# Patient Record
Sex: FEMALE | Race: WHITE | NOT HISPANIC OR LATINO | Employment: OTHER | URBAN - METROPOLITAN AREA
[De-identification: names, ages, dates, MRNs, and addresses within clinical notes are randomized per-mention and may not be internally consistent; named-entity substitution may affect disease eponyms.]

---

## 2017-01-03 ENCOUNTER — APPOINTMENT (OUTPATIENT)
Dept: ULTRASOUND IMAGING | Facility: HOSPITAL | Age: 72
End: 2017-01-03
Attending: INTERNAL MEDICINE

## 2017-01-04 ENCOUNTER — TRANSCRIBE ORDERS (OUTPATIENT)
Dept: ADMINISTRATIVE | Facility: HOSPITAL | Age: 72
End: 2017-01-04

## 2017-01-04 ENCOUNTER — LAB (OUTPATIENT)
Dept: LAB | Facility: HOSPITAL | Age: 72
End: 2017-01-04

## 2017-01-04 DIAGNOSIS — M10.071 ACUTE IDIOPATHIC GOUT OF RIGHT FOOT: ICD-10-CM

## 2017-01-04 DIAGNOSIS — M10.071 ACUTE IDIOPATHIC GOUT OF RIGHT FOOT: Primary | ICD-10-CM

## 2017-01-04 LAB
ALBUMIN SERPL-MCNC: 4.1 G/DL (ref 3.5–5.2)
ALP SERPL-CCNC: 88 U/L (ref 40–129)
ALT SERPL W P-5'-P-CCNC: 21 U/L (ref 5–33)
AST SERPL-CCNC: 26 U/L (ref 5–32)
BILIRUB CONJ SERPL-MCNC: <0.2 MG/DL (ref 0.2–0.3)
BILIRUB INDIRECT SERPL-MCNC: ABNORMAL MG/DL
BILIRUB SERPL-MCNC: 0.3 MG/DL (ref 0.2–1.2)
PROT SERPL-MCNC: 6.8 G/DL (ref 6–8.5)
URATE SERPL-MCNC: 4.5 MG/DL (ref 3.4–7)

## 2017-01-04 PROCEDURE — 84550 ASSAY OF BLOOD/URIC ACID: CPT

## 2017-01-04 PROCEDURE — 36415 COLL VENOUS BLD VENIPUNCTURE: CPT

## 2017-01-04 PROCEDURE — 80076 HEPATIC FUNCTION PANEL: CPT

## 2017-01-09 ENCOUNTER — TRANSCRIBE ORDERS (OUTPATIENT)
Dept: ADMINISTRATIVE | Facility: HOSPITAL | Age: 72
End: 2017-01-09

## 2017-01-09 ENCOUNTER — HOSPITAL ENCOUNTER (OUTPATIENT)
Dept: ULTRASOUND IMAGING | Facility: HOSPITAL | Age: 72
Discharge: HOME | End: 2017-01-09
Attending: INTERNAL MEDICINE | Admitting: INTERNAL MEDICINE

## 2017-01-09 ENCOUNTER — LAB (OUTPATIENT)
Dept: LAB | Facility: HOSPITAL | Age: 72
End: 2017-01-09
Attending: INTERNAL MEDICINE

## 2017-01-09 DIAGNOSIS — N17.9 ACUTE KIDNEY FAILURE, UNSPECIFIED (HCC): ICD-10-CM

## 2017-01-09 DIAGNOSIS — N18.30 CKD (CHRONIC KIDNEY DISEASE), STAGE III (HCC): ICD-10-CM

## 2017-01-09 DIAGNOSIS — D63.1 ANEMIA IN END-STAGE RENAL DISEASE (HCC): ICD-10-CM

## 2017-01-09 DIAGNOSIS — N18.6 ANEMIA IN END-STAGE RENAL DISEASE (HCC): ICD-10-CM

## 2017-01-09 DIAGNOSIS — I12.9 HYPERTENSIVE NEPHROPATHY, STAGE 1-4 OR UNSPECIFIED CHRONIC KIDNEY DISEASE: ICD-10-CM

## 2017-01-09 DIAGNOSIS — I12.9 HYPERTENSIVE NEPHROPATHY, STAGE 1-4 OR UNSPECIFIED CHRONIC KIDNEY DISEASE: Primary | ICD-10-CM

## 2017-01-09 DIAGNOSIS — N18.30 CHRONIC KIDNEY DISEASE, STAGE III (MODERATE) (HCC): ICD-10-CM

## 2017-01-09 LAB
25(OH)D3 SERPL-MCNC: 31.5 NG/ML
ANION GAP SERPL CALCULATED.3IONS-SCNC: 13.3 MMOL/L
BASOPHILS # BLD AUTO: 0.02 10*3/MM3 (ref 0–0.2)
BASOPHILS NFR BLD AUTO: 0.4 % (ref 0–2)
BILIRUB UR QL STRIP: NEGATIVE
BUN BLD-MCNC: 16 MG/DL (ref 8–23)
BUN/CREAT SERPL: 12.2 (ref 7–25)
CALCIUM SPEC-SCNC: 10.1 MG/DL (ref 8.8–10.5)
CHLORIDE SERPL-SCNC: 98 MMOL/L (ref 98–107)
CLARITY UR: CLEAR
CO2 SERPL-SCNC: 26.7 MMOL/L (ref 22–29)
COLOR UR: NORMAL
CREAT BLD-MCNC: 1.31 MG/DL (ref 0.57–1)
CREAT UR-MCNC: 30.1 MG/DL
DEPRECATED RDW RBC AUTO: 46 FL (ref 37–54)
EOSINOPHIL # BLD AUTO: 0.21 10*3/MM3 (ref 0.1–0.3)
EOSINOPHIL NFR BLD AUTO: 4.1 % (ref 0–4)
ERYTHROCYTE [DISTWIDTH] IN BLOOD BY AUTOMATED COUNT: 13.2 % (ref 11.5–14.5)
GFR SERPL CREATININE-BSD FRML MDRD: 40 ML/MIN/1.73
GLUCOSE BLD-MCNC: 87 MG/DL (ref 65–99)
GLUCOSE UR STRIP-MCNC: NEGATIVE MG/DL
HCT VFR BLD AUTO: 35.3 % (ref 37–47)
HGB BLD-MCNC: 11.5 G/DL (ref 12–16)
HGB UR QL STRIP.AUTO: NEGATIVE
IMM GRANULOCYTES # BLD: 0.08 10*3/MM3 (ref 0–0.03)
IMM GRANULOCYTES NFR BLD: 1.6 % (ref 0–0.5)
IRON 24H UR-MRATE: 59 MCG/DL (ref 37–145)
IRON SATN MFR SERPL: 20 %
KETONES UR QL STRIP: NEGATIVE
LEUKOCYTE ESTERASE UR QL STRIP.AUTO: NEGATIVE
LYMPHOCYTES # BLD AUTO: 1.97 10*3/MM3 (ref 0.6–4.8)
LYMPHOCYTES NFR BLD AUTO: 38.3 % (ref 20–45)
MCH RBC QN AUTO: 31 PG (ref 27–31)
MCHC RBC AUTO-ENTMCNC: 32.6 G/DL (ref 31–37)
MCV RBC AUTO: 95.1 FL (ref 81–99)
MONOCYTES # BLD AUTO: 0.58 10*3/MM3 (ref 0–1)
MONOCYTES NFR BLD AUTO: 11.3 % (ref 3–8)
NEUTROPHILS # BLD AUTO: 2.28 10*3/MM3 (ref 1.5–8.3)
NEUTROPHILS NFR BLD AUTO: 44.3 % (ref 45–70)
NITRITE UR QL STRIP: NEGATIVE
NRBC BLD MANUAL-RTO: 0 /100 WBC (ref 0–0)
PH UR STRIP.AUTO: 5 [PH] (ref 4.5–8)
PLATELET # BLD AUTO: 218 10*3/MM3 (ref 140–500)
PMV BLD AUTO: 9.2 FL (ref 7.4–10.4)
POTASSIUM BLD-SCNC: 4.3 MMOL/L (ref 3.5–5.2)
PROT UR QL STRIP: NEGATIVE
PROT UR-MCNC: 8 MG/DL
RBC # BLD AUTO: 3.71 10*6/MM3 (ref 4.2–5.4)
SODIUM BLD-SCNC: 138 MMOL/L (ref 136–145)
SP GR UR STRIP: 1.01 (ref 1–1.03)
TIBC SERPL-MCNC: 289 MCG/DL (ref 261–478)
UIBC SERPL-MCNC: 230 MCG/DL (ref 112–346)
UROBILINOGEN UR QL STRIP: NORMAL
WBC NRBC COR # BLD: 5.14 10*3/MM3 (ref 4.8–10.8)

## 2017-01-09 PROCEDURE — 80048 BASIC METABOLIC PNL TOTAL CA: CPT

## 2017-01-09 PROCEDURE — 85025 COMPLETE CBC W/AUTO DIFF WBC: CPT

## 2017-01-09 PROCEDURE — 82306 VITAMIN D 25 HYDROXY: CPT

## 2017-01-09 PROCEDURE — 83540 ASSAY OF IRON: CPT

## 2017-01-09 PROCEDURE — 36415 COLL VENOUS BLD VENIPUNCTURE: CPT

## 2017-01-09 PROCEDURE — 81003 URINALYSIS AUTO W/O SCOPE: CPT

## 2017-01-09 PROCEDURE — 76775 US EXAM ABDO BACK WALL LIM: CPT

## 2017-01-09 PROCEDURE — 84156 ASSAY OF PROTEIN URINE: CPT

## 2017-01-09 PROCEDURE — 82570 ASSAY OF URINE CREATININE: CPT

## 2017-01-09 PROCEDURE — 83550 IRON BINDING TEST: CPT

## 2017-01-11 ENCOUNTER — TELEPHONE (OUTPATIENT)
Dept: INTERNAL MEDICINE | Facility: CLINIC | Age: 72
End: 2017-01-11

## 2017-01-11 NOTE — TELEPHONE ENCOUNTER
Patient has been advised of her results.    --- Message from Bubba Avalos MD sent at 1/10/2017  4:37 PM EST -----  Normal renal ultrasound

## 2017-01-17 ENCOUNTER — OFFICE VISIT (OUTPATIENT)
Dept: PAIN MEDICINE | Facility: CLINIC | Age: 72
End: 2017-01-17

## 2017-01-17 VITALS
SYSTOLIC BLOOD PRESSURE: 127 MMHG | TEMPERATURE: 98.6 F | HEART RATE: 73 BPM | OXYGEN SATURATION: 96 % | WEIGHT: 201.2 LBS | DIASTOLIC BLOOD PRESSURE: 65 MMHG | HEIGHT: 66 IN | BODY MASS INDEX: 32.33 KG/M2 | RESPIRATION RATE: 18 BRPM

## 2017-01-17 DIAGNOSIS — M51.36 DDD (DEGENERATIVE DISC DISEASE), LUMBAR: ICD-10-CM

## 2017-01-17 DIAGNOSIS — Z79.899 ENCOUNTER FOR LONG-TERM (CURRENT) USE OF HIGH-RISK MEDICATION: ICD-10-CM

## 2017-01-17 DIAGNOSIS — M47.816 LUMBAR FACET ARTHROPATHY: ICD-10-CM

## 2017-01-17 DIAGNOSIS — M17.10 ARTHRITIS OF KNEE: ICD-10-CM

## 2017-01-17 DIAGNOSIS — G89.29 OTHER CHRONIC PAIN: Primary | ICD-10-CM

## 2017-01-17 PROCEDURE — 99213 OFFICE O/P EST LOW 20 MIN: CPT | Performed by: NURSE PRACTITIONER

## 2017-01-17 RX ORDER — HYDROCODONE BITARTRATE AND ACETAMINOPHEN 10; 325 MG/1; MG/1
1 TABLET ORAL EVERY 4 HOURS PRN
Qty: 180 TABLET | Refills: 0 | Status: SHIPPED | OUTPATIENT
Start: 2017-01-17 | End: 2017-02-16 | Stop reason: SDUPTHER

## 2017-01-17 RX ORDER — AMLODIPINE BESYLATE 10 MG/1
5 TABLET ORAL
COMMUNITY
End: 2017-01-17 | Stop reason: SDUPTHER

## 2017-01-17 NOTE — MR AVS SNAPSHOT
Jung Short   1/17/2017 12:40 PM   Office Visit    Dept Phone:  [phone]   Encounter #:  [id number]    Provider:  JOSE ANTONIO Childers   Department:  [facility] PAIN MANAGEMENT                Your Full Care Plan              Today's Medication Changes          These changes are accurate as of: 1/17/17  1:05 PM.  If you have any questions, ask your nurse or doctor.               Medication(s)that have changed:     amLODIPine 5 MG tablet   Commonly known as:  NORVASC   take 1 tablet by mouth once daily   What changed:  Another medication with the same name was removed. Continue taking this medication, and follow the directions you see here.   Changed by:  JOSE ANTONIO Childers            Where to Get Your Medications      You can get these medications from any pharmacy     Bring a paper prescription for each of these medications     HYDROcodone-acetaminophen  MG per tablet                  Your Updated Medication List          This list is accurate as of: 1/17/17  1:05 PM.  Always use your most recent med list.                alendronate 35 MG tablet   Commonly known as:  FOSAMAX   take 1 tablet by mouth WEEKLY       amLODIPine 5 MG tablet   Commonly known as:  NORVASC       anastrozole 1 MG tablet   Commonly known as:  ARIMIDEX   Take 1 tablet by mouth Daily.       aspirin 81 MG chewable tablet       Blood Glucose Test strip       bumetanide 2 MG tablet   Commonly known as:  BUMEX   Take 1 tablet by mouth 2 (Two) Times a Day.       carvedilol 12.5 MG tablet   Commonly known as:  COREG   take 1 tablet by mouth twice a day       CEPHALEXIN PO       Co Q 10 100 MG capsule       colchicine 0.6 MG tablet       DULoxetine 30 MG capsule   Commonly known as:  CYMBALTA   Take 1 capsule by mouth Daily.       * ULORIC 80 MG tablet tablet   Generic drug:  febuxostat       * febuxostat 40 MG tablet   Commonly known as:  ULORIC       FLUZONE HIGH-DOSE 0.5 ML  suspension prefilled syringe injection   Generic drug:  influenza vac split high-dose       glimepiride 1 MG tablet   Commonly known as:  AMARYL   Take 1/2 tab po a.m. And p.m.       HYDROcodone-acetaminophen  MG per tablet   Commonly known as:  NORCO   Take 1 tablet by mouth Every 4 (Four) Hours As Needed for moderate pain (4-6).       IRON PO       levothyroxine 25 MCG tablet   Commonly known as:  SYNTHROID, LEVOTHROID   take 1 tablet by mouth once daily       Magnesium 400 MG capsule       MULTIVITAMIN ADULT PO       Naloxegol Oxalate 25 MG tablet   Take 25 mg by mouth Daily.       spironolactone 25 MG tablet   Commonly known as:  ALDACTONE   take 1 tablet by mouth once daily       vitamin B-12 100 MCG tablet   Commonly known as:  CYANOCOBALAMIN       * Notice:  This list has 2 medication(s) that are the same as other medications prescribed for you. Read the directions carefully, and ask your doctor or other care provider to review them with you.            You Were Diagnosed With        Codes Comments    Other chronic pain    -  Primary ICD-10-CM: G89.29  ICD-9-CM: 338.29     DDD (degenerative disc disease), lumbar     ICD-10-CM: M51.36  ICD-9-CM: 722.52     Lumbar facet arthropathy     ICD-10-CM: M12.88  ICD-9-CM: 721.3     Arthritis of knee     ICD-10-CM: M19.90  ICD-9-CM: 716.96     Encounter for long-term (current) use of high-risk medication     ICD-10-CM: Z79.899  ICD-9-CM: V58.69       Instructions     None    Patient Instructions History      Upcoming Appointments     Visit Type Date Time Department    OFFICE VISIT 1/17/2017 12:40 PM [facility] PAIN [facility]    FOLLOW UP 1 UNIT 3/14/2017  3:20 PM [facility] ONC CBC [facility]    LAB 3/14/2017  2:40 PM [facility] ONC CBC LAB [facility]      App [employer] Signup     [facility] [employer] allows you to send messages to your doctor, view your test results, renew your prescriptions, schedule appointments, and more. To sign up, go to idio and click on the Sign Up  "Now link in the New User? box. Enter your [employer] Activation Code exactly as it appears below along with the last four digits of your Social Security Number and your Date of Birth () to complete the sign-up process. If you do not sign up before the expiration date, you must request a new code.    [employer] Activation Code: [email or web address]  Expires: 2017  1:05 PM    If you have questions, you can email [email or web address] or call [phone] to talk to our [employer] staff. Remember, [employer] is NOT to be used for urgent needs. For medical emergencies, dial 911.               Other Info from Your Visit           Your Appointments     Mar 14, 2017  2:40 PM EDT   Lab with LAB CHAIR 1 [facility] ONCOLOGY CBC LAB ([facility])    [address]  [place] [address] [phone]            Mar 14, 2017  3:20 PM EDT   FOLLOW UP with Tory Pagan MD   [facility] IN BLOOD DISORDERS AND CANCER ([facility])    [address]  [address]  [place] [address] [phone]              Allergies     Other      Adhesive tape    Latex  Rash      Reason for Visit     Pain           Vital Signs     Blood Pressure Pulse Temperature Respirations Height Weight    127/65 73 98.6 °F (37 °C) 18 66\" (167.6 cm) 201 lb 3.2 oz (91.3 kg)    Oxygen Saturation Body Mass Index Smoking Status             96% 32.47 kg/m2 Former Smoker         Problems and Diagnoses Noted     Arthritis of knee    Chronic pain    Degeneration of lumbar or lumbosacral intervertebral disc    Encounter for long-term (current) use of high-risk medication    Joint disorder        "

## 2017-01-17 NOTE — PROGRESS NOTES
CHIEF COMPLAINT    Aron returns for her diffuse pain. Today her knees and hands are hurting the worst.  She sat at a conference here recently and has hurt a lot since.    Subjective   Wendyl Aron Short is a 71 y.o. female  who presents to the office for follow-up.She has a history of chronic pain of multiple joints, OA, low back pain.  Overall her pain remains unchanged, has good and bad days depending on activity.  She is in need of multiple joint replacements but she is too high risk due to cardiovascular issues. She continues to report moderate pain reduction with the current regimen of Hydrocodone 10/325 6/day. Denies adverse reactions, ADL's by self. Does report some constipation, uses Movantik PRN.      Joint Pain   This is a chronic problem. The current episode started more than 1 year ago (today pain is 9/10VAS- joints). The problem occurs constantly. The problem has been unchanged. Associated symptoms include arthralgias, fatigue, joint swelling and numbness. Pertinent negatives include no chest pain, chills, congestion, coughing, fever, headaches (when she went off lisinopril), vomiting or weakness. Nothing aggravates the symptoms. She has tried oral narcotics, position changes and rest for the symptoms. The treatment provided moderate relief.      PEG Assessment   What number best describes your pain on average in the past week?9  What number best describes how, during the past week, pain has interfered with your enjoyment of life?9  What number best describes how, during the past week, pain has interfered with your general activity?  9    The following portions of the patient's history were reviewed and updated as appropriate: allergies, current medications, past family history, past medical history, past social history, past surgical history and problem list.    Review of Systems   Constitutional: Positive for fatigue. Negative for activity change, appetite change, chills and fever.   HENT: Negative  "for congestion.    Respiratory: Negative for cough, shortness of breath and wheezing.    Cardiovascular: Negative for chest pain.   Gastrointestinal: Negative for constipation, diarrhea and vomiting.   Genitourinary: Negative for difficulty urinating, dyspareunia and dysuria.   Musculoskeletal: Positive for arthralgias and joint swelling.   Neurological: Positive for numbness. Negative for dizziness, weakness, light-headedness and headaches (when she went off lisinopril).   Psychiatric/Behavioral: Positive for sleep disturbance. Negative for agitation, confusion, hallucinations and suicidal ideas. The patient is nervous/anxious.      Vitals:    01/17/17 1236   BP: 127/65   Pulse: 73   Resp: 18   Temp: 98.6 °F (37 °C)   SpO2: 96%   Weight: 201 lb 3.2 oz (91.3 kg)   Height: 66\" (167.6 cm)   PainSc:   9   PainLoc: Knee     Objective   Physical Exam   Constitutional: She is oriented to person, place, and time. Vital signs are normal. She appears well-developed and well-nourished. She is cooperative.   HENT:   Head: Normocephalic and atraumatic.   Nose: Nose normal.   Eyes: Conjunctivae and lids are normal.   Cardiovascular: Normal rate, regular rhythm and normal heart sounds.    Pulmonary/Chest: Effort normal and breath sounds normal.   Abdominal: Normal appearance.   Musculoskeletal:        Right shoulder: She exhibits decreased range of motion and tenderness.        Left shoulder: She exhibits decreased range of motion and tenderness.        Right knee: She exhibits swelling. She exhibits no effusion. Tenderness found.        Left knee: She exhibits decreased range of motion and swelling. She exhibits no effusion. Tenderness found.        Lumbar back: She exhibits tenderness.   Walking shoe bilateral feet   Neurological: She is alert and oriented to person, place, and time. Gait (ambulates with cane ) abnormal.   Reflex Scores:       Patellar reflexes are 1+ on the right side and 1+ on the left side.  Skin: Skin is " warm, dry and intact.   Psychiatric: She has a normal mood and affect. Her speech is normal and behavior is normal. Judgment and thought content normal. Cognition and memory are normal.   Nursing note and vitals reviewed.    Assessment/Plan   Jung was seen today for pain.    Diagnoses and all orders for this visit:    Other chronic pain    DDD (degenerative disc disease), lumbar    Lumbar facet arthropathy    Arthritis of knee    Encounter for long-term (current) use of high-risk medication    Other orders  -     HYDROcodone-acetaminophen (NORCO)  MG per tablet; Take 1 tablet by mouth Every 4 (Four) Hours As Needed for moderate pain (4-6).      --- Refill Hydrocodone.  Patient appears stable with current regimen. No adverse effects. Regarding continuation of opioids, there is no evidence of aberrant behavior or any red flags.  The patient continues with appropriate response to opioid therapy. ADL's remain intact by self.   --- The urine drug screen confirmation from 5- has been reviewed and the result is appropriate based on patient history and LOUANN report  --- Follow-up 1 month          LOUANN REPORT    As part of the patient's treatment plan, I am prescribing controlled substances. The patient has been made aware of appropriate use of such medications, including potential risk of somnolence, limited ability to drive and/or work safely, and the potential for dependence or overdose. It has also bee made clear that these medications are for use by this patient only, without concomitant use of alcohol or other substances unless prescribed.     Patient has completed prescribing agreement detailing terms of continued prescribing of controlled substances, including monitoring LOUANN reports, urine drug screening, and pill counts if necessary. The patient is aware that inappropriate use will results in cessation of prescribing such medications.    LOUANN report has been reviewed and scanned into the  patient's chart.    Date of last LOUANN : 1-    History and physical exam exhibit continued safe and appropriate use of controlled substances.    EMR Dragon/Transcription disclaimer:   Much of this encounter note is an electronic transcription/translation of spoken language to printed text. The electronic translation of spoken language may permit erroneous, or at times, nonsensical words or phrases to be inadvertently transcribed; Although I have reviewed the note for such errors, some may still exist.

## 2017-01-26 ENCOUNTER — TELEPHONE (OUTPATIENT)
Dept: INTERNAL MEDICINE | Facility: CLINIC | Age: 72
End: 2017-01-26

## 2017-01-26 RX ORDER — METHYLPREDNISOLONE 4 MG/1
TABLET ORAL
Qty: 21 TABLET | Refills: 0 | Status: SHIPPED | OUTPATIENT
Start: 2017-01-26 | End: 2017-02-16

## 2017-01-26 NOTE — TELEPHONE ENCOUNTER
----- Message from Bubba Avalos MD sent at 1/26/2017 10:16 AM EST -----  Lets send out a medrol pack and if not better ntbs  ----- Message -----     From: Lizeth Hampton MA     Sent: 1/23/2017   3:16 PM       To: Bubba Avalos MD        ----- Message -----     From: Kylee Granger     Sent: 1/23/2017   2:42 PM       To: [facility] [place] [address]    PT WENT TO SEE DR CASTRO TODAY AND THEY SUGGEST SHE COMES IN HERE. STATES HER KNEES ARE SWOLLEN, AND HAS SHOULDER AND HAND PAIN. STATES SHE DOESN'T KNOW WHAT SHE NEEDS TO DO. DOESN'T KNOW IF SHE NEEDS TO BE SEEN OR IF WE CAN JUST PUT HER ON A STEROID. PT SEES CHRISTOPHER. CALL BACK [phone].    Sent to [facility],  Left message for pt

## 2017-01-31 RX ORDER — CARVEDILOL 12.5 MG/1
TABLET ORAL
Qty: 60 TABLET | Refills: 6 | Status: SHIPPED | OUTPATIENT
Start: 2017-01-31 | End: 2017-09-09 | Stop reason: SDUPTHER

## 2017-02-16 ENCOUNTER — OFFICE VISIT (OUTPATIENT)
Dept: PAIN MEDICINE | Facility: CLINIC | Age: 72
End: 2017-02-16

## 2017-02-16 VITALS
HEIGHT: 66 IN | OXYGEN SATURATION: 98 % | HEART RATE: 74 BPM | TEMPERATURE: 98.2 F | SYSTOLIC BLOOD PRESSURE: 119 MMHG | RESPIRATION RATE: 18 BRPM | DIASTOLIC BLOOD PRESSURE: 66 MMHG

## 2017-02-16 DIAGNOSIS — G89.29 OTHER CHRONIC PAIN: Primary | ICD-10-CM

## 2017-02-16 DIAGNOSIS — Z79.899 ENCOUNTER FOR LONG-TERM (CURRENT) USE OF HIGH-RISK MEDICATION: ICD-10-CM

## 2017-02-16 DIAGNOSIS — M51.36 DDD (DEGENERATIVE DISC DISEASE), LUMBAR: ICD-10-CM

## 2017-02-16 DIAGNOSIS — M15.9 GENERALIZED OSTEOARTHRITIS: ICD-10-CM

## 2017-02-16 DIAGNOSIS — M17.10 ARTHRITIS OF KNEE: ICD-10-CM

## 2017-02-16 LAB
POC AMPHETAMINES: NEGATIVE
POC BARBITURATES: NEGATIVE
POC BENZODIAZEPHINES: NEGATIVE
POC COCAINE: NEGATIVE
POC METHADONE: NEGATIVE
POC METHAMPHETAMINE SCREEN URINE: NEGATIVE
POC OPIATES: POSITIVE
POC OXYCODONE: POSITIVE
POC PHENCYCLIDINE: NEGATIVE
POC PROPOXYPHENE: NEGATIVE
POC THC: NEGATIVE
POC TRICYCLIC ANTIDEPRESSANTS: POSITIVE

## 2017-02-16 PROCEDURE — 99213 OFFICE O/P EST LOW 20 MIN: CPT | Performed by: NURSE PRACTITIONER

## 2017-02-16 PROCEDURE — 80305 DRUG TEST PRSMV DIR OPT OBS: CPT | Performed by: NURSE PRACTITIONER

## 2017-02-16 RX ORDER — LISINOPRIL 5 MG/1
5 TABLET ORAL DAILY
Refills: 0 | COMMUNITY
Start: 2017-02-13 | End: 2018-05-10 | Stop reason: SINTOL

## 2017-02-16 RX ORDER — HYDROCODONE BITARTRATE AND ACETAMINOPHEN 10; 325 MG/1; MG/1
1 TABLET ORAL EVERY 4 HOURS PRN
Qty: 180 TABLET | Refills: 0 | Status: SHIPPED | OUTPATIENT
Start: 2017-02-16 | End: 2017-03-16 | Stop reason: SDUPTHER

## 2017-02-16 NOTE — PROGRESS NOTES
"CHIEF COMPLAINT  Follow-up for back and knee pain. Ms. Short states that her left knee has increased since her last appt. He back pain is unchanged.    Subjective   Jung Short is a 71 y.o. female  who presents to the office for follow-up.She has a history of knee pain/OA and back pain.  Overall, her back pain has remained unchanged though her joint pain is slightly worse. She is still having issues with her shoulder(right, \"it is torn\"). She is in need of multiple joint replacements (two knees and 1 shoulder).    Complains of pain in her low back and knees and joints. Today her pain is 8/10VAS. Describes the pain as continuous.  Continues with Hydrocodone 10/325 6/day and Cymbalta. Denies any side effects from the regimen. Constipation is no longer an issue, which has improved significantly. Is not taking Movantik regularly but rather sparingly. Denies any other side effects from the regimen. The regimen helps decrease her pain moderately. ADL's by self.    Is under the care of rheumatology, with Dr. Bolanos. Being followed for gout. Has a follow-up in 6 months.     She has a history of CKD and cannot tolerate NSAIDs.     Joint Pain   This is a chronic problem. The current episode started more than 1 year ago (today pain is 8/10VAS- joints). The problem occurs constantly. The problem has been unchanged. Associated symptoms include arthralgias, fatigue, joint swelling and weakness. Pertinent negatives include no chest pain, chills, congestion, coughing, fever, headaches, numbness or vomiting. Nothing aggravates the symptoms. She has tried oral narcotics, position changes and rest for the symptoms. The treatment provided moderate relief.      The following portions of the patient's history were reviewed and updated as appropriate: allergies, current medications, past family history, past medical history, past social history, past surgical history and problem list.    Review of Systems   Constitutional: Positive " "for fatigue. Negative for chills and fever.   HENT: Negative for congestion.    Eyes: Negative for visual disturbance.   Respiratory: Negative for cough, shortness of breath and wheezing.    Cardiovascular: Positive for leg swelling. Negative for chest pain.   Gastrointestinal: Negative for constipation, diarrhea and vomiting.   Genitourinary: Negative for difficulty urinating.   Musculoskeletal: Positive for arthralgias and joint swelling. Negative for back pain.   Neurological: Positive for weakness. Negative for light-headedness, numbness and headaches.   Psychiatric/Behavioral: Positive for sleep disturbance. Negative for suicidal ideas. The patient is nervous/anxious.        Vitals:    02/16/17 1331   BP: 119/66   Pulse: 74   Resp: 18   Temp: 98.2 °F (36.8 °C)   SpO2: 98%   Height: 66\" (167.6 cm)   PainSc:   8   PainLoc: Knee     Objective   Physical Exam   Constitutional: She is oriented to person, place, and time. Vital signs are normal. She appears well-developed and well-nourished. She is cooperative.   HENT:   Head: Normocephalic and atraumatic.   Nose: Nose normal.   Eyes: Conjunctivae and lids are normal.   Cardiovascular: Normal rate, regular rhythm and normal heart sounds.    Pulmonary/Chest: Effort normal and breath sounds normal. No respiratory distress.   Abdominal: Normal appearance.   Musculoskeletal:        Right shoulder: She exhibits decreased range of motion and tenderness.        Left shoulder: She exhibits decreased range of motion (patient has difficulty with any over-head reach) and tenderness.        Left knee: She exhibits decreased range of motion, effusion and bony tenderness. Tenderness found.        Lumbar back: She exhibits tenderness.   OA changes of bilateral knees    Walking shoe of bilateral feet         Neurological: She is alert and oriented to person, place, and time. Gait (ambulates with aid of cane) abnormal.   Reflex Scores:       Patellar reflexes are 1+ on the right side " and 1+ on the left side.  Skin: Skin is warm, dry and intact.   Psychiatric: She has a normal mood and affect. Her speech is normal and behavior is normal. Judgment and thought content normal. Cognition and memory are normal.   Nursing note and vitals reviewed.      Assessment/Plan   Jung was seen today for back pain and knee pain.    Diagnoses and all orders for this visit:    Other chronic pain  -     POC Urine Drug Screen, Triage  -     Urine Drug Screen Confirmation    Generalized osteoarthritis  -     POC Urine Drug Screen, Triage  -     Urine Drug Screen Confirmation    DDD (degenerative disc disease), lumbar  -     POC Urine Drug Screen, Triage  -     Urine Drug Screen Confirmation    Arthritis of knee  -     POC Urine Drug Screen, Triage  -     Urine Drug Screen Confirmation    Encounter for long-term (current) use of high-risk medication  -     POC Urine Drug Screen, Triage  -     Urine Drug Screen Confirmation    Other orders  -     HYDROcodone-acetaminophen (NORCO)  MG per tablet; Take 1 tablet by mouth Every 4 (Four) Hours As Needed for moderate pain (4-6).      --- Routine UDS in office today as part of monitoring requirements for controlled substances.  The specimen was viewed and the immunoassay result reviewed and is +OPI, +TCA(appropriate) and +OXCY(anticipate false positive).  This specimen will be sent to [facility] laboratory for confirmation.     --- Refill Hydrocodone. Patient appears stable with current regimen. No adverse effects. Regarding continuation of opioids, there is no evidence of aberrant behavior or any red flags.  The patient continues with appropriate response to opioid therapy. ADL's remain intact by self.   --- Follow-up 1 month or sooner if needed.       LOUANN REPORT    As part of the patient's treatment plan, I am prescribing controlled substances. The patient has been made aware of appropriate use of such medications, including potential risk of somnolence, limited  ability to drive and/or work safely, and the potential for dependence or overdose. It has also bee made clear that these medications are for use by this patient only, without concomitant use of alcohol or other substances unless prescribed.     Patient has completed prescribing agreement detailing terms of continued prescribing of controlled substances, including monitoring LOUANN reports, urine drug screening, and pill counts if necessary. The patient is aware that inappropriate use will results in cessation of prescribing such medications.    LOUANN report has been reviewed and scanned into the patient's chart.    Date of last LOUANN : 2-13-17    History and physical exam exhibit continued safe and appropriate use of controlled substances.      EMR Dragon/Transcription disclaimer:   Much of this encounter note is an electronic transcription/translation of spoken language to printed text. The electronic translation of spoken language may permit erroneous, or at times, nonsensical words or phrases to be inadvertently transcribed; Although I have reviewed the note for such errors, some may still exist.

## 2017-02-22 ENCOUNTER — OFFICE VISIT (OUTPATIENT)
Dept: INTERNAL MEDICINE | Facility: CLINIC | Age: 72
End: 2017-02-22

## 2017-02-22 VITALS
BODY MASS INDEX: 32.02 KG/M2 | SYSTOLIC BLOOD PRESSURE: 134 MMHG | HEART RATE: 77 BPM | WEIGHT: 199.2 LBS | HEIGHT: 66 IN | DIASTOLIC BLOOD PRESSURE: 86 MMHG | OXYGEN SATURATION: 97 %

## 2017-02-22 DIAGNOSIS — M25.50 ARTHRALGIA OF MULTIPLE JOINTS: ICD-10-CM

## 2017-02-22 DIAGNOSIS — E03.9 ACQUIRED HYPOTHYROIDISM: ICD-10-CM

## 2017-02-22 DIAGNOSIS — E11.9 TYPE 2 DIABETES MELLITUS WITHOUT COMPLICATION, WITHOUT LONG-TERM CURRENT USE OF INSULIN (HCC): Primary | ICD-10-CM

## 2017-02-22 DIAGNOSIS — M10.9 GOUT, UNSPECIFIED CAUSE, UNSPECIFIED CHRONICITY, UNSPECIFIED SITE: ICD-10-CM

## 2017-02-22 PROCEDURE — 99214 OFFICE O/P EST MOD 30 MIN: CPT | Performed by: INTERNAL MEDICINE

## 2017-02-22 RX ORDER — DULOXETIN HYDROCHLORIDE 60 MG/1
60 CAPSULE, DELAYED RELEASE ORAL DAILY
Qty: 30 CAPSULE | Refills: 1 | Status: SHIPPED | OUTPATIENT
Start: 2017-02-22 | End: 2017-04-20 | Stop reason: SDUPTHER

## 2017-02-22 RX ORDER — PREDNISONE 10 MG/1
TABLET ORAL
Qty: 35 TABLET | Refills: 0 | Status: SHIPPED | OUTPATIENT
Start: 2017-02-22 | End: 2017-03-16

## 2017-02-22 NOTE — PATIENT INSTRUCTIONS
Assessment/Plan   Jung was seen today for knee pain.    Diagnoses and all orders for this visit:    Type 2 diabetes mellitus without complication, without long-term current use of insulin    Acquired hypothyroidism    Arthralgia of multiple joints  -     DULoxetine (CYMBALTA) 60 MG capsule; Take 1 capsule by mouth Daily.  -     predniSONE (DELTASONE) 10 MG tablet; 5 tab po daily for 2 days, 4 for 2 days, 3 for 2 days, 2 for 2 days, 1 for 2 days    Gout, unspecified cause, unspecified chronicity, unspecified site    Recommend sparing use of prednisone  Repeat bmp, magnesium, phosphate  Gout well controlled  DM well controlled last check, will check next visit.

## 2017-02-22 NOTE — PROGRESS NOTES
Subjective   Jnug Short is a 71 y.o. female.     History of Present Illness   70yo female with L knee pain. Ongoing CKD, back to baseline 1.3 off norvasc stable on her ACEI.  She has ongoing pain in her L knee.  Had knee replaced on R with good success.  She had initial success with cymbalta. Unable to do elective surgery due to her heart disease and CHF.     She is seeing Dr. Bolanos for her gout, uric acid 7-->4.0 last check, sees him again in 6 months.    She is unable to walk reliably on L knee, has stopped driving, very depressed about loss of independence.     The following portions of the patient's history were reviewed and updated as appropriate: allergies, current medications, past family history, past medical history, past social history, past surgical history and problem list.    Review of Systems   Constitutional: Negative for appetite change, fatigue, fever and unexpected weight change.   HENT: Negative for sinus pressure and trouble swallowing.    Respiratory: Negative for cough and chest tightness.    Cardiovascular: Negative for chest pain, palpitations and leg swelling.   Gastrointestinal: Negative for constipation and diarrhea.   Genitourinary: Negative for dysuria, frequency, hematuria, pelvic pain and vaginal discharge.   Musculoskeletal: Positive for back pain and joint swelling.        L knee pain and swelling  R shoulder pain   Skin: Negative.    Neurological: Negative for dizziness, light-headedness and headaches.   Hematological: Negative.    Psychiatric/Behavioral: Positive for dysphoric mood. Negative for behavioral problems and sleep disturbance. The patient is nervous/anxious.        Objective   Physical Exam   Constitutional: She is oriented to person, place, and time. She appears well-developed and well-nourished.   HENT:   Head: Normocephalic and atraumatic.   Right Ear: External ear normal.   Left Ear: External ear normal.   Eyes: EOM are normal. Pupils are equal, round, and  reactive to light. Right eye exhibits no discharge. Left eye exhibits no discharge.   Neck: Normal range of motion. Neck supple.   Cardiovascular: Normal rate, regular rhythm and normal heart sounds.  Exam reveals no friction rub.    No murmur heard.  Pulmonary/Chest: Effort normal and breath sounds normal.   Musculoskeletal:   L medial knee effusion   Neurological: She is alert and oriented to person, place, and time. No cranial nerve deficit.   Skin: Skin is warm and dry. No erythema.   Psychiatric: She has a normal mood and affect. Her behavior is normal.   Vitals reviewed.  Labs 1.31. Hb 11.    Assessment/Plan   Jung was seen today for knee pain.    Diagnoses and all orders for this visit:    Type 2 diabetes mellitus without complication, without long-term current use of insulin    Acquired hypothyroidism    Arthralgia of multiple joints  -     DULoxetine (CYMBALTA) 60 MG capsule; Take 1 capsule by mouth Daily.  -     predniSONE (DELTASONE) 10 MG tablet; 5 tab po daily for 2 days, 4 for 2 days, 3 for 2 days, 2 for 2 days, 1 for 2 days    Gout, unspecified cause, unspecified chronicity, unspecified site    Recommend sparing use of prednisone  Repeat bmp, magnesium, phosphate  Gout well controlled  DM well controlled last check, will check next visit.

## 2017-02-23 RX ORDER — ANASTROZOLE 1 MG/1
TABLET ORAL
Qty: 30 TABLET | Refills: 2 | Status: SHIPPED | OUTPATIENT
Start: 2017-02-23 | End: 2017-06-01 | Stop reason: SDUPTHER

## 2017-02-28 ENCOUNTER — TELEPHONE (OUTPATIENT)
Dept: INTERNAL MEDICINE | Facility: CLINIC | Age: 72
End: 2017-02-28

## 2017-02-28 RX ORDER — LEVOTHYROXINE SODIUM 0.03 MG/1
TABLET ORAL
Qty: 30 TABLET | Refills: 6 | Status: SHIPPED | OUTPATIENT
Start: 2017-02-28 | End: 2017-10-25 | Stop reason: SDUPTHER

## 2017-02-28 NOTE — TELEPHONE ENCOUNTER
Per Dr. Jensen, there are no contraindications between these 2 medications.  Patient advised.    ----- Message from Janet Lopez MA sent at 2017  3:28 PM EST -----  Regarding: FW: QUESTION ABOUT MEDS  Contact: [phone]      ----- Message -----     From: Valentina Au     Sent: 2017   3:00 PM       To: [facility]  Subject: QUESTION ABOUT MEDS                              :  41    [facility] PATIENT    PATIENT WAS PRESCRIBED PREDNISONE, 10 MG AND DULOXETINE, 60 MG LAST WEEK. SHE HAS ONLY BEEN TAKING THE DULOXETINE. CAN SHE TAKE BOTH MEDS AT THE SAME TIME?

## 2017-03-16 ENCOUNTER — OFFICE VISIT (OUTPATIENT)
Dept: PAIN MEDICINE | Facility: CLINIC | Age: 72
End: 2017-03-16

## 2017-03-16 VITALS
OXYGEN SATURATION: 98 % | SYSTOLIC BLOOD PRESSURE: 123 MMHG | WEIGHT: 204 LBS | HEART RATE: 73 BPM | BODY MASS INDEX: 32.78 KG/M2 | HEIGHT: 66 IN | TEMPERATURE: 98 F | DIASTOLIC BLOOD PRESSURE: 72 MMHG | RESPIRATION RATE: 15 BRPM

## 2017-03-16 DIAGNOSIS — Z79.899 ENCOUNTER FOR LONG-TERM (CURRENT) USE OF HIGH-RISK MEDICATION: ICD-10-CM

## 2017-03-16 DIAGNOSIS — M47.816 LUMBAR FACET ARTHROPATHY: ICD-10-CM

## 2017-03-16 DIAGNOSIS — M15.9 GENERALIZED OSTEOARTHRITIS: ICD-10-CM

## 2017-03-16 DIAGNOSIS — M51.36 DDD (DEGENERATIVE DISC DISEASE), LUMBAR: ICD-10-CM

## 2017-03-16 DIAGNOSIS — G89.29 OTHER CHRONIC PAIN: Primary | ICD-10-CM

## 2017-03-16 DIAGNOSIS — M17.10 ARTHRITIS OF KNEE: ICD-10-CM

## 2017-03-16 PROCEDURE — 99213 OFFICE O/P EST LOW 20 MIN: CPT | Performed by: NURSE PRACTITIONER

## 2017-03-16 RX ORDER — HYDROCODONE BITARTRATE AND ACETAMINOPHEN 10; 325 MG/1; MG/1
1 TABLET ORAL EVERY 4 HOURS PRN
Qty: 180 TABLET | Refills: 0 | Status: SHIPPED | OUTPATIENT
Start: 2017-03-16 | End: 2017-04-13 | Stop reason: SDUPTHER

## 2017-03-16 NOTE — PROGRESS NOTES
CHIEF COMPLAINT    Since pt's last visit on 2-16-17, pt's knee and back pain decreased since she was on prednisone. But now that she is off of it, she states she feels the pain increasing.     Subjective   Jung Short is a 71 y.o. female  who presents to the office for follow-up.She has a history of arthritis/joint pain(multiple joints) and back pain. Her pain was slightly improved when she was on steroids, which helped her pain. However, she has completed the taper and her pain is now slightly worse.    Complains of pain in her joints and back. Today her pain is 6/10VAS. Describes the pain as continuous. Continues with Hydrocodone 10/325 6/day and Cymbalta daily. She does have some constipation with the regimen. Takes Movantik PRN with positive results.Has not been as bad since her last office visit. The regimen helps decrease her pain moderately. ADL's by self.     Is under the care of rheumatology, with Dr. Bolanos. Being followed for gout. Has a follow-up in < 6 months.      She has a history of CKD and cannot tolerate NSAIDs.     Joint Pain   This is a chronic problem. The current episode started more than 1 year ago (today pain is 6/10VAS- joints). The problem occurs constantly. The problem has been unchanged. Associated symptoms include arthralgias, fatigue, joint swelling and weakness. Pertinent negatives include no chest pain, chills, congestion, coughing, fever, headaches, numbness or vomiting. Nothing aggravates the symptoms. She has tried oral narcotics, position changes and rest for the symptoms. The treatment provided moderate relief.      PEG Assessment   What number best describes your pain on average in the past week?7  What number best describes how, during the past week, pain has interfered with your enjoyment of life?7  What number best describes how, during the past week, pain has interfered with your general activity?  7    The following portions of the patient's history were reviewed and  "updated as appropriate: allergies, current medications, past family history, past medical history, past social history, past surgical history and problem list.    Review of Systems   Constitutional: Positive for fatigue. Negative for chills and fever.   HENT: Negative for congestion.    Eyes: Negative for visual disturbance.   Respiratory: Negative for cough, shortness of breath and wheezing.    Cardiovascular: Positive for leg swelling (pt states they stay swollen). Negative for chest pain.   Gastrointestinal: Negative for constipation, diarrhea and vomiting.   Genitourinary: Negative for difficulty urinating.   Musculoskeletal: Positive for arthralgias, back pain and joint swelling.   Neurological: Positive for weakness. Negative for light-headedness, numbness and headaches.   Psychiatric/Behavioral: Positive for sleep disturbance. Negative for agitation, hallucinations and suicidal ideas. The patient is nervous/anxious.        Vitals:    03/16/17 1331   BP: 123/72   Pulse: 73   Resp: 15   Temp: 98 °F (36.7 °C)   SpO2: 98%   Weight: 204 lb (92.5 kg)   Height: 66\" (167.6 cm)   PainSc: 6  Comment: knees   PainLoc: Back     Objective   Physical Exam   Constitutional: She is oriented to person, place, and time. Vital signs are normal. She appears well-developed and well-nourished. She is cooperative.   HENT:   Head: Normocephalic and atraumatic.   Nose: Nose normal.   Eyes: Conjunctivae and lids are normal.   Cardiovascular: Normal rate, regular rhythm and normal heart sounds.    Pulmonary/Chest: Effort normal and breath sounds normal. No respiratory distress.   Abdominal: Normal appearance.   Musculoskeletal:        Right shoulder: She exhibits decreased range of motion and tenderness.        Left shoulder: She exhibits decreased range of motion (patient has difficulty with any over-head reach) and tenderness.        Left knee: She exhibits decreased range of motion, effusion and bony tenderness. Tenderness found.    "     Lumbar back: She exhibits tenderness.   OA changes of bilateral knees    Walking shoes of bilateral feet         Neurological: She is alert and oriented to person, place, and time. Gait (ambulates with aid of cane) abnormal.   Reflex Scores:       Patellar reflexes are 1+ on the right side and 1+ on the left side.  Skin: Skin is warm, dry and intact.   Psychiatric: She has a normal mood and affect. Her speech is normal and behavior is normal. Judgment and thought content normal. Cognition and memory are normal.   Nursing note and vitals reviewed.      Assessment/Plan   Jung was seen today for back pain and pain.    Diagnoses and all orders for this visit:    Other chronic pain    DDD (degenerative disc disease), lumbar    Lumbar facet arthropathy    Arthritis of knee    Generalized osteoarthritis    Encounter for long-term (current) use of high-risk medication    Other orders  -     HYDROcodone-acetaminophen (NORCO)  MG per tablet; Take 1 tablet by mouth Every 4 (Four) Hours As Needed for Moderate Pain (4-6).      --- The urine drug screen confirmation from 2-14-17 has been reviewed and the result is appropriate based on patient history and LOUANN report  --- Refill Hydrocodone. Patient appears stable with current regimen. No adverse effects. Regarding continuation of opioids, there is no evidence of aberrant behavior or any red flags.  The patient continues with appropriate response to opioid therapy. ADL's remain intact by self.   --- Follow-up 1 month or sooneri f needed.         LOUANN REPORT    As part of the patient's treatment plan, I am prescribing controlled substances. The patient has been made aware of appropriate use of such medications, including potential risk of somnolence, limited ability to drive and/or work safely, and the potential for dependence or overdose. It has also bee made clear that these medications are for use by this patient only, without concomitant use of alcohol or other  substances unless prescribed.     Patient has completed prescribing agreement detailing terms of continued prescribing of controlled substances, including monitoring LOUANN reports, urine drug screening, and pill counts if necessary. The patient is aware that inappropriate use will results in cessation of prescribing such medications.    LOUANN report has been reviewed and scanned into the patient's chart.    Date of last LOUANN : 3-14-17    History and physical exam exhibit continued safe and appropriate use of controlled substances.      EMR Dragon/Transcription disclaimer:   Much of this encounter note is an electronic transcription/translation of spoken language to printed text. The electronic translation of spoken language may permit erroneous, or at times, nonsensical words or phrases to be inadvertently transcribed; Although I have reviewed the note for such errors, some may still exist.

## 2017-04-05 ENCOUNTER — LAB (OUTPATIENT)
Dept: LAB | Facility: HOSPITAL | Age: 72
End: 2017-04-05
Attending: INTERNAL MEDICINE

## 2017-04-05 ENCOUNTER — TRANSCRIBE ORDERS (OUTPATIENT)
Dept: ADMINISTRATIVE | Facility: HOSPITAL | Age: 72
End: 2017-04-05

## 2017-04-05 DIAGNOSIS — N18.30 CHRONIC KIDNEY DISEASE, STAGE III (MODERATE) (HCC): Primary | ICD-10-CM

## 2017-04-05 DIAGNOSIS — N18.6 ANEMIA IN END-STAGE RENAL DISEASE (HCC): ICD-10-CM

## 2017-04-05 DIAGNOSIS — E55.9 UNSPECIFIED VITAMIN D DEFICIENCY: ICD-10-CM

## 2017-04-05 DIAGNOSIS — N18.30 CHRONIC KIDNEY DISEASE, STAGE III (MODERATE) (HCC): ICD-10-CM

## 2017-04-05 DIAGNOSIS — D63.1 ANEMIA IN END-STAGE RENAL DISEASE (HCC): ICD-10-CM

## 2017-04-05 LAB
25(OH)D3 SERPL-MCNC: 24.6 NG/ML
ANION GAP SERPL CALCULATED.3IONS-SCNC: 10.3 MMOL/L
BASOPHILS # BLD AUTO: 0.03 10*3/MM3 (ref 0–0.2)
BASOPHILS NFR BLD AUTO: 0.6 % (ref 0–2)
BILIRUB UR QL STRIP: NEGATIVE
BUN BLD-MCNC: 35 MG/DL (ref 8–23)
BUN/CREAT SERPL: 20.6 (ref 7–25)
CALCIUM SPEC-SCNC: 9.8 MG/DL (ref 8.8–10.5)
CHLORIDE SERPL-SCNC: 94 MMOL/L (ref 98–107)
CLARITY UR: CLEAR
CO2 SERPL-SCNC: 27.7 MMOL/L (ref 22–29)
COLOR UR: YELLOW
CREAT BLD-MCNC: 1.7 MG/DL (ref 0.57–1)
DEPRECATED RDW RBC AUTO: 42.5 FL (ref 37–54)
EOSINOPHIL # BLD AUTO: 0.15 10*3/MM3 (ref 0.1–0.3)
EOSINOPHIL NFR BLD AUTO: 3.2 % (ref 0–4)
ERYTHROCYTE [DISTWIDTH] IN BLOOD BY AUTOMATED COUNT: 12.7 % (ref 11.5–14.5)
GFR SERPL CREATININE-BSD FRML MDRD: 29 ML/MIN/1.73
GLUCOSE BLD-MCNC: 125 MG/DL (ref 65–99)
GLUCOSE UR STRIP-MCNC: NEGATIVE MG/DL
HCT VFR BLD AUTO: 34.3 % (ref 37–47)
HGB BLD-MCNC: 11.1 G/DL (ref 12–16)
HGB UR QL STRIP.AUTO: NEGATIVE
IMM GRANULOCYTES # BLD: 0.04 10*3/MM3 (ref 0–0.03)
IMM GRANULOCYTES NFR BLD: 0.8 % (ref 0–0.5)
KETONES UR QL STRIP: NEGATIVE
LEUKOCYTE ESTERASE UR QL STRIP.AUTO: NEGATIVE
LYMPHOCYTES # BLD AUTO: 1.75 10*3/MM3 (ref 0.6–4.8)
LYMPHOCYTES NFR BLD AUTO: 36.8 % (ref 20–45)
MCH RBC QN AUTO: 29.8 PG (ref 27–31)
MCHC RBC AUTO-ENTMCNC: 32.4 G/DL (ref 31–37)
MCV RBC AUTO: 92.2 FL (ref 81–99)
MONOCYTES # BLD AUTO: 0.65 10*3/MM3 (ref 0–1)
MONOCYTES NFR BLD AUTO: 13.7 % (ref 3–8)
NEUTROPHILS # BLD AUTO: 2.13 10*3/MM3 (ref 1.5–8.3)
NEUTROPHILS NFR BLD AUTO: 44.9 % (ref 45–70)
NITRITE UR QL STRIP: NEGATIVE
NRBC BLD MANUAL-RTO: 0 /100 WBC (ref 0–0)
PH UR STRIP.AUTO: <=5 [PH] (ref 4.5–8)
PLATELET # BLD AUTO: 265 10*3/MM3 (ref 140–500)
PMV BLD AUTO: 9 FL (ref 7.4–10.4)
POTASSIUM BLD-SCNC: 4.5 MMOL/L (ref 3.5–5.2)
PROT UR QL STRIP: NEGATIVE
RBC # BLD AUTO: 3.72 10*6/MM3 (ref 4.2–5.4)
SODIUM BLD-SCNC: 132 MMOL/L (ref 136–145)
SP GR UR STRIP: <=1.005 (ref 1–1.03)
UROBILINOGEN UR QL STRIP: NORMAL
WBC NRBC COR # BLD: 4.75 10*3/MM3 (ref 4.8–10.8)

## 2017-04-05 PROCEDURE — 36415 COLL VENOUS BLD VENIPUNCTURE: CPT

## 2017-04-05 PROCEDURE — 80048 BASIC METABOLIC PNL TOTAL CA: CPT

## 2017-04-05 PROCEDURE — 85025 COMPLETE CBC W/AUTO DIFF WBC: CPT

## 2017-04-05 PROCEDURE — 82306 VITAMIN D 25 HYDROXY: CPT

## 2017-04-05 PROCEDURE — 81003 URINALYSIS AUTO W/O SCOPE: CPT

## 2017-04-11 ENCOUNTER — LAB (OUTPATIENT)
Dept: LAB | Facility: HOSPITAL | Age: 72
End: 2017-04-11

## 2017-04-11 ENCOUNTER — OFFICE VISIT (OUTPATIENT)
Dept: ONCOLOGY | Facility: CLINIC | Age: 72
End: 2017-04-11

## 2017-04-11 VITALS
DIASTOLIC BLOOD PRESSURE: 57 MMHG | RESPIRATION RATE: 16 BRPM | TEMPERATURE: 98.1 F | HEIGHT: 66 IN | WEIGHT: 198.2 LBS | BODY MASS INDEX: 31.85 KG/M2 | OXYGEN SATURATION: 95 % | SYSTOLIC BLOOD PRESSURE: 99 MMHG | HEART RATE: 70 BPM

## 2017-04-11 DIAGNOSIS — C50.211 MALIGNANT NEOPLASM OF UPPER-INNER QUADRANT OF RIGHT FEMALE BREAST (HCC): Primary | ICD-10-CM

## 2017-04-11 DIAGNOSIS — M85.80 OSTEOPENIA: ICD-10-CM

## 2017-04-11 DIAGNOSIS — Z12.39 BREAST CANCER SCREENING: ICD-10-CM

## 2017-04-11 DIAGNOSIS — M85.852 OSTEOPENIA OF LEFT THIGH: ICD-10-CM

## 2017-04-11 DIAGNOSIS — Z13.820 ENCOUNTER FOR SCREENING FOR OSTEOPOROSIS: ICD-10-CM

## 2017-04-11 DIAGNOSIS — D64.9 ANEMIA, UNSPECIFIED TYPE: Primary | ICD-10-CM

## 2017-04-11 DIAGNOSIS — M85.88 OSTEOPENIA OF SPINE: ICD-10-CM

## 2017-04-11 DIAGNOSIS — D64.9 ANEMIA, UNSPECIFIED TYPE: ICD-10-CM

## 2017-04-11 DIAGNOSIS — Z12.31 ENCOUNTER FOR SCREENING MAMMOGRAM FOR MALIGNANT NEOPLASM OF BREAST: ICD-10-CM

## 2017-04-11 LAB
BASOPHILS # BLD AUTO: 0.04 10*3/MM3 (ref 0–0.2)
BASOPHILS NFR BLD AUTO: 0.6 % (ref 0–2)
DEPRECATED RDW RBC AUTO: 41.9 FL (ref 37–54)
EOSINOPHIL # BLD AUTO: 0.22 10*3/MM3 (ref 0.1–0.3)
EOSINOPHIL NFR BLD AUTO: 3.5 % (ref 0–4)
ERYTHROCYTE [DISTWIDTH] IN BLOOD BY AUTOMATED COUNT: 12.8 % (ref 11.5–14.5)
HCT VFR BLD AUTO: 33.8 % (ref 37–47)
HGB BLD-MCNC: 11.2 G/DL (ref 12–16)
IMM GRANULOCYTES # BLD: 0.04 10*3/MM3 (ref 0–0.03)
IMM GRANULOCYTES NFR BLD: 0.6 % (ref 0–0.5)
LYMPHOCYTES # BLD AUTO: 2.53 10*3/MM3 (ref 0.6–4.8)
LYMPHOCYTES NFR BLD AUTO: 40 % (ref 20–45)
MCH RBC QN AUTO: 30 PG (ref 27–31)
MCHC RBC AUTO-ENTMCNC: 33.1 G/DL (ref 31–37)
MCV RBC AUTO: 90.6 FL (ref 81–99)
MONOCYTES # BLD AUTO: 0.81 10*3/MM3 (ref 0–1)
MONOCYTES NFR BLD AUTO: 12.8 % (ref 3–8)
NEUTROPHILS # BLD AUTO: 2.68 10*3/MM3 (ref 1.5–8.3)
NEUTROPHILS NFR BLD AUTO: 42.5 % (ref 45–70)
NRBC BLD MANUAL-RTO: 0 /100 WBC (ref 0–0)
PLATELET # BLD AUTO: 262 10*3/MM3 (ref 140–500)
PMV BLD AUTO: 9.7 FL (ref 7.4–10.4)
RBC # BLD AUTO: 3.73 10*6/MM3 (ref 4.2–5.4)
WBC NRBC COR # BLD: 6.32 10*3/MM3 (ref 4.8–10.8)

## 2017-04-11 PROCEDURE — 85025 COMPLETE CBC W/AUTO DIFF WBC: CPT | Performed by: INTERNAL MEDICINE

## 2017-04-11 PROCEDURE — 99214 OFFICE O/P EST MOD 30 MIN: CPT | Performed by: INTERNAL MEDICINE

## 2017-04-11 PROCEDURE — 36415 COLL VENOUS BLD VENIPUNCTURE: CPT | Performed by: INTERNAL MEDICINE

## 2017-04-11 NOTE — PROGRESS NOTES
Subjective:     Reason for follow up:   1. 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    * Arimidex initiated in 08/2015 and discontinued in 09/2015 secondary to poor tolerance. Further endocrine therapy was not undertaken secondary to patients multiple comorbidities and limited benefit.     2. Osteopenia.      History of Present Ilness: Jung Short presents for follow-up of breast cancer.  She had previously mentioned to me that she discontinue the Arimidex however today patient notes that she never stop taking it.  She tolerates it fairly well.  She has not noticed any abnormalities of the prior mastectomy site or the left breast.  She denies any fevers, chills, night sweats.  She continues to follow with multiple positions were multiple issues.      Past Medical   Past Medical History:   Diagnosis Date   • Anemia    • Benign breast disease    • Cancer     Right breast   • Cellulitis of leg     May-2003 right lower extremity   • CHF (congestive heart failure)    • Chronic kidney disease    • Chronic ulcer of right foot     Non-pressure   • Depression    • Diabetic peripheral neuropathy    • Diarrhea    • Diverticulosis    • Encounter for eye exam    • Gastroesophageal reflux    • Hiatal hernia    • History of bone density study 09/20/2012   • History of colonoscopy     done 6/03 recheck in 10 years.   Done july 2013 recheck in 5 years   • History of mammography, screening 09/20/2012   • Hospital discharge follow-up    • Hyperlipidemia    • Hypertension    • Hypothyroidism    • Impingement syndrome of right shoulder    • Joint pain    • Menopausal disorder    • Methicillin resistant Staphylococcus aureus infection    • MRSA (methicillin resistant staph aureus) culture positive    • Nausea and vomiting    • Nonischemic cardiomyopathy     Ejection fraction 10%  per 2-D echo with Doppler however she did have cardiac catheterization April 2015 which showed ejection fraction 20% with global hypokinesis and severe mitral insufficiency   • Osteoarthritis    • Paroxysmal atrial fibrillation    • Postoperative infection     July of 2012 following her hysterectomy far vaginal wall prolapse. She was on antibiotics and wound dressings for 2 months.     Patient Active Problem List   Diagnosis   • Abdominal bloating   • Abdominal mass   • Abdominal pain   • Abnormal gait   • Abnormal mammogram   • Acute bronchitis   • Acute upper respiratory infection   • Anemia   • Infection due to fungus   • Atherosclerosis of coronary artery   • Hematochezia   • Thoracic back pain   • Malignant neoplasm of upper-inner quadrant of right female breast   • Candidiasis   • Cardiomyopathy   • Disc disorder of cervical region   • Neck pain   • Contusion of chest wall   • Tenderness of chest wall   • Chronic kidney disease, stage III (moderate)   • Chronic pain   • Congestive heart failure   • Constipation   • Generalized osteoarthritis   • Degeneration of intervertebral disc   • Depression   • Type 2 diabetes mellitus   • Controlled type 2 diabetes mellitus without complication   • Diarrhea   • Dyslipidemia   • Gastroesophageal reflux disease with esophagitis   • Fall in home   • Fall on same level from slipping, tripping or stumbling   • Generalized pain   • Gout   • Injury of head   • Hyperkalemia   • Hypertension   • Hypokalemia   • Hypomagnesemia   • Hypothyroidism   • Incisional hernia   • Mass of breast   • Mitral valve insufficiency   • Nausea   • Adult body mass index greater than 30   • Osteopenia   • Arthralgia of multiple joints   • Peripheral neuropathy   • Pulmonary hypertension   • Osteomyelitis   • Tricuspid valve insufficiency   • Cobalamin deficiency   • Vitamin D deficiency   • Weight loss   • Weight gain   • Diabetes mellitus   • Congestive heart failure with left ventricular systolic  dysfunction   • Left knee pain   • Arthritis of knee   • DDD (degenerative disc disease), lumbar   • Lumbar facet arthropathy   • Pain in shoulder   • Chronic gout of multiple sites   • Shortness of breath   • Encounter for long-term (current) use of high-risk medication   • History of cancer   • Constipation due to opioid therapy     Social History   Social History     Social History   • Marital status:      Spouse name: N/A   • Number of children: N/A   • Years of education: N/A     Occupational History   • 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    Review of Systems  Review of Systems   Constitutional: Negative for activity change, appetite change, chills, diaphoresis, fatigue, fever and unexpected weight change.   Respiratory: Negative for cough, chest tightness and shortness of breath.    Cardiovascular: Negative for chest pain, palpitations and leg swelling.   Gastrointestinal:  "Negative for abdominal pain, blood in stool, constipation, diarrhea, nausea and vomiting.   Musculoskeletal: Positive for arthralgias and myalgias. Negative for joint swelling.   Neurological: Positive for numbness.   Hematological: Negative for adenopathy. Does not bruise/bleed easily.          Objective:     Vitals:    04/11/17 1027   BP: 99/57   Pulse: 70   Resp: 16   Temp: 98.1 °F (36.7 °C)   SpO2: 95%   Weight: 198 lb 3.2 oz (89.9 kg)  Comment: stated wt   Height: 66\" (167.6 cm)   PainSc: 0-No pain     Current Status 4/11/2017   ECOG score 1     GENERAL:  Well-developed, overweight female in no acute distress.   LYMPHATICS:  No cervical, supraclavicular, axillary adenopathy.  CHEST:  Lungs clear to percussion and auscultation. Good airflow.  CARDIAC:  Regular rate and rhythm without murmurs, rubs or gallops. Normal S1,S2. No edema  EXTREMITIES:  No clubbing, cyanosis or edema.  PSYCHIATRIC:  Normal affect and mood.    BREASTS: right breast status post mastectomy without palpable masses or skin changes    Labs and Imaging  Results for orders placed or performed in visit on 04/11/17   CBC Auto Differential   Result Value Ref Range    WBC 6.32 4.80 - 10.80 10*3/mm3    RBC 3.73 (L) 4.20 - 5.40 10*6/mm3    Hemoglobin 11.2 (L) 12.0 - 16.0 g/dL    Hematocrit 33.8 (L) 37.0 - 47.0 %    MCV 90.6 81.0 - 99.0 fL    MCH 30.0 27.0 - 31.0 pg    MCHC 33.1 31.0 - 37.0 g/dL    RDW 12.8 11.5 - 14.5 %    RDW-SD 41.9 37.0 - 54.0 fl    MPV 9.7 7.4 - 10.4 fL    Platelets 262 140 - 500 10*3/mm3    Neutrophil % 42.5 (L) 45.0 - 70.0 %    Lymphocyte % 40.0 20.0 - 45.0 %    Monocyte % 12.8 (H) 3.0 - 8.0 %    Eosinophil % 3.5 0.0 - 4.0 %    Basophil % 0.6 0.0 - 2.0 %    Immature Grans % 0.6 (H) 0.0 - 0.5 %    Neutrophils, Absolute 2.68 1.50 - 8.30 10*3/mm3    Lymphocytes, Absolute 2.53 0.60 - 4.80 10*3/mm3    Monocytes, Absolute 0.81 0.00 - 1.00 10*3/mm3    Eosinophils, Absolute 0.22 0.10 - 0.30 10*3/mm3    Basophils, Absolute 0.04 0.00 - " 0.20 10*3/mm3    Immature Grans, Absolute 0.04 (H) 0.00 - 0.03 10*3/mm3    nRBC 0.0 0.0 - 0.0 /100 WBC       Breast imaging: Mammo 6/2016:  IMPRESSION:  Negative left digital diagnostic mammogram. Suggest yearly  screening.      BI-RADS CATEGORY 1: Negative.    Assessment/Plan     Assessment:   1. Stage I, pT1cN0, invasive ductal carcinoma with dcis of the right breast, ER positive, HER-2/gonzalez positive.    * Status post right mastectomy with axillary dissection on 07/16/2015.    * The patient was not a candidate for chemotherapy or Herceptin treatment secondary to her significant comorbidities.  She remains on Arimidex.  After treatment his discussions it seems like she had stopped that she notes today that she never discontinued the Arimidex.  * Remains SONYA.   2. Osteopenia.  Patient has a prescription for Fosamax and takes it occasionally.  She will continue calcium and vitamin D.   3. Anemia of chronic disease. Monitoring.   4. Congestive heart failure managed by Dr. Bates.   5. Multiple comorbidities including CKD, pulmonary hypertension and diabetes as well as foot ulcer managed by Dr Avalos.    Plan:     1. Annual left mammogram due July 2017  2. DEXA scan due 8/2017.  3. Patient was instructed on the importance of physical activity, healthy diet, and self chest wall exams.  Patient will continue calcium and vitamin D supplementation.    4. Monitor anemia  5. Continue Arimidex and Fosamax.    Follow-up in 6 months. I asked the patient to call for any questions, concerns, or new symptoms.

## 2017-04-13 ENCOUNTER — OFFICE VISIT (OUTPATIENT)
Dept: PAIN MEDICINE | Facility: CLINIC | Age: 72
End: 2017-04-13

## 2017-04-13 VITALS
DIASTOLIC BLOOD PRESSURE: 73 MMHG | HEIGHT: 66 IN | OXYGEN SATURATION: 94 % | TEMPERATURE: 98 F | HEART RATE: 76 BPM | SYSTOLIC BLOOD PRESSURE: 112 MMHG | RESPIRATION RATE: 18 BRPM

## 2017-04-13 DIAGNOSIS — M15.9 GENERALIZED OSTEOARTHRITIS: ICD-10-CM

## 2017-04-13 DIAGNOSIS — Z79.899 ENCOUNTER FOR LONG-TERM (CURRENT) USE OF HIGH-RISK MEDICATION: ICD-10-CM

## 2017-04-13 DIAGNOSIS — G62.9 PERIPHERAL POLYNEUROPATHY: ICD-10-CM

## 2017-04-13 DIAGNOSIS — M51.36 DDD (DEGENERATIVE DISC DISEASE), LUMBAR: ICD-10-CM

## 2017-04-13 DIAGNOSIS — G89.29 OTHER CHRONIC PAIN: Primary | ICD-10-CM

## 2017-04-13 PROCEDURE — 99213 OFFICE O/P EST LOW 20 MIN: CPT | Performed by: NURSE PRACTITIONER

## 2017-04-13 RX ORDER — HYDROCODONE BITARTRATE AND ACETAMINOPHEN 10; 325 MG/1; MG/1
1 TABLET ORAL EVERY 4 HOURS PRN
Qty: 180 TABLET | Refills: 0 | Status: SHIPPED | OUTPATIENT
Start: 2017-04-13 | End: 2017-05-11 | Stop reason: SDUPTHER

## 2017-04-13 NOTE — PROGRESS NOTES
CHIEF COMPLAINT  Follow-up for back and knee pain. Ms. Short states that her pain is unchanged from her last appt.    Subjective   Jung Short is a 71 y.o. female  who presents to the office for follow-up.She has a history of back pain and joint pain. Overall, her pain pattern has been relatively unchanged since her last office visit.    Complains of pain in her back and joints(knee). Today her pain is 5/10VAS. Describes the pain as continuous.  Pain increases with activity, including gardening. Continues with Hydrocodone 10/325 6/day and cymbalta. Denies any side effects from the regimen.  Notes moderate relief with the regimen. ADL's by self.     Is meeting with her cardiologist tomorrow. Has discussed with orthopedist(Dr. Braden) about replacing left knee. He will do TKR if given surgical clearance.    Joint Pain   This is a chronic problem. The current episode started more than 1 year ago (today pain is 5/10VAS- joints). The problem occurs constantly. The problem has been unchanged. Associated symptoms include arthralgias, fatigue and joint swelling. Pertinent negatives include no chest pain, chills, congestion, coughing, fever, headaches, numbness, vomiting or weakness. Nothing aggravates the symptoms. She has tried oral narcotics, position changes and rest for the symptoms. The treatment provided moderate relief.      PEG Assessment   What number best describes your pain on average in the past week?5  What number best describes how, during the past week, pain has interfered with your enjoyment of life?5  What number best describes how, during the past week, pain has interfered with your general activity?  7    The following portions of the patient's history were reviewed and updated as appropriate: allergies, current medications, past family history, past medical history, past social history, past surgical history and problem list.    Review of Systems   Constitutional: Positive for fatigue. Negative  "for chills and fever.   HENT: Negative for congestion.    Eyes: Positive for visual disturbance.   Respiratory: Negative for cough, shortness of breath and wheezing.    Cardiovascular: Positive for leg swelling. Negative for chest pain and palpitations.   Gastrointestinal: Negative for constipation, diarrhea and vomiting.   Genitourinary: Negative for difficulty urinating.   Musculoskeletal: Positive for arthralgias, back pain and joint swelling.   Neurological: Negative for weakness, numbness and headaches.   Psychiatric/Behavioral: Positive for sleep disturbance. Negative for suicidal ideas. The patient is not nervous/anxious.        Vitals:    04/13/17 1312   BP: 112/73   Pulse: 76   Resp: 18   Temp: 98 °F (36.7 °C)   SpO2: 94%   Height: 66\" (167.6 cm)   PainSc:   5   PainLoc: Knee     Objective   Physical Exam   Constitutional: She is oriented to person, place, and time. Vital signs are normal. She appears well-developed and well-nourished. She is cooperative.   HENT:   Head: Normocephalic and atraumatic.   Nose: Nose normal.   Eyes: Conjunctivae and lids are normal.   Cardiovascular: Normal rate, regular rhythm and normal heart sounds.    Pulmonary/Chest: Effort normal and breath sounds normal. No respiratory distress.   Abdominal: Normal appearance.   Musculoskeletal:        Right shoulder: She exhibits tenderness.        Left shoulder: She exhibits tenderness.        Left knee: She exhibits decreased range of motion and effusion. Tenderness found.        Lumbar back: She exhibits tenderness.   OA changes of bilateral knees    Walking shoes of bilateral feet         Neurological: She is alert and oriented to person, place, and time. Gait (ambulates with aid of cane) abnormal.   Reflex Scores:       Patellar reflexes are 1+ on the right side and 1+ on the left side.  Skin: Skin is warm, dry and intact.   Psychiatric: She has a normal mood and affect. Her speech is normal and behavior is normal. Judgment and " thought content normal. Cognition and memory are normal.   Nursing note and vitals reviewed.      Assessment/Plan   Jung was seen today for back pain and knee pain.    Diagnoses and all orders for this visit:    Other chronic pain    DDD (degenerative disc disease), lumbar    Generalized osteoarthritis    Peripheral polyneuropathy    Encounter for long-term (current) use of high-risk medication    Other orders  -     HYDROcodone-acetaminophen (NORCO)  MG per tablet; Take 1 tablet by mouth Every 4 (Four) Hours As Needed for Moderate Pain (4-6).      --- The urine drug screen confirmation from 2-14-17 has been reviewed and the result is appropriate based on patient history and LOUANN report  --- Refill Hydrocodone. Patient appears stable with current regimen. No adverse effects. Regarding continuation of opioids, there is no evidence of aberrant behavior or any red flags.  The patient continues with appropriate response to opioid therapy. ADL's remain intact by self.   --- minimally discussed potential for genicular block if unable to proceed with TKR with Dr. Braden. Will continue to monitor.   --- Follow-up 1 month or sooner if needed.       LOUANN REPORT    As part of the patient's treatment plan, I am prescribing controlled substances. The patient has been made aware of appropriate use of such medications, including potential risk of somnolence, limited ability to drive and/or work safely, and the potential for dependence or overdose. It has also bee made clear that these medications are for use by this patient only, without concomitant use of alcohol or other substances unless prescribed.     Patient has completed prescribing agreement detailing terms of continued prescribing of controlled substances, including monitoring LOUANN reports, urine drug screening, and pill counts if necessary. The patient is aware that inappropriate use will results in cessation of prescribing such medications.    LOUANN  report has been reviewed and scanned into the patient's chart.    Date of last LOUANN : 4-11-17    History and physical exam exhibit continued safe and appropriate use of controlled substances.      EMR Dragon/Transcription disclaimer:   Much of this encounter note is an electronic transcription/translation of spoken language to printed text. The electronic translation of spoken language may permit erroneous, or at times, nonsensical words or phrases to be inadvertently transcribed; Although I have reviewed the note for such errors, some may still exist.

## 2017-04-14 ENCOUNTER — OFFICE VISIT (OUTPATIENT)
Dept: CARDIOLOGY | Facility: CLINIC | Age: 72
End: 2017-04-14

## 2017-04-14 VITALS
BODY MASS INDEX: 39.34 KG/M2 | WEIGHT: 200.4 LBS | DIASTOLIC BLOOD PRESSURE: 60 MMHG | HEIGHT: 60 IN | SYSTOLIC BLOOD PRESSURE: 128 MMHG | HEART RATE: 72 BPM

## 2017-04-14 DIAGNOSIS — I42.9 CARDIOMYOPATHY (HCC): Primary | ICD-10-CM

## 2017-04-14 PROCEDURE — 93000 ELECTROCARDIOGRAM COMPLETE: CPT | Performed by: INTERNAL MEDICINE

## 2017-04-14 PROCEDURE — 99214 OFFICE O/P EST MOD 30 MIN: CPT | Performed by: INTERNAL MEDICINE

## 2017-04-14 NOTE — PROGRESS NOTES
Date of Office Visit: 2017  Encounter Provider: Aria Bates MD  Place of Service: [facility] CARDIOLOGY  Patient Name: Jung Short  :1945      Patient ID:  Jung Short is a 71 y.o. female is here for  followup for cardiomyopathy.         History of Present Illness  She presented to [facility] in 2015 with acute congestive heart failure, systolic and diastolic. She had a 2-D echocardiogram which revealed an ejection fraction of 10-15%, moderate left ventricular dilation, severe mitral and tricuspid insufficiency, and her aortic valve was normal. She was transferred to [facility] for a cardiac catheterization. 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 She also had a right heart catheterization done which revealed an RV pressure of 50/20, PA pressure 50/30 with the main of 38 and a capillary wedge pressure of 31 mmHg. Her right atrial pressure was 20 mmHg. Her cardiac output was 3.9 liters per minute. She was treated medically and diuresed.     I saw her on 2015 and she was following a low salt  diet but she had some short-windedness and a cough which seemed to be coming back. Those  were the signs of her prior heart failure when she was admitted in 2015, and in  addition her blood pressure was a bit elevated so we started spironolactone 25 mg daily.   She had a 2-D echocardiogram with Doppler  performed on 2015 which showed her ejection fraction to be 50%. There was normal  segmental wall motion. There was moderate left atrial dilation. A small patent foramen  ovale/atrial septal defect by saline contrast  study and trace mitral and tricuspid  insufficiency. This is a significant improvement from her prior echocardiogram.      She is on anastrozole for right breast cancer.      She saw Ms. Guy 01/2016 as a preoperative evaluation for surgery.  She developed ischemia in her left big toe and was to undergo surgical intervention by Dr. Valentino.  At that time, she was a low surgical risk and so she was cleared for surgery.  I do have laboratory values which were performed on her 04/05/2017 showing BUN 35, creatinine 1.7, sodium 132, glucose 125, potassium 4.5, hemoglobin 11.1, hematocrit 34, white count 4.75, platelets 265,000.  She had normal renal ultrasound ordered by Dr. Hannah 01/09/2017.    She has chronic renal insufficiency and follows with Dr. Hannah.     Overall, she is doing fairly well.   She has been having some back problems lately, and she has severe knee problems.  She is seeing Dr. Chambers and she may need surgery for this.  She has no difficulty breathing with activity, no exertional chest tightness or pressure.  She has no orthopnea or PND.   She sees Dr. Hannah for her kidneys and he did stop amlodipine because her blood pressure was getting too low.   She does not feel her heart racing or skipping.  She has had hot flashes.  I suspect it is the Arimidex, but she has not recently had her thyroid checked.   Her energy level has actually been pretty  stable.  She says she is feeling well.          Past Medical History:   Diagnosis Date   • Anemia    • Benign breast disease    • Cancer     Right breast   • Cellulitis of leg     May-2003 right lower extremity   • CHF (congestive heart failure)    • Chronic kidney disease    • Chronic ulcer of right foot     Non-pressure   • Depression    • Diabetic peripheral neuropathy    • Diarrhea    • Diverticulosis    • Encounter for eye exam    • Gastroesophageal reflux    • Hiatal hernia    • History of bone density study 09/20/2012   • History of colonoscopy      done 6/03 recheck in 10 years.   Done july 2013 recheck in 5 years   • History of mammography, screening 09/20/2012   • Hospital discharge follow-up    • Hyperlipidemia    • Hypertension    • Hypothyroidism    • Impingement syndrome of right shoulder    • Joint pain    • Menopausal disorder    • Methicillin resistant Staphylococcus aureus infection    • MRSA (methicillin resistant staph aureus) culture positive    • Nausea and vomiting    • Nonischemic cardiomyopathy     Ejection fraction 10% per 2-D echo with Doppler however she did have cardiac catheterization April 2015 which showed ejection fraction 20% with global hypokinesis and severe mitral insufficiency   • Osteoarthritis    • Paroxysmal atrial fibrillation    • Postoperative infection     July of 2012 following her hysterectomy far vaginal wall prolapse. She was on antibiotics and wound dressings for 2 months.         Past Surgical History:   Procedure Laterality Date   • AMPUTATION FOOT / TOE Right 11/2013    Rt foot amputation MTP first toe   • BLADDER SURGERY     • BREAST SURGERY  06/29/2015    Percutaneous ultrasound-guided placement of metal localized clip 1st lesion   • DEBRIDEMENT  FOOT Right 01/2013    During hospitalization    • EPIDURAL BLOCK      4 chronic back pain and leg pain last epidurals done 5-2012 she had no benefit at all from this procedure.   • HERNIA REPAIR      At the abdomen February 2007 Dr. Mendez   • INCISIONAL HERNIA REPAIR  05/16/2014    Recurrent. Incarcerated. With mesh implantation   • MASTECTOMY Right 2014   • SHOULDER SURGERY     • TOTAL ABDOMINAL HYSTERECTOMY      with oophorectomy-Done June-2012 secondary to vaginal wall prolapse.   • TOTAL KNEE ARTHROPLASTY         Current Outpatient Prescriptions on File Prior to Visit   Medication Sig Dispense Refill   • alendronate (FOSAMAX) 35 MG tablet take 1 tablet by mouth WEEKLY 4 tablet 11   • anastrozole (ARIMIDEX) 1 MG tablet take 1 tablet by mouth once daily 30 tablet 2    • aspirin 81 MG chewable tablet Chew.     • bumetanide (BUMEX) 2 MG tablet Take 1 tablet by mouth 2 (Two) Times a Day. (Patient taking differently: Take 2 mg by mouth Daily.) 60 tablet 3   • carvedilol (COREG) 12.5 MG tablet take 1 tablet by mouth twice a day 60 tablet 6   • Coenzyme Q10 (CO Q 10) 100 MG capsule Take 1 tablet by mouth daily.     • colchicine 0.6 MG tablet Take 0.6 mg by mouth Daily.     • DULoxetine (CYMBALTA) 60 MG capsule Take 1 capsule by mouth Daily. (Patient taking differently: Take 30 mg by mouth Daily.) 30 capsule 1   • Esomeprazole Magnesium (NEXIUM PO) Take 1 tablet by mouth Daily.     • febuxostat (ULORIC) 40 MG tablet Take 40 mg by mouth Daily.     • glimepiride (AMARYL) 1 MG tablet Take 1/2 tab po a.m. And p.m. (Patient taking differently: 1 mg 2 (Two) Times a Day. Take 1/2 tab po a.m. And p.m. ) 90 tablet 3   • Glucose Blood (BLOOD GLUCOSE TEST) strip Blood Glucose Test In Vitro Strip; Patient Sig: Blood Glucose Test In Vitro Strip She checks blood sugars a.m. and p.m. she has the Accu-Chek christina; 60; 6; 27-Jun-2013; Active     • HYDROcodone-acetaminophen (NORCO)  MG per tablet Take 1 tablet by mouth Every 4 (Four) Hours As Needed for Moderate Pain (4-6). 180 tablet 0   • levothyroxine (SYNTHROID, LEVOTHROID) 25 MCG tablet take 1 tablet by mouth once daily 30 tablet 6   • lisinopril (PRINIVIL,ZESTRIL) 5 MG tablet   0   • spironolactone (ALDACTONE) 25 MG tablet take 1 tablet by mouth once daily 30 tablet 5   • vitamin B-12 (CYANOCOBALAMIN) 100 MCG tablet Take 1 tablet by mouth daily.     • [DISCONTINUED] AMLODIPINE BESYLATE PO Take 5 mg by mouth.       No current facility-administered medications on file prior to visit.        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     [place]  woman    Lots of friends    2 daughters - lives in [facility] and [place] (graphic design)    [employer]           Review of Systems   Constitution: Negative.   HENT: Negative for congestion and headaches.    Eyes: Negative for vision loss in left eye and vision loss in right eye.   Cardiovascular: Positive for leg swelling.   Respiratory: Negative.  Negative for cough, hemoptysis, shortness of breath, sleep disturbances due to breathing, snoring, sputum production and wheezing.    Endocrine: Negative.    Hematologic/Lymphatic: Negative.    Skin: Negative for poor wound healing and rash.   Musculoskeletal: Positive for joint pain and myalgias. Negative for falls and gout.   Gastrointestinal: Negative for abdominal pain, diarrhea, dysphagia, hematemesis, melena, nausea and vomiting.   Neurological: Negative for excessive daytime sleepiness, dizziness, light-headedness, loss of balance, seizures and vertigo.   Psychiatric/Behavioral: Negative for depression and substance abuse. The patient is not nervous/anxious.        Procedures    ECG 12 Lead  Date/Time: 4/14/2017 2:48 PM  Performed by: PITER BROOKE  Authorized by: PITER BROOKE   Comparison: compared with previous ECG   Similar to previous ECG  Rhythm: sinus rhythm  Clinical impression: normal ECG               Objective:      Vitals:    04/14/17 1430   BP: 128/60   BP Location: Right arm   Patient Position: Sitting   Pulse: 72   Weight: 200 lb 6.4 oz (90.9 kg)   Height: 60\" (152.4 cm)     Body mass index is 39.14 kg/(m^2).    Physical Exam   Constitutional: She is oriented to person, place, and time. " She appears well-developed and well-nourished. No distress.   HENT:   Head: Normocephalic and atraumatic.   Eyes: Conjunctivae are normal. No scleral icterus.   Neck: Neck supple. No JVD present. Carotid bruit is not present. No thyromegaly present.   Cardiovascular: Normal rate, regular rhythm, S1 normal, S2 normal, normal heart sounds and intact distal pulses.   No extrasystoles are present. PMI is not displaced.  Exam reveals no gallop.    No murmur heard.  Pulses:       Carotid pulses are 2+ on the right side, and 2+ on the left side.       Radial pulses are 2+ on the right side, and 2+ on the left side.        Dorsalis pedis pulses are 2+ on the right side, and 2+ on the left side.        Posterior tibial pulses are 2+ on the right side, and 2+ on the left side.   Pulmonary/Chest: Effort normal and breath sounds normal. No respiratory distress. She has no wheezes. She has no rhonchi. She has no rales. She exhibits no tenderness.   Abdominal: Soft. Bowel sounds are normal. She exhibits no distension, no abdominal bruit and no mass. There is no tenderness.   Musculoskeletal: She exhibits no edema or deformity.   Lymphadenopathy:     She has no cervical adenopathy.   Neurological: She is alert and oriented to person, place, and time. No cranial nerve deficit.   Skin: Skin is warm and dry. No rash noted. She is not diaphoretic. No cyanosis. No pallor. Nails show no clubbing.   Psychiatric: She has a normal mood and affect. Judgment normal.   Vitals reviewed.      Lab Review:       Assessment:      Diagnosis Plan   1. Cardiomyopathy       1. Nonischemic cardiomyopathy. Her ejection fraction was 10-20%. At this  time she is New York Heart Association class 2a. Her repeat echocardiogram 6/2015 showed  LVEF 50% with trace mitral and tricuspid insufficiency.  2. Hypertension, controlled.  3. DM, type 2.  4. Anomalous coronary artery.   5. H/o right breast cancer.  6. Severe OA of the knees.  7. CKD, sees Dr. Hannah.       Plan:       See back in 1 year, no changes and no testing.

## 2017-04-19 ENCOUNTER — HOSPITAL ENCOUNTER (OUTPATIENT)
Dept: ULTRASOUND IMAGING | Facility: HOSPITAL | Age: 72
Discharge: HOME | End: 2017-04-19
Admitting: INTERNAL MEDICINE

## 2017-04-19 ENCOUNTER — HOSPITAL ENCOUNTER (OUTPATIENT)
Dept: CT IMAGING | Facility: HOSPITAL | Age: 72
Discharge: HOME | End: 2017-04-19

## 2017-04-19 ENCOUNTER — OFFICE VISIT (OUTPATIENT)
Dept: INTERNAL MEDICINE | Facility: CLINIC | Age: 72
End: 2017-04-19

## 2017-04-19 VITALS
HEIGHT: 60 IN | SYSTOLIC BLOOD PRESSURE: 128 MMHG | RESPIRATION RATE: 16 BRPM | WEIGHT: 199.4 LBS | OXYGEN SATURATION: 98 % | HEART RATE: 73 BPM | DIASTOLIC BLOOD PRESSURE: 58 MMHG | BODY MASS INDEX: 39.15 KG/M2

## 2017-04-19 DIAGNOSIS — F48.8 SYNCOPE, PSYCHOGENIC: ICD-10-CM

## 2017-04-19 DIAGNOSIS — N18.30 CHRONIC KIDNEY DISEASE, STAGE III (MODERATE) (HCC): ICD-10-CM

## 2017-04-19 DIAGNOSIS — I50.20 SYSTOLIC CONGESTIVE HEART FAILURE, UNSPECIFIED CONGESTIVE HEART FAILURE CHRONICITY: ICD-10-CM

## 2017-04-19 DIAGNOSIS — R47.89 OTHER SPEECH DISTURBANCES: ICD-10-CM

## 2017-04-19 DIAGNOSIS — E78.2 MIXED HYPERLIPIDEMIA: ICD-10-CM

## 2017-04-19 DIAGNOSIS — M17.10 ARTHRITIS OF KNEE: ICD-10-CM

## 2017-04-19 DIAGNOSIS — E11.9 CONTROLLED TYPE 2 DIABETES MELLITUS WITHOUT COMPLICATION, WITHOUT LONG-TERM CURRENT USE OF INSULIN (HCC): ICD-10-CM

## 2017-04-19 DIAGNOSIS — E11.9 TYPE 2 DIABETES MELLITUS WITHOUT COMPLICATION, WITHOUT LONG-TERM CURRENT USE OF INSULIN (HCC): Primary | ICD-10-CM

## 2017-04-19 DIAGNOSIS — E03.9 HYPOTHYROIDISM (ACQUIRED): ICD-10-CM

## 2017-04-19 DIAGNOSIS — R40.4 TRANSIENT ALTERATION OF AWARENESS: ICD-10-CM

## 2017-04-19 LAB
BILIRUB BLD-MCNC: NEGATIVE MG/DL
CLARITY, POC: CLEAR
COLOR UR: YELLOW
GLUCOSE UR STRIP-MCNC: NEGATIVE MG/DL
HBA1C MFR BLD: 6.3 %
KETONES UR QL: NEGATIVE
LEUKOCYTE EST, POC: NEGATIVE
NITRITE UR-MCNC: NEGATIVE MG/ML
PH UR: 5.5 [PH] (ref 5–8)
POC CREATININE URINE: 100
POC MICROALBUMIN URINE: 30
PROT UR STRIP-MCNC: NEGATIVE MG/DL
RBC # UR STRIP: NEGATIVE /UL
SP GR UR: 1.01 (ref 1–1.03)
UROBILINOGEN UR QL: NORMAL

## 2017-04-19 PROCEDURE — 93880 EXTRACRANIAL BILAT STUDY: CPT

## 2017-04-19 PROCEDURE — 81003 URINALYSIS AUTO W/O SCOPE: CPT | Performed by: INTERNAL MEDICINE

## 2017-04-19 PROCEDURE — 83036 HEMOGLOBIN GLYCOSYLATED A1C: CPT | Performed by: INTERNAL MEDICINE

## 2017-04-19 PROCEDURE — 99214 OFFICE O/P EST MOD 30 MIN: CPT | Performed by: INTERNAL MEDICINE

## 2017-04-19 PROCEDURE — 70450 CT HEAD/BRAIN W/O DYE: CPT

## 2017-04-19 PROCEDURE — 82044 UR ALBUMIN SEMIQUANTITATIVE: CPT | Performed by: INTERNAL MEDICINE

## 2017-04-19 RX ORDER — PRAVASTATIN SODIUM 10 MG
10 TABLET ORAL DAILY
Qty: 90 TABLET | Refills: 2 | Status: SHIPPED | OUTPATIENT
Start: 2017-04-19 | End: 2018-01-04 | Stop reason: SDUPTHER

## 2017-04-19 NOTE — PROGRESS NOTES
Subjective   Jung Short is a 71 y.o. female.     History of Present Illness     72 yo female with pmhx T1 breast cancer followed by Dr. Pagan.  She has mammogram scheduled 8/8/17. She is on fosamax for osteopenia - dexa scheduled for 8/2017.  Very responsive OA to medrol. Her knee is the worst of her joint pain.    She has pmhx hypothyroidism, CHF without any cp or palpitations.  She has gout as well. Currently not on statin therapy.  Her dm is usually well controlled.       72 yo female with trouble Easter morning from 7:30-10:30 where she had a period of unawareness.  She cannot recall what she was doing. Her daughter called her and she told her daughter she could not see to take her pills.  She used a lantern and could not see them. She has cataracts and blamed it on that.   The following portions of the patient's history were reviewed and updated as appropriate: allergies, current medications, past family history, past medical history, past social history, past surgical history and problem list.    Review of Systems   Constitutional: Negative for appetite change, fatigue, fever and unexpected weight change.   HENT: Negative for sinus pressure and trouble swallowing.    Respiratory: Negative for cough and chest tightness.    Cardiovascular: Negative for chest pain, palpitations and leg swelling.   Gastrointestinal: Negative for constipation and diarrhea.   Genitourinary: Negative for dysuria, frequency, hematuria, pelvic pain and vaginal discharge.   Musculoskeletal: Positive for back pain and joint swelling.        L knee pain and swelling  R shoulder pain   Skin: Negative.    Neurological: Negative for dizziness, light-headedness and headaches.   Hematological: Negative.    Psychiatric/Behavioral: Positive for dysphoric mood. Negative for behavioral problems and sleep disturbance. The patient is nervous/anxious.        Objective   Physical Exam   Constitutional: She is oriented to person, place, and  time. She appears well-developed and well-nourished.   HENT:   Head: Normocephalic and atraumatic.   Right Ear: External ear normal.   Left Ear: External ear normal.   Eyes: EOM are normal. Pupils are equal, round, and reactive to light. Right eye exhibits no discharge. Left eye exhibits no discharge.   Neck: Normal range of motion. Neck supple.   Cardiovascular: Normal rate, regular rhythm and normal heart sounds.  Exam reveals no friction rub.    No murmur heard.  Pulmonary/Chest: Effort normal and breath sounds normal.   Musculoskeletal:   L medial knee effusion   Neurological: She is alert and oriented to person, place, and time. No cranial nerve deficit.   Skin: Skin is warm and dry. No erythema.   Psychiatric: She has a normal mood and affect. Her behavior is normal.   Vitals reviewed.      Assessment/Plan   Jung was seen today for diabetes.    Diagnoses and all orders for this visit:    Type 2 diabetes mellitus without complication, without long-term current use of insulin  -     POC Glycosylated Hemoglobin (Hb A1C)  -     POC Urinalysis Dipstick, Automated  -     POCT microalbumin    Systolic congestive heart failure, unspecified congestive heart failure chronicity  -     POCT microalbumin    Controlled type 2 diabetes mellitus without complication, without long-term current use of insulin  -     Lipid Panel w/ Chol/HDL Ratio  -     POCT microalbumin    Arthritis of knee  -     POCT microalbumin    Chronic kidney disease, stage III (moderate)  -     Comprehensive metabolic panel  -     POCT microalbumin    Syncope, psychogenic  -     US Carotid Bilateral  -     CT Head Without Contrast  -     Lipid Panel w/ Chol/HDL Ratio  -     T4, free  -     T3, free  -     TSH  -     Vitamin B12  -     POCT microalbumin    Transient alteration of awareness  -     POCT microalbumin    Mixed hyperlipidemia  -     CBC w AUTO Differential  -     pravastatin (PRAVACHOL) 10 MG tablet; Take 1 tablet by mouth Daily.  -      POCT microalbumin    Hypothyroidism (acquired)  -     T4, free  -     T3, free  -     TSH  -     POCT microalbumin    Other speech disturbances   -     US Carotid Bilateral  -     POCT microalbumin      Presyncope/abnormal mentation certainly concerning for a TIA.  Imaging as ordered  Start statin medication today  Will keep close follow up.

## 2017-04-20 DIAGNOSIS — R42 POSTURAL DIZZINESS WITH PRESYNCOPE: Primary | ICD-10-CM

## 2017-04-20 DIAGNOSIS — R55 POSTURAL DIZZINESS WITH PRESYNCOPE: Primary | ICD-10-CM

## 2017-04-20 DIAGNOSIS — M25.50 ARTHRALGIA OF MULTIPLE JOINTS: ICD-10-CM

## 2017-04-20 LAB
ALBUMIN SERPL-MCNC: 3.7 G/DL (ref 3.5–5.2)
ALBUMIN/GLOB SERPL: 2.1 G/DL
ALP SERPL-CCNC: 68 U/L (ref 40–129)
ALT SERPL-CCNC: 11 U/L (ref 5–33)
AST SERPL-CCNC: 16 U/L (ref 5–32)
BASOPHILS # BLD AUTO: 0.01 10*3/MM3 (ref 0–0.2)
BASOPHILS NFR BLD AUTO: 0.2 % (ref 0–2)
BILIRUB SERPL-MCNC: 0.3 MG/DL (ref 0.2–1.2)
BUN SERPL-MCNC: 25 MG/DL (ref 8–23)
BUN/CREAT SERPL: 18 (ref 7–25)
CALCIUM SERPL-MCNC: 9.6 MG/DL (ref 8.8–10.5)
CHLORIDE SERPL-SCNC: 95 MMOL/L (ref 98–107)
CHOLEST SERPL-MCNC: 193 MG/DL (ref 0–200)
CHOLEST/HDLC SERPL: 5.36 {RATIO}
CO2 SERPL-SCNC: 26.9 MMOL/L (ref 22–29)
CREAT SERPL-MCNC: 1.39 MG/DL (ref 0.57–1)
EOSINOPHIL # BLD AUTO: 0.25 10*3/MM3 (ref 0.1–0.3)
EOSINOPHIL NFR BLD AUTO: 5.2 % (ref 0–4)
ERYTHROCYTE [DISTWIDTH] IN BLOOD BY AUTOMATED COUNT: 13.1 % (ref 11.5–14.5)
GLOBULIN SER CALC-MCNC: 1.8 GM/DL
GLUCOSE SERPL-MCNC: 99 MG/DL (ref 65–99)
HCT VFR BLD AUTO: 32.7 % (ref 37–47)
HDLC SERPL-MCNC: 36 MG/DL (ref 40–60)
HGB BLD-MCNC: 10.3 G/DL (ref 12–16)
IMM GRANULOCYTES # BLD: 0.02 10*3/MM3 (ref 0–0.03)
IMM GRANULOCYTES NFR BLD: 0.4 % (ref 0–0.5)
LDLC SERPL CALC-MCNC: 109 MG/DL (ref 0–100)
LYMPHOCYTES # BLD AUTO: 1.3 10*3/MM3 (ref 0.6–4.8)
LYMPHOCYTES NFR BLD AUTO: 27.1 % (ref 20–45)
MCH RBC QN AUTO: 29.9 PG (ref 27–31)
MCHC RBC AUTO-ENTMCNC: 31.5 G/DL (ref 31–37)
MCV RBC AUTO: 94.8 FL (ref 81–99)
MONOCYTES # BLD AUTO: 0.47 10*3/MM3 (ref 0–1)
MONOCYTES NFR BLD AUTO: 9.8 % (ref 3–8)
NEUTROPHILS # BLD AUTO: 2.75 10*3/MM3 (ref 1.5–8.3)
NEUTROPHILS NFR BLD AUTO: 57.3 % (ref 45–70)
NRBC BLD AUTO-RTO: 0 /100 WBC (ref 0–0)
PLATELET # BLD AUTO: 167 10*3/MM3 (ref 140–500)
POTASSIUM SERPL-SCNC: 3.7 MMOL/L (ref 3.5–5.2)
PROT SERPL-MCNC: 5.5 G/DL (ref 6–8.5)
RBC # BLD AUTO: 3.45 10*6/MM3 (ref 4.2–5.4)
SODIUM SERPL-SCNC: 135 MMOL/L (ref 136–145)
T3FREE SERPL-MCNC: 2.9 PG/ML (ref 2–4.4)
T4 FREE SERPL-MCNC: 1.13 NG/DL (ref 0.93–1.7)
TRIGL SERPL-MCNC: 238 MG/DL (ref 0–150)
TSH SERPL DL<=0.005 MIU/L-ACNC: 1.06 MIU/ML (ref 0.27–4.2)
VIT B12 SERPL-MCNC: 753 PG/ML (ref 211–946)
VLDLC SERPL CALC-MCNC: 47.6 MG/DL (ref 7–27)
WBC # BLD AUTO: 4.8 10*3/MM3 (ref 4.8–10.8)

## 2017-04-20 RX ORDER — DULOXETIN HYDROCHLORIDE 60 MG/1
CAPSULE, DELAYED RELEASE ORAL
Qty: 30 CAPSULE | Refills: 1 | Status: SHIPPED | OUTPATIENT
Start: 2017-04-20 | End: 2017-06-16 | Stop reason: SDUPTHER

## 2017-04-20 NOTE — TELEPHONE ENCOUNTER
LVM with details, told her to call the office back for f/u here in office.       ---- Message from Bubba Avalos MD sent at 4/20/2017  8:09 AM EDT -----  Labs look good.  Her anemia is slightly worse.  Rest of labs stable.  I want her to follow up here in a week.  I am ordering a holter monitor.

## 2017-04-21 ENCOUNTER — HOSPITAL ENCOUNTER (OUTPATIENT)
Dept: CARDIOLOGY | Facility: HOSPITAL | Age: 72
Discharge: HOME | End: 2017-04-21
Admitting: INTERNAL MEDICINE

## 2017-04-21 DIAGNOSIS — R55 POSTURAL DIZZINESS WITH PRESYNCOPE: ICD-10-CM

## 2017-04-21 DIAGNOSIS — R42 POSTURAL DIZZINESS WITH PRESYNCOPE: ICD-10-CM

## 2017-04-21 PROCEDURE — 93226 XTRNL ECG REC<48 HR SCAN A/R: CPT

## 2017-04-21 PROCEDURE — 93225 XTRNL ECG REC<48 HRS REC: CPT

## 2017-04-24 PROCEDURE — 93227 XTRNL ECG REC<48 HR R&I: CPT | Performed by: INTERNAL MEDICINE

## 2017-04-27 ENCOUNTER — OFFICE VISIT (OUTPATIENT)
Dept: INTERNAL MEDICINE | Facility: CLINIC | Age: 72
End: 2017-04-27

## 2017-04-27 VITALS
WEIGHT: 199 LBS | BODY MASS INDEX: 39.07 KG/M2 | HEIGHT: 60 IN | RESPIRATION RATE: 16 BRPM | OXYGEN SATURATION: 98 % | DIASTOLIC BLOOD PRESSURE: 68 MMHG | SYSTOLIC BLOOD PRESSURE: 130 MMHG | HEART RATE: 72 BPM

## 2017-04-27 DIAGNOSIS — R40.4 TRANSIENT ALTERATION OF AWARENESS: Primary | ICD-10-CM

## 2017-04-27 DIAGNOSIS — E11.9 CONTROLLED TYPE 2 DIABETES MELLITUS WITHOUT COMPLICATION, WITHOUT LONG-TERM CURRENT USE OF INSULIN (HCC): ICD-10-CM

## 2017-04-27 PROCEDURE — 99214 OFFICE O/P EST MOD 30 MIN: CPT | Performed by: INTERNAL MEDICINE

## 2017-04-27 NOTE — PROGRESS NOTES
Subjective   Jung Short is a 71 y.o. female.     History of Present Illness   70 yo female with pmhx recent presyncopal and what sounds like an amnestic event.  She had 3-4 hours she was awake and interacting with family but does not remember that time. She reports pain in her feet that keeps her up, on chronic narcotic therapy.  Thinks sleep disorder started when her  .    She has DM, HTN,hypothyroidism, Chf, Pulm HTN, neuropathic pain, severe OA.      Her DM is very well controlled with Hba1c 6.3  She had   Found small vessel changes on her CT so started statin with no change to her symptoms  She is back on antibiotic for her chronic foot ulcer.    She really needs to sleep more, thinks she fell asleep.   The following portions of the patient's history were reviewed and updated as appropriate: allergies, current medications, past family history, past medical history, past social history, past surgical history and problem list.    Review of Systems   Constitutional: Negative.    HENT: Negative.    Respiratory: Negative.    Genitourinary: Negative.    Musculoskeletal: Positive for back pain, joint swelling and myalgias.   Neurological: Negative.    Psychiatric/Behavioral: Positive for sleep disturbance.       Objective   Physical Exam   Constitutional: She is oriented to person, place, and time. She appears well-developed and well-nourished. No distress.   HENT:   Head: Normocephalic.   Right Ear: External ear normal.   Left Ear: External ear normal.   Eyes: EOM are normal. Pupils are equal, round, and reactive to light. Right eye exhibits no discharge. Left eye exhibits no discharge.   Cardiovascular: Normal rate and regular rhythm.    Pulmonary/Chest: Effort normal. No respiratory distress.   Abdominal: Soft.   Neurological: She is alert and oriented to person, place, and time.   Skin: She is not diaphoretic.   Psychiatric: She has a normal mood and affect. Her behavior is normal.    Nursing note and vitals reviewed.       4/19/17 - tsh normal, , CT with small vessel changes.  CBc stable.  Creatinine 1.3  Assessment/Plan   Diagnoses and all orders for this visit:    Transient alteration of awareness   -concern that she has polypharmacy with narcotics   -will wait to see if occurs again   -monitor closely,sleep study if reoccurs    Controlled type 2 diabetes mellitus without complication, without long-term current use of insulin    CHF - well controlled, holter negative  HL - new statin therapy - check cmp   Diffuse pain,myalgia  Multifactorial  I reassured her today, would like to stay on her arimidex at least 5-10 years, despite her pain  Seeing  for ortho  [place] for gouty arthritis with good result    Spent 25 min in care of patient, fu 4 week cmp and 3 month appt.

## 2017-05-01 ENCOUNTER — TELEPHONE (OUTPATIENT)
Dept: INTERNAL MEDICINE | Facility: CLINIC | Age: 72
End: 2017-05-01

## 2017-05-08 RX ORDER — SPIRONOLACTONE 25 MG/1
TABLET ORAL
Qty: 30 TABLET | Refills: 10 | Status: SHIPPED | OUTPATIENT
Start: 2017-05-08 | End: 2018-04-03

## 2017-05-11 ENCOUNTER — TELEPHONE (OUTPATIENT)
Dept: INTERNAL MEDICINE | Facility: CLINIC | Age: 72
End: 2017-05-11

## 2017-05-11 ENCOUNTER — OFFICE VISIT (OUTPATIENT)
Dept: PAIN MEDICINE | Facility: CLINIC | Age: 72
End: 2017-05-11

## 2017-05-11 VITALS
SYSTOLIC BLOOD PRESSURE: 134 MMHG | WEIGHT: 201 LBS | OXYGEN SATURATION: 97 % | RESPIRATION RATE: 16 BRPM | HEIGHT: 60 IN | TEMPERATURE: 98.1 F | BODY MASS INDEX: 39.46 KG/M2 | HEART RATE: 78 BPM | DIASTOLIC BLOOD PRESSURE: 72 MMHG

## 2017-05-11 DIAGNOSIS — M47.816 LUMBAR FACET ARTHROPATHY: ICD-10-CM

## 2017-05-11 DIAGNOSIS — M25.50 ARTHRALGIA OF MULTIPLE JOINTS: ICD-10-CM

## 2017-05-11 DIAGNOSIS — Z79.899 ENCOUNTER FOR LONG-TERM (CURRENT) USE OF HIGH-RISK MEDICATION: ICD-10-CM

## 2017-05-11 DIAGNOSIS — G89.29 OTHER CHRONIC PAIN: Primary | ICD-10-CM

## 2017-05-11 DIAGNOSIS — M51.36 DDD (DEGENERATIVE DISC DISEASE), LUMBAR: ICD-10-CM

## 2017-05-11 DIAGNOSIS — M15.9 GENERALIZED OSTEOARTHRITIS: ICD-10-CM

## 2017-05-11 PROCEDURE — 99213 OFFICE O/P EST LOW 20 MIN: CPT | Performed by: NURSE PRACTITIONER

## 2017-05-11 RX ORDER — HYDROCODONE BITARTRATE AND ACETAMINOPHEN 10; 325 MG/1; MG/1
1 TABLET ORAL EVERY 4 HOURS PRN
Qty: 180 TABLET | Refills: 0 | Status: SHIPPED | OUTPATIENT
Start: 2017-05-11 | End: 2017-06-08 | Stop reason: SDUPTHER

## 2017-06-01 RX ORDER — ANASTROZOLE 1 MG/1
TABLET ORAL
Qty: 30 TABLET | Refills: 2 | Status: SHIPPED | OUTPATIENT
Start: 2017-06-01 | End: 2017-08-31 | Stop reason: SDUPTHER

## 2017-06-01 RX ORDER — BUMETANIDE 2 MG/1
TABLET ORAL
Qty: 60 TABLET | Refills: 3 | Status: ON HOLD | OUTPATIENT
Start: 2017-06-01 | End: 2017-10-22

## 2017-06-08 ENCOUNTER — OFFICE VISIT (OUTPATIENT)
Dept: PAIN MEDICINE | Facility: CLINIC | Age: 72
End: 2017-06-08

## 2017-06-08 VITALS
RESPIRATION RATE: 16 BRPM | SYSTOLIC BLOOD PRESSURE: 108 MMHG | TEMPERATURE: 98.1 F | HEIGHT: 60 IN | HEART RATE: 74 BPM | OXYGEN SATURATION: 99 % | BODY MASS INDEX: 38.68 KG/M2 | WEIGHT: 197 LBS | DIASTOLIC BLOOD PRESSURE: 55 MMHG

## 2017-06-08 DIAGNOSIS — M25.50 ARTHRALGIA OF MULTIPLE JOINTS: ICD-10-CM

## 2017-06-08 DIAGNOSIS — M15.9 GENERALIZED OSTEOARTHRITIS: ICD-10-CM

## 2017-06-08 DIAGNOSIS — M51.36 DDD (DEGENERATIVE DISC DISEASE), LUMBAR: ICD-10-CM

## 2017-06-08 DIAGNOSIS — Z79.899 ENCOUNTER FOR LONG-TERM (CURRENT) USE OF HIGH-RISK MEDICATION: ICD-10-CM

## 2017-06-08 DIAGNOSIS — G89.29 OTHER CHRONIC PAIN: Primary | ICD-10-CM

## 2017-06-08 PROCEDURE — 99213 OFFICE O/P EST LOW 20 MIN: CPT | Performed by: NURSE PRACTITIONER

## 2017-06-08 RX ORDER — OFLOXACIN 3 MG/ML
SOLUTION/ DROPS OPHTHALMIC
Refills: 0 | Status: ON HOLD | COMMUNITY
Start: 2017-05-30 | End: 2017-10-22

## 2017-06-08 RX ORDER — COLCHICINE 0.6 MG/1
CAPSULE ORAL
Refills: 0 | Status: ON HOLD | COMMUNITY
Start: 2017-06-01 | End: 2017-10-22

## 2017-06-08 RX ORDER — FEBUXOSTAT 80 MG/1
TABLET ORAL
Refills: 0 | COMMUNITY
Start: 2017-05-19 | End: 2017-10-23 | Stop reason: HOSPADM

## 2017-06-08 RX ORDER — HYDROCODONE BITARTRATE AND ACETAMINOPHEN 10; 325 MG/1; MG/1
1 TABLET ORAL EVERY 4 HOURS PRN
Qty: 180 TABLET | Refills: 0 | Status: SHIPPED | OUTPATIENT
Start: 2017-06-08 | End: 2017-07-06 | Stop reason: SDUPTHER

## 2017-06-08 RX ORDER — PREDNISOLONE ACETATE 10 MG/ML
SUSPENSION/ DROPS OPHTHALMIC
Refills: 0 | Status: ON HOLD | COMMUNITY
Start: 2017-05-16 | End: 2017-10-22

## 2017-06-08 NOTE — PROGRESS NOTES
"CHIEF COMPLAINT    Since pt's last visit, she states left knee, right shoulder, and back pain has worsened. States she will have a knee replacement soon.     Subjective   Jung Short is a 71 y.o. female  who presents to the office for follow-up.She has a history of chronic joint pain due to OA and low back pain. Her joint pain is slightly worse and she is awaiting impending TKR with Dr. Braden.    Complains of pain in her joints(knee, shoulder) and back. Today her pain is 5/10VAs. Describes the pain as continuous. pain in her joints and back. Today her pain is 4/10VAS. Describes the pain as continuous. Continues with Hydrocodone 10/325 6/day and cymbalta. She notes constipation with the regimen occasionally. Denies any other side effects from the regimen. Notes moderate relief with the regimen. ADL's by self.     Is under care of podiatrist/foot surgeon due to issues with her feet. No longer will have to wear walking shoe of left foot. Wears special shoe on right foot. Can now wear orthopedic(\"micro\") shoe of left foot starting later today. Still has to wear a bandage.    Continues to volunteer at hospital.    Will be having cataract surgery next few weeks.     Joint Pain   This is a chronic problem. The current episode started more than 1 year ago (today pain is 5/10VAS- joints). The problem occurs constantly. The problem has been unchanged. Associated symptoms include arthralgias, fatigue, joint swelling and weakness (legs). Pertinent negatives include no chest pain, chills, congestion, coughing, fever, headaches, numbness or vomiting. Nothing aggravates the symptoms. She has tried oral narcotics, position changes and rest for the symptoms. The treatment provided moderate relief.      PEG Assessment   What number best describes your pain on average in the past week?6  What number best describes how, during the past week, pain has interfered with your enjoyment of life?5  What number best describes how, " "during the past week, pain has interfered with your general activity?  6    The following portions of the patient's history were reviewed and updated as appropriate: allergies, current medications, past family history, past medical history, past social history, past surgical history and problem list.    Review of Systems   Constitutional: Positive for fatigue. Negative for chills and fever.   HENT: Negative for congestion.    Eyes: Positive for visual disturbance (cataracts, both eyes).   Respiratory: Negative for cough, shortness of breath and wheezing.    Cardiovascular: Positive for leg swelling. Negative for chest pain and palpitations.   Gastrointestinal: Negative for constipation, diarrhea and vomiting.   Genitourinary: Negative for difficulty urinating.   Musculoskeletal: Positive for arthralgias, back pain and joint swelling.   Neurological: Positive for weakness (legs). Negative for dizziness, numbness and headaches.   Psychiatric/Behavioral: Positive for sleep disturbance. Negative for agitation and suicidal ideas. The patient is not nervous/anxious.        Vitals:    06/08/17 1352   BP: 108/55   Pulse: 74   Resp: 16   Temp: 98.1 °F (36.7 °C)   SpO2: 99%   Weight: 197 lb (89.4 kg)   Height: 60\" (152.4 cm)   PainSc:   5   PainLoc: Back     Objective   Physical Exam   Constitutional: She is oriented to person, place, and time. Vital signs are normal. She appears well-developed and well-nourished. She is cooperative.   HENT:   Head: Normocephalic and atraumatic.   Nose: Nose normal.   Eyes: Conjunctivae and lids are normal.   Cardiovascular: Normal rate, regular rhythm and normal heart sounds.    Pulmonary/Chest: Effort normal and breath sounds normal. No respiratory distress.   Abdominal: Normal appearance.   Musculoskeletal:        Right shoulder: She exhibits tenderness.        Right knee: Tenderness found.        Left knee: Tenderness found.        Lumbar back: She exhibits tenderness.   OA changes of " bilateral knees    Walking shoe of left foot     Neurological: She is alert and oriented to person, place, and time. Gait (no cane aid today--slow ambulation) abnormal.   Skin: Skin is warm, dry and intact.   Psychiatric: She has a normal mood and affect. Her speech is normal and behavior is normal. Judgment and thought content normal. Cognition and memory are normal.   Nursing note and vitals reviewed.      Assessment/Plan   Jung was seen today for back pain and shoulder pain.    Diagnoses and all orders for this visit:    Other chronic pain    DDD (degenerative disc disease), lumbar    Generalized osteoarthritis    Arthralgia of multiple joints    Encounter for long-term (current) use of high-risk medication    Other orders  -     HYDROcodone-acetaminophen (NORCO)  MG per tablet; Take 1 tablet by mouth Every 4 (Four) Hours As Needed for Moderate Pain (4-6).      --- The urine drug screen confirmation from 2-14-17 has been reviewed and the result is appropriate based on patient history and LOUANN report  --- Refill Hydrocodone. Patient appears stable with current regimen. No adverse effects. Regarding continuation of opioids, there is no evidence of aberrant behavior or any red flags.  The patient continues with appropriate response to opioid therapy. ADL's remain intact by self.   --- Follow-up  1 month or sooner if needed.       LOUANN REPORT    As part of the patient's treatment plan, I am prescribing controlled substances. The patient has been made aware of appropriate use of such medications, including potential risk of somnolence, limited ability to drive and/or work safely, and the potential for dependence or overdose. It has also bee made clear that these medications are for use by this patient only, without concomitant use of alcohol or other substances unless prescribed.     Patient has completed prescribing agreement detailing terms of continued prescribing of controlled substances, including  monitoring LOUANN reports, urine drug screening, and pill counts if necessary. The patient is aware that inappropriate use will results in cessation of prescribing such medications.    LOUANN report has been reviewed and scanned into the patient's chart.    Date of last LOUANN : 6-5-17    History and physical exam exhibit continued safe and appropriate use of controlled substances.      EMR Dragon/Transcription disclaimer:   Much of this encounter note is an electronic transcription/translation of spoken language to printed text. The electronic translation of spoken language may permit erroneous, or at times, nonsensical words or phrases to be inadvertently transcribed; Although I have reviewed the note for such errors, some may still exist.

## 2017-06-16 DIAGNOSIS — M25.50 ARTHRALGIA OF MULTIPLE JOINTS: ICD-10-CM

## 2017-06-16 RX ORDER — DULOXETIN HYDROCHLORIDE 60 MG/1
CAPSULE, DELAYED RELEASE ORAL
Qty: 30 CAPSULE | Refills: 6 | Status: SHIPPED | OUTPATIENT
Start: 2017-06-16 | End: 2018-01-17 | Stop reason: SDUPTHER

## 2017-07-06 ENCOUNTER — OFFICE VISIT (OUTPATIENT)
Dept: PAIN MEDICINE | Facility: CLINIC | Age: 72
End: 2017-07-06

## 2017-07-06 VITALS
SYSTOLIC BLOOD PRESSURE: 156 MMHG | RESPIRATION RATE: 18 BRPM | HEART RATE: 72 BPM | TEMPERATURE: 98.5 F | OXYGEN SATURATION: 96 % | HEIGHT: 60 IN | DIASTOLIC BLOOD PRESSURE: 87 MMHG

## 2017-07-06 DIAGNOSIS — M47.816 LUMBAR FACET ARTHROPATHY: ICD-10-CM

## 2017-07-06 DIAGNOSIS — M25.50 ARTHRALGIA OF MULTIPLE JOINTS: ICD-10-CM

## 2017-07-06 DIAGNOSIS — G89.29 OTHER CHRONIC PAIN: Primary | ICD-10-CM

## 2017-07-06 DIAGNOSIS — Z79.899 ENCOUNTER FOR LONG-TERM (CURRENT) USE OF HIGH-RISK MEDICATION: ICD-10-CM

## 2017-07-06 DIAGNOSIS — R52 GENERALIZED PAIN: ICD-10-CM

## 2017-07-06 DIAGNOSIS — M51.36 DDD (DEGENERATIVE DISC DISEASE), LUMBAR: ICD-10-CM

## 2017-07-06 DIAGNOSIS — M15.9 GENERALIZED OSTEOARTHRITIS: ICD-10-CM

## 2017-07-06 PROCEDURE — 99213 OFFICE O/P EST LOW 20 MIN: CPT | Performed by: NURSE PRACTITIONER

## 2017-07-06 RX ORDER — HYDROCODONE BITARTRATE AND ACETAMINOPHEN 10; 325 MG/1; MG/1
1 TABLET ORAL EVERY 4 HOURS PRN
Qty: 180 TABLET | Refills: 0 | Status: SHIPPED | OUTPATIENT
Start: 2017-07-06 | End: 2017-08-03 | Stop reason: SDUPTHER

## 2017-07-06 RX ORDER — MAGNESIUM HYDROXIDE 1200 MG/15ML
LIQUID ORAL
Refills: 2 | Status: ON HOLD | COMMUNITY
Start: 2017-06-22 | End: 2017-10-22

## 2017-07-06 NOTE — PROGRESS NOTES
"CHIEF COMPLAINT  Follow-up for back and joint pain. Ms. Short states that her pain is unchanged from her last appt.    Subjective   Jugn Short is a 72 y.o. female  who presents to the office for follow-up.She has a history of back and joint pain. Her joint pain is slightly worse and she is awaiting impending TKR with Dr. Braden. Cannot have done until wound on left foot is completely healed. Hoping for surgery in 9-2017.    Complains of pain in her back and joints. Today her pain is 5-6/10VAS. Her pain is continuous and slightly worse since last office visit. Continues with Hydrocodone 10/325 6/day and cymbalta. She notes constipation with the regimen occasionally, however this improved with OTC stool softeners. Denies any other side effects from the regimen. The regimen helps decrease her pain by 30-40%. ADL's by self.     Is able to wear shoes. Has special orthopedic shoes. Also has been placed on \"chair-rest.\"  Was also placed on leave as hospital volunteer.     Joint Pain   This is a chronic problem. The current episode started more than 1 year ago (today pain is 5/10VAS- joints). The problem occurs constantly. The problem has been unchanged. Associated symptoms include arthralgias, fatigue, joint swelling and weakness (bilateral hands). Pertinent negatives include no chest pain, chills, congestion, coughing, fever, headaches, numbness or vomiting. Nothing aggravates the symptoms. She has tried oral narcotics, position changes and rest for the symptoms. The treatment provided moderate relief.   Back Pain   This is a chronic problem. The current episode started more than 1 year ago. The problem occurs constantly. The problem has been gradually worsening since onset. The pain is present in the lumbar spine. The pain is at a severity of 5/10. The pain is moderate. Associated symptoms include weakness (bilateral hands). Pertinent negatives include no chest pain, fever, headaches or numbness. Risk factors " "include lack of exercise and obesity. She has tried analgesics for the symptoms. The treatment provided moderate relief.      PEG Assessment   What number best describes your pain on average in the past week?5  What number best describes how, during the past week, pain has interfered with your enjoyment of life?5  What number best describes how, during the past week, pain has interfered with your general activity?  5    The following portions of the patient's history were reviewed and updated as appropriate: allergies, current medications, past family history, past medical history, past social history, past surgical history and problem list.    Review of Systems   Constitutional: Positive for fatigue. Negative for chills and fever.   HENT: Negative for congestion.    Eyes: Positive for visual disturbance (blurry vision).   Respiratory: Negative for cough, shortness of breath and wheezing.    Cardiovascular: Positive for leg swelling. Negative for chest pain and palpitations.   Gastrointestinal: Negative for constipation, diarrhea and vomiting.   Genitourinary: Negative for difficulty urinating.   Musculoskeletal: Positive for arthralgias, back pain and joint swelling.   Neurological: Positive for weakness (bilateral hands). Negative for numbness and headaches.   Psychiatric/Behavioral: Positive for sleep disturbance. Negative for suicidal ideas. The patient is nervous/anxious.        Vitals:    07/06/17 1501   BP: 156/87   Pulse: 72   Resp: 18   Temp: 98.5 °F (36.9 °C)   SpO2: 96%   Height: 60\" (152.4 cm)   PainSc: 6  Comment: back pain 5/10   PainLoc: Knee     Objective   Physical Exam   Constitutional: She is oriented to person, place, and time. Vital signs are normal. She appears well-developed and well-nourished. She is cooperative.   HENT:   Head: Normocephalic and atraumatic.   Nose: Nose normal.   Eyes: Conjunctivae and lids are normal.   Cardiovascular: Normal rate, regular rhythm and normal heart sounds.  "   Pulmonary/Chest: Effort normal and breath sounds normal. No respiratory distress.   Abdominal: Normal appearance.   Musculoskeletal:        Right shoulder: She exhibits tenderness.        Right knee: Tenderness found.        Left knee: Tenderness found.        Lumbar back: She exhibits tenderness.   OA changes of bilateral knees       Neurological: She is alert and oriented to person, place, and time. Gait (no cane aid today--slow ambulation) abnormal.   Skin: Skin is warm, dry and intact.   Psychiatric: She has a normal mood and affect. Her speech is normal and behavior is normal. Judgment and thought content normal. Cognition and memory are normal.   Nursing note and vitals reviewed.      Assessment/Plan   Jung was seen today for back pain and joint pain.    Diagnoses and all orders for this visit:    Other chronic pain    Generalized pain    Lumbar facet arthropathy    Generalized osteoarthritis    DDD (degenerative disc disease), lumbar    Arthralgia of multiple joints    Encounter for long-term (current) use of high-risk medication    Other orders  -     HYDROcodone-acetaminophen (NORCO)  MG per tablet; Take 1 tablet by mouth Every 4 (Four) Hours As Needed for Moderate Pain (4-6).      --- The urine drug screen confirmation from 2-14-17 has been reviewed and the result is appropriate based on patient history and LOUANN report  --- Refill Hydrocodone. Patient appears stable with current regimen. No adverse effects. Regarding continuation of opioids, there is no evidence of aberrant behavior or any red flags.  The patient continues with appropriate response to opioid therapy. ADL's remain intact by self.   --- Follow-up 1 month or sooner if needed.         LOUANN REPORT    As part of the patient's treatment plan, I am prescribing controlled substances. The patient has been made aware of appropriate use of such medications, including potential risk of somnolence, limited ability to drive and/or work  safely, and the potential for dependence or overdose. It has also bee made clear that these medications are for use by this patient only, without concomitant use of alcohol or other substances unless prescribed.     Patient has completed prescribing agreement detailing terms of continued prescribing of controlled substances, including monitoring LOUANN reports, urine drug screening, and pill counts if necessary. The patient is aware that inappropriate use will results in cessation of prescribing such medications.    LOUANN report has been reviewed and scanned into the patient's chart.    Date of last LOUANN : 7-5-17    History and physical exam exhibit continued safe and appropriate use of controlled substances.      EMR Dragon/Transcription disclaimer:   Much of this encounter note is an electronic transcription/translation of spoken language to printed text. The electronic translation of spoken language may permit erroneous, or at times, nonsensical words or phrases to be inadvertently transcribed; Although I have reviewed the note for such errors, some may still exist.

## 2017-07-11 DIAGNOSIS — E11.29 TYPE 2 DIABETES MELLITUS WITH OTHER KIDNEY COMPLICATION: Primary | ICD-10-CM

## 2017-07-11 DIAGNOSIS — E55.9 VITAMIN D DEFICIENCY: ICD-10-CM

## 2017-07-11 DIAGNOSIS — E03.9 HYPOTHYROIDISM, UNSPECIFIED TYPE: ICD-10-CM

## 2017-07-12 ENCOUNTER — LAB (OUTPATIENT)
Dept: INTERNAL MEDICINE | Facility: CLINIC | Age: 72
End: 2017-07-12

## 2017-07-12 ENCOUNTER — LAB (OUTPATIENT)
Dept: LAB | Facility: HOSPITAL | Age: 72
End: 2017-07-12
Attending: INTERNAL MEDICINE

## 2017-07-12 ENCOUNTER — TRANSCRIBE ORDERS (OUTPATIENT)
Dept: ADMINISTRATIVE | Facility: HOSPITAL | Age: 72
End: 2017-07-12

## 2017-07-12 DIAGNOSIS — N18.30 CHRONIC KIDNEY DISEASE, STAGE III (MODERATE) (HCC): Primary | ICD-10-CM

## 2017-07-12 DIAGNOSIS — N17.9 ACUTE KIDNEY FAILURE, UNSPECIFIED (HCC): ICD-10-CM

## 2017-07-12 DIAGNOSIS — N18.9 CHRONIC KIDNEY DISEASE, UNSPECIFIED: ICD-10-CM

## 2017-07-12 DIAGNOSIS — E03.9 HYPOTHYROIDISM, UNSPECIFIED TYPE: ICD-10-CM

## 2017-07-12 DIAGNOSIS — E55.9 VITAMIN D DEFICIENCY: ICD-10-CM

## 2017-07-12 DIAGNOSIS — E11.29 TYPE 2 DIABETES MELLITUS WITH OTHER KIDNEY COMPLICATION: ICD-10-CM

## 2017-07-12 DIAGNOSIS — N18.30 CHRONIC KIDNEY DISEASE, STAGE III (MODERATE) (HCC): ICD-10-CM

## 2017-07-12 LAB
25(OH)D3 SERPL-MCNC: 26.5 NG/ML
25(OH)D3+25(OH)D2 SERPL-MCNC: 27.7 NG/ML
ALBUMIN SERPL-MCNC: 4.1 G/DL (ref 3.5–5.2)
ALBUMIN/GLOB SERPL: 1.7 G/DL
ALP SERPL-CCNC: 79 U/L (ref 40–129)
ALT SERPL-CCNC: 12 U/L (ref 5–33)
ANION GAP SERPL CALCULATED.3IONS-SCNC: 12.7 MMOL/L
AST SERPL-CCNC: 15 U/L (ref 5–32)
BACTERIA UR QL AUTO: ABNORMAL /HPF
BASOPHILS # BLD AUTO: 0.02 10*3/MM3 (ref 0–0.2)
BASOPHILS # BLD AUTO: 0.03 10*3/MM3 (ref 0–0.2)
BASOPHILS NFR BLD AUTO: 0.3 % (ref 0–2)
BASOPHILS NFR BLD AUTO: 0.6 % (ref 0–2)
BILIRUB SERPL-MCNC: 0.4 MG/DL (ref 0.2–1.2)
BILIRUB UR QL STRIP: NEGATIVE
BUN BLD-MCNC: 28 MG/DL (ref 8–23)
BUN SERPL-MCNC: 28 MG/DL (ref 8–23)
BUN/CREAT SERPL: 23 (ref 7–25)
BUN/CREAT SERPL: 25.9 (ref 7–25)
CALCIUM SERPL-MCNC: 9.9 MG/DL (ref 8.8–10.5)
CALCIUM SPEC-SCNC: 10 MG/DL (ref 8.8–10.5)
CHLORIDE SERPL-SCNC: 98 MMOL/L (ref 98–107)
CHLORIDE SERPL-SCNC: 99 MMOL/L (ref 98–107)
CHOLEST SERPL-MCNC: 191 MG/DL (ref 0–200)
CHOLEST/HDLC SERPL: 3.98 {RATIO}
CLARITY UR: CLEAR
CO2 SERPL-SCNC: 26.4 MMOL/L (ref 22–29)
CO2 SERPL-SCNC: 27.3 MMOL/L (ref 22–29)
COLOR UR: YELLOW
CREAT BLD-MCNC: 1.22 MG/DL (ref 0.57–1)
CREAT SERPL-MCNC: 1.08 MG/DL (ref 0.57–1)
DEPRECATED RDW RBC AUTO: 41.5 FL (ref 37–54)
EOSINOPHIL # BLD AUTO: 0.18 10*3/MM3 (ref 0.1–0.3)
EOSINOPHIL # BLD AUTO: 0.2 10*3/MM3 (ref 0.1–0.3)
EOSINOPHIL NFR BLD AUTO: 3.4 % (ref 0–4)
EOSINOPHIL NFR BLD AUTO: 3.5 % (ref 0–4)
ERYTHROCYTE [DISTWIDTH] IN BLOOD BY AUTOMATED COUNT: 12.4 % (ref 11.5–14.5)
ERYTHROCYTE [DISTWIDTH] IN BLOOD BY AUTOMATED COUNT: 12.5 % (ref 11.5–14.5)
GFR SERPL CREATININE-BSD FRML MDRD: 43 ML/MIN/1.73
GLOBULIN SER CALC-MCNC: 2.4 GM/DL
GLUCOSE BLD-MCNC: 141 MG/DL (ref 65–99)
GLUCOSE SERPL-MCNC: 142 MG/DL (ref 65–99)
GLUCOSE UR STRIP-MCNC: NEGATIVE MG/DL
HBA1C MFR BLD: 6 % (ref 4.8–5.6)
HCT VFR BLD AUTO: 35 % (ref 37–47)
HCT VFR BLD AUTO: 36.7 % (ref 37–47)
HDLC SERPL-MCNC: 48 MG/DL (ref 40–60)
HGB BLD-MCNC: 11.7 G/DL (ref 12–16)
HGB BLD-MCNC: 12 G/DL (ref 12–16)
HGB UR QL STRIP.AUTO: NEGATIVE
HYALINE CASTS UR QL AUTO: ABNORMAL /LPF
IMM GRANULOCYTES # BLD: 0.02 10*3/MM3 (ref 0–0.03)
IMM GRANULOCYTES # BLD: 0.04 10*3/MM3 (ref 0–0.03)
IMM GRANULOCYTES NFR BLD: 0.4 % (ref 0–0.5)
IMM GRANULOCYTES NFR BLD: 0.7 % (ref 0–0.5)
IRON 24H UR-MRATE: 71 MCG/DL (ref 37–145)
IRON SATN MFR SERPL: 24 %
KETONES UR QL STRIP: NEGATIVE
LDLC SERPL CALC-MCNC: 98 MG/DL (ref 0–100)
LEUKOCYTE ESTERASE UR QL STRIP.AUTO: ABNORMAL
LYMPHOCYTES # BLD AUTO: 1.43 10*3/MM3 (ref 0.6–4.8)
LYMPHOCYTES # BLD AUTO: 1.54 10*3/MM3 (ref 0.6–4.8)
LYMPHOCYTES NFR BLD AUTO: 26.9 % (ref 20–45)
LYMPHOCYTES NFR BLD AUTO: 27.3 % (ref 20–45)
MCH RBC QN AUTO: 29.7 PG (ref 27–31)
MCH RBC QN AUTO: 30.5 PG (ref 27–31)
MCHC RBC AUTO-ENTMCNC: 32.7 G/DL (ref 31–37)
MCHC RBC AUTO-ENTMCNC: 33.4 G/DL (ref 31–37)
MCV RBC AUTO: 90.8 FL (ref 81–99)
MCV RBC AUTO: 91.4 FL (ref 81–99)
MONOCYTES # BLD AUTO: 0.45 10*3/MM3 (ref 0–1)
MONOCYTES # BLD AUTO: 0.48 10*3/MM3 (ref 0–1)
MONOCYTES NFR BLD AUTO: 8.4 % (ref 3–8)
MONOCYTES NFR BLD AUTO: 8.6 % (ref 3–8)
NEUTROPHILS # BLD AUTO: 3.12 10*3/MM3 (ref 1.5–8.3)
NEUTROPHILS # BLD AUTO: 3.44 10*3/MM3 (ref 1.5–8.3)
NEUTROPHILS NFR BLD AUTO: 59.7 % (ref 45–70)
NEUTROPHILS NFR BLD AUTO: 60.2 % (ref 45–70)
NITRITE UR QL STRIP: NEGATIVE
NRBC BLD AUTO-RTO: 0 /100 WBC (ref 0–0)
NRBC BLD MANUAL-RTO: 0 /100 WBC (ref 0–0)
PH UR STRIP.AUTO: 5.5 [PH] (ref 4.5–8)
PLATELET # BLD AUTO: 220 10*3/MM3 (ref 140–500)
PLATELET # BLD AUTO: 234 10*3/MM3 (ref 140–500)
PMV BLD AUTO: 9.3 FL (ref 7.4–10.4)
POTASSIUM BLD-SCNC: 4.4 MMOL/L (ref 3.5–5.2)
POTASSIUM SERPL-SCNC: 4.2 MMOL/L (ref 3.5–5.2)
PROT SERPL-MCNC: 6.5 G/DL (ref 6–8.5)
PROT UR QL STRIP: NEGATIVE
RBC # BLD AUTO: 3.83 10*6/MM3 (ref 4.2–5.4)
RBC # BLD AUTO: 4.04 10*6/MM3 (ref 4.2–5.4)
RBC # UR: ABNORMAL /HPF
REF LAB TEST METHOD: ABNORMAL
SODIUM BLD-SCNC: 139 MMOL/L (ref 136–145)
SODIUM SERPL-SCNC: 139 MMOL/L (ref 136–145)
SP GR UR STRIP: <=1.005 (ref 1–1.03)
SQUAMOUS #/AREA URNS HPF: ABNORMAL /HPF
TIBC SERPL-MCNC: 290 MCG/DL (ref 261–478)
TRIGL SERPL-MCNC: 224 MG/DL (ref 0–150)
TSH SERPL DL<=0.005 MIU/L-ACNC: 0.61 MIU/ML (ref 0.27–4.2)
UIBC SERPL-MCNC: 219 MCG/DL (ref 112–346)
UROBILINOGEN UR QL STRIP: ABNORMAL
VLDLC SERPL CALC-MCNC: 44.8 MG/DL (ref 7–27)
WBC # BLD AUTO: 5.23 10*3/MM3 (ref 4.8–10.8)
WBC NRBC COR # BLD: 5.72 10*3/MM3 (ref 4.8–10.8)
WBC UR QL AUTO: ABNORMAL /HPF

## 2017-07-12 PROCEDURE — 80048 BASIC METABOLIC PNL TOTAL CA: CPT

## 2017-07-12 PROCEDURE — 83550 IRON BINDING TEST: CPT

## 2017-07-12 PROCEDURE — 83540 ASSAY OF IRON: CPT

## 2017-07-12 PROCEDURE — 82306 VITAMIN D 25 HYDROXY: CPT

## 2017-07-12 PROCEDURE — 36415 COLL VENOUS BLD VENIPUNCTURE: CPT

## 2017-07-12 PROCEDURE — 81001 URINALYSIS AUTO W/SCOPE: CPT

## 2017-07-12 PROCEDURE — 85025 COMPLETE CBC W/AUTO DIFF WBC: CPT

## 2017-07-14 ENCOUNTER — OFFICE VISIT (OUTPATIENT)
Dept: INTERNAL MEDICINE | Facility: CLINIC | Age: 72
End: 2017-07-14

## 2017-07-14 VITALS
SYSTOLIC BLOOD PRESSURE: 110 MMHG | BODY MASS INDEX: 39.24 KG/M2 | RESPIRATION RATE: 16 BRPM | DIASTOLIC BLOOD PRESSURE: 70 MMHG | WEIGHT: 200.9 LBS | HEART RATE: 77 BPM | OXYGEN SATURATION: 97 %

## 2017-07-14 DIAGNOSIS — E11.9 TYPE 2 DIABETES MELLITUS WITHOUT COMPLICATION, WITHOUT LONG-TERM CURRENT USE OF INSULIN (HCC): ICD-10-CM

## 2017-07-14 DIAGNOSIS — I50.20 CONGESTIVE HEART FAILURE WITH LEFT VENTRICULAR SYSTOLIC DYSFUNCTION (HCC): ICD-10-CM

## 2017-07-14 DIAGNOSIS — I10 ESSENTIAL HYPERTENSION: Primary | ICD-10-CM

## 2017-07-14 DIAGNOSIS — E03.9 ACQUIRED HYPOTHYROIDISM: ICD-10-CM

## 2017-07-14 DIAGNOSIS — E11.9 CONTROLLED TYPE 2 DIABETES MELLITUS WITHOUT COMPLICATION, WITHOUT LONG-TERM CURRENT USE OF INSULIN (HCC): ICD-10-CM

## 2017-07-14 PROCEDURE — 99214 OFFICE O/P EST MOD 30 MIN: CPT | Performed by: INTERNAL MEDICINE

## 2017-07-14 NOTE — PATIENT INSTRUCTIONS
7/12/17 - labs x 2 reviewed, iron ok, creatinine ok, at baseline, hb stble.   Assessment/Plan      Jung was seen today for diabetes.    Diagnoses and all orders for this visit:    Essential hypertension    Congestive heart failure with left ventricular systolic dysfunction    Controlled type 2 diabetes mellitus without complication, without long-term current use of insulin    Acquired hypothyroidism    Type 2 diabetes mellitus without complication, without long-term current use of insulin    Gout - per Dr. Bolanos.  Will get his last labs to check uric acid.     Take vitamin D daily 1000 IU  No calcium for now

## 2017-07-14 NOTE — PROGRESS NOTES
Subjective   Jung Short is a 72 y.o. female.     History of Present Illness   72y o female with HTN, hL, breast cancer per Dr. Pagan, DM, gout, CKD seeing dr. Pina  Seeing pain management as well for her chronic knee pain and OA.  Very motivated to get off pain meds.  She has bilateral shoulder and back pain as well.   She has ongong treatment for L diabetic foot ulcer, getting therapy with wound care and nwb, following their recommendations and not volunteering right now.   Wound care with Dr. Crane. Going well.     No new cp or palpitations. Gout well controlled with Dr. Bolanos.     She is recovering from recent cataract surgery. Cant wear glasses, still not driving.      The following portions of the patient's history were reviewed and updated as appropriate: allergies, current medications, past family history, past medical history, past social history, past surgical history and problem list.    Review of Systems   Constitutional: Negative.  Negative for appetite change, fatigue, fever and unexpected weight change.   HENT: Positive for congestion. Negative for sinus pressure and trouble swallowing.    Eyes: Positive for redness. Negative for pain.   Respiratory: Negative.  Negative for cough and chest tightness.    Cardiovascular: Negative.  Negative for chest pain, palpitations and leg swelling.   Gastrointestinal: Negative.  Negative for constipation and diarrhea.   Genitourinary: Negative.  Negative for dysuria, frequency, hematuria, pelvic pain and vaginal discharge.   Musculoskeletal: Negative.    Skin: Negative.    Neurological: Negative for dizziness, light-headedness and headaches.   Hematological: Negative.    Psychiatric/Behavioral: Negative.        Objective   Physical Exam   Constitutional: She is oriented to person, place, and time. She appears well-developed and well-nourished.   HENT:   Head: Normocephalic and atraumatic.   Right Ear: External ear normal.   Left Ear: External ear normal.    Eyes: EOM are normal. Pupils are equal, round, and reactive to light.   Neck: Normal range of motion. Neck supple. No thyromegaly present.   Cardiovascular: Normal rate, regular rhythm and normal heart sounds.    No murmur heard.  Pulmonary/Chest: Effort normal and breath sounds normal. No respiratory distress.   Neurological: She is alert and oriented to person, place, and time.   Skin: Skin is warm and dry.   Psychiatric: She has a normal mood and affect. Her behavior is normal.   Nursing note and vitals reviewed.    7/12/17 - labs x 2 reviewed, iron ok, creatinine ok, at baseline, hb stble.   Assessment/Plan      Jung was seen today for diabetes.    Diagnoses and all orders for this visit:    Essential hypertension    Congestive heart failure with left ventricular systolic dysfunction    Controlled type 2 diabetes mellitus without complication, without long-term current use of insulin    Acquired hypothyroidism    Type 2 diabetes mellitus without complication, without long-term current use of insulin    Gout - per Dr. Bolanos.  Will get his last labs to check uric acid.     Take vitamin D daily 1000 IU  No calcium for now

## 2017-07-21 ENCOUNTER — TRANSCRIBE ORDERS (OUTPATIENT)
Dept: ADMINISTRATIVE | Facility: HOSPITAL | Age: 72
End: 2017-07-21

## 2017-07-21 ENCOUNTER — LAB (OUTPATIENT)
Dept: LAB | Facility: HOSPITAL | Age: 72
End: 2017-07-21

## 2017-07-21 DIAGNOSIS — M10.072 ACUTE IDIOPATHIC GOUT OF LEFT FOOT: ICD-10-CM

## 2017-07-21 DIAGNOSIS — M10.071 ACUTE IDIOPATHIC GOUT OF RIGHT FOOT: Primary | ICD-10-CM

## 2017-07-21 DIAGNOSIS — M10.071 ACUTE IDIOPATHIC GOUT OF RIGHT FOOT: ICD-10-CM

## 2017-07-21 LAB
BASOPHILS # BLD AUTO: 0.03 10*3/MM3 (ref 0–0.2)
BASOPHILS NFR BLD AUTO: 0.5 % (ref 0–2)
CRP SERPL-MCNC: 0.84 MG/DL (ref 0–0.5)
DEPRECATED RDW RBC AUTO: 41.4 FL (ref 37–54)
EOSINOPHIL # BLD AUTO: 0.25 10*3/MM3 (ref 0.1–0.3)
EOSINOPHIL NFR BLD AUTO: 4.4 % (ref 0–4)
ERYTHROCYTE [DISTWIDTH] IN BLOOD BY AUTOMATED COUNT: 12.7 % (ref 11.5–14.5)
ERYTHROCYTE [SEDIMENTATION RATE] IN BLOOD: 25 MM/HR (ref 0–20)
HCT VFR BLD AUTO: 35.1 % (ref 37–47)
HGB BLD-MCNC: 11.9 G/DL (ref 12–16)
IMM GRANULOCYTES # BLD: 0.05 10*3/MM3 (ref 0–0.03)
IMM GRANULOCYTES NFR BLD: 0.9 % (ref 0–0.5)
LYMPHOCYTES # BLD AUTO: 1.95 10*3/MM3 (ref 0.6–4.8)
LYMPHOCYTES NFR BLD AUTO: 34.4 % (ref 20–45)
MCH RBC QN AUTO: 30.7 PG (ref 27–31)
MCHC RBC AUTO-ENTMCNC: 33.9 G/DL (ref 31–37)
MCV RBC AUTO: 90.5 FL (ref 81–99)
MONOCYTES # BLD AUTO: 0.6 10*3/MM3 (ref 0–1)
MONOCYTES NFR BLD AUTO: 10.6 % (ref 3–8)
NEUTROPHILS # BLD AUTO: 2.79 10*3/MM3 (ref 1.5–8.3)
NEUTROPHILS NFR BLD AUTO: 49.2 % (ref 45–70)
NRBC BLD MANUAL-RTO: 0 /100 WBC (ref 0–0)
PLATELET # BLD AUTO: 190 10*3/MM3 (ref 140–500)
PMV BLD AUTO: 8.8 FL (ref 7.4–10.4)
RBC # BLD AUTO: 3.88 10*6/MM3 (ref 4.2–5.4)
URATE SERPL-MCNC: 3.5 MG/DL (ref 3.4–7)
WBC NRBC COR # BLD: 5.67 10*3/MM3 (ref 4.8–10.8)

## 2017-07-21 PROCEDURE — 85652 RBC SED RATE AUTOMATED: CPT

## 2017-07-21 PROCEDURE — 86140 C-REACTIVE PROTEIN: CPT

## 2017-07-21 PROCEDURE — 84550 ASSAY OF BLOOD/URIC ACID: CPT

## 2017-07-21 PROCEDURE — 36415 COLL VENOUS BLD VENIPUNCTURE: CPT

## 2017-07-21 PROCEDURE — 85025 COMPLETE CBC W/AUTO DIFF WBC: CPT

## 2017-08-03 ENCOUNTER — OFFICE VISIT (OUTPATIENT)
Dept: PAIN MEDICINE | Facility: CLINIC | Age: 72
End: 2017-08-03

## 2017-08-03 VITALS
RESPIRATION RATE: 16 BRPM | BODY MASS INDEX: 40.84 KG/M2 | SYSTOLIC BLOOD PRESSURE: 112 MMHG | OXYGEN SATURATION: 97 % | HEIGHT: 60 IN | TEMPERATURE: 97.5 F | DIASTOLIC BLOOD PRESSURE: 66 MMHG | HEART RATE: 72 BPM | WEIGHT: 208 LBS

## 2017-08-03 DIAGNOSIS — M51.36 DDD (DEGENERATIVE DISC DISEASE), LUMBAR: ICD-10-CM

## 2017-08-03 DIAGNOSIS — M15.9 GENERALIZED OSTEOARTHRITIS: ICD-10-CM

## 2017-08-03 DIAGNOSIS — Z79.899 ENCOUNTER FOR LONG-TERM (CURRENT) USE OF HIGH-RISK MEDICATION: ICD-10-CM

## 2017-08-03 DIAGNOSIS — M47.816 LUMBAR FACET ARTHROPATHY: ICD-10-CM

## 2017-08-03 DIAGNOSIS — M25.50 ARTHRALGIA OF MULTIPLE JOINTS: ICD-10-CM

## 2017-08-03 DIAGNOSIS — G89.29 OTHER CHRONIC PAIN: Primary | ICD-10-CM

## 2017-08-03 PROCEDURE — 99213 OFFICE O/P EST LOW 20 MIN: CPT | Performed by: NURSE PRACTITIONER

## 2017-08-03 RX ORDER — ERGOCALCIFEROL 1.25 MG/1
CAPSULE ORAL
Refills: 0 | COMMUNITY
Start: 2017-07-19 | End: 2018-04-03

## 2017-08-03 RX ORDER — ERYTHROMYCIN 5 MG/G
OINTMENT OPHTHALMIC
Refills: 0 | Status: ON HOLD | COMMUNITY
Start: 2017-07-11 | End: 2017-10-22

## 2017-08-03 RX ORDER — HYDROCODONE BITARTRATE AND ACETAMINOPHEN 10; 325 MG/1; MG/1
1 TABLET ORAL EVERY 4 HOURS PRN
Qty: 180 TABLET | Refills: 0 | Status: SHIPPED | OUTPATIENT
Start: 2017-08-03 | End: 2017-08-31 | Stop reason: SDUPTHER

## 2017-08-03 NOTE — PROGRESS NOTES
"CHIEF COMPLAINT    Since last visit on 7/06/17, pt states back pain is unchanged. However, she is having increased shoulder pain, fawad in her left shoulder. States she had been working on her deck so increased activity.     Subjective   Jung Short is a 72 y.o. female  who presents to the office for follow-up.She has a history of chronic low back pain and joint pain. Also has history of non-helaing ulcers of feet, left worse than right. AT her last office visit, she was on \"chair-rest.\"  Is waiting on TKR until ulcers are healed. Yesterday was her first day back at work. Also has a history of left RCT.    Complains of pain in her back and joints. Today her pain is 6/0VAS. Describes her pain is continuous and mainly unchanged. However, she is noticing a slight increase in her left shoulder pain, which she attributes to increased activity. Continues with Hydrocodone 10/325 6/day and cymbalta. She notes constipation with the regimen occasionally, however this improved with OTC stool softeners. Denies any other side effects from the regimen. The regimen helps decrease her pain by 30-40%. ADL's by self.     Her rheumatologist is Dr. Bolanos.    Joint Pain   This is a chronic problem. The current episode started more than 1 year ago (today pain is 6/10VAS- joints). The problem occurs constantly. The problem has been unchanged. Associated symptoms include arthralgias, fatigue, joint swelling and weakness (bilateral hands). Pertinent negatives include no chest pain, chills, congestion, coughing, fever, headaches, numbness or vomiting. Nothing aggravates the symptoms. She has tried oral narcotics, position changes and rest for the symptoms. The treatment provided moderate relief.   Back Pain   This is a chronic problem. The current episode started more than 1 year ago. The problem occurs constantly. The problem has been gradually worsening since onset. The pain is present in the lumbar spine. The pain is at a severity of " "6/10. The pain is moderate. Associated symptoms include weakness (bilateral hands). Pertinent negatives include no chest pain, fever, headaches or numbness. Risk factors include lack of exercise and obesity. She has tried analgesics for the symptoms. The treatment provided moderate relief.      PEG Assessment   What number best describes your pain on average in the past week?7  What number best describes how, during the past week, pain has interfered with your enjoyment of life?9  What number best describes how, during the past week, pain has interfered with your general activity?  7    The following portions of the patient's history were reviewed and updated as appropriate: allergies, current medications, past family history, past medical history, past social history, past surgical history and problem list.    Review of Systems   Constitutional: Positive for activity change and fatigue. Negative for chills and fever.   HENT: Negative for congestion.    Eyes: Positive for visual disturbance (blurry vision. pt just had cataract removal).   Respiratory: Negative for cough, shortness of breath and wheezing.    Cardiovascular: Positive for leg swelling. Negative for chest pain and palpitations.   Gastrointestinal: Negative for constipation, diarrhea and vomiting.   Genitourinary: Negative for difficulty urinating.   Musculoskeletal: Positive for arthralgias, back pain and joint swelling.   Neurological: Positive for weakness (bilateral hands). Negative for dizziness, numbness and headaches.   Psychiatric/Behavioral: Positive for sleep disturbance. Negative for agitation, confusion, hallucinations and suicidal ideas. The patient is nervous/anxious.        Vitals:    08/03/17 1255   BP: 112/66   Pulse: 72   Resp: 16   Temp: 97.5 °F (36.4 °C)   SpO2: 97%   Weight: 208 lb (94.3 kg)   Height: 60\" (152.4 cm)   PainSc:   6   PainLoc: Generalized     Objective   Physical Exam   Constitutional: She is oriented to person, place, " and time. Vital signs are normal. She appears well-developed and well-nourished. She is cooperative.   HENT:   Head: Normocephalic and atraumatic.   Nose: Nose normal.   Eyes: Conjunctivae and lids are normal.   Cardiovascular: Normal rate, regular rhythm and normal heart sounds.    Pulmonary/Chest: Effort normal and breath sounds normal. No respiratory distress.   Abdominal: Normal appearance.   Musculoskeletal:        Right shoulder: She exhibits tenderness.        Left shoulder: She exhibits decreased range of motion and tenderness.        Right knee: Tenderness found.        Left knee: Tenderness found.        Lumbar back: She exhibits tenderness.   OA changes of bilateral knees       Neurological: She is alert and oriented to person, place, and time. Gait (aid of cane) abnormal.   Reflex Scores:       Patellar reflexes are 0 on the right side and 0 on the left side.  Skin: Skin is warm, dry and intact.   Psychiatric: She has a normal mood and affect. Her speech is normal and behavior is normal. Judgment and thought content normal. Cognition and memory are normal.   Nursing note and vitals reviewed.      Assessment/Plan   Jung was seen today for pain and shoulder pain.    Diagnoses and all orders for this visit:    Other chronic pain    Lumbar facet arthropathy    Generalized osteoarthritis    DDD (degenerative disc disease), lumbar    Arthralgia of multiple joints    Encounter for long-term (current) use of high-risk medication    Other orders  -     HYDROcodone-acetaminophen (NORCO)  MG per tablet; Take 1 tablet by mouth Every 4 (Four) Hours As Needed for Moderate Pain (4-6).      --- The urine drug screen confirmation from 2-14-17 has been reviewed and the result is appropriate based on patient history and LOUANN report  --- Refill Hydrocodone. Patient appears stable with current regimen. No adverse effects. Regarding continuation of opioids, there is no evidence of aberrant behavior or any red flags.   The patient continues with appropriate response to opioid therapy. ADL's remain intact by self.   --- Follow-up 1 month or sooner if needed.       LOUANN REPORT    As part of the patient's treatment plan, I am prescribing controlled substances. The patient has been made aware of appropriate use of such medications, including potential risk of somnolence, limited ability to drive and/or work safely, and the potential for dependence or overdose. It has also bee made clear that these medications are for use by this patient only, without concomitant use of alcohol or other substances unless prescribed.     Patient has completed prescribing agreement detailing terms of continued prescribing of controlled substances, including monitoring LOUANN reports, urine drug screening, and pill counts if necessary. The patient is aware that inappropriate use will results in cessation of prescribing such medications.    LOUANN report has been reviewed and scanned into the patient's chart.    Date of last LOUANN : 7-31-17    History and physical exam exhibit continued safe and appropriate use of controlled substances.      EMR Dragon/Transcription disclaimer:   Much of this encounter note is an electronic transcription/translation of spoken language to printed text. The electronic translation of spoken language may permit erroneous, or at times, nonsensical words or phrases to be inadvertently transcribed; Although I have reviewed the note for such errors, some may still exist.

## 2017-08-04 RX ORDER — ALENDRONATE SODIUM 35 MG/1
TABLET ORAL
Qty: 4 TABLET | Refills: 11 | Status: SHIPPED | OUTPATIENT
Start: 2017-08-04 | End: 2018-04-03

## 2017-08-08 ENCOUNTER — APPOINTMENT (OUTPATIENT)
Dept: BONE DENSITY | Facility: HOSPITAL | Age: 72
End: 2017-08-08
Attending: INTERNAL MEDICINE

## 2017-08-08 ENCOUNTER — APPOINTMENT (OUTPATIENT)
Dept: MAMMOGRAPHY | Facility: HOSPITAL | Age: 72
End: 2017-08-08
Attending: INTERNAL MEDICINE

## 2017-08-11 ENCOUNTER — TELEPHONE (OUTPATIENT)
Dept: INTERNAL MEDICINE | Facility: CLINIC | Age: 72
End: 2017-08-11

## 2017-08-11 NOTE — TELEPHONE ENCOUNTER
Patient advised no record of Pneumonia vaccine dating back to 2012.  Needs shot.  Patient voiced understanding.    ----- Message from Janet Lopez MA sent at 8/11/2017 11:47 AM EDT -----  Regarding: FW: pneumonia shot      ----- Message -----     From: Monica Winston     Sent: 8/11/2017  11:35 AM       To: Jovan Landaverde [facility] [place]  Subject: pneumonia shot                                   KINDIG    Should patient be getting pneumonia shot and has she ever had one?        Gift shot/ today: ext [phone]

## 2017-08-14 ENCOUNTER — APPOINTMENT (OUTPATIENT)
Dept: BONE DENSITY | Facility: HOSPITAL | Age: 72
End: 2017-08-14
Attending: INTERNAL MEDICINE

## 2017-08-14 ENCOUNTER — HOSPITAL ENCOUNTER (OUTPATIENT)
Dept: MAMMOGRAPHY | Facility: HOSPITAL | Age: 72
Discharge: HOME | End: 2017-08-14
Attending: INTERNAL MEDICINE | Admitting: INTERNAL MEDICINE

## 2017-08-14 DIAGNOSIS — Z12.39 BREAST CANCER SCREENING: ICD-10-CM

## 2017-08-14 DIAGNOSIS — M85.88 OSTEOPENIA OF SPINE: ICD-10-CM

## 2017-08-14 DIAGNOSIS — Z12.31 ENCOUNTER FOR SCREENING MAMMOGRAM FOR MALIGNANT NEOPLASM OF BREAST: ICD-10-CM

## 2017-08-14 DIAGNOSIS — Z13.820 ENCOUNTER FOR SCREENING FOR OSTEOPOROSIS: ICD-10-CM

## 2017-08-14 DIAGNOSIS — M85.852 OSTEOPENIA OF LEFT THIGH: ICD-10-CM

## 2017-08-14 PROCEDURE — G0202 SCR MAMMO BI INCL CAD: HCPCS

## 2017-08-14 PROCEDURE — 77063 BREAST TOMOSYNTHESIS BI: CPT

## 2017-08-14 PROCEDURE — 77080 DXA BONE DENSITY AXIAL: CPT

## 2017-08-14 RX ORDER — GLIMEPIRIDE 1 MG/1
TABLET ORAL
Qty: 90 TABLET | Refills: 3 | Status: SHIPPED | OUTPATIENT
Start: 2017-08-14 | End: 2017-12-27 | Stop reason: SDUPTHER

## 2017-08-31 ENCOUNTER — OFFICE VISIT (OUTPATIENT)
Dept: PAIN MEDICINE | Facility: CLINIC | Age: 72
End: 2017-08-31

## 2017-08-31 VITALS
OXYGEN SATURATION: 95 % | WEIGHT: 208 LBS | SYSTOLIC BLOOD PRESSURE: 127 MMHG | RESPIRATION RATE: 16 BRPM | BODY MASS INDEX: 40.84 KG/M2 | HEIGHT: 60 IN | TEMPERATURE: 98.3 F | HEART RATE: 75 BPM | DIASTOLIC BLOOD PRESSURE: 75 MMHG

## 2017-08-31 DIAGNOSIS — M15.9 GENERALIZED OSTEOARTHRITIS: ICD-10-CM

## 2017-08-31 DIAGNOSIS — M25.50 ARTHRALGIA OF MULTIPLE JOINTS: ICD-10-CM

## 2017-08-31 DIAGNOSIS — M47.816 LUMBAR FACET ARTHROPATHY: ICD-10-CM

## 2017-08-31 DIAGNOSIS — Z79.899 ENCOUNTER FOR LONG-TERM (CURRENT) USE OF HIGH-RISK MEDICATION: ICD-10-CM

## 2017-08-31 DIAGNOSIS — M17.10 ARTHRITIS OF KNEE: ICD-10-CM

## 2017-08-31 DIAGNOSIS — M25.519 SHOULDER PAIN, UNSPECIFIED CHRONICITY, UNSPECIFIED LATERALITY: ICD-10-CM

## 2017-08-31 DIAGNOSIS — R52 GENERALIZED PAIN: ICD-10-CM

## 2017-08-31 DIAGNOSIS — G89.29 OTHER CHRONIC PAIN: Primary | ICD-10-CM

## 2017-08-31 PROCEDURE — 99213 OFFICE O/P EST LOW 20 MIN: CPT | Performed by: NURSE PRACTITIONER

## 2017-08-31 RX ORDER — HYDROCODONE BITARTRATE AND ACETAMINOPHEN 10; 325 MG/1; MG/1
1 TABLET ORAL EVERY 4 HOURS PRN
Qty: 180 TABLET | Refills: 0 | Status: SHIPPED | OUTPATIENT
Start: 2017-08-31 | End: 2017-09-28 | Stop reason: SDUPTHER

## 2017-08-31 RX ORDER — HYDROCODONE BITARTRATE AND ACETAMINOPHEN 10; 325 MG/1; MG/1
1 TABLET ORAL EVERY 4 HOURS PRN
Qty: 180 TABLET | Refills: 0 | Status: SHIPPED | OUTPATIENT
Start: 2017-08-31 | End: 2017-08-31 | Stop reason: SDUPTHER

## 2017-08-31 RX ORDER — ANASTROZOLE 1 MG/1
TABLET ORAL
Qty: 30 TABLET | Refills: 2 | Status: SHIPPED | OUTPATIENT
Start: 2017-08-31 | End: 2017-12-06 | Stop reason: SDUPTHER

## 2017-09-03 ENCOUNTER — HOSPITAL ENCOUNTER (EMERGENCY)
Facility: HOSPITAL | Age: 72
Discharge: HOME | End: 2017-09-03
Admitting: EMERGENCY MEDICINE

## 2017-09-03 ENCOUNTER — APPOINTMENT (OUTPATIENT)
Dept: GENERAL RADIOLOGY | Facility: HOSPITAL | Age: 72
End: 2017-09-03

## 2017-09-03 VITALS
OXYGEN SATURATION: 81 % | WEIGHT: 218 LBS | TEMPERATURE: 98.4 F | DIASTOLIC BLOOD PRESSURE: 66 MMHG | BODY MASS INDEX: 35.03 KG/M2 | RESPIRATION RATE: 18 BRPM | SYSTOLIC BLOOD PRESSURE: 132 MMHG | HEART RATE: 74 BPM | HEIGHT: 66 IN

## 2017-09-03 DIAGNOSIS — S82.002A CLOSED NONDISPLACED FRACTURE OF LEFT PATELLA, UNSPECIFIED FRACTURE MORPHOLOGY, INITIAL ENCOUNTER: Primary | ICD-10-CM

## 2017-09-03 PROCEDURE — 99284 EMERGENCY DEPT VISIT MOD MDM: CPT | Performed by: PHYSICIAN ASSISTANT

## 2017-09-03 PROCEDURE — 73560 X-RAY EXAM OF KNEE 1 OR 2: CPT

## 2017-09-03 PROCEDURE — 99283 EMERGENCY DEPT VISIT LOW MDM: CPT

## 2017-09-03 RX ORDER — ACETAMINOPHEN 325 MG/1
650 TABLET ORAL ONCE
Status: COMPLETED | OUTPATIENT
Start: 2017-09-03 | End: 2017-09-03

## 2017-09-03 RX ADMIN — ACETAMINOPHEN 650 MG: 325 TABLET, FILM COATED ORAL at 18:41

## 2017-09-03 NOTE — DISCHARGE INSTRUCTIONS
Return to the emergency department with worsening symptoms, uncontrolled pain, inability to tolerate oral liquids, fever greater than 101°F not controlled by Tylenol or as needed with emergent concerns.      Use walker at all times.

## 2017-09-03 NOTE — ED PROVIDER NOTES
Subjective   History of Present Illness  History of Present Illness    Chief complaint: knee pain    Location: Left knee    Quality/Severity:  Aching, moderate to severe    Timing/Duration: Around midnight this morning    Modifying Factors: Walking and movement makes worse.  Nothing makes better.    Associated Symptoms: Denies numbness or tingling.  Denies calf pain.  Denies chest pain or shortness of breath.  Denies headache or dizziness.  Denies abdominal pain.  Denies back pain.  Denies neck pain.    Narrative: 72-year-old female was grocery shopping around midnight this morning and was carrying groceries inside when she tripped and fell landing on her left knee.  She was scheduled to have surgery on the left knee in September but had pushed it back to January.  She sees Dr. Chambers.  She denies any other injury.  She does state that she aggravated her chronic bilateral shoulder pain, but denies injury.     Review of Systems  General: Denies fevers or chills.  Denies any weakness or fatigue.  Denies any weight loss or weight gain.  SKIN: Denies any rashes lesions or ulcers.  Denies color change.    HEENT:  Denies any change in vision.  LUNGS: Denies any shortness of breath or wheezing.    CARDIAC: Denies any chest pain.  Denies palpitations.  Denies syncope.  Denies any edema  ABD: Denies any abdominal pain.  Denies any nausea or vomiting or diarrhea.    : Denies any dysuria, urgency, frequency or hematuria.    NEURO: Denies any weakness.  Denies headache.  Denies seizures.  Denies changes in speech or difficulty walking.  M/S: knee pain, shoulder pain as above. denies back pain, myalgias or neck pain  PSYCH: Negative for suicidal ideas. Denies anxiety or depression   review was performed in addition to those in the above all other reviews are negative.    Past Medical History:   Diagnosis Date   • Anemia    • Benign breast disease    • Breast cancer    • Cancer     Right breast   • Cellulitis of leg     May-2003  right lower extremity   • CHF (congestive heart failure)    • Chronic kidney disease    • Chronic ulcer of right foot     Non-pressure   • Depression    • Diabetic peripheral neuropathy    • Diarrhea    • Diverticulosis    • Encounter for eye exam    • Gastroesophageal reflux    • Hiatal hernia    • History of bone density study 09/20/2012   • History of colonoscopy     done 6/03 recheck in 10 years.   Done july 2013 recheck in 5 years   • History of mammography, screening 09/20/2012   • Hospital discharge follow-up    • Hyperlipidemia    • Hypertension    • Hypothyroidism    • Impingement syndrome of right shoulder    • Joint pain    • Menopausal disorder    • Methicillin resistant Staphylococcus aureus infection    • MRSA (methicillin resistant staph aureus) culture positive    • Nausea and vomiting    • Nonischemic cardiomyopathy     Ejection fraction 10% per 2-D echo with Doppler however she did have cardiac catheterization April 2015 which showed ejection fraction 20% with global hypokinesis and severe mitral insufficiency   • Osteoarthritis    • Paroxysmal atrial fibrillation    • Postoperative infection     July of 2012 following her hysterectomy far vaginal wall prolapse. She was on antibiotics and wound dressings for 2 months.   • Syncope, psychogenic 4/19/2017   • Transient alteration of awareness 4/19/2017       Allergies   Allergen Reactions   • Adhesive Tape    • Latex Rash       Past Surgical History:   Procedure Laterality Date   • AMPUTATION FOOT / TOE Right 11/2013    Rt foot amputation MTP first toe   • BLADDER SURGERY     • BREAST BIOPSY     • BREAST SURGERY  06/29/2015    Percutaneous ultrasound-guided placement of metal localized clip 1st lesion   • CATARACT EXTRACTION Bilateral 2017   • DEBRIDEMENT  FOOT Right 01/2013    During hospitalization    • EPIDURAL BLOCK      4 chronic back pain and leg pain last epidurals done 5-2012 she had no benefit at all from this procedure.   • HERNIA REPAIR       At the abdomen February 2007 Dr. Mendez   • INCISIONAL HERNIA REPAIR  05/16/2014    Recurrent. Incarcerated. With mesh implantation   • MASTECTOMY Right 2014   • SHOULDER SURGERY     • TOTAL ABDOMINAL HYSTERECTOMY      with oophorectomy-Done June-2012 secondary to vaginal wall prolapse.   • TOTAL KNEE ARTHROPLASTY         Family History   Problem Relation Age of Onset   • Colonic polyp Mother    • Hypertension Mother    • Migraines Mother    • Mental illness Mother    • Coronary artery disease Father    • Other Father      Cardiac Disorder   • Colon cancer Father    • Kidney disease Father    • Cancer Father    • Breast cancer Maternal Aunt    • Colon cancer Brother        Social History     Social History   • Marital status:      Spouse name: N/A   • Number of children: N/A   • Years of education: N/A     Occupational History   • [employer]  Retired     Social History Main Topics   • Smoking status: Former Smoker     Packs/day: 3.00     Years: 30.00     Types: Cigarettes   • Smokeless tobacco: Never Used      Comment: quit 35 years ago   • Alcohol use No   • Drug use: No   • Sexual activity: Defer      Comment: celibate     Other Topics Concern   • None     Social History Narrative     2001    Volunteers here at [facility]     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      Current Outpatient Prescriptions on File Prior to Encounter   Medication Sig Dispense Refill   • alendronate (FOSAMAX) 35 MG tablet take 1 tablet by mouth every week 4 tablet 11   • anastrozole (ARIMIDEX) 1 MG tablet take 1 tablet by mouth once daily 30 tablet 2   • aspirin 81 MG chewable tablet Chew.     • bumetanide (BUMEX) 2 MG tablet take 1 tablet by mouth twice a day 60 tablet 3   • carvedilol (COREG) 12.5 MG tablet take 1 tablet by mouth twice a day 60 tablet 6   •  Coenzyme Q10 (CO Q 10) 100 MG capsule Take 1 tablet by mouth daily.     • colchicine 0.6 MG capsule capsule   0   • DULoxetine (CYMBALTA) 60 MG capsule take 1 capsule by mouth once daily 30 capsule 6   • erythromycin (ROMYCIN) 5 MG/GM ophthalmic ointment APPLY A RICE SIZED AMOUNT INTO BOTH EYES AT BEDTIME.  0   • Esomeprazole Magnesium (NEXIUM PO) Take 1 tablet by mouth Daily.     • glimepiride (AMARYL) 1 MG tablet take 1/2 tablet by mouth every morning and every evening 90 tablet 3   • Glucose Blood (BLOOD GLUCOSE TEST) strip Blood Glucose Test In Vitro Strip; Patient Sig: Blood Glucose Test In Vitro Strip She checks blood sugars a.m. and p.m. she has the Accu-Chek christina; 60; 6; 27-Jun-2013; Active     • HYDROcodone-acetaminophen (NORCO)  MG per tablet Take 1 tablet by mouth Every 4 (Four) Hours As Needed for Moderate Pain . 180 tablet 0   • levothyroxine (SYNTHROID, LEVOTHROID) 25 MCG tablet take 1 tablet by mouth once daily 30 tablet 6   • lisinopril (PRINIVIL,ZESTRIL) 5 MG tablet   0   • ofloxacin (OCUFLOX) 0.3 % ophthalmic solution instill 1 drop into right eye four times a day 3 DAYS BEFORE SURG...  (REFER TO PRESCRIPTION NOTES).  0   • pravastatin (PRAVACHOL) 10 MG tablet Take 1 tablet by mouth Daily. 90 tablet 2   • prednisoLONE acetate (PRED FORTE) 1 % ophthalmic suspension instill 1 drop into left eye three times a day STARTING DAY OF SURGERY.  0   • sodium chloride (NS) 0.9 % irrigation APPLY AA D  2   • spironolactone (ALDACTONE) 25 MG tablet take 1 tablet by mouth every morning 30 tablet 10   • ULORIC 80 MG tablet tablet   0   • vitamin B-12 (CYANOCOBALAMIN) 100 MCG tablet Take 1 tablet by mouth daily.     • vitamin D (ERGOCALCIFEROL) 17067 UNITS capsule capsule take 1 capsule by mouth every week FOR 12 WEEKS.  0             Objective   Physical Exam  Vitals:    09/03/17 1813   BP: 134/73   Pulse: 78   Resp: 18   Temp: 98.4 °F (36.9 °C)   SpO2: 98%     GENERAL: Alert and oriented ×4.  No apparent  distress. GCS 15  SKIN: Warm, pink and dry, no ecchymosis  HEENT: Atraumatic normocephalic, neck supple, full range of motion.  No spinal or paraspinal tenderness.  No step-offs  LUNGS: Clear to auscultation bilaterally without wheezes, rales or rhonchi.  No chest wall tenderness  CARDIAC: Regular rate and rhythm.  S1 and S2.  No murmurs, rubs or gallops.  ABD: Soft, nontender  M/S: MAEW, no deformity, left knee with edema and pain with range of motion.  Range of motion is restricted by pain.  Tender greatest on the medial aspect.  There is no erythema or ecchymosis.  Full range of motion to bilateral hips and ankles.  No spinal tenderness.  No step-offs.  PSYCH: Normal mood and affect    Procedures         ED Course  ED Course    tylenol given (pt has h/o kidney disease. I educated her on NSAIDs and kidney dz)  Ice applied.    Reviewed L knee xray. Independently viewed by me. Interpreted by me and Dr. Campbell. Discussed with pt that final radiology read would be done tomorrow by the radiologist. Hairline fx of the patella.    Knee immobilizer placed.  Pt has norco at home.  Will f/u with dr. Chambers.                   MDM  Number of Diagnoses or Management Options  Closed nondisplaced fracture of left patella, unspecified fracture morphology, initial encounter: new and requires workup     Amount and/or Complexity of Data Reviewed  Tests in the radiology section of CPT®: reviewed and ordered  Tests in the medicine section of CPT®: reviewed and ordered    Risk of Complications, Morbidity, and/or Mortality  Presenting problems: moderate  Diagnostic procedures: moderate  Management options: moderate    Patient Progress  Patient progress: improved      Final diagnoses:   Closed nondisplaced fracture of left patella, unspecified fracture morphology, initial encounter     EMR Dragon/Transcription disclaimer:      Much of this encounter note is an electronic transcription/translation of spoken language to printed text. The  electronic translation of spoken language may permit erroneous, or at times, nonsensical words or phrases to be inadvertently transcribed; Although I have reviewed the note for such errors, some may still exist.            Norma Pride PA-C  09/03/17 1933

## 2017-09-05 ENCOUNTER — OFFICE VISIT (OUTPATIENT)
Dept: ORTHOPEDIC SURGERY | Facility: CLINIC | Age: 72
End: 2017-09-05

## 2017-09-05 VITALS — WEIGHT: 218 LBS | BODY MASS INDEX: 35.03 KG/M2 | HEIGHT: 66 IN

## 2017-09-05 DIAGNOSIS — M17.12 PRIMARY OSTEOARTHRITIS OF LEFT KNEE: ICD-10-CM

## 2017-09-05 DIAGNOSIS — S82.092A OTHER CLOSED FRACTURE OF LEFT PATELLA, INITIAL ENCOUNTER: Primary | ICD-10-CM

## 2017-09-05 PROBLEM — S82.009A CLOSED FRACTURE OF PATELLA: Status: ACTIVE | Noted: 2017-09-05

## 2017-09-05 PROCEDURE — 27520 TREAT KNEECAP FRACTURE: CPT | Performed by: NURSE PRACTITIONER

## 2017-09-05 PROCEDURE — 99213 OFFICE O/P EST LOW 20 MIN: CPT | Performed by: NURSE PRACTITIONER

## 2017-09-05 PROCEDURE — 20610 DRAIN/INJ JOINT/BURSA W/O US: CPT | Performed by: NURSE PRACTITIONER

## 2017-09-05 RX ADMIN — BUPIVACAINE HYDROCHLORIDE 4 ML: 5 INJECTION, SOLUTION EPIDURAL; INTRACAUDAL at 15:54

## 2017-09-05 RX ADMIN — LIDOCAINE HYDROCHLORIDE 4 ML: 10 INJECTION, SOLUTION EPIDURAL; INFILTRATION; INTRACAUDAL; PERINEURAL at 15:54

## 2017-09-05 RX ADMIN — BETAMETHASONE SODIUM PHOSPHATE AND BETAMETHASONE ACETATE 12 MG: 3; 3 INJECTION, SUSPENSION INTRA-ARTICULAR; INTRALESIONAL; INTRAMUSCULAR; SOFT TISSUE at 15:54

## 2017-09-05 NOTE — PROGRESS NOTES
Subjective:     Patient ID: Jung Short is a 72 y.o. female.    Chief Complaint: nondisplaced fracture left patella, tricompartmental osteoarthritis left knee    History of Present Illness    Mr. Short is a 72 y.o female who presents with a 2 day history of pain left knee. Reports she was grocery shopping, carrying groceries in somehow tripped and landed on left knee, anterior aspect. She is planning to have a total knee replacement in January with Dr. Chambers. X-rays completed during ED visit and diagnosed with an acute patellar fracture, nondisplaced. Denies that her greatest amount of pain is over patella but rather over medial and lateral joint line. She also reports significant swelling, worse over superolateral aspect left knee. Denies presence of locking and catching. Believes that her left knee gave out on her which caused her to fall. This is the first time she has been seen by this APRN. Rates pain at 8 out of 10, aching in nature. Pain made worse with all ambulatory activities and mild relief with rest. Denies presence of numbness and tingling at left lower extremity. Denies that she has had recent corticosteroid injection at left knee. Reports it has been quite sometime since she last received a corticosteroid injection due to the previous injections not being helpful. Denies all other concerns present at this time.      Social History     Occupational History   • [employer]  Retired     Social History Main Topics   • Smoking status: Former Smoker     Packs/day: 3.00     Years: 30.00     Types: Cigarettes   • Smokeless tobacco: Never Used      Comment: quit 35 years ago   • Alcohol use No   • Drug use: No   • Sexual activity: Defer      Comment: celibate      Past Medical History:   Diagnosis Date   • Anemia    • Benign breast disease    • Breast cancer    • Cancer     Right breast   • Cellulitis of leg     May-2003 right lower extremity   • CHF (congestive heart failure)    • Chronic kidney  disease    • Chronic ulcer of right foot     Non-pressure   • Depression    • Diabetic peripheral neuropathy    • Diarrhea    • Diverticulosis    • Encounter for eye exam    • Gastroesophageal reflux    • Hiatal hernia    • History of bone density study 09/20/2012   • History of colonoscopy     done 6/03 recheck in 10 years.   Done july 2013 recheck in 5 years   • History of mammography, screening 09/20/2012   • Hospital discharge follow-up    • Hyperlipidemia    • Hypertension    • Hypothyroidism    • Impingement syndrome of right shoulder    • Joint pain    • Menopausal disorder    • Methicillin resistant Staphylococcus aureus infection    • MRSA (methicillin resistant staph aureus) culture positive    • Nausea and vomiting    • Nonischemic cardiomyopathy     Ejection fraction 10% per 2-D echo with Doppler however she did have cardiac catheterization April 2015 which showed ejection fraction 20% with global hypokinesis and severe mitral insufficiency   • Osteoarthritis    • Paroxysmal atrial fibrillation    • Postoperative infection     July of 2012 following her hysterectomy far vaginal wall prolapse. She was on antibiotics and wound dressings for 2 months.   • Syncope, psychogenic 4/19/2017   • Transient alteration of awareness 4/19/2017     Past Surgical History:   Procedure Laterality Date   • AMPUTATION FOOT / TOE Right 11/2013    Rt foot amputation MTP first toe   • BLADDER SURGERY     • BREAST BIOPSY     • BREAST SURGERY  06/29/2015    Percutaneous ultrasound-guided placement of metal localized clip 1st lesion   • CATARACT EXTRACTION Bilateral 2017   • DEBRIDEMENT  FOOT Right 01/2013    During hospitalization    • EPIDURAL BLOCK      4 chronic back pain and leg pain last epidurals done 5-2012 she had no benefit at all from this procedure.   • HERNIA REPAIR      At the abdomen February 2007 Dr. Mendez   • INCISIONAL HERNIA REPAIR  05/16/2014    Recurrent. Incarcerated. With mesh implantation   • MASTECTOMY  "Right 2014   • SHOULDER SURGERY     • TOTAL ABDOMINAL HYSTERECTOMY      with oophorectomy-Done June-2012 secondary to vaginal wall prolapse.   • TOTAL KNEE ARTHROPLASTY         Family History   Problem Relation Age of Onset   • Colonic polyp Mother    • Hypertension Mother    • Migraines Mother    • Mental illness Mother    • Coronary artery disease Father    • Other Father      Cardiac Disorder   • Colon cancer Father    • Kidney disease Father    • Cancer Father    • Breast cancer Maternal Aunt    • Colon cancer Brother          Review of Systems   Constitutional: Negative for chills, diaphoresis, fever and unexpected weight change.   HENT: Negative for hearing loss, nosebleeds, sore throat and tinnitus.    Eyes: Negative for pain and visual disturbance.   Respiratory: Negative for cough, shortness of breath and wheezing.    Cardiovascular: Negative for chest pain and palpitations.   Gastrointestinal: Negative for abdominal pain, diarrhea, nausea and vomiting.   Endocrine: Negative for cold intolerance, heat intolerance and polydipsia.   Genitourinary: Negative for difficulty urinating, dysuria and hematuria.   Musculoskeletal: Negative for arthralgias, joint swelling and myalgias.   Skin: Negative for rash and wound.   Allergic/Immunologic: Negative for environmental allergies.   Neurological: Negative for dizziness, syncope and numbness.   Hematological: Does not bruise/bleed easily.   Psychiatric/Behavioral: Negative for dysphoric mood and sleep disturbance. The patient is not nervous/anxious.            Objective:  Physical Exam    General: No acute distress.  Eyes: conjunctiva clear; pupils equally round and reactive  ENT: external ears and nose atraumatic; oropharynx clear  CV: no peripheral edema  Resp: normal respiratory effort  Skin: no rashes or wounds; normal turgor  Psych: mood and affect appropriate; recent and remote memory intact    Vitals:    09/05/17 1525   Weight: 218 lb (98.9 kg)   Height: 66\" " (167.6 cm)     Last 2 weights    09/05/17  1525   Weight: 218 lb (98.9 kg)     Body mass index is 35.19 kg/(m^2).     Left Knee Exam     Tenderness   The patient is experiencing tenderness in the medial joint line and lateral joint line.    Range of Motion   Extension: 0   Flexion: 90     Tests   Augustin:  Medial - negative Lateral - negative  Lachman:  Anterior - 1+    Posterior - negative  Drawer:       Anterior - negative     Posterior - negative  Varus: negative  Valgus: negative  Patellar Apprehension: positive    Other   Erythema: absent  Sensation: normal  Pulse: present  Effusion: effusion present    Comments:  Crepitus throughout arc of motion                Imaging:  Reviewed x-ray previously completed at [facility]:  Left knee, 3 views      INDICATION: Left knee pain after falling today. Compared with  02/24/2016.      FINDINGS:  There is a nondisplaced fracture involving the patella. There is a large  knee joint effusion. Tricompartmental degenerative changes. Soft tissue  swelling anterior to the patella.      Assessment:       1. Other closed fracture of left patella, initial encounter    2. Primary osteoarthritis of left knee          Plan:  1. Discussed plan of care with patient. Wishes to proceed with corticosteroid injection left knee. She is to remain in knee immobilizer at left leg for the next 2 weeks. She was also instructed to use ace bandage for swelling. Will plan to see her back in 2 weeks. Possibly fit for drytex hinged brace at that time. Swelling must be decreased inorder to fit for drytex brace. Patient verbalized understanding of all information and agrees with plan of care. Denies all other concerns present at this time.     Orders:  No orders of the defined types were placed in this encounter.    Large Joint Arthrocentesis  Date/Time: 9/5/2017 3:54 PM  Consent given by: patient  Site marked: site marked  Timeout: Immediately prior to procedure a time out was called to verify the correct  patient, procedure, equipment, support staff and site/side marked as required   Supporting Documentation  Indications: pain   Procedure Details  Location: knee - L knee  Preparation: Patient was prepped and draped in the usual sterile fashion  Needle size: 22 G  Medications administered: 4 mL lidocaine PF 1% 1 %; 4 mL bupivacaine (PF) 0.5 %; 12 mg betamethasone acetate-betamethasone sodium phosphate 6 (3-3) MG/ML  Patient tolerance: patient tolerated the procedure well with no immediate complications        LOUANN query complete.    Dragon transcription disclaimer     Much of this encounter note is an electronic transcription/translation of spoken language to printed text. The electronic translation of spoken language may permit erroneous, or at times, nonsensical words or phrases to be inadvertently transcribed. Although I have reviewed the note for such errors, some may still exist.

## 2017-09-06 ENCOUNTER — PATIENT OUTREACH (OUTPATIENT)
Dept: CASE MANAGEMENT | Facility: OTHER | Age: 72
End: 2017-09-06

## 2017-09-06 ENCOUNTER — TELEPHONE (OUTPATIENT)
Dept: ORTHOPEDIC SURGERY | Facility: CLINIC | Age: 72
End: 2017-09-06

## 2017-09-06 RX ORDER — BETAMETHASONE SODIUM PHOSPHATE AND BETAMETHASONE ACETATE 3; 3 MG/ML; MG/ML
12 INJECTION, SUSPENSION INTRA-ARTICULAR; INTRALESIONAL; INTRAMUSCULAR; SOFT TISSUE
Status: COMPLETED | OUTPATIENT
Start: 2017-09-05 | End: 2017-09-05

## 2017-09-06 RX ORDER — BUPIVACAINE HYDROCHLORIDE 5 MG/ML
4 INJECTION, SOLUTION EPIDURAL; INTRACAUDAL
Status: COMPLETED | OUTPATIENT
Start: 2017-09-05 | End: 2017-09-05

## 2017-09-06 RX ORDER — LIDOCAINE HYDROCHLORIDE 10 MG/ML
4 INJECTION, SOLUTION EPIDURAL; INFILTRATION; INTRACAUDAL; PERINEURAL
Status: COMPLETED | OUTPATIENT
Start: 2017-09-05 | End: 2017-09-05

## 2017-09-06 NOTE — TELEPHONE ENCOUNTER
Patient calling and left a voicemail with the following questions:      Can she just wear the ace wrap at home and how often does she need to re do the ace?    She is also inquiring on what brace will she be getting next?     She is also asking what type of injection she received and if it will mess with her blood sugar-what range should she be watching for?      Chart:4 mL lidocaine PF 1% 1 %; 4 mL bupivacaine (PF) 0.5 %; 12 mg betamethasone acetate-betamethasone sodium phosphate 6 (3-3) MG/ML    She also wanted to talk with you about the [employer] game on Saturday.    Thanks so much :)

## 2017-09-06 NOTE — OUTREACH NOTE
[facility] contacted patient. Patient seen in the ED on 9-3-17 with fracture of left patella due to fall. States she has been using immobilizer as recommended. Uses walker or cane for ambulation. Patient lives alone and is independent with ADL's. Has assistance from family when needed..She states to be compliant with medications and scheduled medial appointments. Discussed with patient benefits of monitoring blood sugars, blood pressure, daily weights and diet. She verbalized understanding. In discussion of HRCM, patient verbalized understanding and interest. Will continue follow up.

## 2017-09-06 NOTE — TELEPHONE ENCOUNTER
"She can wear ace as needed for swelling.   Next brace will be a hinged brace.   Corticosteroid injection, call her primary care if glucose increases 200 or greater but this is also going to depend on the \"normal\" range of her glucose prior to her injections."

## 2017-09-09 RX ORDER — CARVEDILOL 12.5 MG/1
TABLET ORAL
Qty: 180 TABLET | Refills: 1 | Status: SHIPPED | OUTPATIENT
Start: 2017-09-09 | End: 2018-03-05 | Stop reason: SDUPTHER

## 2017-09-14 ENCOUNTER — OFFICE VISIT (OUTPATIENT)
Dept: ORTHOPEDIC SURGERY | Facility: CLINIC | Age: 72
End: 2017-09-14

## 2017-09-14 DIAGNOSIS — R52 PAIN: Primary | ICD-10-CM

## 2017-09-14 DIAGNOSIS — M17.12 PRIMARY OSTEOARTHRITIS OF LEFT KNEE: ICD-10-CM

## 2017-09-14 DIAGNOSIS — S82.035A CLOSED NONDISPLACED TRANSVERSE FRACTURE OF LEFT PATELLA, INITIAL ENCOUNTER: ICD-10-CM

## 2017-09-14 PROCEDURE — 73560 X-RAY EXAM OF KNEE 1 OR 2: CPT | Performed by: ORTHOPAEDIC SURGERY

## 2017-09-14 PROCEDURE — 99024 POSTOP FOLLOW-UP VISIT: CPT | Performed by: ORTHOPAEDIC SURGERY

## 2017-09-14 NOTE — PROGRESS NOTES
Subjective: Left knee pain     Patient ID: Jung Short is a 72 y.o. female.    Chief Complaint:    History of Present Illness 72-year-old female is 11 days out from a nondisplaced transverse fracture of the left patella.  Seen today because of some slight increase in the pain.  She's been ambulating with a knee immobilizer using a cane.       Social History     Occupational History   • [employer]  Retired     Social History Main Topics   • Smoking status: Former Smoker     Packs/day: 3.00     Years: 30.00     Types: Cigarettes   • Smokeless tobacco: Never Used      Comment: quit 35 years ago   • Alcohol use No   • Drug use: No   • Sexual activity: Defer      Comment: celibate      Review of Systems   Constitutional: Negative for chills, diaphoresis, fever and unexpected weight change.   HENT: Negative for hearing loss, nosebleeds, sore throat and tinnitus.    Eyes: Negative for pain and visual disturbance.   Respiratory: Negative for cough, shortness of breath and wheezing.    Cardiovascular: Negative for chest pain and palpitations.   Gastrointestinal: Negative for abdominal pain, diarrhea, nausea and vomiting.   Endocrine: Negative for cold intolerance, heat intolerance and polydipsia.   Genitourinary: Negative for difficulty urinating, dysuria and hematuria.   Musculoskeletal: Positive for joint swelling and myalgias. Negative for arthralgias.   Skin: Negative for rash and wound.   Allergic/Immunologic: Negative for environmental allergies.   Neurological: Negative for dizziness, syncope and numbness.   Hematological: Does not bruise/bleed easily.   Psychiatric/Behavioral: Negative for dysphoric mood and sleep disturbance. The patient is not nervous/anxious.    All other systems reviewed and are negative.        Past Medical History:   Diagnosis Date   • Anemia    • Benign breast disease    • Breast cancer    • Cancer     Right breast   • Cellulitis of leg     May-2003 right lower extremity   • CHF  (congestive heart failure)    • Chronic kidney disease    • Chronic ulcer of right foot     Non-pressure   • Depression    • Diabetic peripheral neuropathy    • Diarrhea    • Diverticulosis    • Encounter for eye exam    • Gastroesophageal reflux    • Hiatal hernia    • History of bone density study 09/20/2012   • History of colonoscopy     done 6/03 recheck in 10 years.   Done july 2013 recheck in 5 years   • History of mammography, screening 09/20/2012   • Hospital discharge follow-up    • Hyperlipidemia    • Hypertension    • Hypothyroidism    • Impingement syndrome of right shoulder    • Joint pain    • Menopausal disorder    • Methicillin resistant Staphylococcus aureus infection    • MRSA (methicillin resistant staph aureus) culture positive    • Nausea and vomiting    • Nonischemic cardiomyopathy     Ejection fraction 10% per 2-D echo with Doppler however she did have cardiac catheterization April 2015 which showed ejection fraction 20% with global hypokinesis and severe mitral insufficiency   • Osteoarthritis    • Paroxysmal atrial fibrillation    • Postoperative infection     July of 2012 following her hysterectomy far vaginal wall prolapse. She was on antibiotics and wound dressings for 2 months.   • Syncope, psychogenic 4/19/2017   • Transient alteration of awareness 4/19/2017     Past Surgical History:   Procedure Laterality Date   • AMPUTATION FOOT / TOE Right 11/2013    Rt foot amputation MTP first toe   • BLADDER SURGERY     • BREAST BIOPSY     • BREAST SURGERY  06/29/2015    Percutaneous ultrasound-guided placement of metal localized clip 1st lesion   • CATARACT EXTRACTION Bilateral 2017   • DEBRIDEMENT  FOOT Right 01/2013    During hospitalization    • EPIDURAL BLOCK      4 chronic back pain and leg pain last epidurals done 5-2012 she had no benefit at all from this procedure.   • HERNIA REPAIR      At the abdomen February 2007 Dr. Mendez   • INCISIONAL HERNIA REPAIR  05/16/2014    Recurrent.  Incarcerated. With mesh implantation   • MASTECTOMY Right 2014   • SHOULDER SURGERY     • TOTAL ABDOMINAL HYSTERECTOMY      with oophorectomy-Done June-2012 secondary to vaginal wall prolapse.   • TOTAL KNEE ARTHROPLASTY       Family History   Problem Relation Age of Onset   • Colonic polyp Mother    • Hypertension Mother    • Migraines Mother    • Mental illness Mother    • Coronary artery disease Father    • Other Father      Cardiac Disorder   • Colon cancer Father    • Kidney disease Father    • Cancer Father    • Breast cancer Maternal Aunt    • Colon cancer Brother          Objective:  There were no vitals filed for this visit.  There were no vitals filed for this visit.  There is no height or weight on file to calculate BMI.       Ortho Exam  AP and lateral view of the knee done to evaluate her fracture showed a nondisplaced transverse fracture.  Compared to the x-rays done in September 3 is no change.  She is alert and oriented ×3.  There is some bruising superiorly over the knee but there is no appreciable effusion.  There is moderate pain with range of motion.  Calf is nontender negative Homans.  Skin is cool to touch.  No instability.  No motor deficit good distal pulses.    Assessment:       1. Pain    2. Closed nondisplaced transverse fracture of left patella, initial encounter    3. Primary osteoarthritis of left knee          Plan:      she is advised she is to wear the knee immobilizer at all time removing only for bathing.  She is to use a walker to ambulate and she can put his only as much weight as pain permits and that was emphasized.  Using a model short of the transverse fracture and full weightbearing with pain causes separation of the fracture which would result in the need for surgery.  She understands.  Return next week with an x-ray of the knee.      Work Status:    LOUANN query complete.    Orders:  Orders Placed This Encounter   Procedures   • XR Knee 1 or 2 View Left        Medications:  No orders of the defined types were placed in this encounter.      Followup:  Return in about 1 week (around 9/21/2017).          Dragon transcription disclaimer     Much of this encounter note is an electronic transcription/translation of spoken language to printed text. The electronic translation of spoken language may permit erroneous, or at times, nonsensical words or phrases to be inadvertently transcribed. Although I have reviewed the note for such errors, some may still exist.

## 2017-09-20 ENCOUNTER — OFFICE VISIT (OUTPATIENT)
Dept: ORTHOPEDIC SURGERY | Facility: CLINIC | Age: 72
End: 2017-09-20

## 2017-09-20 VITALS — BODY MASS INDEX: 32.49 KG/M2 | HEIGHT: 67 IN | WEIGHT: 207 LBS

## 2017-09-20 DIAGNOSIS — M17.12 PRIMARY OSTEOARTHRITIS OF LEFT KNEE: ICD-10-CM

## 2017-09-20 DIAGNOSIS — S82.035A CLOSED NONDISPLACED TRANSVERSE FRACTURE OF LEFT PATELLA, INITIAL ENCOUNTER: ICD-10-CM

## 2017-09-20 DIAGNOSIS — R52 PAIN: Primary | ICD-10-CM

## 2017-09-20 PROCEDURE — 73560 X-RAY EXAM OF KNEE 1 OR 2: CPT | Performed by: ORTHOPAEDIC SURGERY

## 2017-09-20 PROCEDURE — 99024 POSTOP FOLLOW-UP VISIT: CPT | Performed by: ORTHOPAEDIC SURGERY

## 2017-09-20 NOTE — PROGRESS NOTES
Subjective: Nondisplaced transverse patella fracture     Patient ID: Jung Short is a 72 y.o. female.    Chief Complaint:    History of Present Illness 72-year-old female is 2-1/2 weeks out from her injury and is doing well ambulating with a knee immobilizer.       Social History     Occupational History   • [employer]  Retired     Social History Main Topics   • Smoking status: Former Smoker     Packs/day: 3.00     Years: 30.00     Types: Cigarettes   • Smokeless tobacco: Never Used      Comment: quit 35 years ago   • Alcohol use No   • Drug use: No   • Sexual activity: Defer      Comment: celibate      Review of Systems   Constitutional: Negative for chills, diaphoresis, fever and unexpected weight change.   HENT: Negative for hearing loss, nosebleeds, sore throat and tinnitus.    Eyes: Negative for pain and visual disturbance.   Respiratory: Negative for cough, shortness of breath and wheezing.    Cardiovascular: Negative for chest pain and palpitations.   Gastrointestinal: Negative for abdominal pain, diarrhea, nausea and vomiting.   Endocrine: Negative for cold intolerance, heat intolerance and polydipsia.   Genitourinary: Negative for difficulty urinating, dysuria and hematuria.   Musculoskeletal: Positive for arthralgias and myalgias. Negative for joint swelling.   Skin: Negative for rash and wound.   Allergic/Immunologic: Negative for environmental allergies.   Neurological: Negative for dizziness, syncope and numbness.   Hematological: Does not bruise/bleed easily.   Psychiatric/Behavioral: Negative for dysphoric mood and sleep disturbance. The patient is not nervous/anxious.          Past Medical History:   Diagnosis Date   • Anemia    • Benign breast disease    • Breast cancer    • Cancer     Right breast   • Cellulitis of leg     May-2003 right lower extremity   • CHF (congestive heart failure)    • Chronic kidney disease    • Chronic ulcer of right foot     Non-pressure   • Depression    •  Diabetic peripheral neuropathy    • Diarrhea    • Diverticulosis    • Encounter for eye exam    • Gastroesophageal reflux    • Hiatal hernia    • History of bone density study 09/20/2012   • History of colonoscopy     done 6/03 recheck in 10 years.   Done july 2013 recheck in 5 years   • History of mammography, screening 09/20/2012   • Hospital discharge follow-up    • Hyperlipidemia    • Hypertension    • Hypothyroidism    • Impingement syndrome of right shoulder    • Joint pain    • Menopausal disorder    • Methicillin resistant Staphylococcus aureus infection    • MRSA (methicillin resistant staph aureus) culture positive    • Nausea and vomiting    • Nonischemic cardiomyopathy     Ejection fraction 10% per 2-D echo with Doppler however she did have cardiac catheterization April 2015 which showed ejection fraction 20% with global hypokinesis and severe mitral insufficiency   • Osteoarthritis    • Paroxysmal atrial fibrillation    • Postoperative infection     July of 2012 following her hysterectomy far vaginal wall prolapse. She was on antibiotics and wound dressings for 2 months.   • Syncope, psychogenic 4/19/2017   • Transient alteration of awareness 4/19/2017     Past Surgical History:   Procedure Laterality Date   • AMPUTATION FOOT / TOE Right 11/2013    Rt foot amputation MTP first toe   • BLADDER SURGERY     • BREAST BIOPSY     • BREAST SURGERY  06/29/2015    Percutaneous ultrasound-guided placement of metal localized clip 1st lesion   • CATARACT EXTRACTION Bilateral 2017   • DEBRIDEMENT  FOOT Right 01/2013    During hospitalization    • EPIDURAL BLOCK      4 chronic back pain and leg pain last epidurals done 5-2012 she had no benefit at all from this procedure.   • HERNIA REPAIR      At the abdomen February 2007 Dr. Mendez   • INCISIONAL HERNIA REPAIR  05/16/2014    Recurrent. Incarcerated. With mesh implantation   • MASTECTOMY Right 2014   • SHOULDER SURGERY     • TOTAL ABDOMINAL HYSTERECTOMY      with  oophorectomy-Done June-2012 secondary to vaginal wall prolapse.   • TOTAL KNEE ARTHROPLASTY       Family History   Problem Relation Age of Onset   • Colonic polyp Mother    • Hypertension Mother    • Migraines Mother    • Mental illness Mother    • Coronary artery disease Father    • Other Father      Cardiac Disorder   • Colon cancer Father    • Kidney disease Father    • Cancer Father    • Breast cancer Maternal Aunt    • Colon cancer Brother          Objective:  There were no vitals filed for this visit.  Last 3 weights    09/20/17  1304   Weight: 207 lb (93.9 kg)     Body mass index is 32.42 kg/(m^2).       Ortho Exam  AP and lateral to evaluate healing of that left knee shows the fracture remains nondisplaced.  I don't see abundant callus formation which is not unexpected.  Compared to previous x-rays no change in the position.  On exam there is no swelling to the knee no effusion.  Her calf is nontender with a negative Homans.    Assessment:       1. Pain    2. Closed nondisplaced transverse fracture of left patella, initial encounter    3. Primary osteoarthritis of left knee          Plan:  She is to continue wearing the knee immobilizer when ambulating and ambulate as pain permits.  Return in 2 weeks with repeat x-ray of the knee.          Work Status:    TuneGO query complete.    Orders:  Orders Placed This Encounter   Procedures   • XR Knee 1 or 2 View Left       Medications:  No orders of the defined types were placed in this encounter.      Followup:  Return in about 2 weeks (around 10/4/2017).          Dragon transcription disclaimer     Much of this encounter note is an electronic transcription/translation of spoken language to printed text. The electronic translation of spoken language may permit erroneous, or at times, nonsensical words or phrases to be inadvertently transcribed. Although I have reviewed the note for such errors, some may still exist.

## 2017-09-28 ENCOUNTER — OFFICE VISIT (OUTPATIENT)
Dept: PAIN MEDICINE | Facility: CLINIC | Age: 72
End: 2017-09-28

## 2017-09-28 VITALS
WEIGHT: 207 LBS | HEART RATE: 75 BPM | TEMPERATURE: 97.5 F | HEIGHT: 67 IN | OXYGEN SATURATION: 97 % | SYSTOLIC BLOOD PRESSURE: 105 MMHG | RESPIRATION RATE: 16 BRPM | BODY MASS INDEX: 32.49 KG/M2 | DIASTOLIC BLOOD PRESSURE: 56 MMHG

## 2017-09-28 DIAGNOSIS — M25.519 SHOULDER PAIN, UNSPECIFIED CHRONICITY, UNSPECIFIED LATERALITY: ICD-10-CM

## 2017-09-28 DIAGNOSIS — M25.50 ARTHRALGIA OF MULTIPLE JOINTS: ICD-10-CM

## 2017-09-28 DIAGNOSIS — Z79.899 ENCOUNTER FOR LONG-TERM (CURRENT) USE OF HIGH-RISK MEDICATION: ICD-10-CM

## 2017-09-28 DIAGNOSIS — M47.816 LUMBAR FACET ARTHROPATHY: ICD-10-CM

## 2017-09-28 DIAGNOSIS — G89.29 OTHER CHRONIC PAIN: Primary | ICD-10-CM

## 2017-09-28 DIAGNOSIS — M51.36 DDD (DEGENERATIVE DISC DISEASE), LUMBAR: ICD-10-CM

## 2017-09-28 DIAGNOSIS — M17.10 ARTHRITIS OF KNEE: ICD-10-CM

## 2017-09-28 PROCEDURE — 99213 OFFICE O/P EST LOW 20 MIN: CPT | Performed by: NURSE PRACTITIONER

## 2017-09-28 RX ORDER — HYDROCODONE BITARTRATE AND ACETAMINOPHEN 10; 325 MG/1; MG/1
1 TABLET ORAL EVERY 4 HOURS PRN
Qty: 180 TABLET | Refills: 0 | Status: SHIPPED | OUTPATIENT
Start: 2017-09-28 | End: 2017-10-23 | Stop reason: HOSPADM

## 2017-09-28 RX ORDER — CEPHALEXIN 500 MG/1
CAPSULE ORAL
Refills: 0 | COMMUNITY
Start: 2017-09-21 | End: 2017-10-17

## 2017-09-28 NOTE — PROGRESS NOTES
CHIEF COMPLAINT    F/U shoulder, back, and knee pain. Pain has been increased after a fall in her kitchen after Labor Day. She went to the ED on 9-03-17 for a fractured knee. She is seeing infectious disease doctor tomorrow for infection in the toe that has gone to the bone.     Subjective   Jung Short is a 72 y.o. female  who presents to the office for follow-up.She has a history of chronic joint pain due to OA/gout and also chronic low back pain.    Since her last office visit, she had a fall in her kitchen and fractured her left knee. Seen by Dr. Braden. Plans to have left TKR but on hold for now(january 2018). Also has been having issues with left foot and there is concern for osteomyelitis.  Sees ID tomorrow.     Complains of pain in her low back and joints. Today her pain is 5/10VAS. Describes the pain as continuous and worse.  Continues with Hydrocodone 10/325 6/day and cymbalta. She notes constipation with the regimen occasionally, however this improved significantly with OTC stool softeners. Denies any other side effects from the regimen. The regimen helps decrease her pain by 30-40%. ADL's by self.    Has stage 3 CKD.    Joint Pain   This is a chronic problem. The current episode started more than 1 year ago (today pain is 5/10VAS- joints). The problem occurs constantly. The problem has been unchanged. Associated symptoms include arthralgias, fatigue, joint swelling and weakness (bilateral hands). Pertinent negatives include no chest pain, chills, congestion, coughing, fever, headaches, numbness or vomiting. Nothing aggravates the symptoms. She has tried oral narcotics, position changes and rest for the symptoms. The treatment provided moderate relief.   Back Pain   This is a chronic problem. The current episode started more than 1 year ago. The problem occurs constantly. The problem has been gradually worsening since onset. The pain is present in the lumbar spine. The pain is at a severity of  "5/10. The pain is moderate. Associated symptoms include weakness (bilateral hands). Pertinent negatives include no chest pain, fever, headaches or numbness. Risk factors include lack of exercise and obesity. She has tried analgesics for the symptoms. The treatment provided moderate relief.      The following portions of the patient's history were reviewed and updated as appropriate: allergies, current medications, past family history, past medical history, past social history, past surgical history and problem list.    Review of Systems   Constitutional: Positive for activity change (decreased) and fatigue. Negative for chills and fever.   HENT: Negative for congestion.    Eyes: Positive for visual disturbance (blurry vision. pt just had cataract removal).   Respiratory: Negative for cough, shortness of breath and wheezing.    Cardiovascular: Positive for leg swelling. Negative for chest pain and palpitations.   Gastrointestinal: Negative for constipation, diarrhea and vomiting.   Genitourinary: Negative for difficulty urinating.   Musculoskeletal: Positive for arthralgias, back pain and joint swelling.   Neurological: Positive for weakness (bilateral hands). Negative for dizziness, numbness and headaches.   Psychiatric/Behavioral: Positive for sleep disturbance. Negative for agitation, confusion, hallucinations and suicidal ideas. The patient is nervous/anxious.        Vitals:    09/28/17 1437   BP: 105/56   Pulse: 75   Resp: 16   Temp: 97.5 °F (36.4 °C)   SpO2: 97%   Weight: 207 lb (93.9 kg)   Height: 67\" (170.2 cm)   PainSc:   5   PainLoc: Knee  Comment: shoulder     Objective   Physical Exam   Constitutional: She is oriented to person, place, and time. Vital signs are normal. She appears well-developed and well-nourished. She is cooperative.   HENT:   Head: Normocephalic and atraumatic.   Nose: Nose normal.   Eyes: Conjunctivae and lids are normal.   Cardiovascular: Normal rate, regular rhythm and normal heart " sounds.    Pulmonary/Chest: Effort normal and breath sounds normal. No respiratory distress.   Abdominal: Normal appearance.   Musculoskeletal:        Right shoulder: She exhibits tenderness.        Left shoulder: She exhibits decreased range of motion and tenderness.        Right knee: Tenderness found.        Left knee: Tenderness found.        Lumbar back: She exhibits tenderness.   Guarding of left knee with brace present   Neurological: She is alert and oriented to person, place, and time. Gait (ambulates with aid of rolling walker and left knee brace) abnormal.   Skin: Skin is warm, dry and intact.   Psychiatric: She has a normal mood and affect. Her speech is normal and behavior is normal. Judgment and thought content normal. Cognition and memory are normal.   Nursing note and vitals reviewed.      Assessment/Plan   Jung was seen today for back pain and shoulder pain.    Diagnoses and all orders for this visit:    Other chronic pain    DDD (degenerative disc disease), lumbar    Lumbar facet arthropathy    Arthralgia of multiple joints    Arthritis of knee    Shoulder pain, unspecified chronicity, unspecified laterality    Encounter for long-term (current) use of high-risk medication    Other orders  -     HYDROcodone-acetaminophen (NORCO)  MG per tablet; Take 1 tablet by mouth Every 4 (Four) Hours As Needed for Moderate Pain .      --- The urine drug screen confirmation from 2-14-17 has been reviewed and the result is appropriate based on patient history and LOUANN report  --- Refill Hydrocodone. Patient appears stable with current regimen. No adverse effects. Regarding continuation of opioids, there is no evidence of aberrant behavior or any red flags.  The patient continues with appropriate response to opioid therapy. ADL's remain intact by self.   --- Continue with other specialists as planned. Will call if has surgery prior to next office visit.   --- Follow-up 1 month or sooner if needed.        LOUANN REPORT    As part of the patient's treatment plan, I am prescribing controlled substances. The patient has been made aware of appropriate use of such medications, including potential risk of somnolence, limited ability to drive and/or work safely, and the potential for dependence or overdose. It has also bee made clear that these medications are for use by this patient only, without concomitant use of alcohol or other substances unless prescribed.     Patient has completed prescribing agreement detailing terms of continued prescribing of controlled substances, including monitoring LOUANN reports, urine drug screening, and pill counts if necessary. The patient is aware that inappropriate use will results in cessation of prescribing such medications.    LOUANN report has been reviewed and scanned into the patient's chart.    Date of last LOUANN : 9-27-17    History and physical exam exhibit continued safe and appropriate use of controlled substances.      EMR Dragon/Transcription disclaimer:   Much of this encounter note is an electronic transcription/translation of spoken language to printed text. The electronic translation of spoken language may permit erroneous, or at times, nonsensical words or phrases to be inadvertently transcribed; Although I have reviewed the note for such errors, some may still exist.

## 2017-10-05 ENCOUNTER — TELEPHONE (OUTPATIENT)
Dept: ORTHOPEDIC SURGERY | Facility: CLINIC | Age: 72
End: 2017-10-05

## 2017-10-05 ENCOUNTER — OFFICE VISIT (OUTPATIENT)
Dept: ORTHOPEDIC SURGERY | Facility: CLINIC | Age: 72
End: 2017-10-05

## 2017-10-05 VITALS — BODY MASS INDEX: 33.27 KG/M2 | WEIGHT: 207 LBS | HEIGHT: 66 IN

## 2017-10-05 DIAGNOSIS — M17.12 PRIMARY OSTEOARTHRITIS OF LEFT KNEE: ICD-10-CM

## 2017-10-05 DIAGNOSIS — R52 PAIN: Primary | ICD-10-CM

## 2017-10-05 DIAGNOSIS — S82.035A CLOSED NONDISPLACED TRANSVERSE FRACTURE OF LEFT PATELLA, INITIAL ENCOUNTER: ICD-10-CM

## 2017-10-05 PROCEDURE — 73560 X-RAY EXAM OF KNEE 1 OR 2: CPT | Performed by: ORTHOPAEDIC SURGERY

## 2017-10-05 PROCEDURE — 99024 POSTOP FOLLOW-UP VISIT: CPT | Performed by: ORTHOPAEDIC SURGERY

## 2017-10-05 NOTE — PROGRESS NOTES
Subjective: Left nondisplaced patella fracture     Patient ID: Jung Short is a 72 y.o. female.    Chief Complaint:    History of Present Illness 72-year-old female is approximately 6 weeks off her injury and doing well.  Ambulating with a knee immobilizer on the left leg doing well with minimal pain she does have arthritic pain that she has tricompartmental arthritis in that knee but not experiencing any fracture pain.       Social History     Occupational History   • [employer]  Retired     Social History Main Topics   • Smoking status: Former Smoker     Packs/day: 3.00     Years: 30.00     Types: Cigarettes   • Smokeless tobacco: Never Used      Comment: quit 35 years ago   • Alcohol use No   • Drug use: No   • Sexual activity: Defer      Comment: celibate      Review of Systems   Constitutional: Negative for chills, diaphoresis, fever and unexpected weight change.   HENT: Negative for hearing loss, nosebleeds, sore throat and tinnitus.    Eyes: Negative for pain and visual disturbance.   Respiratory: Negative for cough, shortness of breath and wheezing.    Cardiovascular: Negative for chest pain and palpitations.   Gastrointestinal: Negative for abdominal pain, diarrhea, nausea and vomiting.   Endocrine: Negative for cold intolerance, heat intolerance and polydipsia.   Genitourinary: Negative for difficulty urinating, dysuria and hematuria.   Musculoskeletal: Positive for arthralgias, joint swelling and myalgias.   Skin: Negative for rash and wound.   Allergic/Immunologic: Negative for environmental allergies.   Neurological: Negative for dizziness, syncope and numbness.   Hematological: Does not bruise/bleed easily.   Psychiatric/Behavioral: Negative for dysphoric mood and sleep disturbance. The patient is not nervous/anxious.          Past Medical History:   Diagnosis Date   • Anemia    • Benign breast disease    • Breast cancer    • Cancer     Right breast   • Cellulitis of leg     May-2003 right  lower extremity   • CHF (congestive heart failure)    • Chronic kidney disease    • Chronic ulcer of right foot     Non-pressure   • Depression    • Diabetic peripheral neuropathy    • Diarrhea    • Diverticulosis    • Encounter for eye exam    • Gastroesophageal reflux    • Hiatal hernia    • History of bone density study 09/20/2012   • History of colonoscopy     done 6/03 recheck in 10 years.   Done july 2013 recheck in 5 years   • History of mammography, screening 09/20/2012   • Hospital discharge follow-up    • Hyperlipidemia    • Hypertension    • Hypothyroidism    • Impingement syndrome of right shoulder    • Joint pain    • Menopausal disorder    • Methicillin resistant Staphylococcus aureus infection    • MRSA (methicillin resistant staph aureus) culture positive    • Nausea and vomiting    • Nonischemic cardiomyopathy     Ejection fraction 10% per 2-D echo with Doppler however she did have cardiac catheterization April 2015 which showed ejection fraction 20% with global hypokinesis and severe mitral insufficiency   • Osteoarthritis    • Paroxysmal atrial fibrillation    • Postoperative infection     July of 2012 following her hysterectomy far vaginal wall prolapse. She was on antibiotics and wound dressings for 2 months.   • Syncope, psychogenic 4/19/2017   • Transient alteration of awareness 4/19/2017     Past Surgical History:   Procedure Laterality Date   • AMPUTATION FOOT / TOE Right 11/2013    Rt foot amputation MTP first toe   • BLADDER SURGERY     • BREAST BIOPSY     • BREAST SURGERY  06/29/2015    Percutaneous ultrasound-guided placement of metal localized clip 1st lesion   • CATARACT EXTRACTION Bilateral 2017   • DEBRIDEMENT  FOOT Right 01/2013    During hospitalization    • EPIDURAL BLOCK      4 chronic back pain and leg pain last epidurals done 5-2012 she had no benefit at all from this procedure.   • HERNIA REPAIR      At the abdomen February 2007 Dr. Mendez   • INCISIONAL HERNIA REPAIR   05/16/2014    Recurrent. Incarcerated. With mesh implantation   • MASTECTOMY Right 2014   • SHOULDER SURGERY     • TOTAL ABDOMINAL HYSTERECTOMY      with oophorectomy-Done June-2012 secondary to vaginal wall prolapse.   • TOTAL KNEE ARTHROPLASTY       Family History   Problem Relation Age of Onset   • Colonic polyp Mother    • Hypertension Mother    • Migraines Mother    • Mental illness Mother    • Coronary artery disease Father    • Other Father      Cardiac Disorder   • Colon cancer Father    • Kidney disease Father    • Cancer Father    • Breast cancer Maternal Aunt    • Colon cancer Brother          Objective:  There were no vitals filed for this visit.  Last 3 weights    10/05/17  1359   Weight: 207 lb (93.9 kg)     Body mass index is 33.41 kg/(m^2).       Ortho Exam  AP lateral of the left knee was performed to evaluate fracture healing in comparison from previous x-ray shows a fracture nearly completely healed but is not visible on today's x-ray when compared to previous one.  On exam there is no swelling noted to the knee no erythema calf remains nontender.    Assessment:       1. Pain    2. Closed nondisplaced transverse fracture of left patella, initial encounter    3. Primary osteoarthritis of left knee          Plan:        She is getting ready to have foot surgery done for infection apparently the great toe of her left foot involving the metatarsophalangeal joint.  Is to be done in 2 weeks.  She will return to see me in 3 weeks with a repeat x-ray of the knee and that looks good we'll get rid of the knee immobilizer    Work Status:    LOUANN query complete.    Orders:  Orders Placed This Encounter   Procedures   • XR Knee 1 or 2 View Left       Medications:  No orders of the defined types were placed in this encounter.      Followup:  Return in about 3 weeks (around 10/26/2017).          Dragon transcription disclaimer     Much of this encounter note is an electronic transcription/translation of spoken  language to printed text. The electronic translation of spoken language may permit erroneous, or at times, nonsensical words or phrases to be inadvertently transcribed. Although I have reviewed the note for such errors, some may still exist.

## 2017-10-17 ENCOUNTER — APPOINTMENT (OUTPATIENT)
Dept: ONCOLOGY | Facility: CLINIC | Age: 72
End: 2017-10-17

## 2017-10-17 ENCOUNTER — OFFICE VISIT (OUTPATIENT)
Dept: INTERNAL MEDICINE | Facility: CLINIC | Age: 72
End: 2017-10-17

## 2017-10-17 ENCOUNTER — APPOINTMENT (OUTPATIENT)
Dept: LAB | Facility: HOSPITAL | Age: 72
End: 2017-10-17

## 2017-10-17 VITALS
OXYGEN SATURATION: 98 % | HEART RATE: 72 BPM | HEIGHT: 66 IN | DIASTOLIC BLOOD PRESSURE: 70 MMHG | TEMPERATURE: 98.5 F | SYSTOLIC BLOOD PRESSURE: 142 MMHG

## 2017-10-17 DIAGNOSIS — J20.9 ACUTE BRONCHITIS, UNSPECIFIED ORGANISM: Primary | ICD-10-CM

## 2017-10-17 PROCEDURE — 99213 OFFICE O/P EST LOW 20 MIN: CPT | Performed by: INTERNAL MEDICINE

## 2017-10-17 RX ORDER — DOXYCYCLINE HYCLATE 100 MG/1
100 TABLET, DELAYED RELEASE ORAL 2 TIMES DAILY
Qty: 14 TABLET | Refills: 0 | Status: SHIPPED | OUTPATIENT
Start: 2017-10-17 | End: 2017-10-24

## 2017-10-17 RX ORDER — METHYLPREDNISOLONE 4 MG/1
TABLET ORAL
Qty: 21 EACH | Refills: 0 | Status: SHIPPED | OUTPATIENT
Start: 2017-10-17 | End: 2017-10-23 | Stop reason: HOSPADM

## 2017-10-17 NOTE — PROGRESS NOTES
"Subjective     Joelndkermit Short is a 72 y.o. female, who presents with a chief complaint of   Chief Complaint   Patient presents with   • Cough     all symtoms started this AM, patient states low grade fever this AM 99, dentist advised patient she had blisters in her throat on 10/9 but patient denies feeling \"sick\"    • Sore Throat       HPI Comments: 71 yo F here with multiple medical problems here with cough that started just this morning. She has was noted to have \"blisters\" in her throat one week ago at the dentist. She now has sore throat. Noted low grade fever this morning. She has had fatigue as well . No vomiting or diarrhea. No SOB, but does have some wheezing. She has some hoarseness lately. Phlegm is green in color. Had to cancel knee cap surgery this morning due to feeling ill. 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        The following portions of the patient's history were reviewed and updated as appropriate: allergies, current medications, past family history, past medical history, past social history, past surgical history and problem list.    Allergies: Adhesive tape and Latex    Current Outpatient Prescriptions:   •  alendronate (FOSAMAX) 35 MG tablet, take 1 tablet by mouth every week, Disp: 4 tablet, Rfl: 11  •  anastrozole (ARIMIDEX) 1 MG tablet, take 1 tablet by mouth once daily, Disp: 30 tablet, Rfl: 2  •  aspirin 81 MG chewable tablet, Chew., Disp: , Rfl:   •  bumetanide (BUMEX) 2 MG tablet, take 1 tablet by mouth twice a day, Disp: 60 tablet, Rfl: 3  •  carvedilol (COREG) 12.5 MG tablet, take 1 tablet by mouth twice a day, Disp: 180 tablet, Rfl: 1  •  Coenzyme Q10 (CO Q 10) 100 MG capsule, Take 1 tablet by mouth daily., Disp: , Rfl:   •  colchicine 0.6 MG capsule capsule, , Disp: , Rfl: 0  •  doxycycline (DORYX) 100 MG enteric coated tablet, Take 1 tablet by mouth 2 (Two) Times a Day for 7 days., Disp: 14 tablet, Rfl: 0  •  DULoxetine (CYMBALTA) 60 MG capsule, take 1 capsule " by mouth once daily, Disp: 30 capsule, Rfl: 6  •  erythromycin (ROMYCIN) 5 MG/GM ophthalmic ointment, APPLY A RICE SIZED AMOUNT INTO BOTH EYES AT BEDTIME., Disp: , Rfl: 0  •  Esomeprazole Magnesium (NEXIUM PO), Take 1 tablet by mouth Daily., Disp: , Rfl:   •  glimepiride (AMARYL) 1 MG tablet, take 1/2 tablet by mouth every morning and every evening, Disp: 90 tablet, Rfl: 3  •  Glucose Blood (BLOOD GLUCOSE TEST) strip, Blood Glucose Test In Vitro Strip; Patient Sig: Blood Glucose Test In Vitro Strip She checks blood sugars a.m. and p.m. she has the Accu-Chek christina; 60; 6; 27-Jun-2013; Active, Disp: , Rfl:   •  GuaiFENesin 200 MG/5ML liquid, Take 10 mL by mouth 4 (Four) Times a Day., Disp: 118 mL, Rfl: 0  •  HYDROcodone-acetaminophen (NORCO)  MG per tablet, Take 1 tablet by mouth Every 4 (Four) Hours As Needed for Moderate Pain ., Disp: 180 tablet, Rfl: 0  •  levothyroxine (SYNTHROID, LEVOTHROID) 25 MCG tablet, take 1 tablet by mouth once daily, Disp: 30 tablet, Rfl: 6  •  lisinopril (PRINIVIL,ZESTRIL) 5 MG tablet, , Disp: , Rfl: 0  •  MethylPREDNISolone (MEDROL) 4 MG tablet, Take as directed on package instructions., Disp: 21 each, Rfl: 0  •  ofloxacin (OCUFLOX) 0.3 % ophthalmic solution, instill 1 drop into right eye four times a day 3 DAYS BEFORE SURG...  (REFER TO PRESCRIPTION NOTES)., Disp: , Rfl: 0  •  pravastatin (PRAVACHOL) 10 MG tablet, Take 1 tablet by mouth Daily., Disp: 90 tablet, Rfl: 2  •  prednisoLONE acetate (PRED FORTE) 1 % ophthalmic suspension, instill 1 drop into left eye three times a day STARTING DAY OF SURGERY., Disp: , Rfl: 0  •  sodium chloride (NS) 0.9 % irrigation, APPLY AA D, Disp: , Rfl: 2  •  spironolactone (ALDACTONE) 25 MG tablet, take 1 tablet by mouth every morning, Disp: 30 tablet, Rfl: 10  •  ULORIC 80 MG tablet tablet, , Disp: , Rfl: 0  •  vitamin B-12 (CYANOCOBALAMIN) 100 MCG tablet, Take 1 tablet by mouth daily., Disp: , Rfl:   •  vitamin D (ERGOCALCIFEROL) 42889 UNITS  "capsule capsule, take 1 capsule by mouth every week FOR 12 WEEKS., Disp: , Rfl: 0  Medications Discontinued During This Encounter   Medication Reason   • cephalexin (KEFLEX) 500 MG capsule Therapy completed       Review of Systems   Constitutional: Positive for fatigue and fever. Negative for chills.   HENT: Positive for congestion, postnasal drip and sore throat.    Respiratory: Positive for cough and shortness of breath.    Cardiovascular: Negative for chest pain and palpitations.   Gastrointestinal: Negative for nausea and vomiting.   Skin: Negative for rash.       Objective     /70 (BP Location: Left arm, Patient Position: Sitting, Cuff Size: Adult)  Pulse 72  Temp 98.5 °F (36.9 °C) (Oral)   Ht 66\" (167.6 cm)  SpO2 98%      Physical Exam   Constitutional: She is oriented to person, place, and time. She appears well-developed and well-nourished. No distress.   HENT:   Head: Normocephalic and atraumatic.   Right Ear: External ear normal.   Left Ear: External ear normal.   Mouth/Throat: Mucous membranes are normal. Posterior oropharyngeal edema and posterior oropharyngeal erythema present. No oropharyngeal exudate. Tonsils are 0 on the right. Tonsils are 0 on the left. No tonsillar exudate.   Eyes: Conjunctivae are normal. Right eye exhibits no discharge. Left eye exhibits no discharge. No scleral icterus.   Neck: Neck supple.   Cardiovascular: Normal rate, regular rhythm and normal heart sounds.  Exam reveals no gallop and no friction rub.    No murmur heard.  Pulmonary/Chest: Effort normal and breath sounds normal. No respiratory distress. She has no wheezes. She has no rales.   Abdominal: Soft. Bowel sounds are normal. She exhibits no distension and no mass. There is no tenderness. There is no guarding.   Lymphadenopathy:     She has no cervical adenopathy.   Neurological: She is alert and oriented to person, place, and time.   Skin: Skin is warm. No rash noted.   Psychiatric: She has a normal mood and " affect. Her behavior is normal.   Nursing note and vitals reviewed.        Results for orders placed or performed in visit on 07/21/17   C-reactive Protein   Result Value Ref Range    C-Reactive Protein 0.84 (H) 0.00 - 0.50 mg/dL   Sedimentation Rate   Result Value Ref Range    Sed Rate 25 (H) 0 - 20 mm/hr   Uric Acid   Result Value Ref Range    Uric Acid 3.5 3.4 - 7.0 mg/dL   CBC Auto Differential   Result Value Ref Range    WBC 5.67 4.80 - 10.80 10*3/mm3    RBC 3.88 (L) 4.20 - 5.40 10*6/mm3    Hemoglobin 11.9 (L) 12.0 - 16.0 g/dL    Hematocrit 35.1 (L) 37.0 - 47.0 %    MCV 90.5 81.0 - 99.0 fL    MCH 30.7 27.0 - 31.0 pg    MCHC 33.9 31.0 - 37.0 g/dL    RDW 12.7 11.5 - 14.5 %    RDW-SD 41.4 37.0 - 54.0 fl    MPV 8.8 7.4 - 10.4 fL    Platelets 190 140 - 500 10*3/mm3    Neutrophil % 49.2 45.0 - 70.0 %    Lymphocyte % 34.4 20.0 - 45.0 %    Monocyte % 10.6 (H) 3.0 - 8.0 %    Eosinophil % 4.4 (H) 0.0 - 4.0 %    Basophil % 0.5 0.0 - 2.0 %    Immature Grans % 0.9 (H) 0.0 - 0.5 %    Neutrophils, Absolute 2.79 1.50 - 8.30 10*3/mm3    Lymphocytes, Absolute 1.95 0.60 - 4.80 10*3/mm3    Monocytes, Absolute 0.60 0.00 - 1.00 10*3/mm3    Eosinophils, Absolute 0.25 0.10 - 0.30 10*3/mm3    Basophils, Absolute 0.03 0.00 - 0.20 10*3/mm3    Immature Grans, Absolute 0.05 (H) 0.00 - 0.03 10*3/mm3    nRBC 0.0 0.0 - 0.0 /100 WBC       Assessment/Plan   Jung was seen today for cough and sore throat.    Diagnoses and all orders for this visit:    Acute bronchitis, unspecified organism    Other orders  -     MethylPREDNISolone (MEDROL) 4 MG tablet; Take as directed on package instructions.  -     doxycycline (DORYX) 100 MG enteric coated tablet; Take 1 tablet by mouth 2 (Two) Times a Day for 7 days.  -     GuaiFENesin 200 MG/5ML liquid; Take 10 mL by mouth 4 (Four) Times a Day.      Doxy as well as steroids plus cough syrup   Return if not better   Educated that cough can last several weeks   No surgery if feeling poorly Friday      Return  if symptoms worsen or fail to improve.    Kylee Jensen MD  10/17/2017

## 2017-10-21 ENCOUNTER — HOSPITAL ENCOUNTER (OUTPATIENT)
Facility: HOSPITAL | Age: 72
Setting detail: OBSERVATION
LOS: 1 days | Discharge: HOME | End: 2017-10-23
Attending: EMERGENCY MEDICINE | Admitting: HOSPITALIST

## 2017-10-21 ENCOUNTER — APPOINTMENT (OUTPATIENT)
Dept: GENERAL RADIOLOGY | Facility: HOSPITAL | Age: 72
End: 2017-10-21

## 2017-10-21 DIAGNOSIS — E11.69 DIABETES MELLITUS TYPE 2 IN OBESE (HCC): ICD-10-CM

## 2017-10-21 DIAGNOSIS — S52.92XA RADIUS/ULNA FRACTURE, LEFT, CLOSED, INITIAL ENCOUNTER: Primary | ICD-10-CM

## 2017-10-21 DIAGNOSIS — S52.202A RADIUS/ULNA FRACTURE, LEFT, CLOSED, INITIAL ENCOUNTER: Primary | ICD-10-CM

## 2017-10-21 DIAGNOSIS — E66.9 DIABETES MELLITUS TYPE 2 IN OBESE (HCC): ICD-10-CM

## 2017-10-21 PROCEDURE — 25010000002 HYDROMORPHONE PER 4 MG: Performed by: EMERGENCY MEDICINE

## 2017-10-21 PROCEDURE — 99284 EMERGENCY DEPT VISIT MOD MDM: CPT | Performed by: EMERGENCY MEDICINE

## 2017-10-21 PROCEDURE — 99283 EMERGENCY DEPT VISIT LOW MDM: CPT

## 2017-10-21 PROCEDURE — 96372 THER/PROPH/DIAG INJ SC/IM: CPT

## 2017-10-21 PROCEDURE — 73090 X-RAY EXAM OF FOREARM: CPT

## 2017-10-21 PROCEDURE — 99222 1ST HOSP IP/OBS MODERATE 55: CPT | Performed by: HOSPITALIST

## 2017-10-21 RX ORDER — BUMETANIDE 2 MG/1
2 TABLET ORAL DAILY
COMMUNITY
End: 2018-08-08 | Stop reason: SDUPTHER

## 2017-10-21 RX ORDER — MAGNESIUM OXIDE 400 MG/1
400 TABLET ORAL DAILY
COMMUNITY
End: 2018-10-05

## 2017-10-21 RX ORDER — HYDROCODONE BITARTRATE AND ACETAMINOPHEN 10; 325 MG/1; MG/1
1 TABLET ORAL EVERY 4 HOURS PRN
Status: DISCONTINUED | OUTPATIENT
Start: 2017-10-21 | End: 2017-10-22

## 2017-10-21 RX ORDER — HYDROCODONE BITARTRATE AND ACETAMINOPHEN 10; 325 MG/1; MG/1
TABLET ORAL
Status: COMPLETED
Start: 2017-10-21 | End: 2017-10-21

## 2017-10-21 RX ADMIN — HYDROCODONE BITARTRATE AND ACETAMINOPHEN 1 TABLET: 10; 325 TABLET ORAL at 23:11

## 2017-10-21 RX ADMIN — HYDROMORPHONE HYDROCHLORIDE 1 MG: 1 INJECTION, SOLUTION INTRAMUSCULAR; INTRAVENOUS; SUBCUTANEOUS at 19:14

## 2017-10-21 NOTE — ED PROVIDER NOTES
Subjective   History of Present Illness  History of Present Illness    Chief complaint: Fall with wrist injury    Location: Left wrist    Quality/Severity:  Severe    Timing/Duration: Injury occurred just prior to arrival    Modifying Factors: Patient relates that she was stepping backwards when she tripped over a broom handle and fell.    Associated Symptoms: Left wrist deformity    Narrative: The patient is a 72-year-old white female who presents as noted above.  Patient does relate that she hit her head without loss of consciousness.  Some mild lower back pain.    Review of Systems   Constitutional: Negative for activity change, appetite change, fatigue and fever.   HENT: Negative for congestion.    Respiratory: Negative for cough, shortness of breath and wheezing.    Cardiovascular: Negative for chest pain, palpitations and leg swelling.   Gastrointestinal: Negative for abdominal pain, diarrhea, nausea and vomiting.   Endocrine: Negative for polydipsia.   Genitourinary: Negative for difficulty urinating, dysuria, flank pain, frequency and urgency.   Musculoskeletal: Negative for back pain.        Left wrist injury   Skin: Positive for wound (patient reports that she has a bone infection in her left foot.). Negative for rash.   Neurological: Negative for dizziness, weakness and headaches.   Psychiatric/Behavioral: Negative for confusion.   All other systems reviewed and are negative.      Past Medical History:   Diagnosis Date   • Anemia    • Benign breast disease    • Cancer 2015    Right breast   • Cellulitis of leg     May-2003 right lower extremity   • CHF (congestive heart failure)    • Chronic kidney disease    • Chronic ulcer of right foot     Non-pressure   • Depression    • Diabetic peripheral neuropathy    • Diarrhea    • Diverticulosis    • Encounter for eye exam    • Gastroesophageal reflux    • Hiatal hernia    • History of bone density study 09/20/2012   • History of colonoscopy     done 6/03  recheck in 10 years.   Done july 2013 recheck in 5 years   • History of mammography, screening 09/20/2012   • Hospital discharge follow-up    • Hyperlipidemia    • Hypertension    • Hypothyroidism    • Impingement syndrome of right shoulder    • Joint pain    • Menopausal disorder    • Methicillin resistant Staphylococcus aureus infection    • MRSA (methicillin resistant staph aureus) culture positive    • Nausea and vomiting    • Nonischemic cardiomyopathy     Ejection fraction 10% per 2-D echo with Doppler however she did have cardiac catheterization April 2015 which showed ejection fraction 20% with global hypokinesis and severe mitral insufficiency   • Osteoarthritis    • Paroxysmal atrial fibrillation    • Postoperative infection     July of 2012 following her hysterectomy far vaginal wall prolapse. She was on antibiotics and wound dressings for 2 months.   • Syncope, psychogenic 4/19/2017   • Transient alteration of awareness 4/19/2017       Allergies   Allergen Reactions   • Adhesive Tape    • Latex Rash       Past Surgical History:   Procedure Laterality Date   • AMPUTATION FOOT / TOE Right 11/2013    Rt foot amputation MTP first toe   • BLADDER SURGERY     • BREAST BIOPSY     • BREAST SURGERY  06/29/2015    Percutaneous ultrasound-guided placement of metal localized clip 1st lesion   • CATARACT EXTRACTION Bilateral 2017   • DEBRIDEMENT  FOOT Right 01/2013    During hospitalization    • EPIDURAL BLOCK      4 chronic back pain and leg pain last epidurals done 5-2012 she had no benefit at all from this procedure.   • HERNIA REPAIR      At the abdomen February 2007 Dr. Mendez   • INCISIONAL HERNIA REPAIR  05/16/2014    Recurrent. Incarcerated. With mesh implantation   • MASTECTOMY Right 2014   • SHOULDER SURGERY     • TOTAL ABDOMINAL HYSTERECTOMY      with oophorectomy-Done June-2012 secondary to vaginal wall prolapse.   • TOTAL KNEE ARTHROPLASTY         Family History   Problem Relation Age of Onset   • Colonic  polyp Mother    • Hypertension Mother    • Migraines Mother    • Mental illness Mother    • Depression Mother    • Coronary artery disease Father    • Other Father      Cardiac Disorder   • Colon cancer Father    • Kidney disease Father    • Cancer Father    • Breast cancer Maternal Aunt    • Colon cancer Brother    • Alcohol abuse Brother    • Arthritis Sister    • Hypertension Brother    • Lung disease Brother    • Alcohol abuse Brother        Social History     Social History   • Marital status:      Spouse name: N/A   • Number of children: 2   • Years of education: N/A     Occupational History   • 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   , 72-year-old, white female in significant discomfort due to her wrist injury.   HENT:   Head: Normocephalic and atraumatic.   Mouth/Throat: Oropharynx is clear and moist.   Eyes: Conjunctivae and EOM are normal. Pupils are equal, round, and reactive to light.   Neck: Normal range of motion. Neck supple. No thyromegaly present.   Cardiovascular: Normal rate, regular rhythm and normal heart sounds.    No murmur heard.  Pulmonary/Chest: Effort normal and breath sounds normal. No respiratory distress. She has no wheezes. She has no rales.   Abdominal: Soft. Bowel sounds are normal. She exhibits no distension. There is no tenderness.   Musculoskeletal: She exhibits no edema.   Left  distal radius with obvious deformity and the hand appears to be slightly offset to the radial side.  Neuromuscular vascular exams intact distally.  Mild lumbar tenderness to palpation.   Lymphadenopathy:     She has no cervical adenopathy.   Neurological: She is alert and oriented to person, place, and time.   Skin: Skin is warm and dry. No rash noted.   Psychiatric: She has a normal mood and affect. Her behavior is normal. Judgment and thought content normal.   Nursing note and vitals reviewed.      Procedures         ED Course  ED Course   Comment By Time   10/21/17, 8:05 PM  Case and findings discussed with Dr. Chambers who felt that the patient's fracture would require surgery. Rm Chowdhury MD 10/21 2006   10/21/17, 8:28 PM  Case and findings discussed with Dr. Fatima who agreed that the patient's condition warranted admission to the hospital for further evaluation/treatment.  Patient agreeable to admission.  Short arm volar splint placed by myself using 3 inch Ortho-Glass and elastic wrap.  Neuromuscular vascular exams intact postplacement. Rm Chowdhury MD 10/21 2029                  MDM  Number of Diagnoses or Management Options  Radius/ulna fracture, left, closed, initial encounter: new and does not require workup     Amount and/or Complexity of Data Reviewed  Tests in the radiology section of CPT®: ordered and reviewed  Independent visualization of images, tracings, or specimens: yes    Risk of Complications, Morbidity, and/or Mortality  Presenting problems: high  Diagnostic procedures: moderate  Management options: high        Final diagnoses:   Radius/ulna fracture, left, closed, initial encounter            Rm Chowdhury MD  10/21/17 2035

## 2017-10-22 ENCOUNTER — APPOINTMENT (OUTPATIENT)
Dept: GENERAL RADIOLOGY | Facility: HOSPITAL | Age: 72
End: 2017-10-22

## 2017-10-22 ENCOUNTER — PREP FOR SURGERY (OUTPATIENT)
Dept: OTHER | Facility: HOSPITAL | Age: 72
End: 2017-10-22

## 2017-10-22 DIAGNOSIS — S52.532A CLOSED COLLES' FRACTURE OF LEFT RADIUS, INITIAL ENCOUNTER: Primary | ICD-10-CM

## 2017-10-22 LAB
ANION GAP SERPL CALCULATED.3IONS-SCNC: 11 MMOL/L
BUN BLD-MCNC: 25 MG/DL (ref 8–23)
BUN/CREAT SERPL: 22.9 (ref 7–25)
CALCIUM SPEC-SCNC: 9.5 MG/DL (ref 8.8–10.5)
CHLORIDE SERPL-SCNC: 102 MMOL/L (ref 98–107)
CO2 SERPL-SCNC: 26 MMOL/L (ref 22–29)
CREAT BLD-MCNC: 1.09 MG/DL (ref 0.57–1)
CRP SERPL-MCNC: 0.58 MG/DL (ref 0–0.5)
DEPRECATED RDW RBC AUTO: 46.7 FL (ref 37–54)
EOSINOPHIL # BLD MANUAL: 0.39 10*3/MM3 (ref 0.1–0.3)
EOSINOPHIL NFR BLD MANUAL: 4 % (ref 0–4)
ERYTHROCYTE [DISTWIDTH] IN BLOOD BY AUTOMATED COUNT: 13.5 % (ref 11.5–14.5)
ERYTHROCYTE [SEDIMENTATION RATE] IN BLOOD: 19 MM/HR (ref 0–20)
GFR SERPL CREATININE-BSD FRML MDRD: 49 ML/MIN/1.73
GLUCOSE BLD-MCNC: 148 MG/DL (ref 65–99)
GLUCOSE BLDC GLUCOMTR-MCNC: 123 MG/DL (ref 70–130)
GLUCOSE BLDC GLUCOMTR-MCNC: 141 MG/DL (ref 70–130)
GLUCOSE BLDC GLUCOMTR-MCNC: 141 MG/DL (ref 70–130)
GLUCOSE BLDC GLUCOMTR-MCNC: 192 MG/DL (ref 70–130)
GLUCOSE BLDC GLUCOMTR-MCNC: 203 MG/DL (ref 70–130)
HBA1C MFR BLD: 6.5 % (ref 4.8–5.6)
HCT VFR BLD AUTO: 36.2 % (ref 37–47)
HGB BLD-MCNC: 11.7 G/DL (ref 12–16)
LYMPHOCYTES # BLD MANUAL: 2.15 10*3/MM3 (ref 0.6–4.8)
LYMPHOCYTES NFR BLD MANUAL: 12 % (ref 3–8)
LYMPHOCYTES NFR BLD MANUAL: 22 % (ref 20–45)
MCH RBC QN AUTO: 30.6 PG (ref 27–31)
MCHC RBC AUTO-ENTMCNC: 32.3 G/DL (ref 31–37)
MCV RBC AUTO: 94.8 FL (ref 81–99)
METAMYELOCYTES NFR BLD MANUAL: 2 % (ref 0–0)
MONOCYTES # BLD AUTO: 1.17 10*3/MM3 (ref 0–1)
MYELOCYTES NFR BLD MANUAL: 0 % (ref 0–0)
NEUTROPHILS # BLD AUTO: 5.85 10*3/MM3 (ref 1.5–8.3)
NEUTROPHILS NFR BLD MANUAL: 60 % (ref 45–70)
NEUTS HYPERSEG # BLD: ABNORMAL 10*3/UL
NRBC SPEC MANUAL: 1 /100 WBC (ref 0–0)
PLATELET # BLD AUTO: 201 10*3/MM3 (ref 140–500)
PMV BLD AUTO: 9.2 FL (ref 7.4–10.4)
POTASSIUM BLD-SCNC: 4 MMOL/L (ref 3.5–5.2)
RBC # BLD AUTO: 3.82 10*6/MM3 (ref 4.2–5.4)
RBC MORPH BLD: NORMAL
SMALL PLATELETS BLD QL SMEAR: ADEQUATE
SODIUM BLD-SCNC: 139 MMOL/L (ref 136–145)
TSH SERPL DL<=0.05 MIU/L-ACNC: 2.29 MIU/ML (ref 0.27–4.2)
WBC NRBC COR # BLD: 9.75 10*3/MM3 (ref 4.8–10.8)

## 2017-10-22 PROCEDURE — 86140 C-REACTIVE PROTEIN: CPT | Performed by: HOSPITALIST

## 2017-10-22 PROCEDURE — 93010 ELECTROCARDIOGRAM REPORT: CPT | Performed by: INTERNAL MEDICINE

## 2017-10-22 PROCEDURE — 99225 PR SBSQ OBSERVATION CARE/DAY 25 MINUTES: CPT | Performed by: HOSPITALIST

## 2017-10-22 PROCEDURE — 93005 ELECTROCARDIOGRAM TRACING: CPT | Performed by: HOSPITALIST

## 2017-10-22 PROCEDURE — 84443 ASSAY THYROID STIM HORMONE: CPT | Performed by: HOSPITALIST

## 2017-10-22 PROCEDURE — G0378 HOSPITAL OBSERVATION PER HR: HCPCS

## 2017-10-22 PROCEDURE — 99219 PR INITIAL OBSERVATION CARE/DAY 50 MINUTES: CPT | Performed by: ORTHOPAEDIC SURGERY

## 2017-10-22 PROCEDURE — 83036 HEMOGLOBIN GLYCOSYLATED A1C: CPT | Performed by: HOSPITALIST

## 2017-10-22 PROCEDURE — 85652 RBC SED RATE AUTOMATED: CPT | Performed by: HOSPITALIST

## 2017-10-22 PROCEDURE — 94799 UNLISTED PULMONARY SVC/PX: CPT

## 2017-10-22 PROCEDURE — 80048 BASIC METABOLIC PNL TOTAL CA: CPT | Performed by: HOSPITALIST

## 2017-10-22 PROCEDURE — 25010000002 HYDROMORPHONE PER 4 MG

## 2017-10-22 PROCEDURE — 96365 THER/PROPH/DIAG IV INF INIT: CPT

## 2017-10-22 PROCEDURE — 25010000002 HYDROMORPHONE PER 4 MG: Performed by: ORTHOPAEDIC SURGERY

## 2017-10-22 PROCEDURE — 96372 THER/PROPH/DIAG INJ SC/IM: CPT

## 2017-10-22 PROCEDURE — 73100 X-RAY EXAM OF WRIST: CPT

## 2017-10-22 PROCEDURE — 71010 HC CHEST PA OR AP: CPT

## 2017-10-22 PROCEDURE — 85027 COMPLETE CBC AUTOMATED: CPT | Performed by: HOSPITALIST

## 2017-10-22 PROCEDURE — 25605 CLTX DST RDL FX/EPHYS SEP W/: CPT | Performed by: ORTHOPAEDIC SURGERY

## 2017-10-22 PROCEDURE — 82962 GLUCOSE BLOOD TEST: CPT

## 2017-10-22 PROCEDURE — 85007 BL SMEAR W/DIFF WBC COUNT: CPT | Performed by: HOSPITALIST

## 2017-10-22 RX ORDER — LEVOTHYROXINE SODIUM 0.03 MG/1
25 TABLET ORAL
Status: DISCONTINUED | OUTPATIENT
Start: 2017-10-22 | End: 2017-10-23 | Stop reason: HOSPADM

## 2017-10-22 RX ORDER — ANASTROZOLE 1 MG/1
1 TABLET ORAL DAILY
Status: DISCONTINUED | OUTPATIENT
Start: 2017-10-22 | End: 2017-10-23 | Stop reason: HOSPADM

## 2017-10-22 RX ORDER — NICOTINE POLACRILEX 4 MG
15 LOZENGE BUCCAL
Status: DISCONTINUED | OUTPATIENT
Start: 2017-10-22 | End: 2017-10-23 | Stop reason: HOSPADM

## 2017-10-22 RX ORDER — ACETAMINOPHEN 500 MG
1000 TABLET ORAL ONCE
Status: CANCELLED | OUTPATIENT
Start: 2017-10-22 | End: 2017-10-22

## 2017-10-22 RX ORDER — ONDANSETRON 2 MG/ML
4 INJECTION INTRAMUSCULAR; INTRAVENOUS EVERY 6 HOURS PRN
Status: DISCONTINUED | OUTPATIENT
Start: 2017-10-22 | End: 2017-10-22

## 2017-10-22 RX ORDER — DOXYCYCLINE HYCLATE 100 MG
100 TABLET ORAL EVERY 12 HOURS SCHEDULED
Status: DISCONTINUED | OUTPATIENT
Start: 2017-10-22 | End: 2017-10-22

## 2017-10-22 RX ORDER — OXYCODONE AND ACETAMINOPHEN 7.5; 325 MG/1; MG/1
TABLET ORAL
Status: COMPLETED
Start: 2017-10-22 | End: 2017-10-22

## 2017-10-22 RX ORDER — DEXTROSE MONOHYDRATE 25 G/50ML
25 INJECTION, SOLUTION INTRAVENOUS
Status: DISCONTINUED | OUTPATIENT
Start: 2017-10-22 | End: 2017-10-23 | Stop reason: HOSPADM

## 2017-10-22 RX ORDER — UBIDECARENONE 75 MG
100 CAPSULE ORAL DAILY
Status: DISCONTINUED | OUTPATIENT
Start: 2017-10-22 | End: 2017-10-23 | Stop reason: HOSPADM

## 2017-10-22 RX ORDER — PANTOPRAZOLE SODIUM 40 MG/1
40 TABLET, DELAYED RELEASE ORAL EVERY MORNING
Status: DISCONTINUED | OUTPATIENT
Start: 2017-10-22 | End: 2017-10-23 | Stop reason: HOSPADM

## 2017-10-22 RX ORDER — OXYCODONE AND ACETAMINOPHEN 10; 325 MG/1; MG/1
1 TABLET ORAL EVERY 4 HOURS PRN
Status: DISCONTINUED | OUTPATIENT
Start: 2017-10-22 | End: 2017-10-23 | Stop reason: HOSPADM

## 2017-10-22 RX ORDER — BUMETANIDE 1 MG/1
2 TABLET ORAL DAILY
Status: DISCONTINUED | OUTPATIENT
Start: 2017-10-23 | End: 2017-10-23 | Stop reason: HOSPADM

## 2017-10-22 RX ORDER — SODIUM CHLORIDE 9 MG/ML
40 INJECTION, SOLUTION INTRAVENOUS AS NEEDED
Status: DISCONTINUED | OUTPATIENT
Start: 2017-10-22 | End: 2017-10-23 | Stop reason: HOSPADM

## 2017-10-22 RX ORDER — OXYCODONE AND ACETAMINOPHEN 7.5; 325 MG/1; MG/1
1 TABLET ORAL EVERY 4 HOURS PRN
Status: DISCONTINUED | OUTPATIENT
Start: 2017-10-22 | End: 2017-10-22

## 2017-10-22 RX ORDER — SPIRONOLACTONE 25 MG/1
25 TABLET ORAL DAILY
Status: DISCONTINUED | OUTPATIENT
Start: 2017-10-23 | End: 2017-10-23 | Stop reason: HOSPADM

## 2017-10-22 RX ORDER — DULOXETIN HYDROCHLORIDE 60 MG/1
60 CAPSULE, DELAYED RELEASE ORAL DAILY
Status: DISCONTINUED | OUTPATIENT
Start: 2017-10-22 | End: 2017-10-23 | Stop reason: HOSPADM

## 2017-10-22 RX ORDER — LISINOPRIL 5 MG/1
5 TABLET ORAL DAILY
Status: DISCONTINUED | OUTPATIENT
Start: 2017-10-22 | End: 2017-10-23 | Stop reason: HOSPADM

## 2017-10-22 RX ORDER — GLIPIZIDE 5 MG/1
2.5 TABLET ORAL
Status: DISCONTINUED | OUTPATIENT
Start: 2017-10-22 | End: 2017-10-23 | Stop reason: HOSPADM

## 2017-10-22 RX ORDER — SODIUM CHLORIDE 0.9 % (FLUSH) 0.9 %
1-10 SYRINGE (ML) INJECTION AS NEEDED
Status: DISCONTINUED | OUTPATIENT
Start: 2017-10-22 | End: 2017-10-23 | Stop reason: HOSPADM

## 2017-10-22 RX ORDER — SODIUM CHLORIDE 9 MG/ML
50 INJECTION, SOLUTION INTRAVENOUS CONTINUOUS
Status: DISCONTINUED | OUTPATIENT
Start: 2017-10-22 | End: 2017-10-22

## 2017-10-22 RX ORDER — CARVEDILOL 12.5 MG/1
12.5 TABLET ORAL 2 TIMES DAILY WITH MEALS
Status: DISCONTINUED | OUTPATIENT
Start: 2017-10-22 | End: 2017-10-23 | Stop reason: HOSPADM

## 2017-10-22 RX ORDER — MAGNESIUM OXIDE 400 MG/1
400 TABLET ORAL DAILY
Status: DISCONTINUED | OUTPATIENT
Start: 2017-10-22 | End: 2017-10-22

## 2017-10-22 RX ORDER — MAGNESIUM OXIDE 400 MG/1
400 TABLET ORAL
Status: DISCONTINUED | OUTPATIENT
Start: 2017-10-22 | End: 2017-10-23 | Stop reason: HOSPADM

## 2017-10-22 RX ORDER — OXYCODONE HCL 10 MG/1
10 TABLET, FILM COATED, EXTENDED RELEASE ORAL ONCE
Status: CANCELLED | OUTPATIENT
Start: 2017-10-22 | End: 2017-10-22

## 2017-10-22 RX ORDER — ASPIRIN 81 MG/1
81 TABLET, CHEWABLE ORAL DAILY
Status: DISCONTINUED | OUTPATIENT
Start: 2017-10-22 | End: 2017-10-23 | Stop reason: HOSPADM

## 2017-10-22 RX ORDER — ATORVASTATIN CALCIUM 10 MG/1
10 TABLET, FILM COATED ORAL NIGHTLY
Status: DISCONTINUED | OUTPATIENT
Start: 2017-10-22 | End: 2017-10-23 | Stop reason: HOSPADM

## 2017-10-22 RX ORDER — DOXYCYCLINE HYCLATE 100 MG
100 TABLET ORAL EVERY 12 HOURS SCHEDULED
Status: DISCONTINUED | OUTPATIENT
Start: 2017-10-22 | End: 2017-10-23 | Stop reason: HOSPADM

## 2017-10-22 RX ORDER — PREGABALIN 75 MG/1
150 CAPSULE ORAL ONCE
Status: CANCELLED | OUTPATIENT
Start: 2017-10-22 | End: 2017-10-22

## 2017-10-22 RX ORDER — SODIUM CHLORIDE, SODIUM LACTATE, POTASSIUM CHLORIDE, CALCIUM CHLORIDE 600; 310; 30; 20 MG/100ML; MG/100ML; MG/100ML; MG/100ML
75 INJECTION, SOLUTION INTRAVENOUS CONTINUOUS
Status: DISCONTINUED | OUTPATIENT
Start: 2017-10-22 | End: 2017-10-22

## 2017-10-22 RX ADMIN — HYDROMORPHONE HYDROCHLORIDE 1 MG: 1 INJECTION, SOLUTION INTRAMUSCULAR; INTRAVENOUS; SUBCUTANEOUS at 07:38

## 2017-10-22 RX ADMIN — OXYCODONE HYDROCHLORIDE AND ACETAMINOPHEN 1 TABLET: 10; 325 TABLET ORAL at 22:44

## 2017-10-22 RX ADMIN — HYDROMORPHONE HYDROCHLORIDE 2 MG: 1 INJECTION, SOLUTION INTRAMUSCULAR; INTRAVENOUS; SUBCUTANEOUS at 11:43

## 2017-10-22 RX ADMIN — HYDROCODONE BITARTRATE AND ACETAMINOPHEN 1 TABLET: 10; 325 TABLET ORAL at 03:26

## 2017-10-22 RX ADMIN — DULOXETINE HYDROCHLORIDE 60 MG: 60 CAPSULE, DELAYED RELEASE ORAL at 10:56

## 2017-10-22 RX ADMIN — GLIPIZIDE 2.5 MG: 5 TABLET ORAL at 10:56

## 2017-10-22 RX ADMIN — OXYCODONE HYDROCHLORIDE AND ACETAMINOPHEN 1 TABLET: 10; 325 TABLET ORAL at 18:47

## 2017-10-22 RX ADMIN — CARVEDILOL 12.5 MG: 12.5 TABLET, FILM COATED ORAL at 17:28

## 2017-10-22 RX ADMIN — VITAMIN B12 0.1 MG ORAL TABLET 100 MCG: 0.1 TABLET ORAL at 10:56

## 2017-10-22 RX ADMIN — DOXYCYCLINE HYCLATE 100 MG: 100 TABLET, COATED ORAL at 20:53

## 2017-10-22 RX ADMIN — ASPIRIN 81 MG 81 MG: 81 TABLET ORAL at 10:56

## 2017-10-22 RX ADMIN — OXYCODONE HYDROCHLORIDE AND ACETAMINOPHEN 1 TABLET: 10; 325 TABLET ORAL at 14:50

## 2017-10-22 RX ADMIN — OXYCODONE AND ACETAMINOPHEN 1 TABLET: 7.5; 325 TABLET ORAL at 09:31

## 2017-10-22 RX ADMIN — DOXYCYCLINE HYCLATE 100 MG: 100 TABLET, COATED ORAL at 10:56

## 2017-10-22 RX ADMIN — LEVOTHYROXINE SODIUM 25 MCG: 25 TABLET ORAL at 10:56

## 2017-10-22 RX ADMIN — LISINOPRIL 5 MG: 5 TABLET ORAL at 10:56

## 2017-10-22 RX ADMIN — ATORVASTATIN CALCIUM 10 MG: 10 TABLET, FILM COATED ORAL at 20:53

## 2017-10-22 RX ADMIN — OXYCODONE HYDROCHLORIDE AND ACETAMINOPHEN 1 TABLET: 7.5; 325 TABLET ORAL at 09:31

## 2017-10-22 RX ADMIN — ANASTROZOLE 1 MG: 1 TABLET, FILM COATED ORAL at 10:56

## 2017-10-22 RX ADMIN — HYDROMORPHONE HYDROCHLORIDE 1 MG: 1 INJECTION, SOLUTION INTRAMUSCULAR; INTRAVENOUS; SUBCUTANEOUS at 07:37

## 2017-10-22 RX ADMIN — MAGNESIUM OXIDE 400 MG: 400 TABLET ORAL at 14:50

## 2017-10-22 RX ADMIN — GLIPIZIDE 2.5 MG: 5 TABLET ORAL at 17:28

## 2017-10-22 RX ADMIN — PANTOPRAZOLE SODIUM 40 MG: 40 TABLET, DELAYED RELEASE ORAL at 10:56

## 2017-10-22 RX ADMIN — CARVEDILOL 12.5 MG: 12.5 TABLET, FILM COATED ORAL at 10:56

## 2017-10-22 NOTE — H&P
[facility] HOSPITALIST     Bubba Avalos MD    CHIEF COMPLAINT: fracture radius and Ulna    HISTORY OF PRESENT ILLNESS:    Also multisystem illness as you se in past medical history.  We will be consulting ortho for needed surgery?  Also she has osteomyelitis and is on doxycycline.  Surgeon was  Planning amputation of a toe.  I will order IV doxycycline.  She was on p/O doxy at home.  She has Chronic Systolic CHF as well.  I will get a pre-operative cardiology consult.    Patient is in a lot of pain and very anxious.  She cannot sit still in her seat during interview.  The daughter is in the room with her.  We cannot give iv pain medication secondary to lack of access.  I will order norco 10 mg q 4 hours prn.  Patient needs central line.  Right UE cannot be accessed secondary to breast cancer.  Left cannot be accessed secondary to fracture.  Lower extremities cannot be used secondary to diabetes and osteomyelitis.  I have started IV doxycycline.  Please notify the anesthesiologist first thing in the morning.      Past Medical History:   Diagnosis Date   • Anemia    • Benign breast disease    • Cancer 2015    Right breast   • Cellulitis of leg     May-2003 right lower extremity   • CHF (congestive heart failure)    • Chronic kidney disease    • Chronic ulcer of right foot     Non-pressure   • Depression    • Diabetic peripheral neuropathy    • Diarrhea    • Diverticulosis    • Encounter for eye exam    • Gastroesophageal reflux    • Hiatal hernia    • History of bone density study 09/20/2012   • History of colonoscopy     done 6/03 recheck in 10 years.   Done july 2013 recheck in 5 years   • History of mammography, screening 09/20/2012   • Hospital discharge follow-up    • Hyperlipidemia    • Hypertension    • Hypothyroidism    • Impingement syndrome of right shoulder    • Joint pain    • Menopausal disorder    • Methicillin resistant Staphylococcus aureus infection    • MRSA (methicillin  resistant staph aureus) culture positive    • Nausea and vomiting    • Nonischemic cardiomyopathy     Ejection fraction 10% per 2-D echo with Doppler however she did have cardiac catheterization April 2015 which showed ejection fraction 20% with global hypokinesis and severe mitral insufficiency   • Osteoarthritis    • Paroxysmal atrial fibrillation    • Postoperative infection     July of 2012 following her hysterectomy far vaginal wall prolapse. She was on antibiotics and wound dressings for 2 months.   • Syncope, psychogenic 4/19/2017   • Transient alteration of awareness 4/19/2017     Past Surgical History:   Procedure Laterality Date   • AMPUTATION FOOT / TOE Right 11/2013    Rt foot amputation MTP first toe   • BLADDER SURGERY     • BREAST BIOPSY     • BREAST SURGERY  06/29/2015    Percutaneous ultrasound-guided placement of metal localized clip 1st lesion   • CATARACT EXTRACTION Bilateral 2017   • DEBRIDEMENT  FOOT Right 01/2013    During hospitalization    • EPIDURAL BLOCK      4 chronic back pain and leg pain last epidurals done 5-2012 she had no benefit at all from this procedure.   • HERNIA REPAIR      At the abdomen February 2007 Dr. Mendez   • INCISIONAL HERNIA REPAIR  05/16/2014    Recurrent. Incarcerated. With mesh implantation   • MASTECTOMY Right 2014   • SHOULDER SURGERY     • TOTAL ABDOMINAL HYSTERECTOMY      with oophorectomy-Done June-2012 secondary to vaginal wall prolapse.   • TOTAL KNEE ARTHROPLASTY       Family History   Problem Relation Age of Onset   • Colonic polyp Mother    • Hypertension Mother    • Migraines Mother    • Mental illness Mother    • Depression Mother    • Coronary artery disease Father    • Other Father      Cardiac Disorder   • Colon cancer Father    • Kidney disease Father    • Cancer Father    • Breast cancer Maternal Aunt    • Colon cancer Brother    • Alcohol abuse Brother    • Arthritis Sister    • Hypertension Brother    • Lung disease Brother    • Alcohol abuse  Brother      Social History   Substance Use Topics   • Smoking status: Former Smoker     Packs/day: 3.00     Years: 30.00     Types: Cigarettes   • Smokeless tobacco: Never Used      Comment: quit 35 years ago   • Alcohol use No     Prescriptions Prior to Admission   Medication Sig Dispense Refill Last Dose   • alendronate (FOSAMAX) 35 MG tablet take 1 tablet by mouth every week 4 tablet 11 10/16/2017 at 0900   • anastrozole (ARIMIDEX) 1 MG tablet take 1 tablet by mouth once daily 30 tablet 2 10/21/2017 at 0900   • aspirin 81 MG chewable tablet Chew.   10/21/2017 at unkn   • bumetanide (BUMEX) 2 MG tablet take 1 tablet by mouth twice a day 60 tablet 3 10/21/2017 at unkn   • carvedilol (COREG) 12.5 MG tablet take 1 tablet by mouth twice a day 180 tablet 1 10/21/2017 at unkn   • Coenzyme Q10 (CO Q 10) 100 MG capsule Take 1 tablet by mouth daily.   10/21/2017 at unkn   • colchicine 0.6 MG capsule capsule   0 10/21/2017 at unkn   • doxycycline (DORYX) 100 MG enteric coated tablet Take 1 tablet by mouth 2 (Two) Times a Day for 7 days. 14 tablet 0 10/21/2017 at 1700   • DULoxetine (CYMBALTA) 60 MG capsule take 1 capsule by mouth once daily 30 capsule 6 10/21/2017 at unkn   • erythromycin (ROMYCIN) 5 MG/GM ophthalmic ointment APPLY A RICE SIZED AMOUNT INTO BOTH EYES AT BEDTIME.  0 Taking   • Esomeprazole Magnesium (NEXIUM PO) Take 1 tablet by mouth Daily.   10/21/2017 at unkn   • glimepiride (AMARYL) 1 MG tablet take 1/2 tablet by mouth every morning and every evening 90 tablet 3 10/21/2017 at unkn   • Glucose Blood (BLOOD GLUCOSE TEST) strip Blood Glucose Test In Vitro Strip; Patient Sig: Blood Glucose Test In Vitro Strip She checks blood sugars a.m. and p.m. she has the Accu-Chek christina; 60; 6; 27-Jun-2013; Active   Taking   • GuaiFENesin 200 MG/5ML liquid Take 10 mL by mouth 4 (Four) Times a Day. 118 mL 0 10/20/2017 at unkn   • HYDROcodone-acetaminophen (NORCO)  MG per tablet Take 1 tablet by mouth Every 4 (Four)  "Hours As Needed for Moderate Pain . 180 tablet 0 10/21/2017 at 1700   • levothyroxine (SYNTHROID, LEVOTHROID) 25 MCG tablet take 1 tablet by mouth once daily 30 tablet 6 10/21/2017 at unkn   • lisinopril (PRINIVIL,ZESTRIL) 5 MG tablet   0 10/21/2017 at unkn   • MethylPREDNISolone (MEDROL) 4 MG tablet Take as directed on package instructions. 21 each 0 10/21/2017 at unkn   • ofloxacin (OCUFLOX) 0.3 % ophthalmic solution instill 1 drop into right eye four times a day 3 DAYS BEFORE SURG...  (REFER TO PRESCRIPTION NOTES).  0 Taking   • pravastatin (PRAVACHOL) 10 MG tablet Take 1 tablet by mouth Daily. 90 tablet 2 10/21/2017 at unkn   • prednisoLONE acetate (PRED FORTE) 1 % ophthalmic suspension instill 1 drop into left eye three times a day STARTING DAY OF SURGERY.  0 Taking   • sodium chloride (NS) 0.9 % irrigation APPLY AA D  2 Taking   • spironolactone (ALDACTONE) 25 MG tablet take 1 tablet by mouth every morning 30 tablet 10 10/21/2017 at unkn   • ULORIC 80 MG tablet tablet   0 10/21/2017 at unkn   • vitamin B-12 (CYANOCOBALAMIN) 100 MCG tablet Take 1 tablet by mouth daily.   10/21/2017 at unkn   • vitamin D (ERGOCALCIFEROL) 60248 UNITS capsule capsule take 1 capsule by mouth every week FOR 12 WEEKS.  0 10/21/2017 at unkn     Allergies:  Adhesive tape and Latex    REVIEW OF SYSTEMS:  Please see the above history of present illness for pertinent positives and negatives.  The remainder of the patient's systems have been reviewed and are negative.     Vital Signs  Temp:  [99.6 °F (37.6 °C)] 99.6 °F (37.6 °C)  Heart Rate:  [73-76] 73  Resp:  [16-18] 16  BP: (149-150)/(76-85) 150/76  Oxygen Therapy  SpO2: 98 %  Pulse Oximetry Type: Intermittent  O2 Device: room air}  Body mass index is 35.23 kg/(m^2).  Flowsheet Rows         First Filed Value    Admission Height  66.5\" (168.9 cm) Documented at 10/21/2017 1835    Admission Weight  221 lb 9.6 oz (101 kg) Documented at 10/21/2017 1835        Body mass index is 34.53 " kg/(m^2).       Physical Exam:  Physical Exam   Constitutional: Patient appears obese, anxious and in acute distress secondary to pain at fracture site. She has a difficult time giving history.    HEENT:   Head: Normocephalic and atraumatic.   Eyes:  Pupils are equal, round, and reactive to light. EOM are intact. Sclera are anicteric and non-injected.  Mouth and Throat: Patient has moist mucous membranes. Oropharynx is clear of any erythema or exudate.     Neck: Neck supple. No JVD present. No thyromegaly present. No lymphadenopathy present.  Cardiovascular: Regular rate, regular rhythm, S1 normal and S2 normal.  Exam reveals no gallop and no friction rub.  No murmur heard.  Pulmonary/Chest: Lungs are clear to auscultation bilaterally. No respiratory distress. No wheezes. No rhonchi. No rales.   Abdominal: Soft. Bowel sounds are normal. No distension and no mass. There is no hepatosplenomegaly. There is no tenderness.   Musculoskeletal: Normal Muscle tone  Extremities: No edema. Splint on left arm secondary to fracture.  She is having a very hard time even moving in chair.  Her right shoulder hurts secondary to rotator cuff injury and pain.  Her left knee is in a brace and cannot bend and then she has the left arm fracture; all of which leads to the patient being very weak and unstable with movement.  Neurological: Patient is alert and oriented to person, place, and time. Cranial nerves II-XII are grossly intact with no focal deficits.  Skin: Skin is warm. No rash noted. Nails show no clubbing.  No cyanosis or erythema.     Results Review:    I reviewed the patient's new clinical results.  Lab Results (most recent)     None          Imaging Results (most recent)     Procedure Component Value Units Date/Time    XR Forearm 2 View Left [[id number]] Resulted:  10/21/17 2032     Updated:  10/21/17 2033        not reviewed    ECG/EMG Results (most recent)     None        not reviewed    Assessment/Plan     Radius ulna  fracture left closed/ consult Dr. Sampson MINER  Breast Cancer  Cardiomyopathy  CKD 3  Chronic Systolic CHF  Chronic Constipation  OA  Chronic Low Back Pain/ with pain control clinic/ DDD  Depression/ Anxiety  DM2/ Obese/ Body mass index is 34.53 kg/(m^2)./  HGB A1C was 6.0 on7/12/17  Dyslipidemia  GERD  Gout  Osteomyelitis toe/ On doxycycline/ will order IV tonight.  Essential hypertension/ 170/83  Hypothyroidism/ 7/12/17 TSH was 0.607  Mitral valve insufficiency  Osteopenia  Peripheral Neuropathy secondary to diabetes  Vit D deff  Constipation secondary to opoid therapy  Diverticulosis  Impingement syndrome right shoulder  H/O MRSA    I will consult cardiology pre-op  I will continue the doxycycline IV  We will monitor the blood pressure  EKG and CXR ordered.                    I discussed the patients findings and my recommendations with patient and her daughter.     Akua Choudhary DO  10/21/17  9:23 PM    Time: 50 min

## 2017-10-22 NOTE — PROCEDURES
After consultation with hospitalist regarding patient's general medical condition and after discussion with the patient is felt that his best to treat this nonoperatively as opposed to open reduction internal fixation status with her current infectious disease history the risk of infection at the operative sites is significantly greater been treating this nonoperatively and outcome will probably be better by treating this just with a long-arm cast.discussed history implant the patient and she was in agreement.        The patient was given 2 mg of Dilaudid IM following which the short arm splint on her left arm was removed and a well-padded well molded long-arm cast with the elbow 90° and the wrist slightly volarly flexed was applied.patient tolerated the application cast well.

## 2017-10-22 NOTE — CONSULTS
Orthopedic Consult      Patient: Jung Short    Date of Admission: 10/21/2017  6:30 PM    YOB: 1945    Medical Record Number: [id number]    Attending Physician: Akua Choudhary DO  Consulting Physician:  Reyes      Chief Complaints: Radius/ulna fracture, left, closed, initial encounter [S52.92XA, S52.202A]      History of Present Illness: 72 y.o. female admitted to [facility] to services of Akua Choudhary DO with Radius/ulna fracture, left, closed, initial encounter [S52.92XA, S52.202A].  patient fell yesterday on her back porch while cleaning tripping over a broom injuring her left nondominant wrist.  Patient was seen in the emergency room at [facility] her x-ray demonstrated displaced left distal radial fracture.  She is admitted this time to undergo definitive treatment of that fracture.  No other injuries associated with the fall Allergies:   Allergies   Allergen Reactions   • Adhesive Tape    • Latex Rash       Medications:   Home Medications:  No current facility-administered medications on file prior to encounter.      Current Outpatient Prescriptions on File Prior to Encounter   Medication Sig   • alendronate (FOSAMAX) 35 MG tablet take 1 tablet by mouth every week (Patient taking differently: take 1 tablet by mouth every week,)   • anastrozole (ARIMIDEX) 1 MG tablet take 1 tablet by mouth once daily   • aspirin 81 MG chewable tablet Chew Daily.   • carvedilol (COREG) 12.5 MG tablet take 1 tablet by mouth twice a day   • Coenzyme Q10 (CO Q 10) 100 MG capsule Take 1 tablet by mouth daily.   • doxycycline (DORYX) 100 MG enteric coated tablet Take 1 tablet by mouth 2 (Two) Times a Day for 7 days.   • DULoxetine (CYMBALTA) 60 MG capsule take 1 capsule by mouth once daily   • glimepiride (AMARYL) 1 MG tablet take 1/2 tablet by mouth every morning and every evening   • GuaiFENesin 200 MG/5ML liquid Take 10 mL by mouth 4 (Four) Times a Day.   • HYDROcodone-acetaminophen  (NORCO)  MG per tablet Take 1 tablet by mouth Every 4 (Four) Hours As Needed for Moderate Pain .   • levothyroxine (SYNTHROID, LEVOTHROID) 25 MCG tablet take 1 tablet by mouth once daily   • lisinopril (PRINIVIL,ZESTRIL) 5 MG tablet 5 mg Daily.   • MethylPREDNISolone (MEDROL) 4 MG tablet Take as directed on package instructions.   • pravastatin (PRAVACHOL) 10 MG tablet Take 1 tablet by mouth Daily. (Patient taking differently: Take 10 mg by mouth Every Night.)   • spironolactone (ALDACTONE) 25 MG tablet take 1 tablet by mouth every morning   • vitamin B-12 (CYANOCOBALAMIN) 100 MCG tablet Take 1 tablet by mouth daily.   • vitamin D (ERGOCALCIFEROL) 36626 UNITS capsule capsule takes once monthly   • Glucose Blood (BLOOD GLUCOSE TEST) strip Blood Glucose Test In Vitro Strip; Patient Sig: Blood Glucose Test In Vitro Strip She checks blood sugars a.m. and p.m. she has the Accu-Chek christina; 60; 6; 27-Jun-2013; Active   • ULORIC 80 MG tablet tablet    • [DISCONTINUED] bumetanide (BUMEX) 2 MG tablet take 1 tablet by mouth twice a day (Patient taking differently: once daily)   • [DISCONTINUED] colchicine 0.6 MG capsule capsule    • [DISCONTINUED] erythromycin (ROMYCIN) 5 MG/GM ophthalmic ointment APPLY A RICE SIZED AMOUNT INTO BOTH EYES AT BEDTIME.   • [DISCONTINUED] Esomeprazole Magnesium (NEXIUM PO) Take 1 tablet by mouth Daily.   • [DISCONTINUED] ofloxacin (OCUFLOX) 0.3 % ophthalmic solution instill 1 drop into right eye four times a day 3 DAYS BEFORE SURG...  (REFER TO PRESCRIPTION NOTES).   • [DISCONTINUED] prednisoLONE acetate (PRED FORTE) 1 % ophthalmic suspension instill 1 drop into left eye three times a day STARTING DAY OF SURGERY.   • [DISCONTINUED] sodium chloride (NS) 0.9 % irrigation APPLY AA D     Current Medications:  Scheduled Meds:  doxycycline (VIBRAMYCIN) IVPB 100 mg Intravenous Q12H   insulin aspart 0-7 Units Subcutaneous 4x Daily AC & at Bedtime     Continuous Infusions:  sodium chloride 50 mL/hr      PRN Meds:.•  dextrose  •  dextrose  •  glucagon (human recombinant)  •  HYDROcodone-acetaminophen  •  HYDROmorphone  •  HYDROmorphone  •  ondansetron  •  sodium chloride  •  sodium chloride    Past Medical History:   Diagnosis Date   • Anemia    • Benign breast disease    • Cancer 2015    Right breast   • Cellulitis of leg     May-2003 right lower extremity   • CHF (congestive heart failure)    • Chronic kidney disease    • Chronic ulcer of right foot     Non-pressure   • Depression    • Diabetic peripheral neuropathy    • Diarrhea    • Diverticulosis    • Encounter for eye exam    • Gastroesophageal reflux    • Hiatal hernia    • History of bone density study 09/20/2012   • History of colonoscopy     done 6/03 recheck in 10 years.   Done july 2013 recheck in 5 years   • History of mammography, screening 09/20/2012   • Hospital discharge follow-up    • Hyperlipidemia    • Hypertension    • Hypothyroidism    • Impingement syndrome of right shoulder    • Joint pain    • Menopausal disorder    • Methicillin resistant Staphylococcus aureus infection    • MRSA (methicillin resistant staph aureus) culture positive    • Nausea and vomiting    • Nonischemic cardiomyopathy     Ejection fraction 10% per 2-D echo with Doppler however she did have cardiac catheterization April 2015 which showed ejection fraction 20% with global hypokinesis and severe mitral insufficiency   • Osteoarthritis    • Paroxysmal atrial fibrillation    • Postoperative infection     July of 2012 following her hysterectomy far vaginal wall prolapse. She was on antibiotics and wound dressings for 2 months.   • Syncope, psychogenic 4/19/2017   • Transient alteration of awareness 4/19/2017        Past Surgical History:   Procedure Laterality Date   • AMPUTATION FOOT / TOE Right 11/2013    Rt foot amputation MTP first toe   • BLADDER SURGERY     • BREAST BIOPSY     • BREAST SURGERY  06/29/2015    Percutaneous ultrasound-guided placement of metal  localized clip 1st lesion   • CATARACT EXTRACTION Bilateral 2017   • DEBRIDEMENT  FOOT Right 01/2013    During hospitalization    • EPIDURAL BLOCK      4 chronic back pain and leg pain last epidurals done 5-2012 she had no benefit at all from this procedure.   • HERNIA REPAIR      At the abdomen February 2007 Dr. Mendez   • INCISIONAL HERNIA REPAIR  05/16/2014    Recurrent. Incarcerated. With mesh implantation   • MASTECTOMY Right 2014   • SHOULDER SURGERY     • TOTAL ABDOMINAL HYSTERECTOMY      with oophorectomy-Done June-2012 secondary to vaginal wall prolapse.   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    Lots of friends    2 daughters - lives in [facility] and [place] (graphic design)    M[employer]ism [employer]        Family History   Problem Relation Age of Onset   • Colonic polyp Mother    • Hypertension Mother    • Migraines Mother    • Mental illness Mother    • Depression Mother    • Coronary artery disease Father    • Other Father      Cardiac Disorder   • Colon cancer Father    • Kidney disease Father    • Cancer Father    • Breast cancer Maternal Aunt    • Colon cancer Brother    • Alcohol abuse Brother    • Arthritis Sister    • Hypertension Brother    • Lung disease Brother    • Alcohol abuse Brother          Review of Systems:   HEENT: Patient denies any headaches, vision changes, change in hearing, or tinnitus, Patient denies any rhinorrhea,epistaxis, sinus pain, mouth or dental problems, sore throat or hoarseness, or dysphagia  Pulmonary: Patient denies any  cough, congestion, SOA, or wheezing  Cardiovascular: Patient denies any chest pain, dyspnea, palpitations, weakness, intolerance of exercise, varicosities, swelling of extremities, known murmur  Gastrointestinal:  Patient denies nausea, vomiting, diarrhea, constipation, loss  of appetite, change in appetite, dysphagia, gas, heartburn, melena, change in bowel habits, use of laxatives or other drugs to alter the function of the gastrointestinal tract.  Genital/Urinary: Patient denies dysuria, change in color of urine, change in frequency of urination, pain with urgency, incontinence, retention, or nocturia.  Musculoskeletal: Patient denies increased warmth; redness; or swelling of joints; limitation of function; deformity; crepitation: pain in a joint or an extremity, the neck, or the back, especially with movement.  Neurological: Patient denies dizziness, tremor, ataxia, difficulty in speaking, change in speech, paresthesia, loss of sensation, seizures, syncope, changes in memory.  Endocrine system: Patient denies tremors, palpitations, intolerance of heat or cold, polyuria, polydipsia, polyphagia, diaphoresis, exophthalmos, or goiter.  Psychological: Patient denies thoughts/plans or harming self or other; depression,  insomnia, night terrors, van, memory loss, disorientation.  Skin: Patient denies any bruising, rashes, discoloration, pruritus, wounds, ulcers, decubiti, changes in the hair or nails  Hematopoietic: Patient denies history of spontaneous or excessive bleeding, epistaxis, hematuria, melena, fatigue, enlarged or tender lymph nodes, pallor, history of anemia.    Physical Exam: 72 y.o. female  General Appearance:    Alert, cooperative, in no acute distress                 Vitals:    10/21/17 2129 10/22/17 0000 10/22/17 0343 10/22/17 0650   BP: 175/95 170/83 133/67 143/80   BP Location: Left arm Left arm Left arm Left arm   Patient Position: Lying Sitting Lying Lying   Pulse: 73 79 72 73   Resp: 16 16 16  "16   Temp: 97.9 °F (36.6 °C) 97.2 °F (36.2 °C) 97.6 °F (36.4 °C) 98 °F (36.7 °C)   TempSrc: Oral Oral Oral Oral   SpO2: 100% 99% 98%    Weight: 217 lb 3.2 oz (98.5 kg)      Height: 66.5\" (168.9 cm)           Head:    Normocephalic, without obvious abnormality, atraumatic   Eyes:            Lids and lashes normal, conjunctivae and sclerae normal, no   icterus, no pallor, corneas clear, PERRLA   Ears:    Ears appear intact with no abnormalities noted   Throat:   No oral lesions, no thrush, oral mucosa moist   Neck:   No adenopathy, supple, trachea midline, no thyromegaly, no   carotid bruit, no JVD   Back:     No kyphosis present, no scoliosis present, no skin lesions,      erythema or scars, no tenderness to percussion or                   palpation,   range of motion normal   Lungs:     Clear to auscultation,respirations regular, even and                  unlabored    Heart:    Regular rhythm and normal rate, normal S1 and S2, no            murmur, no gallop, no rub, no click   Chest Wall:    No abnormalities observed   Abdomen:     Normal bowel sounds, no masses, no organomegaly, soft        non-tender, non-distended, no guarding, no rebound                tenderness   Rectal:     Deferred   Extremities:   Left upper extremity is in a short arm splint.  She has good capillary refill with minimal swelling.  Does complain of mild paresthesia in all digits but she has no motor deficit.     Pulses:   Pulses palpable and equal bilaterally   Skin:   No bleeding, bruising or rash   Lymph nodes:   No palpable adenopathy   Neurologic:   Cranial nerves 2 - 12 grossly intact, sensation intact, DTR       present and equal bilaterally           Diagnostic Tests:  [unfilled]Displaced left distal radial fracture      [unfilled]    Assessment:  Patient Active Problem List   Diagnosis   • Abdominal bloating   • Abdominal mass   • Abdominal pain   • Abnormal gait   • Abnormal mammogram   • Acute bronchitis   • Acute upper " respiratory infection   • Anemia   • Infection due to fungus   • Atherosclerosis of coronary artery   • Hematochezia   • Thoracic back pain   • Malignant neoplasm of upper-inner quadrant of right female breast   • Candidiasis   • Cardiomyopathy   • Disc disorder of cervical region   • Neck pain   • Contusion of chest wall   • Tenderness of chest wall   • Chronic kidney disease, stage III (moderate)   • Chronic pain   • Congestive heart failure   • Constipation   • Generalized osteoarthritis   • Degeneration of intervertebral disc   • Depression   • Type 2 diabetes mellitus   • Controlled type 2 diabetes mellitus without complication   • Diarrhea   • Dyslipidemia   • Gastroesophageal reflux disease with esophagitis   • Fall in home   • Fall on same level from slipping, tripping or stumbling   • Generalized pain   • Gout   • Injury of head   • Hyperkalemia   • Hypertension   • Hypokalemia   • Hypomagnesemia   • Hypothyroidism   • Incisional hernia   • Mass of breast   • Mitral valve insufficiency   • Nausea   • Adult body mass index greater than 30   • Osteopenia of spine   • Arthralgia of multiple joints   • Peripheral neuropathy   • Pulmonary hypertension   • Osteomyelitis   • Tricuspid valve insufficiency   • Cobalamin deficiency   • Vitamin D deficiency   • Weight loss   • Weight gain   • Diabetes mellitus   • Congestive heart failure with left ventricular systolic dysfunction   • Left knee pain   • Arthritis of knee   • DDD (degenerative disc disease), lumbar   • Lumbar facet arthropathy   • Pain in shoulder   • Chronic gout of multiple sites   • Shortness of breath   • Encounter for long-term (current) use of high-risk medication   • History of cancer   • Constipation due to opioid therapy   • Syncope, psychogenic   • Transient alteration of awareness   • Closed fracture of patella   • Primary osteoarthritis of left knee   • Radius/ulna fracture, left, closed, initial encounter           Plan:  The patient  voiced understanding of the risks, benefits, and alternative forms of treatment that were discussed and the patient consents to proceed with plan to proceed with open reduction internal fixation of her left distal radius today.  Discussed the risk of surgery with the patient which include infection and delayed union postop pain and discomfort and the need for further surgery in the future along with carpal tunnel symptoms.  She understands and agrees to proceed.            Date: 10/22/2017    Live Chambers MD

## 2017-10-22 NOTE — PLAN OF CARE
Problem: Patient Care Overview (Adult)  Goal: Discharge Needs Assessment  Outcome: Ongoing (interventions implemented as appropriate)    10/22/17 1220   Discharge Needs Assessment   Concerns Comments (Limited mobility. Unable to drive.)   Equipment Needed After Discharge none   Current Discharge Risk lives alone   Discharge Planning Comments Spoke with Mrs Short at bedside. She lives alone in a patio home. Prior ro admission she was independent with her ADL's and drove herself to appointments. She has 2 daughters who will be helping her upon discharge. Her pharmacy is [facility]. There are no financial issues with paying for presctiptions. Her living will is on file here. Facesheet verified and updated. Plan is home when stable. Will continue to follow.   Current Health   Anticipated Changes Related to Illness other (see comments)  (Mobility, ADL's, driving)   Living Environment   Transportation Available car;family or friend will provide   Self-Care   Equipment Currently Used at Home cane, straight;walker, rolling;glucometer

## 2017-10-22 NOTE — NURSING NOTE
Discharge Planning Assessment   [place]     Patient Name: Jung Short  MRN: [id number]  Today's Date: 10/22/2017    Admit Date: 10/21/2017          Discharge Needs Assessment       10/22/17 1220    Living Environment    Lives With alone    Living Arrangements --   patio home    Home Accessibility no concerns    Stair Railings at Home none    Type of Financial/Environmental Concern none    Transportation Available car;family or friend will provide    Living Environment    Provides Primary Care For no one, unable/limited ability to care for self    Quality Of Family Relationships helpful;involved;supportive    Able to Return to Prior Living Arrangements yes    Discharge Needs Assessment    Concerns Comments --   Limited mobility.  Unable to drive.    Anticipated Changes Related to Illness other (see comments)   Mobility, ADL's, driving    Equipment Currently Used at Home cane, straight;walker, rolling;glucometer    Equipment Needed After Discharge none    Current Discharge Risk lives alone    Discharge Planning Comments Spoke with Mrs Short at bedside. She lives alone in a patio home.  Prior ro admission she was independent with her ADL's and drove herself to appointments.  She has 2 daughters who will be helping her upon discharge.  Her pharmacy is [facility] in [place].  There are no financial issues with paying for presctiptions.  Her living will is on file here.  Facesheet verified and updated.  Plan is home when stable.  Will continue to follow.            Discharge Plan       10/22/17 1225    Case Management/Social Work Plan    Plan Home when stable    Patient/Family In Agreement With Plan yes    Additional Comments Spoke with Mrs Short at bedside. She lives alone in a patio home.  Prior ro admission she was independent with her ADL's and drove herself to appointments.  She has 2 daughters who will be helping her upon discharge.  Her pharmacy is [facility] in [place].  There are no financial issues  with paying for presctiptions.  Her living will is on file here.  Facesheet verified and updated.  Plan is home when stable.  Will continue to follow.        Discharge Placement     No information found                Demographic Summary       10/22/17 1220    Referral Information    Admission Type inpatient    Arrived From home or self-care    Referral Source admission list    Reason For Consult discharge planning    Record Reviewed medical record    Contact Information    Permission Granted to Share Information With             Functional Status     None            Psychosocial     None            Abuse/Neglect     None            Legal     None            Substance Abuse     None            Patient Forms     None          Alondra Rivera RN

## 2017-10-22 NOTE — PROGRESS NOTES
"Hospitalist Team      Patient Care Team:  Bubba Avalos MD as PCP - General (Internal Medicine & Pediatrics)  Bubba Avalos MD as PCP - Claims Attributed  Tory Pagan MD as Consulting Physician (Hematology and Oncology)  Lina Fisher MD as Referring Physician (General Surgery)  Shanda Gerardo RN as Care Coordinator (Population Health)        Chief Complaint:  F/U left distal radial and ulnar fractures    Subjective    Interval History and ROS:     I clarified some information with the patient after reviewing her records.  The patient does have OM left 2nd phalymx (? 3rd as well).  She has been off Abx's for this because she was supposed to have an amputation done by Dr. Crane last Tuesday but she developed Acute bronchitis so surgery was cancelled.  The patient was treated by her PCP with doxycycline and a medrol dose pack and she notes her cough and sore throat and fever have all now resolved (she has 2 more days left of the doxycycline for this).  They were unable to place an IV overnight so she has remained on po pain medication and her pain at the time of my exam was not very well controlled. She denies any CP, SOA, N/V or heart palpitations. She is afebrile.       Objective    Vital Signs  Temp:  [97.2 °F (36.2 °C)-99.6 °F (37.6 °C)] 98 °F (36.7 °C)  Heart Rate:  [72-79] 73  Resp:  [16-18] 16  BP: (133-175)/(67-95) 143/80  Oxygen Therapy  SpO2: 98 %  Pulse Oximetry Type: Intermittent  O2 Device: room air     Flowsheet Rows         First Filed Value    Admission Height  66.5\" (168.9 cm) Documented at 10/21/2017 1835    Admission Weight  221 lb 9.6 oz (101 kg) Documented at 10/21/2017 1835            Physical Exam:  Constitutional: Patient is well developed,obese, in mod discomfort secondary to pain at fracture site.   HEENT:   Head: Normocephalic and atraumatic.   Eyes:  Pupils are equal, round, and reactive to light. EOM are intact. Sclera are anicteric and non-injected.  Mouth and Throat: " Patient has moist mucous membranes.     Neck: Neck supple. No JVD present. No lymphadenopathy present.  Cardiovascular: Regular rate, regular rhythm, S1 normal and S2 normal.  Exam reveals no gallop and no friction rub.  No murmur heard.  Pulmonary/Chest: Lungs are clear to auscultation bilaterally. No respiratory distress. No wheezes. No rhonchi. No rales.   Abdominal: Soft. Bowel sounds are normal. There is no tenderness.   Musculoskeletal: Normal Muscle tone  Extremities: No edema. Splint on left arm secondary to fracture.    Neurological: Patient is alert and oriented to person, place, and time.   Skin: Skin is warm. No rash noted. Nails show no clubbing.  No cyanosis or erythema. She does have a hammer toe left second toe with a small scab and previous amputation of right second toes with site well healed.    Results Review:     I reviewed the patient's new clinical results.    Lab Results (last 24 hours)     Procedure Component Value Units Date/Time    POC Glucose Fingerstick [[id number]]  (Abnormal) Collected:  10/22/17 0142    Specimen:  Blood Updated:  10/22/17 0148     Glucose 192 (H) mg/dL     Narrative:       Meter: [id number] : [id number] Geno Almazan NURSING ASSISTANT    POC Glucose Fingerstick [[id number]]  (Normal) Collected:  10/22/17 0722    Specimen:  Blood Updated:  10/22/17 0746     Glucose 123 mg/dL     Narrative:       Meter: [id number] : [id number] Pa Houser NURSING ASSISTANT          Imaging Results (last 24 hours)     Procedure Component Value Units Date/Time    XR Forearm 2 View Left [[id number]] Collected:  10/22/17 0732     Updated:  10/22/17 0736    Narrative:       EXAM: Left forearm  10/21/2017     HISTORY: Pain after fall today     TECHNIQUE: 3 views      COMPARISON: None     FINDINGS: Distal radial and ulnar fractures, with probable mild ulnar  comminution and involvement of both the radiocarpal and radioulnar  joints. Mildly displaced ulnar styloid fracture. Osteopenia.        Impression:       Distal radial and ulnar fractures with radiocarpal and  radioulnar involvement and probably 20 degrees of dorsal tilting of the  distal radial articular surface.     This report was finalized on 10/22/2017 7:34 AM by Dr. Giovany Baxter MD.       XR Chest 1 View [[id number]] Collected:  10/22/17 0806     Updated:  10/22/17 0808    Narrative:       EXAM: Portable chest 10/22/2017     HISTORY: Left forearm fracture yesterday, preop chest radiograph     TECHNIQUE: Single view portable chest      COMPARISON: None     FINDINGS: Submaximal inspiration, no definite acute abnormality.  Prominent cardiac silhouette likely exaggerated by submaximal. Effort.       Impression:       No acute disease. Submaximal inspiration.     This report was finalized on 10/22/2017 8:06 AM by Dr. Giovany Baxter MD.             ECG/EMG Results (most recent)     Procedure Component Value Units Date/Time    ECG 12 Lead [[id number]] Collected:  10/22/17 0849     Updated:  10/22/17 0851    Narrative:       RR Interval= 870 ms  MI Interval= ms  QRSD Interval= 90 ms  QT Interval= 372 ms  QTc Interval= 399 ms  Heart Rate= 69 ms  P Axis= deg  QRS Axis= -11 deg  T Wave Axis= 10 deg  I: 40 Axis= 11 deg  T: 40 Axis= -39 deg  ST Axis= -47 deg  ACCELERATED JUNCTIONAL ESCAPE RHYTHM  EARLY PRECORDIAL R/S TRANSITION  Electronically Signed by:  Date and Time of Study: 2017-10-22 08:49:44          Medication Review:   I have reviewed the patient's current medication list    Current Facility-Administered Medications:   •  dextrose (D50W) solution 25 g, 25 g, Intravenous, Q15 Min PRN, Akua Choudhary, DO  •  dextrose (GLUTOSE) oral gel 15 g, 15 g, Oral, Q15 Min PRN, Akua Choudhary, DO  •  doxycycline (VIBRAMYCIN) 100 mg in sodium chloride 0.9 % 250 mL IVPB, 100 mg, Intravenous, Q12H, Akua Choudhary, DO  •  glucagon (GLUCAGEN) injection 1 mg, 1 mg, Subcutaneous, Q15 Min PRN, Akua Choudhary, DO  •  HYDROmorphone (DILAUDID) injection  1 mg, 1 mg, Intramuscular, Q2H PRN, Live Chambers MD, 1 mg at 10/22/17 0737  •  HYDROmorphone (DILAUDID) injection 1 mg, 1 mg, Intravenous, Q2H PRN, Live Chambers MD  •  insulin aspart (novoLOG) injection 0-7 Units, 0-7 Units, Subcutaneous, 4x Daily AC & at Bedtime, Akua Choudhary DO  •  lactated ringers infusion, 75 mL/hr, Intravenous, Continuous, Live Chambers MD  •  ondansetron (ZOFRAN) injection 4 mg, 4 mg, Intravenous, Q6H PRN, Akua Choudhary DO  •  sodium chloride 0.9 % flush 1-10 mL, 1-10 mL, Intravenous, PRN, Akua Choudhary DO  •  sodium chloride 0.9 % infusion 40 mL, 40 mL, Intravenous, PRN, Akua Choudhary DO  •  sodium chloride 0.9 % infusion, 50 mL/hr, Intravenous, Continuous, Akua Choudhary DO      Assessment/Plan      1. Left distal Radial and Ulnar fractures: After discussing the patient with Dr. Chmabers he has decided not to do surgery on this patient given her active OM left toe and recent acute bronchitis. He is going to place the patient in a cast.  We do not have IV access on this patient so he is using IM dilaudid for placement of the cast and she has been started on po percocet.  We will work today to achieve good pain control and the patient will be able to go home tomorrow.    2. CKD stage III: Patient's renal function here is actually a little better than her previous baseline. No acute issues.     3. Non-ischemic Cardiomyopathy/Chronic systolic CHF with normalization of EF: Patient follows with Dr. Bates outpatient. Her notes state last EF 6/2015 was 50%. Will resume patient's bumex and spironolactone tomorrow and continue her home BB and ACEI. No acute issues currently.     4. Minimal CAD and Anomolous coronary artery: Patient follows with Dr. Bates.  No acute issues here. Continue ASA and statin.    5. Right Breast Cancer: Patient with previous mastectomy 2 years ago and cannot have IV in that arm.  No acute issues here.  Continue home Arimidex.    6. DM-2 with  preripheral neuropathy: will substitute glipizide for Amaryl here. Monitor Accu checks. HbA1C is at goal at 6.5.    7. 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  She was not on Abx for this because they wanted to get a good culture when the amputation was performed. WBC is WNL.  Patient with no acute issues here. (H/o MRSA, MSSA and klebsiella per ID records from previous foot wounds).    8. Recent Bronchitis: Symptoms resolved. Finish patient's last 2 days of doxycycline for this.     9. Chronic Low Back Pain/DDD: Patient follows with Pain medicine. No acute issues here.     10. OA and recent left patella Fx.  Patient is in a brace when ambulating and was following with Dr. Chambers outpatient for this prior to admission.  She needs a left TKA but this has been put off due to her issues with DM foot ulcers and infections.     11. Hypertension: BP is intermittently elevated possibly secondary to pain.  Resume home medications, diuretics to start tomorrow. Monitor.    12. Hypothyroidism: continue home supplementation.    13. Dyslipidemia: Resume statin.    14. Depression/ Anxiety: No acute issues here. Continue home cymbalta.    15. GERD: Continue PPI, no acute issues here.     Plan for disposition: Home tomorrow if pain is controlled.    Monica Stanford MD  10/22/17  8:53 AM

## 2017-10-23 ENCOUNTER — TELEPHONE (OUTPATIENT)
Dept: ORTHOPEDIC SURGERY | Facility: CLINIC | Age: 72
End: 2017-10-23

## 2017-10-23 VITALS
TEMPERATURE: 97.8 F | WEIGHT: 217.2 LBS | SYSTOLIC BLOOD PRESSURE: 180 MMHG | RESPIRATION RATE: 16 BRPM | DIASTOLIC BLOOD PRESSURE: 83 MMHG | HEIGHT: 67 IN | OXYGEN SATURATION: 95 % | BODY MASS INDEX: 34.09 KG/M2 | HEART RATE: 78 BPM

## 2017-10-23 LAB — GLUCOSE BLDC GLUCOMTR-MCNC: 130 MG/DL (ref 70–130)

## 2017-10-23 PROCEDURE — 99217 PR OBSERVATION CARE DISCHARGE MANAGEMENT: CPT | Performed by: HOSPITALIST

## 2017-10-23 PROCEDURE — G0378 HOSPITAL OBSERVATION PER HR: HCPCS

## 2017-10-23 PROCEDURE — 99024 POSTOP FOLLOW-UP VISIT: CPT | Performed by: ORTHOPAEDIC SURGERY

## 2017-10-23 PROCEDURE — 99217 PR OBSERVATION CARE DISCHARGE MANAGEMENT: CPT | Performed by: NURSE PRACTITIONER

## 2017-10-23 PROCEDURE — 82962 GLUCOSE BLOOD TEST: CPT

## 2017-10-23 RX ORDER — OXYCODONE AND ACETAMINOPHEN 10; 325 MG/1; MG/1
1 TABLET ORAL EVERY 4 HOURS PRN
Qty: 50 TABLET | Refills: 0 | Status: SHIPPED | OUTPATIENT
Start: 2017-10-23 | End: 2017-11-01

## 2017-10-23 RX ADMIN — CARVEDILOL 12.5 MG: 12.5 TABLET, FILM COATED ORAL at 08:08

## 2017-10-23 RX ADMIN — LISINOPRIL 5 MG: 5 TABLET ORAL at 08:12

## 2017-10-23 RX ADMIN — SPIRONOLACTONE 25 MG: 25 TABLET ORAL at 08:13

## 2017-10-23 RX ADMIN — OXYCODONE HYDROCHLORIDE AND ACETAMINOPHEN 1 TABLET: 10; 325 TABLET ORAL at 07:10

## 2017-10-23 RX ADMIN — GLIPIZIDE 2.5 MG: 5 TABLET ORAL at 08:08

## 2017-10-23 RX ADMIN — OXYCODONE HYDROCHLORIDE AND ACETAMINOPHEN 1 TABLET: 10; 325 TABLET ORAL at 03:15

## 2017-10-23 RX ADMIN — DOXYCYCLINE HYCLATE 100 MG: 100 TABLET, COATED ORAL at 08:12

## 2017-10-23 RX ADMIN — PANTOPRAZOLE SODIUM 40 MG: 40 TABLET, DELAYED RELEASE ORAL at 08:08

## 2017-10-23 RX ADMIN — LEVOTHYROXINE SODIUM 25 MCG: 25 TABLET ORAL at 07:10

## 2017-10-23 RX ADMIN — BUMETANIDE 2 MG: 1 TABLET ORAL at 08:07

## 2017-10-23 RX ADMIN — ASPIRIN 81 MG 81 MG: 81 TABLET ORAL at 08:10

## 2017-10-23 RX ADMIN — VITAMIN B12 0.1 MG ORAL TABLET 100 MCG: 0.1 TABLET ORAL at 08:11

## 2017-10-23 RX ADMIN — DULOXETINE HYDROCHLORIDE 60 MG: 60 CAPSULE, DELAYED RELEASE ORAL at 08:11

## 2017-10-23 RX ADMIN — OXYCODONE HYDROCHLORIDE AND ACETAMINOPHEN 1 TABLET: 10; 325 TABLET ORAL at 11:02

## 2017-10-23 RX ADMIN — ANASTROZOLE 1 MG: 1 TABLET, FILM COATED ORAL at 08:09

## 2017-10-23 NOTE — DISCHARGE SUMMARY
Jung Short  1945  [id number]    Hospitalists Discharge Summary    Date of Admission: 10/21/2017  Date of Discharge:  10/23/2017    Primary Discharge Diagnoses:   1. Left distal radius/ulna fractures  Secondary Discharge Diagnoses:    2. CKD stage III  3. Non-ischemic Cardiomyopathy/Chronic systolic CHF with normalization of EF   4. CAD/HLD and Anomolous coronary artery   5. Right Breast Cancer, s/p right mastectomy 2 years ago   6. DM-2 with peripheral neuropathy   7. Osteomyelitis of the left 2nd (? 3rd) phalynx   8. Recent Bronchitis   9. Chronic Low Back Pain/DDD   10. OA and recent left patella fracture   11. Hypertension  12. Hypothyroidism   13. Depression/ Anxiety   14. GERD   15. Obesity  PCP  Patient Care Team:  Bubba Avalos MD as PCP - General (Internal Medicine & Pediatrics)  Bubba Avalos MD as PCP - Claims Attributed  Tory Pagan MD as Consulting Physician (Hematology and Oncology)  Lina Fisher MD as Referring Physician (General Surgery)  Shanda Gerardo RN as Care Coordinator (Population Health)    Consults:   Consults     Date and Time Order Name Status Description    10/22/2017 0115 Inpatient Consult to Orthopedic Surgery          Operations and Procedures Performed:    10/22 1206 Note By: Live Chambers MD  Xr Forearm 2 View Left    Result Date: 10/22/2017  Narrative: EXAM: Left forearm  10/21/2017  HISTORY: Pain after fall today  TECHNIQUE: 3 views  COMPARISON: None  FINDINGS: Distal radial and ulnar fractures, with probable mild ulnar comminution and involvement of both the radiocarpal and radioulnar joints. Mildly displaced ulnar styloid fracture. Osteopenia.      Impression: Distal radial and ulnar fractures with radiocarpal and radioulnar involvement and probably 20 degrees of dorsal tilting of the distal radial articular surface.  This report was finalized on 10/22/2017 7:34 AM by Dr. Giovany Baxter MD.      Xr Wrist 2 View Left    Result Date: 10/23/2017  Narrative:  POST REDUCTION LEFT WRIST, 10/22/2017:  HISTORY: Postop closed reduction left distal radius fractures.  TECHNIQUE: Three-view left wrist series was obtained postoperatively with dense cast material in place.  FINDINGS: Comminuted intra-articular fractures of the distal radius are grossly well aligned. Nondisplaced ulnar styloid fracture.   This report was finalized on 10/23/2017 7:50 AM by Dr. Levy Mishra MD.      Xr Knee 1 Or 2 View Left    Impression: Ordering physician's impression is located in the Encounter Note dated 10/05/17.     Xr Chest 1 View    Result Date: 10/22/2017  Narrative: EXAM: Portable chest 10/22/2017  HISTORY: Left forearm fracture yesterday, preop chest radiograph  TECHNIQUE: Single view portable chest  COMPARISON: None  FINDINGS: Submaximal inspiration, no definite acute abnormality. Prominent cardiac silhouette likely exaggerated by submaximal. Effort.      Impression: No acute disease. Submaximal inspiration.  This report was finalized on 10/22/2017 8:06 AM by Dr. Giovany Baxter MD.      Allergies:  is allergic to adhesive tape and latex.    Joey  Hydrocodone 9/2017 per report of 10/22/17    Discharge Medications:   Jung Short Aron   Home Medication Instructions RENATO:[id number]    Printed on:10/23/17 0921   Medication Information                      alendronate (FOSAMAX) 35 MG tablet  take 1 tablet by mouth every week             anastrozole (ARIMIDEX) 1 MG tablet  take 1 tablet by mouth once daily             aspirin 81 MG chewable tablet  Chew Daily.             bumetanide (BUMEX) 2 MG tablet  Take 2 mg by mouth Daily.             carvedilol (COREG) 12.5 MG tablet  take 1 tablet by mouth twice a day             Coenzyme Q10 (CO Q 10) 100 MG capsule  Take 1 tablet by mouth daily.             doxycycline (DORYX) 100 MG enteric coated tablet  Take 1 tablet by mouth 2 (Two) Times a Day for 7 days.             DULoxetine (CYMBALTA) 60 MG capsule  take 1 capsule by mouth once  "daily             esomeprazole (nexIUM) 20 MG capsule  Take 20 mg by mouth Every Morning Before Breakfast.             glimepiride (AMARYL) 1 MG tablet  take 1/2 tablet by mouth every morning and every evening             Glucose Blood (BLOOD GLUCOSE TEST) strip  Blood Glucose Test In Vitro Strip; Patient Sig: Blood Glucose Test In Vitro Strip She checks blood sugars a.m. and p.m. she has the Accu-Chek christina; 60; 6; 27-Jun-2013; Active             GuaiFENesin 200 MG/5ML liquid  Take 10 mL by mouth 4 (Four) Times a Day.             levothyroxine (SYNTHROID, LEVOTHROID) 25 MCG tablet  take 1 tablet by mouth once daily             lisinopril (PRINIVIL,ZESTRIL) 5 MG tablet  5 mg Daily.             magnesium oxide (MAG-OX) 400 MG tablet  Take 400 mg by mouth Daily.             Multiple Vitamin (MULTI VITAMIN DAILY PO)  Take 1 tablet by mouth Daily.             oxyCODONE-acetaminophen (PERCOCET)  MG per tablet  Take 1 tablet by mouth Every 4 (Four) Hours As Needed for Moderate Pain  or Severe Pain  for up to 9 days.             pravastatin (PRAVACHOL) 10 MG tablet  Take 1 tablet by mouth Daily.             spironolactone (ALDACTONE) 25 MG tablet  take 1 tablet by mouth every morning             vitamin B-12 (CYANOCOBALAMIN) 100 MCG tablet  Take 1 tablet by mouth daily.             vitamin D (ERGOCALCIFEROL) 37960 UNITS capsule capsule  takes once monthly               History of Present Illness: Taken from HPI per Dr. Chambers:  \"72 y.o. female admitted to [facility] to services of Akua Choudhary DO with Radius/ulna fracture, left, closed, initial encounter [S52.92XA, S52.202A].  patient fell yesterday on her back porch while cleaning tripping over a broom injuring her left nondominant wrist.  Patient was seen in the emergency room at [facility] her x-ray demonstrated displaced left distal radial fracture.  She is admitted this time to undergo definitive treatment of that fracture.  No other injuries " "associated with the fall\"     Taken from note 10/22/17 per Dr. Stanford:  \"The patient does have OM left 2nd phalynx (? 3rd as well).  She has been off Abx's for this because she was supposed to have an amputation done by Dr. Crane last Tuesday but she developed Acute bronchitis so surgery was cancelled.  The patient was treated by her PCP with doxycycline and a medrol dose pack and she notes her cough and sore throat and fever have all now resolved (she has 2 more days left of the doxycycline for this).  They were unable to place an IV overnight so she has remained on po pain medication and her pain at the time of my exam was not very well controlled. She denies any CP, SOA, N/V or heart palpitations. She is afebrile.\"     Hospital Course  1. Left distal Radial and Ulnar fractures: Dr. Chambers followed  No surgery pending secondary to number 7, 8  Placed in cast per Dr. Chambers  Pain controlled on IM dilaudid/percocet  mg every 4 hours  F/U Dr. Chambers 1 week     2. CKD stage III: creatinine stable at 1.09  F/U Bubba Avalos MD    3. Non-ischemic Cardiomyopathy/Chronic systolic CHF with normalization of EF: followed outpatient per Dr. Bates  Last EF 6/2015 50%.   No acute issues on bumex 2 mg daily/spironolactone 25 mg daily/coreg 12.5 mg twice daily/lisinopril 5 mg daily     4. CAD/HLD and Anomolous coronary artery: follows outpatient with Dr. Bates.   No acute issues on ASA and statin.     5. Right Breast Cancer, s/p right mastectomy 2 years ago:   Continued on home arimidex     6. DM-2 with preripheral neuropathy:  A1C 6.5%  will substitute glipizide for Amaryl here. Monitor Accu checks. HbA1C is at goal at 6.5.     7. Osteomyelitis of the left 2nd (? 3rd) phalynx: followed outpatient by Dr. Crane  Was supposed to undergo amputation 10/17/17, but cancelled due to bronchitis, see below  [facility] normal/CRP 0.58/sed rate 19  No antibiotic treatment due to culture pending during amputation  H/O " MRSA, MSSA and klebsiella per ID records from previous foot wounds     8. Recent Bronchitis: resolved on course of doxy (completed here), completed steroid pack prior to admit     9. Chronic Low Back Pain/DDD: No acute issues, follows outpatient by pain management     10. OA and recent left patella Fx: followed by Dr. Chambers outpatient, and here  Wears a brace when ambulating  Left TKA pending, but delayed due to diabetic foot ulcers and infections      11. Hypertension: BP elevated on and off, increased with pain  All home meds resumed today, monitor and log and bring to Bubba Avalos MD    12. Hypothyroidism: no acute issues on levothyroxine 25 mcg daily     13. Depression/ Anxiety: No acute issues here. Continue home cymbalta.     14. GERD: Continue PPI, no acute issues here.      15. Obesity: Body mass index is 34.53 kg/(m^2).    Last Lab Results:   Lab Results (most recent)     Procedure Component Value Units Date/Time    POC Glucose Fingerstick [[id number]]  (Abnormal) Collected:  10/22/17 0142    Specimen:  Blood Updated:  10/22/17 0148     Glucose 192 (H) mg/dL     Narrative:       Meter: [id number] : [id number] Geno Almazan NURSING ASSISTANT    POC Glucose Fingerstick [[id number]]  (Normal) Collected:  10/22/17 0722    Specimen:  Blood Updated:  10/22/17 0746     Glucose 123 mg/dL     Narrative:       Meter: [id number] : [id number] Pa Houser NURSING ASSISTANT    Hemoglobin A1c [[id number]]  (Abnormal) Collected:  10/22/17 1056    Specimen:  Blood Updated:  10/22/17 1129     Hemoglobin A1C 6.50 (H) %     Narrative:       Hemoglobin A1C Ranges:    Increased Risk for Diabetes  5.7% to 6.4%  Diabetes                     >= 6.5%  Diabetic Goal                < 7.0%    Basic Metabolic Panel [[id number]]  (Abnormal) Collected:  10/22/17 1053    Specimen:  Blood Updated:  10/22/17 1129     Glucose 148 (H) mg/dL      BUN 25 (H) mg/dL      Creatinine 1.09 (H) mg/dL      Sodium 139 mmol/L      Potassium 4.0  mmol/L      Chloride 102 mmol/L      CO2 26.0 mmol/L      Calcium 9.5 mg/dL      eGFR Non African Amer 49 (L) mL/min/1.73      BUN/Creatinine Ratio 22.9     Anion Gap 11.0 mmol/L     Narrative:       The MDRD GFR formula is only valid for adults with stable renal function between ages 18 and 70.    TSH [[id number]]  (Normal) Collected:  10/22/17 1053    Specimen:  Blood Updated:  10/22/17 1135     TSH 2.290 mIU/mL     Sedimentation Rate [[id number]]  (Normal) Collected:  10/22/17 1056    Specimen:  Blood Updated:  10/22/17 1136     Sed Rate 19 mm/hr     POC Glucose Fingerstick [[id number]]  (Abnormal) Collected:  10/22/17 1138    Specimen:  Blood Updated:  10/22/17 1203     Glucose 141 (H) mg/dL     Narrative:       Meter: [id number] : [id number] Pa Houser NURSING ASSISTANT    CBC Auto Differential [[id number]]  (Abnormal) Collected:  10/22/17 1056    Specimen:  Blood Updated:  10/22/17 1324     WBC 9.75 10*3/mm3      RBC 3.82 (L) 10*6/mm3      Hemoglobin 11.7 (L) g/dL      Hematocrit 36.2 (L) %      MCV 94.8 fL      MCH 30.6 pg      MCHC 32.3 g/dL      RDW 13.5 %      RDW-SD 46.7 fl      MPV 9.2 fL      Platelets 201 10*3/mm3     CBC & Differential [[id number]] Collected:  10/22/17 1056    Specimen:  Blood Updated:  10/22/17 1333    Narrative:       The following orders were created for panel order CBC & Differential.  Procedure                               Abnormality         Status                     ---------                               -----------         ------                     Manual Differential[[id number]]          Abnormal            Final result               CBC Auto Differential[[id number]]        Abnormal            Final result                 Please view results for these tests on the individual orders.    Manual Differential [[id number]]  (Abnormal) Collected:  10/22/17 1056    Specimen:  Blood Updated:  10/22/17 1333     Neutrophil % 60.0 %      Lymphocyte % 22.0 %      Monocyte % 12.0 (H) %       Eosinophil % 4.0 %      Metamyelocyte % 2.0 (H) %      Myelocyte % 0.0 %      Neutrophils Absolute 5.85 10*3/mm3      Lymphocytes Absolute 2.15 10*3/mm3      Monocytes Absolute 1.17 (H) 10*3/mm3      Eosinophils Absolute 0.39 (H) 10*3/mm3      nRBC 1.0 (H) /100 WBC      RBC Morphology Normal     Hypersegmented Neutrophils Slight/1+     Platelet Estimate Adequate    POC Glucose Fingerstick [[id number]]  (Abnormal) Collected:  10/22/17 1627    Specimen:  Blood Updated:  10/22/17 1702     Glucose 141 (H) mg/dL     Narrative:       Meter: [id number] : [id number] Pa Houser NURSING ASSISTANT    POC Glucose Fingerstick [[id number]]  (Abnormal) Collected:  10/22/17 2007    Specimen:  Blood Updated:  10/22/17 2013     Glucose 203 (H) mg/dL     Narrative:       Meter: [id number] : [id number] Jorge Salcido    C-reactive Protein [[id number]]  (Abnormal) Collected:  10/22/17 1056    Specimen:  Blood Updated:  10/22/17 2022     C-Reactive Protein 0.58 (H) mg/dL         Imaging Results (most recent)     Procedure Component Value Units Date/Time    XR Forearm 2 View Left [[id number]] Collected:  10/22/17 0732     Updated:  10/22/17 0736    Narrative:       EXAM: Left forearm  10/21/2017     HISTORY: Pain after fall today     TECHNIQUE: 3 views      COMPARISON: None     FINDINGS: Distal radial and ulnar fractures, with probable mild ulnar  comminution and involvement of both the radiocarpal and radioulnar  joints. Mildly displaced ulnar styloid fracture. Osteopenia.       Impression:       Distal radial and ulnar fractures with radiocarpal and  radioulnar involvement and probably 20 degrees of dorsal tilting of the  distal radial articular surface.     This report was finalized on 10/22/2017 7:34 AM by Dr. Giovany Baxter MD.       XR Chest 1 View [[id number]] Collected:  10/22/17 0806     Updated:  10/22/17 0808    Narrative:       EXAM: Portable chest 10/22/2017     HISTORY: Left forearm fracture yesterday, preop  chest radiograph     TECHNIQUE: Single view portable chest      COMPARISON: None     FINDINGS: Submaximal inspiration, no definite acute abnormality.  Prominent cardiac silhouette likely exaggerated by submaximal. Effort.       Impression:       No acute disease. Submaximal inspiration.     This report was finalized on 10/22/2017 8:06 AM by Dr. Giovany Baxter MD.       XR Wrist 2 View Left [[id number]] Updated:  10/22/17 2059        PROCEDURES: NONE    Condition on Discharge:  Stable    Physical Exam at Discharge  Vital Signs  Temp:  [97.7 °F (36.5 °C)-98.5 °F (36.9 °C)] 97.8 °F (36.6 °C)  Heart Rate:  [71-81] 78  Resp:  [16-18] 16  BP: (150-181)/(75-96) 180/83    Physical Exam:  Physical Exam   Constitutional: Patient appears well-developed and well-nourished and in no acute distress   HEENT:   Head: Normocephalic and atraumatic.   Eyes:  Pupils are equal, round, and reactive to light. EOM are intact. Sclera are anicteric and non-injected.  Mouth and Throat: Patient has moist mucous membranes. Oropharynx is clear of any erythema or exudate.     Neck: Neck supple. No JVD present. No thyromegaly present. No lymphadenopathy present.  Cardiovascular: Regular rate, regular rhythm, S1 normal and S2 normal.  Exam reveals no gallop and no friction rub.  No murmur heard.  Pulmonary/Chest: Lungs are clear to auscultation bilaterally. No respiratory distress. No wheezes. No rhonchi. No rales.   Abdominal: Obese. Soft. Bowel sounds are normal. No distension and no mass. There is no hepatosplenomegaly. There is no tenderness.   Musculoskeletal: decreased muscle bulk  Extremities: Left arm in cast with CMS check to left hand WNL. Pulses are palpable in all 4 extremities.  Neurological: Patient is alert and oriented to person, place, and time. Cranial nerves II-XII are grossly intact with no focal deficits.  Skin: Skin is warm. No rash noted. Nails show no clubbing.  No cyanosis or erythema.    Discharge  Disposition  Home     Visiting Nurse:    No     Home PT/OT:  No     Home Safety Evaluation:  No     DME  None new    Discharge Diet:         Dietary Orders            Start     Ordered    10/23/17 0803  Diet Regular; Consistent Carbohydrate, Cardiac  Diet Effective Now     Question Answer Comment   Diet Texture / Consistency Regular    Common Modifiers Consistent Carbohydrate    Common Modifiers Cardiac        10/23/17 0803        Activity at Discharge:  As per Dr. Chambers    Pre-discharge education  Diabetic, Cardiac, Wound Care, medications, follow up    Follow-up Appointments  Future Appointments  Date Time Provider Department Center   10/23/2017 2:00 PM Live Chambers MD [facility] None   10/26/2017 3:00 PM JOSE ANTONIO Akhtar [facility] PM EASPT None   11/6/2017 11:00 AM MD LILIAN [facility]K OS [facility] None   11/7/2017 3:30 PM LAB CHAIR [facility] LAB [facility]   11/7/2017 4:00 PM Tory Pagan MD [facility] CBC [facility]AG  CBC [place]   4/9/2018 2:45 PM Aria Bates MD [facility] CD L[facility]LA None     Additional Instructions for the Follow-ups that You Need to Schedule     Discharge Follow-Up With Specified Provider    As directed    To:  Dr. Chambers   Follow Up:  1 Week       Discharge Follow-up with PCP    As directed    Follow Up Details:  1 week       Discharge Follow-up with Specified Provider    As directed    To:  Reyes   Follow Up:  1 Week   Has the patient’s follow-up appointment been scheduled and documented in the discharge navigator?:  Yes, documented in the appointment section               Test Results Pending at Discharge: NONE     JOSE ANTONIO Fernandez  10/23/17  9:21 AM

## 2017-10-23 NOTE — TELEPHONE ENCOUNTER
Patient calling from the hospital she is about to be discharged and was inquiring about her knee immobilizer, per Dr. Chambers patient is to where the knee immobilizer on her left leg any time she is up and ambulating.     Thanks.

## 2017-10-23 NOTE — PROGRESS NOTES
Orthopedic Progress Note   Chief Complaint:  Left distal radial fracture    Subjective     Interval History: Patient is 1 day status post application long arm cast for the left distal radial fracture that to be treated nonoperatively due to patient's overall medical condition.  He is afebrile this morning still having moderate pain marginally controlled oral medication but she is stable.          Objective     Vital Signs  Temp:  [97.7 °F (36.5 °C)-98.5 °F (36.9 °C)] 97.8 °F (36.6 °C)  Heart Rate:  [71-81] 78  Resp:  [16-18] 16  BP: (150-181)/(75-96) 180/83  Body mass index is 34.53 kg/(m^2).    Intake/Output Summary (Last 24 hours) at 10/23/17 0648  Last data filed at 10/22/17 2354   Gross per 24 hour   Intake             1080 ml   Output              200 ml   Net              880 ml     I/O this shift:  In: 480 [P.O.:480]  Out: 200 [Urine:200]       Physical Exam:   General: patient awake, alert and cooperative   Cardiovascular: regular rhythm and rate   Pulm: clear to auscultation bilaterally   Abdomen: Benign.  Soft bowel sounds   Extremities: Left upper extremity as a long-arm cast.  She has good distal pulses with no motor deficit no sensory loss at this time.  Minimal swelling noted to the fingers.   Neurologic: Normal mood and behavior     Results Review:     I reviewed the patient's new clinical results.      WBC No results found for: WBCS   HGB Hemoglobin   Date Value Ref Range Status   10/22/2017 11.7 (L) 12.0 - 16.0 g/dL Final      HCT Hematocrit   Date Value Ref Range Status   10/22/2017 36.2 (L) 37.0 - 47.0 % Final      Platlets No results found for: LABPLAT     PT/INR:  No results found for: PROTIME/No results found for: INR    Sodium Sodium   Date Value Ref Range Status   10/22/2017 139 136 - 145 mmol/L Final      Potassium Potassium   Date Value Ref Range Status   10/22/2017 4.0 3.5 - 5.2 mmol/L Final      Chloride Chloride   Date Value Ref Range Status   10/22/2017 102 98 - 107 mmol/L Final       Bicarbonate No results found for: PLASMABICARB   BUN BUN   Date Value Ref Range Status   10/22/2017 25 (H) 8 - 23 mg/dL Final      Creatinine Creatinine   Date Value Ref Range Status   10/22/2017 1.09 (H) 0.57 - 1.00 mg/dL Final      Calcium Calcium   Date Value Ref Range Status   10/22/2017 9.5 8.8 - 10.5 mg/dL Final      Magnesium  AST  ALT  Bilirubin, Total  AlkPhos  Albumin    Amylase  Lipase    Radiology: No results found for: MG  No components found for: AST.*  No components found for: ALT.*  No components found for: BILIRUBIN, TOTAL.*    No components found for: ALKPHOS.*  No components found for: ALBUMIN.*      No components found for: AMYLASE.*  No components found for: LIPASE.*            Imaging Results (most recent)     Procedure Component Value Units Date/Time    XR Forearm 2 View Left [[id number]] Collected:  10/22/17 0732     Updated:  10/22/17 0736    Narrative:       EXAM: Left forearm  10/21/2017     HISTORY: Pain after fall today     TECHNIQUE: 3 views      COMPARISON: None     FINDINGS: Distal radial and ulnar fractures, with probable mild ulnar  comminution and involvement of both the radiocarpal and radioulnar  joints. Mildly displaced ulnar styloid fracture. Osteopenia.       Impression:       Distal radial and ulnar fractures with radiocarpal and  radioulnar involvement and probably 20 degrees of dorsal tilting of the  distal radial articular surface.     This report was finalized on 10/22/2017 7:34 AM by Dr. Giovany Baxter MD.       XR Chest 1 View [[id number]] Collected:  10/22/17 0806     Updated:  10/22/17 0808    Narrative:       EXAM: Portable chest 10/22/2017     HISTORY: Left forearm fracture yesterday, preop chest radiograph     TECHNIQUE: Single view portable chest      COMPARISON: None     FINDINGS: Submaximal inspiration, no definite acute abnormality.  Prominent cardiac silhouette likely exaggerated by submaximal. Effort.       Impression:       No acute disease. Submaximal  inspiration.     This report was finalized on 10/22/2017 8:06 AM by Dr. Giovany Baxter MD.       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   • Candidiasis B37.9   • Cardiomyopathy I42.9   • Disc disorder of cervical region M50.90   • Neck pain M54.2   • Contusion of chest wall S20.219A   • Tenderness of chest wall R07.89   • Chronic kidney disease, stage III (moderate) N18.3   • Chronic pain G89.29   • Congestive heart failure I50.9   • Constipation K59.00   • Generalized osteoarthritis M15.9   • Degeneration of intervertebral disc [id number]   • Depression F32.9   • Type 2 diabetes mellitus E11.9   • Controlled type 2 diabetes mellitus without complication E11.9   • Diarrhea R19.7   • Dyslipidemia E78.5   • Gastroesophageal reflux disease with esophagitis K21.0   • Fall in home W19.XXXA, Y92.099   • Fall on same level from slipping, tripping or stumbling W01.0XXA   • Generalized pain R52   • Gout M10.9   • Injury of head S09.90XA   • Hyperkalemia E87.5   • Hypertension I10   • Hypokalemia E87.6   • Hypomagnesemia E83.42   • Hypothyroidism E03.9   • Incisional hernia K43.2   • Mass of breast N63.0   • Mitral valve insufficiency I34.0   • Nausea R11.0   • Adult body mass index greater than 30 [id number]   • Osteopenia of spine M85.88   • Arthralgia of multiple joints M25.50   • Peripheral neuropathy G62.9   • Pulmonary hypertension I27.20   • Osteomyelitis M86.9   • Tricuspid valve insufficiency I07.1    • Cobalamin deficiency E53.8   • Vitamin D deficiency E55.9   • Weight loss R63.4   • Weight gain R63.5   • Diabetes mellitus E11.9   • Congestive heart failure with left ventricular systolic dysfunction I50.20   • Left knee pain M25.562   • Arthritis of knee M17.10   • DDD (degenerative disc disease), lumbar M51.36   • Lumbar facet arthropathy M12.88   • Pain in shoulder M25.519   • Chronic gout of multiple sites M1A.09X0   • Shortness of breath R06.02   • Encounter for long-term (current) use of high-risk medication Z79.899   • History of cancer Z85.9   • Constipation due to opioid therapy K59.03, T40.2X5A   • Syncope, psychogenic F48.8   • Transient alteration of awareness R40.4   • Closed fracture of patella S82.009A   • Primary osteoarthritis of left knee M17.12   • Radius/ulna fracture, left, closed, initial encounter S52.92XA, S52.202A       Post-cast application x-ray showed satisfactory alignment of the fracture.  Patient is ready for discharge from orthopedic standpoint.  We'll leave prescription for pain medication and scheduled follow-up visit in office in 1 week      Live Chambers MD  10/23/17  6:58 AM

## 2017-10-23 NOTE — PLAN OF CARE
Problem: Patient Care Overview (Adult)  Goal: Plan of Care Review  Outcome: Outcome(s) achieved Date Met:  10/23/17    10/23/17 1003   Coping/Psychosocial Response Interventions   Plan Of Care Reviewed With patient   Patient Care Overview   Progress improving   Outcome Evaluation   Outcome Summary/Follow up Plan Discharge Home       Goal: Adult Individualization and Mutuality  Outcome: Outcome(s) achieved Date Met:  10/23/17  Goal: Discharge Needs Assessment  Outcome: Outcome(s) achieved Date Met:  10/23/17    Problem: Pain, Acute (Adult)  Goal: Identify Related Risk Factors and Signs and Symptoms  Outcome: Outcome(s) achieved Date Met:  10/23/17  Goal: Acceptable Pain Control/Comfort Level  Outcome: Outcome(s) achieved Date Met:  10/23/17

## 2017-10-23 NOTE — PLAN OF CARE
Problem: Patient Care Overview (Adult)  Goal: Plan of Care Review  Outcome: Ongoing (interventions implemented as appropriate)    10/23/17 0506   Coping/Psychosocial Response Interventions   Plan Of Care Reviewed With patient   Patient Care Overview   Progress progress toward functional goals as expected       Goal: Discharge Needs Assessment  Outcome: Ongoing (interventions implemented as appropriate)    10/23/17 0506   Discharge Needs Assessment   Readmission Within The Last 30 Days no previous admission in last 30 days   Equipment Needed After Discharge none   Self-Care   Equipment Currently Used at Home cane, straight;walker, rolling;glucometer   Living Environment   Transportation Available family or friend will provide         Problem: Pain, Acute (Adult)  Goal: Identify Related Risk Factors and Signs and Symptoms  Outcome: Ongoing (interventions implemented as appropriate)    10/23/17 0506   Pain, Acute   Related Risk Factors (Acute Pain) patient perception   Signs and Symptoms (Acute Pain) verbalization of pain descriptors       Goal: Acceptable Pain Control/Comfort Level  Outcome: Ongoing (interventions implemented as appropriate)    10/23/17 0506   Pain, Acute (Adult)   Acceptable Pain Control/Comfort Level making progress toward outcome

## 2017-10-23 NOTE — NURSING NOTE
Case Management Discharge Note    Final Note: patient discharged home to self care.     Discharge Placement     No information found             Discharge Codes: 01  Discharge to home

## 2017-10-24 ENCOUNTER — PATIENT OUTREACH (OUTPATIENT)
Dept: CASE MANAGEMENT | Facility: OTHER | Age: 72
End: 2017-10-24

## 2017-10-24 NOTE — OUTREACH NOTE
Patient contacted. Discussed with patient ED visit of 10/21/17 of left distal radius ulna fracture due to fall. Has cast to left arm; able to move fingers without difficulty. Patient able to perform ADL's but  slower. Has assistance from family and neighbors as needed especially with transportation. Ambulating with cane or walker and immobilizer to left knee.. Improving from recent cold symptoms of fever, sore throat and productive cough. Compliant with medications; medical appointments; recommendations, monitoring of blood pressure and blood sugar and daily weights. Reviewed gaps in care; 24/7 Nurse Line telephone number; annual medicare wellness visit; fall precautions ; Life Alert System and medication education. No further questions or concerns voiced  at this time.

## 2017-10-26 RX ORDER — LEVOTHYROXINE SODIUM 0.03 MG/1
TABLET ORAL
Qty: 30 TABLET | Refills: 6 | Status: SHIPPED | OUTPATIENT
Start: 2017-10-26 | End: 2018-04-03

## 2017-10-27 ENCOUNTER — TELEPHONE (OUTPATIENT)
Dept: ORTHOPEDIC SURGERY | Facility: CLINIC | Age: 72
End: 2017-10-27

## 2017-10-27 NOTE — TELEPHONE ENCOUNTER
Patient calling the office back to ask if she could take more benadryl for her itching (she states she is itching all over), she took some benadryl this morning, however [facility] patient was advised to call the surgeon who completed her surgery today as our office did not have access to what medications were given before, during or after surgery.     Thanks.

## 2017-10-27 NOTE — TELEPHONE ENCOUNTER
Patient calling leaving a voicemail stating that her cast is itching her horribly (status post ORIF left radius/ulna 10.21.2017) she is also itching in other places, she has tried the blow dryer set on cool but it doesn't seen to be helping... She is at [facility] awaiting surgery on her toe today, I did try and call the patient back for her to define the itching in other places but only got a voicemail.    Thanks.

## 2017-10-30 ENCOUNTER — OFFICE VISIT (OUTPATIENT)
Dept: ORTHOPEDIC SURGERY | Facility: CLINIC | Age: 72
End: 2017-10-30

## 2017-10-30 VITALS — BODY MASS INDEX: 34.06 KG/M2 | WEIGHT: 217 LBS | HEIGHT: 67 IN

## 2017-10-30 DIAGNOSIS — R52 PAIN: Primary | ICD-10-CM

## 2017-10-30 DIAGNOSIS — S82.035A CLOSED NONDISPLACED TRANSVERSE FRACTURE OF LEFT PATELLA, INITIAL ENCOUNTER: ICD-10-CM

## 2017-10-30 DIAGNOSIS — S52.532A CLOSED COLLES' FRACTURE OF LEFT RADIUS, INITIAL ENCOUNTER: ICD-10-CM

## 2017-10-30 PROCEDURE — 73110 X-RAY EXAM OF WRIST: CPT | Performed by: ORTHOPAEDIC SURGERY

## 2017-10-30 PROCEDURE — 99024 POSTOP FOLLOW-UP VISIT: CPT | Performed by: ORTHOPAEDIC SURGERY

## 2017-10-30 PROCEDURE — 73560 X-RAY EXAM OF KNEE 1 OR 2: CPT | Performed by: ORTHOPAEDIC SURGERY

## 2017-10-30 NOTE — PROGRESS NOTES
Subjective: Left nondisplaced patella fracture, left distal radial fracture     Patient ID: Jung Short is a 72 y.o. female.    Chief Complaint:    History of Present Illness patient is in follow-up for nondisplaced left patella fracture being treated with splinting and since last seen was seen in the hospital after she fell sustaining a nondisplaced left distal radial fracture with subsequent put in a long-arm cast.  The standpoint of fracture she is doing well although she's had constant nosebleeds since she had foot surgery by the podiatrist last week.       Social History     Occupational History   • [employer]  Retired     Social History Main Topics   • Smoking status: Former Smoker     Packs/day: 3.00     Years: 30.00     Types: Cigarettes   • Smokeless tobacco: Never Used      Comment: quit 35 years ago   • Alcohol use No   • Drug use: No   • Sexual activity: Defer      Comment: celibate      Review of Systems   Constitutional: Negative for chills, diaphoresis, fever and unexpected weight change.   HENT: Negative for hearing loss, nosebleeds, sore throat and tinnitus.    Eyes: Negative for pain and visual disturbance.   Respiratory: Negative for cough, shortness of breath and wheezing.    Cardiovascular: Negative for chest pain and palpitations.   Gastrointestinal: Negative for abdominal pain, diarrhea, nausea and vomiting.   Endocrine: Negative for cold intolerance, heat intolerance and polydipsia.   Genitourinary: Negative for difficulty urinating, dysuria and hematuria.   Musculoskeletal: Positive for arthralgias, joint swelling and myalgias.   Skin: Negative for rash and wound.   Allergic/Immunologic: Negative for environmental allergies.   Neurological: Negative for dizziness, syncope and numbness.   Hematological: Does not bruise/bleed easily.   Psychiatric/Behavioral: Negative for dysphoric mood and sleep disturbance. The patient is not nervous/anxious.          Past Medical History:    Diagnosis Date   • Anemia    • Benign breast disease    • Cancer 2015    Right breast   • Cellulitis of leg     May-2003 right lower extremity   • CHF (congestive heart failure)    • Chronic kidney disease    • Chronic ulcer of right foot     Non-pressure   • Depression    • Diabetic peripheral neuropathy    • Diarrhea    • Diverticulosis    • Encounter for eye exam    • Gastroesophageal reflux    • Hiatal hernia    • History of bone density study 09/20/2012   • History of colonoscopy     done 6/03 recheck in 10 years.   Done july 2013 recheck in 5 years   • History of mammography, screening 09/20/2012   • Hospital discharge follow-up    • Hyperlipidemia    • Hypertension    • Hypothyroidism    • Impingement syndrome of right shoulder    • Joint pain    • Menopausal disorder    • Methicillin resistant Staphylococcus aureus infection    • MRSA (methicillin resistant staph aureus) culture positive    • Nausea and vomiting    • Nonischemic cardiomyopathy     Ejection fraction 10% per 2-D echo with Doppler however she did have cardiac catheterization April 2015 which showed ejection fraction 20% with global hypokinesis and severe mitral insufficiency   • Osteoarthritis    • Paroxysmal atrial fibrillation    • Postoperative infection     July of 2012 following her hysterectomy far vaginal wall prolapse. She was on antibiotics and wound dressings for 2 months.   • Syncope, psychogenic 4/19/2017   • Transient alteration of awareness 4/19/2017     Past Surgical History:   Procedure Laterality Date   • AMPUTATION FOOT / TOE Right 11/2013    Rt foot amputation MTP first toe   • BLADDER SURGERY     • BREAST BIOPSY     • BREAST SURGERY  06/29/2015    Percutaneous ultrasound-guided placement of metal localized clip 1st lesion   • CATARACT EXTRACTION Bilateral 2017   • DEBRIDEMENT  FOOT Right 01/2013    During hospitalization    • EPIDURAL BLOCK      4 chronic back pain and leg pain last epidurals done 5-2012 she had no benefit  at all from this procedure.   • HERNIA REPAIR      At the abdomen February 2007 Dr. Mendez   • INCISIONAL HERNIA REPAIR  05/16/2014    Recurrent. Incarcerated. With mesh implantation   • MASTECTOMY Right 2014   • SHOULDER SURGERY     • TOTAL ABDOMINAL HYSTERECTOMY      with oophorectomy-Done June-2012 secondary to vaginal wall prolapse.   • TOTAL KNEE ARTHROPLASTY       Family History   Problem Relation Age of Onset   • Colonic polyp Mother    • Hypertension Mother    • Migraines Mother    • Mental illness Mother    • Depression Mother    • Coronary artery disease Father    • Other Father      Cardiac Disorder   • Colon cancer Father    • Kidney disease Father    • Cancer Father    • Breast cancer Maternal Aunt    • Colon cancer Brother    • Alcohol abuse Brother    • Arthritis Sister    • Hypertension Brother    • Lung disease Brother    • Alcohol abuse Brother          Objective:  There were no vitals filed for this visit.  Last 3 weights    10/30/17  1436   Weight: 217 lb (98.4 kg)     Body mass index is 34.5 kg/(m^2).       Ortho Exam  AP and lateral of the left knee shows complete healing of the fracture compared to previous x-rays.  AP lateral of the left wrist shows a nondisplaced left distal radial fracture no prior x-rays are comparison.  Left arm is in a long-arm cast that she's doing well although still having moderate pain.  The left knee shows no effusion.  There is no motor deficit good distal pulses.    Assessment:       1. Pain    2. Closed nondisplaced transverse fracture of left patella, initial encounter    3. Closed Colles' fracture of left radius, initial encounter          Plan:      She can DC the splint weightbearing as tolerated on that left leg.  She is to continue the long-arm cast.  She'll return to see me to repeat x-ray of that left wrist and may convert to a short arm cast depending how lungs.  Return in a collar primary care doctor to get evaluated for that recurrent nosebleeds and she  is currently having.      Work Status:    [employer] query complete.    Orders:  Orders Placed This Encounter   Procedures   • XR Knee 1 or 2 View Left   • XR Wrist 3+ View Left       Medications:  No orders of the defined types were placed in this encounter.      Followup:  Return in about 2 weeks (around 11/13/2017).          Dragon transcription disclaimer     Much of this encounter note is an electronic transcription/translation of spoken language to printed text. The electronic translation of spoken language may permit erroneous, or at times, nonsensical words or phrases to be inadvertently transcribed. Although I have reviewed the note for such errors, some may still exist.

## 2017-11-06 ENCOUNTER — OFFICE VISIT (OUTPATIENT)
Dept: ORTHOPEDIC SURGERY | Facility: CLINIC | Age: 72
End: 2017-11-06

## 2017-11-06 DIAGNOSIS — M19.071 ARTHRITIS OF FOOT, RIGHT: ICD-10-CM

## 2017-11-06 DIAGNOSIS — M70.61 TROCHANTERIC BURSITIS OF RIGHT HIP: ICD-10-CM

## 2017-11-06 DIAGNOSIS — R52 PAIN: Primary | ICD-10-CM

## 2017-11-06 PROCEDURE — 20610 DRAIN/INJ JOINT/BURSA W/O US: CPT | Performed by: ORTHOPAEDIC SURGERY

## 2017-11-06 PROCEDURE — 73630 X-RAY EXAM OF FOOT: CPT | Performed by: ORTHOPAEDIC SURGERY

## 2017-11-06 PROCEDURE — 72170 X-RAY EXAM OF PELVIS: CPT | Performed by: ORTHOPAEDIC SURGERY

## 2017-11-06 PROCEDURE — 99214 OFFICE O/P EST MOD 30 MIN: CPT | Performed by: ORTHOPAEDIC SURGERY

## 2017-11-06 RX ORDER — BETAMETHASONE SODIUM PHOSPHATE AND BETAMETHASONE ACETATE 3; 3 MG/ML; MG/ML
6 INJECTION, SUSPENSION INTRA-ARTICULAR; INTRALESIONAL; INTRAMUSCULAR; SOFT TISSUE
Status: COMPLETED | OUTPATIENT
Start: 2017-11-06 | End: 2017-11-06

## 2017-11-06 RX ORDER — LIDOCAINE HYDROCHLORIDE 10 MG/ML
4 INJECTION, SOLUTION INFILTRATION; PERINEURAL
Status: COMPLETED | OUTPATIENT
Start: 2017-11-06 | End: 2017-11-06

## 2017-11-06 RX ADMIN — BETAMETHASONE SODIUM PHOSPHATE AND BETAMETHASONE ACETATE 6 MG: 3; 3 INJECTION, SUSPENSION INTRA-ARTICULAR; INTRALESIONAL; INTRAMUSCULAR; SOFT TISSUE at 11:03

## 2017-11-06 RX ADMIN — LIDOCAINE HYDROCHLORIDE 4 ML: 10 INJECTION, SOLUTION INFILTRATION; PERINEURAL at 11:03

## 2017-11-06 NOTE — PROGRESS NOTES
Subjective: Right leg pain     Patient ID: Jung Short is a 72 y.o. female.    Chief Complaint:    History of Present Illness 72-year-old female known to me being treated for minimally displaced left distal radial fracture and previously treated for a left patella fracture presents with a one-week history of severe right leg pain from the hip down to the foot.  She recently had left foot surgery by podiatrist in that is healing is doing well but she has been placing more weight on that right leg during the recovery.  She complains of severe hip pain that'll radiate down into the foot making ambulation difficult and very painful.  No history of any trauma.       Social History     Occupational History   • [employer]  Retired     Social History Main Topics   • Smoking status: Former Smoker     Packs/day: 3.00     Years: 30.00     Types: Cigarettes   • Smokeless tobacco: Never Used      Comment: quit 35 years ago   • Alcohol use No   • Drug use: No   • Sexual activity: Defer      Comment: celibate      Review of Systems   Constitutional: Negative for chills, diaphoresis, fever and unexpected weight change.   HENT: Negative for hearing loss, nosebleeds, sore throat and tinnitus.    Eyes: Negative for pain and visual disturbance.   Respiratory: Negative for cough, shortness of breath and wheezing.    Cardiovascular: Negative for chest pain and palpitations.   Gastrointestinal: Negative for abdominal pain, diarrhea, nausea and vomiting.   Endocrine: Negative for cold intolerance, heat intolerance and polydipsia.   Genitourinary: Negative for difficulty urinating, dysuria and hematuria.   Musculoskeletal: Positive for arthralgias. Negative for joint swelling and myalgias.   Skin: Negative for rash and wound.   Allergic/Immunologic: Negative for environmental allergies.   Neurological: Negative for dizziness, syncope and numbness.   Hematological: Does not bruise/bleed easily.   Psychiatric/Behavioral: Negative for  dysphoric mood and sleep disturbance. The patient is not nervous/anxious.    All other systems reviewed and are negative.        Past Medical History:   Diagnosis Date   • Anemia    • Benign breast disease    • Cancer 2015    Right breast   • Cellulitis of leg     May-2003 right lower extremity   • CHF (congestive heart failure)    • Chronic kidney disease    • Chronic ulcer of right foot     Non-pressure   • Depression    • Diabetic peripheral neuropathy    • Diarrhea    • Diverticulosis    • Encounter for eye exam    • Gastroesophageal reflux    • Hiatal hernia    • History of bone density study 09/20/2012   • History of colonoscopy     done 6/03 recheck in 10 years.   Done july 2013 recheck in 5 years   • History of mammography, screening 09/20/2012   • Hospital discharge follow-up    • Hyperlipidemia    • Hypertension    • Hypothyroidism    • Impingement syndrome of right shoulder    • Joint pain    • Menopausal disorder    • Methicillin resistant Staphylococcus aureus infection    • MRSA (methicillin resistant staph aureus) culture positive    • Nausea and vomiting    • Nonischemic cardiomyopathy     Ejection fraction 10% per 2-D echo with Doppler however she did have cardiac catheterization April 2015 which showed ejection fraction 20% with global hypokinesis and severe mitral insufficiency   • Osteoarthritis    • Paroxysmal atrial fibrillation    • Postoperative infection     July of 2012 following her hysterectomy far vaginal wall prolapse. She was on antibiotics and wound dressings for 2 months.   • Syncope, psychogenic 4/19/2017   • Transient alteration of awareness 4/19/2017     Past Surgical History:   Procedure Laterality Date   • AMPUTATION FOOT / TOE Right 11/2013    Rt foot amputation MTP first toe   • BLADDER SURGERY     • BREAST BIOPSY     • BREAST SURGERY  06/29/2015    Percutaneous ultrasound-guided placement of metal localized clip 1st lesion   • CATARACT EXTRACTION Bilateral 2017   •  DEBRIDEMENT  FOOT Right 01/2013    During hospitalization    • EPIDURAL BLOCK      4 chronic back pain and leg pain last epidurals done 5-2012 she had no benefit at all from this procedure.   • HERNIA REPAIR      At the abdomen February 2007 Dr. Mendez   • INCISIONAL HERNIA REPAIR  05/16/2014    Recurrent. Incarcerated. With mesh implantation   • MASTECTOMY Right 2014   • SHOULDER SURGERY     • TOTAL ABDOMINAL HYSTERECTOMY      with oophorectomy-Done June-2012 secondary to vaginal wall prolapse.   • TOTAL KNEE ARTHROPLASTY       Family History   Problem Relation Age of Onset   • Colonic polyp Mother    • Hypertension Mother    • Migraines Mother    • Mental illness Mother    • Depression Mother    • Coronary artery disease Father    • Other Father      Cardiac Disorder   • Colon cancer Father    • Kidney disease Father    • Cancer Father    • Breast cancer Maternal Aunt    • Colon cancer Brother    • Alcohol abuse Brother    • Arthritis Sister    • Hypertension Brother    • Lung disease Brother    • Alcohol abuse Brother          Objective:  There were no vitals filed for this visit.  There were no vitals filed for this visit.  There is no height or weight on file to calculate BMI.       Ortho Exam  AP of the pelvis to evaluate her chief complaint completely within normal limits.  AP lateral of the foot and ankle show severe arthritic changes.  Previous amputation of the second metatarsal noted.  No prior x-rays are comparison.  He is alert and oriented ×3.  Right lower extremity shows no motor deficit good distal pulses.  Is marked pain to palpation over the right greater trochanter and severe pain with abduction against resistance.  No pain with passive range of motion of the hip.  The right foot shows no swelling with resolving deformity to the foot from the previous surgery.  His no motor deficit or sensory loss in the foot.  No pain with active range of motion.  Knee exam is completely benign.  Her calf is  nontender with a negative Homans.  Skin remains cool to touch.  There is no long tract signs indicating disc disease activity to her pain and discomfort.  She is walking with an outpouching gait secondary to the pain she describes it anywhere from 8 or 9 out of 10.  She has taken occasional anti-inflammatories with minimal relief but no GI side effects.    Assessment:       1. Pain    2. Trochanteric bursitis of right hip    3. Arthritis of foot, right          Plan: Reviewed the x-ray findings a physical findings with the patient.  Believe her dealing with trochanteric bursitis and there was injected with 4 cc lidocaine 1 cc Celestone after sterile prep without complications tolerating it well.  Postinjection instructions given to the patient particularly regarding 1 glucose level.  She is a follow-up visit next Monday the x-ray and evaluate the left wrist to evaluate relief as far as the right leg is concerned.  Large Joint Arthrocentesis  Date/Time: 11/6/2017 11:03 AM  Consent given by: patient  Site marked: site marked  Timeout: Immediately prior to procedure a time out was called to verify the correct patient, procedure, equipment, support staff and site/side marked as required   Supporting Documentation  Indications: pain   Procedure Details  Location: hip - R greater trochanteric bursa  Preparation: Patient was prepped and draped in the usual sterile fashion  Needle size: 22 G  Medications administered: 4 mL lidocaine 1 %; 6 mg betamethasone acetate-betamethasone sodium phosphate 6 (3-3) MG/ML  Patient tolerance: patient tolerated the procedure well with no immediate complications                  Work Status:    LOUANN query complete.    Orders:  Orders Placed This Encounter   Procedures   • Large Joint Arthrocentesis   • XR Pelvis 1 or 2 View   • XR Foot 3+ View Right       Medications:  No orders of the defined types were placed in this encounter.      Followup:  Return in about 1 week (around  11/13/2017).          Dragon transcription disclaimer     Much of this encounter note is an electronic transcription/translation of spoken language to printed text. The electronic translation of spoken language may permit erroneous, or at times, nonsensical words or phrases to be inadvertently transcribed. Although I have reviewed the note for such errors, some may still exist.

## 2017-11-07 ENCOUNTER — LAB (OUTPATIENT)
Dept: LAB | Facility: HOSPITAL | Age: 72
End: 2017-11-07

## 2017-11-07 ENCOUNTER — OFFICE VISIT (OUTPATIENT)
Dept: ONCOLOGY | Facility: CLINIC | Age: 72
End: 2017-11-07

## 2017-11-07 ENCOUNTER — TELEPHONE (OUTPATIENT)
Dept: ORTHOPEDIC SURGERY | Facility: CLINIC | Age: 72
End: 2017-11-07

## 2017-11-07 VITALS
OXYGEN SATURATION: 97 % | RESPIRATION RATE: 16 BRPM | HEART RATE: 81 BPM | SYSTOLIC BLOOD PRESSURE: 123 MMHG | WEIGHT: 212 LBS | DIASTOLIC BLOOD PRESSURE: 69 MMHG | HEIGHT: 67 IN | BODY MASS INDEX: 33.27 KG/M2 | TEMPERATURE: 98.1 F

## 2017-11-07 DIAGNOSIS — D64.9 ANEMIA, UNSPECIFIED TYPE: ICD-10-CM

## 2017-11-07 DIAGNOSIS — Z17.0 MALIGNANT NEOPLASM OF UPPER-INNER QUADRANT OF RIGHT BREAST IN FEMALE, ESTROGEN RECEPTOR POSITIVE (HCC): Primary | ICD-10-CM

## 2017-11-07 DIAGNOSIS — C50.211 MALIGNANT NEOPLASM OF UPPER-INNER QUADRANT OF RIGHT BREAST IN FEMALE, ESTROGEN RECEPTOR POSITIVE (HCC): Primary | ICD-10-CM

## 2017-11-07 PROCEDURE — 99213 OFFICE O/P EST LOW 20 MIN: CPT | Performed by: INTERNAL MEDICINE

## 2017-11-07 NOTE — PROGRESS NOTES
Subjective:     Reason for follow up:   1. 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    * Arimidex initiated in 08/2015 and discontinued in 09/2015 secondary to poor tolerance. Further endocrine therapy was not undertaken secondary to patients multiple comorbidities and limited benefit.     2. Osteopenia.      History of Present Ilness: Wendkermit Short presents for follow-up of breast cancer. Since I last saw Aron she's had a mammogram that was benign and bone density scan that revealed osteopenia. Unfortunately she fell and suffered a left distal radius/ulna fracture and had surgery on her foot ulcer. She is doing well from her breast cancer standpoint.  She is taking the Arimidex without difficulties now.  She is taking the Fosamax on a regular basis.  She is taking vitamin D as well.  She denies any fevers, chills, night sweats.      Past Medical   Past Medical History:   Diagnosis Date   • Anemia    • Benign breast disease    • Cancer 2015    Right breast   • Cellulitis of leg     May-2003 right lower extremity   • CHF (congestive heart failure)    • Chronic kidney disease    • Chronic ulcer of right foot     Non-pressure   • Depression    • Diabetic peripheral neuropathy    • Diarrhea    • Diverticulosis    • Encounter for eye exam    • Gastroesophageal reflux    • Hiatal hernia    • History of bone density study 09/20/2012   • History of colonoscopy     done 6/03 recheck in 10 years.   Done july 2013 recheck in 5 years   • History of mammography, screening 09/20/2012   • Hospital discharge follow-up    • Hyperlipidemia    • Hypertension    • Hypothyroidism    • Impingement syndrome of right shoulder    • Joint pain    • Menopausal disorder    • Methicillin resistant Staphylococcus aureus infection    • MRSA (methicillin resistant staph aureus) culture  positive    • Nausea and vomiting    • Nonischemic cardiomyopathy     Ejection fraction 10% per 2-D echo with Doppler however she did have cardiac catheterization April 2015 which showed ejection fraction 20% with global hypokinesis and severe mitral insufficiency   • Osteoarthritis    • Paroxysmal atrial fibrillation    • Postoperative infection     July of 2012 following her hysterectomy far vaginal wall prolapse. She was on antibiotics and wound dressings for 2 months.   • Syncope, psychogenic 4/19/2017   • Transient alteration of awareness 4/19/2017     Patient Active Problem List   Diagnosis   • Abdominal bloating   • Abdominal mass   • Abdominal pain   • Abnormal gait   • Abnormal mammogram   • Acute bronchitis   • Acute upper respiratory infection   • Anemia   • Infection due to fungus   • Atherosclerosis of coronary artery   • Hematochezia   • Thoracic back pain   • Malignant neoplasm of upper-inner quadrant of right female breast   • Candidiasis   • Cardiomyopathy   • Disc disorder of cervical region   • Neck pain   • Contusion of chest wall   • Tenderness of chest wall   • Chronic kidney disease, stage III (moderate)   • Chronic pain   • Congestive heart failure   • Constipation   • Generalized osteoarthritis   • Degeneration of intervertebral disc   • Depression   • Type 2 diabetes mellitus   • Controlled type 2 diabetes mellitus without complication   • Diarrhea   • Dyslipidemia   • Gastroesophageal reflux disease with esophagitis   • Fall in home   • Fall on same level from slipping, tripping or stumbling   • Generalized pain   • Gout   • Injury of head   • Hyperkalemia   • Hypertension   • Hypokalemia   • Hypomagnesemia   • Hypothyroidism   • Incisional hernia   • Mass of breast   • Mitral valve insufficiency   • Nausea   • Adult body mass index greater than 30   • Osteopenia of spine   • Arthralgia of multiple joints   • Peripheral neuropathy   • Pulmonary hypertension   • Osteomyelitis   • Tricuspid  valve insufficiency   • Cobalamin deficiency   • Vitamin D deficiency   • Weight loss   • Weight gain   • Diabetes mellitus   • Congestive heart failure with left ventricular systolic dysfunction   • Left knee pain   • Arthritis of knee   • DDD (degenerative disc disease), lumbar   • Lumbar facet arthropathy   • Pain in shoulder   • Chronic gout of multiple sites   • Shortness of breath   • Encounter for long-term (current) use of high-risk medication   • History of cancer   • Constipation due to opioid therapy   • Syncope, psychogenic   • Transient alteration of awareness   • Closed fracture of patella   • Primary osteoarthritis of left knee   • Radius/ulna fracture, left, closed, initial encounter     Social History   Social History     Social History   • Marital status:      Spouse name: N/A   • Number of children: 2   • Years of education: N/A     Occupational History   • [employer]  Retired     Social History Main Topics   • Smoking status: Former Smoker     Packs/day: 3.00     Years: 30.00     Types: Cigarettes   • Smokeless tobacco: Never Used      Comment: quit 35 years ago   • Alcohol use No   • Drug use: No   • Sexual activity: Defer      Comment: celibate     Other Topics Concern   • Not on file     Social History Narrative     2001    Volunteers here at [facility]     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 and [place] (graphic design)    [employer]      Family History  Family History   Problem Relation Age of Onset   • Colonic polyp Mother    • Hypertension Mother    • Migraines Mother    • Mental illness Mother    • Depression Mother    • Coronary artery disease Father    • Other Father      Cardiac Disorder   • Colon cancer Father    • Kidney disease Father    • Cancer Father    • Breast cancer Maternal Aunt    • Colon cancer Brother    • Alcohol abuse Brother    • Arthritis Sister    • Hypertension Brother    • Lung disease Brother    •  "Alcohol abuse Brother      Allergies  Allergies   Allergen Reactions   • Adhesive Tape    • Latex Rash       Medications: The current medication list was reviewed in the EMR.    Review of Systems  Review of Systems   Constitutional: Negative for activity change, appetite change, chills, diaphoresis, fatigue, fever and unexpected weight change.   Respiratory: Negative for cough, chest tightness and shortness of breath.    Cardiovascular: Negative for chest pain, palpitations and leg swelling.   Gastrointestinal: Negative for abdominal pain, blood in stool, constipation, diarrhea, nausea and vomiting.   Musculoskeletal: Positive for arthralgias and myalgias. Negative for joint swelling.   Neurological: Positive for numbness.   Hematological: Negative for adenopathy. Does not bruise/bleed easily.          Objective:     Vitals:    11/07/17 1550   BP: 123/69   Pulse: 81   Resp: 16   Temp: 98.1 °F (36.7 °C)   TempSrc: Oral   SpO2: 97%   Weight: 212 lb (96.2 kg)   Height: 66.5\" (168.9 cm)   PainSc: 6  Comment: wrist     Current Status 11/7/2017   ECOG score 1     GENERAL: female comfortable, no acute distress  SKIN:  Without rashes, purpura or petechiae.   HEAD:  Normocephalic.  EYES:  EOMs intact.  Conjunctivae normal.  EARS:  Hearing intact.  LYMPHATICS:  No cervical, supraclavicular, axillary adenopathy.  CHEST:  Lungs clear to percussion and auscultation. Good airflow.  CARDIAC:  Regular rate and rhythm without murmurs, rubs or gallops. Normal S1,S2.  ABDOMEN:  Soft, nontender, normal bowel sounds  EXTREMITIES:  No clubbing, cyanosis or edema.  PSYCHIATRIC:  Normal affect and mood.        Labs and Imaging  Results for orders placed or performed during the hospital encounter of 10/21/17   Basic Metabolic Panel   Result Value Ref Range    Glucose 148 (H) 65 - 99 mg/dL    BUN 25 (H) 8 - 23 mg/dL    Creatinine 1.09 (H) 0.57 - 1.00 mg/dL    Sodium 139 136 - 145 mmol/L    Potassium 4.0 3.5 - 5.2 mmol/L    Chloride 102 98 - " 107 mmol/L    CO2 26.0 22.0 - 29.0 mmol/L    Calcium 9.5 8.8 - 10.5 mg/dL    eGFR Non African Amer 49 (L) >60 mL/min/1.73    BUN/Creatinine Ratio 22.9 7.0 - 25.0    Anion Gap 11.0 mmol/L   Hemoglobin A1c   Result Value Ref Range    Hemoglobin A1C 6.50 (H) 4.80 - 5.60 %   TSH   Result Value Ref Range    TSH 2.290 0.270 - 4.200 mIU/mL   C-reactive Protein   Result Value Ref Range    C-Reactive Protein 0.58 (H) 0.00 - 0.50 mg/dL   Sedimentation Rate   Result Value Ref Range    Sed Rate 19 0 - 20 mm/hr   CBC Auto Differential   Result Value Ref Range    WBC 9.75 4.80 - 10.80 10*3/mm3    RBC 3.82 (L) 4.20 - 5.40 10*6/mm3    Hemoglobin 11.7 (L) 12.0 - 16.0 g/dL    Hematocrit 36.2 (L) 37.0 - 47.0 %    MCV 94.8 81.0 - 99.0 fL    MCH 30.6 27.0 - 31.0 pg    MCHC 32.3 31.0 - 37.0 g/dL    RDW 13.5 11.5 - 14.5 %    RDW-SD 46.7 37.0 - 54.0 fl    MPV 9.2 7.4 - 10.4 fL    Platelets 201 140 - 500 10*3/mm3   POC Glucose Fingerstick   Result Value Ref Range    Glucose 192 (H) 70 - 130 mg/dL   POC Glucose Fingerstick   Result Value Ref Range    Glucose 123 70 - 130 mg/dL   POC Glucose Fingerstick   Result Value Ref Range    Glucose 141 (H) 70 - 130 mg/dL   POC Glucose Fingerstick   Result Value Ref Range    Glucose 141 (H) 70 - 130 mg/dL   POC Glucose Fingerstick   Result Value Ref Range    Glucose 203 (H) 70 - 130 mg/dL   POC Glucose Fingerstick   Result Value Ref Range    Glucose 130 70 - 130 mg/dL   Manual Differential   Result Value Ref Range    Neutrophil % 60.0 45.0 - 70.0 %    Lymphocyte % 22.0 20.0 - 45.0 %    Monocyte % 12.0 (H) 3.0 - 8.0 %    Eosinophil % 4.0 0.0 - 4.0 %    Metamyelocyte % 2.0 (H) 0.0 - 0.0 %    Myelocyte % 0.0 0.0 - 0.0 %    Neutrophils Absolute 5.85 1.50 - 8.30 10*3/mm3    Lymphocytes Absolute 2.15 0.60 - 4.80 10*3/mm3    Monocytes Absolute 1.17 (H) 0.00 - 1.00 10*3/mm3    Eosinophils Absolute 0.39 (H) 0.10 - 0.30 10*3/mm3    nRBC 1.0 (H) 0.0 - 0.0 /100 WBC    RBC Morphology Normal Normal    Hypersegmented  Neutrophils Slight/1+ None Seen    Platelet Estimate Adequate Normal       Breast imaging: Mammogram 8/2017:  FINDINGS:  There are scattered areas of fibroglandular density. Stable diffuse  benign nodular parenchymal pattern. No significant change when compared  with prior images. No mammographic evidence of malignancy. Recommend  repeat screening mammogram in one year.      IMPRESSION:  Benign left unilateral mammogram.      BI-RADS CATEGORY 2: Benign Findings.    DEXA:  FINDINGS:  At the left hip, lowest femoral neck T score measures -1.9, indicating  osteopenia. This represents a 5.9% decrease in left total hip BMD since  08/12/2015.      Lumbar BMD is also within osteopenia range. T score from L1 through L4  measures -1.6. Lumbar BMD has decreased 2.6% since the prior exam.      IMPRESSION:  1. Osteopenia.  2. Left femoral neck T score measures -1.9.  3. Lumbar T score measures -1.6.     Assessment/Plan     Assessment:   1. Stage I, pT1cN0, invasive ductal carcinoma with dcis of the right breast, ER positive, HER-2/gonzalez positive.    * Status post right mastectomy with axillary dissection on 07/16/2015. Not a candidate for chemo/Herceptin therapy due to comorbidities   * Arimidex since 8/2015. She's tolerating well.    * Remains no evidence of disease.   2. Osteopenia.  Continue Fosamax. She will continue calcium and vitamin D.   3. Anemia of chronic disease. Monitoring.   4. Congestive heart failure managed by Dr. Bates.   5. Multiple comorbidities including CKD, pulmonary hypertension, diabetes, foot ulcer and recent left ulnar/radius fracture    Plan:     1. Annual left mammogram due August 2018  2. DEXA scan due 8/2019.  3. Patient was instructed on the importance of physical activity, healthy diet, and self chest wall exams.  Patient will continue calcium and vitamin D supplementation.    4. Monitor anemia  5. Continue Arimidex and Fosamax.    Follow-up in 6 months. I asked the patient to call for any  questions, concerns, or new symptoms.

## 2017-11-07 NOTE — TELEPHONE ENCOUNTER
Patient calling stating that her hip pain is resolving some after the injection she just wanted what was injected she was informed 4 cc lidocaine 1 cc Celestone was what was used.    Thanks.

## 2017-11-13 RX ORDER — HYDROCODONE BITARTRATE AND ACETAMINOPHEN 10; 325 MG/1; MG/1
1 TABLET ORAL 4 TIMES DAILY PRN
Qty: 42 TABLET | Refills: 0 | Status: SHIPPED | OUTPATIENT
Start: 2017-11-13 | End: 2017-11-22 | Stop reason: SDUPTHER

## 2017-11-13 NOTE — TELEPHONE ENCOUNTER
Medication Refill Request    Date of phone call: 11/10/17    Medication being requested: Hydrocodone-apap 10 sixday  Qty: 180    Date of last visit: 17    Date of last refill: 17    LOUANN up to date?: 10/22/17    Next Follow up?: 17    Any new pertinent information? (i.e, new medication allergies, new use of medications, change in patient's health or condition, non-compliance or inconsistency with prescribing agreement?): patient states she will be a week short of her medication until she can get in next

## 2017-11-15 ENCOUNTER — TELEPHONE (OUTPATIENT)
Dept: PAIN MEDICINE | Facility: CLINIC | Age: 72
End: 2017-11-15

## 2017-11-15 ENCOUNTER — OFFICE VISIT (OUTPATIENT)
Dept: ORTHOPEDIC SURGERY | Facility: CLINIC | Age: 72
End: 2017-11-15

## 2017-11-15 DIAGNOSIS — S52.532A CLOSED COLLES' FRACTURE OF LEFT RADIUS, INITIAL ENCOUNTER: ICD-10-CM

## 2017-11-15 DIAGNOSIS — R52 PAIN: Primary | ICD-10-CM

## 2017-11-15 PROCEDURE — 73110 X-RAY EXAM OF WRIST: CPT | Performed by: ORTHOPAEDIC SURGERY

## 2017-11-15 PROCEDURE — 29075 APPL CST ELBW FNGR SHORT ARM: CPT | Performed by: ORTHOPAEDIC SURGERY

## 2017-11-15 PROCEDURE — 99024 POSTOP FOLLOW-UP VISIT: CPT | Performed by: ORTHOPAEDIC SURGERY

## 2017-11-15 RX ORDER — HYDROCODONE BITARTRATE AND ACETAMINOPHEN 10; 325 MG/1; MG/1
1 TABLET ORAL 4 TIMES DAILY PRN
Qty: 42 TABLET | Refills: 0 | Status: CANCELLED | OUTPATIENT
Start: 2017-11-15

## 2017-11-15 NOTE — PROGRESS NOTES
Subjective: Minimally displaced fracture left distal radius     Patient ID: Jung Short is a 72 y.o. female.    Chief Complaint:    History of Present Illness 72-year-old female is 3 weeks out from her fracture being treated nonoperatively due to her from medical condition.  She's been in a long-arm cast doing fairly well with mild to moderate pain controlled oral medication       Social History     Occupational History   • [employer]  Retired     Social History Main Topics   • Smoking status: Former Smoker     Packs/day: 3.00     Years: 30.00     Types: Cigarettes   • Smokeless tobacco: Never Used      Comment: quit 35 years ago   • Alcohol use No   • Drug use: No   • Sexual activity: Defer      Comment: celibate      Review of Systems   Constitutional: Negative for chills, diaphoresis, fever and unexpected weight change.   HENT: Negative for hearing loss, nosebleeds, sore throat and tinnitus.    Eyes: Negative for pain and visual disturbance.   Respiratory: Negative for cough, shortness of breath and wheezing.    Cardiovascular: Negative for chest pain and palpitations.   Gastrointestinal: Negative for abdominal pain, diarrhea, nausea and vomiting.   Endocrine: Negative for cold intolerance, heat intolerance and polydipsia.   Genitourinary: Negative for difficulty urinating, dysuria and hematuria.   Musculoskeletal: Negative for arthralgias, joint swelling and myalgias.   Skin: Negative for rash and wound.   Allergic/Immunologic: Negative for environmental allergies.   Neurological: Negative for dizziness, syncope and numbness.   Hematological: Does not bruise/bleed easily.   Psychiatric/Behavioral: Negative for dysphoric mood and sleep disturbance. The patient is not nervous/anxious.          Past Medical History:   Diagnosis Date   • Anemia    • Benign breast disease    • Cancer 2015    Right breast   • Cellulitis of leg     May-2003 right lower extremity   • CHF (congestive heart failure)    • Chronic  kidney disease    • Chronic ulcer of right foot     Non-pressure   • Depression    • Diabetic peripheral neuropathy    • Diarrhea    • Diverticulosis    • Encounter for eye exam    • Gastroesophageal reflux    • Hiatal hernia    • History of bone density study 09/20/2012   • History of colonoscopy     done 6/03 recheck in 10 years.   Done july 2013 recheck in 5 years   • History of mammography, screening 09/20/2012   • Hospital discharge follow-up    • Hyperlipidemia    • Hypertension    • Hypothyroidism    • Impingement syndrome of right shoulder    • Joint pain    • Menopausal disorder    • Methicillin resistant Staphylococcus aureus infection    • MRSA (methicillin resistant staph aureus) culture positive    • Nausea and vomiting    • Nonischemic cardiomyopathy     Ejection fraction 10% per 2-D echo with Doppler however she did have cardiac catheterization April 2015 which showed ejection fraction 20% with global hypokinesis and severe mitral insufficiency   • Osteoarthritis    • Paroxysmal atrial fibrillation    • Postoperative infection     July of 2012 following her hysterectomy far vaginal wall prolapse. She was on antibiotics and wound dressings for 2 months.   • Syncope, psychogenic 4/19/2017   • Transient alteration of awareness 4/19/2017     Past Surgical History:   Procedure Laterality Date   • AMPUTATION FOOT / TOE Right 11/2013    Rt foot amputation MTP first toe   • BLADDER SURGERY     • BREAST BIOPSY     • BREAST SURGERY  06/29/2015    Percutaneous ultrasound-guided placement of metal localized clip 1st lesion   • CATARACT EXTRACTION Bilateral 2017   • DEBRIDEMENT  FOOT Right 01/2013    During hospitalization    • EPIDURAL BLOCK      4 chronic back pain and leg pain last epidurals done 5-2012 she had no benefit at all from this procedure.   • HERNIA REPAIR      At the abdomen February 2007 Dr. Mendez   • INCISIONAL HERNIA REPAIR  05/16/2014    Recurrent. Incarcerated. With mesh implantation   •  MASTECTOMY Right 2014   • SHOULDER SURGERY     • TOTAL ABDOMINAL HYSTERECTOMY      with oophorectomy-Done June-2012 secondary to vaginal wall prolapse.   • TOTAL KNEE ARTHROPLASTY       Family History   Problem Relation Age of Onset   • Colonic polyp Mother    • Hypertension Mother    • Migraines Mother    • Mental illness Mother    • Depression Mother    • Coronary artery disease Father    • Other Father      Cardiac Disorder   • Colon cancer Father    • Kidney disease Father    • Cancer Father    • Breast cancer Maternal Aunt    • Colon cancer Brother    • Alcohol abuse Brother    • Arthritis Sister    • Hypertension Brother    • Lung disease Brother    • Alcohol abuse Brother          Objective:  There were no vitals filed for this visit.  There were no vitals filed for this visit.  There is no height or weight on file to calculate BMI.       Ortho Exam  AP lateral view show increasing callus at the fracture site compared to previous x-rays.  She is neurologically tagged good distal pulses.    Assessment:       1. Pain    2. Closed Colles' fracture of left radius, initial encounter          Plan:      I've change or short arm cast.  Return in 3 to the repeat x-ray of that wrist in the cast.      Work Status:    LOUANN query complete.    Orders:  Orders Placed This Encounter   Procedures   • XR Wrist 3+ View Left       Medications:  No orders of the defined types were placed in this encounter.      Followup:  Return in about 3 weeks (around 12/6/2017).          Dragon transcription disclaimer     Much of this encounter note is an electronic transcription/translation of spoken language to printed text. The electronic translation of spoken language may permit erroneous, or at times, nonsensical words or phrases to be inadvertently transcribed. Although I have reviewed the note for such errors, some may still exist.

## 2017-11-15 NOTE — TELEPHONE ENCOUNTER
I spoke with Ms. Short today and she wanted to let know that she broke her left wrist and was in [facility] from 10/21/17-10/23/17. She states that she had surgery on her foot on 10/27/17. She states that she was given oxy/apap  mg for post-op pain from her surgeon. She has since run out of her pain med from the surgeon and will  her regular pain med refill from our practice.

## 2017-11-21 ENCOUNTER — TELEPHONE (OUTPATIENT)
Dept: ONCOLOGY | Facility: CLINIC | Age: 72
End: 2017-11-21

## 2017-11-21 DIAGNOSIS — C50.911 MALIGNANT NEOPLASM OF RIGHT FEMALE BREAST, UNSPECIFIED ESTROGEN RECEPTOR STATUS, UNSPECIFIED SITE OF BREAST (HCC): Primary | ICD-10-CM

## 2017-11-21 NOTE — TELEPHONE ENCOUNTER
Order completed for rt. Breast mastectomy bras.  Will send dr. hendrickson a in basket message to sign so we can then fax back to [facility].

## 2017-11-22 ENCOUNTER — OFFICE VISIT (OUTPATIENT)
Dept: PAIN MEDICINE | Facility: CLINIC | Age: 72
End: 2017-11-22

## 2017-11-22 ENCOUNTER — TELEPHONE (OUTPATIENT)
Dept: PAIN MEDICINE | Facility: CLINIC | Age: 72
End: 2017-11-22

## 2017-11-22 VITALS
HEIGHT: 67 IN | HEART RATE: 73 BPM | WEIGHT: 211 LBS | BODY MASS INDEX: 33.12 KG/M2 | TEMPERATURE: 97.7 F | SYSTOLIC BLOOD PRESSURE: 101 MMHG | RESPIRATION RATE: 16 BRPM | OXYGEN SATURATION: 98 % | DIASTOLIC BLOOD PRESSURE: 63 MMHG

## 2017-11-22 DIAGNOSIS — M51.36 DDD (DEGENERATIVE DISC DISEASE), LUMBAR: ICD-10-CM

## 2017-11-22 DIAGNOSIS — Z79.899 ENCOUNTER FOR LONG-TERM (CURRENT) USE OF HIGH-RISK MEDICATION: ICD-10-CM

## 2017-11-22 DIAGNOSIS — G89.29 OTHER CHRONIC PAIN: Primary | ICD-10-CM

## 2017-11-22 DIAGNOSIS — M47.816 LUMBAR FACET ARTHROPATHY: ICD-10-CM

## 2017-11-22 DIAGNOSIS — M15.9 GENERALIZED OSTEOARTHRITIS: ICD-10-CM

## 2017-11-22 DIAGNOSIS — M25.50 ARTHRALGIA OF MULTIPLE JOINTS: ICD-10-CM

## 2017-11-22 DIAGNOSIS — M54.16 LUMBAR RADICULOPATHY: ICD-10-CM

## 2017-11-22 PROCEDURE — 99214 OFFICE O/P EST MOD 30 MIN: CPT | Performed by: NURSE PRACTITIONER

## 2017-11-22 RX ORDER — HYDROCODONE BITARTRATE AND ACETAMINOPHEN 10; 325 MG/1; MG/1
1 TABLET ORAL 4 TIMES DAILY PRN
Qty: 180 TABLET | Refills: 0 | Status: SHIPPED | OUTPATIENT
Start: 2017-11-22 | End: 2017-12-18 | Stop reason: SDUPTHER

## 2017-11-22 NOTE — PROGRESS NOTES
CHIEF COMPLAINT  F/U back, shoulder, knee pain.     Subjective   Jung Short is a 72 y.o. female  who presents to the office for follow-up.She has a history of chronic joint pain due to OA and gout, as well as chronic low back pain.    Had left foot surgery with partial amputation of part of toe box on 10-27-17. Then she broke her left wrist on 10-21-17.    Complains of pain in her low back, shoulder, knee and joints. Today her pain is 7/10VAS. Describes the pain as continuous and unchanged. She was given a temporary prescription for pain medication for broken arm and also for foot surgery which was approved by office. Continues with Hydrocodone 10/325 6/day. Denies any side effects from this. The regimen helps decrease her pain moderately. ADL's by self.    Is complaining of increasing pain in her right low back that shoots down the back of her entire right leg and into foot.  Joint Pain   This is a chronic problem. The current episode started more than 1 year ago (today pain is 7/10VAS- joints). The problem occurs constantly. The problem has been unchanged. Associated symptoms include arthralgias, fatigue, headaches (intermittent), joint swelling and weakness (bilateral hands). Pertinent negatives include no chest pain, chills, congestion, coughing, fever, numbness or vomiting. Nothing aggravates the symptoms. She has tried oral narcotics, position changes and rest for the symptoms. The treatment provided moderate relief.   Back Pain   This is a chronic problem. The current episode started more than 1 year ago. The problem occurs constantly. The problem has been gradually worsening since onset. The pain is present in the lumbar spine. The pain is at a severity of 7/10. The pain is moderate. Associated symptoms include headaches (intermittent) and weakness (bilateral hands). Pertinent negatives include no chest pain, fever or numbness. Risk factors include lack of exercise and obesity. She has tried  "analgesics for the symptoms. The treatment provided moderate relief.      PEG Assessment   What number best describes your pain on average in the past week?7  What number best describes how, during the past week, pain has interfered with your enjoyment of life?7  What number best describes how, during the past week, pain has interfered with your general activity?  7    The following portions of the patient's history were reviewed and updated as appropriate: allergies, current medications, past family history, past medical history, past social history, past surgical history and problem list.    Review of Systems   Constitutional: Positive for activity change (decreased) and fatigue. Negative for chills and fever.   HENT: Negative for congestion.    Eyes: Positive for visual disturbance (blurry vision. pt just had cataract removal).   Respiratory: Negative for cough, shortness of breath and wheezing.    Cardiovascular: Positive for leg swelling. Negative for chest pain and palpitations.   Gastrointestinal: Negative for constipation, diarrhea and vomiting.   Genitourinary: Negative for difficulty urinating.   Musculoskeletal: Positive for arthralgias, back pain and joint swelling.   Neurological: Positive for weakness (bilateral hands) and headaches (intermittent). Negative for dizziness, light-headedness and numbness.   Psychiatric/Behavioral: Positive for sleep disturbance. Negative for agitation, confusion, hallucinations and suicidal ideas. The patient is nervous/anxious.        Vitals:    11/22/17 0755   BP: 101/63   Pulse: 73   Resp: 16   Temp: 97.7 °F (36.5 °C)   SpO2: 98%   Weight: 211 lb (95.7 kg)   Height: 66.5\" (168.9 cm)   PainSc:   7   PainLoc: Generalized     Objective   Physical Exam   Constitutional: She is oriented to person, place, and time. Vital signs are normal. She appears well-developed and well-nourished. She is cooperative.   HENT:   Head: Normocephalic and atraumatic.   Nose: Nose normal. "   Eyes: Conjunctivae and lids are normal.   Cardiovascular: Normal rate, regular rhythm and normal heart sounds.    Pulmonary/Chest: Effort normal and breath sounds normal. No respiratory distress.   Abdominal: Normal appearance.   Musculoskeletal:        Right shoulder: She exhibits tenderness.        Left shoulder: She exhibits decreased range of motion and tenderness.        Right knee: Tenderness found.        Left knee: Tenderness found.        Lumbar back: She exhibits tenderness.   Cast of left wrist    Walking shoe of left foot    + SLR on right   Neurological: She is alert and oriented to person, place, and time. Gait (ambulates with aid cane) abnormal.   Reflex Scores:       Patellar reflexes are 1+ on the right side and 1+ on the left side.  Skin: Skin is warm, dry and intact.   Psychiatric: She has a normal mood and affect. Her speech is normal and behavior is normal. Judgment and thought content normal. Cognition and memory are normal.   Nursing note and vitals reviewed.      Assessment/Plan   Jung was seen today for shoulder pain and back pain.    Diagnoses and all orders for this visit:    Other chronic pain    Lumbar facet arthropathy    DDD (degenerative disc disease), lumbar  -     Case Request    Generalized osteoarthritis    Arthralgia of multiple joints    Encounter for long-term (current) use of high-risk medication    Lumbar radiculopathy  -     Case Request    Other orders  -     HYDROcodone-acetaminophen (NORCO)  MG per tablet; Take 1 tablet by mouth 4 (Four) Times a Day As Needed for Moderate Pain .      --- The urine drug screen confirmation from 2-14-17 has been reviewed and the result is appropriate based on patient history and LOUANN report  --- Refill Hydrocodone. Patient appears stable with current regimen. No adverse effects. Regarding continuation of opioids, there is no evidence of aberrant behavior or any red flags.  The patient continues with appropriate response to  opioid therapy. ADL's remain intact by self.   -- TF KAYLAH right L5. No blood thinners. Reviewed the procedure at length with the patient.  Included in the review was expectations, complications, risk and benefits.The procedure was described in detail and the risks, benefits and alternatives were discussed with the patient (including but not limited to: bleeding, infection, nerve damage, worsening of pain, inability to perform injection, paralysis, seizures, and death) who agreed to proceed.  Discussed the potential for sedation if warranted/wanted.  Questions were answered and in a way the patient could understand.  Patient verbalized understanding and wishes to proceed.  This intervention will be ordered.  --- Follow-up 1 month or sooner if needed.       LOUANN REPORT    As part of the patient's treatment plan, I am prescribing controlled substances. The patient has been made aware of appropriate use of such medications, including potential risk of somnolence, limited ability to drive and/or work safely, and the potential for dependence or overdose. It has also bee made clear that these medications are for use by this patient only, without concomitant use of alcohol or other substances unless prescribed.     Patient has completed prescribing agreement detailing terms of continued prescribing of controlled substances, including monitoring LOUANN reports, urine drug screening, and pill counts if necessary. The patient is aware that inappropriate use will results in cessation of prescribing such medications.    LOUANN report has been reviewed and scanned into the patient's chart.    Date of last LOUANN : 11-20-17    History and physical exam exhibit continued safe and appropriate use of controlled substances.      EMR Dragon/Transcription disclaimer:   Much of this encounter note is an electronic transcription/translation of spoken language to printed text. The electronic translation of spoken language may permit  erroneous, or at times, nonsensical words or phrases to be inadvertently transcribed; Although I have reviewed the note for such errors, some may still exist.

## 2017-11-22 NOTE — TELEPHONE ENCOUNTER
Annemarie at [facility] called to verify [place] and we told her it is supposed to be q4hrs # 180 for 30 days

## 2017-11-27 ENCOUNTER — HOSPITAL ENCOUNTER (OUTPATIENT)
Dept: GENERAL RADIOLOGY | Facility: HOSPITAL | Age: 72
Discharge: HOME | End: 2017-11-27
Attending: INTERNAL MEDICINE | Admitting: INTERNAL MEDICINE

## 2017-11-27 ENCOUNTER — OFFICE VISIT (OUTPATIENT)
Dept: INTERNAL MEDICINE | Facility: CLINIC | Age: 72
End: 2017-11-27

## 2017-11-27 VITALS
HEIGHT: 67 IN | DIASTOLIC BLOOD PRESSURE: 86 MMHG | HEART RATE: 79 BPM | OXYGEN SATURATION: 98 % | SYSTOLIC BLOOD PRESSURE: 148 MMHG

## 2017-11-27 DIAGNOSIS — M25.512 ACUTE PAIN OF LEFT SHOULDER: ICD-10-CM
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
## 2017-11-27 DIAGNOSIS — R07.81 RIB PAIN ON LEFT SIDE: ICD-10-CM
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
## 2017-11-27 PROCEDURE — 71020 HC CHEST PA AND LATERAL: CPT

## 2017-11-27 PROCEDURE — 99214 OFFICE O/P EST MOD 30 MIN: CPT | Performed by: INTERNAL MEDICINE

## 2017-11-27 NOTE — PROGRESS NOTES
Subjective     Jung Short is a 72 y.o. female, who presents with a chief complaint of   Chief Complaint   Patient presents with   • Fall     11/23/17, L side, L arm        HPI   The pt is here bc of difficulty breathing and left rib pain after a fall.  She tripped on her orthopedic left shoe and landed on a table and the tv.  Pain worse after taking a deep breath.  She hurt her shoulder as well and has a bruise.  At the end of October she fell and broke her left wrist.  She is in a left arm cast.  She is in a orthopedic shoe on the left bc of a recent surgery to have surgery to remove a bone that was infected in her foot.  She also fractured her patella in September.  She is worried she might get pneumonia.  She doesn't want pain medication at this time.  She went to the [employer] football game this weekend.      She has chronic shoulder tears and doesn't think her shoulders are much worse than normal.    dexa 8/14/17 osteopenia.  On vit d.  No calcium bc of hx kidney failure.      The following portions of the patient's history were reviewed and updated as appropriate: allergies, current medications, past family history, past medical history, past social history, past surgical history and problem list.    Allergies: Adhesive tape and Latex    Review of Systems   Constitutional: Negative.    HENT: Negative.    Eyes: Negative.    Respiratory: Positive for shortness of breath.    Cardiovascular: Negative.    Gastrointestinal: Negative.    Endocrine: Negative.    Genitourinary: Negative.    Musculoskeletal:        Left rib and shoulder pain   Skin: Negative.    Allergic/Immunologic: Negative.    Neurological: Negative.    Hematological: Negative.    Psychiatric/Behavioral: Negative.    All other systems reviewed and are negative.      Objective     Wt Readings from Last 3 Encounters:   11/22/17 211 lb (95.7 kg)   11/07/17 212 lb (96.2 kg)   10/30/17 217 lb (98.4 kg)     Temp Readings from Last 3 Encounters:    11/22/17 97.7 °F (36.5 °C)   11/07/17 98.1 °F (36.7 °C) (Oral)   10/23/17 97.8 °F (36.6 °C) (Oral)     BP Readings from Last 3 Encounters:   11/27/17 148/86   11/22/17 101/63   11/07/17 123/69     Pulse Readings from Last 3 Encounters:   11/27/17 79   11/22/17 73   11/07/17 81     There is no height or weight on file to calculate BMI.  SpO2 Readings from Last 3 Encounters:   11/27/17 98%   11/22/17 98%   11/07/17 97%       Physical Exam   Constitutional: She is oriented to person, place, and time. She appears well-developed and well-nourished. No distress.   HENT:   Head: Normocephalic and atraumatic.   Right Ear: External ear normal.   Left Ear: External ear normal.   Nose: Nose normal.   Mouth/Throat: Oropharynx is clear and moist.   Eyes: Conjunctivae and EOM are normal. Pupils are equal, round, and reactive to light.   Neck: Normal range of motion. Neck supple.   Cardiovascular: Normal rate, regular rhythm, normal heart sounds and intact distal pulses.    Pulmonary/Chest: Effort normal and breath sounds normal. She has no wheezes.   TTP of left lower ribs, difficulty taking deep breath because of rib pain   Abdominal:   Bruise on abdomen   Musculoskeletal:   Left foot in orthopedic protective shoe  Left wrist in cast  Bruise on left shoulder, decreased rom of both shoulders   Neurological: She is alert and oriented to person, place, and time.   Skin: Skin is warm and dry.   Psychiatric: She has a normal mood and affect. Her behavior is normal. Judgment and thought content normal.   Nursing note and vitals reviewed.      Results for orders placed or performed during the hospital encounter of 10/21/17   Basic Metabolic Panel   Result Value Ref Range    Glucose 148 (H) 65 - 99 mg/dL    BUN 25 (H) 8 - 23 mg/dL    Creatinine 1.09 (H) 0.57 - 1.00 mg/dL    Sodium 139 136 - 145 mmol/L    Potassium 4.0 3.5 - 5.2 mmol/L    Chloride 102 98 - 107 mmol/L    CO2 26.0 22.0 - 29.0 mmol/L    Calcium 9.5 8.8 - 10.5 mg/dL     eGFR Non African Amer 49 (L) >60 mL/min/1.73    BUN/Creatinine Ratio 22.9 7.0 - 25.0    Anion Gap 11.0 mmol/L   Hemoglobin A1c   Result Value Ref Range    Hemoglobin A1C 6.50 (H) 4.80 - 5.60 %   TSH   Result Value Ref Range    TSH 2.290 0.270 - 4.200 mIU/mL   C-reactive Protein   Result Value Ref Range    C-Reactive Protein 0.58 (H) 0.00 - 0.50 mg/dL   Sedimentation Rate   Result Value Ref Range    Sed Rate 19 0 - 20 mm/hr   CBC Auto Differential   Result Value Ref Range    WBC 9.75 4.80 - 10.80 10*3/mm3    RBC 3.82 (L) 4.20 - 5.40 10*6/mm3    Hemoglobin 11.7 (L) 12.0 - 16.0 g/dL    Hematocrit 36.2 (L) 37.0 - 47.0 %    MCV 94.8 81.0 - 99.0 fL    MCH 30.6 27.0 - 31.0 pg    MCHC 32.3 31.0 - 37.0 g/dL    RDW 13.5 11.5 - 14.5 %    RDW-SD 46.7 37.0 - 54.0 fl    MPV 9.2 7.4 - 10.4 fL    Platelets 201 140 - 500 10*3/mm3   POC Glucose Fingerstick   Result Value Ref Range    Glucose 192 (H) 70 - 130 mg/dL   POC Glucose Fingerstick   Result Value Ref Range    Glucose 123 70 - 130 mg/dL   POC Glucose Fingerstick   Result Value Ref Range    Glucose 141 (H) 70 - 130 mg/dL   POC Glucose Fingerstick   Result Value Ref Range    Glucose 141 (H) 70 - 130 mg/dL   POC Glucose Fingerstick   Result Value Ref Range    Glucose 203 (H) 70 - 130 mg/dL   POC Glucose Fingerstick   Result Value Ref Range    Glucose 130 70 - 130 mg/dL   Manual Differential   Result Value Ref Range    Neutrophil % 60.0 45.0 - 70.0 %    Lymphocyte % 22.0 20.0 - 45.0 %    Monocyte % 12.0 (H) 3.0 - 8.0 %    Eosinophil % 4.0 0.0 - 4.0 %    Metamyelocyte % 2.0 (H) 0.0 - 0.0 %    Myelocyte % 0.0 0.0 - 0.0 %    Neutrophils Absolute 5.85 1.50 - 8.30 10*3/mm3    Lymphocytes Absolute 2.15 0.60 - 4.80 10*3/mm3    Monocytes Absolute 1.17 (H) 0.00 - 1.00 10*3/mm3    Eosinophils Absolute 0.39 (H) 0.10 - 0.30 10*3/mm3    nRBC 1.0 (H) 0.0 - 0.0 /100 WBC    RBC Morphology Normal Normal    Hypersegmented Neutrophils Slight/1+ None Seen    Platelet Estimate Adequate Normal        Assessment/Plan   Jung was seen today for fall.    Diagnoses and all orders for this visit:    Fall, initial encounter  -     XR Chest PA & Lateral; Future    Rib pain on left side  -     XR Chest PA & Lateral; Future    Acute pain of left shoulder - pt doesn't want x-rays of shoulder at this time.    tylenol ok.  Limited nsaids.  She has norco at home. Further f/u based on x-ray results.      Outpatient Medications Prior to Visit   Medication Sig Dispense Refill   • alendronate (FOSAMAX) 35 MG tablet take 1 tablet by mouth every week (Patient taking differently: take 1 tablet by mouth every week,) 4 tablet 11   • anastrozole (ARIMIDEX) 1 MG tablet take 1 tablet by mouth once daily 30 tablet 2   • aspirin 81 MG chewable tablet Chew Daily.     • bumetanide (BUMEX) 2 MG tablet Take 2 mg by mouth Daily.     • carvedilol (COREG) 12.5 MG tablet take 1 tablet by mouth twice a day 180 tablet 1   • Coenzyme Q10 (CO Q 10) 100 MG capsule Take 1 tablet by mouth daily.     • DULoxetine (CYMBALTA) 60 MG capsule take 1 capsule by mouth once daily 30 capsule 6   • esomeprazole (nexIUM) 20 MG capsule Take 20 mg by mouth Every Morning Before Breakfast.     • glimepiride (AMARYL) 1 MG tablet take 1/2 tablet by mouth every morning and every evening 90 tablet 3   • Glucose Blood (BLOOD GLUCOSE TEST) strip Blood Glucose Test In Vitro Strip; Patient Sig: Blood Glucose Test In Vitro Strip She checks blood sugars a.m. and p.m. she has the Accu-Chek christina; 60; 6; 27-Jun-2013; Active     • HYDROcodone-acetaminophen (NORCO)  MG per tablet Take 1 tablet by mouth 4 (Four) Times a Day As Needed for Moderate Pain . 180 tablet 0   • levothyroxine (SYNTHROID, LEVOTHROID) 25 MCG tablet take 1 tablet by mouth once daily 30 tablet 6   • lisinopril (PRINIVIL,ZESTRIL) 5 MG tablet 5 mg Daily.  0   • magnesium oxide (MAG-OX) 400 MG tablet Take 400 mg by mouth Daily.     • Multiple Vitamin (MULTI VITAMIN DAILY PO) Take 1 tablet by mouth Daily.      • pravastatin (PRAVACHOL) 10 MG tablet Take 1 tablet by mouth Daily. (Patient taking differently: Take 10 mg by mouth Every Night.) 90 tablet 2   • spironolactone (ALDACTONE) 25 MG tablet take 1 tablet by mouth every morning 30 tablet 10   • vitamin B-12 (CYANOCOBALAMIN) 100 MCG tablet Take 1 tablet by mouth daily.     • vitamin D (ERGOCALCIFEROL) 59711 UNITS capsule capsule takes once monthly  0   • GuaiFENesin 200 MG/5ML liquid Take 10 mL by mouth 4 (Four) Times a Day. 118 mL 0     No facility-administered medications prior to visit.      No orders of the defined types were placed in this encounter.    Medications Discontinued During This Encounter   Medication Reason   • GuaiFENesin 200 MG/5ML liquid Therapy completed         Return in about 1 week (around 12/4/2017) for Recheck.

## 2017-11-28 ENCOUNTER — TELEPHONE (OUTPATIENT)
Dept: INTERNAL MEDICINE | Facility: CLINIC | Age: 72
End: 2017-11-28

## 2017-11-28 NOTE — TELEPHONE ENCOUNTER
Patient has been advised and voiced understanding.     ----- Message from Maryjane Milton MD sent at 11/28/2017  8:33 AM EST -----  Call pt about xr - no rib fracture seen

## 2017-12-06 ENCOUNTER — HOSPITAL ENCOUNTER (OUTPATIENT)
Dept: GENERAL RADIOLOGY | Facility: HOSPITAL | Age: 72
Discharge: HOME | End: 2017-12-06
Attending: ORTHOPAEDIC SURGERY | Admitting: ORTHOPAEDIC SURGERY

## 2017-12-06 ENCOUNTER — TRANSCRIBE ORDERS (OUTPATIENT)
Dept: ADMINISTRATIVE | Facility: HOSPITAL | Age: 72
End: 2017-12-06

## 2017-12-06 DIAGNOSIS — M54.5 LOW BACK PAIN, UNSPECIFIED BACK PAIN LATERALITY, UNSPECIFIED CHRONICITY, WITH SCIATICA PRESENCE UNSPECIFIED: Primary | ICD-10-CM

## 2017-12-06 DIAGNOSIS — M54.5 LOW BACK PAIN, UNSPECIFIED BACK PAIN LATERALITY, UNSPECIFIED CHRONICITY, WITH SCIATICA PRESENCE UNSPECIFIED: ICD-10-CM

## 2017-12-06 PROCEDURE — 72100 X-RAY EXAM L-S SPINE 2/3 VWS: CPT

## 2017-12-07 RX ORDER — ANASTROZOLE 1 MG/1
TABLET ORAL
Qty: 30 TABLET | Refills: 2 | Status: SHIPPED | OUTPATIENT
Start: 2017-12-07 | End: 2018-03-10 | Stop reason: SDUPTHER

## 2017-12-14 ENCOUNTER — OFFICE VISIT (OUTPATIENT)
Dept: ORTHOPEDIC SURGERY | Facility: CLINIC | Age: 72
End: 2017-12-14

## 2017-12-14 DIAGNOSIS — S52.532A CLOSED COLLES' FRACTURE OF LEFT RADIUS, INITIAL ENCOUNTER: ICD-10-CM

## 2017-12-14 DIAGNOSIS — R52 PAIN: Primary | ICD-10-CM

## 2017-12-14 PROCEDURE — 73110 X-RAY EXAM OF WRIST: CPT | Performed by: ORTHOPAEDIC SURGERY

## 2017-12-14 PROCEDURE — 99024 POSTOP FOLLOW-UP VISIT: CPT | Performed by: ORTHOPAEDIC SURGERY

## 2017-12-14 NOTE — PROGRESS NOTES
Subjective: Left distal radial fracture     Patient ID: Jung Short is a 72 y.o. female.    Chief Complaint:    History of Present Illness patient is now almost 8 weeks out from her injury and is been casted and is doing fairly well with just mild pain in the short arm cast       Social History     Occupational History   • [employer]  Retired     Social History Main Topics   • Smoking status: Former Smoker     Packs/day: 3.00     Years: 30.00     Types: Cigarettes   • Smokeless tobacco: Never Used      Comment: quit 35 years ago   • Alcohol use No   • Drug use: No   • Sexual activity: Defer      Comment: celibate      Review of Systems   Constitutional: Negative for chills, diaphoresis, fever and unexpected weight change.   HENT: Negative for hearing loss, nosebleeds, sore throat and tinnitus.    Eyes: Negative for pain and visual disturbance.   Respiratory: Negative for cough, shortness of breath and wheezing.    Cardiovascular: Negative for chest pain and palpitations.   Gastrointestinal: Negative for abdominal pain, diarrhea, nausea and vomiting.   Endocrine: Negative for cold intolerance, heat intolerance and polydipsia.   Genitourinary: Negative for difficulty urinating, dysuria and hematuria.   Musculoskeletal: Positive for arthralgias. Negative for joint swelling and myalgias.   Skin: Negative for rash and wound.   Allergic/Immunologic: Negative for environmental allergies.   Neurological: Negative for dizziness, syncope and numbness.   Hematological: Does not bruise/bleed easily.   Psychiatric/Behavioral: Negative for dysphoric mood and sleep disturbance. The patient is not nervous/anxious.    All other systems reviewed and are negative.        Past Medical History:   Diagnosis Date   • Anemia    • Benign breast disease    • Cancer 2015    Right breast   • Cellulitis of leg     May-2003 right lower extremity   • CHF (congestive heart failure)    • Chronic kidney disease    • Chronic ulcer of right  foot     Non-pressure   • Depression    • Diabetic peripheral neuropathy    • Diarrhea    • Diverticulosis    • Encounter for eye exam    • Gastroesophageal reflux    • Hiatal hernia    • History of bone density study 09/20/2012   • History of colonoscopy     done 6/03 recheck in 10 years.   Done july 2013 recheck in 5 years   • History of mammography, screening 09/20/2012   • Hospital discharge follow-up    • Hyperlipidemia    • Hypertension    • Hypothyroidism    • Impingement syndrome of right shoulder    • Joint pain    • Menopausal disorder    • Methicillin resistant Staphylococcus aureus infection    • MRSA (methicillin resistant staph aureus) culture positive    • Nausea and vomiting    • Nonischemic cardiomyopathy     Ejection fraction 10% per 2-D echo with Doppler however she did have cardiac catheterization April 2015 which showed ejection fraction 20% with global hypokinesis and severe mitral insufficiency   • Osteoarthritis    • Paroxysmal atrial fibrillation    • Postoperative infection     July of 2012 following her hysterectomy far vaginal wall prolapse. She was on antibiotics and wound dressings for 2 months.   • Syncope, psychogenic 4/19/2017   • Transient alteration of awareness 4/19/2017     Past Surgical History:   Procedure Laterality Date   • AMPUTATION FOOT / TOE Right 11/2013    Rt foot amputation MTP first toe   • BLADDER SURGERY     • BREAST BIOPSY     • BREAST SURGERY  06/29/2015    Percutaneous ultrasound-guided placement of metal localized clip 1st lesion   • CATARACT EXTRACTION Bilateral 2017   • DEBRIDEMENT  FOOT Right 01/2013    During hospitalization    • EPIDURAL BLOCK      4 chronic back pain and leg pain last epidurals done 5-2012 she had no benefit at all from this procedure.   • HERNIA REPAIR      At the abdomen February 2007 Dr. Mendez   • INCISIONAL HERNIA REPAIR  05/16/2014    Recurrent. Incarcerated. With mesh implantation   • MASTECTOMY Right 2014   • SHOULDER SURGERY     •  TOTAL ABDOMINAL HYSTERECTOMY      with oophorectomy-Done June-2012 secondary to vaginal wall prolapse.   • TOTAL KNEE ARTHROPLASTY       Family History   Problem Relation Age of Onset   • Colonic polyp Mother    • Hypertension Mother    • Migraines Mother    • Mental illness Mother    • Depression Mother    • Coronary artery disease Father    • Other Father      Cardiac Disorder   • Colon cancer Father    • Kidney disease Father    • Cancer Father    • Breast cancer Maternal Aunt    • Colon cancer Brother    • Alcohol abuse Brother    • Arthritis Sister    • Hypertension Brother    • Lung disease Brother    • Alcohol abuse Brother          Objective:  There were no vitals filed for this visit.  There were no vitals filed for this visit.  There is no height or weight on file to calculate BMI.       Ortho Exam  AP lateral of the wrist out of the cast shows abundant callus at the fracture site compared to previous x-rays.  Alignment is satisfactory.  She has no motor deficit no sensory loss.  There is pain with passive range of motion as expected.  Skin is cool to touch    Assessment:       1. Pain    2. Closed Colles' fracture of left radius, initial encounter          Plan:      we'll place in a wrist immobilizer begin range of motion exercises.  With regard to her left knee which is symptomatic she was to proceed with surgery patient returns in 3 weeks for follow-up on the rest of the repeat x-ray also get an x-ray of the left knee preparation for scheduling surgery      Work Status:    LOUANN query complete.    Orders:  Orders Placed This Encounter   Procedures   • XR Wrist 3+ View Left       Medications:  No orders of the defined types were placed in this encounter.      Followup:  Return in about 3 weeks (around 1/4/2018).          Dragon transcription disclaimer     Much of this encounter note is an electronic transcription/translation of spoken language to printed text. The electronic translation of spoken  language may permit erroneous, or at times, nonsensical words or phrases to be inadvertently transcribed. Although I have reviewed the note for such errors, some may still exist.

## 2017-12-18 ENCOUNTER — OFFICE VISIT (OUTPATIENT)
Dept: PAIN MEDICINE | Facility: CLINIC | Age: 72
End: 2017-12-18

## 2017-12-18 VITALS
SYSTOLIC BLOOD PRESSURE: 119 MMHG | DIASTOLIC BLOOD PRESSURE: 64 MMHG | TEMPERATURE: 97.4 F | RESPIRATION RATE: 18 BRPM | HEART RATE: 77 BPM | OXYGEN SATURATION: 97 % | HEIGHT: 67 IN

## 2017-12-18 DIAGNOSIS — R52 GENERALIZED PAIN: ICD-10-CM

## 2017-12-18 DIAGNOSIS — M15.9 GENERALIZED OSTEOARTHRITIS: ICD-10-CM

## 2017-12-18 DIAGNOSIS — G89.29 OTHER CHRONIC PAIN: Primary | ICD-10-CM

## 2017-12-18 DIAGNOSIS — Z79.899 ENCOUNTER FOR LONG-TERM (CURRENT) USE OF HIGH-RISK MEDICATION: ICD-10-CM

## 2017-12-18 DIAGNOSIS — M47.816 LUMBAR FACET ARTHROPATHY: ICD-10-CM

## 2017-12-18 DIAGNOSIS — M17.12 PRIMARY OSTEOARTHRITIS OF LEFT KNEE: ICD-10-CM

## 2017-12-18 DIAGNOSIS — M25.562 LEFT KNEE PAIN, UNSPECIFIED CHRONICITY: ICD-10-CM

## 2017-12-18 DIAGNOSIS — M51.36 DDD (DEGENERATIVE DISC DISEASE), LUMBAR: ICD-10-CM

## 2017-12-18 PROCEDURE — 99213 OFFICE O/P EST LOW 20 MIN: CPT | Performed by: NURSE PRACTITIONER

## 2017-12-18 RX ORDER — HYDROCODONE BITARTRATE AND ACETAMINOPHEN 10; 325 MG/1; MG/1
1 TABLET ORAL 4 TIMES DAILY PRN
Qty: 180 TABLET | Refills: 0 | Status: SHIPPED | OUTPATIENT
Start: 2017-12-18 | End: 2018-01-18 | Stop reason: SDUPTHER

## 2017-12-18 NOTE — PROGRESS NOTES
CHIEF COMPLAINT  Follow-up for back and joint pain. Ms. Short states that her back and joint pain has increased since her last appt.    Subjective   Jung Short is a 72 y.o. female  who presents to the office for follow-up.She has a history of chronic pain due to knee pain/OA and CLBP.  She reports her pain as being worse since last office visit. Has had another fall since last office visit, but had evaluation and no new breaks.     Complains of pain in her low back and joints(left knee). Today her pain is 5/10VAS. Describes the pain as continuous and worse. Continues with Hydrocodone 10/325 6/day. Denies any side effects from this. Constipation is significantly improved with stool softener. The regimen helps decrease her pain moderately. ADL's by self.    At her last office visit, she was ordered to have TF KAYLAH right L5. She cancelled this because she saw Dr. Draper and he sent her to 4 weeks of PT. No plans for surgery. Wants to wait on epidural until completed PT.     Still plans to have left TKR. Sees Dr. Braden. Unsure of surgery date. Aiming for march 2018.      Joint Pain   This is a chronic problem. The current episode started more than 1 year ago (today pain is 5/10VAS- joints). The problem occurs constantly. The problem has been unchanged. Associated symptoms include arthralgias, headaches (intermittent), joint swelling (left knee) and weakness (bilateral legs). Pertinent negatives include no chest pain, chills, congestion, coughing, fatigue, fever, numbness or vomiting. Nothing aggravates the symptoms. She has tried oral narcotics, position changes and rest for the symptoms. The treatment provided moderate relief.   Back Pain   This is a chronic problem. The current episode started more than 1 year ago. The problem occurs constantly. The problem has been gradually worsening since onset. The pain is present in the lumbar spine. The pain is moderate. Associated symptoms include headaches  "(intermittent) and weakness (bilateral legs). Pertinent negatives include no chest pain, fever or numbness. Risk factors include lack of exercise and obesity. She has tried analgesics for the symptoms. The treatment provided moderate relief.      PEG Assessment   What number best describes your pain on average in the past week?6  What number best describes how, during the past week, pain has interfered with your enjoyment of life?8  What number best describes how, during the past week, pain has interfered with your general activity?  8    The following portions of the patient's history were reviewed and updated as appropriate: allergies, current medications, past family history, past medical history, past social history, past surgical history and problem list.    Review of Systems   Constitutional: Negative for chills, fatigue and fever.   HENT: Negative for congestion.    Eyes: Negative for visual disturbance.   Respiratory: Negative for cough, shortness of breath and wheezing.    Cardiovascular: Positive for leg swelling (left leg). Negative for chest pain and palpitations.   Gastrointestinal: Negative for constipation, diarrhea and vomiting.   Genitourinary: Negative for difficulty urinating.   Musculoskeletal: Positive for arthralgias, back pain and joint swelling (left knee).   Neurological: Positive for weakness (bilateral legs) and headaches (intermittent). Negative for numbness.   Psychiatric/Behavioral: Positive for sleep disturbance. Negative for suicidal ideas. The patient is not nervous/anxious.        Vitals:    12/18/17 0841   BP: 119/64   Pulse: 77   Resp: 18   Temp: 97.4 °F (36.3 °C)   SpO2: 97%   Height: 168.9 cm (66.5\")   PainSc:   5   PainLoc: Knee     Objective   Physical Exam   Constitutional: She is oriented to person, place, and time. Vital signs are normal. She appears well-developed and well-nourished. She is cooperative.   HENT:   Head: Normocephalic and atraumatic.   Nose: Nose normal. "   Eyes: Conjunctivae and lids are normal.   Cardiovascular: Normal rate.    Pulmonary/Chest: Effort normal. No respiratory distress.   Abdominal: Normal appearance.   Musculoskeletal:        Right shoulder: She exhibits tenderness.        Left shoulder: She exhibits decreased range of motion and tenderness.        Right knee: Tenderness found.        Left knee: Tenderness found.        Lumbar back: She exhibits tenderness.   Brace of left wrist    Walking shoe of left foot    + SLR on right   Neurological: She is alert and oriented to person, place, and time. Gait (ambulates with aid cane) abnormal.   Reflex Scores:       Patellar reflexes are 1+ on the right side and 1+ on the left side.  Skin: Skin is warm, dry and intact.   Psychiatric: She has a normal mood and affect. Her speech is normal and behavior is normal. Judgment and thought content normal. Cognition and memory are normal.   Nursing note and vitals reviewed.      Assessment/Plan   Jung was seen today for back pain and joint pain.    Diagnoses and all orders for this visit:    Other chronic pain    Generalized pain    Lumbar facet arthropathy    Primary osteoarthritis of left knee    Left knee pain, unspecified chronicity    Generalized osteoarthritis    DDD (degenerative disc disease), lumbar    Encounter for long-term (current) use of high-risk medication      --- The urine drug screen confirmation from 2-14-17 has been reviewed and the result is appropriate based on patient history and LOUANN report  --- Refill Hydrocodone. Patient appears stable with current regimen. No adverse effects. Regarding continuation of opioids, there is no evidence of aberrant behavior or any red flags.  The patient continues with appropriate response to opioid therapy. ADL's remain intact by self.   --- Hold on epidural at this time per patient request. Wants to try PT first.  --- Follow-up 1 month or sooner if needed.       LOUANN REPORT    As part of the patient's  treatment plan, I am prescribing controlled substances. The patient has been made aware of appropriate use of such medications, including potential risk of somnolence, limited ability to drive and/or work safely, and the potential for dependence or overdose. It has also bee made clear that these medications are for use by this patient only, without concomitant use of alcohol or other substances unless prescribed.     Patient has completed prescribing agreement detailing terms of continued prescribing of controlled substances, including monitoring LOUANN reports, urine drug screening, and pill counts if necessary. The patient is aware that inappropriate use will results in cessation of prescribing such medications.    LOUANN report has been reviewed and scanned into the patient's chart.    Date of last LOUANN : 12-15-17    History and physical exam exhibit continued safe and appropriate use of controlled substances.      EMR Dragon/Transcription disclaimer:   Much of this encounter note is an electronic transcription/translation of spoken language to printed text. The electronic translation of spoken language may permit erroneous, or at times, nonsensical words or phrases to be inadvertently transcribed; Although I have reviewed the note for such errors, some may still exist.

## 2017-12-20 ENCOUNTER — TELEPHONE (OUTPATIENT)
Dept: ORTHOPEDIC SURGERY | Facility: CLINIC | Age: 72
End: 2017-12-20

## 2017-12-20 NOTE — TELEPHONE ENCOUNTER
Patient calling after clinic hours with pain and severe swelling in the left knee, no calf pain or swelling. Patient was advised to use the ace wrap, ice and elevate the knee (she states that she doesn't have time for that).     She has a Nondisplaced transverse patella fracture.    Do you have any other recommendations?

## 2017-12-27 RX ORDER — GLIMEPIRIDE 1 MG/1
1 TABLET ORAL
Qty: 180 TABLET | Refills: 1 | Status: SHIPPED | OUTPATIENT
Start: 2017-12-27 | End: 2018-07-09 | Stop reason: SDUPTHER

## 2018-01-04 ENCOUNTER — OFFICE VISIT (OUTPATIENT)
Dept: ORTHOPEDIC SURGERY | Facility: CLINIC | Age: 73
End: 2018-01-04

## 2018-01-04 VITALS — BODY MASS INDEX: 35.36 KG/M2 | HEIGHT: 66 IN | WEIGHT: 220 LBS

## 2018-01-04 DIAGNOSIS — R52 PAIN: ICD-10-CM

## 2018-01-04 DIAGNOSIS — M25.562 LEFT KNEE PAIN, UNSPECIFIED CHRONICITY: Primary | ICD-10-CM

## 2018-01-04 DIAGNOSIS — M25.532 LEFT WRIST PAIN: ICD-10-CM

## 2018-01-04 DIAGNOSIS — E78.2 MIXED HYPERLIPIDEMIA: ICD-10-CM

## 2018-01-04 DIAGNOSIS — M17.12 PRIMARY OSTEOARTHRITIS OF LEFT KNEE: ICD-10-CM

## 2018-01-04 PROCEDURE — 73110 X-RAY EXAM OF WRIST: CPT | Performed by: ORTHOPAEDIC SURGERY

## 2018-01-04 PROCEDURE — 99214 OFFICE O/P EST MOD 30 MIN: CPT | Performed by: ORTHOPAEDIC SURGERY

## 2018-01-04 PROCEDURE — 73562 X-RAY EXAM OF KNEE 3: CPT | Performed by: ORTHOPAEDIC SURGERY

## 2018-01-04 RX ORDER — PRAVASTATIN SODIUM 10 MG
TABLET ORAL
Qty: 90 TABLET | Refills: 2 | Status: SHIPPED | OUTPATIENT
Start: 2018-01-04 | End: 2018-04-03

## 2018-01-15 ENCOUNTER — APPOINTMENT (OUTPATIENT)
Dept: PHYSICAL THERAPY | Facility: HOSPITAL | Age: 73
End: 2018-01-15

## 2018-01-17 DIAGNOSIS — M25.50 ARTHRALGIA OF MULTIPLE JOINTS: ICD-10-CM

## 2018-01-17 RX ORDER — DULOXETIN HYDROCHLORIDE 60 MG/1
CAPSULE, DELAYED RELEASE ORAL
Qty: 30 CAPSULE | Refills: 6 | Status: SHIPPED | OUTPATIENT
Start: 2018-01-17 | End: 2018-04-03

## 2018-01-18 ENCOUNTER — HOSPITAL ENCOUNTER (OUTPATIENT)
Dept: PHYSICAL THERAPY | Facility: HOSPITAL | Age: 73
Setting detail: THERAPIES SERIES
Discharge: HOME | End: 2018-01-18

## 2018-01-18 ENCOUNTER — OFFICE VISIT (OUTPATIENT)
Dept: PAIN MEDICINE | Facility: CLINIC | Age: 73
End: 2018-01-18

## 2018-01-18 VITALS
TEMPERATURE: 97.8 F | SYSTOLIC BLOOD PRESSURE: 132 MMHG | HEART RATE: 82 BPM | RESPIRATION RATE: 18 BRPM | DIASTOLIC BLOOD PRESSURE: 73 MMHG | OXYGEN SATURATION: 98 % | HEIGHT: 66 IN

## 2018-01-18 DIAGNOSIS — M51.36 DDD (DEGENERATIVE DISC DISEASE), LUMBAR: ICD-10-CM

## 2018-01-18 DIAGNOSIS — M25.50 ARTHRALGIA OF MULTIPLE JOINTS: ICD-10-CM

## 2018-01-18 DIAGNOSIS — Z79.899 ENCOUNTER FOR LONG-TERM (CURRENT) USE OF MEDICATIONS: ICD-10-CM

## 2018-01-18 DIAGNOSIS — M47.816 LUMBAR FACET ARTHROPATHY: ICD-10-CM

## 2018-01-18 DIAGNOSIS — M17.10 ARTHRITIS OF KNEE: ICD-10-CM

## 2018-01-18 DIAGNOSIS — M15.9 GENERALIZED OSTEOARTHRITIS: ICD-10-CM

## 2018-01-18 DIAGNOSIS — M51.36 DEGENERATIVE DISC DISEASE, LUMBAR: Primary | ICD-10-CM

## 2018-01-18 DIAGNOSIS — R52 GENERALIZED PAIN: Primary | ICD-10-CM

## 2018-01-18 LAB
POC AMPHETAMINES: NEGATIVE
POC BARBITURATES: NEGATIVE
POC BENZODIAZEPHINES: NEGATIVE
POC COCAINE: NEGATIVE
POC METHADONE: NEGATIVE
POC METHAMPHETAMINE SCREEN URINE: NEGATIVE
POC OPIATES: POSITIVE
POC OXYCODONE: NEGATIVE
POC PHENCYCLIDINE: NEGATIVE
POC PROPOXYPHENE: NEGATIVE
POC THC: NEGATIVE
POC TRICYCLIC ANTIDEPRESSANTS: NEGATIVE

## 2018-01-18 PROCEDURE — 80305 DRUG TEST PRSMV DIR OPT OBS: CPT | Performed by: NURSE PRACTITIONER

## 2018-01-18 PROCEDURE — 97161 PT EVAL LOW COMPLEX 20 MIN: CPT | Performed by: PHYSICAL THERAPIST

## 2018-01-18 PROCEDURE — G8982 BODY POS GOAL STATUS: HCPCS | Performed by: PHYSICAL THERAPIST

## 2018-01-18 PROCEDURE — G8981 BODY POS CURRENT STATUS: HCPCS | Performed by: PHYSICAL THERAPIST

## 2018-01-18 PROCEDURE — 99213 OFFICE O/P EST LOW 20 MIN: CPT | Performed by: NURSE PRACTITIONER

## 2018-01-18 PROCEDURE — 97035 APP MDLTY 1+ULTRASOUND EA 15: CPT | Performed by: PHYSICAL THERAPIST

## 2018-01-18 RX ORDER — HYDROCODONE BITARTRATE AND ACETAMINOPHEN 10; 325 MG/1; MG/1
1 TABLET ORAL EVERY 4 HOURS PRN
Qty: 180 TABLET | Refills: 0 | Status: SHIPPED | OUTPATIENT
Start: 2018-01-18 | End: 2018-03-19 | Stop reason: SDUPTHER

## 2018-01-18 NOTE — PROGRESS NOTES
CHIEF COMPLAINT  Follow-up for back and joint pain. Ms. Short states that she is having sciatic nerve pain on the right side that radiates down her right leg and into her right foot.    Subjective   Weyesica Short is a 72 y.o. female  who presents to the office for follow-up.She has a history of chronic back and joint pain. Her back pain is worse since last office visit.    Complains of pain in her low back and right leg, as well as joints. Today her pain is 8/10VAS(right leg). Continues with Hydrocodone 10/325 6/day. Denies any side effects from this. Constipation is significantly improved with stool softener. The regimen helps decrease her pain moderately. ADL's by self.    Has previously been  ordered to have TF KAYLAH right L5. She cancelled this because she saw Dr. Draper and he sent her to 4 weeks of PT. No plans for surgery. Wants to wait on epidural until completed PT. Did not start until recently.     Still plans to have left TKR. Sees Dr. Braden. Unsure of surgery date. Aiming for march 2018.      Joint Pain   This is a chronic problem. The current episode started more than 1 year ago (today pain is 5/10VAS- joints). The problem occurs constantly. The problem has been unchanged. Associated symptoms include arthralgias, fatigue, headaches (intermittent) and joint swelling (left knee). Pertinent negatives include no chest pain, chills, congestion, coughing, fever, numbness, vomiting or weakness. Nothing aggravates the symptoms. She has tried oral narcotics, position changes and rest for the symptoms. The treatment provided moderate relief.   Back Pain   This is a chronic problem. The current episode started more than 1 year ago. The problem occurs constantly. The problem has been gradually worsening since onset. The pain is present in the lumbar spine. The pain radiates to the right knee, right foot and right thigh. The pain is at a severity of 8/10. The pain is moderate. Associated symptoms include  "headaches (intermittent). Pertinent negatives include no chest pain, fever, numbness or weakness. Risk factors include lack of exercise and obesity. She has tried analgesics for the symptoms. The treatment provided moderate relief.      PEG Assessment   What number best describes your pain on average in the past week?7  What number best describes how, during the past week, pain has interfered with your enjoyment of life?7  What number best describes how, during the past week, pain has interfered with your general activity?  7    The following portions of the patient's history were reviewed and updated as appropriate: allergies, current medications, past family history, past medical history, past social history, past surgical history and problem list.    Review of Systems   Constitutional: Positive for fatigue. Negative for chills and fever.   HENT: Negative for congestion.    Eyes: Negative for visual disturbance.   Respiratory: Negative for cough, shortness of breath and wheezing.    Cardiovascular: Positive for leg swelling. Negative for chest pain and palpitations.   Gastrointestinal: Negative for constipation, diarrhea and vomiting.   Genitourinary: Negative for difficulty urinating.   Musculoskeletal: Positive for arthralgias, back pain and joint swelling (left knee).   Neurological: Positive for headaches (intermittent). Negative for weakness and numbness.   Psychiatric/Behavioral: Positive for sleep disturbance. Negative for suicidal ideas. The patient is not nervous/anxious.        Vitals:    01/18/18 1251   BP: 132/73   Pulse: 82   Resp: 18   Temp: 97.8 °F (36.6 °C)   SpO2: 98%   Height: 167.6 cm (66\")   PainSc:   8   PainLoc: Leg     Objective   Physical Exam   Constitutional: She is oriented to person, place, and time. Vital signs are normal. She appears well-developed and well-nourished. She is cooperative.   HENT:   Head: Normocephalic and atraumatic.   Nose: Nose normal.   Eyes: Conjunctivae and lids " are normal.   Cardiovascular: Normal rate.    Pulmonary/Chest: Effort normal.   Abdominal: Normal appearance.   Musculoskeletal:        Right shoulder: She exhibits tenderness.        Left shoulder: She exhibits decreased range of motion and tenderness.        Right knee: Tenderness found.        Left knee: Tenderness found.        Lumbar back: She exhibits tenderness.   + SLR on right   Neurological: She is alert and oriented to person, place, and time. Gait (ambulates with aid cane) abnormal.   Reflex Scores:       Patellar reflexes are 1+ on the right side and 1+ on the left side.  Skin: Skin is warm, dry and intact.   Psychiatric: She has a normal mood and affect. Her speech is normal and behavior is normal. Judgment and thought content normal. Cognition and memory are normal.   Nursing note and vitals reviewed.      Assessment/Plan   Jung was seen today for back pain and joint pain.    Diagnoses and all orders for this visit:    Generalized pain  -     Urine Drug Screen Confirmation - Urine, Urine, Clean Catch  -     POC Urine Drug Screen, Triage    Lumbar facet arthropathy  -     Urine Drug Screen Confirmation - Urine, Urine, Clean Catch  -     POC Urine Drug Screen, Triage    Generalized osteoarthritis  -     Urine Drug Screen Confirmation - Urine, Urine, Clean Catch  -     POC Urine Drug Screen, Triage    DDD (degenerative disc disease), lumbar  -     Urine Drug Screen Confirmation - Urine, Urine, Clean Catch  -     POC Urine Drug Screen, Triage    Arthritis of knee  -     Urine Drug Screen Confirmation - Urine, Urine, Clean Catch  -     POC Urine Drug Screen, Triage    Arthralgia of multiple joints  -     Urine Drug Screen Confirmation - Urine, Urine, Clean Catch  -     POC Urine Drug Screen, Triage    Encounter for long-term (current) use of medications  -     Urine Drug Screen Confirmation - Urine, Urine, Clean Catch  -     POC Urine Drug Screen, Triage    Other orders  -     HYDROcodone-acetaminophen  (NORCO)  MG per tablet; Take 1 tablet by mouth Every 4 (Four) Hours As Needed for Moderate Pain .      --- Routine UDS in office today as part of monitoring requirements for controlled substances.  The specimen was viewed and the immunoassay result reviewed and is +OPI(APPROPRIATE).  This specimen will be sent to [facility] for confirmation.     --- Refill Hydrocodone. Patient appears stable with current regimen. No adverse effects. Regarding continuation of opioids, there is no evidence of aberrant behavior or any red flags.  The patient continues with appropriate response to opioid therapy. ADL's remain intact by self.   --- Consider TFESI right L5 in future PRN. Will await for PT per patient request.  --- Follow-up 1 month or sooner if needed.       LOUANN REPORT    As part of the patient's treatment plan, I am prescribing controlled substances. The patient has been made aware of appropriate use of such medications, including potential risk of somnolence, limited ability to drive and/or work safely, and the potential for dependence or overdose. It has also bee made clear that these medications are for use by this patient only, without concomitant use of alcohol or other substances unless prescribed.     Patient has completed prescribing agreement detailing terms of continued prescribing of controlled substances, including monitoring LOUANN reports, urine drug screening, and pill counts if necessary. The patient is aware that inappropriate use will results in cessation of prescribing such medications.    LOUANN report has been reviewed and scanned into the patient's chart.    Date of last LOUANN : 1-17-18    History and physical exam exhibit continued safe and appropriate use of controlled substances.      EMR Dragon/Transcription disclaimer:   Much of this encounter note is an electronic transcription/translation of spoken language to printed text. The electronic translation of spoken language  may permit erroneous, or at times, nonsensical words or phrases to be inadvertently transcribed; Although I have reviewed the note for such errors, some may still exist.

## 2018-01-18 NOTE — THERAPY EVALUATION
Outpatient Physical Therapy Ortho Initial Evaluation  NOHEMY Diaz     Patient Name: Jung Short  : 1945  MRN: [id number]  Today's Date: 2018      Visit Date: 2018    Patient Active Problem List   Diagnosis   • Abdominal bloating   • Abdominal mass   • Abdominal pain   • Abnormal gait   • Abnormal mammogram   • Acute bronchitis   • Acute upper respiratory infection   • Anemia   • Infection due to fungus   • Atherosclerosis of coronary artery   • Hematochezia   • Thoracic back pain   • Malignant neoplasm of upper-inner quadrant of right breast in female, estrogen receptor positive   • Candidiasis   • Cardiomyopathy   • Disc disorder of cervical region   • Neck pain   • Contusion of chest wall   • Tenderness of chest wall   • Chronic kidney disease, stage III (moderate)   • Chronic pain   • Congestive heart failure   • Constipation   • Generalized osteoarthritis   • Degeneration of intervertebral disc   • Depression   • Type 2 diabetes mellitus   • Controlled type 2 diabetes mellitus without complication   • Diarrhea   • Dyslipidemia   • Gastroesophageal reflux disease with esophagitis   • Fall in home   • Fall on same level from slipping, tripping or stumbling   • Generalized pain   • Gout   • Injury of head   • Hyperkalemia   • Hypertension   • Hypokalemia   • Hypomagnesemia   • Hypothyroidism   • Incisional hernia   • Mass of breast   • Mitral valve insufficiency   • Nausea   • Adult body mass index greater than 30   • Osteopenia of spine   • Arthralgia of multiple joints   • Peripheral neuropathy   • Pulmonary hypertension   • Osteomyelitis   • Tricuspid valve insufficiency   • Cobalamin deficiency   • Vitamin D deficiency   • Weight loss   • Weight gain   • Diabetes mellitus   • Congestive heart failure with left ventricular systolic dysfunction   • Left knee pain   • Arthritis of knee   • DDD (degenerative disc disease), lumbar   • Lumbar facet arthropathy   • Pain in shoulder   •  Chronic gout of multiple sites   • Shortness of breath   • Encounter for long-term (current) use of high-risk medication   • History of cancer   • Constipation due to opioid therapy   • Syncope, psychogenic   • Transient alteration of awareness   • Closed fracture of patella   • Primary osteoarthritis of left knee   • Radius/ulna fracture, left, closed, initial encounter   • Rib pain on left side        Past Medical History:   Diagnosis Date   • Anemia    • Benign breast disease    • Cancer 2015    Right breast   • Cellulitis of leg     May-2003 right lower extremity   • CHF (congestive heart failure)    • Chronic kidney disease    • Chronic ulcer of right foot     Non-pressure   • Depression    • Diabetic peripheral neuropathy    • Diarrhea    • Diverticulosis    • Encounter for eye exam    • Gastroesophageal reflux    • Hiatal hernia    • History of bone density study 09/20/2012   • History of colonoscopy     done 6/03 recheck in 10 years.   Done july 2013 recheck in 5 years   • History of mammography, screening 09/20/2012   • Hospital discharge follow-up    • Hyperlipidemia    • Hypertension    • Hypothyroidism    • Impingement syndrome of right shoulder    • Joint pain    • Menopausal disorder    • Methicillin resistant Staphylococcus aureus infection    • MRSA (methicillin resistant staph aureus) culture positive    • Nausea and vomiting    • Nonischemic cardiomyopathy     Ejection fraction 10% per 2-D echo with Doppler however she did have cardiac catheterization April 2015 which showed ejection fraction 20% with global hypokinesis and severe mitral insufficiency   • Osteoarthritis    • Paroxysmal atrial fibrillation    • Postoperative infection     July of 2012 following her hysterectomy far vaginal wall prolapse. She was on antibiotics and wound dressings for 2 months.   • Syncope, psychogenic 4/19/2017   • Transient alteration of awareness 4/19/2017        Past Surgical History:   Procedure Laterality Date    • AMPUTATION FOOT / TOE Right 11/2013    Rt foot amputation MTP first toe   • BLADDER SURGERY     • BREAST BIOPSY     • BREAST SURGERY  06/29/2015    Percutaneous ultrasound-guided placement of metal localized clip 1st lesion   • CATARACT EXTRACTION Bilateral 2017   • DEBRIDEMENT  FOOT Right 01/2013    During hospitalization    • EPIDURAL BLOCK      4 chronic back pain and leg pain last epidurals done 5-2012 she had no benefit at all from this procedure.   • FOOT SURGERY Left    • HERNIA REPAIR      At the abdomen February 2007 Dr. Mendez   • INCISIONAL HERNIA REPAIR  05/16/2014    Recurrent. Incarcerated. With mesh implantation   • MASTECTOMY Right 2014   • SHOULDER SURGERY     • TOTAL ABDOMINAL HYSTERECTOMY      with oophorectomy-Done June-2012 secondary to vaginal wall prolapse.   • TOTAL KNEE ARTHROPLASTY         Visit Dx:     ICD-10-CM ICD-9-CM   1. Degenerative disc disease, lumbar M51.36 722.52             Patient History       01/18/18 0915          History    Chief Complaint Difficulty Walking;Difficulty with daily activities;Pain  -      Type of Pain Back pain  -      Date Current Problem(s) Began 10/22/17  -      Brief Description of Current Complaint Pt fell on 10/22/2017 injuring her head, back and wrist. The wrist was fractured and she subsequently was casted and is now braced. her back did not hurt her too much at the time of the injury, but the pain has increased over the last several months and now interferes with her daily function, walking, and sleeping. She had an MRI done in December. She was diagnosed with lumbar disc degeneration and referred for therapy.  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 MRI  -      Results of Clinical Tests lumbar disc degeneration  -GC      Pain     Pain Location Back;Leg   right leg to ankle  -GC      Pain at Present 3  -GC      Pain at Best 1  -GC      Pain at Worst 8  -GC      Pain Frequency Constant/continuous  -GC      Pain Description Shooting;Radiating;Aching;Discomfort;Cramping;Tightness  -GC      What Performance Factors Make the Current Problem(s) WORSE? Pt has pain when she is on her feet for long periods, walking, and when supine  -GC      What Performance Factors Make the Current Problem(s) BETTER? Pt feels best first thing in the morning and possibly when she is in right sidelying  -GC      Is your sleep disturbed? Yes  -GC      Difficulties with ADL's? Pt has difficulty being on her feet for ADLs, performing community based walking, and with LE dressing tasks  -      Fall Risk Assessment    Any falls in the past year: Yes  -GC      Number of falls reported in the last 12 months 1  -GC      Factors that contributed to the fall: Tripped  -GC      Daily Activities    Primary Language English  -GC      Are you able to read Yes  -GC      Are you able to write Yes  -GC      How does patient learn best? Reading  -GC      Teaching needs identified Home Exercise Program;Management of Condition  -GC      Patient is concerned about/has problems with Bed Mobility;Climbing Stairs;Difficulty with self care (i.e. bathing, dressing, toileting:;Flexibility;Performing home management (household chores, shopping, care of dependents);Performing job responsibilities/community activities (work, school,;Standing;Walking;Transfers (getting out of a chair, bed)  -GC      Does patient have problems with the following? None  -GC      Barriers to learning None  -GC      Functional Status mobility issues preventing performance of daily activities;dressing  -GC      Pt Participated in POC and Goals Yes  -GC      Safety    Are you being hurt, hit, or frightened by anyone at home or  in your life? No  -GC      Are you being neglected by a caregiver No  -GC        User Key  (r) = Recorded By, (t) = Taken By, (c) = Cosigned By    Initials Name Provider Type    GC Ramirez Gutierrez PT Physical Therapist                PT Ortho       01/18/18 0915    Posture/Observations    Scoliosis Normal  -GC    Lumbar lordosis Decreased  -GC    Iliac crests Bilateral:;Normal  -GC    DTR- Lower Quarter Clearing    Patellar tendon (L2-4) Bilateral:;1- Minimal response  -GC    Achilles tendon (S1-2) Bilateral:;1- Minimal response  -GC    Sensory Screen for Light Touch- Lower Quarter Clearing    L2 (anterior mid thigh) Bilateral:;Intact  -GC    L3 (distal anterior thigh) Bilateral:;Intact  -GC    L4 (medial lower leg/foot) Bilateral:;Intact  -GC    L5 (lateral lower leg/great toe) Bilateral:;Intact  -GC    S1 (bottom of foot) Bilateral:;Intact  -GC    Myotomal Screen- Lower Quarter Clearing    Hip flexion (L2) Bilateral:;4+ (Good +)  -GC    Knee extension (L3) Bilateral:;4+ (Good +)  -GC    Ankle DF (L4) Bilateral:;5 (Normal)  -GC    Great toe extension (L5) Bilateral:;5 (Normal)  -GC    Ankle PF (S1) Bilateral:;5 (Normal)  -GC    Knee flexion (S2) Bilateral:;4+ (Good +)  -GC    Lumbar/SI Special Tests    SLR (Neural Tension) Right:;Positive  -GC    TONY (hip vs. SI Dysfunction) Right:;Positive  -GC    Trunk    Flexion AROM Deficit 50% range with pain  -GC    Extension AROM Deficit 25% range with pain  -GC    Lt Lat Flexion AROM Deficit 25% range   -GC    Right Lateral Flexion AROM Deficit 25% range with pain  -GC    Lt Rotation AROM Deficit 50% range  -GC    Right Rotation AROM Deficit 25% range with pain  -GC    Trunk    Trunk Flexion Gross Movement (3/5) fair  -GC    Left Trunk Rotation Gross Movement (3+/5) fair plus  -GC    Right Trunk Rotation Gross Movement (3+/5) fair plus  -GC    Trunk Extension Gross Movement (3+/5) fair plus  -GC    Lower Extremity Flexibility    Hamstrings Right:;Moderately  limited;Left:;Mildly limited  -GC    Hip Flexors Bilateral:;Mildly limited  -GC    Hip External Rotators Right:;Mildly limited;Left:;WNL  -GC    Hip Internal Rotators Right:;Moderately limited;Left:;WNL  -GC    Transfers    Transfer, Comment Pt has pain and difficulty going sit to/from supine and rolling supine to/from prone.  -    Gait Assessment/Treatment    Gait, Comment Pt ambualtes with straight cane and has antalgic gait right LE.  -      User Key  (r) = Recorded By, (t) = Taken By, (c) = Cosigned By    Initials Name Provider Type    ALEXANDER Gutierrez PT Physical Therapist                      Therapy Education  Given: HEP, Symptoms/condition management, Pain management, Posture/body mechanics  Program: New  How Provided: Verbal, Demonstration  Provided to: Patient  Level of Understanding: Teach back education performed, Verbalized, Demonstrated           PT OP Goals       01/18/18 0915       PT Short Term Goals    STG Date to Achieve 02/01/18  -     STG 1 Decrease LBP and right LE pain to 3-4/10 with walking and being on her feet.  -     STG 2 Increase trunk ROM to at least 50% range all planes with testing.  -     STG 3 Increase core strength to at least 4/5 all planes with testing.  -     STG 4 Pt will be indpendent with her HEP issued by this therapist.  -     Long Term Goals    LTG Date to Achieve 02/15/18  -     LTG 1 Decrease LBP and right LE pain to 0-1/10 with walking and bein latasha her feet.  -     LTG 2 Increase trunk ROM to at least 75% range all planes with testing.  -     LTG 3 Increase core strength to at least 4+/5 all planes with testing.  -     LTG 4 Pt will be indpendent with all ADLs wihtout increased pain.  -     Time Calculation    PT Goal Re-Cert Due Date 02/15/18  -       User Key  (r) = Recorded By, (t) = Taken By, (c) = Cosigned By    Initials Name Provider Type    ALEXANDER Gutierrez PT Physical Therapist                PT Assessment/Plan       01/18/18 0915        PT Assessment    Functional Limitations Impaired gait;Limitation in home management;Limitations in community activities;Limitations in functional capacity and performance;Performance in leisure activities;Performance in self-care ADL  -GC     Impairments Gait;Impaired flexibility;Range of motion;Pain;Muscle strength  -GC     Assessment Comments Pt presents approximately 5 months post fall injury with LBP and right LE pain that she rates up to 8/10 with activites sucha s walking and being on her feet. She has decreased trunk ROM, decreased core strength, decreased LE flexibility, decreased ambulatory status, and decreased function secondary to the above.  -GC     Please refer to paper survey for additional self-reported information Yes  -GC     Rehab Potential Fair  -GC     Patient/caregiver participated in establishment of treatment plan and goals Yes  -GC     Patient would benefit from skilled therapy intervention Yes  -GC     PT Plan    PT Frequency 2x/week  -GC     Predicted Duration of Therapy Intervention (days/wks) 4 weeks  -GC     Planned CPT's? PT EVAL LOW COMPLEXITY: 31148;PT THER PROC EA 15 MIN: 47612;PT HOT OR COLD PACK TREAT MCARE;PT ULTRASOUND EA 15 MIN: 47101  -GC     PT Plan Comments Pt is to continue her HEP 2x daily.  -GC       User Key  (r) = Recorded By, (t) = Taken By, (c) = Cosigned By    Initials Name Provider Type    ALEXANDER Gutierrez PT Physical Therapist                Modalities       01/18/18 0915          Moist Heat    MH Applied Yes  -GC      Location LS spine with pt prone  -GC      Rx Minutes 12 mins  -GC      MH Prior to Rx Yes  -GC      Ultrasound 45950    Location right buttock  -GC      Rx Minutes 8 min  -GC      Duty Cycle 100  -GC      Frequency 1.0 MHz  -GC      Intensity - Wts/cm 1.5  -GC        User Key  (r) = Recorded By, (t) = Taken By, (c) = Cosigned By    Initials Name Provider Type    ALEXANDER Gutierrez, PT Physical Therapist              Exercises       01/18/18 0915           Exercise 1    Exercise Name 1 Hamstring stretch  -GC      Cueing 1 Verbal;Tactile  -GC      Reps 1 10  -GC      Time (Seconds) 1 10 secs  -GC      Exercise 2    Exercise Name 2 Piriformis stretch  -GC      Cueing 2 Verbal;Tactile  -GC      Reps 2 10  -GC      Time (Seconds) 2 10 secs  -GC        User Key  (r) = Recorded By, (t) = Taken By, (c) = Cosigned By    Initials Name Provider Type    GC Ramirez Gutierrez, PT Physical Therapist                                  Time Calculation:   Start Time: 0915  Stop Time: 1023  Time Calculation (min): 68 min     Therapy Charges for Today     Code Description Service Date Service Provider Modifiers Qty    48334863366 HC PT CHNG MAIN POS CURRENT 1/18/2018 Ramirez Gutierrez, PT GP, CK 1    14409023180 HC PT CHNG MAIN POS PROJECTED 1/18/2018 Ramirez Gutierrez PT GP, CI 1    37748804096 HC PT EVAL LOW COMPLEXITY 2 1/18/2018 Ramirez Gutierrez PT GP 1    40275109584 HC PT HOT OR COLD PACK TREAT MCARE 1/18/2018 Ramirez Gutierrez, PT GP 1    11785579966 HC PT ULTRASOUND EA 15 MIN 1/18/2018 Ramirez Gutierrez, PT GP 1          PT G-Codes  PT Professional Judgement Used?: Yes (based on observed limitations in ROM, flexibility, strength, and functional mobility/gait)  Functional Limitation: Changing and maintaining body position  Changing and Maintaining Body Position Current Status (): At least 40 percent but less than 60 percent impaired, limited or restricted  Changing and Maintaining Body Position Goal Status (): At least 1 percent but less than 20 percent impaired, limited or restricted         Ramirez Gutierrez PT  1/18/2018

## 2018-01-23 ENCOUNTER — HOSPITAL ENCOUNTER (OUTPATIENT)
Dept: PHYSICAL THERAPY | Facility: HOSPITAL | Age: 73
Setting detail: THERAPIES SERIES
Discharge: HOME | End: 2018-01-23

## 2018-01-23 DIAGNOSIS — M51.36 DEGENERATIVE DISC DISEASE, LUMBAR: Primary | ICD-10-CM

## 2018-01-23 PROCEDURE — 97110 THERAPEUTIC EXERCISES: CPT | Performed by: PHYSICAL THERAPIST

## 2018-01-23 PROCEDURE — 97035 APP MDLTY 1+ULTRASOUND EA 15: CPT | Performed by: PHYSICAL THERAPIST

## 2018-01-23 NOTE — THERAPY TREATMENT NOTE
Outpatient Physical Therapy Ortho Treatment Note  NOHEMY Diaz     Patient Name: Jung Short  : 1945  MRN: [id number]  Today's Date: 2018      Visit Date: 2018    Visit Dx:    ICD-10-CM ICD-9-CM   1. Degenerative disc disease, lumbar M51.36 722.52       Patient Active Problem List   Diagnosis   • Abdominal bloating   • Abdominal mass   • Abdominal pain   • Abnormal gait   • Abnormal mammogram   • Acute bronchitis   • Acute upper respiratory infection   • Anemia   • Infection due to fungus   • Atherosclerosis of coronary artery   • Hematochezia   • Thoracic back pain   • Malignant neoplasm of upper-inner quadrant of right breast in female, estrogen receptor positive   • Candidiasis   • Cardiomyopathy   • Disc disorder of cervical region   • Neck pain   • Contusion of chest wall   • Tenderness of chest wall   • Chronic kidney disease, stage III (moderate)   • Chronic pain   • Congestive heart failure   • Constipation   • Generalized osteoarthritis   • Degeneration of intervertebral disc   • Depression   • Type 2 diabetes mellitus   • Controlled type 2 diabetes mellitus without complication   • Diarrhea   • Dyslipidemia   • Gastroesophageal reflux disease with esophagitis   • Fall in home   • Fall on same level from slipping, tripping or stumbling   • Generalized pain   • Gout   • Injury of head   • Hyperkalemia   • Hypertension   • Hypokalemia   • Hypomagnesemia   • Hypothyroidism   • Incisional hernia   • Mass of breast   • Mitral valve insufficiency   • Nausea   • Adult body mass index greater than 30   • Osteopenia of spine   • Arthralgia of multiple joints   • Peripheral neuropathy   • Pulmonary hypertension   • Osteomyelitis   • Tricuspid valve insufficiency   • Cobalamin deficiency   • Vitamin D deficiency   • Weight loss   • Weight gain   • Diabetes mellitus   • Congestive heart failure with left ventricular systolic dysfunction   • Left knee pain   • Arthritis of knee   • DDD  (degenerative disc disease), lumbar   • Lumbar facet arthropathy   • Pain in shoulder   • Chronic gout of multiple sites   • Shortness of breath   • Encounter for long-term (current) use of high-risk medication   • History of cancer   • Constipation due to opioid therapy   • Syncope, psychogenic   • Transient alteration of awareness   • Closed fracture of patella   • Primary osteoarthritis of left knee   • Radius/ulna fracture, left, closed, initial encounter   • Rib pain on left side   • Encounter for long-term (current) use of medications        Past Medical History:   Diagnosis Date   • Anemia    • Benign breast disease    • Cancer 2015    Right breast   • Cellulitis of leg     May-2003 right lower extremity   • CHF (congestive heart failure)    • Chronic kidney disease    • Chronic ulcer of right foot     Non-pressure   • Depression    • Diabetic peripheral neuropathy    • Diarrhea    • Diverticulosis    • Encounter for eye exam    • Gastroesophageal reflux    • Hiatal hernia    • History of bone density study 09/20/2012   • History of colonoscopy     done 6/03 recheck in 10 years.   Done july 2013 recheck in 5 years   • History of mammography, screening 09/20/2012   • Hospital discharge follow-up    • Hyperlipidemia    • Hypertension    • Hypothyroidism    • Impingement syndrome of right shoulder    • Joint pain    • Menopausal disorder    • Methicillin resistant Staphylococcus aureus infection    • MRSA (methicillin resistant staph aureus) culture positive    • Nausea and vomiting    • Nonischemic cardiomyopathy     Ejection fraction 10% per 2-D echo with Doppler however she did have cardiac catheterization April 2015 which showed ejection fraction 20% with global hypokinesis and severe mitral insufficiency   • Osteoarthritis    • Paroxysmal atrial fibrillation    • Postoperative infection     July of 2012 following her hysterectomy far vaginal wall prolapse. She was on antibiotics and wound dressings for 2  months.   • Syncope, psychogenic 4/19/2017   • Transient alteration of awareness 4/19/2017        Past Surgical History:   Procedure Laterality Date   • AMPUTATION FOOT / TOE Right 11/2013    Rt foot amputation MTP first toe   • BLADDER SURGERY     • BREAST BIOPSY     • BREAST SURGERY  06/29/2015    Percutaneous ultrasound-guided placement of metal localized clip 1st lesion   • CATARACT EXTRACTION Bilateral 2017   • DEBRIDEMENT  FOOT Right 01/2013    During hospitalization    • EPIDURAL BLOCK      4 chronic back pain and leg pain last epidurals done 5-2012 she had no benefit at all from this procedure.   • FOOT SURGERY Left    • HERNIA REPAIR      At the abdomen February 2007 Dr. Mendez   • INCISIONAL HERNIA REPAIR  05/16/2014    Recurrent. Incarcerated. With mesh implantation   • MASTECTOMY Right 2014   • SHOULDER SURGERY     • TOTAL ABDOMINAL HYSTERECTOMY      with oophorectomy-Done June-2012 secondary to vaginal wall prolapse.   • TOTAL KNEE ARTHROPLASTY               PT Ortho       01/23/18 1100    Subjective Comments    Subjective Comments Pt states the piriformis stretch increased her pain and hurt her quite a bit.  -GC      User Key  (r) = Recorded By, (t) = Taken By, (c) = Cosigned By    Initials Name Provider Type    ALEXANDER Gutierrez, PT Physical Therapist                            PT Assessment/Plan       01/23/18 1100       PT Assessment    Assessment Comments Pt tolerated exercises well. Held the MH secondary to pt reporting it aggravated her last time.  -GC     PT Plan    PT Plan Comments Pt is to continue her HEP daily.  -GC       User Key  (r) = Recorded By, (t) = Taken By, (c) = Cosigned By    Initials Name Provider Type    ALEXANDER Gutierrez, PT Physical Therapist                Modalities       01/23/18 1100          Ultrasound 53174    Location right buttock  -GC      Rx Minutes 8 min  -GC      Duty Cycle 100  -GC      Frequency 1.0 MHz  -GC      Intensity - Wts/cm 1.5  -GC        User Key  (r) =  Recorded By, (t) = Taken By, (c) = Cosigned By    Initials Name Provider Type    GC Ramirez Gutierrez, PT Physical Therapist                Exercises       01/23/18 1100          Subjective Comments    Subjective Comments Pt states the piriformis stretch increased her pain and hurt her quite a bit.  -GC      Exercise 1    Exercise Name 1 Hamstring stretch  -GC      Cueing 1 Verbal;Tactile  -GC      Reps 1 10  -GC      Time (Seconds) 1 10 secs  -GC      Exercise 2    Exercise Name 2 SKTC  -GC      Cueing 2 Verbal;Tactile  -GC      Reps 2 10  -GC      Time (Seconds) 2 10 secs  -GC      Exercise 3    Exercise Name 3 LTR  -GC      Cueing 3 Verbal;Tactile  -GC      Reps 3 10  -GC      Time (Seconds) 3 10 secs  -GC      Exercise 4    Exercise Name 4 PPT  -GC      Cueing 4 Verbal  -GC      Reps 4 20  -GC      Time (Seconds) 4 5 secs  -GC      Exercise 5    Exercise Name 5 Supine clam shell  -GC      Cueing 5 Verbal  -GC      Reps 5 20  -GC      Additional Comments latex-free green  -GC      Exercise 6    Exercise Name 6 Bridge vs theraband  -GC      Cueing 6 Verbal  -GC      Reps 6 20  -GC      Additional Comments latex-free green  -GC        User Key  (r) = Recorded By, (t) = Taken By, (c) = Cosigned By    Initials Name Provider Type     Ramirez Gutierrez PT Physical Therapist                                            Time Calculation:   Start Time: 1100  Stop Time: 1157  Time Calculation (min): 57 min    Therapy Charges for Today     Code Description Service Date Service Provider Modifiers Qty    42640183498 HC PT THER PROC EA 15 MIN 1/23/2018 Ramirez Gutierrez, PT GP 1    25082077346 HC PT ULTRASOUND EA 15 MIN 1/23/2018 Ramirez Gutierrez PT GP 1                    Ramirez Gutierrez PT  1/23/2018

## 2018-01-26 ENCOUNTER — HOSPITAL ENCOUNTER (OUTPATIENT)
Dept: PHYSICAL THERAPY | Facility: HOSPITAL | Age: 73
Setting detail: THERAPIES SERIES
Discharge: HOME | End: 2018-01-26

## 2018-01-26 DIAGNOSIS — M51.36 DEGENERATIVE DISC DISEASE, LUMBAR: Primary | ICD-10-CM

## 2018-01-26 PROCEDURE — 97035 APP MDLTY 1+ULTRASOUND EA 15: CPT | Performed by: PHYSICAL THERAPIST

## 2018-01-26 PROCEDURE — 97110 THERAPEUTIC EXERCISES: CPT | Performed by: PHYSICAL THERAPIST

## 2018-01-26 NOTE — THERAPY TREATMENT NOTE
Outpatient Physical Therapy Ortho Treatment Note  NOHEMY Diaz     Patient Name: Jung Short  : 1945  MRN: [id number]  Today's Date: 2018      Visit Date: 2018    Visit Dx:    ICD-10-CM ICD-9-CM   1. Degenerative disc disease, lumbar M51.36 722.52       Patient Active Problem List   Diagnosis   • Abdominal bloating   • Abdominal mass   • Abdominal pain   • Abnormal gait   • Abnormal mammogram   • Acute bronchitis   • Acute upper respiratory infection   • Anemia   • Infection due to fungus   • Atherosclerosis of coronary artery   • Hematochezia   • Thoracic back pain   • Malignant neoplasm of upper-inner quadrant of right breast in female, estrogen receptor positive   • Candidiasis   • Cardiomyopathy   • Disc disorder of cervical region   • Neck pain   • Contusion of chest wall   • Tenderness of chest wall   • Chronic kidney disease, stage III (moderate)   • Chronic pain   • Congestive heart failure   • Constipation   • Generalized osteoarthritis   • Degeneration of intervertebral disc   • Depression   • Type 2 diabetes mellitus   • Controlled type 2 diabetes mellitus without complication   • Diarrhea   • Dyslipidemia   • Gastroesophageal reflux disease with esophagitis   • Fall in home   • Fall on same level from slipping, tripping or stumbling   • Generalized pain   • Gout   • Injury of head   • Hyperkalemia   • Hypertension   • Hypokalemia   • Hypomagnesemia   • Hypothyroidism   • Incisional hernia   • Mass of breast   • Mitral valve insufficiency   • Nausea   • Adult body mass index greater than 30   • Osteopenia of spine   • Arthralgia of multiple joints   • Peripheral neuropathy   • Pulmonary hypertension   • Osteomyelitis   • Tricuspid valve insufficiency   • Cobalamin deficiency   • Vitamin D deficiency   • Weight loss   • Weight gain   • Diabetes mellitus   • Congestive heart failure with left ventricular systolic dysfunction   • Left knee pain   • Arthritis of knee   • DDD  (degenerative disc disease), lumbar   • Lumbar facet arthropathy   • Pain in shoulder   • Chronic gout of multiple sites   • Shortness of breath   • Encounter for long-term (current) use of high-risk medication   • History of cancer   • Constipation due to opioid therapy   • Syncope, psychogenic   • Transient alteration of awareness   • Closed fracture of patella   • Primary osteoarthritis of left knee   • Radius/ulna fracture, left, closed, initial encounter   • Rib pain on left side   • Encounter for long-term (current) use of medications        Past Medical History:   Diagnosis Date   • Anemia    • Benign breast disease    • Cancer 2015    Right breast   • Cellulitis of leg     May-2003 right lower extremity   • CHF (congestive heart failure)    • Chronic kidney disease    • Chronic ulcer of right foot     Non-pressure   • Depression    • Diabetic peripheral neuropathy    • Diarrhea    • Diverticulosis    • Encounter for eye exam    • Gastroesophageal reflux    • Hiatal hernia    • History of bone density study 09/20/2012   • History of colonoscopy     done 6/03 recheck in 10 years.   Done july 2013 recheck in 5 years   • History of mammography, screening 09/20/2012   • Hospital discharge follow-up    • Hyperlipidemia    • Hypertension    • Hypothyroidism    • Impingement syndrome of right shoulder    • Joint pain    • Menopausal disorder    • Methicillin resistant Staphylococcus aureus infection    • MRSA (methicillin resistant staph aureus) culture positive    • Nausea and vomiting    • Nonischemic cardiomyopathy     Ejection fraction 10% per 2-D echo with Doppler however she did have cardiac catheterization April 2015 which showed ejection fraction 20% with global hypokinesis and severe mitral insufficiency   • Osteoarthritis    • Paroxysmal atrial fibrillation    • Postoperative infection     July of 2012 following her hysterectomy far vaginal wall prolapse. She was on antibiotics and wound dressings for 2  months.   • Syncope, psychogenic 4/19/2017   • Transient alteration of awareness 4/19/2017        Past Surgical History:   Procedure Laterality Date   • AMPUTATION FOOT / TOE Right 11/2013    Rt foot amputation MTP first toe   • BLADDER SURGERY     • BREAST BIOPSY     • BREAST SURGERY  06/29/2015    Percutaneous ultrasound-guided placement of metal localized clip 1st lesion   • CATARACT EXTRACTION Bilateral 2017   • DEBRIDEMENT  FOOT Right 01/2013    During hospitalization    • EPIDURAL BLOCK      4 chronic back pain and leg pain last epidurals done 5-2012 she had no benefit at all from this procedure.   • FOOT SURGERY Left    • HERNIA REPAIR      At the abdomen February 2007 Dr. Mendez   • INCISIONAL HERNIA REPAIR  05/16/2014    Recurrent. Incarcerated. With mesh implantation   • MASTECTOMY Right 2014   • SHOULDER SURGERY     • TOTAL ABDOMINAL HYSTERECTOMY      with oophorectomy-Done June-2012 secondary to vaginal wall prolapse.   • TOTAL KNEE ARTHROPLASTY               PT Ortho       01/26/18 1100    Subjective Comments    Subjective Comments Pt states her back is feeling better and she is has noticed some improvement.  -GC      User Key  (r) = Recorded By, (t) = Taken By, (c) = Cosigned By    Initials Name Provider Type    ALEXANDER Gutierrez, PT Physical Therapist                            PT Assessment/Plan       01/26/18 1100       PT Assessment    Assessment Comments Pt is doing well with decreased c/o pain and reported improved function.  -GC     PT Plan    PT Plan Comments Pt is to continue her HEP daily.  -GC       User Key  (r) = Recorded By, (t) = Taken By, (c) = Cosigned By    Initials Name Provider Type    ALEXANDER Gutierrez, PT Physical Therapist                Modalities       01/26/18 1100          Ultrasound [id number]    Location right buttock  -GC      Rx Minutes 8 min  -GC      Duty Cycle 100  -GC      Frequency 1.0 MHz  -GC      Intensity - Wts/cm 1.5  -GC        User Key  (r) = Recorded By, (t) = Taken  By, (c) = Cosigned By    Initials Name Provider Type    ALEXANDER Gutierrez PT Physical Therapist                Exercises       01/26/18 1100          Subjective Comments    Subjective Comments Pt states her back is feeling better and she is has noticed some improvement.  -GC      Exercise 1    Exercise Name 1 Hamstring stretch  -GC      Cueing 1 Verbal;Tactile  -GC      Reps 1 10  -GC      Time (Seconds) 1 10 secs  -GC      Exercise 2    Exercise Name 2 SKTC  -GC      Cueing 2 Verbal;Tactile  -GC      Reps 2 10  -GC      Time (Seconds) 2 10 secs  -GC      Exercise 3    Exercise Name 3 LTR  -GC      Cueing 3 Verbal;Tactile  -GC      Reps 3 10  -GC      Time (Seconds) 3 10 secs  -GC      Exercise 4    Exercise Name 4 PPT  -GC      Cueing 4 Verbal  -GC      Reps 4 20  -GC      Time (Seconds) 4 5 secs  -GC      Exercise 5    Exercise Name 5 Supine clam shell  -GC      Cueing 5 Verbal  -GC      Reps 5 25  -GC      Additional Comments latex-free blue  -GC      Exercise 6    Exercise Name 6 Bridge vs theraband  -GC      Cueing 6 Verbal  -GC      Reps 6 25  -GC      Additional Comments latex-free blue  -GC      Exercise 7    Exercise Name 7 PPT with LE marching  -GC      Cueing 7 Verbal  -GC      Reps 7 20  -GC        User Key  (r) = Recorded By, (t) = Taken By, (c) = Cosigned By    Initials Name Provider Type    ALEXANDER Gutierrez PT Physical Therapist                                            Time Calculation:   Start Time: 1100  Stop Time: 1211  Time Calculation (min): 71 min    Therapy Charges for Today     Code Description Service Date Service Provider Modifiers Qty    69388557616  PT THER PROC EA 15 MIN 1/26/2018 Ramirez Gutierrez, PT GP 1    10620428752 HC PT ULTRASOUND EA 15 MIN 1/26/2018 Ramirez Gutierrez, PT GP 1                    Ramirez Gutierrez PT  1/26/2018

## 2018-01-30 ENCOUNTER — APPOINTMENT (OUTPATIENT)
Dept: PHYSICAL THERAPY | Facility: HOSPITAL | Age: 73
End: 2018-01-30

## 2018-02-02 ENCOUNTER — APPOINTMENT (OUTPATIENT)
Dept: PHYSICAL THERAPY | Facility: HOSPITAL | Age: 73
End: 2018-02-02

## 2018-02-06 ENCOUNTER — HOSPITAL ENCOUNTER (OUTPATIENT)
Dept: PHYSICAL THERAPY | Facility: HOSPITAL | Age: 73
Setting detail: THERAPIES SERIES
Discharge: HOME | End: 2018-02-06

## 2018-02-06 DIAGNOSIS — M51.36 DEGENERATIVE DISC DISEASE, LUMBAR: Primary | ICD-10-CM

## 2018-02-06 PROCEDURE — 97110 THERAPEUTIC EXERCISES: CPT | Performed by: PHYSICAL THERAPIST

## 2018-02-06 PROCEDURE — 97035 APP MDLTY 1+ULTRASOUND EA 15: CPT | Performed by: PHYSICAL THERAPIST

## 2018-02-06 NOTE — THERAPY TREATMENT NOTE
Outpatient Physical Therapy Ortho Treatment Note  NOHEMY Diaz     Patient Name: Jung Short  : 1945  MRN: [id number]  Today's Date: 2018      Visit Date: 2018    Visit Dx:    ICD-10-CM ICD-9-CM   1. Degenerative disc disease, lumbar M51.36 722.52       Patient Active Problem List   Diagnosis   • Abdominal bloating   • Abdominal mass   • Abdominal pain   • Abnormal gait   • Abnormal mammogram   • Acute bronchitis   • Acute upper respiratory infection   • Anemia   • Infection due to fungus   • Atherosclerosis of coronary artery   • Hematochezia   • Thoracic back pain   • Malignant neoplasm of upper-inner quadrant of right breast in female, estrogen receptor positive   • Candidiasis   • Cardiomyopathy   • Disc disorder of cervical region   • Neck pain   • Contusion of chest wall   • Tenderness of chest wall   • Chronic kidney disease, stage III (moderate)   • Chronic pain   • Congestive heart failure   • Constipation   • Generalized osteoarthritis   • Degeneration of intervertebral disc   • Depression   • Type 2 diabetes mellitus   • Controlled type 2 diabetes mellitus without complication   • Diarrhea   • Dyslipidemia   • Gastroesophageal reflux disease with esophagitis   • Fall in home   • Fall on same level from slipping, tripping or stumbling   • Generalized pain   • Gout   • Injury of head   • Hyperkalemia   • Hypertension   • Hypokalemia   • Hypomagnesemia   • Hypothyroidism   • Incisional hernia   • Mass of breast   • Mitral valve insufficiency   • Nausea   • Adult body mass index greater than 30   • Osteopenia of spine   • Arthralgia of multiple joints   • Peripheral neuropathy   • Pulmonary hypertension   • Osteomyelitis   • Tricuspid valve insufficiency   • Cobalamin deficiency   • Vitamin D deficiency   • Weight loss   • Weight gain   • Diabetes mellitus   • Congestive heart failure with left ventricular systolic dysfunction   • Left knee pain   • Arthritis of knee   • DDD  (degenerative disc disease), lumbar   • Lumbar facet arthropathy   • Pain in shoulder   • Chronic gout of multiple sites   • Shortness of breath   • Encounter for long-term (current) use of high-risk medication   • History of cancer   • Constipation due to opioid therapy   • Syncope, psychogenic   • Transient alteration of awareness   • Closed fracture of patella   • Primary osteoarthritis of left knee   • Radius/ulna fracture, left, closed, initial encounter   • Rib pain on left side   • Encounter for long-term (current) use of medications        Past Medical History:   Diagnosis Date   • Anemia    • Benign breast disease    • Cancer 2015    Right breast   • Cellulitis of leg     May-2003 right lower extremity   • CHF (congestive heart failure)    • Chronic kidney disease    • Chronic ulcer of right foot     Non-pressure   • Depression    • Diabetic peripheral neuropathy    • Diarrhea    • Diverticulosis    • Encounter for eye exam    • Gastroesophageal reflux    • Hiatal hernia    • History of bone density study 09/20/2012   • History of colonoscopy     done 6/03 recheck in 10 years.   Done july 2013 recheck in 5 years   • History of mammography, screening 09/20/2012   • Hospital discharge follow-up    • Hyperlipidemia    • Hypertension    • Hypothyroidism    • Impingement syndrome of right shoulder    • Joint pain    • Menopausal disorder    • Methicillin resistant Staphylococcus aureus infection    • MRSA (methicillin resistant staph aureus) culture positive    • Nausea and vomiting    • Nonischemic cardiomyopathy     Ejection fraction 10% per 2-D echo with Doppler however she did have cardiac catheterization April 2015 which showed ejection fraction 20% with global hypokinesis and severe mitral insufficiency   • Osteoarthritis    • Paroxysmal atrial fibrillation    • Postoperative infection     July of 2012 following her hysterectomy far vaginal wall prolapse. She was on antibiotics and wound dressings for 2  months.   • Syncope, psychogenic 4/19/2017   • Transient alteration of awareness 4/19/2017        Past Surgical History:   Procedure Laterality Date   • AMPUTATION FOOT / TOE Right 11/2013    Rt foot amputation MTP first toe   • BLADDER SURGERY     • BREAST BIOPSY     • BREAST SURGERY  06/29/2015    Percutaneous ultrasound-guided placement of metal localized clip 1st lesion   • CATARACT EXTRACTION Bilateral 2017   • DEBRIDEMENT  FOOT Right 01/2013    During hospitalization    • EPIDURAL BLOCK      4 chronic back pain and leg pain last epidurals done 5-2012 she had no benefit at all from this procedure.   • FOOT SURGERY Left    • HERNIA REPAIR      At the abdomen February 2007 Dr. Mendez   • INCISIONAL HERNIA REPAIR  05/16/2014    Recurrent. Incarcerated. With mesh implantation   • MASTECTOMY Right 2014   • SHOULDER SURGERY     • TOTAL ABDOMINAL HYSTERECTOMY      with oophorectomy-Done June-2012 secondary to vaginal wall prolapse.   • TOTAL KNEE ARTHROPLASTY                               PT Assessment/Plan       02/06/18 1100       PT Assessment    Assessment Comments Pt is doing well with decreased c/o pain and improving function. She tolerated increased strengthening well.  -GC     PT Plan    PT Plan Comments Pt is to continue her HEP daily.  -GC       User Key  (r) = Recorded By, (t) = Taken By, (c) = Cosigned By    Initials Name Provider Type    ALEXANDER Gutierrez, PT Physical Therapist                Modalities       02/06/18 1100          Ultrasound [id number]    Location right buttock  -GC      Rx Minutes 8 min  -GC      Duty Cycle 100  -GC      Frequency 1.0 MHz  -GC      Intensity - Wts/cm 1.5  -GC        User Key  (r) = Recorded By, (t) = Taken By, (c) = Cosigned By    Initials Name Provider Type    ALEXANDER Gutierrez, PT Physical Therapist                Exercises       02/06/18 1100          Subjective Comments    Subjective Comments Pt states her back is really feeling pretty good.  -GC      Exercise 1    Exercise  Name 1 Hamstring stretch  -GC      Cueing 1 Verbal;Tactile  -GC      Reps 1 10  -GC      Time (Seconds) 1 10 secs  -GC      Exercise 2    Exercise Name 2 SKTC  -GC      Cueing 2 Verbal;Tactile  -GC      Reps 2 10  -GC      Time (Seconds) 2 10 secs  -GC      Exercise 3    Exercise Name 3 LTR  -GC      Cueing 3 Verbal;Tactile  -GC      Reps 3 10  -GC      Time (Seconds) 3 10 secs  -GC      Exercise 4    Exercise Name 4 PPT  -GC      Cueing 4 Verbal  -GC      Reps 4 20  -GC      Time (Seconds) 4 5 secs  -GC      Exercise 5    Exercise Name 5 Supine clam shell  -GC      Cueing 5 Verbal  -GC      Reps 5 30  -GC      Additional Comments Latex-ree black  -GC      Exercise 6    Exercise Name 6 Bridge vs theraband  -GC      Cueing 6 Verbal  -GC      Reps 6 30  -GC      Additional Comments latex-free black  -GC      Exercise 7    Exercise Name 7 PPT with LE marching  -GC      Cueing 7 Verbal  -GC      Reps 7 30  -GC        User Key  (r) = Recorded By, (t) = Taken By, (c) = Cosigned By    Initials Name Provider Type     Ramirez Gutierrez, PT Physical Therapist                                            Time Calculation:   Start Time: 1100  Stop Time: 1149  Time Calculation (min): 49 min    Therapy Charges for Today     Code Description Service Date Service Provider Modifiers Qty    47971826884  PT THER PROC EA 15 MIN 2/6/2018 Ramirez Gutierrez, PT GP 1    [id number]  PT ULTRASOUND EA 15 MIN 2/6/2018 Ramirez Gutierrez PT GP 1                    Ramirez Gutierrez PT  2/6/2018

## 2018-02-09 ENCOUNTER — APPOINTMENT (OUTPATIENT)
Dept: PHYSICAL THERAPY | Facility: HOSPITAL | Age: 73
End: 2018-02-09

## 2018-02-12 ENCOUNTER — TRANSCRIBE ORDERS (OUTPATIENT)
Dept: ADMINISTRATIVE | Facility: HOSPITAL | Age: 73
End: 2018-02-12

## 2018-02-12 ENCOUNTER — LAB (OUTPATIENT)
Dept: LAB | Facility: HOSPITAL | Age: 73
End: 2018-02-12
Attending: INTERNAL MEDICINE

## 2018-02-12 DIAGNOSIS — N18.30 CHRONIC KIDNEY DISEASE, STAGE III (MODERATE) (HCC): ICD-10-CM

## 2018-02-12 DIAGNOSIS — E55.9 AVITAMINOSIS D: ICD-10-CM

## 2018-02-12 DIAGNOSIS — I12.9 HYPERTENSIVE NEPHROPATHY: ICD-10-CM

## 2018-02-12 DIAGNOSIS — N18.30 CHRONIC KIDNEY DISEASE, STAGE III (MODERATE) (HCC): Primary | ICD-10-CM

## 2018-02-12 LAB
25(OH)D3 SERPL-MCNC: 21.5 NG/ML
ANION GAP SERPL CALCULATED.3IONS-SCNC: 12.9 MMOL/L
BACTERIA UR QL AUTO: ABNORMAL /HPF
BASOPHILS # BLD AUTO: 0.03 10*3/MM3 (ref 0–0.2)
BASOPHILS NFR BLD AUTO: 0.4 % (ref 0–2)
BILIRUB UR QL STRIP: NEGATIVE
BUN BLD-MCNC: 14 MG/DL (ref 8–23)
BUN/CREAT SERPL: 14.7 (ref 7–25)
CALCIUM SPEC-SCNC: 9.2 MG/DL (ref 8.8–10.5)
CHLORIDE SERPL-SCNC: 97 MMOL/L (ref 98–107)
CLARITY UR: CLEAR
CO2 SERPL-SCNC: 27.1 MMOL/L (ref 22–29)
COLOR UR: YELLOW
CREAT BLD-MCNC: 0.95 MG/DL (ref 0.57–1)
DEPRECATED RDW RBC AUTO: 43.8 FL (ref 37–54)
EOSINOPHIL # BLD AUTO: 0.21 10*3/MM3 (ref 0.1–0.3)
EOSINOPHIL NFR BLD AUTO: 2.9 % (ref 0–4)
ERYTHROCYTE [DISTWIDTH] IN BLOOD BY AUTOMATED COUNT: 13.3 % (ref 11.5–14.5)
GFR SERPL CREATININE-BSD FRML MDRD: 58 ML/MIN/1.73
GLUCOSE BLD-MCNC: 182 MG/DL (ref 65–99)
GLUCOSE UR STRIP-MCNC: ABNORMAL MG/DL
HCT VFR BLD AUTO: 36.1 % (ref 37–47)
HGB BLD-MCNC: 11.7 G/DL (ref 12–16)
HGB UR QL STRIP.AUTO: NEGATIVE
HYALINE CASTS UR QL AUTO: ABNORMAL /LPF
IMM GRANULOCYTES # BLD: 0.11 10*3/MM3 (ref 0–0.03)
IMM GRANULOCYTES NFR BLD: 1.5 % (ref 0–0.5)
KETONES UR QL STRIP: NEGATIVE
LEUKOCYTE ESTERASE UR QL STRIP.AUTO: ABNORMAL
LYMPHOCYTES # BLD AUTO: 1.82 10*3/MM3 (ref 0.6–4.8)
LYMPHOCYTES NFR BLD AUTO: 25.4 % (ref 20–45)
MCH RBC QN AUTO: 29.6 PG (ref 27–31)
MCHC RBC AUTO-ENTMCNC: 32.4 G/DL (ref 31–37)
MCV RBC AUTO: 91.4 FL (ref 81–99)
MONOCYTES # BLD AUTO: 0.61 10*3/MM3 (ref 0–1)
MONOCYTES NFR BLD AUTO: 8.5 % (ref 3–8)
NEUTROPHILS # BLD AUTO: 4.39 10*3/MM3 (ref 1.5–8.3)
NEUTROPHILS NFR BLD AUTO: 61.3 % (ref 45–70)
NITRITE UR QL STRIP: NEGATIVE
NRBC BLD MANUAL-RTO: 0 /100 WBC (ref 0–0)
PH UR STRIP.AUTO: 5.5 [PH] (ref 4.5–8)
PLATELET # BLD AUTO: 181 10*3/MM3 (ref 140–500)
PMV BLD AUTO: 9.3 FL (ref 7.4–10.4)
POTASSIUM BLD-SCNC: 3.6 MMOL/L (ref 3.5–5.2)
PROT UR QL STRIP: NEGATIVE
RBC # BLD AUTO: 3.95 10*6/MM3 (ref 4.2–5.4)
RBC # UR: ABNORMAL /HPF
REF LAB TEST METHOD: ABNORMAL
SODIUM BLD-SCNC: 137 MMOL/L (ref 136–145)
SP GR UR STRIP: 1.01 (ref 1–1.03)
SQUAMOUS #/AREA URNS HPF: ABNORMAL /HPF
UROBILINOGEN UR QL STRIP: ABNORMAL
WBC NRBC COR # BLD: 7.17 10*3/MM3 (ref 4.8–10.8)
WBC UR QL AUTO: ABNORMAL /HPF

## 2018-02-12 PROCEDURE — 82306 VITAMIN D 25 HYDROXY: CPT

## 2018-02-12 PROCEDURE — 36415 COLL VENOUS BLD VENIPUNCTURE: CPT

## 2018-02-12 PROCEDURE — 80048 BASIC METABOLIC PNL TOTAL CA: CPT

## 2018-02-12 PROCEDURE — 81001 URINALYSIS AUTO W/SCOPE: CPT

## 2018-02-12 PROCEDURE — 85025 COMPLETE CBC W/AUTO DIFF WBC: CPT

## 2018-02-19 ENCOUNTER — OFFICE VISIT (OUTPATIENT)
Dept: PAIN MEDICINE | Facility: CLINIC | Age: 73
End: 2018-02-19

## 2018-02-19 VITALS
SYSTOLIC BLOOD PRESSURE: 133 MMHG | RESPIRATION RATE: 18 BRPM | BODY MASS INDEX: 36.32 KG/M2 | DIASTOLIC BLOOD PRESSURE: 76 MMHG | HEIGHT: 66 IN | HEART RATE: 74 BPM | TEMPERATURE: 98.3 F | WEIGHT: 226 LBS | OXYGEN SATURATION: 97 %

## 2018-02-19 DIAGNOSIS — M15.9 GENERALIZED OSTEOARTHRITIS: ICD-10-CM

## 2018-02-19 DIAGNOSIS — G89.29 OTHER CHRONIC PAIN: Primary | ICD-10-CM

## 2018-02-19 DIAGNOSIS — M25.50 ARTHRALGIA OF MULTIPLE JOINTS: ICD-10-CM

## 2018-02-19 DIAGNOSIS — Z79.899 ENCOUNTER FOR LONG-TERM (CURRENT) USE OF HIGH-RISK MEDICATION: ICD-10-CM

## 2018-02-19 DIAGNOSIS — M47.816 LUMBAR FACET ARTHROPATHY: ICD-10-CM

## 2018-02-19 DIAGNOSIS — M51.36 DDD (DEGENERATIVE DISC DISEASE), LUMBAR: ICD-10-CM

## 2018-02-19 PROCEDURE — 99213 OFFICE O/P EST LOW 20 MIN: CPT | Performed by: NURSE PRACTITIONER

## 2018-02-19 RX ORDER — BUMETANIDE 2 MG/1
TABLET ORAL
Qty: 60 TABLET | Refills: 3 | Status: SHIPPED | OUTPATIENT
Start: 2018-02-19 | End: 2018-04-03

## 2018-02-19 NOTE — PROGRESS NOTES
CHIEF COMPLAINT  Back pain and joint pain has decreased since last visit.    Subjective   Jung Short is a 72 y.o. female  who presents to the office for follow-up.She has a history of chronic back and joint pain. Reports her pain has decreased since last office visit. She associates this with PT. Notes improvement with ultrasound therapy.     Complains of pain in her back and joints. Today her pain is 6/10VAs. Describes the pain as continuous and slightly improved. Pain is usually worst in the morning. Continues with Hydrocodone 10/325 3-6/day. HAs been able to wean down somewhat from the Hydrocodone. Still has 90 pills since last office visit. Denies any side effects from this. Constipation is significantly improved with stool softener. The regimen helps decrease her pain moderately. ADL's by self.    Had a recent bout of gout of right ankle.   Planning on left TKR with Dr. Braden in April 2018. Needs cardiac clearance.    Joint Pain   This is a chronic problem. The current episode started more than 1 year ago (today pain is 6/10VAS- joints). The problem occurs constantly. The problem has been unchanged. Associated symptoms include arthralgias, fatigue and joint swelling. Pertinent negatives include no chest pain, chills, congestion, coughing, fever, headaches, nausea, numbness, vomiting or weakness. Nothing aggravates the symptoms. She has tried oral narcotics, position changes and rest for the symptoms. The treatment provided moderate relief.   Back Pain   This is a chronic problem. The current episode started more than 1 year ago. The problem occurs constantly. The problem has been gradually worsening (improved since last office visit--due to PT) since onset. The pain is present in the lumbar spine. The pain radiates to the right knee, right foot and right thigh. The pain is at a severity of 6/10. The pain is moderate. Pertinent negatives include no chest pain, dysuria, fever, headaches, numbness or  "weakness. Risk factors include lack of exercise and obesity. She has tried analgesics for the symptoms. The treatment provided moderate relief.      PEG Assessment   What number best describes your pain on average in the past week?6  What number best describes how, during the past week, pain has interfered with your enjoyment of life?5  What number best describes how, during the past week, pain has interfered with your general activity?  5    The following portions of the patient's history were reviewed and updated as appropriate: allergies, current medications, past family history, past medical history, past social history, past surgical history and problem list.    Review of Systems   Constitutional: Positive for fatigue. Negative for chills and fever.   HENT: Negative for congestion.    Respiratory: Negative for cough and shortness of breath.    Cardiovascular: Negative for chest pain.   Gastrointestinal: Negative for constipation, diarrhea, nausea and vomiting.   Genitourinary: Negative for difficulty urinating, dyspareunia and dysuria.   Musculoskeletal: Positive for arthralgias, back pain and joint swelling.   Neurological: Negative for dizziness, weakness, light-headedness, numbness and headaches.   Psychiatric/Behavioral: Negative for confusion, hallucinations, self-injury, sleep disturbance and suicidal ideas. The patient is not nervous/anxious.        Vitals:    02/19/18 1327   BP: 133/76   Pulse: 74   Resp: 18   Temp: 98.3 °F (36.8 °C)   SpO2: 97%   Weight: 103 kg (226 lb)   Height: 167.6 cm (66\")   PainSc:   6   PainLoc: Back     Objective   Physical Exam   Constitutional: She is oriented to person, place, and time. Vital signs are normal. She appears well-developed and well-nourished. She is cooperative.   HENT:   Head: Normocephalic and atraumatic.   Nose: Nose normal.   Eyes: Conjunctivae and lids are normal.   Cardiovascular: Normal rate.    Pulmonary/Chest: Effort normal.   Abdominal: Normal " appearance.   Musculoskeletal:        Right shoulder: She exhibits tenderness.        Left shoulder: She exhibits decreased range of motion and tenderness.        Right knee: Tenderness found.        Left knee: Tenderness found.        Lumbar back: She exhibits tenderness.   Neurological: She is alert and oriented to person, place, and time. Gait (ambulates with aid cane) abnormal.   Reflex Scores:       Patellar reflexes are 1+ on the right side and 1+ on the left side.  Skin: Skin is warm, dry and intact.   Psychiatric: She has a normal mood and affect. Her speech is normal and behavior is normal. Judgment and thought content normal. Cognition and memory are normal.   Nursing note and vitals reviewed.      Assessment/Plan   Jung was seen today for back pain.    Diagnoses and all orders for this visit:    Other chronic pain    DDD (degenerative disc disease), lumbar    Generalized osteoarthritis    Lumbar facet arthropathy    Arthralgia of multiple joints    Encounter for long-term (current) use of high-risk medication      --- The urine drug screen confirmation from 1-18-18 has been reviewed and the result is appropriate based on patient history and LOUANN report  --- Continue with regimen. No changes at this time. Does not need refill. Patient appears stable with current regimen. No adverse effects. Regarding continuation of opioids, there is no evidence of aberrant behavior or any red flags.  The patient continues with appropriate response to opioid therapy. ADL's remain intact by self.  --- Continue with PT.    --- Follow-up 4 weeks or sooner if needed.       LOUANN REPORT    As part of the patient's treatment plan, I am prescribing controlled substances. The patient has been made aware of appropriate use of such medications, including potential risk of somnolence, limited ability to drive and/or work safely, and the potential for dependence or overdose. It has also bee made clear that these medications are  for use by this patient only, without concomitant use of alcohol or other substances unless prescribed.     Patient has completed prescribing agreement detailing terms of continued prescribing of controlled substances, including monitoring LOUANN reports, urine drug screening, and pill counts if necessary. The patient is aware that inappropriate use will results in cessation of prescribing such medications.    LOUANN report has been reviewed and scanned into the patient's chart.    Date of last LOUANN : 2-15-18    History and physical exam exhibit continued safe and appropriate use of controlled substances.      EMR Dragon/Transcription disclaimer:   Much of this encounter note is an electronic transcription/translation of spoken language to printed text. The electronic translation of spoken language may permit erroneous, or at times, nonsensical words or phrases to be inadvertently transcribed; Although I have reviewed the note for such errors, some may still exist.

## 2018-02-23 ENCOUNTER — EPISODE CHANGES (OUTPATIENT)
Dept: CASE MANAGEMENT | Facility: OTHER | Age: 73
End: 2018-02-23

## 2018-03-05 ENCOUNTER — OFFICE VISIT (OUTPATIENT)
Dept: ORTHOPEDIC SURGERY | Facility: CLINIC | Age: 73
End: 2018-03-05

## 2018-03-05 DIAGNOSIS — M17.12 PRIMARY OSTEOARTHRITIS OF LEFT KNEE: Primary | ICD-10-CM

## 2018-03-05 PROCEDURE — 99213 OFFICE O/P EST LOW 20 MIN: CPT | Performed by: ORTHOPAEDIC SURGERY

## 2018-03-05 RX ORDER — CARVEDILOL 12.5 MG/1
TABLET ORAL
Qty: 180 TABLET | Refills: 1 | Status: SHIPPED | OUTPATIENT
Start: 2018-03-05 | End: 2018-04-03

## 2018-03-05 NOTE — PROGRESS NOTES
Subjective: Left knee pain     Patient ID: Jung Short is a 72 y.o. female.    Chief Complaint:    History of Present Illness 72-year-old female with end-stage degenerative arthritis of the left knee presents today to schedule left total knee arthroplasty.  Patient is failed to respond in the past and nonsteroidal anti-inflammatories, cortisone injections, viscus supplement injections and modification of activities.  I discussed with the patient the risk of surgery which include but not limited to infection including the patient as she has a history of MRSA, nerve injury vascular injury ,the need for revision surgery, and persistent pain and discomfort despite a well implanted total knee.  Patient understands and wishes to proceed with surgery       Social History     Occupational History   • [employer]  Retired     Social History Main Topics   • Smoking status: Former Smoker     Packs/day: 3.00     Years: 30.00     Types: Cigarettes   • Smokeless tobacco: Never Used      Comment: quit 35 years ago   • Alcohol use No   • Drug use: No   • Sexual activity: Defer      Comment: celibate      Review of Systems   Constitutional: Negative for chills, diaphoresis, fever and unexpected weight change.   HENT: Negative for hearing loss, nosebleeds, sore throat and tinnitus.    Eyes: Negative for pain and visual disturbance.   Respiratory: Negative for cough, shortness of breath and wheezing.    Cardiovascular: Negative for chest pain and palpitations.   Gastrointestinal: Negative for abdominal pain, diarrhea, nausea and vomiting.   Endocrine: Negative for cold intolerance, heat intolerance and polydipsia.   Genitourinary: Negative for difficulty urinating, dysuria and hematuria.   Musculoskeletal: Positive for arthralgias. Negative for joint swelling and myalgias.   Skin: Negative for rash and wound.   Allergic/Immunologic: Negative for environmental allergies.   Neurological: Negative for dizziness, syncope and  numbness.   Hematological: Does not bruise/bleed easily.   Psychiatric/Behavioral: Negative for dysphoric mood and sleep disturbance. The patient is not nervous/anxious.          Past Medical History:   Diagnosis Date   • Anemia    • Benign breast disease    • Cancer 2015    Right breast   • Cellulitis of leg     May-2003 right lower extremity   • CHF (congestive heart failure)    • Chronic kidney disease    • Chronic ulcer of right foot     Non-pressure   • Depression    • Diabetic peripheral neuropathy    • Diarrhea    • Diverticulosis    • Encounter for eye exam    • Gastroesophageal reflux    • Hiatal hernia    • History of bone density study 09/20/2012   • History of colonoscopy     done 6/03 recheck in 10 years.   Done july 2013 recheck in 5 years   • History of mammography, screening 09/20/2012   • Hospital discharge follow-up    • Hyperlipidemia    • Hypertension    • Hypothyroidism    • Impingement syndrome of right shoulder    • Joint pain    • Menopausal disorder    • Methicillin resistant Staphylococcus aureus infection    • MRSA (methicillin resistant staph aureus) culture positive    • Nausea and vomiting    • Nonischemic cardiomyopathy     Ejection fraction 10% per 2-D echo with Doppler however she did have cardiac catheterization April 2015 which showed ejection fraction 20% with global hypokinesis and severe mitral insufficiency   • Osteoarthritis    • Paroxysmal atrial fibrillation    • Postoperative infection     July of 2012 following her hysterectomy far vaginal wall prolapse. She was on antibiotics and wound dressings for 2 months.   • Syncope, psychogenic 4/19/2017   • Transient alteration of awareness 4/19/2017     Past Surgical History:   Procedure Laterality Date   • AMPUTATION FOOT / TOE Right 11/2013    Rt foot amputation MTP first toe   • BLADDER SURGERY     • BREAST BIOPSY     • BREAST SURGERY  06/29/2015    Percutaneous ultrasound-guided placement of metal localized clip 1st lesion   •  CATARACT EXTRACTION Bilateral 2017   • DEBRIDEMENT  FOOT Right 01/2013    During hospitalization    • EPIDURAL BLOCK      4 chronic back pain and leg pain last epidurals done 5-2012 she had no benefit at all from this procedure.   • FOOT SURGERY Left    • HERNIA REPAIR      At the abdomen February 2007 Dr. Mendez   • INCISIONAL HERNIA REPAIR  05/16/2014    Recurrent. Incarcerated. With mesh implantation   • MASTECTOMY Right 2014   • SHOULDER SURGERY     • TOTAL ABDOMINAL HYSTERECTOMY      with oophorectomy-Done June-2012 secondary to vaginal wall prolapse.   • TOTAL KNEE ARTHROPLASTY       Family History   Problem Relation Age of Onset   • Colonic polyp Mother    • Hypertension Mother    • Migraines Mother    • Mental illness Mother    • Depression Mother    • Coronary artery disease Father    • Other Father      Cardiac Disorder   • Colon cancer Father    • Kidney disease Father    • Cancer Father    • Breast cancer Maternal Aunt    • Colon cancer Brother    • Alcohol abuse Brother    • Arthritis Sister    • Hypertension Brother    • Lung disease Brother    • Alcohol abuse Brother          Objective:  There were no vitals filed for this visit.  There were no vitals filed for this visit.  There is no height or weight on file to calculate BMI.       Ortho Exam  she is alert and oriented ×3.  Reviewed the x-rays done again in January shows tricompartmental end-stage degenerative arthritis.  Left knee shows no effusion although there is a boggy synovitis.  0-125° of motion with pain and crepitus but no instability.  Calf is nontender negative Homans.  Quad function 5 over 5.  Skin is cool to touch.  No motor deficit good distal pulses no sensory loss.  Patient is unable to take anti-inflammatories due to renal disease.    Assessment:       1. Primary osteoarthritis of left knee          Plan:      discussed the timing of the surgery once again with the patient.  She wishes proceed with surgery visit April tentatively  schedule surgery for April 17 pending prep evaluation.      Work Status:    LOUANN query complete.    Orders:  No orders of the defined types were placed in this encounter.      Medications:  No orders of the defined types were placed in this encounter.      Followup:  Return in about 6 weeks (around 4/16/2018).          Dragon transcription disclaimer     Much of this encounter note is an electronic transcription/translation of spoken language to printed text. The electronic translation of spoken language may permit erroneous, or at times, nonsensical words or phrases to be inadvertently transcribed. Although I have reviewed the note for such errors, some may still exist.

## 2018-03-06 ENCOUNTER — PREP FOR SURGERY (OUTPATIENT)
Dept: OTHER | Facility: HOSPITAL | Age: 73
End: 2018-03-06

## 2018-03-06 DIAGNOSIS — M17.12 PRIMARY OSTEOARTHRITIS OF LEFT KNEE: Primary | ICD-10-CM

## 2018-03-06 RX ORDER — OXYCODONE HCL 10 MG/1
10 TABLET, FILM COATED, EXTENDED RELEASE ORAL ONCE
Status: CANCELLED | OUTPATIENT
Start: 2018-04-17 | End: 2018-04-17

## 2018-03-06 RX ORDER — PREGABALIN 75 MG/1
150 CAPSULE ORAL ONCE
Status: CANCELLED | OUTPATIENT
Start: 2018-04-17 | End: 2018-04-17

## 2018-03-06 RX ORDER — CHLORHEXIDINE GLUCONATE 0.12 MG/ML
15 RINSE ORAL EVERY 12 HOURS SCHEDULED
Status: CANCELLED | OUTPATIENT
Start: 2018-03-06

## 2018-03-06 RX ORDER — ACETAMINOPHEN 500 MG
1000 TABLET ORAL ONCE
Status: CANCELLED | OUTPATIENT
Start: 2018-04-17 | End: 2018-04-17

## 2018-03-09 ENCOUNTER — PATIENT OUTREACH (OUTPATIENT)
Dept: CASE MANAGEMENT | Facility: OTHER | Age: 73
End: 2018-03-09

## 2018-03-09 NOTE — OUTREACH NOTE
Talked with patient. Patient states she is scheduled for left TKA in April. She is presently ambulating with a cane. Reports to no longer have symptoms of gout to right ankle and no longer participating with physical therapy. Talked briefly because patient is visiting with friend. No further questions or concerns voiced at this time.

## 2018-03-12 RX ORDER — ANASTROZOLE 1 MG/1
TABLET ORAL
Qty: 90 TABLET | Refills: 0 | Status: SHIPPED | OUTPATIENT
Start: 2018-03-12 | End: 2018-04-03

## 2018-03-19 ENCOUNTER — OFFICE VISIT (OUTPATIENT)
Dept: PAIN MEDICINE | Facility: CLINIC | Age: 73
End: 2018-03-19

## 2018-03-19 VITALS
RESPIRATION RATE: 16 BRPM | HEIGHT: 66 IN | TEMPERATURE: 97.4 F | SYSTOLIC BLOOD PRESSURE: 149 MMHG | DIASTOLIC BLOOD PRESSURE: 72 MMHG | OXYGEN SATURATION: 97 % | WEIGHT: 230 LBS | BODY MASS INDEX: 36.96 KG/M2 | HEART RATE: 79 BPM

## 2018-03-19 DIAGNOSIS — M25.50 ARTHRALGIA OF MULTIPLE JOINTS: ICD-10-CM

## 2018-03-19 DIAGNOSIS — M15.9 GENERALIZED OSTEOARTHRITIS: ICD-10-CM

## 2018-03-19 DIAGNOSIS — G89.29 OTHER CHRONIC PAIN: Primary | ICD-10-CM

## 2018-03-19 DIAGNOSIS — Z79.899 ENCOUNTER FOR LONG-TERM (CURRENT) USE OF HIGH-RISK MEDICATION: ICD-10-CM

## 2018-03-19 DIAGNOSIS — M51.36 DDD (DEGENERATIVE DISC DISEASE), LUMBAR: ICD-10-CM

## 2018-03-19 DIAGNOSIS — M47.816 LUMBAR FACET ARTHROPATHY: ICD-10-CM

## 2018-03-19 PROCEDURE — 99214 OFFICE O/P EST MOD 30 MIN: CPT | Performed by: NURSE PRACTITIONER

## 2018-03-19 RX ORDER — HYDROCODONE BITARTRATE AND ACETAMINOPHEN 10; 325 MG/1; MG/1
1 TABLET ORAL EVERY 6 HOURS PRN
Qty: 120 TABLET | Refills: 0 | Status: SHIPPED | OUTPATIENT
Start: 2018-03-19 | End: 2018-04-12 | Stop reason: SDUPTHER

## 2018-03-19 NOTE — PROGRESS NOTES
"CHIEF COMPLAINT  Pt has decided to decrease hydrocodone  from 6 to 3 daily and consequently has increased joint and mid back pain.    Subjective   Jung Short is a 72 y.o. female  who presents to the office for follow-up.She has a history of chronic back and joint pain. Reports her pain is worse since last office visit, but has been decreasing Hydrocodone.    Complains of pain in her low back and joints. Today her pain is 6/10VAS. Describes the joint pain as continuous and worse since last office visit, however she associates this with decreasing Hydrocodone. \"It just wasn't enough.\"  Continues with Hydrocodone 10/325 3/day.Denies any side effects from this. Constipation is significantly improved with stool softener. The regimen helps decrease her pain minimally. ADL's by self.    Is planning LEFT TKR with Dr. Braden on 4-17-18([facility]), pending pre-op clearance.    Joint Pain   This is a chronic problem. The current episode started more than 1 year ago (today pain is 6/10VAS- joints; pain can range from 4-8/10VAs). The problem occurs constantly. The problem has been unchanged. Associated symptoms include arthralgias (Pt to have L TKR on 4/17/18 per Dr Chambers.), fatigue, joint swelling and weakness (L leg). Pertinent negatives include no chest pain, chills, congestion, coughing, fever, headaches, nausea, numbness or vomiting. Nothing aggravates the symptoms. She has tried oral narcotics, position changes and rest for the symptoms. The treatment provided moderate relief.   Back Pain   This is a chronic problem. The current episode started more than 1 year ago. The problem occurs constantly. The problem has been gradually worsening (improved since last office visit--due to PT) since onset. The pain is present in the lumbar spine. The pain radiates to the right knee, right foot and right thigh. The pain is at a severity of 6/10. The pain is moderate. Associated symptoms include weakness (L leg). " "Pertinent negatives include no chest pain, dysuria, fever, headaches or numbness. Risk factors include lack of exercise and obesity. She has tried analgesics for the symptoms. The treatment provided moderate relief.      PEG Assessment   What number best describes your pain on average in the past week?8  What number best describes how, during the past week, pain has interfered with your enjoyment of life?8  What number best describes how, during the past week, pain has interfered with your general activity?  8    The following portions of the patient's history were reviewed and updated as appropriate: allergies, current medications, past family history, past medical history, past social history, past surgical history and problem list.    Review of Systems   Constitutional: Positive for fatigue. Negative for activity change, appetite change, chills and fever.   HENT: Negative for congestion.    Respiratory: Negative for cough and shortness of breath.    Cardiovascular: Negative for chest pain.   Gastrointestinal: Positive for diarrhea. Negative for constipation, nausea and vomiting.   Genitourinary: Negative for difficulty urinating, dyspareunia and dysuria.   Musculoskeletal: Positive for arthralgias (Pt to have L TKR on 4/17/18 per Dr Chambers.), back pain and joint swelling.   Neurological: Positive for weakness (L leg). Negative for dizziness, light-headedness, numbness and headaches.   Psychiatric/Behavioral: Positive for sleep disturbance. Negative for confusion, hallucinations, self-injury and suicidal ideas. The patient is not nervous/anxious.        Vitals:    03/19/18 1321   BP: 149/72   Pulse: 79   Resp: 16   Temp: 97.4 °F (36.3 °C)   SpO2: 97%   Weight: 104 kg (230 lb)   Height: 167.6 cm (65.98\")   PainSc: 6  Comment: Diffuse joint pain ranges from 4-8/10   PainLoc: Generalized     Objective   Physical Exam   Constitutional: She is oriented to person, place, and time. Vital signs are normal. She appears " well-developed and well-nourished. She is cooperative.   HENT:   Head: Normocephalic and atraumatic.   Nose: Nose normal.   Eyes: Conjunctivae and lids are normal.   Cardiovascular: Normal rate.    Pulmonary/Chest: Effort normal.   Abdominal: Normal appearance.   Musculoskeletal:        Right shoulder: She exhibits tenderness.        Left shoulder: She exhibits decreased range of motion and tenderness.        Right knee: Tenderness found.        Left knee: Tenderness found.        Lumbar back: She exhibits tenderness.   Neurological: She is alert and oriented to person, place, and time. Gait (ambulates with aid cane) abnormal.   Reflex Scores:       Patellar reflexes are 1+ on the right side and 1+ on the left side.  Skin: Skin is warm, dry and intact.   Psychiatric: She has a normal mood and affect. Her speech is normal and behavior is normal. Judgment and thought content normal. Cognition and memory are normal.   Nursing note and vitals reviewed.      Assessment/Plan   Jung was seen today for arthritis.    Diagnoses and all orders for this visit:    Other chronic pain    DDD (degenerative disc disease), lumbar    Generalized osteoarthritis    Lumbar facet arthropathy    Arthralgia of multiple joints    Encounter for long-term (current) use of high-risk medication    Other orders  -     HYDROcodone-acetaminophen (NORCO)  MG per tablet; Take 1 tablet by mouth Every 6 (Six) Hours As Needed for Moderate Pain .      --- The urine drug screen confirmation from 1-18-18 has been reviewed and the result is appropriate based on patient history and LOUANN report  --- Refill Hydrocodone, change to q6hrs pRN.  --- can receive post-operative pain medication for TKR from Dr. Braden.  --- Follow-up 2 months or sooner if needed. To be seen when released from Dr. Braden post-operative care/medication.     LOUANN REPORT    As part of the patient's treatment plan, I am prescribing controlled substances. The patient has been  made aware of appropriate use of such medications, including potential risk of somnolence, limited ability to drive and/or work safely, and the potential for dependence or overdose. It has also bee made clear that these medications are for use by this patient only, without concomitant use of alcohol or other substances unless prescribed.     Patient has completed prescribing agreement detailing terms of continued prescribing of controlled substances, including monitoring LOUANN reports, urine drug screening, and pill counts if necessary. The patient is aware that inappropriate use will results in cessation of prescribing such medications.    LOUANN report has been reviewed and scanned into the patient's chart.    Date of last LOUANN : 3-16-18    History and physical exam exhibit continued safe and appropriate use of controlled substances.      EMR Dragon/Transcription disclaimer:   Much of this encounter note is an electronic transcription/translation of spoken language to printed text. The electronic translation of spoken language may permit erroneous, or at times, nonsensical words or phrases to be inadvertently transcribed; Although I have reviewed the note for such errors, some may still exist.

## 2018-04-03 ENCOUNTER — APPOINTMENT (OUTPATIENT)
Dept: PREADMISSION TESTING | Facility: HOSPITAL | Age: 73
End: 2018-04-03

## 2018-04-03 ENCOUNTER — PREP FOR SURGERY (OUTPATIENT)
Dept: OTHER | Facility: HOSPITAL | Age: 73
End: 2018-04-03

## 2018-04-03 VITALS
HEART RATE: 70 BPM | OXYGEN SATURATION: 96 % | DIASTOLIC BLOOD PRESSURE: 54 MMHG | WEIGHT: 236.2 LBS | HEIGHT: 66 IN | BODY MASS INDEX: 37.96 KG/M2 | RESPIRATION RATE: 16 BRPM | SYSTOLIC BLOOD PRESSURE: 104 MMHG

## 2018-04-03 DIAGNOSIS — R14.0 ABDOMINAL BLOATING: Primary | ICD-10-CM

## 2018-04-03 DIAGNOSIS — I15.9 SECONDARY HYPERTENSION: ICD-10-CM

## 2018-04-03 DIAGNOSIS — I15.9 SECONDARY HYPERTENSION: Primary | ICD-10-CM

## 2018-04-03 LAB
ABO GROUP BLD: NORMAL
ABO GROUP BLD: NORMAL
ANION GAP SERPL CALCULATED.3IONS-SCNC: 12.9 MMOL/L
BACTERIA UR QL AUTO: ABNORMAL /HPF
BASOPHILS # BLD AUTO: 0.02 10*3/MM3 (ref 0–0.2)
BASOPHILS NFR BLD AUTO: 0.3 % (ref 0–2)
BILIRUB UR QL STRIP: NEGATIVE
BLD GP AB SCN SERPL QL: NEGATIVE
BUN BLD-MCNC: 24 MG/DL (ref 8–23)
BUN/CREAT SERPL: 18.3 (ref 7–25)
CALCIUM SPEC-SCNC: 9.6 MG/DL (ref 8.8–10.5)
CHLORIDE SERPL-SCNC: 102 MMOL/L (ref 98–107)
CLARITY UR: CLEAR
CO2 SERPL-SCNC: 23.1 MMOL/L (ref 22–29)
COLOR UR: YELLOW
CREAT BLD-MCNC: 1.31 MG/DL (ref 0.57–1)
DEPRECATED RDW RBC AUTO: 47.5 FL (ref 37–54)
EOSINOPHIL # BLD AUTO: 0.28 10*3/MM3 (ref 0.1–0.3)
EOSINOPHIL NFR BLD AUTO: 4 % (ref 0–4)
ERYTHROCYTE [DISTWIDTH] IN BLOOD BY AUTOMATED COUNT: 14.1 % (ref 11.5–14.5)
GFR SERPL CREATININE-BSD FRML MDRD: 40 ML/MIN/1.73
GLUCOSE BLD-MCNC: 164 MG/DL (ref 65–99)
GLUCOSE UR STRIP-MCNC: NEGATIVE MG/DL
HCT VFR BLD AUTO: 36 % (ref 37–47)
HGB BLD-MCNC: 11.6 G/DL (ref 12–16)
HGB UR QL STRIP.AUTO: NEGATIVE
HYALINE CASTS UR QL AUTO: ABNORMAL /LPF
IMM GRANULOCYTES # BLD: 0.05 10*3/MM3 (ref 0–0.03)
IMM GRANULOCYTES NFR BLD: 0.7 % (ref 0–0.5)
INR PPP: 1 (ref 0.9–1.1)
KETONES UR QL STRIP: NEGATIVE
LEUKOCYTE ESTERASE UR QL STRIP.AUTO: ABNORMAL
LYMPHOCYTES # BLD AUTO: 2.11 10*3/MM3 (ref 0.6–4.8)
LYMPHOCYTES NFR BLD AUTO: 30.4 % (ref 20–45)
MCH RBC QN AUTO: 30.3 PG (ref 27–31)
MCHC RBC AUTO-ENTMCNC: 32.2 G/DL (ref 31–37)
MCV RBC AUTO: 94 FL (ref 81–99)
MONOCYTES # BLD AUTO: 0.79 10*3/MM3 (ref 0–1)
MONOCYTES NFR BLD AUTO: 11.4 % (ref 3–8)
MUCOUS THREADS URNS QL MICRO: ABNORMAL /HPF
NEUTROPHILS # BLD AUTO: 3.69 10*3/MM3 (ref 1.5–8.3)
NEUTROPHILS NFR BLD AUTO: 53.2 % (ref 45–70)
NITRITE UR QL STRIP: NEGATIVE
NRBC BLD MANUAL-RTO: 0 /100 WBC (ref 0–0)
PH UR STRIP.AUTO: <=5 [PH] (ref 4.5–8)
PLATELET # BLD AUTO: 197 10*3/MM3 (ref 140–500)
PMV BLD AUTO: 9.6 FL (ref 7.4–10.4)
POTASSIUM BLD-SCNC: 4.2 MMOL/L (ref 3.5–5.2)
PROT UR QL STRIP: NEGATIVE
PROTHROMBIN TIME: 13.2 SECONDS (ref 12.1–15)
RBC # BLD AUTO: 3.83 10*6/MM3 (ref 4.2–5.4)
RBC # UR: ABNORMAL /HPF
REF LAB TEST METHOD: ABNORMAL
RH BLD: POSITIVE
RH BLD: POSITIVE
SODIUM BLD-SCNC: 138 MMOL/L (ref 136–145)
SP GR UR STRIP: 1.01 (ref 1–1.03)
SQUAMOUS #/AREA URNS HPF: ABNORMAL /HPF
T&S EXPIRATION DATE: NORMAL
UROBILINOGEN UR QL STRIP: ABNORMAL
WBC NRBC COR # BLD: 6.94 10*3/MM3 (ref 4.8–10.8)
WBC UR QL AUTO: ABNORMAL /HPF

## 2018-04-03 PROCEDURE — 85025 COMPLETE CBC W/AUTO DIFF WBC: CPT | Performed by: ORTHOPAEDIC SURGERY

## 2018-04-03 PROCEDURE — 87081 CULTURE SCREEN ONLY: CPT | Performed by: ORTHOPAEDIC SURGERY

## 2018-04-03 PROCEDURE — 36415 COLL VENOUS BLD VENIPUNCTURE: CPT

## 2018-04-03 PROCEDURE — 86901 BLOOD TYPING SEROLOGIC RH(D): CPT

## 2018-04-03 PROCEDURE — 86900 BLOOD TYPING SEROLOGIC ABO: CPT

## 2018-04-03 PROCEDURE — 87086 URINE CULTURE/COLONY COUNT: CPT | Performed by: ORTHOPAEDIC SURGERY

## 2018-04-03 PROCEDURE — 81001 URINALYSIS AUTO W/SCOPE: CPT | Performed by: ORTHOPAEDIC SURGERY

## 2018-04-03 PROCEDURE — 86900 BLOOD TYPING SEROLOGIC ABO: CPT | Performed by: ORTHOPAEDIC SURGERY

## 2018-04-03 PROCEDURE — 86850 RBC ANTIBODY SCREEN: CPT | Performed by: ORTHOPAEDIC SURGERY

## 2018-04-03 PROCEDURE — 86901 BLOOD TYPING SEROLOGIC RH(D): CPT | Performed by: ORTHOPAEDIC SURGERY

## 2018-04-03 PROCEDURE — 80048 BASIC METABOLIC PNL TOTAL CA: CPT | Performed by: ORTHOPAEDIC SURGERY

## 2018-04-03 PROCEDURE — 85610 PROTHROMBIN TIME: CPT | Performed by: ORTHOPAEDIC SURGERY

## 2018-04-03 RX ORDER — SPIRONOLACTONE 25 MG/1
25 TABLET ORAL DAILY
COMMUNITY
End: 2018-05-21 | Stop reason: SDUPTHER

## 2018-04-03 RX ORDER — ALENDRONATE SODIUM 35 MG/1
35 TABLET ORAL
COMMUNITY
End: 2018-05-04

## 2018-04-03 RX ORDER — ANASTROZOLE 1 MG/1
1 TABLET ORAL DAILY
COMMUNITY
End: 2018-07-27 | Stop reason: SDUPTHER

## 2018-04-03 RX ORDER — ERGOCALCIFEROL 1.25 MG/1
50000 CAPSULE ORAL
COMMUNITY
End: 2018-05-04

## 2018-04-03 RX ORDER — PRAVASTATIN SODIUM 10 MG
10 TABLET ORAL NIGHTLY
COMMUNITY
End: 2018-10-12 | Stop reason: DRUGHIGH

## 2018-04-03 RX ORDER — LEVOTHYROXINE SODIUM 0.03 MG/1
25 TABLET ORAL DAILY
COMMUNITY
End: 2018-06-14 | Stop reason: SDUPTHER

## 2018-04-03 RX ORDER — DULOXETIN HYDROCHLORIDE 60 MG/1
60 CAPSULE, DELAYED RELEASE ORAL DAILY
COMMUNITY
End: 2018-08-01 | Stop reason: SDUPTHER

## 2018-04-03 RX ORDER — CARVEDILOL 12.5 MG/1
12.5 TABLET ORAL 2 TIMES DAILY WITH MEALS
COMMUNITY
End: 2018-10-08 | Stop reason: SDUPTHER

## 2018-04-03 NOTE — PAT
Pt here for PAT visit.  Pre-op tests completed, chg soap given, and instructions reviewed.  Instructed clears until 2 hrs prior to arrival time, voiced understanding.  Getting medical (Robbie) and cardiac (Jana) clearance.  Will need accu-check and potassium dos.

## 2018-04-03 NOTE — DISCHARGE INSTRUCTIONS
PRE-ADMISSION TESTING INSTRUCTIONS FOR TOTAL JOINT PATIENTS    Take these medications the morning of surgery with a small sip of water:  Nexium, Carvedilol, Duloxetine, and Levothyroxine      No aspirin, advil, aleve, ibuprofen, naproxen, diet pills, decongestants, or vitamin/herbal supplements for a week prior to your surgery.    Do not take any insulin or diabetes medications the morning of surgery.      Start your Bactroban ointment on ___4/12/2018________.  You will need to apply the ointment with a clean Q-tip 3 times a day in both sides of your nose for 5 days and the morning of surgery.    General Instructions:    • Do not eat solid food after midnight the night before surgery.  No gum, mints, or hard candy after midnight the night before surgery.  • You may drink clear liquids the day of surgery up until 2 hours before your arrival time.  • Clear liquids are liquids you can see through. Nothing RED in color.    Plain water    Sports drinks  Sodas     Gelatin (Jell-O)  Fruit juices without pulp such as white grape juice and apple juice  Popsicles that contain no fruit or yogurt  Tea or coffee (no cream or milk added)    • It is beneficial for you to have a clear drink that contains carbohydrates just before you leave your house and before your fasting time begins.  We suggest a 20 ounce bottle of Gatorade or Powerade for non-diabetic patients or a 20 ounce bottle of G2 or Powerade Zero for diabetic patients.     • Patients who avoid smoking, chewing tobacco and alcohol for 4 weeks prior to surgery have a reduced risk of post-operative complications.  If at all possible, quit smoking as many days before surgery as you can.    • Do not smoke, use chewing tobacco or drink alcohol after midnight the day of surgery.    • Bring your C-PAP/ BI-PAP machine if you use one.  • Wear clean comfortable clothes and socks.  • Do not wear contact lenses, lotion, deodorant, or make-up.  Bring a case for your glasses if  applicable.   • You may brush your teeth the morning of surgery.  • You may wear dentures/partials, do not put adhesive/glue on them.  • Bring crutches or walker if applicable.  • Leave all other jewelry and valuables at home.  • NOTIFY YOUR SURGEON IF YOU BECOME ILL, HAVE A FEVER, PRODUCTIVE COUGH, OR CANNOT BE HERE THE DAY OF SURGERY.      Preventing a Surgical Site Infection:    • Shower the night before and on the morning of surgery using the chlorhexidine soap you were given.  Use a clean washcloth with the soap.  Place clean sheets on your bed after showering the night before surgery. Do not use the CHG soap on your hair, face, or private areas. Wash your body gently for five (5) minutes. Do not scrub your skin.  Dry with a clean towel and dress in clean clothing.    • Do not shave the surgical area for 10 days-2 weeks prior to surgery  because the razor can irritate skin and make it easier to develop an infection.  • Make sure you, your family, and all healthcare providers clean their hands with soap and water or an alcohol based hand  before caring for you or your wound.      Day of surgery:    Your surgeon’s office will advise you of your arrival time for the day of surgery.    Upon arrival, a Pre-op nurse and Anesthesia provider will review your health history, obtain vital signs, and answer questions you may have.  The only belongings needed at this time will be your home medications and if applicable your C-PAP/BI-PAP machine.  If you are staying overnight your family can leave the rest of your belongings in the car and bring them to your room later.  A Pre-op nurse will start an IV and you may receive medication in preparation for surgery, including something to help you relax.  Your family will be able to see you in the Pre-op area.  While you are in surgery your family should notify the waiting room  if they leave the waiting room area and provide a contact phone number.    If you  have any questions, you can call the Pre-Admission Department at [phone] or your surgeon's office.    Please be aware that surgery does come with discomfort.  We want to make every effort to control your discomfort so please discuss any uncontrolled symptoms with your nurse.   Your doctor will most likely have prescribed pain medications.      Please leave all luggage in the car the morning of surgery.  You will be transported to your hospital room following the recovery period.  Your family can get your luggage at that time.

## 2018-04-04 LAB
BACTERIA SPEC AEROBE CULT: NORMAL
BACTERIA SPEC AEROBE CULT: NORMAL

## 2018-04-05 LAB — MRSA SPEC QL CULT: NORMAL

## 2018-04-06 ENCOUNTER — OFFICE VISIT (OUTPATIENT)
Dept: INTERNAL MEDICINE | Facility: CLINIC | Age: 73
End: 2018-04-06

## 2018-04-06 VITALS
RESPIRATION RATE: 16 BRPM | HEART RATE: 73 BPM | OXYGEN SATURATION: 97 % | BODY MASS INDEX: 36.96 KG/M2 | WEIGHT: 230 LBS | HEIGHT: 66 IN

## 2018-04-06 DIAGNOSIS — I10 ESSENTIAL HYPERTENSION: Primary | ICD-10-CM

## 2018-04-06 DIAGNOSIS — N18.30 CHRONIC KIDNEY DISEASE, STAGE III (MODERATE) (HCC): ICD-10-CM

## 2018-04-06 DIAGNOSIS — E55.9 VITAMIN D DEFICIENCY: ICD-10-CM

## 2018-04-06 DIAGNOSIS — R79.9 ABNORMAL FINDING OF BLOOD CHEMISTRY: ICD-10-CM

## 2018-04-06 DIAGNOSIS — Z23 NEED FOR PNEUMOCOCCAL VACCINATION: ICD-10-CM

## 2018-04-06 DIAGNOSIS — L97.509 DIABETES MELLITUS DUE TO UNDERLYING CONDITION WITH FOOT ULCER, UNSPECIFIED LONG TERM INSULIN USE STATUS: ICD-10-CM

## 2018-04-06 DIAGNOSIS — E08.621 DIABETES MELLITUS DUE TO UNDERLYING CONDITION WITH FOOT ULCER, UNSPECIFIED LONG TERM INSULIN USE STATUS: ICD-10-CM

## 2018-04-06 DIAGNOSIS — M85.88 OSTEOPENIA OF SPINE: ICD-10-CM

## 2018-04-06 DIAGNOSIS — I50.20 CONGESTIVE HEART FAILURE WITH LEFT VENTRICULAR SYSTOLIC DYSFUNCTION (HCC): ICD-10-CM

## 2018-04-06 LAB
HBA1C MFR BLD: 7.7 %
POC CREATININE URINE: 300
POC MICROALBUMIN URINE: 80

## 2018-04-06 PROCEDURE — G0009 ADMIN PNEUMOCOCCAL VACCINE: HCPCS | Performed by: INTERNAL MEDICINE

## 2018-04-06 PROCEDURE — 83036 HEMOGLOBIN GLYCOSYLATED A1C: CPT | Performed by: INTERNAL MEDICINE

## 2018-04-06 PROCEDURE — 90732 PPSV23 VACC 2 YRS+ SUBQ/IM: CPT | Performed by: INTERNAL MEDICINE

## 2018-04-06 PROCEDURE — 82044 UR ALBUMIN SEMIQUANTITATIVE: CPT | Performed by: INTERNAL MEDICINE

## 2018-04-06 PROCEDURE — 99214 OFFICE O/P EST MOD 30 MIN: CPT | Performed by: INTERNAL MEDICINE

## 2018-04-06 NOTE — PROGRESS NOTES
Jung Short is a 72 y.o. female, who presents with a chief complaint of   Chief Complaint   Patient presents with   • Pre-op Exam       HPI     71yo female with complicated medical past. She has CHF,  HTN, hypothyroidism, DM2 with neuropathy, Obesity, CKD (seeing renal)and now planning L knee replacement with Dr. Chambers.    She is seeing pain management for her pain syndrome. Has dm neuropathy, controlled with pain management. Advil not help so on chronic narcotic.  She has osteopenia - doing okay on fosamax.   Has gout, previously seeing rheum but controlled and doing well.       Recent sciatica resolved with PT and has not reoccurred.       The following portions of the patient's history were reviewed and updated as appropriate: allergies, current medications, past family history, past medical history, past social history, past surgical history and problem list.    Allergies: Latex    Current Outpatient Prescriptions:   •  alendronate (FOSAMAX) 35 MG tablet, Take 35 mg by mouth Every 7 (Seven) Days., Disp: , Rfl:   •  anastrozole (ARIMIDEX) 1 MG tablet, Take 1 mg by mouth Daily., Disp: , Rfl:   •  aspirin 81 MG chewable tablet, Chew 81 mg Daily., Disp: , Rfl:   •  bumetanide (BUMEX) 2 MG tablet, Take 2 mg by mouth Daily., Disp: , Rfl:   •  carvedilol (COREG) 12.5 MG tablet, Take 12.5 mg by mouth 2 (Two) Times a Day With Meals., Disp: , Rfl:   •  Coenzyme Q10 (CO Q 10) 100 MG capsule, Take 1 tablet by mouth daily., Disp: , Rfl:   •  Docusate Calcium (STOOL SOFTENER PO), Take 1 tablet by mouth Daily As Needed (constipation)., Disp: , Rfl:   •  DULoxetine (CYMBALTA) 60 MG capsule, Take 60 mg by mouth Daily., Disp: , Rfl:   •  esomeprazole (nexIUM) 20 MG capsule, Take 20 mg by mouth Every Morning Before Breakfast., Disp: , Rfl:   •  glimepiride (AMARYL) 1 MG tablet, Take 1 tablet by mouth 2 (Two) Times a Day. (Patient taking differently: Take 1 mg by mouth 2 (Two) Times a Day.), Disp: 180 tablet, Rfl: 1  •  " Glucose Blood (BLOOD GLUCOSE TEST) strip, Blood Glucose Test In Vitro Strip; Patient Sig: Blood Glucose Test In Vitro Strip She checks blood sugars a.m. and p.m. she has the Accu-Chek christina; 60; 6; 27-Jun-2013; Active, Disp: , Rfl:   •  HYDROcodone-acetaminophen (NORCO)  MG per tablet, Take 1 tablet by mouth Every 6 (Six) Hours As Needed for Moderate Pain ., Disp: 120 tablet, Rfl: 0  •  levothyroxine (SYNTHROID, LEVOTHROID) 25 MCG tablet, Take 25 mcg by mouth Daily., Disp: , Rfl:   •  lisinopril (PRINIVIL,ZESTRIL) 5 MG tablet, Take 5 mg by mouth Daily., Disp: , Rfl: 0  •  magnesium oxide (MAG-OX) 400 MG tablet, Take 400 mg by mouth Daily., Disp: , Rfl:   •  Multiple Vitamin (MULTI VITAMIN DAILY PO), Take 1 tablet by mouth Daily., Disp: , Rfl:   •  pravastatin (PRAVACHOL) 10 MG tablet, Take 10 mg by mouth Every Night., Disp: , Rfl:   •  spironolactone (ALDACTONE) 25 MG tablet, Take 25 mg by mouth Daily., Disp: , Rfl:   •  vitamin B-12 (CYANOCOBALAMIN) 100 MCG tablet, Take 1 tablet by mouth daily., Disp: , Rfl:   •  vitamin D (ERGOCALCIFEROL) 67456 units capsule capsule, Take 50,000 Units by mouth Every 7 (Seven) Days., Disp: , Rfl:   There are no discontinued medications.    Review of Systems   Constitutional: Negative.    Respiratory: Negative.  Negative for cough.    Cardiovascular: Negative.  Negative for chest pain and palpitations.   Gastrointestinal: Negative.    Endocrine:        Diabetes.    Genitourinary: Negative.    Musculoskeletal:        Lhand arthralgia, diffuse OA   Neurological: Negative.    All other systems reviewed and are negative.            Pulse 73   Resp 16   Ht 167.6 cm (66\")   Wt 104 kg (230 lb)   SpO2 97%   BMI 37.12 kg/m²       Physical Exam   Constitutional: She is oriented to person, place, and time. She appears well-developed and well-nourished.   HENT:   Head: Normocephalic and atraumatic.   Right Ear: External ear normal.   Left Ear: External ear normal.   Eyes: EOM are " normal. Pupils are equal, round, and reactive to light.   Neck: Normal range of motion. Neck supple. No JVD present. No tracheal deviation present. No thyromegaly present.   Cardiovascular: Normal rate, regular rhythm and normal heart sounds.    No murmur heard.  Pulmonary/Chest: Effort normal and breath sounds normal.   Lymphadenopathy:     She has no cervical adenopathy.   Neurological: She is alert and oriented to person, place, and time.   Skin: Skin is warm and dry.   Psychiatric: She has a normal mood and affect. Her behavior is normal.   Vitals reviewed.      Lab Results (most recent)     None        8/14/17 - mammogram neg  8/14/17 - dexa    Results for orders placed or performed in visit on 04/03/18   MRSA Screen Culture - Swab, Nares   Result Value Ref Range    MRSA SCREEN CX       No Methicillin Resistant Staphylococcus aureus isolated   Urine Culture - Urine, Urine, Clean Catch   Result Value Ref Range    Urine Culture      Urine Culture 50,000 CFU/mL Mixed Dee Isolated    Basic metabolic panel   Result Value Ref Range    Glucose 164 (H) 65 - 99 mg/dL    BUN 24 (H) 8 - 23 mg/dL    Creatinine 1.31 (H) 0.57 - 1.00 mg/dL    Sodium 138 136 - 145 mmol/L    Potassium 4.2 3.5 - 5.2 mmol/L    Chloride 102 98 - 107 mmol/L    CO2 23.1 22.0 - 29.0 mmol/L    Calcium 9.6 8.8 - 10.5 mg/dL    eGFR Non African Amer 40 (L) >60 mL/min/1.73    BUN/Creatinine Ratio 18.3 7.0 - 25.0    Anion Gap 12.9 mmol/L   Protime-INR   Result Value Ref Range    Protime 13.2 12.1 - 15.0 Seconds    INR 1.00 0.90 - 1.10   Urinalysis With / Culture If Indicated - Urine, Clean Catch   Result Value Ref Range    Color, UA Yellow Yellow, Straw    Appearance, UA Clear Clear    pH, UA <=5.0 4.5 - 8.0    Specific Gravity, UA 1.015 1.003 - 1.030    Glucose, UA Negative Negative    Ketones, UA Negative Negative, 80 mg/dL (3+), >=160 mg/dL (4+)    Bilirubin, UA Negative Negative    Blood, UA Negative Negative    Protein, UA Negative Negative    Leuk  Esterase, UA Small (1+) (A) Negative    Nitrite, UA Negative Negative    Urobilinogen, UA 0.2 E.U./dL 0.2 - 1.0 E.U./dL   CBC Auto Differential   Result Value Ref Range    WBC 6.94 4.80 - 10.80 10*3/mm3    RBC 3.83 (L) 4.20 - 5.40 10*6/mm3    Hemoglobin 11.6 (L) 12.0 - 16.0 g/dL    Hematocrit 36.0 (L) 37.0 - 47.0 %    MCV 94.0 81.0 - 99.0 fL    MCH 30.3 27.0 - 31.0 pg    MCHC 32.2 31.0 - 37.0 g/dL    RDW 14.1 11.5 - 14.5 %    RDW-SD 47.5 37.0 - 54.0 fl    MPV 9.6 7.4 - 10.4 fL    Platelets 197 140 - 500 10*3/mm3    Neutrophil % 53.2 45.0 - 70.0 %    Lymphocyte % 30.4 20.0 - 45.0 %    Monocyte % 11.4 (H) 3.0 - 8.0 %    Eosinophil % 4.0 0.0 - 4.0 %    Basophil % 0.3 0.0 - 2.0 %    Immature Grans % 0.7 (H) 0.0 - 0.5 %    Neutrophils, Absolute 3.69 1.50 - 8.30 10*3/mm3    Lymphocytes, Absolute 2.11 0.60 - 4.80 10*3/mm3    Monocytes, Absolute 0.79 0.00 - 1.00 10*3/mm3    Eosinophils, Absolute 0.28 0.10 - 0.30 10*3/mm3    Basophils, Absolute 0.02 0.00 - 0.20 10*3/mm3    Immature Grans, Absolute 0.05 (H) 0.00 - 0.03 10*3/mm3    nRBC 0.0 0.0 - 0.0 /100 WBC   Urinalysis, Microscopic Only - Urine, Clean Catch   Result Value Ref Range    RBC, UA 0-2 (A) None Seen /HPF    WBC, UA 6-12 (A) None Seen /HPF    Bacteria, UA 1+ (A) None Seen /HPF    Squamous Epithelial Cells, UA 7-12 (A) None Seen, 0-2 /HPF    Hyaline Casts, UA 3-6 None Seen /LPF    Mucus, UA Trace None Seen, Trace /HPF    Methodology Manual Light Microscopy    Type and screen   Result Value Ref Range    ABO Type O     RH type Positive     Antibody Screen Negative     T&S Expiration Date 4/17/2018 11:59:00 PM    ABORH 2ND SPECIMEN VERIFICATION   Result Value Ref Range    ABO Type O     RH type Positive            Jung was seen today for pre-op exam.    Diagnoses and all orders for this visit:    Essential hypertension    Congestive heart failure with left ventricular systolic dysfunction    Vitamin D deficiency    Osteopenia of spine    Chronic kidney disease,  stage III (moderate)    Creatinine is stable.  Seeing renal  HTN well controlled  CAD/CHF has appt with Dr. Bates   Asymptomatic - expect she will be cleared  Need renal note - request today  DEXA in August. Tolerating fosamax at 35 mg, does have stable reflux  Mammogram due in August as well.   Really want her to do HmR - information given.  Recheck chol and  Hm catch up after surgery.      Return if symptoms worsen or fail to improve.    Bubba Avalos MD  04/06/2018    Addend with Dr. Bates note reviewed, cleared for surgery.

## 2018-04-09 ENCOUNTER — OFFICE VISIT (OUTPATIENT)
Dept: CARDIOLOGY | Facility: CLINIC | Age: 73
End: 2018-04-09

## 2018-04-09 VITALS
HEART RATE: 75 BPM | BODY MASS INDEX: 36.8 KG/M2 | SYSTOLIC BLOOD PRESSURE: 140 MMHG | HEIGHT: 66 IN | WEIGHT: 229 LBS | DIASTOLIC BLOOD PRESSURE: 74 MMHG

## 2018-04-09 DIAGNOSIS — I42.8 OTHER CARDIOMYOPATHY (HCC): Primary | ICD-10-CM

## 2018-04-09 DIAGNOSIS — I10 ESSENTIAL HYPERTENSION: ICD-10-CM

## 2018-04-09 DIAGNOSIS — N18.30 CHRONIC KIDNEY DISEASE, STAGE III (MODERATE) (HCC): ICD-10-CM

## 2018-04-09 DIAGNOSIS — Z01.810 PREOPERATIVE CARDIOVASCULAR EXAMINATION: ICD-10-CM

## 2018-04-09 DIAGNOSIS — M1A.39X0 CHRONIC GOUT DUE TO RENAL IMPAIRMENT OF MULTIPLE SITES WITHOUT TOPHUS: ICD-10-CM

## 2018-04-09 DIAGNOSIS — E11.9 TYPE 2 DIABETES MELLITUS WITHOUT COMPLICATION, WITHOUT LONG-TERM CURRENT USE OF INSULIN (HCC): ICD-10-CM

## 2018-04-09 PROCEDURE — 99214 OFFICE O/P EST MOD 30 MIN: CPT | Performed by: INTERNAL MEDICINE

## 2018-04-09 PROCEDURE — 93000 ELECTROCARDIOGRAM COMPLETE: CPT | Performed by: INTERNAL MEDICINE

## 2018-04-09 NOTE — PROGRESS NOTES
Date of Office Visit: 2018  Encounter Provider: Aria Bates MD  Place of Service: [facility] CARDIOLOGY  Patient Name: Jung Short  :1945      Patient ID:  Jung Short is a 72 y.o. female is here for  followup for preoperative evaluation.         History of Present Illness    She presented to [facility] in 2015 with acute congestive heart failure, systolic and diastolic. She had a 2-D echocardiogram which revealed an ejection fraction of 10-15%, moderate left ventricular dilation, severe mitral and tricuspid insufficiency, and her aortic valve was normal. She was transferred to [facility] for a cardiac catheterization. 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 She also had a right heart catheterization done which revealed an RV pressure of 50/20, PA pressure 50/30 with the main of 38 and a capillary wedge pressure of 31 mmHg. Her right atrial pressure was 20 mmHg. Her cardiac output was 3.9 liters per minute. She was treated medically and diuresed.        She had a 2-D echocardiogram with Doppler  performed on 2015 which showed her ejection fraction to be 50%. There was normal  segmental wall motion. There was moderate left atrial dilation. A small patent foramen  ovale/atrial septal defect by saline contrast study and trace mitral and tricuspid  insufficiency. This is a significant improvement from her prior echocardiogram.       She is on anastrozole for right breast cancer.       She had normal renal ultrasound ordered by Dr. Hannah 2017.  She has chronic renal insufficiency and follows with   Brielle.      She had a carotid duplex study done April 2017 which showed mild carotid arteries.  She had a Holter monitor done just benign 4/2017.    Her kidney function is doing much better.  She follows with Dr. Hannah.  She is preparing to have left knee surgery done with Dr. Chambers 4/17/18.  She has no orthopnea or PND.  She has no lower extremity edema.  She's had no tachycardia, dizziness or syncope.  She has no chest pain or pressure with activity.  She has not exertional dyspnea but has not been exercising and has gained weight. Her BMP done 4/3/18 showed creatinine of 1.3.     Past Medical History:   Diagnosis Date   • Anemia    • Benign breast disease    • Cancer 2015    Right breast   • Cellulitis of leg     May-2003 right lower extremity   • CHF (congestive heart failure)     Dr Bates follows   • Chronic kidney disease     Dr Hannah follows   • Chronic ulcer of right foot     Non-pressure, history of    • Depression    • Diabetic peripheral neuropathy    • Diarrhea    • Diverticulosis    • Encounter for eye exam    • Fracture of left wrist     history of   • Gastroesophageal reflux    • Hiatal hernia    • History of bone density study 09/20/2012   • History of colonoscopy     done 6/03 recheck in 10 years.   Done july 2013 recheck in 5 years   • History of mammography, screening 09/20/2012   • Hospital discharge follow-up    • Hyperlipidemia    • Hypertension    • Hypothyroidism    • Impingement syndrome of right shoulder    • Joint pain    • Menopausal disorder    • Methicillin resistant Staphylococcus aureus infection 2012    after bladder surgery   • MRSA (methicillin resistant staph aureus) culture positive    • Nausea and vomiting    • Nonischemic cardiomyopathy     Ejection fraction 10% per 2-D echo with Doppler however she did have cardiac catheterization April 2015 which showed ejection fraction 20% with global hypokinesis and severe mitral insufficiency   • Osteoarthritis     generalized,  sched janai TKA   • Paroxysmal atrial fibrillation     Dr Bates follows   • Postoperative infection     July of 2012 following her hysterectomy far vaginal wall prolapse. She was on antibiotics and wound dressings for 2 months.   • Shoulder pain, bilateral     has tears on both sides   • Syncope, psychogenic 4/19/2017   • Toe ulcer     h/o to both feet, d/t shoes rubbing per patient   • Transient alteration of awareness 4/19/2017         Past Surgical History:   Procedure Laterality Date   • ABDOMINAL SURGERY  2012    had to be reopened after bladder surgery d/t infection   • AMPUTATION FOOT / TOE Right 11/2013    Rt foot amputation MTP first toe   • BLADDER SURGERY  2012   • BREAST BIOPSY     • BREAST SURGERY  06/29/2015    Percutaneous ultrasound-guided placement of metal localized clip 1st lesion   • CARDIAC CATHETERIZATION  2015   • CATARACT EXTRACTION Bilateral 2017   • DEBRIDEMENT  FOOT Right 01/2013    During hospitalization    • EPIDURAL BLOCK      4 chronic back pain and leg pain last epidurals done 5-2012 she had no benefit at all from this procedure.   • FOOT SURGERY Bilateral     several   • HERNIA REPAIR      At the abdomen February 2007 Dr. Mendez   • INCISIONAL HERNIA REPAIR  05/16/2014    Recurrent. Incarcerated. With mesh implantation   • MASTECTOMY Right 05/2015   • SHOULDER SURGERY Right     x2 RCR   • TOTAL ABDOMINAL HYSTERECTOMY      with oophorectomy-Done June-2012 secondary to vaginal wall prolapse.   • TOTAL KNEE ARTHROPLASTY Right        Current Outpatient Prescriptions on File Prior to Visit   Medication Sig Dispense Refill   • alendronate (FOSAMAX) 35 MG tablet Take 35 mg by mouth Every 7 (Seven) Days.     • anastrozole (ARIMIDEX) 1 MG tablet Take 1 mg by mouth Daily.     • aspirin 81 MG chewable tablet Chew 81 mg Daily.     • bumetanide (BUMEX) 2 MG tablet Take 2 mg by mouth Daily.     • carvedilol (COREG) 12.5 MG tablet Take 12.5 mg by mouth 2 (Two) Times a Day With Meals.     •  Coenzyme Q10 (CO Q 10) 100 MG capsule Take 1 tablet by mouth daily.     • Docusate Calcium (STOOL SOFTENER PO) Take 1 tablet by mouth Daily As Needed (constipation).     • DULoxetine (CYMBALTA) 60 MG capsule Take 60 mg by mouth Daily.     • esomeprazole (nexIUM) 20 MG capsule Take 20 mg by mouth Every Morning Before Breakfast.     • glimepiride (AMARYL) 1 MG tablet Take 1 tablet by mouth 2 (Two) Times a Day. (Patient taking differently: Take 1 mg by mouth 2 (Two) Times a Day.) 180 tablet 1   • Glucose Blood (BLOOD GLUCOSE TEST) strip Blood Glucose Test In Vitro Strip; Patient Sig: Blood Glucose Test In Vitro Strip She checks blood sugars a.m. and p.m. she has the Accu-Chek christina; 60; 6; 27-Jun-2013; Active     • HYDROcodone-acetaminophen (NORCO)  MG per tablet Take 1 tablet by mouth Every 6 (Six) Hours As Needed for Moderate Pain . 120 tablet 0   • levothyroxine (SYNTHROID, LEVOTHROID) 25 MCG tablet Take 25 mcg by mouth Daily.     • lisinopril (PRINIVIL,ZESTRIL) 5 MG tablet Take 5 mg by mouth Daily.  0   • magnesium oxide (MAG-OX) 400 MG tablet Take 400 mg by mouth Daily.     • Multiple Vitamin (MULTI VITAMIN DAILY PO) Take 1 tablet by mouth Daily.     • pravastatin (PRAVACHOL) 10 MG tablet Take 10 mg by mouth Every Night.     • spironolactone (ALDACTONE) 25 MG tablet Take 25 mg by mouth Daily.     • vitamin B-12 (CYANOCOBALAMIN) 100 MCG tablet Take 1 tablet by mouth daily.     • vitamin D (ERGOCALCIFEROL) 57560 units capsule capsule Take 50,000 Units by mouth Every 7 (Seven) Days.       No current facility-administered medications on file prior to visit.        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 and [place])    [employer]           Review of Systems   Constitution: Negative.   HENT: Negative for congestion.    Eyes: Negative for vision loss in left eye and vision loss in right eye.   Respiratory: Negative.  Negative for cough, hemoptysis, shortness of breath, sleep disturbances due to breathing, snoring, sputum production and wheezing.    Endocrine: Negative.    Hematologic/Lymphatic: Negative.    Skin: Negative for poor wound healing and rash.   Musculoskeletal: Negative for falls, gout, muscle cramps and myalgias.   Gastrointestinal: Negative for abdominal pain, diarrhea, dysphagia, hematemesis, melena, nausea and vomiting.   Neurological: Negative for excessive daytime sleepiness, dizziness, headaches, light-headedness, loss of balance, seizures and vertigo.   Psychiatric/Behavioral: Negative for depression and substance abuse. The patient is not nervous/anxious.        Procedures    ECG 12 Lead  Date/Time: 4/9/2018 2:38 PM  Performed by: PITER BROOKE  Authorized by: PITER BROOKE   Comparison: compared with previous ECG   Similar to previous ECG  Rhythm: sinus rhythm  QRS axis: left  Other findings: PRWP  Clinical impression: non-specific ECG                Objective:      Vitals:    04/09/18 1415   BP: 140/74   BP Location: Left arm   Patient Position: Sitting   Pulse: 75   Weight: 104 kg (229 lb)   Height: 167.6 cm (66\")     Body mass index is 36.96 kg/m².    Physical Exam   Constitutional: She is oriented to person, place, and time. She appears well-developed and well-nourished. No distress.   HENT:   Head: Normocephalic and atraumatic. "   Eyes: Conjunctivae are normal. No scleral icterus.   Neck: Neck supple. No JVD present. Carotid bruit is not present. No thyromegaly present.   Cardiovascular: Normal rate, regular rhythm, S1 normal, S2 normal, normal heart sounds and intact distal pulses.   No extrasystoles are present. PMI is not displaced.  Exam reveals no gallop.    No murmur heard.  Pulses:       Carotid pulses are 2+ on the right side, and 2+ on the left side.       Radial pulses are 2+ on the right side, and 2+ on the left side.        Dorsalis pedis pulses are 2+ on the right side, and 2+ on the left side.        Posterior tibial pulses are 2+ on the right side, and 2+ on the left side.   Pulmonary/Chest: Effort normal and breath sounds normal. No respiratory distress. She has no wheezes. She has no rhonchi. She has no rales. She exhibits no tenderness.   Abdominal: Soft. Bowel sounds are normal. She exhibits no distension, no abdominal bruit and no mass. There is no tenderness.   Musculoskeletal: She exhibits no edema or deformity.   Lymphadenopathy:     She has no cervical adenopathy.   Neurological: She is alert and oriented to person, place, and time. No cranial nerve deficit.   Skin: Skin is warm and dry. No rash noted. She is not diaphoretic. No cyanosis. No pallor. Nails show no clubbing.   Psychiatric: She has a normal mood and affect. Judgment normal.   Vitals reviewed.      Lab Review:       Assessment:      Diagnosis Plan   1. Other cardiomyopathy     2. Essential hypertension       1. H/o Nonischemic cardiomyopathy. Her ejection fraction was 10-20%. At this  time she is New York Heart Association class 2a. Her repeat echocardiogram 6/2015 showed  LVEF 50% with trace mitral and tricuspid insufficiency. No CHF now.    2. Hypertension, controlled.  3. DM, type 2.  4. Anomalous coronary artery.   5. H/o right breast cancer. Sees Dr. Draper.   6. Severe OA of the knees.  7. CKD, sees Dr. Hannah.   8.  Left knee OA.  She is low CV  risk to undergo surgery 4/17/18 with Dr. Chambers.           Plan:      No further testing, no med changes, see back in 1 year.

## 2018-04-12 RX ORDER — HYDROCODONE BITARTRATE AND ACETAMINOPHEN 10; 325 MG/1; MG/1
1 TABLET ORAL EVERY 6 HOURS PRN
Qty: 16 TABLET | Refills: 0 | Status: ON HOLD | OUTPATIENT
Start: 2018-04-12 | End: 2018-04-17

## 2018-04-12 NOTE — TELEPHONE ENCOUNTER
Will give 4 day supply. She can . Then ortho can give what they feel is appropriate. Let patient know. Thanks. piotr

## 2018-04-12 NOTE — TELEPHONE ENCOUNTER
Medication Refill Request    Date of phone call: 4-12-18    Medication being requested: Hydrocodone-apap  mg sig: Take 1 tablet by mouth every 6 hours as needed for moderate pain  Qty: 120 tablets    Date of last visit: 3-19-18    Date of last refill: 3-19-18    LOUANN up to date?: 4-16-18    Next Follow up?: 5-17-18    Any new pertinent information? (i.e, new medication allergies, new use of medications, change in patient's health or condition, non-compliance or inconsistency with prescribing agreement?): Pt states she will run out of medication on Sunday, two days before her surgery

## 2018-04-16 ENCOUNTER — OFFICE VISIT (OUTPATIENT)
Dept: ORTHOPEDIC SURGERY | Facility: CLINIC | Age: 73
End: 2018-04-16

## 2018-04-16 ENCOUNTER — ANESTHESIA EVENT (OUTPATIENT)
Dept: PERIOP | Facility: HOSPITAL | Age: 73
End: 2018-04-16

## 2018-04-16 DIAGNOSIS — M17.12 PRIMARY OSTEOARTHRITIS OF LEFT KNEE: Primary | ICD-10-CM

## 2018-04-16 PROCEDURE — S0260 H&P FOR SURGERY: HCPCS | Performed by: ORTHOPAEDIC SURGERY

## 2018-04-16 ASSESSMENT — KOOS JR
KOOS JR SCORE: 44.905
KOOS JR SCORE: 17

## 2018-04-16 NOTE — H&P
Orthopedic Surgery    Patient Care Team:  Bubba Avalos MD as PCP - General (Internal Medicine & Pediatrics)  JOSE ANTONIO Akhtar as PCP - Claims Attributed  Tory Pagan MD as Consulting Physician (Hematology and Oncology)  Lina Fisher MD as Referring Physician (General Surgery)  Shanda Gerardo RN as Care Coordinator (Population Health)  Lvie Chambers MD as Surgeon (Orthopedic Surgery)    CHIEF COMPLAINT: Osteoarthritis left knee    HISTORY OF PRESENT ILLNESS: 72-year-old female end-stage degenerative arthritis of the left knee that failed to respond to nonoperative management is admitted at this time to undergo a left total knee arthroplasty.  Have discussed with the patient in detail the risk of surgery which include but not limited to an action and the need for multiple procedures including implant removal to eradicate infection particularly in person as she is had MRSA in the past, nerve injury, vascular injury, periprosthetic fracture, DVT, the need for revision surgery and persistent pain and discomfort despite a well implanted total knee.  Discussed the rehabilitation which is 3 months to a year.  Patient understands and agrees to proceed.  Has been cleared by primary care physician for the procedure.          Past Medical History:   Diagnosis Date   • Anemia    • Benign breast disease    • Cancer 2015    Right breast   • Cellulitis of leg     May-2003 right lower extremity   • CHF (congestive heart failure)     Dr Bates follows   • Chronic kidney disease     Dr Hannah follows   • Chronic ulcer of right foot     Non-pressure, history of    • Depression    • Diabetic peripheral neuropathy    • Diarrhea    • Diverticulosis    • Encounter for eye exam    • Fracture of left wrist     history of   • Gastroesophageal reflux    • Hiatal hernia    • History of bone density study 09/20/2012   • History of colonoscopy     done 6/03 recheck in 10 years.   Done july 2013 recheck in 5 years   • History  of mammography, screening 09/20/2012   • Hospital discharge follow-up    • Hyperlipidemia    • Hypertension    • Hypothyroidism    • Impingement syndrome of right shoulder    • Joint pain    • Menopausal disorder    • Methicillin resistant Staphylococcus aureus infection 2012    after bladder surgery   • MRSA (methicillin resistant staph aureus) culture positive    • Nausea and vomiting    • Nonischemic cardiomyopathy     Ejection fraction 10% per 2-D echo with Doppler however she did have cardiac catheterization April 2015 which showed ejection fraction 20% with global hypokinesis and severe mitral insufficiency   • Osteoarthritis     generalized, sched jania TKA   • Paroxysmal atrial fibrillation     Dr Bates follows   • Postoperative infection     July of 2012 following her hysterectomy far vaginal wall prolapse. She was on antibiotics and wound dressings for 2 months.   • Shoulder pain, bilateral     has tears on both sides   • Syncope, psychogenic 4/19/2017   • Toe ulcer     h/o to both feet, d/t shoes rubbing per patient   • Transient alteration of awareness 4/19/2017     Past Surgical History:   Procedure Laterality Date   • ABDOMINAL SURGERY  2012    had to be reopened after bladder surgery d/t infection   • AMPUTATION FOOT / TOE Right 11/2013    Rt foot amputation MTP first toe   • BLADDER SURGERY  2012   • BREAST BIOPSY     • BREAST SURGERY  06/29/2015    Percutaneous ultrasound-guided placement of metal localized clip 1st lesion   • CARDIAC CATHETERIZATION  2015   • CATARACT EXTRACTION Bilateral 2017   • DEBRIDEMENT  FOOT Right 01/2013    During hospitalization    • EPIDURAL BLOCK      4 chronic back pain and leg pain last epidurals done 5-2012 she had no benefit at all from this procedure.   • FOOT SURGERY Bilateral     several   • HERNIA REPAIR      At the abdomen February 2007 Dr. Mendez   • INCISIONAL HERNIA REPAIR  05/16/2014    Recurrent. Incarcerated. With mesh implantation   • MASTECTOMY Right  05/2015   • SHOULDER SURGERY Right     x2 RCR   • TOTAL ABDOMINAL HYSTERECTOMY      with oophorectomy-Done June-2012 secondary to vaginal wall prolapse.   • TOTAL KNEE ARTHROPLASTY Right      Family History   Problem Relation Age of Onset   • Colonic polyp Mother    • Hypertension Mother    • Migraines Mother    • Mental illness Mother    • Depression Mother    • Coronary artery disease Father    • Other Father      Cardiac Disorder   • Colon cancer Father    • Kidney disease Father    • Cancer Father    • Breast cancer Maternal Aunt    • Colon cancer Brother    • Alcohol abuse Brother    • Arthritis Sister    • Hypertension Brother    • Lung disease Brother    • Alcohol abuse Brother    • Malig Hyperthermia Neg Hx      Social History   Substance Use Topics   • Smoking status: Former Smoker     Packs/day: 3.00     Years: 30.00     Types: Cigarettes     Quit date: 1983   • Smokeless tobacco: Never Used   • Alcohol use No      Comment: h/o quit 1980s     No prescriptions prior to admission.     Allergies:  Latex    REVIEW OF SYSTEMS:  Please see the above history of present illness for pertinent positives and negatives.  The remainder of the patient's systems have been reviewed and are negative.    Vital Signs            Physical Exam:  Physical Exam   Constitutional: Patient appears well-developed and well-nourished and in no acute distress   HEENT:   Head: Normocephalic and atraumatic.   Eyes:  Pupils are equal, round, and reactive to light.  Mouth and Throat: Patient has moist mucous membranes. Oropharynx is clear of any erythema or exudate.     Neck: Neck supple. No JVD present. No thyromegaly present. No lymphadenopathy present.  Cardiovascular: Regular rate, regular rhythm.  Pulmonary/Chest: Lungs are clear to auscultation bilaterally.  Abdominal:benign,soft with bowel sounds  Musculoskeletal: Normal posture.  Extremities: Left leg shows good distal pulses no motor deficit no sensory loss.  0-120° of motion with  moderate pain and crepitus throughout the arc of motion no instability no patella subluxation.  Quad function is +4 out of 5 secondary to pain in her calf is nontender negative Homans.    Neurological: Patient is alert and oriented.  Psychological:   Mood and behavior appropriate.  Skin: Skin is warm and dry.     Results Review:    I reviewed the patient's new clinical results.  Lab Results (most recent)     None          Imaging Results (most recent)     None            ECG/EMG Results (most recent)     None            Assessment/Plan osteoarthritis left knee        I discussed the patients findings and my recommendations with patient and plan to proceed with left total knee arthroplasty    Live Chambers MD  04/16/18  1:36 PM

## 2018-04-16 NOTE — PROGRESS NOTES
Subjective: Left knee pain     Patient ID: Jung Short is a 72 y.o. female.    Chief Complaint:    History of Present Illness patient seen for H&P done a left total knee arthroplasty tomorrow       Social History     Occupational History   • [employer]  Retired     Social History Main Topics   • Smoking status: Former Smoker     Packs/day: 3.00     Years: 30.00     Types: Cigarettes     Quit date: 1983   • Smokeless tobacco: Never Used   • Alcohol use No      Comment: h/o quit 1980s   • Drug use: No   • Sexual activity: Defer      Comment: celibate      Review of Systems   Constitutional: Negative for chills, diaphoresis, fever and unexpected weight change.   HENT: Negative for hearing loss, nosebleeds, sore throat and tinnitus.    Eyes: Negative for pain and visual disturbance.   Respiratory: Negative for cough, shortness of breath and wheezing.    Cardiovascular: Negative for chest pain and palpitations.   Gastrointestinal: Negative for abdominal pain, diarrhea, nausea and vomiting.   Endocrine: Negative for cold intolerance, heat intolerance and polydipsia.   Genitourinary: Negative for difficulty urinating, dysuria and hematuria.   Musculoskeletal: Positive for arthralgias and joint swelling. Negative for myalgias.   Skin: Negative for rash and wound.   Allergic/Immunologic: Negative for environmental allergies.   Neurological: Negative for dizziness, syncope and numbness.   Hematological: Does not bruise/bleed easily.   Psychiatric/Behavioral: Negative for dysphoric mood and sleep disturbance. The patient is not nervous/anxious.    All other systems reviewed and are negative.          Objective:     Ortho Exam  H&P completed    Assessment:       1. Primary osteoarthritis of left knee          Plan:        All questions answered

## 2018-04-17 ENCOUNTER — HOSPITAL ENCOUNTER (INPATIENT)
Facility: HOSPITAL | Age: 73
LOS: 3 days | Discharge: NURSING, REHAB OR LONG-TERM CARE | End: 2018-04-20
Attending: ORTHOPAEDIC SURGERY | Admitting: ORTHOPAEDIC SURGERY

## 2018-04-17 ENCOUNTER — ANESTHESIA (OUTPATIENT)
Dept: PERIOP | Facility: HOSPITAL | Age: 73
End: 2018-04-17

## 2018-04-17 ENCOUNTER — APPOINTMENT (OUTPATIENT)
Dept: GENERAL RADIOLOGY | Facility: HOSPITAL | Age: 73
End: 2018-04-17

## 2018-04-17 DIAGNOSIS — M17.12 PRIMARY OSTEOARTHRITIS OF LEFT KNEE: ICD-10-CM

## 2018-04-17 PROBLEM — Z79.899 ENCOUNTER FOR LONG-TERM (CURRENT) USE OF MEDICATIONS: Status: RESOLVED | Noted: 2018-01-18 | Resolved: 2018-04-17

## 2018-04-17 LAB
GLUCOSE BLDC GLUCOMTR-MCNC: 174 MG/DL (ref 70–130)
POTASSIUM BLD-SCNC: 3.8 MMOL/L (ref 3.5–5.2)

## 2018-04-17 PROCEDURE — 8E0YXBZ COMPUTER ASSISTED PROCEDURE OF LOWER EXTREMITY: ICD-10-PCS | Performed by: ORTHOPAEDIC SURGERY

## 2018-04-17 PROCEDURE — 25010000002 MORPHINE PER 10 MG: Performed by: ORTHOPAEDIC SURGERY

## 2018-04-17 PROCEDURE — 87075 CULTR BACTERIA EXCEPT BLOOD: CPT | Performed by: ORTHOPAEDIC SURGERY

## 2018-04-17 PROCEDURE — L1830 KO IMMOB CANVAS LONG PRE OTS: HCPCS | Performed by: ORTHOPAEDIC SURGERY

## 2018-04-17 PROCEDURE — 99222 1ST HOSP IP/OBS MODERATE 55: CPT | Performed by: INTERNAL MEDICINE

## 2018-04-17 PROCEDURE — 73560 X-RAY EXAM OF KNEE 1 OR 2: CPT

## 2018-04-17 PROCEDURE — 87205 SMEAR GRAM STAIN: CPT | Performed by: ORTHOPAEDIC SURGERY

## 2018-04-17 PROCEDURE — 84132 ASSAY OF SERUM POTASSIUM: CPT | Performed by: ORTHOPAEDIC SURGERY

## 2018-04-17 PROCEDURE — 0SRD0J9 REPLACEMENT OF LEFT KNEE JOINT WITH SYNTHETIC SUBSTITUTE, CEMENTED, OPEN APPROACH: ICD-10-PCS | Performed by: ORTHOPAEDIC SURGERY

## 2018-04-17 PROCEDURE — 25010000003 CEFAZOLIN PER 500 MG: Performed by: ORTHOPAEDIC SURGERY

## 2018-04-17 PROCEDURE — 25010000002 HYDROMORPHONE PER 4 MG: Performed by: ORTHOPAEDIC SURGERY

## 2018-04-17 PROCEDURE — 20985 CPTR-ASST DIR MS PX: CPT | Performed by: ORTHOPAEDIC SURGERY

## 2018-04-17 PROCEDURE — 25010000002 ONDANSETRON PER 1 MG: Performed by: NURSE ANESTHETIST, CERTIFIED REGISTERED

## 2018-04-17 PROCEDURE — 87070 CULTURE OTHR SPECIMN AEROBIC: CPT | Performed by: ORTHOPAEDIC SURGERY

## 2018-04-17 PROCEDURE — 27447 TOTAL KNEE ARTHROPLASTY: CPT | Performed by: ORTHOPAEDIC SURGERY

## 2018-04-17 PROCEDURE — C1713 ANCHOR/SCREW BN/BN,TIS/BN: HCPCS | Performed by: ORTHOPAEDIC SURGERY

## 2018-04-17 PROCEDURE — 25010000002 MIDAZOLAM PER 1 MG: Performed by: NURSE ANESTHETIST, CERTIFIED REGISTERED

## 2018-04-17 PROCEDURE — 25010000002 VANCOMYCIN PER 500 MG: Performed by: ORTHOPAEDIC SURGERY

## 2018-04-17 PROCEDURE — 94799 UNLISTED PULMONARY SVC/PX: CPT

## 2018-04-17 PROCEDURE — 82962 GLUCOSE BLOOD TEST: CPT

## 2018-04-17 PROCEDURE — 25010000002 ROPIVACAINE PER 1 MG: Performed by: NURSE ANESTHETIST, CERTIFIED REGISTERED

## 2018-04-17 PROCEDURE — C1776 JOINT DEVICE (IMPLANTABLE): HCPCS | Performed by: ORTHOPAEDIC SURGERY

## 2018-04-17 DEVICE — BASEPLT TIB TRIATH CMT NO4: Type: IMPLANTABLE DEVICE | Site: KNEE | Status: FUNCTIONAL

## 2018-04-17 DEVICE — INSRT TIB/KN TRIATHLON CONDY/STBL X3 SZ4 9MM: Type: IMPLANTABLE DEVICE | Site: KNEE | Status: FUNCTIONAL

## 2018-04-17 DEVICE — TOTL KN HI DEMAND STRYKER: Type: IMPLANTABLE DEVICE | Site: KNEE | Status: FUNCTIONAL

## 2018-04-17 DEVICE — COMP FEM TRIATH CR NO5 LT: Type: IMPLANTABLE DEVICE | Site: KNEE | Status: FUNCTIONAL

## 2018-04-17 DEVICE — SMARTSET GMV HIGH PERFORMANCE GENTAMICIN MEDIUM VISCOSITY BONE CEMENT 40G
Type: IMPLANTABLE DEVICE | Site: KNEE | Status: FUNCTIONAL
Brand: SMARTSET

## 2018-04-17 DEVICE — IMPLANTABLE DEVICE: Type: IMPLANTABLE DEVICE | Site: KNEE | Status: FUNCTIONAL

## 2018-04-17 RX ORDER — MULTIPLE VITAMINS W/ MINERALS TAB 9MG-400MCG
1 TAB ORAL DAILY
Status: DISCONTINUED | OUTPATIENT
Start: 2018-04-17 | End: 2018-04-20 | Stop reason: HOSPADM

## 2018-04-17 RX ORDER — ACETAMINOPHEN 500 MG
1000 TABLET ORAL EVERY 6 HOURS
Status: DISCONTINUED | OUTPATIENT
Start: 2018-04-17 | End: 2018-04-19

## 2018-04-17 RX ORDER — MIDAZOLAM HYDROCHLORIDE 1 MG/ML
2 INJECTION INTRAMUSCULAR; INTRAVENOUS
Status: DISCONTINUED | OUTPATIENT
Start: 2018-04-17 | End: 2018-04-17

## 2018-04-17 RX ORDER — ANASTROZOLE 1 MG/1
1 TABLET ORAL DAILY
Status: DISCONTINUED | OUTPATIENT
Start: 2018-04-17 | End: 2018-04-20 | Stop reason: HOSPADM

## 2018-04-17 RX ORDER — ROPIVACAINE HYDROCHLORIDE 5 MG/ML
INJECTION, SOLUTION EPIDURAL; INFILTRATION; PERINEURAL AS NEEDED
Status: DISCONTINUED | OUTPATIENT
Start: 2018-04-17 | End: 2018-04-17 | Stop reason: SURG

## 2018-04-17 RX ORDER — DULOXETIN HYDROCHLORIDE 60 MG/1
60 CAPSULE, DELAYED RELEASE ORAL DAILY
Status: DISCONTINUED | OUTPATIENT
Start: 2018-04-17 | End: 2018-04-20 | Stop reason: HOSPADM

## 2018-04-17 RX ORDER — LEVOTHYROXINE SODIUM 0.03 MG/1
25 TABLET ORAL EVERY MORNING
Status: DISCONTINUED | OUTPATIENT
Start: 2018-04-18 | End: 2018-04-20 | Stop reason: HOSPADM

## 2018-04-17 RX ORDER — OXYCODONE HCL 10 MG/1
10 TABLET, FILM COATED, EXTENDED RELEASE ORAL ONCE
Status: COMPLETED | OUTPATIENT
Start: 2018-04-17 | End: 2018-04-17

## 2018-04-17 RX ORDER — PREGABALIN 75 MG/1
150 CAPSULE ORAL 2 TIMES DAILY
Status: DISCONTINUED | OUTPATIENT
Start: 2018-04-17 | End: 2018-04-20 | Stop reason: HOSPADM

## 2018-04-17 RX ORDER — SODIUM CHLORIDE 9 MG/ML
40 INJECTION, SOLUTION INTRAVENOUS AS NEEDED
Status: DISCONTINUED | OUTPATIENT
Start: 2018-04-17 | End: 2018-04-17

## 2018-04-17 RX ORDER — OXYCODONE HYDROCHLORIDE 5 MG/1
10 TABLET ORAL EVERY 4 HOURS PRN
Status: DISCONTINUED | OUTPATIENT
Start: 2018-04-17 | End: 2018-04-17

## 2018-04-17 RX ORDER — SODIUM CHLORIDE 0.9 % (FLUSH) 0.9 %
1-10 SYRINGE (ML) INJECTION AS NEEDED
Status: DISCONTINUED | OUTPATIENT
Start: 2018-04-17 | End: 2018-04-17

## 2018-04-17 RX ORDER — LIDOCAINE HYDROCHLORIDE 10 MG/ML
0.5 INJECTION, SOLUTION EPIDURAL; INFILTRATION; INTRACAUDAL; PERINEURAL ONCE AS NEEDED
Status: DISCONTINUED | OUTPATIENT
Start: 2018-04-17 | End: 2018-04-17

## 2018-04-17 RX ORDER — ONDANSETRON 2 MG/ML
4 INJECTION INTRAMUSCULAR; INTRAVENOUS ONCE
Status: COMPLETED | OUTPATIENT
Start: 2018-04-17 | End: 2018-04-17

## 2018-04-17 RX ORDER — SPIRONOLACTONE 25 MG/1
25 TABLET ORAL DAILY
Status: DISCONTINUED | OUTPATIENT
Start: 2018-04-17 | End: 2018-04-20 | Stop reason: HOSPADM

## 2018-04-17 RX ORDER — MULTIVITAMIN,THERAPEUTIC
1 TABLET ORAL DAILY
Status: DISCONTINUED | OUTPATIENT
Start: 2018-04-17 | End: 2018-04-17 | Stop reason: ALTCHOICE

## 2018-04-17 RX ORDER — SODIUM CHLORIDE, SODIUM LACTATE, POTASSIUM CHLORIDE, CALCIUM CHLORIDE 600; 310; 30; 20 MG/100ML; MG/100ML; MG/100ML; MG/100ML
100 INJECTION, SOLUTION INTRAVENOUS CONTINUOUS
Status: DISCONTINUED | OUTPATIENT
Start: 2018-04-17 | End: 2018-04-20 | Stop reason: HOSPADM

## 2018-04-17 RX ORDER — UBIDECARENONE 75 MG
100 CAPSULE ORAL DAILY
Status: DISCONTINUED | OUTPATIENT
Start: 2018-04-17 | End: 2018-04-20 | Stop reason: HOSPADM

## 2018-04-17 RX ORDER — NALOXONE HCL 0.4 MG/ML
0.1 VIAL (ML) INJECTION
Status: DISCONTINUED | OUTPATIENT
Start: 2018-04-17 | End: 2018-04-20 | Stop reason: HOSPADM

## 2018-04-17 RX ORDER — MAGNESIUM HYDROXIDE 1200 MG/15ML
LIQUID ORAL AS NEEDED
Status: DISCONTINUED | OUTPATIENT
Start: 2018-04-17 | End: 2018-04-17 | Stop reason: HOSPADM

## 2018-04-17 RX ORDER — MAGNESIUM OXIDE 400 MG/1
400 TABLET ORAL DAILY
Status: DISCONTINUED | OUTPATIENT
Start: 2018-04-17 | End: 2018-04-20 | Stop reason: HOSPADM

## 2018-04-17 RX ORDER — SODIUM CHLORIDE, SODIUM LACTATE, POTASSIUM CHLORIDE, CALCIUM CHLORIDE 600; 310; 30; 20 MG/100ML; MG/100ML; MG/100ML; MG/100ML
30 INJECTION, SOLUTION INTRAVENOUS CONTINUOUS
Status: DISCONTINUED | OUTPATIENT
Start: 2018-04-17 | End: 2018-04-20 | Stop reason: HOSPADM

## 2018-04-17 RX ORDER — ONDANSETRON 2 MG/ML
4 INJECTION INTRAMUSCULAR; INTRAVENOUS ONCE AS NEEDED
Status: DISCONTINUED | OUTPATIENT
Start: 2018-04-17 | End: 2018-04-17 | Stop reason: HOSPADM

## 2018-04-17 RX ORDER — ACETAMINOPHEN 500 MG
1000 TABLET ORAL ONCE
Status: COMPLETED | OUTPATIENT
Start: 2018-04-17 | End: 2018-04-17

## 2018-04-17 RX ORDER — FAMOTIDINE 20 MG/50ML
20 INJECTION, SOLUTION INTRAVENOUS ONCE
Status: COMPLETED | OUTPATIENT
Start: 2018-04-17 | End: 2018-04-17

## 2018-04-17 RX ORDER — SODIUM CHLORIDE 9 MG/ML
INJECTION, SOLUTION INTRAVENOUS AS NEEDED
Status: DISCONTINUED | OUTPATIENT
Start: 2018-04-17 | End: 2018-04-17 | Stop reason: HOSPADM

## 2018-04-17 RX ORDER — PANTOPRAZOLE SODIUM 40 MG/1
40 TABLET, DELAYED RELEASE ORAL EVERY MORNING
Status: DISCONTINUED | OUTPATIENT
Start: 2018-04-18 | End: 2018-04-20 | Stop reason: HOSPADM

## 2018-04-17 RX ORDER — ONDANSETRON 2 MG/ML
4 INJECTION INTRAMUSCULAR; INTRAVENOUS EVERY 6 HOURS PRN
Status: DISCONTINUED | OUTPATIENT
Start: 2018-04-17 | End: 2018-04-20 | Stop reason: HOSPADM

## 2018-04-17 RX ORDER — MIDAZOLAM HYDROCHLORIDE 1 MG/ML
1 INJECTION INTRAMUSCULAR; INTRAVENOUS AS NEEDED
Status: DISCONTINUED | OUTPATIENT
Start: 2018-04-17 | End: 2018-04-17

## 2018-04-17 RX ORDER — BACITRACIN ZINC 500 [USP'U]/G
OINTMENT TOPICAL AS NEEDED
Status: DISCONTINUED | OUTPATIENT
Start: 2018-04-17 | End: 2018-04-17 | Stop reason: HOSPADM

## 2018-04-17 RX ORDER — ATORVASTATIN CALCIUM 10 MG/1
10 TABLET, FILM COATED ORAL DAILY
Status: DISCONTINUED | OUTPATIENT
Start: 2018-04-17 | End: 2018-04-20 | Stop reason: HOSPADM

## 2018-04-17 RX ORDER — HYDROCODONE BITARTRATE AND ACETAMINOPHEN 10; 325 MG/1; MG/1
1 TABLET ORAL EVERY 6 HOURS
Status: ON HOLD | COMMUNITY
End: 2018-04-27

## 2018-04-17 RX ORDER — LISINOPRIL 5 MG/1
5 TABLET ORAL DAILY
Status: DISCONTINUED | OUTPATIENT
Start: 2018-04-17 | End: 2018-04-20 | Stop reason: HOSPADM

## 2018-04-17 RX ORDER — MORPHINE SULFATE 10 MG/ML
4 INJECTION INTRAMUSCULAR; INTRAVENOUS; SUBCUTANEOUS
Status: DISCONTINUED | OUTPATIENT
Start: 2018-04-17 | End: 2018-04-19

## 2018-04-17 RX ORDER — OXYCODONE HYDROCHLORIDE 5 MG/1
20 TABLET ORAL EVERY 4 HOURS PRN
Status: DISCONTINUED | OUTPATIENT
Start: 2018-04-17 | End: 2018-04-19

## 2018-04-17 RX ORDER — MELATONIN
1000 DAILY
Status: DISCONTINUED | OUTPATIENT
Start: 2018-04-17 | End: 2018-04-20 | Stop reason: HOSPADM

## 2018-04-17 RX ORDER — PREGABALIN 75 MG/1
150 CAPSULE ORAL ONCE
Status: COMPLETED | OUTPATIENT
Start: 2018-04-17 | End: 2018-04-17

## 2018-04-17 RX ORDER — CARVEDILOL 12.5 MG/1
12.5 TABLET ORAL EVERY 12 HOURS SCHEDULED
Status: DISCONTINUED | OUTPATIENT
Start: 2018-04-17 | End: 2018-04-20 | Stop reason: HOSPADM

## 2018-04-17 RX ORDER — BUPIVACAINE HYDROCHLORIDE 5 MG/ML
INJECTION, SOLUTION EPIDURAL; INTRACAUDAL AS NEEDED
Status: DISCONTINUED | OUTPATIENT
Start: 2018-04-17 | End: 2018-04-17 | Stop reason: SURG

## 2018-04-17 RX ORDER — HYDROMORPHONE HCL 110MG/55ML
1 PATIENT CONTROLLED ANALGESIA SYRINGE INTRAVENOUS
Status: DISCONTINUED | OUTPATIENT
Start: 2018-04-17 | End: 2018-04-17

## 2018-04-17 RX ORDER — ONDANSETRON 4 MG/1
4 TABLET, ORALLY DISINTEGRATING ORAL EVERY 6 HOURS PRN
Status: DISCONTINUED | OUTPATIENT
Start: 2018-04-17 | End: 2018-04-20 | Stop reason: HOSPADM

## 2018-04-17 RX ORDER — GLIPIZIDE 5 MG/1
2.5 TABLET ORAL
Status: DISCONTINUED | OUTPATIENT
Start: 2018-04-18 | End: 2018-04-20 | Stop reason: HOSPADM

## 2018-04-17 RX ORDER — BISACODYL 10 MG
10 SUPPOSITORY, RECTAL RECTAL DAILY PRN
Status: DISCONTINUED | OUTPATIENT
Start: 2018-04-17 | End: 2018-04-20 | Stop reason: HOSPADM

## 2018-04-17 RX ORDER — DOCUSATE SODIUM 100 MG/1
100 CAPSULE, LIQUID FILLED ORAL DAILY PRN
Status: DISCONTINUED | OUTPATIENT
Start: 2018-04-17 | End: 2018-04-20 | Stop reason: HOSPADM

## 2018-04-17 RX ORDER — SODIUM CHLORIDE, SODIUM LACTATE, POTASSIUM CHLORIDE, CALCIUM CHLORIDE 600; 310; 30; 20 MG/100ML; MG/100ML; MG/100ML; MG/100ML
9 INJECTION, SOLUTION INTRAVENOUS CONTINUOUS
Status: DISCONTINUED | OUTPATIENT
Start: 2018-04-17 | End: 2018-04-17

## 2018-04-17 RX ORDER — ONDANSETRON 4 MG/1
4 TABLET, FILM COATED ORAL EVERY 6 HOURS PRN
Status: DISCONTINUED | OUTPATIENT
Start: 2018-04-17 | End: 2018-04-20 | Stop reason: HOSPADM

## 2018-04-17 RX ORDER — BUMETANIDE 1 MG/1
2 TABLET ORAL DAILY
Status: DISCONTINUED | OUTPATIENT
Start: 2018-04-17 | End: 2018-04-20 | Stop reason: HOSPADM

## 2018-04-17 RX ORDER — HYDROMORPHONE HCL 110MG/55ML
1 PATIENT CONTROLLED ANALGESIA SYRINGE INTRAVENOUS ONCE
Status: COMPLETED | OUTPATIENT
Start: 2018-04-17 | End: 2018-04-17

## 2018-04-17 RX ORDER — PREGABALIN 75 MG/1
75 CAPSULE ORAL EVERY 12 HOURS SCHEDULED
Status: DISCONTINUED | OUTPATIENT
Start: 2018-04-17 | End: 2018-04-17

## 2018-04-17 RX ORDER — SODIUM CHLORIDE 9 MG/ML
INJECTION, SOLUTION INTRAVENOUS
Status: DISPENSED
Start: 2018-04-17 | End: 2018-04-18

## 2018-04-17 RX ORDER — CHLORHEXIDINE GLUCONATE 0.12 MG/ML
15 RINSE ORAL EVERY 12 HOURS SCHEDULED
Status: DISCONTINUED | OUTPATIENT
Start: 2018-04-17 | End: 2018-04-17

## 2018-04-17 RX ADMIN — LISINOPRIL 5 MG: 5 TABLET ORAL at 16:21

## 2018-04-17 RX ADMIN — FAMOTIDINE 20 MG: 20 INJECTION, SOLUTION INTRAVENOUS at 10:43

## 2018-04-17 RX ADMIN — VANCOMYCIN HYDROCHLORIDE 1500 MG: 1 INJECTION, POWDER, LYOPHILIZED, FOR SOLUTION INTRAVENOUS at 10:23

## 2018-04-17 RX ADMIN — ATORVASTATIN CALCIUM 10 MG: 10 TABLET, FILM COATED ORAL at 16:21

## 2018-04-17 RX ADMIN — CEFAZOLIN SODIUM 2 G: 2 SOLUTION INTRAVENOUS at 12:33

## 2018-04-17 RX ADMIN — SODIUM CHLORIDE, POTASSIUM CHLORIDE, SODIUM LACTATE AND CALCIUM CHLORIDE 100 ML/HR: 600; 310; 30; 20 INJECTION, SOLUTION INTRAVENOUS at 19:02

## 2018-04-17 RX ADMIN — OXYCODONE HYDROCHLORIDE 20 MG: 5 TABLET ORAL at 22:26

## 2018-04-17 RX ADMIN — MORPHINE SULFATE 4 MG: 10 INJECTION INTRAVENOUS at 21:23

## 2018-04-17 RX ADMIN — VITAMIN D, TAB 1000IU (100/BT) 1000 UNITS: 25 TAB at 16:21

## 2018-04-17 RX ADMIN — VITAM B12 100 MCG: 100 TAB at 16:21

## 2018-04-17 RX ADMIN — HYDROMORPHONE HYDROCHLORIDE 1 MG: 2 INJECTION INTRAMUSCULAR; INTRAVENOUS; SUBCUTANEOUS at 18:44

## 2018-04-17 RX ADMIN — OXYCODONE HYDROCHLORIDE 10 MG: 5 TABLET ORAL at 16:20

## 2018-04-17 RX ADMIN — BUMETANIDE 2 MG: 1 TABLET ORAL at 16:21

## 2018-04-17 RX ADMIN — HYDROMORPHONE HYDROCHLORIDE 1 MG: 2 INJECTION INTRAMUSCULAR; INTRAVENOUS; SUBCUTANEOUS at 17:04

## 2018-04-17 RX ADMIN — BUPIVACAINE HYDROCHLORIDE 3 ML: 5 INJECTION, SOLUTION EPIDURAL; INTRACAUDAL; PERINEURAL at 11:55

## 2018-04-17 RX ADMIN — ACETAMINOPHEN 1000 MG: 500 TABLET, FILM COATED ORAL at 09:39

## 2018-04-17 RX ADMIN — ANASTROZOLE 1 MG: 1 TABLET, COATED ORAL at 16:21

## 2018-04-17 RX ADMIN — MAGNESIUM OXIDE TAB 400 MG (241.3 MG ELEMENTAL MG) 400 MG: 400 (241.3 MG) TAB at 16:21

## 2018-04-17 RX ADMIN — ROPIVACAINE HYDROCHLORIDE 15 ML: 5 INJECTION, SOLUTION EPIDURAL; INFILTRATION; PERINEURAL at 11:00

## 2018-04-17 RX ADMIN — ACETAMINOPHEN 1000 MG: 500 TABLET, FILM COATED ORAL at 21:31

## 2018-04-17 RX ADMIN — ONDANSETRON HYDROCHLORIDE 4 MG: 2 INJECTION, SOLUTION INTRAMUSCULAR; INTRAVENOUS at 10:43

## 2018-04-17 RX ADMIN — CEFAZOLIN SODIUM 2 G: 2 SOLUTION INTRAVENOUS at 21:23

## 2018-04-17 RX ADMIN — SPIRONOLACTONE 25 MG: 25 TABLET ORAL at 16:20

## 2018-04-17 RX ADMIN — VANCOMYCIN HYDROCHLORIDE 1500 MG: 1 INJECTION, POWDER, LYOPHILIZED, FOR SOLUTION INTRAVENOUS at 23:52

## 2018-04-17 RX ADMIN — MIDAZOLAM HYDROCHLORIDE 2 MG: 1 INJECTION, SOLUTION INTRAMUSCULAR; INTRAVENOUS at 10:42

## 2018-04-17 RX ADMIN — OXYCODONE HYDROCHLORIDE 10 MG: 10 TABLET, FILM COATED, EXTENDED RELEASE ORAL at 09:39

## 2018-04-17 RX ADMIN — Medication 1 TABLET: at 16:21

## 2018-04-17 RX ADMIN — DULOXETINE HYDROCHLORIDE 60 MG: 60 CAPSULE, DELAYED RELEASE ORAL at 16:21

## 2018-04-17 RX ADMIN — PREGABALIN 150 MG: 75 CAPSULE ORAL at 18:44

## 2018-04-17 RX ADMIN — PREGABALIN 150 MG: 75 CAPSULE ORAL at 09:39

## 2018-04-17 RX ADMIN — SODIUM CHLORIDE, POTASSIUM CHLORIDE, SODIUM LACTATE AND CALCIUM CHLORIDE 9 ML/HR: 600; 310; 30; 20 INJECTION, SOLUTION INTRAVENOUS at 09:40

## 2018-04-17 RX ADMIN — MIDAZOLAM HYDROCHLORIDE 1 MG: 1 INJECTION, SOLUTION INTRAMUSCULAR; INTRAVENOUS at 12:00

## 2018-04-17 RX ADMIN — CARVEDILOL 12.5 MG: 12.5 TABLET, FILM COATED ORAL at 21:25

## 2018-04-17 NOTE — ANESTHESIA POSTPROCEDURE EVALUATION
Patient: Jung Short    Procedure Summary     Date:  04/17/18 Room / Location:   [facility] /  [facility]    Anesthesia Start:  1222 Anesthesia Stop:  1424    Procedure:  TOTAL KNEE ARTHROPLASTY (Left Knee) Diagnosis:       Primary osteoarthritis of left knee      (Primary osteoarthritis of left knee [M17.12])    Surgeon:  Live Chambers MD Provider:  Niki Buchanan CRNA    Anesthesia Type:  spinal, regional ASA Status:  3          Anesthesia Type: spinal, regional  Last vitals  BP   145/81 (04/17/18 1505)   Temp   97.5 °F (36.4 °C) (04/17/18 1419)   Pulse   76 (04/17/18 1505)   Resp   15 (04/17/18 1505)     SpO2   98 % (04/17/18 1505)     Post Anesthesia Care and Evaluation    Patient location during evaluation: bedside  Patient participation: complete - patient participated  Level of consciousness: awake and alert  Pain score: 0  Pain management: adequate  Airway patency: patent  Anesthetic complications: No anesthetic complications  PONV Status: none  Cardiovascular status: acceptable  Respiratory status: acceptable  Hydration status: acceptable

## 2018-04-17 NOTE — ANESTHESIA PROCEDURE NOTES
Spinal Block    Patient location during procedure: pre-op  Start Time: 4/17/2018 11:45 AM  Stop Time: 4/17/2018 11:55 AM  Indication:at surgeon's request  Performed By  CRNA: HEMAL MERCER A  Preanesthetic Checklist  Completed: patient identified, surgical consent, pre-op evaluation, timeout performed, IV checked, risks and benefits discussed and monitors and equipment checked  Spinal Block Prep:  Patient Position:sitting  Sterile Tech:cap, gloves, gown, mask and sterile barriers  Prep:Betadine and Chloraprep  Patient Monitoring:blood pressure monitoring, continuous pulse oximetry and EKG  Spinal Block Procedure  Approach:midline  Guidance:landmark technique  Location:L3-L4  Needle Type:Sprotte  Needle Gauge:25 G  Placement of Spinal needle event:cerebrospinal fluid aspirated  Paresthesia: no  Fluid Appearance:clear  Post Assessment  Patient Tolerance:patient tolerated the procedure well with no apparent complications  Complications no  Additional Notes  LIDO 1% LOCAL / INTRODUCER NEEDLE USED / CLEAR CSF / 1 ATTEMPT

## 2018-04-17 NOTE — ANESTHESIA PREPROCEDURE EVALUATION
Anesthesia Evaluation     Patient summary reviewed and Nursing notes reviewed   NPO Solid Status: > 8 hours  NPO Liquid Status: > 4 hours           Airway   Mallampati: II  TM distance: >3 FB  Neck ROM: full  No difficulty expected  Dental - normal exam   (+) upper dentures and lower dentures    Pulmonary - normal exam    breath sounds clear to auscultation  Cardiovascular - normal exam    ECG reviewed  Patient on routine beta blocker and Beta blocker given within 24 hours of surgery    (+) hypertension, valvular problems/murmurs MR, CAD, CHF (H/O IN PAST), PVD, hyperlipidemia,     ROS comment: CARDIAC CLEARANCE 4/9   Diagnosis Plan  1. Other cardiomyopathy     2. Essential hypertension        1. H/o Nonischemic cardiomyopathy. Her ejection fraction was 10-20%. At this  time she is New York Heart Association class 2a. Her repeat echocardiogram 6/2015 showed  LVEF 50% with trace mitral and tricuspid insufficiency. No CHF now.    2. Hypertension, controlled.  3. DM, type 2.  4. Anomalous coronary artery.   5. H/o right breast cancer. Sees Dr. Draper.   6. Severe OA of the knees.  7. CKD, sees Dr. Hannah.   8.  Left knee OA.  She is low CV risk to undergo surgery 4/17/18 with Dr. Chambers.            Plan:     No further testing, no med changes, see back in 1 yeaR    .       Neuro/Psych  (+) psychiatric history Depression and Anxiety, poor historian.,     Numbness: PERIPH NEUROPATHY.  GI/Hepatic/Renal/Endo    (+) obesity,  GERD,  renal disease CRI, diabetes mellitus (ACCUCHK 176) type 2, hypothyroidism,     Musculoskeletal     (+) chronic pain,   Abdominal    Substance History      OB/GYN          Other   (+) arthritis     History of cancer: BREAST / NO CHEMO/ TAKING ORALLY.  ROS/Med Hx Other: PT VERY ANXIOUS /                 Anesthesia Plan    ASA 3     spinal and regional   (LFT ACB / WILL CONVERT TO GA IF NEC)  intravenous induction   Anesthetic plan and risks discussed with patient.  Use of blood products discussed  with patient  Consented to blood products.

## 2018-04-17 NOTE — ANESTHESIA PROCEDURE NOTES
Peripheral Block    Patient location during procedure: pre-op  Start time: 4/17/2018 10:55 AM  Stop time: 4/17/2018 11:00 AM  Reason for block: post-op pain management  Performed by  CRNA: HEMAL MERCER A  Preanesthetic Checklist  Completed: patient identified, site marked, surgical consent, pre-op evaluation, timeout performed, IV checked, risks and benefits discussed and monitors and equipment checked  Prep:  Sterile barriers:cap, gloves and mask  Prep: ChloraPrep  Patient monitoring: blood pressure monitoring, continuous pulse oximetry and EKG  Procedure  Sedation:yes    Guidance:ultrasound guided  ULTRASOUND INTERPRETATION. Using ultrasound guidance a 21 G gauge needle was placed in close proximity to the nerve, at which point, under ultrasound guidance anesthetic was injected in the area of the nerve and spread of the anesthesia was seen on ultrasound in close proximity thereto.  There were no abnormalities seen on ultrasound; a digital image was taken; and the patient tolerated the procedure with no complications. Images:still images obtained    Laterality:left  Block Type:adductor canal block  Injection Technique:single-shot  Needle Type:echogenic  Needle Gauge:21 G    Medications  Local Injected:ropivacaine 0.5% Local Amount Injected:15mL  Post Assessment  Injection Assessment: negative aspiration for heme, no paresthesia on injection and incremental injection  Patient Tolerance:comfortable throughout block  Complications:no

## 2018-04-17 NOTE — INTERVAL H&P NOTE
"H&P updated. The patient was examined and vital /66 (BP Location: Right leg)   Pulse 74   Temp 98 °F (36.7 °C) (Oral)   Resp 18   Ht 167.6 cm (66\")   Wt 105 kg (231 lb)   SpO2 98%   BMI 37.28 kg/m²   "

## 2018-04-17 NOTE — PLAN OF CARE
Problem: Patient Care Overview  Goal: Plan of Care Review  Outcome: Ongoing (interventions implemented as appropriate)   04/17/18 1743   Coping/Psychosocial   Plan of Care Reviewed With patient   Plan of Care Review   Progress no change   OTHER   Outcome Summary VSS. Acute pain, PRN Dilaudid IV and PO Roxicodone 10mg administered. DeRoyal ice machine and immobilizer to L. knee. Encouraged to CADB, use IS. Tolerating PO intake.        Problem: Knee Arthroplasty (Total, Partial) (Adult)  Goal: Signs and Symptoms of Listed Potential Problems Will be Absent, Minimized or Managed (Knee Arthroplasty)  Outcome: Ongoing (interventions implemented as appropriate)   04/17/18 1743   Goal/Outcome Evaluation   Problems Assessed (Knee Arthroplasty) all   Problems Present (Knee Arthroplasty) functional deficit;pain;range of motion decreased;situational response     Goal: Anesthesia/Sedation Recovery  Outcome: Ongoing (interventions implemented as appropriate)   04/17/18 1743   Goal/Outcome Evaluation   Anesthesia/Sedation Recovery progressing toward baseline       Problem: Skin Injury Risk (Adult)  Goal: Identify Related Risk Factors and Signs and Symptoms  Outcome: Ongoing (interventions implemented as appropriate)   04/17/18 1743   Skin Injury Risk (Adult)   Related Risk Factors (Skin Injury Risk) advanced age;mechanical forces;medical devices;medication;mobility impaired     Goal: Skin Health and Integrity  Outcome: Ongoing (interventions implemented as appropriate)   04/17/18 1743   Skin Injury Risk (Adult)   Skin Health and Integrity making progress toward outcome

## 2018-04-17 NOTE — OP NOTE
Preoperative Diagnosis: Osteoarthritis left knee    Postoperative Diagnosis: Same    Procedure Performed:  Left total knee arthroplasty using the OrthoAlign navigational system.  [place] #5 PC retaining femoral component, 4 tibial tray, 9 mm CS insert with 32 patella button using antibiotic cement    Surgeon: Reyes      Assistant:  Rajendra Alarcon    Anesthesia: Spinal    Anesthetist: Niki Buchanan    Tourniquet time; 49 minutes    Estimated blood loss: 125 cc    Drains: ×1     Complications: None        Indications for procedure: 72-year-old female with end-stage degenerative arthritis of the left knee that failed to respond to nonoperative management          Operative Note: Patient brought to the operating room and after satisfactory anesthesia a pneumonic tourniquet was placed around the left upper leg.  Left leg was then prepped and after the prep drive for 3 minutes was draped in a sterile from the usual manner.  Timeout completed identifying the patient procedure correctly.  The leg was then exsanguinated tourniquet was elevated to 250.  Timeout was once again completed.  A midline incisions then made a full-thickness subcutaneous flaps were developed and medial arthrotomy incision was carried out.  Aerobic anaerobic cultures were taken of the fluid which appeared clear.  A synovectomy suprapatellar pouch is then completed.  Using the femoral OrthoAlign navigational system after was applied and appropriate navigational maneuvers were made the distal femoral cut was completed.  Patient templated for 5 femoral component with a 5 jig and placed the anterior posterior camphor cuts were completed and a trial reduction 5 component showed excellent bone the prosthesis contact.  Posterior cruciate retractors and inserted remainder the menisci removed.  The tibial OrthoAlign navigational system was then applied to the proximal tibia and again after the appropriate navigational maneuvers the proximal tibia cut was  completed.  Section of the posterior recess show that the posterior cruciate was intact and allow removal remainder the menisci.  Gap balancing testing at 0 90° was symmetrical.  Trial reduction then with a 4 tray with a 9 insert 5 femur showed excellent stability throughout the arc of motion with no evidence of instability at 0 90°.  Keel cut was made in the tibia and the drill holes are made in the femur and the drill hole was made for the patella for 32 patella button.  The knee had normal tracking with patella button in place.  All trial components were then removed and the bone was lavaged.  40 cc of the exparel solution was injected the posterior capsule.  Once 2 packs of antibiotic cement was readied and nonporous keel tibial tray cemented into place after complete seating and removal of the excess cement the 9 mm insert was placed over the tibial tray.  The #5 femoral component then cemented into place again after complete seating and removing excess cement the knees but out to full extension and axially loaded the 32 patella button was cemented into place.  While the cement was hardening the remainder of the exparel solution was injected deep and superficial tissues.  The tourniquet released with hemostasis patella to cautery and transexamic acid.  Hemovac is placed in the deep recesses of the wound.  1 g of vancomycin powder massaging deep and superficial tissues.  The arthrotomy was closed a cortisone doesn't #2 Vicryl and a running stitch of #1 strata fix.  Subcuticular closure was completed using 0 strata fix and 2-0 strata fix. A Prineo Dermabond dressing was applied.  Sterile dressing then applied and the patient transferred recovery mannitol procedure well.  All counts were correct.

## 2018-04-18 ENCOUNTER — EPISODE CHANGES (OUTPATIENT)
Dept: CASE MANAGEMENT | Facility: OTHER | Age: 73
End: 2018-04-18

## 2018-04-18 LAB
ABO GROUP BLD: NORMAL
APTT PPP: 47 SECONDS (ref 24.3–38.1)
BASOPHILS # BLD AUTO: 0.03 10*3/MM3 (ref 0–0.2)
BASOPHILS NFR BLD AUTO: 0.2 % (ref 0–2)
BLD GP AB SCN SERPL QL: NEGATIVE
DEPRECATED RDW RBC AUTO: 46.9 FL (ref 37–54)
EOSINOPHIL # BLD AUTO: 0.09 10*3/MM3 (ref 0.1–0.3)
EOSINOPHIL NFR BLD AUTO: 0.7 % (ref 0–4)
ERYTHROCYTE [DISTWIDTH] IN BLOOD BY AUTOMATED COUNT: 13.9 % (ref 11.5–14.5)
HBV SURFACE AG SERPL QL IA: NORMAL
HCT VFR BLD AUTO: 30.6 % (ref 37–47)
HCV AB SER DONR QL: NORMAL
HGB BLD-MCNC: 10 G/DL (ref 12–16)
HIV1 P24 AG SER QL: NORMAL
HIV1+2 AB SER QL: NEGATIVE
IMM GRANULOCYTES # BLD: 0.1 10*3/MM3 (ref 0–0.03)
IMM GRANULOCYTES NFR BLD: 0.8 % (ref 0–0.5)
LYMPHOCYTES # BLD AUTO: 1.62 10*3/MM3 (ref 0.6–4.8)
LYMPHOCYTES NFR BLD AUTO: 13.2 % (ref 20–45)
MCH RBC QN AUTO: 30.6 PG (ref 27–31)
MCHC RBC AUTO-ENTMCNC: 32.7 G/DL (ref 31–37)
MCV RBC AUTO: 93.6 FL (ref 81–99)
MONOCYTES # BLD AUTO: 1.29 10*3/MM3 (ref 0–1)
MONOCYTES NFR BLD AUTO: 10.5 % (ref 3–8)
NEUTROPHILS # BLD AUTO: 9.15 10*3/MM3 (ref 1.5–8.3)
NEUTROPHILS NFR BLD AUTO: 74.6 % (ref 45–70)
NRBC BLD MANUAL-RTO: 0 /100 WBC (ref 0–0)
PLATELET # BLD AUTO: 225 10*3/MM3 (ref 140–500)
PMV BLD AUTO: 9.5 FL (ref 7.4–10.4)
RBC # BLD AUTO: 3.27 10*6/MM3 (ref 4.2–5.4)
RH BLD: POSITIVE
T&S EXPIRATION DATE: NORMAL
WBC NRBC COR # BLD: 12.28 10*3/MM3 (ref 4.8–10.8)

## 2018-04-18 PROCEDURE — 97161 PT EVAL LOW COMPLEX 20 MIN: CPT

## 2018-04-18 PROCEDURE — 25010000002 MORPHINE PER 10 MG: Performed by: ORTHOPAEDIC SURGERY

## 2018-04-18 PROCEDURE — 86900 BLOOD TYPING SEROLOGIC ABO: CPT | Performed by: ORTHOPAEDIC SURGERY

## 2018-04-18 PROCEDURE — 25010000003 CEFAZOLIN PER 500 MG: Performed by: ORTHOPAEDIC SURGERY

## 2018-04-18 PROCEDURE — 86803 HEPATITIS C AB TEST: CPT | Performed by: NURSE ANESTHETIST, CERTIFIED REGISTERED

## 2018-04-18 PROCEDURE — 99024 POSTOP FOLLOW-UP VISIT: CPT | Performed by: ORTHOPAEDIC SURGERY

## 2018-04-18 PROCEDURE — 97165 OT EVAL LOW COMPLEX 30 MIN: CPT

## 2018-04-18 PROCEDURE — 87521 HEPATITIS C PROBE&RVRS TRNSC: CPT | Performed by: NURSE ANESTHETIST, CERTIFIED REGISTERED

## 2018-04-18 PROCEDURE — 99232 SBSQ HOSP IP/OBS MODERATE 35: CPT | Performed by: HOSPITALIST

## 2018-04-18 PROCEDURE — 85025 COMPLETE CBC W/AUTO DIFF WBC: CPT | Performed by: ORTHOPAEDIC SURGERY

## 2018-04-18 PROCEDURE — 86850 RBC ANTIBODY SCREEN: CPT | Performed by: ORTHOPAEDIC SURGERY

## 2018-04-18 PROCEDURE — 87521 HEPATITIS C PROBE&RVRS TRNSC: CPT

## 2018-04-18 PROCEDURE — G0432 EIA HIV-1/HIV-2 SCREEN: HCPCS | Performed by: NURSE ANESTHETIST, CERTIFIED REGISTERED

## 2018-04-18 PROCEDURE — 87899 AGENT NOS ASSAY W/OPTIC: CPT | Performed by: NURSE ANESTHETIST, CERTIFIED REGISTERED

## 2018-04-18 PROCEDURE — 86901 BLOOD TYPING SEROLOGIC RH(D): CPT | Performed by: ORTHOPAEDIC SURGERY

## 2018-04-18 PROCEDURE — 97110 THERAPEUTIC EXERCISES: CPT

## 2018-04-18 PROCEDURE — 87340 HEPATITIS B SURFACE AG IA: CPT | Performed by: NURSE ANESTHETIST, CERTIFIED REGISTERED

## 2018-04-18 PROCEDURE — 85730 THROMBOPLASTIN TIME PARTIAL: CPT | Performed by: ORTHOPAEDIC SURGERY

## 2018-04-18 RX ADMIN — SPIRONOLACTONE 25 MG: 25 TABLET ORAL at 13:58

## 2018-04-18 RX ADMIN — ANASTROZOLE 1 MG: 1 TABLET, COATED ORAL at 13:56

## 2018-04-18 RX ADMIN — Medication 1 TABLET: at 09:05

## 2018-04-18 RX ADMIN — LEVOTHYROXINE SODIUM 25 MCG: 25 TABLET ORAL at 06:26

## 2018-04-18 RX ADMIN — VITAM B12 100 MCG: 100 TAB at 13:57

## 2018-04-18 RX ADMIN — ACETAMINOPHEN 1000 MG: 500 TABLET, FILM COATED ORAL at 16:51

## 2018-04-18 RX ADMIN — CARVEDILOL 12.5 MG: 12.5 TABLET, FILM COATED ORAL at 09:05

## 2018-04-18 RX ADMIN — BUMETANIDE 2 MG: 1 TABLET ORAL at 09:05

## 2018-04-18 RX ADMIN — ACETAMINOPHEN 1000 MG: 500 TABLET, FILM COATED ORAL at 04:06

## 2018-04-18 RX ADMIN — LISINOPRIL 5 MG: 5 TABLET ORAL at 09:05

## 2018-04-18 RX ADMIN — PANTOPRAZOLE SODIUM 40 MG: 40 TABLET, DELAYED RELEASE ORAL at 06:34

## 2018-04-18 RX ADMIN — OXYCODONE HYDROCHLORIDE 20 MG: 5 TABLET ORAL at 06:23

## 2018-04-18 RX ADMIN — DULOXETINE HYDROCHLORIDE 60 MG: 60 CAPSULE, DELAYED RELEASE ORAL at 13:55

## 2018-04-18 RX ADMIN — GLIPIZIDE 2.5 MG: 5 TABLET ORAL at 08:54

## 2018-04-18 RX ADMIN — MAGNESIUM OXIDE TAB 400 MG (241.3 MG ELEMENTAL MG) 400 MG: 400 (241.3 MG) TAB at 09:05

## 2018-04-18 RX ADMIN — MORPHINE SULFATE 4 MG: 10 INJECTION INTRAVENOUS at 00:29

## 2018-04-18 RX ADMIN — OXYCODONE HYDROCHLORIDE 20 MG: 5 TABLET ORAL at 13:52

## 2018-04-18 RX ADMIN — VITAMIN D, TAB 1000IU (100/BT) 1000 UNITS: 25 TAB at 13:56

## 2018-04-18 RX ADMIN — PREGABALIN 150 MG: 75 CAPSULE ORAL at 09:04

## 2018-04-18 RX ADMIN — MORPHINE SULFATE 4 MG: 10 INJECTION INTRAVENOUS at 23:50

## 2018-04-18 RX ADMIN — RIVAROXABAN 10 MG: 10 TABLET, FILM COATED ORAL at 08:54

## 2018-04-18 RX ADMIN — CEFAZOLIN SODIUM 2 G: 2 SOLUTION INTRAVENOUS at 05:40

## 2018-04-18 RX ADMIN — ATORVASTATIN CALCIUM 10 MG: 10 TABLET, FILM COATED ORAL at 09:05

## 2018-04-18 RX ADMIN — MORPHINE SULFATE 4 MG: 10 INJECTION INTRAVENOUS at 04:06

## 2018-04-18 RX ADMIN — ACETAMINOPHEN 1000 MG: 500 TABLET, FILM COATED ORAL at 09:04

## 2018-04-18 NOTE — PLAN OF CARE
Problem: Patient Care Overview  Goal: Plan of Care Review  Outcome: Ongoing (interventions implemented as appropriate)   04/18/18 4172   Coping/Psychosocial   Plan of Care Reviewed With patient   OTHER   Outcome Summary PT evaluation completeted. Patient required min assist for bed mobility and min assist for 2 for sit to stand transfers with cues for sequencing and hand placement. She ambulated 15 feet with a rolling walker and min A with cues for sequencing and assist to navigate around obstacles. Patient c/o 10/10 pain throughout session. Mobility limited today by lethargy, slurred speech, and difficulty staying awake throughout evaluation. PT to see patient twice daily x 5 days to develop and implement HEP, progress safety with functional mobility. Patient plans to transition to SNF for continued therapy prior to d/c home. PT will continue to monitor progress and make recommendations for d/c needs.

## 2018-04-18 NOTE — THERAPY EVALUATION
Acute Care - Occupational Therapy Initial Evaluation  NOHEMY Diaz     Patient Name: Jung Short  : 1945  MRN: [id number]  Today's Date: 2018  Onset of Illness/Injury or Date of Surgery: 18  Date of Referral to OT: 18  Referring Physician: Reyes    Admit Date: 2018       ICD-10-CM ICD-9-CM   1. Primary osteoarthritis of left knee M17.12 715.16     Patient Active Problem List   Diagnosis   • Abdominal bloating   • Abdominal mass   • Abdominal pain   • Abnormal gait   • Abnormal mammogram   • Anemia   • Atherosclerosis of coronary artery   • Thoracic back pain   • Malignant neoplasm of upper-inner quadrant of right breast in female, estrogen receptor positive   • Candidiasis   • Cardiomyopathy   • Disc disorder of cervical region   • Neck pain   • Tenderness of chest wall   • Chronic kidney disease, stage III (moderate)   • Chronic pain   • Constipation   • Generalized osteoarthritis   • Depression   • Type 2 diabetes mellitus   • Controlled type 2 diabetes mellitus without complication   • Dyslipidemia   • Gastroesophageal reflux disease with esophagitis   • Generalized pain   • Gout   • Hypertension   • Hypothyroidism   • Incisional hernia   • Mass of breast   • Mitral valve insufficiency   • Nausea   • Adult body mass index greater than 30   • Osteopenia of spine   • Arthralgia of multiple joints   • Peripheral neuropathy   • Pulmonary hypertension   • Osteomyelitis   • Tricuspid valve insufficiency   • Cobalamin deficiency   • Vitamin D deficiency   • Diabetes mellitus   • Left knee pain   • Arthritis of knee   • DDD (degenerative disc disease), lumbar   • Lumbar facet arthropathy   • Pain in shoulder   • Chronic gout of multiple sites   • Constipation due to opioid therapy   • Syncope, psychogenic   • Transient alteration of awareness   • Closed fracture of patella   • Primary osteoarthritis of left knee   • Radius/ulna fracture, left, closed, initial encounter   • Rib pain on  left side   • Preoperative cardiovascular examination   • Osteoarthritis of left knee     Past Medical History:   Diagnosis Date   • Anemia    • Benign breast disease    • Cancer 2015    Right breast   • Cellulitis of leg     May-2003 right lower extremity   • CHF (congestive heart failure)     Dr Bates follows   • Chronic kidney disease     Dr Hannah follows   • Chronic ulcer of right foot     Non-pressure, history of    • Depression    • Diabetic peripheral neuropathy    • Diarrhea    • Diverticulosis    • Encounter for eye exam    • Fracture of left wrist     history of   • Gastroesophageal reflux    • Hiatal hernia    • History of bone density study 09/20/2012   • History of colonoscopy     done 6/03 recheck in 10 years.   Done july 2013 recheck in 5 years   • History of mammography, screening 09/20/2012   • Hospital discharge follow-up    • Hyperlipidemia    • Hypertension    • Hypothyroidism    • Impingement syndrome of right shoulder    • Joint pain    • Menopausal disorder    • Methicillin resistant Staphylococcus aureus infection 2012    after bladder surgery   • MRSA (methicillin resistant staph aureus) culture positive    • Nausea and vomiting    • Nonischemic cardiomyopathy     Ejection fraction 10% per 2-D echo with Doppler however she did have cardiac catheterization April 2015 which showed ejection fraction 20% with global hypokinesis and severe mitral insufficiency   • Osteoarthritis     generalized, sched jania TKA   • Paroxysmal atrial fibrillation     Dr Bates follows   • Postoperative infection     July of 2012 following her hysterectomy far vaginal wall prolapse. She was on antibiotics and wound dressings for 2 months.   • Shoulder pain, bilateral     has tears on both sides   • Syncope, psychogenic 4/19/2017   • Toe ulcer     h/o to both feet, d/t shoes rubbing per patient   • Transient alteration of awareness 4/19/2017     Past Surgical History:   Procedure Laterality Date   • ABDOMINAL  SURGERY  2012    had to be reopened after bladder surgery d/t infection   • AMPUTATION FOOT / TOE Right 11/2013    Rt foot amputation MTP first toe   • BLADDER SURGERY  2012   • BREAST BIOPSY     • BREAST SURGERY  06/29/2015    Percutaneous ultrasound-guided placement of metal localized clip 1st lesion   • CARDIAC CATHETERIZATION  2015   • CATARACT EXTRACTION Bilateral 2017   • DEBRIDEMENT  FOOT Right 01/2013    During hospitalization    • EPIDURAL BLOCK      4 chronic back pain and leg pain last epidurals done 5-2012 she had no benefit at all from this procedure.   • FOOT SURGERY Bilateral     several   • HERNIA REPAIR      At the abdomen February 2007 Dr. Mendez   • INCISIONAL HERNIA REPAIR  05/16/2014    Recurrent. Incarcerated. With mesh implantation   • MASTECTOMY Right 05/2015   • SHOULDER SURGERY Right     x2 RCR   • TOTAL ABDOMINAL HYSTERECTOMY      with oophorectomy-Done June-2012 secondary to vaginal wall prolapse.   • TOTAL KNEE ARTHROPLASTY Right    • TOTAL KNEE ARTHROPLASTY Left 4/17/2018    Procedure: TOTAL KNEE ARTHROPLASTY;  Surgeon: Live Chambers MD;  Location: [facility];  Service: Orthopedics          OT ASSESSMENT FLOWSHEET (last 72 hours)      Occupational Therapy Evaluation     Row Name 04/18/18 0936                   OT Evaluation Time/Intention    Subjective Information complains of;pain;fatigue  -JJ        Document Type evaluation  -JJ        Mode of Treatment occupational therapy  -JJ        Patient Effort adequate  -JJ        Symptoms Noted During/After Treatment increased pain  -JJ           General Information    Patient Profile Reviewed? yes  -JJ        Onset of Illness/Injury or Date of Surgery 04/17/18  -JJ        Referring Physician Reyes  -JJ        Patient Observations lethargic  -JJ        Patient/Family Observations pt supine in bed, daughters in room, agreed to evaluation  -JJ        Prior Level of Function independent:;ADL's;transfer;all household mobility;bed mobility  -JJ         Equipment Currently Used at Home walker, rolling;walker, standard;commode, bedside;grab bar  -JJ        Pertinent History of Current Functional Problem pt with OA of L knee, sp L TKA  -JJ        Existing Precautions/Restrictions fall;brace worn when out of bed  -JJ        Equipment Ordered for Patient --   pt may benefit from long handled ae  -JJ        Risks Reviewed patient and family:;increased discomfort  -JJ        Benefits Reviewed patient and family:;improve function;increase independence  -JJ        Barriers to Rehab cognitive status  -JJ           Resource/Environmental Concerns    Current Living Arrangements home/apartment/condo  -JJ        Resource/Environmental Concerns none  -JJ           Cognitive Assessment/Intervention- PT/OT    Orientation Status (Cognition) oriented to;person;place  -JJ        Follows Commands (Cognition) follows one step commands;75-90% accuracy;delayed response/completion;increased processing time needed;repetition of directions required;physical/tactile prompts required  -JJ        Safety Deficit (Cognitive) problem solving;insight into deficits/self awareness  -JJ           Safety Issues, Functional Mobility    Safety Issues Affecting Function (Mobility) problem solving;insight into deficits/self awareness  -JJ           Bed Mobility Assessment/Treatment    Bed Mobility Assessment/Treatment supine-sit;sit-supine  -JJ        Supine-Sit [place] (Bed Mobility) minimum assist (75% patient effort);verbal cues  -JJ        Assistive Device (Bed Mobility) bed rails;head of bed elevated  -JJ        Comment (Bed Mobility) pt required assist with L LE and cues for sequencing  -JJ           Functional Mobility    Functional Mobility- Ind. Level contact guard assist;minimum assist (75% patient effort);verbal cues required  -JJ        Functional Mobility- Device rolling walker  -JJ        Functional Mobility-Distance (Feet) --   in room  -JJ        Functional Mobility- Comment pt  required cues and assist to advance walker  -JJ           Transfer Assessment/Treatment    Transfer Assessment/Treatment sit-stand transfer;stand-sit transfer;toilet transfer  -JJ        Comment (Transfers) pt required cues for hand placement and extended time  -JJ        Sit-Stand [place] (Transfers) minimum assist (75% patient effort);verbal cues;2 person assist  -JJ        Stand-Sit [place] (Transfers) minimum assist (75% patient effort);verbal cues;2 person assist  -JJ        [place] Level (Toilet Transfer) minimum assist (75% patient effort);verbal cues;2 person assist  -JJ        Assistive Device (Toilet Transfer) commode, 3-in-1;walker, front-wheeled  -JJ           Sit-Stand Transfer    Assistive Device (Sit-Stand Transfers) walker, front-wheeled  -JJ           Stand-Sit Transfer    Assistive Device (Stand-Sit Transfers) walker, front-wheeled  -JJ           Toilet Transfer    Type (Toilet Transfer) stand-sit;sit-stand  -JJ           Bathing Assessment/Intervention    Bathing [place] Level lower body;moderate assist (50% patient effort)  -JJ           Lower Body Dressing Assessment/Training    Lower Body Dressing [place] Level lower body dressing skills;moderate assist (50% patient effort)  -JJ           Toileting Assessment/Training    [place] Level (Toileting) toileting skills;minimum assist (75% patient effort)  -JJ           General ROM    GENERAL ROM COMMENTS B UE AROM WFL  -JJ           General Assessment (Manual Muscle Testing)    Comment, General Manual Muscle Testing (MMT) Assessment B UE MMT WFL  -J           Positioning and Restraints    Pre-Treatment Position in bed  -J        Post Treatment Position chair  -J        In Chair reclined;call light within reach;encouraged to call for assist;with PT;with family/caregiver  -J           Pain Assessment    Additional Documentation Pain Scale: Numbers Pre/Post-Treatment (Group)  -JJ           Pain Scale: Numbers  Pre/Post-Treatment    Pain Scale: Numbers, Pretreatment 10/10  -JJ        Pain Scale: Numbers, Post-Treatment 10/10  -JJ        Pain Location - Side Left  -JJ        Pain Location knee  -JJ        Pain Intervention(s) Repositioned;Cold applied  -JJ           Wound 04/17/18 1323 Left knee incision    Wound - Properties Group Date first assessed: 04/17/18  - Time first assessed: 1323  -JM Side: Left  -JM Location: knee  -JM Type: incision  -JM       Plan of Care Review    Plan of Care Reviewed With patient  -JJ           Clinical Impression (OT)    Date of Referral to OT 04/18/18  -JJ        OT Diagnosis decreased adl sp knee sx  -JJ        Prognosis (OT Eval) good  -JJ        Patient/Family Goals Statement (OT Eval) pt states wants to go to SNF after hospital admission  -JJ        Criteria for Skilled Therapeutic Interventions Met (OT Eval) yes;treatment indicated  -JJ        Rehab Potential (OT Eval) good, to achieve stated therapy goals  -JJ        Therapy Frequency (OT Eval) --   x 2 additional visits  -JJ        Predicted Duration of Therapy Intervention (OT Eval) x 2 additional visits  -JJ        Care Plan Review (OT) evaluation/treatment results reviewed;risks/benefits reviewed  -JJ        Anticipated Equipment Needs at Discharge (OT) --   pt may benefit from long handled ae   -JJ        Anticipated Discharge Disposition (OT) skilled nursing facility (SNF)  -JJ           Planned OT Interventions    Planned Therapy Interventions (OT Eval) adaptive equipment training;BADL retraining;transfer/mobility retraining  -JJ        Adaptive Equipment Training x  -JJ        BADL Retraining x  -JJ        Transfer/Mobility Retraining x  -JJ           Transfer Goal 1 (OT)    Activity/Assistive Device (Transfer Goal 1, OT) toilet;walker, rolling  -JJ        [place] Level/Cues Needed (Transfer Goal 1, OT) contact guard assist  -JJ        Time Frame (Transfer Goal 1, OT) 3 days  -JJ        Progress/Outcome (Transfer Goal  1, OT) --   new goal  -JJ           Dressing Goal 1 (OT)    Activity/Assistive Device (Dressing Goal 1, OT) lower body dressing;long handled shoe horn;reacher;sock-aid  -JJ        [place]/Cues Needed (Dressing Goal 1, OT) minimum assist (75% or more patient effort)  -JJ        Time Frame (Dressing Goal 1, OT) 3 days  -JJ        Progress/Outcome (Dressing Goal 1, OT) --   new goal  -JJ           Patient Education Goal (OT)    Activity (Patient Education Goal, OT) pt will vebalize understanding of long handled ae for use with lb adls  -JJ        [place]/Cues/Accuracy (Memory Goal 2, OT) verbalizes understanding  -JJ        Time Frame (Patient Education Goal, OT) 3 days  -JJ        Progress/Outcome (Patient Education Goal, OT) --   new goal  -JJ          User Key  (r) = Recorded By, (t) = Taken By, (c) = Cosigned By    Initials Name Effective Dates    JJ Niharika Butcher, OTR 06/22/16 -     ELSA Colindres, RN 06/16/16 -            Occupational Therapy Education     Title: PT OT SLP Therapies (Done)     Topic: Occupational Therapy (Done)     Point: ADL training (Done)     Description: Instruct learner(s) on proper safety adaptation and remediation techniques during self care or transfers.   Instruct in proper use of assistive devices.   Learning Progress Summary     Learner Status Readiness Method Response Comment Documented by    Patient Done Acceptance E VU pt and daughters educated on benefits of activity, safety with functional transfers and mobility, adls and long handled ae  04/18/18 1402    Family Done Acceptance E VU pt and daughters educated on benefits of activity, safety with functional transfers and mobility, adls and long handled ae  04/18/18 1402          Point: Precautions (Done)     Description: Instruct learner(s) on prescribed precautions during self-care and functional transfers.   Learning Progress Summary     Learner Status Readiness Method Response Comment Documented by     Patient Done Acceptance E VU pt and daughters educated on benefits of activity, safety with functional transfers and mobility, adls and long handled ae  04/18/18 1402    Family Done Acceptance E VU pt and daughters educated on benefits of activity, safety with functional transfers and mobility, adls and long handled ae  04/18/18 1402                      User Key     Initials Effective Dates Name Provider Type Discipline     06/22/16 -  Niharika Butcher, OTR Occupational Therapist OT                  OT Recommendation and Plan  Outcome Summary/Treatment Plan (OT)  Anticipated Equipment Needs at Discharge (OT):  (pt may benefit from long handled ae )  Anticipated Discharge Disposition (OT): skilled nursing facility (SNF)  Planned Therapy Interventions (OT Eval): adaptive equipment training, BADL retraining, transfer/mobility retraining  Therapy Frequency (OT Eval):  (x 2 additional visits)  Plan of Care Review  Plan of Care Reviewed With: patient  Plan of Care Reviewed With: patient  Outcome Summary: OT evaluation completed. pt required min assist for bed mobility. pt required min assist x 2 for functional transfers from EOB and  BSC with RW and cues for sequencing  and hand placement. pt requires mod-max assist for lb adls. pt complains of 10/10 pain at rest and during mobility however pt required cues to attend to task and demonstrated slurred speech and would fall asleep at times throughout evaluation. pt would benefit from skilled ot services to address adl retrining, ae education and functional transfers. pt states she plans to continue rehab in SNF with anticipated discharge home alone post SNF          Outcome Measures     Row Name 04/18/18 0936             How much help from another is currently needed...    Putting on and taking off regular lower body clothing? 2  -JJ      Bathing (including washing, rinsing, and drying) 2  -JJ      Toileting (which includes using toilet bed pan or urinal) 3  -JJ       Putting on and taking off regular upper body clothing 4  -JJ      Taking care of personal grooming (such as brushing teeth) 4  -JJ      Eating meals 4  -JJ      Score 19  -JJ         Functional Assessment    Outcome Measure Options AM-PAC 6 Clicks Daily Activity (OT)  -JJ        User Key  (r) = Recorded By, (t) = Taken By, (c) = Cosigned By    Initials Name Provider Type    JJ Niharika Butcher, GUNJAN Occupational Therapist          Time Calculation:   OT Start Time: 0935    Therapy Charges for Today     Code Description Service Date Service Provider Modifiers Qty    [id number]  OT EVAL LOW COMPLEXITY 2 4/18/2018 GUNJAN Juarez GO 1               GUNJAN Juarez  4/18/2018

## 2018-04-18 NOTE — PROGRESS NOTES
"Hospitalist Team      Patient Care Team:  Bubba Avalos MD as PCP - General (Internal Medicine & Pediatrics)  JOSE ANTONIO Akhtar as PCP - Claims Attributed  Tory Pagan MD as Consulting Physician (Hematology and Oncology)  Lina Fisher MD as Referring Physician (General Surgery)  Shanda Gerardo RN as Care Coordinator (Population Health)  Live Chambers MD as Surgeon (Orthopedic Surgery)        Chief Complaint:  Follow-up Diabetes, CKD III, and Hypothyroidism    Subjective    Events noted overnight.  She is resting, but aroused easily.  Reports her pain is not well-controlled.  Denies chest pain and dyspnea.  Minimal PO intake.    Objective    Vital Signs  Temp:  [96.7 °F (35.9 °C)-98.4 °F (36.9 °C)] 97.8 °F (36.6 °C)  Heart Rate:  [68-94] 87  Resp:  [15-20] 18  BP: (117-183)/(59-96) 117/64  Oxygen Therapy  SpO2: 95 %  Pulse Oximetry Type: Continuous  Device (Oxygen Therapy): nasal cannula  Flow (L/min): 2  ETCO2 (mmHg): 45 mmHg}    Flowsheet Rows    Flowsheet Row First Filed Value   Admission Height 167.6 cm (66\") Documented at 04/17/2018 0937   Admission Weight 105 kg (231 lb) Documented at 04/17/2018 0937          Physical Exam:    General Appearance:    Sleeping but easily aroused; Appears stated age.   Lungs:     Clear to auscultation,respirations regular, even and                  unlabored    Heart:    Regular rhythm and normal rate, normal S1 and S2, no            murmur, no gallop, no rub, no click   Abdomen:     Soft and Non-tender w/ diminished bowel sounds   Extremities:   Moves all extremities well, no edema, no cyanosis   Pulses:   Pulses palpable and equal bilaterally   Neurologic:   Cranial nerves 2 - 12 grossly intact       Results Review:     I reviewed the patient's new clinical results.    Lab Results (last 24 hours)     Procedure Component Value Units Date/Time    Wound Culture - Wound, Knee, Left [[id number]]  (Normal) Collected:  04/17/18 1358    Specimen:  Wound from Knee, Left " Updated:  04/18/18 0954     Wound Culture No growth at less than 24 hours     Gram Stain Result No organisms seen      No WBCs per low power field    Wound Culture - Wound, Knee, Left [[id number]]  (Normal) Collected:  04/17/18 1308    Specimen:  Wound from Knee, Left Updated:  04/18/18 0950     Wound Culture No growth at less than 24 hours     Gram Stain Result No organisms seen      Rare (1+) WBCs per low power field    Anaerobic Culture - Wound, Knee, Left [[id number]] Collected:  04/17/18 1358    Specimen:  Wound from Knee, Left Updated:  04/17/18 1408    Anaerobic Culture - Wound, Knee, Left [[id number]] Collected:  04/17/18 1308    Specimen:  Wound from Knee, Left Updated:  04/17/18 1318          Imaging Results (last 24 hours)     Procedure Component Value Units Date/Time    XR Knee 1 or 2 View Left [[id number]] Collected:  04/17/18 1543     Updated:  04/17/18 1546    Narrative:       POSTOP LEFT KNEE SERIES, 4/17/2018:     HISTORY:  Postop knee arthroplasty surgery.     TECHNIQUE:  AP and crosstable lateral radiographs of the left knee were obtained  portably following surgery.     FINDINGS:  Total knee arthroplasty components are well-positioned postoperatively.  No visible fracture or dislocation.       Impression:       Satisfactory postoperative appearance following left total knee  arthroplasty.     This report was finalized on 4/17/2018 3:44 PM by Dr. Levy Mishra MD.               Medication Review:   I have reviewed the patient's current medication list    Current Facility-Administered Medications:   •  acetaminophen (TYLENOL) tablet 1,000 mg, 1,000 mg, Oral, Q6H, Live Chambers MD, 1,000 mg at 04/18/18 0904  •  anastrozole (ARIMIDEX) tablet 1 mg, 1 mg, Oral, Daily, Live Chambers MD, 1 mg at 04/17/18 1621  •  atorvastatin (LIPITOR) tablet 10 mg, 10 mg, Oral, Daily, Live Chambers MD, 10 mg at 04/18/18 0905  •  bisacodyl (DULCOLAX) suppository 10 mg, 10 mg, Rectal, Daily PRN, Live Chambers MD  •   bumetanide (BUMEX) tablet 2 mg, 2 mg, Oral, Daily, Live Chambers MD, 2 mg at 04/18/18 0905  •  carvedilol (COREG) tablet 12.5 mg, 12.5 mg, Oral, Q12H, Live Chambers MD, 12.5 mg at 04/18/18 0905  •  cholecalciferol (VITAMIN D3) tablet 1,000 Units, 1,000 Units, Oral, Daily, Live Chambers MD, 1,000 Units at 04/17/18 1621  •  docusate sodium (COLACE) capsule 100 mg, 100 mg, Oral, Daily PRN, Live Chambers MD  •  DULoxetine (CYMBALTA) DR capsule 60 mg, 60 mg, Oral, Daily, Live Chambers MD, 60 mg at 04/17/18 1621  •  glipiZIDE (GLUCOTROL) tablet 2.5 mg, 2.5 mg, Oral, QAM AC, Live Chambers MD, 2.5 mg at 04/18/18 0854  •  lactated ringers infusion, 100 mL/hr, Intravenous, Continuous, Niki Buchanan, LLUVIA, Stopped at 04/18/18 0846  •  lactated ringers infusion, 30 mL/hr, Intravenous, Continuous, Live Chambers MD  •  levothyroxine (SYNTHROID, LEVOTHROID) tablet 25 mcg, 25 mcg, Oral, QAM, Live Chambers MD, 25 mcg at 04/18/18 0626  •  lisinopril (PRINIVIL,ZESTRIL) tablet 5 mg, 5 mg, Oral, Daily, Live Chambers MD, 5 mg at 04/18/18 0905  •  magnesium hydroxide (MILK OF MAGNESIA) 400 MG/5ML suspension 30 mL, 30 mL, Oral, Daily PRN, Live Chambers MD  •  magnesium oxide (MAG-OX) tablet 400 mg, 400 mg, Oral, Daily, Live Chambers MD, 400 mg at 04/18/18 0905  •  Morphine injection 4 mg, 4 mg, Intravenous, Q2H PRN, Live Chambers MD, 4 mg at 04/18/18 0406  •  multivitamin with minerals 1 tablet, 1 tablet, Oral, Daily, Live Chambers MD, 1 tablet at 04/18/18 0905  •  [DISCONTINUED] HYDROmorphone (DILAUDID) injection 1 mg, 1 mg, Intravenous, Q2H PRN, 1 mg at 04/17/18 1704 **AND** naloxone (NARCAN) injection 0.1 mg, 0.1 mg, Intravenous, Q5 Min PRN, Live Chambers MD  •  ondansetron (ZOFRAN) tablet 4 mg, 4 mg, Oral, Q6H PRN **OR** ondansetron ODT (ZOFRAN-ODT) disintegrating tablet 4 mg, 4 mg, Oral, Q6H PRN **OR** ondansetron (ZOFRAN) injection 4 mg, 4 mg, Intravenous, Q6H PRN, Live Chambers MD  •  oxyCODONE (ROXICODONE) immediate release  tablet 20 mg, 20 mg, Oral, Q4H PRN, Live Chambers MD, 20 mg at 04/18/18 0623  •  pantoprazole (PROTONIX) EC tablet 40 mg, 40 mg, Oral, QAM, Live Chambers MD, 40 mg at 04/18/18 0634  •  pregabalin (LYRICA) capsule 150 mg, 150 mg, Oral, BID, Live Chambers MD, 150 mg at 04/18/18 0904  •  rivaroxaban (XARELTO) tablet 10 mg, 10 mg, Oral, Daily, Live Chambers MD, 10 mg at 04/18/18 0854  •  spironolactone (ALDACTONE) tablet 25 mg, 25 mg, Oral, Daily, Live Chambers MD, 25 mg at 04/17/18 1620  •  vitamin B-12 (CYANOCOBALAMIN) tablet 100 mcg, 100 mcg, Oral, Daily, Live Chambers MD, 100 mcg at 04/17/18 1621      Assessment/Plan     1.  Diabetes Mellitus, Type 2:  Bedsides at goal.  Continue current dose Glipizide.    2.  HTN:  At goal.  No change to regimen.     3.  Post-op Delirium:  Improved.  Will continue to monitor.  Watch sleep/wake cycles as well.     4.  CKD III:  Check BMP in AM.     5.  Hypothyroidism:  Continue current replacement dose.    6.  Chronic Systolic CHF:  Nothing acute.  Medical management.     Plan for disposition: Home vs. Rehab    Rey Giron MD  04/18/18  11:37 AM

## 2018-04-18 NOTE — NURSING NOTE
Discharge Planning Assessment   [place]     Patient Name: Jung Short  MRN: [id number]  Today's Date: 4/18/2018    Admit Date: 4/17/2018          Discharge Needs Assessment     Row Name 04/18/18 1415       Living Environment    Lives With alone    Current Living Arrangements home/apartment/condo    Primary Care Provided by self    Provides Primary Care For no one    Family Caregiver if Needed child(shira), adult    Quality of Family Relationships helpful;involved;supportive    Able to Return to Prior Arrangements --   will continue to assess       Resource/Environmental Concerns    Resource/Environmental Concerns none    Transportation Concerns car, none       Transition Planning    Patient/Family Anticipates Transition to inpatient rehabilitation facility    Patient/Family Anticipated Services at Transition skilled nursing    Transportation Anticipated car, drives self       Discharge Needs Assessment    Readmission Within the Last 30 Days no previous admission in last 30 days    Concerns to be Addressed discharge planning    Equipment Currently Used at Home cane, straight;glucometer   has rolling walker    Anticipated Changes Related to Illness inability to care for self    Equipment Needed After Discharge --   will continue to assess    Offered/Gave Vendor List no            Discharge Plan     Row Name 04/18/18 1417       Plan    Plan to be determined    Patient/Family in Agreement with Plan yes    Plan Comments spoke with patient and daughter at bedside. patient agreeable to speak to . patient lives in a patio home alone. 2 daughters involved in her care. used [facility] in the past. she uses a cane and glucometer. has a rolling walker. no home O2/neb. independent with ADL's including driving, meal prep and shopping. uses [facility] [place] and denies having trouble paying for medications. she has filled and picked up Xarelto. she states she needs assist with living will: [facility] for Annita  Krsi. face sheet verified. patient would like to go to skilled rehab over on the [facility] rehab unit if insurance will pay. awaiting therapy recommendation. face sheet verified. will continue to follow.         Destination     No service coordination in this encounter.      Durable Medical Equipment     No service coordination in this encounter.      Dialysis/Infusion     No service coordination in this encounter.      Home Medical Care     No service coordination in this encounter.      Social Care     No service coordination in this encounter.                Demographic Summary     Row Name 04/18/18 5348       General Information    Admission Type inpatient    Arrived From home    Referral Source admission list;physician    Reason for Consult discharge planning    Preferred Language English     Used During This Interaction no       Contact Information    Permission Granted to Share Info With ;family/designee            Functional Status    No documentation.           Psychosocial    No documentation.           Abuse/Neglect    No documentation.           Legal    No documentation.           Substance Abuse    No documentation.           Patient Forms    No documentation.         Sofía Kumar RN

## 2018-04-18 NOTE — THERAPY EVALUATION
Acute Care - Physical Therapy Initial Evaluation  NOHEMY Diaz     Patient Name: Jung Short  : 1945  MRN: [id number]  Today's Date: 2018   Onset of Illness/Injury or Date of Surgery: 18  Date of Referral to PT: 18  Referring Physician: Reyes      Admit Date: 2018    Visit Dx:     ICD-10-CM ICD-9-CM   1. Primary osteoarthritis of left knee M17.12 715.16     Patient Active Problem List   Diagnosis   • Abdominal bloating   • Abdominal mass   • Abdominal pain   • Abnormal gait   • Abnormal mammogram   • Anemia   • Atherosclerosis of coronary artery   • Thoracic back pain   • Malignant neoplasm of upper-inner quadrant of right breast in female, estrogen receptor positive   • Candidiasis   • Cardiomyopathy   • Disc disorder of cervical region   • Neck pain   • Tenderness of chest wall   • Chronic kidney disease, stage III (moderate)   • Chronic pain   • Constipation   • Generalized osteoarthritis   • Depression   • Type 2 diabetes mellitus   • Controlled type 2 diabetes mellitus without complication   • Dyslipidemia   • Gastroesophageal reflux disease with esophagitis   • Generalized pain   • Gout   • Hypertension   • Hypothyroidism   • Incisional hernia   • Mass of breast   • Mitral valve insufficiency   • Nausea   • Adult body mass index greater than 30   • Osteopenia of spine   • Arthralgia of multiple joints   • Peripheral neuropathy   • Pulmonary hypertension   • Osteomyelitis   • Tricuspid valve insufficiency   • Cobalamin deficiency   • Vitamin D deficiency   • Diabetes mellitus   • Left knee pain   • Arthritis of knee   • DDD (degenerative disc disease), lumbar   • Lumbar facet arthropathy   • Pain in shoulder   • Chronic gout of multiple sites   • Constipation due to opioid therapy   • Syncope, psychogenic   • Transient alteration of awareness   • Closed fracture of patella   • Primary osteoarthritis of left knee   • Radius/ulna fracture, left, closed, initial encounter   •  Rib pain on left side   • Preoperative cardiovascular examination   • Osteoarthritis of left knee     Past Medical History:   Diagnosis Date   • Anemia    • Benign breast disease    • Cancer 2015    Right breast   • Cellulitis of leg     May-2003 right lower extremity   • CHF (congestive heart failure)     Dr Bates follows   • Chronic kidney disease     Dr Hannah follows   • Chronic ulcer of right foot     Non-pressure, history of    • Depression    • Diabetic peripheral neuropathy    • Diarrhea    • Diverticulosis    • Encounter for eye exam    • Fracture of left wrist     history of   • Gastroesophageal reflux    • Hiatal hernia    • History of bone density study 09/20/2012   • History of colonoscopy     done 6/03 recheck in 10 years.   Done july 2013 recheck in 5 years   • History of mammography, screening 09/20/2012   • Hospital discharge follow-up    • Hyperlipidemia    • Hypertension    • Hypothyroidism    • Impingement syndrome of right shoulder    • Joint pain    • Menopausal disorder    • Methicillin resistant Staphylococcus aureus infection 2012    after bladder surgery   • MRSA (methicillin resistant staph aureus) culture positive    • Nausea and vomiting    • Nonischemic cardiomyopathy     Ejection fraction 10% per 2-D echo with Doppler however she did have cardiac catheterization April 2015 which showed ejection fraction 20% with global hypokinesis and severe mitral insufficiency   • Osteoarthritis     generalized, sched jania TKA   • Paroxysmal atrial fibrillation     Dr Bates follows   • Postoperative infection     July of 2012 following her hysterectomy far vaginal wall prolapse. She was on antibiotics and wound dressings for 2 months.   • Shoulder pain, bilateral     has tears on both sides   • Syncope, psychogenic 4/19/2017   • Toe ulcer     h/o to both feet, d/t shoes rubbing per patient   • Transient alteration of awareness 4/19/2017     Past Surgical History:   Procedure Laterality Date    • ABDOMINAL SURGERY  2012    had to be reopened after bladder surgery d/t infection   • AMPUTATION FOOT / TOE Right 11/2013    Rt foot amputation MTP first toe   • BLADDER SURGERY  2012   • BREAST BIOPSY     • BREAST SURGERY  06/29/2015    Percutaneous ultrasound-guided placement of metal localized clip 1st lesion   • CARDIAC CATHETERIZATION  2015   • CATARACT EXTRACTION Bilateral 2017   • DEBRIDEMENT  FOOT Right 01/2013    During hospitalization    • EPIDURAL BLOCK      4 chronic back pain and leg pain last epidurals done 5-2012 she had no benefit at all from this procedure.   • FOOT SURGERY Bilateral     several   • HERNIA REPAIR      At the abdomen February 2007 Dr. Mendez   • INCISIONAL HERNIA REPAIR  05/16/2014    Recurrent. Incarcerated. With mesh implantation   • MASTECTOMY Right 05/2015   • SHOULDER SURGERY Right     x2 RCR   • TOTAL ABDOMINAL HYSTERECTOMY      with oophorectomy-Done June-2012 secondary to vaginal wall prolapse.   • TOTAL KNEE ARTHROPLASTY Right    • TOTAL KNEE ARTHROPLASTY Left 4/17/2018    Procedure: TOTAL KNEE ARTHROPLASTY;  Surgeon: Live Chambers MD;  Location: [facility];  Service: Orthopedics        PT ASSESSMENT (last 12 hours)      Physical Therapy Evaluation     Row Name 04/18/18 0935          PT Evaluation Time/Intention    Subjective Information complains of;pain;fatigue  -     Document Type evaluation  -     Mode of Treatment physical therapy  -     Patient Effort adequate  -     Symptoms Noted During/After Treatment increased pain  -     Comment lethargic, confused at times, possibly related to medication.    -     Row Name 04/18/18 0935          General Information    Patient Profile Reviewed? yes  -     Onset of Illness/Injury or Date of Surgery 04/17/18  -     Referring Physician Dr. Chambers  -     Patient Observations lethargic  -     Patient/Family Observations patient supine, daughters present. Immobilizer on left leg.    -     Prior Level of Function  independent:;ADL's;gait;all household mobility;community mobility  -     Equipment Currently Used at Home walker, rolling;walker, standard;grab bar;commode, bedside  -     Pertinent History of Current Functional Problem patient admitted for Left TKR due to OA, chronic pain of left knee  -     Existing Precautions/Restrictions brace worn when out of bed;fall  -     Risks Reviewed patient and family:;increased discomfort  -     Benefits Reviewed patient and family:;improve function;increase independence;decrease risk of DVT  -     Barriers to Rehab cognitive status  -     Row Name 04/18/18 0935          Resource/Environmental Concerns    Current Living Arrangements home/apartment/condo  -     Row Name 04/18/18 0935          Cognitive Assessment/Intervention- PT/OT    Orientation Status (Cognition) oriented to;place;person  -     Follows Commands (Cognition) follows one step commands;75-90% accuracy;delayed response/completion;increased processing time needed;repetition of directions required;physical/tactile prompts required  -     Safety Deficit (Cognitive) problem solving;insight into deficits/self awareness  -     Row Name 04/18/18 0935          Safety Issues, Functional Mobility    Safety Issues Affecting Function (Mobility) insight into deficits/self awareness;problem solving  -     Row Name 04/18/18 0935          Bed Mobility Assessment/Treatment    Supine-Sit [place] (Bed Mobility) minimum assist (75% patient effort);verbal cues  -     Assistive Device (Bed Mobility) bed rails;head of bed elevated  -     Comment (Bed Mobility) assist for moving left LE and verbal cues for sequencing  -     Row Name 04/18/18 0935          Transfer Assessment/Treatment    Transfer Assessment/Treatment stand pivot/stand step transfer  -     Comment (Transfers) Cues for hand placement and for positioning of left leg   -     Sit-Stand [place] (Transfers) minimum assist (75% patient  effort);2 person assist;verbal cues   from bed and from [facility]  -     Stand-Sit [place] (Transfers) minimum assist (75% patient effort);verbal cues  -     Row Name 04/18/18 0935          Sit-Stand Transfer    Assistive Device (Sit-Stand Transfers) walker, front-wheeled  -     Row Name 04/18/18 0935          Stand-Sit Transfer    Assistive Device (Stand-Sit Transfers) walker, front-wheeled  -     Row Name 04/18/18 0935          Stand Pivot/Stand Step Transfer    Stand Pivot/Stand Step [place] verbal cues;minimum assist (75% patient effort);2 person assist  -     Assistive Device (Stand Pivot Stand Step Transfer) walker, front-wheeled  -     Row Name 04/18/18 0935          Gait/Stairs Assessment/Training    [place] Level (Gait) contact guard;minimum assist (75% patient effort);verbal cues;2 person assist  -     Assistive Device (Gait) walker, front-wheeled  -     Distance in Feet (Gait) 15  -     Pattern (Gait) step-to  -     Deviations/Abnormal Patterns (Gait) gait speed decreased;stride length decreased;lisa decreased  -     Bilateral Gait Deviations forward flexed posture;weight shift ability decreased  -     Comment (Gait/Stairs) assist at times to manage walker/navigate obstacles - putting running walker into wall repeatedly, difficulty staying focused/on task.  Knee immobilizer on left LE  -     Row Name 04/18/18 0935          General ROM    GENERAL ROM COMMENTS Right LE AROM WFL.  Left hip/ankle AROM WFL.  Left knee AROM limited by pain/dressing/cognition with patient c/o 10/10 pain throughout entire session, crying out intermittently in pain, one time at rest with no movement/apparent cause of pain  -     Row Name 04/18/18 0935          General Assessment (Manual Muscle Testing)    Comment, General Manual Muscle Testing (MMT) Assessment right LE grossly 4+/5, left LE NT  -     Row Name 04/18/18 0935          Pain Scale: Numbers Pre/Post-Treatment    Pain Scale:  Numbers, Pretreatment 10/10  -     Pain Scale: Numbers, Post-Treatment 10/10  -     Pain Location - Side Left  -LH     Pain Location knee  -     Pain Intervention(s) Repositioned;Cold applied  -     Row Name             Wound 04/17/18 1323 Left knee incision    Wound - Properties Group Date first assessed: 04/17/18  - Time first assessed: 1323  - Side: Left  - Location: knee  -JM Type: incision  -JM    Row Name 04/18/18 0935          Plan of Care Review    Plan of Care Reviewed With patient  -     Row Name 04/18/18 0935          Physical Therapy Clinical Impression    Date of Referral to PT 04/17/18  -     PT Diagnosis (PT Clinical Impression) Impaired functional mobility s/p left TKR  -     Patient/Family Goals Statement (PT Clinical Impression) plan to go to rehab prior to d/c home alone  -     Criteria for Skilled Interventions Met (PT Clinical Impression) yes;treatment indicated  -     Impairments Found (describe specific impairments) gait, locomotion, and balance;ROM  -     Functional Limitations in Following Categories (Describe Specific Limitations) self-care;home management;community/leisure  -     Rehab Potential (PT Clinical Summary) good, to achieve stated therapy goals  -     Predicted Duration of Therapy (PT) 5 days  -     Care Plan Review (PT) evaluation/treatment results reviewed;care plan/treatment goals reviewed;risks/benefits reviewed;current/potential barriers reviewed;patient/other agree to care plan  -     Row Name 04/18/18 0935          Physical Therapy Goals    Bed Mobility Goal Selection (PT) bed mobility, PT goal 1  -     Transfer Goal Selection (PT) transfer, PT goal 1  [facility]     Gait Training Goal Selection (PT) gait training, PT goal 1  -     Row Name 04/18/18 0935          Bed Mobility Goal 1 (PT)    Activity/Assistive Device (Bed Mobility Goal 1, PT) sit to supine/supine to sit  -     [place] Level/Cues Needed (Bed Mobility Goal 1, PT)  conditional independence  -LH     Time Frame (Bed Mobility Goal 1, PT) 5 days  -LH     Progress/Outcomes (Bed Mobility Goal 1, PT) other (see comments)   new goal  -     Row Name 04/18/18 0935          Transfer Goal 1 (PT)    Activity/Assistive Device (Transfer Goal 1, PT) sit-to-stand/stand-to-sit;walker, rolling  -LH     [place] Level/Cues Needed (Transfer Goal 1, PT) supervision required  -LH     Time Frame (Transfer Goal 1, PT) 5 days  -LH     Progress/Outcome (Transfer Goal 1, PT) other (see comments)   new goal  -     Row Name 04/18/18 0935          Gait Training Goal 1 (PT)    Activity/Assistive Device (Gait Training Goal 1, PT) gait (walking locomotion);walker, rolling  -LH     [place] Level (Gait Training Goal 1, PT) standby assist  -LH     Distance (Gait Goal 1, PT) 75  -LH     Time Frame (Gait Training Goal 1, PT) 5 days  -LH     Progress/Outcome (Gait Training Goal 1, PT) other (see comments)   new goal  -     Row Name 04/18/18 0935          Patient Education Goal (PT)    Activity (Patient Education Goal, PT) written HEP for LE strengthening/ROM  -     [place]/Cues/Accuracy (Memory Goal 2, PT) demonstrates adequately;verbalizes understanding  -     Time Frame (Patient Education Goal, PT) long term goal (LTG);5 days  -LH     Progress/Outcome (Patient Education Goal, PT) other (see comments)   new goal  -     Row Name 04/18/18 0935          Positioning and Restraints    Pre-Treatment Position in bed  -     Post Treatment Position chair  -LH     In Chair notified nsg;reclined;call light within reach;encouraged to call for assist  -     Row Name 04/18/18 0935          Living Environment    Home Accessibility --   no steps.  [place] home.  -       User Key  (r) = Recorded By, (t) = Taken By, (c) = Cosigned By    Initials Name Provider Type     Anna Childress PT Physical Therapist    ELSA Colindres, RN Registered Nurse          Physical Therapy Education     Title: PT OT  SLP Therapies (Active)     Topic: Physical Therapy (Active)     Point: Mobility training (Done)    Learning Progress Summary     Learner Status Readiness Method Response Comment Documented by    Patient Done Acceptance E KILEY,MYLES   04/18/18 8106                      User Key     Initials Effective Dates Name Provider Type Discipline     08/11/15 -  Anna Childress, PT Physical Therapist PT                PT Recommendation and Plan  Anticipated Discharge Disposition (PT): skilled nursing facility (SNF)  Planned Therapy Interventions (PT Eval): bed mobility training, gait training, home exercise program, patient/family education, ROM (range of motion), strengthening, transfer training  Therapy Frequency (PT Clinical Impression): 2 times/day  Outcome Summary/Treatment Plan (PT)  Anticipated Discharge Disposition (PT): skilled nursing facility (SNF)  Plan of Care Reviewed With: patient  Outcome Summary: PT evaluation completeted.  Patient required min assist for bed mobility and min assist for 2 for sit to stand transfers with cues for sequencing and hand placement.  She ambulated 15 feet with a rolling walker and min A with cues for sequencing and assist to navigate around obstacles.  Patient c/o 10/10 pain throughout session.  Mobility limited today by lethargy, slurred speech, and difficulty staying awake throughout evaluation.  PT to see patient twice daily x 5 days to develop and implement HEP, progress safety with functional mobility.  Patient plans to transition to SNF for continued therapy prior to d/c home.  PT will continue to monitor progress and make recommendations for d/c needs.          Outcome Measures     Row Name 04/18/18 1400 04/18/18 0936          How much help from another person do you currently need...    Turning from your back to your side while in flat bed without using bedrails? 3  -LH  --     Moving from lying on back to sitting on the side of a flat bed without bedrails? 2  -LH  --      Moving to and from a bed to a chair (including a wheelchair)? 2  -LH  --     Standing up from a chair using your arms (e.g., wheelchair, bedside chair)? 2  -LH  --     Climbing 3-5 steps with a railing? 2  -LH  --     To walk in hospital room? 2  -LH  --     AM-PAC 6 Clicks Score 13  -  --        How much help from another is currently needed...    Putting on and taking off regular lower body clothing?  -- 2  -JJ     Bathing (including washing, rinsing, and drying)  -- 2  -JJ     Toileting (which includes using toilet bed pan or urinal)  -- 3  -JJ     Putting on and taking off regular upper body clothing  -- 4  -JJ     Taking care of personal grooming (such as brushing teeth)  -- 4  -JJ     Eating meals  -- 4  -JJ     Score  -- 19  -JJ        Functional Assessment    Outcome Measure Options AM-PAC 6 Clicks Basic Mobility (PT)  - AM-PAC 6 Clicks Daily Activity (OT)  -       User Key  (r) = Recorded By, (t) = Taken By, (c) = Cosigned By    Initials Name Provider Type     Anna Childress PT Physical Therapist    JDIRK Butcher, OTR Occupational Therapist           Time Calculation:         PT Charges     Row Name 04/18/18 1431             Time Calculation    Start Time 0935  -      Stop Time 1013  -      Time Calculation (min) 38 min  -      PT Received On 04/18/18  -      PT - Next Appointment 04/18/18  -        User Key  (r) = Recorded By, (t) = Taken By, (c) = Cosigned By    Initials Name Provider Type     Anna Childress PT Physical Therapist          Therapy Charges for Today     Code Description Service Date Service Provider Modifiers Qty    [id number] HC PT EVAL LOW COMPLEXITY 3 4/18/2018 Anna Childress, PT GP 1          PT G-Codes  Outcome Measure Options: AM-PAC 6 Clicks Basic Mobility (PT)      Anna Childress PT  4/18/2018

## 2018-04-18 NOTE — PLAN OF CARE
Problem: Patient Care Overview  Goal: Discharge Needs Assessment  Outcome: Ongoing (interventions implemented as appropriate)   04/18/18 1415   Discharge Needs Assessment   Readmission Within the Last 30 Days no previous admission in last 30 days   Concerns to be Addressed discharge planning   Patient/Family Anticipates Transition to inpatient rehabilitation facility   Patient/Family Anticipated Services at Transition skilled nursing   Transportation Concerns car, none   Transportation Anticipated car, drives self   Anticipated Changes Related to Illness inability to care for self   Equipment Needed After Discharge (will continue to assess)   Offered/Gave Vendor List no   Disability   Equipment Currently Used at Home cane, straight;glucometer  (has rolling walker)

## 2018-04-18 NOTE — THERAPY TREATMENT NOTE
Acute Care - Physical Therapy Treatment Note  NOHEMY Diaz     Patient Name: Jung Short  : 1945  MRN: [id number]  Today's Date: 2018  Onset of Illness/Injury or Date of Surgery: 18  Date of Referral to PT: 18  Referring Physician: Reyes    Admit Date: 2018    Visit Dx:    ICD-10-CM ICD-9-CM   1. Primary osteoarthritis of left knee M17.12 715.16     Patient Active Problem List   Diagnosis   • Abdominal bloating   • Abdominal mass   • Abdominal pain   • Abnormal gait   • Abnormal mammogram   • Anemia   • Atherosclerosis of coronary artery   • Thoracic back pain   • Malignant neoplasm of upper-inner quadrant of right breast in female, estrogen receptor positive   • Candidiasis   • Cardiomyopathy   • Disc disorder of cervical region   • Neck pain   • Tenderness of chest wall   • Chronic kidney disease, stage III (moderate)   • Chronic pain   • Constipation   • Generalized osteoarthritis   • Depression   • Type 2 diabetes mellitus   • Controlled type 2 diabetes mellitus without complication   • Dyslipidemia   • Gastroesophageal reflux disease with esophagitis   • Generalized pain   • Gout   • Hypertension   • Hypothyroidism   • Incisional hernia   • Mass of breast   • Mitral valve insufficiency   • Nausea   • Adult body mass index greater than 30   • Osteopenia of spine   • Arthralgia of multiple joints   • Peripheral neuropathy   • Pulmonary hypertension   • Osteomyelitis   • Tricuspid valve insufficiency   • Cobalamin deficiency   • Vitamin D deficiency   • Diabetes mellitus   • Left knee pain   • Arthritis of knee   • DDD (degenerative disc disease), lumbar   • Lumbar facet arthropathy   • Pain in shoulder   • Chronic gout of multiple sites   • Constipation due to opioid therapy   • Syncope, psychogenic   • Transient alteration of awareness   • Closed fracture of patella   • Primary osteoarthritis of left knee   • Radius/ulna fracture, left, closed, initial encounter   • Rib pain  on left side   • Preoperative cardiovascular examination   • Osteoarthritis of left knee       Therapy Treatment          Rehabilitation Treatment Summary     Row Name 04/18/18 1305             Treatment Time/Intention    Discipline physical therapy assistant  -KM      Document Type therapy note (daily note)  -KM      Subjective Information complains of;pain;fatigue   daughter states pt has to go to bathroom  -KM      Mode of Treatment physical therapy  -KM      Patient/Family Observations pt sitting up in recliner.  Knee immobilizer under knee but not strapped  -KM      Patient Effort adequate  -KM      Comment pt required cues to stay focused on task.  Pt would talk about some man coming in room to assist to toilet recently(last 20 minutes/today).  Daughter states no man has been in room to see her except MD and he did not assist her to toilet.  Pt had not eaten lunch- per family was not awake enough to eat  -KM      Existing Precautions/Restrictions fall;brace worn when out of bed   difficulty following directions  -KM      Recorded by [KM] Abimbola Singletary, MCKENZIE 04/18/18 1441 04/18/18 1441      Row Name 04/18/18 1305             Cognitive Assessment/Intervention- PT/OT    Orientation Status (Cognition) disoriented to;person;place;situation   pt recognized this therapist from previous sessions  -KM      Safety Deficit (Cognitive) problem solving;insight into deficits/self awareness  -KM      Personal Safety Interventions gait belt;nonskid shoes/slippers when out of bed  -KM      Recorded by [KM] Abimbola Singletary, MCKENZIE 04/18/18 1441 04/18/18 1441      Row Name 04/18/18 1305             Bed Mobility Assessment/Treatment    Comment (Bed Mobility) pt up in chair  -KM      Recorded by [KM] Abimbola Singletary PTA 04/18/18 1441 04/18/18 1441      Row Name 04/18/18 1305             Transfer Assessment/Treatment    Comment (Transfers) pt  required frequent cues and time to complete.  Cues to keep hands on walker with mobility and eye  open during session .  Pt required assist with walker management and safety with mobility  -KM      Sit-Stand [place] (Transfers) moderate assist (50% patient effort);2 person assist;verbal cues;nonverbal cues (demo/gesture)  -KM      Stand-Sit [place] (Transfers) moderate assist (50% patient effort);2 person assist;verbal cues;nonverbal cues (demo/gesture)  -KM      [place] Level (Toilet Transfer) minimum assist (75% patient effort);moderate assist (50% patient effort);2 person assist;verbal cues   pt able to wipe herself but cues/assist as needed  -KM      Assistive Device (Toilet Transfer) commode, 3-in-1;walker, front-wheeled  -KM      Recorded by [KM] Abimbola Singletary, [facility] 04/18/18 1441 04/18/18 1441      Row Name 04/18/18 1305             Sit-Stand Transfer    Assistive Device (Sit-Stand Transfers) walker, front-wheeled  -KM      Recorded by [KM] Abimbola Singletary, [facility] 04/18/18 1441 04/18/18 1441      Row Name 04/18/18 1305             Stand-Sit Transfer    Assistive Device (Stand-Sit Transfers) walker, front-wheeled  -KM      Recorded by [KM] Abimbola Singletary, [facility] 04/18/18 1441 04/18/18 1441      Row Name 04/18/18 1305             Toilet Transfer    Type (Toilet Transfer) sit-stand;stand-sit  -KM      Recorded by [KM] Abimbola Singletary, [facility] 04/18/18 1441 04/18/18 1441      Row Name 04/18/18 1305             Gait/Stairs Assessment/Training    [place] Level (Gait) minimum assist (75% patient effort);moderate assist (50% patient effort);2 person assist  -KM      Assistive Device (Gait) walker, front-wheeled  -KM      Distance in Feet (Gait) 3   three feet to [facility] then back to [facility]  -      Bilateral Gait Deviations forward flexed posture;weight shift ability decreased   knee immobilizer on  -KM      Comment (Gait/Stairs) pt had difficulty staying on task.  Pt concerned about man that was here earlier assisting her to bathroom.  Daughter states she has been her all day with her and no man has assisted  her to bathroom.  Pt required assist for safety.  Pt had difficulty staying focused on task and was easily distracted.  At end of session, assisted pt with setup of lunch and encouraged her to eat.  Pt states she has eaten very little today.  -KM      Recorded by [KM] Abimbola Singletary PTA 04/18/18 1441 04/18/18 1441      Row Name 04/18/18 1305             Therapeutic Exercise    Lower Extremity (Therapeutic Exercise) quad sets, bilateral;heel slides, left;SLR (straight leg raise), left   heelslides, SLR with assist  -KM      Lower Extremity Range of Motion (Therapeutic Exercise) hip abduction/adduction, left;ankle dorsiflexion/plantar flexion, bilateral   hip abduction with assist  -KM      Comment (Therapeutic Exercise) issued written HEP to pt and daughter.  Encouraged daughter to encourage pt to not keep LLE in one place but to move.  Pt per family is sleepijng alot and not moving  -KM      Recorded by [KM] Abimbola Singletary PTA 04/18/18 1441 04/18/18 1441      Row Name 04/18/18 1305             Positioning and Restraints    Pre-Treatment Position sitting in chair/recliner  -KM      Post Treatment Position chair  -KM      In Chair reclined;notified nsg;call light within reach;encouraged to call for assist;with family/caregiver;legs elevated   assisted with setup for lunch- pt attempting to eat/drink  -KM      Recorded by [KM] Abimbola Singletary PTA 04/18/18 1441 04/18/18 1441      Row Name 04/18/18 1305             Pain Scale: Numbers Pre/Post-Treatment    Pain Scale: Numbers, Pretreatment --   pt reported pain with movement but did not rate  -KM      Pain Location - Side Left  -KM      Pain Location knee  -KM      Pain Intervention(s) Medication (See MAR);Cold applied;Repositioned   discussed session with nursing  -KM      Recorded by [KM] Abimbola Singletary PTA 04/18/18 1441 04/18/18 1441      Row Name                Wound 04/17/18 1323 Left knee incision    Wound - Properties Group Date first assessed: 04/17/18 [JM] Time  first assessed: 1323 [JM] Side: Left [JM] Location: knee [JM] Type: incision [JM] Recorded by:  [JM] Niki Colindres RN 04/17/18 1323 04/17/18 1323    Row Name 04/18/18 1305             Plan of Care Review    Plan of Care Reviewed With patient  -KM      Recorded by [KM] Abimbola Singletary, PTA 04/18/18 1441 04/18/18 1441      Row Name 04/18/18 1305             Outcome Summary/Treatment Plan (PT)    Daily Summary of Progress (PT) progress towards functional goals is fair  -KM      Barriers to Overall Progress (PT) pt having difficulty staying awake and following directions.  Limited sessions per pt tolerance  -KM      Plan for Continued Treatment (PT) cont per plan  -KM      Anticipated Discharge Disposition (PT) --   skilled nursing vs home health- see how pt progresses  -KM      Recorded by [KM] Abimbola Singletary PTA 04/18/18 1441 04/18/18 1441        User Key  (r) = Recorded By, (t) = Taken By, (c) = Cosigned By    Initials Name Effective Dates Discipline    KM Abimbola Singletary, MCKENZIE 08/11/15 -  PT    JM Niki Colindres RN 06/16/16 -  Nurse          Wound 04/17/18 1323 Left knee incision (Active)   Dressing Appearance dry;intact 4/18/2018  9:09 AM   Closure ANN 4/17/2018  9:23 PM               PT Rehab Goals     Row Name 04/18/18 0935             Bed Mobility Goal 1 (PT)    Activity/Assistive Device (Bed Mobility Goal 1, PT) sit to supine/supine to sit  -      [place] Level/Cues Needed (Bed Mobility Goal 1, PT) conditional independence  -      Time Frame (Bed Mobility Goal 1, PT) 5 days  -      Progress/Outcomes (Bed Mobility Goal 1, PT) other (see comments)   new goal  -         Transfer Goal 1 (PT)    Activity/Assistive Device (Transfer Goal 1, PT) sit-to-stand/stand-to-sit;walker, rolling  -      [place] Level/Cues Needed (Transfer Goal 1, PT) supervision required  -      Time Frame (Transfer Goal 1, PT) 5 days  -      Progress/Outcome (Transfer Goal 1, PT) other (see comments)   new goal  -          Gait Training Goal 1 (PT)    Activity/Assistive Device (Gait Training Goal 1, PT) gait (walking locomotion);walker, rolling  -      [place] Level (Gait Training Goal 1, PT) standby assist  -      Distance (Gait Goal 1, PT) 75  -LH      Time Frame (Gait Training Goal 1, PT) 5 days  -LH      Progress/Outcome (Gait Training Goal 1, PT) other (see comments)   new goal  -         Patient Education Goal (PT)    Activity (Patient Education Goal, PT) written HEP for LE strengthening/ROM  -      [place]/Cues/Accuracy (Memory Goal 2, PT) demonstrates adequately;verbalizes understanding  -LH      Time Frame (Patient Education Goal, PT) long term goal (LTG);5 days  -LH      Progress/Outcome (Patient Education Goal, PT) other (see comments)   new goal  -        User Key  (r) = Recorded By, (t) = Taken By, (c) = Cosigned By    Initials Name Provider Type Discipline     Anna Childress, PT Physical Therapist PT          Physical Therapy Education     Title: PT OT SLP Therapies (Done)     Topic: Physical Therapy (Done)     Point: Mobility training (Done)    Learning Progress Summary     Learner Status Readiness Method Response Comment Documented by    Patient Done Acceptance E,D,H VU,NR Pt does remember some ex from previous surgeries- was attempting to perform on RLE. Frequent cues for mobility and safety.  04/18/18 1441     Done Acceptance E VU,NR   04/18/18 1424          Point: Home exercise program (Done)    Learning Progress Summary     Learner Status Readiness Method Response Comment Documented by    Patient Done Acceptance E,D,H VU,NR Pt does remember some ex from previous surgeries- was attempting to perform on RLE. Frequent cues for mobility and safety.  04/18/18 1441                      User Key     Initials Effective Dates Name Provider Type Discipline     08/11/15 -  Anna Childress PT Physical Therapist PT     08/11/15 -  Abimbola Singletary PTA Physical Therapy Assistant PT                 Pt having difficulty staying awake and focused on task.  Pt was not paying attention at times with mobility because she was trying to remember the man who helped her to the bathroom.  Daughter told pt several times that no man has assisted her to bathroom.  Pt states in the last few minutes man was here and we reminded her we had just assisted her to bathroom (bsc) and back to chair.  Discussed session with nursing.  Limited participation from pt.  Encouraged her to not keep LLE in place but to move LLE.  Will see how pt does tomorrow with therapy.    PT Recommendation and Plan  Anticipated Discharge Disposition (PT):  (skilled nursing vs home health- see how pt progresses)  Outcome Summary/Treatment Plan (PT)  Daily Summary of Progress (PT): progress towards functional goals is fair  Barriers to Overall Progress (PT): pt having difficulty staying awake and following directions.  Limited sessions per pt tolerance  Plan for Continued Treatment (PT): cont per plan  Anticipated Discharge Disposition (PT):  (skilled nursing vs home health- see how pt progresses)  Plan of Care Reviewed With: patient  Progress: no change  Outcome Summary: PT:  Pt had difficulty staying awake and following directions.  Pt transfers and ambulated 3 feet to BSC with mod assist of 2 and back to [facility].  Pt requires assist with rolling walker management and cues for safety.  Pt states some man helped her to bathroom earlier and daughter reports no man has been in the room to assist her to the bathroom.  Pt has difficulty staying focused on task and requires significant encouragement to perform at best.  Discussed limited session with pt's nurse.  Knee immobilizer on with mobility.          Outcome Measures     Row Name 04/18/18 1400 04/18/18 1305 04/18/18 0936       How much help from another person do you currently need...    Turning from your back to your side while in flat bed without using bedrails? 3  -LH 2  -KM  --    Moving  from lying on back to sitting on the side of a flat bed without bedrails? 2  - 2  -KM  --    Moving to and from a bed to a chair (including a wheelchair)? 2  - 2  -KM  --    Standing up from a chair using your arms (e.g., wheelchair, bedside chair)? 2  - 2  -KM  --    Climbing 3-5 steps with a railing? 2  - 2  -KM  --    To walk in hospital room? 2  - 2  -KM  --    AM-PAC 6 Clicks Score 13  - 12  -KM  --       How much help from another is currently needed...    Putting on and taking off regular lower body clothing?  --  -- 2  -JJ    Bathing (including washing, rinsing, and drying)  --  -- 2  -JJ    Toileting (which includes using toilet bed pan or urinal)  --  -- 3  -JJ    Putting on and taking off regular upper body clothing  --  -- 4  -JJ    Taking care of personal grooming (such as brushing teeth)  --  -- 4  -JJ    Eating meals  --  -- 4  -JJ    Score  --  -- 19  -JJ       Functional Assessment    Outcome Measure Options AM-PAC 6 Clicks Basic Mobility (PT)  - AM-PAC 6 Clicks Basic Mobility (PT)  - AM-PAC 6 Clicks Daily Activity (OT)  -J      User Key  (r) = Recorded By, (t) = Taken By, (c) = Cosigned By    Initials Name Provider Type     Anna Childress PT Physical Therapist    SANDRA Singletary PTA Physical Therapy Assistant    DIRK Butcher, OTR Occupational Therapist           Time Calculation:         PT Charges     Row Name 04/18/18 1446 04/18/18 1431          Time Calculation    Start Time 1305  -KM 0935  -     Stop Time 1335  -KM 1013  -     Time Calculation (min) 30 min  -KM 38 min  -     PT Received On  -- 04/18/18  -     PT - Next Appointment 04/19/18  -KM 04/18/18  -       User Key  (r) = Recorded By, (t) = Taken By, (c) = Cosigned By    Initials Name Provider Type     Anna Childress PT Physical Therapist    SANDRA Singletary PTA Physical Therapy Assistant          Therapy Charges for Today     Code Description Service Date Service Provider Modifiers  Qty    89026418817 HC PT THER PROC EA 15 MIN 4/18/2018 Abimbola Singletary, PTA GP 2    97149154616 HC PT THER SUPP EA 15 MIN 4/18/2018 Abimbola Singletary, MCKENZIE GP 1          PT G-Codes  Outcome Measure Options: AM-PAC 6 Clicks Basic Mobility (PT)    Abimbola Singletary PTA  4/18/2018

## 2018-04-18 NOTE — PLAN OF CARE
Problem: Patient Care Overview  Goal: Plan of Care Review  Outcome: Ongoing (interventions implemented as appropriate)   04/18/18 6112   Coping/Psychosocial   Plan of Care Reviewed With patient   Plan of Care Review   Progress no change   OTHER   Outcome Summary PT: Pt had difficulty staying awake and following directions. Pt transfers and ambulated 3 feet to [facility] with mod assist of 2 and back to [facility]. Pt requires assist with rolling walker management and cues for safety. Pt states some man helped her to bathroom earlier and daughter reports no man has been in the room to assist her to the bathroom. Pt has difficulty staying focused on task and requires significant encouragement to perform at best. Discussed limited session with pt's nurse. Knee immobilizer on with mobility.

## 2018-04-18 NOTE — PLAN OF CARE
Problem: Patient Care Overview  Goal: Plan of Care Review  Outcome: Ongoing (interventions implemented as appropriate)   04/18/18 0439   Coping/Psychosocial   Plan of Care Reviewed With patient   Plan of Care Review   Progress no change   OTHER   Outcome Summary Patient BP elevated, MD aware. Notify MD if SBP > 200. Patient complains of pain, controlled with PRN morphine IV and Roxicodone 20mg. Ice machine and immobilizer to L knee. POD 1. Encouraged PO intake. IVF maintained. Will continue to monitor patient.     Goal: Individualization and Mutuality  Outcome: Ongoing (interventions implemented as appropriate)    Goal: Discharge Needs Assessment  Outcome: Ongoing (interventions implemented as appropriate)    Goal: Interprofessional Rounds/Family Conf  Outcome: Ongoing (interventions implemented as appropriate)      Problem: Knee Arthroplasty (Total, Partial) (Adult)  Goal: Signs and Symptoms of Listed Potential Problems Will be Absent, Minimized or Managed (Knee Arthroplasty)  Outcome: Ongoing (interventions implemented as appropriate)    Goal: Anesthesia/Sedation Recovery  Outcome: Ongoing (interventions implemented as appropriate)      Problem: Skin Injury Risk (Adult)  Goal: Identify Related Risk Factors and Signs and Symptoms  Outcome: Ongoing (interventions implemented as appropriate)    Goal: Skin Health and Integrity  Outcome: Ongoing (interventions implemented as appropriate)      Problem: Fall Risk (Adult)  Goal: Identify Related Risk Factors and Signs and Symptoms  Outcome: Ongoing (interventions implemented as appropriate)    Goal: Absence of Fall  Outcome: Ongoing (interventions implemented as appropriate)

## 2018-04-18 NOTE — NURSING NOTE
Continued Stay Note   [place]     Patient Name: Jung Short  MRN: [id number]  Today's Date: 4/18/2018    Admit Date: 4/17/2018          Discharge Plan     Row Name 04/18/18 1522       Plan    Plan [facility] rehab referral    Patient/Family in Agreement with Plan yes    Plan Comments spoke with Monica Sainz @ [facility] rehab unit r/t referral. will continue to follow.     Row Name 04/18/18 1417       Plan    Plan to be determined    Patient/Family in Agreement with Plan yes    Plan Comments spoke with patient and daughter at bedside. patient agreeable to speak to . patient lives in a patio home alone. 2 daughters involved in her care. used [facility] in the past. she uses a cane and glucometer. has a rolling walker. no home O2/neb. independent with ADL's including driving, meal prep and shopping. uses [facility] [place] and denies having trouble paying for medications. she has filled and picked up Xarelto. she states she needs assist with living will: [facility] for Annita Ponce. face sheet verified. patient would like to go to skilled rehab over on the [facility] rehab unit if insurance will pay. awaiting therapy recommendation. face sheet verified. will continue to follow.               Discharge Codes    No documentation.           Sofía Kumar RN

## 2018-04-18 NOTE — PROGRESS NOTES
Orthopedic Progress Note   Chief Complaint:  Status post left total knee arthroplasty    Subjective     Interval History: Patient postop day 1 she is afebrile but having severe pain in that knee marginally controlled with the oxycodone morphine.  Did get some sleep last night.          Objective     Vital Signs  Temp:  [96.7 °F (35.9 °C)-98.4 °F (36.9 °C)] 97.6 °F (36.4 °C)  Heart Rate:  [68-94] 94  Resp:  [14-20] 18  BP: (125-183)/(59-96) 173/94  Body mass index is 37.28 kg/m².    Intake/Output Summary (Last 24 hours) at 04/18/18 0646  Last data filed at 04/18/18 0037   Gross per 24 hour   Intake              440 ml   Output             1525 ml   Net            -1085 ml     I/O this shift:  In: -   Out: 1325 [Urine:1325]       Physical Exam:   General: patient awake, alert and cooperative   Cardiovascular: regular rhythm and rate   Pulm: clear to auscultation bilaterally   Abdomen: Benign.  Soft bowel sounds   Extremities: Left leg is good distal pulses no motor deficit dressing is dry.  Approximately 200 cc a Hemovac drainage   Neurologic: Normal mood and behavior     Results Review:     I reviewed the patient's new clinical results.      WBC No results found for: WBCS   HGB No results found for: HGB   HCT No results found for: HCT   Platlets No results found for: LABPLAT     PT/INR:  No results found for: PROTIME/No results found for: INR    Sodium No results found for: NA   Potassium Potassium   Date Value Ref Range Status   04/17/2018 3.8 3.5 - 5.2 mmol/L Final      Chloride No results found for: CL   Bicarbonate No results found for: PLASMABICARB   BUN No results found for: BUN   Creatinine No results found for: CREATININE   Calcium No results found for: CALCIUM   Magnesium  AST  ALT  Bilirubin, Total  AlkPhos  Albumin    Amylase  Lipase    Radiology: No results found for: MG  No components found for: AST.*  No components found for: ALT.*  No components found for: BILIRUBIN, TOTAL.*    No components found for:  ALKPHOS.*  No components found for: ALBUMIN.*      No components found for: AMYLASE.*  No components found for: LIPASE.*            Imaging Results (most recent)     Procedure Component Value Units Date/Time    XR Knee 1 or 2 View Left [[id number]] Collected:  04/17/18 1543     Updated:  04/17/18 1546    Narrative:       POSTOP LEFT KNEE SERIES, 4/17/2018:     HISTORY:  Postop knee arthroplasty surgery.     TECHNIQUE:  AP and crosstable lateral radiographs of the left knee were obtained  portably following surgery.     FINDINGS:  Total knee arthroplasty components are well-positioned postoperatively.  No visible fracture or dislocation.       Impression:       Satisfactory postoperative appearance following left total knee  arthroplasty.     This report was finalized on 4/17/2018 3:44 PM by Dr. Levy Mishra MD.                  lactated ringers 100 mL/hr Last Rate: 100 mL/hr (04/17/18 1902)   lactated ringers 30 mL/hr          Assessment/Plan     Patient Active Problem List   Diagnosis Code   • Abdominal bloating R14.0   • Abdominal mass R19.00   • Abdominal pain R10.9   • Abnormal gait R26.9   • Abnormal mammogram R92.8   • Anemia D64.9   • Atherosclerosis of coronary artery I25.10   • Thoracic back pain M54.6   • Malignant neoplasm of upper-inner quadrant of right breast in female, estrogen receptor positive C50.211, Z17.0   • Candidiasis B37.9   • Cardiomyopathy I42.9   • Disc disorder of cervical region M50.90   • Neck pain M54.2   • Tenderness of chest wall R07.89   • Chronic kidney disease, stage III (moderate) N18.3   • Chronic pain G89.29   • Constipation K59.00   • Generalized osteoarthritis M15.9   • Depression F32.9   • Type 2 diabetes mellitus E11.9   • Controlled type 2 diabetes mellitus without complication E11.9   • Dyslipidemia E78.5   • Gastroesophageal reflux disease with esophagitis K21.0   • Generalized pain R52   • Gout M10.9   • Hypertension I10   • Hypothyroidism E03.9   • Incisional  hernia K43.2   • Mass of breast N63.0   • Mitral valve insufficiency I34.0   • Nausea R11.0   • Adult body mass index greater than 30 [id number]   • Osteopenia of spine M85.88   • Arthralgia of multiple joints M25.50   • Peripheral neuropathy G62.9   • Pulmonary hypertension I27.20   • Osteomyelitis M86.9   • Tricuspid valve insufficiency I07.1   • Cobalamin deficiency E53.8   • Vitamin D deficiency E55.9   • Diabetes mellitus E11.9   • Left knee pain M25.562   • Arthritis of knee M17.10   • DDD (degenerative disc disease), lumbar M51.36   • Lumbar facet arthropathy M12.88   • Pain in shoulder M25.519   • Chronic gout of multiple sites M1A.09X0   • Constipation due to opioid therapy K59.03, T40.2X5A   • Syncope, psychogenic F48.8   • Transient alteration of awareness R40.4   • Closed fracture of patella S82.009A   • Primary osteoarthritis of left knee M17.12   • Radius/ulna fracture, left, closed, initial encounter S52.92XA, S52.202A   • Rib pain on left side R07.81   • Preoperative cardiovascular examination Z01.810   • Osteoarthritis of left knee M17.12       DC Hemovac.  Begin PT OT today.  Patient most likely will need TCU stay      Live Chambers MD  04/18/18  6:46 AM

## 2018-04-18 NOTE — CONSULTS
[facility] MEDICAL [facility] HOSPITALIST     Bubba Avalos MD    CHIEF COMPLAINT: Left end stage degenerative arthritis of the knee    HISTORY OF PRESENT ILLNESS:    71 yo WF w/ PMH of CAD, DM, Depression, CKD Stage 3, Hypothyrriodism, ER pos Rt Breast CA, Pulm HTN,  Mitral Insuffiency, Prior Osteomyeltits of the Lft [place]ax w/ MSSA in 2015, Nonischemic Cardiomyopathy which improved to a Non-reduced EF with medical management.     Daughter at bedside this evening, pt had gotten dose of IV pain medication and was sleeping. History mostly from daughter, pt had been restless prior to dose of pain medication, and daughter requested pt be allowed to rest.    Pt has had a history of post op delirium after previous surgeries. MS and confusion has been waxing and waning since post op. Also complaining of all over pain, but this is a chronic issue for the pt.     Who also deals with insomnia, but does not follow with sleep hygeine recommendations, and is on chronic opioid threapy. Had tried to titrate pain medications down prior to surgery, but has recently needed to increase.       ROS: Anxiety    Past Medical History:   Diagnosis Date   • Anemia    • Benign breast disease    • Cancer 2015    Right breast   • Cellulitis of leg     May-2003 right lower extremity   • CHF (congestive heart failure)     Dr Bates follows   • Chronic kidney disease     Dr Hannah follows   • Chronic ulcer of right foot     Non-pressure, history of    • Depression    • Diabetic peripheral neuropathy    • Diarrhea    • Diverticulosis    • Encounter for eye exam    • Fracture of left wrist     history of   • Gastroesophageal reflux    • Hiatal hernia    • History of bone density study 09/20/2012   • History of colonoscopy     done 6/03 recheck in 10 years.   Done july 2013 recheck in 5 years   • History of mammography, screening 09/20/2012   • Hospital discharge follow-up    • Hyperlipidemia    • Hypertension    • Hypothyroidism    •  Impingement syndrome of right shoulder    • Joint pain    • Menopausal disorder    • Methicillin resistant Staphylococcus aureus infection 2012    after bladder surgery   • MRSA (methicillin resistant staph aureus) culture positive    • Nausea and vomiting    • Nonischemic cardiomyopathy     Ejection fraction 10% per 2-D echo with Doppler however she did have cardiac catheterization April 2015 which showed ejection fraction 20% with global hypokinesis and severe mitral insufficiency   • Osteoarthritis     generalized, sched jania TKA   • Paroxysmal atrial fibrillation     Dr Bates follows   • Postoperative infection     July of 2012 following her hysterectomy far vaginal wall prolapse. She was on antibiotics and wound dressings for 2 months.   • Shoulder pain, bilateral     has tears on both sides   • Syncope, psychogenic 4/19/2017   • Toe ulcer     h/o to both feet, d/t shoes rubbing per patient   • Transient alteration of awareness 4/19/2017     Past Surgical History:   Procedure Laterality Date   • ABDOMINAL SURGERY  2012    had to be reopened after bladder surgery d/t infection   • AMPUTATION FOOT / TOE Right 11/2013    Rt foot amputation MTP first toe   • BLADDER SURGERY  2012   • BREAST BIOPSY     • BREAST SURGERY  06/29/2015    Percutaneous ultrasound-guided placement of metal localized clip 1st lesion   • CARDIAC CATHETERIZATION  2015   • CATARACT EXTRACTION Bilateral 2017   • DEBRIDEMENT  FOOT Right 01/2013    During hospitalization    • EPIDURAL BLOCK      4 chronic back pain and leg pain last epidurals done 5-2012 she had no benefit at all from this procedure.   • FOOT SURGERY Bilateral     several   • HERNIA REPAIR      At the abdomen February 2007 Dr. Mendez   • INCISIONAL HERNIA REPAIR  05/16/2014    Recurrent. Incarcerated. With mesh implantation   • MASTECTOMY Right 05/2015   • SHOULDER SURGERY Right     x2 RCR   • TOTAL ABDOMINAL HYSTERECTOMY      with oophorectomy-Done June-2012 secondary to  vaginal wall prolapse.   • TOTAL KNEE ARTHROPLASTY Right      Family History   Problem Relation Age of Onset   • Colonic polyp Mother    • Hypertension Mother    • Migraines Mother    • Mental illness Mother    • Depression Mother    • Coronary artery disease Father    • Other Father      Cardiac Disorder   • Colon cancer Father    • Kidney disease Father    • Cancer Father    • Breast cancer Maternal Aunt    • Colon cancer Brother    • Alcohol abuse Brother    • Arthritis Sister    • Hypertension Brother    • Lung disease Brother    • Alcohol abuse Brother    • Malig Hyperthermia Neg Hx      Social History   Substance Use Topics   • Smoking status: Former Smoker     Packs/day: 3.00     Years: 30.00     Types: Cigarettes     Quit date: 1983   • Smokeless tobacco: Never Used   • Alcohol use No      Comment: h/o quit 1980s     Prescriptions Prior to Admission   Medication Sig Dispense Refill Last Dose   • esomeprazole (nexIUM) 20 MG capsule Take 20 mg by mouth Every Morning Before Breakfast.   4/17/2018 at Unknown time   • Glucose Blood (BLOOD GLUCOSE TEST) strip Blood Glucose Test In Vitro Strip; Patient Sig: Blood Glucose Test In Vitro Strip She checks blood sugars a.m. and p.m. she has the Accu-Chek christina; 60; 6; 27-Jun-2013; Active   4/17/2018 at Unknown time   • HYDROcodone-acetaminophen (NORCO)  MG per tablet Take 1 tablet by mouth Every 6 (Six) Hours.      • levothyroxine (SYNTHROID, LEVOTHROID) 25 MCG tablet Take 25 mcg by mouth Daily.   4/17/2018 at Unknown time   • mupirocin (BACTROBAN) 2 % ointment Apply  topically 3 (Three) Times a Day. PLEASE APPLY THREE TIMES PER DAY TO EACH NOSTRIL; START 5 DAYS BEFORE SURGERY 22 g 0 4/17/2018 at Unknown time   • alendronate (FOSAMAX) 35 MG tablet Take 35 mg by mouth Every 7 (Seven) Days.   Unknown at Unknown time   • anastrozole (ARIMIDEX) 1 MG tablet Take 1 mg by mouth Daily.   Unknown at Unknown time   • aspirin 81 MG chewable tablet Chew 81 mg Daily.    "4/10/2018   • bumetanide (BUMEX) 2 MG tablet Take 2 mg by mouth Daily.   Unknown at Unknown time   • carvedilol (COREG) 12.5 MG tablet Take 12.5 mg by mouth 2 (Two) Times a Day With Meals.   Unknown at Unknown time   • Coenzyme Q10 (CO Q 10) 100 MG capsule Take 1 tablet by mouth daily.   Unknown at Unknown time   • Docusate Calcium (STOOL SOFTENER PO) Take 1 tablet by mouth Daily As Needed (constipation).   Unknown at Unknown time   • DULoxetine (CYMBALTA) 60 MG capsule Take 60 mg by mouth Daily.   Unknown at Unknown time   • glimepiride (AMARYL) 1 MG tablet Take 1 tablet by mouth 2 (Two) Times a Day. (Patient taking differently: Take 1 mg by mouth 2 (Two) Times a Day.) 180 tablet 1 Unknown at Unknown time   • lisinopril (PRINIVIL,ZESTRIL) 5 MG tablet Take 5 mg by mouth Daily.  0 Unknown at Unknown time   • magnesium oxide (MAG-OX) 400 MG tablet Take 400 mg by mouth Daily.   Unknown at Unknown time   • Multiple Vitamin (MULTI VITAMIN DAILY PO) Take 1 tablet by mouth Daily.   Unknown at Unknown time   • pravastatin (PRAVACHOL) 10 MG tablet Take 10 mg by mouth Every Night.   Unknown at Unknown time   • spironolactone (ALDACTONE) 25 MG tablet Take 25 mg by mouth Daily.   Unknown at Unknown time   • vitamin B-12 (CYANOCOBALAMIN) 100 MCG tablet Take 1 tablet by mouth daily.   Unknown at Unknown time   • vitamin D (ERGOCALCIFEROL) 88686 units capsule capsule Take 50,000 Units by mouth Every 7 (Seven) Days.   Unknown at Unknown time     Allergies:  Latex    REVIEW OF SYSTEMS:  Please see the above history of present illness for pertinent positives and negatives.  The remainder of the patient's systems have been reviewed and are negative.     Vital Signs  Temp:  [96.7 °F (35.9 °C)-98 °F (36.7 °C)] 96.7 °F (35.9 °C)  Heart Rate:  [68-94] 93  Resp:  [14-20] 16  BP: (125-178)/(59-96) 174/82    Flowsheet Rows    Flowsheet Row First Filed Value   Admission Height 167.6 cm (66\") Documented at 04/17/2018 0937   Admission Weight " 105 kg (231 lb) Documented at 04/17/2018 0937           Physical Exam:  Physical Exam   Constitutional: Patient appears well-developed and well-nourished and in no acute distress, awakens easily to voice   HEENT:   Head: Normocephalic and atraumatic.   Eyes:  Pupils are equal, round, and reactive to light. EOM are intact. Sclera are anicteric and non-injected.  Mouth and Throat: Patient has moist mucous membranes. Oropharynx is clear of any erythema or exudate.     Neck: Neck supple. No JVD present.  No lymphadenopathy present.  Cardiovascular: Regular rate, regular rhythm, S1 normal and S2 normal.  Exam reveals no gallop and no friction rub.  No murmur heard.  Pulmonary/Chest: Lungs are clear to auscultation bilaterally. No respiratory distress. No wheezes. No rhonchi. No rales.   Abdominal: Soft. Bowel sounds are normal. No distension and no mass. There is no tenderness.   Musculoskeletal: Normal Muscle tone, str 5/5 in UE/LE  Extremities: No edema. Pulses are palpable in all 4 extremities. NO uclers seen on the b/l feet  Neurological: Pt GCS of 15, moves all 4 extermities, CN II_XII grossly intact  Skin: Skin is warm. No rash noted.  No cyanosis or erythema.       Results Review:    I reviewed the patient's new clinical results.  Lab Results (most recent)     Procedure Component Value Units Date/Time    Anaerobic Culture - Wound, Knee, Left [[id number]] Collected:  04/17/18 1358    Specimen:  Wound from Knee, Left Updated:  04/17/18 1408    Wound Culture - Wound, Knee, Left [[id number]] Collected:  04/17/18 1358    Specimen:  Wound from Knee, Left Updated:  04/17/18 1408    Anaerobic Culture - Wound, Knee, Left [[id number]] Collected:  04/17/18 1308    Specimen:  Wound from Knee, Left Updated:  04/17/18 1318    Wound Culture - Wound, Knee, Left [[id number]] Collected:  04/17/18 1308    Specimen:  Wound from Knee, Left Updated:  04/17/18 1318    Potassium [[id number]]  (Normal) Collected:  04/17/18 0946    Specimen:   Blood Updated:  04/17/18 1007     Potassium 3.8 mmol/L     POC Glucose Once [[id number]]  (Abnormal) Collected:  04/17/18 0948    Specimen:  Blood Updated:  04/17/18 1003     Glucose 174 (H) mg/dL     Narrative:       Meter: [id number] : [id number] Prabhu Nichols G.RN          Imaging Results (most recent)     Procedure Component Value Units Date/Time    XR Knee 1 or 2 View Left [[id number]] Collected:  04/17/18 1543     Updated:  04/17/18 1546    Narrative:       POSTOP LEFT KNEE SERIES, 4/17/2018:     HISTORY:  Postop knee arthroplasty surgery.     TECHNIQUE:  AP and crosstable lateral radiographs of the left knee were obtained  portably following surgery.     FINDINGS:  Total knee arthroplasty components are well-positioned postoperatively.  No visible fracture or dislocation.       Impression:       Satisfactory postoperative appearance following left total knee  arthroplasty.     This report was finalized on 4/17/2018 3:44 PM by Dr. Levy Mishra MD.           reviewed    ECG/EMG Results (most recent)     None        reviewed    Assessment/Plan     DM  - On orals, giving glipizide    HTN  - SBP pre op was 130's-170's, has been the same post op, has been restarted on home meds    Post Op Delirium  - Daughter was asking for something for 'jitter's  - Cautioned that the medications that can help with 'jitters' can also make post op deliruim worse  - Encouraged daughter to frequently reorinate pt  - Lights on during day, off at night, minimize interruptions and provide quiet environment  - Pt has dentures in place and glasses at bedside    CKD stage III  - Moniutor    Hypothyriodism  - On home levothyroxine 25 mcg    History of Osteomyelitis  - No non-healing ulcers seen on exam    History of Non-ischemic cardiomyopathy  - Seen by Cards on 4/9/2018, low risk for surgery    I discussed the patients findings and my recommendations with patient.     Abdullahi Mahoney MD  04/17/18  9:42 PM

## 2018-04-18 NOTE — PLAN OF CARE
Problem: Patient Care Overview  Goal: Plan of Care Review   04/18/18 9069   Coping/Psychosocial   Plan of Care Reviewed With patient   OTHER   Outcome Summary OT evaluation completed. pt required min assist for bed mobility. pt required min assist x 2 for functional transfers from EOB and BSC with RW and cues for sequencing and hand placement. pt requires mod-max assist for lb adls. pt complains of 10/10 pain at rest and during mobility however pt required cues to attend to task and demonstrated slurred speech and would fall asleep at times throughout evaluation. pt would benefit from skilled ot services to address adl retrining, ae education and functional transfers. pt states she plans to continue rehab in SNF with anticipated discharge home alone post SNF

## 2018-04-18 NOTE — PROGRESS NOTES
Patient: Jung Short  Procedure(s):  TOTAL KNEE ARTHROPLASTY  Anesthesia type: [unfilled]    Patient location: [facility] Surgical Floor  Vitals:    04/17/18 1900 04/17/18 2230 04/18/18 0037 04/18/18 0600   BP: 174/82 160/82 (!) 183/73 173/94   BP Location: Left arm Left arm Left arm Left arm   Patient Position: Lying Lying Lying Lying   Pulse:   93 94   Resp:   20 18   Temp:   98.4 °F (36.9 °C) 97.6 °F (36.4 °C)   TempSrc:   Oral Oral   SpO2:   94% 96%   Weight:       Height:         Level of consciousness: awake, alert and oriented    Post-anesthesia pain: adequate analgesia  Airway patency: patent  Respiratory: unassisted  Cardiovascular: stable and blood pressure at baseline  Hydration: euvolemic    Anesthetic complications: no

## 2018-04-18 NOTE — CONSULTS
"Diabetes Education  Assessment/Teaching    Patient Name:  Jung Short  YOB: 1945  MRN: [id number]  Admit Date:  4/17/2018      Assessment Date:  4/18/2018  Flowsheet Row Most Recent Value   General Information    Height 167.6 cm (66\")   Weight 105 kg (231 lb)   Pregnancy Assessment   Diabetes History   What type of diabetes do you have? Type 2   Length of Diabetes Diagnosis 10 + years   Do you test your blood sugar at home? yes   Frequency of checks \"not very often\" per daughter   Do you have any diabetes complications? amputations   Education Preferences   Nutrition Information   Assessment Topics   Healthy Eating - Assessment Needs education   Being Active - Assessment N/A- unable to assess   Taking Medication - Assessment Competent   Problem Solving - Assessment Needs education   Reducing Risk - Assessment Needs education   Healthy Coping - Assessment Competent   Monitoring - Assessment Needs education   DM Goals          Flowsheet Row Most Recent Value   DM Education Needs   Meter Has own   Frequency of Testing Daily   Medication Oral   Reducing Risks A1C testing, Foot care, Neuropathy   Discharge Plan Home   Teaching Method Explanation, Handouts   Patient Response Verbalized understanding            Other Comments:  All teaching was done with the daughter who was at the bedside. Patient is sleeping.  Daughter states that the patient does have a glucometer and does not check her blood sugars very often.  I encouraged her to get her mother to check at least once daily.  I discussed the importance of keeping her sugars under good control to promote healing of her incision and decrease chances for infection.  Discussed the importance of meticulous foot care in light of severe neuropathy and history of toe amputation.  Thank you for this referral.  Please reconsult if needed.        Electronically signed by:  Florence Lyle RN  04/18/18 12:47 PM  "

## 2018-04-19 LAB
ANION GAP SERPL CALCULATED.3IONS-SCNC: 9.6 MMOL/L
BASOPHILS # BLD AUTO: 0.02 10*3/MM3 (ref 0–0.2)
BASOPHILS NFR BLD AUTO: 0.2 % (ref 0–2)
BUN BLD-MCNC: 16 MG/DL (ref 8–23)
BUN/CREAT SERPL: 16.2 (ref 7–25)
CALCIUM SPEC-SCNC: 9 MG/DL (ref 8.8–10.5)
CHLORIDE SERPL-SCNC: 97 MMOL/L (ref 98–107)
CO2 SERPL-SCNC: 30.4 MMOL/L (ref 22–29)
CREAT BLD-MCNC: 0.99 MG/DL (ref 0.57–1)
DEPRECATED RDW RBC AUTO: 45.7 FL (ref 37–54)
EOSINOPHIL # BLD AUTO: 0.1 10*3/MM3 (ref 0.1–0.3)
EOSINOPHIL NFR BLD AUTO: 0.9 % (ref 0–4)
ERYTHROCYTE [DISTWIDTH] IN BLOOD BY AUTOMATED COUNT: 13.6 % (ref 11.5–14.5)
GFR SERPL CREATININE-BSD FRML MDRD: 55 ML/MIN/1.73
GLUCOSE BLD-MCNC: 211 MG/DL (ref 65–99)
HCT VFR BLD AUTO: 27.6 % (ref 37–47)
HGB BLD-MCNC: 9.1 G/DL (ref 12–16)
IMM GRANULOCYTES # BLD: 0.12 10*3/MM3 (ref 0–0.03)
IMM GRANULOCYTES NFR BLD: 1 % (ref 0–0.5)
LYMPHOCYTES # BLD AUTO: 1.49 10*3/MM3 (ref 0.6–4.8)
LYMPHOCYTES NFR BLD AUTO: 13 % (ref 20–45)
MCH RBC QN AUTO: 30.5 PG (ref 27–31)
MCHC RBC AUTO-ENTMCNC: 33 G/DL (ref 31–37)
MCV RBC AUTO: 92.6 FL (ref 81–99)
MONOCYTES # BLD AUTO: 1.18 10*3/MM3 (ref 0–1)
MONOCYTES NFR BLD AUTO: 10.3 % (ref 3–8)
NEUTROPHILS # BLD AUTO: 8.53 10*3/MM3 (ref 1.5–8.3)
NEUTROPHILS NFR BLD AUTO: 74.6 % (ref 45–70)
NRBC BLD MANUAL-RTO: 0 /100 WBC (ref 0–0)
PLATELET # BLD AUTO: 162 10*3/MM3 (ref 140–500)
PMV BLD AUTO: 9.6 FL (ref 7.4–10.4)
POTASSIUM BLD-SCNC: 3.9 MMOL/L (ref 3.5–5.2)
RBC # BLD AUTO: 2.98 10*6/MM3 (ref 4.2–5.4)
SODIUM BLD-SCNC: 137 MMOL/L (ref 136–145)
WBC NRBC COR # BLD: 11.44 10*3/MM3 (ref 4.8–10.8)

## 2018-04-19 PROCEDURE — 25010000002 NALOXONE PER 1 MG: Performed by: ORTHOPAEDIC SURGERY

## 2018-04-19 PROCEDURE — 80048 BASIC METABOLIC PNL TOTAL CA: CPT | Performed by: HOSPITALIST

## 2018-04-19 PROCEDURE — 97116 GAIT TRAINING THERAPY: CPT

## 2018-04-19 PROCEDURE — 85025 COMPLETE CBC W/AUTO DIFF WBC: CPT | Performed by: ORTHOPAEDIC SURGERY

## 2018-04-19 PROCEDURE — 99024 POSTOP FOLLOW-UP VISIT: CPT | Performed by: ORTHOPAEDIC SURGERY

## 2018-04-19 PROCEDURE — 99232 SBSQ HOSP IP/OBS MODERATE 35: CPT | Performed by: HOSPITALIST

## 2018-04-19 PROCEDURE — 25010000002 MORPHINE PER 10 MG: Performed by: ORTHOPAEDIC SURGERY

## 2018-04-19 PROCEDURE — 97110 THERAPEUTIC EXERCISES: CPT

## 2018-04-19 PROCEDURE — 94799 UNLISTED PULMONARY SVC/PX: CPT

## 2018-04-19 PROCEDURE — 97535 SELF CARE MNGMENT TRAINING: CPT

## 2018-04-19 RX ORDER — HYDROCODONE BITARTRATE AND ACETAMINOPHEN 10; 325 MG/1; MG/1
1 TABLET ORAL EVERY 4 HOURS PRN
Status: DISCONTINUED | OUTPATIENT
Start: 2018-04-19 | End: 2018-04-20 | Stop reason: HOSPADM

## 2018-04-19 RX ADMIN — BUMETANIDE 2 MG: 1 TABLET ORAL at 10:18

## 2018-04-19 RX ADMIN — PANTOPRAZOLE SODIUM 40 MG: 40 TABLET, DELAYED RELEASE ORAL at 06:22

## 2018-04-19 RX ADMIN — ACETAMINOPHEN 1000 MG: 500 TABLET, FILM COATED ORAL at 10:16

## 2018-04-19 RX ADMIN — DULOXETINE HYDROCHLORIDE 60 MG: 60 CAPSULE, DELAYED RELEASE ORAL at 10:16

## 2018-04-19 RX ADMIN — OXYCODONE HYDROCHLORIDE 10 MG: 5 TABLET ORAL at 01:00

## 2018-04-19 RX ADMIN — HYDROCODONE BITARTRATE AND ACETAMINOPHEN 1 TABLET: 10; 325 TABLET ORAL at 13:23

## 2018-04-19 RX ADMIN — PREGABALIN 150 MG: 75 CAPSULE ORAL at 20:45

## 2018-04-19 RX ADMIN — RIVAROXABAN 10 MG: 10 TABLET, FILM COATED ORAL at 10:17

## 2018-04-19 RX ADMIN — MAGNESIUM OXIDE TAB 400 MG (241.3 MG ELEMENTAL MG) 400 MG: 400 (241.3 MG) TAB at 10:19

## 2018-04-19 RX ADMIN — Medication 1 TABLET: at 10:17

## 2018-04-19 RX ADMIN — SPIRONOLACTONE 25 MG: 25 TABLET ORAL at 10:16

## 2018-04-19 RX ADMIN — LISINOPRIL 5 MG: 5 TABLET ORAL at 10:19

## 2018-04-19 RX ADMIN — MORPHINE SULFATE 4 MG: 10 INJECTION INTRAVENOUS at 02:54

## 2018-04-19 RX ADMIN — PREGABALIN 150 MG: 75 CAPSULE ORAL at 10:27

## 2018-04-19 RX ADMIN — LEVOTHYROXINE SODIUM 25 MCG: 25 TABLET ORAL at 06:22

## 2018-04-19 RX ADMIN — ACETAMINOPHEN 1000 MG: 500 TABLET, FILM COATED ORAL at 02:54

## 2018-04-19 RX ADMIN — ATORVASTATIN CALCIUM 10 MG: 10 TABLET, FILM COATED ORAL at 10:17

## 2018-04-19 RX ADMIN — CARVEDILOL 12.5 MG: 12.5 TABLET, FILM COATED ORAL at 20:45

## 2018-04-19 RX ADMIN — VITAMIN D, TAB 1000IU (100/BT) 1000 UNITS: 25 TAB at 10:18

## 2018-04-19 RX ADMIN — GLIPIZIDE 2.5 MG: 5 TABLET ORAL at 08:00

## 2018-04-19 RX ADMIN — VITAM B12 100 MCG: 100 TAB at 10:17

## 2018-04-19 RX ADMIN — OXYCODONE HYDROCHLORIDE 20 MG: 5 TABLET ORAL at 07:07

## 2018-04-19 RX ADMIN — CARVEDILOL 12.5 MG: 12.5 TABLET, FILM COATED ORAL at 10:17

## 2018-04-19 RX ADMIN — NALOXONE HYDROCHLORIDE 0.1 MG: 0.4 INJECTION, SOLUTION INTRAMUSCULAR; INTRAVENOUS; SUBCUTANEOUS at 17:05

## 2018-04-19 NOTE — THERAPY TREATMENT NOTE
Acute Care - Physical Therapy Treatment Note  NOHEMY Diaz     Patient Name: Jung Short  : 1945  MRN: [id number]  Today's Date: 2018  Onset of Illness/Injury or Date of Surgery: 18  Date of Referral to PT: 18  Referring Physician: Reyes    Admit Date: 2018    Visit Dx:    ICD-10-CM ICD-9-CM   1. Primary osteoarthritis of left knee M17.12 715.16     Patient Active Problem List   Diagnosis   • Abdominal bloating   • Abdominal mass   • Abdominal pain   • Abnormal gait   • Abnormal mammogram   • Anemia   • Atherosclerosis of coronary artery   • Thoracic back pain   • Malignant neoplasm of upper-inner quadrant of right breast in female, estrogen receptor positive   • Candidiasis   • Cardiomyopathy   • Disc disorder of cervical region   • Neck pain   • Tenderness of chest wall   • Chronic kidney disease, stage III (moderate)   • Chronic pain   • Constipation   • Generalized osteoarthritis   • Depression   • Type 2 diabetes mellitus   • Controlled type 2 diabetes mellitus without complication   • Dyslipidemia   • Gastroesophageal reflux disease with esophagitis   • Generalized pain   • Gout   • Hypertension   • Hypothyroidism   • Incisional hernia   • Mass of breast   • Mitral valve insufficiency   • Nausea   • Adult body mass index greater than 30   • Osteopenia of spine   • Arthralgia of multiple joints   • Peripheral neuropathy   • Pulmonary hypertension   • Osteomyelitis   • Tricuspid valve insufficiency   • Cobalamin deficiency   • Vitamin D deficiency   • Diabetes mellitus   • Left knee pain   • Arthritis of knee   • DDD (degenerative disc disease), lumbar   • Lumbar facet arthropathy   • Pain in shoulder   • Chronic gout of multiple sites   • Constipation due to opioid therapy   • Syncope, psychogenic   • Transient alteration of awareness   • Closed fracture of patella   • Primary osteoarthritis of left knee   • Radius/ulna fracture, left, closed, initial encounter   • Rib pain  on left side   • Preoperative cardiovascular examination   • Osteoarthritis of left knee       Therapy Treatment          Rehabilitation Treatment Summary     Row Name 04/19/18 1305 04/19/18 0900 04/19/18 0855       Treatment Time/Intention    Discipline physical therapy assistant  -KM occupational therapist  -JDIRK physical therapy assistant  -    Document Type therapy note (daily note)  -KM therapy note (daily note)  -JDIRK therapy note (daily note)  -    Subjective Information --   pt very confused/dilusional  - complains of;pain   pt with slurred speech, confusion  - --   pt confused- talking about people not in room  -    Mode of Treatment physical therapy  -KM occupational therapy  -JJ physical therapy  -KM    Patient/Family Observations Pt in bed. states she needs to go to the bathroom..  However during attempt to take pt to bathroom pt states she is not supposed to be getting up.  States Dr. Chambers said she does not have to get up until tomorrow.  informed pt that i had spoken  to Dr Chambers less than an hour ago and said pt does need to perform HEp and ambulate.  Pt states she will talk to Dr. Chambers herself.  Reminded pt she said she had to go to the bathroom and pt decided to go.  Pt verbally abusive during session- at first at me then about others in room and upset that people walked by her room and did not come in.  PT with this therapist during session and nurse and discharge planner in room to observe pt's outbursts   - pt supine in bed, sister in room, very confused, discussed with RN and MD about decreaseing pain meds in order fo pt to better participate in therapy. pt agreed to treatment  - pt in bed sister present.  Pt at first states she does not know who I am but then later states she does but does not remember my name.  Pt talking about people in room and no one is there.  Pt talking about things that do not make sense.  Pt sitting in bed but kept leaning forward and back in bed- very  restless  -KM    Patient Effort fair  -KM adequate  -JJ adequate  -KM    Comment pt at times tearful, mad, upset.  States she did a favor for the man in charge here and she is going to talk to him about the terrible care she is receiving here.  Pt states that I am taking good care of her but others keep ignoring here..  Pt easily distracted and had difficulty staying focused on task.  Nurse reports pain meds are being changed and hopefully this will help pt's confusion.  -KM  -- pt required cues to stay on task.  Pt easily distracted and at times, second therapist would have to stand by destination to show pt where she was going.  Pt would go off topic and forget what she was doing.  Sister present for session   -KM    Existing Precautions/Restrictions fall   confusion  -KM fall   confusion  -JJ fall   confusion  -KM    Recorded by [KM] Abimbola Singletary, PTA 04/19/18 1424 04/19/18 1424 [JJ] Niharika Butcher, OTR 04/19/18 1319 04/19/18 1319 [KM] Abimbola Singletary, PTA 04/19/18 1210 04/19/18 1210    Row Name 04/19/18 1305 04/19/18 0900 04/19/18 0855       Vital Signs    Pre SpO2 (%)  -- 87  -JJ 87  -KM    O2 Delivery Pre Treatment  -- room air  -JJ room air  -KM    Post SpO2 (%) 97  -KM 93  -JJ 93  -KM    O2 Delivery Post Treatment room air  -KM room air  -JJ room air  -KM    Pre Patient Position  -- Supine  -JJ Supine  -KM    Post Patient Position  -- --   Rn requests return pt to 2L O2 NC post treatment  -JJ Sitting  -KM    Recovery Time  --  -- Per RN request, O2 applied at end of session- Sats appear to possibly drop when pt sleeps  -KM    Recorded by [KM] Abimbola Singlteary, MCKENZIE 04/19/18 1424 04/19/18 1424 [JJ] Niharika Butcher, OTR 04/19/18 1319 04/19/18 1319 [KM] Abimbola Singletary, PTA 04/19/18 1210 04/19/18 1210    Row Name 04/19/18 1305 04/19/18 0900 04/19/18 0855       Cognitive Assessment/Intervention- PT/OT    Safety Deficit (Cognitive)  -- problem solving;judgment;insight into deficits/self  awareness;impulsivity  -JJ insight into deficits/self awareness;safety precautions awareness  -KM    Personal Safety Interventions gait belt;nonskid shoes/slippers when out of bed  -KM  -- gait belt;nonskid shoes/slippers when out of bed  -KM    Recorded by [KM] Abimbola Singletary, PTA 04/19/18 1424 04/19/18 1424 [JJ] Niharika Butcher, OTR 04/19/18 1319 04/19/18 1319 [KM] Abimbola Singletary, PTA 04/19/18 1210 04/19/18 1210    Row Name 04/19/18 0855             Cognitive Assessment Intervention- SLP    Cognition, Comment pt knew the year was 2018 but did not know the Presidents name.  Pt would talk on one subject and quickly change to another.  Pt had difficulty staying focused on task.  Pt wanting to rub left knee.  Cues to not rub on incision.  Nurse in to apply dressing   -KM      Recorded by [KM] Abimbola Singletary, PTA 04/19/18 1210 04/19/18 1210      Row Name 04/19/18 1305 04/19/18 0900 04/19/18 0855       Bed Mobility Assessment/Treatment    Bed Mobility Assessment/Treatment  -- supine-sit  -JJ  --    Supine-Sit [place] (Bed Mobility) contact guard   assist for LLE  -KM contact guard;verbal cues  -JJ supervision;contact guard;verbal cues  -KM    Assistive Device (Bed Mobility)  -- bed rails;head of bed elevated  -JJ  --    Comment (Bed Mobility) pt states i need to help her move her leg- reminded her she moved LLE this am and she says she did not.  Assisted as needed  -KM requires cues and extended time 2 to confusion  -JJ pt required time and assist to blanca knee immobiliizer on left LE.    -KM    Recorded by [KM] Abimbola Singletary, PTA 04/19/18 1424 04/19/18 1424 [JJ] Niharika Butcher, OTR 04/19/18 1319 04/19/18 1319 [KM] Abimbola Singletary, PTA 04/19/18 1210 04/19/18 1210    Row Name 04/19/18 0900             Functional Mobility    Functional Mobility- Ind. Level contact guard assist;verbal cues required  -JJ      Functional Mobility- Device rolling walker  -JJ      Functional Mobility-Distance (Feet) --   in room   -JJ      Functional Mobility- Comment pt required cues and increased time due to confusion. pt required cues to maintain attention to task, very distractible  -JJ      Recorded by [JJ] Niharika Butcher, OTR 04/19/18 1319 04/19/18 1319      Row Name 04/19/18 1305 04/19/18 0900 04/19/18 0855       Transfer Assessment/Treatment    Transfer Assessment/Treatment  -- sit-stand transfer;stand-sit transfer;toilet transfer  -  --    Comment (Transfers) pt sitting on the side of the bed stating we are mean making her get up.  Reminded pt she said she needed to go to the bathroom and pt decided to go into bathroom.  -KM  --  --    Sit-Stand [place] (Transfers) contact guard;verbal cues   second person for safety   -KM contact guard;minimum assist (75% patient effort);verbal cues;2 person assist  -JJ contact guard;minimum assist (75% patient effort);2 person assist;verbal cues  -KM    Stand-Sit [place] (Transfers) contact guard;verbal cues  -KM minimum assist (75% patient effort);contact guard;verbal cues;2 person assist  -JJ contact guard;minimum assist (75% patient effort);2 person assist;verbal cues  -KM    [place] Level (Toilet Transfer) contact guard;verbal cues  -KM contact guard;minimum assist (75% patient effort);2 person assist;verbal cues  -JJ contact guard;minimum assist (75% patient effort)   1-2  -KM    Assistive Device (Toilet Transfer) commode, 3-in-1;walker, front-wheeled   assist for pulling pants up  -KM commode, 3-in-1;walker, 4-wheeled   required cues to maintain attention to task  -J commode, 3-in-1;walker, front-wheeled   assist to pull up pants- would start 7 then forget task  -KM    Recorded by [KM] Abimbola Singletary, PTA 04/19/18 1424 04/19/18 1424 [JJ] Niharika Butcher, OTR 04/19/18 1319 04/19/18 1319 [KM] Abimbola Singletary, PTA 04/19/18 1210 04/19/18 1210    Row Name 04/19/18 1305 04/19/18 0900 04/19/18 0855       Sit-Stand Transfer    Assistive Device (Sit-Stand Transfers) walker,  front-wheeled  -KM walker, front-wheeled  -JJ walker, front-wheeled  -KM    Recorded by [KM] Abimbola Singletary, PTA 04/19/18 1424 04/19/18 1424 [JJ] Niharika Butcher, OTR 04/19/18 1319 04/19/18 1319 [KM] Abimbola Singletary, PTA 04/19/18 1210 04/19/18 1210    Row Name 04/19/18 1305 04/19/18 0900 04/19/18 0855       Stand-Sit Transfer    Assistive Device (Stand-Sit Transfers) walker, front-wheeled  -KM walker, front-wheeled  -JJ walker, front-wheeled  -KM    Recorded by [KM] Abimbola Singletary, PTA 04/19/18 1424 04/19/18 1424 [JJ] Niharika Butcher, OTR 04/19/18 1319 04/19/18 1319 [KM] Abimbola Singletary, PTA 04/19/18 1210 04/19/18 1210    Row Name 04/19/18 1305 04/19/18 0900 04/19/18 0855       Toilet Transfer    Type (Toilet Transfer) sit-stand;stand-sit  -KM sit-stand;stand-sit  -JJ sit-stand;stand-sit  -KM    Recorded by [KM] Abimbola Singletary, PTA 04/19/18 1424 04/19/18 1424 [JJ] Niharika Butcher, OTR 04/19/18 1319 04/19/18 1319 [KM] Abimbola Singletary, PTA 04/19/18 1210 04/19/18 1210    Row Name 04/19/18 1305 04/19/18 0855          Gait/Stairs Assessment/Training    [place] Level (Gait) minimum assist (75% patient effort);1 person assist;1 person to manage equipment  -KM minimum assist (75% patient effort);2 person assist  -KM     Assistive Device (Gait) walker, front-wheeled  -KM walker, front-wheeled  -KM     Distance in Feet (Gait) 6   seated rest then 23 feet  -KM 6   seated rest then 15 feet  -KM     Comment (Gait/Stairs) pt kept letting go of walker to blow her nose- on one occasion required assist for balance due to not paying attention.  Knee immobilizer off during ambulation this pm  -KM knee immobilizer on with ambulation.  Pt at times only required assist of one physically but due to cognition, required second person for safety and to assist with directing pt to destination.  Pt would get off topic and had difficulty walking to chair until therapist stood by chair and told pt here is where she is going.  Pt  continues to talk about boy in room and states we did not see her for therapy yesterday pm.  Pt also state Dr. Chambers told her she did not have to do ex today but then stated maybe she was not supposed to walk.  Education provided on benefits of participating with therapy.  -KM     Recorded by [KM] Abimbola Singletary, PTA 04/19/18 1424 04/19/18 1424 [KM] Abimbola Singletary, PTA 04/19/18 1210 04/19/18 1210     Row Name 04/19/18 0900             Toileting Assessment/Training    [place] Level (Toileting) --   required assist for clothing managment 2 to distractibility  -JJ      Recorded by [JJ] Niharika Butcher, OTR 04/19/18 1319 04/19/18 1319      Row Name 04/19/18 1305 04/19/18 0855          Therapeutic Exercise    Lower Extremity (Therapeutic Exercise) heel slides, left;quad sets, bilateral;SLR (straight leg raise), left  -KM heel slides, left;quad sets, bilateral;SLR (straight leg raise), left  -KM     Lower Extremity Range of Motion (Therapeutic Exercise) hip abduction/adduction, left;ankle dorsiflexion/plantar flexion, bilateral  -KM hip abduction/adduction, left;ankle dorsiflexion/plantar flexion, bilateral  -KM     Comment (Therapeutic Exercise) assist with HEP- pt will perform part of ex but assist to complete  -KM continues to require assist with HEP.  Sister asked several times if knee immobilizer can come off.  Explained at this time, pt needs to use knee immobilizer with mobility- does not need to have on when in bed/chair- just with mobility.  Explained will discharge knee immobilizer when we feel pt is ready to go without it.  -KM     Recorded by [KM] Abimbola Singletary, PTA 04/19/18 1424 04/19/18 1424 [KM] Abimbola Singletary, PTA 04/19/18 1210 04/19/18 1210     Row Name 04/19/18 1305 04/19/18 0900 04/19/18 0855       Positioning and Restraints    Pre-Treatment Position in bed  -KM in bed  -JJ in bed  -KM    Post Treatment Position chair  -KM chair  -JJ chair  -KM    In Chair notified nsg;reclined;call light within  reach;encouraged to call for assist;exit alarm on;legs elevated  -KM notified nsg;reclined;call light within reach;encouraged to call for assist;with family/caregiver;exit alarm on  -JJ notified nsg;reclined;call light within reach;encouraged to call for assist;exit alarm on;with family/caregiver;legs elevated  -KM    Recorded by [KM] Abimbola Singletary, PTA 04/19/18 1424 04/19/18 1424 [JJ] Niharika Butcher, OTR 04/19/18 1319 04/19/18 1319 [KM] Abimbola Singletary, PTA 04/19/18 1210 04/19/18 1210    Row Name 04/19/18 1305 04/19/18 0900 04/19/18 0855       Pain Scale: Numbers Pre/Post-Treatment    Pain Scale: Numbers, Pretreatment --   did not state pain number  -KM --   no co pain at rest  -JJ --   pt did not c/o pain  -KM    Pain Scale: Numbers, Post-Treatment  -- 10/10  -JJ  --    Pain Location - Side  -- Left  -JJ  --    Pain Location  -- knee  -JJ  --    Pain Intervention(s) Medication (See MAR);Repositioned;Cold applied;Ambulation/increased activity  -KM Medication (See MAR);Repositioned;Ambulation/increased activity  -JJ Medication (See MAR);Repositioned;Ambulation/increased activity  -KM    Recorded by [KM] Abimbola Singletary, PTA 04/19/18 1424 04/19/18 1424 [JDIRK] Niharika Butcher, OTR 04/19/18 1319 04/19/18 1319 [KM] Abimbola Singletary, PTA 04/19/18 1210 04/19/18 1210    Row Name                Wound 04/17/18 1323 Left knee incision    Wound - Properties Group Date first assessed: 04/17/18 [JM] Time first assessed: 1323 [JM] Side: Left [JM] Location: knee [JM] Type: incision [JM] Recorded by:  [JM] Niki Colindres RN 04/17/18 1323 04/17/18 1323    Row Name 04/19/18 0900             Plan of Care Review    Plan of Care Reviewed With patient  -JJ      Recorded by [JJ] Niharika Butcher, OTR 04/19/18 1319 04/19/18 1319      Row Name 04/19/18 0900             Outcome Summary/Treatment Plan (OT)    Daily Summary of Progress (OT) progress towards functional goals is fair  -DIRKJ      Barriers to Overall Progress (OT) pt with  confusion, distractible 2 to pain medication  -JJ      Anticipated Discharge Disposition (OT) skilled nursing facility (SNF)  -JJ      Recorded by [JJ] Niharika Butcher, OTR 04/19/18 1319 04/19/18 1319      Row Name 04/19/18 1305 04/19/18 0855          Outcome Summary/Treatment Plan (PT)    Daily Summary of Progress (PT) progress towards functional goals is fair  -KM progress towards functional goals is fair  -KM     Barriers to Overall Progress (PT) pt continues to have confusion, difficulty following directions and staying on task  -KM Pt continues to have confusion and difficulty following directions/staying on task.    -KM     Plan for Continued Treatment (PT) cont per plan  -KM cont per plan  -KM     Anticipated Discharge Disposition (PT) skilled nursing facility (SNF)  -KM skilled nursing facility (SNF)  -KM     Patient/Family Concerns, Anticipated Discharge Disposition (PT) no family present in room this pm  -KM  --     Recorded by [KM] Abimbola Singletary, PTA 04/19/18 1424 04/19/18 1424 [KM] Abimbola Singletary, PTA 04/19/18 1210 04/19/18 1210       User Key  (r) = Recorded By, (t) = Taken By, (c) = Cosigned By    Initials Name Effective Dates Discipline     Abimbola Singletary, PTA 08/11/15 -  PT    JDIRK Butcher, OTR 06/22/16 -  OT     Niki Colindres, RN 06/16/16 -  Nurse          Wound 04/17/18 1323 Left knee incision (Active)   Dressing Appearance dry;intact 4/19/2018  7:37 AM   Closure ANN 4/18/2018  8:40 PM             Physical Therapy Education     Title: PT OT SLP Therapies (Done)     Topic: Physical Therapy (Done)     Point: Mobility training (Done)    Learning Progress Summary     Learner Status Readiness Method Response Comment Documented by    Patient Done Acceptance E,D,H MYLES CAO continues to require cues and assist due to confusion  04/19/18 1424     Done Acceptance VAMSI,D,H MYLES CAO pt did participate minimally with HEP but continues to require cues and assist.  Much encouragement/cues with  "mobilty for safety  04/19/18 1210     Done Acceptance E,D,H VU,NR Pt does remember some ex from previous surgeries- was attempting to perform on RLE. Frequent cues for mobility and safety.  04/18/18 1441     Done Acceptance E KILEYNR   04/18/18 1424          Point: Home exercise program (Done)    Learning Progress Summary     Learner Status Readiness Method Response Comment Documented by    Patient Done Acceptance E,D,H VU,NR continues to require cues and assist due to confusion  04/19/18 1424     Done Acceptance E,D,H VU,NR pt did participate minimally with HEP but continues to require cues and assist.  Much encouragement/cues with mobilty for safety  04/19/18 1210     Done Acceptance E,D,H VU,NR Pt does remember some ex from previous surgeries- was attempting to perform on RLE. Frequent cues for mobility and safety.  04/18/18 1441                      User Key     Initials Effective Dates Name Provider Type Discipline     08/11/15 -  Anna Childress, PT Physical Therapist PT     08/11/15 -  Abimbola Singletary, PTA Physical Therapy Assistant PT              Spoke to Dr. Chambers prior to seeing pt this pm.  He states pt does need to participate with therapy today- including gait and ex. Discussed that pt does not remember therapy sessions from yesterday.  During session this pm, pt wanting to get up, then upset with us that we were assisting her up, then upset with me, then happy with me but upset with \"everyone else\".  Pt would change the subject frequently.  Pt states she thinks she is not getting correct meds- spoke of throwing some meds in trash or \"whatever they did with them\".  Pt required frequent cues and encouragement to perform.  Nurse in and out of the room during session and discharge planner in also.  Pt calm at end of session.  Will see if change in pain meds will help pt.  Also spoke to LUCY Shields,  Nurse practioner about pt and limited progress/outbursts/confusion.      PT Recommendation and " Plan  Anticipated Discharge Disposition (PT): skilled nursing facility (SNF)  Outcome Summary/Treatment Plan (PT)  Daily Summary of Progress (PT): progress towards functional goals is fair  Barriers to Overall Progress (PT): pt continues to have confusion, difficulty following directions and staying on task  Plan for Continued Treatment (PT): cont per plan  Anticipated Discharge Disposition (PT): skilled nursing facility (SNF)  Patient/Family Concerns, Anticipated Discharge Disposition (PT): no family present in room this pm  Plan of Care Reviewed With: patient  Progress: no change  Outcome Summary: PT:  Pt states she needs to go the bathroom & then says we are mean for making her get up.  Pt very confused this pm at times verbally abusive to this therapist, then happy with this therapist & upset people walk by her room and do not come in to assist her.  Pt had difficulty staying focused on task.  Pt states she is getting terrible care here and she did a favor for the man in charge here.  States she plans to report staff for not taking care of her.  Shortly thereafter, pt happy with care and comfortable sitting in recliner.  Discussion earlier with Dr. Chambers about pt and after session with Javier GENAO.          Outcome Measures     Row Name 04/19/18 1305 04/19/18 0900 04/19/18 0855       How much help from another person do you currently need...    Turning from your back to your side while in flat bed without using bedrails? 3  -KM  -- 3  -KM    Moving from lying on back to sitting on the side of a flat bed without bedrails? 3  -KM  -- 3  -KM    Moving to and from a bed to a chair (including a wheelchair)? 3  -KM  -- 3  -KM    Standing up from a chair using your arms (e.g., wheelchair, bedside chair)? 3  -KM  -- 2  -KM    Climbing 3-5 steps with a railing? 2  -KM  -- 2  -KM    To walk in hospital room? 3  -KM  -- 3  -KM    AM-PAC 6 Clicks Score 17  -KM  -- 16  -KM       How much help from another is currently  needed...    Putting on and taking off regular lower body clothing?  -- 2  -JJ  --    Bathing (including washing, rinsing, and drying)  -- 2  -JJ  --    Toileting (which includes using toilet bed pan or urinal)  -- 3  -JJ  --    Putting on and taking off regular upper body clothing  -- 4  -JJ  --    Taking care of personal grooming (such as brushing teeth)  -- 4  -JJ  --    Eating meals  -- 4  -JJ  --    Score  -- 19  -JJ  --       Functional Assessment    Outcome Measure Options AM-PAC 6 Clicks Basic Mobility (PT)  -KM  -- AM-PAC 6 Clicks Basic Mobility (PT)  -KM    Row Name 04/18/18 1400 04/18/18 1305 04/18/18 0936       How much help from another person do you currently need...    Turning from your back to your side while in flat bed without using bedrails? 3  -LH 2  -KM  --    Moving from lying on back to sitting on the side of a flat bed without bedrails? 2  - 2  -KM  --    Moving to and from a bed to a chair (including a wheelchair)? 2  -LH 2  -KM  --    Standing up from a chair using your arms (e.g., wheelchair, bedside chair)? 2  -LH 2  -KM  --    Climbing 3-5 steps with a railing? 2  -LH 2  -KM  --    To walk in hospital room? 2  -LH 2  -KM  --    AM-PAC 6 Clicks Score 13  -LH 12  -KM  --       How much help from another is currently needed...    Putting on and taking off regular lower body clothing?  --  -- 2  -JJ    Bathing (including washing, rinsing, and drying)  --  -- 2  -JJ    Toileting (which includes using toilet bed pan or urinal)  --  -- 3  -JJ    Putting on and taking off regular upper body clothing  --  -- 4  -JJ    Taking care of personal grooming (such as brushing teeth)  --  -- 4  -JJ    Eating meals  --  -- 4  -JJ    Score  --  -- 19  -JJ       Functional Assessment    Outcome Measure Options AM-PAC 6 Clicks Basic Mobility (PT)  -LH AM-PAC 6 Clicks Basic Mobility (PT)  -KM AM-PAC 6 Clicks Daily Activity (OT)  -JJ      User Key  (r) = Recorded By, (t) = Taken By, (c) = Cosigned By     Initials Name Provider Type     Anna Childress, PT Physical Therapist    KM Abimbola Singletary PTA Physical Therapy Assistant    LUPIS Butcher, OTR Occupational Therapist           Time Calculation:         PT Charges     Row Name 04/19/18 1429 04/19/18 1214          Time Calculation    Start Time 1305  -KM 0855  -KM     Stop Time 1345  -KM 0931  -KM     Time Calculation (min) 40 min  -KM 36 min  -KM     PT - Next Appointment 04/20/18  -KM 04/19/18  -KM       User Key  (r) = Recorded By, (t) = Taken By, (c) = Cosigned By    Initials Name Provider Type     Abimbola Singletary PTA Physical Therapy Assistant          Therapy Charges for Today     Code Description Service Date Service Provider Modifiers Qty    80716700041 HC PT THER PROC EA 15 MIN 4/18/2018 Abimbola Singletary PTA GP 2    43725462860 HC PT THER SUPP EA 15 MIN 4/18/2018 Abimbola Singletary, PTA GP 1    33184062662 HC GAIT TRAINING EA 15 MIN 4/19/2018 Abimbola Singletary PTA GP 1    82982917713 HC PT THER PROC EA 15 MIN 4/19/2018 Abimbola Singletary, PTA GP 1    81521393529 HC GAIT TRAINING EA 15 MIN 4/19/2018 Abimbola Singletary, PTA GP 1    37749291447 HC PT THER PROC EA 15 MIN 4/19/2018 Abimbola Singletary PTA GP 2    30427165678 HC PT THER SUPP EA 15 MIN 4/19/2018 Abimbola Singletary PTA GP 1          PT G-Codes  Outcome Measure Options: AM-PAC 6 Clicks Basic Mobility (PT)    Abimbola Singletary PTA  4/19/2018

## 2018-04-19 NOTE — PLAN OF CARE
Problem: Patient Care Overview  Goal: Plan of Care Review  Outcome: Ongoing (interventions implemented as appropriate)   04/19/18 0535   Coping/Psychosocial   Plan of Care Reviewed With patient   OTHER   Outcome Summary PT: Pt states she needs to go the bathroom & then says we are mean for making her get up. Pt very confused this pm at times verbally abusive to this therapist, then happy with this therapist & upset people walk by her room and do not come in to assist her. Pt had difficulty staying focued on task. Pt states she is getting terrible care here and she did a favor for the man in charge here. States she plans to report staff for not taking care of her. Shortly thereafter, pt happy with care and comfortable sitting in recliner. Discussion earlier with Dr. Chambers about pt and after session with Cornelius GENAO. JOSE ANTONIO.

## 2018-04-19 NOTE — ACP (ADVANCE CARE PLANNING)
Patient is confused and not able to complete Advance Directive.  I will continue to follow up during her stay.    Annita Ponce

## 2018-04-19 NOTE — PLAN OF CARE
Problem: Patient Care Overview  Goal: Plan of Care Review  Outcome: Ongoing (interventions implemented as appropriate)   04/19/18 0318   Coping/Psychosocial   Plan of Care Reviewed With patient   Plan of Care Review   Progress no change   OTHER   Outcome Summary Pt BP remains elevated, MD aware. MD states not concerned unless SBP >200. Pt confused and denied medication and vital signs at the beginning of shift. Throughout shift, convinced patient to take medications due at that time. Pt does well with transfer to bedside commode. Will continue to monitor patient.     Goal: Individualization and Mutuality  Outcome: Ongoing (interventions implemented as appropriate)    Goal: Discharge Needs Assessment  Outcome: Ongoing (interventions implemented as appropriate)    Goal: Interprofessional Rounds/Family Conf  Outcome: Ongoing (interventions implemented as appropriate)      Problem: Knee Arthroplasty (Total, Partial) (Adult)  Goal: Signs and Symptoms of Listed Potential Problems Will be Absent, Minimized or Managed (Knee Arthroplasty)  Outcome: Ongoing (interventions implemented as appropriate)    Goal: Anesthesia/Sedation Recovery  Outcome: Ongoing (interventions implemented as appropriate)      Problem: Skin Injury Risk (Adult)  Goal: Identify Related Risk Factors and Signs and Symptoms  Outcome: Ongoing (interventions implemented as appropriate)    Goal: Skin Health and Integrity  Outcome: Ongoing (interventions implemented as appropriate)      Problem: Fall Risk (Adult)  Goal: Identify Related Risk Factors and Signs and Symptoms  Outcome: Ongoing (interventions implemented as appropriate)    Goal: Absence of Fall  Outcome: Ongoing (interventions implemented as appropriate)

## 2018-04-19 NOTE — NURSING NOTE
Continued Stay Note   Alissa Castellon     Patient Name: Jung Short  MRN: [id number]  Today's Date: 4/19/2018    Admit Date: 4/17/2018          Discharge Plan     Row Name 04/19/18 1545       Plan    Plan accepted to [facility] rehab unit    Patient/Family in Agreement with Plan yes    Plan Comments spoke with Monica @ [facility] rehab unit and patient has been accepted starting 4/20. will continue to follow.               Discharge Codes    No documentation.           Sofía Kumar RN

## 2018-04-19 NOTE — PROGRESS NOTES
"Hospitalist Team      Patient Care Team:  Bubba Avalos MD as PCP - General (Internal Medicine & Pediatrics)  JOSE ANTONIO Akhtar as PCP - Claims Attributed  Tory Pagan MD as Consulting Physician (Hematology and Oncology)  Lina Fisher MD as Referring Physician (General Surgery)  Shanda Gerardo RN as Care Coordinator (Population Health)  Live Chambers MD as Surgeon (Orthopedic Surgery)        Chief Complaint:  Follow-up Diabetes, CKD III, and Hypothyroidism    Subjective    Events noted overnight.  Gave small dose of Narcan this afternoon.    Objective    Vital Signs  Temp:  [97.8 °F (36.6 °C)-99.1 °F (37.3 °C)] 99.1 °F (37.3 °C)  Heart Rate:  [81-98] 81  Resp:  [16-18] 18  BP: (100-183)/(60-89) 100/60  Oxygen Therapy  SpO2: 95 %  Pulse Oximetry Type: Continuous  Device (Oxygen Therapy): nasal cannula  Flow (L/min): 2  ETCO2 (mmHg): 48 mmHg}    Flowsheet Rows    Flowsheet Row First Filed Value   Admission Height 167.6 cm (66\") Documented at 04/17/2018 0937   Admission Weight 105 kg (231 lb) Documented at 04/17/2018 0937          Physical Exam:    General Appearance:    Drowsy, but arouses   Lungs:     Clear to auscultation,respirations regular, even and                  unlabored    Heart:    Regular rhythm and normal rate, normal S1 and S2, no            murmur, no gallop, no rub, no click   Abdomen:     Soft and non-tender; Bowel sounds are diminished   Extremities:   No clubbing or cyanosis   Pulses:   Radial pulses palpable and equal bilaterally   Neurologic:   Cranial nerves 2 - 12 grossly intact       Results Review:     I reviewed the patient's new clinical results.    Lab Results (last 24 hours)     Procedure Component Value Units Date/Time    Wound Culture - Wound, Knee, Left [[id number]]  (Normal) Collected:  04/17/18 1308    Specimen:  Wound from Knee, Left Updated:  04/19/18 0734     Wound Culture No growth at 2 days     Gram Stain Result No organisms seen      Rare (1+) WBCs per low power " field    Wound Culture - Wound, Knee, Left [[id number]]  (Normal) Collected:  04/17/18 1358    Specimen:  Wound from Knee, Left Updated:  04/19/18 0704     Wound Culture No growth at 2 days     Gram Stain Result No organisms seen      No WBCs per low power field    Basic Metabolic Panel [[id number]]  (Abnormal) Collected:  04/19/18 0442    Specimen:  Blood Updated:  04/19/18 0524     Glucose 211 (H) mg/dL      BUN 16 mg/dL      Creatinine 0.99 mg/dL      Sodium 137 mmol/L      Potassium 3.9 mmol/L      Chloride 97 (L) mmol/L      CO2 30.4 (H) mmol/L      Calcium 9.0 mg/dL      eGFR Non African Amer 55 (L) mL/min/1.73      BUN/Creatinine Ratio 16.2     Anion Gap 9.6 mmol/L     Narrative:       The MDRD GFR formula is only valid for adults with stable renal function between ages 18 and 70.    CBC & Differential [[id number]] Collected:  04/19/18 0442    Specimen:  Blood Updated:  04/19/18 0501    Narrative:       The following orders were created for panel order CBC & Differential.  Procedure                               Abnormality         Status                     ---------                               -----------         ------                     CBC Auto Differential[[id number]]        Abnormal            Final result                 Please view results for these tests on the individual orders.    CBC Auto Differential [[id number]]  (Abnormal) Collected:  04/19/18 0442    Specimen:  Blood Updated:  04/19/18 0501     WBC 11.44 (H) 10*3/mm3      RBC 2.98 (L) 10*6/mm3      Hemoglobin 9.1 (L) g/dL      Hematocrit 27.6 (L) %      MCV 92.6 fL      MCH 30.5 pg      MCHC 33.0 g/dL      RDW 13.6 %      RDW-SD 45.7 fl      MPV 9.6 fL      Platelets 162 10*3/mm3      Neutrophil % 74.6 (H) %      Lymphocyte % 13.0 (L) %      Monocyte % 10.3 (H) %      Eosinophil % 0.9 %      Basophil % 0.2 %      Immature Grans % 1.0 (H) %      Neutrophils, Absolute 8.53 (H) 10*3/mm3      Lymphocytes, Absolute 1.49 10*3/mm3      Monocytes,  Absolute 1.18 (H) 10*3/mm3      Eosinophils, Absolute 0.10 10*3/mm3      Basophils, Absolute 0.02 10*3/mm3      Immature Grans, Absolute 0.12 (H) 10*3/mm3      nRBC 0.0 /100 WBC     Needle Stick Pt Source [[id number]] Collected:  04/18/18 1407    Specimen:  Blood Updated:  04/18/18 1743    Narrative:       The following orders were created for panel order Needle Stick Pt Source.  Procedure                               Abnormality         Status                     ---------                               -----------         ------                     HCV RNA, PCR, Qualitative[[id number]]                        In process                 Hepatitis B Surface Antigen[[id number]]  Normal              Final result               HIV-1 / O / 2 Ag / Antib...[[id number]]  Normal              Final result               Hepatitis C Antibody[[id number]]         Normal              Final result                 Please view results for these tests on the individual orders.    Hepatitis B Surface Antigen [[id number]]  (Normal) Collected:  04/18/18 1408    Specimen:  Blood Updated:  04/18/18 1743     Hepatitis B Surface Ag Non-Reactive    Hepatitis C Antibody [[id number]]  (Normal) Collected:  04/18/18 1408    Specimen:  Blood Updated:  04/18/18 1743     Hepatitis C Ab Non-Reactive          Imaging Results (last 24 hours)     ** No results found for the last 24 hours. **            Medication Review:   I have reviewed the patient's current medication list    Current Facility-Administered Medications:   •  anastrozole (ARIMIDEX) tablet 1 mg, 1 mg, Oral, Daily, Live Chambers MD, 1 mg at 04/18/18 1356  •  atorvastatin (LIPITOR) tablet 10 mg, 10 mg, Oral, Daily, Live Chambers MD, 10 mg at 04/19/18 1017  •  bisacodyl (DULCOLAX) suppository 10 mg, 10 mg, Rectal, Daily PRN, Live Chambers MD  •  bumetanide (BUMEX) tablet 2 mg, 2 mg, Oral, Daily, Live Chambers MD, 2 mg at 04/19/18 1018  •  carvedilol (COREG) tablet 12.5 mg, 12.5 mg, Oral, Q12H,  Live Chambers MD, 12.5 mg at 04/19/18 1017  •  cholecalciferol (VITAMIN D3) tablet 1,000 Units, 1,000 Units, Oral, Daily, Live Chambers MD, 1,000 Units at 04/19/18 1018  •  docusate sodium (COLACE) capsule 100 mg, 100 mg, Oral, Daily PRN, Live Chambers MD  •  DULoxetine (CYMBALTA) DR capsule 60 mg, 60 mg, Oral, Daily, Live Chambers MD, 60 mg at 04/19/18 1016  •  glipiZIDE (GLUCOTROL) tablet 2.5 mg, 2.5 mg, Oral, QAM AC, Live Chambers MD, 2.5 mg at 04/19/18 0800  •  HYDROcodone-acetaminophen (NORCO)  MG per tablet 1 tablet, 1 tablet, Oral, Q4H PRN, Live Chambers MD, 1 tablet at 04/19/18 1323  •  lactated ringers infusion, 100 mL/hr, Intravenous, Continuous, Niki Buchanan CRNA, Stopped at 04/18/18 0846  •  lactated ringers infusion, 30 mL/hr, Intravenous, Continuous, Live Chambers MD  •  levothyroxine (SYNTHROID, LEVOTHROID) tablet 25 mcg, 25 mcg, Oral, QAM, Live Chambers MD, 25 mcg at 04/19/18 0622  •  lisinopril (PRINIVIL,ZESTRIL) tablet 5 mg, 5 mg, Oral, Daily, Live Chambers MD, 5 mg at 04/19/18 1019  •  magnesium hydroxide (MILK OF MAGNESIA) 400 MG/5ML suspension 30 mL, 30 mL, Oral, Daily PRN, Live Chambers MD  •  magnesium oxide (MAG-OX) tablet 400 mg, 400 mg, Oral, Daily, Live Chambers MD, 400 mg at 04/19/18 1019  •  multivitamin with minerals 1 tablet, 1 tablet, Oral, Daily, Live Chambers MD, 1 tablet at 04/19/18 1017  •  [DISCONTINUED] HYDROmorphone (DILAUDID) injection 1 mg, 1 mg, Intravenous, Q2H PRN, 1 mg at 04/17/18 1704 **AND** naloxone (NARCAN) injection 0.1 mg, 0.1 mg, Intravenous, Q5 Min PRN, Live Chambers MD, 0.1 mg at 04/19/18 1705  •  ondansetron (ZOFRAN) tablet 4 mg, 4 mg, Oral, Q6H PRN **OR** ondansetron ODT (ZOFRAN-ODT) disintegrating tablet 4 mg, 4 mg, Oral, Q6H PRN **OR** ondansetron (ZOFRAN) injection 4 mg, 4 mg, Intravenous, Q6H PRN, Live Chambers MD  •  pantoprazole (PROTONIX) EC tablet 40 mg, 40 mg, Oral, QAM, Live Chambers MD, 40 mg at 04/19/18 0622  •  pregabalin (LYRICA)  capsule 150 mg, 150 mg, Oral, BID, Live Chambers MD, 150 mg at 04/19/18 1027  •  rivaroxaban (XARELTO) tablet 10 mg, 10 mg, Oral, Daily, Live Chambers MD, 10 mg at 04/19/18 1017  •  spironolactone (ALDACTONE) tablet 25 mg, 25 mg, Oral, Daily, Live Chambers MD, 25 mg at 04/19/18 1016  •  vitamin B-12 (CYANOCOBALAMIN) tablet 100 mcg, 100 mcg, Oral, Daily, Live Chambers MD, 100 mcg at 04/19/18 1017      Assessment/Plan     1.  Diabetes Mellitus, Type 2:  AM not at goal.  Will continue to monitor on current dose Glipizide as she is high risk of morbidity from hypoglycemia.     2.  HTN:  BP remains at goal.  No change to regimen.     3.  Post-op Delirium:  Recommend stopping narcotics.     4.  CKD III:  Creatinine at baseline.      5.  Hypothyroidism:  Continue current replacement dose.     6.  Chronic Systolic CHF:  No acute issues.  Continue medical management.    Plan for disposition:  Rehab    Rey Giron MD  04/19/18  5:12 PM

## 2018-04-19 NOTE — PLAN OF CARE
Problem: Patient Care Overview  Goal: Plan of Care Review  Outcome: Ongoing (interventions implemented as appropriate)   04/19/18 1211   Coping/Psychosocial   Plan of Care Reviewed With patient   OTHER   Outcome Summary PT: Pt continues to be confused and having difficulty staying focused on task. Pt ambulating only short distances with rolling walker and assist of 2. Pt continues to talk about people in room and no one is there. Pt states she did not have therapy yesterday afternoon. Discussed with nurse and Dr. Giron about limited progress with pt due to limited participation from pt and confusion.

## 2018-04-19 NOTE — PLAN OF CARE
Problem: Patient Care Overview  Goal: Plan of Care Review   04/19/18 3972   Coping/Psychosocial   Plan of Care Reviewed With patient   OTHER   Outcome Summary OT: pt required cga- min assist for functional transfers from elevated commode seat. pt requires assist to manage clothing during toileting due to distractibility within functional task. pt confused and distarctible throughout treatment session due to pain medications. discussed with RN and MD regarding decreasing pain meds to incerase pts participation with therapy

## 2018-04-19 NOTE — PROGRESS NOTES
Orthopedic Progress Note   Chief Complaint:  Status post left total knee arthroplasty    Subjective     Interval History: Patient is postop day 2.  Still experiencing moderate to severe pain which is not totally unexpected with the pain management patient preoperatively.  Hemoglobin is 9.1          Objective     Vital Signs  Temp:  [97.5 °F (36.4 °C)-98 °F (36.7 °C)] 97.8 °F (36.6 °C)  Heart Rate:  [82-93] 93  Resp:  [16-18] 16  BP: (117-183)/(64-88) 152/88  Body mass index is 37.28 kg/m².    Intake/Output Summary (Last 24 hours) at 04/19/18 0718  Last data filed at 04/19/18 0159   Gross per 24 hour   Intake             1960 ml   Output             1520 ml   Net              440 ml     No intake/output data recorded.       Physical Exam:   General: patient awake, alert and cooperative   Cardiovascular: regular rhythm and rate   Pulm: clear to auscultation bilaterally   Abdomen: Benign.  Soft bowel sounds   Extremities: Dressing to be changed wound completely benign.  No motor deficit no sensory loss Is nontender   Neurologic: Normal mood and behavior     Results Review:     I reviewed the patient's new clinical results.      WBC No results found for: WBCS   HGB Hemoglobin   Date Value Ref Range Status   04/19/2018 9.1 (L) 12.0 - 16.0 g/dL Final   04/18/2018 10.0 (L) 12.0 - 16.0 g/dL Final      HCT Hematocrit   Date Value Ref Range Status   04/19/2018 27.6 (L) 37.0 - 47.0 % Final   04/18/2018 30.6 (L) 37.0 - 47.0 % Final      Platlets No results found for: LABPLAT     PT/INR:  No results found for: PROTIME/No results found for: INR    Sodium Sodium   Date Value Ref Range Status   04/19/2018 137 136 - 145 mmol/L Final      Potassium Potassium   Date Value Ref Range Status   04/19/2018 3.9 3.5 - 5.2 mmol/L Final   04/17/2018 3.8 3.5 - 5.2 mmol/L Final      Chloride Chloride   Date Value Ref Range Status   04/19/2018 97 (L) 98 - 107 mmol/L Final      Bicarbonate No results found for: PLASMABICARB   BUN BUN   Date Value  Ref Range Status   04/19/2018 16 8 - 23 mg/dL Final      Creatinine Creatinine   Date Value Ref Range Status   04/19/2018 0.99 0.57 - 1.00 mg/dL Final      Calcium Calcium   Date Value Ref Range Status   04/19/2018 9.0 8.8 - 10.5 mg/dL Final      Magnesium  AST  ALT  Bilirubin, Total  AlkPhos  Albumin    Amylase  Lipase    Radiology: No results found for: MG  No components found for: AST.*  No components found for: ALT.*  No components found for: BILIRUBIN, TOTAL.*    No components found for: ALKPHOS.*  No components found for: ALBUMIN.*      No components found for: AMYLASE.*  No components found for: LIPASE.*            Imaging Results (most recent)     Procedure Component Value Units Date/Time    XR Knee 1 or 2 View Left [[id number]] Collected:  04/17/18 1543     Updated:  04/17/18 1546    Narrative:       POSTOP LEFT KNEE SERIES, 4/17/2018:     HISTORY:  Postop knee arthroplasty surgery.     TECHNIQUE:  AP and crosstable lateral radiographs of the left knee were obtained  portably following surgery.     FINDINGS:  Total knee arthroplasty components are well-positioned postoperatively.  No visible fracture or dislocation.       Impression:       Satisfactory postoperative appearance following left total knee  arthroplasty.     This report was finalized on 4/17/2018 3:44 PM by Dr. Levy Mishra MD.                  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, Z17.0   • Candidiasis B37.9   • Cardiomyopathy I42.9   • Disc disorder of cervical region M50.90   • Neck  pain M54.2   • Tenderness of chest wall R07.89   • Chronic kidney disease, stage III (moderate) N18.3   • Chronic pain G89.29   • Constipation K59.00   • Generalized osteoarthritis M15.9   • Depression F32.9   • Type 2 diabetes mellitus E11.9   • Controlled type 2 diabetes mellitus without complication E11.9   • Dyslipidemia E78.5   • Gastroesophageal reflux disease with esophagitis K21.0   • Generalized pain R52   • Gout M10.9   • Hypertension I10   • Hypothyroidism E03.9   • Incisional hernia K43.2   • Mass of breast N63.0   • Mitral valve insufficiency I34.0   • Nausea R11.0   • Adult body mass index greater than 30 [id number]   • Osteopenia of spine M85.88   • Arthralgia of multiple joints M25.50   • Peripheral neuropathy G62.9   • Pulmonary hypertension I27.20   • Osteomyelitis M86.9   • Tricuspid valve insufficiency I07.1   • Cobalamin deficiency E53.8   • Vitamin D deficiency E55.9   • Diabetes mellitus E11.9   • Left knee pain M25.562   • Arthritis of knee M17.10   • DDD (degenerative disc disease), lumbar M51.36   • Lumbar facet arthropathy M12.88   • Pain in shoulder M25.519   • Chronic gout of multiple sites M1A.09X0   • Constipation due to opioid therapy K59.03, T40.2X5A   • Syncope, psychogenic F48.8   • Transient alteration of awareness R40.4   • Closed fracture of patella S82.009A   • Primary osteoarthritis of left knee M17.12   • Radius/ulna fracture, left, closed, initial encounter S52.92XA, S52.202A   • Rib pain on left side R07.81   • Preoperative cardiovascular examination Z01.810   • Osteoarthritis of left knee M17.12         Continue PT OT.  Transfer to TCU tomorrow    Live Chambers MD  04/19/18  7:18 AM

## 2018-04-19 NOTE — THERAPY TREATMENT NOTE
Acute Care - Physical Therapy Treatment Note  NOHEMY Diaz     Patient Name: Jung Short  : 1945  MRN: [id number]  Today's Date: 2018  Onset of Illness/Injury or Date of Surgery: 18  Date of Referral to PT: 18  Referring Physician: Reyes    Admit Date: 2018    Visit Dx:    ICD-10-CM ICD-9-CM   1. Primary osteoarthritis of left knee M17.12 715.16     Patient Active Problem List   Diagnosis   • Abdominal bloating   • Abdominal mass   • Abdominal pain   • Abnormal gait   • Abnormal mammogram   • Anemia   • Atherosclerosis of coronary artery   • Thoracic back pain   • Malignant neoplasm of upper-inner quadrant of right breast in female, estrogen receptor positive   • Candidiasis   • Cardiomyopathy   • Disc disorder of cervical region   • Neck pain   • Tenderness of chest wall   • Chronic kidney disease, stage III (moderate)   • Chronic pain   • Constipation   • Generalized osteoarthritis   • Depression   • Type 2 diabetes mellitus   • Controlled type 2 diabetes mellitus without complication   • Dyslipidemia   • Gastroesophageal reflux disease with esophagitis   • Generalized pain   • Gout   • Hypertension   • Hypothyroidism   • Incisional hernia   • Mass of breast   • Mitral valve insufficiency   • Nausea   • Adult body mass index greater than 30   • Osteopenia of spine   • Arthralgia of multiple joints   • Peripheral neuropathy   • Pulmonary hypertension   • Osteomyelitis   • Tricuspid valve insufficiency   • Cobalamin deficiency   • Vitamin D deficiency   • Diabetes mellitus   • Left knee pain   • Arthritis of knee   • DDD (degenerative disc disease), lumbar   • Lumbar facet arthropathy   • Pain in shoulder   • Chronic gout of multiple sites   • Constipation due to opioid therapy   • Syncope, psychogenic   • Transient alteration of awareness   • Closed fracture of patella   • Primary osteoarthritis of left knee   • Radius/ulna fracture, left, closed, initial encounter   • Rib pain  on left side   • Preoperative cardiovascular examination   • Osteoarthritis of left knee       Therapy Treatment          Rehabilitation Treatment Summary     Row Name 04/19/18 0855             Treatment Time/Intention    Discipline physical therapy assistant  -KM      Document Type therapy note (daily note)  -KM      Subjective Information --   pt confused- talking about people not in room  -KM      Mode of Treatment physical therapy  -KM      Patient/Family Observations pt in bed sister present.  Pt at first states she does not know who I am but then later states she does but does not remember my name.  Pt talking about people in room and no one is there.  Pt talking about things that do not make sense.  Pt sitting in bed but kept leaning forward and back in bed- very restless  -KM      Patient Effort adequate  -KM      Comment pt required cues to stay on task.  Pt easily distracted and at times, second therapist would have to stand by destination to show pt where she was going.  Pt would go off topic and forget what she was doing.  Sister present for session   -KM      Existing Precautions/Restrictions fall   confusion  -KM      Recorded by [KM] Abimbola Singletary, MCKENZIE 04/19/18 1210 04/19/18 1210      Row Name 04/19/18 0855             Vital Signs    Pre SpO2 (%) 87  -KM      O2 Delivery Pre Treatment room air  -KM      Post SpO2 (%) 93  -KM      O2 Delivery Post Treatment room air  -KM      Pre Patient Position Supine  -KM      Post Patient Position Sitting  -KM      Recovery Time Per RN request, O2 applied at end of session- Sats appear to possibly drop when pt sleeps  -KM      Recorded by [KM] Abimbola Singletary PTA 04/19/18 1210 04/19/18 1210      Row Name 04/19/18 0855             Cognitive Assessment/Intervention- PT/OT    Safety Deficit (Cognitive) insight into deficits/self awareness;safety precautions awareness  -KM      Personal Safety Interventions gait belt;nonskid shoes/slippers when out of bed  -KM       Recorded by [KM] Abimbola Singletary, PTA 04/19/18 1210 04/19/18 1210      Row Name 04/19/18 0855             Cognitive Assessment Intervention- SLP    Cognition, Comment pt knew the year was 2018 but did not know the Presidents name.  Pt would talk on one subject and quickly change to another.  Pt had difficulty staying focused on task.  Pt wanting to rub left knee.  Cues to not rub on incision.  Nurse in to apply dressing   -KM      Recorded by [KM] Abimbola Singletary, PTA 04/19/18 1210 04/19/18 1210      Row Name 04/19/18 0855             Bed Mobility Assessment/Treatment    Supine-Sit Leslie (Bed Mobility) supervision;contact guard;verbal cues  -KM      Comment (Bed Mobility) pt required time and assist to blanca knee immobiliizer on left LE.    -KM      Recorded by [KM] Abimbola Singletary, PTA 04/19/18 1210 04/19/18 1210      Row Name 04/19/18 0855             Transfer Assessment/Treatment    Sit-Stand Leslie (Transfers) contact guard;minimum assist (75% patient effort);2 person assist;verbal cues  -KM      Stand-Sit Leslie (Transfers) contact guard;minimum assist (75% patient effort);2 person assist;verbal cues  -KM      Leslie Level (Toilet Transfer) contact guard;minimum assist (75% patient effort)   1-2  -KM      Assistive Device (Toilet Transfer) commode, 3-in-1;walker, front-wheeled   assist to pull up pants- would start 7 then forget task  -KM      Recorded by [KM] Abimbola Singletary, PTA 04/19/18 1210 04/19/18 1210      Row Name 04/19/18 0855             Sit-Stand Transfer    Assistive Device (Sit-Stand Transfers) walker, front-wheeled  -KM      Recorded by [KM] Abimbola Singletary, PTA 04/19/18 1210 04/19/18 1210      Row Name 04/19/18 0855             Stand-Sit Transfer    Assistive Device (Stand-Sit Transfers) walker, front-wheeled  -KM      Recorded by [KM] Abimbola Singletary, PTA 04/19/18 1210 04/19/18 1210      Row Name 04/19/18 0855             Toilet Transfer    Type (Toilet Transfer) sit-stand;stand-sit   -KM      Recorded by [KM] Abimbola Singletary PTA 04/19/18 1210 04/19/18 1210      Row Name 04/19/18 0855             Gait/Stairs Assessment/Training    [place] Level (Gait) minimum assist (75% patient effort);2 person assist  -KM      Assistive Device (Gait) walker, front-wheeled  -KM      Distance in Feet (Gait) 6   seated rest then 15 feet  -KM      Comment (Gait/Stairs) knee immobilizer on with ambulation.  Pt at times only required assist of one physically but due to cognition, required second person for safety and to assist with directing pt to destination.  Pt would get off topic and had difficulty walking to chair until therapist stood by chair and told pt here is where she is going.  Pt continues to talk about boy in room and states we did not see her for therapy yesterday pm.  Pt also state Dr. Chambers told her she did not have to do ex today but then stated maybe she was not supposed to walk.  Education provided on benefits of participating with therapy.  -KM      Recorded by [KM] Abimbola Singletary PTA 04/19/18 1210 04/19/18 1210      Row Name 04/19/18 0855             Therapeutic Exercise    Lower Extremity (Therapeutic Exercise) heel slides, left;quad sets, bilateral;SLR (straight leg raise), left  -KM      Lower Extremity Range of Motion (Therapeutic Exercise) hip abduction/adduction, left;ankle dorsiflexion/plantar flexion, bilateral  -KM      Comment (Therapeutic Exercise) continues to require assist with HEP.  Sister asked several times if knee immobilizer can come off.  Explained at this time, pt needs to use knee immobilizer with mobility- does not need to have on when in bed/chair- just with mobility.  Explained will discharge knee immobilizer when we feel pt is ready to go without it.  -KM      Recorded by [KM] Abimbola Singletary, MCKENZIE 04/19/18 1210 04/19/18 1210      Row Name 04/19/18 0855             Positioning and Restraints    Pre-Treatment Position in bed  -KM      Post Treatment Position chair  -KM       In Chair notified nsg;reclined;call light within reach;encouraged to call for assist;exit alarm on;with family/caregiver;legs elevated  -KM      Recorded by [KM] Abimbola Singletary PTA 04/19/18 1210 04/19/18 1210      Row Name 04/19/18 0855             Pain Scale: Numbers Pre/Post-Treatment    Pain Scale: Numbers, Pretreatment --   pt did not c/o pain  -KM      Pain Intervention(s) Medication (See MAR);Repositioned;Ambulation/increased activity  -KM      Recorded by [KM] Abimbola Singletary PTA 04/19/18 1210 04/19/18 1210      Row Name                Wound 04/17/18 1323 Left knee incision    Wound - Properties Group Date first assessed: 04/17/18 [ELSA] Time first assessed: 1323 [ELSA] Side: Left [JM] Location: knee [JM] Type: incision [] Recorded by:  [] Niki Colindres RN 04/17/18 1323 04/17/18 1323    Row Name 04/19/18 0855             Outcome Summary/Treatment Plan (PT)    Daily Summary of Progress (PT) progress towards functional goals is fair  -KM      Barriers to Overall Progress (PT) Pt continues to have confusion and difficulty following directions/staying on task.    -KM      Plan for Continued Treatment (PT) cont per plan  -KM      Anticipated Discharge Disposition (PT) skilled nursing facility (SNF)  -KM      Recorded by [KM] Abimbola Singletary PTA 04/19/18 1210 04/19/18 1210        User Key  (r) = Recorded By, (t) = Taken By, (c) = Cosigned By    Initials Name Effective Dates Discipline    KM Abimbola Singletary PTA 08/11/15 -  PT    JM Niki Colindres RN 06/16/16 -  Nurse          Wound 04/17/18 1323 Left knee incision (Active)   Dressing Appearance dry;intact 4/19/2018  7:37 AM   Closure ANN 4/18/2018  8:40 PM             Physical Therapy Education     Title: PT OT SLP Therapies (Done)     Topic: Physical Therapy (Done)     Point: Mobility training (Done)    Learning Progress Summary     Learner Status Readiness Method Response Comment Documented by    Patient Done Acceptance E,D,H VU,NR pt did participate minimally  with HEP but continues to require cues and assist.  Much encouragement/cues with mobilty for safety  04/19/18 1210     Done Acceptance E,D,H VU,NR Pt does remember some ex from previous surgeries- was attempting to perform on RLE. Frequent cues for mobility and safety.  04/18/18 1441     Done Acceptance E KILEY,NR   04/18/18 1424          Point: Home exercise program (Done)    Learning Progress Summary     Learner Status Readiness Method Response Comment Documented by    Patient Done Acceptance E,D,H VU,NR pt did participate minimally with HEP but continues to require cues and assist.  Much encouragement/cues with mobilty for safety  04/19/18 1210     Done Acceptance E,D,H VU,NR Pt does remember some ex from previous surgeries- was attempting to perform on RLE. Frequent cues for mobility and safety.  04/18/18 1441                      User Key     Initials Effective Dates Name Provider Type Discipline     08/11/15 -  Anna Childress, PT Physical Therapist PT     08/11/15 -  Abimbola Singletary PTA Physical Therapy Assistant PT                Pt would just change subjects during session.  At one point, she states Dr. Chambers told her she does not have to do ex today.  Reminded her she just performed HEP with my assist and then she stated maybe it was that I was supposed to not walk.  Education provided on benefits of participating with therapy.  Pt states she did not have therapy yesterday- reminded her that I saw her in pm and OT with me this am saw her yesterday am with another PT.  Sister questions about removing knee immobilizer.  At this time, will continue with knee immobilizer and will remove when pt improves with use of LLE and mobility.  Discussed session with pt's nurse, Dr. Giron and PT.    PT Recommendation and Plan  Anticipated Discharge Disposition (PT): skilled nursing facility (SNF)  Outcome Summary/Treatment Plan (PT)  Daily Summary of Progress (PT): progress towards functional goals is  fair  Barriers to Overall Progress (PT): Pt continues to have confusion and difficulty following directions/staying on task.    Plan for Continued Treatment (PT): cont per plan  Anticipated Discharge Disposition (PT): skilled nursing facility (SNF)  Plan of Care Reviewed With: patient  Progress: no change  Outcome Summary: PT:  Pt continues to be confused and having difficulty staying focused on task.  Pt ambulating only short distances with rolling walker and assist of 2.  Pt continues to talk about people in room and no one is there.  Pt  states she did not have therapy yesterday afternoon.  Discussed with nurse and Dr. Giron about limited progress with pt due to limited participation from pt and confusion.          Outcome Measures     Row Name 04/19/18 0855 04/18/18 1400 04/18/18 1305       How much help from another person do you currently need...    Turning from your back to your side while in flat bed without using bedrails? 3  -KM 3  -LH 2  -KM    Moving from lying on back to sitting on the side of a flat bed without bedrails? 3  -KM 2  -LH 2  -KM    Moving to and from a bed to a chair (including a wheelchair)? 3  -KM 2  -LH 2  -KM    Standing up from a chair using your arms (e.g., wheelchair, bedside chair)? 2  -KM 2  -LH 2  -KM    Climbing 3-5 steps with a railing? 2  -KM 2  -LH 2  -KM    To walk in hospital room? 3  -KM 2  -LH 2  -KM    AM-PAC 6 Clicks Score 16  -KM 13  -LH 12  -KM       Functional Assessment    Outcome Measure Options AM-PAC 6 Clicks Basic Mobility (PT)  -KM AM-PAC 6 Clicks Basic Mobility (PT)  -LH AM-PAC 6 Clicks Basic Mobility (PT)  -KM    Row Name 04/18/18 0936             How much help from another is currently needed...    Putting on and taking off regular lower body clothing? 2  -JJ      Bathing (including washing, rinsing, and drying) 2  -JJ      Toileting (which includes using toilet bed pan or urinal) 3  -JJ      Putting on and taking off regular upper body clothing 4  -JJ       Taking care of personal grooming (such as brushing teeth) 4  -JJ      Eating meals 4  -JJ      Score 19  -JJ         Functional Assessment    Outcome Measure Options AM-PAC 6 Clicks Daily Activity (OT)  -JJ        User Key  (r) = Recorded By, (t) = Taken By, (c) = Cosigned By    Initials Name Provider Type     Anna Childress, PT Physical Therapist    KM Abimbola Singletary PTA Physical Therapy Assistant    LUPIS Butcher, OTR Occupational Therapist           Time Calculation:         PT Charges     Row Name 04/19/18 1214             Time Calculation    Start Time 0855  -KM      Stop Time 0931  -KM      Time Calculation (min) 36 min  -KM      PT - Next Appointment 04/19/18  -KM        User Key  (r) = Recorded By, (t) = Taken By, (c) = Cosigned By    Initials Name Provider Type    SANDRA Singletary PTA Physical Therapy Assistant          Therapy Charges for Today     Code Description Service Date Service Provider Modifiers Qty    86010426136 HC PT THER PROC EA 15 MIN 4/18/2018 Abimbola Singletary PTA GP 2    69429493550 HC PT THER SUPP EA 15 MIN 4/18/2018 Abimbola Singletary PTA GP 1    76974554283 HC GAIT TRAINING EA 15 MIN 4/19/2018 Abimbola Singletary PTA GP 1    53463107711 HC PT THER PROC EA 15 MIN 4/19/2018 Abimbola Singletary, MCKENZIE GP 1          PT G-Codes  Outcome Measure Options: AM-PAC 6 Clicks Basic Mobility (PT)    Abimbola Singletary PTA  4/19/2018

## 2018-04-19 NOTE — THERAPY TREATMENT NOTE
Acute Care - Occupational Therapy Treatment Note  [facility]     Patient Name: Jung Short  : 1945  MRN: [id number]  Today's Date: 2018  Onset of Illness/Injury or Date of Surgery: 18  Date of Referral to OT: 18  Referring Physician: Reyes    Admit Date: 2018       ICD-10-CM ICD-9-CM   1. Primary osteoarthritis of left knee M17.12 715.16     Patient Active Problem List   Diagnosis   • Abdominal bloating   • Abdominal mass   • Abdominal pain   • Abnormal gait   • Abnormal mammogram   • Anemia   • Atherosclerosis of coronary artery   • Thoracic back pain   • Malignant neoplasm of upper-inner quadrant of right breast in female, estrogen receptor positive   • Candidiasis   • Cardiomyopathy   • Disc disorder of cervical region   • Neck pain   • Tenderness of chest wall   • Chronic kidney disease, stage III (moderate)   • Chronic pain   • Constipation   • Generalized osteoarthritis   • Depression   • Type 2 diabetes mellitus   • Controlled type 2 diabetes mellitus without complication   • Dyslipidemia   • Gastroesophageal reflux disease with esophagitis   • Generalized pain   • Gout   • Hypertension   • Hypothyroidism   • Incisional hernia   • Mass of breast   • Mitral valve insufficiency   • Nausea   • Adult body mass index greater than 30   • Osteopenia of spine   • Arthralgia of multiple joints   • Peripheral neuropathy   • Pulmonary hypertension   • Osteomyelitis   • Tricuspid valve insufficiency   • Cobalamin deficiency   • Vitamin D deficiency   • Diabetes mellitus   • Left knee pain   • Arthritis of knee   • DDD (degenerative disc disease), lumbar   • Lumbar facet arthropathy   • Pain in shoulder   • Chronic gout of multiple sites   • Constipation due to opioid therapy   • Syncope, psychogenic   • Transient alteration of awareness   • Closed fracture of patella   • Primary osteoarthritis of left knee   • Radius/ulna fracture, left, closed, initial encounter   • Rib pain on left  side   • Preoperative cardiovascular examination   • Osteoarthritis of left knee     Past Medical History:   Diagnosis Date   • Anemia    • Benign breast disease    • Cancer 2015    Right breast   • Cellulitis of leg     May-2003 right lower extremity   • CHF (congestive heart failure)     Dr Bates follows   • Chronic kidney disease     Dr Hannah follows   • Chronic ulcer of right foot     Non-pressure, history of    • Depression    • Diabetic peripheral neuropathy    • Diarrhea    • Diverticulosis    • Encounter for eye exam    • Fracture of left wrist     history of   • Gastroesophageal reflux    • Hiatal hernia    • History of bone density study 09/20/2012   • History of colonoscopy     done 6/03 recheck in 10 years.   Done july 2013 recheck in 5 years   • History of mammography, screening 09/20/2012   • Hospital discharge follow-up    • Hyperlipidemia    • Hypertension    • Hypothyroidism    • Impingement syndrome of right shoulder    • Joint pain    • Menopausal disorder    • Methicillin resistant Staphylococcus aureus infection 2012    after bladder surgery   • MRSA (methicillin resistant staph aureus) culture positive    • Nausea and vomiting    • Nonischemic cardiomyopathy     Ejection fraction 10% per 2-D echo with Doppler however she did have cardiac catheterization April 2015 which showed ejection fraction 20% with global hypokinesis and severe mitral insufficiency   • Osteoarthritis     generalized, sched jania TKA   • Paroxysmal atrial fibrillation     Dr Bates follows   • Postoperative infection     July of 2012 following her hysterectomy far vaginal wall prolapse. She was on antibiotics and wound dressings for 2 months.   • Shoulder pain, bilateral     has tears on both sides   • Syncope, psychogenic 4/19/2017   • Toe ulcer     h/o to both feet, d/t shoes rubbing per patient   • Transient alteration of awareness 4/19/2017     Past Surgical History:   Procedure Laterality Date   • ABDOMINAL  SURGERY  2012    had to be reopened after bladder surgery d/t infection   • AMPUTATION FOOT / TOE Right 11/2013    Rt foot amputation MTP first toe   • BLADDER SURGERY  2012   • BREAST BIOPSY     • BREAST SURGERY  06/29/2015    Percutaneous ultrasound-guided placement of metal localized clip 1st lesion   • CARDIAC CATHETERIZATION  2015   • CATARACT EXTRACTION Bilateral 2017   • DEBRIDEMENT  FOOT Right 01/2013    During hospitalization    • EPIDURAL BLOCK      4 chronic back pain and leg pain last epidurals done 5-2012 she had no benefit at all from this procedure.   • FOOT SURGERY Bilateral     several   • HERNIA REPAIR      At the abdomen February 2007 Dr. Mendez   • INCISIONAL HERNIA REPAIR  05/16/2014    Recurrent. Incarcerated. With mesh implantation   • MASTECTOMY Right 05/2015   • SHOULDER SURGERY Right     x2 RCR   • TOTAL ABDOMINAL HYSTERECTOMY      with oophorectomy-Done June-2012 secondary to vaginal wall prolapse.   • TOTAL KNEE ARTHROPLASTY Right    • TOTAL KNEE ARTHROPLASTY Left 4/17/2018    Procedure: TOTAL KNEE ARTHROPLASTY;  Surgeon: Live Chambers MD;  Location: [facility];  Service: Orthopedics       Therapy Treatment          Rehabilitation Treatment Summary     Row Name 04/19/18 0900 04/19/18 0855          Treatment Time/Intention    Discipline occupational therapist  -JJ physical therapy assistant  -KM     Document Type therapy note (daily note)  - therapy note (daily note)  -KM     Subjective Information complains of;pain   pt with slurred speech, confusion  - --   pt confused- talking about people not in room  -KM     Mode of Treatment occupational therapy  -JJ physical therapy  -KM     Patient/Family Observations pt supine in bed, sister in room, very confused, discussed with RN and MD about decreaseing pain meds in order fo pt to better participate in therapy. pt agreed to treatment  - pt in bed sister present.  Pt at first states she does not know who I am but then later states she does  but does not remember my name.  Pt talking about people in room and no one is there.  Pt talking about things that do not make sense.  Pt sitting in bed but kept leaning forward and back in bed- very restless  -KM     Patient Effort adequate  - adequate  -KM     Comment  -- pt required cues to stay on task.  Pt easily distracted and at times, second therapist would have to stand by destination to show pt where she was going.  Pt would go off topic and forget what she was doing.  Sister present for session   -KM     Existing Precautions/Restrictions fall   confusion  - fall   confusion  -KM     Recorded by [JJ] Niharika Butcher, OTR 04/19/18 1319 04/19/18 1319 [KM] Abimbola Singletary, PTA 04/19/18 1210 04/19/18 1210     Row Name 04/19/18 0900 04/19/18 0855          Vital Signs    Pre SpO2 (%) 87  - 87  -KM     O2 Delivery Pre Treatment room air  - room air  -KM     Post SpO2 (%) 93  - 93  -KM     O2 Delivery Post Treatment room air  - room air  -KM     Pre Patient Position Supine  - Supine  -KM     Post Patient Position --   Rn requests return pt to 2L O2 NC post treatment  - Sitting  -KM     Recovery Time  -- Per RN request, O2 applied at end of session- Sats appear to possibly drop when pt sleeps  -KM     Recorded by [JJ] Niharika Butcher, OTR 04/19/18 1319 04/19/18 1319 [KM] Abimbola Singletary, PTA 04/19/18 1210 04/19/18 1210     Row Name 04/19/18 0900 04/19/18 0855          Cognitive Assessment/Intervention- PT/OT    Safety Deficit (Cognitive) problem solving;judgment;insight into deficits/self awareness;impulsivity  - insight into deficits/self awareness;safety precautions awareness  -KM     Personal Safety Interventions  -- gait belt;nonskid shoes/slippers when out of bed  -KM     Recorded by [JJ] Niharika Butcher, OTR 04/19/18 1319 04/19/18 1319 [KM] Abimbola Singletary, PTA 04/19/18 1210 04/19/18 1210     Row Name 04/19/18 0855             Cognitive Assessment Intervention- SLP    Cognition,  Comment pt knew the year was 2018 but did not know the Presidents name.  Pt would talk on one subject and quickly change to another.  Pt had difficulty staying focused on task.  Pt wanting to rub left knee.  Cues to not rub on incision.  Nurse in to apply dressing   -KM      Recorded by [KM] Abimbola Singletary, PTA 04/19/18 1210 04/19/18 1210      Row Name 04/19/18 0900 04/19/18 0855          Bed Mobility Assessment/Treatment    Bed Mobility Assessment/Treatment supine-sit  -JJ  --     Supine-Sit [place] (Bed Mobility) contact guard;verbal cues  -JJ supervision;contact guard;verbal cues  -KM     Assistive Device (Bed Mobility) bed rails;head of bed elevated  -JJ  --     Comment (Bed Mobility) requires cues and extended time 2 to confusion  -JJ pt required time and assist to blanca knee immobiliizer on left LE.    -KM     Recorded by [JJ] Niharika Butcher, OTR 04/19/18 1319 04/19/18 1319 [KM] Abimbola Singletary, PTA 04/19/18 1210 04/19/18 1210     Row Name 04/19/18 0900             Functional Mobility    Functional Mobility- Ind. Level contact guard assist;verbal cues required  -JJ      Functional Mobility- Device rolling walker  -J      Functional Mobility-Distance (Feet) --   in room  -JJ      Functional Mobility- Comment pt required cues and increased time due to confusion. pt required cues to maintain attention to task, very distractible  -JJ      Recorded by [JJ] Niharika Butcher, OTR 04/19/18 1319 04/19/18 1319      Row Name 04/19/18 0900 04/19/18 0855          Transfer Assessment/Treatment    Transfer Assessment/Treatment sit-stand transfer;stand-sit transfer;toilet transfer  -JJ  --     Sit-Stand [place] (Transfers) contact guard;minimum assist (75% patient effort);verbal cues;2 person assist  -JJ contact guard;minimum assist (75% patient effort);2 person assist;verbal cues  -KM     Stand-Sit [place] (Transfers) minimum assist (75% patient effort);contact guard;verbal cues;2 person assist  -  contact guard;minimum assist (75% patient effort);2 person assist;verbal cues  -KM     [place] Level (Toilet Transfer) contact guard;minimum assist (75% patient effort);2 person assist;verbal cues  -JJ contact guard;minimum assist (75% patient effort)   1-2  -KM     Assistive Device (Toilet Transfer) commode, 3-in-1;walker, 4-wheeled   required cues to maintain attention to task  -JJ commode, 3-in-1;walker, front-wheeled   assist to pull up pants- would start 7 then forget task  -KM     Recorded by [JJ] Niharika Butcher, OTR 04/19/18 1319 04/19/18 1319 [KM] Abimbola Singletary, PTA 04/19/18 1210 04/19/18 1210     Row Name 04/19/18 0900 04/19/18 0855          Sit-Stand Transfer    Assistive Device (Sit-Stand Transfers) walker, front-wheeled  -JJ walker, front-wheeled  -KM     Recorded by [JJ] Niharika Butcher, OTR 04/19/18 1319 04/19/18 1319 [KM] Abimbola Singletary, PTA 04/19/18 1210 04/19/18 1210     Row Name 04/19/18 0900 04/19/18 0855          Stand-Sit Transfer    Assistive Device (Stand-Sit Transfers) walker, front-wheeled  -JJ walker, front-wheeled  -KM     Recorded by [JJ] Niharika Butcher, OTR 04/19/18 1319 04/19/18 1319 [KM] Abimbola Singletary, PTA 04/19/18 1210 04/19/18 1210     Row Name 04/19/18 0900 04/19/18 0855          Toilet Transfer    Type (Toilet Transfer) sit-stand;stand-sit  -JJ sit-stand;stand-sit  -KM     Recorded by [JJ] Niharika Butcher, OTR 04/19/18 1319 04/19/18 1319 [KM] Abimbola Singletary, PTA 04/19/18 1210 04/19/18 1210     Row Name 04/19/18 0855             Gait/Stairs Assessment/Training    [place] Level (Gait) minimum assist (75% patient effort);2 person assist  -KM      Assistive Device (Gait) walker, front-wheeled  -KM      Distance in Feet (Gait) 6   seated rest then 15 feet  -KM      Comment (Gait/Stairs) knee immobilizer on with ambulation.  Pt at times only required assist of one physically but due to cognition, required second person for safety and to assist with  directing pt to destination.  Pt would get off topic and had difficulty walking to chair until therapist stood by chair and told pt here is where she is going.  Pt continues to talk about boy in room and states we did not see her for therapy yesterday pm.  Pt also state Dr. Chambers told her she did not have to do ex today but then stated maybe she was not supposed to walk.  Education provided on benefits of participating with therapy.  -KM      Recorded by [KM] Abimbola Singletary, PTA 04/19/18 1210 04/19/18 1210      Row Name 04/19/18 0900             Toileting Assessment/Training    [place] Level (Toileting) --   required assist for clothing managment 2 to distractibility  -JJ      Recorded by [JJ] Niharika Butcher, OTR 04/19/18 1319 04/19/18 1319      Row Name 04/19/18 0855             Therapeutic Exercise    Lower Extremity (Therapeutic Exercise) heel slides, left;quad sets, bilateral;SLR (straight leg raise), left  -KM      Lower Extremity Range of Motion (Therapeutic Exercise) hip abduction/adduction, left;ankle dorsiflexion/plantar flexion, bilateral  -KM      Comment (Therapeutic Exercise) continues to require assist with HEP.  Sister asked several times if knee immobilizer can come off.  Explained at this time, pt needs to use knee immobilizer with mobility- does not need to have on when in bed/chair- just with mobility.  Explained will discharge knee immobilizer when we feel pt is ready to go without it.  -KM      Recorded by [KM] Abimbola Singletary, PTA 04/19/18 1210 04/19/18 1210      Row Name 04/19/18 0900 04/19/18 0855          Positioning and Restraints    Pre-Treatment Position in bed  -JJ in bed  -KM     Post Treatment Position chair  -JJ chair  -KM     In Chair notified nsg;reclined;call light within reach;encouraged to call for assist;with family/caregiver;exit alarm on  -JJ notified nsg;reclined;call light within reach;encouraged to call for assist;exit alarm on;with family/caregiver;legs elevated   -KM     Recorded by [JJ] Niharika Butcher, OTR 04/19/18 1319 04/19/18 1319 [KM] Abimbola Singletary, PTA 04/19/18 1210 04/19/18 1210     Row Name 04/19/18 0900 04/19/18 0855          Pain Scale: Numbers Pre/Post-Treatment    Pain Scale: Numbers, Pretreatment --   no co pain at rest  -JJ --   pt did not c/o pain  -KM     Pain Scale: Numbers, Post-Treatment 10/10  -JJ  --     Pain Location - Side Left  -JJ  --     Pain Location knee  -JJ  --     Pain Intervention(s) Medication (See MAR);Repositioned;Ambulation/increased activity  -JJ Medication (See MAR);Repositioned;Ambulation/increased activity  -KM     Recorded by [JJ] Niharika Butcher, OTR 04/19/18 1319 04/19/18 1319 [KM] Abimbola Singletary, PTA 04/19/18 1210 04/19/18 1210     Row Name                Wound 04/17/18 1323 Left knee incision    Wound - Properties Group Date first assessed: 04/17/18 [JM] Time first assessed: 1323 [JM] Side: Left [JM] Location: knee [JM] Type: incision [JM] Recorded by:  [JM] Niki Colindres RN 04/17/18 1323 04/17/18 1323    Row Name 04/19/18 0900             Plan of Care Review    Plan of Care Reviewed With patient  -JJ      Recorded by [JJ] Niharika Butcher, OTR 04/19/18 1319 04/19/18 1319      Row Name 04/19/18 0900             Outcome Summary/Treatment Plan (OT)    Daily Summary of Progress (OT) progress towards functional goals is fair  -JJ      Barriers to Overall Progress (OT) pt with confusion, distractible 2 to pain medication  -JJ      Anticipated Discharge Disposition (OT) skilled nursing facility (SNF)  -JJ      Recorded by [JJ] Niharika Butcher, OTR 04/19/18 1319 04/19/18 1319      Row Name 04/19/18 0855             Outcome Summary/Treatment Plan (PT)    Daily Summary of Progress (PT) progress towards functional goals is fair  -KM      Barriers to Overall Progress (PT) Pt continues to have confusion and difficulty following directions/staying on task.    -KM      Plan for Continued Treatment (PT) cont per plan  -KM       Anticipated Discharge Disposition (PT) skilled nursing facility (SNF)  -KM      Recorded by [KM] Abimbola Singletary, PTA 04/19/18 1210 04/19/18 1210        User Key  (r) = Recorded By, (t) = Taken By, (c) = Cosigned By    Initials Name Effective Dates Discipline    KM Abimbola Singletary, PTA 08/11/15 -  PT    LUPIS Butcher, OTR 06/22/16 -  OT    ELSA Colindres, RN 06/16/16 -  Nurse        Wound 04/17/18 1323 Left knee incision (Active)   Dressing Appearance dry;intact 4/19/2018  7:37 AM   Closure ANN 4/18/2018  8:40 PM         Occupational Therapy Education     Title: PT OT SLP Therapies (Done)     Topic: Occupational Therapy (Done)     Point: ADL training (Done)     Description: Instruct learner(s) on proper safety adaptation and remediation techniques during self care or transfers.   Instruct in proper use of assistive devices.   Learning Progress Summary     Learner Status Readiness Method Response Comment Documented by    Patient Done Acceptance E VU pt and sister educated on safety with functional transfers and mobility and adls J 04/19/18 1319     Done Acceptance E VU pt and daughters educated on benefits of activity, safety with functional transfers and mobility, adls and long handled ae  04/18/18 1402    Family Done Acceptance E VU pt and daughters educated on benefits of activity, safety with functional transfers and mobility, adls and long handled ae  04/18/18 1402          Point: Precautions (Done)     Description: Instruct learner(s) on prescribed precautions during self-care and functional transfers.   Learning Progress Summary     Learner Status Readiness Method Response Comment Documented by    Patient Done Acceptance E VU pt and sister educated on safety with functional transfers and mobility and adls J 04/19/18 1319     Done Acceptance E VU pt and daughters educated on benefits of activity, safety with functional transfers and mobility, adls and long handled ae  04/18/18 1402     Family Done Acceptance E VU pt and daughters educated on benefits of activity, safety with functional transfers and mobility, adls and long handled ae  04/18/18 1402                      User Key     Initials Effective Dates Name Provider Type Discipline     06/22/16 -  Niharika Butcher, OTR Occupational Therapist OT                OT Recommendation and Plan  Outcome Summary/Treatment Plan (OT)  Daily Summary of Progress (OT): progress towards functional goals is fair  Barriers to Overall Progress (OT): pt with confusion, distractible 2 to pain medication  Anticipated Equipment Needs at Discharge (OT):  (pt may benefit from long handled ae )  Anticipated Discharge Disposition (OT): skilled nursing facility (SNF)  Planned Therapy Interventions (OT Eval): adaptive equipment training, BADL retraining, transfer/mobility retraining  Therapy Frequency (OT Eval):  (x 2 additional visits)  Daily Summary of Progress (OT): progress towards functional goals is fair  Plan of Care Review  Plan of Care Reviewed With: patient  Plan of Care Reviewed With: patient  Outcome Summary: OT: pt required cga- min assist for functional transfers from elevated commode seat. pt requires assist to manage clothing during toileting due to distractibility within functional task. pt confused and distarctible throughout treatment session due to pain medications. discussed with RN and MD regarding decreasing pain meds to incerase pts participation with therapy        Outcome Measures     Row Name 04/19/18 0900 04/19/18 0855 04/18/18 1400       How much help from another person do you currently need...    Turning from your back to your side while in flat bed without using bedrails?  -- 3  -KM 3  -LH    Moving from lying on back to sitting on the side of a flat bed without bedrails?  -- 3  -KM 2  -LH    Moving to and from a bed to a chair (including a wheelchair)?  -- 3  -KM 2  -LH    Standing up from a chair using your arms (e.g.,  wheelchair, bedside chair)?  -- 2  -KM 2  -LH    Climbing 3-5 steps with a railing?  -- 2  -KM 2  -LH    To walk in hospital room?  -- 3  -KM 2  -LH    AM-PAC 6 Clicks Score  -- 16  -KM 13  -LH       How much help from another is currently needed...    Putting on and taking off regular lower body clothing? 2  -JJ  --  --    Bathing (including washing, rinsing, and drying) 2  -JJ  --  --    Toileting (which includes using toilet bed pan or urinal) 3  -JJ  --  --    Putting on and taking off regular upper body clothing 4  -JJ  --  --    Taking care of personal grooming (such as brushing teeth) 4  -JJ  --  --    Eating meals 4  -JJ  --  --    Score 19  -JJ  --  --       Functional Assessment    Outcome Measure Options  -- AM-PAC 6 Clicks Basic Mobility (PT)  [facility]  -    Row Name 04/18/18 1305 04/18/18 0936          How much help from another person do you currently need...    Turning from your back to your side while in flat bed without using bedrails? 2  -KM  --     Moving from lying on back to sitting on the side of a flat bed without bedrails? 2  -KM  --     Moving to and from a bed to a chair (including a wheelchair)? 2  -KM  --     Standing up from a chair using your arms (e.g., wheelchair, bedside chair)? 2  -KM  --     Climbing 3-5 steps with a railing? 2  -KM  --     To walk in hospital room? 2  -KM  --     AM-PAC 6 Clicks Score 12  -KM  --        How much help from another is currently needed...    Putting on and taking off regular lower body clothing?  -- 2  -JJ     Bathing (including washing, rinsing, and drying)  -- 2  -JJ     Toileting (which includes using toilet bed pan or urinal)  -- 3  -JJ     Putting on and taking off regular upper body clothing  -- 4  -JJ     Taking care of personal grooming (such as brushing teeth)  -- 4  -JJ     Eating meals  -- 4  -JJ     Score  -- 19  -JJ        Functional Assessment    Outcome Measure Options AM-PAC 6 Clicks Basic Mobility  (PT)  -KM AM-PAC 6 Clicks Daily Activity (OT)  -       User Key  (r) = Recorded By, (t) = Taken By, (c) = Cosigned By    Initials Name Provider Type     Anna Childress, PT Physical Therapist    KM Abimbola Singletary, PTA Physical Therapy Assistant    JDIRK Butcher, OTR Occupational Therapist           Time Calculation:         Time Calculation- OT     Row Name 04/19/18 1321             Time Calculation- OT    OT Start Time 0900  -      OT Stop Time 0931  -      OT Time Calculation (min) 31 min  -        User Key  (r) = Recorded By, (t) = Taken By, (c) = Cosigned By    Initials Name Provider Type    LUPIS Butcher, OTR Occupational Therapist           Therapy Charges for Today     Code Description Service Date Service Provider Modifiers Qty    17543980993 HC OT EVAL LOW COMPLEXITY 2 4/18/2018 Niharika Butcher, OTR GO 1    10938418776 HC OT SELF CARE/MGMT/TRAIN EA 15 MIN 4/19/2018 Niharika Butcher OTR GO 2               Niharika Butcher OTR  4/19/2018

## 2018-04-20 ENCOUNTER — HOSPITAL ENCOUNTER (INPATIENT)
Facility: HOSPITAL | Age: 73
LOS: 7 days | Discharge: HOME | End: 2018-04-27
Attending: HOSPITALIST | Admitting: HOSPITALIST

## 2018-04-20 VITALS
SYSTOLIC BLOOD PRESSURE: 167 MMHG | RESPIRATION RATE: 18 BRPM | BODY MASS INDEX: 37.12 KG/M2 | TEMPERATURE: 98.7 F | DIASTOLIC BLOOD PRESSURE: 59 MMHG | HEART RATE: 94 BPM | HEIGHT: 66 IN | OXYGEN SATURATION: 96 % | WEIGHT: 231 LBS

## 2018-04-20 DIAGNOSIS — E11.9 TYPE 2 DIABETES MELLITUS WITHOUT COMPLICATION, WITHOUT LONG-TERM CURRENT USE OF INSULIN (HCC): Primary | ICD-10-CM

## 2018-04-20 PROBLEM — M17.12 OSTEOARTHRITIS OF LEFT KNEE: Status: RESOLVED | Noted: 2018-04-17 | Resolved: 2018-04-20

## 2018-04-20 PROBLEM — R53.81 PHYSICAL DECONDITIONING: Status: ACTIVE | Noted: 2018-04-20

## 2018-04-20 LAB
BACTERIA SPEC AEROBE CULT: NORMAL
BACTERIA SPEC AEROBE CULT: NORMAL
BASOPHILS # BLD AUTO: 0.02 10*3/MM3 (ref 0–0.2)
BASOPHILS NFR BLD AUTO: 0.2 % (ref 0–2)
DEPRECATED RDW RBC AUTO: 46.9 FL (ref 37–54)
EOSINOPHIL # BLD AUTO: 0.25 10*3/MM3 (ref 0.1–0.3)
EOSINOPHIL NFR BLD AUTO: 2.5 % (ref 0–4)
ERYTHROCYTE [DISTWIDTH] IN BLOOD BY AUTOMATED COUNT: 13.7 % (ref 11.5–14.5)
GLUCOSE BLDC GLUCOMTR-MCNC: 235 MG/DL (ref 70–130)
GRAM STN SPEC: NORMAL
HCT VFR BLD AUTO: 27.6 % (ref 37–47)
HGB BLD-MCNC: 8.8 G/DL (ref 12–16)
IMM GRANULOCYTES # BLD: 0.1 10*3/MM3 (ref 0–0.03)
IMM GRANULOCYTES NFR BLD: 1 % (ref 0–0.5)
LYMPHOCYTES # BLD AUTO: 1.5 10*3/MM3 (ref 0.6–4.8)
LYMPHOCYTES NFR BLD AUTO: 14.8 % (ref 20–45)
MCH RBC QN AUTO: 30.1 PG (ref 27–31)
MCHC RBC AUTO-ENTMCNC: 31.9 G/DL (ref 31–37)
MCV RBC AUTO: 94.5 FL (ref 81–99)
MONOCYTES # BLD AUTO: 0.98 10*3/MM3 (ref 0–1)
MONOCYTES NFR BLD AUTO: 9.7 % (ref 3–8)
NEUTROPHILS # BLD AUTO: 7.27 10*3/MM3 (ref 1.5–8.3)
NEUTROPHILS NFR BLD AUTO: 71.8 % (ref 45–70)
NRBC BLD MANUAL-RTO: 0.2 /100 WBC (ref 0–0)
PLATELET # BLD AUTO: 183 10*3/MM3 (ref 140–500)
PMV BLD AUTO: 10 FL (ref 7.4–10.4)
RBC # BLD AUTO: 2.92 10*6/MM3 (ref 4.2–5.4)
WBC NRBC COR # BLD: 10.12 10*3/MM3 (ref 4.8–10.8)

## 2018-04-20 PROCEDURE — 99024 POSTOP FOLLOW-UP VISIT: CPT | Performed by: ORTHOPAEDIC SURGERY

## 2018-04-20 PROCEDURE — 99231 SBSQ HOSP IP/OBS SF/LOW 25: CPT | Performed by: HOSPITALIST

## 2018-04-20 PROCEDURE — 97116 GAIT TRAINING THERAPY: CPT

## 2018-04-20 PROCEDURE — 97535 SELF CARE MNGMENT TRAINING: CPT

## 2018-04-20 PROCEDURE — 99305 1ST NF CARE MODERATE MDM 35: CPT | Performed by: HOSPITALIST

## 2018-04-20 PROCEDURE — 85025 COMPLETE CBC W/AUTO DIFF WBC: CPT | Performed by: ORTHOPAEDIC SURGERY

## 2018-04-20 PROCEDURE — 97110 THERAPEUTIC EXERCISES: CPT

## 2018-04-20 PROCEDURE — 82962 GLUCOSE BLOOD TEST: CPT

## 2018-04-20 RX ORDER — ATORVASTATIN CALCIUM 10 MG/1
10 TABLET, FILM COATED ORAL DAILY
Status: DISCONTINUED | OUTPATIENT
Start: 2018-04-21 | End: 2018-04-27 | Stop reason: HOSPADM

## 2018-04-20 RX ORDER — NICOTINE POLACRILEX 4 MG
15 LOZENGE BUCCAL
Status: DISCONTINUED | OUTPATIENT
Start: 2018-04-20 | End: 2018-04-27 | Stop reason: HOSPADM

## 2018-04-20 RX ORDER — LEVOTHYROXINE SODIUM 0.03 MG/1
25 TABLET ORAL DAILY
Status: DISCONTINUED | OUTPATIENT
Start: 2018-04-20 | End: 2018-04-27 | Stop reason: HOSPADM

## 2018-04-20 RX ORDER — DOCUSATE SODIUM 100 MG/1
100 CAPSULE, LIQUID FILLED ORAL DAILY PRN
Status: DISCONTINUED | OUTPATIENT
Start: 2018-04-20 | End: 2018-04-27 | Stop reason: HOSPADM

## 2018-04-20 RX ORDER — ANASTROZOLE 1 MG/1
1 TABLET ORAL DAILY
Status: DISCONTINUED | OUTPATIENT
Start: 2018-04-21 | End: 2018-04-27 | Stop reason: HOSPADM

## 2018-04-20 RX ORDER — BISACODYL 10 MG
10 SUPPOSITORY, RECTAL RECTAL DAILY PRN
Status: DISCONTINUED | OUTPATIENT
Start: 2018-04-20 | End: 2018-04-27 | Stop reason: HOSPADM

## 2018-04-20 RX ORDER — DULOXETIN HYDROCHLORIDE 60 MG/1
60 CAPSULE, DELAYED RELEASE ORAL DAILY
Status: DISCONTINUED | OUTPATIENT
Start: 2018-04-20 | End: 2018-04-27 | Stop reason: HOSPADM

## 2018-04-20 RX ORDER — HYDROCODONE BITARTRATE AND ACETAMINOPHEN 10; 325 MG/1; MG/1
1 TABLET ORAL EVERY 4 HOURS PRN
Status: DISCONTINUED | OUTPATIENT
Start: 2018-04-20 | End: 2018-04-20

## 2018-04-20 RX ORDER — BUMETANIDE 1 MG/1
2 TABLET ORAL DAILY
Status: DISCONTINUED | OUTPATIENT
Start: 2018-04-21 | End: 2018-04-27 | Stop reason: HOSPADM

## 2018-04-20 RX ORDER — ACETAMINOPHEN 500 MG
1000 TABLET ORAL EVERY 6 HOURS PRN
COMMUNITY
End: 2018-04-27 | Stop reason: HOSPADM

## 2018-04-20 RX ORDER — PANTOPRAZOLE SODIUM 40 MG/1
40 TABLET, DELAYED RELEASE ORAL EVERY MORNING
Status: DISCONTINUED | OUTPATIENT
Start: 2018-04-21 | End: 2018-04-27 | Stop reason: HOSPADM

## 2018-04-20 RX ORDER — LISINOPRIL 5 MG/1
5 TABLET ORAL DAILY
Status: DISCONTINUED | OUTPATIENT
Start: 2018-04-20 | End: 2018-04-27 | Stop reason: HOSPADM

## 2018-04-20 RX ORDER — SPIRONOLACTONE 25 MG/1
25 TABLET ORAL DAILY
Status: DISCONTINUED | OUTPATIENT
Start: 2018-04-21 | End: 2018-04-27 | Stop reason: HOSPADM

## 2018-04-20 RX ORDER — UBIDECARENONE 75 MG
100 CAPSULE ORAL DAILY
Status: DISCONTINUED | OUTPATIENT
Start: 2018-04-21 | End: 2018-04-27 | Stop reason: HOSPADM

## 2018-04-20 RX ORDER — ASPIRIN 81 MG/1
81 TABLET, CHEWABLE ORAL DAILY
Status: DISCONTINUED | OUTPATIENT
Start: 2018-04-21 | End: 2018-04-27 | Stop reason: HOSPADM

## 2018-04-20 RX ORDER — MAGNESIUM OXIDE 400 MG/1
400 TABLET ORAL DAILY
Status: DISCONTINUED | OUTPATIENT
Start: 2018-04-21 | End: 2018-04-27 | Stop reason: HOSPADM

## 2018-04-20 RX ORDER — HYDROCODONE BITARTRATE AND ACETAMINOPHEN 10; 325 MG/1; MG/1
1 TABLET ORAL EVERY 4 HOURS PRN
Status: DISCONTINUED | OUTPATIENT
Start: 2018-04-20 | End: 2018-04-27 | Stop reason: HOSPADM

## 2018-04-20 RX ORDER — DEXTROSE MONOHYDRATE 25 G/50ML
25 INJECTION, SOLUTION INTRAVENOUS
Status: DISCONTINUED | OUTPATIENT
Start: 2018-04-20 | End: 2018-04-27 | Stop reason: HOSPADM

## 2018-04-20 RX ORDER — MULTIPLE VITAMINS W/ MINERALS TAB 9MG-400MCG
1 TAB ORAL DAILY
Status: DISCONTINUED | OUTPATIENT
Start: 2018-04-21 | End: 2018-04-27 | Stop reason: HOSPADM

## 2018-04-20 RX ORDER — CARVEDILOL 12.5 MG/1
12.5 TABLET ORAL 2 TIMES DAILY WITH MEALS
Status: DISCONTINUED | OUTPATIENT
Start: 2018-04-20 | End: 2018-04-27 | Stop reason: HOSPADM

## 2018-04-20 RX ORDER — MULTIVITAMIN,THERAPEUTIC
1 TABLET ORAL DAILY
Status: DISCONTINUED | OUTPATIENT
Start: 2018-04-21 | End: 2018-04-20 | Stop reason: CLARIF

## 2018-04-20 RX ORDER — BISACODYL 10 MG
10 SUPPOSITORY, RECTAL RECTAL DAILY PRN
Qty: 8 SUPPOSITORY | Refills: 0
Start: 2018-04-20 | End: 2018-05-17

## 2018-04-20 RX ORDER — GLIPIZIDE 5 MG/1
2.5 TABLET ORAL
Status: DISCONTINUED | OUTPATIENT
Start: 2018-04-21 | End: 2018-04-23

## 2018-04-20 RX ADMIN — TUBERCULIN PURIFIED PROTEIN DERIVATIVE 5 UNITS: 5 INJECTION INTRADERMAL at 16:05

## 2018-04-20 RX ADMIN — HYDROCODONE BITARTRATE AND ACETAMINOPHEN 1 TABLET: 10; 325 TABLET ORAL at 09:13

## 2018-04-20 RX ADMIN — SPIRONOLACTONE 25 MG: 25 TABLET ORAL at 09:13

## 2018-04-20 RX ADMIN — VITAMIN D, TAB 1000IU (100/BT) 1000 UNITS: 25 TAB at 09:13

## 2018-04-20 RX ADMIN — GLIPIZIDE 2.5 MG: 5 TABLET ORAL at 09:12

## 2018-04-20 RX ADMIN — PREGABALIN 150 MG: 75 CAPSULE ORAL at 09:13

## 2018-04-20 RX ADMIN — HYDROCODONE BITARTRATE AND ACETAMINOPHEN 1 TABLET: 10; 325 TABLET ORAL at 16:05

## 2018-04-20 RX ADMIN — CARVEDILOL 12.5 MG: 12.5 TABLET, FILM COATED ORAL at 09:13

## 2018-04-20 RX ADMIN — BUMETANIDE 2 MG: 1 TABLET ORAL at 09:13

## 2018-04-20 RX ADMIN — ANASTROZOLE 1 MG: 1 TABLET, COATED ORAL at 11:40

## 2018-04-20 RX ADMIN — Medication 1 TABLET: at 09:13

## 2018-04-20 RX ADMIN — DOCUSATE SODIUM 100 MG: 100 CAPSULE, LIQUID FILLED ORAL at 21:14

## 2018-04-20 RX ADMIN — CARVEDILOL 12.5 MG: 12.5 TABLET, FILM COATED ORAL at 18:45

## 2018-04-20 RX ADMIN — HYDROCODONE BITARTRATE AND ACETAMINOPHEN 1 TABLET: 10; 325 TABLET ORAL at 04:29

## 2018-04-20 RX ADMIN — ATORVASTATIN CALCIUM 10 MG: 10 TABLET, FILM COATED ORAL at 09:13

## 2018-04-20 RX ADMIN — VITAM B12 100 MCG: 100 TAB at 09:13

## 2018-04-20 RX ADMIN — LISINOPRIL 5 MG: 5 TABLET ORAL at 09:13

## 2018-04-20 RX ADMIN — DULOXETINE HYDROCHLORIDE 60 MG: 60 CAPSULE, DELAYED RELEASE ORAL at 09:13

## 2018-04-20 RX ADMIN — RIVAROXABAN 10 MG: 10 TABLET, FILM COATED ORAL at 09:13

## 2018-04-20 RX ADMIN — LEVOTHYROXINE SODIUM 25 MCG: 25 TABLET ORAL at 05:34

## 2018-04-20 RX ADMIN — LEVOTHYROXINE SODIUM 25 MCG: 25 TABLET ORAL at 09:13

## 2018-04-20 RX ADMIN — PANTOPRAZOLE SODIUM 40 MG: 40 TABLET, DELAYED RELEASE ORAL at 05:35

## 2018-04-20 RX ADMIN — MAGNESIUM OXIDE TAB 400 MG (241.3 MG ELEMENTAL MG) 400 MG: 400 (241.3 MG) TAB at 09:13

## 2018-04-20 RX ADMIN — HYDROCODONE BITARTRATE AND ACETAMINOPHEN 1 TABLET: 10; 325 TABLET ORAL at 21:13

## 2018-04-20 NOTE — PROGRESS NOTES
Orthopedic Progress Note   Chief Complaint:  Status post left total knee arthroplasty    Subjective     Interval History: Patient postop day 3 afebrile hemodynamically stable.  Pain is controlled oral medication.  Still needing though maximum assistance and transfers will be transferred to TCU          Objective     Vital Signs  Temp:  [98.5 °F (36.9 °C)-99.1 °F (37.3 °C)] 98.7 °F (37.1 °C)  Heart Rate:  [81-98] 94  Resp:  [16-18] 18  BP: (100-167)/(59-89) 167/59  Body mass index is 37.28 kg/m².    Intake/Output Summary (Last 24 hours) at 04/20/18 1111  Last data filed at 04/20/18 0531   Gross per 24 hour   Intake             1680 ml   Output             1050 ml   Net              630 ml     No intake/output data recorded.       Physical Exam:   General: patient awake, alert and cooperative   Cardiovascular: regular rhythm and rate   Pulm: clear to auscultation bilaterally   Abdomen: Benign.  Soft bowel sounds   Extremities: Left knee wound completely benign.  Good distal pulses no motor deficit no sensory loss and transferred to TCU   Neurologic: Normal mood and behavior     Results Review:     I reviewed the patient's new clinical results.      WBC No results found for: WBCS   HGB Hemoglobin   Date Value Ref Range Status   04/20/2018 8.8 (L) 12.0 - 16.0 g/dL Final   04/19/2018 9.1 (L) 12.0 - 16.0 g/dL Final   04/18/2018 10.0 (L) 12.0 - 16.0 g/dL Final      HCT Hematocrit   Date Value Ref Range Status   04/20/2018 27.6 (L) 37.0 - 47.0 % Final   04/19/2018 27.6 (L) 37.0 - 47.0 % Final   04/18/2018 30.6 (L) 37.0 - 47.0 % Final      Platlets No results found for: LABPLAT     PT/INR:  No results found for: PROTIME/No results found for: INR    Sodium Sodium   Date Value Ref Range Status   04/19/2018 137 136 - 145 mmol/L Final      Potassium Potassium   Date Value Ref Range Status   04/19/2018 3.9 3.5 - 5.2 mmol/L Final      Chloride Chloride   Date Value Ref Range Status   04/19/2018 97 (L) 98 - 107 mmol/L Final       Bicarbonate No results found for: PLASMABICARB   BUN BUN   Date Value Ref Range Status   04/19/2018 16 8 - 23 mg/dL Final      Creatinine Creatinine   Date Value Ref Range Status   04/19/2018 0.99 0.57 - 1.00 mg/dL Final      Calcium Calcium   Date Value Ref Range Status   04/19/2018 9.0 8.8 - 10.5 mg/dL Final      Magnesium  AST  ALT  Bilirubin, Total  AlkPhos  Albumin    Amylase  Lipase    Radiology: No results found for: MG  No components found for: AST.*  No components found for: ALT.*  No components found for: BILIRUBIN, TOTAL.*    No components found for: ALKPHOS.*  No components found for: ALBUMIN.*      No components found for: AMYLASE.*  No components found for: LIPASE.*            Imaging Results (most recent)     Procedure Component Value Units Date/Time    XR Knee 1 or 2 View Left [[id number]] Collected:  04/17/18 1543     Updated:  04/17/18 1546    Narrative:       POSTOP LEFT KNEE SERIES, 4/17/2018:     HISTORY:  Postop knee arthroplasty surgery.     TECHNIQUE:  AP and crosstable lateral radiographs of the left knee were obtained  portably following surgery.     FINDINGS:  Total knee arthroplasty components are well-positioned postoperatively.  No visible fracture or dislocation.       Impression:       Satisfactory postoperative appearance following left total knee  arthroplasty.     This report was finalized on 4/17/2018 3:44 PM by Dr. Levy Mishra MD.                  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, Z17.0   • Candidiasis B37.9   •  Cardiomyopathy I42.9   • Disc disorder of cervical region M50.90   • Neck pain M54.2   • Tenderness of chest wall R07.89   • Chronic kidney disease, stage III (moderate) N18.3   • Chronic pain G89.29   • Constipation K59.00   • Generalized osteoarthritis M15.9   • Depression F32.9   • Type 2 diabetes mellitus E11.9   • Controlled type 2 diabetes mellitus without complication E11.9   • Dyslipidemia E78.5   • Gastroesophageal reflux disease with esophagitis K21.0   • Generalized pain R52   • Gout M10.9   • Hypertension I10   • Hypothyroidism E03.9   • Incisional hernia K43.2   • Mass of breast N63.0   • Mitral valve insufficiency I34.0   • Nausea R11.0   • Adult body mass index greater than 30 [id number]   • Osteopenia of spine M85.88   • Arthralgia of multiple joints M25.50   • Peripheral neuropathy G62.9   • Pulmonary hypertension I27.20   • Osteomyelitis M86.9   • Tricuspid valve insufficiency I07.1   • Cobalamin deficiency E53.8   • Vitamin D deficiency E55.9   • Diabetes mellitus E11.9   • Left knee pain M25.562   • Arthritis of knee M17.10   • DDD (degenerative disc disease), lumbar M51.36   • Lumbar facet arthropathy M12.88   • Pain in shoulder M25.519   • Chronic gout of multiple sites M1A.09X0   • Constipation due to opioid therapy K59.03, T40.2X5A   • Syncope, psychogenic F48.8   • Transient alteration of awareness R40.4   • Closed fracture of patella S82.009A   • Primary osteoarthritis of left knee M17.12   • Radius/ulna fracture, left, closed, initial encounter S52.92XA, S52.202A   • Rib pain on left side R07.81   • Preoperative cardiovascular examination Z01.810   • Osteoarthritis of left knee M17.12         Transfer to TCU    Live Chambers MD  04/20/18  11:11 AM

## 2018-04-20 NOTE — PLAN OF CARE
Problem: Patient Care Overview  Goal: Plan of Care Review  Outcome: Ongoing (interventions implemented as appropriate)    Goal: Individualization and Mutuality  Outcome: Ongoing (interventions implemented as appropriate)      Problem: Knee Arthroplasty (Total, Partial) (Adult)  Goal: Signs and Symptoms of Listed Potential Problems Will be Absent, Minimized or Managed (Knee Arthroplasty)  Outcome: Ongoing (interventions implemented as appropriate)      Problem: Skin Injury Risk (Adult)  Goal: Identify Related Risk Factors and Signs and Symptoms  Outcome: Ongoing (interventions implemented as appropriate)    Goal: Skin Health and Integrity  Outcome: Ongoing (interventions implemented as appropriate)      Problem: Fall Risk (Adult)  Goal: Identify Related Risk Factors and Signs and Symptoms  Outcome: Ongoing (interventions implemented as appropriate)    Goal: Absence of Fall  Outcome: Ongoing (interventions implemented as appropriate)

## 2018-04-20 NOTE — PLAN OF CARE
"Problem: Patient Care Overview  Goal: Plan of Care Review   04/20/18 1105   OTHER   Outcome Summary OT: Pt reports she is feeling better today. She stated \"apparently I was pretty confused the last couple days\". Pt was able to perform functional transfers/mobility with CGA . Eduction provided regarding compensatory strategies and use of adaptive equipment to manage daily tasks. Pt plans to go to SNf unit for continued therapy prior to returning home.          "

## 2018-04-20 NOTE — PLAN OF CARE
Problem: Patient Care Overview  Goal: Plan of Care Review   04/20/18 1312   Coping/Psychosocial   Plan of Care Reviewed With patient   OTHER   Outcome Summary PT: Patient performed supine to sit transfer with min A, sit to/from stand transfers with CGA and gait x 64 feet with 2 seated rest breaks with CGA and use of RW. Patient requires extended time and verbal cues for complete functional transfers and gait. She requires verbal cues for improved gait mechanics. Reviewed and performed LE HEP. Patient to be discharged to SNF today.

## 2018-04-20 NOTE — PROGRESS NOTES
"Hospitalist Team      Patient Care Team:  Bubba Avalos MD as PCP - General (Internal Medicine & Pediatrics)  JOSE ANTONIO Akhtar as PCP - Claims Attributed  Tory Pagan MD as Consulting Physician (Hematology and Oncology)  Lina Fisher MD as Referring Physician (General Surgery)  Shanda Gerardo RN as Care Coordinator (Population Health)  Live Chambers MD as Surgeon (Orthopedic Surgery)        Chief Complaint:  Some confusion yesterday and last night    Subjective    Interval History and ROS:     Patient States She was having nightmares during the night  Patient Complaints: She was confused when I first saw her this morning but was cleared up by 12 noon.  She and the hospitalist last night think it is ssecondary to the narcotics.  They were de-escalated.  Patient is happy now and feels much more like herself. She will go to skilled care and rehabilitation to rehabilitate from her surgery  Patient Denies:  Nausea vomiting diarrhea dizziness.  She is not confused in the afternoon.  She is doing very well  History taken from: patient      Objective    Vital Signs  Temp:  [98.5 °F (36.9 °C)-99.1 °F (37.3 °C)] 98.7 °F (37.1 °C)  Heart Rate:  [81-94] 94  Resp:  [16-18] 18  BP: (100-167)/(59-81) 167/59  Oxygen Therapy  SpO2: 96 %  Pulse Oximetry Type: Continuous  Device (Oxygen Therapy): room air  Flow (L/min): 2  ETCO2 (mmHg): 48 mmHg}  Flowsheet Rows    Flowsheet Row First Filed Value   Admission Height 167.6 cm (66\") Documented at 04/17/2018 0937   Admission Weight 105 kg (231 lb) Documented at 04/17/2018 0937        Body mass index is 37.28 kg/m².      Physical Exam:    Physical Exam   Constitutional: Patient appears well-developed and well-nourished and in no acute distress   HEENT:   Head: Normocephalic and atraumatic.   Eyes:  Pupils are equal, round, and reactive to light. EOM are intact. Sclera are anicteric and non-injected.  Mouth and Throat: Patient has moist mucous membranes. Oropharynx is clear " of any erythema or exudate.     Neck: Neck supple. No JVD present. No thyromegaly present. No lymphadenopathy present.  Cardiovascular: Regular rate, regular rhythm, S1 normal and S2 normal.  Exam reveals no gallop and no friction rub.  No murmur heard.  Pulmonary/Chest: Lungs are clear to auscultation bilaterally. No respiratory distress. No wheezes. No rhonchi. No rales.   Abdominal: Soft. Bowel sounds are normal. No distension and no mass. There is no hepatosplenomegaly. There is no tenderness.   Musculoskeletal: Normal Muscle tone  Extremities: No edema. Pulses are palpable in all 4 extremities.  Neurological: Patient is alert and oriented to person, place, and time. Cranial nerves II-XII are grossly intact with no focal deficits.  Skin: Skin is warm. No rash noted. Nails show no clubbing.  No cyanosis or erythema.         Results Review:     I reviewed the patient's new clinical results.    Lab Results (last 24 hours)     Procedure Component Value Units Date/Time    POC Glucose Once [[id number]]  (Abnormal) Collected:  04/20/18 1141    Specimen:  Blood Updated:  04/20/18 1148     Glucose 235 (H) mg/dL     Narrative:       Meter: [id number] : [id number] Heron Torres CNA    Anaerobic Culture - Wound, Knee, Left [[id number]]  (Normal) Collected:  04/17/18 1308    Specimen:  Wound from Knee, Left Updated:  04/20/18 1137     Culture No anaerobes isolated at 3 days    Anaerobic Culture - Wound, Knee, Left [[id number]]  (Normal) Collected:  04/17/18 1358    Specimen:  Wound from Knee, Left Updated:  04/20/18 1135     Culture No anaerobes isolated at 3 days    Wound Culture - Wound, Knee, Left [[id number]]  (Normal) Collected:  04/17/18 1308    Specimen:  Wound from Knee, Left Updated:  04/20/18 0748     Wound Culture No growth at 3 days     Gram Stain Result No organisms seen      Rare (1+) WBCs per low power field    Wound Culture - Wound, Knee, Left [[id number]]  (Normal) Collected:  04/17/18 1358    Specimen:  Wound  from Knee, Left Updated:  04/20/18 0748     Wound Culture No growth at 3 days     Gram Stain Result No organisms seen      No WBCs per low power field    CBC & Differential [[id number]] Collected:  04/20/18 0425    Specimen:  Blood Updated:  04/20/18 0512    Narrative:       The following orders were created for panel order CBC & Differential.  Procedure                               Abnormality         Status                     ---------                               -----------         ------                     CBC Auto Differential[[id number]]        Abnormal            Final result                 Please view results for these tests on the individual orders.    CBC Auto Differential [[id number]]  (Abnormal) Collected:  04/20/18 0425    Specimen:  Blood Updated:  04/20/18 0512     WBC 10.12 10*3/mm3      RBC 2.92 (L) 10*6/mm3      Hemoglobin 8.8 (L) g/dL      Hematocrit 27.6 (L) %      MCV 94.5 fL      MCH 30.1 pg      MCHC 31.9 g/dL      RDW 13.7 %      RDW-SD 46.9 fl      MPV 10.0 fL      Platelets 183 10*3/mm3      Neutrophil % 71.8 (H) %      Lymphocyte % 14.8 (L) %      Monocyte % 9.7 (H) %      Eosinophil % 2.5 %      Basophil % 0.2 %      Immature Grans % 1.0 (H) %      Neutrophils, Absolute 7.27 10*3/mm3      Lymphocytes, Absolute 1.50 10*3/mm3      Monocytes, Absolute 0.98 10*3/mm3      Eosinophils, Absolute 0.25 10*3/mm3      Basophils, Absolute 0.02 10*3/mm3      Immature Grans, Absolute 0.10 (H) 10*3/mm3      nRBC 0.2 (H) /100 WBC           Imaging Results (last 24 hours)     ** No results found for the last 24 hours. **          Xray not reviewed personally by physician.      ECG not reviewed personally by physician  ECG/EMG Results (most recent)     None          Medication Review:   I have reviewed the patient's current medication list    Current Facility-Administered Medications:   •  anastrozole (ARIMIDEX) tablet 1 mg, 1 mg, Oral, Daily, Live Chambers MD, 1 mg at 04/20/18 1140  •  atorvastatin  (LIPITOR) tablet 10 mg, 10 mg, Oral, Daily, Live Chambers MD, 10 mg at 04/20/18 0913  •  bisacodyl (DULCOLAX) suppository 10 mg, 10 mg, Rectal, Daily PRN, Live Chambers MD  •  bumetanide (BUMEX) tablet 2 mg, 2 mg, Oral, Daily, Live Chambers MD, 2 mg at 04/20/18 0913  •  carvedilol (COREG) tablet 12.5 mg, 12.5 mg, Oral, Q12H, Live Chambers MD, 12.5 mg at 04/20/18 0913  •  cholecalciferol (VITAMIN D3) tablet 1,000 Units, 1,000 Units, Oral, Daily, Live Chambers MD, 1,000 Units at 04/20/18 0913  •  docusate sodium (COLACE) capsule 100 mg, 100 mg, Oral, Daily PRN, Live Chambers MD  •  DULoxetine (CYMBALTA) DR capsule 60 mg, 60 mg, Oral, Daily, Live Chambers MD, 60 mg at 04/20/18 0913  •  glipiZIDE (GLUCOTROL) tablet 2.5 mg, 2.5 mg, Oral, QAM AC, Live Chambers MD, 2.5 mg at 04/20/18 0912  •  HYDROcodone-acetaminophen (NORCO)  MG per tablet 1 tablet, 1 tablet, Oral, Q4H PRN, Live Chambers MD, 1 tablet at 04/20/18 0913  •  lactated ringers infusion, 100 mL/hr, Intravenous, Continuous, Niki Buchanan CRNA, Stopped at 04/18/18 0846  •  lactated ringers infusion, 30 mL/hr, Intravenous, Continuous, Live Chambers MD  •  levothyroxine (SYNTHROID, LEVOTHROID) tablet 25 mcg, 25 mcg, Oral, QAM, Live Chambers MD, 25 mcg at 04/20/18 0913  •  lisinopril (PRINIVIL,ZESTRIL) tablet 5 mg, 5 mg, Oral, Daily, Live Chambers MD, 5 mg at 04/20/18 0913  •  magnesium hydroxide (MILK OF MAGNESIA) 400 MG/5ML suspension 30 mL, 30 mL, Oral, Daily PRN, Live Chambers MD  •  magnesium oxide (MAG-OX) tablet 400 mg, 400 mg, Oral, Daily, Live Chambers MD, 400 mg at 04/20/18 0913  •  multivitamin with minerals 1 tablet, 1 tablet, Oral, Daily, Live Chambers MD, 1 tablet at 04/20/18 0913  •  [DISCONTINUED] HYDROmorphone (DILAUDID) injection 1 mg, 1 mg, Intravenous, Q2H PRN, 1 mg at 04/17/18 1704 **AND** naloxone (NARCAN) injection 0.1 mg, 0.1 mg, Intravenous, Q5 Min PRN, Live Chambers MD, 0.1 mg at 04/19/18 1705  •  ondansetron (ZOFRAN) tablet 4 mg,  4 mg, Oral, Q6H PRN **OR** ondansetron ODT (ZOFRAN-ODT) disintegrating tablet 4 mg, 4 mg, Oral, Q6H PRN **OR** ondansetron (ZOFRAN) injection 4 mg, 4 mg, Intravenous, Q6H PRN, Live Chambers MD  •  pantoprazole (PROTONIX) EC tablet 40 mg, 40 mg, Oral, QAM, Live Chambers MD, 40 mg at 04/20/18 0535  •  pregabalin (LYRICA) capsule 150 mg, 150 mg, Oral, BID, Live Chambers MD, 150 mg at 04/20/18 0913  •  rivaroxaban (XARELTO) tablet 10 mg, 10 mg, Oral, Daily, Live Chambers MD, 10 mg at 04/20/18 0913  •  spironolactone (ALDACTONE) tablet 25 mg, 25 mg, Oral, Daily, Live Chambers MD, 25 mg at 04/20/18 0913  •  vitamin B-12 (CYANOCOBALAMIN) tablet 100 mcg, 100 mcg, Oral, Daily, Live Chambers MD, 100 mcg at 04/20/18 0913      Assessment/Plan     1.  Diabetes Mellitus, Type 2:  211 and 235/  Continue to monitor in Skilled care and rehabilitation     2.  HTN:  BP remains at goal.  No change to regimen.  167/59     3.  Post-op Delirium:  Recommend stopping narcotics.  Still somewhat confused early am but improved on my return to room at 12 noon to normal     4.  CKD III:  Creatinine at baseline.      5.  Hypothyroidism:  Continue current replacement dose.     6.  Chronic Systolic CHF:  No acute issues.  Continue medical management.       Plan for disposition:To skilled care and rehabilitation today    Akua Choudhary DO  04/20/18  12:47 PM      Time: 20 min

## 2018-04-20 NOTE — THERAPY DISCHARGE NOTE
Acute Care - Physical Therapy Treatment Note/Discharge  NOHEMY Diaz     Patient Name: Jung hSort  : 1945  MRN: [id number]  Today's Date: 2018  Onset of Illness/Injury or Date of Surgery: 18  Date of Referral to PT: 18  Referring Physician: Reyes    Admit Date: 2018    Visit Dx:    ICD-10-CM ICD-9-CM   1. Primary osteoarthritis of left knee M17.12 715.16     Patient Active Problem List   Diagnosis   • Abdominal bloating   • Abdominal mass   • Abdominal pain   • Abnormal gait   • Abnormal mammogram   • Anemia   • Atherosclerosis of coronary artery   • Thoracic back pain   • Malignant neoplasm of upper-inner quadrant of right breast in female, estrogen receptor positive   • Candidiasis   • Cardiomyopathy   • Disc disorder of cervical region   • Neck pain   • Tenderness of chest wall   • Chronic kidney disease, stage III (moderate)   • Chronic pain   • Constipation   • Generalized osteoarthritis   • Depression   • Type 2 diabetes mellitus   • Controlled type 2 diabetes mellitus without complication   • Dyslipidemia   • Gastroesophageal reflux disease with esophagitis   • Generalized pain   • Gout   • Hypertension   • Hypothyroidism   • Incisional hernia   • Mass of breast   • Mitral valve insufficiency   • Nausea   • Adult body mass index greater than 30   • Osteopenia of spine   • Arthralgia of multiple joints   • Peripheral neuropathy   • Pulmonary hypertension   • Osteomyelitis   • Tricuspid valve insufficiency   • Cobalamin deficiency   • Vitamin D deficiency   • Diabetes mellitus   • Left knee pain   • Arthritis of knee   • DDD (degenerative disc disease), lumbar   • Lumbar facet arthropathy   • Pain in shoulder   • Chronic gout of multiple sites   • Constipation due to opioid therapy   • Syncope, psychogenic   • Transient alteration of awareness   • Closed fracture of patella   • Primary osteoarthritis of left knee   • Radius/ulna fracture, left, closed, initial encounter   •  Rib pain on left side   • Preoperative cardiovascular examination       Physical Therapy Education     Title: PT OT SLP Therapies (Done)     Topic: Physical Therapy (Done)     Point: Mobility training (Done)    Learning Progress Summary     Learner Status Readiness Method Response Comment Documented by    Patient Done Acceptance E VU   04/20/18 1312     Done Acceptance E,D,H VU,NR continues to require cues and assist due to confusion  04/19/18 1424     Done Acceptance E,D,H VU,NR pt did participate minimally with HEP but continues to require cues and assist.  Much encouragement/cues with mobilty for safety  04/19/18 1210     Done Acceptance E,D,H VU,NR Pt does remember some ex from previous surgeries- was attempting to perform on RLE. Frequent cues for mobility and safety.  04/18/18 1441     Done Acceptance E VU,NR   04/18/18 1424          Point: Home exercise program (Done)    Learning Progress Summary     Learner Status Readiness Method Response Comment Documented by    Patient Done Acceptance E VU   04/20/18 1312     Done Acceptance E,D,H VU,NR continues to require cues and assist due to confusion  04/19/18 1424     Done Acceptance E,D,H VU,NR pt did participate minimally with HEP but continues to require cues and assist.  Much encouragement/cues with mobilty for safety  04/19/18 1210     Done Acceptance E,D,H VU,NR Pt does remember some ex from previous surgeries- was attempting to perform on RLE. Frequent cues for mobility and safety.  04/18/18 1441                      User Key     Initials Effective Dates Name Provider Type Discipline     08/11/15 -  Anna Childress, PT Physical Therapist PT     08/11/15 -  Abimbola Singletary, PTA Physical Therapy Assistant PT     04/03/18 -  Angeles Duarte, PT Physical Therapist PT                    PT Rehab Goals     Row Name 04/20/18 1300             Bed Mobility Goal 1 (PT)    Activity/Assistive Device (Bed Mobility Goal 1, PT) sit to supine/supine  to sit  -BP      [place] Level/Cues Needed (Bed Mobility Goal 1, PT) conditional independence  -BP      Time Frame (Bed Mobility Goal 1, PT) 5 days  -BP      Progress/Outcomes (Bed Mobility Goal 1, PT) goal not met  -BP         Transfer Goal 1 (PT)    Activity/Assistive Device (Transfer Goal 1, PT) sit-to-stand/stand-to-sit;walker, rolling  -BP      [place] Level/Cues Needed (Transfer Goal 1, PT) supervision required  -BP      Time Frame (Transfer Goal 1, PT) 5 days  -BP      Progress/Outcome (Transfer Goal 1, PT) goal not met  -BP         Gait Training Goal 1 (PT)    Activity/Assistive Device (Gait Training Goal 1, PT) gait (walking locomotion);walker, rolling  -BP      [place] Level (Gait Training Goal 1, PT) standby assist  -BP      Distance (Gait Goal 1, PT) 75  -BP      Time Frame (Gait Training Goal 1, PT) 5 days  -BP      Progress/Outcome (Gait Training Goal 1, PT) goal not met  -BP         Patient Education Goal (PT)    Activity (Patient Education Goal, PT) written HEP for LE strengthening/ROM  -BP      [place]/Cues/Accuracy (Memory Goal 2, PT) demonstrates adequately;verbalizes understanding  -BP      Time Frame (Patient Education Goal, PT) long term goal (LTG);5 days  -BP      Progress/Outcome (Patient Education Goal, PT) goal not met  -BP        User Key  (r) = Recorded By, (t) = Taken By, (c) = Cosigned By    Initials Name Provider Type Discipline    BP Angeles Duarte, PT Physical Therapist PT        Therapy Treatment        Rehabilitation Treatment Summary     Row Name 04/20/18 1015 04/20/18 0940          Treatment Time/Intention    Discipline occupational therapist  -SD physical therapy assistant  -BP     Document Type therapy note (daily note)  -SD discharge treatment;therapy note (daily note)  -BP     Subjective Information  -- complains of;pain  -BP     Mode of Treatment occupational therapy  -SD physical therapy  -BP     Patient/Family Observations  -- Patient supine in bed  "with HOB elevated. In acute distress. Agrees to treatment.   -BP     Care Plan Review  -- risks/benefits reviewed  -BP     Patient Effort good  -SD adequate  -BP     Comment Pt stated she is feeling much better today.  \"Apparently i was pretty confused the last couple days.  I don't remember much of it.\"  -SD  --     Existing Precautions/Restrictions fall  -SD fall  -BP     Recorded by [SD] Mukund Stokes, OTR 04/20/18 1114 04/20/18 1114 [BP] Angeles Duarte, PT 04/20/18 1311 04/20/18 1311     Row Name 04/20/18 0940             Cognitive Assessment/Intervention- PT/OT    Personal Safety Interventions gait belt;nonskid shoes/slippers when out of bed  -BP      Recorded by [BP] Angeles Duarte, PT 04/20/18 1311 04/20/18 1311      Row Name 04/20/18 0940             Bed Mobility Assessment/Treatment    Supine-Sit [place] (Bed Mobility) minimum assist (75% patient effort);verbal cues  -BP      Bed Mobility, Safety Issues decreased use of legs for bridging/pushing  -BP      Assistive Device (Bed Mobility) bed rails;head of bed elevated  -BP      Comment (Bed Mobility) Patient requires assist for L LE out of bed. She requires increased time to complte transfer   -BP      Recorded by [BP] Angeles Duarte, PT 04/20/18 1311 04/20/18 1311      Row Name 04/20/18 1015             Functional Mobility    Functional Mobility- Ind. Level contact guard assist  -SD      Functional Mobility- Device rolling walker  -SD      Functional Mobility-Distance (Feet) 50  -SD      Recorded by [SD] Mukund Casianor, OTR 04/20/18 1114 04/20/18 1114      Row Name 04/20/18 0940             Transfer Assessment/Treatment    Transfer Assessment/Treatment sit-stand transfer;stand-sit transfer;toilet transfer  -BP      Comment (Transfers) Patient requires verbal cues for hand placement.   -BP      Sit-Stand [place] (Transfers) contact guard;verbal cues  -BP      Stand-Sit [place] (Transfers) contact guard;verbal cues  -BP      " [place] Level (Toilet Transfer) contact guard;verbal cues  -BP      Assistive Device (Toilet Transfer) walker, front-wheeled;commode, bedside without drop arms  -BP      Recorded by [BP] Angeles Duarte, PT 04/20/18 1311 04/20/18 1311      Row Name 04/20/18 0940             Sit-Stand Transfer    Assistive Device (Sit-Stand Transfers) walker, front-wheeled  -BP      Recorded by [BP] Angeles Duarte, PT 04/20/18 1311 04/20/18 1311      Row Name 04/20/18 0940             Stand Pivot/Stand Step Transfer    Stand Pivot/Stand Step [place] contact guard;verbal cues  -BP      Recorded by [BP] Angeles Duarte, PT 04/20/18 1311 04/20/18 1311      Row Name 04/20/18 0940             Toilet Transfer    Type (Toilet Transfer) sit-stand;stand-sit  -BP      Recorded by [BP] Angeles Duarte, PT 04/20/18 1311 04/20/18 1311      Row Name 04/20/18 0940             Gait/Stairs Assessment/Training    [place] Level (Gait) contact guard;verbal cues  -BP      Assistive Device (Gait) walker, front-wheeled  -BP      Distance in Feet (Gait) 6   8, 50. seated breaks in between   -BP      Pattern (Gait) swing-to  -BP      Deviations/Abnormal Patterns (Gait) antalgic;lisa decreased;stride length decreased  -BP      Bilateral Gait Deviations forward flexed posture  -BP      Left Sided Gait Deviations heel strike decreased  -BP      Right Sided Gait Deviations --   step lenght decreased   -BP      Comment (Gait/Stairs) Patient requires verbal cues for increased step length on the right as well as verbal cues for upright posture. Patient demonstrates significantly decreased gait speed.   -BP      Recorded by [BP] Angeles Duarte, PT 04/20/18 1311 04/20/18 1311      Row Name 04/20/18 1015             Grooming Assessment/Training    [place] Level (Grooming) set up;hair care, combing/brushing   applying makeup  -SD      Grooming Position other (see comments)   sitting in recliner  -SD      Recorded by [SD] Mukund Stokes,  OTR 04/20/18 1114 04/20/18 1114      Row Name 04/20/18 0940             Motor Skills Assessment/Interventions    Additional Documentation Therapeutic Exercise (Group);Therapeutic Exercise Interventions (Group)  -BP      Recorded by [BP] Angeles Duarte, PT 04/20/18 1311 04/20/18 1311      Row Name 04/20/18 0940             Therapeutic Exercise    Therapeutic Exercise supine, lower extremities  -BP      Additional Documentation Therapeutic Exercise (Row)  -BP      Recorded by [BP] Angeles Duarte, PT 04/20/18 1311 04/20/18 1311      Row Name 04/20/18 0940             Lower Extremity Supine Therapeutic Exercise    Performed, Supine Lower Extremity (Therapeutic Exercise) hip abduction/adduction;quadriceps sets;SLR (straight leg raise);SAQ (short arc quad) over bolster;ankle pumps;heel slides;gluteal sets  -BP      Comment, Supine Lower Extremity (Therapeutic Exercise) provided and reviewed LE HEP   -BP      Recorded by [BP] Angeles Duarte, PT 04/20/18 1311 04/20/18 1311      Row Name 04/20/18 1015 04/20/18 0940          Positioning and Restraints    Pre-Treatment Position sitting in chair/recliner  -SD in bed  -BP     Post Treatment Position chair  -SD chair  -BP     In Chair sitting;with PT  -SD call light within reach;encouraged to call for assist;notified nsg;reclined  -BP     Recorded by [SD] Mukund Stokes, OTR 04/20/18 1114 04/20/18 1114 [BP] Angeles Duarte, PT 04/20/18 1311 04/20/18 1311     Row Name 04/20/18 0940             Pain Scale: Numbers Pre/Post-Treatment    Pain Scale: Numbers, Pretreatment 3/10  -BP      Pain Scale: Numbers, Post-Treatment --   Increases with gait however does not rate   -BP      Pain Location - Side Left  -BP      Pain Location knee  -BP      Pain Intervention(s) Repositioned;Ambulation/increased activity;Medication (See MAR)   pre medicated for treatment   -BP      Recorded by [BP] Angeles Duarte, PT 04/20/18 1311 04/20/18 1311      Row Name                Wound 04/17/18  1323 Left knee incision    Wound - Properties Group Date first assessed: 04/17/18 [JM] Time first assessed: 1323 [JM] Side: Left [JM] Location: knee [JM] Type: incision [JM] Recorded by:  [JM] Niki Colindres RN 04/17/18 1323 04/17/18 1323    Row Name 04/20/18 0940             Plan of Care Review    Plan of Care Reviewed With patient  -BP      Recorded by [BP] Angeles Duarte, PT 04/20/18 1311 04/20/18 1311      Row Name 04/20/18 1015             Outcome Summary/Treatment Plan (OT)    Daily Summary of Progress (OT) progress towards functional goals is fair  -SD      Plan for Continued Treatment (OT) rec continued adl retraining/AE education  -SD      Anticipated Equipment Needs at Discharge (OT) bathing equipment;dressing equipment  -SD      Anticipated Discharge Disposition (OT) skilled nursing facility (SNF)  -SD      Recorded by [SD] Mukund Stokes, OTR 04/20/18 1114 04/20/18 1114      Row Name 04/20/18 0940             Outcome Summary/Treatment Plan (PT)    Daily Summary of Progress (PT) progress toward functional goals is gradual  -BP      Barriers to Overall Progress (PT) pain   -BP      Plan for Continued Treatment (PT) Patient to be discharged to SNF today   -BP      Anticipated Discharge Disposition (PT) skilled nursing facility (SNF)  -BP      Recorded by [BP] Angeles Duarte, PT 04/20/18 1311 04/20/18 1311        User Key  (r) = Recorded By, (t) = Taken By, (c) = Cosigned By    Initials Name Effective Dates Discipline    SD Mukund Stokes, OTR 06/22/16 -  OT    JM Niki Colindres, RN 06/16/16 -  Nurse    BP Angeles Duarte, PT 04/03/18 -  PT        Wound 04/17/18 1323 Left knee incision (Active)   Dressing Appearance dry;intact 4/20/2018  9:10 AM   Closure ANN 4/20/2018  9:10 AM       PT Recommendation and Plan  Anticipated Discharge Disposition (PT): skilled nursing facility (SNF)  Outcome Summary/Treatment Plan (PT)  Daily Summary of Progress (PT): progress toward functional goals is  gradual  Barriers to Overall Progress (PT): pain   Plan for Continued Treatment (PT): Patient to be discharged to SNF today   Anticipated Discharge Disposition (PT): skilled nursing facility (SNF)  Plan of Care Reviewed With: patient  Outcome Summary: PT: Patient performed supine to sit transfer with min A, sit to/from stand transfers with CGA and gait x 64 feet with 2 seated rest  breaks with CGA and use of RW. Patient requires extended time and verbal cues for complete functional transfers and gait. She requires verbal cues for improved gait mechanics. Reviewed and performed LE HEP. Patient to be discharged to SNF today.           Outcome Measures     Row Name 04/20/18 1015 04/20/18 0840 04/19/18 1305       How much help from another person do you currently need...    Turning from your back to your side while in flat bed without using bedrails?  -- 3  -BP 3  -KM    Moving from lying on back to sitting on the side of a flat bed without bedrails?  -- 3  -BP 3  -KM    Moving to and from a bed to a chair (including a wheelchair)?  -- 3  -BP 3  -KM    Standing up from a chair using your arms (e.g., wheelchair, bedside chair)?  -- 3  -BP 3  -KM    Climbing 3-5 steps with a railing?  -- 2  -BP 2  -KM    To walk in hospital room?  -- 3  -BP 3  -KM    AM-PAC 6 Clicks Score  -- 17  -BP 17  -KM       How much help from another is currently needed...    Putting on and taking off regular lower body clothing? 2  -SD  --  --    Bathing (including washing, rinsing, and drying) 2  -SD  --  --    Toileting (which includes using toilet bed pan or urinal) 3  -SD  --  --    Putting on and taking off regular upper body clothing 4  -SD  --  --    Taking care of personal grooming (such as brushing teeth) 4  -SD  --  --    Eating meals 4  -SD  --  --    Score 19  -SD  --  --       Functional Assessment    Outcome Measure Options  -- AM-PAC 6 Clicks Basic Mobility (PT)  -BP AM-PAC 6 Clicks Basic Mobility (PT)  -KM    Row Name 04/19/18 0900  04/19/18 0855 04/18/18 1400       How much help from another person do you currently need...    Turning from your back to your side while in flat bed without using bedrails?  -- 3  -KM 3  -LH    Moving from lying on back to sitting on the side of a flat bed without bedrails?  -- 3  -KM 2  -LH    Moving to and from a bed to a chair (including a wheelchair)?  -- 3  -KM 2  -LH    Standing up from a chair using your arms (e.g., wheelchair, bedside chair)?  -- 2  -KM 2  -LH    Climbing 3-5 steps with a railing?  -- 2  -KM 2  -LH    To walk in hospital room?  -- 3  -KM 2  -LH    AM-PAC 6 Clicks Score  -- 16  -KM 13  -LH       How much help from another is currently needed...    Putting on and taking off regular lower body clothing? 2  -JJ  --  --    Bathing (including washing, rinsing, and drying) 2  -JJ  --  --    Toileting (which includes using toilet bed pan or urinal) 3  -JJ  --  --    Putting on and taking off regular upper body clothing 4  -JJ  --  --    Taking care of personal grooming (such as brushing teeth) 4  -JJ  --  --    Eating meals 4  -JJ  --  --    Score 19  -JJ  --  --       Functional Assessment    Outcome Measure Options  -- AM-PAC 6 Clicks Basic Mobility (PT)  -KM AM-PAC 6 Clicks Basic Mobility (PT)  -    Row Name 04/18/18 1305 04/18/18 0936          How much help from another person do you currently need...    Turning from your back to your side while in flat bed without using bedrails? 2  -KM  --     Moving from lying on back to sitting on the side of a flat bed without bedrails? 2  -KM  --     Moving to and from a bed to a chair (including a wheelchair)? 2  -KM  --     Standing up from a chair using your arms (e.g., wheelchair, bedside chair)? 2  -KM  --     Climbing 3-5 steps with a railing? 2  -KM  --     To walk in hospital room? 2  -KM  --     AM-PAC 6 Clicks Score 12  -KM  --        How much help from another is currently needed...    Putting on and taking off regular lower body clothing?   -- 2  -JJ     Bathing (including washing, rinsing, and drying)  -- 2  -JJ     Toileting (which includes using toilet bed pan or urinal)  -- 3  -JJ     Putting on and taking off regular upper body clothing  -- 4  -JJ     Taking care of personal grooming (such as brushing teeth)  -- 4  -JJ     Eating meals  -- 4  -JJ     Score  -- 19  -JJ        Functional Assessment    Outcome Measure Options AM-PAC 6 Clicks Basic Mobility (PT)  -KM AM-PAC 6 Clicks Daily Activity (OT)  -JJ       User Key  (r) = Recorded By, (t) = Taken By, (c) = Cosigned By    Initials Name Provider Type    LH Anna Childress, PT Physical Therapist    KM Abimbola Singletary, PTA Physical Therapy Assistant    SD Mukund Stokes, OTR Occupational Therapist    JJ Niharika Butcher, OTR Occupational Therapist    BP Angeles Duarte, PT Physical Therapist           Time Calculation:         PT Charges     Row Name 04/20/18 1316             Time Calculation    Start Time 0940  -BP      Stop Time 1022  -BP      Time Calculation (min) 42 min  -BP      PT Received On 04/20/18  -BP        User Key  (r) = Recorded By, (t) = Taken By, (c) = Cosigned By    Initials Name Provider Type    BP Angeles Duarte, PT Physical Therapist          Therapy Charges for Today     Code Description Service Date Service Provider Modifiers Qty    64717542448 HC GAIT TRAINING EA 15 MIN 4/20/2018 Angeles Duarte, PT GP 1    89562150348 HC PT THER PROC EA 15 MIN 4/20/2018 Angeles Duarte, PT GP 2          PT G-Codes  Outcome Measure Options: AM-PAC 6 Clicks Basic Mobility (PT)    PT Discharge Summary  Anticipated Discharge Disposition (PT): skilled nursing facility (SNF)  Reason for Discharge: Discharge from facility  Outcomes Achieved:  (Progressed however did not meet any goals )  Discharge Destination: SNF    Angeles Duarte, PT  4/20/2018

## 2018-04-20 NOTE — NURSING NOTE
Continued Stay Note   Alissa Castellon     Patient Name: Jung Short  MRN: [id number]  Today's Date: 4/20/2018    Admit Date: 4/17/2018          Discharge Plan     Row Name 04/20/18 1120       Plan    Plan Comments spoke with patient at bedside r/t discharge to [facility] rehab unit. updated IMM. patient in better spirits today. will continue to follow.               Discharge Codes    No documentation.           Sofía Kumar RN

## 2018-04-21 LAB
GLUCOSE BLDC GLUCOMTR-MCNC: 212 MG/DL (ref 70–130)
GLUCOSE BLDC GLUCOMTR-MCNC: 213 MG/DL (ref 70–130)
GLUCOSE BLDC GLUCOMTR-MCNC: 213 MG/DL (ref 70–130)
GLUCOSE BLDC GLUCOMTR-MCNC: 297 MG/DL (ref 70–130)
HEPATITIS C RNA-NAA: NEGATIVE

## 2018-04-21 PROCEDURE — 97110 THERAPEUTIC EXERCISES: CPT

## 2018-04-21 PROCEDURE — 82962 GLUCOSE BLOOD TEST: CPT

## 2018-04-21 PROCEDURE — 97165 OT EVAL LOW COMPLEX 30 MIN: CPT

## 2018-04-21 PROCEDURE — 63710000001 INSULIN ASPART PER 5 UNITS: Performed by: HOSPITALIST

## 2018-04-21 PROCEDURE — 97161 PT EVAL LOW COMPLEX 20 MIN: CPT

## 2018-04-21 RX ORDER — CHOLECALCIFEROL (VITAMIN D3) 125 MCG
5 CAPSULE ORAL NIGHTLY PRN
Status: DISCONTINUED | OUTPATIENT
Start: 2018-04-21 | End: 2018-04-27 | Stop reason: HOSPADM

## 2018-04-21 RX ADMIN — RIVAROXABAN 10 MG: 10 TABLET, FILM COATED ORAL at 08:16

## 2018-04-21 RX ADMIN — BUMETANIDE 2 MG: 1 TABLET ORAL at 08:16

## 2018-04-21 RX ADMIN — LEVOTHYROXINE SODIUM 25 MCG: 25 TABLET ORAL at 08:16

## 2018-04-21 RX ADMIN — Medication 5 MG: at 21:10

## 2018-04-21 RX ADMIN — ANASTROZOLE 1 MG: 1 TABLET, COATED ORAL at 08:16

## 2018-04-21 RX ADMIN — Medication 1 TABLET: at 08:16

## 2018-04-21 RX ADMIN — INSULIN ASPART 3 UNITS: 100 INJECTION, SOLUTION INTRAVENOUS; SUBCUTANEOUS at 08:17

## 2018-04-21 RX ADMIN — INSULIN ASPART 4 UNITS: 100 INJECTION, SOLUTION INTRAVENOUS; SUBCUTANEOUS at 12:02

## 2018-04-21 RX ADMIN — ASPIRIN 81 MG 81 MG: 81 TABLET ORAL at 08:16

## 2018-04-21 RX ADMIN — INSULIN ASPART 3 UNITS: 100 INJECTION, SOLUTION INTRAVENOUS; SUBCUTANEOUS at 21:11

## 2018-04-21 RX ADMIN — DULOXETINE HYDROCHLORIDE 60 MG: 60 CAPSULE, DELAYED RELEASE ORAL at 08:17

## 2018-04-21 RX ADMIN — PANTOPRAZOLE SODIUM 40 MG: 40 TABLET, DELAYED RELEASE ORAL at 06:01

## 2018-04-21 RX ADMIN — MAGNESIUM OXIDE TAB 400 MG (241.3 MG ELEMENTAL MG) 400 MG: 400 (241.3 MG) TAB at 08:16

## 2018-04-21 RX ADMIN — ATORVASTATIN CALCIUM 10 MG: 10 TABLET, FILM COATED ORAL at 08:16

## 2018-04-21 RX ADMIN — DOCUSATE SODIUM 100 MG: 100 CAPSULE, LIQUID FILLED ORAL at 21:10

## 2018-04-21 RX ADMIN — INSULIN ASPART 3 UNITS: 100 INJECTION, SOLUTION INTRAVENOUS; SUBCUTANEOUS at 17:24

## 2018-04-21 RX ADMIN — HYDROCODONE BITARTRATE AND ACETAMINOPHEN 1 TABLET: 10; 325 TABLET ORAL at 10:21

## 2018-04-21 RX ADMIN — HYDROCODONE BITARTRATE AND ACETAMINOPHEN 1 TABLET: 10; 325 TABLET ORAL at 06:01

## 2018-04-21 RX ADMIN — SPIRONOLACTONE 25 MG: 25 TABLET ORAL at 08:16

## 2018-04-21 RX ADMIN — VITAM B12 100 MCG: 100 TAB at 08:16

## 2018-04-21 RX ADMIN — HYDROCODONE BITARTRATE AND ACETAMINOPHEN 1 TABLET: 10; 325 TABLET ORAL at 16:00

## 2018-04-21 RX ADMIN — CARVEDILOL 12.5 MG: 12.5 TABLET, FILM COATED ORAL at 08:16

## 2018-04-21 RX ADMIN — DOCUSATE SODIUM 100 MG: 100 CAPSULE, LIQUID FILLED ORAL at 10:21

## 2018-04-21 RX ADMIN — GLIPIZIDE 2.5 MG: 5 TABLET ORAL at 08:17

## 2018-04-21 RX ADMIN — HYDROCODONE BITARTRATE AND ACETAMINOPHEN 1 TABLET: 10; 325 TABLET ORAL at 21:11

## 2018-04-21 RX ADMIN — CARVEDILOL 12.5 MG: 12.5 TABLET, FILM COATED ORAL at 17:24

## 2018-04-21 RX ADMIN — LISINOPRIL 5 MG: 5 TABLET ORAL at 08:16

## 2018-04-22 LAB
BACTERIA SPEC ANAEROBE CULT: NORMAL
BACTERIA SPEC ANAEROBE CULT: NORMAL
GLUCOSE BLDC GLUCOMTR-MCNC: 202 MG/DL (ref 70–130)
GLUCOSE BLDC GLUCOMTR-MCNC: 204 MG/DL (ref 70–130)
GLUCOSE BLDC GLUCOMTR-MCNC: 228 MG/DL (ref 70–130)
GLUCOSE BLDC GLUCOMTR-MCNC: 263 MG/DL (ref 70–130)

## 2018-04-22 PROCEDURE — 63710000001 INSULIN ASPART PER 5 UNITS: Performed by: HOSPITALIST

## 2018-04-22 PROCEDURE — 82962 GLUCOSE BLOOD TEST: CPT

## 2018-04-22 RX ADMIN — PANTOPRAZOLE SODIUM 40 MG: 40 TABLET, DELAYED RELEASE ORAL at 06:51

## 2018-04-22 RX ADMIN — HYDROCODONE BITARTRATE AND ACETAMINOPHEN 1 TABLET: 10; 325 TABLET ORAL at 17:52

## 2018-04-22 RX ADMIN — LISINOPRIL 5 MG: 5 TABLET ORAL at 08:55

## 2018-04-22 RX ADMIN — CARVEDILOL 12.5 MG: 12.5 TABLET, FILM COATED ORAL at 17:50

## 2018-04-22 RX ADMIN — BUMETANIDE 2 MG: 1 TABLET ORAL at 08:54

## 2018-04-22 RX ADMIN — ATORVASTATIN CALCIUM 10 MG: 10 TABLET, FILM COATED ORAL at 08:54

## 2018-04-22 RX ADMIN — HYDROCODONE BITARTRATE AND ACETAMINOPHEN 1 TABLET: 10; 325 TABLET ORAL at 04:28

## 2018-04-22 RX ADMIN — ANASTROZOLE 1 MG: 1 TABLET, COATED ORAL at 10:50

## 2018-04-22 RX ADMIN — INSULIN ASPART 6 UNITS: 100 INJECTION, SOLUTION INTRAVENOUS; SUBCUTANEOUS at 21:00

## 2018-04-22 RX ADMIN — INSULIN ASPART 3 UNITS: 100 INJECTION, SOLUTION INTRAVENOUS; SUBCUTANEOUS at 08:55

## 2018-04-22 RX ADMIN — RIVAROXABAN 10 MG: 10 TABLET, FILM COATED ORAL at 08:54

## 2018-04-22 RX ADMIN — INSULIN ASPART 4 UNITS: 100 INJECTION, SOLUTION INTRAVENOUS; SUBCUTANEOUS at 17:50

## 2018-04-22 RX ADMIN — HYDROCODONE BITARTRATE AND ACETAMINOPHEN 1 TABLET: 10; 325 TABLET ORAL at 23:20

## 2018-04-22 RX ADMIN — LEVOTHYROXINE SODIUM 25 MCG: 25 TABLET ORAL at 08:55

## 2018-04-22 RX ADMIN — ASPIRIN 81 MG 81 MG: 81 TABLET ORAL at 08:54

## 2018-04-22 RX ADMIN — MAGNESIUM OXIDE TAB 400 MG (241.3 MG ELEMENTAL MG) 400 MG: 400 (241.3 MG) TAB at 08:54

## 2018-04-22 RX ADMIN — HYDROCODONE BITARTRATE AND ACETAMINOPHEN 1 TABLET: 10; 325 TABLET ORAL at 08:55

## 2018-04-22 RX ADMIN — INSULIN ASPART 2 UNITS: 100 INJECTION, SOLUTION INTRAVENOUS; SUBCUTANEOUS at 13:06

## 2018-04-22 RX ADMIN — SPIRONOLACTONE 25 MG: 25 TABLET ORAL at 08:54

## 2018-04-22 RX ADMIN — DULOXETINE HYDROCHLORIDE 60 MG: 60 CAPSULE, DELAYED RELEASE ORAL at 08:55

## 2018-04-22 RX ADMIN — HYDROCODONE BITARTRATE AND ACETAMINOPHEN 1 TABLET: 10; 325 TABLET ORAL at 13:11

## 2018-04-22 RX ADMIN — VITAM B12 100 MCG: 100 TAB at 08:55

## 2018-04-22 RX ADMIN — Medication 1 TABLET: at 08:54

## 2018-04-22 RX ADMIN — GLIPIZIDE 2.5 MG: 5 TABLET ORAL at 08:54

## 2018-04-22 RX ADMIN — CARVEDILOL 12.5 MG: 12.5 TABLET, FILM COATED ORAL at 08:54

## 2018-04-23 ENCOUNTER — EPISODE CHANGES (OUTPATIENT)
Dept: CASE MANAGEMENT | Facility: OTHER | Age: 73
End: 2018-04-23

## 2018-04-23 ENCOUNTER — PATIENT OUTREACH (OUTPATIENT)
Dept: CASE MANAGEMENT | Facility: OTHER | Age: 73
End: 2018-04-23

## 2018-04-23 LAB
GLUCOSE BLDC GLUCOMTR-MCNC: 198 MG/DL (ref 70–130)
GLUCOSE BLDC GLUCOMTR-MCNC: 207 MG/DL (ref 70–130)
GLUCOSE BLDC GLUCOMTR-MCNC: 258 MG/DL (ref 70–130)
GLUCOSE BLDC GLUCOMTR-MCNC: 333 MG/DL (ref 70–130)
HBA1C MFR BLD: 6.9 % (ref 4.8–5.6)
HCT VFR BLD AUTO: 25.8 % (ref 37–47)
HGB BLD-MCNC: 8.4 G/DL (ref 12–16)

## 2018-04-23 PROCEDURE — 85014 HEMATOCRIT: CPT | Performed by: HOSPITALIST

## 2018-04-23 PROCEDURE — 85018 HEMOGLOBIN: CPT | Performed by: HOSPITALIST

## 2018-04-23 PROCEDURE — 83036 HEMOGLOBIN GLYCOSYLATED A1C: CPT | Performed by: HOSPITALIST

## 2018-04-23 PROCEDURE — 97535 SELF CARE MNGMENT TRAINING: CPT

## 2018-04-23 PROCEDURE — 97116 GAIT TRAINING THERAPY: CPT

## 2018-04-23 PROCEDURE — 97110 THERAPEUTIC EXERCISES: CPT

## 2018-04-23 PROCEDURE — 82962 GLUCOSE BLOOD TEST: CPT

## 2018-04-23 PROCEDURE — 63710000001 INSULIN ASPART PER 5 UNITS: Performed by: HOSPITALIST

## 2018-04-23 RX ORDER — DOCUSATE SODIUM 100 MG/1
100 CAPSULE, LIQUID FILLED ORAL 2 TIMES DAILY
Status: DISCONTINUED | OUTPATIENT
Start: 2018-04-23 | End: 2018-04-27 | Stop reason: HOSPADM

## 2018-04-23 RX ORDER — POLYETHYLENE GLYCOL 3350 17 G/17G
17 POWDER, FOR SOLUTION ORAL DAILY
Status: DISCONTINUED | OUTPATIENT
Start: 2018-04-23 | End: 2018-04-27 | Stop reason: HOSPADM

## 2018-04-23 RX ORDER — GLIPIZIDE 5 MG/1
5 TABLET ORAL
Status: DISCONTINUED | OUTPATIENT
Start: 2018-04-23 | End: 2018-04-27 | Stop reason: HOSPADM

## 2018-04-23 RX ADMIN — RIVAROXABAN 10 MG: 10 TABLET, FILM COATED ORAL at 08:01

## 2018-04-23 RX ADMIN — POLYETHYLENE GLYCOL (3350) 17 G: 17 POWDER, FOR SOLUTION ORAL at 16:31

## 2018-04-23 RX ADMIN — VITAM B12 100 MCG: 100 TAB at 08:01

## 2018-04-23 RX ADMIN — CARVEDILOL 12.5 MG: 12.5 TABLET, FILM COATED ORAL at 16:30

## 2018-04-23 RX ADMIN — ASPIRIN 81 MG 81 MG: 81 TABLET ORAL at 08:01

## 2018-04-23 RX ADMIN — MAGNESIUM OXIDE TAB 400 MG (241.3 MG ELEMENTAL MG) 400 MG: 400 (241.3 MG) TAB at 08:02

## 2018-04-23 RX ADMIN — HYDROCODONE BITARTRATE AND ACETAMINOPHEN 1 TABLET: 10; 325 TABLET ORAL at 16:30

## 2018-04-23 RX ADMIN — DOCUSATE SODIUM 100 MG: 100 CAPSULE, LIQUID FILLED ORAL at 20:37

## 2018-04-23 RX ADMIN — HYDROCODONE BITARTRATE AND ACETAMINOPHEN 1 TABLET: 10; 325 TABLET ORAL at 08:01

## 2018-04-23 RX ADMIN — HYDROCODONE BITARTRATE AND ACETAMINOPHEN 1 TABLET: 10; 325 TABLET ORAL at 12:15

## 2018-04-23 RX ADMIN — Medication 1 TABLET: at 08:01

## 2018-04-23 RX ADMIN — INSULIN ASPART 7 UNITS: 100 INJECTION, SOLUTION INTRAVENOUS; SUBCUTANEOUS at 20:38

## 2018-04-23 RX ADMIN — DULOXETINE HYDROCHLORIDE 60 MG: 60 CAPSULE, DELAYED RELEASE ORAL at 08:01

## 2018-04-23 RX ADMIN — HYDROCODONE BITARTRATE AND ACETAMINOPHEN 1 TABLET: 10; 325 TABLET ORAL at 03:41

## 2018-04-23 RX ADMIN — SPIRONOLACTONE 25 MG: 25 TABLET ORAL at 08:01

## 2018-04-23 RX ADMIN — GLIPIZIDE 2.5 MG: 5 TABLET ORAL at 08:01

## 2018-04-23 RX ADMIN — INSULIN ASPART 4 UNITS: 100 INJECTION, SOLUTION INTRAVENOUS; SUBCUTANEOUS at 08:07

## 2018-04-23 RX ADMIN — CARVEDILOL 12.5 MG: 12.5 TABLET, FILM COATED ORAL at 08:02

## 2018-04-23 RX ADMIN — BUMETANIDE 2 MG: 1 TABLET ORAL at 08:01

## 2018-04-23 RX ADMIN — INSULIN ASPART 6 UNITS: 100 INJECTION, SOLUTION INTRAVENOUS; SUBCUTANEOUS at 13:37

## 2018-04-23 RX ADMIN — LEVOTHYROXINE SODIUM 25 MCG: 25 TABLET ORAL at 08:01

## 2018-04-23 RX ADMIN — ATORVASTATIN CALCIUM 10 MG: 10 TABLET, FILM COATED ORAL at 08:01

## 2018-04-23 RX ADMIN — INSULIN ASPART 2 UNITS: 100 INJECTION, SOLUTION INTRAVENOUS; SUBCUTANEOUS at 17:29

## 2018-04-23 RX ADMIN — PANTOPRAZOLE SODIUM 40 MG: 40 TABLET, DELAYED RELEASE ORAL at 08:02

## 2018-04-23 RX ADMIN — ANASTROZOLE 1 MG: 1 TABLET, COATED ORAL at 08:01

## 2018-04-23 RX ADMIN — HYDROCODONE BITARTRATE AND ACETAMINOPHEN 1 TABLET: 10; 325 TABLET ORAL at 20:37

## 2018-04-23 RX ADMIN — LISINOPRIL 5 MG: 5 TABLET ORAL at 08:01

## 2018-04-23 NOTE — OUTREACH NOTE
Skilled Nursing Facility Discharge Flowsheet:     Skilled Nursing Facility Discharge Assessment 4/23/2018   Acute Facility Discharged From Early   Acute Discharge Date 4/20/2018   Name of the Skilled Nursing Facility? [facility]   Tier Level of the Skilled Nursing Facility 1   Purpose of SNF Admission PT;OT;SN   Estimated length of stay for the patient? Unknown   Who is the insurance provider or payor of patient stay? Medicare   Progression of Patient? Talked with Moni/ Staff at [facility] [phone]. States patient is receiving SN, PT, OT and no discharge date at this time.

## 2018-04-24 LAB
GLUCOSE BLDC GLUCOMTR-MCNC: 199 MG/DL (ref 70–130)
GLUCOSE BLDC GLUCOMTR-MCNC: 218 MG/DL (ref 70–130)
GLUCOSE BLDC GLUCOMTR-MCNC: 258 MG/DL (ref 70–130)
GLUCOSE BLDC GLUCOMTR-MCNC: 294 MG/DL (ref 70–130)

## 2018-04-24 PROCEDURE — 63710000001 INSULIN ASPART PER 5 UNITS: Performed by: HOSPITALIST

## 2018-04-24 PROCEDURE — 99024 POSTOP FOLLOW-UP VISIT: CPT | Performed by: ORTHOPAEDIC SURGERY

## 2018-04-24 PROCEDURE — 97116 GAIT TRAINING THERAPY: CPT

## 2018-04-24 PROCEDURE — 94799 UNLISTED PULMONARY SVC/PX: CPT

## 2018-04-24 PROCEDURE — 97535 SELF CARE MNGMENT TRAINING: CPT

## 2018-04-24 PROCEDURE — 97110 THERAPEUTIC EXERCISES: CPT

## 2018-04-24 PROCEDURE — 82962 GLUCOSE BLOOD TEST: CPT

## 2018-04-24 RX ADMIN — PANTOPRAZOLE SODIUM 40 MG: 40 TABLET, DELAYED RELEASE ORAL at 06:09

## 2018-04-24 RX ADMIN — ANASTROZOLE 1 MG: 1 TABLET, COATED ORAL at 09:01

## 2018-04-24 RX ADMIN — HYDROCODONE BITARTRATE AND ACETAMINOPHEN 1 TABLET: 10; 325 TABLET ORAL at 09:01

## 2018-04-24 RX ADMIN — RIVAROXABAN 10 MG: 10 TABLET, FILM COATED ORAL at 09:01

## 2018-04-24 RX ADMIN — DULOXETINE HYDROCHLORIDE 60 MG: 60 CAPSULE, DELAYED RELEASE ORAL at 09:01

## 2018-04-24 RX ADMIN — ASPIRIN 81 MG 81 MG: 81 TABLET ORAL at 09:01

## 2018-04-24 RX ADMIN — DOCUSATE SODIUM 100 MG: 100 CAPSULE, LIQUID FILLED ORAL at 09:01

## 2018-04-24 RX ADMIN — VITAM B12 100 MCG: 100 TAB at 09:01

## 2018-04-24 RX ADMIN — MAGNESIUM OXIDE TAB 400 MG (241.3 MG ELEMENTAL MG) 400 MG: 400 (241.3 MG) TAB at 09:01

## 2018-04-24 RX ADMIN — CARVEDILOL 12.5 MG: 12.5 TABLET, FILM COATED ORAL at 09:01

## 2018-04-24 RX ADMIN — ATORVASTATIN CALCIUM 10 MG: 10 TABLET, FILM COATED ORAL at 09:01

## 2018-04-24 RX ADMIN — SPIRONOLACTONE 25 MG: 25 TABLET ORAL at 09:01

## 2018-04-24 RX ADMIN — LEVOTHYROXINE SODIUM 25 MCG: 25 TABLET ORAL at 09:01

## 2018-04-24 RX ADMIN — HYDROCODONE BITARTRATE AND ACETAMINOPHEN 1 TABLET: 10; 325 TABLET ORAL at 21:18

## 2018-04-24 RX ADMIN — Medication 1 TABLET: at 09:01

## 2018-04-24 RX ADMIN — INSULIN ASPART 4 UNITS: 100 INJECTION, SOLUTION INTRAVENOUS; SUBCUTANEOUS at 17:05

## 2018-04-24 RX ADMIN — INSULIN ASPART 2 UNITS: 100 INJECTION, SOLUTION INTRAVENOUS; SUBCUTANEOUS at 09:02

## 2018-04-24 RX ADMIN — LISINOPRIL 5 MG: 5 TABLET ORAL at 09:01

## 2018-04-24 RX ADMIN — GLIPIZIDE 5 MG: 5 TABLET ORAL at 09:01

## 2018-04-24 RX ADMIN — HYDROCODONE BITARTRATE AND ACETAMINOPHEN 1 TABLET: 10; 325 TABLET ORAL at 00:35

## 2018-04-24 RX ADMIN — CARVEDILOL 12.5 MG: 12.5 TABLET, FILM COATED ORAL at 17:06

## 2018-04-24 RX ADMIN — INSULIN ASPART 6 UNITS: 100 INJECTION, SOLUTION INTRAVENOUS; SUBCUTANEOUS at 21:21

## 2018-04-24 RX ADMIN — BUMETANIDE 2 MG: 1 TABLET ORAL at 09:01

## 2018-04-24 RX ADMIN — HYDROCODONE BITARTRATE AND ACETAMINOPHEN 1 TABLET: 10; 325 TABLET ORAL at 17:05

## 2018-04-24 RX ADMIN — HYDROCODONE BITARTRATE AND ACETAMINOPHEN 1 TABLET: 10; 325 TABLET ORAL at 12:51

## 2018-04-24 RX ADMIN — INSULIN ASPART 6 UNITS: 100 INJECTION, SOLUTION INTRAVENOUS; SUBCUTANEOUS at 12:23

## 2018-04-24 RX ADMIN — POLYETHYLENE GLYCOL (3350) 17 G: 17 POWDER, FOR SOLUTION ORAL at 09:00

## 2018-04-24 RX ADMIN — DOCUSATE SODIUM 100 MG: 100 CAPSULE, LIQUID FILLED ORAL at 21:17

## 2018-04-25 ENCOUNTER — EPISODE CHANGES (OUTPATIENT)
Dept: CASE MANAGEMENT | Facility: OTHER | Age: 73
End: 2018-04-25

## 2018-04-25 LAB
GLUCOSE BLDC GLUCOMTR-MCNC: 197 MG/DL (ref 70–130)
GLUCOSE BLDC GLUCOMTR-MCNC: 226 MG/DL (ref 70–130)
GLUCOSE BLDC GLUCOMTR-MCNC: 231 MG/DL (ref 70–130)
GLUCOSE BLDC GLUCOMTR-MCNC: 242 MG/DL (ref 70–130)

## 2018-04-25 PROCEDURE — 82962 GLUCOSE BLOOD TEST: CPT

## 2018-04-25 PROCEDURE — 97535 SELF CARE MNGMENT TRAINING: CPT

## 2018-04-25 PROCEDURE — 97116 GAIT TRAINING THERAPY: CPT

## 2018-04-25 PROCEDURE — 97110 THERAPEUTIC EXERCISES: CPT

## 2018-04-25 PROCEDURE — 63710000001 INSULIN ASPART PER 5 UNITS: Performed by: HOSPITALIST

## 2018-04-25 RX ADMIN — RIVAROXABAN 10 MG: 10 TABLET, FILM COATED ORAL at 07:58

## 2018-04-25 RX ADMIN — GLIPIZIDE 5 MG: 5 TABLET ORAL at 07:57

## 2018-04-25 RX ADMIN — INSULIN ASPART 4 UNITS: 100 INJECTION, SOLUTION INTRAVENOUS; SUBCUTANEOUS at 18:16

## 2018-04-25 RX ADMIN — HYDROCODONE BITARTRATE AND ACETAMINOPHEN 1 TABLET: 10; 325 TABLET ORAL at 06:07

## 2018-04-25 RX ADMIN — HYDROCODONE BITARTRATE AND ACETAMINOPHEN 1 TABLET: 10; 325 TABLET ORAL at 18:59

## 2018-04-25 RX ADMIN — HYDROCODONE BITARTRATE AND ACETAMINOPHEN 1 TABLET: 10; 325 TABLET ORAL at 01:14

## 2018-04-25 RX ADMIN — ATORVASTATIN CALCIUM 10 MG: 10 TABLET, FILM COATED ORAL at 07:59

## 2018-04-25 RX ADMIN — INSULIN ASPART 4 UNITS: 100 INJECTION, SOLUTION INTRAVENOUS; SUBCUTANEOUS at 11:56

## 2018-04-25 RX ADMIN — INSULIN ASPART 4 UNITS: 100 INJECTION, SOLUTION INTRAVENOUS; SUBCUTANEOUS at 21:55

## 2018-04-25 RX ADMIN — DOCUSATE SODIUM 100 MG: 100 CAPSULE, LIQUID FILLED ORAL at 07:58

## 2018-04-25 RX ADMIN — PANTOPRAZOLE SODIUM 40 MG: 40 TABLET, DELAYED RELEASE ORAL at 06:07

## 2018-04-25 RX ADMIN — HYDROCODONE BITARTRATE AND ACETAMINOPHEN 1 TABLET: 10; 325 TABLET ORAL at 14:50

## 2018-04-25 RX ADMIN — BUMETANIDE 2 MG: 1 TABLET ORAL at 07:59

## 2018-04-25 RX ADMIN — INSULIN ASPART 2 UNITS: 100 INJECTION, SOLUTION INTRAVENOUS; SUBCUTANEOUS at 08:01

## 2018-04-25 RX ADMIN — MAGNESIUM OXIDE TAB 400 MG (241.3 MG ELEMENTAL MG) 400 MG: 400 (241.3 MG) TAB at 08:00

## 2018-04-25 RX ADMIN — Medication 1 TABLET: at 08:05

## 2018-04-25 RX ADMIN — HYDROCODONE BITARTRATE AND ACETAMINOPHEN 1 TABLET: 10; 325 TABLET ORAL at 23:28

## 2018-04-25 RX ADMIN — SPIRONOLACTONE 25 MG: 25 TABLET ORAL at 07:58

## 2018-04-25 RX ADMIN — LEVOTHYROXINE SODIUM 25 MCG: 25 TABLET ORAL at 07:59

## 2018-04-25 RX ADMIN — CARVEDILOL 12.5 MG: 12.5 TABLET, FILM COATED ORAL at 18:16

## 2018-04-25 RX ADMIN — CARVEDILOL 12.5 MG: 12.5 TABLET, FILM COATED ORAL at 07:57

## 2018-04-25 RX ADMIN — DOCUSATE SODIUM 100 MG: 100 CAPSULE, LIQUID FILLED ORAL at 21:55

## 2018-04-25 RX ADMIN — ASPIRIN 81 MG 81 MG: 81 TABLET ORAL at 07:59

## 2018-04-25 RX ADMIN — LISINOPRIL 5 MG: 5 TABLET ORAL at 07:58

## 2018-04-25 RX ADMIN — VITAM B12 100 MCG: 100 TAB at 08:04

## 2018-04-25 RX ADMIN — DULOXETINE HYDROCHLORIDE 60 MG: 60 CAPSULE, DELAYED RELEASE ORAL at 07:59

## 2018-04-25 RX ADMIN — ANASTROZOLE 1 MG: 1 TABLET, COATED ORAL at 08:00

## 2018-04-25 RX ADMIN — HYDROCODONE BITARTRATE AND ACETAMINOPHEN 1 TABLET: 10; 325 TABLET ORAL at 10:34

## 2018-04-26 LAB
GLUCOSE BLDC GLUCOMTR-MCNC: 189 MG/DL (ref 70–130)
GLUCOSE BLDC GLUCOMTR-MCNC: 246 MG/DL (ref 70–130)
GLUCOSE BLDC GLUCOMTR-MCNC: 261 MG/DL (ref 70–130)
GLUCOSE BLDC GLUCOMTR-MCNC: 342 MG/DL (ref 70–130)

## 2018-04-26 PROCEDURE — 97110 THERAPEUTIC EXERCISES: CPT

## 2018-04-26 PROCEDURE — 97535 SELF CARE MNGMENT TRAINING: CPT

## 2018-04-26 PROCEDURE — 63710000001 INSULIN ASPART PER 5 UNITS: Performed by: HOSPITALIST

## 2018-04-26 PROCEDURE — 99308 SBSQ NF CARE LOW MDM 20: CPT | Performed by: INTERNAL MEDICINE

## 2018-04-26 PROCEDURE — 82962 GLUCOSE BLOOD TEST: CPT

## 2018-04-26 PROCEDURE — 97116 GAIT TRAINING THERAPY: CPT

## 2018-04-26 RX ADMIN — CARVEDILOL 12.5 MG: 12.5 TABLET, FILM COATED ORAL at 17:14

## 2018-04-26 RX ADMIN — HYDROCODONE BITARTRATE AND ACETAMINOPHEN 1 TABLET: 10; 325 TABLET ORAL at 12:18

## 2018-04-26 RX ADMIN — CARVEDILOL 12.5 MG: 12.5 TABLET, FILM COATED ORAL at 08:39

## 2018-04-26 RX ADMIN — ANASTROZOLE 1 MG: 1 TABLET, COATED ORAL at 08:39

## 2018-04-26 RX ADMIN — HYDROCODONE BITARTRATE AND ACETAMINOPHEN 1 TABLET: 10; 325 TABLET ORAL at 03:39

## 2018-04-26 RX ADMIN — PANTOPRAZOLE SODIUM 40 MG: 40 TABLET, DELAYED RELEASE ORAL at 06:28

## 2018-04-26 RX ADMIN — INSULIN ASPART 4 UNITS: 100 INJECTION, SOLUTION INTRAVENOUS; SUBCUTANEOUS at 17:12

## 2018-04-26 RX ADMIN — INSULIN ASPART 6 UNITS: 100 INJECTION, SOLUTION INTRAVENOUS; SUBCUTANEOUS at 12:19

## 2018-04-26 RX ADMIN — ATORVASTATIN CALCIUM 10 MG: 10 TABLET, FILM COATED ORAL at 08:37

## 2018-04-26 RX ADMIN — VITAM B12 100 MCG: 100 TAB at 08:39

## 2018-04-26 RX ADMIN — LISINOPRIL 5 MG: 5 TABLET ORAL at 08:39

## 2018-04-26 RX ADMIN — INSULIN ASPART 7 UNITS: 100 INJECTION, SOLUTION INTRAVENOUS; SUBCUTANEOUS at 21:17

## 2018-04-26 RX ADMIN — GLIPIZIDE 5 MG: 5 TABLET ORAL at 08:37

## 2018-04-26 RX ADMIN — LEVOTHYROXINE SODIUM 25 MCG: 25 TABLET ORAL at 08:40

## 2018-04-26 RX ADMIN — HYDROCODONE BITARTRATE AND ACETAMINOPHEN 1 TABLET: 10; 325 TABLET ORAL at 08:08

## 2018-04-26 RX ADMIN — SPIRONOLACTONE 25 MG: 25 TABLET ORAL at 08:39

## 2018-04-26 RX ADMIN — DOCUSATE SODIUM 100 MG: 100 CAPSULE, LIQUID FILLED ORAL at 21:18

## 2018-04-26 RX ADMIN — Medication 1 TABLET: at 08:37

## 2018-04-26 RX ADMIN — BUMETANIDE 2 MG: 1 TABLET ORAL at 08:37

## 2018-04-26 RX ADMIN — ASPIRIN 81 MG 81 MG: 81 TABLET ORAL at 08:39

## 2018-04-26 RX ADMIN — HYDROCODONE BITARTRATE AND ACETAMINOPHEN 1 TABLET: 10; 325 TABLET ORAL at 21:17

## 2018-04-26 RX ADMIN — RIVAROXABAN 10 MG: 10 TABLET, FILM COATED ORAL at 08:40

## 2018-04-26 RX ADMIN — DOCUSATE SODIUM 100 MG: 100 CAPSULE, LIQUID FILLED ORAL at 08:39

## 2018-04-26 RX ADMIN — DULOXETINE HYDROCHLORIDE 60 MG: 60 CAPSULE, DELAYED RELEASE ORAL at 10:19

## 2018-04-26 RX ADMIN — MAGNESIUM OXIDE TAB 400 MG (241.3 MG ELEMENTAL MG) 400 MG: 400 (241.3 MG) TAB at 08:40

## 2018-04-26 RX ADMIN — INSULIN ASPART 2 UNITS: 100 INJECTION, SOLUTION INTRAVENOUS; SUBCUTANEOUS at 08:41

## 2018-04-26 RX ADMIN — HYDROCODONE BITARTRATE AND ACETAMINOPHEN 1 TABLET: 10; 325 TABLET ORAL at 17:14

## 2018-04-27 ENCOUNTER — TELEPHONE (OUTPATIENT)
Dept: ORTHOPEDIC SURGERY | Facility: CLINIC | Age: 73
End: 2018-04-27

## 2018-04-27 VITALS
SYSTOLIC BLOOD PRESSURE: 139 MMHG | TEMPERATURE: 97.6 F | WEIGHT: 231.48 LBS | HEART RATE: 75 BPM | HEIGHT: 65 IN | BODY MASS INDEX: 38.57 KG/M2 | DIASTOLIC BLOOD PRESSURE: 69 MMHG | RESPIRATION RATE: 18 BRPM | OXYGEN SATURATION: 93 %

## 2018-04-27 LAB
GLUCOSE BLDC GLUCOMTR-MCNC: 203 MG/DL (ref 70–130)
GLUCOSE BLDC GLUCOMTR-MCNC: 264 MG/DL (ref 70–130)

## 2018-04-27 PROCEDURE — 82962 GLUCOSE BLOOD TEST: CPT

## 2018-04-27 PROCEDURE — 99024 POSTOP FOLLOW-UP VISIT: CPT | Performed by: ORTHOPAEDIC SURGERY

## 2018-04-27 PROCEDURE — 99316 NF DSCHRG MGMT 30 MIN+: CPT | Performed by: NURSE PRACTITIONER

## 2018-04-27 PROCEDURE — 63710000001 INSULIN ASPART PER 5 UNITS: Performed by: HOSPITALIST

## 2018-04-27 RX ORDER — SULFAMETHOXAZOLE AND TRIMETHOPRIM 800; 160 MG/1; MG/1
1 TABLET ORAL 2 TIMES DAILY
Qty: 30 TABLET | Refills: 2 | Status: SHIPPED | OUTPATIENT
Start: 2018-04-27 | End: 2018-05-15

## 2018-04-27 RX ORDER — POLYETHYLENE GLYCOL 3350 17 G/17G
17 POWDER, FOR SOLUTION ORAL DAILY
Start: 2018-04-28 | End: 2018-05-04

## 2018-04-27 RX ORDER — CHOLECALCIFEROL (VITAMIN D3) 125 MCG
5 CAPSULE ORAL NIGHTLY PRN
Start: 2018-04-27 | End: 2018-05-04

## 2018-04-27 RX ORDER — HYDROCODONE BITARTRATE AND ACETAMINOPHEN 10; 325 MG/1; MG/1
1 TABLET ORAL EVERY 6 HOURS
Qty: 50 TABLET | Refills: 0 | Status: SHIPPED | OUTPATIENT
Start: 2018-04-27 | End: 2018-05-08 | Stop reason: SDUPTHER

## 2018-04-27 RX ORDER — PSEUDOEPHEDRINE HCL 30 MG
100 TABLET ORAL 2 TIMES DAILY
Start: 2018-04-27 | End: 2018-10-05

## 2018-04-27 RX ADMIN — HYDROCODONE BITARTRATE AND ACETAMINOPHEN 1 TABLET: 10; 325 TABLET ORAL at 01:10

## 2018-04-27 RX ADMIN — HYDROCODONE BITARTRATE AND ACETAMINOPHEN 1 TABLET: 10; 325 TABLET ORAL at 14:45

## 2018-04-27 RX ADMIN — CARVEDILOL 12.5 MG: 12.5 TABLET, FILM COATED ORAL at 08:35

## 2018-04-27 RX ADMIN — ASPIRIN 81 MG 81 MG: 81 TABLET ORAL at 08:34

## 2018-04-27 RX ADMIN — PANTOPRAZOLE SODIUM 40 MG: 40 TABLET, DELAYED RELEASE ORAL at 06:19

## 2018-04-27 RX ADMIN — BUMETANIDE 2 MG: 1 TABLET ORAL at 08:34

## 2018-04-27 RX ADMIN — LISINOPRIL 5 MG: 5 TABLET ORAL at 08:35

## 2018-04-27 RX ADMIN — INSULIN ASPART 6 UNITS: 100 INJECTION, SOLUTION INTRAVENOUS; SUBCUTANEOUS at 12:26

## 2018-04-27 RX ADMIN — MAGNESIUM OXIDE TAB 400 MG (241.3 MG ELEMENTAL MG) 400 MG: 400 (241.3 MG) TAB at 08:35

## 2018-04-27 RX ADMIN — GLIPIZIDE 5 MG: 5 TABLET ORAL at 08:34

## 2018-04-27 RX ADMIN — DULOXETINE HYDROCHLORIDE 60 MG: 60 CAPSULE, DELAYED RELEASE ORAL at 08:55

## 2018-04-27 RX ADMIN — SPIRONOLACTONE 25 MG: 25 TABLET ORAL at 08:34

## 2018-04-27 RX ADMIN — ANASTROZOLE 1 MG: 1 TABLET, COATED ORAL at 08:35

## 2018-04-27 RX ADMIN — Medication 1 TABLET: at 08:35

## 2018-04-27 RX ADMIN — VITAM B12 100 MCG: 100 TAB at 08:34

## 2018-04-27 RX ADMIN — RIVAROXABAN 10 MG: 10 TABLET, FILM COATED ORAL at 08:35

## 2018-04-27 RX ADMIN — LEVOTHYROXINE SODIUM 25 MCG: 25 TABLET ORAL at 06:19

## 2018-04-27 RX ADMIN — HYDROCODONE BITARTRATE AND ACETAMINOPHEN 1 TABLET: 10; 325 TABLET ORAL at 06:19

## 2018-04-27 RX ADMIN — HYDROCODONE BITARTRATE AND ACETAMINOPHEN 1 TABLET: 10; 325 TABLET ORAL at 10:39

## 2018-04-27 RX ADMIN — ATORVASTATIN CALCIUM 10 MG: 10 TABLET, FILM COATED ORAL at 08:34

## 2018-04-27 RX ADMIN — DOCUSATE SODIUM 100 MG: 100 CAPSULE, LIQUID FILLED ORAL at 08:35

## 2018-04-27 RX ADMIN — INSULIN ASPART 4 UNITS: 100 INJECTION, SOLUTION INTRAVENOUS; SUBCUTANEOUS at 08:35

## 2018-04-30 ENCOUNTER — EPISODE CHANGES (OUTPATIENT)
Dept: CASE MANAGEMENT | Facility: OTHER | Age: 73
End: 2018-04-30

## 2018-04-30 ENCOUNTER — OFFICE VISIT (OUTPATIENT)
Dept: ORTHOPEDIC SURGERY | Facility: CLINIC | Age: 73
End: 2018-04-30

## 2018-04-30 ENCOUNTER — PATIENT OUTREACH (OUTPATIENT)
Dept: CASE MANAGEMENT | Facility: OTHER | Age: 73
End: 2018-04-30

## 2018-04-30 DIAGNOSIS — Z96.652 STATUS POST TOTAL LEFT KNEE REPLACEMENT: ICD-10-CM

## 2018-04-30 DIAGNOSIS — M17.12 PRIMARY OSTEOARTHRITIS OF LEFT KNEE: Primary | ICD-10-CM

## 2018-04-30 PROCEDURE — 99024 POSTOP FOLLOW-UP VISIT: CPT | Performed by: ORTHOPAEDIC SURGERY

## 2018-04-30 NOTE — PROGRESS NOTES
Subjective: Status post left total knee     Patient ID: Jung Short is a 72 y.o. female.    Chief Complaint:    History of Present Illness 72-year-old female is 2 weeks status post a left total knee.  Recently discharged from the rehabilitation unit.  He is ambulating independently with a walker or is having normal postop surgical pain but her preoperative pain has resolved.       Social History     Occupational History   • [employer]  Retired     Social History Main Topics   • Smoking status: Former Smoker     Packs/day: 3.00     Years: 30.00     Types: Cigarettes     Quit date: 1983   • Smokeless tobacco: Never Used   • Alcohol use No      Comment: h/o quit 1980s   • Drug use: No   • Sexual activity: Defer      Comment: celibate      Review of Systems   Constitutional: Negative for chills, diaphoresis, fever and unexpected weight change.   HENT: Negative for hearing loss, nosebleeds, sore throat and tinnitus.    Eyes: Negative for pain and visual disturbance.   Respiratory: Negative for cough, shortness of breath and wheezing.    Cardiovascular: Negative for chest pain and palpitations.   Gastrointestinal: Negative for abdominal pain, diarrhea, nausea and vomiting.   Endocrine: Negative for cold intolerance, heat intolerance and polydipsia.   Genitourinary: Negative for difficulty urinating, dysuria and hematuria.   Musculoskeletal: Negative for arthralgias, joint swelling and myalgias.   Skin: Negative for rash and wound.   Allergic/Immunologic: Negative for environmental allergies.   Neurological: Negative for dizziness, syncope and numbness.   Hematological: Does not bruise/bleed easily.   Psychiatric/Behavioral: Negative for dysphoric mood and sleep disturbance. The patient is not nervous/anxious.          Past Medical History:   Diagnosis Date   • Anemia    • Benign breast disease    • Cancer 2015    Right breast   • Cellulitis of leg     May-2003 right lower extremity   • CHF (congestive heart  failure)     Dr Bates follows   • Chronic kidney disease     Dr Hannah follows   • Chronic ulcer of right foot     Non-pressure, history of    • Depression    • Diabetic peripheral neuropathy    • Diarrhea    • Diverticulosis    • Encounter for eye exam    • Fracture of left wrist     history of   • Gastroesophageal reflux    • Hiatal hernia    • History of bone density study 09/20/2012   • History of colonoscopy     done 6/03 recheck in 10 years.   Done july 2013 recheck in 5 years   • History of mammography, screening 09/20/2012   • Hospital discharge follow-up    • Hyperlipidemia    • Hypertension    • Hypothyroidism    • Impingement syndrome of right shoulder    • Joint pain    • Menopausal disorder    • Methicillin resistant Staphylococcus aureus infection 2012    after bladder surgery   • MRSA (methicillin resistant staph aureus) culture positive    • Nausea and vomiting    • Nonischemic cardiomyopathy     Ejection fraction 10% per 2-D echo with Doppler however she did have cardiac catheterization April 2015 which showed ejection fraction 20% with global hypokinesis and severe mitral insufficiency   • Osteoarthritis     generalized, sched jania TKA   • Paroxysmal atrial fibrillation     Dr Bates follows   • Postoperative infection     July of 2012 following her hysterectomy far vaginal wall prolapse. She was on antibiotics and wound dressings for 2 months.   • Shoulder pain, bilateral     has tears on both sides   • Syncope, psychogenic 4/19/2017   • Toe ulcer     h/o to both feet, d/t shoes rubbing per patient   • Transient alteration of awareness 4/19/2017     Past Surgical History:   Procedure Laterality Date   • ABDOMINAL SURGERY  2012    had to be reopened after bladder surgery d/t infection   • AMPUTATION FOOT / TOE Right 11/2013    Rt foot amputation MTP first toe   • BLADDER SURGERY  2012   • BREAST BIOPSY     • BREAST SURGERY  06/29/2015    Percutaneous ultrasound-guided placement of metal  localized clip 1st lesion   • CARDIAC CATHETERIZATION  2015   • CATARACT EXTRACTION Bilateral 2017   • DEBRIDEMENT  FOOT Right 01/2013    During hospitalization    • EPIDURAL BLOCK      4 chronic back pain and leg pain last epidurals done 5-2012 she had no benefit at all from this procedure.   • FOOT SURGERY Bilateral     several   • HERNIA REPAIR      At the abdomen February 2007 Dr. Mendez   • INCISIONAL HERNIA REPAIR  05/16/2014    Recurrent. Incarcerated. With mesh implantation   • MASTECTOMY Right 05/2015   • SHOULDER SURGERY Right     x2 RCR   • TOTAL ABDOMINAL HYSTERECTOMY      with oophorectomy-Done June-2012 secondary to vaginal wall prolapse.   • TOTAL KNEE ARTHROPLASTY Right    • TOTAL KNEE ARTHROPLASTY Left 4/17/2018    Procedure: TOTAL KNEE ARTHROPLASTY;  Surgeon: Live Chambers MD;  Location: [facility];  Service: Orthopedics     Family History   Problem Relation Age of Onset   • Colonic polyp Mother    • Hypertension Mother    • Migraines Mother    • Mental illness Mother    • Depression Mother    • Coronary artery disease Father    • Other Father      Cardiac Disorder   • Colon cancer Father    • Kidney disease Father    • Cancer Father    • Breast cancer Maternal Aunt    • Colon cancer Brother    • Alcohol abuse Brother    • Arthritis Sister    • Hypertension Brother    • Lung disease Brother    • Alcohol abuse Brother    • Malig Hyperthermia Neg Hx          Objective:  There were no vitals filed for this visit.  There were no vitals filed for this visit.  There is no height or weight on file to calculate BMI.       Ortho Exam  she is alert and oriented ×3.  Has had some issues with dizziness today when she bends over but otherwise is doing well.  Has an appointment with primary care physician tomorrow.  Wound is completely benign.  Some swelling but is no erythema or drainage at incision site.  Some ecchymosis about the leg or calf is nontender negative Homans.  Quad function is +4 out of  5.    Assessment:       1. Primary osteoarthritis of left knee    2. Status post total left knee replacement          Plan:      continue outpatient PT OT.  Return in 2 weeks with an x-ray of her knee on return.  Pain medication as needed.  Images are also to finish and then proceed with just 1 adult aspirin a day.      Work Status:    LOUANN query complete.    Orders:  No orders of the defined types were placed in this encounter.      Medications:  No orders of the defined types were placed in this encounter.      Followup:  Return in about 2 weeks (around 5/14/2018).          Dragon transcription disclaimer     Much of this encounter note is an electronic transcription/translation of spoken language to printed text. The electronic translation of spoken language may permit erroneous, or at times, nonsensical words or phrases to be inadvertently transcribed. Although I have reviewed the note for such errors, some may still exist.

## 2018-04-30 NOTE — OUTREACH NOTE
Skilled Nursing Facility Discharge Flowsheet:     Skilled Nursing Facility Discharge Assessment 4/30/2018   Acute Facility Discharged From [facility]   Acute Discharge Date 4/20/2018   Name of the Skilled Nursing Facility? [facility]   Tier Level of the Skilled Nursing Facility 1   Purpose of SNF Admission PT;OT;SN   Estimated length of stay for the patient? Patient discharged on 4/27/18 to home.   Who is the insurance provider or payor of patient stay? Medicare   Progression of Patient? Talked with Do/ Staff at [facility] [phone]. States patient  discharged 4/27/18 to home.    Skilled Nursing Discharge Date? 4/27/2018   Where was the patient discharged to? Home

## 2018-05-01 ENCOUNTER — APPOINTMENT (OUTPATIENT)
Dept: PHYSICAL THERAPY | Facility: HOSPITAL | Age: 73
End: 2018-05-01

## 2018-05-03 ENCOUNTER — EPISODE CHANGES (OUTPATIENT)
Dept: CASE MANAGEMENT | Facility: OTHER | Age: 73
End: 2018-05-03

## 2018-05-03 ENCOUNTER — APPOINTMENT (OUTPATIENT)
Dept: PHYSICAL THERAPY | Facility: HOSPITAL | Age: 73
End: 2018-05-03

## 2018-05-04 ENCOUNTER — OFFICE VISIT (OUTPATIENT)
Dept: INTERNAL MEDICINE | Facility: CLINIC | Age: 73
End: 2018-05-04

## 2018-05-04 VITALS
SYSTOLIC BLOOD PRESSURE: 132 MMHG | OXYGEN SATURATION: 98 % | TEMPERATURE: 97.8 F | HEART RATE: 77 BPM | DIASTOLIC BLOOD PRESSURE: 68 MMHG | WEIGHT: 216 LBS | HEIGHT: 65 IN | BODY MASS INDEX: 35.99 KG/M2

## 2018-05-04 DIAGNOSIS — T40.2X5A CONSTIPATION DUE TO OPIOID THERAPY: Primary | ICD-10-CM

## 2018-05-04 DIAGNOSIS — K21.00 GASTROESOPHAGEAL REFLUX DISEASE WITH ESOPHAGITIS: ICD-10-CM

## 2018-05-04 DIAGNOSIS — D64.9 ANEMIA, UNSPECIFIED TYPE: ICD-10-CM

## 2018-05-04 DIAGNOSIS — N18.30 CHRONIC KIDNEY DISEASE, STAGE III (MODERATE) (HCC): ICD-10-CM

## 2018-05-04 DIAGNOSIS — R11.0 NAUSEA: ICD-10-CM

## 2018-05-04 DIAGNOSIS — E03.9 ACQUIRED HYPOTHYROIDISM: ICD-10-CM

## 2018-05-04 DIAGNOSIS — E11.9 TYPE 2 DIABETES MELLITUS WITHOUT COMPLICATION, WITHOUT LONG-TERM CURRENT USE OF INSULIN (HCC): ICD-10-CM

## 2018-05-04 DIAGNOSIS — K59.03 CONSTIPATION DUE TO OPIOID THERAPY: Primary | ICD-10-CM

## 2018-05-04 LAB
ALBUMIN SERPL-MCNC: 4 G/DL (ref 3.5–5.2)
ALBUMIN/GLOB SERPL: 1.2 G/DL
ALP SERPL-CCNC: 122 U/L (ref 40–129)
ALT SERPL-CCNC: 9 U/L (ref 5–33)
AST SERPL-CCNC: 15 U/L (ref 5–32)
BASOPHILS # BLD AUTO: 0.05 10*3/MM3 (ref 0–0.2)
BASOPHILS NFR BLD AUTO: 0.4 % (ref 0–2)
BILIRUB BLD-MCNC: NEGATIVE MG/DL
BILIRUB SERPL-MCNC: 0.4 MG/DL (ref 0.2–1.2)
BUN SERPL-MCNC: 35 MG/DL (ref 8–23)
BUN/CREAT SERPL: 15 (ref 7–25)
CALCIUM SERPL-MCNC: 10 MG/DL (ref 8.8–10.5)
CHLORIDE SERPL-SCNC: 91 MMOL/L (ref 98–107)
CLARITY, POC: CLEAR
CO2 SERPL-SCNC: 23.1 MMOL/L (ref 22–29)
COLOR UR: YELLOW
CREAT SERPL-MCNC: 2.33 MG/DL (ref 0.57–1)
EOSINOPHIL # BLD AUTO: 0.33 10*3/MM3 (ref 0.1–0.3)
EOSINOPHIL NFR BLD AUTO: 2.8 % (ref 0–4)
ERYTHROCYTE [DISTWIDTH] IN BLOOD BY AUTOMATED COUNT: 14.4 % (ref 11.5–14.5)
GFR SERPLBLD CREATININE-BSD FMLA CKD-EPI: 20 ML/MIN/1.73
GFR SERPLBLD CREATININE-BSD FMLA CKD-EPI: 25 ML/MIN/1.73
GLOBULIN SER CALC-MCNC: 3.3 GM/DL
GLUCOSE BLDC GLUCOMTR-MCNC: 224 MG/DL (ref 70–130)
GLUCOSE SERPL-MCNC: 223 MG/DL (ref 65–99)
GLUCOSE UR STRIP-MCNC: NEGATIVE MG/DL
HCT VFR BLD AUTO: 32.5 % (ref 37–47)
HGB BLD-MCNC: 10.2 G/DL (ref 12–16)
IMM GRANULOCYTES # BLD: 0.1 10*3/MM3 (ref 0–0.03)
IMM GRANULOCYTES NFR BLD: 0.8 % (ref 0–0.5)
KETONES UR QL: NEGATIVE
LEUKOCYTE EST, POC: ABNORMAL
LYMPHOCYTES # BLD AUTO: 2.06 10*3/MM3 (ref 0.6–4.8)
LYMPHOCYTES NFR BLD AUTO: 17.3 % (ref 20–45)
MCH RBC QN AUTO: 29 PG (ref 27–31)
MCHC RBC AUTO-ENTMCNC: 31.4 G/DL (ref 31–37)
MCV RBC AUTO: 92.3 FL (ref 81–99)
MONOCYTES # BLD AUTO: 0.98 10*3/MM3 (ref 0–1)
MONOCYTES NFR BLD AUTO: 8.2 % (ref 3–8)
NEUTROPHILS # BLD AUTO: 8.39 10*3/MM3 (ref 1.5–8.3)
NEUTROPHILS NFR BLD AUTO: 70.5 % (ref 45–70)
NITRITE UR-MCNC: NEGATIVE MG/ML
NRBC BLD AUTO-RTO: 0 /100 WBC (ref 0–0)
PH UR: 5.5 [PH] (ref 5–8)
PLATELET # BLD AUTO: 569 10*3/MM3 (ref 140–500)
POTASSIUM SERPL-SCNC: 4.9 MMOL/L (ref 3.5–5.2)
PROT SERPL-MCNC: 7.3 G/DL (ref 6–8.5)
PROT UR STRIP-MCNC: NEGATIVE MG/DL
RBC # BLD AUTO: 3.52 10*6/MM3 (ref 4.2–5.4)
RBC # UR STRIP: NEGATIVE /UL
SODIUM SERPL-SCNC: 127 MMOL/L (ref 136–145)
SP GR UR: 1.02 (ref 1–1.03)
TSH SERPL DL<=0.005 MIU/L-ACNC: 1.3 MIU/ML (ref 0.27–4.2)
UROBILINOGEN UR QL: NORMAL
WBC # BLD AUTO: 11.91 10*3/MM3 (ref 4.8–10.8)

## 2018-05-04 PROCEDURE — 81003 URINALYSIS AUTO W/O SCOPE: CPT | Performed by: INTERNAL MEDICINE

## 2018-05-04 PROCEDURE — 99496 TRANSJ CARE MGMT HIGH F2F 7D: CPT | Performed by: INTERNAL MEDICINE

## 2018-05-04 PROCEDURE — 82962 GLUCOSE BLOOD TEST: CPT | Performed by: INTERNAL MEDICINE

## 2018-05-04 RX ORDER — ONDANSETRON HYDROCHLORIDE 8 MG/1
8 TABLET, FILM COATED ORAL EVERY 8 HOURS PRN
Qty: 30 TABLET | Refills: 2 | Status: SHIPPED | OUTPATIENT
Start: 2018-05-04 | End: 2018-10-05

## 2018-05-04 NOTE — PROGRESS NOTES
Transitional Care Follow Up Visit  Subjective     Jung Short is a 72 y.o. female who presents for a transitional care management visit.    Within 48 business hours after discharge our office contacted her via telephone to coordinate her care and needs.      I reviewed and discussed the details of that call along with the discharge summary, hospital problems, inpatient lab results, inpatient diagnostic studies, and consultation reports with Jung.   She was hospitalized 4/20-4/27 after TKA for OA   SHe has PMHx CHF, HTN, HL, DM,     She has struggled with her bowels since the surgery. She is on chronic narcotics (even prior to surgery),but that is all getting better.She has [facility] and HH, on nexium 20 daily, not increased in hosptial.  Has very persistent nausea.  She is now getting gavin eimprovement in her leg ecchymosis, on xarelto. She is not hungry due to the nausea.  Her nausea was present in the hospital.     She has struggled until today with her pain and constipation. Taking stool softener with great result today. Not holding down food to take pills.     She has no abdominal pain right now.    She has not started PT    Dr. Pagan managing R breast cancer, has resumed arimidex.            Current outpatient and discharge medications have been reconciled for the patient.    No flowsheet data found.  Risk for Readmission (LACE) Score: 14 (4/27/2018  6:00 AM)    History of Present Illness   Course During Hospital Stay:  See above.  She had relatively uncomplicated course.       The following portions of the patient's history were reviewed and updated as appropriate: allergies, current medications, past family history, past medical history, past social history, past surgical history and problem list.    Review of Systems   Constitutional: Negative.    HENT: Negative.    Eyes: Negative.    Respiratory: Negative.    Cardiovascular: Negative.  Negative for chest pain, palpitations and leg swelling.   Endocrine:         Checking sugar at home sporadically due to poor energy, last 5 days ago.    Genitourinary: Negative.  Negative for difficulty urinating, dysuria and hematuria.   Musculoskeletal:        Leg pain in L calf, bruising.   Skin: Positive for color change and rash.        Bruising and thickening L lower leg   Neurological: Negative.    Hematological: Negative.    Psychiatric/Behavioral: Negative.    All other systems reviewed and are negative.      Objective   Physical Exam   Constitutional: She appears well-developed and well-nourished.   HENT:   Head: Normocephalic and atraumatic.   Eyes: EOM are normal. Pupils are equal, round, and reactive to light. Right eye exhibits no discharge. Left eye exhibits no discharge.   Neck: Normal range of motion. No thyromegaly present.   Cardiovascular: Normal rate and regular rhythm.    Pulmonary/Chest: Effort normal. No respiratory distress.   Abdominal: Soft.   Musculoskeletal: She exhibits no edema.   Neurological: She is alert.   Skin: Skin is warm. Capillary refill takes less than 2 seconds. No erythema.   Bruising anterior   Psychiatric: She has a normal mood and affect. Her behavior is normal.   Nursing note and vitals reviewed.      Assessment/Plan   Jung was seen today for follow-up and knee pain.    Diagnoses and all orders for this visit:    Constipation due to opioid therapy  -     POCT Glucose  -     POC Urinalysis Dipstick, Automated    Type 2 diabetes mellitus without complication, without long-term current use of insulin  -     POCT Glucose  -     POC Urinalysis Dipstick, Automated    Chronic kidney disease, stage III (moderate)  -     Comprehensive metabolic panel  -     POCT Glucose  -     POC Urinalysis Dipstick, Automated    Acquired hypothyroidism  -     TSH  -     POCT Glucose  -     POC Urinalysis Dipstick, Automated    Anemia, unspecified type  -     CBC w AUTO Differential  -     POCT Glucose  -     POC Urinalysis Dipstick, Automated    Nausea  -      POCT Glucose  -     POC Urinalysis Dipstick, Automated    Gastroesophageal reflux disease with esophagitis  -     POCT Glucose  -     POC Urinalysis Dipstick, Automated    Other orders  -     ondansetron (ZOFRAN) 8 MG tablet; Take 1 tablet by mouth Every 8 (Eight) Hours As Needed for Nausea or Vomiting.        Increase nexium to 40 mg po daily  Stop vitamin D, b12 and coq10 to get rid of pill burden    If bowel stop moving, consider linzess    Start zofran 8 mg po q8 hr prn nausea    Goal stop xarelto on May 10, has some tightness in her L calf, but her indigestion is a side effect of xarelto, may need to change her to eliquis.   If her nausea is not better with zofran and increased nexium will change to eliquis.     Check sugar at least daily and write down.  She hasnt checked it in last 5 days.  She is too low if under 90.  Has lost significant weight. Continue amaryl for now, accucheck here >200.      MRSA carrier, finish bactrim.     UA with trace LE, likely contaminant and on bactrim.

## 2018-05-07 ENCOUNTER — TELEPHONE (OUTPATIENT)
Dept: ORTHOPEDIC SURGERY | Facility: CLINIC | Age: 73
End: 2018-05-07

## 2018-05-07 ENCOUNTER — EPISODE CHANGES (OUTPATIENT)
Dept: CASE MANAGEMENT | Facility: OTHER | Age: 73
End: 2018-05-07

## 2018-05-07 NOTE — TELEPHONE ENCOUNTER
Patient calling for an RX refill of pain medicine Norco 10/325- she only needs it until the 17th when she goes back to pain management.    She is status post TKA 04.17.2018.    Thanks.

## 2018-05-08 ENCOUNTER — HOSPITAL ENCOUNTER (OUTPATIENT)
Dept: PHYSICAL THERAPY | Facility: HOSPITAL | Age: 73
Setting detail: THERAPIES SERIES
Discharge: HOME | End: 2018-05-08

## 2018-05-08 DIAGNOSIS — Z96.652 STATUS POST TOTAL KNEE REPLACEMENT, LEFT: Primary | ICD-10-CM

## 2018-05-08 DIAGNOSIS — T84.82XA ARTHROFIBROSIS OF TOTAL KNEE REPLACEMENT, INITIAL ENCOUNTER (HCC): Primary | ICD-10-CM

## 2018-05-08 DIAGNOSIS — E87.1 HYPONATREMIA: Primary | ICD-10-CM

## 2018-05-08 PROCEDURE — G8979 MOBILITY GOAL STATUS: HCPCS | Performed by: PHYSICAL THERAPIST

## 2018-05-08 PROCEDURE — G8978 MOBILITY CURRENT STATUS: HCPCS | Performed by: PHYSICAL THERAPIST

## 2018-05-08 PROCEDURE — 97110 THERAPEUTIC EXERCISES: CPT | Performed by: PHYSICAL THERAPIST

## 2018-05-08 PROCEDURE — 97161 PT EVAL LOW COMPLEX 20 MIN: CPT | Performed by: PHYSICAL THERAPIST

## 2018-05-08 RX ORDER — HYDROCODONE BITARTRATE AND ACETAMINOPHEN 10; 325 MG/1; MG/1
1 TABLET ORAL EVERY 6 HOURS
Qty: 50 TABLET | Refills: 0 | Status: SHIPPED | OUTPATIENT
Start: 2018-05-08 | End: 2018-05-17 | Stop reason: SDUPTHER

## 2018-05-08 NOTE — THERAPY EVALUATION
Outpatient Physical Therapy Ortho Initial Evaluation  NOHEMY Diaz     Patient Name: Jung Short  : 1945  MRN: [id number]  Today's Date: 2018      Visit Date: 2018    Patient Active Problem List   Diagnosis   • Abdominal bloating   • Abdominal mass   • Abdominal pain   • Abnormal gait   • Abnormal mammogram   • Anemia   • Atherosclerosis of coronary artery   • Thoracic back pain   • Malignant neoplasm of upper-inner quadrant of right breast in female, estrogen receptor positive   • Candidiasis   • Cardiomyopathy   • Disc disorder of cervical region   • Neck pain   • Tenderness of chest wall   • Chronic kidney disease, stage III (moderate)   • Chronic pain   • Constipation   • Generalized osteoarthritis   • Depression   • Type 2 diabetes mellitus   • Controlled type 2 diabetes mellitus without complication   • Dyslipidemia   • Gastroesophageal reflux disease with esophagitis   • Generalized pain   • Gout   • Hypertension   • Hypothyroidism   • Incisional hernia   • Mass of breast   • Mitral valve insufficiency   • Nausea   • Adult body mass index greater than 30   • Osteopenia of spine   • Arthralgia of multiple joints   • Peripheral neuropathy   • Pulmonary hypertension   • Osteomyelitis   • Tricuspid valve insufficiency   • Cobalamin deficiency   • Vitamin D deficiency   • Diabetes mellitus   • Left knee pain   • Arthritis of knee   • DDD (degenerative disc disease), lumbar   • Lumbar facet arthropathy   • Pain in shoulder   • Chronic gout of multiple sites   • Constipation due to opioid therapy   • Syncope, psychogenic   • Transient alteration of awareness   • Closed fracture of patella   • Primary osteoarthritis of left knee   • Radius/ulna fracture, left, closed, initial encounter   • Rib pain on left side   • Preoperative cardiovascular examination   • Physical deconditioning        Past Medical History:   Diagnosis Date   • Anemia    • Benign breast disease    • Cancer 2015    Right  breast   • Cellulitis of leg     May-2003 right lower extremity   • CHF (congestive heart failure)     Dr Bates follows   • Chronic kidney disease     Dr Hannah follows   • Chronic ulcer of right foot     Non-pressure, history of    • Depression    • Diabetic peripheral neuropathy    • Diarrhea    • Diverticulosis    • Encounter for eye exam    • Fracture of left wrist     history of   • Gastroesophageal reflux    • Hiatal hernia    • History of bone density study 09/20/2012   • History of colonoscopy     done 6/03 recheck in 10 years.   Done july 2013 recheck in 5 years   • History of mammography, screening 09/20/2012   • Hospital discharge follow-up    • Hyperlipidemia    • Hypertension    • Hypothyroidism    • Impingement syndrome of right shoulder    • Joint pain    • Menopausal disorder    • Methicillin resistant Staphylococcus aureus infection 2012    after bladder surgery   • MRSA (methicillin resistant staph aureus) culture positive    • Nausea and vomiting    • Nonischemic cardiomyopathy     Ejection fraction 10% per 2-D echo with Doppler however she did have cardiac catheterization April 2015 which showed ejection fraction 20% with global hypokinesis and severe mitral insufficiency   • Osteoarthritis     generalized, sched jania TKA   • Paroxysmal atrial fibrillation     Dr Bates follows   • Postoperative infection     July of 2012 following her hysterectomy far vaginal wall prolapse. She was on antibiotics and wound dressings for 2 months.   • Shoulder pain, bilateral     has tears on both sides   • Syncope, psychogenic 4/19/2017   • Toe ulcer     h/o to both feet, d/t shoes rubbing per patient   • Transient alteration of awareness 4/19/2017        Past Surgical History:   Procedure Laterality Date   • ABDOMINAL SURGERY  2012    had to be reopened after bladder surgery d/t infection   • AMPUTATION FOOT / TOE Right 11/2013    Rt foot amputation MTP first toe   • BLADDER SURGERY  2012   • BREAST  BIOPSY     • BREAST SURGERY  06/29/2015    Percutaneous ultrasound-guided placement of metal localized clip 1st lesion   • CARDIAC CATHETERIZATION  2015   • CATARACT EXTRACTION Bilateral 2017   • DEBRIDEMENT  FOOT Right 01/2013    During hospitalization    • EPIDURAL BLOCK      4 chronic back pain and leg pain last epidurals done 5-2012 she had no benefit at all from this procedure.   • FOOT SURGERY Bilateral     several   • HERNIA REPAIR      At the abdomen February 2007 Dr. Mendez   • INCISIONAL HERNIA REPAIR  05/16/2014    Recurrent. Incarcerated. With mesh implantation   • MASTECTOMY Right 05/2015   • SHOULDER SURGERY Right     x2 RCR   • TOTAL ABDOMINAL HYSTERECTOMY      with oophorectomy-Done June-2012 secondary to vaginal wall prolapse.   • TOTAL KNEE ARTHROPLASTY Right    • TOTAL KNEE ARTHROPLASTY Left 4/17/2018    Procedure: TOTAL KNEE ARTHROPLASTY;  Surgeon: Live Chambers MD;  Location: [facility];  Service: Orthopedics       Visit Dx:     ICD-10-CM ICD-9-CM   1. Status post total knee replacement, left Z96.652 V43.65             Patient History     Row Name 05/08/18 0930             History    Chief Complaint Difficulty Walking;Difficulty with daily activities;Pain   Patient complains of nausea since surgery  -SP      Type of Pain Knee pain  -SP      Date Current Problem(s) Began 04/17/18  -SP      Brief Description of Current Complaint Patient with chronic history of left knee pain, remote total right knee replacement.  Patient is s/p left TKR 4/17/2018 without complication.  Patient stayed in skilled rehab for 11 days following surgery and has since returned home.  She has not had home health and has had to cancel initial evaluation for outpatient PT two times due to illness.  Patient reports that she has had stomach pain and nausea since surgery and has talked with her family physician Dr. Avalos and Dr. Chambers regarding these symptoms.  She reports she was prescribed medication for the nausea and has been  "taking it for about a week with no relief.  Patient states she is taking her pain meds regularly every 6 hours.  She reports that she has not been using her cane in her home and does feel like her balance is not good.  She is using the can only for community ambulation but states she has only been out the house for doctor appointments.   Patient is concerned about discoloration and anterior lower leg and her skin in the area.  She reports that her leg \"was all black\"  but this bruising has subsided, although lower leg discoloration has remained.  She has not been elevating her leg or using ice at home but reports her swelling has significantly decreased  -SP      Previous treatment for THIS PROBLEM Medication;Surgery  -SP      Patient/Caregiver Goals Relieve pain  -SP      Patient's Rating of General Health Fair  -SP      Occupation/sports/leisure activities Patient volunteers at hospital  -SP      What clinical tests have you had for this problem? X-ray  -SP         Pain     Pain Location Knee  -SP      Pain at Present 4  -SP      Pain at Best 4  -SP      Pain at Worst 4  -SP      Pain Frequency Constant/continuous  -SP      Pain Description Aching  -SP      What Performance Factors Make the Current Problem(s) WORSE? pain with weight bearing activity, and end range flexion  -SP      What Performance Factors Make the Current Problem(s) BETTER? rest, pain meds  -SP      Tolerance Time- Standing tolerates short periods of stand for light adls and self care activity  -SP      Tolerance Time- Sitting difficulty tolerating sustained positions  -SP      Tolerance Time- Walking household distances and limited community distances  -SP      Is your sleep disturbed? Yes  -SP      Difficulties with ADL's? Patient with limited tolerance for weight bearing activity.  Patient feeling ill with constant nausea and reports of weakness limiting her tolerance for activity  -SP         Fall Risk Assessment    Any falls in the past " year: Yes  -SP      Number of falls reported in the last 12 months 1  -SP      Factors that contributed to the fall: Tripped  -SP         Daily Activities    Primary Language English  -SP      Are you able to read Yes  -SP      Are you able to write Yes  -SP      How does patient learn best? Reading;Listening  -SP      Teaching needs identified Home Exercise Program;Management of Condition  -SP      Patient is concerned about/has problems with --   constant nausea  -SP      Does patient have problems with the following? None  -SP      Barriers to learning None  -SP      Pt Participated in POC and Goals Yes  -SP         Safety    Are you being hurt, hit, or frightened by anyone at home or in your life? No  -SP      Are you being neglected by a caregiver No  -SP        User Key  (r) = Recorded By, (t) = Taken By, (c) = Cosigned By    Initials Name Provider Type    SP Kylee Melton, PT Physical Therapist                PT Ortho     Row Name 05/08/18 0930       Posture/Observations    Observations Edema;Ecchymosis/bruising;Muscle atrophy;Incision healing  -SP    Posture/Observations Comments Patient with brown discoloration and thickened skin on anterior aspect of lower leg.   Clear bandage partially intact over incision, patient has cut the ends off.  Incision appears to have good closure without difficulty  -SP       Left Lower Ext    Lt Knee Extension/Flexion AROM 0 to 107  -SP    Lt Knee Extension/Flexion PROM 0 to 117  -SP       Left Hip (Manual Muscle Testing)    MMT: Flexion, Left Hip flexion  -SP    MMT, Gross Movement: Left Hip Flexion (4-/5) good minus  -SP    MMT: ABduction, Left Hip abduction  -SP    MMT, Gross Movement: Left Hip ABduction (4-/5) good minus  -SP    MMT, Gross Movement: Left Hip ADduction (3+/5) fair plus  -SP       Left Knee (Manual Muscle Testing)    MMT: Extension, Left Knee extension  -SP    MMT, Gross Movement: Left Knee Extension (3/5) fair  -SP    MMT: Flexion, Left Knee  flexion  -SP    MMT, Gross Movement: Left Knee Flexion (3/5) fair  -SP       Left Ankle/Foot (Manual Muscle Testing)    MMT: Plantarflexion, Left Ankle plantarflexion  -SP    MMT, Gross Movement: Left Ankle Plantarflexion (3+/5) fair plus  -SP    MMT: Dorsiflexion Left Ankle Muscles dorsiflexion  -SP    MMT, Gross Movement: Left Ankle Dorsiflexion (3+/5) fair plus  -SP       Sensation    Additional Comments decreased to light touch lateral aspect of left knee  -SP       Lower Extremity Flexibility    Hamstrings Left:;Moderately limited  -SP    Hip Flexors Left:;Moderately limited  -SP       LLE Quick Girth (cm)    Smallest ankle 19.5 cm  -SP    Largest calf 38.5 cm  -SP    Mid patella 52.9 cm  -SP    Other 1 56.3 cm   5 cm above mid patella  -SP    Other 2 43.8 cm   5 cm below mid patella  -SP       Transfers    Sit-Stand Dillon (Transfers) independent  -SP    Stand-Sit Dillon (Transfers) independent  -SP    Comment (Transfers) uses UEs to assist with transfers to stand: occasionally requires >1 attempt  -SP       Gait/Stairs Assessment/Training    Dillon Level (Gait) independent  -SP    Assistive Device (Gait) cane, straight  -SP    Pattern (Gait) swing-through  -SP    Deviations/Abnormal Patterns (Gait) gait speed decreased;base of support, wide;lisa decreased;antalgic  -SP    Bilateral Gait Deviations heel strike decreased;forward flexed posture  -SP    Dillon Level (Stairs) independent  -SP    Assistive Device (Stairs) cane, straight  -SP    Ascending Technique (Stairs) step-to-step  -SP    Descending Technique (Stairs) step-to-step  -SP    Dillon Level (Ramp) independent  -SP    Comment (Gait/Stairs) requires cane and hold to rail  -SP      User Key  (r) = Recorded By, (t) = Taken By, (c) = Cosigned By    Initials Name Provider Type    SP Kylee Melton PT Physical Therapist                      Therapy Education  Education Details: Patient advised to consult physician  regarding skin changes and discoloration  Given: Symptoms/condition management  Program: New  How Provided: Verbal  Provided to: Patient  Level of Understanding: Verbalized           PT OP Goals     Row Name 05/08/18 0930          PT Short Term Goals    STG 1 Patient demonstrates left knee AROM 0 to 120 degrees to aid in functional mobility  -SP     STG 2 Patient transfers sit/stand without difficulty with decreased use of UEs  -SP     STG 3 Patient tolerates participation in ther-ex for >45 min without difficulty  -SP        Long Term Goals    LTG 1 Patient ambulates stairs with normal reciprocal pattern with rail as needed  -SP     LTG 2 Patient demonstrates increased strength by one muscle grade to better tolerate ADLs  -SP     LTG 3 Patient independent with HEP  -SP        Time Calculation    PT Goal Re-Cert Due Date 06/07/18  -SP       User Key  (r) = Recorded By, (t) = Taken By, (c) = Cosigned By    Initials Name Provider Type    SP Kylee Melton, PT Physical Therapist                PT Assessment/Plan     Row Name 05/08/18 4649          PT Assessment    Functional Limitations Impaired gait;Limitation in home management;Limitations in community activities;Limitations in functional capacity and performance;Performance in self-care ADL;Performance in work activities  -SP     Impairments Balance;Edema;Gait;Pain;Muscle strength;Range of motion  -SP     Assessment Comments Patient is s/p left TKR 4-.  Patient is currently having consistent nausea and difficulty tolerating therapy.  She presents with decreased left knee ROM, decreased strength, altered gait and impaired functional ability to complete home and community ADLs  -SP     Please refer to paper survey for additional self-reported information Yes  -SP     Rehab Potential Good  -SP     Patient/caregiver participated in establishment of treatment plan and goals Yes  -SP     Patient would benefit from skilled therapy intervention Yes  -SP      "   PT Plan    PT Frequency 2x/week;3x/week  -SP     Predicted Duration of Therapy Intervention (OT Eval) 6-8 weeks  -SP     Planned CPT's? PT EVAL LOW COMPLEXITY: 13798;PT MANUAL THERAPY EA 15 MIN: 14670;PT THER PROC EA 15 MIN: 28777;PT GAIT TRAINING EA 15 MIN: 16388;PT HOT OR COLD PACK TREAT MCARE;PT ELECTRICAL STIM UNATTEND:   -SP       User Key  (r) = Recorded By, (t) = Taken By, (c) = Cosigned By    Initials Name Provider Type    SABAS Melton, CORRIE Physical Therapist                  Exercises     Row Name 05/08/18 0930             Exercise 1    Time 1 6 min  -SP      Time (Seconds) 1 heel slides with sheet  -SP         Exercise 2    Time 2 6 min  -SP      Time (Seconds) 2 cycle-recumbent (seat 7)  -SP         Exercise 3    Reps 3 10 x each  -SP      Time (Seconds) 3 step ups 4\"  -SP         Exercise 4    Reps 4 20  -SP      Time (Seconds) 4 SAQ  -SP         Exercise 5    Exercise Name 5 SLR  -SP      Reps 5 15  -SP        User Key  (r) = Recorded By, (t) = Taken By, (c) = Cosigned By    Initials Name Provider Type    SP Kylee Melton, CORRIE Physical Therapist                        Outcome Measure Options: Lower Extremity Functional Scale (LEFS)  Lower Extremity Functional Index  Any of your usual work, housework or school activities: A little bit of difficulty  Your usual hobbies, recreational or sporting activities: Moderate difficulty  Getting into or out of the bath: Quite a bit of difficulty  Walking between rooms: No difficulty  Putting on your shoes or socks: No difficulty  Squatting: Extreme difficulty or unable to perform activity  Lifting an object, like a bag of groceries from the floor: No difficulty  Performing light activities around your home: No difficulty  Performing heavy activities around your home: No difficulty  Getting into or out of a car: No difficulty  Walking 2 blocks: No difficulty  Walking a mile: A little bit of difficulty  Going up or down 10 stairs (about 1 " flight of stairs): No difficulty  Standing for 1 hour: Quite a bit of difficulty  Sitting for 1 hour: No difficulty  Running on even ground: Quite a bit of difficulty  Running on uneven ground: Quite a bit of difficulty  Making sharp turns while running fast: Quite a bit of difficulty  Hopping: Quite a bit of difficulty  Rolling over in bed: No difficulty  Total: 54      Time Calculation:   Start Time: 0930  Stop Time: 1043  Time Calculation (min): 73 min     Therapy Charges for Today     Code Description Service Date Service Provider Modifiers Qty    70552721554 HC PT MOBILITY CURRENT 5/8/2018 Kylee Melton, PT GP, CL 1    50790786368 HC PT MOBILITY PROJECTED 5/8/2018 Kylee Melton, PT GP, CI 1    22921447701 HC PT EVAL LOW COMPLEXITY 2 5/8/2018 Kylee Melton, PT GP 1    37100673364 HC PT THER PROC EA 15 MIN 5/8/2018 Kylee Melton, PT GP 1          PT G-Codes  PT Professional Judgement Used?: Yes  Outcome Measure Options: Lower Extremity Functional Scale (LEFS)  Mobility: Walking and Moving Around Current Status (): At least 60 percent but less than 80 percent impaired, limited or restricted  Mobility: Walking and Moving Around Goal Status (): At least 1 percent but less than 20 percent impaired, limited or restricted         Kylee Melton, PT  5/8/2018

## 2018-05-09 ENCOUNTER — TELEPHONE (OUTPATIENT)
Dept: INTERNAL MEDICINE | Facility: CLINIC | Age: 73
End: 2018-05-09

## 2018-05-09 ENCOUNTER — EPISODE CHANGES (OUTPATIENT)
Dept: CASE MANAGEMENT | Facility: OTHER | Age: 73
End: 2018-05-09

## 2018-05-09 NOTE — TELEPHONE ENCOUNTER
Patient advised. Made her appt for tomorrow.  Stopped xarelto yesterday and advised to stop lisinopril today.    ----- Message from Bubba Avalos MD sent at 5/8/2018  7:46 AM EDT -----  Sodium is way too low and that is new for her.   Has she stopped feeling nauseated? She needs appt tomorrow!  Stop lisinopril for now.   Stop xarelto.  Recheck labs tomorrow, nonfasting in morning please or can we have home health go out?

## 2018-05-10 ENCOUNTER — HOSPITAL ENCOUNTER (OUTPATIENT)
Dept: ULTRASOUND IMAGING | Facility: HOSPITAL | Age: 73
Discharge: HOME | End: 2018-05-10
Attending: INTERNAL MEDICINE

## 2018-05-10 ENCOUNTER — OFFICE VISIT (OUTPATIENT)
Dept: INTERNAL MEDICINE | Facility: CLINIC | Age: 73
End: 2018-05-10

## 2018-05-10 ENCOUNTER — APPOINTMENT (OUTPATIENT)
Dept: PHYSICAL THERAPY | Facility: HOSPITAL | Age: 73
End: 2018-05-10

## 2018-05-10 ENCOUNTER — HOSPITAL ENCOUNTER (OUTPATIENT)
Dept: GENERAL RADIOLOGY | Facility: HOSPITAL | Age: 73
Discharge: HOME | End: 2018-05-10
Attending: INTERNAL MEDICINE | Admitting: INTERNAL MEDICINE

## 2018-05-10 VITALS
RESPIRATION RATE: 22 BRPM | BODY MASS INDEX: 35.25 KG/M2 | HEART RATE: 80 BPM | HEIGHT: 65 IN | OXYGEN SATURATION: 98 % | WEIGHT: 211.6 LBS | SYSTOLIC BLOOD PRESSURE: 116 MMHG | DIASTOLIC BLOOD PRESSURE: 70 MMHG | TEMPERATURE: 98.3 F

## 2018-05-10 DIAGNOSIS — E11.9 TYPE 2 DIABETES MELLITUS WITHOUT COMPLICATION, WITHOUT LONG-TERM CURRENT USE OF INSULIN (HCC): ICD-10-CM

## 2018-05-10 DIAGNOSIS — R10.30 LOWER ABDOMINAL PAIN: ICD-10-CM

## 2018-05-10 DIAGNOSIS — E87.1 HYPONATREMIA: ICD-10-CM

## 2018-05-10 DIAGNOSIS — N18.30 CHRONIC KIDNEY DISEASE, STAGE III (MODERATE) (HCC): Primary | ICD-10-CM

## 2018-05-10 DIAGNOSIS — E55.9 VITAMIN D DEFICIENCY: ICD-10-CM

## 2018-05-10 DIAGNOSIS — R11.0 NAUSEA: ICD-10-CM

## 2018-05-10 LAB
ALBUMIN SERPL-MCNC: 4 G/DL (ref 3.5–5.2)
ALBUMIN/GLOB SERPL: 1.5 G/DL
ALP SERPL-CCNC: 111 U/L (ref 40–129)
ALT SERPL-CCNC: 8 U/L (ref 5–33)
AMYLASE SERPL-CCNC: 38 U/L (ref 28–100)
AST SERPL-CCNC: 15 U/L (ref 5–32)
BASOPHILS # BLD AUTO: 0.02 10*3/MM3 (ref 0–0.2)
BASOPHILS NFR BLD AUTO: 0.3 % (ref 0–2)
BILIRUB BLD-MCNC: NEGATIVE MG/DL
BILIRUB SERPL-MCNC: 0.2 MG/DL (ref 0.2–1.2)
BUN SERPL-MCNC: 28 MG/DL (ref 8–23)
BUN/CREAT SERPL: 13.6 (ref 7–25)
CALCIUM SERPL-MCNC: 10.6 MG/DL (ref 8.8–10.5)
CHLORIDE SERPL-SCNC: 99 MMOL/L (ref 98–107)
CLARITY, POC: CLEAR
CO2 SERPL-SCNC: 21.6 MMOL/L (ref 22–29)
COLOR UR: YELLOW
CREAT SERPL-MCNC: 2.06 MG/DL (ref 0.57–1)
EOSINOPHIL # BLD AUTO: 0.14 10*3/MM3 (ref 0.1–0.3)
EOSINOPHIL NFR BLD AUTO: 1.8 % (ref 0–4)
ERYTHROCYTE [DISTWIDTH] IN BLOOD BY AUTOMATED COUNT: 14.3 % (ref 11.5–14.5)
GFR SERPLBLD CREATININE-BSD FMLA CKD-EPI: 24 ML/MIN/1.73
GFR SERPLBLD CREATININE-BSD FMLA CKD-EPI: 29 ML/MIN/1.73
GLOBULIN SER CALC-MCNC: 2.7 GM/DL
GLUCOSE SERPL-MCNC: 206 MG/DL (ref 65–99)
GLUCOSE UR STRIP-MCNC: ABNORMAL MG/DL
HCT VFR BLD AUTO: 32.9 % (ref 37–47)
HGB BLD-MCNC: 10.5 G/DL (ref 12–16)
IMM GRANULOCYTES # BLD: 0.07 10*3/MM3 (ref 0–0.03)
IMM GRANULOCYTES NFR BLD: 0.9 % (ref 0–0.5)
KETONES UR QL: NEGATIVE
LEUKOCYTE EST, POC: NEGATIVE
LIPASE SERPL-CCNC: 42 U/L (ref 13–60)
LYMPHOCYTES # BLD AUTO: 1.78 10*3/MM3 (ref 0.6–4.8)
LYMPHOCYTES NFR BLD AUTO: 22.7 % (ref 20–45)
MAGNESIUM SERPL-MCNC: 1.8 MG/DL (ref 1.7–2.5)
MCH RBC QN AUTO: 29.7 PG (ref 27–31)
MCHC RBC AUTO-ENTMCNC: 31.9 G/DL (ref 31–37)
MCV RBC AUTO: 92.9 FL (ref 81–99)
MONOCYTES # BLD AUTO: 0.55 10*3/MM3 (ref 0–1)
MONOCYTES NFR BLD AUTO: 7 % (ref 3–8)
NEUTROPHILS # BLD AUTO: 5.27 10*3/MM3 (ref 1.5–8.3)
NEUTROPHILS NFR BLD AUTO: 67.3 % (ref 45–70)
NITRITE UR-MCNC: NEGATIVE MG/ML
NRBC BLD AUTO-RTO: 0 /100 WBC (ref 0–0)
PH UR: 5 [PH] (ref 5–8)
PHOSPHATE SERPL-MCNC: 3.8 MG/DL (ref 2.7–4.5)
PLATELET # BLD AUTO: 398 10*3/MM3 (ref 140–500)
POTASSIUM SERPL-SCNC: 5.2 MMOL/L (ref 3.5–5.2)
PROT SERPL-MCNC: 6.7 G/DL (ref 6–8.5)
PROT UR STRIP-MCNC: NEGATIVE MG/DL
RBC # BLD AUTO: 3.54 10*6/MM3 (ref 4.2–5.4)
RBC # UR STRIP: NEGATIVE /UL
SODIUM SERPL-SCNC: 133 MMOL/L (ref 136–145)
SP GR UR: 1.02 (ref 1–1.03)
UROBILINOGEN UR QL: NORMAL
WBC # BLD AUTO: 7.83 10*3/MM3 (ref 4.8–10.8)

## 2018-05-10 PROCEDURE — 81003 URINALYSIS AUTO W/O SCOPE: CPT | Performed by: INTERNAL MEDICINE

## 2018-05-10 PROCEDURE — 99214 OFFICE O/P EST MOD 30 MIN: CPT | Performed by: INTERNAL MEDICINE

## 2018-05-10 PROCEDURE — 71046 X-RAY EXAM CHEST 2 VIEWS: CPT

## 2018-05-10 PROCEDURE — 76705 ECHO EXAM OF ABDOMEN: CPT

## 2018-05-11 ENCOUNTER — TELEPHONE (OUTPATIENT)
Dept: INTERNAL MEDICINE | Facility: CLINIC | Age: 73
End: 2018-05-11

## 2018-05-11 DIAGNOSIS — N18.30 CHRONIC KIDNEY DISEASE, STAGE III (MODERATE) (HCC): Primary | ICD-10-CM

## 2018-05-11 DIAGNOSIS — I10 ESSENTIAL HYPERTENSION: ICD-10-CM

## 2018-05-11 NOTE — TELEPHONE ENCOUNTER
Patient has been advised and voiced understanding. Order placed, patient scheduled lab for Monday.     ----- Message from Bubba Avalos MD sent at 5/11/2018 10:57 AM EDT -----  Sodium is better. Kidney function improving.   Recommend bmp on Monday again. Pancreas numbers are good.   Keep up low carb diet at home.  Watch BP at home.

## 2018-05-13 ENCOUNTER — RESULTS ENCOUNTER (OUTPATIENT)
Dept: INTERNAL MEDICINE | Facility: CLINIC | Age: 73
End: 2018-05-13

## 2018-05-13 DIAGNOSIS — E87.1 HYPONATREMIA: ICD-10-CM

## 2018-05-14 ENCOUNTER — OFFICE VISIT (OUTPATIENT)
Dept: ORTHOPEDIC SURGERY | Facility: CLINIC | Age: 73
End: 2018-05-14

## 2018-05-14 ENCOUNTER — LAB (OUTPATIENT)
Dept: INTERNAL MEDICINE | Facility: CLINIC | Age: 73
End: 2018-05-14

## 2018-05-14 VITALS — WEIGHT: 211 LBS | HEIGHT: 67 IN | BODY MASS INDEX: 33.12 KG/M2

## 2018-05-14 DIAGNOSIS — N18.30 CHRONIC KIDNEY DISEASE, STAGE III (MODERATE) (HCC): ICD-10-CM

## 2018-05-14 DIAGNOSIS — I10 ESSENTIAL HYPERTENSION: ICD-10-CM

## 2018-05-14 DIAGNOSIS — Z96.652 STATUS POST TOTAL LEFT KNEE REPLACEMENT: ICD-10-CM

## 2018-05-14 DIAGNOSIS — R52 PAIN: Primary | ICD-10-CM

## 2018-05-14 LAB
BUN SERPL-MCNC: 20 MG/DL (ref 8–23)
BUN/CREAT SERPL: 15.5 (ref 7–25)
CALCIUM SERPL-MCNC: 10 MG/DL (ref 8.8–10.5)
CHLORIDE SERPL-SCNC: 101 MMOL/L (ref 98–107)
CO2 SERPL-SCNC: 21.9 MMOL/L (ref 22–29)
CREAT SERPL-MCNC: 1.29 MG/DL (ref 0.57–1)
GFR SERPLBLD CREATININE-BSD FMLA CKD-EPI: 40 ML/MIN/1.73
GFR SERPLBLD CREATININE-BSD FMLA CKD-EPI: 49 ML/MIN/1.73
GLUCOSE SERPL-MCNC: 271 MG/DL (ref 65–99)
POTASSIUM SERPL-SCNC: 4.9 MMOL/L (ref 3.5–5.2)
SODIUM SERPL-SCNC: 134 MMOL/L (ref 136–145)

## 2018-05-14 PROCEDURE — 73562 X-RAY EXAM OF KNEE 3: CPT | Performed by: ORTHOPAEDIC SURGERY

## 2018-05-14 PROCEDURE — 99024 POSTOP FOLLOW-UP VISIT: CPT | Performed by: ORTHOPAEDIC SURGERY

## 2018-05-14 NOTE — PROGRESS NOTES
Subjective:     Reason for follow up:   1. 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    * Arimidex initiated in 08/2015    2. Osteopenia.      History of Present Ilness: Jung Short presents for follow-up of breast cancer. She remains on Arimidex and Fosamax. Her primary care physician perform labs on May 4, 2018 and it showed a creatinine of 2.33, calcium of 10 and 23.  Her CBC showed a hemoglobin of 10.2, white blood cell count 3.5 to platelets of 569.  She repeat her labs a week later and it showed sodium of 133, creatinine 2.06, calcium 10.6 , hemoglobin 10.5 and platelets of 398. She's also had hip replacement surgery since I last saw her and has a mild postoperative anemia on top of her anemia of chronic kidney disease.  She's had nausea since being discharged from the hospital. It was persistent and now its only occasionally and she's eating better.       Past Medical   Past Medical History:   Diagnosis Date   • Anemia    • Benign breast disease    • Cancer 2015    Right breast   • Cellulitis of leg     May-2003 right lower extremity   • CHF (congestive heart failure)     Dr Bates follows   • Chronic kidney disease     Dr Hannah follows   • Chronic ulcer of right foot     Non-pressure, history of    • Depression    • Diabetic peripheral neuropathy    • Diarrhea    • Diverticulosis    • Encounter for eye exam    • Fracture of left wrist     history of   • Gastroesophageal reflux    • Hiatal hernia    • History of bone density study 09/20/2012   • History of colonoscopy     done 6/03 recheck in 10 years.   Done july 2013 recheck in 5 years   • History of mammography, screening 09/20/2012   • Hospital discharge follow-up    • Hyperlipidemia    • Hypertension    • Hypothyroidism    • Impingement syndrome of right shoulder    • Joint pain    • Menopausal  disorder    • Methicillin resistant Staphylococcus aureus infection 2012    after bladder surgery   • MRSA (methicillin resistant staph aureus) culture positive    • Nausea and vomiting    • Nonischemic cardiomyopathy     Ejection fraction 10% per 2-D echo with Doppler however she did have cardiac catheterization April 2015 which showed ejection fraction 20% with global hypokinesis and severe mitral insufficiency   • Osteoarthritis     generalized, sched jania TKA   • Paroxysmal atrial fibrillation     Dr Bates follows   • Postoperative infection     July of 2012 following her hysterectomy far vaginal wall prolapse. She was on antibiotics and wound dressings for 2 months.   • Shoulder pain, bilateral     has tears on both sides   • Syncope, psychogenic 4/19/2017   • Toe ulcer     h/o to both feet, d/t shoes rubbing per patient   • Transient alteration of awareness 4/19/2017     Patient Active Problem List   Diagnosis   • Abdominal bloating   • Abdominal mass   • Abdominal pain   • Abnormal gait   • Abnormal mammogram   • Anemia   • Atherosclerosis of coronary artery   • Thoracic back pain   • Malignant neoplasm of upper-inner quadrant of right breast in female, estrogen receptor positive   • Candidiasis   • Cardiomyopathy   • Disc disorder of cervical region   • Neck pain   • Tenderness of chest wall   • Chronic kidney disease, stage III (moderate)   • Chronic pain   • Constipation   • Generalized osteoarthritis   • Depression   • Type 2 diabetes mellitus   • Controlled type 2 diabetes mellitus without complication   • Dyslipidemia   • Gastroesophageal reflux disease with esophagitis   • Generalized pain   • Gout   • Hypertension   • Hypothyroidism   • Incisional hernia   • Mass of breast   • Mitral valve insufficiency   • Nausea   • Adult body mass index greater than 30   • Osteopenia of spine   • Arthralgia of multiple joints   • Peripheral neuropathy   • Pulmonary hypertension   • Osteomyelitis   • Tricuspid  valve insufficiency   • Cobalamin deficiency   • Vitamin D deficiency   • Diabetes mellitus   • Left knee pain   • Arthritis of knee   • DDD (degenerative disc disease), lumbar   • Lumbar facet arthropathy   • Pain in shoulder   • Chronic gout of multiple sites   • Constipation due to opioid therapy   • Syncope, psychogenic   • Transient alteration of awareness   • Closed fracture of patella   • Primary osteoarthritis of left knee   • Radius/ulna fracture, left, closed, initial encounter   • Rib pain on left side   • Preoperative cardiovascular examination   • Physical deconditioning   • Hyponatremia     Social History   Social History     Social History   • Marital status:      Spouse name: N/A   • Number of children: 2   • Years of education: N/A     Occupational History   • [employer]  Retired     Social History Main Topics   • Smoking status: Former Smoker     Packs/day: 3.00     Years: 30.00     Types: Cigarettes     Quit date: 1983   • Smokeless tobacco: Never Used   • Alcohol use No      Comment: h/o quit 1980s   • Drug use: No   • Sexual activity: Defer      Comment: celibate     Other Topics Concern   • Not on file     Social History Narrative     2001    Volunteers here at [facility]     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    Lots of friends    2 daughters - lives in [facility] [place] and [place] (graphic design)    [employer]      Family History  Family History   Problem Relation Age of Onset   • Colonic polyp Mother    • Hypertension Mother    • Migraines Mother    • Mental illness Mother    • Depression Mother    • Coronary artery disease Father    • Other Father      Cardiac Disorder   • Colon cancer Father    • Kidney disease Father    • Cancer Father    • Breast cancer Maternal Aunt    • Colon cancer Brother    • Alcohol abuse Brother    • Arthritis Sister    • Hypertension Brother    • Lung disease Brother    • Alcohol abuse Brother    • Malig Hyperthermia Neg Hx      Allergies  Allergies  "  Allergen Reactions   • Dilaudid [Hydromorphone] Delirium and Confusion   • Latex Rash       Medications: The current medication list was reviewed in the EMR.    Review of Systems  Review of Systems   Constitutional: Positive for activity change and unexpected weight change. Negative for appetite change, chills, diaphoresis, fatigue and fever.   Respiratory: Negative for cough, chest tightness and shortness of breath.    Cardiovascular: Negative for chest pain, palpitations and leg swelling.   Gastrointestinal: Positive for nausea. Negative for abdominal pain, blood in stool, constipation, diarrhea and vomiting.   Musculoskeletal: Positive for arthralgias. Negative for joint swelling and myalgias.   Neurological: Positive for numbness.   Hematological: Negative for adenopathy. Does not bruise/bleed easily.          Objective:     Vitals:    05/15/18 1609   BP: 136/77   Pulse: 85   Resp: 16   Temp: 98.3 °F (36.8 °C)   TempSrc: Oral   SpO2: 96%   Weight: 95.3 kg (210 lb)   Height: 168.9 cm (66.5\")   PainSc: 4  Comment: body pain     Current Status 5/15/2018   ECOG score 1   GENERAL: female comfortable, no acute distress  SKIN:  Warm, without rashes, purpura or petechiae. Chronic skin changes left lower leg with well healing incision on left knee  EYES:  EOMs intact.  Conjunctivae normal. Pupils equal and reactive to light.   EARS:  Hearing intact.  LYMPHATICS:  No cervical, supraclavicular, axillary adenopathy.  RESP:  Lungs clear to percussion and auscultation. Good airflow. Normal effort.   CHEST: Breast exam - right mastectomy without palpable masses or skin changes.   CARDIAC:  Regular rate and rhythm without murmurs, rubs or gallops. Normal S1,S2. Lower extremity edema:  No  GI:  Soft, nontender, normal bowel sounds  PSYCHIATRIC:  Normal affect and mood; alert and oriented x 3; Insight and judgement appropriate        Labs and Imaging  Results for orders placed or performed in visit on 05/14/18   Basic metabolic " panel   Result Value Ref Range    Glucose 271 (H) 65 - 99 mg/dL    BUN 20 8 - 23 mg/dL    Creatinine 1.29 (H) 0.57 - 1.00 mg/dL    eGFR Non African Am 40 (L) >60 mL/min/1.73    eGFR African Am 49 (L) >60 mL/min/1.73    BUN/Creatinine Ratio 15.5 7.0 - 25.0    Sodium 134 (L) 136 - 145 mmol/L    Potassium 4.9 3.5 - 5.2 mmol/L    Chloride 101 98 - 107 mmol/L    Total CO2 21.9 (L) 22.0 - 29.0 mmol/L    Calcium 10.0 8.8 - 10.5 mg/dL     Labs reviewed.     Breast imaging: None    Assessment/Plan     Assessment:   1. Stage I, pT1cN0, invasive ductal carcinoma with dcis of the right breast, ER positive, HER-2/gonzalez positive.    * Status post right mastectomy with axillary dissection on 07/16/2015. Not a candidate for chemo/Herceptin therapy due to comorbidities   * Arimidex since 8/2015. Tolerating it well.    * Remains no evidence of disease.   2. Osteopenia.  Continue Fosamax - was briefly placed on hold by PCP but patient will restart  3. Anemia of chronic disease. Down more than normal because of recent surgery.  4. Congestive heart failure managed by Dr. Bates.   5. Multiple comorbidities including CKD, pulmonary hypertension, diabetes, foot ulcer and recent left ulnar/radius fracture  6. Hypercalcemia. Her calcium was up on 5/11, but her bmp a week earlier shwoed a normal calcium thus I will not pursue further    Plan:     1. Annual left mammogram due August 2018 - ordered.  2. DEXA scan due 8/2019.  3. Patient was instructed on the importance of physical activity, healthy diet, and self chest wall exams.  Patient will continue calcium and vitamin D supplementation.    4. Monitor anemia  5. Continue Arimidex and restart Fosamax.   Plan reviewed and unchanged.   Follow-up in 6 months. I asked the patient to call for any questions, concerns, or new symptoms.

## 2018-05-14 NOTE — PROGRESS NOTES
Subjective: Status post left total knee arthroplasty     Patient ID: Jung Short is a 72 y.o. female.    Chief Complaint:    History of Present Illness 72-year-old female is 4 weeks status post a left total knee arthroplasties Point of the surgery she's doing extremely well ambulating independently relatively pain-free.  Her problem remains constant nausea that she's had since surgery.  The knee itself is doing well with minimal discomfort.       Social History     Occupational History   • [employer]  Retired     Social History Main Topics   • Smoking status: Former Smoker     Packs/day: 3.00     Years: 30.00     Types: Cigarettes     Quit date: 1983   • Smokeless tobacco: Never Used   • Alcohol use No      Comment: h/o quit 1980s   • Drug use: No   • Sexual activity: Defer      Comment: celibate      Review of Systems   Constitutional: Negative for chills, diaphoresis, fever and unexpected weight change.   HENT: Negative for hearing loss, nosebleeds, sore throat and tinnitus.    Eyes: Negative for pain and visual disturbance.   Respiratory: Negative for cough, shortness of breath and wheezing.    Cardiovascular: Negative for chest pain and palpitations.   Gastrointestinal: Negative for abdominal pain, diarrhea, nausea and vomiting.   Endocrine: Negative for cold intolerance, heat intolerance and polydipsia.   Genitourinary: Negative for difficulty urinating, dysuria and hematuria.   Musculoskeletal: Positive for arthralgias and myalgias. Negative for joint swelling.   Skin: Negative for rash and wound.   Allergic/Immunologic: Negative for environmental allergies.   Neurological: Negative for dizziness, syncope and numbness.   Hematological: Does not bruise/bleed easily.   Psychiatric/Behavioral: Negative for dysphoric mood and sleep disturbance. The patient is not nervous/anxious.          Past Medical History:   Diagnosis Date   • Anemia    • Benign breast disease    • Cancer 2015    Right breast   •  Cellulitis of leg     May-2003 right lower extremity   • CHF (congestive heart failure)     Dr Bates follows   • Chronic kidney disease     Dr Hannah follows   • Chronic ulcer of right foot     Non-pressure, history of    • Depression    • Diabetic peripheral neuropathy    • Diarrhea    • Diverticulosis    • Encounter for eye exam    • Fracture of left wrist     history of   • Gastroesophageal reflux    • Hiatal hernia    • History of bone density study 09/20/2012   • History of colonoscopy     done 6/03 recheck in 10 years.   Done july 2013 recheck in 5 years   • History of mammography, screening 09/20/2012   • Hospital discharge follow-up    • Hyperlipidemia    • Hypertension    • Hypothyroidism    • Impingement syndrome of right shoulder    • Joint pain    • Menopausal disorder    • Methicillin resistant Staphylococcus aureus infection 2012    after bladder surgery   • MRSA (methicillin resistant staph aureus) culture positive    • Nausea and vomiting    • Nonischemic cardiomyopathy     Ejection fraction 10% per 2-D echo with Doppler however she did have cardiac catheterization April 2015 which showed ejection fraction 20% with global hypokinesis and severe mitral insufficiency   • Osteoarthritis     generalized, sched jania TKA   • Paroxysmal atrial fibrillation     Dr Bates follows   • Postoperative infection     July of 2012 following her hysterectomy far vaginal wall prolapse. She was on antibiotics and wound dressings for 2 months.   • Shoulder pain, bilateral     has tears on both sides   • Syncope, psychogenic 4/19/2017   • Toe ulcer     h/o to both feet, d/t shoes rubbing per patient   • Transient alteration of awareness 4/19/2017     Past Surgical History:   Procedure Laterality Date   • ABDOMINAL SURGERY  2012    had to be reopened after bladder surgery d/t infection   • AMPUTATION FOOT / TOE Right 11/2013    Rt foot amputation MTP first toe   • BLADDER SURGERY  2012   • BREAST BIOPSY     •  BREAST SURGERY  06/29/2015    Percutaneous ultrasound-guided placement of metal localized clip 1st lesion   • CARDIAC CATHETERIZATION  2015   • CATARACT EXTRACTION Bilateral 2017   • DEBRIDEMENT  FOOT Right 01/2013    During hospitalization    • EPIDURAL BLOCK      4 chronic back pain and leg pain last epidurals done 5-2012 she had no benefit at all from this procedure.   • FOOT SURGERY Bilateral     several   • HERNIA REPAIR      At the abdomen February 2007 Dr. Mendez   • INCISIONAL HERNIA REPAIR  05/16/2014    Recurrent. Incarcerated. With mesh implantation   • MASTECTOMY Right 05/2015   • SHOULDER SURGERY Right     x2 RCR   • TOTAL ABDOMINAL HYSTERECTOMY      with oophorectomy-Done June-2012 secondary to vaginal wall prolapse.   • TOTAL KNEE ARTHROPLASTY Right    • TOTAL KNEE ARTHROPLASTY Left 4/17/2018    Procedure: TOTAL KNEE ARTHROPLASTY;  Surgeon: Live Chambers MD;  Location: [facility];  Service: Orthopedics     Family History   Problem Relation Age of Onset   • Colonic polyp Mother    • Hypertension Mother    • Migraines Mother    • Mental illness Mother    • Depression Mother    • Coronary artery disease Father    • Other Father      Cardiac Disorder   • Colon cancer Father    • Kidney disease Father    • Cancer Father    • Breast cancer Maternal Aunt    • Colon cancer Brother    • Alcohol abuse Brother    • Arthritis Sister    • Hypertension Brother    • Lung disease Brother    • Alcohol abuse Brother    • Malig Hyperthermia Neg Hx          Objective:  There were no vitals filed for this visit.  1    05/14/18  1353   Weight: 95.7 kg (211 lb)     Body mass index is 33.55 kg/m².       Ortho Exam  AP lateral sunrise view of the knee to evaluate his surgery shows perfect position of the implant.  No prior x-rays available for her some.  On exam she is alert and oriented ×3.  The wound is completely benign.  His no effusion swelling erythema to the knee.  She has 0-130° of motion with no instability out the arc  of motion no patella subluxation.  Quad function 5 over 5.  Calf is nontender.  Gives cool to touch.    Assessment:       1. Pain    2. Status post total left knee replacement          Plan:Patient is doing extremely well for standpoint of the knee does not need physical therapy.  Continue activity as tolerated return to see me in 4 weeks.            Work Status:    LOUANN query complete.    Orders:  Orders Placed This Encounter   Procedures   • XR Knee 3 View Left       Medications:  No orders of the defined types were placed in this encounter.      Followup:  Return in about 4 weeks (around 6/11/2018).          Dragon transcription disclaimer     Much of this encounter note is an electronic transcription/translation of spoken language to printed text. The electronic translation of spoken language may permit erroneous, or at times, nonsensical words or phrases to be inadvertently transcribed. Although I have reviewed the note for such errors, some may still exist.

## 2018-05-15 ENCOUNTER — TELEPHONE (OUTPATIENT)
Dept: INTERNAL MEDICINE | Facility: CLINIC | Age: 73
End: 2018-05-15

## 2018-05-15 ENCOUNTER — OFFICE VISIT (OUTPATIENT)
Dept: ONCOLOGY | Facility: CLINIC | Age: 73
End: 2018-05-15

## 2018-05-15 ENCOUNTER — APPOINTMENT (OUTPATIENT)
Dept: LAB | Facility: HOSPITAL | Age: 73
End: 2018-05-15

## 2018-05-15 VITALS
OXYGEN SATURATION: 96 % | RESPIRATION RATE: 16 BRPM | SYSTOLIC BLOOD PRESSURE: 136 MMHG | HEART RATE: 85 BPM | TEMPERATURE: 98.3 F | DIASTOLIC BLOOD PRESSURE: 77 MMHG | BODY MASS INDEX: 33.75 KG/M2 | HEIGHT: 66 IN | WEIGHT: 210 LBS

## 2018-05-15 DIAGNOSIS — D64.9 ANEMIA, UNSPECIFIED TYPE: ICD-10-CM

## 2018-05-15 DIAGNOSIS — Z12.31 ENCOUNTER FOR SCREENING MAMMOGRAM FOR BREAST CANCER: ICD-10-CM

## 2018-05-15 DIAGNOSIS — C50.211 MALIGNANT NEOPLASM OF UPPER-INNER QUADRANT OF RIGHT BREAST IN FEMALE, ESTROGEN RECEPTOR POSITIVE (HCC): Primary | ICD-10-CM

## 2018-05-15 DIAGNOSIS — Z17.0 MALIGNANT NEOPLASM OF UPPER-INNER QUADRANT OF RIGHT BREAST IN FEMALE, ESTROGEN RECEPTOR POSITIVE (HCC): Primary | ICD-10-CM

## 2018-05-15 PROCEDURE — 99214 OFFICE O/P EST MOD 30 MIN: CPT | Performed by: INTERNAL MEDICINE

## 2018-05-15 PROCEDURE — G0463 HOSPITAL OUTPT CLINIC VISIT: HCPCS | Performed by: INTERNAL MEDICINE

## 2018-05-15 NOTE — TELEPHONE ENCOUNTER
LVM with details and to call me back with any questions/concerns.     ----- Message from Bubba Avalos MD sent at 5/15/2018  8:41 AM EDT -----  Renal function is back to normal.  Stay off lisinopril. She already finished her xarelto. Good news. She should fu in 3months. I think she will start to feel better.  Dr. Pina is aware.

## 2018-05-17 ENCOUNTER — OFFICE VISIT (OUTPATIENT)
Dept: PAIN MEDICINE | Facility: CLINIC | Age: 73
End: 2018-05-17

## 2018-05-17 VITALS
BODY MASS INDEX: 33.75 KG/M2 | OXYGEN SATURATION: 98 % | RESPIRATION RATE: 16 BRPM | SYSTOLIC BLOOD PRESSURE: 119 MMHG | WEIGHT: 210 LBS | DIASTOLIC BLOOD PRESSURE: 74 MMHG | HEART RATE: 74 BPM | HEIGHT: 66 IN | TEMPERATURE: 98.2 F

## 2018-05-17 DIAGNOSIS — R52 GENERALIZED PAIN: ICD-10-CM

## 2018-05-17 DIAGNOSIS — G62.9 PERIPHERAL POLYNEUROPATHY: ICD-10-CM

## 2018-05-17 DIAGNOSIS — M25.562 LEFT KNEE PAIN, UNSPECIFIED CHRONICITY: ICD-10-CM

## 2018-05-17 DIAGNOSIS — G89.29 OTHER CHRONIC PAIN: Primary | ICD-10-CM

## 2018-05-17 DIAGNOSIS — M15.9 GENERALIZED OSTEOARTHRITIS: ICD-10-CM

## 2018-05-17 DIAGNOSIS — M47.816 LUMBAR FACET ARTHROPATHY: ICD-10-CM

## 2018-05-17 DIAGNOSIS — M51.36 DDD (DEGENERATIVE DISC DISEASE), LUMBAR: ICD-10-CM

## 2018-05-17 PROCEDURE — 99213 OFFICE O/P EST LOW 20 MIN: CPT | Performed by: NURSE PRACTITIONER

## 2018-05-17 RX ORDER — ALENDRONATE SODIUM 35 MG/1
35 TABLET ORAL
COMMUNITY
End: 2018-07-27 | Stop reason: SDUPTHER

## 2018-05-17 RX ORDER — HYDROCODONE BITARTRATE AND ACETAMINOPHEN 10; 325 MG/1; MG/1
1 TABLET ORAL EVERY 6 HOURS PRN
Qty: 120 TABLET | Refills: 0 | Status: SHIPPED | OUTPATIENT
Start: 2018-05-17 | End: 2018-06-14 | Stop reason: SDUPTHER

## 2018-05-17 RX ORDER — ERGOCALCIFEROL 1.25 MG/1
1000 CAPSULE ORAL DAILY
COMMUNITY
End: 2020-04-10 | Stop reason: HOSPADM

## 2018-05-17 NOTE — PROGRESS NOTES
"CHIEF COMPLAINT  F/U arthritis and mid back pain. Recent left knee replacement on 4-17-18. Pt states she has lost 20 lbs due to being nauseous after surgery. States her knee feels better but a different kind of \"post-operative\" pain. Her back pain is unchanged, and she gets a \"reprieve from it once in awhile.\"     Subjective   Jung Short is a 72 y.o. female  who presents to the office for follow-up.She has a history of chronic joint and back pain. Had left TKR 4-17-18. This is improved but still having some post-operative pain. Her back pain might be slightly better but believes it is due to decreased activity.    Complains of pain in her back and joints. Today her pain is 4/10VAS. Describes the pain as continuous and variable. Pain is worse at night, but also has a history of peripheral neuropathy.   Has continued with Hydrocodone. Received post-op pain medication from Dr. Braden.  Has continued with Hydrocodone 10/325 4/day and Cymbalta.  Denies any side effects from the regimen.However, she later admitted that she is having some increased constipation surgery. This was controlled prior to surgery. Took some old samples of Movantik with positive results.  This helps decrease her pain moderately. ADL's by self.     Joint Pain   This is a chronic problem. The current episode started more than 1 year ago (today pain is 4/10VAS- joints;). The problem occurs constantly. The problem has been unchanged. Associated symptoms include arthralgias (Pt to have L TKR on 4/17/18 per Dr Chambers.), fatigue, joint swelling, nausea and weakness (L leg). Pertinent negatives include no chest pain, chills, congestion, coughing, fever, headaches, numbness or vomiting. Nothing aggravates the symptoms. She has tried oral narcotics, position changes and rest for the symptoms. The treatment provided moderate relief.   Back Pain   This is a chronic problem. The current episode started more than 1 year ago. The problem occurs " constantly. The problem has been gradually worsening (improved since last office visit--due to PT) since onset. The pain is present in the lumbar spine. The pain radiates to the right knee, right foot and right thigh. The pain is at a severity of 4/10. The pain is moderate. Associated symptoms include weakness (L leg). Pertinent negatives include no chest pain, dysuria, fever, headaches or numbness. Risk factors include lack of exercise and obesity. She has tried analgesics for the symptoms. The treatment provided moderate relief.      PEG Assessment   What number best describes your pain on average in the past week?5  What number best describes how, during the past week, pain has interfered with your enjoyment of life?4  What number best describes how, during the past week, pain has interfered with your general activity?  4    The following portions of the patient's history were reviewed and updated as appropriate: allergies, current medications, past family history, past medical history, past social history, past surgical history and problem list.    Review of Systems   Constitutional: Positive for fatigue. Negative for activity change, appetite change, chills and fever.   HENT: Negative for congestion.    Respiratory: Negative for cough and shortness of breath.    Cardiovascular: Negative for chest pain.   Gastrointestinal: Positive for constipation and nausea. Negative for diarrhea and vomiting.   Genitourinary: Negative for difficulty urinating, dyspareunia and dysuria.   Musculoskeletal: Positive for arthralgias (Pt to have L TKR on 4/17/18 per Dr Chambers.), back pain and joint swelling.   Neurological: Positive for weakness (L leg). Negative for dizziness, light-headedness, numbness and headaches.   Psychiatric/Behavioral: Positive for sleep disturbance. Negative for confusion, hallucinations, self-injury and suicidal ideas. The patient is not nervous/anxious.        Vitals:    05/17/18 1235   BP: 119/74  "  Pulse: 74   Resp: 16   Temp: 98.2 °F (36.8 °C)   SpO2: 98%   Weight: 95.3 kg (210 lb)   Height: 168.9 cm (66.5\")   PainSc:   4   PainLoc: Generalized     Objective   Physical Exam   Constitutional: She is oriented to person, place, and time. Vital signs are normal. She appears well-developed and well-nourished. She is cooperative.   HENT:   Head: Normocephalic and atraumatic.   Nose: Nose normal.   Eyes: Conjunctivae and lids are normal.   Cardiovascular: Normal rate.    Pulmonary/Chest: Effort normal.   Abdominal: Normal appearance.   Musculoskeletal:        Lumbar back: She exhibits tenderness.   Surgical scar of left knee   Neurological: She is alert and oriented to person, place, and time. Gait (ambulates with aid cane) abnormal.   Skin: Skin is warm, dry and intact.   Psychiatric: She has a normal mood and affect. Her speech is normal and behavior is normal. Judgment and thought content normal. Cognition and memory are normal.   Nursing note and vitals reviewed.      Assessment/Plan   Jung was seen today for pain.    Diagnoses and all orders for this visit:    Other chronic pain    Generalized pain    DDD (degenerative disc disease), lumbar    Generalized osteoarthritis    Lumbar facet arthropathy    Left knee pain, unspecified chronicity    Peripheral polyneuropathy    Other orders  -     HYDROcodone-acetaminophen (NORCO)  MG per tablet; Take 1 tablet by mouth Every 6 (Six) Hours As Needed for Moderate Pain .      --- The urine drug screen confirmation from 1-18-18 has been reviewed and the result is APPROPRIATE based on patient history and LOUANN report  --- Refill Hydrocodone 10/325 q6hrs PRN. Patient appears stable with current regimen. No adverse effects. Regarding continuation of opioids, there is no evidence of aberrant behavior or any red flags.  The patient continues with appropriate response to opioid therapy. ADL's remain intact by self.   --- Follow-up 2 month or sooner if needed.     "   LOUANN REPORT    As part of the patient's treatment plan, I am prescribing controlled substances. The patient has been made aware of appropriate use of such medications, including potential risk of somnolence, limited ability to drive and/or work safely, and the potential for dependence or overdose. It has also bee made clear that these medications are for use by this patient only, without concomitant use of alcohol or other substances unless prescribed.     Patient has completed prescribing agreement detailing terms of continued prescribing of controlled substances, including monitoring LOUANN reports, urine drug screening, and pill counts if necessary. The patient is aware that inappropriate use will results in cessation of prescribing such medications.    LOUANN report has been reviewed and scanned into the patient's chart.    As the clinician, I personally reviewed the LOUANN from 5-16-18 while the patient was in the office today.    History and physical exam exhibit continued safe and appropriate use of controlled substances.      EMR Dragon/Transcription disclaimer:   Much of this encounter note is an electronic transcription/translation of spoken language to printed text. The electronic translation of spoken language may permit erroneous, or at times, nonsensical words or phrases to be inadvertently transcribed; Although I have reviewed the note for such errors, some may still exist.

## 2018-05-21 RX ORDER — SPIRONOLACTONE 25 MG/1
TABLET ORAL
Qty: 30 TABLET | Refills: 10 | Status: SHIPPED | OUTPATIENT
Start: 2018-05-21 | End: 2019-05-02 | Stop reason: SDUPTHER

## 2018-05-23 ENCOUNTER — EPISODE CHANGES (OUTPATIENT)
Dept: CASE MANAGEMENT | Facility: OTHER | Age: 73
End: 2018-05-23

## 2018-06-13 ENCOUNTER — OFFICE VISIT (OUTPATIENT)
Dept: ORTHOPEDIC SURGERY | Facility: CLINIC | Age: 73
End: 2018-06-13

## 2018-06-13 VITALS — WEIGHT: 210 LBS | HEIGHT: 67 IN | BODY MASS INDEX: 32.96 KG/M2

## 2018-06-13 DIAGNOSIS — Z96.652 STATUS POST TOTAL LEFT KNEE REPLACEMENT: Primary | ICD-10-CM

## 2018-06-13 PROCEDURE — 99024 POSTOP FOLLOW-UP VISIT: CPT | Performed by: ORTHOPAEDIC SURGERY

## 2018-06-13 NOTE — PROGRESS NOTES
Subjective: Status post left total knee     Patient ID: Jung Short is a 72 y.o. female.    Chief Complaint:    History of Present Illness 72-year-old female is 2 months status post a left total knee continues to do well ambulating independently with minimal pain.  Complains of only mild stiffness in the knee but all of her preoperative pain has resolved       Social History     Occupational History   • [employer]  Retired     Social History Main Topics   • Smoking status: Former Smoker     Packs/day: 3.00     Years: 30.00     Types: Cigarettes     Quit date: 1983   • Smokeless tobacco: Never Used   • Alcohol use No      Comment: h/o quit 1980s   • Drug use: No   • Sexual activity: Defer      Comment: celibate      Review of Systems   Constitutional: Negative for chills, diaphoresis, fever and unexpected weight change.   HENT: Negative for hearing loss, nosebleeds, sore throat and tinnitus.    Eyes: Negative for pain and visual disturbance.   Respiratory: Negative for cough, shortness of breath and wheezing.    Cardiovascular: Negative for chest pain and palpitations.   Gastrointestinal: Negative for abdominal pain, diarrhea, nausea and vomiting.   Endocrine: Negative for cold intolerance, heat intolerance and polydipsia.   Genitourinary: Negative for difficulty urinating, dysuria and hematuria.   Musculoskeletal: Positive for arthralgias and myalgias. Negative for joint swelling.   Skin: Negative for rash and wound.   Allergic/Immunologic: Negative for environmental allergies.   Neurological: Negative for dizziness, syncope and numbness.   Hematological: Does not bruise/bleed easily.   Psychiatric/Behavioral: Negative for dysphoric mood and sleep disturbance. The patient is not nervous/anxious.          Past Medical History:   Diagnosis Date   • Anemia    • Benign breast disease    • Cancer 2015    Right breast   • Cellulitis of leg     May-2003 right lower extremity   • CHF (congestive heart failure)      Dr Bates follows   • Chronic kidney disease     Dr Hannah follows   • Chronic ulcer of right foot     Non-pressure, history of    • Depression    • Diabetic peripheral neuropathy    • Diarrhea    • Diverticulosis    • Encounter for eye exam    • Fracture of left wrist     history of   • Gastroesophageal reflux    • Hiatal hernia    • History of bone density study 09/20/2012   • History of colonoscopy     done 6/03 recheck in 10 years.   Done july 2013 recheck in 5 years   • History of mammography, screening 09/20/2012   • Hospital discharge follow-up    • Hyperlipidemia    • Hypertension    • Hypothyroidism    • Impingement syndrome of right shoulder    • Joint pain    • Menopausal disorder    • Methicillin resistant Staphylococcus aureus infection 2012    after bladder surgery   • MRSA (methicillin resistant staph aureus) culture positive    • Nausea and vomiting    • Nonischemic cardiomyopathy     Ejection fraction 10% per 2-D echo with Doppler however she did have cardiac catheterization April 2015 which showed ejection fraction 20% with global hypokinesis and severe mitral insufficiency   • Osteoarthritis     generalized, sched jania TKA   • Paroxysmal atrial fibrillation     Dr Bates follows   • Postoperative infection     July of 2012 following her hysterectomy far vaginal wall prolapse. She was on antibiotics and wound dressings for 2 months.   • Shoulder pain, bilateral     has tears on both sides   • Syncope, psychogenic 4/19/2017   • Toe ulcer     h/o to both feet, d/t shoes rubbing per patient   • Transient alteration of awareness 4/19/2017     Past Surgical History:   Procedure Laterality Date   • ABDOMINAL SURGERY  2012    had to be reopened after bladder surgery d/t infection   • AMPUTATION FOOT / TOE Right 11/2013    Rt foot amputation MTP first toe   • BLADDER SURGERY  2012   • BREAST BIOPSY     • BREAST SURGERY  06/29/2015    Percutaneous ultrasound-guided placement of metal localized clip  1st lesion   • CARDIAC CATHETERIZATION  2015   • CATARACT EXTRACTION Bilateral 2017   • DEBRIDEMENT  FOOT Right 01/2013    During hospitalization    • EPIDURAL BLOCK      4 chronic back pain and leg pain last epidurals done 5-2012 she had no benefit at all from this procedure.   • FOOT SURGERY Bilateral     several   • HERNIA REPAIR      At the abdomen February 2007 Dr. Mendez   • INCISIONAL HERNIA REPAIR  05/16/2014    Recurrent. Incarcerated. With mesh implantation   • MASTECTOMY Right 05/2015   • SHOULDER SURGERY Right     x2 RCR   • TOTAL ABDOMINAL HYSTERECTOMY      with oophorectomy-Done June-2012 secondary to vaginal wall prolapse.   • TOTAL KNEE ARTHROPLASTY Right    • TOTAL KNEE ARTHROPLASTY Left 4/17/2018    Procedure: TOTAL KNEE ARTHROPLASTY;  Surgeon: Live Chambers MD;  Location: [facility];  Service: Orthopedics     Family History   Problem Relation Age of Onset   • Colonic polyp Mother    • Hypertension Mother    • Migraines Mother    • Mental illness Mother    • Depression Mother    • Coronary artery disease Father    • Other Father         Cardiac Disorder   • Colon cancer Father    • Kidney disease Father    • Cancer Father    • Breast cancer Maternal Aunt    • Colon cancer Brother    • Alcohol abuse Brother    • Arthritis Sister    • Hypertension Brother    • Lung disease Brother    • Alcohol abuse Brother    • Malig Hyperthermia Neg Hx          Objective:  There were no vitals filed for this visit.  1    06/13/18  1320   Weight: 95.3 kg (210 lb)     Body mass index is 33.39 kg/m².       Ortho Exam  she is alert and oriented ×3.  The left leg is no motor deficit no sensory deficit.  He has 0-120° of motion with no instability throughout the arc of motion.  No patella subluxation.  Quad function 5/5 and her calf Is nontender.  She still has what appears to be lymphedema in the lower leg.    Assessment:       1. Status post total left knee replacement          Plan: Continue exercise program.  Return to  see me in 6 weeks no x-rays necessary            Work Status:    LOUANN query complete.    Orders:  No orders of the defined types were placed in this encounter.      Medications:  No orders of the defined types were placed in this encounter.      Followup:  Return in about 6 weeks (around 7/25/2018).          Dragon transcription disclaimer     Much of this encounter note is an electronic transcription/translation of spoken language to printed text. The electronic translation of spoken language may permit erroneous, or at times, nonsensical words or phrases to be inadvertently transcribed. Although I have reviewed the note for such errors, some may still exist.

## 2018-06-14 DIAGNOSIS — R14.0 ABDOMINAL BLOATING: ICD-10-CM

## 2018-06-14 RX ORDER — HYDROCODONE BITARTRATE AND ACETAMINOPHEN 10; 325 MG/1; MG/1
1 TABLET ORAL EVERY 6 HOURS PRN
Qty: 120 TABLET | Refills: 0 | Status: SHIPPED | OUTPATIENT
Start: 2018-06-14 | End: 2018-07-17 | Stop reason: SDUPTHER

## 2018-06-14 RX ORDER — LEVOTHYROXINE SODIUM 0.03 MG/1
TABLET ORAL
Qty: 180 TABLET | Refills: 0 | Status: SHIPPED | OUTPATIENT
Start: 2018-06-14 | End: 2018-12-13 | Stop reason: SDUPTHER

## 2018-06-14 NOTE — TELEPHONE ENCOUNTER
Medication Refill Request    Date of phone call: 6-13-18    Medication being requested: Hydrocodone-apap  mg sig: Take 1 tablet by mouth every 6 hours as needed for moderate pain  Qty: 120 tablets    Date of last visit: 5-17-18    Date of last refill: 5-17-18    LOUANN up to date?: 5-16-18    Next Follow up?: 7-17-18    Any new pertinent information? (i.e, new medication allergies, new use of medications, change in patient's health or condition, non-compliance or inconsistency with prescribing agreement?): Pt states she will run out on the 17th and forgot to call 7 days prior.

## 2018-06-15 ENCOUNTER — TELEPHONE (OUTPATIENT)
Dept: INTERNAL MEDICINE | Facility: CLINIC | Age: 73
End: 2018-06-15

## 2018-06-15 DIAGNOSIS — Z79.899 HIGH RISK MEDICATION USE: Primary | ICD-10-CM

## 2018-06-15 NOTE — TELEPHONE ENCOUNTER
Left message for pt  To call to make appt for  Lab next week      ----- Message from Bubba Avalos MD sent at 6/15/2018  8:18 AM EDT -----  Regarding: RE: BP MED  Contact: [phone]  Yes but get bmp next week sometime to make sure kidneys ok going back on.  Ill put order in.  ----- Message -----  From: Lizeth Hampton MA  Sent: 6/12/2018   3:49 PM  To: Bubba Avalos MD  Subject: FW: BP MED                                           ----- Message -----  From: Angelica Zimmerman  Sent: 6/12/2018   3:26 PM  [facility] [place]  Subject: BP MED                                           CHRISTOPHER PT      Patient called to say that she wanted to let dr Avalos know that she has went back to taking her lisinopril. She said that her bp started going up. It was 170/84, so she started taking it every day, (5 MG), and it is ok now. She wants to know if it is ok for her to take it every day again. Please call her back    Thanks!  angelica

## 2018-06-20 LAB
BUN SERPL-MCNC: 21 MG/DL (ref 8–23)
BUN/CREAT SERPL: 18.8 (ref 7–25)
CALCIUM SERPL-MCNC: 9.7 MG/DL (ref 8.8–10.5)
CHLORIDE SERPL-SCNC: 101 MMOL/L (ref 98–107)
CO2 SERPL-SCNC: 26.4 MMOL/L (ref 22–29)
CREAT SERPL-MCNC: 1.12 MG/DL (ref 0.57–1)
GFR SERPLBLD CREATININE-BSD FMLA CKD-EPI: 48 ML/MIN/1.73
GFR SERPLBLD CREATININE-BSD FMLA CKD-EPI: 58 ML/MIN/1.73
GLUCOSE SERPL-MCNC: 152 MG/DL (ref 65–99)
POTASSIUM SERPL-SCNC: 3.9 MMOL/L (ref 3.5–5.2)
SODIUM SERPL-SCNC: 141 MMOL/L (ref 136–145)

## 2018-06-22 ENCOUNTER — TELEPHONE (OUTPATIENT)
Dept: INTERNAL MEDICINE | Facility: CLINIC | Age: 73
End: 2018-06-22

## 2018-06-22 RX ORDER — ANASTROZOLE 1 MG/1
TABLET ORAL
Qty: 90 TABLET | Refills: 0 | Status: SHIPPED | OUTPATIENT
Start: 2018-06-22 | End: 2018-09-17 | Stop reason: SDUPTHER

## 2018-06-22 NOTE — TELEPHONE ENCOUNTER
Patient is aware    ----- Message from Bubba Avalos MD sent at 6/21/2018  4:12 PM EDT -----  Labs great

## 2018-06-28 RX ORDER — LISINOPRIL 5 MG/1
TABLET ORAL
Qty: 120 TABLET | Refills: 0 | Status: SHIPPED | OUTPATIENT
Start: 2018-06-28 | End: 2018-09-17 | Stop reason: SDUPTHER

## 2018-07-10 RX ORDER — GLIMEPIRIDE 1 MG/1
TABLET ORAL
Qty: 180 TABLET | Refills: 1 | Status: SHIPPED | OUTPATIENT
Start: 2018-07-10 | End: 2019-01-04 | Stop reason: SDUPTHER

## 2018-07-13 RX ORDER — ALENDRONATE SODIUM 35 MG/1
TABLET ORAL
Qty: 12 TABLET | Refills: 3 | Status: SHIPPED | OUTPATIENT
Start: 2018-07-13 | End: 2020-05-29 | Stop reason: ALTCHOICE

## 2018-07-17 ENCOUNTER — OFFICE VISIT (OUTPATIENT)
Dept: PAIN MEDICINE | Facility: CLINIC | Age: 73
End: 2018-07-17

## 2018-07-17 VITALS
DIASTOLIC BLOOD PRESSURE: 81 MMHG | SYSTOLIC BLOOD PRESSURE: 166 MMHG | HEIGHT: 66 IN | OXYGEN SATURATION: 95 % | HEART RATE: 72 BPM | WEIGHT: 220 LBS | TEMPERATURE: 98.2 F | BODY MASS INDEX: 35.36 KG/M2 | RESPIRATION RATE: 16 BRPM

## 2018-07-17 DIAGNOSIS — Z79.899 ENCOUNTER FOR LONG-TERM (CURRENT) USE OF HIGH-RISK MEDICATION: ICD-10-CM

## 2018-07-17 DIAGNOSIS — M47.816 LUMBAR FACET ARTHROPATHY: ICD-10-CM

## 2018-07-17 DIAGNOSIS — R52 GENERALIZED PAIN: Primary | ICD-10-CM

## 2018-07-17 DIAGNOSIS — G62.9 PERIPHERAL POLYNEUROPATHY: ICD-10-CM

## 2018-07-17 DIAGNOSIS — M15.9 GENERALIZED OSTEOARTHRITIS: ICD-10-CM

## 2018-07-17 DIAGNOSIS — M51.36 DDD (DEGENERATIVE DISC DISEASE), LUMBAR: ICD-10-CM

## 2018-07-17 PROCEDURE — 99214 OFFICE O/P EST MOD 30 MIN: CPT | Performed by: NURSE PRACTITIONER

## 2018-07-17 RX ORDER — HYDROCODONE BITARTRATE AND ACETAMINOPHEN 10; 325 MG/1; MG/1
1 TABLET ORAL EVERY 6 HOURS PRN
Qty: 120 TABLET | Refills: 0 | Status: SHIPPED | OUTPATIENT
Start: 2018-07-17 | End: 2018-08-13 | Stop reason: SDUPTHER

## 2018-07-17 NOTE — PROGRESS NOTES
CHIEF COMPLAINT  Patient f/u for chronic pain.    Subjective   Weyesica Short is a 73 y.o. female  who presents to the office for follow-up of chronic joint and back pain. Reports her pain is variable since last office visit. Reports her knee pain is still improving. Had left TKR 4-17-18.    Complains of pain in her back and joints. Today her pain is 5/10VAS.  Her back is her primary complaint. Continues with Hydrocodone 10/325 4/day and Cymbalta.  Denies any side effects from the regimen. Has previously had constipation but this has resolved.  This helps decrease her pain moderately. ADL's by self.      Joint Pain   This is a chronic problem. The current episode started more than 1 year ago (today pain is 5/10VAS- joints). The problem occurs constantly. The problem has been unchanged. Associated symptoms include arthralgias, fatigue, joint swelling, nausea and weakness (L leg). Pertinent negatives include no chest pain, chills, congestion, coughing, fever, headaches, numbness or vomiting. Nothing aggravates the symptoms. She has tried oral narcotics, position changes and rest for the symptoms. The treatment provided moderate relief.   Back Pain   This is a chronic problem. The current episode started more than 1 year ago. The problem occurs constantly. The problem has been gradually worsening since onset. The pain is present in the lumbar spine. The pain radiates to the right knee, right foot and right thigh. The pain is at a severity of 5/10. The pain is moderate. Associated symptoms include weakness (L leg). Pertinent negatives include no chest pain, dysuria, fever, headaches or numbness. Risk factors include lack of exercise and obesity. She has tried analgesics for the symptoms. The treatment provided moderate relief.      PEG Assessment   What number best describes your pain on average in the past week? 5  What number best describes how, during the past week, pain has interfered with your enjoyment of  "life? 5  What number best describes how, during the past week, pain has interfered with your general activity? 5    The following portions of the patient's history were reviewed and updated as appropriate: allergies, current medications, past family history, past medical history, past social history, past surgical history and problem list.    Review of Systems   Constitutional: Positive for fatigue. Negative for activity change, appetite change, chills and fever.   HENT: Negative for congestion.    Respiratory: Negative for cough and shortness of breath.    Cardiovascular: Negative for chest pain.   Gastrointestinal: Positive for constipation and nausea. Negative for diarrhea and vomiting.   Genitourinary: Negative for difficulty urinating, dyspareunia and dysuria.   Musculoskeletal: Positive for arthralgias, back pain and joint swelling.   Neurological: Positive for weakness (L leg). Negative for dizziness, light-headedness, numbness and headaches.   Psychiatric/Behavioral: Positive for sleep disturbance. Negative for confusion, hallucinations, self-injury and suicidal ideas. The patient is not nervous/anxious.        Vitals:    07/17/18 1244   BP: 166/81   Pulse: 72   Resp: 16   Temp: 98.2 °F (36.8 °C)   TempSrc: Oral   SpO2: 95%   Weight: 99.8 kg (220 lb)   Height: 168.9 cm (66.5\")   PainSc:   5     Objective   Physical Exam   Constitutional: She is oriented to person, place, and time. Vital signs are normal. She appears well-developed and well-nourished. She is cooperative.   HENT:   Head: Normocephalic and atraumatic.   Nose: Nose normal.   Eyes: Conjunctivae and lids are normal.   Cardiovascular: Normal rate.    Pulmonary/Chest: Effort normal.   Abdominal: Normal appearance.   Musculoskeletal:        Lumbar back: She exhibits tenderness.   Surgical scar of left knee   Neurological: She is alert and oriented to person, place, and time. Gait abnormal.   Reflex Scores:       Patellar reflexes are 1+ on the right " side and 1+ on the left side.  Skin: Skin is warm, dry and intact.   Psychiatric: She has a normal mood and affect. Her speech is normal and behavior is normal. Judgment and thought content normal. Cognition and memory are normal.   Nursing note and vitals reviewed.      Assessment/Plan   Jung was seen today for pain.    Diagnoses and all orders for this visit:    Generalized pain    DDD (degenerative disc disease), lumbar    Generalized osteoarthritis    Lumbar facet arthropathy    Peripheral polyneuropathy    Encounter for long-term (current) use of high-risk medication    Other orders  -     HYDROcodone-acetaminophen (NORCO)  MG per tablet; Take 1 tablet by mouth Every 6 (Six) Hours As Needed for Moderate Pain .      --- The urine drug screen confirmation from 1-18-18 has been reviewed and the result is APPROPRIATE based on patient history and LOUANN report  --- Refill Hydrocodone. Patient appears stable with current regimen. No adverse effects. Regarding continuation of opioids, there is no evidence of aberrant behavior or any red flags.  The patient continues with appropriate response to opioid therapy. ADL's remain intact by self.   --- Declines lumbar interventions at this time.  --- Follow-up 2 months or sooner if needed.       LOUANN REPORT    As part of the patient's treatment plan, I am prescribing controlled substances. The patient has been made aware of appropriate use of such medications, including potential risk of somnolence, limited ability to drive and/or work safely, and the potential for dependence or overdose. It has also bee made clear that these medications are for use by this patient only, without concomitant use of alcohol or other substances unless prescribed.     Patient has completed prescribing agreement detailing terms of continued prescribing of controlled substances, including monitoring LOUANN reports, urine drug screening, and pill counts if necessary. The patient is aware  that inappropriate use will results in cessation of prescribing such medications.    LOUANN report has been reviewed and scanned into the patient's chart.    As the clinician, I personally reviewed the LOUANN from 7-16-18 while the patient was in the office today.    History and physical exam exhibit continued safe and appropriate use of controlled substances.       EMR Dragon/Transcription disclaimer:   Much of this encounter note is an electronic transcription/translation of spoken language to printed text. The electronic translation of spoken language may permit erroneous, or at times, nonsensical words or phrases to be inadvertently transcribed; Although I have reviewed the note for such errors, some may still exist.

## 2018-07-24 ENCOUNTER — PATIENT OUTREACH (OUTPATIENT)
Dept: CASE MANAGEMENT | Facility: OTHER | Age: 73
End: 2018-07-24

## 2018-07-24 ENCOUNTER — EPISODE CHANGES (OUTPATIENT)
Dept: CASE MANAGEMENT | Facility: OTHER | Age: 73
End: 2018-07-24

## 2018-07-25 ENCOUNTER — OFFICE VISIT (OUTPATIENT)
Dept: ORTHOPEDIC SURGERY | Facility: CLINIC | Age: 73
End: 2018-07-25

## 2018-07-25 DIAGNOSIS — Z96.652 STATUS POST TOTAL LEFT KNEE REPLACEMENT: Primary | ICD-10-CM

## 2018-07-25 PROCEDURE — 99213 OFFICE O/P EST LOW 20 MIN: CPT | Performed by: ORTHOPAEDIC SURGERY

## 2018-07-25 NOTE — PROGRESS NOTES
Subjective: Status post left total knee     Patient ID: Jung Short is a 73 y.o. female.    Chief Complaint:    History of Present Illness 73-year-old female is 3 months status post left total knee continues to do well with complete resolution of all of her preoperative pain and discomfort.  Ambulating independently without cane or crutch.  The only soreness she has is getting in and out of a chair and navigating steps and I think is due to more quad weakness in any instability in the knee itself.  Also her diabetic neuropathy gives her problems as far as ambulating long distances.       Social History     Occupational History   • [employer]  Retired     Social History Main Topics   • Smoking status: Former Smoker     Packs/day: 3.00     Years: 30.00     Types: Cigarettes     Quit date: 1983   • Smokeless tobacco: Never Used   • Alcohol use No      Comment: h/o quit 1980s   • Drug use: No   • Sexual activity: Defer      Comment: celibate      Review of Systems   Constitutional: Negative for chills, diaphoresis, fever and unexpected weight change.   HENT: Negative for hearing loss, nosebleeds, sore throat and tinnitus.    Eyes: Negative for pain and visual disturbance.   Respiratory: Negative for cough, shortness of breath and wheezing.    Cardiovascular: Negative for chest pain and palpitations.   Gastrointestinal: Negative for abdominal pain, diarrhea, nausea and vomiting.   Endocrine: Negative for cold intolerance, heat intolerance and polydipsia.   Genitourinary: Negative for difficulty urinating, dysuria and hematuria.   Musculoskeletal: Positive for arthralgias and myalgias. Negative for joint swelling.   Skin: Negative for rash and wound.   Allergic/Immunologic: Negative for environmental allergies.   Neurological: Negative for dizziness, syncope and numbness.   Hematological: Does not bruise/bleed easily.   Psychiatric/Behavioral: Negative for dysphoric mood and sleep disturbance. The patient is not  nervous/anxious.          Past Medical History:   Diagnosis Date   • Anemia    • Benign breast disease    • Cancer (CMS/HCC) 2015    Right breast   • Cellulitis of leg     May-2003 right lower extremity   • CHF (congestive heart failure) (CMS/HCC)     Dr Bates follows   • Chronic kidney disease     Dr Hannah follows   • Chronic ulcer of right foot (CMS/HCC)     Non-pressure, history of    • Depression    • Diabetic peripheral neuropathy (CMS/HCC)    • Diarrhea    • Diverticulosis    • Encounter for eye exam    • Fracture of left wrist     history of   • Gastroesophageal reflux    • Hiatal hernia    • History of bone density study 09/20/2012   • History of colonoscopy     done 6/03 recheck in 10 years.   Done july 2013 recheck in 5 years   • History of mammography, screening 09/20/2012   • Hospital discharge follow-up    • Hyperlipidemia    • Hypertension    • Hypothyroidism    • Impingement syndrome of right shoulder    • Joint pain    • Menopausal disorder    • Methicillin resistant Staphylococcus aureus infection 2012    after bladder surgery   • MRSA (methicillin resistant staph aureus) culture positive    • Nausea and vomiting    • Nonischemic cardiomyopathy (CMS/[facility])     Ejection fraction 10% per 2-D echo with Doppler however she did have cardiac catheterization April 2015 which showed ejection fraction 20% with global hypokinesis and severe mitral insufficiency   • Osteoarthritis     generalized, sched jania TKA   • Paroxysmal atrial fibrillation (CMS/HCC)     Dr Bates follows   • Postoperative infection     July of 2012 following her hysterectomy far vaginal wall prolapse. She was on antibiotics and wound dressings for 2 months.   • Shoulder pain, bilateral     has tears on both sides   • Syncope, psychogenic 4/19/2017   • Toe ulcer (CMS/HCC)     h/o to both feet, d/t shoes rubbing per patient   • Transient alteration of awareness 4/19/2017     Past Surgical History:   Procedure Laterality Date   •  ABDOMINAL SURGERY  2012    had to be reopened after bladder surgery d/t infection   • AMPUTATION FOOT / TOE Right 11/2013    Rt foot amputation MTP first toe   • BLADDER SURGERY  2012   • BREAST BIOPSY     • BREAST SURGERY  06/29/2015    Percutaneous ultrasound-guided placement of metal localized clip 1st lesion   • CARDIAC CATHETERIZATION  2015   • CATARACT EXTRACTION Bilateral 2017   • DEBRIDEMENT  FOOT Right 01/2013    During hospitalization    • EPIDURAL BLOCK      4 chronic back pain and leg pain last epidurals done 5-2012 she had no benefit at all from this procedure.   • FOOT SURGERY Bilateral     several   • HERNIA REPAIR      At the abdomen February 2007 Dr. Mendez   • INCISIONAL HERNIA REPAIR  05/16/2014    Recurrent. Incarcerated. With mesh implantation   • MASTECTOMY Right 05/2015   • SHOULDER SURGERY Right     x2 RCR   • TOTAL ABDOMINAL HYSTERECTOMY      with oophorectomy-Done June-2012 secondary to vaginal wall prolapse.   • TOTAL KNEE ARTHROPLASTY Right    • TOTAL KNEE ARTHROPLASTY Left 4/17/2018    Procedure: TOTAL KNEE ARTHROPLASTY;  Surgeon: Live Chambers MD;  Location: [facility];  Service: Orthopedics     Family History   Problem Relation Age of Onset   • Colonic polyp Mother    • Hypertension Mother    • Migraines Mother    • Mental illness Mother    • Depression Mother    • Coronary artery disease Father    • Other Father         Cardiac Disorder   • Colon cancer Father    • Kidney disease Father    • Cancer Father    • Breast cancer Maternal Aunt    • Colon cancer Brother    • Alcohol abuse Brother    • Arthritis Sister    • Hypertension Brother    • Lung disease Brother    • Alcohol abuse Brother    • Malig Hyperthermia Neg Hx          Objective:  There were no vitals filed for this visit.  There were no vitals filed for this visit.  There is no height or weight on file to calculate BMI.       Ortho Exam  She is alert and oriented ×3.  The left knee shows no swelling effusion erythema.  She has  0-125° of motion with no instability at 0 90° and no varus or valgus instability no patella subluxation.  Quad function is +3-1/2-4 out of 5.  Her calf is nontender with a negative Homans.  The skin is cool to touch.  No motor deficit good capillary refill but again she does complain of diabetic neuropathy both feet.  All in all the patient isn't very pleased with results of her surgery and now able to perform just about all activities of daily living without discomfort    Assessment:       1. Status post total left knee replacement          Plan:Over 10 minutes was spent with the patient reviewing a physical finding outlining treatment plan going forward.  Some of the soreness getting into a chair and navigating steps to do think his quad weakness I set it up therapy for home strengthening program.  As far as the neuropathy in the feet she states she's not on any medications I told her to contact her primary care physician to see she is a candidate for Neurontin or Lyrica.  Poorly prescribing the medication to her primary care physician as she knows her medical history better than I.  Return in 3 months with an x-ray of the knee            Work Status:    LOUANN query complete.    Orders:  Orders Placed This Encounter   Procedures   • Ambulatory Referral to Physical Therapy Evaluate and treat       Medications:  No orders of the defined types were placed in this encounter.      Followup:  Return in about 3 months (around 10/25/2018).          Dragon transcription disclaimer     Much of this encounter note is an electronic transcription/translation of spoken language to printed text. The electronic translation of spoken language may permit erroneous, or at times, nonsensical words or phrases to be inadvertently transcribed. Although I have reviewed the note for such errors, some may still exist.

## 2018-07-27 ENCOUNTER — OFFICE VISIT (OUTPATIENT)
Dept: INTERNAL MEDICINE | Facility: CLINIC | Age: 73
End: 2018-07-27

## 2018-07-27 ENCOUNTER — TELEPHONE (OUTPATIENT)
Dept: INTERNAL MEDICINE | Facility: CLINIC | Age: 73
End: 2018-07-27

## 2018-07-27 VITALS
SYSTOLIC BLOOD PRESSURE: 140 MMHG | TEMPERATURE: 98.2 F | OXYGEN SATURATION: 97 % | BODY MASS INDEX: 35.2 KG/M2 | HEART RATE: 72 BPM | DIASTOLIC BLOOD PRESSURE: 80 MMHG | HEIGHT: 66 IN | RESPIRATION RATE: 16 BRPM | WEIGHT: 219 LBS

## 2018-07-27 DIAGNOSIS — G62.9 PERIPHERAL POLYNEUROPATHY: Primary | ICD-10-CM

## 2018-07-27 DIAGNOSIS — N18.30 CHRONIC KIDNEY DISEASE, STAGE III (MODERATE) (HCC): ICD-10-CM

## 2018-07-27 PROCEDURE — 99214 OFFICE O/P EST MOD 30 MIN: CPT | Performed by: INTERNAL MEDICINE

## 2018-07-27 RX ORDER — GABAPENTIN 100 MG/1
200 CAPSULE ORAL 2 TIMES DAILY
Qty: 60 CAPSULE | Refills: 0 | Status: SHIPPED | OUTPATIENT
Start: 2018-07-27 | End: 2018-07-30 | Stop reason: SDUPTHER

## 2018-07-27 NOTE — PROGRESS NOTES
Jung Short is a 73 y.o. female, who presents with a chief complaint of   Chief Complaint   Patient presents with   • Hypertension     per patient's home Bp cuff   • Peripheral Neuropathy     pedal- suggested Gabapentin       72 yo F with CKD stage 3, diabetes and peripheral neuropathy that has worsened lately. The pain has been worsening. She has sharp and shooting pain in her bilateral feet that radiates up her leg to mid calf area. She has never been treated for this other than her Norco which she also takes for her arthritis. She takes 4 per day. All of her problems are new to me.          The following portions of the patient's history were reviewed and updated as appropriate: allergies, current medications, past family history, past medical history, past social history, past surgical history and problem list.    Allergies: Dilaudid [hydromorphone] and Latex    Current Outpatient Prescriptions:   •  alendronate (FOSAMAX) 35 MG tablet, take 1 tablet by mouth every week, Disp: 12 tablet, Rfl: 3  •  anastrozole (ARIMIDEX) 1 MG tablet, take 1 tablet by mouth once daily, Disp: 90 tablet, Rfl: 0  •  bumetanide (BUMEX) 2 MG tablet, Take 2 mg by mouth Daily., Disp: , Rfl:   •  carvedilol (COREG) 12.5 MG tablet, Take 12.5 mg by mouth 2 (Two) Times a Day With Meals., Disp: , Rfl:   •  docusate sodium 100 MG capsule, Take 100 mg by mouth 2 (Two) Times a Day., Disp: , Rfl:   •  DULoxetine (CYMBALTA) 60 MG capsule, Take 60 mg by mouth Daily., Disp: , Rfl:   •  esomeprazole (nexIUM) 20 MG capsule, Take 20 mg by mouth Every Morning Before Breakfast., Disp: , Rfl:   •  glimepiride (AMARYL) 1 MG tablet, take 1 tablet by mouth twice a day, Disp: 180 tablet, Rfl: 1  •  levothyroxine (SYNTHROID, LEVOTHROID) 25 MCG tablet, take 1 tablet by mouth once daily, Disp: 180 tablet, Rfl: 0  •  lisinopril (PRINIVIL,ZESTRIL) 5 MG tablet, take 1 tablet by mouth once daily, Disp: 120 tablet, Rfl: 0  •  magnesium oxide  "(MAG-OX) 400 MG tablet, Take 400 mg by mouth Daily., Disp: , Rfl:   •  Multiple Vitamins-Minerals (MULTIVITAMIN ADULT PO), Take  by mouth., Disp: , Rfl:   •  pravastatin (PRAVACHOL) 10 MG tablet, Take 10 mg by mouth Every Night., Disp: , Rfl:   •  spironolactone (ALDACTONE) 25 MG tablet, take 1 tablet by mouth every morning, Disp: 30 tablet, Rfl: 10  •  vitamin D (ERGOCALCIFEROL) 39734 units capsule capsule, Take 50,000 Units by mouth 1 (One) Time Per Week., Disp: , Rfl:   •  gabapentin (NEURONTIN) 100 MG capsule, Take 2 capsules by mouth 2 (Two) Times a Day., Disp: 60 capsule, Rfl: 0  •  HYDROcodone-acetaminophen (NORCO)  MG per tablet, Take 1 tablet by mouth Every 6 (Six) Hours As Needed for Moderate Pain ., Disp: 120 tablet, Rfl: 0  •  ondansetron (ZOFRAN) 8 MG tablet, Take 1 tablet by mouth Every 8 (Eight) Hours As Needed for Nausea or Vomiting., Disp: 30 tablet, Rfl: 2  Medications Discontinued During This Encounter   Medication Reason   • alendronate (FOSAMAX) 35 MG tablet Duplicate order   • anastrozole (ARIMIDEX) 1 MG tablet Duplicate order       Review of Systems   Constitutional: Negative for chills, fatigue and fever.   Musculoskeletal: Positive for arthralgias.   Neurological: Positive for numbness.        Tingling of bilateral feet              /80 (BP Location: Left arm, Patient Position: Sitting, Cuff Size: Large Adult)   Pulse 72   Temp 98.2 °F (36.8 °C) (Oral)   Resp 16   Ht 167.6 cm (66\")   Wt 99.3 kg (219 lb)   SpO2 97%   BMI 35.35 kg/m²       Physical Exam   Constitutional: She is oriented to person, place, and time. She appears well-developed and well-nourished. No distress.   HENT:   Head: Normocephalic and atraumatic.   Right Ear: External ear normal.   Left Ear: External ear normal.   Mouth/Throat: Oropharynx is clear and moist. No oropharyngeal exudate.   Eyes: Conjunctivae are normal. Right eye exhibits no discharge. Left eye exhibits no discharge. No scleral icterus. "   Neck: Neck supple.   Pulmonary/Chest: Effort normal.    Jung had a diabetic foot exam performed today.   During the foot exam she had a monofilament test performed.    Neurological Sensory Findings - Unaltered hot/cold right ankle/foot discrimination and unaltered hot/cold left ankle/foot discrimination. Altered sharp/dull right ankle/foot discrimination and altered sharp/dull left ankle/foot discrimination. No right ankle/foot altered proprioception and no left ankle/foot altered proprioception  Vascular Status -  Her right foot exhibits normal foot vasculature  and no edema. Her left foot exhibits normal foot vasculature  and no edema.  Skin Integrity  -  Her right foot skin is intact.Her left foot skin is intact..  Lymphadenopathy:     She has no cervical adenopathy.   Neurological: She is alert and oriented to person, place, and time.   Skin: Skin is warm. No rash noted.   Psychiatric: She has a normal mood and affect. Her behavior is normal.   Nursing note and vitals reviewed.        Results for orders placed or performed in visit on 06/15/18   Basic Metabolic Panel   Result Value Ref Range    Glucose 152 (H) 65 - 99 mg/dL    BUN 21 8 - 23 mg/dL    Creatinine 1.12 (H) 0.57 - 1.00 mg/dL    eGFR Non African Am 48 (L) >60 mL/min/1.73    eGFR African Am 58 (L) >60 mL/min/1.73    BUN/Creatinine Ratio 18.8 7.0 - 25.0    Sodium 141 136 - 145 mmol/L    Potassium 3.9 3.5 - 5.2 mmol/L    Chloride 101 98 - 107 mmol/L    Total CO2 26.4 22.0 - 29.0 mmol/L    Calcium 9.7 8.8 - 10.5 mg/dL           Jung was seen today for hypertension and peripheral neuropathy.    Diagnoses and all orders for this visit:    Peripheral polyneuropathy  -     Basic metabolic panel    Chronic kidney disease, stage III (moderate)  -     Basic metabolic panel    Other orders  -     gabapentin (NEURONTIN) 100 MG capsule; Take 2 capsules by mouth 2 (Two) Times a Day.      Start gabapentin at 200mg PO BID with her CKD Stage 3   Repeat BMP in 1  month to make sure that kidney function has maintained   Discussed risks and side effects of medication   Spent 50% of 25 minutes in counseling patient regarding treatment and side effects of medication       Return if symptoms worsen or fail to improve.    Kylee Jensen MD  07/27/2018

## 2018-07-27 NOTE — TELEPHONE ENCOUNTER
LM on patient's VM as per below.    ----- Message from Bubba Avalos MD sent at 7/26/2018  3:41 PM EDT -----  Needs appt.  ----- Message -----  From: Janet Lopez MA  Sent: 7/25/2018   2:56 PM  To: Bubba Avalos MD        ----- Message -----  From: Kylee Giulia  Sent: 7/25/2018   2:09 PM  To: Jovan Landaverde [facility]    PT STATES SHE HAS DIABETES IN HER FEET THAT REALLY BOTHERS HER A NIGHT. SHE SAW DR FALLON TODAY AND HE RECOMMENDS GABAPENTIN. CAN WE PRESCRIBE THIS FOR HER OR DOES SHE NEED TO BE SEEN?   ALSO SHE IS ON LISINOPRIL 5MG. SHE HAD TO TAKE THREE YESTERDAY TO KEEP IT DOWN. IT /86. THAT IS AS HIGH AS IT HAS GOT. SHE HAD GOT THE TOP NUMBER TO AROUND 150 WITH TAKING IT THREE TIMES DAILY.   SEES CHRISTOPHER.   CALL BACK [phone].

## 2018-07-30 ENCOUNTER — HOSPITAL ENCOUNTER (OUTPATIENT)
Dept: PHYSICAL THERAPY | Facility: HOSPITAL | Age: 73
Setting detail: THERAPIES SERIES
Discharge: HOME | End: 2018-07-30

## 2018-07-30 DIAGNOSIS — Z96.652 STATUS POST TOTAL KNEE REPLACEMENT, LEFT: Primary | ICD-10-CM

## 2018-07-30 PROCEDURE — G8979 MOBILITY GOAL STATUS: HCPCS | Performed by: PHYSICAL THERAPIST

## 2018-07-30 PROCEDURE — G8980 MOBILITY D/C STATUS: HCPCS | Performed by: PHYSICAL THERAPIST

## 2018-07-30 PROCEDURE — G8978 MOBILITY CURRENT STATUS: HCPCS | Performed by: PHYSICAL THERAPIST

## 2018-07-30 PROCEDURE — 97161 PT EVAL LOW COMPLEX 20 MIN: CPT | Performed by: PHYSICAL THERAPIST

## 2018-07-30 RX ORDER — GABAPENTIN 100 MG/1
200 CAPSULE ORAL 2 TIMES DAILY
Qty: 120 CAPSULE | Refills: 0 | Status: SHIPPED | OUTPATIENT
Start: 2018-07-30 | End: 2018-09-07 | Stop reason: SINTOL

## 2018-07-30 NOTE — THERAPY EVALUATION
Outpatient Physical Therapy Ortho Initial Evaluation  NOHEMY Diaz     Patient Name: Jung Short  : 1945  MRN: [id number]  Today's Date: 2018      Visit Date: 2018    Patient Active Problem List   Diagnosis   • Abdominal bloating   • Abdominal mass   • Abdominal pain   • Abnormal gait   • Abnormal mammogram   • Anemia   • Atherosclerosis of coronary artery   • Thoracic back pain   • Malignant neoplasm of upper-inner quadrant of right breast in female, estrogen receptor positive (CMS/HCC)   • Candidiasis   • Cardiomyopathy (CMS/HCC)   • Disc disorder of cervical region   • Neck pain   • Tenderness of chest wall   • Chronic kidney disease, stage III (moderate)   • Chronic pain   • Constipation   • Generalized osteoarthritis   • Depression   • Type 2 diabetes mellitus (CMS/HCC)   • Controlled type 2 diabetes mellitus without complication (CMS/HCC)   • Dyslipidemia   • Gastroesophageal reflux disease with esophagitis   • Generalized pain   • Gout   • Hypertension   • Hypothyroidism   • Incisional hernia   • Mass of breast   • Mitral valve insufficiency   • Nausea   • Adult body mass index greater than 30   • Osteopenia of spine   • Arthralgia of multiple joints   • Peripheral neuropathy   • Pulmonary hypertension   • Osteomyelitis (CMS/HCC)   • Tricuspid valve insufficiency   • Cobalamin deficiency   • Vitamin D deficiency   • Diabetes mellitus (CMS/HCC)   • Left knee pain   • Arthritis of knee   • DDD (degenerative disc disease), lumbar   • Lumbar facet arthropathy   • Pain in shoulder   • Chronic gout of multiple sites   • Encounter for long-term (current) use of high-risk medication   • Constipation due to opioid therapy   • Syncope, psychogenic   • Transient alteration of awareness   • Closed fracture of patella   • Primary osteoarthritis of left knee   • Radius/ulna fracture, left, closed, initial encounter   • Rib pain on left side   • Preoperative cardiovascular examination   •  Physical deconditioning   • Hyponatremia        Past Medical History:   Diagnosis Date   • Anemia    • Benign breast disease    • Cancer (CMS/HCC) 2015    Right breast   • Cellulitis of leg     May-2003 right lower extremity   • CHF (congestive heart failure) (CMS/HCC)     Dr Bates follows   • Chronic kidney disease     Dr Hannah follows   • Chronic ulcer of right foot (CMS/HCC)     Non-pressure, history of    • Depression    • Diabetic peripheral neuropathy (CMS/HCC)    • Diarrhea    • Diverticulosis    • Encounter for eye exam    • Fracture of left wrist     history of   • Gastroesophageal reflux    • Hiatal hernia    • History of bone density study 09/20/2012   • History of colonoscopy     done 6/03 recheck in 10 years.   Done july 2013 recheck in 5 years   • History of mammography, screening 09/20/2012   • Hospital discharge follow-up    • Hyperlipidemia    • Hypertension    • Hypothyroidism    • Impingement syndrome of right shoulder    • Joint pain    • Menopausal disorder    • Methicillin resistant Staphylococcus aureus infection 2012    after bladder surgery   • MRSA (methicillin resistant staph aureus) culture positive    • Nausea and vomiting    • Nonischemic cardiomyopathy (CMS/HCC)     Ejection fraction 10% per 2-D echo with Doppler however she did have cardiac catheterization April 2015 which showed ejection fraction 20% with global hypokinesis and severe mitral insufficiency   • Osteoarthritis     generalized, sched jania TKA   • Paroxysmal atrial fibrillation (CMS/HCC)     Dr Bates follows   • Postoperative infection     July of 2012 following her hysterectomy far vaginal wall prolapse. She was on antibiotics and wound dressings for 2 months.   • Shoulder pain, bilateral     has tears on both sides   • Syncope, psychogenic 4/19/2017   • Toe ulcer (CMS/HCC)     h/o to both feet, d/t shoes rubbing per patient   • Transient alteration of awareness 4/19/2017        Past Surgical History:    Procedure Laterality Date   • ABDOMINAL SURGERY  2012    had to be reopened after bladder surgery d/t infection   • AMPUTATION FOOT / TOE Right 11/2013    Rt foot amputation MTP first toe   • BLADDER SURGERY  2012   • BREAST BIOPSY     • BREAST SURGERY  06/29/2015    Percutaneous ultrasound-guided placement of metal localized clip 1st lesion   • CARDIAC CATHETERIZATION  2015   • CATARACT EXTRACTION Bilateral 2017   • DEBRIDEMENT  FOOT Right 01/2013    During hospitalization    • EPIDURAL BLOCK      4 chronic back pain and leg pain last epidurals done 5-2012 she had no benefit at all from this procedure.   • FOOT SURGERY Bilateral     several   • HERNIA REPAIR      At the abdomen February 2007 Dr. Mendez   • INCISIONAL HERNIA REPAIR  05/16/2014    Recurrent. Incarcerated. With mesh implantation   • MASTECTOMY Right 05/2015   • SHOULDER SURGERY Right     x2 RCR   • TOTAL ABDOMINAL HYSTERECTOMY      with oophorectomy-Done June-2012 secondary to vaginal wall prolapse.   • TOTAL KNEE ARTHROPLASTY Right    • TOTAL KNEE ARTHROPLASTY Left 4/17/2018    Procedure: TOTAL KNEE ARTHROPLASTY;  Surgeon: Live Chambers MD;  Location: [facility];  Service: Orthopedics       Visit Dx:     ICD-10-CM ICD-9-CM   1. Status post total knee replacement, left Z96.652 V43.65             Patient History     Row Name 07/30/18 1140             History    Chief Complaint Difficulty Walking;Difficulty with daily activities;Pain   Patient complains of nausea since surgery  -      Type of Pain Knee pain   left  -      Date Current Problem(s) Began 04/17/18  -      Brief Description of Current Complaint Pt had left TKA on 4/17/2018. She had one outpatient post-op visit before getting very sick for several weeks. She decided at that point to do her own therapy based on what she did after her right TKA. She has done well with her rehab, but a follow up with the MD resulted in a referral for therapy due to quad weakness.   -GC      Previous  treatment for THIS PROBLEM Rehabilitation  -      Patient/Caregiver Goals Improve strength  -      Patient's Rating of General Health Fair  -      Occupation/sports/leisure activities Patient volunteers at hospital  -      What clinical tests have you had for this problem? X-ray  -         Pain     Pain Location Knee   left  -GC      Pain at Present 0  -GC      Pain at Best 0  -GC      Pain at Worst 2  -GC      Pain Frequency Intermittent  -GC      Pain Description Aching;Discomfort  -      What Performance Factors Make the Current Problem(s) WORSE? Pt has pain withgoing up stairs and rising from low seat heights  -      What Performance Factors Make the Current Problem(s) BETTER? Pt feels better if she gets off her feet and rests  -      Difficulties with ADL's? Pt has some difficulty with ADLs due to trouble with going up stairs  -         Fall Risk Assessment    Any falls in the past year: Yes  -GC      Number of falls reported in the last 12 months 1  -GC      Factors that contributed to the fall: Tripped  -         Daily Activities    Primary Language English  -      Are you able to read Yes  -GC      Are you able to write Yes  -GC      How does patient learn best? Reading;Listening  -      Teaching needs identified Home Exercise Program;Management of Condition  -      Patient is concerned about/has problems with --  -GC      Does patient have problems with the following? None  -      Barriers to learning None  -GC      Pt Participated in POC and Goals Yes  -GC         Safety    Are you being hurt, hit, or frightened by anyone at home or in your life? No  -GC      Are you being neglected by a caregiver No  -GC        User Key  (r) = Recorded By, (t) = Taken By, (c) = Cosigned By    Initials Name Provider Type     Ramirez Gutierrez PT Physical Therapist                PT Ortho     Row Name 07/30/18 1140       Posture/Observations    Posture/Observations Comments Pt has mild left knee  edema and mild quadricep atrophy noted.  -GC       General ROM    GENERAL ROM COMMENTS TKE < 6 degrees left knee  -GC       Left Lower Ext    Lt Knee Extension/Flexion AROM 0-125 degrees  -GC       Left Hip (Manual Muscle Testing)    MMT: Flexion, Left Hip flexion  -    MMT, Gross Movement: Left Hip Flexion (4+/5) good plus  -GC    MMT: ABduction, Left Hip abduction  -    MMT, Gross Movement: Left Hip ABduction (5/5) normal  -GC    MMT, Gross Movement: Left Hip ADduction (4/5) good   adduction  -GC       Left Knee (Manual Muscle Testing)    MMT: Extension, Left Knee extension  -    MMT, Gross Movement: Left Knee Extension (4+/5) good plus   4/5 at TKE  -GC    MMT: Flexion, Left Knee flexion  -GC    MMT, Gross Movement: Left Knee Flexion (4+/5) good plus  -GC       Left Ankle/Foot (Manual Muscle Testing)    MMT: Plantarflexion, Left Ankle plantarflexion  -    MMT, Gross Movement: Left Ankle Plantarflexion (5/5) normal  -    MMT: Dorsiflexion Left Ankle Muscles dorsiflexion  -    MMT, Gross Movement: Left Ankle Dorsiflexion (5/5) normal  -       Lower Extremity Flexibility    Hamstrings Left:;Mildly limited  -GC    Hip Flexors Left:;WNL  -GC    Quadriceps Left:;Mildly limited  -GC       Transfers    Comment (Transfers) Pt is independent with all bed mobility and transfers, but was noted to have difficulty rising from chair in waiting room  -       Gait/Stairs Assessment/Training    Comment (Gait/Stairs) Pt ambulates without assistive device  -      User Key  (r) = Recorded By, (t) = Taken By, (c) = Cosigned By    Initials Name Provider Type    GC Ramirez Gutierrez, PT Physical Therapist                      Therapy Education  Given: HEP  Program: New  How Provided: Verbal, Demonstration, Written  Provided to: Patient  Level of Understanding: Teach back education performed, Verbalized, Demonstrated           PT OP Goals     Row Name 07/30/18 2518          PT Short Term Goals    STG Date to Achieve  07/30/18  -     STG 1 Pt will be independent with her HEP issued by this therapist.  -     STG 1 Progress Met  -       User Key  (r) = Recorded By, (t) = Taken By, (c) = Cosigned By    Initials Name Provider Type    ALEXANDER Gutierrez, PT Physical Therapist                PT Assessment/Plan     Row Name 07/30/18 1140          PT Assessment    Functional Limitations Impaired gait;Limitations in community activities;Limitations in functional capacity and performance  -     Impairments Gait;Pain;Muscle strength  -     Assessment Comments Pt presents several months s/p left TKA. She has done most of her rehab at home on her own. She still has some decrease in her quad strength leading to difficulty with stairs and rising from a low seat height. She is seen today for instruction in an HEP only.  -     Rehab Potential Good  -     Patient/caregiver participated in establishment of treatment plan and goals Yes  -     Patient would benefit from skilled therapy intervention Yes  -        PT Plan    PT Frequency One time visit  -     Planned CPT's? PT EVAL LOW COMPLEXITY: 28107  -     PT Plan Comments Pt is to continue her HEP daily  -       User Key  (r) = Recorded By, (t) = Taken By, (c) = Cosigned By    Initials Name Provider Type    ALEXANDER Gutierrez, PT Physical Therapist                  Exercises     Row Name 07/30/18 1140             Exercise 1    Exercise Name 1 SLR  -GC      Cueing 1 Verbal;Tactile  -GC      Reps 1 25  -GC         Exercise 2    Exercise Name 2 SAQ  -GC      Cueing 2 Verbal;Tactile  -GC      Reps 2 25  -GC         Exercise 3    Exercise Name 3 LAQ  -GC      Cueing 3 Verbal;Tactile  -GC      Reps 3 25  -GC         Exercise 4    Exercise Name 4 TKE vs latex free theraband  -GC      Cueing 4 Verbal;Demo  -GC      Reps 4 25  -GC      Additional Comments black  -GC         Exercise 5    Exercise Name 5 Mini squats/ball squeeze  -GC      Cueing 5 Verbal;Demo  -GC      Reps 5 25  -GC          Exercise 6    Exercise Name 6 Forward/backward step ups/downs  -      Cueing 6 Verbal;Demo  -GC      Reps 6 20  -GC        User Key  (r) = Recorded By, (t) = Taken By, (c) = Cosigned By    Initials Name Provider Type     Ramirez Gutierrez PT Physical Therapist                        Outcome Measure Options: Lower Extremity Functional Scale (LEFS)  Lower Extremity Functional Index  Any of your usual work, housework or school activities: A little bit of difficulty  Your usual hobbies, recreational or sporting activities: A little bit of difficulty  Getting into or out of the bath: A little bit of difficulty  Walking between rooms: No difficulty  Putting on your shoes or socks: No difficulty  Squatting: Moderate difficulty  Lifting an object, like a bag of groceries from the floor: No difficulty  Performing light activities around your home: No difficulty  Performing heavy activities around your home: A little bit of difficulty  Getting into or out of a car: A little bit of difficulty  Walking 2 blocks: No difficulty  Walking a mile: No difficulty  Going up or down 10 stairs (about 1 flight of stairs): A little bit of difficulty  Standing for 1 hour: Moderate difficulty  Sitting for 1 hour: No difficulty  Running on even ground: Quite a bit of difficulty  Running on uneven ground: Quite a bit of difficulty  Making sharp turns while running fast: Quite a bit of difficulty  Hopping: Quite a bit of difficulty  Rolling over in bed: No difficulty  Total: 58      Time Calculation:     Therapy Suggested Charges     Code   Minutes Charges    None             Start Time: 1140  Stop Time: 1218  Time Calculation (min): 38 min     Therapy Charges for Today     Code Description Service Date Service Provider Modifiers Qty    84160724397 HC PT EVAL LOW COMPLEXITY 1 7/30/2018 Ramirez Gutierrez, PT GP 1    50455265342 HC PT MOBILITY CURRENT 7/30/2018 Ramirez Gutierrez, PT GP, CI 1    46363740004 HC PT MOBILITY PROJECTED 7/30/2018 Ramirez  Brenda, PT GP, CI 1    [id number]  PT MOBILITY DISCHARGE 7/30/2018 Ramirez Gutierrez, PT GP, CI 1          PT G-Codes  Outcome Measure Options: Lower Extremity Functional Scale (LEFS)  Score: 58  Functional Limitation: Mobility: Walking and moving around  Mobility: Walking and Moving Around Current Status (): At least 1 percent but less than 20 percent impaired, limited or restricted  Mobility: Walking and Moving Around Goal Status (): At least 1 percent but less than 20 percent impaired, limited or restricted  Mobility: Walking and Moving Around Discharge Status (): At least 1 percent but less than 20 percent impaired, limited or restricted         Ramirez Gutierrez, PT  7/30/2018       Patient/Caregiver provided printed discharge information.

## 2018-07-30 NOTE — TELEPHONE ENCOUNTER
PT  AWARE TO SENT  TO [facility]      ----- Message from Kylee Jensen MD sent at 7/30/2018  2:10 PM EDT -----  It was an accident. Can send in 30 day supply for me. I didn't realize it was 2 tabs  BID   ----- Message -----  From: Lizeth Hampton MA  Sent: 7/30/2018   2:06 PM  To: Kylee Jensen MD    PT  CALLED TO LET US KNOW THAT ONLY  15 DAYS WORTH OF GABAPENTIN 100MG   2 PO  BID  WAS SENT TO PHARMACY.  SHE WANTS TO KNOW WHY ONLY   15 DAYS WORTH OF MEDICATION WAS SENT, HAS NOT PICKED UP YET.   SHOULD FOR   120 FOR   30 DAYS.     THANKS    [phone]    ADVISE

## 2018-08-01 DIAGNOSIS — R14.0 ABDOMINAL BLOATING: ICD-10-CM

## 2018-08-01 RX ORDER — DULOXETIN HYDROCHLORIDE 60 MG/1
CAPSULE, DELAYED RELEASE ORAL
Qty: 90 CAPSULE | Refills: 0 | Status: SHIPPED | OUTPATIENT
Start: 2018-08-01 | End: 2018-10-26 | Stop reason: SDUPTHER

## 2018-08-07 ENCOUNTER — TELEPHONE (OUTPATIENT)
Dept: INTERNAL MEDICINE | Facility: CLINIC | Age: 73
End: 2018-08-07

## 2018-08-07 NOTE — TELEPHONE ENCOUNTER
----- Message from Lizeth Hampton MA sent at 8/7/2018 12:18 PM EDT -----  I think this was  Suppose to be on  Usman  feeback chart not  Ms tierney.      ----- Message -----  From: Janet Lopez MA  Sent: 8/7/2018  10:33 AM  To: Lizeth Hampton MA    I don't see Valsartan on her med list, just Lisinopril...  ----- Message -----  From: Maryjane Milton MD  Sent: 8/6/2018  10:29 AM  To: Janet Lopez MA    Switch per chart I gave you.    ----- Message -----  From: Lizeth Hampton MA  Sent: 7/30/2018   2:08 PM  To: Maryjane Milton MD    PT  CALLED GOT RECALL NOTICE ON HIS   VALSARTAN   80 MG, NEEDS SOMETHING ELSE  SENT TO PHARMACY      [phone]

## 2018-08-09 RX ORDER — BUMETANIDE 2 MG/1
TABLET ORAL
Qty: 60 TABLET | Refills: 5 | Status: SHIPPED | OUTPATIENT
Start: 2018-08-09 | End: 2018-12-06 | Stop reason: SDUPTHER

## 2018-08-09 RX ORDER — BUMETANIDE 2 MG/1
TABLET ORAL
Qty: 60 TABLET | Refills: 5 | Status: SHIPPED | OUTPATIENT
Start: 2018-08-09 | End: 2018-10-05

## 2018-08-10 ENCOUNTER — TELEPHONE (OUTPATIENT)
Dept: ORTHOPEDIC SURGERY | Facility: CLINIC | Age: 73
End: 2018-08-10

## 2018-08-10 NOTE — TELEPHONE ENCOUNTER
"Patient calling stating that she fell at home last night and bumped her left knee she is Status post left total knee on 04.17.2018, she said her knee is just a little \"very little but sore\".    Dr. Chambers notified and he would like the patient to just rest it over the weekend and call Monday if it is still giving her any issues.    Thanks.      "

## 2018-08-13 NOTE — TELEPHONE ENCOUNTER
Medication Refill Request    Date of phone call: 18    Medication being requested: hydro/apap  mg si tab q 6 hrs  Qty: 120    Date of last visit: 18    Date of last refill: 18    LOUANN up to date?: yes    Next Follow up?: 18    Any new pertinent information? (i.e, new medication allergies, new use of medications, change in patient's health or condition, non-compliance or inconsistency with prescribing agreement?):

## 2018-08-14 RX ORDER — HYDROCODONE BITARTRATE AND ACETAMINOPHEN 10; 325 MG/1; MG/1
1 TABLET ORAL EVERY 6 HOURS PRN
Qty: 120 TABLET | Refills: 0 | Status: SHIPPED | OUTPATIENT
Start: 2018-08-14 | End: 2018-09-17 | Stop reason: SDUPTHER

## 2018-08-16 ENCOUNTER — HOSPITAL ENCOUNTER (OUTPATIENT)
Dept: MAMMOGRAPHY | Facility: HOSPITAL | Age: 73
Discharge: HOME | End: 2018-08-16
Attending: INTERNAL MEDICINE | Admitting: INTERNAL MEDICINE

## 2018-08-16 DIAGNOSIS — Z17.0 MALIGNANT NEOPLASM OF UPPER-INNER QUADRANT OF RIGHT BREAST IN FEMALE, ESTROGEN RECEPTOR POSITIVE (HCC): ICD-10-CM

## 2018-08-16 DIAGNOSIS — C50.211 MALIGNANT NEOPLASM OF UPPER-INNER QUADRANT OF RIGHT BREAST IN FEMALE, ESTROGEN RECEPTOR POSITIVE (HCC): ICD-10-CM

## 2018-08-16 DIAGNOSIS — Z12.31 ENCOUNTER FOR SCREENING MAMMOGRAM FOR BREAST CANCER: ICD-10-CM

## 2018-08-16 PROCEDURE — 77063 BREAST TOMOSYNTHESIS BI: CPT

## 2018-08-16 PROCEDURE — 77067 SCR MAMMO BI INCL CAD: CPT

## 2018-08-17 ENCOUNTER — TELEPHONE (OUTPATIENT)
Dept: INTERNAL MEDICINE | Facility: CLINIC | Age: 73
End: 2018-08-17

## 2018-08-17 DIAGNOSIS — E11.42 DM TYPE 2 WITH DIABETIC PERIPHERAL NEUROPATHY (HCC): ICD-10-CM

## 2018-08-17 DIAGNOSIS — E11.8 TYPE 2 DIABETES MELLITUS WITH COMPLICATION, UNSPECIFIED LONG TERM INSULIN USE STATUS: Primary | ICD-10-CM

## 2018-08-17 DIAGNOSIS — L97.509 ULCER OF FOOT, UNSPECIFIED LATERALITY, UNSPECIFIED ULCER STAGE (HCC): ICD-10-CM

## 2018-08-17 NOTE — TELEPHONE ENCOUNTER
----- Message from Janet Lopez MA sent at 8/17/2018  9:59 AM EDT -----  At patient request, I faxed order for diabetic shoes (along with demos, insurance, and last OV notes) to [facility].

## 2018-08-21 ENCOUNTER — TELEPHONE (OUTPATIENT)
Dept: INTERNAL MEDICINE | Facility: CLINIC | Age: 73
End: 2018-08-21

## 2018-08-21 NOTE — TELEPHONE ENCOUNTER
Left detailed message for pt.   Also told her to call back and let us know if she wants to change to lyrica        ----- Message from Kylee Borjas MD sent at 8/21/2018  7:35 AM EDT -----  Gabapentin should not make her swell. If she is falling from side effects of gabapentin (feeling dizzy or drunk), then we can switch her to Lyrica. Otherwise, it may take up to a month for her to not have side effects and she may be falling due to the neuropathy. Should would have to let us know whether this was a tripping fall (mechanical in nature) or related to dizziness.  If she is wanting to switch, I would start at 75mg PO BID of Lyrica.    ----- Message -----  From: Lizeth Hampton MA  Sent: 8/20/2018   3:44 PM  To: Kylee Borjas MD        ----- Message -----  From: Kylee Platt  Sent: 8/20/2018   3:22 PM  [facility] [place]    PT STATES SHE WAS PUT ON GABAPENTIN. STATES IT IS WORKING FOR THE PAIN, BUT SHE HAS FALLEN. HER LEGS FEELS HEAVY-HAVE BEEN SWOLLEN ONCE OR TWICE. WANTS TO KNOW HOW LONG THIS WILL LAST WITH THIS MEDICINE. SHE SAW DR BORJAS-AND WAS SWITCHED TO THIS (THIS IS WHO IS ON HER BOTTLE) NOT SURE IF SHE IS WHO PRESCRIBED THIS.   CALL BACK [phone].  NORMALLY SEES LU

## 2018-08-21 NOTE — TELEPHONE ENCOUNTER
"  Pt  Aware           ----- Message from Kylee Borjas MD sent at 8/21/2018  3:31 PM EDT -----  The walking and tripping may never get fully better since the nerves are damaged in her feet.Her feeling in her feet will not get better with the gabapentin, but will help with pain. She needs to remove all rugs or cables that she could trip over. Wear good shoes that she can't easily fall in either.     ----- Message -----  From: Lizeth Hampton MA  Sent: 8/21/2018   3:08 PM  To: Kylee Borjas MD    Pt is going  Try to stay on gabapentin.  She just wants to know how  Long   Before she can walk normal again.   Falls related fell overy rug once  And  2 times got feet tangled up.   Morning she is fine.   Feels like she has   \"heavy legs in the afternoon.  No longer has   Neuropathy since being on meds.      [phone]  ----- Message -----  From: Lizeth Hampton MA  Sent: 8/21/2018   2:53 PM  To: Lizeth Hampton MA        ----- Message -----  From: Kylee Borjas MD  Sent: 8/21/2018   7:35 AM  To: Lizeth Hampton MA    Gabapentin should not make her swell. If she is falling from side effects of gabapentin (feeling dizzy or drunk), then we can switch her to Lyrica. Otherwise, it may take up to a month for her to not have side effects and she may be falling due to the neuropathy. Should would have to let us know whether this was a tripping fall (mechanical in nature) or related to dizziness.  If she is wanting to switch, I would start at 75mg PO BID of Lyrica.    ----- Message -----  From: Lizeth Hampton MA  Sent: 8/20/2018   3:44 PM  To: Kylee Borjas MD        ----- Message -----  From: Kylee Platt  Sent: 8/20/2018   3:22 PM  To: [facility] Clinical [place]    PT STATES SHE WAS PUT ON GABAPENTIN. STATES IT IS WORKING FOR THE PAIN, BUT SHE HAS FALLEN. HER LEGS FEELS HEAVY-HAVE BEEN SWOLLEN ONCE OR TWICE. WANTS TO KNOW HOW LONG THIS WILL LAST WITH THIS MEDICINE. SHE SAW DR BORJAS-AND WAS SWITCHED " TO THIS (THIS IS WHO IS ON HER BOTTLE) NOT SURE IF SHE IS WHO PRESCRIBED THIS.   CALL BACK [phone].  NORMALLY SEES LU

## 2018-08-27 LAB
BUN SERPL-MCNC: 15 MG/DL (ref 8–23)
BUN/CREAT SERPL: 14.3 (ref 7–25)
CALCIUM SERPL-MCNC: 8.7 MG/DL (ref 8.8–10.5)
CHLORIDE SERPL-SCNC: 101 MMOL/L (ref 98–107)
CO2 SERPL-SCNC: 25.2 MMOL/L (ref 22–29)
CREAT SERPL-MCNC: 1.05 MG/DL (ref 0.57–1)
GLUCOSE SERPL-MCNC: 349 MG/DL (ref 65–99)
POTASSIUM SERPL-SCNC: 3.4 MMOL/L (ref 3.5–5.2)
SODIUM SERPL-SCNC: 142 MMOL/L (ref 136–145)

## 2018-08-28 ENCOUNTER — TELEPHONE (OUTPATIENT)
Dept: INTERNAL MEDICINE | Facility: CLINIC | Age: 73
End: 2018-08-28

## 2018-08-28 DIAGNOSIS — E87.6 LOW BLOOD POTASSIUM: Primary | ICD-10-CM

## 2018-08-28 RX ORDER — POTASSIUM CHLORIDE 750 MG/1
10 TABLET, FILM COATED, EXTENDED RELEASE ORAL DAILY
Qty: 30 TABLET | Refills: 1 | Status: SHIPPED | OUTPATIENT
Start: 2018-08-28 | End: 2018-08-28 | Stop reason: SDUPTHER

## 2018-08-28 RX ORDER — POTASSIUM CHLORIDE 750 MG/1
10 TABLET, FILM COATED, EXTENDED RELEASE ORAL DAILY
Qty: 90 TABLET | Refills: 1 | Status: SHIPPED | OUTPATIENT
Start: 2018-08-28 | End: 2019-02-26 | Stop reason: SDUPTHER

## 2018-08-28 NOTE — TELEPHONE ENCOUNTER
Patient has been advised and voiced understanding. RX sent to local pharmacy. Lab apt scheduled. Patient voiced ok.     ----- Message from Kylee Jensen MD sent at 8/28/2018  8:06 AM EDT -----  Kidney function is better. Potassium a little low. I want her to start taking 10meq of potassium chloride per day and recheck BMP in 7-10 days. Keep pushing fluids.

## 2018-09-05 LAB
BUN SERPL-MCNC: 14 MG/DL (ref 8–23)
BUN/CREAT SERPL: 13.2 (ref 7–25)
CALCIUM SERPL-MCNC: 9.4 MG/DL (ref 8.8–10.5)
CHLORIDE SERPL-SCNC: 99 MMOL/L (ref 98–107)
CO2 SERPL-SCNC: 27.1 MMOL/L (ref 22–29)
CREAT SERPL-MCNC: 1.06 MG/DL (ref 0.57–1)
GLUCOSE SERPL-MCNC: 235 MG/DL (ref 65–99)
POTASSIUM SERPL-SCNC: 4 MMOL/L (ref 3.5–5.2)
SODIUM SERPL-SCNC: 140 MMOL/L (ref 136–145)

## 2018-09-06 NOTE — PROGRESS NOTES
Potassium is much better and I want her to stay on the 10mEq per day. She needs to schedule labs and AWV with Dr. Avalos in the next few months to have her cholesterol and fasting labs checked.

## 2018-09-07 ENCOUNTER — RESULTS ENCOUNTER (OUTPATIENT)
Dept: INTERNAL MEDICINE | Facility: CLINIC | Age: 73
End: 2018-09-07

## 2018-09-07 DIAGNOSIS — E87.6 LOW BLOOD POTASSIUM: ICD-10-CM

## 2018-09-07 RX ORDER — PREGABALIN 75 MG/1
75 CAPSULE ORAL 2 TIMES DAILY
Qty: 60 CAPSULE | Refills: 0 | Status: SHIPPED | OUTPATIENT
Start: 2018-09-07 | End: 2018-10-05 | Stop reason: SDUPTHER

## 2018-09-07 NOTE — TELEPHONE ENCOUNTER
"Patient has been advised and voiced understanding. Patient states her legs have been \"wobbly\" and does not like RX gabapentin. Patient states she has fallen a \"few\" times in the past few weeks, advised to go to ER, did not go to ER. MDs aware. Per Dr Jensen patient can dc RX gabapentin and start RX Lyrica 75 mg po BID. Rx sent to local pharmacy.     ----- Message from Kylee Jenesn MD sent at 9/6/2018  8:01 AM EDT -----  Potassium is much better and I want her to stay on the 10mEq per day. She needs to schedule labs and AWV with Dr. Avalos in the next few months to have her cholesterol and fasting labs checked.    "

## 2018-09-17 ENCOUNTER — OFFICE VISIT (OUTPATIENT)
Dept: PAIN MEDICINE | Facility: CLINIC | Age: 73
End: 2018-09-17

## 2018-09-17 VITALS
RESPIRATION RATE: 14 BRPM | HEART RATE: 74 BPM | HEIGHT: 66 IN | OXYGEN SATURATION: 96 % | BODY MASS INDEX: 35.81 KG/M2 | DIASTOLIC BLOOD PRESSURE: 88 MMHG | TEMPERATURE: 97.1 F | WEIGHT: 222.8 LBS | SYSTOLIC BLOOD PRESSURE: 159 MMHG

## 2018-09-17 DIAGNOSIS — M47.816 LUMBAR FACET ARTHROPATHY: ICD-10-CM

## 2018-09-17 DIAGNOSIS — G89.29 OTHER CHRONIC PAIN: Primary | ICD-10-CM

## 2018-09-17 DIAGNOSIS — Z79.899 ENCOUNTER FOR LONG-TERM (CURRENT) USE OF HIGH-RISK MEDICATION: ICD-10-CM

## 2018-09-17 DIAGNOSIS — M51.36 DDD (DEGENERATIVE DISC DISEASE), LUMBAR: ICD-10-CM

## 2018-09-17 DIAGNOSIS — M25.50 ARTHRALGIA OF MULTIPLE JOINTS: ICD-10-CM

## 2018-09-17 DIAGNOSIS — M15.9 GENERALIZED OSTEOARTHRITIS: ICD-10-CM

## 2018-09-17 PROCEDURE — 99214 OFFICE O/P EST MOD 30 MIN: CPT | Performed by: NURSE PRACTITIONER

## 2018-09-17 RX ORDER — HYDROCODONE BITARTRATE AND ACETAMINOPHEN 10; 325 MG/1; MG/1
1 TABLET ORAL EVERY 6 HOURS PRN
Qty: 120 TABLET | Refills: 0 | Status: SHIPPED | OUTPATIENT
Start: 2018-09-17 | End: 2018-10-11 | Stop reason: SDUPTHER

## 2018-09-17 NOTE — PROGRESS NOTES
CHIEF COMPLAINT  F/U back pain and arthritis. States BP been worse in the last three weeks, and had fallen four times. She is now on Lyrica rather than Gabapentin.     Subjective   Jung Short is a 73 y.o. female  who presents to the office for follow-up.She has a history of chronic back and joint pain. Reports her pain is worse since last office visit, specifically her back pain.  Stopped gabapentin due to falls. Was started by Dr. Jnesen. Could not tolerate. Changed to Lyrica 75 mg BID.  Started a few days ago. Was having worsening neuropathy symptoms, which is why she went to Dr. Jensen to begin with, as well we elevated BP.     Complains of pain in her low back, joints and feet. Today her pain is 6/10VAS. Describes her pain as continuous and slightly worse. Has started Lyrica. Continues with Hydrocodone 10/325 4/day and Cymbalta, and Lyrica 75 mg BID.  Denies any side effects from the regimen. Has previously had constipation but this has resolved.  This helps decrease her pain by 50%. ADL's by self.     Had left TKR 4-17-18.  Last refill of pain medication was 8-14-18.   Joint Pain   This is a chronic problem. The current episode started more than 1 year ago (today pain is 6/10VAS- joints). The problem occurs constantly. The problem has been unchanged. Associated symptoms include arthralgias, fatigue, joint swelling and weakness (L leg). Pertinent negatives include no chest pain, chills, congestion, coughing, fever, headaches, nausea, numbness or vomiting. Nothing aggravates the symptoms. She has tried oral narcotics, position changes and rest for the symptoms. The treatment provided moderate relief.   Back Pain   This is a chronic problem. The current episode started more than 1 year ago. The problem occurs constantly. The problem has been gradually worsening (wosre since last office visti) since onset. The pain is present in the lumbar spine. The pain radiates to the right knee, right foot and right  "thigh. The pain is at a severity of 6/10. The pain is moderate. Associated symptoms include weakness (L leg). Pertinent negatives include no chest pain, dysuria, fever, headaches or numbness. Risk factors include lack of exercise and obesity. She has tried analgesics for the symptoms. The treatment provided moderate relief.      PEG Assessment   What number best describes your pain on average in the past week?5  What number best describes how, during the past week, pain has interfered with your enjoyment of life?5  What number best describes how, during the past week, pain has interfered with your general activity?  5    The following portions of the patient's history were reviewed and updated as appropriate: allergies, current medications, past family history, past medical history, past social history, past surgical history and problem list.    Review of Systems   Constitutional: Positive for fatigue. Negative for activity change, appetite change, chills and fever.   HENT: Negative for congestion.    Respiratory: Negative for cough and shortness of breath.    Cardiovascular: Negative for chest pain.   Gastrointestinal: Negative for constipation, diarrhea, nausea and vomiting.   Genitourinary: Negative for difficulty urinating, dyspareunia and dysuria.   Musculoskeletal: Positive for arthralgias, back pain and joint swelling.   Neurological: Positive for weakness (L leg). Negative for dizziness, light-headedness, numbness and headaches.   Psychiatric/Behavioral: Positive for sleep disturbance. Negative for agitation, confusion, hallucinations, self-injury and suicidal ideas. The patient is not nervous/anxious.        Vitals:    09/17/18 1419   BP: 159/88   Pulse: 74   Resp: 14   Temp: 97.1 °F (36.2 °C)   SpO2: 96%   Weight: 101 kg (222 lb 12.8 oz)   Height: 167.6 cm (65.98\")   PainSc:   6   PainLoc: Back     Objective   Physical Exam   Constitutional: She is oriented to person, place, and time. Vital signs are " normal. She appears well-developed and well-nourished. She is cooperative.   HENT:   Head: Normocephalic and atraumatic.   Nose: Nose normal.   Eyes: Conjunctivae and lids are normal.   Cardiovascular: Normal rate.    Pulmonary/Chest: Effort normal.   Abdominal: Normal appearance.   Musculoskeletal:        Lumbar back: She exhibits decreased range of motion and tenderness.   Surgical scar of left knee   Neurological: She is alert and oriented to person, place, and time. Gait (cane) abnormal.   Reflex Scores:       Patellar reflexes are 1+ on the right side and 1+ on the left side.  Skin: Skin is warm, dry and intact.   Psychiatric: She has a normal mood and affect. Her speech is normal and behavior is normal. Judgment and thought content normal. Cognition and memory are normal.   Nursing note and vitals reviewed.      Assessment/Plan   Jung was seen today for pain.    Diagnoses and all orders for this visit:    Other chronic pain    Lumbar facet arthropathy    DDD (degenerative disc disease), lumbar    Generalized osteoarthritis    Arthralgia of multiple joints    Encounter for long-term (current) use of high-risk medication    Other orders  -     HYDROcodone-acetaminophen (NORCO)  MG per tablet; Take 1 tablet by mouth Every 6 (Six) Hours As Needed for Moderate Pain .      --- The urine drug screen confirmation from 1-18-18 has been reviewed and the result is APPROPRIATE based on patient history and LOUANN report  --- Refill Hydrocodone. Patient appears stable with current regimen. No adverse effects. Regarding continuation of opioids, there is no evidence of aberrant behavior or any red flags.  The patient continues with appropriate response to opioid therapy. ADL's remain intact by self.   --- Patient declined TFLESI.  --- Follow-up 2 months or sooner if needed.     LOUANN REPORT    As part of the patient's treatment plan, I am prescribing controlled substances. The patient has been made aware of  appropriate use of such medications, including potential risk of somnolence, limited ability to drive and/or work safely, and the potential for dependence or overdose. It has also bee made clear that these medications are for use by this patient only, without concomitant use of alcohol or other substances unless prescribed.     Patient has completed prescribing agreement detailing terms of continued prescribing of controlled substances, including monitoring LOUANN reports, urine drug screening, and pill counts if necessary. The patient is aware that inappropriate use will results in cessation of prescribing such medications.    LOUANN report has been reviewed and scanned into the patient's chart.    As the clinician, I personally reviewed the LOUANN from 9-14-18 while the patient was in the office today.    History and physical exam exhibit continued safe and appropriate use of controlled substances.      EMR Dragon/Transcription disclaimer:   Much of this encounter note is an electronic transcription/translation of spoken language to printed text. The electronic translation of spoken language may permit erroneous, or at times, nonsensical words or phrases to be inadvertently transcribed; Although I have reviewed the note for such errors, some may still exist.

## 2018-09-18 RX ORDER — ANASTROZOLE 1 MG/1
TABLET ORAL
Qty: 90 TABLET | Refills: 0 | Status: SHIPPED | OUTPATIENT
Start: 2018-09-18 | End: 2018-12-13 | Stop reason: SDUPTHER

## 2018-09-18 RX ORDER — LISINOPRIL 5 MG/1
TABLET ORAL
Qty: 120 TABLET | Refills: 0 | Status: SHIPPED | OUTPATIENT
Start: 2018-09-18 | End: 2019-01-07 | Stop reason: SDUPTHER

## 2018-10-05 ENCOUNTER — OFFICE VISIT (OUTPATIENT)
Dept: INTERNAL MEDICINE | Facility: CLINIC | Age: 73
End: 2018-10-05

## 2018-10-05 VITALS
BODY MASS INDEX: 37.09 KG/M2 | HEART RATE: 78 BPM | WEIGHT: 222.6 LBS | DIASTOLIC BLOOD PRESSURE: 68 MMHG | SYSTOLIC BLOOD PRESSURE: 140 MMHG | RESPIRATION RATE: 20 BRPM | TEMPERATURE: 98.8 F | OXYGEN SATURATION: 96 % | HEIGHT: 65 IN

## 2018-10-05 DIAGNOSIS — N18.30 CHRONIC KIDNEY DISEASE, STAGE III (MODERATE) (HCC): ICD-10-CM

## 2018-10-05 DIAGNOSIS — E11.9 TYPE 2 DIABETES MELLITUS WITHOUT COMPLICATION, WITHOUT LONG-TERM CURRENT USE OF INSULIN (HCC): Primary | ICD-10-CM

## 2018-10-05 DIAGNOSIS — D64.9 ANEMIA, UNSPECIFIED TYPE: ICD-10-CM

## 2018-10-05 DIAGNOSIS — Z23 NEEDS FLU SHOT: ICD-10-CM

## 2018-10-05 DIAGNOSIS — G62.9 PERIPHERAL POLYNEUROPATHY: ICD-10-CM

## 2018-10-05 DIAGNOSIS — E03.9 ACQUIRED HYPOTHYROIDISM: ICD-10-CM

## 2018-10-05 LAB
ALBUMIN SERPL-MCNC: 4.3 G/DL (ref 3.5–5.2)
ALBUMIN/GLOB SERPL: 1.6 G/DL
ALP SERPL-CCNC: 107 U/L (ref 40–129)
ALT SERPL-CCNC: 11 U/L (ref 5–33)
AST SERPL-CCNC: 18 U/L (ref 5–32)
BASOPHILS # BLD AUTO: 0.02 10*3/MM3 (ref 0–0.2)
BASOPHILS NFR BLD AUTO: 0.3 % (ref 0–2)
BILIRUB SERPL-MCNC: 0.4 MG/DL (ref 0.2–1.2)
BUN SERPL-MCNC: 17 MG/DL (ref 8–23)
BUN/CREAT SERPL: 15.3 (ref 7–25)
CALCIUM SERPL-MCNC: 9.5 MG/DL (ref 8.8–10.5)
CHLORIDE SERPL-SCNC: 98 MMOL/L (ref 98–107)
CO2 SERPL-SCNC: 26.8 MMOL/L (ref 22–29)
CREAT SERPL-MCNC: 1.11 MG/DL (ref 0.57–1)
EOSINOPHIL # BLD AUTO: 0.1 10*3/MM3 (ref 0.1–0.3)
EOSINOPHIL NFR BLD AUTO: 1.6 % (ref 0–4)
ERYTHROCYTE [DISTWIDTH] IN BLOOD BY AUTOMATED COUNT: 14.5 % (ref 11.5–14.5)
GLOBULIN SER CALC-MCNC: 2.7 GM/DL
GLUCOSE SERPL-MCNC: 295 MG/DL (ref 65–99)
HCT VFR BLD AUTO: 37.4 % (ref 37–47)
HGB BLD-MCNC: 11.8 G/DL (ref 12–16)
IMM GRANULOCYTES # BLD: 0.08 10*3/MM3 (ref 0–0.03)
IMM GRANULOCYTES NFR BLD: 1.3 % (ref 0–0.5)
LYMPHOCYTES # BLD AUTO: 1.14 10*3/MM3 (ref 0.6–4.8)
LYMPHOCYTES NFR BLD AUTO: 17.9 % (ref 20–45)
MAGNESIUM SERPL-MCNC: 1.5 MG/DL (ref 1.7–2.5)
MCH RBC QN AUTO: 28 PG (ref 27–31)
MCHC RBC AUTO-ENTMCNC: 31.6 G/DL (ref 31–37)
MCV RBC AUTO: 88.8 FL (ref 81–99)
MONOCYTES # BLD AUTO: 0.34 10*3/MM3 (ref 0–1)
MONOCYTES NFR BLD AUTO: 5.3 % (ref 3–8)
NEUTROPHILS # BLD AUTO: 4.69 10*3/MM3 (ref 1.5–8.3)
NEUTROPHILS NFR BLD AUTO: 73.6 % (ref 45–70)
NRBC BLD AUTO-RTO: 0 /100 WBC (ref 0–0)
PHOSPHATE SERPL-MCNC: 3.1 MG/DL (ref 2.7–4.5)
PLATELET # BLD AUTO: 186 10*3/MM3 (ref 140–500)
POTASSIUM SERPL-SCNC: 4 MMOL/L (ref 3.5–5.2)
PROT SERPL-MCNC: 7 G/DL (ref 6–8.5)
RBC # BLD AUTO: 4.21 10*6/MM3 (ref 4.2–5.4)
SODIUM SERPL-SCNC: 138 MMOL/L (ref 136–145)
TSH SERPL DL<=0.005 MIU/L-ACNC: 0.63 MIU/ML (ref 0.27–4.2)
WBC # BLD AUTO: 6.37 10*3/MM3 (ref 4.8–10.8)

## 2018-10-05 PROCEDURE — 99214 OFFICE O/P EST MOD 30 MIN: CPT | Performed by: INTERNAL MEDICINE

## 2018-10-05 PROCEDURE — G0008 ADMIN INFLUENZA VIRUS VAC: HCPCS | Performed by: INTERNAL MEDICINE

## 2018-10-05 PROCEDURE — 90662 IIV NO PRSV INCREASED AG IM: CPT | Performed by: INTERNAL MEDICINE

## 2018-10-05 RX ORDER — PREGABALIN 50 MG/1
50 CAPSULE ORAL 3 TIMES DAILY
Qty: 60 CAPSULE | Refills: 3 | Status: SHIPPED | OUTPATIENT
Start: 2018-10-05 | End: 2018-10-12 | Stop reason: DRUGHIGH

## 2018-10-05 RX ORDER — PREGABALIN 50 MG/1
50 CAPSULE ORAL 2 TIMES DAILY
Qty: 60 CAPSULE | Refills: 2 | Status: SHIPPED | OUTPATIENT
Start: 2018-10-05 | End: 2018-10-12 | Stop reason: DRUGHIGH

## 2018-10-05 NOTE — PROGRESS NOTES
Jung Short is a 73 y.o. female, who presents with a chief complaint of   Chief Complaint   Patient presents with   • Leg Pain       HPI     72 yo female with HTN, CKD 3, osteoporosis, DM, hypothyroidism and worsening leg pain.    She tried gabapentin but had significant falls on that medication. We changed to lyrica 75 mg po twice daily and it is really helping her.  She does sleep well at night.  She is sleeping more than she should.     She does feel wellenough to start her walking.  No significant falls since off her gapapentin. Did forget lyrica one night and had significant unilateral leg pain.      The following portions of the patient's history were reviewed and updated as appropriate: allergies, current medications, past family history, past medical history, past social history, past surgical history and problem list.    Allergies: Dilaudid [hydromorphone] and Latex    Current Outpatient Prescriptions:   •  alendronate (FOSAMAX) 35 MG tablet, take 1 tablet by mouth every week, Disp: 12 tablet, Rfl: 3  •  anastrozole (ARIMIDEX) 1 MG tablet, TAKE 1 TABLET BY MOUTH ONCE DAILY, Disp: 90 tablet, Rfl: 0  •  bumetanide (BUMEX) 2 MG tablet, TAKE 1 TABLET BY MOUTH TWICE A DAY, Disp: 60 tablet, Rfl: 5  •  carvedilol (COREG) 12.5 MG tablet, Take 12.5 mg by mouth 2 (Two) Times a Day With Meals., Disp: , Rfl:   •  DULoxetine (CYMBALTA) 60 MG capsule, TAKE 1 CAPSULE BY MOUTH ONCE DAILY, Disp: 90 capsule, Rfl: 0  •  esomeprazole (nexIUM) 20 MG capsule, Take 20 mg by mouth Every Morning Before Breakfast., Disp: , Rfl:   •  glimepiride (AMARYL) 1 MG tablet, take 1 tablet by mouth twice a day, Disp: 180 tablet, Rfl: 1  •  HYDROcodone-acetaminophen (NORCO)  MG per tablet, Take 1 tablet by mouth Every 6 (Six) Hours As Needed for Moderate Pain ., Disp: 120 tablet, Rfl: 0  •  levothyroxine (SYNTHROID, LEVOTHROID) 25 MCG tablet, take 1 tablet by mouth once daily, Disp: 180 tablet, Rfl: 0  •  lisinopril  "(PRINIVIL,ZESTRIL) 5 MG tablet, TAKE 1 TABLET BY MOUTH ONCE DAILY, Disp: 120 tablet, Rfl: 0  •  Multiple Vitamins-Minerals (MULTIVITAMIN ADULT PO), Take  by mouth., Disp: , Rfl:   •  potassium chloride (K-DUR) 10 MEQ CR tablet, TAKE 1 TABLET BY MOUTH DAILY, Disp: 90 tablet, Rfl: 1  •  pravastatin (PRAVACHOL) 10 MG tablet, Take 10 mg by mouth Every Night., Disp: , Rfl:   •  pregabalin (LYRICA) 50 MG capsule, Take 1 capsule by mouth 2 (Two) Times a Day., Disp: 60 capsule, Rfl: 2  •  spironolactone (ALDACTONE) 25 MG tablet, take 1 tablet by mouth every morning, Disp: 30 tablet, Rfl: 10  •  vitamin D (ERGOCALCIFEROL) 11528 units capsule capsule, Take 50,000 Units by mouth 1 (One) Time Per Week., Disp: , Rfl:   •  pregabalin (LYRICA) 50 MG capsule, Take 1 capsule by mouth 3 (Three) Times a Day., Disp: 60 capsule, Rfl: 3  Medications Discontinued During This Encounter   Medication Reason   • bumetanide (BUMEX) 2 MG tablet    • magnesium oxide (MAG-OX) 400 MG tablet *Therapy completed   • ondansetron (ZOFRAN) 8 MG tablet *Therapy completed   • docusate sodium 100 MG capsule *Therapy completed   • pregabalin (LYRICA) 75 MG capsule Reorder       Review of Systems   Constitutional: Negative.    HENT: Negative.    Respiratory: Negative.    Gastrointestinal: Positive for constipation.        Uses stool softener   Genitourinary: Negative.         Chronic renal disease   Musculoskeletal:        Bilateral shoulder pain   Neurological:        Bipedal neuropathy   Hematological: Negative.    Psychiatric/Behavioral: Negative.    All other systems reviewed and are negative.            /68   Pulse 78   Temp 98.8 °F (37.1 °C)   Resp 20   Ht 165.1 cm (65\")   Wt 101 kg (222 lb 9.6 oz)   SpO2 96%   BMI 37.04 kg/m²       Physical Exam   Constitutional: She is oriented to person, place, and time. She appears well-developed and well-nourished. No distress.   HENT:   Head: Normocephalic and atraumatic.   Right Ear: External ear " normal.   Left Ear: External ear normal.   Mouth/Throat: Oropharynx is clear and moist.   Eyes: Pupils are equal, round, and reactive to light. EOM are normal.   Neck: Normal range of motion. Neck supple. No tracheal deviation present. No thyromegaly present.   Cardiovascular: Normal rate, regular rhythm and normal heart sounds.    No murmur heard.  Pulmonary/Chest: Effort normal and breath sounds normal.   Abdominal: Soft.   Musculoskeletal: She exhibits no edema.   Lipoma left shoulder   Neurological: She is alert and oriented to person, place, and time.   Skin: Skin is warm and dry. Capillary refill takes less than 2 seconds. She is not diaphoretic.   Psychiatric: She has a normal mood and affect. Her behavior is normal.   Vitals reviewed.      Lab Results (most recent)     None          Results for orders placed or performed in visit on 09/07/18   Basic metabolic panel   Result Value Ref Range    Glucose 235 (H) 65 - 99 mg/dL    BUN 14 8 - 23 mg/dL    Creatinine 1.06 (H) 0.57 - 1.00 mg/dL    eGFR Non African Am 51 (L) >60 mL/min/1.73    eGFR African Am 61 >60 mL/min/1.73    BUN/Creatinine Ratio 13.2 7.0 - 25.0    Sodium 140 136 - 145 mmol/L    Potassium 4.0 3.5 - 5.2 mmol/L    Chloride 99 98 - 107 mmol/L    Total CO2 27.1 22.0 - 29.0 mmol/L    Calcium 9.4 8.8 - 10.5 mg/dL           Jung was seen today for leg pain.    Diagnoses and all orders for this visit:    Type 2 diabetes mellitus without complication, without long-term current use of insulin (CMS/[facility])    Acquired hypothyroidism  -     TSH    Chronic kidney disease, stage III (moderate) (CMS/[facility])  -     Comprehensive Metabolic Panel  -     Magnesium  -     Phosphorus    Anemia, unspecified type  -     CBC & Differential    Peripheral polyneuropathy  -     pregabalin (LYRICA) 50 MG capsule; Take 1 capsule by mouth 3 (Three) Times a Day.  -     pregabalin (LYRICA) 50 MG capsule; Take 1 capsule by mouth 2 (Two) Times a Day.    decrease lyrica 75-->50 oo cut  down on sedation  Natalietes today  FOllowed by renal    Pain management doing her narcotic refills.  She will let me know how this goes.     Return in about 3 months (around 1/5/2019).    Bubba Avalos MD  10/05/2018

## 2018-10-08 DIAGNOSIS — R14.0 ABDOMINAL BLOATING: ICD-10-CM

## 2018-10-08 RX ORDER — CARVEDILOL 12.5 MG/1
12.5 TABLET ORAL 2 TIMES DAILY WITH MEALS
Qty: 180 TABLET | Refills: 3 | Status: SHIPPED | OUTPATIENT
Start: 2018-10-08 | End: 2019-09-26 | Stop reason: SDUPTHER

## 2018-10-11 NOTE — TELEPHONE ENCOUNTER
Medication Refill Request    Date of phone call: 10/10/18    Medication being requested: Hydrocodone-apap 10 sig: q6hrs  Qty: 120    Date of last visit: 9/17/18    Date of last refill: 9/17/18    LOUANN up to date?: 9/14/18    Next Follow up?: 11/12/18    Any new pertinent information? (i.e, new medication allergies, new use of medications, change in patient's health or condition, non-compliance or inconsistency with prescribing agreement?): n/a

## 2018-10-12 DIAGNOSIS — G62.9 PERIPHERAL POLYNEUROPATHY: ICD-10-CM

## 2018-10-12 RX ORDER — PREGABALIN 75 MG/1
75 CAPSULE ORAL 3 TIMES DAILY
Qty: 90 CAPSULE | Refills: 0 | Status: SHIPPED | OUTPATIENT
Start: 2018-10-12 | End: 2019-01-14 | Stop reason: SDUPTHER

## 2018-10-12 RX ORDER — HYDROCODONE BITARTRATE AND ACETAMINOPHEN 10; 325 MG/1; MG/1
1 TABLET ORAL EVERY 6 HOURS PRN
Qty: 120 TABLET | Refills: 0 | Status: SHIPPED | OUTPATIENT
Start: 2018-10-12 | End: 2018-11-12 | Stop reason: SDUPTHER

## 2018-10-12 RX ORDER — PRAVASTATIN SODIUM 10 MG
10 TABLET ORAL DAILY
Qty: 30 TABLET | Refills: 0
Start: 2018-10-12 | End: 2018-10-12 | Stop reason: SDUPTHER

## 2018-10-12 RX ORDER — PRAVASTATIN SODIUM 10 MG
TABLET ORAL
Qty: 90 TABLET | Refills: 0 | Status: SHIPPED | OUTPATIENT
Start: 2018-10-12 | End: 2019-01-07 | Stop reason: SDUPTHER

## 2018-10-12 NOTE — TELEPHONE ENCOUNTER
Sent Rx to Dr. LUCAS to sign off as per below.  Patient advised.    ----- Message from Bubba Avalos MD sent at 10/11/2018  1:12 PM EDT -----  Regarding: RE: Dose adjustment   Contact: [phone]  Ok send in increased dose  ----- Message -----  From: Janet Lopez MA  Sent: 10/9/2018  10:05 AM  To: Bubba Avalos MD  Subject: FW: Dose adjustment                                  ----- Message -----  From: Rosita Wen MA  Sent: 10/9/2018   9:49 AM  [facility] [place]  Subject: Dose adjustment                                  Pt is taking Lyrica was changed to 50 mg due to side effects from taking 75 mg. Pt had increased neuropathy last night due to lowering the dose. Pt wanted to increase back up to 75 mg. She claims her foot pain from the neuropathy is unbearable.

## 2018-10-14 ENCOUNTER — EPISODE CHANGES (OUTPATIENT)
Dept: CASE MANAGEMENT | Facility: OTHER | Age: 73
End: 2018-10-14

## 2018-10-25 ENCOUNTER — TELEPHONE (OUTPATIENT)
Dept: INTERNAL MEDICINE | Facility: CLINIC | Age: 73
End: 2018-10-25

## 2018-10-25 NOTE — TELEPHONE ENCOUNTER
----- Message from Bubba Avalos MD sent at 10/24/2018  2:48 PM EDT -----  Please make sure she is taking her magnesium

## 2018-10-26 DIAGNOSIS — R14.0 ABDOMINAL BLOATING: ICD-10-CM

## 2018-10-29 ENCOUNTER — OFFICE VISIT (OUTPATIENT)
Dept: ORTHOPEDIC SURGERY | Facility: CLINIC | Age: 73
End: 2018-10-29

## 2018-10-29 VITALS — WEIGHT: 226 LBS | HEIGHT: 65 IN | BODY MASS INDEX: 37.65 KG/M2

## 2018-10-29 DIAGNOSIS — R52 PAIN: Primary | ICD-10-CM

## 2018-10-29 DIAGNOSIS — Z96.652 STATUS POST TOTAL LEFT KNEE REPLACEMENT: ICD-10-CM

## 2018-10-29 PROCEDURE — 73562 X-RAY EXAM OF KNEE 3: CPT | Performed by: ORTHOPAEDIC SURGERY

## 2018-10-29 PROCEDURE — 99213 OFFICE O/P EST LOW 20 MIN: CPT | Performed by: ORTHOPAEDIC SURGERY

## 2018-10-29 RX ORDER — DULOXETIN HYDROCHLORIDE 60 MG/1
CAPSULE, DELAYED RELEASE ORAL
Qty: 90 CAPSULE | Refills: 1 | Status: SHIPPED | OUTPATIENT
Start: 2018-10-29 | End: 2019-02-13 | Stop reason: SDUPTHER

## 2018-10-29 NOTE — PROGRESS NOTES
Subjective:Status post left total knee.         Patient ID: Jung Short is a 73 y.o. female.    Chief Complaint:    History of Present Illness Patient approximately 6 months out from her surgery. From the standpoint of the total knee arthroplasty, she is doing well. The bigger problem she has had is vertigo and falling. According to the patient, she is now on Lyrica to help with her legs giving out. She was on gabapentin but that made her too sleepy. The Lyrica has too although not as bad. She is now again taking 75 mg twice a day. She states it has helped with discomfort in the legs and her legs giving out and falling. She has had several episodes since last seen. The knee itself is doing well with minimal complaints.           Social History     Occupational History   • [employer]  Retired     Social History Main Topics   • Smoking status: Former Smoker     Packs/day: 3.00     Years: 30.00     Types: Cigarettes     Quit date: 1983   • Smokeless tobacco: Never Used   • Alcohol use No      Comment: h/o quit 1980s   • Drug use: No   • Sexual activity: Defer      Comment: celibate      Review of Systems   Constitutional: Negative for chills, diaphoresis, fever and unexpected weight change.   HENT: Negative for hearing loss, nosebleeds, sore throat and tinnitus.    Eyes: Negative for pain and visual disturbance.   Respiratory: Negative for cough, shortness of breath and wheezing.    Cardiovascular: Negative for chest pain and palpitations.   Gastrointestinal: Negative for abdominal pain, diarrhea, nausea and vomiting.   Endocrine: Negative for cold intolerance, heat intolerance and polydipsia.   Genitourinary: Negative for difficulty urinating, dysuria and hematuria.   Musculoskeletal: Positive for arthralgias and myalgias. Negative for joint swelling.   Skin: Negative for rash and wound.   Allergic/Immunologic: Negative for environmental allergies.   Neurological: Negative for dizziness, syncope and numbness.    Hematological: Does not bruise/bleed easily.   Psychiatric/Behavioral: Negative for dysphoric mood and sleep disturbance. The patient is not nervous/anxious.          Past Medical History:   Diagnosis Date   • Anemia    • Benign breast disease    • Cancer (CMS/HCC) 2015    Right breast   • Cellulitis of leg     May-2003 right lower extremity   • CHF (congestive heart failure) (CMS/HCC)     Dr Bates follows   • Chronic kidney disease     Dr Hannah follows   • Chronic ulcer of right foot (CMS/HCC)     Non-pressure, history of    • Depression    • Diabetic peripheral neuropathy (CMS/HCC)    • Diarrhea    • Diverticulosis    • Encounter for eye exam    • Fracture of left wrist     history of   • Gastroesophageal reflux    • Hiatal hernia    • History of bone density study 09/20/2012   • History of colonoscopy     done 6/03 recheck in 10 years.   Done july 2013 recheck in 5 years   • History of mammography, screening 09/20/2012   • Hospital discharge follow-up    • Hyperlipidemia    • Hypertension    • Hypothyroidism    • Impingement syndrome of right shoulder    • Joint pain    • Menopausal disorder    • Methicillin resistant Staphylococcus aureus infection 2012    after bladder surgery   • MRSA (methicillin resistant staph aureus) culture positive    • Nausea and vomiting    • Nonischemic cardiomyopathy (CMS/HCC)     Ejection fraction 10% per 2-D echo with Doppler however she did have cardiac catheterization April 2015 which showed ejection fraction 20% with global hypokinesis and severe mitral insufficiency   • Osteoarthritis     generalized, sched jania TKA   • Paroxysmal atrial fibrillation (CMS/HCC)     Dr Bates follows   • Postoperative infection     July of 2012 following her hysterectomy far vaginal wall prolapse. She was on antibiotics and wound dressings for 2 months.   • Shoulder pain, bilateral     has tears on both sides   • Syncope, psychogenic 4/19/2017   • Toe ulcer (CMS/HCC)     h/o to both  feet, d/t shoes rubbing per patient   • Transient alteration of awareness 4/19/2017     Past Surgical History:   Procedure Laterality Date   • ABDOMINAL SURGERY  2012    had to be reopened after bladder surgery d/t infection   • AMPUTATION FOOT / TOE Right 11/2013    Rt foot amputation MTP first toe   • BLADDER SURGERY  2012   • BREAST BIOPSY     • BREAST SURGERY  06/29/2015    Percutaneous ultrasound-guided placement of metal localized clip 1st lesion   • CARDIAC CATHETERIZATION  2015   • CATARACT EXTRACTION Bilateral 2017   • DEBRIDEMENT  FOOT Right 01/2013    During hospitalization    • EPIDURAL BLOCK      4 chronic back pain and leg pain last epidurals done 5-2012 she had no benefit at all from this procedure.   • FOOT SURGERY Bilateral     several   • HERNIA REPAIR      At the abdomen February 2007 Dr. Mendez   • INCISIONAL HERNIA REPAIR  05/16/2014    Recurrent. Incarcerated. With mesh implantation   • MASTECTOMY Right 05/2015   • SHOULDER SURGERY Right     x2 RCR   • TOTAL ABDOMINAL HYSTERECTOMY      with oophorectomy-Done June-2012 secondary to vaginal wall prolapse.   • TOTAL KNEE ARTHROPLASTY Right    • TOTAL KNEE ARTHROPLASTY Left 4/17/2018    Procedure: TOTAL KNEE ARTHROPLASTY;  Surgeon: Live Chambers MD;  Location: [facility];  Service: Orthopedics     Family History   Problem Relation Age of Onset   • Colonic polyp Mother    • Hypertension Mother    • Migraines Mother    • Mental illness Mother    • Depression Mother    • Coronary artery disease Father    • Other Father         Cardiac Disorder   • Colon cancer Father    • Kidney disease Father    • Cancer Father    • Breast cancer Maternal Aunt    • Colon cancer Brother    • Alcohol abuse Brother    • Arthritis Sister    • Hypertension Brother    • Lung disease Brother    • Alcohol abuse Brother    • Malig Hyperthermia Neg Hx          Objective:  There were no vitals filed for this visit.  1    10/29/18  1327   Weight: 103 kg (226 lb)     Body mass index  is 37.61 kg/m².        Ortho Exam   AP, lateral, sunrise view of the knee shows perfect condition of the implant in all 3 views compared to previous x-rays with no change. On exam, she is alert and oriented x3. The knee has 0° to 125° of motion with no instability at 0° to 90° and no varus or valgus instability. Good distal pulses. No motor or sensory deficit. Quad function 5/5 and her calf is nontender.        Assessment:        1. Pain    2. Status post total left knee replacement           Plan:She had been afraid to walk outside for fear of falling and hurting herself, so she is going to try to do some conditioning in the workout facility where she lives; i.e. a stationary bike.  Continue that program as tolerated. Return to see me in 6 months with a repeat x-ray of the knee.                Work Status:    LOUANN query complete.    Orders:  Orders Placed This Encounter   Procedures   • XR Knee 3 View Left       Medications:  No orders of the defined types were placed in this encounter.      Followup:  Return in about 6 months (around 4/29/2019).          Dictated utilizing Dragon dictation

## 2018-11-12 ENCOUNTER — OFFICE VISIT (OUTPATIENT)
Dept: PAIN MEDICINE | Facility: CLINIC | Age: 73
End: 2018-11-12

## 2018-11-12 VITALS
DIASTOLIC BLOOD PRESSURE: 79 MMHG | SYSTOLIC BLOOD PRESSURE: 156 MMHG | HEIGHT: 65 IN | OXYGEN SATURATION: 94 % | RESPIRATION RATE: 15 BRPM | WEIGHT: 231 LBS | HEART RATE: 75 BPM | BODY MASS INDEX: 38.49 KG/M2 | TEMPERATURE: 97.9 F

## 2018-11-12 DIAGNOSIS — M51.36 DDD (DEGENERATIVE DISC DISEASE), LUMBAR: ICD-10-CM

## 2018-11-12 DIAGNOSIS — Z79.899 ENCOUNTER FOR LONG-TERM (CURRENT) USE OF HIGH-RISK MEDICATION: ICD-10-CM

## 2018-11-12 DIAGNOSIS — M15.9 GENERALIZED OSTEOARTHRITIS: ICD-10-CM

## 2018-11-12 DIAGNOSIS — G89.29 OTHER CHRONIC PAIN: Primary | ICD-10-CM

## 2018-11-12 DIAGNOSIS — M47.816 LUMBAR FACET ARTHROPATHY: ICD-10-CM

## 2018-11-12 PROCEDURE — 99214 OFFICE O/P EST MOD 30 MIN: CPT | Performed by: NURSE PRACTITIONER

## 2018-11-12 RX ORDER — HYDROCODONE BITARTRATE AND ACETAMINOPHEN 10; 325 MG/1; MG/1
1 TABLET ORAL EVERY 6 HOURS PRN
Qty: 120 TABLET | Refills: 0 | Status: SHIPPED | OUTPATIENT
Start: 2018-11-12 | End: 2018-12-06 | Stop reason: SDUPTHER

## 2018-11-12 NOTE — PROGRESS NOTES
CHIEF COMPLAINT  F/U back pain and arthritis. States her neuropathy has improved.    Subjective   Jung Short is a 73 y.o. female  who presents to the office for follow-up.She has a history of chronic back and joint pain. Reports her pain is unchanged since last office visit. However, later reports her neuropathy is improved.  Admits she has had a few falls since last office visit.  HAd previously failed Gabapentin.     Complains of pain in her low back and joints. Today her pain is 4/10VAS. Describes the pain as continuous and unchanged. Pain increases with walking, standing and activity; pain decreases with medication and rest. Continues with Hydrocodone 10/325 4/day and Cymbalta, and Lyrica 50 mg BID(prescribed by Dr. Avalos).  Denies any side effects from the regimen.  This helps decrease her pain by 40-50%. ADL's by self.      Had left TKR 4-17-18.    Joint Pain   This is a chronic problem. The current episode started more than 1 year ago (today pain is 4/10VAS- joints). The problem occurs constantly. The problem has been unchanged (unchanged since last [id number] visit). Associated symptoms include arthralgias, fatigue, joint swelling and weakness (L leg). Pertinent negatives include no chest pain, chills, congestion, coughing, fever, headaches, nausea, numbness or vomiting. Nothing aggravates the symptoms. She has tried oral narcotics, position changes and rest for the symptoms. The treatment provided moderate relief.   Back Pain   This is a chronic problem. The current episode started more than 1 year ago. The problem occurs constantly. The problem has been gradually worsening (unchanged since last office visti) since onset. The pain is present in the lumbar spine. The pain radiates to the right knee, right foot and right thigh. The pain is at a severity of 4/10. The pain is moderate. Associated symptoms include weakness (L leg). Pertinent negatives include no chest pain, dysuria, fever, headaches or  "numbness. Risk factors include lack of exercise and obesity. She has tried analgesics for the symptoms. The treatment provided moderate relief.      PEG Assessment   What number best describes your pain on average in the past week?5  What number best describes how, during the past week, pain has interfered with your enjoyment of life?6  What number best describes how, during the past week, pain has interfered with your general activity?  6    The following portions of the patient's history were reviewed and updated as appropriate: allergies, current medications, past family history, past medical history, past social history, past surgical history and problem list.    Review of Systems   Constitutional: Positive for fatigue. Negative for activity change, appetite change, chills and fever.   HENT: Negative for congestion.    Respiratory: Negative for cough and shortness of breath.    Cardiovascular: Negative for chest pain.   Gastrointestinal: Negative for constipation, diarrhea, nausea and vomiting.   Genitourinary: Negative for difficulty urinating, dyspareunia and dysuria.   Musculoskeletal: Positive for arthralgias, back pain and joint swelling.   Neurological: Positive for weakness (L leg). Negative for dizziness, light-headedness, numbness and headaches.   Psychiatric/Behavioral: Positive for sleep disturbance. Negative for agitation, confusion, hallucinations, self-injury and suicidal ideas. The patient is not nervous/anxious.        Vitals:    11/12/18 1403   BP: 156/79   Pulse: 75   Resp: 15   Temp: 97.9 °F (36.6 °C)   SpO2: 94%   Weight: 105 kg (231 lb)   Height: 165.1 cm (65\")   PainSc:   4   PainLoc: Back       Objective   Physical Exam   Constitutional: She is oriented to person, place, and time. Vital signs are normal. She appears well-developed and well-nourished. She is cooperative.   HENT:   Head: Normocephalic and atraumatic.   Nose: Nose normal.   Eyes: Conjunctivae and lids are normal. "   Cardiovascular: Normal rate.   Pulmonary/Chest: Effort normal.   Abdominal: Normal appearance.   Musculoskeletal:        Lumbar back: She exhibits decreased range of motion and tenderness.   Surgical scar of left knee  Widespread OA changes with no acute synovitis   Neurological: She is alert and oriented to person, place, and time. Gait (cane) abnormal.   Reflex Scores:       Patellar reflexes are 1+ on the right side and 1+ on the left side.  Skin: Skin is warm, dry and intact.   Psychiatric: She has a normal mood and affect. Her speech is normal and behavior is normal. Judgment and thought content normal. Cognition and memory are normal.   Nursing note and vitals reviewed.      Assessment/Plan   Jung was seen today for back pain.    Diagnoses and all orders for this visit:    Other chronic pain    Lumbar facet arthropathy    DDD (degenerative disc disease), lumbar    Generalized osteoarthritis    Encounter for long-term (current) use of high-risk medication    Other orders  -     HYDROcodone-acetaminophen (NORCO)  MG per tablet; Take 1 tablet by mouth Every 6 (Six) Hours As Needed for Moderate Pain .      --- Routine UDS in office today as part of monitoring requirements for controlled substances.  Unable to perform POC. This specimen will be sent to [facility] for confirmation.     --- Refill Hydrocodone. Patient appears stable with current regimen. No adverse effects. Regarding continuation of opioids, there is no evidence of aberrant behavior or any red flags.  The patient continues with appropriate response to opioid therapy. ADL's remain intact by self.   --_ Declines TFLESI.  --- Follow-up 2 months or sooner if needed.     LOUANN REPORT    As part of the patient's treatment plan, I am prescribing controlled substances. The patient has been made aware of appropriate use of such medications, including potential risk of somnolence, limited ability to drive and/or work safely, and the  potential for dependence or overdose. It has also bee made clear that these medications are for use by this patient only, without concomitant use of alcohol or other substances unless prescribed.     Patient has completed prescribing agreement detailing terms of continued prescribing of controlled substances, including monitoring LOUANN reports, urine drug screening, and pill counts if necessary. The patient is aware that inappropriate use will results in cessation of prescribing such medications.    LOUANN report has been reviewed and scanned into the patient's chart.    As the clinician, I personally reviewed the LOUANN from 11-9-18 while the patient was in the office today.    +++ Reviewed LOUANN with patient. She states her last refill was 10-12-18. Her LOUANN shows 7-17-18. Had to switch pharmacy from [facility] to [facility].+++    History and physical exam exhibit continued safe and appropriate use of controlled substances.      EMR Dragon/Transcription disclaimer:   Much of this encounter note is an electronic transcription/translation of spoken language to printed text. The electronic translation of spoken language may permit erroneous, or at times, nonsensical words or phrases to be inadvertently transcribed; Although I have reviewed the note for such errors, some may still exist.

## 2018-11-20 ENCOUNTER — APPOINTMENT (OUTPATIENT)
Dept: LAB | Facility: HOSPITAL | Age: 73
End: 2018-11-20

## 2018-11-20 ENCOUNTER — OFFICE VISIT (OUTPATIENT)
Dept: ONCOLOGY | Facility: CLINIC | Age: 73
End: 2018-11-20

## 2018-11-20 VITALS
TEMPERATURE: 98.1 F | WEIGHT: 232 LBS | OXYGEN SATURATION: 96 % | BODY MASS INDEX: 38.65 KG/M2 | HEIGHT: 65 IN | SYSTOLIC BLOOD PRESSURE: 127 MMHG | HEART RATE: 82 BPM | DIASTOLIC BLOOD PRESSURE: 77 MMHG | RESPIRATION RATE: 16 BRPM

## 2018-11-20 DIAGNOSIS — Z17.0 MALIGNANT NEOPLASM OF UPPER-INNER QUADRANT OF RIGHT BREAST IN FEMALE, ESTROGEN RECEPTOR POSITIVE (HCC): Primary | ICD-10-CM

## 2018-11-20 DIAGNOSIS — C50.211 MALIGNANT NEOPLASM OF UPPER-INNER QUADRANT OF RIGHT BREAST IN FEMALE, ESTROGEN RECEPTOR POSITIVE (HCC): Primary | ICD-10-CM

## 2018-11-20 PROCEDURE — 99213 OFFICE O/P EST LOW 20 MIN: CPT | Performed by: INTERNAL MEDICINE

## 2018-11-20 PROCEDURE — G0463 HOSPITAL OUTPT CLINIC VISIT: HCPCS | Performed by: INTERNAL MEDICINE

## 2018-11-20 RX ORDER — ASPIRIN 81 MG/1
81 TABLET ORAL DAILY
COMMUNITY

## 2018-11-20 NOTE — PROGRESS NOTES
Subjective:     Reason for follow up:   1. 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    * Arimidex initiated in 08/2015    2. Osteopenia.      History of Present Ilness: Jung Short presents for follow-up of breast cancer. She remains on Arimidex and Fosamax. Mammogram in August was benign. She's on Lyrica for neuropathy which is helping.  No fevers, chills, night sweats, nausea, vomiting, diarrhea, and generally is doing quite well.    Reviewed, confirmed and updated history (past medical, social and family)   Past Medical   Past Medical History:   Diagnosis Date   • Anemia    • Benign breast disease    • Cancer (CMS/HCC) 2015    Right breast   • Cellulitis of leg     May-2003 right lower extremity   • CHF (congestive heart failure) (CMS/HCC)     Dr Bates follows   • Chronic kidney disease     Dr Hannah follows   • Chronic ulcer of right foot (CMS/HCC)     Non-pressure, history of    • Depression    • Diabetic peripheral neuropathy (CMS/HCC)    • Diarrhea    • Diverticulosis    • Encounter for eye exam    • Fracture of left wrist     history of   • Gastroesophageal reflux    • Hiatal hernia    • History of bone density study 09/20/2012   • History of colonoscopy     done 6/03 recheck in 10 years.   Done july 2013 recheck in 5 years   • History of mammography, screening 09/20/2012   • Hospital discharge follow-up    • Hyperlipidemia    • Hypertension    • Hypothyroidism    • Impingement syndrome of right shoulder    • Joint pain    • Menopausal disorder    • Methicillin resistant Staphylococcus aureus infection 2012    after bladder surgery   • MRSA (methicillin resistant staph aureus) culture positive    • Nausea and vomiting    • Nonischemic cardiomyopathy (CMS/HCC)     Ejection fraction 10% per 2-D echo with Doppler however she did have cardiac  catheterization April 2015 which showed ejection fraction 20% with global hypokinesis and severe mitral insufficiency   • Osteoarthritis     generalized, sched jania TKA   • Paroxysmal atrial fibrillation (CMS/HCC)     Dr Bates follows   • Postoperative infection     July of 2012 following her hysterectomy far vaginal wall prolapse. She was on antibiotics and wound dressings for 2 months.   • Shoulder pain, bilateral     has tears on both sides   • Syncope, psychogenic 4/19/2017   • Toe ulcer (CMS/HCC)     h/o to both feet, d/t shoes rubbing per patient   • Transient alteration of awareness 4/19/2017     Patient Active Problem List   Diagnosis   • Abdominal bloating   • Abdominal mass   • Abdominal pain   • Abnormal gait   • Abnormal mammogram   • Anemia   • Atherosclerosis of coronary artery   • Thoracic back pain   • Malignant neoplasm of upper-inner quadrant of right breast in female, estrogen receptor positive (CMS/HCC)   • Candidiasis   • Cardiomyopathy (CMS/HCC)   • Disc disorder of cervical region   • Neck pain   • Tenderness of chest wall   • Chronic kidney disease, stage III (moderate) (CMS/HCC)   • Chronic pain   • Constipation   • Generalized osteoarthritis   • Depression   • Type 2 diabetes mellitus (CMS/HCC)   • Controlled type 2 diabetes mellitus without complication (CMS/HCC)   • Dyslipidemia   • Gastroesophageal reflux disease with esophagitis   • Generalized pain   • Gout   • Hypertension   • Hypothyroidism   • Incisional hernia   • Mass of breast   • Mitral valve insufficiency   • Nausea   • Adult body mass index greater than 30   • Osteopenia of spine   • Arthralgia of multiple joints   • Peripheral neuropathy   • Pulmonary hypertension (CMS/HCC)   • Osteomyelitis (CMS/HCC)   • Tricuspid valve insufficiency   • Cobalamin deficiency   • Vitamin D deficiency   • Diabetes mellitus (CMS/HCC)   • Left knee pain   • Arthritis of knee   • DDD (degenerative disc disease), lumbar   • Lumbar facet  arthropathy   • Pain in shoulder   • Chronic gout of multiple sites   • Encounter for long-term (current) use of high-risk medication   • Constipation due to opioid therapy   • Syncope, psychogenic   • Transient alteration of awareness   • Closed fracture of patella   • Primary osteoarthritis of left knee   • Radius/ulna fracture, left, closed, initial encounter   • Rib pain on left side   • Preoperative cardiovascular examination   • Physical deconditioning   • Hyponatremia   • Needs flu shot     Social History   Social History     Socioeconomic History   • Marital status:      Spouse name: Not on file   • Number of children: 2   • Years of education: Not on file   • Highest education level: Not on file   Social Needs   • Financial resource strain: Not on file   • Food insecurity - worry: Not on file   • Food insecurity - inability: Not on file   • Transportation needs - medical: Not on file   • Transportation needs - non-medical: Not on file   Occupational History   • 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   Tobacco Use   • Smoking status: Former Smoker     Packs/day: 3.00     Years: 30.00     Pack years: 90.00     Types: Cigarettes     Last attempt to quit:      Years since quittin.9   • Smokeless tobacco: Never Used   Substance and Sexual Activity   • Alcohol use: No     Comment: h/o quit    • Drug use: No   • Sexual activity: Defer     Partners: Male     Comment: celibate   Other Topics Concern   • Not on file   Social History Narrative         Volunteers here at hospital     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    Lots of friends    2 daughters - lives in [facility] and [place] (graphic design)    J[employer] Worship      Family History  Family History   Problem Relation Age of Onset   • Colonic polyp Mother    • Hypertension Mother    • Migraines Mother    • Mental illness Mother    • Depression Mother    • Coronary artery disease Father    • Other Father         Cardiac Disorder   •  "Colon cancer Father    • Kidney disease Father    • Cancer Father    • Breast cancer Maternal Aunt    • Colon cancer Brother    • Alcohol abuse Brother    • Arthritis Sister    • Hypertension Brother    • Lung disease Brother    • Alcohol abuse Brother    • Malig Hyperthermia Neg Hx      Allergies  Allergies   Allergen Reactions   • Dilaudid [Hydromorphone] Delirium and Confusion   • Latex Rash       Medications: The current medication list was reviewed in the EMR.    Review of Systems  Review of Systems   Constitutional: Negative for activity change, appetite change, chills, diaphoresis, fatigue, fever and unexpected weight change.   Respiratory: Negative for cough, chest tightness and shortness of breath.    Cardiovascular: Negative for chest pain, palpitations and leg swelling.   Gastrointestinal: Negative for abdominal pain, blood in stool, constipation, diarrhea, nausea and vomiting.   Musculoskeletal: Positive for arthralgias and back pain. Negative for joint swelling and myalgias.   Neurological: Positive for numbness.   Hematological: Negative for adenopathy. Does not bruise/bleed easily.          Objective:     Vitals:    11/20/18 1616   BP: 127/77   Pulse: 82   Resp: 16   Temp: 98.1 °F (36.7 °C)   SpO2: 96%   Weight: 105 kg (232 lb)   Height: 165.1 cm (65\")   PainSc:   4   PainLoc: Back     Current Status 11/20/2018   ECOG score 0   GENERAL: female comfortable, no acute distress  SKIN:  Warm, without rashes, purpura or petechiae.   EYES:  EOMs intact.  Conjunctivae normal. Pupils equal and reactive to light.   EARS:  Hearing intact.  LYMPHATICS:  No cervical, supraclavicular, axillary adenopathy.  RESP:  Lungs clear to percussion and auscultation. Good airflow. Normal effort.   CHEST: Mediport -No; Breast exam - Yes, describe: status post left mastectomy without palpable masses or skin changes.   CARDIAC:  Regular rate and rhythm without murmurs, rubs or gallops. Normal S1,S2. Lower extremity edema:  " No  GI:  Soft, nontender, normal bowel sounds  PSYCHIATRIC:  Normal affect and mood; alert and oriented x 3; Insight and judgement appropriate        Labs and Imaging  Results for orders placed or performed in visit on 10/05/18   Comprehensive Metabolic Panel   Result Value Ref Range    Glucose 295 (H) 65 - 99 mg/dL    BUN 17 8 - 23 mg/dL    Creatinine 1.11 (H) 0.57 - 1.00 mg/dL    eGFR Non African Am 48 (L) >60 mL/min/1.73    eGFR African Am 58 (L) >60 mL/min/1.73    BUN/Creatinine Ratio 15.3 7.0 - 25.0    Sodium 138 136 - 145 mmol/L    Potassium 4.0 3.5 - 5.2 mmol/L    Chloride 98 98 - 107 mmol/L    Total CO2 26.8 22.0 - 29.0 mmol/L    Calcium 9.5 8.8 - 10.5 mg/dL    Total Protein 7.0 6.0 - 8.5 g/dL    Albumin 4.30 3.50 - 5.20 g/dL    Globulin 2.7 gm/dL    A/G Ratio 1.6 g/dL    Total Bilirubin 0.4 0.2 - 1.2 mg/dL    Alkaline Phosphatase 107 40 - 129 U/L    AST (SGOT) 18 5 - 32 U/L    ALT (SGPT) 11 5 - 33 U/L   Magnesium   Result Value Ref Range    Magnesium 1.5 (L) 1.7 - 2.5 mg/dL   Phosphorus   Result Value Ref Range    Phosphorus 3.1 2.7 - 4.5 mg/dL   TSH   Result Value Ref Range    TSH 0.626 0.270 - 4.200 mIU/mL   CBC & Differential   Result Value Ref Range    WBC 6.37 4.80 - 10.80 10*3/mm3    RBC 4.21 4.20 - 5.40 10*6/mm3    Hemoglobin 11.8 (L) 12.0 - 16.0 g/dL    Hematocrit 37.4 37.0 - 47.0 %    MCV 88.8 81.0 - 99.0 fL    MCH 28.0 27.0 - 31.0 pg    MCHC 31.6 31.0 - 37.0 g/dL    RDW 14.5 11.5 - 14.5 %    Platelets 186 140 - 500 10*3/mm3    Neutrophil Rel % 73.6 (H) 45.0 - 70.0 %    Lymphocyte Rel % 17.9 (L) 20.0 - 45.0 %    Monocyte Rel % 5.3 3.0 - 8.0 %    Eosinophil Rel % 1.6 0.0 - 4.0 %    Basophil Rel % 0.3 0.0 - 2.0 %    Neutrophils Absolute 4.69 1.50 - 8.30 10*3/mm3    Lymphocytes Absolute 1.14 0.60 - 4.80 10*3/mm3    Monocytes Absolute 0.34 0.00 - 1.00 10*3/mm3    Eosinophils Absolute 0.10 0.10 - 0.30 10*3/mm3    Basophils Absolute 0.02 0.00 - 0.20 10*3/mm3    Immature Granulocyte Rel % 1.3 (H) 0.0 -  0.5 %    Immature Grans Absolute 0.08 (H) 0.00 - 0.03 10*3/mm3    nRBC 0.0 0.0 - 0.0 /100 WBC     Breast imaging: Mammogram 8/2018:  FINDINGS:  There are scattered areas of fibroglandular density. Stable benign  diffuse mildly nodular parenchymal pattern. No significant change when  compared with prior images. No mammographic evidence of malignancy.  Recommend repeat screening mammogram in one year.     IMPRESSION:  Benign left unilateral mammogram.     BI-RADS CATEGORY 2: Benign Findings.    Assessment/Plan     Assessment:   1. Stage I, pT1cN0, invasive ductal carcinoma with dcis of the right breast, ER positive, HER-2/gonzalez positive.    * Status post right mastectomy with axillary dissection on 07/16/2015. Not a candidate for chemo/Herceptin therapy due to comorbidities   * Arimidex since 8/2015. Tolerating well.    * Remains no evidence of disease   2. Osteopenia.  Continue Fosamax  3. Anemia of chronic disease. Down more than normal because of recent surgery.  4. Congestive heart failure managed by Dr. Bates.   5. Multiple comorbidities including CKD, pulmonary hypertension, diabetes, foot ulcer and recent left ulnar/radius fracture    Plan:     1. Annual left mammogram due August 2019  2. DEXA scan due 8/2019.  3. Patient was instructed on the importance of physical activity, healthy diet, and self chest wall exams.  Patient will continue calcium and vitamin D supplementation.    4. Monitor anemia  5. Continue Arimidex and continueFosamax.    Plan of care reviewed and unchanged.   Follow-up in 6 months. I asked the patient to call for any questions, concerns, or new symptoms.

## 2018-12-06 RX ORDER — HYDROCODONE BITARTRATE AND ACETAMINOPHEN 10; 325 MG/1; MG/1
1 TABLET ORAL EVERY 6 HOURS PRN
Qty: 120 TABLET | Refills: 0 | Status: SHIPPED | OUTPATIENT
Start: 2018-12-06 | End: 2019-01-09 | Stop reason: SDUPTHER

## 2018-12-06 NOTE — TELEPHONE ENCOUNTER
Medication Refill Request    Date of phone call: 12/4/18    Medication being requested: Hydrocodone-apap 10 sig: q6hrs  Qty: 120    Date of last visit: 11/12/18    Date of last refill: 11/12/18    LOUANN up to date?: 11/9/18    Next Follow up?: 1/9/19    Any new pertinent information? (i.e, new medication allergies, new use of medications, change in patient's health or condition, non-compliance or inconsistency with prescribing agreement?): n/a

## 2018-12-07 RX ORDER — BUMETANIDE 2 MG/1
TABLET ORAL
Qty: 180 TABLET | Refills: 4 | Status: SHIPPED | OUTPATIENT
Start: 2018-12-07 | End: 2019-02-12 | Stop reason: SDUPTHER

## 2018-12-13 DIAGNOSIS — R14.0 ABDOMINAL BLOATING: ICD-10-CM

## 2018-12-14 RX ORDER — ANASTROZOLE 1 MG/1
TABLET ORAL
Qty: 90 TABLET | Refills: 1 | Status: SHIPPED | OUTPATIENT
Start: 2018-12-14 | End: 2019-06-28 | Stop reason: SDUPTHER

## 2018-12-14 RX ORDER — LEVOTHYROXINE SODIUM 0.03 MG/1
TABLET ORAL
Qty: 180 TABLET | Refills: 0 | Status: SHIPPED | OUTPATIENT
Start: 2018-12-14 | End: 2019-09-27 | Stop reason: SDUPTHER

## 2018-12-26 ENCOUNTER — OFFICE VISIT (OUTPATIENT)
Dept: ORTHOPEDIC SURGERY | Facility: CLINIC | Age: 73
End: 2018-12-26

## 2018-12-26 VITALS — BODY MASS INDEX: 38.32 KG/M2 | HEIGHT: 65 IN | WEIGHT: 230 LBS

## 2018-12-26 DIAGNOSIS — R52 PAIN: Primary | ICD-10-CM

## 2018-12-26 DIAGNOSIS — S60.111A CONTUSION OF RIGHT THUMB WITH DAMAGE TO NAIL, INITIAL ENCOUNTER: ICD-10-CM

## 2018-12-26 PROCEDURE — 73130 X-RAY EXAM OF HAND: CPT | Performed by: ORTHOPAEDIC SURGERY

## 2018-12-26 PROCEDURE — 99214 OFFICE O/P EST MOD 30 MIN: CPT | Performed by: ORTHOPAEDIC SURGERY

## 2018-12-26 NOTE — PROGRESS NOTES
Subjective: Right thumb pain     Patient ID: Jung Short is a 73 y.o. female.    Chief Complaint:    History of Present Illness 73-year-old female known to me seen today for new problem involving her right hand are completely the thumb.  Fell about a month ago struck the hand and thumb against the sink and bathtub but since that time she developed increasing pain and discomfort and now has a small not on the dorsal aspect of the base of the thumb that is painful.  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   HENT: Negative for hearing loss, nosebleeds, sore throat and tinnitus.    Eyes: Negative for pain and visual disturbance.   Respiratory: Negative for cough, shortness of breath and wheezing.    Cardiovascular: Negative for chest pain and palpitations.   Gastrointestinal: Negative for abdominal pain, diarrhea, nausea and vomiting.   Endocrine: Negative for cold intolerance, heat intolerance and polydipsia.   Genitourinary: Negative for difficulty urinating, dysuria and hematuria.   Musculoskeletal: Positive for arthralgias and myalgias. Negative for joint swelling.   Skin: Negative for rash and wound.   Allergic/Immunologic: Negative for environmental allergies.   Neurological: Negative for dizziness, syncope and numbness.   Hematological: Does not bruise/bleed easily.   Psychiatric/Behavioral:  Negative for dysphoric mood and sleep disturbance. The patient is not nervous/anxious.          Past Medical History:   Diagnosis Date   • Anemia    • Benign breast disease    • Cancer (CMS/HCC) 2015    Right breast   • Cellulitis of leg     May-2003 right lower extremity   • CHF (congestive heart failure) (CMS/HCC)     Dr Bates follows   • Chronic kidney disease     Dr Hannah follows   • Chronic ulcer of right foot (CMS/HCC)     Non-pressure, history of    • Depression    • Diabetic peripheral neuropathy (CMS/HCC)    • Diarrhea    • Diverticulosis    • Encounter for eye exam    • Fracture of left wrist     history of   • Gastroesophageal reflux    • Hiatal hernia    • History of bone density study 09/20/2012   • History of colonoscopy     done 6/03 recheck in 10 years.   Done july 2013 recheck in 5 years   • History of mammography, screening 09/20/2012   • Hospital discharge follow-up    • Hyperlipidemia    • Hypertension    • Hypothyroidism    • Impingement syndrome of right shoulder    • Joint pain    • Menopausal disorder    • Methicillin resistant Staphylococcus aureus infection 2012    after bladder surgery   • MRSA (methicillin resistant staph aureus) culture positive    • Nausea and vomiting    • Nonischemic cardiomyopathy (CMS/HCC)     Ejection fraction 10% per 2-D echo with Doppler however she did have cardiac catheterization April 2015 which showed ejection fraction 20% with global hypokinesis and severe mitral insufficiency   • Osteoarthritis     generalized, sched jania TKA   • Paroxysmal atrial fibrillation (CMS/HCC)     Dr Bates follows   • Postoperative infection     July of 2012 following her hysterectomy far vaginal wall prolapse. She was on antibiotics and wound dressings for 2 months.   • Shoulder pain, bilateral     has tears on both sides   • Syncope, psychogenic 4/19/2017   • Toe ulcer (CMS/HCC)     h/o to both feet, d/t shoes rubbing per patient   • Transient alteration of awareness  4/19/2017     Past Surgical History:   Procedure Laterality Date   • ABDOMINAL SURGERY  2012    had to be reopened after bladder surgery d/t infection   • AMPUTATION FOOT / TOE Right 11/2013    Rt foot amputation MTP first toe   • BLADDER SURGERY  2012   • BREAST BIOPSY     • BREAST SURGERY  06/29/2015    Percutaneous ultrasound-guided placement of metal localized clip 1st lesion   • CARDIAC CATHETERIZATION  2015   • CATARACT EXTRACTION Bilateral 2017   • DEBRIDEMENT  FOOT Right 01/2013    During hospitalization    • EPIDURAL BLOCK      4 chronic back pain and leg pain last epidurals done 5-2012 she had no benefit at all from this procedure.   • FOOT SURGERY Bilateral     several   • HERNIA REPAIR      At the abdomen February 2007 Dr. Mendez   • INCISIONAL HERNIA REPAIR  05/16/2014    Recurrent. Incarcerated. With mesh implantation   • MASTECTOMY Right 05/2015   • SHOULDER SURGERY Right     x2 RCR   • TOTAL ABDOMINAL HYSTERECTOMY      with oophorectomy-Done June-2012 secondary to vaginal wall prolapse.   • TOTAL KNEE ARTHROPLASTY Right    • TOTAL KNEE ARTHROPLASTY Left 4/17/2018    Procedure: TOTAL KNEE ARTHROPLASTY;  Surgeon: Live Chambers MD;  Location: [facility];  Service: Orthopedics     Family History   Problem Relation Age of Onset   • Colonic polyp Mother    • Hypertension Mother    • Migraines Mother    • Mental illness Mother    • Depression Mother    • Coronary artery disease Father    • Other Father         Cardiac Disorder   • Colon cancer Father    • Kidney disease Father    • Cancer Father    • Breast cancer Maternal Aunt    • Colon cancer Brother    • Alcohol abuse Brother    • Arthritis Sister    • Hypertension Brother    • Lung disease Brother    • Alcohol abuse Brother    • Malig Hyperthermia Neg Hx          Objective:  There were no vitals filed for this visit.      12/26/18  1318   Weight: 104 kg (230 lb)     Body mass index is 38.27 kg/m².        Ortho Exam   AP lateral oblique view of the hand  shows arthritis at the metacarpocarpal junction of the right thumb.  No acute changes are noted no prior x-rays available for comparison.  She is alert and oriented ×3.  She does have a small ganglionic cyst in the dorsal aspect of the base of the thumb.  This pain to palpation with a positive grind test of the thumb.  No motor sensory deficit.  No pain with extension against resistance and negative Finkelstein test.  She has full dorsi and volar flexion of the wrist with minimal pain.  No deficits far as radial ulnar median nerve function and no sensory loss either.  The skin to touch.  Patient unable to take anti-inflammatory medication.    Assessment:        1. Pain    2. Contusion of right thumb with damage to nail, initial encounter           Plan: Over 15 minutes with the patient face-to-face reviewing her x-ray findings and physical findings outlined a treatment plan going forward.  She is concerned of the fracture and I did assure there was no fracture noted just arthritic changes that was aggravated by the fall causing development of a ganglionic cyst.  Offered a cortisone injection but she like to defer for the time being.  I did advise a thumb spica splint for comfort.  She is getting ready to go to [place] for week I told her pain is still symptomatic when she returns I can give her cortisone injection at that time she was in agreement with the treatment plan            Work Status:    LOUANN query complete.    Orders:  Orders Placed This Encounter   Procedures   • XR Hand 3+ View Right       Medications:  No orders of the defined types were placed in this encounter.      Followup:  Return if symptoms worsen or fail to improve.          Dictated utilizing Dragon dictation

## 2019-01-04 RX ORDER — GLIMEPIRIDE 1 MG/1
TABLET ORAL
Qty: 180 TABLET | Refills: 0 | Status: SHIPPED | OUTPATIENT
Start: 2019-01-04 | End: 2019-02-08

## 2019-01-07 RX ORDER — LISINOPRIL 5 MG/1
TABLET ORAL
Qty: 120 TABLET | Refills: 0 | Status: SHIPPED | OUTPATIENT
Start: 2019-01-07 | End: 2019-05-20 | Stop reason: SDUPTHER

## 2019-01-07 RX ORDER — PRAVASTATIN SODIUM 10 MG
TABLET ORAL
Qty: 90 TABLET | Refills: 0 | Status: SHIPPED | OUTPATIENT
Start: 2019-01-07 | End: 2019-04-09 | Stop reason: SDUPTHER

## 2019-01-09 ENCOUNTER — OFFICE VISIT (OUTPATIENT)
Dept: PAIN MEDICINE | Facility: CLINIC | Age: 74
End: 2019-01-09

## 2019-01-09 VITALS
BODY MASS INDEX: 38.53 KG/M2 | HEART RATE: 70 BPM | SYSTOLIC BLOOD PRESSURE: 151 MMHG | TEMPERATURE: 98.3 F | OXYGEN SATURATION: 93 % | DIASTOLIC BLOOD PRESSURE: 85 MMHG | RESPIRATION RATE: 16 BRPM | WEIGHT: 231.25 LBS | HEIGHT: 65 IN

## 2019-01-09 DIAGNOSIS — M47.816 LUMBAR FACET ARTHROPATHY: ICD-10-CM

## 2019-01-09 DIAGNOSIS — Z79.899 ENCOUNTER FOR LONG-TERM (CURRENT) USE OF HIGH-RISK MEDICATION: ICD-10-CM

## 2019-01-09 DIAGNOSIS — G89.29 OTHER CHRONIC PAIN: Primary | ICD-10-CM

## 2019-01-09 DIAGNOSIS — M15.9 GENERALIZED OSTEOARTHRITIS: ICD-10-CM

## 2019-01-09 DIAGNOSIS — M51.36 DDD (DEGENERATIVE DISC DISEASE), LUMBAR: ICD-10-CM

## 2019-01-09 PROCEDURE — 99214 OFFICE O/P EST MOD 30 MIN: CPT | Performed by: NURSE PRACTITIONER

## 2019-01-09 RX ORDER — HYDROCODONE BITARTRATE AND ACETAMINOPHEN 10; 325 MG/1; MG/1
1 TABLET ORAL EVERY 6 HOURS PRN
Qty: 120 TABLET | Refills: 0 | Status: SHIPPED | OUTPATIENT
Start: 2019-01-09 | End: 2019-02-07 | Stop reason: SDUPTHER

## 2019-01-09 NOTE — PROGRESS NOTES
CHIEF COMPLAINT  Follow up back and joint pain    Subjective   Jung Short is a 73 y.o. female  who presents to the office for follow-up.She has a history of chronic back and joint pain. Reports her pain is UNCHANGED since last office visit.    Complains of pain in her low back and joints. Today her pain is 4/10VAS. Describes the pain as continuous aching and throbbing.  Continues with Hydrocodone 10/325 4/day and Cymbalta, and Lyrica 50 mg BID(prescribed by Dr. Avalos).  Denies any side effects from the regimen.  This helps decrease her pain by 40-50%. ADL's by self.      Has been ill for past week with cough.   Just returned from [employer] in [place]. Rented a scooter.   Had left TKR 4-17-18.  Has a cyst on right hand near thumb.  Is supposed to be wearing a brace.   Joint Pain   This is a chronic problem. The current episode started more than 1 year ago (today pain is 4/10VAS- joints). The problem occurs constantly. The problem has been unchanged (unchanged since last [id number] visit). Associated symptoms include arthralgias, congestion, coughing, fatigue, joint swelling and weakness (L leg). Pertinent negatives include no chest pain, chills, fever, headaches, nausea, neck pain, numbness, sore throat or vomiting. Nothing aggravates the symptoms. She has tried oral narcotics, position changes and rest for the symptoms. The treatment provided moderate relief.   Back Pain   This is a chronic problem. The current episode started more than 1 year ago. The problem occurs constantly. The problem has been gradually worsening (unchanged since last office visti) since onset. The pain is present in the lumbar spine. The pain radiates to the right knee, right foot and right thigh. The pain is at a severity of 4/10. The pain is moderate. Associated symptoms include weakness (L leg). Pertinent negatives include no chest pain, dysuria, fever, headaches or numbness. Risk factors include lack of exercise and obesity. She  "has tried analgesics for the symptoms. The treatment provided moderate relief.      PEG Assessment   What number best describes your pain on average in the past week?5  What number best describes how, during the past week, pain has interfered with your enjoyment of life?5  What number best describes how, during the past week, pain has interfered with your general activity?  5    The following portions of the patient's history were reviewed and updated as appropriate: allergies, current medications, past family history, past medical history, past social history, past surgical history and problem list.    Review of Systems   Constitutional: Positive for fatigue. Negative for appetite change, chills and fever.   HENT: Positive for congestion, postnasal drip and sinus pain. Negative for sore throat.    Eyes: Negative for pain.   Respiratory: Positive for cough. Negative for chest tightness.    Cardiovascular: Negative for chest pain and palpitations.   Gastrointestinal: Positive for diarrhea. Negative for constipation, nausea and vomiting.   Genitourinary: Negative for difficulty urinating, dysuria, flank pain and hematuria.   Musculoskeletal: Positive for arthralgias, back pain and joint swelling. Negative for neck pain.   Neurological: Positive for weakness (L leg). Negative for dizziness, syncope, numbness and headaches.   Psychiatric/Behavioral: Negative for confusion, self-injury and suicidal ideas.       Vitals:    01/09/19 1402   BP: 151/85   Pulse: 70   Resp: 16   Temp: 98.3 °F (36.8 °C)   SpO2: 93%   Weight: 105 kg (231 lb 4 oz)   Height: 165.1 cm (65\")   PainSc:   4     Objective   Physical Exam   Constitutional: She is oriented to person, place, and time. Vital signs are normal. She appears well-developed and well-nourished. She is cooperative.   HENT:   Head: Normocephalic and atraumatic.   Nose: Nose normal.   Eyes: Conjunctivae and lids are normal.   Cardiovascular: Normal rate.   Pulmonary/Chest: Effort " normal.   Abdominal: Normal appearance.   Musculoskeletal:        Lumbar back: She exhibits decreased range of motion and tenderness.   Surgical scar of left knee  Widespread OA changes with no acute synovitis   Neurological: She is alert and oriented to person, place, and time. Gait (cane) abnormal.   Reflex Scores:       Patellar reflexes are 1+ on the right side and 1+ on the left side.  Skin: Skin is warm, dry and intact.   Psychiatric: She has a normal mood and affect. Her speech is normal and behavior is normal. Judgment and thought content normal. Cognition and memory are normal.   Nursing note and vitals reviewed.      Assessment/Plan   Diagnoses and all orders for this visit:    Other chronic pain    Generalized osteoarthritis    DDD (degenerative disc disease), lumbar    Lumbar facet arthropathy    Encounter for long-term (current) use of high-risk medication    Other orders  -     HYDROcodone-acetaminophen (NORCO)  MG per tablet; Take 1 tablet by mouth Every 6 (Six) Hours As Needed for Moderate Pain . DNF until 12-11-18      --- The urine drug screen confirmation from 11-14-18 has been reviewed and the result is APPROPRIATE based on patient history and LOUANN report  --- If pharmacy remains non-compliant with reporting to LOUANN, she will have to change pharmacies. Patient verbalized understanding.  --- Refill Hydrocodone. Patient appears stable with current regimen. No adverse effects. Regarding continuation of opioids, there is no evidence of aberrant behavior or any red flags.  The patient continues with appropriate response to opioid therapy. ADL's remain intact by self.   --- Follow-up 2 months or sooner if needed.     LOUANN REPORT    As part of the patient's treatment plan, I am prescribing controlled substances. The patient has been made aware of appropriate use of such medications, including potential risk of somnolence, limited ability to drive and/or work safely, and the potential for  dependence or overdose. It has also bee made clear that these medications are for use by this patient only, without concomitant use of alcohol or other substances unless prescribed.     Patient has completed prescribing agreement detailing terms of continued prescribing of controlled substances, including monitoring LOUANN reports, urine drug screening, and pill counts if necessary. The patient is aware that inappropriate use will results in cessation of prescribing such medications.    LOUANN report has been reviewed and scanned into the patient's chart.    As the clinician, I personally reviewed the LOUANN from 1-8-19 while the patient was in the office today.    History and physical exam exhibit continued safe and appropriate use of controlled substances.      EMR Dragon/Transcription disclaimer:   Much of this encounter note is an electronic transcription/translation of spoken language to printed text. The electronic translation of spoken language may permit erroneous, or at times, nonsensical words or phrases to be inadvertently transcribed; Although I have reviewed the note for such errors, some may still exist.

## 2019-01-18 ENCOUNTER — TELEPHONE (OUTPATIENT)
Dept: INTERNAL MEDICINE | Facility: CLINIC | Age: 74
End: 2019-01-18

## 2019-01-18 NOTE — TELEPHONE ENCOUNTER
----- Message from Angelica Zimmerman sent at 1/18/2019 10:46 AM EST -----  Regarding: FW: ADVISE  Contact: [phone]  I spoke to the patient and scheduled an appointment for 01-29 for her to come in as a follow up visit from her last appointment, and to discuss this cough. She is hoping that it will be gone by then.  I suggested that she call on Tuesday morning to check for any cancellations, to see if we can work her in.    angelica  ----- Message -----  From: Lizeth Hampton MA  Sent: 1/18/2019   8:20 AM  To: Angelica Erasmo  Subject: FW: ADVISE                                           ----- Message -----  From: Bubba Avalos MD  Sent: 1/17/2019   5:41 PM  To: Lizeth Hampton MA  Subject: RE: ADVISE                                       She will need to go to urgent care or er if we have nothing available. Sadly.  ----- Message -----  From: Lizeth Hampton MA  Sent: 1/17/2019   2:04 PM  To: Bubba Avalos MD  Subject: FW: ADVISE                                           ----- Message -----  From: Erasmo, Karen  Sent: 1/17/2019   1:52 PM  To: Jovan Landaverde [facility]  Subject: ADVISE                                           CHRISTOPHER PT    PATIENT CALLED TO SAY THAT SHE HAS BEEN SICK FOR 19 DAYS WITH COUGH, GREEN MUCUS, NO FEVER. SHE HAD A SORE THROAT WHEN IT STARTED, BUT NOT NOW. SHE SAID THAT SHE HAS TRIED SEVERAL OVER THE COUNTER MEDS, AND NOTHING IS WORKING FOR HER.   SHE IS RASPY.    PLEASE CALL HER TO ADVISE    SULEMA BUCKLEY    THANKS!  ANGELICA

## 2019-01-29 ENCOUNTER — OFFICE VISIT (OUTPATIENT)
Dept: INTERNAL MEDICINE | Facility: CLINIC | Age: 74
End: 2019-01-29

## 2019-01-29 VITALS
BODY MASS INDEX: 37.09 KG/M2 | OXYGEN SATURATION: 98 % | HEIGHT: 66 IN | HEART RATE: 73 BPM | RESPIRATION RATE: 16 BRPM | DIASTOLIC BLOOD PRESSURE: 76 MMHG | TEMPERATURE: 98 F | WEIGHT: 230.8 LBS | SYSTOLIC BLOOD PRESSURE: 148 MMHG

## 2019-01-29 DIAGNOSIS — E78.5 HYPERLIPIDEMIA, UNSPECIFIED HYPERLIPIDEMIA TYPE: ICD-10-CM

## 2019-01-29 DIAGNOSIS — I10 ESSENTIAL HYPERTENSION: Primary | ICD-10-CM

## 2019-01-29 DIAGNOSIS — E11.9 TYPE 2 DIABETES MELLITUS WITHOUT COMPLICATION, WITHOUT LONG-TERM CURRENT USE OF INSULIN (HCC): ICD-10-CM

## 2019-01-29 DIAGNOSIS — J06.9 ACUTE URI: ICD-10-CM

## 2019-01-29 DIAGNOSIS — E03.9 ACQUIRED HYPOTHYROIDISM: ICD-10-CM

## 2019-01-29 DIAGNOSIS — D63.1 ANEMIA DUE TO STAGE 3 CHRONIC KIDNEY DISEASE (HCC): ICD-10-CM

## 2019-01-29 DIAGNOSIS — N18.30 ANEMIA DUE TO STAGE 3 CHRONIC KIDNEY DISEASE (HCC): ICD-10-CM

## 2019-01-29 LAB
ALBUMIN SERPL-MCNC: 4.3 G/DL (ref 3.5–5.2)
ALBUMIN/GLOB SERPL: 1.5 G/DL
ALP SERPL-CCNC: 123 U/L (ref 40–129)
ALT SERPL-CCNC: 9 U/L (ref 5–33)
AST SERPL-CCNC: 14 U/L (ref 5–32)
BASOPHILS # BLD AUTO: 0.03 10*3/MM3 (ref 0–0.2)
BASOPHILS NFR BLD AUTO: 0.5 % (ref 0–2)
BILIRUB SERPL-MCNC: 0.4 MG/DL (ref 0.2–1.2)
BUN SERPL-MCNC: 16 MG/DL (ref 8–23)
BUN/CREAT SERPL: 13 (ref 7–25)
CALCIUM SERPL-MCNC: 9.6 MG/DL (ref 8.8–10.5)
CHLORIDE SERPL-SCNC: 96 MMOL/L (ref 98–107)
CHOLEST SERPL-MCNC: 216 MG/DL (ref 0–200)
CO2 SERPL-SCNC: 28.1 MMOL/L (ref 22–29)
CREAT SERPL-MCNC: 1.23 MG/DL (ref 0.57–1)
EOSINOPHIL # BLD AUTO: 0.23 10*3/MM3 (ref 0.1–0.3)
EOSINOPHIL NFR BLD AUTO: 3.5 % (ref 0–4)
ERYTHROCYTE [DISTWIDTH] IN BLOOD BY AUTOMATED COUNT: 13.6 % (ref 11.5–14.5)
GLOBULIN SER CALC-MCNC: 2.8 GM/DL
GLUCOSE SERPL-MCNC: 261 MG/DL (ref 65–99)
HBA1C MFR BLD: 9 % (ref 4.8–5.6)
HCT VFR BLD AUTO: 38.3 % (ref 37–47)
HDLC SERPL-MCNC: 47 MG/DL (ref 40–60)
HGB BLD-MCNC: 12.1 G/DL (ref 12–16)
IMM GRANULOCYTES # BLD AUTO: 0.07 10*3/MM3 (ref 0–0.03)
IMM GRANULOCYTES NFR BLD AUTO: 1.1 % (ref 0–0.5)
LDLC SERPL CALC-MCNC: 125 MG/DL (ref 0–100)
LDLC/HDLC SERPL: 2.66 {RATIO}
LYMPHOCYTES # BLD AUTO: 1.7 10*3/MM3 (ref 0.6–4.8)
LYMPHOCYTES NFR BLD AUTO: 25.8 % (ref 20–45)
MAGNESIUM SERPL-MCNC: 1.3 MG/DL (ref 1.7–2.5)
MCH RBC QN AUTO: 28.5 PG (ref 27–31)
MCHC RBC AUTO-ENTMCNC: 31.6 G/DL (ref 31–37)
MCV RBC AUTO: 90.3 FL (ref 81–99)
MONOCYTES # BLD AUTO: 0.51 10*3/MM3 (ref 0–1)
MONOCYTES NFR BLD AUTO: 7.7 % (ref 3–8)
NEUTROPHILS # BLD AUTO: 4.05 10*3/MM3 (ref 1.5–8.3)
NEUTROPHILS NFR BLD AUTO: 61.4 % (ref 45–70)
NRBC BLD AUTO-RTO: 0 /100 WBC (ref 0–0)
PHOSPHATE SERPL-MCNC: 3.8 MG/DL (ref 2.7–4.5)
PLATELET # BLD AUTO: 205 10*3/MM3 (ref 140–500)
POTASSIUM SERPL-SCNC: 4.2 MMOL/L (ref 3.5–5.2)
PROT SERPL-MCNC: 7.1 G/DL (ref 6–8.5)
RBC # BLD AUTO: 4.24 10*6/MM3 (ref 4.2–5.4)
SODIUM SERPL-SCNC: 140 MMOL/L (ref 136–145)
TRIGL SERPL-MCNC: 220 MG/DL (ref 0–150)
TSH SERPL DL<=0.005 MIU/L-ACNC: 1.78 MIU/ML (ref 0.27–4.2)
VLDLC SERPL CALC-MCNC: 44 MG/DL (ref 7–27)
WBC # BLD AUTO: 6.59 10*3/MM3 (ref 4.8–10.8)

## 2019-01-29 PROCEDURE — 99214 OFFICE O/P EST MOD 30 MIN: CPT | Performed by: INTERNAL MEDICINE

## 2019-01-29 NOTE — PATIENT INSTRUCTIONS
Jung was seen today for diabetes and hypothyroidism.    Diagnoses and all orders for this visit:    Essential hypertension  -     Comprehensive Metabolic Panel  -     Magnesium  -     Phosphorus    Type 2 diabetes mellitus without complication, without long-term current use of insulin (CMS/[facility])  -     Hemoglobin A1c    Acquired hypothyroidism  -     TSH    Acute URI    Anemia due to stage 3 chronic kidney disease (CMS/[facility])  -     CBC & Differential  -     Magnesium  -     Phosphorus

## 2019-01-29 NOTE — PROGRESS NOTES
Jung Short is a 73 y.o. female, who presents with a chief complaint of   Chief Complaint   Patient presents with   • Diabetes   • Hypothyroidism       HPI     74 YO FEMALE  With pmhx hypothyroidism, Fm, osteoporosis, HTN, dependent edema, DM, HL, RLS.    She is here for follow up. Doing fairly well.     HTN is well controlled.  Has CAD and CDM - on lisinopril and spironolactone. Using bumex sparingly with leg swelling.     She is also very concerned about lyrica which cost her around 300 - taking 75 mg at night and getting a little more pain.  She was previously taking tid.     Going to [facility] pain management UDS appropriate 11/18    Diabetes well controlled, eye exam 8/7/18    She is otherwise feeling herself.      Has CKD seeing nephrology annually.  Some underlying anemia - like from renal disease.     Getting over URI, some production that has resolved. She was told we were too busy to be seen, frustrated about waiting for an appointment. Sick contact with son in law.  She was able to go to the [facility] in [place], drove to [place].       The following portions of the patient's history were reviewed and updated as appropriate: allergies, current medications, past family history, past medical history, past social history, past surgical history and problem list.    Allergies: Dilaudid [hydromorphone] and Latex    Current Outpatient Medications:   •  alendronate (FOSAMAX) 35 MG tablet, take 1 tablet by mouth every week, Disp: 12 tablet, Rfl: 3  •  anastrozole (ARIMIDEX) 1 MG tablet, TAKE 1 TABLET BY MOUTH EVERY DAY, Disp: 90 tablet, Rfl: 1  •  aspirin 81 MG EC tablet, Take 81 mg by mouth Daily., Disp: , Rfl:   •  bumetanide (BUMEX) 2 MG tablet, TAKE 1 TABLET BY MOUTH TWICE A DAY, Disp: 180 tablet, Rfl: 4  •  carvedilol (COREG) 12.5 MG tablet, Take 1 tablet by mouth 2 (Two) Times a Day With Meals., Disp: 180 tablet, Rfl: 3  •  DULoxetine (CYMBALTA) 60 MG capsule, TAKE 1 CAPSULE BY MOUTH EVERY DAY, Disp: 90  capsule, Rfl: 1  •  esomeprazole (nexIUM) 20 MG capsule, Take 20 mg by mouth Every Morning Before Breakfast., Disp: , Rfl:   •  glimepiride (AMARYL) 1 MG tablet, TAKE 1 TABLET BY MOUTH TWICE A DAY, Disp: 180 tablet, Rfl: 0  •  HYDROcodone-acetaminophen (NORCO)  MG per tablet, Take 1 tablet by mouth Every 6 (Six) Hours As Needed for Moderate Pain . DNF until 12-11-18, Disp: 120 tablet, Rfl: 0  •  levothyroxine (SYNTHROID, LEVOTHROID) 25 MCG tablet, TAKE 1 TABLET BY MOUTH ONCE DAILY, Disp: 180 tablet, Rfl: 0  •  lisinopril (PRINIVIL,ZESTRIL) 5 MG tablet, TAKE 1 TABLET BY MOUTH ONCE DAILY, Disp: 120 tablet, Rfl: 0  •  LYRICA 75 MG capsule, TAKE 1 CAPSULE BY MOUTH THREE TIMES DAILY (Patient taking differently: TAKE 1 CAPSULE IN AM, 1 CAPSULE IN PM), Disp: 90 capsule, Rfl: 5  •  Multiple Vitamins-Minerals (MULTIVITAMIN ADULT PO), Take  by mouth., Disp: , Rfl:   •  potassium chloride (K-DUR) 10 MEQ CR tablet, TAKE 1 TABLET BY MOUTH DAILY, Disp: 90 tablet, Rfl: 1  •  pravastatin (PRAVACHOL) 10 MG tablet, TAKE 1 TABLET BY MOUTH ONCE DAILY, Disp: 90 tablet, Rfl: 0  •  spironolactone (ALDACTONE) 25 MG tablet, take 1 tablet by mouth every morning, Disp: 30 tablet, Rfl: 10  •  vitamin D (ERGOCALCIFEROL) 42586 units capsule capsule, Take 50,000 Units by mouth 1 (One) Time Per Week., Disp: , Rfl:   Medications Discontinued During This Encounter   Medication Reason   • LYRICA 50 MG capsule *Therapy completed       Review of Systems   Constitutional: Positive for fatigue.   HENT: Positive for congestion and rhinorrhea.    Respiratory: Positive for cough. Negative for shortness of breath.    Cardiovascular: Negative for chest pain and palpitations.   Gastrointestinal: Negative.    Genitourinary: Negative.         Seeing renal for CKD   Musculoskeletal:        Leg pain   Psychiatric/Behavioral: Negative.    All other systems reviewed and are negative.            /76   Pulse 73   Temp 98 °F (36.7 °C)   Resp 16   Ht  "166.4 cm (65.5\")   Wt 105 kg (230 lb 12.8 oz)   SpO2 98%   BMI 37.82 kg/m²       Physical Exam   Constitutional: She is oriented to person, place, and time. She appears well-developed and well-nourished.   HENT:   Head: Normocephalic and atraumatic.   Right Ear: External ear normal.   Left Ear: External ear normal.   Eyes: EOM are normal. Pupils are equal, round, and reactive to light.   Neck: Normal range of motion. Neck supple. No thyromegaly present.   Cardiovascular: Normal rate, regular rhythm and normal heart sounds.   No murmur heard.  Pulmonary/Chest: Effort normal and breath sounds normal. No respiratory distress. She has no wheezes.   Neurological: She is alert and oriented to person, place, and time.   Skin: Skin is warm and dry. Capillary refill takes less than 2 seconds.   Psychiatric: She has a normal mood and affect. Her behavior is normal.   Vitals reviewed.      Lab Results (most recent)     None          Results for orders placed or performed in visit on 10/05/18   Comprehensive Metabolic Panel   Result Value Ref Range    Glucose 295 (H) 65 - 99 mg/dL    BUN 17 8 - 23 mg/dL    Creatinine 1.11 (H) 0.57 - 1.00 mg/dL    eGFR Non African Am 48 (L) >60 mL/min/1.73    eGFR African Am 58 (L) >60 mL/min/1.73    BUN/Creatinine Ratio 15.3 7.0 - 25.0    Sodium 138 136 - 145 mmol/L    Potassium 4.0 3.5 - 5.2 mmol/L    Chloride 98 98 - 107 mmol/L    Total CO2 26.8 22.0 - 29.0 mmol/L    Calcium 9.5 8.8 - 10.5 mg/dL    Total Protein 7.0 6.0 - 8.5 g/dL    Albumin 4.30 3.50 - 5.20 g/dL    Globulin 2.7 gm/dL    A/G Ratio 1.6 g/dL    Total Bilirubin 0.4 0.2 - 1.2 mg/dL    Alkaline Phosphatase 107 40 - 129 U/L    AST (SGOT) 18 5 - 32 U/L    ALT (SGPT) 11 5 - 33 U/L   Magnesium   Result Value Ref Range    Magnesium 1.5 (L) 1.7 - 2.5 mg/dL   Phosphorus   Result Value Ref Range    Phosphorus 3.1 2.7 - 4.5 mg/dL   TSH   Result Value Ref Range    TSH 0.626 0.270 - 4.200 mIU/mL   CBC & Differential   Result Value Ref " Range    WBC 6.37 4.80 - 10.80 10*3/mm3    RBC 4.21 4.20 - 5.40 10*6/mm3    Hemoglobin 11.8 (L) 12.0 - 16.0 g/dL    Hematocrit 37.4 37.0 - 47.0 %    MCV 88.8 81.0 - 99.0 fL    MCH 28.0 27.0 - 31.0 pg    MCHC 31.6 31.0 - 37.0 g/dL    RDW 14.5 11.5 - 14.5 %    Platelets 186 140 - 500 10*3/mm3    Neutrophil Rel % 73.6 (H) 45.0 - 70.0 %    Lymphocyte Rel % 17.9 (L) 20.0 - 45.0 %    Monocyte Rel % 5.3 3.0 - 8.0 %    Eosinophil Rel % 1.6 0.0 - 4.0 %    Basophil Rel % 0.3 0.0 - 2.0 %    Neutrophils Absolute 4.69 1.50 - 8.30 10*3/mm3    Lymphocytes Absolute 1.14 0.60 - 4.80 10*3/mm3    Monocytes Absolute 0.34 0.00 - 1.00 10*3/mm3    Eosinophils Absolute 0.10 0.10 - 0.30 10*3/mm3    Basophils Absolute 0.02 0.00 - 0.20 10*3/mm3    Immature Granulocyte Rel % 1.3 (H) 0.0 - 0.5 %    Immature Grans Absolute 0.08 (H) 0.00 - 0.03 10*3/mm3    nRBC 0.0 0.0 - 0.0 /100 WBC           Jung was seen today for diabetes and hypothyroidism.    Diagnoses and all orders for this visit:    Essential hypertension  -     Comprehensive Metabolic Panel  -     Magnesium  -     Phosphorus    Type 2 diabetes mellitus without complication, without long-term current use of insulin (CMS/[facility])  -     Hemoglobin A1c    Acquired hypothyroidism  -     TSH    Acute URI    Anemia due to stage 3 chronic kidney disease (CMS/[facility])  -     CBC & Differential  -     Magnesium  -     Phosphorus    Keep renal appointment.  Reassurance that she is doing well.  Recommend low carb diet.  She is almost out of lyrica, and cant afford more. She will do a low dose nightly neurontin when the time comes due to the cost.  She will call that week for the change.    The patient has read and signed the [facility] Controlled Substance Contract.  I will continue to see patient for regular follow up appointments.  They are well controlled on their medication.  LOUANN is updated every 3 months. The patient is aware of the potential for addiction and dependence.      Return in  about 3 months (around 4/29/2019).    Bubba Avalos MD  01/29/2019

## 2019-02-07 RX ORDER — HYDROCODONE BITARTRATE AND ACETAMINOPHEN 10; 325 MG/1; MG/1
1 TABLET ORAL EVERY 6 HOURS PRN
Qty: 120 TABLET | Refills: 0 | Status: SHIPPED | OUTPATIENT
Start: 2019-02-07 | End: 2019-03-06 | Stop reason: SDUPTHER

## 2019-02-07 NOTE — TELEPHONE ENCOUNTER
Medication Refill Request    Date of phone call: 2-5-19    Medication being requested: Hydrocodone-apap  mg sig: Take 1 tablet by mouth every 6 hours PRN pain  Qty: 120     Date of last visit: 1-09-19    Date of last refill: 1-09-19    LOUANN up to date?: 1-08-19    Next Follow up?: 3-06-19    Any new pertinent information? (i.e, new medication allergies, new use of medications, change in patient's health or condition, non-compliance or inconsistency with prescribing agreement?): n/a

## 2019-02-08 RX ORDER — GLIMEPIRIDE 4 MG/1
4 TABLET ORAL
Qty: 90 TABLET | Refills: 2 | Status: SHIPPED | OUTPATIENT
Start: 2019-02-08 | End: 2019-02-28 | Stop reason: SDUPTHER

## 2019-02-12 RX ORDER — BUMETANIDE 2 MG/1
TABLET ORAL
Qty: 180 TABLET | Refills: 3 | Status: SHIPPED | OUTPATIENT
Start: 2019-02-12 | End: 2019-08-16

## 2019-02-13 DIAGNOSIS — G62.9 PERIPHERAL POLYNEUROPATHY: ICD-10-CM

## 2019-02-13 DIAGNOSIS — R14.0 ABDOMINAL BLOATING: ICD-10-CM

## 2019-02-14 ENCOUNTER — TRANSCRIBE ORDERS (OUTPATIENT)
Dept: ADMINISTRATIVE | Facility: HOSPITAL | Age: 74
End: 2019-02-14

## 2019-02-14 ENCOUNTER — LAB (OUTPATIENT)
Dept: LAB | Facility: HOSPITAL | Age: 74
End: 2019-02-14

## 2019-02-14 DIAGNOSIS — I12.9 HYPERTENSIVE NEPHROPATHY: ICD-10-CM

## 2019-02-14 DIAGNOSIS — N18.30 CHRONIC KIDNEY DISEASE, STAGE III (MODERATE) (HCC): ICD-10-CM

## 2019-02-14 DIAGNOSIS — E55.9 VITAMIN D DEFICIENCY: ICD-10-CM

## 2019-02-14 DIAGNOSIS — N18.30 CHRONIC KIDNEY DISEASE, STAGE III (MODERATE) (HCC): Primary | ICD-10-CM

## 2019-02-14 LAB
25(OH)D3 SERPL-MCNC: 15.7 NG/ML
ANION GAP SERPL CALCULATED.3IONS-SCNC: 12.5 MMOL/L
BACTERIA UR QL AUTO: ABNORMAL /HPF
BASOPHILS # BLD AUTO: 0.01 10*3/MM3 (ref 0–0.2)
BASOPHILS NFR BLD AUTO: 0.1 % (ref 0–1.5)
BILIRUB UR QL STRIP: NEGATIVE
BUN BLD-MCNC: 12 MG/DL (ref 8–23)
BUN/CREAT SERPL: 10.5 (ref 7–25)
CALCIUM SPEC-SCNC: 9.3 MG/DL (ref 8.8–10.5)
CHLORIDE SERPL-SCNC: 100 MMOL/L (ref 98–107)
CLARITY UR: CLEAR
CO2 SERPL-SCNC: 27.5 MMOL/L (ref 22–29)
COLOR UR: YELLOW
CREAT BLD-MCNC: 1.14 MG/DL (ref 0.57–1)
DEPRECATED RDW RBC AUTO: 44.3 FL (ref 37–54)
EOSINOPHIL # BLD AUTO: 0.2 10*3/MM3 (ref 0–0.4)
EOSINOPHIL NFR BLD AUTO: 2.7 % (ref 0.3–6.2)
ERYTHROCYTE [DISTWIDTH] IN BLOOD BY AUTOMATED COUNT: 13.7 % (ref 12.3–15.4)
GFR SERPL CREATININE-BSD FRML MDRD: 46 ML/MIN/1.73
GLUCOSE BLD-MCNC: 172 MG/DL (ref 65–99)
GLUCOSE UR STRIP-MCNC: ABNORMAL MG/DL
HCT VFR BLD AUTO: 37.7 % (ref 34–46.6)
HGB BLD-MCNC: 12 G/DL (ref 12–15.9)
HGB UR QL STRIP.AUTO: NEGATIVE
HYALINE CASTS UR QL AUTO: ABNORMAL /LPF
IMM GRANULOCYTES # BLD AUTO: 0.06 10*3/MM3 (ref 0–0.05)
IMM GRANULOCYTES NFR BLD AUTO: 0.8 % (ref 0–0.5)
KETONES UR QL STRIP: NEGATIVE
LEUKOCYTE ESTERASE UR QL STRIP.AUTO: ABNORMAL
LYMPHOCYTES # BLD AUTO: 2.32 10*3/MM3 (ref 0.7–3.1)
LYMPHOCYTES NFR BLD AUTO: 30.8 % (ref 19.6–45.3)
MCH RBC QN AUTO: 28.5 PG (ref 26.6–33)
MCHC RBC AUTO-ENTMCNC: 31.8 G/DL (ref 31.5–35.7)
MCV RBC AUTO: 89.5 FL (ref 79–97)
MONOCYTES # BLD AUTO: 0.59 10*3/MM3 (ref 0.1–0.9)
MONOCYTES NFR BLD AUTO: 7.8 % (ref 5–12)
NEUTROPHILS # BLD AUTO: 4.36 10*3/MM3 (ref 1.4–7)
NEUTROPHILS NFR BLD AUTO: 57.8 % (ref 42.7–76)
NITRITE UR QL STRIP: NEGATIVE
NRBC BLD AUTO-RTO: 0 /100 WBC (ref 0–0)
PH UR STRIP.AUTO: 5.5 [PH] (ref 4.5–8)
PLATELET # BLD AUTO: 180 10*3/MM3 (ref 140–450)
PMV BLD AUTO: 9.4 FL (ref 6–12)
POTASSIUM BLD-SCNC: 3.8 MMOL/L (ref 3.5–5.2)
PROT UR QL STRIP: NEGATIVE
RBC # BLD AUTO: 4.21 10*6/MM3 (ref 3.77–5.28)
RBC # UR: ABNORMAL /HPF
REF LAB TEST METHOD: ABNORMAL
SODIUM BLD-SCNC: 140 MMOL/L (ref 136–145)
SP GR UR STRIP: 1.01 (ref 1–1.03)
SQUAMOUS #/AREA URNS HPF: ABNORMAL /HPF
UROBILINOGEN UR QL STRIP: ABNORMAL
WBC CLUMPS # UR AUTO: ABNORMAL /HPF
WBC NRBC COR # BLD: 7.54 10*3/MM3 (ref 3.4–10.8)
WBC UR QL AUTO: ABNORMAL /HPF

## 2019-02-14 PROCEDURE — 80048 BASIC METABOLIC PNL TOTAL CA: CPT

## 2019-02-14 PROCEDURE — 82306 VITAMIN D 25 HYDROXY: CPT

## 2019-02-14 PROCEDURE — 85025 COMPLETE CBC W/AUTO DIFF WBC: CPT

## 2019-02-14 PROCEDURE — 81001 URINALYSIS AUTO W/SCOPE: CPT

## 2019-02-14 PROCEDURE — 36415 COLL VENOUS BLD VENIPUNCTURE: CPT

## 2019-02-15 RX ORDER — DULOXETIN HYDROCHLORIDE 60 MG/1
CAPSULE, DELAYED RELEASE ORAL
Qty: 90 CAPSULE | Refills: 0 | Status: SHIPPED | OUTPATIENT
Start: 2019-02-15 | End: 2019-05-01 | Stop reason: SDUPTHER

## 2019-02-27 RX ORDER — POTASSIUM CHLORIDE 750 MG/1
10 TABLET, FILM COATED, EXTENDED RELEASE ORAL DAILY
Qty: 90 TABLET | Refills: 0 | Status: SHIPPED | OUTPATIENT
Start: 2019-02-27 | End: 2019-05-24

## 2019-02-28 ENCOUNTER — TELEPHONE (OUTPATIENT)
Dept: INTERNAL MEDICINE | Facility: CLINIC | Age: 74
End: 2019-02-28

## 2019-02-28 RX ORDER — GLIMEPIRIDE 4 MG/1
TABLET ORAL
Qty: 180 TABLET | Refills: 2 | Status: SHIPPED | OUTPATIENT
Start: 2019-02-28 | End: 2019-09-04 | Stop reason: HOSPADM

## 2019-02-28 NOTE — TELEPHONE ENCOUNTER
Was  Calling  About   Glipizide   Instead of potassium.    Per  Lab results this was sent in incorrectly.   I corrected and sent to pharmacy  ----- Message from Monica Winston sent at 2/28/2019  1:15 PM EST -----  Regarding: script question  Contact: [phone]  dean / maría    Patient has questions about potassium chloride (K-DUR) 10 MEQ CR tablet and how she is to take it.

## 2019-03-06 ENCOUNTER — OFFICE VISIT (OUTPATIENT)
Dept: PAIN MEDICINE | Facility: CLINIC | Age: 74
End: 2019-03-06

## 2019-03-06 VITALS
HEIGHT: 65 IN | RESPIRATION RATE: 16 BRPM | TEMPERATURE: 98.3 F | HEART RATE: 70 BPM | OXYGEN SATURATION: 99 % | SYSTOLIC BLOOD PRESSURE: 156 MMHG | BODY MASS INDEX: 38.41 KG/M2 | DIASTOLIC BLOOD PRESSURE: 85 MMHG

## 2019-03-06 DIAGNOSIS — Z79.899 ENCOUNTER FOR LONG-TERM (CURRENT) USE OF HIGH-RISK MEDICATION: ICD-10-CM

## 2019-03-06 DIAGNOSIS — G89.29 OTHER CHRONIC PAIN: Primary | ICD-10-CM

## 2019-03-06 DIAGNOSIS — M15.9 GENERALIZED OSTEOARTHRITIS: ICD-10-CM

## 2019-03-06 DIAGNOSIS — M51.36 DDD (DEGENERATIVE DISC DISEASE), LUMBAR: ICD-10-CM

## 2019-03-06 PROCEDURE — 99213 OFFICE O/P EST LOW 20 MIN: CPT | Performed by: NURSE PRACTITIONER

## 2019-03-06 RX ORDER — HYDROCODONE BITARTRATE AND ACETAMINOPHEN 10; 325 MG/1; MG/1
1 TABLET ORAL EVERY 6 HOURS PRN
Qty: 120 TABLET | Refills: 0 | Status: SHIPPED | OUTPATIENT
Start: 2019-03-06 | End: 2019-04-01 | Stop reason: SDUPTHER

## 2019-03-06 NOTE — PROGRESS NOTES
CHIEF COMPLAINT  Pt is here for wrist, back, pt sts chronic pain all over.    Subjective   Jung Short is a 73 y.o. female  who presents to the office for follow-up.She has a history of chronic back and joint pain. Reports her pain is unchanged since last office visit.    Complains of pain in her joints and back. Today her pain is 4/10VAS. Describes the pain as continuous aching and throbbing. Is having issues with her feet, wearing her shoes. Continues with Hydrocodone 10/325 4/day and Cymbalta, and Lyrica 75 mg nightly(prescribed by Dr. Avalos)(has a new prescription for Lyrica 50 mg BID--cost).  Denies any side effects from the regimen.  This helps decrease her pain by 40-50%. ADL's by self.     Had left [facility] 4-17-18.  Dr Avalos has left her practice. Patient was referred to Dr. Kylee Jensen.   Joint Pain   This is a chronic problem. The current episode started more than 1 year ago (today pain is 4/10VAS- joints). The problem occurs constantly. The problem has been unchanged (unchanged since last [id number] visit). Associated symptoms include arthralgias, congestion, coughing and joint swelling. Pertinent negatives include no abdominal pain, chest pain, chills, fatigue, fever, headaches, nausea, neck pain, numbness, sore throat, vomiting or weakness. Nothing aggravates the symptoms. She has tried oral narcotics, position changes and rest for the symptoms. The treatment provided moderate relief.   Back Pain   This is a chronic problem. The current episode started more than 1 year ago. The problem occurs constantly. The problem has been gradually worsening (unchanged since last office visit) since onset. The pain is present in the lumbar spine. The pain radiates to the right knee, right foot and right thigh. The pain is at a severity of 4/10. The pain is moderate. Pertinent negatives include no abdominal pain, chest pain, dysuria, fever, headaches, numbness or weakness. Risk factors include lack of exercise and  "obesity. She has tried analgesics for the symptoms. The treatment provided moderate relief.      PEG Assessment   What number best describes your pain on average in the past week?4  What number best describes how, during the past week, pain has interfered with your enjoyment of life?5  What number best describes how, during the past week, pain has interfered with your general activity?  5    The following portions of the patient's history were reviewed and updated as appropriate: allergies, current medications, past family history, past medical history, past social history, past surgical history and problem list.    Review of Systems   Constitutional: Negative for chills, fatigue and fever.   HENT: Positive for congestion. Negative for sore throat.    Respiratory: Positive for cough. Negative for chest tightness and shortness of breath.    Cardiovascular: Negative for chest pain and leg swelling.   Gastrointestinal: Positive for constipation. Negative for abdominal pain, diarrhea, nausea and vomiting.   Genitourinary: Negative for difficulty urinating and dysuria.   Musculoskeletal: Positive for arthralgias, back pain, gait problem and joint swelling. Negative for neck pain.   Neurological: Negative for dizziness, weakness, light-headedness, numbness and headaches.   Psychiatric/Behavioral: Negative for sleep disturbance. The patient is not nervous/anxious.        Vitals:    03/06/19 1340   BP: 156/85   Pulse: 70   Resp: 16   Temp: 98.3 °F (36.8 °C)   SpO2: 99%   Height: 165.1 cm (65\")   PainSc:   4   PainLoc: Back     Objective   Physical Exam   Constitutional: She is oriented to person, place, and time. Vital signs are normal. She appears well-developed and well-nourished. She is cooperative.   HENT:   Head: Normocephalic and atraumatic.   Nose: Nose normal.   Eyes: Conjunctivae and lids are normal.   Cardiovascular: Normal rate.   Pulmonary/Chest: Effort normal.   Abdominal: Normal appearance.   Musculoskeletal: "        Lumbar back: She exhibits decreased range of motion and tenderness.   Surgical scar of left knee  Widespread OA changes with no acute synovitis   Neurological: She is alert and oriented to person, place, and time. Gait (cane) abnormal.   Reflex Scores:       Patellar reflexes are 1+ on the right side and 1+ on the left side.  Skin: Skin is warm, dry and intact.   Psychiatric: She has a normal mood and affect. Her speech is normal and behavior is normal. Judgment and thought content normal. Cognition and memory are normal.   Nursing note and vitals reviewed.      Assessment/Plan   Jung was seen today for pain.    Diagnoses and all orders for this visit:    Other chronic pain    Generalized osteoarthritis    DDD (degenerative disc disease), lumbar    Encounter for long-term (current) use of high-risk medication    Other orders  -     HYDROcodone-acetaminophen (NORCO)  MG per tablet; Take 1 tablet by mouth Every 6 (Six) Hours As Needed for Moderate Pain . DNF until 3-11-19      --- The urine drug screen confirmation from 11-14-18 has been reviewed and the result is APPROPRIATE based on patient history and LOUANN report  --- Refill Hydrocodone. Patient appears stable with current regimen. No adverse effects. Regarding continuation of opioids, there is no evidence of aberrant behavior or any red flags.  The patient continues with appropriate response to opioid therapy. ADL's remain intact by self.   --- Follow-up 2 months or sooner if needed.     LOUANN REPORT    As part of the patient's treatment plan, I am prescribing controlled substances. The patient has been made aware of appropriate use of such medications, including potential risk of somnolence, limited ability to drive and/or work safely, and the potential for dependence or overdose. It has also bee made clear that these medications are for use by this patient only, without concomitant use of alcohol or other substances unless prescribed.      Patient has completed prescribing agreement detailing terms of continued prescribing of controlled substances, including monitoring LOUANN reports, urine drug screening, and pill counts if necessary. The patient is aware that inappropriate use will results in cessation of prescribing such medications.    LOUANN report has been reviewed and scanned into the patient's chart.    As the clinician, I personally reviewed the LOUANN from 3-5-19 while the patient was in the office today.    History and physical exam exhibit continued safe and appropriate use of controlled substances.      EMR Dragon/Transcription disclaimer:   Much of this encounter note is an electronic transcription/translation of spoken language to printed text. The electronic translation of spoken language may permit erroneous, or at times, nonsensical words or phrases to be inadvertently transcribed; Although I have reviewed the note for such errors, some may still exist.

## 2019-03-12 ENCOUNTER — OFFICE VISIT (OUTPATIENT)
Dept: INTERNAL MEDICINE | Facility: CLINIC | Age: 74
End: 2019-03-12

## 2019-03-12 VITALS
HEIGHT: 65 IN | WEIGHT: 230 LBS | HEART RATE: 74 BPM | DIASTOLIC BLOOD PRESSURE: 84 MMHG | OXYGEN SATURATION: 98 % | RESPIRATION RATE: 18 BRPM | TEMPERATURE: 98 F | SYSTOLIC BLOOD PRESSURE: 138 MMHG | BODY MASS INDEX: 38.32 KG/M2

## 2019-03-12 DIAGNOSIS — G62.9 PERIPHERAL POLYNEUROPATHY: ICD-10-CM

## 2019-03-12 DIAGNOSIS — I10 ESSENTIAL HYPERTENSION: Primary | ICD-10-CM

## 2019-03-12 DIAGNOSIS — E11.65 UNCONTROLLED TYPE 2 DIABETES MELLITUS WITH HYPERGLYCEMIA (HCC): ICD-10-CM

## 2019-03-12 DIAGNOSIS — M79.604 RIGHT LEG PAIN: ICD-10-CM

## 2019-03-12 LAB — HBA1C MFR BLD: 9.6 %

## 2019-03-12 PROCEDURE — 99214 OFFICE O/P EST MOD 30 MIN: CPT | Performed by: INTERNAL MEDICINE

## 2019-03-12 PROCEDURE — 83036 HEMOGLOBIN GLYCOSYLATED A1C: CPT | Performed by: INTERNAL MEDICINE

## 2019-03-12 NOTE — PROGRESS NOTES
Subjective   Jung Short is a 73 y.o. female.     History of Present Illness     72 y/o WF with hx of DM with complications including bilateral LE neuropathy, osteomyelitis of L foot s/p amputation, and CKD stage 3, HTN, Hx of breast cancer s/p R masectomy with LN dissection, chronic Lower back pain, osteoarthritis, osteopenia, CHF, GERD, hypothryoidism and vit D deficiency who presents for follow up.      Today has some aching pain in her L Hip.  Just begin today.  Seems to be exacerbating with walking. Tender to palpation.  Has chronic low back pain and is on Norco, Lyrica, Cymbalta and follows with a pain clinic.  She has known osteoarthritis.  L hip is worsened with some lateral motion.        Trying to lose weight by walking on the treadmill.  Planning to try the eliptical.  Working on improved diet.      On Glimepiride for DM. At last appointment, was noted to have A1c of 9.0 and therefore glimepiride was increased from  1 mg to 4mg BID.  Has been running from 120s to 160s at home without any hypoglycemic episodes.  Has known LE neuropathy and CKD    Vit D Deficiency - On replacement.       The following portions of the patient's history were reviewed and updated as appropriate: allergies, current medications, past family history, past medical history, past social history, past surgical history and problem list.    Review of Systems   Constitutional: Negative for chills, fatigue and fever.   HENT: Negative.    Respiratory: Negative.    Genitourinary: Negative for dysuria.   Musculoskeletal: Positive for arthralgias and myalgias.   Neurological: Positive for numbness.        Tingling of bilateral feet        Objective   Physical Exam   Constitutional: She is oriented to person, place, and time. She appears well-developed and well-nourished. No distress.   HENT:   Head: Normocephalic and atraumatic.   Right Ear: External ear normal.   Left Ear: External ear normal.   Mouth/Throat: Oropharynx is clear and  moist. No oropharyngeal exudate.   Eyes: Conjunctivae are normal. Right eye exhibits no discharge. Left eye exhibits no discharge. No scleral icterus.   Neck: Neck supple.   Cardiovascular: Normal rate and regular rhythm.   Murmur heard.  Pulmonary/Chest: Effort normal and breath sounds normal. No respiratory distress. She has no wheezes.   Musculoskeletal:   Bilateral wrists with deformity/swelling.  + Osteophytes and MCP swelling.  + R wrist ganglion cyst.        During the foot exam she had a monofilament test not performed.  Lymphadenopathy:     She has no cervical adenopathy.   Neurological: She is alert and oriented to person, place, and time.   Skin: Skin is warm. No rash noted.   Psychiatric: She has a normal mood and affect. Her behavior is normal.   Nursing note and vitals reviewed.      Assessment/Plan   Jung was seen today for follow-up and diabetes.    Diagnoses and all orders for this visit:    Essential hypertension    Type 2 diabetes mellitus without complication, without long-term current use of insulin (CMS/[facility])      HTN  - BP well controlled in office today.  Continue Lisinopril at current dose.  No symptoms of light headedness or dizziness today    DM II  - Recheck A1c today.  Increased to 9.6%.  Counseled on low carb low sweet diet.  Encouraged exercise.  Will add Januvia to regimen and continue glimepiride.  Not having hypoglycemic episodes.  Continue to monitor BG at home.  Diabetic Foot exam and eye exam UTD    Osteoarthritis - Pain management per pain clinic.  Lyrica 75mg daily for now until out and then 50mg BID.      Vitamin D Deficiency - Continue replacement    Paul Stone MD  Med/Peds PGY-4    I saw patient with resident and agree with assessment and plan. F/u in 3 months to monitor BG after starting Januvia (samples given to patient today) and follow up of chronic medical conditions. Patient needs to schedule f/u with cardiology to manage CHF.

## 2019-04-01 RX ORDER — HYDROCODONE BITARTRATE AND ACETAMINOPHEN 10; 325 MG/1; MG/1
1 TABLET ORAL EVERY 6 HOURS PRN
Qty: 120 TABLET | Refills: 0 | Status: SHIPPED | OUTPATIENT
Start: 2019-04-01 | End: 2019-05-06 | Stop reason: SDUPTHER

## 2019-04-01 NOTE — TELEPHONE ENCOUNTER
Medication Refill Request    Date of phone call: 19    Medication being requested: norco 10/325 si tab po q 6 hours prn  Qty: 120    Date of last visit: 3/6/19    Date of last refill: 3/11/19    LOUANN up to date?: yes    Next Follow up?: 19    Any new pertinent information? (i.e, new medication allergies, new use of medications, change in patient's health or condition, non-compliance or inconsistency with prescribing agreement?):

## 2019-04-05 NOTE — TELEPHONE ENCOUNTER
Medication Refill Request    Date of phone call: ***    Medication being requested: *** sig: ***  Qty: ***    Date of last visit: ***    Date of last refill: ***    LOUANN up to date?: ***    Next Follow up?: ***    Any new pertinent information? (i.e, new medication allergies, new use of medications, change in patient's health or condition, non-compliance or inconsistency with prescribing agreement?): ***

## 2019-04-10 RX ORDER — PRAVASTATIN SODIUM 10 MG
TABLET ORAL
Qty: 90 TABLET | Refills: 1 | Status: SHIPPED | OUTPATIENT
Start: 2019-04-10 | End: 2019-05-24 | Stop reason: SDUPTHER

## 2019-04-21 DIAGNOSIS — G62.9 PERIPHERAL POLYNEUROPATHY: ICD-10-CM

## 2019-05-01 DIAGNOSIS — R14.0 ABDOMINAL BLOATING: ICD-10-CM

## 2019-05-01 RX ORDER — LINAGLIPTIN 5 MG/1
5 TABLET, FILM COATED ORAL DAILY
Qty: 30 TABLET | Refills: 5 | Status: SHIPPED | OUTPATIENT
Start: 2019-05-01 | End: 2019-08-05

## 2019-05-01 RX ORDER — DULOXETIN HYDROCHLORIDE 60 MG/1
60 CAPSULE, DELAYED RELEASE ORAL DAILY
Qty: 90 CAPSULE | Refills: 1 | Status: SHIPPED | OUTPATIENT
Start: 2019-05-01 | End: 2019-08-01

## 2019-05-02 RX ORDER — SPIRONOLACTONE 25 MG/1
25 TABLET ORAL EVERY MORNING
Qty: 90 TABLET | Refills: 0 | Status: SHIPPED | OUTPATIENT
Start: 2019-05-02 | End: 2019-08-05 | Stop reason: SDUPTHER

## 2019-05-06 ENCOUNTER — OFFICE VISIT (OUTPATIENT)
Dept: PAIN MEDICINE | Facility: CLINIC | Age: 74
End: 2019-05-06

## 2019-05-06 VITALS
WEIGHT: 228 LBS | HEIGHT: 65 IN | RESPIRATION RATE: 18 BRPM | OXYGEN SATURATION: 94 % | TEMPERATURE: 98.4 F | HEART RATE: 74 BPM | DIASTOLIC BLOOD PRESSURE: 83 MMHG | SYSTOLIC BLOOD PRESSURE: 152 MMHG | BODY MASS INDEX: 37.99 KG/M2

## 2019-05-06 DIAGNOSIS — Z79.899 ENCOUNTER FOR LONG-TERM (CURRENT) USE OF HIGH-RISK MEDICATION: ICD-10-CM

## 2019-05-06 DIAGNOSIS — M51.36 DDD (DEGENERATIVE DISC DISEASE), LUMBAR: ICD-10-CM

## 2019-05-06 DIAGNOSIS — M47.816 LUMBAR FACET ARTHROPATHY: ICD-10-CM

## 2019-05-06 DIAGNOSIS — G89.29 OTHER CHRONIC PAIN: Primary | ICD-10-CM

## 2019-05-06 DIAGNOSIS — M1A.39X0 CHRONIC GOUT DUE TO RENAL IMPAIRMENT OF MULTIPLE SITES WITHOUT TOPHUS: ICD-10-CM

## 2019-05-06 DIAGNOSIS — M25.50 ARTHRALGIA OF MULTIPLE JOINTS: ICD-10-CM

## 2019-05-06 PROCEDURE — 99214 OFFICE O/P EST MOD 30 MIN: CPT | Performed by: NURSE PRACTITIONER

## 2019-05-06 RX ORDER — HYDROCODONE BITARTRATE AND ACETAMINOPHEN 10; 325 MG/1; MG/1
1 TABLET ORAL EVERY 6 HOURS PRN
Qty: 120 TABLET | Refills: 0 | Status: SHIPPED | OUTPATIENT
Start: 2019-05-06 | End: 2019-06-06 | Stop reason: SDUPTHER

## 2019-05-06 NOTE — PROGRESS NOTES
"CHIEF COMPLAINT  Follow-up for joint pain.    Subjective   Jung Short is a 73 y.o. female  who presents to the office for follow-up.She has a history of chronic joint and back pain. Reports her pain is UNCHANGED since last office visit.    Complains of pain in her low back and joints(right shoulder, right wrist). Today her pain is 6/10VAS. Describes the pain as continuous aching and throbbing. Pain increases with walking, standing, bending/lifting/twisting, household chores; pain decreases with medication, rest. Continues with Hydrocodone 10/325 4/day and Cymbalta, and Lyrica 50 mg BID(had difficulty tolerating Lyrica 75 mg).  Denies any side effects from the regimen. Used to have constipation, takes Colace OTC as needed with positive results.  This helps decrease her pain by 40-50%. ADL's by self. Is no longer volunteering at [facility]. \"I feel really old and worn out.\" Is no a [employer] [place] [employer]. Is unable to go to [place] with family and friends.     Had left TKR 4-17-18.  Joint Pain   This is a chronic problem. The current episode started more than 1 year ago (today pain is 6/10VAS- joints). The problem occurs constantly. The problem has been unchanged (unchanged since last office visit). Associated symptoms include arthralgias and joint swelling (bilateral ankles). Pertinent negatives include no abdominal pain, chest pain, chills, congestion, coughing, fatigue, fever, headaches, nausea, neck pain, numbness, sore throat, vomiting or weakness. Nothing aggravates the symptoms. She has tried oral narcotics, position changes and rest for the symptoms. The treatment provided moderate relief.   Back Pain   This is a chronic problem. The current episode started more than 1 year ago. The problem occurs constantly. The problem has been gradually worsening (unchanged since last office visit) since onset. The pain is present in the lumbar spine. The pain radiates to the right knee, right foot and right " "thigh. The pain is at a severity of 6/10. The pain is moderate. Pertinent negatives include no abdominal pain, chest pain, dysuria, fever, headaches, numbness or weakness. Risk factors include lack of exercise and obesity. She has tried analgesics for the symptoms. The treatment provided moderate relief.      PEG Assessment   What number best describes your pain on average in the past week?6  What number best describes how, during the past week, pain has interfered with your enjoyment of life?5  What number best describes how, during the past week, pain has interfered with your general activity?  5    The following portions of the patient's history were reviewed and updated as appropriate: allergies, current medications, past family history, past medical history, past social history, past surgical history and problem list.    Review of Systems   Constitutional: Negative for chills, fatigue and fever.   HENT: Negative for congestion and sore throat.    Eyes: Negative for visual disturbance.   Respiratory: Negative for cough, shortness of breath and wheezing.    Cardiovascular: Negative.  Negative for chest pain.   Gastrointestinal: Negative for abdominal pain, constipation, diarrhea, nausea and vomiting.   Genitourinary: Negative for difficulty urinating and dysuria.   Musculoskeletal: Positive for arthralgias, back pain and joint swelling (bilateral ankles). Negative for neck pain.   Neurological: Negative for weakness, numbness and headaches.   Psychiatric/Behavioral: Positive for sleep disturbance. Negative for suicidal ideas. The patient is not nervous/anxious.        Vitals:    05/06/19 1323   BP: 152/83   Pulse: 74   Resp: 18   Temp: 98.4 °F (36.9 °C)   SpO2: 94%   Weight: 103 kg (228 lb)  Comment: Per patient   Height: 165.1 cm (65\")   PainSc:   6   PainLoc: Back     Objective   Physical Exam   Constitutional: She is oriented to person, place, and time. Vital signs are normal. She appears well-developed and " well-nourished. She is cooperative.   HENT:   Head: Normocephalic and atraumatic.   Nose: Nose normal.   Eyes: Conjunctivae and lids are normal.   Cardiovascular: Normal rate.   Pulmonary/Chest: Effort normal.   Abdominal: Normal appearance.   Musculoskeletal:        Lumbar back: She exhibits decreased range of motion and tenderness.   Surgical scar of left knee  Widespread OA changes with no acute synovitis   Neurological: She is alert and oriented to person, place, and time. Gait (cane) abnormal.   Reflex Scores:       Patellar reflexes are 1+ on the right side and 1+ on the left side.  Skin: Skin is warm, dry and intact.   Psychiatric: She has a normal mood and affect. Her speech is normal and behavior is normal. Judgment and thought content normal. Cognition and memory are normal.   Nursing note and vitals reviewed.      Assessment/Plan   Jung was seen today for joint pain.    Diagnoses and all orders for this visit:    Other chronic pain    Arthralgia of multiple joints    DDD (degenerative disc disease), lumbar    Lumbar facet arthropathy    Chronic gout due to renal impairment of multiple sites without tophus    Encounter for long-term (current) use of high-risk medication    Other orders  -     HYDROcodone-acetaminophen (NORCO)  MG per tablet; Take 1 tablet by mouth Every 6 (Six) Hours As Needed for Moderate Pain . DNF until 5-9-19      --- The urine drug screen confirmation from 11-14-18 has been reviewed and the result is APPROPRIATE based on patient history and LOUANN report  --- Refill Hydrocodone. DNF until 5-9-19. Patient appears stable with current regimen. No adverse effects. Regarding continuation of opioids, there is no evidence of aberrant behavior or any red flags.  The patient continues with appropriate response to opioid therapy. ADL's remain intact by self.   --- Follow-up 2 months or sooner if needed.     LOUANN REPORT    As part of the patient's treatment plan, I am prescribing  controlled substances. The patient has been made aware of appropriate use of such medications, including potential risk of somnolence, limited ability to drive and/or work safely, and the potential for dependence or overdose. It has also bee made clear that these medications are for use by this patient only, without concomitant use of alcohol or other substances unless prescribed.     Patient has completed prescribing agreement detailing terms of continued prescribing of controlled substances, including monitoring LOUANN reports, urine drug screening, and pill counts if necessary. The patient is aware that inappropriate use will results in cessation of prescribing such medications.    LOUANN report has been reviewed and scanned into the patient's chart.    As the clinician, I personally reviewed the LOUANN from 5-1-19 while the patient was in the office today.    History and physical exam exhibit continued safe and appropriate use of controlled substances.      EMR Dragon/Transcription disclaimer:   Much of this encounter note is an electronic transcription/translation of spoken language to printed text. The electronic translation of spoken language may permit erroneous, or at times, nonsensical words or phrases to be inadvertently transcribed; Although I have reviewed the note for such errors, some may still exist.

## 2019-05-08 ENCOUNTER — APPOINTMENT (OUTPATIENT)
Dept: LAB | Facility: HOSPITAL | Age: 74
End: 2019-05-08

## 2019-05-08 ENCOUNTER — APPOINTMENT (OUTPATIENT)
Dept: ONCOLOGY | Facility: CLINIC | Age: 74
End: 2019-05-08

## 2019-05-09 ENCOUNTER — TELEPHONE (OUTPATIENT)
Dept: INTERNAL MEDICINE | Facility: CLINIC | Age: 74
End: 2019-05-09

## 2019-05-09 DIAGNOSIS — E11.65 TYPE 2 DIABETES MELLITUS WITH HYPERGLYCEMIA, WITHOUT LONG-TERM CURRENT USE OF INSULIN (HCC): ICD-10-CM

## 2019-05-09 DIAGNOSIS — L98.9 SORE ON TOE: Primary | ICD-10-CM

## 2019-05-09 NOTE — TELEPHONE ENCOUNTER
Appointment made for 08/16 - confirmed appointment on 05/09        ----- Message from Sofía Mcclure CMA sent at 5/9/2019  8:57 AM EDT -----  Regarding: FW: REFERRAL REQUEST  Contact: [phone]  Lily please schedule patient in 3 months for 30 minutes. Let her know Kiarra or Dr. Mcclain ([place]) will call her to set up Podiatry apt. Thanks you.       ----- Message -----  From: Kylee Daniel MD  Sent: 5/8/2019   1:58 PM  To: Janet Lopez MA, Sofía Mcclure CMA  Subject: RE: REFERRAL REQUEST                             Referral to Dr. Mcclain or Matt is fine. I wanted to see her back in 3 months I think. 30 minutes would be great.  ----- Message -----  From: Sofía Mcclure CMA  Sent: 5/8/2019   9:20 AM  To: Kylee Daniel MD  Subject: FW: REFERRAL REQUEST                             Ok for Dr. Mcclain ([place] referral?   Also, how long would you like her apt? 15 or 30    ----- Message -----  From: Erasmo Lily  Sent: 5/7/2019   2:12 PM  To: Jovan [address]  Subject: REFERRAL REQUEST                                 PREV [facility] - [facility]    Patient called to say that she is very concerned about the sore on her toe because of her sugar. She has seen dr daniel a couple of times. She is asking for a referral to podiatrist. She prefers to stay in [place] or [place].  She also wanted to make another appointment with dr daniel. Should this be a 15 minute or 30, and when?    thanks

## 2019-05-09 NOTE — TELEPHONE ENCOUNTER
Lily will schedule apt with patient. Referral placed.     ----- Message from Kylee Daniel MD sent at 5/8/2019  1:58 PM EDT -----  Regarding: RE: REFERRAL REQUEST  Contact: [phone]  Referral to Dr. Mcclain or Matt is fine. I wanted to see her back in 3 months I think. 30 minutes would be great.    ----- Message -----  From: Sofía Mcclure CMA  Sent: 5/8/2019   9:20 AM  To: Kylee Daniel MD  Subject: FW: REFERRAL REQUEST                             Ok for Dr. Mcclain ([place] referral?   Also, how long would you like her apt? 15 or 30    ----- Message -----  From: Lily Zimmerman  Sent: 5/7/2019   2:12 PM  To: Jovan [address]  Subject: REFERRAL REQUEST                                 [facility] [place]    Patient called to say that she is very concerned about the sore on her toe because of her sugar. She has seen dr daniel a couple of times. She is asking for a referral to podiatrist. She prefers to stay in [place] or [place].  She also wanted to make another appointment with dr daniel. Should this be a 15 minute or 30, and when?    thanks

## 2019-05-20 RX ORDER — LISINOPRIL 5 MG/1
5 TABLET ORAL DAILY
Qty: 120 TABLET | Refills: 5 | Status: SHIPPED | OUTPATIENT
Start: 2019-05-20 | End: 2019-05-24

## 2019-05-24 ENCOUNTER — OFFICE VISIT (OUTPATIENT)
Dept: CARDIOLOGY | Facility: CLINIC | Age: 74
End: 2019-05-24

## 2019-05-24 VITALS
SYSTOLIC BLOOD PRESSURE: 152 MMHG | WEIGHT: 231.4 LBS | HEIGHT: 67 IN | BODY MASS INDEX: 36.32 KG/M2 | HEART RATE: 66 BPM | DIASTOLIC BLOOD PRESSURE: 80 MMHG

## 2019-05-24 DIAGNOSIS — I10 ESSENTIAL HYPERTENSION: ICD-10-CM

## 2019-05-24 DIAGNOSIS — L97.509 DIABETES MELLITUS DUE TO UNDERLYING CONDITION WITH FOOT ULCER, UNSPECIFIED WHETHER LONG TERM INSULIN USE (HCC): ICD-10-CM

## 2019-05-24 DIAGNOSIS — N18.9 CHRONIC KIDNEY DISEASE, UNSPECIFIED CKD STAGE: ICD-10-CM

## 2019-05-24 DIAGNOSIS — I42.8 OTHER CARDIOMYOPATHY (HCC): Primary | ICD-10-CM

## 2019-05-24 DIAGNOSIS — I34.0 NON-RHEUMATIC MITRAL REGURGITATION: ICD-10-CM

## 2019-05-24 DIAGNOSIS — E08.621 DIABETES MELLITUS DUE TO UNDERLYING CONDITION WITH FOOT ULCER, UNSPECIFIED WHETHER LONG TERM INSULIN USE (HCC): ICD-10-CM

## 2019-05-24 PROCEDURE — 99214 OFFICE O/P EST MOD 30 MIN: CPT | Performed by: INTERNAL MEDICINE

## 2019-05-24 PROCEDURE — 93000 ELECTROCARDIOGRAM COMPLETE: CPT | Performed by: INTERNAL MEDICINE

## 2019-05-24 RX ORDER — LISINOPRIL 20 MG/1
20 TABLET ORAL DAILY
Qty: 90 TABLET | Refills: 3 | Status: SHIPPED | OUTPATIENT
Start: 2019-05-24 | End: 2019-08-17 | Stop reason: HOSPADM

## 2019-05-24 RX ORDER — CEPHALEXIN 500 MG/1
500 CAPSULE ORAL 2 TIMES DAILY
COMMUNITY
End: 2019-06-14

## 2019-05-24 RX ORDER — PRAVASTATIN SODIUM 20 MG
20 TABLET ORAL DAILY
Qty: 90 TABLET | Refills: 3 | Status: SHIPPED | OUTPATIENT
Start: 2019-05-24 | End: 2019-08-16

## 2019-05-24 RX ORDER — LISINOPRIL 20 MG/1
20 TABLET ORAL DAILY
Qty: 90 TABLET | Refills: 3 | Status: SHIPPED | OUTPATIENT
Start: 2019-05-24 | End: 2019-05-24 | Stop reason: SDUPTHER

## 2019-05-24 RX ORDER — CIPROFLOXACIN 500 MG/1
500 TABLET, FILM COATED ORAL 2 TIMES DAILY
COMMUNITY
End: 2019-06-14

## 2019-05-24 NOTE — PROGRESS NOTES
Date of Office Visit: 2019  Encounter Provider: Aria Bates MD  Place of Service: [facility] CARDIOLOGY  Patient Name: Jung Short  :1945      Patient ID:  Jung Short is a 73 y.o. female is here for  followup for cardiomyopathy.         History of Present Illness    She presented to [facility] in 2015 with acute congestive heart failure, systolic and diastolic. She had a 2-D echocardiogram which revealed an ejection fraction of 10-15%, moderate left ventricular dilation, severe mitral and tricuspid insufficiency, and her aortic valve was normal. She was transferred to [facility] for a cardiac catheterization. 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 She also had a right heart catheterization done which revealed an RV pressure of 50/20, PA pressure 50/30 with the main of 38 and a capillary wedge pressure of 31 mmHg. Her right atrial pressure was 20 mmHg. Her cardiac output was 3.9 liters per minute. She was treated medically and diuresed.         She had a 2-D echocardiogram with Doppler  performed on 2015 which showed her ejection fraction to be 50%. There was normal  segmental wall motion. There was moderate left atrial dilation. A small patent foramen  ovale/atrial septal defect by saline contrast study and trace mitral and tricuspid  insufficiency. This is a significant improvement from her prior echocardiogram.       She is on anastrozole for right breast cancer.       She had normal renal ultrasound ordered by Dr. Hannah 2017.  She has chronic renal insufficiency and follows with Dr. Hannah.  She had a  carotid duplex study done April 2017 which showed mild carotid arteries.  She had a Holter monitor done just benign 4/2017.    Laboratory values done 2/14/2019 show BMP with creatinine of 1.14.  Her last lipid panel was done 1/29/2019 showing , HDL 47.  She also had a normal TSH.  Her CBC has been normal.  She had laboratory values done at [facility] on 5/2019 showing creatinine 0.9.  Her potassium was 3.5.    She has no tachycardia, dizziness or syncope.  She has bilateral foot wounds from diabetes and follows with Kaitlynn Frias at [facility] for wound care.  She does not feel more short winded though she has some mild exertional dyspnea.  She has not had orthopnea or PND and she has no chest pain.  She has been taking her medications as directed but her blood pressures been really high and she is concerned about that, she has gotten as high as 240/110.  She is not using salt.    Past Medical History:   Diagnosis Date   • Anemia    • Benign breast disease    • Cancer (CMS/HCC) 2015    Right breast   • Cellulitis of leg     May-2003 right lower extremity   • CHF (congestive heart failure) (CMS/HCC)     Dr Bates follows   • Chronic kidney disease     Dr Hannah follows   • Chronic ulcer of right foot (CMS/HCC)     Non-pressure, history of    • Depression    • Diabetic peripheral neuropathy (CMS/HCC)    • Diarrhea    • Diverticulosis    • Encounter for eye exam    • Fracture of left wrist     history of   • Gastroesophageal reflux    • Hiatal hernia    • History of bone density study 09/20/2012   • History of colonoscopy     done 6/03 recheck in 10 years.   Done july 2013 recheck in 5 years   • History of mammography, screening 09/20/2012   • Hospital discharge follow-up    • Hyperlipidemia    • Hypertension    • Hypothyroidism    • Impingement syndrome of right shoulder    • Joint pain    • Menopausal disorder    • Methicillin resistant Staphylococcus aureus infection 2012    after bladder surgery   • MRSA  (methicillin resistant staph aureus) culture positive    • Nausea and vomiting    • Nonischemic cardiomyopathy (CMS/HCC)     Ejection fraction 10% per 2-D echo with Doppler however she did have cardiac catheterization April 2015 which showed ejection fraction 20% with global hypokinesis and severe mitral insufficiency   • Osteoarthritis     generalized, sched jania TKA   • Paroxysmal atrial fibrillation (CMS/HCC)     Dr Bates follows   • Postoperative infection     July of 2012 following her hysterectomy far vaginal wall prolapse. She was on antibiotics and wound dressings for 2 months.   • Shoulder pain, bilateral     has tears on both sides   • Syncope, psychogenic 4/19/2017   • Toe ulcer (CMS/HCC)     h/o to both feet, d/t shoes rubbing per patient   • Transient alteration of awareness 4/19/2017         Past Surgical History:   Procedure Laterality Date   • ABDOMINAL SURGERY  2012    had to be reopened after bladder surgery d/t infection   • AMPUTATION FOOT / TOE Right 11/2013    Rt foot amputation MTP first toe   • BLADDER SURGERY  2012   • BREAST BIOPSY     • BREAST SURGERY  06/29/2015    Percutaneous ultrasound-guided placement of metal localized clip 1st lesion   • CARDIAC CATHETERIZATION  2015   • CATARACT EXTRACTION Bilateral 2017   • DEBRIDEMENT  FOOT Right 01/2013    During hospitalization    • EPIDURAL BLOCK      4 chronic back pain and leg pain last epidurals done 5-2012 she had no benefit at all from this procedure.   • FOOT SURGERY Bilateral     several   • HERNIA REPAIR      At the abdomen February 2007 Dr. Mendez   • INCISIONAL HERNIA REPAIR  05/16/2014    Recurrent. Incarcerated. With mesh implantation   • MASTECTOMY Right 05/2015   • SHOULDER SURGERY Right     x2 RCR   • TOTAL ABDOMINAL HYSTERECTOMY      with oophorectomy-Done June-2012 secondary to vaginal wall prolapse.   • TOTAL KNEE ARTHROPLASTY Right    • TOTAL KNEE ARTHROPLASTY Left 4/17/2018    Procedure: TOTAL KNEE ARTHROPLASTY;  Surgeon:  Live Chambers MD;  Location: [facility];  Service: Orthopedics       Current Outpatient Medications on File Prior to Visit   Medication Sig Dispense Refill   • anastrozole (ARIMIDEX) 1 MG tablet TAKE 1 TABLET BY MOUTH EVERY DAY 90 tablet 1   • aspirin 81 MG EC tablet Take 81 mg by mouth Daily.     • bumetanide (BUMEX) 2 MG tablet TAKE 1 TABLET BY MOUTH TWICE A  tablet 3   • carvedilol (COREG) 12.5 MG tablet Take 1 tablet by mouth 2 (Two) Times a Day With Meals. 180 tablet 3   • cephalexin (KEFLEX) 500 MG capsule Take 500 mg by mouth 2 (Two) Times a Day.     • ciprofloxacin (CIPRO) 500 MG tablet Take 500 mg by mouth 2 (Two) Times a Day.     • DULoxetine (CYMBALTA) 60 MG capsule Take 1 capsule by mouth Daily. 90 capsule 1   • esomeprazole (nexIUM) 20 MG capsule Take 20 mg by mouth Every Morning Before Breakfast.     • glimepiride (AMARYL) 4 MG tablet Take  One po bid 180 tablet 2   • HYDROcodone-acetaminophen (NORCO)  MG per tablet Take 1 tablet by mouth Every 6 (Six) Hours As Needed for Moderate Pain . DNF until 5-9-19 120 tablet 0   • levothyroxine (SYNTHROID, LEVOTHROID) 25 MCG tablet TAKE 1 TABLET BY MOUTH ONCE DAILY 180 tablet 0   • lisinopril (PRINIVIL,ZESTRIL) 5 MG tablet Take 1 tablet by mouth Daily. 120 tablet 5   • LYRICA 50 MG capsule TAKE 1 CAPSULE BY MOUTH TWICE DAILY 180 capsule 1   • Multiple Vitamins-Minerals (MULTIVITAMIN ADULT PO) Take  by mouth.     • potassium chloride (K-DUR) 10 MEQ CR tablet TAKE 1 TABLET BY MOUTH DAILY 90 tablet 0   • pravastatin (PRAVACHOL) 10 MG tablet TAKE 1 TABLET BY MOUTH ONCE DAILY 90 tablet 1   • spironolactone (ALDACTONE) 25 MG tablet Take 1 tablet by mouth Every Morning. 90 tablet 0   • TRADJENTA 5 MG tablet tablet Take 1 tablet by mouth Daily. 30 tablet 5   • vitamin D (ERGOCALCIFEROL) 00206 units capsule capsule Take 50,000 Units by mouth 1 (One) Time Per Week.     • alendronate (FOSAMAX) 35 MG tablet take 1 tablet by mouth every week 12 tablet 3     No  current facility-administered medications on file prior to visit.        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   Tobacco Use   • Smoking status: Former Smoker     Packs/day: 3.00     Years: 30.00     Pack years: 90.00     Types: Cigarettes     Last attempt to quit:      Years since quittin.4   • Smokeless tobacco: Never Used   Substance and Sexual Activity   • Alcohol use: No     Comment: h/o quit    • Drug use: No   • Sexual activity: Defer     Partners: Male     Comment: celibate   Social History Narrative         Volunteers here at [facility] [employer] [facility]     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   HENT: Negative for congestion.    Eyes: Negative for vision loss in left eye and vision loss in right eye.   Respiratory: Negative.  Negative for cough, hemoptysis, shortness of breath, sleep disturbances due to breathing, snoring, sputum production and wheezing.    Endocrine: Negative.    Hematologic/Lymphatic: Negative.    Skin: Negative for poor wound healing and rash.   Musculoskeletal: Negative for falls, gout, muscle cramps and myalgias.   Gastrointestinal: Negative for abdominal pain, diarrhea, dysphagia, hematemesis, melena, nausea and vomiting.   Neurological: Negative for excessive daytime sleepiness, dizziness, headaches, light-headedness, loss of balance, seizures and vertigo.   Psychiatric/Behavioral: Negative for depression and substance abuse. The patient is not nervous/anxious.        Procedures    ECG 12 Lead  Date/Time: 2019 1:05 PM  Performed by: Aria Bates MD  Authorized by: Aria Bates,  "MD   Comparison: compared with previous ECG   Similar to previous ECG  Rhythm: sinus rhythm    Clinical impression: normal ECG                Objective:      Vitals:    05/24/19 1237   BP: 152/80   BP Location: Left arm   Pulse: 66   Weight: 105 kg (231 lb 6.4 oz)   Height: 168.9 cm (66.5\")     Body mass index is 36.79 kg/m².    Physical Exam   Constitutional: She is oriented to person, place, and time. She appears well-developed and well-nourished. No distress.   HENT:   Head: Normocephalic and atraumatic.   Eyes: Conjunctivae are normal. No scleral icterus.   Neck: Neck supple. No JVD present. Carotid bruit is not present. No thyromegaly present.   Cardiovascular: Normal rate, regular rhythm, S1 normal, S2 normal, normal heart sounds and intact distal pulses.  No extrasystoles are present. PMI is not displaced. Exam reveals no gallop.   No murmur heard.  Pulses:       Carotid pulses are 2+ on the right side, and 2+ on the left side.       Radial pulses are 2+ on the right side, and 2+ on the left side.        Dorsalis pedis pulses are 2+ on the right side, and 2+ on the left side.        Posterior tibial pulses are 2+ on the right side, and 2+ on the left side.   Pulmonary/Chest: Effort normal and breath sounds normal. No respiratory distress. She has no wheezes. She has no rhonchi. She has no rales. She exhibits no tenderness.   Abdominal: Soft. Bowel sounds are normal. She exhibits no distension, no abdominal bruit and no mass. There is no tenderness.   Musculoskeletal: She exhibits no edema or deformity.   Lymphadenopathy:     She has no cervical adenopathy.   Neurological: She is alert and oriented to person, place, and time. No cranial nerve deficit.   Skin: Skin is warm and dry. No rash noted. She is not diaphoretic. No cyanosis. No pallor. Nails show no clubbing.   Psychiatric: She has a normal mood and affect. Judgment normal.   Vitals reviewed.      Lab Review:       Assessment:      Diagnosis Plan   1. " Other cardiomyopathy (CMS/[facility])     2. Essential hypertension     3. Non-rheumatic mitral regurgitation     4. Diabetes mellitus due to underlying condition with foot ulcer, unspecified whether long term insulin use (CMS/[facility])     5. Chronic kidney disease, unspecified CKD stage       1. H/o Nonischemic cardiomyopathy. Her ejection fraction was 10-20%. At this  time she is New York Heart Association class 2a. Her repeat echocardiogram 6/2015 showed  LVEF 50% with trace mitral and tricuspid insufficiency. No CHF now.    2. Hypertension, BP is too high.   3. DM, type 2.  4. Anomalous coronary artery.   5. H/o right breast cancer. Sees Dr. Draper.   6. Severe OA of the knees.  7. CKD, sees Dr. Hannah.   8.  Left knee OA.  She is low CV risk to undergo surgery 4/17/18 with Dr. Chambers.      Plan:       See ha in 6 months.  Increase lisinopril to 20mg daily for hypertension, BP target <120/80.  Increase pravastatin to 20mg daily, get LDL <100. Repeat echo in 1 year.

## 2019-06-06 RX ORDER — HYDROCODONE BITARTRATE AND ACETAMINOPHEN 10; 325 MG/1; MG/1
1 TABLET ORAL EVERY 6 HOURS PRN
Qty: 120 TABLET | Refills: 0 | Status: SHIPPED | OUTPATIENT
Start: 2019-06-06 | End: 2019-07-11 | Stop reason: SDUPTHER

## 2019-06-14 ENCOUNTER — OFFICE VISIT (OUTPATIENT)
Dept: ONCOLOGY | Facility: CLINIC | Age: 74
End: 2019-06-14

## 2019-06-14 ENCOUNTER — LAB (OUTPATIENT)
Dept: LAB | Facility: HOSPITAL | Age: 74
End: 2019-06-14

## 2019-06-14 VITALS
DIASTOLIC BLOOD PRESSURE: 68 MMHG | WEIGHT: 237 LBS | HEART RATE: 96 BPM | BODY MASS INDEX: 37.68 KG/M2 | SYSTOLIC BLOOD PRESSURE: 147 MMHG | OXYGEN SATURATION: 96 % | TEMPERATURE: 98.9 F

## 2019-06-14 DIAGNOSIS — M85.80 OSTEOPENIA, UNSPECIFIED LOCATION: Primary | ICD-10-CM

## 2019-06-14 DIAGNOSIS — C50.211 MALIGNANT NEOPLASM OF UPPER-INNER QUADRANT OF RIGHT BREAST IN FEMALE, ESTROGEN RECEPTOR POSITIVE (HCC): ICD-10-CM

## 2019-06-14 DIAGNOSIS — Z79.811 AROMATASE INHIBITOR USE: ICD-10-CM

## 2019-06-14 DIAGNOSIS — Z17.0 MALIGNANT NEOPLASM OF UPPER-INNER QUADRANT OF RIGHT BREAST IN FEMALE, ESTROGEN RECEPTOR POSITIVE (HCC): ICD-10-CM

## 2019-06-14 DIAGNOSIS — Z12.31 ENCOUNTER FOR SCREENING MAMMOGRAM FOR BREAST CANCER: ICD-10-CM

## 2019-06-14 DIAGNOSIS — D64.9 ANEMIA, UNSPECIFIED TYPE: Primary | ICD-10-CM

## 2019-06-14 LAB
ALBUMIN SERPL-MCNC: 3.5 G/DL (ref 3.5–5.2)
ALBUMIN/GLOB SERPL: 1.2 G/DL
ALP SERPL-CCNC: 98 U/L (ref 39–117)
ALT SERPL W P-5'-P-CCNC: 8 U/L (ref 1–33)
ANION GAP SERPL CALCULATED.3IONS-SCNC: 13.6 MMOL/L
AST SERPL-CCNC: 12 U/L (ref 1–32)
BASOPHILS # BLD AUTO: 0.02 10*3/MM3 (ref 0–0.2)
BASOPHILS NFR BLD AUTO: 0.4 % (ref 0–1.5)
BILIRUB SERPL-MCNC: 0.4 MG/DL (ref 0.2–1.2)
BUN BLD-MCNC: 10 MG/DL (ref 8–23)
BUN/CREAT SERPL: 9.9 (ref 7–25)
CALCIUM SPEC-SCNC: 9.3 MG/DL (ref 8.6–10.5)
CHLORIDE SERPL-SCNC: 97 MMOL/L (ref 98–107)
CO2 SERPL-SCNC: 28.4 MMOL/L (ref 22–29)
CREAT BLD-MCNC: 1.01 MG/DL (ref 0.57–1)
DEPRECATED RDW RBC AUTO: 44 FL (ref 37–54)
EOSINOPHIL # BLD AUTO: 0.24 10*3/MM3 (ref 0–0.4)
EOSINOPHIL NFR BLD AUTO: 4.3 % (ref 0.3–6.2)
ERYTHROCYTE [DISTWIDTH] IN BLOOD BY AUTOMATED COUNT: 13.7 % (ref 12.3–15.4)
GFR SERPL CREATININE-BSD FRML MDRD: 53 ML/MIN/1.73
GLOBULIN UR ELPH-MCNC: 3 GM/DL
GLUCOSE BLD-MCNC: 301 MG/DL (ref 65–99)
HCT VFR BLD AUTO: 35 % (ref 34–46.6)
HGB BLD-MCNC: 11.2 G/DL (ref 12–15.9)
IMM GRANULOCYTES # BLD AUTO: 0.04 10*3/MM3 (ref 0–0.05)
IMM GRANULOCYTES NFR BLD AUTO: 0.7 % (ref 0–0.5)
LYMPHOCYTES # BLD AUTO: 1.49 10*3/MM3 (ref 0.7–3.1)
LYMPHOCYTES NFR BLD AUTO: 26.7 % (ref 19.6–45.3)
MCH RBC QN AUTO: 28 PG (ref 26.6–33)
MCHC RBC AUTO-ENTMCNC: 32 G/DL (ref 31.5–35.7)
MCV RBC AUTO: 87.5 FL (ref 79–97)
MONOCYTES # BLD AUTO: 0.49 10*3/MM3 (ref 0.1–0.9)
MONOCYTES NFR BLD AUTO: 8.8 % (ref 5–12)
NEUTROPHILS # BLD AUTO: 3.3 10*3/MM3 (ref 1.7–7)
NEUTROPHILS NFR BLD AUTO: 59.1 % (ref 42.7–76)
NRBC BLD AUTO-RTO: 0 /100 WBC (ref 0–0.2)
PLATELET # BLD AUTO: 166 10*3/MM3 (ref 140–450)
PMV BLD AUTO: 9.6 FL (ref 6–12)
POTASSIUM BLD-SCNC: 3.2 MMOL/L (ref 3.5–5.2)
PROT SERPL-MCNC: 6.5 G/DL (ref 6–8.5)
RBC # BLD AUTO: 4 10*6/MM3 (ref 3.77–5.28)
SODIUM BLD-SCNC: 139 MMOL/L (ref 136–145)
WBC NRBC COR # BLD: 5.58 10*3/MM3 (ref 3.4–10.8)

## 2019-06-14 PROCEDURE — 85025 COMPLETE CBC W/AUTO DIFF WBC: CPT

## 2019-06-14 PROCEDURE — 36415 COLL VENOUS BLD VENIPUNCTURE: CPT

## 2019-06-14 PROCEDURE — 80053 COMPREHEN METABOLIC PANEL: CPT

## 2019-06-14 PROCEDURE — 99213 OFFICE O/P EST LOW 20 MIN: CPT | Performed by: NURSE PRACTITIONER

## 2019-06-14 RX ORDER — UBIDECARENONE 100 MG
100 CAPSULE ORAL
Status: ON HOLD | COMMUNITY
End: 2020-01-31

## 2019-06-14 NOTE — PROGRESS NOTES
Subjective:     Reason for follow up:   1. 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    * Arimidex initiated in 08/2015    2. Osteopenia.      History of Present Ilness: Jung Short presents for follow-up of breast cancer. She remains on Arimidex and Fosamax.  She does have intermittent hot flashes.  She also has chronic arthralgias that were pre-existing.  She denies recent fevers or infections.  She denies any changes to the breast.  She is frustrated by excess skin under her right axilla however this is unchanged.  She does state her blood sugar has been uncontrolled as of recent.  She did have some medication changes in the past few months.  I have encouraged her to follow-up on this sooner.      Reviewed, confirmed and updated history (past medical, social and family)   Past Medical   Past Medical History:   Diagnosis Date   • Anemia    • Benign breast disease    • Cancer (CMS/HCC) 2015    Right breast   • Cellulitis of leg     May-2003 right lower extremity   • CHF (congestive heart failure) (CMS/HCC)     Dr Bates follows   • Chronic kidney disease     Dr Hannah follows   • Chronic ulcer of right foot (CMS/HCC)     Non-pressure, history of    • Depression    • Diabetic peripheral neuropathy (CMS/HCC)    • Diarrhea    • Diverticulosis    • Encounter for eye exam    • Fracture of left wrist     history of   • Gastroesophageal reflux    • Hiatal hernia    • History of bone density study 09/20/2012   • History of colonoscopy     done 6/03 recheck in 10 years.   Done july 2013 recheck in 5 years   • History of mammography, screening 09/20/2012   • Hospital discharge follow-up    • Hyperlipidemia    • Hypertension    • Hypothyroidism    • Impingement syndrome of right shoulder    • Joint pain    • Menopausal disorder    • Methicillin resistant  Staphylococcus aureus infection 2012    after bladder surgery   • MRSA (methicillin resistant staph aureus) culture positive    • Nausea and vomiting    • Nonischemic cardiomyopathy (CMS/HCC)     Ejection fraction 10% per 2-D echo with Doppler however she did have cardiac catheterization April 2015 which showed ejection fraction 20% with global hypokinesis and severe mitral insufficiency   • Osteoarthritis     generalized, sched jania TKA   • Paroxysmal atrial fibrillation (CMS/HCC)     Dr Bates follows   • Postoperative infection     July of 2012 following her hysterectomy far vaginal wall prolapse. She was on antibiotics and wound dressings for 2 months.   • Shoulder pain, bilateral     has tears on both sides   • Syncope, psychogenic 4/19/2017   • Toe ulcer (CMS/HCC)     h/o to both feet, d/t shoes rubbing per patient   • Transient alteration of awareness 4/19/2017     Patient Active Problem List   Diagnosis   • Abdominal bloating   • Abdominal mass   • Abdominal pain   • Abnormal gait   • Abnormal mammogram   • Anemia   • Thoracic back pain   • Malignant neoplasm of upper-inner quadrant of right breast in female, estrogen receptor positive (CMS/HCC)   • Candidiasis   • Cardiomyopathy (CMS/HCC)   • Disc disorder of cervical region   • Neck pain   • Tenderness of chest wall   • Anemia due to stage 3 chronic kidney disease (CMS/HCC)   • Chronic pain   • Constipation   • Generalized osteoarthritis   • Depression   • Type 2 diabetes mellitus (CMS/HCC)   • Controlled type 2 diabetes mellitus without complication (CMS/HCC)   • Dyslipidemia   • Gastroesophageal reflux disease with esophagitis   • Generalized pain   • Gout   • Hypertension   • Hypothyroidism   • Incisional hernia   • Mass of breast   • Mitral valve insufficiency   • Nausea   • Adult body mass index greater than 30   • Osteopenia of spine   • Arthralgia of multiple joints   • Peripheral neuropathy   • Pulmonary hypertension (CMS/HCC)   • Osteomyelitis  (CMS/[facility])   • Tricuspid valve insufficiency   • Cobalamin deficiency   • Vitamin D deficiency   • Diabetes mellitus (CMS/[facility])   • Left knee pain   • Arthritis of knee   • DDD (degenerative disc disease), lumbar   • Lumbar facet arthropathy   • Pain in shoulder   • Chronic gout of multiple sites   • Encounter for long-term (current) use of high-risk medication   • Constipation due to opioid therapy   • Syncope, psychogenic   • Transient alteration of awareness   • Closed fracture of patella   • Primary osteoarthritis of left knee   • Radius/ulna fracture, left, closed, initial encounter   • Rib pain on left side   • Preoperative cardiovascular examination   • Physical deconditioning   • Hyponatremia   • Needs flu shot   • Acute URI   • Chronic kidney disease     Social History   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   Tobacco Use   • Smoking status: Former Smoker     Packs/day: 3.00     Years: 30.00     Pack years: 90.00     Types: Cigarettes     Last attempt to quit:      Years since quittin.4   • Smokeless tobacco: Never Used   Substance and Sexual Activity   • Alcohol use: No     Comment: h/o quit    • Drug use: No   • Sexual activity: Defer     Partners: Male     Comment: celibate   Social History Narrative         Volunteers here at [facility]     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    Lots of friends    2 daughters - lives in [facility] [place] [facility] and [place] (graphic design)    [employer]      Family History  Family History   Problem Relation Age of Onset   • Colonic polyp Mother    • Hypertension Mother    • Migraines Mother    • Mental illness Mother    • Depression Mother    • Coronary artery disease Father    • Other Father         Cardiac Disorder   • Colon cancer  Father    • Kidney disease Father    • Cancer Father    • Breast cancer Maternal Aunt    • Colon cancer Brother    • Alcohol abuse Brother    • Arthritis Sister    • Hypertension Brother    • Lung disease Brother    • Alcohol abuse Brother    • Malig Hyperthermia Neg Hx      Allergies  Allergies   Allergen Reactions   • Dilaudid [Hydromorphone] Delirium and Confusion   • Latex Rash       Medications: The current medication list was reviewed in the EMR.    Review of Systems  Review of Systems   Constitutional: Negative for activity change, appetite change, chills, diaphoresis, fatigue, fever and unexpected weight change.   Respiratory: Negative for cough, chest tightness and shortness of breath.    Cardiovascular: Negative for chest pain, palpitations and leg swelling.   Gastrointestinal: Negative for abdominal pain, blood in stool, constipation, diarrhea, nausea and vomiting.   Musculoskeletal: Positive for arthralgias and back pain. Negative for joint swelling and myalgias.   Neurological: Positive for numbness.   Hematological: Negative for adenopathy. Does not bruise/bleed easily.   Review of systems unchanged from previous as of 6/14/2019       Objective:     Vitals:    06/14/19 1201   BP: 147/68   Pulse: 96   Temp: 98.9 °F (37.2 °C)   SpO2: 96%   Weight: 108 kg (237 lb)   PainSc: 0-No pain     Current Status 6/14/2019   ECOG score 0   GENERAL: female comfortable, no acute distress  SKIN:  Warm, without rashes, purpura or petechiae.   EYES:  EOMs intact.  Conjunctivae normal. Pupils equal and reactive to light.   EARS:  Hearing intact.  LYMPHATICS:  No cervical, supraclavicular, axillary adenopathy.  RESP:  Lungs clear to auscultation. Good airflow. Normal effort.   CHEST: Mediport -No; Breast exam - Yes, describe: status post left mastectomy without palpable masses or skin changes.  Right breast without nodules, color change, or pain.  CARDIAC:  Regular rate and rhythm without murmurs, rubs or gallops. Normal  S1,S2. Lower extremity edema:  No  GI:  Soft, nontender, normal bowel sounds  PSYCHIATRIC:  Normal affect and mood; alert and oriented x 3; Insight and judgement appropriate        Labs and Imaging  Results for orders placed or performed in visit on 06/14/19   Comprehensive Metabolic Panel   Result Value Ref Range    Glucose 301 (H) 65 - 99 mg/dL    BUN 10 8 - 23 mg/dL    Creatinine 1.01 (H) 0.57 - 1.00 mg/dL    Sodium 139 136 - 145 mmol/L    Potassium 3.2 (L) 3.5 - 5.2 mmol/L    Chloride 97 (L) 98 - 107 mmol/L    CO2 28.4 22.0 - 29.0 mmol/L    Calcium 9.3 8.6 - 10.5 mg/dL    Total Protein 6.5 6.0 - 8.5 g/dL    Albumin 3.50 3.50 - 5.20 g/dL    ALT (SGPT) 8 1 - 33 U/L    AST (SGOT) 12 1 - 32 U/L    Alkaline Phosphatase 98 39 - 117 U/L    Total Bilirubin 0.4 0.2 - 1.2 mg/dL    eGFR Non African Amer 53 (L) >60 mL/min/1.73    Globulin 3.0 gm/dL    A/G Ratio 1.2 g/dL    BUN/Creatinine Ratio 9.9 7.0 - 25.0    Anion Gap 13.6 mmol/L   CBC Auto Differential   Result Value Ref Range    WBC 5.58 3.40 - 10.80 10*3/mm3    RBC 4.00 3.77 - 5.28 10*6/mm3    Hemoglobin 11.2 (L) 12.0 - 15.9 g/dL    Hematocrit 35.0 34.0 - 46.6 %    MCV 87.5 79.0 - 97.0 fL    MCH 28.0 26.6 - 33.0 pg    MCHC 32.0 31.5 - 35.7 g/dL    RDW 13.7 12.3 - 15.4 %    RDW-SD 44.0 37.0 - 54.0 fl    MPV 9.6 6.0 - 12.0 fL    Platelets 166 140 - 450 10*3/mm3    Neutrophil % 59.1 42.7 - 76.0 %    Lymphocyte % 26.7 19.6 - 45.3 %    Monocyte % 8.8 5.0 - 12.0 %    Eosinophil % 4.3 0.3 - 6.2 %    Basophil % 0.4 0.0 - 1.5 %    Immature Grans % 0.7 (H) 0.0 - 0.5 %    Neutrophils, Absolute 3.30 1.70 - 7.00 10*3/mm3    Lymphocytes, Absolute 1.49 0.70 - 3.10 10*3/mm3    Monocytes, Absolute 0.49 0.10 - 0.90 10*3/mm3    Eosinophils, Absolute 0.24 0.00 - 0.40 10*3/mm3    Basophils, Absolute 0.02 0.00 - 0.20 10*3/mm3    Immature Grans, Absolute 0.04 0.00 - 0.05 10*3/mm3    nRBC 0.0 0.0 - 0.2 /100 WBC     Breast imaging: Mammogram 8/2018:  FINDINGS:  There are scattered areas of  fibroglandular density. Stable benign  diffuse mildly nodular parenchymal pattern. No significant change when  compared with prior images. No mammographic evidence of malignancy.  Recommend repeat screening mammogram in one year.     IMPRESSION:  Benign left unilateral mammogram.     BI-RADS CATEGORY 2: Benign Findings.    Assessment/Plan     Assessment:   1. Stage I, pT1cN0, invasive ductal carcinoma with dcis of the right breast, ER positive, HER-2/gonzalez positive.    * Status post right mastectomy with axillary dissection on 07/16/2015. Not a candidate for chemo/Herceptin therapy due to comorbidities   * Arimidex since 8/2015. Tolerating well.    * Remains no evidence of disease   2. Osteopenia.  Continue Fosamax, calcium, and vitamin D.  3. Anemia of chronic disease.  Hemoglobin stable.  4. Congestive heart failure managed by Dr. Bates.   5. Multiple comorbidities including CKD, pulmonary hypertension, diabetes, foot ulcer and recent left ulnar/radius fracture  6.  Elevated blood glucose and hypokalemia.  Lab results were forwarded to Dr. Jensen, the patient's primary care provider.  She states that her blood sugar has been higher than typical as of the past few months and I have encouraged patient to get back in with Dr. Jensen sooner.    Plan:     1. Annual left mammogram due August 2019  2. DEXA scan due 8/2019.  3. Patient was instructed on the importance of physical activity, healthy diet, and self chest wall exams.  Patient will continue calcium and vitamin D supplementation.    4. Monitor anemia  5. Continue Arimidex and continueFosamax.  6. Patient asked to call Dr. Jensen's office to schedule follow-up regarding elevated glucose.    Patient will follow-up in 6 months with Dr. Lo.  Mammogram and DEXA scan scheduled for August 2019.

## 2019-06-17 DIAGNOSIS — Z86.39 HISTORY OF LOW POTASSIUM: Primary | ICD-10-CM

## 2019-06-17 RX ORDER — POTASSIUM CHLORIDE 20 MEQ/1
20 TABLET, EXTENDED RELEASE ORAL DAILY
Qty: 90 TABLET | Refills: 0 | Status: SHIPPED | OUTPATIENT
Start: 2019-06-17 | End: 2019-11-22 | Stop reason: SDUPTHER

## 2019-06-20 DIAGNOSIS — E87.6 HYPOKALEMIA: Primary | ICD-10-CM

## 2019-06-22 LAB
BUN SERPL-MCNC: 17 MG/DL (ref 8–23)
BUN/CREAT SERPL: 14.3 (ref 7–25)
CALCIUM SERPL-MCNC: 9 MG/DL (ref 8.6–10.5)
CHLORIDE SERPL-SCNC: 102 MMOL/L (ref 98–107)
CO2 SERPL-SCNC: 24.9 MMOL/L (ref 22–29)
CREAT SERPL-MCNC: 1.19 MG/DL (ref 0.57–1)
GLUCOSE SERPL-MCNC: 203 MG/DL (ref 65–99)
MAGNESIUM SERPL-MCNC: 1.6 MG/DL (ref 1.6–2.4)
POTASSIUM SERPL-SCNC: 4.5 MMOL/L (ref 3.5–5.2)
SODIUM SERPL-SCNC: 142 MMOL/L (ref 136–145)

## 2019-06-28 RX ORDER — ANASTROZOLE 1 MG/1
TABLET ORAL
Qty: 90 TABLET | Refills: 0 | Status: ON HOLD | OUTPATIENT
Start: 2019-06-28 | End: 2019-08-17 | Stop reason: SDUPTHER

## 2019-07-02 ENCOUNTER — TELEPHONE (OUTPATIENT)
Dept: PAIN MEDICINE | Facility: CLINIC | Age: 74
End: 2019-07-02

## 2019-07-02 NOTE — TELEPHONE ENCOUNTER
Pt called today and could not make it due to not being able to find keys after being home from vacation .   Cancelled and r/s appt to 7/11   requesting medication refill     HYDROcodone-acetaminophen (NORCO)  MG per tablet    Original Order:  HYDROcodone-acetaminophen (NORCO)  MG per tablet [[id number]]      Pharmacy:  [facility] [address] [place] [address] [place] [id number] AT [facility] & [address] - [phone]  - [phone] FX Phone:  [phone] Fax:  [phone]    Address:  [address] [place] [id number]

## 2019-07-11 ENCOUNTER — OFFICE VISIT (OUTPATIENT)
Dept: PAIN MEDICINE | Facility: CLINIC | Age: 74
End: 2019-07-11

## 2019-07-11 VITALS
HEART RATE: 71 BPM | BODY MASS INDEX: 34.84 KG/M2 | SYSTOLIC BLOOD PRESSURE: 105 MMHG | WEIGHT: 222 LBS | DIASTOLIC BLOOD PRESSURE: 59 MMHG | TEMPERATURE: 97.9 F | OXYGEN SATURATION: 97 % | RESPIRATION RATE: 20 BRPM | HEIGHT: 67 IN

## 2019-07-11 DIAGNOSIS — Z79.899 ENCOUNTER FOR LONG-TERM (CURRENT) USE OF HIGH-RISK MEDICATION: ICD-10-CM

## 2019-07-11 DIAGNOSIS — M25.50 ARTHRALGIA OF MULTIPLE JOINTS: ICD-10-CM

## 2019-07-11 DIAGNOSIS — G89.29 OTHER CHRONIC PAIN: Primary | ICD-10-CM

## 2019-07-11 DIAGNOSIS — M51.36 DDD (DEGENERATIVE DISC DISEASE), LUMBAR: ICD-10-CM

## 2019-07-11 DIAGNOSIS — M47.816 LUMBAR FACET ARTHROPATHY: ICD-10-CM

## 2019-07-11 PROCEDURE — 99214 OFFICE O/P EST MOD 30 MIN: CPT | Performed by: NURSE PRACTITIONER

## 2019-07-11 RX ORDER — HYDROCODONE BITARTRATE AND ACETAMINOPHEN 10; 325 MG/1; MG/1
1 TABLET ORAL EVERY 6 HOURS PRN
Qty: 120 TABLET | Refills: 0 | Status: SHIPPED | OUTPATIENT
Start: 2019-07-11 | End: 2019-08-07 | Stop reason: SDUPTHER

## 2019-07-11 RX ORDER — PRAVASTATIN SODIUM 10 MG
10 TABLET ORAL DAILY
Refills: 1 | COMMUNITY
Start: 2019-06-28 | End: 2019-09-06 | Stop reason: SDUPTHER

## 2019-07-11 NOTE — PROGRESS NOTES
"CHIEF COMPLAINT  F/u joint and lower back pain. Pt sts pain has remained the same since last ov.     Subjective   Jung Short is a 74 y.o. female  who presents to the office for follow-up.She has a history of chronic joint and low back pain. Reports her pain is unchanged since last office visit. Has also been doing more activity. \"I had to sit in a chair for a month so my feet would heal so I could go to [place].\"   Recently returned from vacation to [place]. Was sick on almost the entire trip with a cough.      Complains of pain in her low back and joints. Today her pain is 4/10VAS. Describes the pain as nearly continuous aching and throbbing. Pain increases with walking, standing, activity; pain decreases with medication, rest. Continues with Hydrocodone 10/325 4/day and Cymbalta, and Lyrica 50 mg BID(had difficulty tolerating Lyrica 75 mg).  Denies any side effects from the regimen. Used to have constipation, takes Colace OTC as needed with positive results.  This helps decrease her pain by 40-50%. ADL's by self.     Had left TKR 4-17-18.  Joint Pain   This is a chronic problem. The current episode started more than 1 year ago (today pain is 4/10VAS- joints). The problem occurs constantly. The problem has been unchanged (unchanged since last office visit). Associated symptoms include arthralgias (joint pain in shoulders, arms) and fatigue (occ). Pertinent negatives include no abdominal pain, chest pain, chills, congestion, coughing, fever, headaches, joint swelling, nausea, neck pain, numbness, sore throat, vomiting or weakness. Nothing aggravates the symptoms. She has tried oral narcotics, position changes and rest for the symptoms. The treatment provided moderate relief.   Back Pain   This is a chronic problem. The current episode started more than 1 year ago. The problem occurs constantly. The problem has been gradually worsening (unchanged since last office visit) since onset. The pain is present in " "the lumbar spine. The pain radiates to the right knee, right foot and right thigh. The pain is at a severity of 4/10. The pain is moderate. Pertinent negatives include no abdominal pain, chest pain, dysuria, fever, headaches, numbness or weakness. Risk factors include lack of exercise and obesity. She has tried analgesics for the symptoms. The treatment provided moderate relief.      PEG Assessment   What number best describes your pain on average in the past week?4  What number best describes how, during the past week, pain has interfered with your enjoyment of life?5  What number best describes how, during the past week, pain has interfered with your general activity?  5    The following portions of the patient's history were reviewed and updated as appropriate: allergies, current medications, past family history, past medical history, past social history, past surgical history and problem list.    Review of Systems   Constitutional: Positive for fatigue (occ). Negative for activity change, chills and fever.   HENT: Negative for congestion and sore throat.    Eyes: Negative for visual disturbance.   Respiratory: Negative for cough, shortness of breath and wheezing.    Cardiovascular: Negative.  Negative for chest pain.   Gastrointestinal: Negative for abdominal pain, constipation, diarrhea, nausea and vomiting.   Genitourinary: Negative for difficulty urinating and dysuria.   Musculoskeletal: Positive for arthralgias (joint pain in shoulders, arms) and back pain. Negative for joint swelling and neck pain.   Neurological: Negative for weakness, numbness and headaches.   Psychiatric/Behavioral: Positive for sleep disturbance. Negative for suicidal ideas. The patient is not nervous/anxious.        Vitals:    07/11/19 1250   BP: 105/59   Pulse: 71   Resp: 20   Temp: 97.9 °F (36.6 °C)   SpO2: 97%   Weight: 101 kg (222 lb)   Height: 168.9 cm (66.5\")   PainSc:   4   PainLoc: Hip  Comment: right     Objective   Physical " Exam   Constitutional: She is oriented to person, place, and time. Vital signs are normal. She appears well-developed and well-nourished. She is cooperative.   HENT:   Head: Normocephalic and atraumatic.   Nose: Nose normal.   Eyes: Conjunctivae and lids are normal.   Cardiovascular: Normal rate.   Pulmonary/Chest: Effort normal.   Abdominal: Normal appearance.   Musculoskeletal:        Lumbar back: She exhibits decreased range of motion and tenderness.   Surgical scar of left knee  Widespread OA changes with no acute synovitis   Neurological: She is alert and oriented to person, place, and time. Gait (cane) abnormal.   Reflex Scores:       Patellar reflexes are 1+ on the right side and 1+ on the left side.  Skin: Skin is warm, dry and intact.   Psychiatric: She has a normal mood and affect. Her speech is normal and behavior is normal. Judgment and thought content normal. Cognition and memory are normal.   Nursing note and vitals reviewed.      Assessment/Plan   Jugn was seen today for generalized body aches.    Diagnoses and all orders for this visit:    Other chronic pain    Arthralgia of multiple joints    DDD (degenerative disc disease), lumbar    Lumbar facet arthropathy    Encounter for long-term (current) use of high-risk medication    Other orders  -     HYDROcodone-acetaminophen (NORCO)  MG per tablet; Take 1 tablet by mouth Every 6 (Six) Hours As Needed for Moderate Pain .      --- The urine drug screen confirmation from 11-14-18 has been reviewed and the result is APPROPRIATE based on patient history and LOUANN report  --- Refill Hydrocodone.  Patient appears stable with current regimen. No adverse effects. Regarding continuation of opioids, there is no evidence of aberrant behavior or any red flags.  The patient continues with appropriate response to opioid therapy. ADL's remain intact by self.   --- Follow-up 2 months or sooner if needed.     LOUANN REPORT    As part of the patient's treatment  plan, I am prescribing controlled substances. The patient has been made aware of appropriate use of such medications, including potential risk of somnolence, limited ability to drive and/or work safely, and the potential for dependence or overdose. It has also bee made clear that these medications are for use by this patient only, without concomitant use of alcohol or other substances unless prescribed.     Patient has completed prescribing agreement detailing terms of continued prescribing of controlled substances, including monitoring LOUANN reports, urine drug screening, and pill counts if necessary. The patient is aware that inappropriate use will results in cessation of prescribing such medications.    LOUANN report has been reviewed and scanned into the patient's chart.    As the clinician, I personally reviewed the LOUANN from 7-5-19 while the patient was in the office today.    History and physical exam exhibit continued safe and appropriate use of controlled substances.      EMR Dragon/Transcription disclaimer:   Much of this encounter note is an electronic transcription/translation of spoken language to printed text. The electronic translation of spoken language may permit erroneous, or at times, nonsensical words or phrases to be inadvertently transcribed; Although I have reviewed the note for such errors, some may still exist.

## 2019-07-30 ENCOUNTER — TELEPHONE (OUTPATIENT)
Dept: INTERNAL MEDICINE | Facility: CLINIC | Age: 74
End: 2019-07-30

## 2019-07-30 NOTE — TELEPHONE ENCOUNTER
Pt  Called and wanted to let you know that she can not get her  duloxetin 60 mg and  tradjenta   5mg  This will cost her over  $500 per month. She can't afford.   She did   One medication that cost   $122, but she did not say what medication.  This is a  Fyi,i think,  As the phone was cutting in and out.    [phone]

## 2019-08-01 ENCOUNTER — LAB (OUTPATIENT)
Dept: LAB | Facility: HOSPITAL | Age: 74
End: 2019-08-01

## 2019-08-01 ENCOUNTER — TRANSCRIBE ORDERS (OUTPATIENT)
Dept: ADMINISTRATIVE | Facility: HOSPITAL | Age: 74
End: 2019-08-01

## 2019-08-01 DIAGNOSIS — E55.9 VITAMIN D DEFICIENCY: ICD-10-CM

## 2019-08-01 DIAGNOSIS — N18.30 CHRONIC KIDNEY DISEASE, STAGE III (MODERATE) (HCC): ICD-10-CM

## 2019-08-01 DIAGNOSIS — N18.30 CHRONIC KIDNEY DISEASE, STAGE III (MODERATE) (HCC): Primary | ICD-10-CM

## 2019-08-01 LAB
25(OH)D3 SERPL-MCNC: 27.4 NG/ML (ref 30–100)
ANION GAP SERPL CALCULATED.3IONS-SCNC: 11.1 MMOL/L (ref 5–15)
BACTERIA UR QL AUTO: ABNORMAL /HPF
BASOPHILS # BLD AUTO: 0.03 10*3/MM3 (ref 0–0.2)
BASOPHILS NFR BLD AUTO: 0.4 % (ref 0–1.5)
BILIRUB UR QL STRIP: NEGATIVE
BUN BLD-MCNC: 7 MG/DL (ref 8–23)
BUN/CREAT SERPL: 7.5 (ref 7–25)
CALCIUM SPEC-SCNC: 9.5 MG/DL (ref 8.6–10.5)
CHLORIDE SERPL-SCNC: 107 MMOL/L (ref 98–107)
CLARITY UR: CLEAR
CO2 SERPL-SCNC: 21.9 MMOL/L (ref 22–29)
COLOR UR: YELLOW
CREAT BLD-MCNC: 0.93 MG/DL (ref 0.57–1)
DEPRECATED RDW RBC AUTO: 50.7 FL (ref 37–54)
EOSINOPHIL # BLD AUTO: 0.13 10*3/MM3 (ref 0–0.4)
EOSINOPHIL NFR BLD AUTO: 1.9 % (ref 0.3–6.2)
ERYTHROCYTE [DISTWIDTH] IN BLOOD BY AUTOMATED COUNT: 15.2 % (ref 12.3–15.4)
GFR SERPL CREATININE-BSD FRML MDRD: 59 ML/MIN/1.73
GLUCOSE BLD-MCNC: 122 MG/DL (ref 65–99)
GLUCOSE UR STRIP-MCNC: NEGATIVE MG/DL
HCT VFR BLD AUTO: 35.9 % (ref 34–46.6)
HGB BLD-MCNC: 11 G/DL (ref 12–15.9)
HGB UR QL STRIP.AUTO: NEGATIVE
HYALINE CASTS UR QL AUTO: ABNORMAL /LPF
IMM GRANULOCYTES # BLD AUTO: 0.08 10*3/MM3 (ref 0–0.05)
IMM GRANULOCYTES NFR BLD AUTO: 1.2 % (ref 0–0.5)
KETONES UR QL STRIP: NEGATIVE
LEUKOCYTE ESTERASE UR QL STRIP.AUTO: ABNORMAL
LYMPHOCYTES # BLD AUTO: 1.53 10*3/MM3 (ref 0.7–3.1)
LYMPHOCYTES NFR BLD AUTO: 22.7 % (ref 19.6–45.3)
MCH RBC QN AUTO: 28.2 PG (ref 26.6–33)
MCHC RBC AUTO-ENTMCNC: 30.6 G/DL (ref 31.5–35.7)
MCV RBC AUTO: 92.1 FL (ref 79–97)
MONOCYTES # BLD AUTO: 0.52 10*3/MM3 (ref 0.1–0.9)
MONOCYTES NFR BLD AUTO: 7.7 % (ref 5–12)
NEUTROPHILS # BLD AUTO: 4.46 10*3/MM3 (ref 1.7–7)
NEUTROPHILS NFR BLD AUTO: 66.1 % (ref 42.7–76)
NITRITE UR QL STRIP: NEGATIVE
NRBC BLD AUTO-RTO: 0 /100 WBC (ref 0–0.2)
PH UR STRIP.AUTO: 6 [PH] (ref 5–8)
PLATELET # BLD AUTO: 172 10*3/MM3 (ref 140–450)
PMV BLD AUTO: 9.7 FL (ref 6–12)
POTASSIUM BLD-SCNC: 4.5 MMOL/L (ref 3.5–5.2)
PROT UR QL STRIP: ABNORMAL
RBC # BLD AUTO: 3.9 10*6/MM3 (ref 3.77–5.28)
RBC # UR: ABNORMAL /HPF
REF LAB TEST METHOD: ABNORMAL
SODIUM BLD-SCNC: 140 MMOL/L (ref 136–145)
SP GR UR STRIP: 1.02 (ref 1–1.03)
SQUAMOUS #/AREA URNS HPF: ABNORMAL /HPF
UROBILINOGEN UR QL STRIP: ABNORMAL
WBC NRBC COR # BLD: 6.75 10*3/MM3 (ref 3.4–10.8)
WBC UR QL AUTO: ABNORMAL /HPF

## 2019-08-01 PROCEDURE — 36415 COLL VENOUS BLD VENIPUNCTURE: CPT

## 2019-08-01 PROCEDURE — 80048 BASIC METABOLIC PNL TOTAL CA: CPT

## 2019-08-01 PROCEDURE — 82306 VITAMIN D 25 HYDROXY: CPT

## 2019-08-01 PROCEDURE — 81001 URINALYSIS AUTO W/SCOPE: CPT | Performed by: INTERNAL MEDICINE

## 2019-08-01 PROCEDURE — 87086 URINE CULTURE/COLONY COUNT: CPT | Performed by: INTERNAL MEDICINE

## 2019-08-01 PROCEDURE — 85025 COMPLETE CBC W/AUTO DIFF WBC: CPT

## 2019-08-01 RX ORDER — DESVENLAFAXINE 25 MG/1
25 TABLET, EXTENDED RELEASE ORAL DAILY
Qty: 30 TABLET | Refills: 3 | Status: SHIPPED | OUTPATIENT
Start: 2019-08-01 | End: 2019-08-30

## 2019-08-02 LAB — BACTERIA SPEC AEROBE CULT: NORMAL

## 2019-08-05 ENCOUNTER — TELEPHONE (OUTPATIENT)
Dept: INTERNAL MEDICINE | Facility: CLINIC | Age: 74
End: 2019-08-05

## 2019-08-05 RX ORDER — SPIRONOLACTONE 25 MG/1
25 TABLET ORAL EVERY MORNING
Qty: 90 TABLET | Refills: 0 | Status: SHIPPED | OUTPATIENT
Start: 2019-08-05 | End: 2019-08-16 | Stop reason: SDUPTHER

## 2019-08-05 NOTE — TELEPHONE ENCOUNTER
Spoke with patient on 8/1/19.   Dr. Jensen advised patient could start Desvenlafaxine 25mg, daily in place if Duloxetine 60mg, due to cost. Patient could also switch to Januvia 100 mg once daily, in place of Tradjenta due to cost. Januvia samples were placed up front for patient to . Patient aware. Patient states no change for Lyrica (even though costly) patient would like to stay on Lyrica. Dr. Jensen and patient aware of all above.

## 2019-08-05 NOTE — TELEPHONE ENCOUNTER
Lizeth Hampton, MA        7/30/19 3:32 PM   Note      Pt  Called and wanted to let you know that she can not get her  duloxetin 60 mg and  tradjenta   5mg  This will cost her over  $500 per month. She can't afford.   She did   One medication that cost   $122, but she did not say what medication.  This is a  Fyi,i think,  As the phone was cutting in and out.    [phone]

## 2019-08-07 RX ORDER — HYDROCODONE BITARTRATE AND ACETAMINOPHEN 10; 325 MG/1; MG/1
1 TABLET ORAL EVERY 6 HOURS PRN
Qty: 120 TABLET | Refills: 0 | Status: SHIPPED | OUTPATIENT
Start: 2019-08-07 | End: 2019-09-11 | Stop reason: SDUPTHER

## 2019-08-07 NOTE — TELEPHONE ENCOUNTER
Medication Refill Request    Date of phone call: 2019    Medication being requested:Norco 10-325mg  si tablet every 6 hours prn  Qty:120    Date of last visit:2019     Date of last refill: 2019    LOUANN up to date?: yes    Next Follow up?: 2019    Any new pertinent information? (i.e, new medication allergies, new use of medications, change in patient's health or condition, non-compliance or inconsistency with prescribing agreement?):

## 2019-08-16 ENCOUNTER — APPOINTMENT (OUTPATIENT)
Dept: GENERAL RADIOLOGY | Facility: HOSPITAL | Age: 74
End: 2019-08-16

## 2019-08-16 ENCOUNTER — HOSPITAL ENCOUNTER (OUTPATIENT)
Facility: HOSPITAL | Age: 74
Setting detail: OBSERVATION
Discharge: HOME | End: 2019-08-17
Attending: EMERGENCY MEDICINE | Admitting: INTERNAL MEDICINE

## 2019-08-16 ENCOUNTER — OFFICE VISIT (OUTPATIENT)
Dept: INTERNAL MEDICINE | Facility: CLINIC | Age: 74
End: 2019-08-16

## 2019-08-16 VITALS
DIASTOLIC BLOOD PRESSURE: 68 MMHG | BODY MASS INDEX: 35.94 KG/M2 | SYSTOLIC BLOOD PRESSURE: 138 MMHG | HEIGHT: 67 IN | TEMPERATURE: 98.1 F | WEIGHT: 229 LBS | RESPIRATION RATE: 18 BRPM | HEART RATE: 69 BPM | OXYGEN SATURATION: 98 %

## 2019-08-16 DIAGNOSIS — I10 ESSENTIAL HYPERTENSION: Primary | ICD-10-CM

## 2019-08-16 DIAGNOSIS — R06.02 SOB (SHORTNESS OF BREATH) ON EXERTION: ICD-10-CM

## 2019-08-16 DIAGNOSIS — E03.9 ACQUIRED HYPOTHYROIDISM: ICD-10-CM

## 2019-08-16 DIAGNOSIS — E11.9 TYPE 2 DIABETES MELLITUS WITHOUT COMPLICATION, WITHOUT LONG-TERM CURRENT USE OF INSULIN (HCC): ICD-10-CM

## 2019-08-16 DIAGNOSIS — R06.09 DOE (DYSPNEA ON EXERTION): ICD-10-CM

## 2019-08-16 DIAGNOSIS — R07.89 ATYPICAL CHEST PAIN: ICD-10-CM

## 2019-08-16 DIAGNOSIS — I50.23 ACUTE ON CHRONIC HFREF (HEART FAILURE WITH REDUCED EJECTION FRACTION) (HCC): Primary | ICD-10-CM

## 2019-08-16 DIAGNOSIS — D64.9 ANEMIA, UNSPECIFIED TYPE: ICD-10-CM

## 2019-08-16 DIAGNOSIS — R07.89 OTHER CHEST PAIN: ICD-10-CM

## 2019-08-16 PROBLEM — Z23 NEEDS FLU SHOT: Status: RESOLVED | Noted: 2018-10-05 | Resolved: 2019-08-16

## 2019-08-16 PROBLEM — R07.9 CHEST PAIN: Status: ACTIVE | Noted: 2019-08-16

## 2019-08-16 LAB
ALBUMIN SERPL-MCNC: 4 G/DL (ref 3.5–5.2)
ALBUMIN/GLOB SERPL: 1.3 G/DL
ALP SERPL-CCNC: 79 U/L (ref 39–117)
ALT SERPL W P-5'-P-CCNC: 11 U/L (ref 1–33)
ANION GAP SERPL CALCULATED.3IONS-SCNC: 10.8 MMOL/L (ref 5–15)
APTT PPP: 33.7 SECONDS (ref 24.3–38.1)
AST SERPL-CCNC: 21 U/L (ref 1–32)
BASOPHILS # BLD AUTO: 0.02 10*3/MM3 (ref 0–0.2)
BASOPHILS NFR BLD AUTO: 0.3 % (ref 0–1.5)
BILIRUB SERPL-MCNC: 0.5 MG/DL (ref 0.2–1.2)
BUN BLD-MCNC: 11 MG/DL (ref 8–23)
BUN/CREAT SERPL: 10.5 (ref 7–25)
CALCIUM SPEC-SCNC: 10.2 MG/DL (ref 8.6–10.5)
CHLORIDE SERPL-SCNC: 107 MMOL/L (ref 98–107)
CO2 SERPL-SCNC: 23.2 MMOL/L (ref 22–29)
CREAT BLD-MCNC: 1.05 MG/DL (ref 0.57–1)
DEPRECATED RDW RBC AUTO: 49.6 FL (ref 37–54)
EOSINOPHIL # BLD AUTO: 0.14 10*3/MM3 (ref 0–0.4)
EOSINOPHIL NFR BLD AUTO: 2.1 % (ref 0.3–6.2)
ERYTHROCYTE [DISTWIDTH] IN BLOOD BY AUTOMATED COUNT: 14.9 % (ref 12.3–15.4)
GFR SERPL CREATININE-BSD FRML MDRD: 51 ML/MIN/1.73
GLOBULIN UR ELPH-MCNC: 3.1 GM/DL
GLUCOSE BLD-MCNC: 159 MG/DL (ref 65–99)
GLUCOSE BLDC GLUCOMTR-MCNC: 82 MG/DL (ref 70–130)
HBA1C MFR BLD: 7.3 % (ref 4.8–5.6)
HBA1C MFR BLD: 7.8 %
HCT VFR BLD AUTO: 35.9 % (ref 34–46.6)
HGB BLD-MCNC: 11.3 G/DL (ref 12–15.9)
IMM GRANULOCYTES # BLD AUTO: 0.06 10*3/MM3 (ref 0–0.05)
IMM GRANULOCYTES NFR BLD AUTO: 0.9 % (ref 0–0.5)
INR PPP: 1.08 (ref 0.9–1.1)
LYMPHOCYTES # BLD AUTO: 1.53 10*3/MM3 (ref 0.7–3.1)
LYMPHOCYTES NFR BLD AUTO: 23.3 % (ref 19.6–45.3)
MAGNESIUM SERPL-MCNC: 1.9 MG/DL (ref 1.6–2.4)
MCH RBC QN AUTO: 28.7 PG (ref 26.6–33)
MCHC RBC AUTO-ENTMCNC: 31.5 G/DL (ref 31.5–35.7)
MCV RBC AUTO: 91.1 FL (ref 79–97)
MONOCYTES # BLD AUTO: 0.56 10*3/MM3 (ref 0.1–0.9)
MONOCYTES NFR BLD AUTO: 8.5 % (ref 5–12)
NEUTROPHILS # BLD AUTO: 4.26 10*3/MM3 (ref 1.7–7)
NEUTROPHILS NFR BLD AUTO: 64.9 % (ref 42.7–76)
NRBC BLD AUTO-RTO: 0 /100 WBC (ref 0–0.2)
NT-PROBNP SERPL-MCNC: 392.9 PG/ML (ref 5–1800)
PLATELET # BLD AUTO: 169 10*3/MM3 (ref 140–450)
PMV BLD AUTO: 9.7 FL (ref 6–12)
POTASSIUM BLD-SCNC: 4.9 MMOL/L (ref 3.5–5.2)
PROT SERPL-MCNC: 7.1 G/DL (ref 6–8.5)
PROTHROMBIN TIME: 13.7 SECONDS (ref 12.1–15)
RBC # BLD AUTO: 3.94 10*6/MM3 (ref 3.77–5.28)
SODIUM BLD-SCNC: 141 MMOL/L (ref 136–145)
TROPONIN T SERPL-MCNC: <0.01 NG/ML (ref 0–0.03)
TROPONIN T SERPL-MCNC: <0.01 NG/ML (ref 0–0.03)
WBC NRBC COR # BLD: 6.57 10*3/MM3 (ref 3.4–10.8)

## 2019-08-16 PROCEDURE — 85025 COMPLETE CBC W/AUTO DIFF WBC: CPT | Performed by: PHYSICIAN ASSISTANT

## 2019-08-16 PROCEDURE — 93000 ELECTROCARDIOGRAM COMPLETE: CPT | Performed by: INTERNAL MEDICINE

## 2019-08-16 PROCEDURE — 99284 EMERGENCY DEPT VISIT MOD MDM: CPT

## 2019-08-16 PROCEDURE — 99214 OFFICE O/P EST MOD 30 MIN: CPT | Performed by: INTERNAL MEDICINE

## 2019-08-16 PROCEDURE — 93010 ELECTROCARDIOGRAM REPORT: CPT | Performed by: INTERNAL MEDICINE

## 2019-08-16 PROCEDURE — 82962 GLUCOSE BLOOD TEST: CPT

## 2019-08-16 PROCEDURE — G0378 HOSPITAL OBSERVATION PER HR: HCPCS

## 2019-08-16 PROCEDURE — 99219 PR INITIAL OBSERVATION CARE/DAY 50 MINUTES: CPT | Performed by: FAMILY MEDICINE

## 2019-08-16 PROCEDURE — 85610 PROTHROMBIN TIME: CPT | Performed by: PHYSICIAN ASSISTANT

## 2019-08-16 PROCEDURE — 96374 THER/PROPH/DIAG INJ IV PUSH: CPT

## 2019-08-16 PROCEDURE — 84484 ASSAY OF TROPONIN QUANT: CPT | Performed by: PHYSICIAN ASSISTANT

## 2019-08-16 PROCEDURE — 93005 ELECTROCARDIOGRAM TRACING: CPT | Performed by: EMERGENCY MEDICINE

## 2019-08-16 PROCEDURE — 80053 COMPREHEN METABOLIC PANEL: CPT | Performed by: PHYSICIAN ASSISTANT

## 2019-08-16 PROCEDURE — 83036 HEMOGLOBIN GLYCOSYLATED A1C: CPT | Performed by: INTERNAL MEDICINE

## 2019-08-16 PROCEDURE — 85730 THROMBOPLASTIN TIME PARTIAL: CPT | Performed by: PHYSICIAN ASSISTANT

## 2019-08-16 PROCEDURE — 83880 ASSAY OF NATRIURETIC PEPTIDE: CPT | Performed by: PHYSICIAN ASSISTANT

## 2019-08-16 PROCEDURE — 99284 EMERGENCY DEPT VISIT MOD MDM: CPT | Performed by: PHYSICIAN ASSISTANT

## 2019-08-16 PROCEDURE — 71045 X-RAY EXAM CHEST 1 VIEW: CPT

## 2019-08-16 PROCEDURE — 84484 ASSAY OF TROPONIN QUANT: CPT | Performed by: INTERNAL MEDICINE

## 2019-08-16 PROCEDURE — 83735 ASSAY OF MAGNESIUM: CPT | Performed by: PHYSICIAN ASSISTANT

## 2019-08-16 RX ORDER — DESVENLAFAXINE 25 MG/1
25 TABLET, EXTENDED RELEASE ORAL DAILY
Status: DISCONTINUED | OUTPATIENT
Start: 2019-08-16 | End: 2019-08-17 | Stop reason: HOSPADM

## 2019-08-16 RX ORDER — DEXTROSE MONOHYDRATE 25 G/50ML
25 INJECTION, SOLUTION INTRAVENOUS
Status: DISCONTINUED | OUTPATIENT
Start: 2019-08-16 | End: 2019-08-17 | Stop reason: HOSPADM

## 2019-08-16 RX ORDER — BUMETANIDE 0.25 MG/ML
2 INJECTION INTRAMUSCULAR; INTRAVENOUS ONCE
Status: COMPLETED | OUTPATIENT
Start: 2019-08-16 | End: 2019-08-16

## 2019-08-16 RX ORDER — ASPIRIN 81 MG/1
81 TABLET ORAL DAILY
Status: DISCONTINUED | OUTPATIENT
Start: 2019-08-16 | End: 2019-08-17 | Stop reason: HOSPADM

## 2019-08-16 RX ORDER — PREGABALIN 50 MG/1
50 CAPSULE ORAL 2 TIMES DAILY
Status: DISCONTINUED | OUTPATIENT
Start: 2019-08-16 | End: 2019-08-17 | Stop reason: HOSPADM

## 2019-08-16 RX ORDER — POTASSIUM CHLORIDE 20 MEQ/1
20 TABLET, EXTENDED RELEASE ORAL DAILY
Status: DISCONTINUED | OUTPATIENT
Start: 2019-08-17 | End: 2019-08-17

## 2019-08-16 RX ORDER — PRAVASTATIN SODIUM 20 MG
10 TABLET ORAL DAILY
Status: DISCONTINUED | OUTPATIENT
Start: 2019-08-16 | End: 2019-08-17 | Stop reason: HOSPADM

## 2019-08-16 RX ORDER — PRAVASTATIN SODIUM 10 MG
10 TABLET ORAL DAILY
Status: DISCONTINUED | OUTPATIENT
Start: 2019-08-16 | End: 2019-08-16 | Stop reason: RX

## 2019-08-16 RX ORDER — CARVEDILOL 12.5 MG/1
12.5 TABLET ORAL 2 TIMES DAILY WITH MEALS
Status: DISCONTINUED | OUTPATIENT
Start: 2019-08-16 | End: 2019-08-17 | Stop reason: HOSPADM

## 2019-08-16 RX ORDER — LISINOPRIL 5 MG/1
5 TABLET ORAL DAILY
Status: DISCONTINUED | OUTPATIENT
Start: 2019-08-16 | End: 2019-08-17

## 2019-08-16 RX ORDER — SODIUM CHLORIDE 0.9 % (FLUSH) 0.9 %
3 SYRINGE (ML) INJECTION EVERY 12 HOURS SCHEDULED
Status: DISCONTINUED | OUTPATIENT
Start: 2019-08-16 | End: 2019-08-17 | Stop reason: HOSPADM

## 2019-08-16 RX ORDER — LEVOTHYROXINE SODIUM 0.03 MG/1
25 TABLET ORAL DAILY
Status: DISCONTINUED | OUTPATIENT
Start: 2019-08-16 | End: 2019-08-17 | Stop reason: HOSPADM

## 2019-08-16 RX ORDER — SODIUM CHLORIDE 0.9 % (FLUSH) 0.9 %
3-10 SYRINGE (ML) INJECTION AS NEEDED
Status: DISCONTINUED | OUTPATIENT
Start: 2019-08-16 | End: 2019-08-17 | Stop reason: HOSPADM

## 2019-08-16 RX ORDER — SODIUM CHLORIDE 0.9 % (FLUSH) 0.9 %
10 SYRINGE (ML) INJECTION AS NEEDED
Status: DISCONTINUED | OUTPATIENT
Start: 2019-08-16 | End: 2019-08-17 | Stop reason: HOSPADM

## 2019-08-16 RX ORDER — SPIRONOLACTONE 25 MG/1
25 TABLET ORAL EVERY MORNING
Status: DISCONTINUED | OUTPATIENT
Start: 2019-08-17 | End: 2019-08-17 | Stop reason: HOSPADM

## 2019-08-16 RX ORDER — MAGNESIUM OXIDE 400 MG/1
500 TABLET ORAL DAILY
Status: ON HOLD | COMMUNITY
End: 2020-01-31

## 2019-08-16 RX ORDER — DULOXETIN HYDROCHLORIDE 60 MG/1
60 CAPSULE, DELAYED RELEASE ORAL DAILY
Status: ON HOLD | COMMUNITY
End: 2019-08-16

## 2019-08-16 RX ORDER — SODIUM CHLORIDE 9 MG/ML
40 INJECTION, SOLUTION INTRAVENOUS AS NEEDED
Status: DISCONTINUED | OUTPATIENT
Start: 2019-08-16 | End: 2019-08-17 | Stop reason: HOSPADM

## 2019-08-16 RX ORDER — PANTOPRAZOLE SODIUM 40 MG/1
40 TABLET, DELAYED RELEASE ORAL EVERY MORNING
Status: DISCONTINUED | OUTPATIENT
Start: 2019-08-17 | End: 2019-08-17 | Stop reason: HOSPADM

## 2019-08-16 RX ORDER — CHOLECALCIFEROL (VITAMIN D3) 125 MCG
5 CAPSULE ORAL NIGHTLY PRN
Status: DISCONTINUED | OUTPATIENT
Start: 2019-08-16 | End: 2019-08-17 | Stop reason: HOSPADM

## 2019-08-16 RX ORDER — HYDROCODONE BITARTRATE AND ACETAMINOPHEN 10; 325 MG/1; MG/1
1 TABLET ORAL EVERY 6 HOURS PRN
Status: DISCONTINUED | OUTPATIENT
Start: 2019-08-16 | End: 2019-08-17 | Stop reason: HOSPADM

## 2019-08-16 RX ORDER — NICOTINE POLACRILEX 4 MG
15 LOZENGE BUCCAL
Status: DISCONTINUED | OUTPATIENT
Start: 2019-08-16 | End: 2019-08-17 | Stop reason: HOSPADM

## 2019-08-16 RX ORDER — SPIRONOLACTONE 50 MG/1
50 TABLET, FILM COATED ORAL EVERY MORNING
Qty: 30 TABLET | Refills: 2 | Status: ON HOLD | OUTPATIENT
Start: 2019-08-16 | End: 2019-08-17 | Stop reason: SDUPTHER

## 2019-08-16 RX ORDER — SENNA AND DOCUSATE SODIUM 50; 8.6 MG/1; MG/1
2 TABLET, FILM COATED ORAL NIGHTLY PRN
Status: DISCONTINUED | OUTPATIENT
Start: 2019-08-16 | End: 2019-08-17 | Stop reason: HOSPADM

## 2019-08-16 RX ADMIN — BUMETANIDE 2 MG: 0.25 INJECTION INTRAMUSCULAR; INTRAVENOUS at 22:48

## 2019-08-16 RX ADMIN — SODIUM CHLORIDE, PRESERVATIVE FREE 3 ML: 5 INJECTION INTRAVENOUS at 20:53

## 2019-08-16 RX ADMIN — PREGABALIN 50 MG: 50 CAPSULE ORAL at 20:45

## 2019-08-16 RX ADMIN — HYDROCODONE BITARTRATE AND ACETAMINOPHEN 1 TABLET: 10; 325 TABLET ORAL at 20:44

## 2019-08-16 RX ADMIN — CARVEDILOL 12.5 MG: 12.5 TABLET, FILM COATED ORAL at 20:47

## 2019-08-16 NOTE — ED PROVIDER NOTES
"Subjective   History of Present Illness  History of Present Illness    Chief complaint: soa    Location: generalized    Quality/Severity:  \"can't breathe\", varies. None currently    Timing/Duration: 2 weeks, intermittent    Modifying Factors: worse with exertion.  Relieved with rest.    Associated Symptoms: Positive chronic two-pillow orthopnea, unchanged.  Positive dyspnea on exertion.  Occasional left sharp chest pain with radiation to the left jaw.  This is not occurred in a couple of days.  Denies cough.  Denies hemoptysis.  Denies fevers or chills.  Denies nausea or vomiting.  Denies dizziness.  Denies syncope.  Denies weight gain.  Chronic peripheral edema is improved.    Narrative: 74-year-old female presents from her primary care's office for further evaluation of dyspnea on exertion and intermittent chest pain as above. (And potential stress test)  Dr. Jensen felt patient seems somewhat confused about her medications. she has not been taking her Bumex for an unknown amount of time. (was on Bumex 2mg BID per May 2019 Cards note).  Patient states that that medication was too expensive, along with other medications.  She is not exactly sure which medications she stopped.  She has been continuing to take her spironolactone. Pt is a poor historian.    RHC/C April 2015:  1.  Hemodynamics: RA 21/24/19. RV 50/20. PA 50/30/38. PCWP 31/37/30. /30.  AO1 119/68/89. Cardiac output was 3.9 L/min.  2.  Left ventriculography: EF 20%, global hypokinesis, severe mitral regurgitation.   3.  Coronary angiography: Right dominant system. There is a single coronary  artery arising from the right coronary cusp. This feeds the right coronary  artery as well as a branch that feeds both the conus as well as the ventricular  septum. Another branch feeds the LAD and circumflex branches. There is minimal atherosclerosis (10% throughout).     2-D echocardiogram with Doppler 06/29/2015   -ejection fraction to be 50%. There was " normal segmental wall motion. There was moderate left atrial dilation. A small patent foramen ovale/atrial septal defect by saline contrast study and trace mitral and tricuspid insufficiency. This is a significant improvement from her prior echocardiogram.    Review of Systems  General: Denies fevers or chills.  Denies any weakness or fatigue.  Denies any weight loss or weight gain.  SKIN: Denies any rashes lesions or ulcers.  Denies color change.  ENT: Denies sore throat or rhinorrhea.  Denies ear pain.    EYES: Denies any blurred vision.  Denies any change in vision.  Denies any photophobia.  Denies any vision loss.  LUNGS: + shortness of breath  Denies wheezing.  Denies any cough.  Denies any hemoptysis.  CARDIAC: + chest pain.  Denies palpitations.  Denies syncope.  Chronic edema improved.  ABD: Denies any abdominal pain.  Denies any nausea or vomiting or diarrhea.  Denies any rectal bleeding.  Denies constipation  : Denies any dysuria, urgency, frequency or hematuria.  Denies discharge.  Denies flank pain.  NEURO: Denies any focal weakness.  Denies headache.  Denies seizures.  Denies changes in speech or difficulty walking.  ENDOCRINE: Denies polydipsia and polyuria  M/S: Denies arthralgias, back pain, myalgias or neck pain  HEME/LYMPH: Negative for adenopathy. Does not bruise/bleed easily.   PSYCH: Negative for suicidal ideas. Denies anxiety or depression  review was performed in addition to those in the above all other reviews are negative.      Past Medical History:   Diagnosis Date   • Anemia    • Benign breast disease    • Cancer (CMS/HCC) 2015    Right breast   • Cellulitis of leg     May-2003 right lower extremity   • CHF (congestive heart failure) (CMS/HCC)     Dr Bates follows   • Chronic kidney disease     Dr Hannah follows   • Chronic ulcer of right foot (CMS/HCC)     Non-pressure, history of    • Depression    • Diabetic peripheral neuropathy (CMS/HCC)    • Diarrhea    • Diverticulosis    •  Encounter for eye exam    • Fracture of left wrist     history of   • Gastroesophageal reflux    • Hiatal hernia    • History of bone density study 09/20/2012   • History of colonoscopy     done 6/03 recheck in 10 years.   Done july 2013 recheck in 5 years   • History of mammography, screening 09/20/2012   • Hospital discharge follow-up    • Hyperlipidemia    • Hypertension    • Hypothyroidism    • Impingement syndrome of right shoulder    • Joint pain    • Menopausal disorder    • Methicillin resistant Staphylococcus aureus infection 2012    after bladder surgery   • MRSA (methicillin resistant staph aureus) culture positive    • Nausea and vomiting    • Nonischemic cardiomyopathy (CMS/HCC)     Ejection fraction 10% per 2-D echo with Doppler however she did have cardiac catheterization April 2015 which showed ejection fraction 20% with global hypokinesis and severe mitral insufficiency   • Osteoarthritis     generalized, sched jania TKA   • Paroxysmal atrial fibrillation (CMS/HCC)     Dr Bates follows   • Postoperative infection     July of 2012 following her hysterectomy far vaginal wall prolapse. She was on antibiotics and wound dressings for 2 months.   • Shoulder pain, bilateral     has tears on both sides   • Syncope, psychogenic 4/19/2017   • Toe ulcer (CMS/HCC)     h/o to both feet, d/t shoes rubbing per patient   • Transient alteration of awareness 4/19/2017       Allergies   Allergen Reactions   • Dilaudid [Hydromorphone] Delirium and Confusion   • Latex Rash       Past Surgical History:   Procedure Laterality Date   • ABDOMINAL SURGERY  2012    had to be reopened after bladder surgery d/t infection   • AMPUTATION FOOT / TOE Right 11/2013    Rt foot amputation MTP first toe   • BLADDER SURGERY  2012   • BREAST BIOPSY     • BREAST SURGERY  06/29/2015    Percutaneous ultrasound-guided placement of metal localized clip 1st lesion   • CARDIAC CATHETERIZATION  2015   • CATARACT EXTRACTION Bilateral 2017   •  DEBRIDEMENT  FOOT Right 2013    During hospitalization    • EPIDURAL BLOCK      4 chronic back pain and leg pain last epidurals done  she had no benefit at all from this procedure.   • FOOT SURGERY Bilateral     several   • HERNIA REPAIR      At the abdomen 2007 Dr. Mendez   • INCISIONAL HERNIA REPAIR  2014    Recurrent. Incarcerated. With mesh implantation   • MASTECTOMY Right 2015   • SHOULDER SURGERY Right     x2 RCR   • TOTAL ABDOMINAL HYSTERECTOMY      with oophorectomy-Done 2012 secondary to vaginal wall prolapse.   • TOTAL KNEE ARTHROPLASTY Right    • TOTAL KNEE ARTHROPLASTY Left 2018    Procedure: TOTAL KNEE ARTHROPLASTY;  Surgeon: Live Chambers MD;  Location: [facility];  Service: Orthopedics       Family History   Problem Relation Age of Onset   • Colonic polyp Mother    • Hypertension Mother    • Migraines Mother    • Mental illness Mother    • Depression Mother    • Coronary artery disease Father    • Other Father         Cardiac Disorder   • Colon cancer Father    • Kidney disease Father    • Cancer Father    • Breast cancer Maternal Aunt    • Colon cancer Brother    • Alcohol abuse Brother    • Arthritis Sister    • Hypertension Brother    • Lung disease Brother    • Alcohol abuse Brother    • Malig Hyperthermia Neg Hx        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   Tobacco Use   • Smoking status: Former Smoker     Packs/day: 3.00     Years: 30.00     Pack years: 90.00     Types: Cigarettes     Last attempt to quit:      Years since quittin.6   • Smokeless tobacco: Never Used   Substance and Sexual Activity   • Alcohol use: No     Comment: h/o quit    • Drug use: No   • Sexual activity: Defer     Partners: Male     Comment:  celibate   Social History Narrative     2001    Volunteers here at [facility]  woman    Lots of friends    2 daughters - lives in [facility] and [place] (graphic design)    [employer]     No current facility-administered medications for this encounter.     Current Outpatient Medications:   •  alendronate (FOSAMAX) 35 MG tablet, take 1 tablet by mouth every week, Disp: 12 tablet, Rfl: 3  •  anastrozole (ARIMIDEX) 1 MG tablet, TAKE 1 TABLET BY MOUTH EVERY DAY, Disp: 90 tablet, Rfl: 0  •  aspirin 81 MG EC tablet, Take 81 mg by mouth Daily., Disp: , Rfl:   •  carvedilol (COREG) 12.5 MG tablet, Take 1 tablet by mouth 2 (Two) Times a Day With Meals., Disp: 180 tablet, Rfl: 3  •  coenzyme Q10 100 MG capsule, Take 100 mg by mouth., Disp: , Rfl:   •  Desvenlafaxine Succinate ER 25 MG tablet sustained-release 24 hour, Take 1 tablet by mouth Daily., Disp: 30 tablet, Rfl: 3  •  esomeprazole (nexIUM) 20 MG capsule, Take 20 mg by mouth Every Morning Before Breakfast., Disp: , Rfl:   •  glimepiride (AMARYL) 4 MG tablet, Take  One po bid, Disp: 180 tablet, Rfl: 2  •  HYDROcodone-acetaminophen (NORCO)  MG per tablet, Take 1 tablet by mouth Every 6 (Six) Hours As Needed for Moderate Pain . DNF until 8-10-19, Disp: 120 tablet, Rfl: 0  •  levothyroxine (SYNTHROID, LEVOTHROID) 25 MCG tablet, TAKE 1 TABLET BY MOUTH ONCE DAILY, Disp: 180 tablet, Rfl: 0  •  lisinopril (PRINIVIL,ZESTRIL) 20 MG tablet, Take 1 tablet by mouth Daily., Disp: 90 tablet, Rfl: 3  •  LYRICA 50 MG capsule, TAKE 1 CAPSULE BY MOUTH TWICE DAILY, Disp: 180 capsule, Rfl: 1  •  Multiple Vitamins-Minerals (MULTIVITAMIN ADULT PO), Take  by mouth., Disp: , Rfl:   •  potassium chloride (K-DUR,KLOR-CON) 20 MEQ CR tablet, Take 1 tablet by mouth Daily., Disp: 90 tablet, Rfl: 0  •  pravastatin (PRAVACHOL) 10 MG tablet, Take 10 mg by mouth Daily., Disp: , Rfl: 1  •  SITagliptin (JANUVIA) 100 MG tablet, Take 1 tablet by mouth Daily., Disp: 90  tablet, Rfl: 0  •  spironolactone (ALDACTONE) 50 MG tablet, Take 1 tablet by mouth Every Morning., Disp: 30 tablet, Rfl: 2  •  vitamin D (ERGOCALCIFEROL) 65614 units capsule capsule, Take 50,000 Units by mouth 1 (One) Time Per Week., Disp: , Rfl:         Objective   Physical Exam  Vitals:    08/16/19 1624   BP: (!) 188/97   Pulse: 78   Resp: 22   Temp: 98.9 °F (37.2 °C)   SpO2: 95%   GENERAL: a/o x 4, NAD  SKIN: Warm pink and dry   HEENT:  PERRLA, EOM intact, conjunctiva normal, sclera clear  NECK: supple, no JVD  LUNGS: L base rales. No wheezes or rhonchi..  No accessory muscle use and no nasal flaring.  CARDIAC:  Regular rate and rhythm, S1-S2.  No murmurs, rubs or gallops.  Trace bilat peripheral edema.  Equal pulses bilaterally.  ABDOMEN: Soft, nontender, nondistended.  No guarding or rebound tenderness.  Normal bowel sounds.  MUSCULOSKELETAL: Moves all extremities well.  No deformity.  NEURO: Cranial nerves II through XII grossly intact.  No gross focal deficits.  Alert.  Normal speech and motor.  PSYCH: Normal mood and affect        Procedures           ED Course  ED Course as of Aug 16 1725   Fri Aug 16, 2019   1714 stable Creatinine: (!) 1.05 [KY]      ED Course User Index  [KY] Norma Pride, ATTILA    EKG         EKG time / Interpretation time: 1626/1628  Rhythm/Rate: Normal sinus rhythm 70, LVH   CO: 173  QRS, axis: -26  QTc 419  ST and T waves: Inversion lead III, aVR.  EKG Tracing Interpreted Contemporaneously by me, independently viewed by me and MD.  Not significantly changed compared to prior 5/24/2019    Reviewed CXR. Independently viewed by me. Interpreted by radiologist. Discussed with pt.  Xr Chest 1 View    Result Date: 8/16/2019  Narrative: CR Chest 1 Vw INDICATION: Short of air and chest pain. Symptoms started 2 weeks ago. Sent from 's office with an abnormal EKG. COMPARISON:  Chest x-ray 5/10/2018 FINDINGS: Single portable AP view(s) of the chest.  The cardiac silhouette size is mildly  enlarged. This is increased from previous. Lung volumes are lower and there is subtle interstitial edema. No focal alveolar infiltrate. There is evidence for old granulomatous disease. No pleural effusion or pneumothorax.     Impression: Mild cardiac silhouette enlargement, size increased from prior with development of subtle interstitial edema. Findings suggest mild congestive failure. Correlation and follow-up suggested. Signer Name: Zarina Mari MD  Signed: 8/16/2019 5:04 PM  Workstation Name: JOHN  [facility]    Results for orders placed or performed during the hospital encounter of 08/16/19   Comprehensive Metabolic Panel   Result Value Ref Range    Glucose 159 (H) 65 - 99 mg/dL    BUN 11 8 - 23 mg/dL    Creatinine 1.05 (H) 0.57 - 1.00 mg/dL    Sodium 141 136 - 145 mmol/L    Potassium 4.9 3.5 - 5.2 mmol/L    Chloride 107 98 - 107 mmol/L    CO2 23.2 22.0 - 29.0 mmol/L    Calcium 10.2 8.6 - 10.5 mg/dL    Total Protein 7.1 6.0 - 8.5 g/dL    Albumin 4.00 3.50 - 5.20 g/dL    ALT (SGPT) 11 1 - 33 U/L    AST (SGOT) 21 1 - 32 U/L    Alkaline Phosphatase 79 39 - 117 U/L    Total Bilirubin 0.5 0.2 - 1.2 mg/dL    eGFR Non African Amer 51 (L) >60 mL/min/1.73    Globulin 3.1 gm/dL    A/G Ratio 1.3 g/dL    BUN/Creatinine Ratio 10.5 7.0 - 25.0    Anion Gap 10.8 5.0 - 15.0 mmol/L   Protime-INR   Result Value Ref Range    Protime 13.7 12.1 - 15.0 Seconds    INR 1.08 0.90 - 1.10   aPTT   Result Value Ref Range    PTT 33.7 24.3 - 38.1 seconds   Troponin   Result Value Ref Range    Troponin T <0.010 0.000-<0.030 ng/mL   BNP   Result Value Ref Range    proBNP 392.9 5.0-1,800.0 pg/mL   Magnesium   Result Value Ref Range    Magnesium 1.9 1.6 - 2.4 mg/dL   CBC Auto Differential   Result Value Ref Range    WBC 6.57 3.40 - 10.80 10*3/mm3    RBC 3.94 3.77 - 5.28 10*6/mm3    Hemoglobin 11.3 (L) 12.0 - 15.9 g/dL    Hematocrit 35.9 34.0 - 46.6 %    MCV 91.1 79.0 - 97.0 fL    MCH 28.7 26.6 - 33.0 pg    MCHC 31.5  31.5 - 35.7 g/dL    RDW 14.9 12.3 - 15.4 %    RDW-SD 49.6 37.0 - 54.0 fl    MPV 9.7 6.0 - 12.0 fL    Platelets 169 140 - 450 10*3/mm3    Neutrophil % 64.9 42.7 - 76.0 %    Lymphocyte % 23.3 19.6 - 45.3 %    Monocyte % 8.5 5.0 - 12.0 %    Eosinophil % 2.1 0.3 - 6.2 %    Basophil % 0.3 0.0 - 1.5 %    Immature Grans % 0.9 (H) 0.0 - 0.5 %    Neutrophils, Absolute 4.26 1.70 - 7.00 10*3/mm3    Lymphocytes, Absolute 1.53 0.70 - 3.10 10*3/mm3    Monocytes, Absolute 0.56 0.10 - 0.90 10*3/mm3    Eosinophils, Absolute 0.14 0.00 - 0.40 10*3/mm3    Basophils, Absolute 0.02 0.00 - 0.20 10*3/mm3    Immature Grans, Absolute 0.06 (H) 0.00 - 0.05 10*3/mm3    nRBC 0.0 0.0 - 0.2 /100 WBC     CONSULT  Time 1727  Discussed case with Dr Mahoney  Reviewed history, exam, results and treatments.  Discussed concerns and plan of care. Dr Mahoney accepts pt to be admitted to tele after we talk to cards.  1738- d/w Dr. Gayle.  Ok to stay here.   Pt agrees.            MDM  Number of Diagnoses or Management Options  Acute on chronic HFrEF (heart failure with reduced ejection fraction) (CMS/[facility]): new and requires workup  PITTS (dyspnea on exertion): new and requires workup     Amount and/or Complexity of Data Reviewed  Clinical lab tests: reviewed and ordered  Tests in the radiology section of CPT®: reviewed and ordered  Tests in the medicine section of CPT®: reviewed and ordered  Decide to obtain previous medical records or to obtain history from someone other than the patient: yes  Obtain history from someone other than the patient: yes  Review and summarize past medical records: yes  Discuss the patient with other providers: yes  Independent visualization of images, tracings, or specimens: yes    Risk of Complications, Morbidity, and/or Mortality  Presenting problems: high  Diagnostic procedures: high  Management options: high    Patient Progress  Patient progress: improved    My differential diagnosis for chest pain includes but is not limited  to:  Muscle strain, costochondritis, myositis, pleurisy, rib fracture, intercostal neuritis, herpes zoster, tumor, myocardial infarction, coronary syndrome, unstable angina, angina, aortic dissection, mitral valve prolapse, pericarditis, palpitations, pulmonary embolus, pneumonia, pneumothorax, lung cancer, GERD, esophagitis, esophageal spasm  My differential diagnosis for dyspnea includes but is not limited to:  Asthma, COPD, pneumonia, pulmonary embolus, acute respiratory distress syndrome, pneumothorax, pleural effusion, pulmonary fibrosis, congestive heart failure, myocardial infarction, DKA, uremia, acidosis, sepsis, anemia, drug related, hyperventilation, CNS disease      Final diagnoses:   Acute on chronic HFrEF (heart failure with reduced ejection fraction) (CMS/[facility])   PITTS (dyspnea on exertion)     Dictated utilizing Dragon dictation         Norma Pride PA-C  08/16/19 [id number]

## 2019-08-16 NOTE — PROGRESS NOTES
"      Jung Short is a 74 y.o. female, who presents with a chief complaint of   Chief Complaint   Patient presents with   • Follow-up     3 x month f/u, lab results   • Diabetes       75 yo F with h/o non-ischemic cardiomyopathy, diabetes, HLD and HTN here for follow up of her diabetes and chronic issues. This is my 2nd time seeing her after transfer from Dr. Avalos.     She has been feeling SOB recently with walking a few feet. Not SOB at rest. Happening for a few weeks. Does have CP. States that it's sharp in the central to left side of her chest.Pain is \"fleeting\". Not tender to touch. Happens intermittently.  Pains in her neck as well that are sharp. Feels really tired too.    She has Type 2 diabetes and is now taking Januvia. Tradjenta was too expensive. She is feeling well on this.     She is taking Cymbalta and Pristiq today. Apparently could not afford Cymbalta and picked up Pristiq and is now taking both. Did not understand to stop Cymbalta.     Her legs have been swelling for about 3 weeks ago. Unsure of when she stopped Bumes. She is taking Spironalactone though. No leg pain. Worse at the end of the day.              The following portions of the patient's history were reviewed and updated as appropriate: allergies, current medications, past family history, past medical history, past social history, past surgical history and problem list.    Allergies: Dilaudid [hydromorphone] and Latex    Current Outpatient Medications:   •  alendronate (FOSAMAX) 35 MG tablet, take 1 tablet by mouth every week, Disp: 12 tablet, Rfl: 3  •  anastrozole (ARIMIDEX) 1 MG tablet, TAKE 1 TABLET BY MOUTH EVERY DAY, Disp: 90 tablet, Rfl: 0  •  aspirin 81 MG EC tablet, Take 81 mg by mouth Daily., Disp: , Rfl:   •  carvedilol (COREG) 12.5 MG tablet, Take 1 tablet by mouth 2 (Two) Times a Day With Meals., Disp: 180 tablet, Rfl: 3  •  coenzyme Q10 100 MG capsule, Take 100 mg by mouth., Disp: , Rfl:   •  Desvenlafaxine " Succinate ER 25 MG tablet sustained-release 24 hour, Take 1 tablet by mouth Daily., Disp: 30 tablet, Rfl: 3  •  esomeprazole (nexIUM) 20 MG capsule, Take 20 mg by mouth Every Morning Before Breakfast., Disp: , Rfl:   •  glimepiride (AMARYL) 4 MG tablet, Take  One po bid, Disp: 180 tablet, Rfl: 2  •  HYDROcodone-acetaminophen (NORCO)  MG per tablet, Take 1 tablet by mouth Every 6 (Six) Hours As Needed for Moderate Pain . DNF until 8-10-19, Disp: 120 tablet, Rfl: 0  •  levothyroxine (SYNTHROID, LEVOTHROID) 25 MCG tablet, TAKE 1 TABLET BY MOUTH ONCE DAILY, Disp: 180 tablet, Rfl: 0  •  lisinopril (PRINIVIL,ZESTRIL) 20 MG tablet, Take 1 tablet by mouth Daily., Disp: 90 tablet, Rfl: 3  •  LYRICA 50 MG capsule, TAKE 1 CAPSULE BY MOUTH TWICE DAILY, Disp: 180 capsule, Rfl: 1  •  Multiple Vitamins-Minerals (MULTIVITAMIN ADULT PO), Take  by mouth., Disp: , Rfl:   •  potassium chloride (K-DUR,KLOR-CON) 20 MEQ CR tablet, Take 1 tablet by mouth Daily., Disp: 90 tablet, Rfl: 0  •  pravastatin (PRAVACHOL) 10 MG tablet, Take 10 mg by mouth Daily., Disp: , Rfl: 1  •  SITagliptin (JANUVIA) 100 MG tablet, Take 1 tablet by mouth Daily., Disp: 90 tablet, Rfl: 0  •  spironolactone (ALDACTONE) 50 MG tablet, Take 1 tablet by mouth Every Morning., Disp: 30 tablet, Rfl: 2  •  vitamin D (ERGOCALCIFEROL) 74479 units capsule capsule, Take 50,000 Units by mouth 1 (One) Time Per Week., Disp: , Rfl:   Medications Discontinued During This Encounter   Medication Reason   • bumetanide (BUMEX) 2 MG tablet *Therapy completed   • spironolactone (ALDACTONE) 25 MG tablet Reorder   • pravastatin (PRAVACHOL) 20 MG tablet *Therapy completed       Review of Systems   Constitutional: Negative for chills and fatigue.   HENT: Negative for congestion.    Respiratory: Positive for cough (when she is SOB ) and shortness of breath. Negative for wheezing.    Cardiovascular: Positive for chest pain (neck and chest pain (central)).   Genitourinary: Negative for  "difficulty urinating and dysuria.             /68 (BP Location: Left arm, Patient Position: Sitting, Cuff Size: Large Adult)   Pulse 69   Temp 98.1 °F (36.7 °C) (Oral)   Resp 18   Ht 168.9 cm (66.5\")   Wt 104 kg (229 lb)   SpO2 98%   BMI 36.41 kg/m²         Physical Exam   Constitutional: She is oriented to person, place, and time. She appears well-developed and well-nourished. No distress.   HENT:   Head: Normocephalic and atraumatic.   Right Ear: External ear normal.   Left Ear: External ear normal.   Mouth/Throat: Oropharynx is clear and moist. No oropharyngeal exudate.   Eyes: Conjunctivae are normal. Right eye exhibits no discharge. Left eye exhibits no discharge. No scleral icterus.   Neck: Neck supple.   Cardiovascular: Normal rate, regular rhythm and normal heart sounds. Exam reveals no gallop and no friction rub.   No murmur heard.  Pulmonary/Chest: Effort normal and breath sounds normal. No respiratory distress. She has no wheezes. She has no rales.   Slightly breathless when talking    Lymphadenopathy:     She has no cervical adenopathy.   Neurological: She is alert and oriented to person, place, and time.   Skin: Skin is warm. No rash noted.   Psychiatric: She has a normal mood and affect. Her behavior is normal.   Nursing note and vitals reviewed.      ECG 12 Lead  Date/Time: 8/16/2019 3:48 PM  Performed by: Kylee Jensen MD  Authorized by: Kylee Jensen MD   Comparison: compared with previous ECG from 5/24/2019  Rhythm: sinus rhythm  Rate: normal  Conduction: conduction normal  ST Segments: ST segments normal  ST Depression: II, III and aVF  T Waves: T waves normal  QRS axis: normal  Other: no other findings    Clinical impression: normal ECG            Results for orders placed or performed in visit on 08/16/19   POC Glycosylated Hemoglobin (Hb A1C)   Result Value Ref Range    Hemoglobin A1C 7.8 %           Jung was seen today for follow-up and diabetes.    Diagnoses and all " orders for this visit:    Essential hypertension    SOB (shortness of breath) on exertion  -     Cancel: Stress test with myocardial perfusion; Future  -     ECG 12 Lead    Type 2 diabetes mellitus without complication, without long-term current use of insulin (CMS/[facility])  -     POC Glycosylated Hemoglobin (Hb A1C)    Atypical chest pain  -     ECG 12 Lead    Anemia, unspecified type  -     Ferritin  -     Iron Profile  -     Folate  -     Vitamin B12    Acquired hypothyroidism  -     T4, Free  -     TSH    Other orders  -     spironolactone (ALDACTONE) 50 MG tablet; Take 1 tablet by mouth Every Morning.      She has new inferior lead changes on her EKG and with her SOB on exertion and chest pain for the last 3 weeks, I think that she needs labs and possibly stress test. Will send to ER for further evaluation. I am unsure when she stopped her Bumex, but this may reason for her recent change. Seems confused about her medications at times during exam.     Will plan on seeing her for follow up after ER to discuss. Needs labs for anemia as well as check of her thyroid.     I am happy that her diabetes is doing better.       Return for Follow up after ER .    Kylee Jensen MD  08/16/2019

## 2019-08-16 NOTE — PLAN OF CARE
Problem: Pain, Acute (Adult)  Goal: Acceptable Pain Control/Comfort Level  Outcome: Ongoing (interventions implemented as appropriate)   08/16/19 3979   Pain, Acute (Adult)   Acceptable Pain Control/Comfort Level making progress toward outcome  (Patient states she has no pain at this time.)   Patient in good spirits, no complaints.  She would like to eat and drink.

## 2019-08-17 ENCOUNTER — APPOINTMENT (OUTPATIENT)
Dept: GENERAL RADIOLOGY | Facility: HOSPITAL | Age: 74
End: 2019-08-17

## 2019-08-17 PROBLEM — R07.9 CHEST PAIN: Status: RESOLVED | Noted: 2019-08-16 | Resolved: 2019-08-17

## 2019-08-17 PROBLEM — I50.23 ACUTE ON CHRONIC HFREF (HEART FAILURE WITH REDUCED EJECTION FRACTION): Status: ACTIVE | Noted: 2019-08-17

## 2019-08-17 LAB
ANION GAP SERPL CALCULATED.3IONS-SCNC: 13.7 MMOL/L (ref 5–15)
BUN BLD-MCNC: 12 MG/DL (ref 8–23)
BUN/CREAT SERPL: 11.9 (ref 7–25)
CALCIUM SPEC-SCNC: 10.5 MG/DL (ref 8.6–10.5)
CHLORIDE SERPL-SCNC: 104 MMOL/L (ref 98–107)
CO2 SERPL-SCNC: 22.3 MMOL/L (ref 22–29)
CREAT BLD-MCNC: 1.01 MG/DL (ref 0.57–1)
DEPRECATED RDW RBC AUTO: 47.7 FL (ref 37–54)
ERYTHROCYTE [DISTWIDTH] IN BLOOD BY AUTOMATED COUNT: 14.8 % (ref 12.3–15.4)
GFR SERPL CREATININE-BSD FRML MDRD: 53 ML/MIN/1.73
GLUCOSE BLD-MCNC: 77 MG/DL (ref 65–99)
GLUCOSE BLDC GLUCOMTR-MCNC: 232 MG/DL (ref 70–130)
GLUCOSE BLDC GLUCOMTR-MCNC: 86 MG/DL (ref 70–130)
HCT VFR BLD AUTO: 36.7 % (ref 34–46.6)
HGB BLD-MCNC: 11.6 G/DL (ref 12–15.9)
INR PPP: 1.09 (ref 0.9–1.1)
MCH RBC QN AUTO: 28.2 PG (ref 26.6–33)
MCHC RBC AUTO-ENTMCNC: 31.6 G/DL (ref 31.5–35.7)
MCV RBC AUTO: 89.3 FL (ref 79–97)
NT-PROBNP SERPL-MCNC: 755.4 PG/ML (ref 5–1800)
PLATELET # BLD AUTO: 185 10*3/MM3 (ref 140–450)
PMV BLD AUTO: 9.9 FL (ref 6–12)
POTASSIUM BLD-SCNC: 4.3 MMOL/L (ref 3.5–5.2)
PROTHROMBIN TIME: 13.8 SECONDS (ref 12.1–15)
RBC # BLD AUTO: 4.11 10*6/MM3 (ref 3.77–5.28)
SODIUM BLD-SCNC: 140 MMOL/L (ref 136–145)
TROPONIN T SERPL-MCNC: <0.01 NG/ML (ref 0–0.03)
WBC NRBC COR # BLD: 7.12 10*3/MM3 (ref 3.4–10.8)

## 2019-08-17 PROCEDURE — 80048 BASIC METABOLIC PNL TOTAL CA: CPT | Performed by: INTERNAL MEDICINE

## 2019-08-17 PROCEDURE — 63710000001 INSULIN ASPART PER 5 UNITS: Performed by: INTERNAL MEDICINE

## 2019-08-17 PROCEDURE — 99214 OFFICE O/P EST MOD 30 MIN: CPT | Performed by: INTERNAL MEDICINE

## 2019-08-17 PROCEDURE — 93010 ELECTROCARDIOGRAM REPORT: CPT | Performed by: INTERNAL MEDICINE

## 2019-08-17 PROCEDURE — 99217 PR OBSERVATION CARE DISCHARGE MANAGEMENT: CPT | Performed by: INTERNAL MEDICINE

## 2019-08-17 PROCEDURE — G0378 HOSPITAL OBSERVATION PER HR: HCPCS

## 2019-08-17 PROCEDURE — 85027 COMPLETE CBC AUTOMATED: CPT | Performed by: INTERNAL MEDICINE

## 2019-08-17 PROCEDURE — 93005 ELECTROCARDIOGRAM TRACING: CPT | Performed by: INTERNAL MEDICINE

## 2019-08-17 PROCEDURE — 84484 ASSAY OF TROPONIN QUANT: CPT | Performed by: INTERNAL MEDICINE

## 2019-08-17 PROCEDURE — 74018 RADEX ABDOMEN 1 VIEW: CPT

## 2019-08-17 PROCEDURE — 85610 PROTHROMBIN TIME: CPT | Performed by: INTERNAL MEDICINE

## 2019-08-17 PROCEDURE — 83880 ASSAY OF NATRIURETIC PEPTIDE: CPT | Performed by: INTERNAL MEDICINE

## 2019-08-17 PROCEDURE — 82962 GLUCOSE BLOOD TEST: CPT

## 2019-08-17 RX ORDER — BUMETANIDE 1 MG/1
2 TABLET ORAL DAILY
Status: DISCONTINUED | OUTPATIENT
Start: 2019-08-17 | End: 2019-08-17 | Stop reason: HOSPADM

## 2019-08-17 RX ORDER — BUMETANIDE 2 MG/1
2 TABLET ORAL DAILY
Qty: 30 TABLET | Refills: 0 | Status: SHIPPED | OUTPATIENT
Start: 2019-08-18 | End: 2019-09-04 | Stop reason: HOSPADM

## 2019-08-17 RX ORDER — LOSARTAN POTASSIUM 50 MG/1
100 TABLET ORAL
Status: DISCONTINUED | OUTPATIENT
Start: 2019-08-17 | End: 2019-08-17 | Stop reason: HOSPADM

## 2019-08-17 RX ORDER — SPIRONOLACTONE 50 MG/1
25 TABLET, FILM COATED ORAL EVERY MORNING
Start: 2019-08-17 | End: 2019-09-04 | Stop reason: HOSPADM

## 2019-08-17 RX ORDER — ANASTROZOLE 1 MG/1
1 TABLET ORAL DAILY
Qty: 90 TABLET | Refills: 0
Start: 2019-08-17 | End: 2019-09-27 | Stop reason: SDUPTHER

## 2019-08-17 RX ORDER — LOSARTAN POTASSIUM 100 MG/1
100 TABLET ORAL
Qty: 30 TABLET | Refills: 0 | Status: SHIPPED | OUTPATIENT
Start: 2019-08-18 | End: 2019-09-04 | Stop reason: HOSPADM

## 2019-08-17 RX ADMIN — PRAVASTATIN SODIUM 10 MG: 20 TABLET ORAL at 08:28

## 2019-08-17 RX ADMIN — CARVEDILOL 12.5 MG: 12.5 TABLET, FILM COATED ORAL at 07:46

## 2019-08-17 RX ADMIN — PANTOPRAZOLE SODIUM 40 MG: 40 TABLET, DELAYED RELEASE ORAL at 07:46

## 2019-08-17 RX ADMIN — HYDROCODONE BITARTRATE AND ACETAMINOPHEN 1 TABLET: 10; 325 TABLET ORAL at 08:31

## 2019-08-17 RX ADMIN — LEVOTHYROXINE SODIUM 25 MCG: 25 TABLET ORAL at 07:47

## 2019-08-17 RX ADMIN — SPIRONOLACTONE 25 MG: 25 TABLET ORAL at 08:28

## 2019-08-17 RX ADMIN — BUMETANIDE 2 MG: 1 TABLET ORAL at 09:13

## 2019-08-17 RX ADMIN — ASPIRIN 81 MG: 81 TABLET ORAL at 07:46

## 2019-08-17 RX ADMIN — INSULIN ASPART 3 UNITS: 100 INJECTION, SOLUTION INTRAVENOUS; SUBCUTANEOUS at 12:28

## 2019-08-17 RX ADMIN — LISINOPRIL 5 MG: 5 TABLET ORAL at 07:47

## 2019-08-17 RX ADMIN — PREGABALIN 50 MG: 50 CAPSULE ORAL at 07:47

## 2019-08-17 RX ADMIN — HYDROCODONE BITARTRATE AND ACETAMINOPHEN 1 TABLET: 10; 325 TABLET ORAL at 02:57

## 2019-08-17 RX ADMIN — SODIUM CHLORIDE, PRESERVATIVE FREE 3 ML: 5 INJECTION INTRAVENOUS at 08:03

## 2019-08-17 RX ADMIN — LOSARTAN POTASSIUM 100 MG: 50 TABLET, FILM COATED ORAL at 09:13

## 2019-08-17 NOTE — PLAN OF CARE
Problem: Patient Care Overview  Goal: Discharge Needs Assessment  Outcome: Ongoing (interventions implemented as appropriate)   08/16/19 1835 08/17/19 0992   Discharge Needs Assessment   Readmission Within the Last 30 Days --  no previous admission in last 30 days   Concerns to be Addressed --  medication   Patient/Family Anticipates Transition to --  home   Patient/Family Anticipated Services at Transition none --    Transportation Anticipated --  family or friend will provide   Anticipated Changes Related to Illness --  none   Equipment Needed After Discharge --  none   Disability   Equipment Currently Used at Home --  cane, straight

## 2019-08-17 NOTE — PLAN OF CARE
Problem: Patient Care Overview  Goal: Plan of Care Review  Outcome: Ongoing (interventions implemented as appropriate)      Problem: Pain, Acute (Adult)  Goal: Acceptable Pain Control/Comfort Level  Outcome: Ongoing (interventions implemented as appropriate)      Problem: Fall Risk (Adult)  Goal: Absence of Fall  Outcome: Ongoing (interventions implemented as appropriate)      Problem: Arrhythmia/Dysrhythmia (Symptomatic) (Adult)  Goal: Signs and Symptoms of Listed Potential Problems Will be Absent, Minimized or Managed (Arrhythmia/Dysrhythmia)  Outcome: Ongoing (interventions implemented as appropriate)      Problem: Cardiac: ACS (Acute Coronary Syndrome) (Adult)  Goal: Signs and Symptoms of Listed Potential Problems Will be Absent, Minimized or Managed (Cardiac: ACS)  Outcome: Ongoing (interventions implemented as appropriate)   08/17/19 0625   Goal/Outcome Evaluation   Problems Assessed (Acute Coronary Syndrome) all   Problems Present (Acute Coronary Syn) none

## 2019-08-17 NOTE — CONSULTS
Date of Hospital Visit: [unfilled]ENC@  Encounter Provider: Aria Bates MD  Place of Service: [facility] CARDIOLOGY  Patient Name: Jung Short  :1945  Referral Provider: No ref. provider found    Chief complaint    Dyspnea, edema and chest pain.     History of Present Illness      This is a 74-year-old female with a history of severe nonischemic cardiomyopathy.  This was diagnosed in 2015, ejection fraction at that time 10-15%.  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  She was treated medically.  A repeat echocardiogram done 2015 showed ejection fraction 50%.    She has a history of right breast cancer, diabetes mellitus, chronic kidney disease, right foot ulcers, diabetic peripheral neuropathy, hypertension, hyperlipidemia, hypothyroidism she has been on chronic anastrozole therapy for breast cancer.      She was last evaluated in my office on 2019.  At that time, her blood pressure was elevated and medication adjustments were made.  I was seeing her as a preoperative evaluation prior to left knee surgery.    She had a benign Holter recording in 2017.  This showed one 3 beat run of SVT.  She had a carotid ultrasound done 2017 which showed mild atherosclerosis but no stenosis.    On arrival to the emergency department, her blood pressure was 188/97, heart rate 78.  Her laboratory values have been demonstrated 3 consecutive negative troponins, proBNP on arrival was 392, is now 755.  Her CMP was fairly unremarkable except blood glucose at 139, hemoglobin A1c 7.3, magnesium 1.9, mild anemia on CBC hemoglobin 11.3.  Chest x-ray shows mild interstitial edema.  EKG shows normal sinus rhythm, left axis deviation, no acute ST changes.  This is unchanged from  prior ECGs.    She says that she has had diarrhea for 3 weeks as well as abdominal fullness.  She had some intermittent nonexertional sharp substernal fleeting chest pain yesterday.  She has had no prolonged exertional chest tightness.  She is noticed 3 weeks of progressive lower extremity edema.  She has not checked her blood pressure in 3 weeks.  She does not know if her blood pressure cuff is accurate.  She does not really follow a low-sodium diet.  She denies fevers, chills, vomiting.    She is a poor historian    Past Medical History:   Diagnosis Date   • Anemia    • Benign breast disease    • Cancer (CMS/HCC) 2015    Right breast   • Cellulitis of leg     May-2003 right lower extremity   • CHF (congestive heart failure) (CMS/HCC)     Dr Bates follows   • Chronic kidney disease     Dr Hannah follows   • Chronic ulcer of right foot (CMS/HCC)     Non-pressure, history of    • Depression    • Diabetic peripheral neuropathy (CMS/HCC)    • Diarrhea    • Diverticulosis    • Encounter for eye exam    • Fracture of left wrist     history of   • Gastroesophageal reflux    • Hiatal hernia    • History of bone density study 09/20/2012   • History of colonoscopy     done 6/03 recheck in 10 years.   Done july 2013 recheck in 5 years   • History of mammography, screening 09/20/2012   • Hospital discharge follow-up    • Hyperlipidemia    • Hypertension    • Hypothyroidism    • Impingement syndrome of right shoulder    • Joint pain    • Menopausal disorder    • Methicillin resistant Staphylococcus aureus infection 2012    after bladder surgery   • MRSA (methicillin resistant staph aureus) culture positive    • Nausea and vomiting    • Nonischemic cardiomyopathy (CMS/HCC)     Ejection fraction 10% per 2-D echo with Doppler however she did have cardiac catheterization April 2015 which showed ejection fraction 20% with global hypokinesis and severe mitral insufficiency   • Osteoarthritis     generalized, sched jania TKA   •  Paroxysmal atrial fibrillation (CMS/HCC)     Dr Bates follows   • Postoperative infection     July of 2012 following her hysterectomy far vaginal wall prolapse. She was on antibiotics and wound dressings for 2 months.   • Shoulder pain, bilateral     has tears on both sides   • Syncope, psychogenic 4/19/2017   • Toe ulcer (CMS/HCC)     h/o to both feet, d/t shoes rubbing per patient   • Transient alteration of awareness 4/19/2017       Past Surgical History:   Procedure Laterality Date   • ABDOMINAL SURGERY  2012    had to be reopened after bladder surgery d/t infection   • AMPUTATION FOOT / TOE Right 11/2013    Rt foot amputation MTP first toe   • BLADDER SURGERY  2012   • BREAST BIOPSY     • BREAST SURGERY  06/29/2015    Percutaneous ultrasound-guided placement of metal localized clip 1st lesion   • CARDIAC CATHETERIZATION  2015   • CATARACT EXTRACTION Bilateral 2017   • DEBRIDEMENT  FOOT Right 01/2013    During hospitalization    • EPIDURAL BLOCK      4 chronic back pain and leg pain last epidurals done 5-2012 she had no benefit at all from this procedure.   • FOOT SURGERY Bilateral     several   • HERNIA REPAIR      At the abdomen February 2007 Dr. Mednez   • INCISIONAL HERNIA REPAIR  05/16/2014    Recurrent. Incarcerated. With mesh implantation   • MASTECTOMY Right 05/2015   • SHOULDER SURGERY Right     x2 RCR   • TOTAL ABDOMINAL HYSTERECTOMY      with oophorectomy-Done June-2012 secondary to vaginal wall prolapse.   • TOTAL KNEE ARTHROPLASTY Right    • TOTAL KNEE ARTHROPLASTY Left 4/17/2018    Procedure: TOTAL KNEE ARTHROPLASTY;  Surgeon: Live Chambers MD;  Location: [facility];  Service: Orthopedics       Medications Prior to Admission   Medication Sig Dispense Refill Last Dose   • alendronate (FOSAMAX) 35 MG tablet take 1 tablet by mouth every week 12 tablet 3 Taking   • anastrozole (ARIMIDEX) 1 MG tablet TAKE 1 TABLET BY MOUTH EVERY DAY 90 tablet 0 Taking   • aspirin 81 MG EC tablet Take 81 mg by mouth  Daily.   Taking   • carvedilol (COREG) 12.5 MG tablet Take 1 tablet by mouth 2 (Two) Times a Day With Meals. 180 tablet 3 Taking   • coenzyme Q10 100 MG capsule Take 100 mg by mouth.   Taking   • Desvenlafaxine Succinate ER 25 MG tablet sustained-release 24 hour Take 1 tablet by mouth Daily. 30 tablet 3 Taking   • esomeprazole (nexIUM) 20 MG capsule Take 20 mg by mouth Every Morning Before Breakfast.   Taking   • glimepiride (AMARYL) 4 MG tablet Take  One po bid 180 tablet 2 Taking   • HYDROcodone-acetaminophen (NORCO)  MG per tablet Take 1 tablet by mouth Every 6 (Six) Hours As Needed for Moderate Pain . DNF until 8-10-19 120 tablet 0 Taking   • levothyroxine (SYNTHROID, LEVOTHROID) 25 MCG tablet TAKE 1 TABLET BY MOUTH ONCE DAILY 180 tablet 0 Taking   • lisinopril (PRINIVIL,ZESTRIL) 20 MG tablet Take 1 tablet by mouth Daily. (Patient taking differently: Take 5 mg by mouth Daily. Lisinopril 5mg) 90 tablet 3 Taking   • LYRICA 50 MG capsule TAKE 1 CAPSULE BY MOUTH TWICE DAILY 180 capsule 1 Taking   • magnesium oxide (MAG-OX) 400 MG tablet Take 500 mg by mouth Daily.      • Multiple Vitamins-Minerals (MULTIVITAMIN ADULT PO) Take  by mouth.   Taking   • potassium chloride (K-DUR,KLOR-CON) 20 MEQ CR tablet Take 1 tablet by mouth Daily. 90 tablet 0 Taking   • pravastatin (PRAVACHOL) 10 MG tablet Take 10 mg by mouth Daily.  1 Taking   • SITagliptin (JANUVIA) 100 MG tablet Take 1 tablet by mouth Daily. 90 tablet 0 Taking   • vitamin D (ERGOCALCIFEROL) 49435 units capsule capsule Take 50,000 Units by mouth 1 (One) Time Per Week.   Taking   • spironolactone (ALDACTONE) 50 MG tablet Take 1 tablet by mouth Every Morning. (Patient taking differently: Take 25 mg by mouth Every Morning.) 30 tablet 2        Current Meds  Scheduled Meds:  aspirin 81 mg Oral Daily   carvedilol 12.5 mg Oral BID With Meals   Desvenlafaxine Succinate ER 25 mg Oral Daily   enoxaparin 40 mg Subcutaneous Q24H   insulin aspart 0-7 Units Subcutaneous  4x Daily AC & at Bedtime   levothyroxine 25 mcg Oral Daily   lisinopril 5 mg Oral Daily   pantoprazole 40 mg Oral QAM   potassium chloride 20 mEq Oral Daily   pravastatin 10 mg Oral Daily   pregabalin 50 mg Oral BID   sodium chloride 3 mL Intravenous Q12H   spironolactone 25 mg Oral QAM     Continuous Infusions:   PRN Meds:.dextrose  •  dextrose  •  glucagon (human recombinant)  •  HYDROcodone-acetaminophen  •  melatonin  •  sennosides-docusate sodium  •  [COMPLETED] Insert peripheral IV **AND** sodium chloride  •  sodium chloride  •  sodium chloride    Allergies as of 2019 - Reviewed 2019   Allergen Reaction Noted   • Dilaudid [hydromorphone] Delirium and Confusion 2018   • Latex Rash 10/20/2016       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   Tobacco Use   • Smoking status: Former Smoker     Packs/day: 3.00     Years: 30.00     Pack years: 90.00     Types: Cigarettes     Last attempt to quit:      Years since quittin.6   • Smokeless tobacco: Never Used   Substance and Sexual Activity   • Alcohol use: No     Comment: h/o quit    • Drug use: No   • Sexual activity: Defer     Partners: Male     Comment: celibate   Social History Narrative         Volunteers here at [facility]     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    Lots of friends    2 daughters - lives in [facility] and [place] (graphic design)    Sabianist Bahai       Family History   Problem Relation Age of Onset   • Colonic polyp Mother    • Hypertension Mother    • Migraines Mother    • Mental illness Mother    • Depression Mother    • Coronary artery disease Father    • Other Father         Cardiac Disorder   • Colon cancer Father    • Kidney disease Father    • Cancer Father    • Breast cancer  "Maternal Aunt    • Colon cancer Brother    • Alcohol abuse Brother    • Arthritis Sister    • Hypertension Brother    • Lung disease Brother    • Alcohol abuse Brother    • Malig Hyperthermia Neg Hx        REVIEW OF SYSTEMS:   12 point ROS was performed and is negative except as outlined in HPI       Objective:   Temp:  [97.4 °F (36.3 °C)-98.9 °F (37.2 °C)] 97.4 °F (36.3 °C)  Heart Rate:  [65-78] 69  Resp:  [16-22] 18  BP: (136-188)/(68-97) 136/83  Body mass index is 36.2 kg/m².  Flowsheet Rows      First Filed Value   Admission Height  167.6 cm (66\") Documented at 08/16/2019 1624   Admission Weight  104 kg (229 lb) Documented at 08/16/2019 1624        Vitals:    08/17/19 0637   BP: 136/83   Pulse: 69   Resp: 18   Temp: 97.4 °F (36.3 °C)   SpO2: 96%       General Appearance:    Alert, cooperative, in no acute distress   Head:    Normocephalic, without obvious abnormality, atraumatic   Eyes:            Lids and lashes normal, conjunctivae and sclerae normal, no   icterus, no pallor, corneas clear   Ears:    Ears appear intact with no abnormalities noted   Throat:   No oral lesions, no thrush, oral mucosa moist   Neck:   No adenopathy, supple, trachea midline, no thyromegaly, no   carotid bruit, no JVD   Back:     No kyphosis present, no scoliosis present, no skin lesions, erythema or scars, no tenderness range of motion normal   Lungs:     Clear to auscultation,respirations regular, even and unlabored    Heart:    Regular rhythm and normal rate, normal S1 and S2, no murmur, no gallop, no rub, no click   Chest Wall:    No abnormalities observed   Abdomen:     Normal bowel sounds, no masses, no organomegaly, soft        non-tender, non-distended, no guarding, no rebound  tenderness   Extremities:   Moves all extremities well, no edema, no cyanosis, no redness   Pulses:   Pulses palpable and equal bilaterally. Normal radial, carotid, dorsalis pedis and posterior tibial pulses bilaterally.    Skin:  Psychiatric:   No " bleeding, bruising or rash    Alert and oriented x 3, normal mood and affect                 Lab Review:    Results from last 7 days   Lab Units 08/17/19  0353 08/16/19  1643   SODIUM mmol/L 140 141   POTASSIUM mmol/L 4.3 4.9   CHLORIDE mmol/L 104 107   CO2 mmol/L 22.3 23.2   BUN mg/dL 12 11   CREATININE mg/dL 1.01* 1.05*   CALCIUM mg/dL 10.5 10.2   BILIRUBIN mg/dL  --  0.5   ALK PHOS U/L  --  79   ALT (SGPT) U/L  --  11   AST (SGOT) U/L  --  21   GLUCOSE mg/dL 77 159*     Results from last 7 days   Lab Units 08/17/19  0353 08/16/19  2303 08/16/19  1643   TROPONIN T ng/mL <0.010 <0.010 <0.010       Results from last 7 days   Lab Units 08/17/19  0353 08/16/19  1643   WBC 10*3/mm3 7.12 6.57   HEMOGLOBIN g/dL 11.6* 11.3*   HEMATOCRIT % 36.7 35.9   PLATELETS 10*3/mm3 185 169     Results from last 7 days   Lab Units 08/17/19  0353 08/16/19  1643   INR  1.09 1.08   APTT seconds  --  33.7     Results from last 7 days   Lab Units 08/16/19  1643   MAGNESIUM mg/dL 1.9       I personally viewed and interpreted the patient's EKG/Telemetry data  )  Patient Active Problem List   Diagnosis   • Abdominal bloating   • Abdominal mass   • Abdominal pain   • Abnormal gait   • Abnormal mammogram   • Anemia   • Thoracic back pain   • Malignant neoplasm of upper-inner quadrant of right breast in female, estrogen receptor positive (CMS/HCC)   • Candidiasis   • Cardiomyopathy (CMS/HCC)   • Disc disorder of cervical region   • Neck pain   • Tenderness of chest wall   • Anemia due to stage 3 chronic kidney disease (CMS/HCC)   • Chronic pain   • Constipation   • Depression   • Type 2 diabetes mellitus (CMS/HCC)   • Uncontrolled type 2 diabetes mellitus (CMS/HCC)   • Dyslipidemia   • Gastroesophageal reflux disease with esophagitis   • Generalized pain   • Gout   • Hypertension   • Hypothyroidism   • Incisional hernia   • Mass of breast   • Mitral valve insufficiency   • Nausea   • Adult body mass index greater than 30   • Osteopenia of spine    • Arthralgia of multiple joints   • Peripheral neuropathy   • Pulmonary hypertension (CMS/HCC)   • Osteomyelitis (CMS/HCC)   • Tricuspid valve insufficiency   • Cobalamin deficiency   • Vitamin D deficiency   • Diabetes mellitus (CMS/HCC)   • Left knee pain   • Arthritis of knee   • DDD (degenerative disc disease), lumbar   • Lumbar facet arthropathy   • Pain in shoulder   • Chronic gout of multiple sites   • Encounter for long-term (current) use of high-risk medication   • Constipation due to opioid therapy   • Syncope, psychogenic   • Transient alteration of awareness   • Closed fracture of patella   • Primary osteoarthritis of left knee   • Radius/ulna fracture, left, closed, initial encounter   • Rib pain on left side   • Preoperative cardiovascular examination   • Physical deconditioning   • Hyponatremia   • Acute URI   • Chronic kidney disease   • Chest pain     Assessment and Plan:    1. Abdominal distention and diarrhea for 3 weeks.  Check an abdominal flatplate.  This could be heart failure but could be a primary abdominal pathology as well.  2. Edema of the lower extremities for 3 weeks.  Her blood pressure was very high on admission.  This may be due to heart failure but could also be due to overuse of sodium.  Interestingly, her proBNP was normal.  Her chest x-ray though does show vascular congestion.  3. History of nonischemic cardiomyopathy with resolution in 2015.  Await another echocardiogram to be done Monday  4. Hypertension, not well controlled.  At her last visit in the office in May, her blood pressure was elevated.  I increased her blood pressure medications.  When she came back in, she was on even less than when I saw her in the office.  I do wonder if her current symptoms are driven by uncontrolled hypertension.  She checks her blood pressure at home but she is unsure if her cuff at home is accurate.  5. 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  6. Breast cancer on anastrozole, not sure that she is still taking - this can increase risk of MI/ischemia but has no ACS now and ECG is stable.   7. Obesity  8. DM, type II  9. Possible sleep apnea.  Should be tested and treated for this.    Treat blood pressure, start oral Bumex, check echo Monday, needs dietary education.    Aria Bates MD  08/17/19  7:22 AM.  Time spent in reviewing chart, discussion and examination:

## 2019-08-17 NOTE — DISCHARGE SUMMARY
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  H/o Breast Ca on Aromatase inhibitor which may worsen HF  CKD Stage III  Possible Sleep Apnea  Essential HTN  Hypothyroidism  Non-Ischemic Cardiomyopathy w/ resolution in 2015      PCP  Patient Care Team:  Kylee Jensen MD as PCP - General (Internal Medicine)  Trini Zaidi APRN as PCP - Claims Attributed  Lina Fisher MD as Referring Physician (General Surgery)  Live Chambers MD as Surgeon (Orthopedic Surgery)  Rafa Hannah MD as Consulting Physician (Nephrology)    Consults:   Consults     Date and Time Order Name Status Description    8/17/2019 0030 Inpatient Cardiology Consult Completed           CC:  Shortness of breath, lower extremity edema, intermittent chest pain      History of Present Illness:  As Per H&P: \"74-year-old white female with multiple medical problems history of diabetes hypertension chronic kidney disease stage III prior history of nonischemic cardiomyopathy who was at her primary care physician's office Dr. Jensen for routine evaluation.  She complained of intermittent episodes of sharp fleeting left-sided chest pain and bilateral neck pain over the last several weeks had several episodes lasted a few seconds along with recent onset of exertional dyspnea and lower extremity edema.  Denies any orthopnea or PND.  She is noticed she is getting short of breath even with least with exertion.  She denies any dyspnea at rest.  As noted patient was on bumex 2 mg twice a day for some reason stopped it     Her cardiac history significant for " "acute onset of congestive heart failure systolic and diastolic 4/2015 showed ejection fraction of 10 to 15% with moderate left ventricular dilatation severe mitral tricuspid insufficiency.  Cardiac catheterization did not show any significant coronary disease and repeat echocardiogram 6/2015 showed ejection fraction of 50% with normal segmental wall motion with small patent PFO with mild mitral and tricuspid insufficiency.\"    Hospital Course    Pt very insistent on going home. Pt was ambulating, and not on oxygen, and had good family support.    Clinically chief complaint appeared to be related to pt deciding not to take her bumex because \"I tried to  3 medications and it was $550, so I said forget it.\" Unclear how much bumex was. Family at bedside said they would be sure to acquire bumex for pt while she awaits scheduling of an otpt echo and followup.    Holding aromatase inhib until followup, as it may worsen heart failure .    Started on Arb, and added Bumex in addition to sprinolactone, follow up w/ PCP next week for hospital follow up, HF management and electrolyte check    Follow up with Cards per their scheduling    Physical Exam at Discharge  Vital Signs  Temp:  [97.4 °F (36.3 °C)-98.9 °F (37.2 °C)] 97.6 °F (36.4 °C)  Heart Rate:  [60-78] 60  Resp:  [16-22] 18  BP: (130-188)/(68-97) 130/68    Physical Exam:  Physical Exam   Constitutional: She is oriented to person, place, and time. She appears well-developed and well-nourished. No distress.   HENT:   Head: Normocephalic and atraumatic.   Right Ear: External ear normal.   Left Ear: External ear normal.   Nose: Nose normal.   Eyes: Conjunctivae and EOM are normal. Pupils are equal, round, and reactive to light. No scleral icterus.   Cardiovascular: Normal rate, regular rhythm and normal heart sounds.   Pulmonary/Chest: Effort normal and breath sounds normal. No stridor. No respiratory distress. She has no wheezes. She has no rales.   Abdominal: Soft. " Bowel sounds are normal. She exhibits no distension. There is no tenderness. There is no guarding.   Musculoskeletal: She exhibits edema.   1+ b/l   Neurological: She is alert and oriented to person, place, and time. No cranial nerve deficit.   Skin: Skin is warm and dry. She is not diaphoretic. No erythema.   Psychiatric: She has a normal mood and affect. Her behavior is normal.   Nursing note and vitals reviewed.      Operations and Procedures Performed:        Allergies:  is allergic to dilaudid [hydromorphone] and latex.    Joey  reviewed    Discharge Medications:     Discharge Medications      New Medications      Instructions Start Date   bumetanide 2 MG tablet  Commonly known as:  BUMEX   2 mg, Oral, Daily   Start Date:  8/18/2019     losartan 100 MG tablet  Commonly known as:  COZAAR   100 mg, Oral, Every 24 Hours Scheduled   Start Date:  8/18/2019        Changes to Medications      Instructions Start Date   anastrozole 1 MG tablet  Commonly known as:  ARIMIDEX  What changed:  additional instructions   1 mg, Oral, Daily, Hold until follow up with Dr. Jensen         Continue These Medications      Instructions Start Date   alendronate 35 MG tablet  Commonly known as:  FOSAMAX   take 1 tablet by mouth every week      aspirin 81 MG EC tablet   81 mg, Oral, Daily      carvedilol 12.5 MG tablet  Commonly known as:  COREG   12.5 mg, Oral, 2 Times Daily With Meals      coenzyme Q10 100 MG capsule   100 mg, Oral      Desvenlafaxine Succinate ER 25 MG tablet sustained-release 24 hour   25 mg, Oral, Daily      esomeprazole 20 MG capsule  Commonly known as:  nexIUM   20 mg, Oral, Every Morning Before Breakfast      glimepiride 4 MG tablet  Commonly known as:  AMARYL   Take  One po bid      HYDROcodone-acetaminophen  MG per tablet  Commonly known as:  NORCO   1 tablet, Oral, Every 6 Hours PRN, DNF until 8-10-19      levothyroxine 25 MCG tablet  Commonly known as:  SYNTHROID, LEVOTHROID   TAKE 1 TABLET BY MOUTH  ONCE DAILY      LYRICA 50 MG capsule  Generic drug:  pregabalin   TAKE 1 CAPSULE BY MOUTH TWICE DAILY      magnesium oxide 400 MG tablet  Commonly known as:  MAG-OX   500 mg, Oral, Daily      MULTIVITAMIN ADULT PO   Oral      potassium chloride 20 MEQ CR tablet  Commonly known as:  K-DUR,KLOR-CON   20 mEq, Oral, Daily      pravastatin 10 MG tablet  Commonly known as:  PRAVACHOL   10 mg, Oral, Daily      SITagliptin 100 MG tablet  Commonly known as:  JANUVIA   100 mg, Oral, Daily      spironolactone 50 MG tablet  Commonly known as:  ALDACTONE   25 mg, Oral, Every Morning      vitamin D 60306 units capsule capsule  Commonly known as:  ERGOCALCIFEROL   50,000 Units, Oral, Weekly         Stop These Medications    lisinopril 20 MG tablet  Commonly known as:  PRINIVIL,ZESTRIL            Last Lab Results:   Lab Results (last 72 hours)     Procedure Component Value Units Date/Time    POC Glucose Once [[id number]]  (Abnormal) Collected:  08/17/19 1122    Specimen:  Blood Updated:  08/17/19 1149     Glucose 232 mg/dL     POC Glucose Once [[id number]]  (Normal) Collected:  08/17/19 0715    Specimen:  Blood Updated:  08/17/19 0738     Glucose 86 mg/dL     BNP [[id number]]  (Normal) Collected:  08/17/19 0353    Specimen:  Blood Updated:  08/17/19 0507     proBNP 755.4 pg/mL     Narrative:       Among patients with dyspnea, NT-proBNP is highly sensitive for the detection of acute congestive heart failure. In addition NT-proBNP of <300 pg/ml effectively rules out acute congestive heart failure with 99% negative predictive value.    Troponin [[id number]]  (Normal) Collected:  08/17/19 0353    Specimen:  Blood Updated:  08/17/19 0505     Troponin T <0.010 ng/mL     Narrative:       Troponin T Reference Range:  <= 0.03 ng/mL-   Negative for AMI  >0.03 ng/mL-     Abnormal for myocardial necrosis.  Clinicians would have to utilize clinical acumen, EKG, Troponin and serial changes to determine if it is an Acute Myocardial Infarction or  myocardial injury due to an underlying chronic condition.     Basic Metabolic Panel [[id number]]  (Abnormal) Collected:  08/17/19 0353    Specimen:  Blood Updated:  08/17/19 0504     Glucose 77 mg/dL      BUN 12 mg/dL      Creatinine 1.01 mg/dL      Sodium 140 mmol/L      Potassium 4.3 mmol/L      Chloride 104 mmol/L      CO2 22.3 mmol/L      Calcium 10.5 mg/dL      eGFR Non African Amer 53 mL/min/1.73      BUN/Creatinine Ratio 11.9     Anion Gap 13.7 mmol/L     Narrative:       GFR Normal >60  Chronic Kidney Disease <60  Kidney Failure <15    Protime-INR [[id number]]  (Normal) Collected:  08/17/19 0353    Specimen:  Blood Updated:  08/17/19 0501     Protime 13.8 Seconds      INR 1.09    Narrative:       Therapeutic Ranges for INR: 2.0-3.0 (PT 20-30)                              2.5-3.5 (PT 25-34)    CBC (No Diff) [[id number]]  (Abnormal) Collected:  08/17/19 0353    Specimen:  Blood Updated:  08/17/19 0443     WBC 7.12 10*3/mm3      RBC 4.11 10*6/mm3      Hemoglobin 11.6 g/dL      Hematocrit 36.7 %      MCV 89.3 fL      MCH 28.2 pg      MCHC 31.6 g/dL      RDW 14.8 %      RDW-SD 47.7 fl      MPV 9.9 fL      Platelets 185 10*3/mm3     Troponin [[id number]]  (Normal) Collected:  08/16/19 2303    Specimen:  Blood Updated:  08/16/19 2328     Troponin T <0.010 ng/mL     Narrative:       Troponin T Reference Range:  <= 0.03 ng/mL-   Negative for AMI  >0.03 ng/mL-     Abnormal for myocardial necrosis.  Clinicians would have to utilize clinical acumen, EKG, Troponin and serial changes to determine if it is an Acute Myocardial Infarction or myocardial injury due to an underlying chronic condition.     POC Glucose Once [[id number]]  (Normal) Collected:  08/16/19 2026    Specimen:  Blood Updated:  08/16/19 2033     Glucose 82 mg/dL     Hemoglobin A1c [[id number]]  (Abnormal) Collected:  08/16/19 1643    Specimen:  Blood Updated:  08/16/19 2007     Hemoglobin A1C 7.30 %     Narrative:       Hemoglobin A1C Ranges:    Increased Risk  for Diabetes  5.7% to 6.4%  Diabetes                     >= 6.5%  Diabetic Goal                < 7.0%    Protime-INR [[id number]]  (Normal) Collected:  08/16/19 1643    Specimen:  Blood Updated:  08/16/19 1725     Protime 13.7 Seconds      INR 1.08    Narrative:       Therapeutic Ranges for INR: 2.0-3.0 (PT 20-30)                              2.5-3.5 (PT 25-34)    aPTT [[id number]]  (Normal) Collected:  08/16/19 1643    Specimen:  Blood Updated:  08/16/19 1725     PTT 33.7 seconds     Narrative:       PTT = The equivalent PTT values for the therapeutic range of heparin levels at 0.1 to 0.7 U/ml are 53 to 110 seconds.    BNP [[id number]]  (Normal) Collected:  08/16/19 1643    Specimen:  Blood Updated:  08/16/19 1720     proBNP 392.9 pg/mL     Narrative:       Among patients with dyspnea, NT-proBNP is highly sensitive for the detection of acute congestive heart failure. In addition NT-proBNP of <300 pg/ml effectively rules out acute congestive heart failure with 99% negative predictive value.    Troponin [[id number]]  (Normal) Collected:  08/16/19 1643    Specimen:  Blood Updated:  08/16/19 1713     Troponin T <0.010 ng/mL     Narrative:       Troponin T Reference Range:  <= 0.03 ng/mL-   Negative for AMI  >0.03 ng/mL-     Abnormal for myocardial necrosis.  Clinicians would have to utilize clinical acumen, EKG, Troponin and serial changes to determine if it is an Acute Myocardial Infarction or myocardial injury due to an underlying chronic condition.     Comprehensive Metabolic Panel [[id number]]  (Abnormal) Collected:  08/16/19 1643    Specimen:  Blood Updated:  08/16/19 1712     Glucose 159 mg/dL      BUN 11 mg/dL      Creatinine 1.05 mg/dL      Sodium 141 mmol/L      Potassium 4.9 mmol/L      Chloride 107 mmol/L      CO2 23.2 mmol/L      Calcium 10.2 mg/dL      Total Protein 7.1 g/dL      Albumin 4.00 g/dL      ALT (SGPT) 11 U/L      AST (SGOT) 21 U/L      Comment: Specimen hemolyzed.  Results may be affected.         Alkaline Phosphatase 79 U/L      Total Bilirubin 0.5 mg/dL      eGFR Non African Amer 51 mL/min/1.73      Globulin 3.1 gm/dL      A/G Ratio 1.3 g/dL      BUN/Creatinine Ratio 10.5     Anion Gap 10.8 mmol/L     Narrative:       GFR Normal >60  Chronic Kidney Disease <60  Kidney Failure <15    Magnesium [[id number]]  (Normal) Collected:  08/16/19 1643    Specimen:  Blood Updated:  08/16/19 1711     Magnesium 1.9 mg/dL     CBC & Differential [[id number]] Collected:  08/16/19 1643    Specimen:  Blood Updated:  08/16/19 1650    Narrative:       The following orders were created for panel order CBC & Differential.  Procedure                               Abnormality         Status                     ---------                               -----------         ------                     CBC Auto Differential[[id number]]        Abnormal            Final result                 Please view results for these tests on the individual orders.    CBC Auto Differential [[id number]]  (Abnormal) Collected:  08/16/19 1643    Specimen:  Blood Updated:  08/16/19 1650     WBC 6.57 10*3/mm3      RBC 3.94 10*6/mm3      Hemoglobin 11.3 g/dL      Hematocrit 35.9 %      MCV 91.1 fL      MCH 28.7 pg      MCHC 31.5 g/dL      RDW 14.9 %      RDW-SD 49.6 fl      MPV 9.7 fL      Platelets 169 10*3/mm3      Neutrophil % 64.9 %      Lymphocyte % 23.3 %      Monocyte % 8.5 %      Eosinophil % 2.1 %      Basophil % 0.3 %      Immature Grans % 0.9 %      Neutrophils, Absolute 4.26 10*3/mm3      Lymphocytes, Absolute 1.53 10*3/mm3      Monocytes, Absolute 0.56 10*3/mm3      Eosinophils, Absolute 0.14 10*3/mm3      Basophils, Absolute 0.02 10*3/mm3      Immature Grans, Absolute 0.06 10*3/mm3      nRBC 0.0 /100 WBC         Xr Chest 1 View    Result Date: 8/16/2019  Narrative: CR Chest 1 Vw INDICATION: Short of air and chest pain. Symptoms started 2 weeks ago. Sent from 's office with an abnormal EKG. COMPARISON:  Chest x-ray 5/10/2018 FINDINGS: Single  portable AP view(s) of the chest.  The cardiac silhouette size is mildly enlarged. This is increased from previous. Lung volumes are lower and there is subtle interstitial edema. No focal alveolar infiltrate. There is evidence for old granulomatous disease. No pleural effusion or pneumothorax.     Impression: Mild cardiac silhouette enlargement, size increased from prior with development of subtle interstitial edema. Findings suggest mild congestive failure. Correlation and follow-up suggested. Signer Name: Zarina Mari MD  Signed: 8/16/2019 5:04 PM  Workstation Name: [id number]  [facility]    Xr Abdomen Kub    Result Date: 8/17/2019  Narrative: CR Abdomen 1 Vw 8/17/2019 INDICATION: Abdominal fullness and bilateral lower extremity edema for 3 weeks with diarrhea for 10 days. COMPARISON: None available FINDINGS: AP radiographs of the abdomen. The renal shadows are symmetric. No renal calculi. No bladder calculi. The bowel gas pattern is nonobstructive. No acute osseous abnormalities. No radiopaque foreign body.     Impression: Negative KUB. Signer Name: Dillon Adair MD  Signed: 8/17/2019 8:24 AM  Workstation Name: [id number]  [facility]      Condition on Discharge:  Asymptomatic, good, ambulatory    Discharge Disposition  To Home    Visiting Nurse:    No     Home PT/OT:  No     Home Safety Evaluation:  No     DME  None    Discharge Diet:      Dietary Orders (From admission, onward)    Start     Ordered    08/17/19 1053  Diet Regular; Cardiac, Consistent Carbohydrate  Diet Effective Now     Question Answer Comment   Diet Texture / Consistency Regular    Common Modifiers Cardiac    Common Modifiers Consistent Carbohydrate        08/17/19 1053          Activity at Discharge:  no strenuous exercise for until follow up      Pre-discharge education      Follow-up Appointments  Future Appointments   Date Time Provider Department Center   8/21/2019 10:40 AM LAG MAMM 1 BH LAG  MAMMO LAG   8/21/2019 11:00 AM LAG DEXA 1  LAG DEXA None   9/11/2019  1:00 PM Trini Zaidi APRN [facility] PM EASPT None   11/25/2019  1:30 PM Lina Valencia APRN [facility] CD LCGLA None   11/26/2019  1:30 PM [facility]   11/26/2019  2:00 PM Robbin Lo MD [facility]     Additional Instructions for the Follow-ups that You Need to Schedule     Discharge Follow-up with PCP   As directed       Currently Documented PCP:    Kylee Jensen MD    PCP Phone Number:    [phone]     Follow Up Details:  1 week for electrolyte check         Discharge Follow-up with Specified Provider: Cardiology; 6 Weeks   As directed      To:  Cardiology    Follow Up:  6 Weeks               Test Results Pending at Discharge       Abdullahi Mahoney MD  08/17/19  2:16 PM    Time: Discharge < 30 min (if over 30 minutes give explanation as to why it took greater than 30 minutes)

## 2019-08-17 NOTE — H&P
HISTORY AND PHYSICAL      Patient Care Team:  Kylee Jensen MD as PCP - General (Internal Medicine)  Trini Zaidi APRN as PCP - Claims Attributed  Lina Fisher MD as Referring Physician (General Surgery)  Live Chambers MD as Surgeon (Orthopedic Surgery)  Rafa Hannah MD as Consulting Physician (Nephrology)    CHIEF COMPLAINT: Shortness of breath, lower extremity edema, intermittent chest pain    HISTORY OF PRESENT ILLNESS:    74-year-old white female with multiple medical problems history of diabetes hypertension chronic kidney disease stage III prior history of nonischemic cardiomyopathy who was at her primary care physician's office Dr. Jensen for routine evaluation.  She complained of intermittent episodes of sharp fleeting left-sided chest pain and bilateral neck pain over the last several weeks had several episodes lasted a few seconds along with recent onset of exertional dyspnea and lower extremity edema.  Denies any orthopnea or PND.  She is noticed she is getting short of breath even with least with exertion.  She denies any dyspnea at rest.  As noted patient was on bumex 2 mg twice a day for some reason stopped it    Her cardiac history significant for acute onset of congestive heart failure systolic and diastolic 4/2015 showed ejection fraction of 10 to 15% with moderate left ventricular dilatation severe mitral tricuspid insufficiency.  Cardiac catheterization did not show any significant coronary disease and repeat echocardiogram 6/2015 showed ejection fraction of 50% with normal segmental wall motion with small patent PFO with mild mitral and tricuspid insufficiency.    Past Medical History:   Diagnosis Date   • Anemia    • Benign breast disease    • Cancer (CMS/[facility]) 2015    Right breast   • Cellulitis of leg     May-2003 right lower extremity   • CHF (congestive heart failure) (CMS/[facility])     Dr Bates follows   • Chronic kidney disease     Dr Hannah follows   • Chronic  ulcer of right foot (CMS/HCC)     Non-pressure, history of    • Depression    • Diabetic peripheral neuropathy (CMS/HCC)    • Diarrhea    • Diverticulosis    • Encounter for eye exam    • Fracture of left wrist     history of   • Gastroesophageal reflux    • Hiatal hernia    • History of bone density study 09/20/2012   • History of colonoscopy     done 6/03 recheck in 10 years.   Done july 2013 recheck in 5 years   • History of mammography, screening 09/20/2012   • Hospital discharge follow-up    • Hyperlipidemia    • Hypertension    • Hypothyroidism    • Impingement syndrome of right shoulder    • Joint pain    • Menopausal disorder    • Methicillin resistant Staphylococcus aureus infection 2012    after bladder surgery   • MRSA (methicillin resistant staph aureus) culture positive    • Nausea and vomiting    • Nonischemic cardiomyopathy (CMS/HCC)     Ejection fraction 10% per 2-D echo with Doppler however she did have cardiac catheterization April 2015 which showed ejection fraction 20% with global hypokinesis and severe mitral insufficiency   • Osteoarthritis     generalized, sched jania TKA   • Paroxysmal atrial fibrillation (CMS/HCC)     Dr Bates follows   • Postoperative infection     July of 2012 following her hysterectomy far vaginal wall prolapse. She was on antibiotics and wound dressings for 2 months.   • Shoulder pain, bilateral     has tears on both sides   • Syncope, psychogenic 4/19/2017   • Toe ulcer (CMS/HCC)     h/o to both feet, d/t shoes rubbing per patient   • Transient alteration of awareness 4/19/2017     Past Surgical History:   Procedure Laterality Date   • ABDOMINAL SURGERY  2012    had to be reopened after bladder surgery d/t infection   • AMPUTATION FOOT / TOE Right 11/2013    Rt foot amputation MTP first toe   • BLADDER SURGERY  2012   • BREAST BIOPSY     • BREAST SURGERY  06/29/2015    Percutaneous ultrasound-guided placement of metal localized clip 1st lesion   • CARDIAC  CATHETERIZATION     • CATARACT EXTRACTION Bilateral 2017   • DEBRIDEMENT  FOOT Right 2013    During hospitalization    • EPIDURAL BLOCK      4 chronic back pain and leg pain last epidurals done  she had no benefit at all from this procedure.   • FOOT SURGERY Bilateral     several   • HERNIA REPAIR      At the abdomen 2007 Dr. Mendez   • INCISIONAL HERNIA REPAIR  2014    Recurrent. Incarcerated. With mesh implantation   • MASTECTOMY Right 2015   • SHOULDER SURGERY Right     x2 RCR   • TOTAL ABDOMINAL HYSTERECTOMY      with oophorectomy-Done 2012 secondary to vaginal wall prolapse.   • TOTAL KNEE ARTHROPLASTY Right    • TOTAL KNEE ARTHROPLASTY Left 2018    Procedure: TOTAL KNEE ARTHROPLASTY;  Surgeon: Live Chambers MD;  Location: [facility];  Service: Orthopedics     Family History   Problem Relation Age of Onset   • Colonic polyp Mother    • Hypertension Mother    • Migraines Mother    • Mental illness Mother    • Depression Mother    • Coronary artery disease Father    • Other Father         Cardiac Disorder   • Colon cancer Father    • Kidney disease Father    • Cancer Father    • Breast cancer Maternal Aunt    • Colon cancer Brother    • Alcohol abuse Brother    • Arthritis Sister    • Hypertension Brother    • Lung disease Brother    • Alcohol abuse Brother    • Malig Hyperthermia Neg Hx      Social History     Tobacco Use   • Smoking status: Former Smoker     Packs/day: 3.00     Years: 30.00     Pack years: 90.00     Types: Cigarettes     Last attempt to quit:      Years since quittin.6   • Smokeless tobacco: Never Used   Substance Use Topics   • Alcohol use: No     Comment: h/o quit    • Drug use: No     Medications Prior to Admission   Medication Sig Dispense Refill Last Dose   • alendronate (FOSAMAX) 35 MG tablet take 1 tablet by mouth every week 12 tablet 3 Taking   • anastrozole (ARIMIDEX) 1 MG tablet TAKE 1 TABLET BY MOUTH EVERY DAY 90 tablet 0 Taking   •  aspirin 81 MG EC tablet Take 81 mg by mouth Daily.   Taking   • carvedilol (COREG) 12.5 MG tablet Take 1 tablet by mouth 2 (Two) Times a Day With Meals. 180 tablet 3 Taking   • coenzyme Q10 100 MG capsule Take 100 mg by mouth.   Taking   • Desvenlafaxine Succinate ER 25 MG tablet sustained-release 24 hour Take 1 tablet by mouth Daily. 30 tablet 3 Taking   • DULoxetine (CYMBALTA) 60 MG capsule Take 60 mg by mouth Daily.      • esomeprazole (nexIUM) 20 MG capsule Take 20 mg by mouth Every Morning Before Breakfast.   Taking   • glimepiride (AMARYL) 4 MG tablet Take  One po bid 180 tablet 2 Taking   • HYDROcodone-acetaminophen (NORCO)  MG per tablet Take 1 tablet by mouth Every 6 (Six) Hours As Needed for Moderate Pain . DNF until 8-10-19 120 tablet 0 Taking   • levothyroxine (SYNTHROID, LEVOTHROID) 25 MCG tablet TAKE 1 TABLET BY MOUTH ONCE DAILY 180 tablet 0 Taking   • lisinopril (PRINIVIL,ZESTRIL) 20 MG tablet Take 1 tablet by mouth Daily. (Patient taking differently: Take 5 mg by mouth Daily. Lisinopril 5mg) 90 tablet 3 Taking   • LYRICA 50 MG capsule TAKE 1 CAPSULE BY MOUTH TWICE DAILY 180 capsule 1 Taking   • magnesium oxide (MAG-OX) 400 MG tablet Take 500 mg by mouth Daily.      • Multiple Vitamins-Minerals (MULTIVITAMIN ADULT PO) Take  by mouth.   Taking   • potassium chloride (K-DUR,KLOR-CON) 20 MEQ CR tablet Take 1 tablet by mouth Daily. 90 tablet 0 Taking   • pravastatin (PRAVACHOL) 10 MG tablet Take 10 mg by mouth Daily.  1 Taking   • SITagliptin (JANUVIA) 100 MG tablet Take 1 tablet by mouth Daily. 90 tablet 0 Taking   • vitamin D (ERGOCALCIFEROL) 15782 units capsule capsule Take 50,000 Units by mouth 1 (One) Time Per Week.   Taking   • spironolactone (ALDACTONE) 50 MG tablet Take 1 tablet by mouth Every Morning. (Patient taking differently: Take 25 mg by mouth Every Morning.) 30 tablet 2      Allergies:  Dilaudid [hydromorphone] and Latex     Review of Systems   Constitutional: Positive for activity  "change. Negative for appetite change and fatigue.   HENT: Negative.  Negative for congestion.    Respiratory: Positive for shortness of breath. Negative for cough, chest tightness and wheezing.    Cardiovascular: Positive for chest pain and leg swelling.   Gastrointestinal: Negative for abdominal distention, abdominal pain, diarrhea, nausea and vomiting.   Endocrine: Negative for polyphagia and polyuria.   Genitourinary: Negative for frequency.   Skin: Negative for rash.   Neurological: Negative for light-headedness.   Hematological: Does not bruise/bleed easily.   Psychiatric/Behavioral: Negative for agitation and behavioral problems.   All other systems reviewed and are negative.      Vital Signs  Temp:  [97.9 °F (36.6 °C)-98.9 °F (37.2 °C)] 98.3 °F (36.8 °C)  Heart Rate:  [65-78] 70  Resp:  [16-22] 18  BP: (138-188)/(68-97) 174/73  Oxygen Therapy  SpO2: 98 %  Pulse Oximetry Type: Intermittent  Device (Oxygen Therapy): room air}  Body mass index is 37.33 kg/m².  Flowsheet Rows      First Filed Value   Admission Height  167.6 cm (66\") Documented at 08/16/2019 1624   Admission Weight  104 kg (229 lb) Documented at 08/16/2019 1624                 Physical Exam   Constitutional: She appears well-developed and well-nourished.   Moderately obese   HENT:   Head: Normocephalic.   Mouth/Throat: Oropharynx is clear and moist.   Eyes: Conjunctivae are normal.   Neck: Normal range of motion. No JVD present. No thyromegaly present.   Cardiovascular: Normal rate and regular rhythm. Exam reveals no gallop, no S3 and no S4.   No murmur heard.  Pulmonary/Chest: Effort normal. No respiratory distress. She has no wheezes. She has rales.   Fine rales at both bases posteriorly   Abdominal: Soft. Bowel sounds are normal. She exhibits no distension. There is no tenderness. There is no guarding.   Musculoskeletal: She exhibits no edema (1+ pitting edema below the knees).   Neurological: She is alert.   Skin: Skin is warm and dry. No rash " noted.   Nursing note and vitals reviewed.       Debilities/Disabilities Identified: None    Emotional Behavior: Appropriate    Results Review:    I reviewed the patient's new clinical results.  Lab Results (most recent)     Procedure Component Value Units Date/Time    POC Glucose Once [[id number]]  (Normal) Collected:  08/16/19 2026    Specimen:  Blood Updated:  08/16/19 2033     Glucose 82 mg/dL     Hemoglobin A1c [[id number]]  (Abnormal) Collected:  08/16/19 1643    Specimen:  Blood Updated:  08/16/19 2007     Hemoglobin A1C 7.30 %     Narrative:       Hemoglobin A1C Ranges:    Increased Risk for Diabetes  5.7% to 6.4%  Diabetes                     >= 6.5%  Diabetic Goal                < 7.0%    Protime-INR [[id number]]  (Normal) Collected:  08/16/19 1643    Specimen:  Blood Updated:  08/16/19 1725     Protime 13.7 Seconds      INR 1.08    Narrative:       Therapeutic Ranges for INR: 2.0-3.0 (PT 20-30)                              2.5-3.5 (PT 25-34)    aPTT [[id number]]  (Normal) Collected:  08/16/19 1643    Specimen:  Blood Updated:  08/16/19 1725     PTT 33.7 seconds     Narrative:       PTT = The equivalent PTT values for the therapeutic range of heparin levels at 0.1 to 0.7 U/ml are 53 to 110 seconds.    BNP [[id number]]  (Normal) Collected:  08/16/19 1643    Specimen:  Blood Updated:  08/16/19 1720     proBNP 392.9 pg/mL     Narrative:       Among patients with dyspnea, NT-proBNP is highly sensitive for the detection of acute congestive heart failure. In addition NT-proBNP of <300 pg/ml effectively rules out acute congestive heart failure with 99% negative predictive value.    Troponin [[id number]]  (Normal) Collected:  08/16/19 1643    Specimen:  Blood Updated:  08/16/19 1713     Troponin T <0.010 ng/mL     Narrative:       Troponin T Reference Range:  <= 0.03 ng/mL-   Negative for AMI  >0.03 ng/mL-     Abnormal for myocardial necrosis.  Clinicians would have to utilize clinical acumen, EKG, Troponin and serial  changes to determine if it is an Acute Myocardial Infarction or myocardial injury due to an underlying chronic condition.     Comprehensive Metabolic Panel [[id number]]  (Abnormal) Collected:  08/16/19 1643    Specimen:  Blood Updated:  08/16/19 1712     Glucose 159 mg/dL      BUN 11 mg/dL      Creatinine 1.05 mg/dL      Sodium 141 mmol/L      Potassium 4.9 mmol/L      Chloride 107 mmol/L      CO2 23.2 mmol/L      Calcium 10.2 mg/dL      Total Protein 7.1 g/dL      Albumin 4.00 g/dL      ALT (SGPT) 11 U/L      AST (SGOT) 21 U/L      Comment: Specimen hemolyzed.  Results may be affected.        Alkaline Phosphatase 79 U/L      Total Bilirubin 0.5 mg/dL      eGFR Non African Amer 51 mL/min/1.73      Globulin 3.1 gm/dL      A/G Ratio 1.3 g/dL      BUN/Creatinine Ratio 10.5     Anion Gap 10.8 mmol/L     Narrative:       GFR Normal >60  Chronic Kidney Disease <60  Kidney Failure <15    Magnesium [[id number]]  (Normal) Collected:  08/16/19 1643    Specimen:  Blood Updated:  08/16/19 1711     Magnesium 1.9 mg/dL     CBC & Differential [[id number]] Collected:  08/16/19 1643    Specimen:  Blood Updated:  08/16/19 1650    Narrative:       The following orders were created for panel order CBC & Differential.  Procedure                               Abnormality         Status                     ---------                               -----------         ------                     CBC Auto Differential[[id number]]        Abnormal            Final result                 Please view results for these tests on the individual orders.    CBC Auto Differential [[id number]]  (Abnormal) Collected:  08/16/19 1643    Specimen:  Blood Updated:  08/16/19 1650     WBC 6.57 10*3/mm3      RBC 3.94 10*6/mm3      Hemoglobin 11.3 g/dL      Hematocrit 35.9 %      MCV 91.1 fL      MCH 28.7 pg      MCHC 31.5 g/dL      RDW 14.9 %      RDW-SD 49.6 fl      MPV 9.7 fL      Platelets 169 10*3/mm3      Neutrophil % 64.9 %      Lymphocyte % 23.3 %       Monocyte % 8.5 %      Eosinophil % 2.1 %      Basophil % 0.3 %      Immature Grans % 0.9 %      Neutrophils, Absolute 4.26 10*3/mm3      Lymphocytes, Absolute 1.53 10*3/mm3      Monocytes, Absolute 0.56 10*3/mm3      Eosinophils, Absolute 0.14 10*3/mm3      Basophils, Absolute 0.02 10*3/mm3      Immature Grans, Absolute 0.06 10*3/mm3      nRBC 0.0 /100 WBC           Imaging Results (most recent)     Procedure Component Value Units Date/Time    XR Chest 1 View [[id number]] Collected:  08/16/19 1704     Updated:  08/16/19 1706    Narrative:       CR Chest 1 Vw    INDICATION:   Short of air and chest pain. Symptoms started 2 weeks ago. Sent from 's office with an abnormal EKG.    COMPARISON:    Chest x-ray 5/10/2018    FINDINGS:  Single portable AP view(s) of the chest.  The cardiac silhouette size is mildly enlarged. This is increased from previous. Lung volumes are lower and there is subtle interstitial edema. No focal alveolar infiltrate. There is evidence for old  granulomatous disease. No pleural effusion or pneumothorax.      Impression:       Mild cardiac silhouette enlargement, size increased from prior with development of subtle interstitial edema. Findings suggest mild congestive failure. Correlation and follow-up suggested.    Signer Name: Zarina Mari MD   Signed: 8/16/2019 5:04 PM   Workstation Name: JOHN    [facility]        reviewed    ECG/EMG Results (most recent)     Procedure Component Value Units Date/Time    ECG 12 Lead [[id number]] Collected:  08/16/19 1626     Updated:  08/16/19 1630    Narrative:       HEART RATE= 70  bpm  RR Interval= 852  ms  WY Interval= 173  ms  P Horizontal Axis= 67  deg  P Front Axis= -6  deg  QRSD Interval= 107  ms  QT Interval= 387  ms  QRS Axis= -26  deg  T Wave Axis= -1  deg  - BORDERLINE ECG -  Sinus rhythm  Probable left ventricular hypertrophy  Electronically Signed By:   Date and Time of Study: 2019-08-16 16:26:09         reviewed    Assessment/Plan       1.  Acute congestive heart failure likely systolic uncertain etiology although could be due to the fact that she has stopped oral diuretics.  Patient will be given given some IV diuretics echocardiogram was obtained cardiology be consulted    2.  Atypical chest pain questionable angina will likely need stress test await cardiology evaluation    3.  Adult-onset diabetes mellitus non-insulin-dependent controlled continue home medications Accu-Chek sliding scale insulin    4.  Hypothyroidism nothing acute continue with home thyroid replacement      5.  Chronic kidney disease stage III at baseline nothing acute    6.  History of nonischemic cardiomyopathy last ejection fraction 6 2015  EF 50% we will reevaluate with repeat echocardiogram    7.  Depression anxiety stable    8.  DVT prophylaxis Lovenox will be ordered        I discussed the patients findings and my recommendations with patient and and nursing staff    Caridad Vega MD  08/16/19  10:53 PM      Much of this encounter note is an electronic transcription/translation of spoken language to printed text using Dragon Software

## 2019-08-17 NOTE — NURSING NOTE
"Discharge Planning Assessment  [facility]     Patient Name: Jung Short  MRN: [id number]  Today's Date: 8/17/2019    Admit Date: 8/16/2019    Discharge Needs Assessment     Row Name 08/17/19 0923       Living Environment    Lives With  alone    Current Living Arrangements  home/apartment/condo    Primary Care Provided by  self    Provides Primary Care For  no one, unable/limited ability to care for self    Family Caregiver if Needed  child(shira), adult    Quality of Family Relationships  helpful    Able to Return to Prior Arrangements  yes       Transition Planning    Patient/Family Anticipates Transition to  home    Transportation Anticipated  family or friend will provide       Discharge Needs Assessment    Readmission Within the Last 30 Days  no previous admission in last 30 days    Concerns to be Addressed  medication    Equipment Currently Used at Home  cane, straight    Anticipated Changes Related to Illness  none    Equipment Needed After Discharge  none        Discharge Plan     Row Name 08/17/19 0925       Plan    Plan  Home when stable    Patient/Family in Agreement with Plan  yes    Plan Comments  Spoke with Mrs Short at bedside.  She lives alone in a house in [place].  She does not have home health, DME or oxygen.  She has a glucometer that she does not use becasue the strips are \"Too expensive.\"  She is independent with her ADL's and srives herself.  Her pharmacy is [facility] in [place].  She is on many medications and has stopped several of them lately due to cost:  she has stopped Lyrica, Trajenta, and Bumex, due to being \"in the donut hole\".  She has an advanced directive and it is on file here at the hospital.  Plan is home when stable.  Will continue to follow        Destination      No service coordination in this encounter.      Durable Medical Equipment      No service coordination in this encounter.      Dialysis/Infusion      No service coordination in this encounter.      Home " Medical Care      No service coordination in this encounter.      Therapy      No service coordination in this encounter.      Community Resources      No service coordination in this encounter.          Demographic Summary     Row Name 08/17/19 0923       General Information    Admission Type  observation    Arrived From  home    Referral Source  admission list    Reason for Consult  discharge planning    Preferred Language  English     Used During This Interaction  no       Contact Information    Permission Granted to Share Info With          Functional Status    No documentation.       Psychosocial    No documentation.       Abuse/Neglect    No documentation.       Legal    No documentation.       Substance Abuse    No documentation.       Patient Forms    No documentation.           Alondra Rivera RN

## 2019-08-18 ENCOUNTER — READMISSION MANAGEMENT (OUTPATIENT)
Dept: CALL CENTER | Facility: HOSPITAL | Age: 74
End: 2019-08-18

## 2019-08-18 VITALS
SYSTOLIC BLOOD PRESSURE: 130 MMHG | OXYGEN SATURATION: 94 % | WEIGHT: 224.31 LBS | RESPIRATION RATE: 18 BRPM | DIASTOLIC BLOOD PRESSURE: 68 MMHG | HEIGHT: 66 IN | HEART RATE: 60 BPM | TEMPERATURE: 97.6 F | BODY MASS INDEX: 36.05 KG/M2

## 2019-08-18 NOTE — CONSULTS
"Adult Nutrition  Assessment/PES    Patient Name:  Jung Short  YOB: 1945  MRN: [id number]  Admit Date:  8/16/2019    Assessment Date:  8/18/2019    Comments:  Educated pt. Via phone on Low   Sodium, Heart Healthy and Consistent Carbohydrate diet (see below):    Reason for Assessment     Row Name 08/18/19 1349          Reason for Assessment    Reason For Assessment  physician consult     Diagnosis  -- acute/chronic CHF, hypertension, hyperlipidemia, hypothyroidism, diabetes, obesity, CKD         Nutrition/Diet History     Row Name 08/18/19 1352          Nutrition/Diet History    Typical Food/Fluid Intake  -- doesn't cook a lot, tries to do some fresh vegetables, some canned, won't eat something if it doesn't have flavor, want to enjoy my food, sometimes only eats twice a day         Anthropometrics     Row Name 08/18/19 1354          Anthropometrics    Height  167.6 cm (65.98\")     Weight  -- 224.3# 8-17-19        Ideal Body Weight (IBW)    Ideal Body Weight (IBW) (kg)  59.54        Usual Body Weight (UBW)    Usual Body Weight  -- 7-11-19 222#, 10-29-18 226#        Body Mass Index (BMI)    BMI Assessment  BMI 35-39.9: obesity grade II 36.3 36.2                                       Labs/Tests/Procedures/Meds     Row Name 08/18/19 1355          Labs/Procedures/Meds    Lab Results Reviewed  reviewed     Lab Results Comments  [id number]=6.3        Medications    Pertinent Medications Reviewed  reviewed     Pertinent Medications Comments  Bumex, Aldactone, Pravachol, Lozaar           Estimated/Assessed Needs     Row Name 08/18/19 1356 08/18/19 1354       Calculation Measurements    Height  --  167.6 cm (65.98\")       Estimated/Assessed Needs    Additional Documentation  Calorie Requirements (Group);[place] Equation (Group);Protein Requirements (Group);Fluid Requirements (Group)  --       Calorie Requirements    Estimated Calorie Need Method  [place] no activity factor  --    Estimated " "Calorie Requirement Comment  1,526 estimated carbohydrate yswds=242 grams carbohydrate   --       Protein Requirements    Est Protein Requirement Amount (gms/kg)  0.8 gm protein 82 grams  --       Fluid Requirements    Estimated Fluid Requirement Method  RDA Method 1,526  --        Nutrition Prescription Ordered     Row Name 08/18/19 1359          Nutrition Prescription PO    Common Modifiers  Consistent Carbohydrate;Cardiac was on this diet when admitted         Evaluation of Received Nutrient/Fluid Intake     Row Name 08/18/19 1359 08/18/19 1354       Calculation Measurements    Height  --  167.6 cm (65.98\")       Fluid Intake Evaluation    Oral Fluid (mL)  -- insufficient data  --       PO Evaluation    Number of Meals  2  --    % PO Intake  75%  --        Evaluation of Prescribed Nutrient/Fluid Intake     Row Name 08/18/19 1354          Calculation Measurements    Height  167.6 cm (65.98\")             Problem/Interventions:  Problem 1     Row Name 08/18/19 1401          Nutrition Diagnoses Problem 1    Problem 1  Knowledge Deficit     Etiology (related to)  MNT for Treatment/Condition     Signs/Symptoms (evidenced by)  Potential Information Deficit                 Intervention Goal     Row Name 08/18/19 1401          Intervention Goal    General  Provide information regarding MNT for treatment/condition         Nutrition Intervention     Row Name 08/18/19 1401          Nutrition Intervention    RD/Tech Action  Other (comment) called pt. on home phone           Education/Evaluation     Row Name 08/18/19 1402          Education    Education  -- called pt. on home number and reviewed Low Sodium diet for diabetes, reviewed low sodium seasonings, discussed healthier choices and recommended to spread intake throughout the day, portion control, also recommended to weigh self daily/alert MD office        Monitor/Evaluation    Monitor  -- mailed the following:  Seasoning Your Food, Quick Starts to Low Sodium, Quick " Starts to Diabetes, DASH diet & RD contact info.           Electronically signed by:  Ivory Luther RD  08/18/19 2:08 PM

## 2019-08-18 NOTE — OUTREACH NOTE
Prep Survey      Responses   Facility patient discharged from?  [facility]   Is LACE score < 7 ?  No   Is patient eligible?  Yes   Discharge diagnosis  CHF   Does the patient have one of the following disease processes/diagnoses(primary or secondary)?  CHF   Does the patient have Home health ordered?  No   Is there a DME ordered?  No   Medication alerts for this patient  bumex, cozaar, arimidex,  STOP lisinopril   Prep survey completed?  Yes          Eusebia Granados RN

## 2019-08-19 ENCOUNTER — READMISSION MANAGEMENT (OUTPATIENT)
Dept: CALL CENTER | Facility: HOSPITAL | Age: 74
End: 2019-08-19

## 2019-08-19 ENCOUNTER — EPISODE CHANGES (OUTPATIENT)
Dept: CASE MANAGEMENT | Facility: OTHER | Age: 74
End: 2019-08-19

## 2019-08-19 NOTE — OUTREACH NOTE
CHF Week 1 Survey      Responses   Facility patient discharged from?  [facility]   Does the patient have one of the following disease processes/diagnoses(primary or secondary)?  CHF   Is there a successful TCM telephone encounter documented?  No   CHF Week 1 attempt successful?  Yes   Call start time  1443   Call end time  1502   Discharge diagnosis  CHF   Meds reviewed with patient/caregiver?  Yes   Is the patient having any side effects they believe may be caused by any medication additions or changes?  No   Does the patient have all medications ordered at discharge?  Yes   Prescription comments  pt states is on Cymbalta, but is not listed on AVS, advised pt to discuss med with MD at next appt   Is the patient taking all medications as directed (includes completed medication regime)?  Yes   Does the patient have a primary care provider?   Yes   Does the patient have an appointment with their PCP within 7 days of discharge?  No   What is preventing the patient from scheduling follow up appointments within 7 days of discharge?  Unsure of when or with whom, Transportation, Haven't had time, Waiting on return call   Nursing Interventions  Educated patient on importance of making appointment   Has the patient kept scheduled appointments due by today?  N/A   Has home health visited the patient within 72 hours of discharge?  N/A   Psychosocial issues?  No   Comments  pt states difficulty sleeping, restlessness   Did the patient receive a copy of their discharge instructions?  Yes   Nursing interventions  Reviewed instructions with patient   What is the patient's perception of their health status since discharge?  Improving   Nursing interventions  Nurse provided patient education   Is the patient weighing daily?  Yes   Does the patient have scales?  Yes   Is the patient able to teach back Heart Failure diet management?  Yes   Is the patient able to teach back Heart Failure Zones?  Yes   Is the patient able to teach back  signs and symptoms of worsening condition? (i.e. weight gain, shortness of air, etc.)  Yes   Is the patient/caregiver able to teach back the hierarchy of who to call/visit for symptoms/problems? PCP, Specialist, Home health nurse, Urgent Care, ED, 911  Yes    CHF Week 1 call completed?  Yes          Jania Uriostegui RN

## 2019-08-20 ENCOUNTER — TELEPHONE (OUTPATIENT)
Dept: INTERNAL MEDICINE | Facility: CLINIC | Age: 74
End: 2019-08-20

## 2019-08-20 NOTE — TELEPHONE ENCOUNTER
----- Message from Monica Winston sent at 8/20/2019  2:38 PM EDT -----  Contact: patient  maría    Pt has scheduled the Echo on September 10th  Follow up with electrolyte check in 1 week.  (pt is confused as to whether she just needs lab appointment or pcp appt as well) please advise

## 2019-08-21 ENCOUNTER — TELEPHONE (OUTPATIENT)
Dept: INTERNAL MEDICINE | Facility: CLINIC | Age: 74
End: 2019-08-21

## 2019-08-21 ENCOUNTER — HOSPITAL ENCOUNTER (OUTPATIENT)
Dept: MAMMOGRAPHY | Facility: HOSPITAL | Age: 74
Discharge: HOME | End: 2019-08-21
Admitting: NURSE PRACTITIONER

## 2019-08-21 ENCOUNTER — APPOINTMENT (OUTPATIENT)
Dept: BONE DENSITY | Facility: HOSPITAL | Age: 74
End: 2019-08-21

## 2019-08-21 DIAGNOSIS — C50.211 MALIGNANT NEOPLASM OF UPPER-INNER QUADRANT OF RIGHT BREAST IN FEMALE, ESTROGEN RECEPTOR POSITIVE (HCC): ICD-10-CM

## 2019-08-21 DIAGNOSIS — Z17.0 MALIGNANT NEOPLASM OF UPPER-INNER QUADRANT OF RIGHT BREAST IN FEMALE, ESTROGEN RECEPTOR POSITIVE (HCC): ICD-10-CM

## 2019-08-21 DIAGNOSIS — Z12.31 ENCOUNTER FOR SCREENING MAMMOGRAM FOR BREAST CANCER: ICD-10-CM

## 2019-08-21 DIAGNOSIS — I10 ESSENTIAL HYPERTENSION: ICD-10-CM

## 2019-08-21 DIAGNOSIS — M85.80 OSTEOPENIA, UNSPECIFIED LOCATION: ICD-10-CM

## 2019-08-21 DIAGNOSIS — I42.8 OTHER CARDIOMYOPATHY (HCC): ICD-10-CM

## 2019-08-21 DIAGNOSIS — Z79.811 AROMATASE INHIBITOR USE: ICD-10-CM

## 2019-08-21 DIAGNOSIS — I50.23 ACUTE ON CHRONIC HFREF (HEART FAILURE WITH REDUCED EJECTION FRACTION) (HCC): Primary | ICD-10-CM

## 2019-08-21 PROCEDURE — 77067 SCR MAMMO BI INCL CAD: CPT

## 2019-08-21 PROCEDURE — 77063 BREAST TOMOSYNTHESIS BI: CPT

## 2019-08-21 PROCEDURE — 77080 DXA BONE DENSITY AXIAL: CPT | Performed by: NURSE PRACTITIONER

## 2019-08-21 NOTE — TELEPHONE ENCOUNTER
LM for pt to call and confirm appt made on 8/27/19.    ----- Message from Sofía Mcclure CMA sent at 8/20/2019  2:51 PM EDT -----  Please schedule pt for HOS f/u in 7-14 days, 30 minutes. Thank you

## 2019-08-23 ENCOUNTER — OFFICE VISIT (OUTPATIENT)
Dept: INTERNAL MEDICINE | Facility: CLINIC | Age: 74
End: 2019-08-23

## 2019-08-23 ENCOUNTER — TELEPHONE (OUTPATIENT)
Dept: ULTRASOUND IMAGING | Facility: HOSPITAL | Age: 74
End: 2019-08-23

## 2019-08-23 ENCOUNTER — HOSPITAL ENCOUNTER (OUTPATIENT)
Dept: MRI IMAGING | Facility: HOSPITAL | Age: 74
Discharge: HOME | End: 2019-08-23
Admitting: NURSE PRACTITIONER

## 2019-08-23 VITALS
OXYGEN SATURATION: 94 % | BODY MASS INDEX: 35.16 KG/M2 | RESPIRATION RATE: 18 BRPM | HEIGHT: 67 IN | DIASTOLIC BLOOD PRESSURE: 50 MMHG | TEMPERATURE: 98.5 F | SYSTOLIC BLOOD PRESSURE: 96 MMHG | WEIGHT: 224 LBS | HEART RATE: 85 BPM

## 2019-08-23 DIAGNOSIS — F03.91 DEMENTIA WITH BEHAVIORAL DISTURBANCE, UNSPECIFIED DEMENTIA TYPE: ICD-10-CM

## 2019-08-23 DIAGNOSIS — R41.0 DISORIENTATION: Primary | ICD-10-CM

## 2019-08-23 DIAGNOSIS — F03.91 DEMENTIA WITH BEHAVIORAL DISTURBANCE, UNSPECIFIED DEMENTIA TYPE: Primary | ICD-10-CM

## 2019-08-23 PROCEDURE — 70553 MRI BRAIN STEM W/O & W/DYE: CPT

## 2019-08-23 PROCEDURE — A9577 INJ MULTIHANCE: HCPCS | Performed by: NURSE PRACTITIONER

## 2019-08-23 PROCEDURE — 99213 OFFICE O/P EST LOW 20 MIN: CPT | Performed by: NURSE PRACTITIONER

## 2019-08-23 PROCEDURE — 0 GADOBENATE DIMEGLUMINE 529 MG/ML SOLUTION: Performed by: NURSE PRACTITIONER

## 2019-08-23 RX ADMIN — GADOBENATE DIMEGLUMINE 20 ML: 529 INJECTION, SOLUTION INTRAVENOUS at 16:55

## 2019-08-23 NOTE — TELEPHONE ENCOUNTER
Called STAT MRI results to JOSE ANTONIO Bethea who spoke to patient at that time and gave her instructions to follow-up. 8/23/19 6911

## 2019-08-23 NOTE — PROGRESS NOTES
"Subjective   Wendyl Aron Short is a 74 y.o. female presenting today for   Chief Complaint   Patient presents with   • Altered Mental Status       Altered Mental Status   This is a new problem. The current episode started today. Pertinent negatives include no chest pain, coughing, fatigue, fever, nausea, numbness, vomiting or weakness.      This is my first visit w/ Ms. Short and ll issues are new to me. She is seen today at the request of the office staff d/t concern over her altered mental state. Ms. Short was scheduled for a lab appt today and reports sitting in her car for about an hour confused as to where she was and what she was doing here. She notes maybe two other occassions in the last few weeks where she has had a similar experience.     The following portions of the patient's history were reviewed and updated as appropriate: allergies, current medications, past family history, past medical history, past social history, past surgical history and problem list.    Review of Systems   Constitutional: Negative for fatigue and fever.   Respiratory: Negative for cough, shortness of breath and wheezing.    Cardiovascular: Negative for chest pain and palpitations.   Gastrointestinal: Negative for diarrhea, nausea and vomiting.   Neurological: Positive for confusion. Negative for dizziness, weakness, numbness and headache.       Objective   Vitals:    08/23/19 1433   BP: 96/50   BP Location: Left arm   Patient Position: Sitting   Cuff Size: Adult   Pulse: 85   Resp: 18   Temp: 98.5 °F (36.9 °C)   TempSrc: Oral   SpO2: 94%   Weight: 102 kg (224 lb)   Height: 168.9 cm (66.5\")     Nursing notes and vitals reviewed.    Physical Exam   Constitutional: She appears well-developed and well-nourished.   Cardiovascular: Regular rhythm and normal heart sounds. Exam reveals no gallop and no friction rub.   No murmur heard.  Pulmonary/Chest: Effort normal and breath sounds normal. No respiratory distress. She has no wheezes. "   Neurological: She is alert. She is not disoriented. No sensory deficit. She exhibits abnormal muscle tone.         Assessment/Plan   Jung was seen today for altered mental status.    Diagnoses and all orders for this visit:    Disorientation    Pt escorted from my office to radiology for stat MRI of the brain w and w/o contrast.    Return pending imaging.

## 2019-08-26 ENCOUNTER — READMISSION MANAGEMENT (OUTPATIENT)
Dept: CALL CENTER | Facility: HOSPITAL | Age: 74
End: 2019-08-26

## 2019-08-26 ENCOUNTER — RESULTS ENCOUNTER (OUTPATIENT)
Dept: INTERNAL MEDICINE | Facility: CLINIC | Age: 74
End: 2019-08-26

## 2019-08-26 DIAGNOSIS — I10 ESSENTIAL HYPERTENSION: ICD-10-CM

## 2019-08-26 DIAGNOSIS — I50.23 ACUTE ON CHRONIC HFREF (HEART FAILURE WITH REDUCED EJECTION FRACTION) (HCC): ICD-10-CM

## 2019-08-26 DIAGNOSIS — I42.8 OTHER CARDIOMYOPATHY (HCC): ICD-10-CM

## 2019-08-26 LAB
BUN SERPL-MCNC: 43 MG/DL (ref 8–23)
BUN/CREAT SERPL: 19.4 (ref 7–25)
CALCIUM SERPL-MCNC: 9.8 MG/DL (ref 8.6–10.5)
CHLORIDE SERPL-SCNC: 99 MMOL/L (ref 98–107)
CO2 SERPL-SCNC: 23.5 MMOL/L (ref 22–29)
CREAT SERPL-MCNC: 2.22 MG/DL (ref 0.57–1)
GLUCOSE SERPL-MCNC: 214 MG/DL (ref 65–99)
POTASSIUM SERPL-SCNC: 5.1 MMOL/L (ref 3.5–5.2)
SODIUM SERPL-SCNC: 137 MMOL/L (ref 136–145)

## 2019-08-26 NOTE — OUTREACH NOTE
CHF Week 2 Survey      Responses   Facility patient discharged from?  [facility]   Does the patient have one of the following disease processes/diagnoses(primary or secondary)?  CHF   Week 2 attempt successful?  Yes   Call start time  1645   Call end time  1726   Discharge diagnosis  CHF   Medication alerts for this patient  bumex, cozaar, arimidex,  STOP lisinopril   Meds reviewed with patient/caregiver?  Yes   Is the patient having any side effects they believe may be caused by any medication additions or changes?  No   Does the patient have all medications ordered at discharge?  Yes   Prescription comments  No longer on cymbalta, was swapped to pristiq d/t cost, was changed at the beginning of August   Is the patient taking all medications as directed (includes completed medication regime)?  Yes   Medication comments  Discussed her medications for 30 minutes.    Does the patient have a primary care provider?   Yes   Does the patient have an appointment with their PCP within 7 days of discharge?  Greater than 7 days   What is preventing the patient from scheduling follow up appointments within 7 days of discharge?  Earlier appointment not available   Nursing Interventions  Verified appointment date/time/provider   Has the patient kept scheduled appointments due by today?  N/A   Has home health visited the patient within 72 hours of discharge?  N/A   Psychosocial issues?  No   Did the patient receive a copy of their discharge instructions?  Yes   Nursing interventions  Reviewed instructions with patient   What is the patient's perception of their health status since discharge?  Improving   Nursing interventions  Nurse provided patient education   Is the patient weighing daily?  Yes   Does the patient have scales?  Yes   Daily weight interventions  Education provided on importance of daily weight   Is the patient able to teach back Heart Failure diet management?  Yes   Is the patient able to teach back Heart Failure  Zones?  Yes   Is the patient able to teach back signs and symptoms of worsening condition? (i.e. weight gain, shortness of air, etc.)  Yes   Is the patient/caregiver able to teach back the hierarchy of who to call/visit for symptoms/problems? PCP, Specialist, Home health nurse, Urgent Care, ED, 911  Yes   CHF Week 2 call completed?  Yes   Wrap up additional comments  Long call.           Jordyn Maier RN

## 2019-08-28 NOTE — PROGRESS NOTES
Patient did not come to her appointment on Tuesday. She needs to reschedule with me ASAP. Her kidney function is down after restarting Bumex. We need her to hold this for a few days and repeat labs. See if she is willing to come in and see me later this week.

## 2019-08-30 ENCOUNTER — OFFICE VISIT (OUTPATIENT)
Dept: INTERNAL MEDICINE | Facility: CLINIC | Age: 74
End: 2019-08-30

## 2019-08-30 VITALS
SYSTOLIC BLOOD PRESSURE: 116 MMHG | WEIGHT: 217 LBS | TEMPERATURE: 98.2 F | DIASTOLIC BLOOD PRESSURE: 60 MMHG | RESPIRATION RATE: 18 BRPM | BODY MASS INDEX: 34.06 KG/M2 | HEART RATE: 78 BPM | HEIGHT: 67 IN | OXYGEN SATURATION: 98 %

## 2019-08-30 DIAGNOSIS — N17.9 AKI (ACUTE KIDNEY INJURY) (HCC): ICD-10-CM

## 2019-08-30 DIAGNOSIS — R40.4 TRANSIENT ALTERATION OF AWARENESS: ICD-10-CM

## 2019-08-30 DIAGNOSIS — R82.998 LEUKOCYTES IN URINE: ICD-10-CM

## 2019-08-30 DIAGNOSIS — I50.23 ACUTE ON CHRONIC HFREF (HEART FAILURE WITH REDUCED EJECTION FRACTION) (HCC): Primary | ICD-10-CM

## 2019-08-30 DIAGNOSIS — N18.30 CKD (CHRONIC KIDNEY DISEASE) STAGE 3, GFR 30-59 ML/MIN (HCC): ICD-10-CM

## 2019-08-30 PROBLEM — N18.9 CHRONIC KIDNEY DISEASE: Status: RESOLVED | Noted: 2019-05-24 | Resolved: 2019-08-30

## 2019-08-30 LAB
BILIRUB BLD-MCNC: NEGATIVE MG/DL
CLARITY, POC: CLEAR
COLOR UR: YELLOW
GLUCOSE UR STRIP-MCNC: NEGATIVE MG/DL
KETONES UR QL: NEGATIVE
LEUKOCYTE EST, POC: ABNORMAL
NITRITE UR-MCNC: NEGATIVE MG/ML
PH UR: 5 [PH] (ref 5–8)
PROT UR STRIP-MCNC: NEGATIVE MG/DL
RBC # UR STRIP: NEGATIVE /UL
SP GR UR: 1.01 (ref 1–1.03)
UROBILINOGEN UR QL: NORMAL

## 2019-08-30 PROCEDURE — 81003 URINALYSIS AUTO W/O SCOPE: CPT | Performed by: INTERNAL MEDICINE

## 2019-08-30 PROCEDURE — 99214 OFFICE O/P EST MOD 30 MIN: CPT | Performed by: INTERNAL MEDICINE

## 2019-08-30 RX ORDER — DULOXETIN HYDROCHLORIDE 60 MG/1
60 CAPSULE, DELAYED RELEASE ORAL DAILY
COMMUNITY
End: 2019-11-19 | Stop reason: SDUPTHER

## 2019-08-31 ENCOUNTER — HOSPITAL ENCOUNTER (EMERGENCY)
Facility: HOSPITAL | Age: 74
Discharge: ANOTHER HOSPITAL | End: 2019-09-01
Attending: EMERGENCY MEDICINE | Admitting: EMERGENCY MEDICINE

## 2019-08-31 ENCOUNTER — APPOINTMENT (OUTPATIENT)
Dept: GENERAL RADIOLOGY | Facility: HOSPITAL | Age: 74
End: 2019-08-31

## 2019-08-31 DIAGNOSIS — N17.9 ACUTE KIDNEY INJURY (HCC): Primary | ICD-10-CM

## 2019-08-31 PROCEDURE — 80048 BASIC METABOLIC PNL TOTAL CA: CPT | Performed by: EMERGENCY MEDICINE

## 2019-08-31 PROCEDURE — 71045 X-RAY EXAM CHEST 1 VIEW: CPT

## 2019-08-31 PROCEDURE — 93005 ELECTROCARDIOGRAM TRACING: CPT | Performed by: EMERGENCY MEDICINE

## 2019-08-31 PROCEDURE — 84484 ASSAY OF TROPONIN QUANT: CPT | Performed by: EMERGENCY MEDICINE

## 2019-08-31 PROCEDURE — 99284 EMERGENCY DEPT VISIT MOD MDM: CPT | Performed by: EMERGENCY MEDICINE

## 2019-08-31 PROCEDURE — 73030 X-RAY EXAM OF SHOULDER: CPT

## 2019-08-31 PROCEDURE — 99284 EMERGENCY DEPT VISIT MOD MDM: CPT

## 2019-09-01 ENCOUNTER — APPOINTMENT (OUTPATIENT)
Dept: ULTRASOUND IMAGING | Facility: HOSPITAL | Age: 74
End: 2019-09-01

## 2019-09-01 ENCOUNTER — READMISSION MANAGEMENT (OUTPATIENT)
Dept: CALL CENTER | Facility: HOSPITAL | Age: 74
End: 2019-09-01

## 2019-09-01 ENCOUNTER — HOSPITAL ENCOUNTER (INPATIENT)
Facility: HOSPITAL | Age: 74
LOS: 3 days | Discharge: HOME | End: 2019-09-04
Attending: HOSPITALIST | Admitting: INTERNAL MEDICINE

## 2019-09-01 VITALS
HEART RATE: 67 BPM | OXYGEN SATURATION: 95 % | RESPIRATION RATE: 14 BRPM | TEMPERATURE: 97.6 F | BODY MASS INDEX: 35.03 KG/M2 | HEIGHT: 66 IN | SYSTOLIC BLOOD PRESSURE: 124 MMHG | WEIGHT: 218 LBS | DIASTOLIC BLOOD PRESSURE: 83 MMHG

## 2019-09-01 PROBLEM — I50.30 DIASTOLIC CONGESTIVE HEART FAILURE: Status: ACTIVE | Noted: 2019-09-01

## 2019-09-01 PROBLEM — N17.9 AKI (ACUTE KIDNEY INJURY): Status: ACTIVE | Noted: 2019-09-01

## 2019-09-01 LAB
ANION GAP SERPL CALCULATED.3IONS-SCNC: 17.7 MMOL/L (ref 5–15)
ANION GAP SERPL CALCULATED.3IONS-SCNC: 9.2 MMOL/L (ref 5–15)
BACTERIA UR CULT: NO GROWTH
BACTERIA UR CULT: NORMAL
BUN BLD-MCNC: 29 MG/DL (ref 8–23)
BUN BLD-MCNC: 34 MG/DL (ref 8–23)
BUN/CREAT SERPL: 11.1 (ref 7–25)
BUN/CREAT SERPL: 13.1 (ref 7–25)
CALCIUM SPEC-SCNC: 10.1 MG/DL (ref 8.6–10.5)
CALCIUM SPEC-SCNC: 8.2 MG/DL (ref 8.6–10.5)
CHLORIDE SERPL-SCNC: 107 MMOL/L (ref 98–107)
CHLORIDE SERPL-SCNC: 98 MMOL/L (ref 98–107)
CHLORIDE UR-SCNC: <20 MMOL/L
CO2 SERPL-SCNC: 21.8 MMOL/L (ref 22–29)
CO2 SERPL-SCNC: 23.3 MMOL/L (ref 22–29)
CREAT BLD-MCNC: 2.22 MG/DL (ref 0.57–1)
CREAT BLD-MCNC: 3.06 MG/DL (ref 0.57–1)
DEPRECATED RDW RBC AUTO: 50.6 FL (ref 37–54)
ERYTHROCYTE [DISTWIDTH] IN BLOOD BY AUTOMATED COUNT: 14.9 % (ref 12.3–15.4)
GFR SERPL CREATININE-BSD FRML MDRD: 15 ML/MIN/1.73
GFR SERPL CREATININE-BSD FRML MDRD: 21 ML/MIN/1.73
GLUCOSE BLD-MCNC: 165 MG/DL (ref 65–99)
GLUCOSE BLD-MCNC: 94 MG/DL (ref 65–99)
GLUCOSE BLDC GLUCOMTR-MCNC: 126 MG/DL (ref 70–130)
GLUCOSE BLDC GLUCOMTR-MCNC: 146 MG/DL (ref 70–130)
GLUCOSE BLDC GLUCOMTR-MCNC: 221 MG/DL (ref 70–130)
GLUCOSE BLDC GLUCOMTR-MCNC: 95 MG/DL (ref 70–130)
HBA1C MFR BLD: 7.2 % (ref 4.8–5.6)
HCT VFR BLD AUTO: 33.5 % (ref 34–46.6)
HGB BLD-MCNC: 10 G/DL (ref 12–15.9)
MCH RBC QN AUTO: 27.5 PG (ref 26.6–33)
MCHC RBC AUTO-ENTMCNC: 29.9 G/DL (ref 31.5–35.7)
MCV RBC AUTO: 92.3 FL (ref 79–97)
PLATELET # BLD AUTO: 168 10*3/MM3 (ref 140–450)
PMV BLD AUTO: 10.1 FL (ref 6–12)
POTASSIUM BLD-SCNC: 3.5 MMOL/L (ref 3.5–5.2)
POTASSIUM BLD-SCNC: 4.5 MMOL/L (ref 3.5–5.2)
RBC # BLD AUTO: 3.63 10*6/MM3 (ref 3.77–5.28)
SODIUM BLD-SCNC: 138 MMOL/L (ref 136–145)
SODIUM BLD-SCNC: 139 MMOL/L (ref 136–145)
SODIUM UR-SCNC: 39 MMOL/L
TROPONIN T SERPL-MCNC: <0.01 NG/ML (ref 0–0.03)
WBC NRBC COR # BLD: 6.91 10*3/MM3 (ref 3.4–10.8)

## 2019-09-01 PROCEDURE — 80048 BASIC METABOLIC PNL TOTAL CA: CPT | Performed by: NURSE PRACTITIONER

## 2019-09-01 PROCEDURE — 85027 COMPLETE CBC AUTOMATED: CPT | Performed by: NURSE PRACTITIONER

## 2019-09-01 PROCEDURE — 63710000001 INSULIN LISPRO (HUMAN) PER 5 UNITS: Performed by: NURSE PRACTITIONER

## 2019-09-01 PROCEDURE — 82436 ASSAY OF URINE CHLORIDE: CPT | Performed by: INTERNAL MEDICINE

## 2019-09-01 PROCEDURE — 76775 US EXAM ABDO BACK WALL LIM: CPT

## 2019-09-01 PROCEDURE — 82962 GLUCOSE BLOOD TEST: CPT

## 2019-09-01 PROCEDURE — 84300 ASSAY OF URINE SODIUM: CPT | Performed by: INTERNAL MEDICINE

## 2019-09-01 PROCEDURE — 83036 HEMOGLOBIN GLYCOSYLATED A1C: CPT | Performed by: NURSE PRACTITIONER

## 2019-09-01 PROCEDURE — 93010 ELECTROCARDIOGRAM REPORT: CPT | Performed by: INTERNAL MEDICINE

## 2019-09-01 RX ORDER — CARVEDILOL 12.5 MG/1
12.5 TABLET ORAL 2 TIMES DAILY WITH MEALS
Status: DISCONTINUED | OUTPATIENT
Start: 2019-09-01 | End: 2019-09-04 | Stop reason: HOSPADM

## 2019-09-01 RX ORDER — NICOTINE POLACRILEX 4 MG
15 LOZENGE BUCCAL
Status: DISCONTINUED | OUTPATIENT
Start: 2019-09-01 | End: 2019-09-04 | Stop reason: HOSPADM

## 2019-09-01 RX ORDER — ACETAMINOPHEN 325 MG/1
650 TABLET ORAL EVERY 4 HOURS PRN
Status: DISCONTINUED | OUTPATIENT
Start: 2019-09-01 | End: 2019-09-04 | Stop reason: HOSPADM

## 2019-09-01 RX ORDER — PRAVASTATIN SODIUM 10 MG
10 TABLET ORAL DAILY
Status: DISCONTINUED | OUTPATIENT
Start: 2019-09-01 | End: 2019-09-04 | Stop reason: HOSPADM

## 2019-09-01 RX ORDER — LEVOTHYROXINE SODIUM 0.03 MG/1
25 TABLET ORAL DAILY
Status: DISCONTINUED | OUTPATIENT
Start: 2019-09-01 | End: 2019-09-04 | Stop reason: HOSPADM

## 2019-09-01 RX ORDER — ONDANSETRON 2 MG/ML
4 INJECTION INTRAMUSCULAR; INTRAVENOUS EVERY 6 HOURS PRN
Status: DISCONTINUED | OUTPATIENT
Start: 2019-09-01 | End: 2019-09-04 | Stop reason: HOSPADM

## 2019-09-01 RX ORDER — SODIUM CHLORIDE 0.9 % (FLUSH) 0.9 %
10 SYRINGE (ML) INJECTION EVERY 12 HOURS SCHEDULED
Status: DISCONTINUED | OUTPATIENT
Start: 2019-09-01 | End: 2019-09-04 | Stop reason: HOSPADM

## 2019-09-01 RX ORDER — PREGABALIN 25 MG/1
50 CAPSULE ORAL 2 TIMES DAILY
Status: DISCONTINUED | OUTPATIENT
Start: 2019-09-01 | End: 2019-09-04 | Stop reason: HOSPADM

## 2019-09-01 RX ORDER — PANTOPRAZOLE SODIUM 40 MG/1
40 TABLET, DELAYED RELEASE ORAL EVERY MORNING
Status: DISCONTINUED | OUTPATIENT
Start: 2019-09-01 | End: 2019-09-04 | Stop reason: HOSPADM

## 2019-09-01 RX ORDER — ASPIRIN 81 MG/1
81 TABLET ORAL DAILY
Status: DISCONTINUED | OUTPATIENT
Start: 2019-09-01 | End: 2019-09-04 | Stop reason: HOSPADM

## 2019-09-01 RX ORDER — HYDROCODONE BITARTRATE AND ACETAMINOPHEN 10; 325 MG/1; MG/1
1 TABLET ORAL EVERY 6 HOURS PRN
Status: DISCONTINUED | OUTPATIENT
Start: 2019-09-01 | End: 2019-09-04 | Stop reason: HOSPADM

## 2019-09-01 RX ORDER — ACETAMINOPHEN 160 MG/5ML
650 SOLUTION ORAL EVERY 4 HOURS PRN
Status: DISCONTINUED | OUTPATIENT
Start: 2019-09-01 | End: 2019-09-04 | Stop reason: HOSPADM

## 2019-09-01 RX ORDER — SODIUM CHLORIDE 9 MG/ML
100 INJECTION, SOLUTION INTRAVENOUS CONTINUOUS
Status: DISCONTINUED | OUTPATIENT
Start: 2019-09-01 | End: 2019-09-03

## 2019-09-01 RX ORDER — LANOLIN ALCOHOL/MO/W.PET/CERES
1000 CREAM (GRAM) TOPICAL DAILY
Status: ON HOLD | COMMUNITY
End: 2020-01-31

## 2019-09-01 RX ORDER — CHOLECALCIFEROL (VITAMIN D3) 125 MCG
1000 CAPSULE ORAL DAILY
Status: DISCONTINUED | OUTPATIENT
Start: 2019-09-01 | End: 2019-09-04 | Stop reason: HOSPADM

## 2019-09-01 RX ORDER — ACETAMINOPHEN 650 MG/1
650 SUPPOSITORY RECTAL EVERY 4 HOURS PRN
Status: DISCONTINUED | OUTPATIENT
Start: 2019-09-01 | End: 2019-09-04 | Stop reason: HOSPADM

## 2019-09-01 RX ORDER — DULOXETIN HYDROCHLORIDE 60 MG/1
60 CAPSULE, DELAYED RELEASE ORAL DAILY
Status: DISCONTINUED | OUTPATIENT
Start: 2019-09-01 | End: 2019-09-04 | Stop reason: HOSPADM

## 2019-09-01 RX ORDER — DEXTROSE MONOHYDRATE 25 G/50ML
25 INJECTION, SOLUTION INTRAVENOUS
Status: DISCONTINUED | OUTPATIENT
Start: 2019-09-01 | End: 2019-09-04 | Stop reason: HOSPADM

## 2019-09-01 RX ORDER — SODIUM CHLORIDE 0.9 % (FLUSH) 0.9 %
10 SYRINGE (ML) INJECTION AS NEEDED
Status: DISCONTINUED | OUTPATIENT
Start: 2019-09-01 | End: 2019-09-04 | Stop reason: HOSPADM

## 2019-09-01 RX ORDER — SODIUM CHLORIDE 9 MG/ML
300 INJECTION, SOLUTION INTRAVENOUS CONTINUOUS
Status: DISCONTINUED | OUTPATIENT
Start: 2019-09-01 | End: 2019-09-01 | Stop reason: HOSPADM

## 2019-09-01 RX ADMIN — SODIUM CHLORIDE 100 ML/HR: 9 INJECTION, SOLUTION INTRAVENOUS at 15:32

## 2019-09-01 RX ADMIN — LEVOTHYROXINE SODIUM 25 MCG: 25 TABLET ORAL at 09:30

## 2019-09-01 RX ADMIN — PANTOPRAZOLE SODIUM 40 MG: 40 TABLET, DELAYED RELEASE ORAL at 06:17

## 2019-09-01 RX ADMIN — PREGABALIN 50 MG: 25 CAPSULE ORAL at 20:15

## 2019-09-01 RX ADMIN — CARVEDILOL 12.5 MG: 12.5 TABLET, FILM COATED ORAL at 18:22

## 2019-09-01 RX ADMIN — HYDROCODONE BITARTRATE AND ACETAMINOPHEN 1 TABLET: 10; 325 TABLET ORAL at 14:04

## 2019-09-01 RX ADMIN — HYDROCODONE BITARTRATE AND ACETAMINOPHEN 1 TABLET: 10; 325 TABLET ORAL at 20:15

## 2019-09-01 RX ADMIN — PRAVASTATIN SODIUM 10 MG: 10 TABLET ORAL at 10:50

## 2019-09-01 RX ADMIN — INSULIN LISPRO 3 UNITS: 100 INJECTION, SOLUTION INTRAVENOUS; SUBCUTANEOUS at 12:39

## 2019-09-01 RX ADMIN — SODIUM CHLORIDE 100 ML/HR: 9 INJECTION, SOLUTION INTRAVENOUS at 05:20

## 2019-09-01 RX ADMIN — ASPIRIN 81 MG: 81 TABLET, COATED ORAL at 09:29

## 2019-09-01 RX ADMIN — CARVEDILOL 12.5 MG: 12.5 TABLET, FILM COATED ORAL at 09:29

## 2019-09-01 RX ADMIN — PREGABALIN 50 MG: 25 CAPSULE ORAL at 12:45

## 2019-09-01 RX ADMIN — DULOXETINE HYDROCHLORIDE 60 MG: 60 CAPSULE, DELAYED RELEASE ORAL at 09:30

## 2019-09-01 RX ADMIN — Medication 1000 MCG: at 09:29

## 2019-09-01 NOTE — PLAN OF CARE
Problem: Patient Care Overview  Goal: Plan of Care Review  Outcome: Ongoing (interventions implemented as appropriate)   09/01/19 0526   Coping/Psychosocial   Plan of Care Reviewed With patient   Plan of Care Review   Progress no change   OTHER   Outcome Summary vss, ivfluid started, consult  Daily to see pt in am, bladder scan done, on fall precaution, encourage to increase fluid intake. will continue to monitor the pt.     Goal: Individualization and Mutuality  Outcome: Ongoing (interventions implemented as appropriate)    Goal: Discharge Needs Assessment  Outcome: Ongoing (interventions implemented as appropriate)    Goal: Interprofessional Rounds/Family Conf  Outcome: Ongoing (interventions implemented as appropriate)      Problem: Pain, Acute (Adult)  Goal: Identify Related Risk Factors and Signs and Symptoms  Outcome: Ongoing (interventions implemented as appropriate)    Goal: Acceptable Pain Control/Comfort Level  Outcome: Ongoing (interventions implemented as appropriate)      Problem: Renal Failure/Kidney Injury, Acute (Adult)  Goal: Signs and Symptoms of Listed Potential Problems Will be Absent, Minimized or Managed (Renal Failure/Kidney Injury, Acute)  Outcome: Ongoing (interventions implemented as appropriate)      Problem: Fall Risk (Adult)  Goal: Identify Related Risk Factors and Signs and Symptoms  Outcome: Ongoing (interventions implemented as appropriate)    Goal: Absence of Fall  Outcome: Ongoing (interventions implemented as appropriate)

## 2019-09-01 NOTE — H&P
Patient Name:  Jung Short  YOB: 1945  MRN:  [id number]  Admit Date:  9/1/2019  Patient Care Team:  Kylee Jensen MD as PCP - General (Internal Medicine)  Trini Zaidi APRN as PCP - Claims Attributed  Lina Fisher MD as Referring Physician (General Surgery)  Live Chambers MD as Surgeon (Orthopedic Surgery)  Rafa Hannah MD as Consulting Physician (Nephrology)  Shanda Gerardo RN as Care Coordinator (Population Health)      CC: can't urinate    Subjective     Ms. Short is a 74 y.o. female with a history of breast CA, CKD3, depression, DM2, GERD, HTN, hypothyroidism, HLD, PAF that presents to [facility] as a transfer from [facility] for acute kidney injury. Patient went to [facility] ED last night after she was unable to urinate all day. She reports that she last was able to urinate on Saturday morning at about 4 am and began drinking water and cokes all day to help make urine. She then reports that she drank a beer because someone told her it would make her pee, and reports that she was able to make a very small amount of urine at that time. On arrival to ED, she was found to have creatinine of 3.06 and BUN of 34. Patient reports several new medications and medications changes recently, though she is unable to tell which. Patient denies fever, chills, chest pain, shortness of breath, abdominal pain, changes in bowel habits, edema. She does report having dysuria on Friday, though no other urinary complaints other than previously stated. In ED prior to transfer, they were unable to obtain IV access and she was not able to receive any IV fluids at that time. Nephrology was consulted and they asked that she be transferred here to be seen by their group.    History of Present Illness    Past Medical History:   Diagnosis Date   • Anemia    • Benign breast disease    • Cancer (CMS/HCC) 2015    Right breast   • Cellulitis of leg     May-2003 right lower  extremity   • CHF (congestive heart failure) (CMS/HCC)     Dr Bates follows   • Chronic kidney disease     Dr Hannah follows   • Chronic kidney disease 5/24/2019   • Chronic ulcer of right foot (CMS/HCC)     Non-pressure, history of    • Depression    • Diabetic peripheral neuropathy (CMS/HCC)    • Diarrhea    • Diverticulosis    • Encounter for eye exam    • Fracture of left wrist     history of   • Gastroesophageal reflux    • Hiatal hernia    • History of bone density study 09/20/2012   • History of colonoscopy     done 6/03 recheck in 10 years.   Done july 2013 recheck in 5 years   • History of mammography, screening 09/20/2012   • Hospital discharge follow-up    • Hyperlipidemia    • Hypertension    • Hypothyroidism    • Impingement syndrome of right shoulder    • Joint pain    • Menopausal disorder    • Methicillin resistant Staphylococcus aureus infection 2012    after bladder surgery   • MRSA (methicillin resistant staph aureus) culture positive    • Nausea and vomiting    • Nonischemic cardiomyopathy (CMS/HCC)     Ejection fraction 10% per 2-D echo with Doppler however she did have cardiac catheterization April 2015 which showed ejection fraction 20% with global hypokinesis and severe mitral insufficiency   • Osteoarthritis     generalized, sched jania TKA   • Paroxysmal atrial fibrillation (CMS/HCC)     Dr Bates follows   • Postoperative infection     July of 2012 following her hysterectomy far vaginal wall prolapse. She was on antibiotics and wound dressings for 2 months.   • Shoulder pain, bilateral     has tears on both sides   • Syncope, psychogenic 4/19/2017   • Toe ulcer (CMS/HCC)     h/o to both feet, d/t shoes rubbing per patient   • Transient alteration of awareness 4/19/2017     Past Surgical History:   Procedure Laterality Date   • ABDOMINAL SURGERY  2012    had to be reopened after bladder surgery d/t infection   • AMPUTATION FOOT / TOE Right 11/2013    Rt foot amputation MTP first toe    • BLADDER SURGERY     • BREAST BIOPSY     • BREAST SURGERY  2015    Percutaneous ultrasound-guided placement of metal localized clip 1st lesion   • CARDIAC CATHETERIZATION     • CATARACT EXTRACTION Bilateral    • DEBRIDEMENT  FOOT Right 2013    During hospitalization    • EPIDURAL BLOCK      4 chronic back pain and leg pain last epidurals done  she had no benefit at all from this procedure.   • FOOT SURGERY Bilateral     several   • HERNIA REPAIR      At the abdomen 2007 Dr. Mendez   • INCISIONAL HERNIA REPAIR  2014    Recurrent. Incarcerated. With mesh implantation   • MASTECTOMY Right 2015   • SHOULDER SURGERY Right     x2 RCR   • TOTAL ABDOMINAL HYSTERECTOMY      with oophorectomy-Done 2012 secondary to vaginal wall prolapse.   • TOTAL KNEE ARTHROPLASTY Right    • TOTAL KNEE ARTHROPLASTY Left 2018    Procedure: TOTAL KNEE ARTHROPLASTY;  Surgeon: Live Chambers MD;  Location: [facility];  Service: Orthopedics     Family History   Problem Relation Age of Onset   • Colonic polyp Mother    • Hypertension Mother    • Migraines Mother    • Mental illness Mother    • Depression Mother    • Coronary artery disease Father    • Other Father         Cardiac Disorder   • Colon cancer Father    • Kidney disease Father    • Cancer Father    • Breast cancer Maternal Aunt    • Colon cancer Brother    • Alcohol abuse Brother    • Arthritis Sister    • Hypertension Brother    • Lung disease Brother    • Alcohol abuse Brother    • Malig Hyperthermia Neg Hx      Social History     Tobacco Use   • Smoking status: Former Smoker     Packs/day: 3.00     Years: 30.00     Pack years: 90.00     Types: Cigarettes     Last attempt to quit:      Years since quittin.6   • Smokeless tobacco: Never Used   Substance Use Topics   • Alcohol use: No     Comment: h/o quit    • Drug use: No     Medications Prior to Admission   Medication Sig Dispense Refill Last Dose   • alendronate  (FOSAMAX) 35 MG tablet take 1 tablet by mouth every week 12 tablet 3 8/31/2019 at Unknown time   • aspirin 81 MG EC tablet Take 81 mg by mouth Daily.   8/31/2019 at Unknown time   • carvedilol (COREG) 12.5 MG tablet Take 1 tablet by mouth 2 (Two) Times a Day With Meals. 180 tablet 3 8/31/2019 at Unknown time   • coenzyme Q10 100 MG capsule Take 100 mg by mouth.   8/31/2019 at Unknown time   • DULoxetine (CYMBALTA) 60 MG capsule Take 60 mg by mouth Daily.   8/31/2019 at Unknown time   • esomeprazole (nexIUM) 20 MG capsule Take 20 mg by mouth Every Morning Before Breakfast.   8/31/2019 at Unknown time   • glimepiride (AMARYL) 4 MG tablet Take  One po bid 180 tablet 2 8/31/2019 at Unknown time   • levothyroxine (SYNTHROID, LEVOTHROID) 25 MCG tablet TAKE 1 TABLET BY MOUTH ONCE DAILY 180 tablet 0 8/31/2019 at Unknown time   • losartan (COZAAR) 100 MG tablet Take 1 tablet by mouth Daily for 30 days. 30 tablet 0 8/31/2019 at Unknown time   • LYRICA 50 MG capsule TAKE 1 CAPSULE BY MOUTH TWICE DAILY 180 capsule 1 8/31/2019 at Unknown time   • magnesium oxide (MAG-OX) 400 MG tablet Take 500 mg by mouth Daily.   8/31/2019 at Unknown time   • Multiple Vitamins-Minerals (MULTIVITAMIN ADULT PO) Take  by mouth.   8/31/2019 at Unknown time   • potassium chloride (K-DUR,KLOR-CON) 20 MEQ CR tablet Take 1 tablet by mouth Daily. 90 tablet 0 8/31/2019 at Unknown time   • pravastatin (PRAVACHOL) 10 MG tablet Take 10 mg by mouth Daily.  1 8/31/2019 at Unknown time   • SITagliptin (JANUVIA) 100 MG tablet Take 1 tablet by mouth Daily. 90 tablet 0 8/31/2019 at Unknown time   • spironolactone (ALDACTONE) 50 MG tablet Take 0.5 tablets by mouth Every Morning.   8/31/2019 at Unknown time   • vitamin B-12 (CYANOCOBALAMIN) 1000 MCG tablet Take 1,000 mcg by mouth Daily.   8/31/2019 at Unknown time   • vitamin D (ERGOCALCIFEROL) 25342 units capsule capsule Take 50,000 Units by mouth 1 (One) Time Per Week.   8/31/2019 at Unknown time   • anastrozole  (ARIMIDEX) 1 MG tablet Take 1 tablet by mouth Daily. Hold until follow up with Dr. Jensen 90 tablet 0 Unknown   • bumetanide (BUMEX) 2 MG tablet Take 1 tablet by mouth Daily for 30 days. 30 tablet 0 Unknown   • HYDROcodone-acetaminophen (NORCO)  MG per tablet Take 1 tablet by mouth Every 6 (Six) Hours As Needed for Moderate Pain . DNF until 8-10-19 120 tablet 0 Unknown at Unknown time     Allergies:    Allergies   Allergen Reactions   • Dilaudid [Hydromorphone] Delirium and Confusion   • Latex Rash       Review of Systems   Constitutional: Negative.  Negative for chills and fever.   HENT: Negative.  Negative for congestion and sore throat.    Eyes: Negative.  Negative for visual disturbance.   Respiratory: Negative.  Negative for cough and shortness of breath.    Cardiovascular: Negative.  Negative for chest pain, palpitations and leg swelling.   Gastrointestinal: Negative for abdominal pain, constipation, diarrhea, nausea and vomiting.   Endocrine: Negative.    Genitourinary: Positive for decreased urine volume, difficulty urinating and dysuria. Negative for frequency and urgency.   Musculoskeletal: Negative.  Negative for arthralgias and myalgias.   Skin: Negative.  Negative for color change, pallor, rash and wound.   Allergic/Immunologic: Negative.    Neurological: Negative.  Negative for dizziness and weakness.   Hematological: Negative.    Psychiatric/Behavioral: Negative.  Negative for agitation, behavioral problems, confusion and decreased concentration.        Objective    Vital Signs  Temp:  [97.6 °F (36.4 °C)-98.3 °F (36.8 °C)] 97.6 °F (36.4 °C)  Heart Rate:  [67-83] 67  Resp:  [14-16] 14  BP: (123-124)/(68-83) 124/83  SpO2:  [95 %-97 %] 95 %  on   ;      There is no height or weight on file to calculate BMI.    Physical Exam   Constitutional: She is oriented to person, place, and time. She appears well-developed and well-nourished. No distress.   HENT:   Head: Normocephalic and atraumatic.   Eyes:  EOM are normal. Pupils are equal, round, and reactive to light.   Neck: Normal range of motion. Neck supple.   Cardiovascular: Normal rate, regular rhythm and intact distal pulses.   Pulmonary/Chest: Effort normal and breath sounds normal. No respiratory distress.   Abdominal: Soft. Bowel sounds are normal. She exhibits no distension. There is no tenderness.   Musculoskeletal: Normal range of motion. She exhibits no edema or tenderness.   Neurological: She is alert and oriented to person, place, and time.   Skin: Skin is warm and dry. She is not diaphoretic.   Psychiatric: She has a normal mood and affect. Her behavior is normal. Judgment and thought content normal.   Nursing note and vitals reviewed.      Results Review:   I reviewed the patient's new clinical results including all labs and xrays.    Lab Results (last 24 hours)     Procedure Component Value Units Date/Time    Basic Metabolic Panel [[id number]]  (Abnormal) Collected:  08/31/19 2355    Specimen:  Blood Updated:  09/01/19 0049     Glucose 165 mg/dL      BUN 34 mg/dL      Creatinine 3.06 mg/dL      Sodium 139 mmol/L      Potassium 4.5 mmol/L      Chloride 98 mmol/L      CO2 23.3 mmol/L      Calcium 10.1 mg/dL      eGFR Non African Amer 15 mL/min/1.73      BUN/Creatinine Ratio 11.1     Anion Gap 17.7 mmol/L     Narrative:       GFR Normal >60  Chronic Kidney Disease <60  Kidney Failure <15    Troponin [[id number]]  (Normal) Collected:  08/31/19 2355    Specimen:  Blood Updated:  09/01/19 0023     Troponin T <0.010 ng/mL     Narrative:       Troponin T Reference Range:  <= 0.03 ng/mL-   Negative for AMI  >0.03 ng/mL-     Abnormal for myocardial necrosis.  Clinicians would have to utilize clinical acumen, EKG, Troponin and serial changes to determine if it is an Acute Myocardial Infarction or myocardial injury due to an underlying chronic condition.           No orders to display     Assessment/Plan      Active Hospital Problems    Diagnosis  POA   • **ADRIENNE  (acute kidney injury) (CMS/[facility]) [N17.9]  Yes   • Diastolic congestive heart failure (CMS/[facility]) [I50.30]  Yes   • CKD (chronic kidney disease) stage 3, GFR 30-59 ml/min (CMS/[facility]) [N18.3]  Yes   • Hypertension [I10]  Yes   • DM2 (diabetes mellitus, type 2) (CMS/[facility]) [E11.9]  Yes   • Hypothyroidism [E03.9]  Yes      Resolved Hospital Problems   No resolved problems to display.     ADRIENNE on CKD3  -no urination for past 24 hours  -creatinine 3.06 tonight up from baseline of 2.22 one week ago  -renal consult  -IVF fluids  -avoid further nephrotoxins  -recheck labs in AM    CHF  -on spironolactone at home, recently taken off bumex  -will hold for now given ADRIENNE  -renal consult as per above for further fluid management    DM2  -hold home dose amaryl and januvia for now  -low dose correctional factor insulin for meal coverage  -a1c in AM    HTN/hypothyroidism  -stable, will hold home dose losartan and spironolactone given ADRIENNE  -may continue other home medications    VTE Ppx  -SCDs    CODE status  -full    I discussed the patients findings and my recommendations with patient, family and nursing staff.    JOSE ANTONIO Larson  [facility]  09/01/19  4:39 AM    As above.  Patient examined and test results noted.  Patient states she is beginning to feel better already.  She has now started to make urine again.  Initial bladder scan showed 0 residual.    Well-developed well-nourished female no apparent distress.  Lungs clear to auscultation good air movement.  Heart regular rate and rhythm.  Extremities with no clubbing cyanosis or edema.  Alert oriented conversant cooperative and pleasant.  Creatinine BUN improving this morning.  Agree with above assessment.  Nephrology input greatly appreciated.  Continue IV fluids and monitor renal function electrolytes as well as fluid status closely.    Eamon Liu MD  9/1/2019  10:32

## 2019-09-01 NOTE — ED NOTES
Call made to Nephrology Dr. Rafa Hannah @[phone], Dr. Ramos is on call     Anna Woods  09/01/19 0126

## 2019-09-01 NOTE — CONSULTS
Referring Provider: Dr. Liu  Reason for Consultation: ADRIENNE/CKD    Subjective     Chief complaint: No urine output    History of present illness:  Patient is a 75 yo WF with h/o ADRIENNE/CKD who has been following with Dr. Hannah.  Her recent Cr was 0.9 when she saw him last.  She was admitted to [facility] for fluid overload a couple weeks ago.  She was discharged on Bumex, Aldactone and Losartan.  Yesterday she states she didn't make any urine so she went to the ER.  Her Cr was 3.  She is a little confused but family is at bedside.  They state she has had no N/V/D.  She has been acting strange recently.  No CP or SOA.  No NSAID's.  No other complaints.    History  Past Medical History:   Diagnosis Date   • Anemia    • Benign breast disease    • Cancer (CMS/HCC) 2015    Right breast   • Cellulitis of leg     May-2003 right lower extremity   • CHF (congestive heart failure) (CMS/HCC)     Dr Bates follows   • Chronic kidney disease     Dr Hannah follows   • Chronic kidney disease 5/24/2019   • Chronic ulcer of right foot (CMS/HCC)     Non-pressure, history of    • Depression    • Diabetic peripheral neuropathy (CMS/HCC)    • Diarrhea    • Diverticulosis    • Encounter for eye exam    • Fracture of left wrist     history of   • Gastroesophageal reflux    • Hiatal hernia    • History of bone density study 09/20/2012   • History of colonoscopy     done 6/03 recheck in 10 years.   Done july 2013 recheck in 5 years   • History of mammography, screening 09/20/2012   • Hospital discharge follow-up    • Hyperlipidemia    • Hypertension    • Hypothyroidism    • Impingement syndrome of right shoulder    • Joint pain    • Menopausal disorder    • Methicillin resistant Staphylococcus aureus infection 2012    after bladder surgery   • MRSA (methicillin resistant staph aureus) culture positive    • Nausea and vomiting    • Nonischemic cardiomyopathy (CMS/HCC)     Ejection fraction 10% per 2-D echo with Doppler however  she did have cardiac catheterization April 2015 which showed ejection fraction 20% with global hypokinesis and severe mitral insufficiency   • Osteoarthritis     generalized, sched jania TKA   • Paroxysmal atrial fibrillation (CMS/HCC)     Dr Bates follows   • Postoperative infection     July of 2012 following her hysterectomy far vaginal wall prolapse. She was on antibiotics and wound dressings for 2 months.   • Shoulder pain, bilateral     has tears on both sides   • Syncope, psychogenic 4/19/2017   • Toe ulcer (CMS/HCC)     h/o to both feet, d/t shoes rubbing per patient   • Transient alteration of awareness 4/19/2017   ,   Past Surgical History:   Procedure Laterality Date   • ABDOMINAL SURGERY  2012    had to be reopened after bladder surgery d/t infection   • AMPUTATION FOOT / TOE Right 11/2013    Rt foot amputation MTP first toe   • BLADDER SURGERY  2012   • BREAST BIOPSY     • BREAST SURGERY  06/29/2015    Percutaneous ultrasound-guided placement of metal localized clip 1st lesion   • CARDIAC CATHETERIZATION  2015   • CATARACT EXTRACTION Bilateral 2017   • DEBRIDEMENT  FOOT Right 01/2013    During hospitalization    • EPIDURAL BLOCK      4 chronic back pain and leg pain last epidurals done 5-2012 she had no benefit at all from this procedure.   • FOOT SURGERY Bilateral     several   • HERNIA REPAIR      At the abdomen February 2007 Dr. Mendez   • INCISIONAL HERNIA REPAIR  05/16/2014    Recurrent. Incarcerated. With mesh implantation   • MASTECTOMY Right 05/2015   • SHOULDER SURGERY Right     x2 RCR   • TOTAL ABDOMINAL HYSTERECTOMY      with oophorectomy-Done June-2012 secondary to vaginal wall prolapse.   • TOTAL KNEE ARTHROPLASTY Right    • TOTAL KNEE ARTHROPLASTY Left 4/17/2018    Procedure: TOTAL KNEE ARTHROPLASTY;  Surgeon: Live Chambers MD;  Location: [facility];  Service: Orthopedics   ,   Family History   Problem Relation Age of Onset   • Colonic polyp Mother    • Hypertension Mother    • Migraines  Mother    • Mental illness Mother    • Depression Mother    • Coronary artery disease Father    • Other Father         Cardiac Disorder   • Colon cancer Father    • Kidney disease Father    • Cancer Father    • Breast cancer Maternal Aunt    • Colon cancer Brother    • Alcohol abuse Brother    • Arthritis Sister    • Hypertension Brother    • Lung disease Brother    • Alcohol abuse Brother    • Malig Hyperthermia Neg Hx    ,   Social History     Tobacco Use   • Smoking status: Former Smoker     Packs/day: 3.00     Years: 30.00     Pack years: 90.00     Types: Cigarettes     Last attempt to quit:      Years since quittin.6   • Smokeless tobacco: Never Used   Substance Use Topics   • Alcohol use: No     Comment: h/o quit    • Drug use: No   ,   Medications Prior to Admission   Medication Sig Dispense Refill Last Dose   • alendronate (FOSAMAX) 35 MG tablet take 1 tablet by mouth every week 12 tablet 3 2019 at Unknown time   • aspirin 81 MG EC tablet Take 81 mg by mouth Daily.   2019 at Unknown time   • carvedilol (COREG) 12.5 MG tablet Take 1 tablet by mouth 2 (Two) Times a Day With Meals. 180 tablet 3 2019 at Unknown time   • coenzyme Q10 100 MG capsule Take 100 mg by mouth.   2019 at Unknown time   • DULoxetine (CYMBALTA) 60 MG capsule Take 60 mg by mouth Daily.   2019 at Unknown time   • esomeprazole (nexIUM) 20 MG capsule Take 20 mg by mouth Every Morning Before Breakfast.   2019 at Unknown time   • glimepiride (AMARYL) 4 MG tablet Take  One po bid 180 tablet 2 2019 at Unknown time   • levothyroxine (SYNTHROID, LEVOTHROID) 25 MCG tablet TAKE 1 TABLET BY MOUTH ONCE DAILY 180 tablet 0 2019 at Unknown time   • losartan (COZAAR) 100 MG tablet Take 1 tablet by mouth Daily for 30 days. 30 tablet 0 2019 at Unknown time   • LYRICA 50 MG capsule TAKE 1 CAPSULE BY MOUTH TWICE DAILY 180 capsule 1 2019 at Unknown time   • magnesium oxide (MAG-OX) 400 MG tablet  Take 500 mg by mouth Daily.   8/31/2019 at Unknown time   • Multiple Vitamins-Minerals (MULTIVITAMIN ADULT PO) Take  by mouth.   8/31/2019 at Unknown time   • potassium chloride (K-DUR,KLOR-CON) 20 MEQ CR tablet Take 1 tablet by mouth Daily. 90 tablet 0 8/31/2019 at Unknown time   • pravastatin (PRAVACHOL) 10 MG tablet Take 10 mg by mouth Daily.  1 8/31/2019 at Unknown time   • SITagliptin (JANUVIA) 100 MG tablet Take 1 tablet by mouth Daily. 90 tablet 0 8/31/2019 at Unknown time   • spironolactone (ALDACTONE) 50 MG tablet Take 0.5 tablets by mouth Every Morning.   8/31/2019 at Unknown time   • vitamin B-12 (CYANOCOBALAMIN) 1000 MCG tablet Take 1,000 mcg by mouth Daily.   8/31/2019 at Unknown time   • vitamin D (ERGOCALCIFEROL) 47134 units capsule capsule Take 50,000 Units by mouth 1 (One) Time Per Week.   8/31/2019 at Unknown time   • anastrozole (ARIMIDEX) 1 MG tablet Take 1 tablet by mouth Daily. Hold until follow up with Dr. Jensen 90 tablet 0 Unknown   • bumetanide (BUMEX) 2 MG tablet Take 1 tablet by mouth Daily for 30 days. 30 tablet 0 Unknown   • HYDROcodone-acetaminophen (NORCO)  MG per tablet Take 1 tablet by mouth Every 6 (Six) Hours As Needed for Moderate Pain . DNF until 8-10-19 120 tablet 0 Unknown at Unknown time   , Scheduled Meds:    aspirin 81 mg Oral Daily   carvedilol 12.5 mg Oral BID With Meals   DULoxetine 60 mg Oral Daily   insulin lispro 0-7 Units Subcutaneous 4x Daily With Meals & Nightly   levothyroxine 25 mcg Oral Daily   pantoprazole 40 mg Oral QAM   pravastatin 10 mg Oral Daily   sodium chloride 10 mL Intravenous Q12H   vitamin B-12 1,000 mcg Oral Daily   , Continuous Infusions:    sodium chloride 100 mL/hr Last Rate: 100 mL/hr (09/01/19 0520)   , PRN Meds:  •  acetaminophen **OR** acetaminophen **OR** acetaminophen  •  dextrose  •  dextrose  •  glucagon (human recombinant)  •  ondansetron  •  sodium chloride and Allergies:  Dilaudid [hydromorphone] and Latex    Review of  Systems  Pertinent items are noted in HPI    Objective     Vital Signs  Temp:  [97.6 °F (36.4 °C)-98.3 °F (36.8 °C)] 97.6 °F (36.4 °C)  Heart Rate:  [67-83] 67  Resp:  [14-16] 14  BP: (123-124)/(68-83) 124/83    No intake/output data recorded.  I/O last 3 completed shifts:  In: 300 [P.O.:300]  Out: -     Physical Exam:  GEN: Awake, NAD  ENT: PERRL, EOMI, MMM  NECK: Supple, no JVD  CHEST: CTAB, no W/R/C  CV: RRR, no M/G/R  ABD: Soft, NT, +BS  SKIN: Warm an Dry  NEURO: CN's intact    Results Review:   I reviewed the patient's new clinical results.    WBC WBC   Date Value Ref Range Status   09/01/2019 6.91 3.40 - 10.80 10*3/mm3 Final      HGB Hemoglobin   Date Value Ref Range Status   09/01/2019 10.0 (L) 12.0 - 15.9 g/dL Final      HCT Hematocrit   Date Value Ref Range Status   09/01/2019 33.5 (L) 34.0 - 46.6 % Final      Platlets No results found for: LABPLAT   MCV MCV   Date Value Ref Range Status   09/01/2019 92.3 79.0 - 97.0 fL Final          Sodium Sodium   Date Value Ref Range Status   09/01/2019 138 136 - 145 mmol/L Final   08/31/2019 139 136 - 145 mmol/L Final      Potassium Potassium   Date Value Ref Range Status   09/01/2019 3.5 3.5 - 5.2 mmol/L Final   08/31/2019 4.5 3.5 - 5.2 mmol/L Final      Chloride Chloride   Date Value Ref Range Status   09/01/2019 107 98 - 107 mmol/L Final   08/31/2019 98 98 - 107 mmol/L Final      CO2 CO2   Date Value Ref Range Status   09/01/2019 21.8 (L) 22.0 - 29.0 mmol/L Final   08/31/2019 23.3 22.0 - 29.0 mmol/L Final      BUN BUN   Date Value Ref Range Status   09/01/2019 29 (H) 8 - 23 mg/dL Final   08/31/2019 34 (H) 8 - 23 mg/dL Final      Creatinine Creatinine   Date Value Ref Range Status   09/01/2019 2.22 (H) 0.57 - 1.00 mg/dL Final   08/31/2019 3.06 (H) 0.57 - 1.00 mg/dL Final      Calcium Calcium   Date Value Ref Range Status   09/01/2019 8.2 (L) 8.6 - 10.5 mg/dL Final   08/31/2019 10.1 8.6 - 10.5 mg/dL Final      PO4 No results found for: CAPO4   Albumin No results found  for: ALBUMIN   Magnesium No results found for: MG   Uric Acid No results found for: URICACID           aspirin 81 mg Oral Daily   carvedilol 12.5 mg Oral BID With Meals   DULoxetine 60 mg Oral Daily   insulin lispro 0-7 Units Subcutaneous 4x Daily With Meals & Nightly   levothyroxine 25 mcg Oral Daily   pantoprazole 40 mg Oral QAM   pravastatin 10 mg Oral Daily   sodium chloride 10 mL Intravenous Q12H   vitamin B-12 1,000 mcg Oral Daily       sodium chloride 100 mL/hr Last Rate: 100 mL/hr (09/01/19 0520)       Assessment/Plan       ADRIENNE (acute kidney injury) (CMS/[facility])    DM2 (diabetes mellitus, type 2) (CMS/[facility])    Hypertension    Hypothyroidism    CKD (chronic kidney disease) stage 3, GFR 30-59 ml/min (CMS/[facility])    Diastolic congestive heart failure (CMS/[facility])    PLAN: Cr 2.2 from 3 with IVF which makes pre-renal etiology most likely (possibly from diuretics and ARB).  U/S and UA OK.  Continue IVF today but watch closely given her h/o CHF and recent admission for fluid overload.        Simone Hopkins MD   [facility]  09/01/19  @NOW

## 2019-09-01 NOTE — ED PROVIDER NOTES
Subjective   History of Present Illness  History of Present Illness    Chief complaint: Cannot urinate    Location: Home    Quality/Severity: No burning on urination, had urinary output just prior to arrival    Timing/Onset/Duration: Started today    Modifying Factors: Nothing makes it better work    Associated Symptoms: No fever chills.  No nausea or vomiting.  No chest pain or shortness of breath.  No abdominal pain.  No diarrhea or burning when she urinates.  She has some intermittent left shoulder pain today.  No headache.    Narrative: This 74-year-old white female with stage III kidney disease presents with inability to urinate.  She states she could not urinate all day until just prior to arrival.  Patient seen by physician on Friday with bacteria in the urine but not started on antibiotic.      PCP: Mikey  Nephrologist: Daily      Review of Systems   Constitutional: Negative for chills and fever.   HENT: Negative for ear pain and sore throat.    Eyes: Negative for discharge and redness.   Respiratory: Negative for cough, chest tightness, shortness of breath and wheezing.    Cardiovascular: Negative for chest pain, palpitations and leg swelling.   Gastrointestinal: Negative for abdominal pain, diarrhea, nausea and vomiting.   Endocrine: Negative for polyuria.   Genitourinary: Positive for difficulty urinating. Negative for decreased urine volume, dysuria, flank pain, frequency, hematuria and urgency.   Musculoskeletal: Negative for back pain and neck stiffness.   Skin: Negative for rash.   Neurological: Negative for headaches.   Psychiatric/Behavioral: Negative.  Negative for confusion.        Medication List      ASK your doctor about these medications    alendronate 35 MG tablet  Commonly known as:  FOSAMAX  take 1 tablet by mouth every week     anastrozole 1 MG tablet  Commonly known as:  ARIMIDEX  Take 1 tablet by mouth Daily. Hold until follow up with Dr. Jensen     aspirin 81 MG EC tablet      bumetanide 2 MG tablet  Commonly known as:  BUMEX  Take 1 tablet by mouth Daily for 30 days.     carvedilol 12.5 MG tablet  Commonly known as:  COREG  Take 1 tablet by mouth 2 (Two) Times a Day With Meals.     coenzyme Q10 100 MG capsule     DULoxetine 60 MG capsule  Commonly known as:  CYMBALTA     esomeprazole 20 MG capsule  Commonly known as:  nexIUM     glimepiride 4 MG tablet  Commonly known as:  AMARYL  Take  One po bid     HYDROcodone-acetaminophen  MG per tablet  Commonly known as:  NORCO  Take 1 tablet by mouth Every 6 (Six) Hours As Needed for Moderate Pain .   DNF until 8-10-19     levothyroxine 25 MCG tablet  Commonly known as:  SYNTHROID, LEVOTHROID  TAKE 1 TABLET BY MOUTH ONCE DAILY     losartan 100 MG tablet  Commonly known as:  COZAAR  Take 1 tablet by mouth Daily for 30 days.     LYRICA 50 MG capsule  Generic drug:  pregabalin  TAKE 1 CAPSULE BY MOUTH TWICE DAILY     magnesium oxide 400 MG tablet  Commonly known as:  MAG-OX     MULTIVITAMIN ADULT PO     potassium chloride 20 MEQ CR tablet  Commonly known as:  K-DUR,KLOR-CON  Take 1 tablet by mouth Daily.     pravastatin 10 MG tablet  Commonly known as:  PRAVACHOL     SITagliptin 100 MG tablet  Commonly known as:  JANUVIA  Take 1 tablet by mouth Daily.     spironolactone 50 MG tablet  Commonly known as:  ALDACTONE  Take 0.5 tablets by mouth Every Morning.     vitamin D 12331 units capsule capsule  Commonly known as:  ERGOCALCIFEROL          Past Medical History:   Diagnosis Date   • Anemia    • Benign breast disease    • Cancer (CMS/HCC) 2015    Right breast   • Cellulitis of leg     May-2003 right lower extremity   • CHF (congestive heart failure) (CMS/HCC)     Dr Bates follows   • Chronic kidney disease     Dr Hannah follows   • Chronic kidney disease 5/24/2019   • Chronic ulcer of right foot (CMS/HCC)     Non-pressure, history of    • Depression    • Diabetic peripheral neuropathy (CMS/HCC)    • Diarrhea    • Diverticulosis    •  Encounter for eye exam    • Fracture of left wrist     history of   • Gastroesophageal reflux    • Hiatal hernia    • History of bone density study 09/20/2012   • History of colonoscopy     done 6/03 recheck in 10 years.   Done july 2013 recheck in 5 years   • History of mammography, screening 09/20/2012   • Hospital discharge follow-up    • Hyperlipidemia    • Hypertension    • Hypothyroidism    • Impingement syndrome of right shoulder    • Joint pain    • Menopausal disorder    • Methicillin resistant Staphylococcus aureus infection 2012    after bladder surgery   • MRSA (methicillin resistant staph aureus) culture positive    • Nausea and vomiting    • Nonischemic cardiomyopathy (CMS/HCC)     Ejection fraction 10% per 2-D echo with Doppler however she did have cardiac catheterization April 2015 which showed ejection fraction 20% with global hypokinesis and severe mitral insufficiency   • Osteoarthritis     generalized, sched jania TKA   • Paroxysmal atrial fibrillation (CMS/HCC)     Dr Bates follows   • Postoperative infection     July of 2012 following her hysterectomy far vaginal wall prolapse. She was on antibiotics and wound dressings for 2 months.   • Shoulder pain, bilateral     has tears on both sides   • Syncope, psychogenic 4/19/2017   • Toe ulcer (CMS/HCC)     h/o to both feet, d/t shoes rubbing per patient   • Transient alteration of awareness 4/19/2017       Allergies   Allergen Reactions   • Dilaudid [Hydromorphone] Delirium and Confusion   • Latex Rash       Past Surgical History:   Procedure Laterality Date   • ABDOMINAL SURGERY  2012    had to be reopened after bladder surgery d/t infection   • AMPUTATION FOOT / TOE Right 11/2013    Rt foot amputation MTP first toe   • BLADDER SURGERY  2012   • BREAST BIOPSY     • BREAST SURGERY  06/29/2015    Percutaneous ultrasound-guided placement of metal localized clip 1st lesion   • CARDIAC CATHETERIZATION  2015   • CATARACT EXTRACTION Bilateral 2017   •  DEBRIDEMENT  FOOT Right 2013    During hospitalization    • EPIDURAL BLOCK      4 chronic back pain and leg pain last epidurals done  she had no benefit at all from this procedure.   • FOOT SURGERY Bilateral     several   • HERNIA REPAIR      At the abdomen 2007 Dr. Mendez   • INCISIONAL HERNIA REPAIR  2014    Recurrent. Incarcerated. With mesh implantation   • MASTECTOMY Right 2015   • SHOULDER SURGERY Right     x2 RCR   • TOTAL ABDOMINAL HYSTERECTOMY      with oophorectomy-Done 2012 secondary to vaginal wall prolapse.   • TOTAL KNEE ARTHROPLASTY Right    • TOTAL KNEE ARTHROPLASTY Left 2018    Procedure: TOTAL KNEE ARTHROPLASTY;  Surgeon: Live Chambers MD;  Location: [facility];  Service: Orthopedics       Family History   Problem Relation Age of Onset   • Colonic polyp Mother    • Hypertension Mother    • Migraines Mother    • Mental illness Mother    • Depression Mother    • Coronary artery disease Father    • Other Father         Cardiac Disorder   • Colon cancer Father    • Kidney disease Father    • Cancer Father    • Breast cancer Maternal Aunt    • Colon cancer Brother    • Alcohol abuse Brother    • Arthritis Sister    • Hypertension Brother    • Lung disease Brother    • Alcohol abuse Brother    • Malig Hyperthermia Neg Hx        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   Tobacco Use   • Smoking status: Former Smoker     Packs/day: 3.00     Years: 30.00     Pack years: 90.00     Types: Cigarettes     Last attempt to quit:      Years since quittin.6   • Smokeless tobacco: Never Used   Substance and Sexual Activity   • Alcohol use: No     Comment: h/o quit    • Drug use: No   • Sexual activity: Defer     Partners: Male     Comment:  celibate   Social History Narrative     2001    Volunteers here at [facility]     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 She appears well-developed and well-nourished. No distress.   ED Triage Vitals (08/31/19 2328)  Temp: 98.3 °F (36.8 °C)  Heart Rate: 83  Resp: 16  BP: 123/80  SpO2: 95 %  Temp src: Oral  Heart Rate Source: Monitor  Patient Position: Sitting  BP Location: Left arm  FiO2 (%): n/a    The patient's vitals were reviewed by me.  Unless otherwise noted they are within normal limits.     Cardiovascular: Normal rate, regular rhythm, normal heart sounds and intact distal pulses. Exam reveals no gallop and no friction rub.   No murmur heard.  Pulmonary/Chest: Effort normal and breath sounds normal. No stridor. No respiratory distress. She has no wheezes. She has no rales. She exhibits no tenderness.   Abdominal: Soft. Bowel sounds are normal. She exhibits no distension and no mass. There is no tenderness. There is no rebound and no guarding. No hernia.   Musculoskeletal: Normal range of motion. She exhibits no edema, tenderness or deformity.   Neurological: She is alert and oriented to person, place, and time.   Skin: Skin is warm and dry. No erythema. No pallor.   Psychiatric: She has a normal mood and affect.   Nursing note and vitals reviewed.      Procedures           ED Course  ED Course as of Sep 01 0130   Sun Sep 01, 2019   0120 The laboratory values were reviewed by me.  The glucose is 165.  The BUN is 34.  The creatinine is 3.06.  GFR is 15.  The anion gap is 17.7.  The patient has not been able to urinate.  She refuses to provide a cath specimen.  [RC]   0121 Results for JOSSELIN GUAJARDO (MRN [id number]) as of 9/1/2019 01:21    8/23/2019 14:57  Glucose: 214 (H)  Sodium: 137  Potassium: 5.1  CO2:  23.5  Chloride: 99  Creatinine: 2.22 (H)  BUN: 43 (H)  BUN/Creatinine Ratio: 19.4  Calcium: 9.8  eGFR Non  Am: 21 (L)  eGFR  Am: 26 (L)    [RC]      ED Course User Index  [RC] Eduardo Harrington MD      12:05 AM, 09/01/19:  The EKG was obtained at 0001.  The EKG was interpreted by me at 0005.  EKG shows a normal sinus rhythm with rate of 74.  There is a normal axis with left ventricular hypertrophy.  The [place], and QT intervals are unremarkable.  The QRS is prolonged at 113 ms.  There is no ectopy.  There is no acute ST elevation or depression.  The EKG tonight was compared to an EKG dated September 1, 2019 and is is essentially unchanged.   1:30 AM, 09/01/19:  The patient was reassessed.  She has not been able to have any urinary output.  He has no complaints.  The patchy airspace disease is inconsistent with pneumonia as the patient is afebrile.  She is not tachypneic.  She has normal saturations.    1:30 AM, 09/01/19:  I spoke with Dr. Ramos, on-call for Dr. Dyer, the patient's nephrologist.  She would like the patient admitted.  The patient will be transferred to [facility] as we have no nephrology coverage here.  Daily service will consult on the patient.    1:31 AM, 09/01/19:  The patient's diagnosis of acute kidney injury was discussed with the patient.  The patient will be transferred to [facility] for further work-up and evaluation.    2:10 AM, 09/01/19:  I spoke with Dr. Markham, on-call for the hospitalist, he will admit the patient at [facility].          MDM    No orders to display     Labs Reviewed - No data to display  Mri Brain With & Without Contrast    Addendum Date: 8/23/2019 Addendum:   //////////// ADDENDUM #1 //////////// Findings called and discussed with Brittany Epstein following this dictation. Signer Name: DIRK Granados MD  Signed: 8/23/2019 5:38 PM  Workstation Name: [facility]  [facility]////////////   ORIGINAL REPORT //////////// MRI Brain WO W INDICATION: Unsteady balance. Difficulty getting words out today. Breast cancer diagnosed 3 years ago. TECHNIQUE: MRI of the brain with and without IV gadolinium 20 cc MultiHance IV contrast. COMPARISON:  Head CT 4/19/2017 FINDINGS: Mild prominence of ventricles, sulci and basilar cisterns compatible with generalized atrophy and advanced age. No mass, mass effect or midline shift. No hemorrhage or abnormal extra axial fluid collections. Scattered T2 and FLAIR periventricular white matter changes most likely represent the sequela of chronic microvascular disease. No restricted diffusion. 9 x 12 mm focus of abnormal enhancement right superior cerebellum. No corresponding mass or restricted diffusion identified on the other imaging sequences. 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 The bony calvarium, skull base and mastoids appear normal.     Result Date: 8/23/2019  Narrative: MRI Brain WO W INDICATION: Unsteady balance. Difficulty getting words out today. Breast cancer diagnosed 3 years ago. TECHNIQUE: MRI of the brain with and without IV gadolinium 20 cc MultiHance IV contrast. COMPARISON:  Head CT 4/19/2017 FINDINGS: Mild prominence of ventricles, sulci and basilar cisterns compatible with generalized atrophy and advanced age. No mass, mass effect or midline shift. No hemorrhage or abnormal extra axial fluid collections. Scattered T2 and FLAIR periventricular white matter changes most likely represent the sequela of chronic microvascular disease. No restricted diffusion. 9 x 12 mm focus of abnormal enhancement right superior cerebellum. No corresponding mass or restricted diffusion identified on the other imaging sequences. 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 The bony calvarium, skull base and mastoids appear normal.     Impression: 9 x 12 mm focus of abnormal enhancement right superior cerebellum without corresponding mass or  signal abnormality on the precontrast study. This may represent a developmental venous anomaly (venous angioma). Scattered periventricular white matter signal abnormality most likely the sequela of chronic microvascular disease. Signer Name: DIRK Granados MD  Signed: 8/23/2019 5:29 PM  Workstation Name: [email or web address]  [facility]    Xr Chest 1 View    Result Date: 8/16/2019  Narrative: CR Chest 1 Vw INDICATION: Short of air and chest pain. Symptoms started 2 weeks ago. Sent from 's office with an abnormal EKG. COMPARISON:  Chest x-ray 5/10/2018 FINDINGS: Single portable AP view(s) of the chest.  The cardiac silhouette size is mildly enlarged. This is increased from previous. Lung volumes are lower and there is subtle interstitial edema. No focal alveolar infiltrate. There is evidence for old granulomatous disease. No pleural effusion or pneumothorax.     Impression: Mild cardiac silhouette enlargement, size increased from prior with development of subtle interstitial edema. Findings suggest mild congestive failure. Correlation and follow-up suggested. Signer Name: Zarina Mari MD  Signed: 8/16/2019 5:04 PM  Workstation Name: [email or web address]  [facility]    Dexa Bone Density Axial    Result Date: 8/21/2019  Narrative: DXA BONE MINERAL DENSITY MEASUREMENT, 08/21/2019  INDICATION: 74-year-old postmenopausal female with history of osteopenia/low bone mineral density.  TECHNIQUE: DXA bone mineral density measurements were obtained at the lumbar spine and left hip.  FINDINGS: At the left hip, lowest femoral neck T score measures -2.3, indicating osteopenia. Similarly, L1-for lumbar T score measures -1.4, also indicating osteopenia. Left total hip BMD has improved 6.9% since 08/14/2017, and lumbar BMD has not changed significantly.      Impression: 1. Osteopenia. 2. Left femoral neck T score measures -2.3. 3. Lumbar T score measures -1.4.  FRAX: *  10 year fracture risk for  major osteoporotic fracture: 14%. *  Calculated for an untreated postmenopausal patient with osteopenia/low bone mineral density.  This report was finalized on 8/21/2019 11:45 AM by Dr. Levy Mishra MD.      Xr Abdomen Kub    Result Date: 8/17/2019  Narrative: CR Abdomen 1 Vw 8/17/2019 INDICATION: Abdominal fullness and bilateral lower extremity edema for 3 weeks with diarrhea for 10 days. COMPARISON: None available FINDINGS: AP radiographs of the abdomen. The renal shadows are symmetric. No renal calculi. No bladder calculi. The bowel gas pattern is nonobstructive. No acute osseous abnormalities. No radiopaque foreign body.     Impression: Negative KUB. Signer Name: Dillon Adair MD  Signed: 8/17/2019 8:24 AM  Workstation Name: [email or web address]  [facility]    Mammo Screening Modified With Tomosynthesis Left With Cad    Result Date: 8/21/2019  Narrative: EXAM, a 2119: 1. Left unilateral digital screening mammogram with CAD. 2. Left unilateral digital breast tomosynthesis.  INDICATION: 74-year-old female status post right mastectomy for breast cancer in 2015. Annual examination.  TECHNIQUE: Unilateral digital screening mammogram images were obtained including digital breast tomosynthesis and CAD review.  COMPARISON: *  Screening mammogram, 08/16/2018, 8/14/2017 and 6/24/2016.  FINDINGS: There are scattered areas of fibroglandular density. Stable benign diffuse mildly nodular parenchymal pattern. No significant change when compared with prior images. No mammographic evidence of malignancy. Recommend repeat screening mammogram in one year.      Impression: Benign left unilateral annual mammogram.  BI-RADS CATEGORY 2: Benign Findings.   Women over the age of 40 undergoing screening mammography are entered into a reminder system with target due date for the next mammogram.  This report was finalized on 8/21/2019 1:08 PM by Dr. Levy Mishra MD.        Final diagnoses:   Acute kidney injury  (CMS/[facility])         ED Medications:  Medications - No data to display    New Medications:     Medication List      ASK your doctor about these medications    alendronate 35 MG tablet  Commonly known as:  FOSAMAX  take 1 tablet by mouth every week     anastrozole 1 MG tablet  Commonly known as:  ARIMIDEX  Take 1 tablet by mouth Daily. Hold until follow up with Dr. Jensen     aspirin 81 MG EC tablet     bumetanide 2 MG tablet  Commonly known as:  BUMEX  Take 1 tablet by mouth Daily for 30 days.     carvedilol 12.5 MG tablet  Commonly known as:  COREG  Take 1 tablet by mouth 2 (Two) Times a Day With Meals.     coenzyme Q10 100 MG capsule     DULoxetine 60 MG capsule  Commonly known as:  CYMBALTA     esomeprazole 20 MG capsule  Commonly known as:  nexIUM     glimepiride 4 MG tablet  Commonly known as:  AMARYL  Take  One po bid     HYDROcodone-acetaminophen  MG per tablet  Commonly known as:  NORCO  Take 1 tablet by mouth Every 6 (Six) Hours As Needed for Moderate Pain .   DNF until 8-10-19     levothyroxine 25 MCG tablet  Commonly known as:  SYNTHROID, LEVOTHROID  TAKE 1 TABLET BY MOUTH ONCE DAILY     losartan 100 MG tablet  Commonly known as:  COZAAR  Take 1 tablet by mouth Daily for 30 days.     LYRICA 50 MG capsule  Generic drug:  pregabalin  TAKE 1 CAPSULE BY MOUTH TWICE DAILY     magnesium oxide 400 MG tablet  Commonly known as:  MAG-OX     MULTIVITAMIN ADULT PO     potassium chloride 20 MEQ CR tablet  Commonly known as:  K-DUR,KLOR-CON  Take 1 tablet by mouth Daily.     pravastatin 10 MG tablet  Commonly known as:  PRAVACHOL     SITagliptin 100 MG tablet  Commonly known as:  JANUVIA  Take 1 tablet by mouth Daily.     spironolactone 50 MG tablet  Commonly known as:  ALDACTONE  Take 0.5 tablets by mouth Every Morning.     vitamin D 02210 units capsule capsule  Commonly known as:  ERGOCALCIFEROL          Stopped Medications:     Medication List      ASK your doctor about these medications    alendronate 35 MG  tablet  Commonly known as:  FOSAMAX  take 1 tablet by mouth every week     anastrozole 1 MG tablet  Commonly known as:  ARIMIDEX  Take 1 tablet by mouth Daily. Hold until follow up with Dr. Jensen     aspirin 81 MG EC tablet     bumetanide 2 MG tablet  Commonly known as:  BUMEX  Take 1 tablet by mouth Daily for 30 days.     carvedilol 12.5 MG tablet  Commonly known as:  COREG  Take 1 tablet by mouth 2 (Two) Times a Day With Meals.     coenzyme Q10 100 MG capsule     DULoxetine 60 MG capsule  Commonly known as:  CYMBALTA     esomeprazole 20 MG capsule  Commonly known as:  nexIUM     glimepiride 4 MG tablet  Commonly known as:  AMARYL  Take  One po bid     HYDROcodone-acetaminophen  MG per tablet  Commonly known as:  NORCO  Take 1 tablet by mouth Every 6 (Six) Hours As Needed for Moderate Pain .   DNF until 8-10-19     levothyroxine 25 MCG tablet  Commonly known as:  SYNTHROID, LEVOTHROID  TAKE 1 TABLET BY MOUTH ONCE DAILY     losartan 100 MG tablet  Commonly known as:  COZAAR  Take 1 tablet by mouth Daily for 30 days.     LYRICA 50 MG capsule  Generic drug:  pregabalin  TAKE 1 CAPSULE BY MOUTH TWICE DAILY     magnesium oxide 400 MG tablet  Commonly known as:  MAG-OX     MULTIVITAMIN ADULT PO     potassium chloride 20 MEQ CR tablet  Commonly known as:  K-DUR,KLOR-CON  Take 1 tablet by mouth Daily.     pravastatin 10 MG tablet  Commonly known as:  PRAVACHOL     SITagliptin 100 MG tablet  Commonly known as:  JANUVIA  Take 1 tablet by mouth Daily.     spironolactone 50 MG tablet  Commonly known as:  ALDACTONE  Take 0.5 tablets by mouth Every Morning.     vitamin D 04552 units capsule capsule  Commonly known as:  ERGOCALCIFEROL            Final diagnoses:   Acute kidney injury (CMS/HCC)            Eduardo Harrington MD  09/01/19 0214       Eduardo Harrington MD  09/01/19 [id number]

## 2019-09-01 NOTE — ED NOTES
Myself along with 2 other nurses attempted to obtain IV access without success. Patient received 4 attempts, all in her left arm.     Gisell Jeronimo, RN  09/01/19 [id number]

## 2019-09-01 NOTE — PROGRESS NOTES
Discharge Planning Assessment  [facility]     Patient Name: Jung Short  MRN: [id number]  Today's Date: 9/1/2019    Admit Date: 9/1/2019    Discharge Needs Assessment     Row Name 09/01/19 1429       Living Environment    Lives With  alone    Current Living Arrangements  home/apartment/condo    Primary Care Provided by  self    Provides Primary Care For  no one    Family Caregiver if Needed  child(shira), adult    Quality of Family Relationships  supportive    Able to Return to Prior Arrangements  yes       Resource/Environmental Concerns    Resource/Environmental Concerns  none    Transportation Concerns  car, none       Transition Planning    Patient/Family Anticipates Transition to  home    Patient/Family Anticipated Services at Transition  none    Transportation Anticipated  family or friend will provide       Discharge Needs Assessment    Readmission Within the Last 30 Days  no previous admission in last 30 days    Concerns to be Addressed  no discharge needs identified    Equipment Currently Used at Home  cane, straight    Anticipated Changes Related to Illness  none    Equipment Needed After Discharge  none        Discharge Plan     Row Name 09/01/19 1429       Plan    Plan  Home    Patient/Family in Agreement with Plan  yes    Plan Comments  Met with patient and dgt Flory Hitesh [phone].  Patient gave permission to discuss with dgt present.  Plan is home at discharge.  No needs identified.        Destination      No service coordination in this encounter.      Durable Medical Equipment      No service coordination in this encounter.      Dialysis/Infusion      No service coordination in this encounter.      Home Medical Care      No service coordination in this encounter.      Therapy      No service coordination in this encounter.      Community Resources      No service coordination in this encounter.          Demographic Summary     Row Name 09/01/19 1428       General Information    Admission Type   inpatient    Arrived From  emergency department    Required Notices Provided  Important Message from Medicare    Referral Source  admission list    Reason for Consult  discharge planning    Preferred Language  English     Used During This Interaction  no        Functional Status     Row Name 09/01/19 1428       Functional Status    Usual Activity Tolerance  excellent    Current Activity Tolerance  good       Functional Status, IADL    Medications  independent    Meal Preparation  independent    Housekeeping  independent    Laundry  independent    Shopping  independent       Mental Status    General Appearance WDL  WDL       Mental Status Summary    Recent Changes in Mental Status/Cognitive Functioning  no changes       Employment/    Employment Status  retired        Psychosocial    No documentation.       Abuse/Neglect    No documentation.       Legal    No documentation.       Substance Abuse    No documentation.       Patient Forms    No documentation.           Valentina Abrams RN

## 2019-09-02 LAB
ANION GAP SERPL CALCULATED.3IONS-SCNC: 11.4 MMOL/L (ref 5–15)
BASOPHILS # BLD AUTO: 0.02 10*3/MM3 (ref 0–0.2)
BASOPHILS NFR BLD AUTO: 0.3 % (ref 0–1.5)
BUN BLD-MCNC: 24 MG/DL (ref 8–23)
BUN/CREAT SERPL: 18.3 (ref 7–25)
CALCIUM SPEC-SCNC: 9.5 MG/DL (ref 8.6–10.5)
CHLORIDE SERPL-SCNC: 107 MMOL/L (ref 98–107)
CO2 SERPL-SCNC: 22.6 MMOL/L (ref 22–29)
CREAT BLD-MCNC: 1.31 MG/DL (ref 0.57–1)
DEPRECATED RDW RBC AUTO: 50.7 FL (ref 37–54)
EOSINOPHIL # BLD AUTO: 0.19 10*3/MM3 (ref 0–0.4)
EOSINOPHIL NFR BLD AUTO: 3 % (ref 0.3–6.2)
ERYTHROCYTE [DISTWIDTH] IN BLOOD BY AUTOMATED COUNT: 14.7 % (ref 12.3–15.4)
GFR SERPL CREATININE-BSD FRML MDRD: 39 ML/MIN/1.73
GLUCOSE BLD-MCNC: 111 MG/DL (ref 65–99)
GLUCOSE BLDC GLUCOMTR-MCNC: 117 MG/DL (ref 70–130)
GLUCOSE BLDC GLUCOMTR-MCNC: 122 MG/DL (ref 70–130)
GLUCOSE BLDC GLUCOMTR-MCNC: 131 MG/DL (ref 70–130)
GLUCOSE BLDC GLUCOMTR-MCNC: 194 MG/DL (ref 70–130)
HCT VFR BLD AUTO: 35.4 % (ref 34–46.6)
HGB BLD-MCNC: 10.5 G/DL (ref 12–15.9)
IMM GRANULOCYTES # BLD AUTO: 0.03 10*3/MM3 (ref 0–0.05)
IMM GRANULOCYTES NFR BLD AUTO: 0.5 % (ref 0–0.5)
LYMPHOCYTES # BLD AUTO: 1.73 10*3/MM3 (ref 0.7–3.1)
LYMPHOCYTES NFR BLD AUTO: 27.2 % (ref 19.6–45.3)
MAGNESIUM SERPL-MCNC: 1.8 MG/DL (ref 1.6–2.4)
MCH RBC QN AUTO: 28.1 PG (ref 26.6–33)
MCHC RBC AUTO-ENTMCNC: 29.7 G/DL (ref 31.5–35.7)
MCV RBC AUTO: 94.7 FL (ref 79–97)
MONOCYTES # BLD AUTO: 0.49 10*3/MM3 (ref 0.1–0.9)
MONOCYTES NFR BLD AUTO: 7.7 % (ref 5–12)
NEUTROPHILS # BLD AUTO: 3.91 10*3/MM3 (ref 1.7–7)
NEUTROPHILS NFR BLD AUTO: 61.3 % (ref 42.7–76)
NRBC BLD AUTO-RTO: 0 /100 WBC (ref 0–0.2)
PHOSPHATE SERPL-MCNC: 3.2 MG/DL (ref 2.5–4.5)
PLATELET # BLD AUTO: 144 10*3/MM3 (ref 140–450)
PMV BLD AUTO: 10.6 FL (ref 6–12)
POTASSIUM BLD-SCNC: 4.8 MMOL/L (ref 3.5–5.2)
RBC # BLD AUTO: 3.74 10*6/MM3 (ref 3.77–5.28)
SODIUM BLD-SCNC: 141 MMOL/L (ref 136–145)
WBC NRBC COR # BLD: 6.37 10*3/MM3 (ref 3.4–10.8)

## 2019-09-02 PROCEDURE — 80048 BASIC METABOLIC PNL TOTAL CA: CPT | Performed by: INTERNAL MEDICINE

## 2019-09-02 PROCEDURE — 63710000001 INSULIN LISPRO (HUMAN) PER 5 UNITS: Performed by: NURSE PRACTITIONER

## 2019-09-02 PROCEDURE — 84100 ASSAY OF PHOSPHORUS: CPT | Performed by: INTERNAL MEDICINE

## 2019-09-02 PROCEDURE — 82962 GLUCOSE BLOOD TEST: CPT

## 2019-09-02 PROCEDURE — 83735 ASSAY OF MAGNESIUM: CPT | Performed by: INTERNAL MEDICINE

## 2019-09-02 PROCEDURE — 85025 COMPLETE CBC W/AUTO DIFF WBC: CPT | Performed by: INTERNAL MEDICINE

## 2019-09-02 RX ADMIN — HYDROCODONE BITARTRATE AND ACETAMINOPHEN 1 TABLET: 10; 325 TABLET ORAL at 04:14

## 2019-09-02 RX ADMIN — CARVEDILOL 12.5 MG: 12.5 TABLET, FILM COATED ORAL at 09:38

## 2019-09-02 RX ADMIN — LEVOTHYROXINE SODIUM 25 MCG: 25 TABLET ORAL at 06:43

## 2019-09-02 RX ADMIN — PRAVASTATIN SODIUM 10 MG: 10 TABLET ORAL at 09:38

## 2019-09-02 RX ADMIN — DULOXETINE HYDROCHLORIDE 60 MG: 60 CAPSULE, DELAYED RELEASE ORAL at 09:38

## 2019-09-02 RX ADMIN — HYDROCODONE BITARTRATE AND ACETAMINOPHEN 1 TABLET: 10; 325 TABLET ORAL at 17:25

## 2019-09-02 RX ADMIN — SODIUM CHLORIDE 100 ML/HR: 9 INJECTION, SOLUTION INTRAVENOUS at 21:39

## 2019-09-02 RX ADMIN — HYDROCODONE BITARTRATE AND ACETAMINOPHEN 1 TABLET: 10; 325 TABLET ORAL at 11:01

## 2019-09-02 RX ADMIN — CARVEDILOL 12.5 MG: 12.5 TABLET, FILM COATED ORAL at 17:25

## 2019-09-02 RX ADMIN — ASPIRIN 81 MG: 81 TABLET, COATED ORAL at 09:38

## 2019-09-02 RX ADMIN — Medication 1000 MCG: at 09:38

## 2019-09-02 RX ADMIN — INSULIN LISPRO 2 UNITS: 100 INJECTION, SOLUTION INTRAVENOUS; SUBCUTANEOUS at 18:05

## 2019-09-02 RX ADMIN — PANTOPRAZOLE SODIUM 40 MG: 40 TABLET, DELAYED RELEASE ORAL at 06:43

## 2019-09-02 RX ADMIN — SODIUM CHLORIDE 100 ML/HR: 9 INJECTION, SOLUTION INTRAVENOUS at 12:11

## 2019-09-02 RX ADMIN — PREGABALIN 50 MG: 25 CAPSULE ORAL at 09:38

## 2019-09-02 RX ADMIN — SODIUM CHLORIDE 100 ML/HR: 9 INJECTION, SOLUTION INTRAVENOUS at 00:43

## 2019-09-02 RX ADMIN — PREGABALIN 50 MG: 25 CAPSULE ORAL at 20:44

## 2019-09-02 NOTE — PLAN OF CARE
Problem: Patient Care Overview  Goal: Plan of Care Review  Outcome: Ongoing (interventions implemented as appropriate)   09/02/19 0526   Coping/Psychosocial   Plan of Care Reviewed With patient   Plan of Care Review   Progress improving   OTHER   Outcome Summary Pt c/o pain and was medicated with norco. IVFs infusing. Voiding spontaneously - 1200 mL through the night. Up with assist. SCDs on while in bed. Tolerating regular diet. Ambulated in [place]. VSS. Rested comfortably during the night.      Goal: Individualization and Mutuality  Outcome: Ongoing (interventions implemented as appropriate)    Goal: Discharge Needs Assessment  Outcome: Ongoing (interventions implemented as appropriate)    Goal: Interprofessional Rounds/Family Conf  Outcome: Ongoing (interventions implemented as appropriate)      Problem: Pain, Acute (Adult)  Goal: Identify Related Risk Factors and Signs and Symptoms  Outcome: Outcome(s) achieved Date Met: 09/02/19    Goal: Acceptable Pain Control/Comfort Level  Outcome: Ongoing (interventions implemented as appropriate)      Problem: Renal Failure/Kidney Injury, Acute (Adult)  Goal: Signs and Symptoms of Listed Potential Problems Will be Absent, Minimized or Managed (Renal Failure/Kidney Injury, Acute)  Outcome: Ongoing (interventions implemented as appropriate)      Problem: Fall Risk (Adult)  Goal: Identify Related Risk Factors and Signs and Symptoms  Outcome: Outcome(s) achieved Date Met: 09/02/19    Goal: Absence of Fall  Outcome: Ongoing (interventions implemented as appropriate)      Problem: Pain, Chronic (Adult)  Goal: Identify Related Risk Factors and Signs and Symptoms  Outcome: Outcome(s) achieved Date Met: 09/02/19    Goal: Acceptable Pain/Comfort Level and Functional Ability  Outcome: Ongoing (interventions implemented as appropriate)

## 2019-09-02 NOTE — PLAN OF CARE
Problem: Patient Care Overview  Goal: Plan of Care Review   09/02/19 1818   Coping/Psychosocial   Plan of Care Reviewed With patient   Plan of Care Review   Progress improving   OTHER   Outcome Summary Patient voiding freely. Iv fluids as ordered. Pain controlled with PO pain meds. Up with assist. Tolerating regular diet.

## 2019-09-02 NOTE — PROGRESS NOTES
Name: Jung Short ADMIT: 2019   : 1945  PCP: Kylee Jensen MD    MRN: [id number] LOS: 1 days   AGE/SEX: 74 y.o. female  ROOM: [facility]     Subjective   Subjective   CC: decresaed urine output, generalized weakness  No acute events.  Patient overall is feeling much better.  UOP is improving.  Taking PO.  No CP/dyspnea/f/c/n/v/d.    Objective   Objective   Vital Signs  Temp:  [96.9 °F (36.1 °C)-98 °F (36.7 °C)] 97 °F (36.1 °C)  Heart Rate:  [68-80] 70  Resp:  [16] 16  BP: (113-149)/(64-84) 149/84  SpO2:  [95 %-98 %] 97 %  on   ;   Device (Oxygen Therapy): room air  There is no height or weight on file to calculate BMI.  Physical Exam   Constitutional: She is oriented to person, place, and time. No distress.   HENT:   Head: Normocephalic and atraumatic.   Mouth/Throat: Oropharynx is clear and moist.   Eyes: Conjunctivae and EOM are normal. Pupils are equal, round, and reactive to light.   Neck: Normal range of motion. Neck supple.   Cardiovascular: Normal rate, regular rhythm and intact distal pulses.   Pulmonary/Chest: Effort normal and breath sounds normal. She has no rales.   Abdominal: Soft. Bowel sounds are normal.   Musculoskeletal: She exhibits no edema or tenderness.   Neurological: She is alert and oriented to person, place, and time.   Skin: Skin is warm and dry. She is not diaphoretic.   Psychiatric: She has a normal mood and affect. Her behavior is normal.   Nursing note and vitals reviewed.      Results Review:       I reviewed the patient's new clinical results.  Results from last 7 days   Lab Units 19  0609 19  0601   WBC 10*3/mm3 6.37 6.91   HEMOGLOBIN g/dL 10.5* 10.0*   PLATELETS 10*3/mm3 144 168     Results from last 7 days   Lab Units 19  0609 19  0601 19  2355   SODIUM mmol/L 141 138 139   POTASSIUM mmol/L 4.8 3.5 4.5   CHLORIDE mmol/L 107 107 98   CO2 mmol/L 22.6 21.8* 23.3   BUN mg/dL 24* 29* 34*   CREATININE mg/dL 1.31* 2.22* 3.06*   GLUCOSE  mg/dL 111* 94 165*   Estimated Creatinine Clearance: 43.3 mL/min (A) (by C-G formula based on SCr of 1.31 mg/dL (H)).    Results from last 7 days   Lab Units 09/02/19  0609 09/01/19  0601 08/31/19  2355   CALCIUM mg/dL 9.5 8.2* 10.1   MAGNESIUM mg/dL 1.8  --   --    PHOSPHORUS mg/dL 3.2  --   --        Hemoglobin A1C   Date/Time Value Ref Range Status   09/01/2019 0601 7.20 (H) 4.80 - 5.60 % Final     Glucose   Date/Time Value Ref Range Status   09/02/2019 1628 194 (H) 70 - 130 mg/dL Final   09/02/2019 1116 131 (H) 70 - 130 mg/dL Final   09/02/2019 0552 122 70 - 130 mg/dL Final   09/01/2019 2039 146 (H) 70 - 130 mg/dL Final   09/01/2019 1642 95 70 - 130 mg/dL Final   09/01/2019 1135 221 (H) 70 - 130 mg/dL Final   09/01/2019 0614 126 70 - 130 mg/dL Final         aspirin 81 mg Oral Daily   carvedilol 12.5 mg Oral BID With Meals   DULoxetine 60 mg Oral Daily   insulin lispro 0-7 Units Subcutaneous 4x Daily With Meals & Nightly   levothyroxine 25 mcg Oral Daily   pantoprazole 40 mg Oral QAM   pravastatin 10 mg Oral Daily   pregabalin 50 mg Oral BID   sodium chloride 10 mL Intravenous Q12H   vitamin B-12 1,000 mcg Oral Daily       sodium chloride 100 mL/hr Last Rate: 100 mL/hr (09/02/19 1211)   Diet Regular; Cardiac, Consistent Carbohydrate       Assessment/Plan     Active Hospital Problems    Diagnosis  POA   • **ADRIENNE (acute kidney injury) (CMS/[facility]) [N17.9]  Yes   • Diastolic congestive heart failure (CMS/[facility]) [I50.30]  Yes   • CKD (chronic kidney disease) stage 3, GFR 30-59 ml/min (CMS/[facility]) [N18.3]  Yes   • Hypertension [I10]  Yes   • DM2 (diabetes mellitus, type 2) (CMS/[facility]) [E11.9]  Yes   • Hypothyroidism [E03.9]  Yes      Resolved Hospital Problems   No resolved problems to display.   ADRIENNE on CKD  - likely pre-renal from volume depletion  - improving with IVF, continue  - no evidence of postrenal obstruction  - hold bumex, spironolactone, and losartan  - appreciate nephrology recs    Chronic Diastolic CHF  - she does  not appear volume overloaded but will watch closely with IVF    Type 2 DM  - holding amaryl and januvia  - cover with ssi    VTE Prophylaxis - SCDs  Code Status - Full code  Disposition - Anticipate discharge home in 1-2 days.      Lupillo Garcia MD  [facility]  09/02/19  7:54 PM

## 2019-09-02 NOTE — PROGRESS NOTES
LOS: 1 day    Patient Care Team:  Kylee Jensen MD as PCP - General (Internal Medicine)  Trini Zaidi APRN as PCP - Claims Attributed  Lina Fisher MD as Referring Physician (General Surgery)  Live Chambers MD as Surgeon (Orthopedic Surgery)  Rafa Hannah MD as Consulting Physician (Nephrology)  Shanda Gerardo, RN as Care Coordinator ([facility])    Chief Complaint:  ADRIENNE    Subjective     Interval History:   Feels much better.  No CP or SOA.  No other complaints.    Objective     Vital Sign Min/Max for last 24 hours  Temp  Min: 97.2 °F (36.2 °C)  Max: 98 °F (36.7 °C)   BP  Min: 113/65  Max: 147/74   Pulse  Min: 68  Max: 80   Resp  Min: 16  Max: 16   SpO2  Min: 95 %  Max: 98 %   No Data Recorded   No Data Recorded         No intake/output data recorded.  I/O last 3 completed shifts:  In: 3829 [P.O.:730; I.V.:3099]  Out: 2500 [Urine:2500]    Physical Exam:  GEN: Awake, NAD  ENT: PERRL, EOMI, MMM  NECK: Supple, no JVD  CHEST: CTAB, no W/R/C  CV: RRR, no M/G/R  ABD: Soft, NT, +BS  SKIN: Warm an Dry  NEURO: CN's intact    WBC WBC   Date Value Ref Range Status   09/02/2019 6.37 3.40 - 10.80 10*3/mm3 Final   09/01/2019 6.91 3.40 - 10.80 10*3/mm3 Final      HGB Hemoglobin   Date Value Ref Range Status   09/02/2019 10.5 (L) 12.0 - 15.9 g/dL Final   09/01/2019 10.0 (L) 12.0 - 15.9 g/dL Final      HCT Hematocrit   Date Value Ref Range Status   09/02/2019 35.4 34.0 - 46.6 % Final   09/01/2019 33.5 (L) 34.0 - 46.6 % Final      Platlets No results found for: LABPLAT   MCV MCV   Date Value Ref Range Status   09/02/2019 94.7 79.0 - 97.0 fL Final   09/01/2019 92.3 79.0 - 97.0 fL Final          Sodium Sodium   Date Value Ref Range Status   09/02/2019 141 136 - 145 mmol/L Final   09/01/2019 138 136 - 145 mmol/L Final   08/31/2019 139 136 - 145 mmol/L Final      Potassium Potassium   Date Value Ref Range Status   09/02/2019 4.8 3.5 - 5.2 mmol/L Final   09/01/2019 3.5 3.5 - 5.2 mmol/L Final    08/31/2019 4.5 3.5 - 5.2 mmol/L Final      Chloride Chloride   Date Value Ref Range Status   09/02/2019 107 98 - 107 mmol/L Final   09/01/2019 107 98 - 107 mmol/L Final   08/31/2019 98 98 - 107 mmol/L Final      CO2 CO2   Date Value Ref Range Status   09/02/2019 22.6 22.0 - 29.0 mmol/L Final   09/01/2019 21.8 (L) 22.0 - 29.0 mmol/L Final   08/31/2019 23.3 22.0 - 29.0 mmol/L Final      BUN BUN   Date Value Ref Range Status   09/02/2019 24 (H) 8 - 23 mg/dL Final   09/01/2019 29 (H) 8 - 23 mg/dL Final   08/31/2019 34 (H) 8 - 23 mg/dL Final      Creatinine Creatinine   Date Value Ref Range Status   09/02/2019 1.31 (H) 0.57 - 1.00 mg/dL Final   09/01/2019 2.22 (H) 0.57 - 1.00 mg/dL Final   08/31/2019 3.06 (H) 0.57 - 1.00 mg/dL Final      Calcium Calcium   Date Value Ref Range Status   09/02/2019 9.5 8.6 - 10.5 mg/dL Final   09/01/2019 8.2 (L) 8.6 - 10.5 mg/dL Final   08/31/2019 10.1 8.6 - 10.5 mg/dL Final      PO4 No results found for: CAPO4   Albumin No results found for: ALBUMIN   Magnesium Magnesium   Date Value Ref Range Status   09/02/2019 1.8 1.6 - 2.4 mg/dL Final      Uric Acid No results found for: URICACID        Results Review:     I reviewed the patient's new clinical results.      aspirin 81 mg Oral Daily   carvedilol 12.5 mg Oral BID With Meals   DULoxetine 60 mg Oral Daily   insulin lispro 0-7 Units Subcutaneous 4x Daily With Meals & Nightly   levothyroxine 25 mcg Oral Daily   pantoprazole 40 mg Oral QAM   pravastatin 10 mg Oral Daily   pregabalin 50 mg Oral BID   sodium chloride 10 mL Intravenous Q12H   vitamin B-12 1,000 mcg Oral Daily       sodium chloride 100 mL/hr Last Rate: 100 mL/hr (09/02/19 0043)       Medication Review: Reviewed    Assessment/Plan       ADRIENNE (acute kidney injury) (CMS/[facility])    DM2 (diabetes mellitus, type 2) (CMS/[facility])    Hypertension    Hypothyroidism    CKD (chronic kidney disease) stage 3, GFR 30-59 ml/min (CMS/[facility])    Diastolic congestive heart failure (CMS/[facility])    PLAN: Cr  improving with IVF as of yesterday makes pre-renal azotemia from diuretics and ARB most likely.  Awaiting labs today.  Diuretics on hold.  If Cr significantly better then can monitor off IVF.  Would then cautiously ad back diuretics at lower dose once renal function remaining stable.  Will follow       Simone Hopkins MD   [facility]  09/02/19  8:34 AM

## 2019-09-03 ENCOUNTER — EPISODE CHANGES (OUTPATIENT)
Dept: CASE MANAGEMENT | Facility: OTHER | Age: 74
End: 2019-09-03

## 2019-09-03 ENCOUNTER — TELEPHONE (OUTPATIENT)
Dept: INTERNAL MEDICINE | Facility: CLINIC | Age: 74
End: 2019-09-03

## 2019-09-03 LAB
ANION GAP SERPL CALCULATED.3IONS-SCNC: 4.4 MMOL/L (ref 5–15)
BASOPHILS # BLD AUTO: 0.02 10*3/MM3 (ref 0–0.2)
BASOPHILS NFR BLD AUTO: 0.4 % (ref 0–1.5)
BUN BLD-MCNC: 14 MG/DL (ref 8–23)
BUN/CREAT SERPL: 15.1 (ref 7–25)
CALCIUM SPEC-SCNC: 9 MG/DL (ref 8.6–10.5)
CHLORIDE SERPL-SCNC: 109 MMOL/L (ref 98–107)
CO2 SERPL-SCNC: 23.6 MMOL/L (ref 22–29)
CREAT BLD-MCNC: 0.93 MG/DL (ref 0.57–1)
DEPRECATED RDW RBC AUTO: 49 FL (ref 37–54)
EOSINOPHIL # BLD AUTO: 0.19 10*3/MM3 (ref 0–0.4)
EOSINOPHIL NFR BLD AUTO: 3.4 % (ref 0.3–6.2)
ERYTHROCYTE [DISTWIDTH] IN BLOOD BY AUTOMATED COUNT: 14.3 % (ref 12.3–15.4)
GFR SERPL CREATININE-BSD FRML MDRD: 59 ML/MIN/1.73
GLUCOSE BLD-MCNC: 119 MG/DL (ref 65–99)
GLUCOSE BLDC GLUCOMTR-MCNC: 110 MG/DL (ref 70–130)
GLUCOSE BLDC GLUCOMTR-MCNC: 139 MG/DL (ref 70–130)
GLUCOSE BLDC GLUCOMTR-MCNC: 147 MG/DL (ref 70–130)
GLUCOSE BLDC GLUCOMTR-MCNC: 168 MG/DL (ref 70–130)
HCT VFR BLD AUTO: 31.3 % (ref 34–46.6)
HGB BLD-MCNC: 9.6 G/DL (ref 12–15.9)
IMM GRANULOCYTES # BLD AUTO: 0.03 10*3/MM3 (ref 0–0.05)
IMM GRANULOCYTES NFR BLD AUTO: 0.5 % (ref 0–0.5)
LYMPHOCYTES # BLD AUTO: 1.58 10*3/MM3 (ref 0.7–3.1)
LYMPHOCYTES NFR BLD AUTO: 28.1 % (ref 19.6–45.3)
MAGNESIUM SERPL-MCNC: 1.4 MG/DL (ref 1.6–2.4)
MCH RBC QN AUTO: 28.8 PG (ref 26.6–33)
MCHC RBC AUTO-ENTMCNC: 30.7 G/DL (ref 31.5–35.7)
MCV RBC AUTO: 94 FL (ref 79–97)
MONOCYTES # BLD AUTO: 0.43 10*3/MM3 (ref 0.1–0.9)
MONOCYTES NFR BLD AUTO: 7.6 % (ref 5–12)
NEUTROPHILS # BLD AUTO: 3.38 10*3/MM3 (ref 1.7–7)
NEUTROPHILS NFR BLD AUTO: 60 % (ref 42.7–76)
NRBC BLD AUTO-RTO: 0 /100 WBC (ref 0–0.2)
PHOSPHATE SERPL-MCNC: 2.8 MG/DL (ref 2.5–4.5)
PLATELET # BLD AUTO: 132 10*3/MM3 (ref 140–450)
PMV BLD AUTO: 10.1 FL (ref 6–12)
POTASSIUM BLD-SCNC: 4 MMOL/L (ref 3.5–5.2)
RBC # BLD AUTO: 3.33 10*6/MM3 (ref 3.77–5.28)
SODIUM BLD-SCNC: 137 MMOL/L (ref 136–145)
WBC NRBC COR # BLD: 5.63 10*3/MM3 (ref 3.4–10.8)

## 2019-09-03 PROCEDURE — 82962 GLUCOSE BLOOD TEST: CPT

## 2019-09-03 PROCEDURE — 85025 COMPLETE CBC W/AUTO DIFF WBC: CPT | Performed by: INTERNAL MEDICINE

## 2019-09-03 PROCEDURE — 84100 ASSAY OF PHOSPHORUS: CPT | Performed by: INTERNAL MEDICINE

## 2019-09-03 PROCEDURE — 25010000002 MAGNESIUM SULFATE IN D5W 1G/100ML (PREMIX) 1-5 GM/100ML-% SOLUTION: Performed by: INTERNAL MEDICINE

## 2019-09-03 PROCEDURE — 63710000001 INSULIN LISPRO (HUMAN) PER 5 UNITS: Performed by: NURSE PRACTITIONER

## 2019-09-03 PROCEDURE — 83735 ASSAY OF MAGNESIUM: CPT | Performed by: INTERNAL MEDICINE

## 2019-09-03 PROCEDURE — 80048 BASIC METABOLIC PNL TOTAL CA: CPT | Performed by: INTERNAL MEDICINE

## 2019-09-03 RX ORDER — BUMETANIDE 0.5 MG/1
0.5 TABLET ORAL DAILY
Status: DISCONTINUED | OUTPATIENT
Start: 2019-09-04 | End: 2019-09-04 | Stop reason: HOSPADM

## 2019-09-03 RX ORDER — MAGNESIUM SULFATE 1 G/100ML
1 INJECTION INTRAVENOUS ONCE
Status: COMPLETED | OUTPATIENT
Start: 2019-09-03 | End: 2019-09-03

## 2019-09-03 RX ADMIN — HYDROCODONE BITARTRATE AND ACETAMINOPHEN 1 TABLET: 10; 325 TABLET ORAL at 13:52

## 2019-09-03 RX ADMIN — INSULIN LISPRO 2 UNITS: 100 INJECTION, SOLUTION INTRAVENOUS; SUBCUTANEOUS at 12:18

## 2019-09-03 RX ADMIN — ASPIRIN 81 MG: 81 TABLET, COATED ORAL at 10:31

## 2019-09-03 RX ADMIN — LEVOTHYROXINE SODIUM 25 MCG: 25 TABLET ORAL at 06:02

## 2019-09-03 RX ADMIN — SODIUM CHLORIDE 100 ML/HR: 9 INJECTION, SOLUTION INTRAVENOUS at 07:09

## 2019-09-03 RX ADMIN — PREGABALIN 50 MG: 25 CAPSULE ORAL at 10:31

## 2019-09-03 RX ADMIN — HYDROCODONE BITARTRATE AND ACETAMINOPHEN 1 TABLET: 10; 325 TABLET ORAL at 07:09

## 2019-09-03 RX ADMIN — PANTOPRAZOLE SODIUM 40 MG: 40 TABLET, DELAYED RELEASE ORAL at 06:02

## 2019-09-03 RX ADMIN — HYDROCODONE BITARTRATE AND ACETAMINOPHEN 1 TABLET: 10; 325 TABLET ORAL at 00:53

## 2019-09-03 RX ADMIN — PRAVASTATIN SODIUM 10 MG: 10 TABLET ORAL at 10:30

## 2019-09-03 RX ADMIN — SODIUM CHLORIDE, PRESERVATIVE FREE 10 ML: 5 INJECTION INTRAVENOUS at 22:00

## 2019-09-03 RX ADMIN — HYDROCODONE BITARTRATE AND ACETAMINOPHEN 1 TABLET: 10; 325 TABLET ORAL at 20:01

## 2019-09-03 RX ADMIN — PREGABALIN 50 MG: 25 CAPSULE ORAL at 22:00

## 2019-09-03 RX ADMIN — CARVEDILOL 12.5 MG: 12.5 TABLET, FILM COATED ORAL at 20:01

## 2019-09-03 RX ADMIN — Medication 1000 MCG: at 10:31

## 2019-09-03 RX ADMIN — CARVEDILOL 12.5 MG: 12.5 TABLET, FILM COATED ORAL at 10:30

## 2019-09-03 RX ADMIN — DULOXETINE HYDROCHLORIDE 60 MG: 60 CAPSULE, DELAYED RELEASE ORAL at 10:30

## 2019-09-03 RX ADMIN — MAGNESIUM SULFATE 1 G: 1 INJECTION INTRAVENOUS at 10:30

## 2019-09-03 NOTE — TELEPHONE ENCOUNTER
----- Message from Valentina Au sent at 9/3/2019 11:27 AM EDT -----  Regarding: PATIENT  PATIENT CALLED TO LET DR. BORJAS KNOW SHE IS CURRENTLY IN [facility] SINCE SAT. HER KIDNEYS SHUT DOWN.

## 2019-09-03 NOTE — PLAN OF CARE
Problem: Patient Care Overview  Goal: Plan of Care Review  Outcome: Ongoing (interventions implemented as appropriate)   09/03/19 0322   Coping/Psychosocial   Plan of Care Reviewed With patient   Plan of Care Review   Progress improving   OTHER   Outcome Summary VSS and afebrile. Medicating with Lortab for chronic pain. Up with asst to void. IVF continued. Tolerating regular diet well. Will cont to monitor.      Goal: Individualization and Mutuality  Outcome: Ongoing (interventions implemented as appropriate)    Goal: Discharge Needs Assessment  Outcome: Ongoing (interventions implemented as appropriate)    Goal: Interprofessional Rounds/Family Conf  Outcome: Ongoing (interventions implemented as appropriate)      Problem: Pain, Acute (Adult)  Goal: Acceptable Pain Control/Comfort Level  Outcome: Ongoing (interventions implemented as appropriate)      Problem: Renal Failure/Kidney Injury, Acute (Adult)  Goal: Signs and Symptoms of Listed Potential Problems Will be Absent, Minimized or Managed (Renal Failure/Kidney Injury, Acute)  Outcome: Ongoing (interventions implemented as appropriate)      Problem: Fall Risk (Adult)  Goal: Absence of Fall  Outcome: Ongoing (interventions implemented as appropriate)      Problem: Pain, Chronic (Adult)  Goal: Acceptable Pain/Comfort Level and Functional Ability  Outcome: Ongoing (interventions implemented as appropriate)

## 2019-09-03 NOTE — PROGRESS NOTES
LOS: 2 days    Patient Care Team:  Kylee Jensen MD as PCP - General (Internal Medicine)  Trini Zaidi APRN as PCP - Claims Attributed  Lina Fisher MD as Referring Physician (General Surgery)  Live Chambers MD as Surgeon (Orthopedic Surgery)  Rafa Hannah MD as Consulting Physician (Nephrology)  Shanda Gerardo, RN as Community Care Coordinator ([facility])    Chief Complaint:  ADRIENNE    Subjective     Interval History:   Feels much better.  No CP or SOA.  No other complaints.  Good appetite, no n/v    Objective     Vital Sign Min/Max for last 24 hours  Temp  Min: 96.9 °F (36.1 °C)  Max: 98.2 °F (36.8 °C)   BP  Min: 134/73  Max: 151/79   Pulse  Min: 60  Max: 70   Resp  Min: 16  Max: 16   SpO2  Min: 94 %  Max: 98 %   No Data Recorded   No Data Recorded         I/O this shift:  In: 1170 [P.O.:220; I.V.:950]  Out: 600 [Urine:600]  I/O last 3 completed shifts:  In: 5320.7 [P.O.:1280; I.V.:4040.7]  Out: 4300 [Urine:4300]    Physical Exam:  GEN: Awake, NAD  ENT: PERRL, EOMI, MMM  NECK: Supple, no JVD  CHEST: CTAB, no W/R/C  CV: RRR, no M/G/R  ABD: Soft, NT, +BS  SKIN: Warm an Dry  NEURO: CN's intact    WBC WBC   Date Value Ref Range Status   09/03/2019 5.63 3.40 - 10.80 10*3/mm3 Final   09/02/2019 6.37 3.40 - 10.80 10*3/mm3 Final   09/01/2019 6.91 3.40 - 10.80 10*3/mm3 Final      HGB Hemoglobin   Date Value Ref Range Status   09/03/2019 9.6 (L) 12.0 - 15.9 g/dL Final   09/02/2019 10.5 (L) 12.0 - 15.9 g/dL Final   09/01/2019 10.0 (L) 12.0 - 15.9 g/dL Final      HCT Hematocrit   Date Value Ref Range Status   09/03/2019 31.3 (L) 34.0 - 46.6 % Final   09/02/2019 35.4 34.0 - 46.6 % Final   09/01/2019 33.5 (L) 34.0 - 46.6 % Final      Platlets No results found for: LABPLAT   MCV MCV   Date Value Ref Range Status   09/03/2019 94.0 79.0 - 97.0 fL Final   09/02/2019 94.7 79.0 - 97.0 fL Final   09/01/2019 92.3 79.0 - 97.0 fL Final          Sodium Sodium   Date Value Ref Range Status   09/03/2019 137  136 - 145 mmol/L Final   09/02/2019 141 136 - 145 mmol/L Final   09/01/2019 138 136 - 145 mmol/L Final   08/31/2019 139 136 - 145 mmol/L Final      Potassium Potassium   Date Value Ref Range Status   09/03/2019 4.0 3.5 - 5.2 mmol/L Final   09/02/2019 4.8 3.5 - 5.2 mmol/L Final   09/01/2019 3.5 3.5 - 5.2 mmol/L Final   08/31/2019 4.5 3.5 - 5.2 mmol/L Final      Chloride Chloride   Date Value Ref Range Status   09/03/2019 109 (H) 98 - 107 mmol/L Final   09/02/2019 107 98 - 107 mmol/L Final   09/01/2019 107 98 - 107 mmol/L Final   08/31/2019 98 98 - 107 mmol/L Final      CO2 CO2   Date Value Ref Range Status   09/03/2019 23.6 22.0 - 29.0 mmol/L Final   09/02/2019 22.6 22.0 - 29.0 mmol/L Final   09/01/2019 21.8 (L) 22.0 - 29.0 mmol/L Final   08/31/2019 23.3 22.0 - 29.0 mmol/L Final      BUN BUN   Date Value Ref Range Status   09/03/2019 14 8 - 23 mg/dL Final   09/02/2019 24 (H) 8 - 23 mg/dL Final   09/01/2019 29 (H) 8 - 23 mg/dL Final   08/31/2019 34 (H) 8 - 23 mg/dL Final      Creatinine Creatinine   Date Value Ref Range Status   09/03/2019 0.93 0.57 - 1.00 mg/dL Final   09/02/2019 1.31 (H) 0.57 - 1.00 mg/dL Final   09/01/2019 2.22 (H) 0.57 - 1.00 mg/dL Final   08/31/2019 3.06 (H) 0.57 - 1.00 mg/dL Final      Calcium Calcium   Date Value Ref Range Status   09/03/2019 9.0 8.6 - 10.5 mg/dL Final   09/02/2019 9.5 8.6 - 10.5 mg/dL Final   09/01/2019 8.2 (L) 8.6 - 10.5 mg/dL Final   08/31/2019 10.1 8.6 - 10.5 mg/dL Final      PO4 No results found for: CAPO4   Albumin No results found for: ALBUMIN   Magnesium Magnesium   Date Value Ref Range Status   09/03/2019 1.4 (L) 1.6 - 2.4 mg/dL Final   09/02/2019 1.8 1.6 - 2.4 mg/dL Final      Uric Acid No results found for: URICACID        Results Review:     I reviewed the patient's new clinical results.      aspirin 81 mg Oral Daily   [START ON 9/4/2019] bumetanide 0.5 mg Oral Daily   carvedilol 12.5 mg Oral BID With Meals   DULoxetine 60 mg Oral Daily   insulin lispro 0-7 Units  Subcutaneous 4x Daily With Meals & Nightly   levothyroxine 25 mcg Oral Daily   pantoprazole 40 mg Oral QAM   pravastatin 10 mg Oral Daily   pregabalin 50 mg Oral BID   sodium chloride 10 mL Intravenous Q12H   vitamin B-12 1,000 mcg Oral Daily          Medication Review: Reviewed    Assessment/Plan       ADRIENNE (acute kidney injury, much improved    DM2 (diabetes mellitus, type 2) (CMS/[facility])    Hypertension    Hypothyroidism    CKD (chronic kidney disease) stage 3, GFR 30-59 ml/min (CMS/[facility])    Diastolic congestive heart failure (CMS/[facility])    PLAN:   Cr improved with IVF and creatinine near baseline  Diuretics on hold, can resume low dose po bumex tomorrow  Hold aldactone and ARB for now.   D/C planning, OK from renal standpoint at any time      Rafa Hannah MD   [facility]  09/03/19  12:13 PM

## 2019-09-03 NOTE — PLAN OF CARE
Problem: Patient Care Overview  Goal: Plan of Care Review  Outcome: Ongoing (interventions implemented as appropriate)   09/03/19 1403   Coping/Psychosocial   Plan of Care Reviewed With patient   Plan of Care Review   Progress no change   OTHER   Outcome Summary IV saline locked. Voiding freely. Pain controlled with PO pain meds. Probable d/c tomorrow

## 2019-09-03 NOTE — PROGRESS NOTES
Name: Jung Short ADMIT: 2019   : 1945  PCP: Kylee Jensen MD    MRN: [id number] LOS: 2 days   AGE/SEX: 74 y.o. female  ROOM: [facility]     Subjective   Subjective   CC: decresaed urine output, generalized weakness  No acute events.  Patient overall is feeling much better.  UOP is improved and more consistent now.  Taking PO.  No CP/dyspnea/f/c/n/v/d.    Objective   Objective   Vital Signs  Temp:  [96.9 °F (36.1 °C)-98.2 °F (36.8 °C)] 98.2 °F (36.8 °C)  Heart Rate:  [60-70] 70  Resp:  [16] 16  BP: (134-151)/(66-84) 151/79  SpO2:  [94 %-98 %] 97 %  on   ;   Device (Oxygen Therapy): room air  There is no height or weight on file to calculate BMI.  Physical Exam   Constitutional: She is oriented to person, place, and time. No distress.   HENT:   Head: Normocephalic and atraumatic.   Mouth/Throat: Oropharynx is clear and moist.   Eyes: Conjunctivae and EOM are normal. Pupils are equal, round, and reactive to light.   Neck: Normal range of motion. Neck supple.   Cardiovascular: Normal rate, regular rhythm and intact distal pulses.   Pulmonary/Chest: Effort normal and breath sounds normal. She has no rales.   Abdominal: Soft. Bowel sounds are normal.   Musculoskeletal: She exhibits no edema or tenderness.   Neurological: She is alert and oriented to person, place, and time.   Skin: Skin is warm and dry. She is not diaphoretic.   Psychiatric: She has a normal mood and affect. Her behavior is normal.   Nursing note and vitals reviewed.      Results Review:       I reviewed the patient's new clinical results.  Results from last 7 days   Lab Units 19  0620 19  0609 19  0601   WBC 10*3/mm3 5.63 6.37 6.91   HEMOGLOBIN g/dL 9.6* 10.5* 10.0*   PLATELETS 10*3/mm3 132* 144 168     Results from last 7 days   Lab Units 19  0619 19  0609 19  0601 19  9226   SODIUM mmol/L 137 141 138 139   POTASSIUM mmol/L 4.0 4.8 3.5 4.5   CHLORIDE mmol/L 109* 107 107 98   CO2 mmol/L 23.6  22.6 21.8* 23.3   BUN mg/dL 14 24* 29* 34*   CREATININE mg/dL 0.93 1.31* 2.22* 3.06*   GLUCOSE mg/dL 119* 111* 94 165*   Estimated Creatinine Clearance: 61 mL/min (by C-G formula based on SCr of 0.93 mg/dL).    Results from last 7 days   Lab Units 09/03/19  0619 09/02/19  0609 09/01/19  0601 08/31/19  2355   CALCIUM mg/dL 9.0 9.5 8.2* 10.1   MAGNESIUM mg/dL 1.4* 1.8  --   --    PHOSPHORUS mg/dL 2.8 3.2  --   --        Hemoglobin A1C   Date/Time Value Ref Range Status   09/01/2019 0601 7.20 (H) 4.80 - 5.60 % Final     Glucose   Date/Time Value Ref Range Status   09/03/2019 1105 168 (H) 70 - 130 mg/dL Final   09/03/2019 0630 110 70 - 130 mg/dL Final   09/02/2019 2050 117 70 - 130 mg/dL Final   09/02/2019 1628 194 (H) 70 - 130 mg/dL Final   09/02/2019 1116 131 (H) 70 - 130 mg/dL Final   09/02/2019 0552 122 70 - 130 mg/dL Final   09/01/2019 2039 146 (H) 70 - 130 mg/dL Final         aspirin 81 mg Oral Daily   [START ON 9/4/2019] bumetanide 0.5 mg Oral Daily   carvedilol 12.5 mg Oral BID With Meals   DULoxetine 60 mg Oral Daily   insulin lispro 0-7 Units Subcutaneous 4x Daily With Meals & Nightly   levothyroxine 25 mcg Oral Daily   pantoprazole 40 mg Oral QAM   pravastatin 10 mg Oral Daily   pregabalin 50 mg Oral BID   sodium chloride 10 mL Intravenous Q12H   vitamin B-12 1,000 mcg Oral Daily      Diet Regular; Cardiac, Consistent Carbohydrate       Assessment/Plan     Active Hospital Problems    Diagnosis  POA   • **ADRIENNE (acute kidney injury) (CMS/HCC) [N17.9]  Yes   • Diastolic congestive heart failure (CMS/[facility]) [I50.30]  Yes   • CKD (chronic kidney disease) stage 3, GFR 30-59 ml/min (CMS/[facility]) [N18.3]  Yes   • Hypertension [I10]  Yes   • DM2 (diabetes mellitus, type 2) (CMS/HCC) [E11.9]  Yes   • Hypothyroidism [E03.9]  Yes      Resolved Hospital Problems   No resolved problems to display.   ADRIENNE on CKD  - likely pre-renal from volume depletion  - improving with IVF, continue  - no evidence of postrenal obstruction  - hold  bumex, spironolactone, and losartan-resume low dose bumex tomorrow per nephrology  - appreciate nephrology recs    Chronic Diastolic CHF  - she does not appear volume overloaded but will watch volume status closely     Type 2 DM  - holding amaryl and januvia  - cover with ssi    VTE Prophylaxis - SCDs  Code Status - Full code  Disposition - Anticipate discharge home tomorrow.      Lupillo Garcia MD  [facility]  09/03/19  12:28 PM

## 2019-09-04 VITALS
OXYGEN SATURATION: 92 % | RESPIRATION RATE: 16 BRPM | SYSTOLIC BLOOD PRESSURE: 143 MMHG | HEART RATE: 64 BPM | DIASTOLIC BLOOD PRESSURE: 71 MMHG | TEMPERATURE: 98.2 F

## 2019-09-04 PROBLEM — N17.9 AKI (ACUTE KIDNEY INJURY) (HCC): Status: RESOLVED | Noted: 2019-09-01 | Resolved: 2019-09-04

## 2019-09-04 LAB
GLUCOSE BLDC GLUCOMTR-MCNC: 104 MG/DL (ref 70–130)
GLUCOSE BLDC GLUCOMTR-MCNC: 153 MG/DL (ref 70–130)

## 2019-09-04 PROCEDURE — 82962 GLUCOSE BLOOD TEST: CPT

## 2019-09-04 RX ORDER — BUMETANIDE 0.5 MG/1
0.5 TABLET ORAL DAILY
Qty: 30 TABLET | Refills: 0 | Status: SHIPPED | OUTPATIENT
Start: 2019-09-05 | End: 2019-09-06 | Stop reason: SDUPTHER

## 2019-09-04 RX ADMIN — PRAVASTATIN SODIUM 10 MG: 10 TABLET ORAL at 08:25

## 2019-09-04 RX ADMIN — CARVEDILOL 12.5 MG: 12.5 TABLET, FILM COATED ORAL at 08:25

## 2019-09-04 RX ADMIN — LEVOTHYROXINE SODIUM 25 MCG: 25 TABLET ORAL at 05:43

## 2019-09-04 RX ADMIN — PREGABALIN 50 MG: 25 CAPSULE ORAL at 08:25

## 2019-09-04 RX ADMIN — HYDROCODONE BITARTRATE AND ACETAMINOPHEN 1 TABLET: 10; 325 TABLET ORAL at 02:07

## 2019-09-04 RX ADMIN — HYDROCODONE BITARTRATE AND ACETAMINOPHEN 1 TABLET: 10; 325 TABLET ORAL at 08:30

## 2019-09-04 RX ADMIN — DULOXETINE HYDROCHLORIDE 60 MG: 60 CAPSULE, DELAYED RELEASE ORAL at 08:25

## 2019-09-04 RX ADMIN — ASPIRIN 81 MG: 81 TABLET, COATED ORAL at 08:25

## 2019-09-04 RX ADMIN — BUMETANIDE 0.5 MG: 0.5 TABLET ORAL at 08:25

## 2019-09-04 RX ADMIN — SODIUM CHLORIDE, PRESERVATIVE FREE 10 ML: 5 INJECTION INTRAVENOUS at 08:25

## 2019-09-04 RX ADMIN — Medication 1000 MCG: at 08:25

## 2019-09-04 RX ADMIN — PANTOPRAZOLE SODIUM 40 MG: 40 TABLET, DELAYED RELEASE ORAL at 05:43

## 2019-09-04 NOTE — DISCHARGE SUMMARY
Date of Admission: 9/1/2019  Date of Discharge:  9/4/2019  Primary Care Physician: Kylee Jensen MD     Discharge Diagnosis:  Active Hospital Problems    Diagnosis  POA   • Diastolic congestive heart failure (CMS/[facility]) [I50.30]  Yes   • CKD (chronic kidney disease) stage 3, GFR 30-59 ml/min (CMS/[facility]) [N18.3]  Yes   • Hypertension [I10]  Yes   • DM2 (diabetes mellitus, type 2) (CMS/[facility]) [E11.9]  Yes   • Hypothyroidism [E03.9]  Yes      Resolved Hospital Problems    Diagnosis Date Resolved POA   • **ADRIENNE (acute kidney injury) (CMS/[facility]) [N17.9] 09/04/2019 Yes       Presenting Problem/History of Present Illness:  ADRIENNE (acute kidney injury) (CMS/[facility]) [N17.9]     Hospital Course:  The patient is a 74 y.o. female with a history of breast CA, CKD3, depression, DM2, GERD, HTN, hypothyroidism, HLD, PAF who presented with decreased urine output and generalized weakness.  She was found to have an ADRIENNE with a creatinine of 3.06.  Please see admission H&P from 9/1/19 for further details.  She was started on IV fluids with steady improvement in her symptoms as well as her renal function.  Her creatinine was 0.93 at discharge.  She will be resumed on a low dose of bumex.  Her losartan and aldactone will be held for the time being.  Nephrology followed the patient and she should keep follow up with Dr. Hannah.  Of note, her amaryl and januvia were held on admission.  Her BG levels were very stable in the low 100s.  She will be resumed on januvia at discharge without the amaryl. She should follow up with her PCP in about a week.    Exam Today:  Blood pressure 143/71, pulse 64, temperature 98.2 °F (36.8 °C), temperature source Oral, resp. rate 16, SpO2 92 %.  Constitutional: She is oriented to person, place, and time. No distress.   Head: Normocephalic and atraumatic.   Mouth/Throat: Oropharynx is clear and moist.   Eyes: Conjunctivae and EOM are normal. Pupils are equal, round, and reactive to light.   Neck: Normal range of motion.  Neck supple.   Cardiovascular: Normal rate, regular rhythm and intact distal pulses.   Pulmonary/Chest: Effort normal and breath sounds normal. She has no rales.   Abdominal: Soft. Bowel sounds are normal.   Musculoskeletal: She exhibits no edema or tenderness.   Neurological: She is alert and oriented to person, place, and time.   Skin: Skin is warm and dry. She is not diaphoretic.   Psychiatric: She has a normal mood and affect. Her behavior is normal.   Nursing note and vitals reviewed.    Consults:   Consults     Date and Time Order Name Status Description    9/1/2019 0438 Inpatient Nephrology Consult Completed     8/17/2019 0030 Inpatient Cardiology Consult Completed            Discharge Disposition:  Home or Self Care    Discharge Medications:     Discharge Medications      Changes to Medications      Instructions Start Date   bumetanide 0.5 MG tablet  Commonly known as:  BUMEX  What changed:    · medication strength  · how much to take   0.5 mg, Oral, Daily   Start Date:  9/5/2019        Continue These Medications      Instructions Start Date   alendronate 35 MG tablet  Commonly known as:  FOSAMAX   take 1 tablet by mouth every week      anastrozole 1 MG tablet  Commonly known as:  ARIMIDEX   1 mg, Oral, Daily, Hold until follow up with Dr. Jensen      aspirin 81 MG EC tablet   81 mg, Oral, Daily      carvedilol 12.5 MG tablet  Commonly known as:  COREG   12.5 mg, Oral, 2 Times Daily With Meals      coenzyme Q10 100 MG capsule   100 mg, Oral      DULoxetine 60 MG capsule  Commonly known as:  CYMBALTA   60 mg, Oral, Daily      esomeprazole 20 MG capsule  Commonly known as:  nexIUM   20 mg, Oral, Every Morning Before Breakfast      HYDROcodone-acetaminophen  MG per tablet  Commonly known as:  NORCO   1 tablet, Oral, Every 6 Hours PRN, DNF until 8-10-19      levothyroxine 25 MCG tablet  Commonly known as:  SYNTHROID, LEVOTHROID   TAKE 1 TABLET BY MOUTH ONCE DAILY      LYRICA 50 MG capsule  Generic drug:   pregabalin   TAKE 1 CAPSULE BY MOUTH TWICE DAILY      magnesium oxide 400 MG tablet  Commonly known as:  MAG-OX   500 mg, Oral, Daily      MULTIVITAMIN ADULT PO   Oral      potassium chloride 20 MEQ CR tablet  Commonly known as:  K-DUR,KLOR-CON   20 mEq, Oral, Daily      pravastatin 10 MG tablet  Commonly known as:  PRAVACHOL   10 mg, Oral, Daily      SITagliptin 100 MG tablet  Commonly known as:  JANUVIA   100 mg, Oral, Daily      vitamin B-12 1000 MCG tablet  Commonly known as:  CYANOCOBALAMIN   1,000 mcg, Oral, Daily      vitamin D 34151 units capsule capsule  Commonly known as:  ERGOCALCIFEROL   50,000 Units, Oral, Weekly         Stop These Medications    glimepiride 4 MG tablet  Commonly known as:  AMARYL     losartan 100 MG tablet  Commonly known as:  COZAAR     spironolactone 50 MG tablet  Commonly known as:  ALDACTONE            Discharge Diet:   Diet Instructions     Diet: Consistent Carbohydrate, Cardiac; Thin      Discharge Diet:   Consistent Carbohydrate  Cardiac       Fluid Consistency:  Thin          Activity at Discharge:   Activity Instructions     Activity as Tolerated            Follow-up Appointments:  Future Appointments   Date Time Provider Department Center   9/6/2019 11:15 AM Kylee Jensen MD [facility] PC L[facility]2 None   9/10/2019  1:00 PM [facility] ECHO CART 1  [facility] CARDI [facility]   9/11/2019  1:00 PM Trini Zaidi APRN [facility] PM EASPT None   11/25/2019  1:30 PM Lina Valencia APRN [facility] CD LCGLA None   11/26/2019  1:30 PM LAB CHAIR 1 [facility] LAB LAG LAG   11/26/2019  2:00 PM Robbin Lo MD [facility]     Additional Instructions for the Follow-ups that You Need to Schedule     Discharge Follow-up with PCP   As directed       Currently Documented PCP:    Kylee Jensen MD    PCP Phone Number:    [phone]     Follow Up Details:  1 week         Discharge Follow-up with Specified Provider: Dr. Hannah (Nephrology)   As directed      To:  Dr. Hannah (Nephrology)    Follow Up  Details:  as scheduled                Lupillo Garcia MD  09/04/19  10:30 AM    Time Spent on Discharge Activities: Greater than 30 minutes.

## 2019-09-04 NOTE — PLAN OF CARE
Problem: Patient Care Overview  Goal: Plan of Care Review  Outcome: Ongoing (interventions implemented as appropriate)   09/04/19 0406   Coping/Psychosocial   Plan of Care Reviewed With patient   Plan of Care Review   Progress no change   OTHER   Outcome Summary PO pain meds given for chronic pain. VSS. Ambulating with assist. Voiding freely. IV saline lock. Will cont to monitor.      Goal: Individualization and Mutuality  Outcome: Ongoing (interventions implemented as appropriate)    Goal: Discharge Needs Assessment  Outcome: Ongoing (interventions implemented as appropriate)    Goal: Interprofessional Rounds/Family Conf  Outcome: Ongoing (interventions implemented as appropriate)      Problem: Pain, Acute (Adult)  Goal: Acceptable Pain Control/Comfort Level  Outcome: Ongoing (interventions implemented as appropriate)      Problem: Renal Failure/Kidney Injury, Acute (Adult)  Goal: Signs and Symptoms of Listed Potential Problems Will be Absent, Minimized or Managed (Renal Failure/Kidney Injury, Acute)  Outcome: Ongoing (interventions implemented as appropriate)      Problem: Fall Risk (Adult)  Goal: Absence of Fall  Outcome: Ongoing (interventions implemented as appropriate)      Problem: Pain, Chronic (Adult)  Goal: Acceptable Pain/Comfort Level and Functional Ability  Outcome: Ongoing (interventions implemented as appropriate)

## 2019-09-04 NOTE — PROGRESS NOTES
LOS: 3 days    Patient Care Team:  Kylee Jensen MD as PCP - General (Internal Medicine)  Trini Zaidi APRN as PCP - Claims Attributed  Lina Fisher MD as Referring Physician (General Surgery)  Live Chambers MD as Surgeon (Orthopedic Surgery)  Rafa Hannah MD as Consulting Physician (Nephrology)  Shanda Gerardo, RN as Community Care Coordinator ([facility])    Chief Complaint:  ADRIENNE    Subjective     Interval History:   Feels much better.  No CP or SOA.  No other complaints.  Good appetite, no n/v  Getting ready for DC    Objective     Vital Sign Min/Max for last 24 hours  Temp  Min: 97 °F (36.1 °C)  Max: 98.7 °F (37.1 °C)   BP  Min: 149/75  Max: 179/82   Pulse  Min: 63  Max: 86   Resp  Min: 16  Max: 16   SpO2  Min: 91 %  Max: 97 %   No Data Recorded   No Data Recorded         No intake/output data recorded.  I/O last 3 completed shifts:  In: 3646.7 [P.O.:1190; I.V.:2456.7]  Out: 5750 [Urine:5750]    Physical Exam:  GEN: Awake, NAD  ENT: PERRL, EOMI, MMM  NECK: Supple, no JVD  CHEST: CTAB, no W/R/C  CV: RRR, no M/G/R  ABD: Soft, NT, +BS  SKIN: Warm an Dry  NEURO: CN's intact. walking    WBC WBC   Date Value Ref Range Status   09/03/2019 5.63 3.40 - 10.80 10*3/mm3 Final   09/02/2019 6.37 3.40 - 10.80 10*3/mm3 Final      HGB Hemoglobin   Date Value Ref Range Status   09/03/2019 9.6 (L) 12.0 - 15.9 g/dL Final   09/02/2019 10.5 (L) 12.0 - 15.9 g/dL Final      HCT Hematocrit   Date Value Ref Range Status   09/03/2019 31.3 (L) 34.0 - 46.6 % Final   09/02/2019 35.4 34.0 - 46.6 % Final      Platlets No results found for: LABPLAT   MCV MCV   Date Value Ref Range Status   09/03/2019 94.0 79.0 - 97.0 fL Final   09/02/2019 94.7 79.0 - 97.0 fL Final          Sodium Sodium   Date Value Ref Range Status   09/03/2019 137 136 - 145 mmol/L Final   09/02/2019 141 136 - 145 mmol/L Final      Potassium Potassium   Date Value Ref Range Status   09/03/2019 4.0 3.5 - 5.2 mmol/L Final   09/02/2019 4.8 3.5 -  5.2 mmol/L Final      Chloride Chloride   Date Value Ref Range Status   09/03/2019 109 (H) 98 - 107 mmol/L Final   09/02/2019 107 98 - 107 mmol/L Final      CO2 CO2   Date Value Ref Range Status   09/03/2019 23.6 22.0 - 29.0 mmol/L Final   09/02/2019 22.6 22.0 - 29.0 mmol/L Final      BUN BUN   Date Value Ref Range Status   09/03/2019 14 8 - 23 mg/dL Final   09/02/2019 24 (H) 8 - 23 mg/dL Final      Creatinine Creatinine   Date Value Ref Range Status   09/03/2019 0.93 0.57 - 1.00 mg/dL Final   09/02/2019 1.31 (H) 0.57 - 1.00 mg/dL Final      Calcium Calcium   Date Value Ref Range Status   09/03/2019 9.0 8.6 - 10.5 mg/dL Final   09/02/2019 9.5 8.6 - 10.5 mg/dL Final      PO4 No results found for: CAPO4   Albumin No results found for: ALBUMIN   Magnesium Magnesium   Date Value Ref Range Status   09/03/2019 1.4 (L) 1.6 - 2.4 mg/dL Final   09/02/2019 1.8 1.6 - 2.4 mg/dL Final      Uric Acid No results found for: URICACID        Results Review:     I reviewed the patient's new clinical results.      aspirin 81 mg Oral Daily   bumetanide 0.5 mg Oral Daily   carvedilol 12.5 mg Oral BID With Meals   DULoxetine 60 mg Oral Daily   insulin lispro 0-7 Units Subcutaneous 4x Daily With Meals & Nightly   levothyroxine 25 mcg Oral Daily   pantoprazole 40 mg Oral QAM   pravastatin 10 mg Oral Daily   pregabalin 50 mg Oral BID   sodium chloride 10 mL Intravenous Q12H   vitamin B-12 1,000 mcg Oral Daily          Medication Review: Reviewed    Assessment/Plan       ADRIENNE (acute kidney injury, much improved    DM2 (diabetes mellitus, type 2) (CMS/[facility])    Hypertension    Hypothyroidism    CKD (chronic kidney disease) stage 3, GFR 30-59 ml/min (CMS/[facility])    Diastolic congestive heart failure (CMS/[facility])  Hypomagnesemia     PLAN:   Cr improved as of yesterday. No labs today  can resume low dose po bumex today   Hold aldactone and ARB for now.   D/C planning, OK from renal standpoint at any time  If no DC today, check BMP and Mg in am   Follow up  with Dr. Hannah in 6 weeks.     Breanna Potts MD   [facility]  09/04/19  9:17 AM

## 2019-09-05 ENCOUNTER — EPISODE CHANGES (OUTPATIENT)
Dept: CASE MANAGEMENT | Facility: OTHER | Age: 74
End: 2019-09-05

## 2019-09-05 ENCOUNTER — READMISSION MANAGEMENT (OUTPATIENT)
Dept: CALL CENTER | Facility: HOSPITAL | Age: 74
End: 2019-09-05

## 2019-09-05 NOTE — OUTREACH NOTE
Prep Survey      Responses   Facility patient discharged from?  [place]   Is patient eligible?  Yes   Discharge diagnosis  Diastolic CHF, CKD III, HTN, DM II, Hypothyroidism   Does the patient have one of the following disease processes/diagnoses(primary or secondary)?  CHF [Admitted for ADRIENNE, chronic CHF]   Does the patient have Home health ordered?  No   Is there a DME ordered?  No   Comments regarding appointments  See AVS   Prep survey completed?  Yes          Alondra Garcia RN

## 2019-09-06 ENCOUNTER — OFFICE VISIT (OUTPATIENT)
Dept: INTERNAL MEDICINE | Facility: CLINIC | Age: 74
End: 2019-09-06

## 2019-09-06 ENCOUNTER — READMISSION MANAGEMENT (OUTPATIENT)
Dept: CALL CENTER | Facility: HOSPITAL | Age: 74
End: 2019-09-06

## 2019-09-06 VITALS
HEART RATE: 70 BPM | RESPIRATION RATE: 20 BRPM | DIASTOLIC BLOOD PRESSURE: 66 MMHG | OXYGEN SATURATION: 100 % | WEIGHT: 227 LBS | BODY MASS INDEX: 35.63 KG/M2 | SYSTOLIC BLOOD PRESSURE: 140 MMHG | HEIGHT: 67 IN | TEMPERATURE: 98.3 F

## 2019-09-06 DIAGNOSIS — N18.30 TYPE 2 DIABETES MELLITUS WITH STAGE 3 CHRONIC KIDNEY DISEASE, WITHOUT LONG-TERM CURRENT USE OF INSULIN (HCC): ICD-10-CM

## 2019-09-06 DIAGNOSIS — G62.9 PERIPHERAL POLYNEUROPATHY: ICD-10-CM

## 2019-09-06 DIAGNOSIS — Z09 HOSPITAL DISCHARGE FOLLOW-UP: Primary | ICD-10-CM

## 2019-09-06 DIAGNOSIS — E11.22 TYPE 2 DIABETES MELLITUS WITH STAGE 3 CHRONIC KIDNEY DISEASE, WITHOUT LONG-TERM CURRENT USE OF INSULIN (HCC): ICD-10-CM

## 2019-09-06 DIAGNOSIS — N17.9 AKI (ACUTE KIDNEY INJURY) (HCC): ICD-10-CM

## 2019-09-06 DIAGNOSIS — N18.30 CKD (CHRONIC KIDNEY DISEASE) STAGE 3, GFR 30-59 ML/MIN (HCC): ICD-10-CM

## 2019-09-06 DIAGNOSIS — I50.23 ACUTE ON CHRONIC HFREF (HEART FAILURE WITH REDUCED EJECTION FRACTION) (HCC): ICD-10-CM

## 2019-09-06 LAB
BILIRUB BLD-MCNC: NEGATIVE MG/DL
CLARITY, POC: CLEAR
COLOR UR: YELLOW
GLUCOSE UR STRIP-MCNC: NEGATIVE MG/DL
KETONES UR QL: NEGATIVE
LEUKOCYTE EST, POC: NEGATIVE
NITRITE UR-MCNC: NEGATIVE MG/ML
PH UR: 5 [PH] (ref 5–8)
PROT UR STRIP-MCNC: NEGATIVE MG/DL
RBC # UR STRIP: NEGATIVE /UL
SP GR UR: 1.02 (ref 1–1.03)
UROBILINOGEN UR QL: NORMAL

## 2019-09-06 PROCEDURE — 81003 URINALYSIS AUTO W/O SCOPE: CPT | Performed by: INTERNAL MEDICINE

## 2019-09-06 PROCEDURE — 99214 OFFICE O/P EST MOD 30 MIN: CPT | Performed by: INTERNAL MEDICINE

## 2019-09-06 RX ORDER — PRAVASTATIN SODIUM 10 MG
10 TABLET ORAL DAILY
Qty: 30 TABLET | Refills: 1 | Status: SHIPPED | OUTPATIENT
Start: 2019-09-06 | End: 2019-09-06 | Stop reason: SDUPTHER

## 2019-09-06 RX ORDER — BUMETANIDE 0.5 MG/1
0.5 TABLET ORAL DAILY
Qty: 30 TABLET | Refills: 1 | Status: SHIPPED | OUTPATIENT
Start: 2019-09-06 | End: 2019-09-06 | Stop reason: SDUPTHER

## 2019-09-06 RX ORDER — PRAVASTATIN SODIUM 10 MG
10 TABLET ORAL DAILY
Qty: 90 TABLET | Refills: 1 | Status: SHIPPED | OUTPATIENT
Start: 2019-09-06 | End: 2019-10-10 | Stop reason: SDUPTHER

## 2019-09-06 RX ORDER — PREGABALIN 75 MG/1
75 CAPSULE ORAL 2 TIMES DAILY
Qty: 60 CAPSULE | Refills: 1 | Status: SHIPPED | OUTPATIENT
Start: 2019-09-06 | End: 2019-09-12

## 2019-09-06 RX ORDER — BUMETANIDE 0.5 MG/1
0.5 TABLET ORAL DAILY
Qty: 90 TABLET | Refills: 1 | Status: ON HOLD | OUTPATIENT
Start: 2019-09-06 | End: 2020-04-10 | Stop reason: SDUPTHER

## 2019-09-06 NOTE — OUTREACH NOTE
CHF Week 1 Survey      Responses   Facility patient discharged from?  [place]   Does the patient have one of the following disease processes/diagnoses(primary or secondary)?  CHF   Is there a successful TCM telephone encounter documented?  No   CHF Week 1 attempt successful?  No   Unsuccessful attempts  Attempt 1          Lisa Grissom, RN

## 2019-09-06 NOTE — PROGRESS NOTES
Jung Short is a 74 y.o. female, who presents with a chief complaint of   Chief Complaint   Patient presents with   • Follow-up     HOS f/u 9/1-9/4   • Acute Renal Failure     ADRIENNE       75 yo F with a history of breast CA, CKD3, depression, DM2, GERD, HTN, hypothyroidism, HLD, PAF who presented with decreased urine output and generalized weakness to the hospital. Seen by me Friday before admission and I had held her Bumex due to ADRIENNE. She was started on IV fluids with steady improvement in her symptoms as well as her renal function.  Her creatinine was 0.93 at discharge.  She was resumed on a low dose of bumex.  Her losartan and aldactone were held as well.  Nephrology followed the patient and she should keep follow up with Dr. Hannah.  Her were held on admission as well. Her BG levels were very stable in the low 100s.  Januvia was restarted at discharge.     She has been taking 2mg of Bumex since she left hospital. States that she did not have 0.5mg pills. Her BP has been running good about 130/80 during the day. No CP or SOB.     She states that Lyrica is no longer being covered by her insurance. Did not take on Thursday. It has helped her a lot and she did not do well with Gabapentin in the past.              The following portions of the patient's history were reviewed and updated as appropriate: allergies, current medications, past family history, past medical history, past social history, past surgical history and problem list.    Allergies: Dilaudid [hydromorphone] and Latex    Current Outpatient Medications:   •  alendronate (FOSAMAX) 35 MG tablet, take 1 tablet by mouth every week, Disp: 12 tablet, Rfl: 3  •  anastrozole (ARIMIDEX) 1 MG tablet, Take 1 tablet by mouth Daily. Hold until follow up with Dr. Jensen, Disp: 90 tablet, Rfl: 0  •  aspirin 81 MG EC tablet, Take 81 mg by mouth Daily., Disp: , Rfl:   •  bumetanide (BUMEX) 0.5 MG tablet, Take 1 tablet by mouth Daily., Disp: 30 tablet, Rfl:  1  •  carvedilol (COREG) 12.5 MG tablet, Take 1 tablet by mouth 2 (Two) Times a Day With Meals., Disp: 180 tablet, Rfl: 3  •  coenzyme Q10 100 MG capsule, Take 100 mg by mouth., Disp: , Rfl:   •  DULoxetine (CYMBALTA) 60 MG capsule, Take 60 mg by mouth Daily., Disp: , Rfl:   •  esomeprazole (nexIUM) 20 MG capsule, Take 20 mg by mouth Every Morning Before Breakfast., Disp: , Rfl:   •  HYDROcodone-acetaminophen (NORCO)  MG per tablet, Take 1 tablet by mouth Every 6 (Six) Hours As Needed for Moderate Pain . DNF until 8-10-19, Disp: 120 tablet, Rfl: 0  •  levothyroxine (SYNTHROID, LEVOTHROID) 25 MCG tablet, TAKE 1 TABLET BY MOUTH ONCE DAILY, Disp: 180 tablet, Rfl: 0  •  magnesium oxide (MAG-OX) 400 MG tablet, Take 500 mg by mouth Daily., Disp: , Rfl:   •  potassium chloride (K-DUR,KLOR-CON) 20 MEQ CR tablet, Take 1 tablet by mouth Daily., Disp: 90 tablet, Rfl: 0  •  pravastatin (PRAVACHOL) 10 MG tablet, Take 1 tablet by mouth Daily., Disp: 30 tablet, Rfl: 1  •  SITagliptin (JANUVIA) 100 MG tablet, Take 1 tablet by mouth Daily., Disp: 30 tablet, Rfl: 1  •  vitamin B-12 (CYANOCOBALAMIN) 1000 MCG tablet, Take 1,000 mcg by mouth Daily., Disp: , Rfl:   •  vitamin D (ERGOCALCIFEROL) 70221 units capsule capsule, Take 50,000 Units by mouth 1 (One) Time Per Week., Disp: , Rfl:   •  Multiple Vitamins-Minerals (MULTIVITAMIN ADULT PO), Take  by mouth., Disp: , Rfl:   •  pregabalin (LYRICA) 75 MG capsule, Take 1 capsule by mouth 2 (Two) Times a Day., Disp: 60 capsule, Rfl: 1  Medications Discontinued During This Encounter   Medication Reason   • LYRICA 50 MG capsule *Therapy completed   • bumetanide (BUMEX) 0.5 MG tablet Reorder   • pravastatin (PRAVACHOL) 10 MG tablet Reorder   • SITagliptin (JANUVIA) 100 MG tablet Reorder       Review of Systems   Constitutional: Negative for chills and fatigue.   Respiratory: Negative for cough and shortness of breath.    Cardiovascular: Negative for chest pain, palpitations and leg  "swelling.             /66 (BP Location: Left arm, Patient Position: Sitting, Cuff Size: Large Adult)   Pulse 70   Temp 98.3 °F (36.8 °C) (Oral)   Resp 20   Ht 168.9 cm (66.5\")   Wt 103 kg (227 lb) Comment: per pt- she never weighs in office  SpO2 100%   BMI 36.09 kg/m²       Physical Exam   Constitutional: She is oriented to person, place, and time. She appears well-developed and well-nourished. No distress.   HENT:   Head: Normocephalic and atraumatic.   Right Ear: External ear normal.   Left Ear: External ear normal.   Mouth/Throat: Oropharynx is clear and moist. No oropharyngeal exudate.   Eyes: Conjunctivae are normal. Right eye exhibits no discharge. Left eye exhibits no discharge. No scleral icterus.   Neck: Neck supple.   Cardiovascular: Normal rate, regular rhythm, normal heart sounds and intact distal pulses. Exam reveals no gallop and no friction rub.   No murmur heard.  Pulmonary/Chest: Effort normal and breath sounds normal. No respiratory distress. She has no wheezes. She has no rales.   Musculoskeletal: She exhibits no edema.   Lymphadenopathy:     She has no cervical adenopathy.   Neurological: She is alert and oriented to person, place, and time.   Skin: Skin is warm. No rash noted.   Psychiatric: She has a normal mood and affect. Her behavior is normal.   Nursing note and vitals reviewed.        Results for orders placed or performed during the hospital encounter of 09/01/19   Basic Metabolic Panel   Result Value Ref Range    Glucose 94 65 - 99 mg/dL    BUN 29 (H) 8 - 23 mg/dL    Creatinine 2.22 (H) 0.57 - 1.00 mg/dL    Sodium 138 136 - 145 mmol/L    Potassium 3.5 3.5 - 5.2 mmol/L    Chloride 107 98 - 107 mmol/L    CO2 21.8 (L) 22.0 - 29.0 mmol/L    Calcium 8.2 (L) 8.6 - 10.5 mg/dL    eGFR Non African Amer 21 (L) >60 mL/min/1.73    BUN/Creatinine Ratio 13.1 7.0 - 25.0    Anion Gap 9.2 5.0 - 15.0 mmol/L   CBC (No Diff)   Result Value Ref Range    WBC 6.91 3.40 - 10.80 10*3/mm3    RBC " 3.63 (L) 3.77 - 5.28 10*6/mm3    Hemoglobin 10.0 (L) 12.0 - 15.9 g/dL    Hematocrit 33.5 (L) 34.0 - 46.6 %    MCV 92.3 79.0 - 97.0 fL    MCH 27.5 26.6 - 33.0 pg    MCHC 29.9 (L) 31.5 - 35.7 g/dL    RDW 14.9 12.3 - 15.4 %    RDW-SD 50.6 37.0 - 54.0 fl    MPV 10.1 6.0 - 12.0 fL    Platelets 168 140 - 450 10*3/mm3   Hemoglobin A1c   Result Value Ref Range    Hemoglobin A1C 7.20 (H) 4.80 - 5.60 %   Chloride, Urine, Random - Urine, Clean Catch   Result Value Ref Range    Chloride, Urine <20 mmol/L   Sodium, Urine, Random - Urine, Clean Catch   Result Value Ref Range    Sodium, Urine 39 mmol/L   Basic Metabolic Panel   Result Value Ref Range    Glucose 111 (H) 65 - 99 mg/dL    BUN 24 (H) 8 - 23 mg/dL    Creatinine 1.31 (H) 0.57 - 1.00 mg/dL    Sodium 141 136 - 145 mmol/L    Potassium 4.8 3.5 - 5.2 mmol/L    Chloride 107 98 - 107 mmol/L    CO2 22.6 22.0 - 29.0 mmol/L    Calcium 9.5 8.6 - 10.5 mg/dL    eGFR Non African Amer 39 (L) >60 mL/min/1.73    BUN/Creatinine Ratio 18.3 7.0 - 25.0    Anion Gap 11.4 5.0 - 15.0 mmol/L   Magnesium   Result Value Ref Range    Magnesium 1.8 1.6 - 2.4 mg/dL   Phosphorus   Result Value Ref Range    Phosphorus 3.2 2.5 - 4.5 mg/dL   CBC Auto Differential   Result Value Ref Range    WBC 6.37 3.40 - 10.80 10*3/mm3    RBC 3.74 (L) 3.77 - 5.28 10*6/mm3    Hemoglobin 10.5 (L) 12.0 - 15.9 g/dL    Hematocrit 35.4 34.0 - 46.6 %    MCV 94.7 79.0 - 97.0 fL    MCH 28.1 26.6 - 33.0 pg    MCHC 29.7 (L) 31.5 - 35.7 g/dL    RDW 14.7 12.3 - 15.4 %    RDW-SD 50.7 37.0 - 54.0 fl    MPV 10.6 6.0 - 12.0 fL    Platelets 144 140 - 450 10*3/mm3    Neutrophil % 61.3 42.7 - 76.0 %    Lymphocyte % 27.2 19.6 - 45.3 %    Monocyte % 7.7 5.0 - 12.0 %    Eosinophil % 3.0 0.3 - 6.2 %    Basophil % 0.3 0.0 - 1.5 %    Immature Grans % 0.5 0.0 - 0.5 %    Neutrophils, Absolute 3.91 1.70 - 7.00 10*3/mm3    Lymphocytes, Absolute 1.73 0.70 - 3.10 10*3/mm3    Monocytes, Absolute 0.49 0.10 - 0.90 10*3/mm3    Eosinophils, Absolute 0.19  0.00 - 0.40 10*3/mm3    Basophils, Absolute 0.02 0.00 - 0.20 10*3/mm3    Immature Grans, Absolute 0.03 0.00 - 0.05 10*3/mm3    nRBC 0.0 0.0 - 0.2 /100 WBC   Basic Metabolic Panel   Result Value Ref Range    Glucose 119 (H) 65 - 99 mg/dL    BUN 14 8 - 23 mg/dL    Creatinine 0.93 0.57 - 1.00 mg/dL    Sodium 137 136 - 145 mmol/L    Potassium 4.0 3.5 - 5.2 mmol/L    Chloride 109 (H) 98 - 107 mmol/L    CO2 23.6 22.0 - 29.0 mmol/L    Calcium 9.0 8.6 - 10.5 mg/dL    eGFR Non African Amer 59 (L) >60 mL/min/1.73    BUN/Creatinine Ratio 15.1 7.0 - 25.0    Anion Gap 4.4 (L) 5.0 - 15.0 mmol/L   Magnesium   Result Value Ref Range    Magnesium 1.4 (L) 1.6 - 2.4 mg/dL   Phosphorus   Result Value Ref Range    Phosphorus 2.8 2.5 - 4.5 mg/dL   CBC Auto Differential   Result Value Ref Range    WBC 5.63 3.40 - 10.80 10*3/mm3    RBC 3.33 (L) 3.77 - 5.28 10*6/mm3    Hemoglobin 9.6 (L) 12.0 - 15.9 g/dL    Hematocrit 31.3 (L) 34.0 - 46.6 %    MCV 94.0 79.0 - 97.0 fL    MCH 28.8 26.6 - 33.0 pg    MCHC 30.7 (L) 31.5 - 35.7 g/dL    RDW 14.3 12.3 - 15.4 %    RDW-SD 49.0 37.0 - 54.0 fl    MPV 10.1 6.0 - 12.0 fL    Platelets 132 (L) 140 - 450 10*3/mm3    Neutrophil % 60.0 42.7 - 76.0 %    Lymphocyte % 28.1 19.6 - 45.3 %    Monocyte % 7.6 5.0 - 12.0 %    Eosinophil % 3.4 0.3 - 6.2 %    Basophil % 0.4 0.0 - 1.5 %    Immature Grans % 0.5 0.0 - 0.5 %    Neutrophils, Absolute 3.38 1.70 - 7.00 10*3/mm3    Lymphocytes, Absolute 1.58 0.70 - 3.10 10*3/mm3    Monocytes, Absolute 0.43 0.10 - 0.90 10*3/mm3    Eosinophils, Absolute 0.19 0.00 - 0.40 10*3/mm3    Basophils, Absolute 0.02 0.00 - 0.20 10*3/mm3    Immature Grans, Absolute 0.03 0.00 - 0.05 10*3/mm3    nRBC 0.0 0.0 - 0.2 /100 WBC   POC Glucose Once   Result Value Ref Range    Glucose 126 70 - 130 mg/dL   POC Glucose Once   Result Value Ref Range    Glucose 221 (H) 70 - 130 mg/dL   POC Glucose Once   Result Value Ref Range    Glucose 95 70 - 130 mg/dL   POC Glucose Once   Result Value Ref Range     Glucose 146 (H) 70 - 130 mg/dL   POC Glucose Once   Result Value Ref Range    Glucose 122 70 - 130 mg/dL   POC Glucose Once   Result Value Ref Range    Glucose 131 (H) 70 - 130 mg/dL   POC Glucose Once   Result Value Ref Range    Glucose 194 (H) 70 - 130 mg/dL   POC Glucose Once   Result Value Ref Range    Glucose 117 70 - 130 mg/dL   POC Glucose Once   Result Value Ref Range    Glucose 110 70 - 130 mg/dL   POC Glucose Once   Result Value Ref Range    Glucose 168 (H) 70 - 130 mg/dL   POC Glucose Once   Result Value Ref Range    Glucose 147 (H) 70 - 130 mg/dL   POC Glucose Once   Result Value Ref Range    Glucose 139 (H) 70 - 130 mg/dL   POC Glucose Once   Result Value Ref Range    Glucose 104 70 - 130 mg/dL   POC Glucose Once   Result Value Ref Range    Glucose 153 (H) 70 - 130 mg/dL           Jung was seen today for follow-up and acute renal failure.    Diagnoses and all orders for this visit:    Hospital discharge follow-up    ADRIENNE (acute kidney injury) (CMS/[facility])  -     Basic Metabolic Panel    CKD (chronic kidney disease) stage 3, GFR 30-59 ml/min (CMS/[facility])  -     Basic Metabolic Panel    Type 2 diabetes mellitus with stage 3 chronic kidney disease, without long-term current use of insulin (CMS/[facility])    Peripheral polyneuropathy    Acute on chronic HFrEF (heart failure with reduced ejection fraction) (CMS/[facility])  -     Basic Metabolic Panel    Other orders  -     bumetanide (BUMEX) 0.5 MG tablet; Take 1 tablet by mouth Daily.  -     pravastatin (PRAVACHOL) 10 MG tablet; Take 1 tablet by mouth Daily.  -     SITagliptin (JANUVIA) 100 MG tablet; Take 1 tablet by mouth Daily.  -     pregabalin (LYRICA) 75 MG capsule; Take 1 capsule by mouth 2 (Two) Times a Day.      She is doing fairly well from her hospitalization. Has been taking 2mg Bumex instead of 0.5mg. I have send 0.5mg for her to pharmacy. Will recheck BMP today with close follow up with me next week on her pressure.     For now we are going to keep holding her  Losartan and Aldactone and she is going to keep monitoring her pressures. Will likely restart when she returns. Call me if she has issues before our follow up including high blood pressure, leg swelling or problems with urination.     Return in about 6 days (around 9/12/2019) for Recheck.    Kylee Jensen MD  09/06/2019

## 2019-09-07 ENCOUNTER — READMISSION MANAGEMENT (OUTPATIENT)
Dept: CALL CENTER | Facility: HOSPITAL | Age: 74
End: 2019-09-07

## 2019-09-07 LAB
BUN SERPL-MCNC: 21 MG/DL (ref 8–23)
BUN/CREAT SERPL: 20.6 (ref 7–25)
CALCIUM SERPL-MCNC: 10.3 MG/DL (ref 8.6–10.5)
CHLORIDE SERPL-SCNC: 105 MMOL/L (ref 98–107)
CO2 SERPL-SCNC: 23.4 MMOL/L (ref 22–29)
CREAT SERPL-MCNC: 1.02 MG/DL (ref 0.57–1)
GLUCOSE SERPL-MCNC: 166 MG/DL (ref 65–99)
POTASSIUM SERPL-SCNC: 5 MMOL/L (ref 3.5–5.2)
SODIUM SERPL-SCNC: 142 MMOL/L (ref 136–145)

## 2019-09-07 NOTE — OUTREACH NOTE
CHF Week 1 Survey      Responses   Facility patient discharged from?  [facility]   Does the patient have one of the following disease processes/diagnoses(primary or secondary)?  CHF   Is there a successful TCM telephone encounter documented?  No   CHF Week 1 attempt successful?  Yes   Call start time  1136   Call end time  1141   Discharge diagnosis  Diastolic CHF, CKD III, HTN, DM II, Hypothyroidism   Is patient permission given to speak with other caregiver?  Yes   Person spoke with today (if not patient) and relationship  Flory   Meds reviewed with patient/caregiver?  Yes   Is the patient having any side effects they believe may be caused by any medication additions or changes?  No   Does the patient have all medications ordered at discharge?  Yes   Is the patient taking all medications as directed (includes completed medication regime)?  Yes   Does the patient have a primary care provider?   Yes   Does the patient have an appointment with their PCP within 7 days of discharge?  Yes   Has the patient kept scheduled appointments due by today?  Yes   Has home health visited the patient within 72 hours of discharge?  N/A   Psychosocial issues?  No   Did the patient receive a copy of their discharge instructions?  Yes   Nursing interventions  Reviewed instructions with patient   What is the patient's perception of their health status since discharge?  Improving   Nursing interventions  Nurse provided patient education   Is the patient weighing daily?  Yes   Does the patient have scales?  Yes   Daily weight interventions  Education provided on importance of daily weight   Is the patient able to teach back Heart Failure diet management?  Yes   Is the patient able to teach back Heart Failure Zones?  Yes   Is the patient able to teach back signs and symptoms of worsening condition? (i.e. weight gain, shortness of air, etc.)  Yes   Is the patient/caregiver able to teach back the hierarchy of who to call/visit for  symptoms/problems? PCP, Specialist, Home health nurse, Urgent Care, ED, 911  Yes   Additional teach back comments  She says her mom weighs daily but doesn't avoid salt as she should.  Saw PCP yesterday.    CHF Week 1 call completed?  Yes          Lakshmi Negrete RN

## 2019-09-09 ENCOUNTER — TELEPHONE (OUTPATIENT)
Dept: INTERNAL MEDICINE | Facility: CLINIC | Age: 74
End: 2019-09-09

## 2019-09-09 NOTE — TELEPHONE ENCOUNTER
Pt contact her insurance company. Lyrica is not covered and is $153.00 every month. Pt cannot do this. LOV you all had discussed starting Gabapentin in replace of Lyrica. Please send in what dose, pt will  tomorrow (09/10/2019) at her pharmacy.

## 2019-09-09 NOTE — TELEPHONE ENCOUNTER
Pt is calling insurance to see what is on her formulary. Pt advised she will have insurance fax us regarding this.

## 2019-09-09 NOTE — TELEPHONE ENCOUNTER
----- Message from Kylee Jensen MD sent at 9/9/2019  9:25 AM EDT -----  Kidney function stable. Please make sure that she is taking 0.5mg of her Bumex until I see her for follow up and then we will decide on increasing based on her symptoms.

## 2019-09-09 NOTE — PROGRESS NOTES
Kidney function stable. Please make sure that she is taking 0.5mg of her Bumex until I see her for follow up and then we will decide on increasing based on her symptoms.

## 2019-09-09 NOTE — TELEPHONE ENCOUNTER
----- Message from Kylee Jensen MD sent at 9/6/2019 12:00 PM EDT -----  Pt states that her Lyrica is being denied by insurance. Can we check on this? She has failed Gabapentin before. I sent in 75mg PO BID today.

## 2019-09-10 ENCOUNTER — HOSPITAL ENCOUNTER (OUTPATIENT)
Dept: CARDIOLOGY | Facility: HOSPITAL | Age: 74
Discharge: HOME | End: 2019-09-10
Admitting: INTERNAL MEDICINE

## 2019-09-10 VITALS
SYSTOLIC BLOOD PRESSURE: 194 MMHG | BODY MASS INDEX: 36.48 KG/M2 | WEIGHT: 227 LBS | DIASTOLIC BLOOD PRESSURE: 99 MMHG | HEIGHT: 66 IN

## 2019-09-10 DIAGNOSIS — R07.89 OTHER CHEST PAIN: ICD-10-CM

## 2019-09-10 DIAGNOSIS — R06.09 DOE (DYSPNEA ON EXERTION): ICD-10-CM

## 2019-09-10 LAB
BH CV ECHO MEAS - ACS: 2 CM
BH CV ECHO MEAS - AO MAX PG (FULL): 6.5 MMHG
BH CV ECHO MEAS - AO MAX PG: 10.9 MMHG
BH CV ECHO MEAS - AO MEAN PG (FULL): 4 MMHG
BH CV ECHO MEAS - AO MEAN PG: 6 MMHG
BH CV ECHO MEAS - AO ROOT AREA (BSA CORRECTED): 1.4
BH CV ECHO MEAS - AO ROOT AREA: 7.1 CM^2
BH CV ECHO MEAS - AO ROOT DIAM: 3 CM
BH CV ECHO MEAS - AO V2 MAX: 165 CM/SEC
BH CV ECHO MEAS - AO V2 MEAN: 117 CM/SEC
BH CV ECHO MEAS - AO V2 VTI: 39.2 CM
BH CV ECHO MEAS - AVA(I,A): 2 CM^2
BH CV ECHO MEAS - AVA(I,D): 2 CM^2
BH CV ECHO MEAS - AVA(V,A): 2 CM^2
BH CV ECHO MEAS - AVA(V,D): 2 CM^2
BH CV ECHO MEAS - BSA(HAYCOCK): 2.2 M^2
BH CV ECHO MEAS - BSA: 2.1 M^2
BH CV ECHO MEAS - BZI_BMI: 36.6 KILOGRAMS/M^2
BH CV ECHO MEAS - BZI_METRIC_HEIGHT: 167.6 CM
BH CV ECHO MEAS - BZI_METRIC_WEIGHT: 103 KG
BH CV ECHO MEAS - EDV(CUBED): 129.6 ML
BH CV ECHO MEAS - EDV(MOD-SP2): 107 ML
BH CV ECHO MEAS - EDV(MOD-SP4): 72.8 ML
BH CV ECHO MEAS - EDV(TEICH): 121.6 ML
BH CV ECHO MEAS - EF(CUBED): 63.7 %
BH CV ECHO MEAS - EF(MOD-BP): 52 %
BH CV ECHO MEAS - EF(MOD-SP2): 55.6 %
BH CV ECHO MEAS - EF(MOD-SP4): 54.4 %
BH CV ECHO MEAS - EF(TEICH): 54.9 %
BH CV ECHO MEAS - ESV(CUBED): 47 ML
BH CV ECHO MEAS - ESV(MOD-SP2): 47.5 ML
BH CV ECHO MEAS - ESV(MOD-SP4): 33.2 ML
BH CV ECHO MEAS - ESV(TEICH): 54.8 ML
BH CV ECHO MEAS - FS: 28.7 %
BH CV ECHO MEAS - IVS/LVPW: 1.1
BH CV ECHO MEAS - IVSD: 1.2 CM
BH CV ECHO MEAS - LAT PEAK E' VEL: 6 CM/SEC
BH CV ECHO MEAS - LV DIASTOLIC VOL/BSA (35-75): 34.5 ML/M^2
BH CV ECHO MEAS - LV MASS(C)D: 221.8 GRAMS
BH CV ECHO MEAS - LV MASS(C)DI: 105.1 GRAMS/M^2
BH CV ECHO MEAS - LV MAX PG: 4.4 MMHG
BH CV ECHO MEAS - LV MEAN PG: 2 MMHG
BH CV ECHO MEAS - LV SYSTOLIC VOL/BSA (12-30): 15.7 ML/M^2
BH CV ECHO MEAS - LV V1 MAX: 105 CM/SEC
BH CV ECHO MEAS - LV V1 MEAN: 73.1 CM/SEC
BH CV ECHO MEAS - LV V1 VTI: 25.4 CM
BH CV ECHO MEAS - LVIDD: 5.1 CM
BH CV ECHO MEAS - LVIDS: 3.6 CM
BH CV ECHO MEAS - LVLD AP2: 8 CM
BH CV ECHO MEAS - LVLD AP4: 7.7 CM
BH CV ECHO MEAS - LVLS AP2: 7.3 CM
BH CV ECHO MEAS - LVLS AP4: 6 CM
BH CV ECHO MEAS - LVOT AREA (M): 3.1 CM^2
BH CV ECHO MEAS - LVOT AREA: 3.1 CM^2
BH CV ECHO MEAS - LVOT DIAM: 2 CM
BH CV ECHO MEAS - LVPWD: 1.1 CM
BH CV ECHO MEAS - MED PEAK E' VEL: 6 CM/SEC
BH CV ECHO MEAS - MV A DUR: 0.14 SEC
BH CV ECHO MEAS - MV A MAX VEL: 106 CM/SEC
BH CV ECHO MEAS - MV DEC SLOPE: 322 CM/SEC^2
BH CV ECHO MEAS - MV DEC TIME: 280 SEC
BH CV ECHO MEAS - MV E MAX VEL: 117 CM/SEC
BH CV ECHO MEAS - MV E/A: 1.1
BH CV ECHO MEAS - MV MAX PG: 5.3 MMHG
BH CV ECHO MEAS - MV MEAN PG: 3 MMHG
BH CV ECHO MEAS - MV P1/2T MAX VEL: 117 CM/SEC
BH CV ECHO MEAS - MV P1/2T: 106.4 MSEC
BH CV ECHO MEAS - MV V2 MAX: 115 CM/SEC
BH CV ECHO MEAS - MV V2 MEAN: 75 CM/SEC
BH CV ECHO MEAS - MV V2 VTI: 34.4 CM
BH CV ECHO MEAS - MVA P1/2T LCG: 1.9 CM^2
BH CV ECHO MEAS - MVA(P1/2T): 2.1 CM^2
BH CV ECHO MEAS - MVA(VTI): 2.3 CM^2
BH CV ECHO MEAS - PA ACC TIME: 0.12 SEC
BH CV ECHO MEAS - PA MAX PG (FULL): 3.3 MMHG
BH CV ECHO MEAS - PA MAX PG: 6.5 MMHG
BH CV ECHO MEAS - PA PR(ACCEL): 26.8 MMHG
BH CV ECHO MEAS - PA V2 MAX: 127 CM/SEC
BH CV ECHO MEAS - PULM A REVS DUR: 0.14 SEC
BH CV ECHO MEAS - PULM A REVS VEL: 22 CM/SEC
BH CV ECHO MEAS - PULM DIAS VEL: 41.1 CM/SEC
BH CV ECHO MEAS - PULM S/D: 1.6
BH CV ECHO MEAS - PULM SYS VEL: 66.8 CM/SEC
BH CV ECHO MEAS - PVA(V,A): 3.5 CM^2
BH CV ECHO MEAS - PVA(V,D): 3.5 CM^2
BH CV ECHO MEAS - QP/QS: 1.3
BH CV ECHO MEAS - RAP SYSTOLE: 8 MMHG
BH CV ECHO MEAS - RV MAX PG: 3.2 MMHG
BH CV ECHO MEAS - RV MEAN PG: 2 MMHG
BH CV ECHO MEAS - RV V1 MAX: 89.4 CM/SEC
BH CV ECHO MEAS - RV V1 MEAN: 61.4 CM/SEC
BH CV ECHO MEAS - RV V1 VTI: 21.3 CM
BH CV ECHO MEAS - RVOT AREA: 4.9 CM^2
BH CV ECHO MEAS - RVOT DIAM: 2.5 CM
BH CV ECHO MEAS - RVSP: 40.3 MMHG
BH CV ECHO MEAS - SI(AO): 131.3 ML/M^2
BH CV ECHO MEAS - SI(CUBED): 39.1 ML/M^2
BH CV ECHO MEAS - SI(LVOT): 37.8 ML/M^2
BH CV ECHO MEAS - SI(MOD-SP2): 28.2 ML/M^2
BH CV ECHO MEAS - SI(MOD-SP4): 18.8 ML/M^2
BH CV ECHO MEAS - SI(TEICH): 31.6 ML/M^2
BH CV ECHO MEAS - SV(AO): 277.1 ML
BH CV ECHO MEAS - SV(CUBED): 82.5 ML
BH CV ECHO MEAS - SV(LVOT): 79.8 ML
BH CV ECHO MEAS - SV(MOD-SP2): 59.5 ML
BH CV ECHO MEAS - SV(MOD-SP4): 39.6 ML
BH CV ECHO MEAS - SV(RVOT): 104.6 ML
BH CV ECHO MEAS - SV(TEICH): 66.8 ML
BH CV ECHO MEAS - TAPSE (>1.6): 1.7 CM
BH CV ECHO MEAS - TR MAX VEL: 283.5 CM/SEC
BH CV ECHO MEASUREMENTS AVERAGE E/E' RATIO: 19.5
BH CV VAS BP LEFT ARM: NORMAL MMHG
BH CV XLRA - RV BASE: 2.8 CM
BH CV XLRA - TDI S': 13 CM/SEC
LEFT ATRIUM VOLUME INDEX: 34 ML/M2
MAXIMAL PREDICTED HEART RATE: 144 BPM
STRESS TARGET HR: 122 BPM

## 2019-09-10 PROCEDURE — 93306 TTE W/DOPPLER COMPLETE: CPT | Performed by: INTERNAL MEDICINE

## 2019-09-10 PROCEDURE — 93306 TTE W/DOPPLER COMPLETE: CPT

## 2019-09-10 RX ORDER — GABAPENTIN 100 MG/1
100 CAPSULE ORAL 2 TIMES DAILY
Qty: 60 CAPSULE | Refills: 0 | Status: SHIPPED | OUTPATIENT
Start: 2019-09-10 | End: 2019-11-27

## 2019-09-10 NOTE — TELEPHONE ENCOUNTER
She was on Gabapentin the past and had some confusion. I am happy to try again, but will nerd to start at low does. Would start out with 100mg BID.

## 2019-09-11 ENCOUNTER — OFFICE VISIT (OUTPATIENT)
Dept: PAIN MEDICINE | Facility: CLINIC | Age: 74
End: 2019-09-11

## 2019-09-11 VITALS
WEIGHT: 224 LBS | SYSTOLIC BLOOD PRESSURE: 177 MMHG | HEIGHT: 66 IN | OXYGEN SATURATION: 96 % | BODY MASS INDEX: 36 KG/M2 | DIASTOLIC BLOOD PRESSURE: 78 MMHG | HEART RATE: 65 BPM | RESPIRATION RATE: 20 BRPM | TEMPERATURE: 98.9 F

## 2019-09-11 DIAGNOSIS — M47.816 LUMBAR FACET ARTHROPATHY: ICD-10-CM

## 2019-09-11 DIAGNOSIS — M25.50 ARTHRALGIA OF MULTIPLE JOINTS: ICD-10-CM

## 2019-09-11 DIAGNOSIS — Z79.899 ENCOUNTER FOR LONG-TERM (CURRENT) USE OF HIGH-RISK MEDICATION: ICD-10-CM

## 2019-09-11 DIAGNOSIS — M51.36 DDD (DEGENERATIVE DISC DISEASE), LUMBAR: ICD-10-CM

## 2019-09-11 DIAGNOSIS — G89.29 OTHER CHRONIC PAIN: Primary | ICD-10-CM

## 2019-09-11 PROCEDURE — 99214 OFFICE O/P EST MOD 30 MIN: CPT | Performed by: NURSE PRACTITIONER

## 2019-09-11 RX ORDER — HYDROCODONE BITARTRATE AND ACETAMINOPHEN 10; 325 MG/1; MG/1
1 TABLET ORAL EVERY 6 HOURS PRN
Qty: 120 TABLET | Refills: 0 | Status: SHIPPED | OUTPATIENT
Start: 2019-09-11 | End: 2019-10-08 | Stop reason: SDUPTHER

## 2019-09-11 NOTE — PROGRESS NOTES
CHIEF COMPLAINT  F/u joint and lower back pain. Pt sts pain has remained the same since last ov.     Subjective   Jung Short is a 74 y.o. female  who presents to the office for follow-up.She has a history of chronic back and joint pain. Reports her pain is unchanged since last office visit.    Has been in hospital twice since last office visit. First was 23 obs for chest pain 8-16-19. Second admission for ADRIENNE 9-1-19.  Has new PCP-Dr Kylee Jensen    Complains of pain in her joints and low back. Today her pain is 5/10VAs. Describes the pain as continuous aching and throbbing. Pain increases with walking, standing, activity and prolonged position; pain decreases with medication, rest.  Continues with Hydrocodone 10/325 4/day and Cymbalta and Lyrica 75 mg BID.   Denies any side effects from the regimen. Used to have constipation, takes Colace OTC as needed with positive results.  This helps decrease her pain by 40-50%. ADL's by self.      Had left TKR 4-17-18.  Could not tolerate gabapentin--- altered mental status.     Joint Pain   This is a chronic problem. The current episode started more than 1 year ago (today pain is 5/10VAS- joints). The problem occurs constantly. The problem has been unchanged (unchanged since last office visit). Associated symptoms include arthralgias (joint pain in shoulders, arms), fatigue (occ) and numbness (bilat legs and feet). Pertinent negatives include no abdominal pain, chest pain, chills, congestion, coughing, fever, headaches, joint swelling, nausea, neck pain, sore throat, vomiting or weakness. Nothing aggravates the symptoms. She has tried oral narcotics, position changes and rest for the symptoms. The treatment provided moderate relief.   Back Pain   This is a chronic problem. The current episode started more than 1 year ago. The problem occurs constantly. The problem has been gradually worsening (unchanged since last office visit) since onset. The pain is present in the  lumbar spine. The pain radiates to the right knee, right foot and right thigh. The pain is at a severity of 5/10. The pain is moderate. Associated symptoms include numbness (bilat legs and feet). Pertinent negatives include no abdominal pain, chest pain, dysuria, fever, headaches or weakness. Risk factors include lack of exercise and obesity. She has tried analgesics for the symptoms. The treatment provided moderate relief.      PEG Assessment   What number best describes your pain on average in the past week?5  What number best describes how, during the past week, pain has interfered with your enjoyment of life?5  What number best describes how, during the past week, pain has interfered with your general activity?  5    The following portions of the patient's history were reviewed and updated as appropriate: allergies, current medications, past family history, past medical history, past social history, past surgical history and problem list.    Review of Systems   Constitutional: Positive for fatigue (occ). Negative for activity change, chills and fever.   HENT: Negative for congestion and sore throat.    Eyes: Negative for visual disturbance.   Respiratory: Negative for cough, shortness of breath and wheezing.    Cardiovascular: Negative.  Negative for chest pain.   Gastrointestinal: Negative for abdominal pain, constipation, diarrhea, nausea and vomiting.   Genitourinary: Negative for difficulty urinating and dysuria.   Musculoskeletal: Positive for arthralgias (joint pain in shoulders, arms) and back pain. Negative for joint swelling and neck pain.   Neurological: Positive for numbness (bilat legs and feet). Negative for dizziness, weakness and headaches.   Psychiatric/Behavioral: Positive for sleep disturbance (occ). Negative for agitation and suicidal ideas. The patient is not nervous/anxious.        Vitals:    09/11/19 1302   BP: 177/78   Pulse: 65   Resp: 20   Temp: 98.9 °F (37.2 °C)   SpO2: 96%   Weight:  "102 kg (224 lb)  Comment: pt stated wt, pt never weighs in office   Height: 167.6 cm (66\")   PainSc:   5   PainLoc: Back     Objective   Physical Exam   Constitutional: She is oriented to person, place, and time. Vital signs are normal. She appears well-developed and well-nourished. She is cooperative.   HENT:   Head: Normocephalic and atraumatic.   Nose: Nose normal.   Eyes: Conjunctivae and lids are normal.   Cardiovascular: Normal rate.   Pulmonary/Chest: Effort normal.   Abdominal: Normal appearance.   Musculoskeletal:        Lumbar back: She exhibits decreased range of motion and tenderness.   Surgical scar of left knee  Widespread OA changes with no acute synovitis   Neurological: She is alert and oriented to person, place, and time. Gait (cane) abnormal.   Reflex Scores:       Patellar reflexes are 1+ on the right side and 1+ on the left side.  Skin: Skin is warm, dry and intact.   Psychiatric: She has a normal mood and affect. Her speech is normal and behavior is normal. Judgment and thought content normal. Cognition and memory are normal.   Nursing note and vitals reviewed.    Assessment/Plan   Jung was seen today for back pain.    Diagnoses and all orders for this visit:    Other chronic pain    Arthralgia of multiple joints    DDD (degenerative disc disease), lumbar    Lumbar facet arthropathy    Encounter for long-term (current) use of high-risk medication    Other orders  -     HYDROcodone-acetaminophen (NORCO)  MG per tablet; Take 1 tablet by mouth Every 6 (Six) Hours As Needed for Moderate Pain .      --- The urine drug screen confirmation from 11-14-18 has been reviewed and the result is APPROPRIATE based on patient history and LOUANN report  --- Refill Hydrocodone. Patient appears stable with current regimen. No adverse effects. Regarding continuation of opioids, there is no evidence of aberrant behavior or any red flags.  The patient continues with appropriate response to opioid therapy. " ADL's remain intact by self.   --- Follow-up 2 months or sooner if needed.     LOUANN REPORT    As part of the patient's treatment plan, I am prescribing controlled substances. The patient has been made aware of appropriate use of such medications, including potential risk of somnolence, limited ability to drive and/or work safely, and the potential for dependence or overdose. It has also bee made clear that these medications are for use by this patient only, without concomitant use of alcohol or other substances unless prescribed.     Patient has completed prescribing agreement detailing terms of continued prescribing of controlled substances, including monitoring LOUANN reports, urine drug screening, and pill counts if necessary. The patient is aware that inappropriate use will results in cessation of prescribing such medications.    LOUANN report has been reviewed and scanned into the patient's chart.    As the clinician, I personally reviewed the LOUANN from 9-10-19 while the patient was in the office today.    History and physical exam exhibit continued safe and appropriate use of controlled substances.      EMR Dragon/Transcription disclaimer:   Much of this encounter note is an electronic transcription/translation of spoken language to printed text. The electronic translation of spoken language may permit erroneous, or at times, nonsensical words or phrases to be inadvertently transcribed; Although I have reviewed the note for such errors, some may still exist.

## 2019-09-12 ENCOUNTER — OFFICE VISIT (OUTPATIENT)
Dept: INTERNAL MEDICINE | Facility: CLINIC | Age: 74
End: 2019-09-12

## 2019-09-12 VITALS
DIASTOLIC BLOOD PRESSURE: 72 MMHG | HEART RATE: 64 BPM | OXYGEN SATURATION: 99 % | HEIGHT: 66 IN | SYSTOLIC BLOOD PRESSURE: 154 MMHG | TEMPERATURE: 98.3 F | RESPIRATION RATE: 18 BRPM | BODY MASS INDEX: 36 KG/M2 | WEIGHT: 224 LBS

## 2019-09-12 DIAGNOSIS — I10 ESSENTIAL HYPERTENSION: Primary | ICD-10-CM

## 2019-09-12 DIAGNOSIS — N17.9 AKI (ACUTE KIDNEY INJURY) (HCC): ICD-10-CM

## 2019-09-12 DIAGNOSIS — G62.9 PERIPHERAL POLYNEUROPATHY: ICD-10-CM

## 2019-09-12 PROBLEM — R40.4 TRANSIENT ALTERATION OF AWARENESS: Status: RESOLVED | Noted: 2017-04-19 | Resolved: 2019-09-12

## 2019-09-12 PROCEDURE — 99214 OFFICE O/P EST MOD 30 MIN: CPT | Performed by: INTERNAL MEDICINE

## 2019-09-12 RX ORDER — LOSARTAN POTASSIUM 25 MG/1
25 TABLET ORAL DAILY
Qty: 30 TABLET | Refills: 0 | Status: SHIPPED | OUTPATIENT
Start: 2019-09-12 | End: 2019-09-13 | Stop reason: SDUPTHER

## 2019-09-12 NOTE — PROGRESS NOTES
uJng Short is a 74 y.o. female, who presents with a chief complaint of   Chief Complaint   Patient presents with   • Follow-up     6 x day f/u    • Chronic Kidney Disease   • Diabetes       73 yo F with a history of breast CA, CKD3, depression, DM2, GERD, HTN, hypothyroidism, HLD, and PAF here for follow up of her HTN and kidney function. She has been doing well on Bumex 0.5mg. She has noticed increased swelling of her legs, but resolved on own. No checking her BP's at home, but has had two readings on 9/10 and 9/11 that are high.    She started Gabapentin 100mg PO BID and feels well on this. Does not feel odd or strange on this. Helping her pain.          The following portions of the patient's history were reviewed and updated as appropriate: allergies, current medications, past family history, past medical history, past social history, past surgical history and problem list.    Allergies: Dilaudid [hydromorphone] and Latex    Current Outpatient Medications:   •  alendronate (FOSAMAX) 35 MG tablet, take 1 tablet by mouth every week, Disp: 12 tablet, Rfl: 3  •  anastrozole (ARIMIDEX) 1 MG tablet, Take 1 tablet by mouth Daily. Hold until follow up with Dr. Jensen, Disp: 90 tablet, Rfl: 0  •  aspirin 81 MG EC tablet, Take 81 mg by mouth Daily., Disp: , Rfl:   •  bumetanide (BUMEX) 0.5 MG tablet, TAKE 1 TABLET BY MOUTH DAILY, Disp: 90 tablet, Rfl: 1  •  carvedilol (COREG) 12.5 MG tablet, Take 1 tablet by mouth 2 (Two) Times a Day With Meals., Disp: 180 tablet, Rfl: 3  •  coenzyme Q10 100 MG capsule, Take 100 mg by mouth., Disp: , Rfl:   •  DULoxetine (CYMBALTA) 60 MG capsule, Take 60 mg by mouth Daily., Disp: , Rfl:   •  esomeprazole (nexIUM) 20 MG capsule, Take 20 mg by mouth Every Morning Before Breakfast., Disp: , Rfl:   •  gabapentin (NEURONTIN) 100 MG capsule, Take 1 capsule by mouth 2 (Two) Times a Day., Disp: 60 capsule, Rfl: 0  •  HYDROcodone-acetaminophen (NORCO)  MG per tablet, Take 1  "tablet by mouth Every 6 (Six) Hours As Needed for Moderate Pain ., Disp: 120 tablet, Rfl: 0  •  levothyroxine (SYNTHROID, LEVOTHROID) 25 MCG tablet, TAKE 1 TABLET BY MOUTH ONCE DAILY, Disp: 180 tablet, Rfl: 0  •  magnesium oxide (MAG-OX) 400 MG tablet, Take 500 mg by mouth Daily., Disp: , Rfl:   •  Multiple Vitamins-Minerals (MULTIVITAMIN ADULT PO), Take  by mouth., Disp: , Rfl:   •  potassium chloride (K-DUR,KLOR-CON) 20 MEQ CR tablet, Take 1 tablet by mouth Daily., Disp: 90 tablet, Rfl: 0  •  pravastatin (PRAVACHOL) 10 MG tablet, TAKE 1 TABLET BY MOUTH DAILY, Disp: 90 tablet, Rfl: 1  •  SITagliptin (JANUVIA) 100 MG tablet, Take 1 tablet by mouth Daily., Disp: 30 tablet, Rfl: 1  •  vitamin B-12 (CYANOCOBALAMIN) 1000 MCG tablet, Take 1,000 mcg by mouth Daily., Disp: , Rfl:   •  vitamin D (ERGOCALCIFEROL) 45198 units capsule capsule, Take 50,000 Units by mouth 1 (One) Time Per Week., Disp: , Rfl:   •  losartan (COZAAR) 25 MG tablet, Take 1 tablet by mouth Daily., Disp: 30 tablet, Rfl: 0  Medications Discontinued During This Encounter   Medication Reason   • pregabalin (LYRICA) 75 MG capsule *Therapy completed       Review of Systems   Constitutional: Positive for fatigue. Negative for chills and fever.   Cardiovascular: Negative for leg swelling.             /72 (BP Location: Left arm, Patient Position: Sitting, Cuff Size: Large Adult)   Pulse 64   Temp 98.3 °F (36.8 °C) (Oral)   Resp 18   Ht 167.6 cm (66\")   Wt 102 kg (224 lb)   SpO2 99%   BMI 36.15 kg/m²       Physical Exam   Constitutional: She is oriented to person, place, and time. She appears well-developed and well-nourished. No distress.   HENT:   Head: Normocephalic and atraumatic.   Right Ear: External ear normal.   Left Ear: External ear normal.   Mouth/Throat: Oropharynx is clear and moist. No oropharyngeal exudate.   Eyes: Conjunctivae are normal. Right eye exhibits no discharge. Left eye exhibits no discharge. No scleral icterus.   Neck: " Neck supple.   Cardiovascular: Normal rate, regular rhythm and normal heart sounds. Exam reveals no gallop and no friction rub.   No murmur heard.  Pulmonary/Chest: Effort normal and breath sounds normal. No respiratory distress. She has no wheezes. She has no rales.   Musculoskeletal: She exhibits edema (trace edema of her ankles ).   Lymphadenopathy:     She has no cervical adenopathy.   Neurological: She is alert and oriented to person, place, and time.   Skin: Skin is warm. No rash noted.   Psychiatric: She has a normal mood and affect. Her behavior is normal.   Nursing note and vitals reviewed.        Results for orders placed or performed during the hospital encounter of 09/10/19   Adult Transthoracic Echo Complete W/ Cont if Necessary Per Protocol   Result Value Ref Range    BSA 2.1 m^2    IVSd 1.2 cm    LVIDd 5.1 cm    LVIDs 3.6 cm    LVPWd 1.1 cm    IVS/LVPW 1.1     FS 28.7 %    EDV(Teich) 121.6 ml    ESV(Teich) 54.8 ml    EF(Teich) 54.9 %    EDV(cubed) 129.6 ml    ESV(cubed) 47.0 ml    EF(cubed) 63.7 %    LV mass(C)d 221.8 grams    LV mass(C)dI 105.1 grams/m^2    SV(Teich) 66.8 ml    SI(Teich) 31.6 ml/m^2    SV(cubed) 82.5 ml    SI(cubed) 39.1 ml/m^2    Ao root diam 3.0 cm    Ao root area 7.1 cm^2    ACS 2.0 cm    LVOT diam 2.0 cm    LVOT area 3.1 cm^2    LVOT area(traced) 3.1 cm^2    RVOT diam 2.5 cm    RVOT area 4.9 cm^2    LVLd ap4 7.7 cm    EDV(MOD-sp4) 72.8 ml    LVLs ap4 6.0 cm    ESV(MOD-sp4) 33.2 ml    EF(MOD-sp4) 54.4 %    LVLd ap2 8.0 cm    EDV(MOD-sp2) 107.0 ml    LVLs ap2 7.3 cm    ESV(MOD-sp2) 47.5 ml    EF(MOD-sp2) 55.6 %    SV(MOD-sp4) 39.6 ml    SI(MOD-sp4) 18.8 ml/m^2    SV(MOD-sp2) 59.5 ml    SI(MOD-sp2) 28.2 ml/m^2    Ao root area (BSA corrected) 1.4     LV Hfuf Vol (BSA corrected) 34.5 ml/m^2    LV Sys Vol (BSA corrected) 15.7 ml/m^2    TAPSE (>1.6) 1.7 cm    MV A dur 0.14 sec    MV E max macie 117.0 cm/sec    MV A max macie 106.0 cm/sec    MV E/A 1.1     MV V2 max 115.0 cm/sec    MV max PG  5.3 mmHg    MV V2 mean 75.0 cm/sec    MV mean PG 3.0 mmHg    MV V2 VTI 34.4 cm    MVA(VTI) 2.3 cm^2    MV P1/2t max tobi 117.0 cm/sec    MV P1/2t 106.4 msec    MVA(P1/2t) 2.1 cm^2    MV dec slope 322.0 cm/sec^2    MV dec time 280 sec    Ao pk tobi 165.0 cm/sec    Ao max PG 10.9 mmHg    Ao max PG (full) 6.5 mmHg    Ao V2 mean 117.0 cm/sec    Ao mean PG 6.0 mmHg    Ao mean PG (full) 4.0 mmHg    Ao V2 VTI 39.2 cm    ERNESTINA(I,A) 2.0 cm^2    ERNESTINA(I,D) 2.0 cm^2    ERNESTINA(V,A) 2.0 cm^2    ERNESTINA(V,D) 2.0 cm^2    LV V1 max PG 4.4 mmHg    LV V1 mean PG 2.0 mmHg    LV V1 max 105.0 cm/sec    LV V1 mean 73.1 cm/sec    LV V1 VTI 25.4 cm    SV(Ao) 277.1 ml    SI(Ao) 131.3 ml/m^2    SV(LVOT) 79.8 ml    SV(RVOT) 104.6 ml    SI(LVOT) 37.8 ml/m^2    PA V2 max 127.0 cm/sec    PA max PG 6.5 mmHg    PA max PG (full) 3.3 mmHg    BH CV ECHO SHAKIRA - PVA(V,A) 3.5 cm^2    BH CV ECHO SHAKIRA - PVA(V,D) 3.5 cm^2    PA acc time 0.12 sec    RV V1 max PG 3.2 mmHg    RV V1 mean PG 2.0 mmHg    RV V1 max 89.4 cm/sec    RV V1 mean 61.4 cm/sec    RV V1 VTI 21.3 cm    TR max tobi 283.5 cm/sec    RVSP(TR) 40.3 mmHg    RAP systole 8.0 mmHg    PA pr(Accel) 26.8 mmHg    Pulm Sys Tobi 66.8 cm/sec    Pulm Huff Tobi 41.1 cm/sec    Pulm S/D 1.6     Qp/Qs 1.3     Pulm A Revs Dur 0.14 sec    Pulm A Revs Tobi 22.0 cm/sec    MVA P1/2T LCG 1.9 cm^2    BH CV ECHO SHAKIRA - BZI_BMI 36.6 kilograms/m^2    BH CV ECHO SHAKIRA - BSA(HAYCOCK) 2.2 m^2    BH CV ECHO SHAKIRA - BZI_METRIC_WEIGHT 103.0 kg    BH CV ECHO SHAKIRA - BZI_METRIC_HEIGHT 167.6 cm    Target HR (85%) 122 bpm    Max. Pred. HR (100%) 144 bpm    BH CV VAS BP LEFT /99 mmHg    TDI S' 13.00 cm/sec    RV Base 2.80 cm    LA Volume Index 34.0 mL/m2    Avg E/e' ratio 19.50     EF(MOD-bp) 52.0 %    Lat Peak E' Tobi 6.0 cm/sec    Med Peak E' Tobi 6.00 cm/sec           Jung was seen today for follow-up, chronic kidney disease and diabetes.    Diagnoses and all orders for this visit:    Essential hypertension    ADRIENNE (acute kidney injury)  (CMS/HCC)    Peripheral polyneuropathy    Other orders  -     losartan (COZAAR) 25 MG tablet; Take 1 tablet by mouth Daily.      She is doing well now. BP is higher today and we need to get a little bit better control. Will continue on her Bumex at 0.5mg and Coreg and restart her Losartan today. Will see back in one week and if doing well, will just hold. Need to repeat kidney function next week to make sure she is tolerating her ARB well.     Doing well on Gabapentin. Will continue low dose and titrate up as we need to.     Return in about 1 week (around 9/19/2019) for Recheck.    Kylee Jensen MD  09/12/2019

## 2019-09-13 RX ORDER — LOSARTAN POTASSIUM 25 MG/1
25 TABLET ORAL DAILY
Qty: 90 TABLET | Refills: 0 | Status: SHIPPED | OUTPATIENT
Start: 2019-09-13 | End: 2019-11-15 | Stop reason: SDUPTHER

## 2019-09-15 ENCOUNTER — READMISSION MANAGEMENT (OUTPATIENT)
Dept: CALL CENTER | Facility: HOSPITAL | Age: 74
End: 2019-09-15

## 2019-09-15 NOTE — OUTREACH NOTE
CHF Week 2 Survey      Responses   Facility patient discharged from?  [facility]   Does the patient have one of the following disease processes/diagnoses(primary or secondary)?  CHF   Week 2 attempt successful?  No   Unsuccessful attempts  Attempt 1          Lakshmi Negrete RN

## 2019-09-16 ENCOUNTER — READMISSION MANAGEMENT (OUTPATIENT)
Dept: CALL CENTER | Facility: HOSPITAL | Age: 74
End: 2019-09-16

## 2019-09-16 NOTE — OUTREACH NOTE
CHF Week 2 Survey      Responses   Facility patient discharged from?  [facility]   Does the patient have one of the following disease processes/diagnoses(primary or secondary)?  CHF   Week 2 attempt successful?  No   Unsuccessful attempts  Attempt 2          Lakshmi Negrete RN

## 2019-09-18 ENCOUNTER — READMISSION MANAGEMENT (OUTPATIENT)
Dept: CALL CENTER | Facility: HOSPITAL | Age: 74
End: 2019-09-18

## 2019-09-18 NOTE — OUTREACH NOTE
CHF Week 3 Survey      Responses   Facility patient discharged from?  [facility]   Does the patient have one of the following disease processes/diagnoses(primary or secondary)?  CHF   Week 3 attempt successful?  No   Unsuccessful attempts  Attempt 1          Connie Reveles RN

## 2019-09-20 ENCOUNTER — OFFICE VISIT (OUTPATIENT)
Dept: INTERNAL MEDICINE | Facility: CLINIC | Age: 74
End: 2019-09-20

## 2019-09-20 ENCOUNTER — READMISSION MANAGEMENT (OUTPATIENT)
Dept: CALL CENTER | Facility: HOSPITAL | Age: 74
End: 2019-09-20

## 2019-09-20 ENCOUNTER — EPISODE CHANGES (OUTPATIENT)
Dept: CASE MANAGEMENT | Facility: OTHER | Age: 74
End: 2019-09-20

## 2019-09-20 VITALS
HEART RATE: 70 BPM | RESPIRATION RATE: 18 BRPM | TEMPERATURE: 98.3 F | BODY MASS INDEX: 36.42 KG/M2 | DIASTOLIC BLOOD PRESSURE: 82 MMHG | HEIGHT: 66 IN | WEIGHT: 226.6 LBS | SYSTOLIC BLOOD PRESSURE: 142 MMHG | OXYGEN SATURATION: 97 %

## 2019-09-20 DIAGNOSIS — N17.9 AKI (ACUTE KIDNEY INJURY) (HCC): ICD-10-CM

## 2019-09-20 DIAGNOSIS — M79.89 LEG SWELLING: ICD-10-CM

## 2019-09-20 DIAGNOSIS — I10 ESSENTIAL HYPERTENSION: Primary | ICD-10-CM

## 2019-09-20 LAB
BUN SERPL-MCNC: 12 MG/DL (ref 8–23)
BUN/CREAT SERPL: 12.6 (ref 7–25)
CALCIUM SERPL-MCNC: 10 MG/DL (ref 8.6–10.5)
CHLORIDE SERPL-SCNC: 100 MMOL/L (ref 98–107)
CO2 SERPL-SCNC: 26.3 MMOL/L (ref 22–29)
CREAT SERPL-MCNC: 0.95 MG/DL (ref 0.57–1)
GLUCOSE SERPL-MCNC: 184 MG/DL (ref 65–99)
POTASSIUM SERPL-SCNC: 4.4 MMOL/L (ref 3.5–5.2)
SODIUM SERPL-SCNC: 137 MMOL/L (ref 136–145)

## 2019-09-20 PROCEDURE — 99214 OFFICE O/P EST MOD 30 MIN: CPT | Performed by: INTERNAL MEDICINE

## 2019-09-20 NOTE — PROGRESS NOTES
Jung Short is a 74 y.o. female, who presents with a chief complaint of   Chief Complaint   Patient presents with   • Follow-up     1 x week f/u   • Hypertension       75 yo F with a history of breast CA, CKD3, depression, DM2, GERD, HTN, hypothyroidism, HLD, and PAF here for follow up of her HTN and kidney function. She has been doing well on Bumex 0.5mg. Started on low dose Losartan last week due to higher BP's and is tolerating well. Not checking at home. Still having some leg swelling.      She is still taking Gabapentin 100mg PO BID and feels well on this and it's helping her pain.          The following portions of the patient's history were reviewed and updated as appropriate: allergies, current medications, past family history, past medical history, past social history, past surgical history and problem list.    Allergies: Dilaudid [hydromorphone] and Latex    Current Outpatient Medications:   •  alendronate (FOSAMAX) 35 MG tablet, take 1 tablet by mouth every week, Disp: 12 tablet, Rfl: 3  •  anastrozole (ARIMIDEX) 1 MG tablet, Take 1 tablet by mouth Daily. Hold until follow up with Dr. Jensen, Disp: 90 tablet, Rfl: 0  •  aspirin 81 MG EC tablet, Take 81 mg by mouth Daily., Disp: , Rfl:   •  bumetanide (BUMEX) 0.5 MG tablet, TAKE 1 TABLET BY MOUTH DAILY, Disp: 90 tablet, Rfl: 1  •  carvedilol (COREG) 12.5 MG tablet, Take 1 tablet by mouth 2 (Two) Times a Day With Meals., Disp: 180 tablet, Rfl: 3  •  coenzyme Q10 100 MG capsule, Take 100 mg by mouth., Disp: , Rfl:   •  DULoxetine (CYMBALTA) 60 MG capsule, Take 60 mg by mouth Daily., Disp: , Rfl:   •  esomeprazole (nexIUM) 20 MG capsule, Take 20 mg by mouth Every Morning Before Breakfast., Disp: , Rfl:   •  gabapentin (NEURONTIN) 100 MG capsule, Take 1 capsule by mouth 2 (Two) Times a Day., Disp: 60 capsule, Rfl: 0  •  glucose blood test strip, Take BG daily, Disp: 50 each, Rfl: 12  •  HYDROcodone-acetaminophen (NORCO)  MG per tablet, Take  "1 tablet by mouth Every 6 (Six) Hours As Needed for Moderate Pain ., Disp: 120 tablet, Rfl: 0  •  levothyroxine (SYNTHROID, LEVOTHROID) 25 MCG tablet, TAKE 1 TABLET BY MOUTH ONCE DAILY, Disp: 180 tablet, Rfl: 0  •  losartan (COZAAR) 25 MG tablet, TAKE 1 TABLET BY MOUTH DAILY (Patient taking differently: Take 50 mg by mouth Daily.), Disp: 90 tablet, Rfl: 0  •  magnesium oxide (MAG-OX) 400 MG tablet, Take 500 mg by mouth Daily., Disp: , Rfl:   •  Multiple Vitamins-Minerals (MULTIVITAMIN ADULT PO), Take  by mouth., Disp: , Rfl:   •  potassium chloride (K-DUR,KLOR-CON) 20 MEQ CR tablet, Take 1 tablet by mouth Daily., Disp: 90 tablet, Rfl: 0  •  pravastatin (PRAVACHOL) 10 MG tablet, TAKE 1 TABLET BY MOUTH DAILY, Disp: 90 tablet, Rfl: 1  •  SITagliptin (JANUVIA) 100 MG tablet, Take 1 tablet by mouth Daily., Disp: 30 tablet, Rfl: 1  •  vitamin B-12 (CYANOCOBALAMIN) 1000 MCG tablet, Take 1,000 mcg by mouth Daily., Disp: , Rfl:   •  vitamin D (ERGOCALCIFEROL) 01903 units capsule capsule, Take 50,000 Units by mouth 1 (One) Time Per Week., Disp: , Rfl:   There are no discontinued medications.    Review of Systems   Constitutional: Negative for chills, fatigue and fever.   HENT: Negative for congestion.    Respiratory: Negative for cough and shortness of breath.    Cardiovascular: Positive for leg swelling (minimal ).             /82 (BP Location: Left arm, Patient Position: Sitting, Cuff Size: Large Adult)   Pulse 70   Temp 98.3 °F (36.8 °C) (Oral)   Resp 18   Ht 167.6 cm (66\")   Wt 103 kg (226 lb 9.6 oz) Comment: per pt  SpO2 97%   BMI 36.57 kg/m²       Physical Exam   Constitutional: She is oriented to person, place, and time. She appears well-developed and well-nourished. No distress.   HENT:   Head: Normocephalic and atraumatic.   Right Ear: External ear normal.   Left Ear: External ear normal.   Mouth/Throat: Oropharynx is clear and moist. No oropharyngeal exudate.   Eyes: Conjunctivae are normal. Right eye " exhibits no discharge. Left eye exhibits no discharge. No scleral icterus.   Cardiovascular: Normal rate, regular rhythm and normal heart sounds. Exam reveals no gallop and no friction rub.   No murmur heard.  Pulmonary/Chest: Effort normal and breath sounds normal. No respiratory distress. She has no wheezes. She has no rales.   Musculoskeletal: She exhibits edema (minimal of bilateral legs to mid calf).   Neurological: She is alert and oriented to person, place, and time.   Skin: Skin is warm. No rash noted.   Psychiatric: She has a normal mood and affect. Her behavior is normal.   Nursing note and vitals reviewed.        Results for orders placed or performed during the hospital encounter of 09/10/19   Adult Transthoracic Echo Complete W/ Cont if Necessary Per Protocol   Result Value Ref Range    BSA 2.1 m^2    IVSd 1.2 cm    LVIDd 5.1 cm    LVIDs 3.6 cm    LVPWd 1.1 cm    IVS/LVPW 1.1     FS 28.7 %    EDV(Teich) 121.6 ml    ESV(Teich) 54.8 ml    EF(Teich) 54.9 %    EDV(cubed) 129.6 ml    ESV(cubed) 47.0 ml    EF(cubed) 63.7 %    LV mass(C)d 221.8 grams    LV mass(C)dI 105.1 grams/m^2    SV(Teich) 66.8 ml    SI(Teich) 31.6 ml/m^2    SV(cubed) 82.5 ml    SI(cubed) 39.1 ml/m^2    Ao root diam 3.0 cm    Ao root area 7.1 cm^2    ACS 2.0 cm    LVOT diam 2.0 cm    LVOT area 3.1 cm^2    LVOT area(traced) 3.1 cm^2    RVOT diam 2.5 cm    RVOT area 4.9 cm^2    LVLd ap4 7.7 cm    EDV(MOD-sp4) 72.8 ml    LVLs ap4 6.0 cm    ESV(MOD-sp4) 33.2 ml    EF(MOD-sp4) 54.4 %    LVLd ap2 8.0 cm    EDV(MOD-sp2) 107.0 ml    LVLs ap2 7.3 cm    ESV(MOD-sp2) 47.5 ml    EF(MOD-sp2) 55.6 %    SV(MOD-sp4) 39.6 ml    SI(MOD-sp4) 18.8 ml/m^2    SV(MOD-sp2) 59.5 ml    SI(MOD-sp2) 28.2 ml/m^2    Ao root area (BSA corrected) 1.4     LV Huff Vol (BSA corrected) 34.5 ml/m^2    LV Sys Vol (BSA corrected) 15.7 ml/m^2    TAPSE (>1.6) 1.7 cm    MV A dur 0.14 sec    MV E max macie 117.0 cm/sec    MV A max macie 106.0 cm/sec    MV E/A 1.1     MV V2 max 115.0  cm/sec    MV max PG 5.3 mmHg    MV V2 mean 75.0 cm/sec    MV mean PG 3.0 mmHg    MV V2 VTI 34.4 cm    MVA(VTI) 2.3 cm^2    MV P1/2t max tobi 117.0 cm/sec    MV P1/2t 106.4 msec    MVA(P1/2t) 2.1 cm^2    MV dec slope 322.0 cm/sec^2    MV dec time 280 sec    Ao pk tobi 165.0 cm/sec    Ao max PG 10.9 mmHg    Ao max PG (full) 6.5 mmHg    Ao V2 mean 117.0 cm/sec    Ao mean PG 6.0 mmHg    Ao mean PG (full) 4.0 mmHg    Ao V2 VTI 39.2 cm    ERNESTINA(I,A) 2.0 cm^2    ERNESTINA(I,D) 2.0 cm^2    ERNESTINA(V,A) 2.0 cm^2    ERNESTINA(V,D) 2.0 cm^2    LV V1 max PG 4.4 mmHg    LV V1 mean PG 2.0 mmHg    LV V1 max 105.0 cm/sec    LV V1 mean 73.1 cm/sec    LV V1 VTI 25.4 cm    SV(Ao) 277.1 ml    SI(Ao) 131.3 ml/m^2    SV(LVOT) 79.8 ml    SV(RVOT) 104.6 ml    SI(LVOT) 37.8 ml/m^2    PA V2 max 127.0 cm/sec    PA max PG 6.5 mmHg    PA max PG (full) 3.3 mmHg    BH CV ECHO SHAKIRA - PVA(V,A) 3.5 cm^2    BH CV ECHO SHAKIRA - PVA(V,D) 3.5 cm^2    PA acc time 0.12 sec    RV V1 max PG 3.2 mmHg    RV V1 mean PG 2.0 mmHg    RV V1 max 89.4 cm/sec    RV V1 mean 61.4 cm/sec    RV V1 VTI 21.3 cm    TR max tobi 283.5 cm/sec    RVSP(TR) 40.3 mmHg    RAP systole 8.0 mmHg    PA pr(Accel) 26.8 mmHg    Pulm Sys Tobi 66.8 cm/sec    Pulm Huff Tobi 41.1 cm/sec    Pulm S/D 1.6     Qp/Qs 1.3     Pulm A Revs Dur 0.14 sec    Pulm A Revs Tobi 22.0 cm/sec    MVA P1/2T LCG 1.9 cm^2    BH CV ECHO SHAKIRA - BZI_BMI 36.6 kilograms/m^2    BH CV ECHO SHAKIRA - BSA(HAYCOCK) 2.2 m^2    BH CV ECHO SHAKIRA - BZI_METRIC_WEIGHT 103.0 kg    BH CV ECHO SHAKIRA - BZI_METRIC_HEIGHT 167.6 cm    Target HR (85%) 122 bpm    Max. Pred. HR (100%) 144 bpm    BH CV VAS BP LEFT /99 mmHg    TDI S' 13.00 cm/sec    RV Base 2.80 cm    LA Volume Index 34.0 mL/m2    Avg E/e' ratio 19.50     EF(MOD-bp) 52.0 %    Lat Peak E' Tobi 6.0 cm/sec    Med Peak E' Tobi 6.00 cm/sec           Jung was seen today for follow-up and hypertension.    Diagnoses and all orders for this visit:    Essential hypertension  -     Basic Metabolic  Panel    ADRIENNE (acute kidney injury) (CMS/[facility])  -     Basic Metabolic Panel    Leg swelling      Go up on Losartan to 50mg for goal BP of 130/80. Recheck BMP today and see back next week. If swelling does not get better with compression stockings, will go up on Bumex to 1mg.   Return in about 1 week (around 9/27/2019) for Recheck of swelling and BP .    Kylee Jensen MD  09/20/2019

## 2019-09-20 NOTE — OUTREACH NOTE
CHF Week 3 Survey      Responses   Facility patient discharged from?  [facility]   Does the patient have one of the following disease processes/diagnoses(primary or secondary)?  CHF   Week 3 attempt successful?  Yes   Call start time  0844   Revoke  Decline to participate [Hung up]   Call end time  0844          Alondra Turner RN

## 2019-09-24 ENCOUNTER — TELEPHONE (OUTPATIENT)
Dept: INTERNAL MEDICINE | Facility: CLINIC | Age: 74
End: 2019-09-24

## 2019-09-24 ENCOUNTER — EPISODE CHANGES (OUTPATIENT)
Dept: CASE MANAGEMENT | Facility: OTHER | Age: 74
End: 2019-09-24

## 2019-09-24 NOTE — TELEPHONE ENCOUNTER
----- Message from Kylee Jensen MD sent at 9/20/2019  8:40 PM EDT -----  Repeat kidney function is all normal.

## 2019-09-26 DIAGNOSIS — R14.0 ABDOMINAL BLOATING: ICD-10-CM

## 2019-09-26 RX ORDER — ANASTROZOLE 1 MG/1
TABLET ORAL
Qty: 90 TABLET | Refills: 0 | Status: SHIPPED | OUTPATIENT
Start: 2019-09-26 | End: 2019-12-26

## 2019-09-26 RX ORDER — DULOXETIN HYDROCHLORIDE 60 MG/1
60 CAPSULE, DELAYED RELEASE ORAL DAILY
Qty: 90 CAPSULE | Refills: 0 | OUTPATIENT
Start: 2019-09-26

## 2019-09-26 RX ORDER — CARVEDILOL 12.5 MG/1
TABLET ORAL
Qty: 180 TABLET | Refills: 0 | Status: SHIPPED | OUTPATIENT
Start: 2019-09-26 | End: 2020-06-03 | Stop reason: SDUPTHER

## 2019-09-27 ENCOUNTER — EPISODE CHANGES (OUTPATIENT)
Dept: CASE MANAGEMENT | Facility: OTHER | Age: 74
End: 2019-09-27

## 2019-09-27 ENCOUNTER — OFFICE VISIT (OUTPATIENT)
Dept: INTERNAL MEDICINE | Facility: CLINIC | Age: 74
End: 2019-09-27

## 2019-09-27 VITALS
DIASTOLIC BLOOD PRESSURE: 72 MMHG | HEART RATE: 74 BPM | HEIGHT: 66 IN | TEMPERATURE: 98 F | SYSTOLIC BLOOD PRESSURE: 160 MMHG | WEIGHT: 228.5 LBS | OXYGEN SATURATION: 97 % | RESPIRATION RATE: 18 BRPM | BODY MASS INDEX: 36.72 KG/M2

## 2019-09-27 DIAGNOSIS — M79.89 LEG SWELLING: ICD-10-CM

## 2019-09-27 DIAGNOSIS — R09.89 LABILE BLOOD PRESSURE: ICD-10-CM

## 2019-09-27 DIAGNOSIS — I10 ESSENTIAL HYPERTENSION: Primary | ICD-10-CM

## 2019-09-27 DIAGNOSIS — R14.0 ABDOMINAL BLOATING: ICD-10-CM

## 2019-09-27 PROCEDURE — G0008 ADMIN INFLUENZA VIRUS VAC: HCPCS | Performed by: INTERNAL MEDICINE

## 2019-09-27 PROCEDURE — 99214 OFFICE O/P EST MOD 30 MIN: CPT | Performed by: INTERNAL MEDICINE

## 2019-09-27 PROCEDURE — 90653 IIV ADJUVANT VACCINE IM: CPT | Performed by: INTERNAL MEDICINE

## 2019-09-27 RX ORDER — LISINOPRIL 5 MG/1
TABLET ORAL
COMMUNITY
Start: 2019-09-26 | End: 2019-09-27

## 2019-09-27 RX ORDER — LEVOTHYROXINE SODIUM 0.03 MG/1
25 TABLET ORAL DAILY
Qty: 90 TABLET | Refills: 1 | Status: SHIPPED | OUTPATIENT
Start: 2019-09-27 | End: 2020-05-11

## 2019-09-27 NOTE — TELEPHONE ENCOUNTER
----- Message from Monica Winston sent at 9/27/2019  3:23 PM EDT -----  Contact: patient  Patient    During visit today patient remembers pcp asking if she was having trouble getting any of her medication.  At the time she said no, but after leaving she remembers that she has had trouble getting the Levothyroxine and she is still taking it.

## 2019-09-27 NOTE — PROGRESS NOTES
Jung Short is a 74 y.o. female, who presents with a chief complaint of   Chief Complaint   Patient presents with   • Follow-up     1 x week f/u    • Hypertension     pt thinks she is taking lisinopril and losartan    • Edema       73 yo F here for follow up of her HTN and swelling. She went up on her Losartan to 50mg and is still taking Bumex 0.5mg. Over the last week it has been running 124-150/70-80. She is also taking Coreg BID. Feeling well on this. Still having minimal swelling of her legs.          The following portions of the patient's history were reviewed and updated as appropriate: allergies, current medications, past family history, past medical history, past social history, past surgical history and problem list.    Allergies: Dilaudid [hydromorphone] and Latex    Current Outpatient Medications:   •  alendronate (FOSAMAX) 35 MG tablet, take 1 tablet by mouth every week, Disp: 12 tablet, Rfl: 3  •  anastrozole (ARIMIDEX) 1 MG tablet, TAKE 1 TABLET BY MOUTH EVERY DAY, Disp: 90 tablet, Rfl: 0  •  aspirin 81 MG EC tablet, Take 81 mg by mouth Daily., Disp: , Rfl:   •  bumetanide (BUMEX) 0.5 MG tablet, TAKE 1 TABLET BY MOUTH DAILY, Disp: 90 tablet, Rfl: 1  •  carvedilol (COREG) 12.5 MG tablet, TAKE 1 TABLET BY MOUTH TWICE DAILY WITH MEALS, Disp: 180 tablet, Rfl: 0  •  coenzyme Q10 100 MG capsule, Take 100 mg by mouth., Disp: , Rfl:   •  DULoxetine (CYMBALTA) 60 MG capsule, Take 60 mg by mouth Daily., Disp: , Rfl:   •  esomeprazole (nexIUM) 20 MG capsule, Take 20 mg by mouth Every Morning Before Breakfast., Disp: , Rfl:   •  gabapentin (NEURONTIN) 100 MG capsule, Take 1 capsule by mouth 2 (Two) Times a Day., Disp: 60 capsule, Rfl: 0  •  glucose blood test strip, Take BG daily, Disp: 50 each, Rfl: 12  •  HYDROcodone-acetaminophen (NORCO)  MG per tablet, Take 1 tablet by mouth Every 6 (Six) Hours As Needed for Moderate Pain ., Disp: 120 tablet, Rfl: 0  •  levothyroxine (SYNTHROID, LEVOTHROID)  "25 MCG tablet, TAKE 1 TABLET BY MOUTH ONCE DAILY, Disp: 180 tablet, Rfl: 0  •  losartan (COZAAR) 25 MG tablet, TAKE 1 TABLET BY MOUTH DAILY (Patient taking differently: Take 50 mg by mouth Daily.), Disp: 90 tablet, Rfl: 0  •  magnesium oxide (MAG-OX) 400 MG tablet, Take 500 mg by mouth Daily., Disp: , Rfl:   •  Multiple Vitamins-Minerals (MULTIVITAMIN ADULT PO), Take  by mouth., Disp: , Rfl:   •  potassium chloride (K-DUR,KLOR-CON) 20 MEQ CR tablet, Take 1 tablet by mouth Daily., Disp: 90 tablet, Rfl: 0  •  pravastatin (PRAVACHOL) 10 MG tablet, TAKE 1 TABLET BY MOUTH DAILY, Disp: 90 tablet, Rfl: 1  •  SITagliptin (JANUVIA) 100 MG tablet, Take 1 tablet by mouth Daily., Disp: 30 tablet, Rfl: 1  •  vitamin B-12 (CYANOCOBALAMIN) 1000 MCG tablet, Take 1,000 mcg by mouth Daily., Disp: , Rfl:   •  vitamin D (ERGOCALCIFEROL) 75829 units capsule capsule, Take 50,000 Units by mouth 1 (One) Time Per Week., Disp: , Rfl:   Medications Discontinued During This Encounter   Medication Reason   • anastrozole (ARIMIDEX) 1 MG tablet Duplicate order   • lisinopril (PRINIVIL,ZESTRIL) 5 MG tablet *Therapy completed       Review of Systems   Constitutional: Negative for chills, fatigue and fever.   Respiratory: Negative for cough and shortness of breath.    Cardiovascular: Positive for leg swelling (minimal swelling bilateral legs ). Negative for chest pain and palpitations.             /72 (BP Location: Left arm, Patient Position: Sitting, Cuff Size: Large Adult)   Pulse 74   Temp 98 °F (36.7 °C) (Oral)   Resp 18   Ht 167.6 cm (66\")   Wt 104 kg (228 lb 8 oz) Comment: pt does not weigh in office-  per pt  SpO2 97%   BMI 36.88 kg/m²       Physical Exam   Constitutional: She is oriented to person, place, and time. She appears well-developed and well-nourished. No distress.   HENT:   Head: Normocephalic and atraumatic.   Right Ear: External ear normal.   Left Ear: External ear normal.   Mouth/Throat: Oropharynx is clear and " moist. No oropharyngeal exudate.   Eyes: Conjunctivae are normal. Right eye exhibits no discharge. Left eye exhibits no discharge. No scleral icterus.   Neck: Neck supple.   Cardiovascular: Normal rate, regular rhythm and normal heart sounds. Exam reveals no gallop and no friction rub.   No murmur heard.  Pulmonary/Chest: Effort normal and breath sounds normal. No respiratory distress. She has no wheezes. She has no rales.   Abdominal: She exhibits no mass.   Lymphadenopathy:     She has no cervical adenopathy.   Neurological: She is alert and oriented to person, place, and time.   Skin: Skin is warm. No rash noted.   Psychiatric: She has a normal mood and affect. Her behavior is normal.   Nursing note and vitals reviewed.        Results for orders placed or performed in visit on 09/20/19   Basic Metabolic Panel   Result Value Ref Range    Glucose 184 (H) 65 - 99 mg/dL    BUN 12 8 - 23 mg/dL    Creatinine 0.95 0.57 - 1.00 mg/dL    eGFR Non African Am 57 (L) >60 mL/min/1.73    eGFR African Am 69 >60 mL/min/1.73    BUN/Creatinine Ratio 12.6 7.0 - 25.0    Sodium 137 136 - 145 mmol/L    Potassium 4.4 3.5 - 5.2 mmol/L    Chloride 100 98 - 107 mmol/L    Total CO2 26.3 22.0 - 29.0 mmol/L    Calcium 10.0 8.6 - 10.5 mg/dL           Jung was seen today for follow-up, hypertension and edema.    Diagnoses and all orders for this visit:    Essential hypertension    Leg swelling    Labile blood pressure    Other orders  -     Fluad Quad >65 years    I want her to stay on her Coreg at current dose of 12.5mg BID   Stay on Losartan at 50mg   Bring back log with her when she returns   Will go up to 1mg on her Bumex and see back in 7-10 days and recheck kidney function next time  Stop taking Green Coffee Bean extract      Return in about 10 days (around 10/7/2019) for Recheck HTN and edema .    Kylee Jensen MD  09/27/2019

## 2019-10-02 RX ORDER — SPIRONOLACTONE 25 MG/1
25 TABLET ORAL EVERY MORNING
Qty: 90 TABLET | Refills: 0 | OUTPATIENT
Start: 2019-10-02

## 2019-10-02 NOTE — TELEPHONE ENCOUNTER
Called and s/w pt. Pt states that she is no longer taking Spirolactone since she was dc from the Hospital 9/1/19.

## 2019-10-07 ENCOUNTER — EPISODE CHANGES (OUTPATIENT)
Dept: CASE MANAGEMENT | Facility: OTHER | Age: 74
End: 2019-10-07

## 2019-10-08 RX ORDER — HYDROCODONE BITARTRATE AND ACETAMINOPHEN 10; 325 MG/1; MG/1
1 TABLET ORAL EVERY 6 HOURS PRN
Qty: 120 TABLET | Refills: 0 | Status: SHIPPED | OUTPATIENT
Start: 2019-10-08 | End: 2019-11-11 | Stop reason: SDUPTHER

## 2019-10-08 NOTE — TELEPHONE ENCOUNTER
Medication Refill Request    Date of phone call: 10/8/19    Medication being requested: norco 10/325mg si tab po q 6 hours prn   Qty: 120    Date of last visit: 19    Date of last refill: 19    LOUANN up to date?: yes    Next Follow up?: 19    Any new pertinent information? (i.e, new medication allergies, new use of medications, change in patient's health or condition, non-compliance or inconsistency with prescribing agreement?): can you please fill for Trini?

## 2019-10-10 ENCOUNTER — OFFICE VISIT (OUTPATIENT)
Dept: INTERNAL MEDICINE | Facility: CLINIC | Age: 74
End: 2019-10-10

## 2019-10-10 ENCOUNTER — TELEPHONE (OUTPATIENT)
Dept: INTERNAL MEDICINE | Facility: CLINIC | Age: 74
End: 2019-10-10

## 2019-10-10 VITALS
OXYGEN SATURATION: 98 % | DIASTOLIC BLOOD PRESSURE: 78 MMHG | SYSTOLIC BLOOD PRESSURE: 132 MMHG | TEMPERATURE: 98.6 F | BODY MASS INDEX: 36.32 KG/M2 | HEIGHT: 66 IN | HEART RATE: 76 BPM | WEIGHT: 226 LBS | RESPIRATION RATE: 18 BRPM

## 2019-10-10 DIAGNOSIS — M79.89 LEG SWELLING: ICD-10-CM

## 2019-10-10 DIAGNOSIS — I10 ESSENTIAL HYPERTENSION: Primary | ICD-10-CM

## 2019-10-10 PROCEDURE — 99213 OFFICE O/P EST LOW 20 MIN: CPT | Performed by: INTERNAL MEDICINE

## 2019-10-10 RX ORDER — PRAVASTATIN SODIUM 10 MG
10 TABLET ORAL DAILY
Qty: 90 TABLET | Refills: 1 | Status: SHIPPED | OUTPATIENT
Start: 2019-10-10 | End: 2020-06-23

## 2019-10-10 NOTE — TELEPHONE ENCOUNTER
----- Message from Kylee Jensen MD sent at 10/10/2019  3:56 PM EDT -----  Regarding: RE: MEDS  Contact: [phone]  Ok, just stay on 75mg of Losartan then. She was suppose to be on 50mg just FYI. May need new prescription.     Can send in Pravachol for her.     ----- Message -----  From: Valentina Au  Sent: 10/10/2019   2:26 PM  To: Kylee Jensen MD  Subject: MEDS                                             PATIENT CALLED BACK TO GIVE YOU THE LIST OF MEDS SHE IS CURRENTLY TAKING.    POTASSIUM  20  LOSARTAN 25 MG + 50 MG  BUMETANIDE  .5 MG  PRAVASTATIN  10 MG-- HAS 5 LEFT  LEVOTHYROXINE  .025  TREGABALIN  75 MG  CARVEDILOL  12.5 MG

## 2019-10-10 NOTE — PROGRESS NOTES
Jung Short is a 74 y.o. female, who presents with a chief complaint of   Chief Complaint   Patient presents with   • Follow-up     1 x week f/u   • Hypertension       75 yo F with HTN and leg swelling here for follow up.     She is unsure of what she is taking at home for her BP. States that I wrote something down for her (which I did last time she was here), but couldn't remember what. Leg swelling is better. BP better today. Running around 130-140/80 at home. She did not increase her Lasix.          The following portions of the patient's history were reviewed and updated as appropriate: allergies, current medications, past family history, past medical history, past social history, past surgical history and problem list.    Allergies: Dilaudid [hydromorphone] and Latex    Current Outpatient Medications:   •  alendronate (FOSAMAX) 35 MG tablet, take 1 tablet by mouth every week, Disp: 12 tablet, Rfl: 3  •  anastrozole (ARIMIDEX) 1 MG tablet, TAKE 1 TABLET BY MOUTH EVERY DAY, Disp: 90 tablet, Rfl: 0  •  aspirin 81 MG EC tablet, Take 81 mg by mouth Daily., Disp: , Rfl:   •  bumetanide (BUMEX) 0.5 MG tablet, TAKE 1 TABLET BY MOUTH DAILY, Disp: 90 tablet, Rfl: 1  •  carvedilol (COREG) 12.5 MG tablet, TAKE 1 TABLET BY MOUTH TWICE DAILY WITH MEALS, Disp: 180 tablet, Rfl: 0  •  coenzyme Q10 100 MG capsule, Take 100 mg by mouth., Disp: , Rfl:   •  DULoxetine (CYMBALTA) 60 MG capsule, Take 60 mg by mouth Daily., Disp: , Rfl:   •  esomeprazole (nexIUM) 20 MG capsule, Take 20 mg by mouth Every Morning Before Breakfast., Disp: , Rfl:   •  gabapentin (NEURONTIN) 100 MG capsule, Take 1 capsule by mouth 2 (Two) Times a Day., Disp: 60 capsule, Rfl: 0  •  glucose blood test strip, Take BG daily, Disp: 50 each, Rfl: 12  •  HYDROcodone-acetaminophen (NORCO)  MG per tablet, Take 1 tablet by mouth Every 6 (Six) Hours As Needed for Moderate Pain . dnf 10/11/19, Disp: 120 tablet, Rfl: 0  •  levothyroxine (SYNTHROID,  "LEVOTHROID) 25 MCG tablet, Take 1 tablet by mouth Daily., Disp: 90 tablet, Rfl: 1  •  losartan (COZAAR) 25 MG tablet, TAKE 1 TABLET BY MOUTH DAILY (Patient taking differently: Take 50 mg by mouth Daily.), Disp: 90 tablet, Rfl: 0  •  magnesium oxide (MAG-OX) 400 MG tablet, Take 500 mg by mouth Daily., Disp: , Rfl:   •  Multiple Vitamins-Minerals (MULTIVITAMIN ADULT PO), Take  by mouth., Disp: , Rfl:   •  potassium chloride (K-DUR,KLOR-CON) 20 MEQ CR tablet, Take 1 tablet by mouth Daily., Disp: 90 tablet, Rfl: 0  •  pravastatin (PRAVACHOL) 10 MG tablet, TAKE 1 TABLET BY MOUTH DAILY, Disp: 90 tablet, Rfl: 1  •  SITagliptin (JANUVIA) 100 MG tablet, Take 1 tablet by mouth Daily., Disp: 30 tablet, Rfl: 1  •  vitamin B-12 (CYANOCOBALAMIN) 1000 MCG tablet, Take 1,000 mcg by mouth Daily., Disp: , Rfl:   •  vitamin D (ERGOCALCIFEROL) 80116 units capsule capsule, Take 50,000 Units by mouth 1 (One) Time Per Week., Disp: , Rfl:   There are no discontinued medications.    Review of Systems   Constitutional: Negative for chills, fatigue and fever.   Respiratory: Negative for cough and shortness of breath.    Cardiovascular: Negative for leg swelling (getting better).             /78 (BP Location: Left arm, Patient Position: Sitting, Cuff Size: Large Adult)   Pulse 76   Temp 98.6 °F (37 °C) (Oral)   Resp 18   Ht 167.6 cm (66\")   Wt 103 kg (226 lb)   SpO2 98%   BMI 36.48 kg/m²       Physical Exam   Constitutional: She is oriented to person, place, and time. She appears well-developed and well-nourished. No distress.   HENT:   Head: Normocephalic and atraumatic.   Right Ear: External ear normal.   Left Ear: External ear normal.   Mouth/Throat: Oropharynx is clear and moist. No oropharyngeal exudate.   Eyes: Conjunctivae are normal. Right eye exhibits no discharge. Left eye exhibits no discharge. No scleral icterus.   Neck: Neck supple.   Pulmonary/Chest: Effort normal.   Lymphadenopathy:     She has no cervical " adenopathy.   Neurological: She is alert and oriented to person, place, and time.   Skin: Skin is warm. No rash noted.   Psychiatric: She has a normal mood and affect. Her behavior is normal.   Nursing note and vitals reviewed.          Results for orders placed or performed in visit on 09/20/19   Basic Metabolic Panel   Result Value Ref Range    Glucose 184 (H) 65 - 99 mg/dL    BUN 12 8 - 23 mg/dL    Creatinine 0.95 0.57 - 1.00 mg/dL    eGFR Non African Am 57 (L) >60 mL/min/1.73    eGFR African Am 69 >60 mL/min/1.73    BUN/Creatinine Ratio 12.6 7.0 - 25.0    Sodium 137 136 - 145 mmol/L    Potassium 4.4 3.5 - 5.2 mmol/L    Chloride 100 98 - 107 mmol/L    Total CO2 26.3 22.0 - 29.0 mmol/L    Calcium 10.0 8.6 - 10.5 mg/dL           Jung was seen today for follow-up and hypertension.    Diagnoses and all orders for this visit:    Essential hypertension    Leg swelling      She is unsure what she is taking for her medications yet again. I advised her to stay on what she is taking now and will call office. Continue Coreg at current dose as well as Losartan at 50mg. She is just not sure if she is taking 0.5 or 1mg of her Bumex.     See back in 3 months with labs one week before     Return in about 3 months (around 1/10/2020) for Recheck.    Kylee Jensen MD  10/10/2019

## 2019-11-05 ENCOUNTER — EPISODE CHANGES (OUTPATIENT)
Dept: CASE MANAGEMENT | Facility: OTHER | Age: 74
End: 2019-11-05

## 2019-11-07 ENCOUNTER — EPISODE CHANGES (OUTPATIENT)
Dept: CASE MANAGEMENT | Facility: OTHER | Age: 74
End: 2019-11-07

## 2019-11-11 ENCOUNTER — OFFICE VISIT (OUTPATIENT)
Dept: PAIN MEDICINE | Facility: CLINIC | Age: 74
End: 2019-11-11

## 2019-11-11 VITALS
WEIGHT: 226 LBS | RESPIRATION RATE: 16 BRPM | OXYGEN SATURATION: 96 % | DIASTOLIC BLOOD PRESSURE: 90 MMHG | BODY MASS INDEX: 36.32 KG/M2 | HEART RATE: 79 BPM | TEMPERATURE: 97.5 F | SYSTOLIC BLOOD PRESSURE: 164 MMHG | HEIGHT: 66 IN

## 2019-11-11 DIAGNOSIS — M51.36 DDD (DEGENERATIVE DISC DISEASE), LUMBAR: ICD-10-CM

## 2019-11-11 DIAGNOSIS — M47.816 LUMBAR FACET ARTHROPATHY: ICD-10-CM

## 2019-11-11 DIAGNOSIS — G89.29 OTHER CHRONIC PAIN: Primary | ICD-10-CM

## 2019-11-11 DIAGNOSIS — M25.50 ARTHRALGIA OF MULTIPLE JOINTS: ICD-10-CM

## 2019-11-11 DIAGNOSIS — Z79.899 ENCOUNTER FOR LONG-TERM (CURRENT) USE OF HIGH-RISK MEDICATION: ICD-10-CM

## 2019-11-11 PROCEDURE — 99214 OFFICE O/P EST MOD 30 MIN: CPT | Performed by: NURSE PRACTITIONER

## 2019-11-11 PROCEDURE — 80305 DRUG TEST PRSMV DIR OPT OBS: CPT | Performed by: NURSE PRACTITIONER

## 2019-11-11 RX ORDER — AMLODIPINE BESYLATE 5 MG/1
TABLET ORAL
Status: ON HOLD | COMMUNITY
Start: 2017-01-18 | End: 2020-01-31

## 2019-11-11 RX ORDER — DULOXETIN HYDROCHLORIDE 30 MG/1
CAPSULE, DELAYED RELEASE ORAL
COMMUNITY
Start: 2017-01-18 | End: 2019-11-19 | Stop reason: SDUPTHER

## 2019-11-11 RX ORDER — HYDROCODONE BITARTRATE AND ACETAMINOPHEN 10; 325 MG/1; MG/1
1 TABLET ORAL EVERY 6 HOURS PRN
Qty: 120 TABLET | Refills: 0 | Status: ON HOLD | OUTPATIENT
Start: 2019-11-11 | End: 2020-01-31

## 2019-11-11 RX ORDER — SPIRONOLACTONE 25 MG/1
TABLET ORAL
Status: ON HOLD | COMMUNITY
Start: 2016-12-01 | End: 2020-01-31

## 2019-11-11 RX ORDER — GLIMEPIRIDE 1 MG/1
TABLET ORAL
Status: ON HOLD | COMMUNITY
Start: 2016-12-01 | End: 2020-01-31

## 2019-11-11 RX ORDER — PREGABALIN 75 MG/1
CAPSULE ORAL
Refills: 1 | COMMUNITY
Start: 2019-10-08 | End: 2019-11-27 | Stop reason: SDUPTHER

## 2019-11-11 RX ORDER — LISINOPRIL 5 MG/1
1 TABLET ORAL
Status: ON HOLD | COMMUNITY
Start: 2017-01-18 | End: 2020-01-31

## 2019-11-11 NOTE — PROGRESS NOTES
CHIEF COMPLAINT  Pt is here to f/u on joint pain. Pt sts the pain has increased.    Subjective   Jung Short is a 74 y.o. female  who presents to the office for follow-up.She has a history of chronic back and joint pain. Reports her pain is worse since last office visit.    Complains of pain in her low back and joints. Today her pain is 4/10VAS. Describes the pain as nearly continuous aching and throbbing. Pain increases with activity, weather changes; pain decreases with medication and rest. Continues with Hydrocodone 10/325 4/day and cymbalta 30 mg daily and pregabalin 75 mg BID. No longer taking Lyrica(NBO) due to cost.  Denies any side effects from the regimen. Used to have constipation, takes Colace OTC as needed with positive results.  This helps decrease her pain moderately. ADL's by self.     Had left TKR 4-17-18.  Could not tolerate gabapentin--- altered mental status.   Joint Pain   This is a chronic problem. The current episode started more than 1 year ago (today pain is 5/10VAS- joints). The problem occurs constantly. The problem has been unchanged (worse since last office visit). Associated symptoms include arthralgias (joint pain in shoulders, arms) and numbness (bilat legs and feet). Pertinent negatives include no abdominal pain, chest pain, chills, congestion, coughing, fatigue, fever, headaches, joint swelling, nausea, neck pain, sore throat, vomiting or weakness. Nothing aggravates the symptoms. She has tried oral narcotics, position changes and rest for the symptoms. The treatment provided moderate relief.   Back Pain   This is a chronic problem. The current episode started more than 1 year ago. The problem occurs constantly. The problem has been gradually worsening (worse since last office visit) since onset. The pain is present in the lumbar spine. The pain radiates to the right knee, right foot and right thigh. The pain is at a severity of 4/10. The pain is moderate. Associated symptoms  "include numbness (bilat legs and feet). Pertinent negatives include no abdominal pain, chest pain, dysuria, fever, headaches or weakness. Risk factors include lack of exercise and obesity. She has tried analgesics for the symptoms. The treatment provided moderate relief.      PEG Assessment   What number best describes your pain on average in the past week?4  What number best describes how, during the past week, pain has interfered with your enjoyment of life?4  What number best describes how, during the past week, pain has interfered with your general activity?  5    The following portions of the patient's history were reviewed and updated as appropriate: allergies, current medications, past family history, past medical history, past social history, past surgical history and problem list.    Review of Systems   Constitutional: Negative for chills, fatigue and fever.   HENT: Negative for congestion and sore throat.    Respiratory: Negative for cough and shortness of breath.    Cardiovascular: Negative for chest pain.   Gastrointestinal: Negative for abdominal pain, nausea and vomiting.   Genitourinary: Negative for difficulty urinating and dysuria.   Musculoskeletal: Positive for arthralgias (joint pain in shoulders, arms). Negative for back pain, joint swelling and neck pain.   Neurological: Positive for numbness (bilat legs and feet). Negative for dizziness, weakness and headaches.   Psychiatric/Behavioral: Negative for sleep disturbance.       Vitals:    11/11/19 1236   BP: 164/90   Pulse: 79   Resp: 16   Temp: 97.5 °F (36.4 °C)   SpO2: 96%   Weight: 103 kg (226 lb)   Height: 167.6 cm (65.98\")   PainSc:   4   PainLoc: Comment: Joint pain     Objective   Physical Exam   Constitutional: She is oriented to person, place, and time. Vital signs are normal. She appears well-developed and well-nourished. She is cooperative.   HENT:   Head: Normocephalic and atraumatic.   Nose: Nose normal.   Eyes: Conjunctivae and lids " are normal.   Cardiovascular: Normal rate.   Pulmonary/Chest: Effort normal.   Abdominal: Normal appearance.   Musculoskeletal:        Lumbar back: She exhibits decreased range of motion and tenderness.   Surgical scar of left knee  Widespread OA changes with no acute synovitis   Neurological: She is alert and oriented to person, place, and time. Gait (cane) abnormal.   Skin: Skin is warm, dry and intact.   Psychiatric: She has a normal mood and affect. Her speech is normal and behavior is normal. Judgment and thought content normal. Cognition and memory are normal.   Nursing note and vitals reviewed.      Assessment/Plan   Jung was seen today for joint pain.    Diagnoses and all orders for this visit:    Other chronic pain    DDD (degenerative disc disease), lumbar    Lumbar facet arthropathy    Arthralgia of multiple joints    Encounter for long-term (current) use of high-risk medication    Other orders  -     HYDROcodone-acetaminophen (NORCO)  MG per tablet; Take 1 tablet by mouth Every 6 (Six) Hours As Needed for Moderate Pain .      --- Routine UDS in office today as part of monitoring requirements for controlled substances.  The specimen was viewed and the immunoassay result reviewed and is +OPI(APPROPRIATE).  This specimen will be sent to [facility] laboratory for confirmation.     --- Refill Hydrocodone. Patient appears stable with current regimen. No adverse effects. Regarding continuation of opioids, there is no evidence of aberrant behavior or any red flags.  The patient continues with appropriate response to opioid therapy. ADL's remain intact by self.   --- Follow-up 2 months or sooner if needed.     LOUANN REPORT    As part of the patient's treatment plan, I am prescribing controlled substances. The patient has been made aware of appropriate use of such medications, including potential risk of somnolence, limited ability to drive and/or work safely, and the potential for dependence or overdose.  It has also bee made clear that these medications are for use by this patient only, without concomitant use of alcohol or other substances unless prescribed.     Patient has completed prescribing agreement detailing terms of continued prescribing of controlled substances, including monitoring LOUANN reports, urine drug screening, and pill counts if necessary. The patient is aware that inappropriate use will results in cessation of prescribing such medications.    LOUANN report has been reviewed and scanned into the patient's chart.    As the clinician, I personally reviewed the LOUANN from 11-8-19 while the patient was in the office today.    History and physical exam exhibit continued safe and appropriate use of controlled substances.      EMR Dragon/Transcription disclaimer:   Much of this encounter note is an electronic transcription/translation of spoken language to printed text. The electronic translation of spoken language may permit erroneous, or at times, nonsensical words or phrases to be inadvertently transcribed; Although I have reviewed the note for such errors, some may still exist.

## 2019-11-13 ENCOUNTER — RESULTS ENCOUNTER (OUTPATIENT)
Dept: PAIN MEDICINE | Facility: CLINIC | Age: 74
End: 2019-11-13

## 2019-11-13 DIAGNOSIS — Z79.899 ENCOUNTER FOR LONG-TERM (CURRENT) USE OF HIGH-RISK MEDICATION: ICD-10-CM

## 2019-11-13 DIAGNOSIS — M47.816 LUMBAR FACET ARTHROPATHY: ICD-10-CM

## 2019-11-13 DIAGNOSIS — M25.50 ARTHRALGIA OF MULTIPLE JOINTS: ICD-10-CM

## 2019-11-13 DIAGNOSIS — G89.29 OTHER CHRONIC PAIN: ICD-10-CM

## 2019-11-13 DIAGNOSIS — M51.36 DDD (DEGENERATIVE DISC DISEASE), LUMBAR: ICD-10-CM

## 2019-11-18 RX ORDER — LOSARTAN POTASSIUM 25 MG/1
25 TABLET ORAL DAILY
Qty: 90 TABLET | Refills: 0 | Status: SHIPPED | OUTPATIENT
Start: 2019-11-18 | End: 2020-06-23

## 2019-11-18 NOTE — TELEPHONE ENCOUNTER
Pt with difficult med list, LOV she had a hard time remembering what she was taking. (see phone encounter day after LOV) she called with some RX's. Ok for pt to continue this RX?

## 2019-11-19 DIAGNOSIS — R14.0 ABDOMINAL BLOATING: ICD-10-CM

## 2019-11-19 RX ORDER — PREGABALIN 75 MG/1
CAPSULE ORAL
Qty: 60 CAPSULE | Refills: 0 | OUTPATIENT
Start: 2019-11-19

## 2019-11-19 RX ORDER — DULOXETIN HYDROCHLORIDE 60 MG/1
60 CAPSULE, DELAYED RELEASE ORAL DAILY
Qty: 90 CAPSULE | Refills: 0 | Status: SHIPPED | OUTPATIENT
Start: 2019-11-19 | End: 2019-12-26

## 2019-11-19 RX ORDER — SPIRONOLACTONE 50 MG/1
50 TABLET, FILM COATED ORAL EVERY MORNING
Qty: 90 TABLET | Refills: 0 | OUTPATIENT
Start: 2019-11-19

## 2019-11-22 DIAGNOSIS — Z86.39 HISTORY OF LOW POTASSIUM: ICD-10-CM

## 2019-11-26 RX ORDER — PREGABALIN 75 MG/1
CAPSULE ORAL
Qty: 60 CAPSULE | Refills: 0 | OUTPATIENT
Start: 2019-11-26

## 2019-11-26 RX ORDER — POTASSIUM CHLORIDE 20 MEQ/1
20 TABLET, EXTENDED RELEASE ORAL DAILY
Qty: 90 TABLET | Refills: 0 | Status: ON HOLD | OUTPATIENT
Start: 2019-11-26 | End: 2020-01-31

## 2019-11-27 ENCOUNTER — TELEPHONE (OUTPATIENT)
Dept: INTERNAL MEDICINE | Facility: CLINIC | Age: 74
End: 2019-11-27

## 2019-11-27 RX ORDER — GABAPENTIN 100 MG/1
100 CAPSULE ORAL 2 TIMES DAILY
Qty: 60 CAPSULE | Refills: 0 | Status: CANCELLED | OUTPATIENT
Start: 2019-11-27

## 2019-11-27 RX ORDER — PREGABALIN 75 MG/1
75 CAPSULE ORAL 2 TIMES DAILY
Qty: 60 CAPSULE | Refills: 1 | Status: SHIPPED | OUTPATIENT
Start: 2019-11-27 | End: 2020-06-02

## 2019-11-27 NOTE — TELEPHONE ENCOUNTER
Pt with complicated med list. Pt was supposed to be taking Gabapentin but confirmed with pharmacist 11/27/19, pt has never picked up. Pt has still been taking lyrica. Would you mind filling since Dr. Jensen is out of the office. Thank you. I will CC Dr. Jensen in this so she is in the loop for when she returns.

## 2019-12-25 ENCOUNTER — LAB REQUISITION (OUTPATIENT)
Dept: LAB | Facility: HOSPITAL | Age: 74
End: 2019-12-25

## 2019-12-25 DIAGNOSIS — R14.0 ABDOMINAL BLOATING: ICD-10-CM

## 2019-12-25 DIAGNOSIS — Z00.00 ROUTINE GENERAL MEDICAL EXAMINATION AT A HEALTH CARE FACILITY: ICD-10-CM

## 2019-12-25 LAB
BACTERIA UR QL AUTO: ABNORMAL /HPF
BILIRUB UR QL STRIP: NEGATIVE
CLARITY UR: ABNORMAL
COLOR UR: YELLOW
GLUCOSE UR STRIP-MCNC: ABNORMAL MG/DL
HGB UR QL STRIP.AUTO: NEGATIVE
HYALINE CASTS UR QL AUTO: ABNORMAL /LPF
KETONES UR QL STRIP: NEGATIVE
LEUKOCYTE ESTERASE UR QL STRIP.AUTO: ABNORMAL
NITRITE UR QL STRIP: NEGATIVE
PH UR STRIP.AUTO: <=5 [PH] (ref 5–8)
PROT UR QL STRIP: NEGATIVE
RBC # UR: ABNORMAL /HPF
REF LAB TEST METHOD: ABNORMAL
SP GR UR STRIP: 1.02 (ref 1–1.03)
SQUAMOUS #/AREA URNS HPF: ABNORMAL /HPF
UROBILINOGEN UR QL STRIP: ABNORMAL
WBC UR QL AUTO: ABNORMAL /HPF

## 2019-12-25 PROCEDURE — 81001 URINALYSIS AUTO W/SCOPE: CPT

## 2019-12-26 RX ORDER — ANASTROZOLE 1 MG/1
TABLET ORAL
Qty: 90 TABLET | Refills: 2 | Status: SHIPPED | OUTPATIENT
Start: 2019-12-26 | End: 2020-06-17

## 2019-12-26 RX ORDER — DULOXETIN HYDROCHLORIDE 60 MG/1
60 CAPSULE, DELAYED RELEASE ORAL DAILY
Qty: 90 CAPSULE | Refills: 0 | Status: SHIPPED | OUTPATIENT
Start: 2019-12-26 | End: 2020-06-18

## 2020-01-31 ENCOUNTER — HOSPITAL ENCOUNTER (INPATIENT)
Facility: HOSPITAL | Age: 75
LOS: 6 days | Discharge: NURSING, REHAB OR LONG-TERM CARE | End: 2020-02-06
Attending: EMERGENCY MEDICINE | Admitting: HOSPITALIST

## 2020-01-31 ENCOUNTER — APPOINTMENT (OUTPATIENT)
Dept: GENERAL RADIOLOGY | Facility: HOSPITAL | Age: 75
End: 2020-01-31

## 2020-01-31 ENCOUNTER — APPOINTMENT (OUTPATIENT)
Dept: CT IMAGING | Facility: HOSPITAL | Age: 75
End: 2020-01-31

## 2020-01-31 DIAGNOSIS — G89.29 OTHER CHRONIC PAIN: ICD-10-CM

## 2020-01-31 DIAGNOSIS — I50.9 CONGESTIVE HEART FAILURE, UNSPECIFIED HF CHRONICITY, UNSPECIFIED HEART FAILURE TYPE (HCC): ICD-10-CM

## 2020-01-31 DIAGNOSIS — I16.1 HYPERTENSIVE EMERGENCY: ICD-10-CM

## 2020-01-31 DIAGNOSIS — J90 PLEURAL EFFUSION: Primary | ICD-10-CM

## 2020-01-31 DIAGNOSIS — S52.591A OTHER CLOSED FRACTURE OF DISTAL END OF RIGHT RADIUS, INITIAL ENCOUNTER: ICD-10-CM

## 2020-01-31 DIAGNOSIS — R09.02 HYPOXIA: ICD-10-CM

## 2020-01-31 PROBLEM — J96.01 ACUTE RESPIRATORY FAILURE WITH HYPOXIA (HCC): Status: ACTIVE | Noted: 2020-01-31

## 2020-01-31 LAB
ALBUMIN SERPL-MCNC: 4 G/DL (ref 3.5–5.2)
ALBUMIN/GLOB SERPL: 1.2 G/DL
ALP SERPL-CCNC: 104 U/L (ref 39–117)
ALT SERPL W P-5'-P-CCNC: 7 U/L (ref 1–33)
ANION GAP SERPL CALCULATED.3IONS-SCNC: 13.1 MMOL/L (ref 5–15)
AST SERPL-CCNC: 13 U/L (ref 1–32)
BASOPHILS # BLD AUTO: 0.03 10*3/MM3 (ref 0–0.2)
BASOPHILS NFR BLD AUTO: 0.3 % (ref 0–1.5)
BILIRUB SERPL-MCNC: 0.9 MG/DL (ref 0.2–1.2)
BUN BLD-MCNC: 11 MG/DL (ref 8–23)
BUN/CREAT SERPL: 17.2 (ref 7–25)
CALCIUM SPEC-SCNC: 9.7 MG/DL (ref 8.6–10.5)
CHLORIDE SERPL-SCNC: 100 MMOL/L (ref 98–107)
CO2 SERPL-SCNC: 26.9 MMOL/L (ref 22–29)
CREAT BLD-MCNC: 0.64 MG/DL (ref 0.57–1)
D DIMER PPP FEU-MCNC: 2.15 MCGFEU/ML (ref 0–0.46)
D-LACTATE SERPL-SCNC: 1.3 MMOL/L (ref 0.5–2)
DEPRECATED RDW RBC AUTO: 47.1 FL (ref 37–54)
EOSINOPHIL # BLD AUTO: 0.16 10*3/MM3 (ref 0–0.4)
EOSINOPHIL NFR BLD AUTO: 1.6 % (ref 0.3–6.2)
ERYTHROCYTE [DISTWIDTH] IN BLOOD BY AUTOMATED COUNT: 14.8 % (ref 12.3–15.4)
GFR SERPL CREATININE-BSD FRML MDRD: 90 ML/MIN/1.73
GLOBULIN UR ELPH-MCNC: 3.4 GM/DL
GLUCOSE BLD-MCNC: 213 MG/DL (ref 65–99)
GLUCOSE BLDC GLUCOMTR-MCNC: 172 MG/DL (ref 70–130)
HBA1C MFR BLD: 6.3 % (ref 4.8–5.6)
HCT VFR BLD AUTO: 37.8 % (ref 34–46.6)
HGB BLD-MCNC: 11.7 G/DL (ref 12–15.9)
IMM GRANULOCYTES # BLD AUTO: 0.11 10*3/MM3 (ref 0–0.05)
IMM GRANULOCYTES NFR BLD AUTO: 1.1 % (ref 0–0.5)
LYMPHOCYTES # BLD AUTO: 0.96 10*3/MM3 (ref 0.7–3.1)
LYMPHOCYTES NFR BLD AUTO: 9.8 % (ref 19.6–45.3)
MCH RBC QN AUTO: 27.1 PG (ref 26.6–33)
MCHC RBC AUTO-ENTMCNC: 31 G/DL (ref 31.5–35.7)
MCV RBC AUTO: 87.7 FL (ref 79–97)
MONOCYTES # BLD AUTO: 0.66 10*3/MM3 (ref 0.1–0.9)
MONOCYTES NFR BLD AUTO: 6.7 % (ref 5–12)
NEUTROPHILS # BLD AUTO: 7.9 10*3/MM3 (ref 1.7–7)
NEUTROPHILS NFR BLD AUTO: 80.5 % (ref 42.7–76)
NRBC BLD AUTO-RTO: 0.3 /100 WBC (ref 0–0.2)
NT-PROBNP SERPL-MCNC: ABNORMAL PG/ML (ref 5–1800)
PLATELET # BLD AUTO: 299 10*3/MM3 (ref 140–450)
PMV BLD AUTO: 9.7 FL (ref 6–12)
POTASSIUM BLD-SCNC: 3.3 MMOL/L (ref 3.5–5.2)
PROCALCITONIN SERPL-MCNC: 0.03 NG/ML (ref 0.1–0.25)
PROT SERPL-MCNC: 7.4 G/DL (ref 6–8.5)
RBC # BLD AUTO: 4.31 10*6/MM3 (ref 3.77–5.28)
SODIUM BLD-SCNC: 140 MMOL/L (ref 136–145)
TROPONIN T SERPL-MCNC: 0.01 NG/ML (ref 0–0.03)
WBC NRBC COR # BLD: 9.82 10*3/MM3 (ref 3.4–10.8)

## 2020-01-31 PROCEDURE — 87040 BLOOD CULTURE FOR BACTERIA: CPT | Performed by: EMERGENCY MEDICINE

## 2020-01-31 PROCEDURE — 83880 ASSAY OF NATRIURETIC PEPTIDE: CPT | Performed by: EMERGENCY MEDICINE

## 2020-01-31 PROCEDURE — 83036 HEMOGLOBIN GLYCOSYLATED A1C: CPT | Performed by: HOSPITALIST

## 2020-01-31 PROCEDURE — 73110 X-RAY EXAM OF WRIST: CPT

## 2020-01-31 PROCEDURE — 25010000002 HYDRALAZINE PER 20 MG: Performed by: EMERGENCY MEDICINE

## 2020-01-31 PROCEDURE — 84484 ASSAY OF TROPONIN QUANT: CPT | Performed by: EMERGENCY MEDICINE

## 2020-01-31 PROCEDURE — 63710000001 INSULIN ASPART PER 5 UNITS: Performed by: HOSPITALIST

## 2020-01-31 PROCEDURE — 94799 UNLISTED PULMONARY SVC/PX: CPT

## 2020-01-31 PROCEDURE — 85379 FIBRIN DEGRADATION QUANT: CPT | Performed by: EMERGENCY MEDICINE

## 2020-01-31 PROCEDURE — 85025 COMPLETE CBC W/AUTO DIFF WBC: CPT | Performed by: EMERGENCY MEDICINE

## 2020-01-31 PROCEDURE — 99284 EMERGENCY DEPT VISIT MOD MDM: CPT | Performed by: EMERGENCY MEDICINE

## 2020-01-31 PROCEDURE — 82962 GLUCOSE BLOOD TEST: CPT

## 2020-01-31 PROCEDURE — 25010000002 ENOXAPARIN PER 10 MG: Performed by: HOSPITALIST

## 2020-01-31 PROCEDURE — 25010000002 FUROSEMIDE PER 20 MG: Performed by: HOSPITALIST

## 2020-01-31 PROCEDURE — 99222 1ST HOSP IP/OBS MODERATE 55: CPT | Performed by: HOSPITALIST

## 2020-01-31 PROCEDURE — 84145 PROCALCITONIN (PCT): CPT | Performed by: EMERGENCY MEDICINE

## 2020-01-31 PROCEDURE — 93010 ELECTROCARDIOGRAM REPORT: CPT | Performed by: INTERNAL MEDICINE

## 2020-01-31 PROCEDURE — 93005 ELECTROCARDIOGRAM TRACING: CPT | Performed by: EMERGENCY MEDICINE

## 2020-01-31 PROCEDURE — 71045 X-RAY EXAM CHEST 1 VIEW: CPT

## 2020-01-31 PROCEDURE — 99285 EMERGENCY DEPT VISIT HI MDM: CPT

## 2020-01-31 PROCEDURE — 80053 COMPREHEN METABOLIC PANEL: CPT | Performed by: EMERGENCY MEDICINE

## 2020-01-31 PROCEDURE — 25010000002 FUROSEMIDE PER 20 MG: Performed by: EMERGENCY MEDICINE

## 2020-01-31 PROCEDURE — 83605 ASSAY OF LACTIC ACID: CPT | Performed by: EMERGENCY MEDICINE

## 2020-01-31 RX ORDER — OXYCODONE HYDROCHLORIDE 5 MG/1
5 TABLET ORAL EVERY 4 HOURS PRN
Status: DISCONTINUED | OUTPATIENT
Start: 2020-01-31 | End: 2020-02-03

## 2020-01-31 RX ORDER — NICOTINE POLACRILEX 4 MG
15 LOZENGE BUCCAL
Status: DISCONTINUED | OUTPATIENT
Start: 2020-01-31 | End: 2020-02-06 | Stop reason: HOSPADM

## 2020-01-31 RX ORDER — IPRATROPIUM BROMIDE AND ALBUTEROL SULFATE 2.5; .5 MG/3ML; MG/3ML
3 SOLUTION RESPIRATORY (INHALATION) EVERY 4 HOURS PRN
Status: DISCONTINUED | OUTPATIENT
Start: 2020-01-31 | End: 2020-02-06 | Stop reason: HOSPADM

## 2020-01-31 RX ORDER — PHENAZOPYRIDINE HYDROCHLORIDE 100 MG/1
200 TABLET, FILM COATED ORAL ONCE
Status: COMPLETED | OUTPATIENT
Start: 2020-01-31 | End: 2020-01-31

## 2020-01-31 RX ORDER — OXYCODONE HYDROCHLORIDE 10 MG/1
10 TABLET ORAL EVERY 4 HOURS PRN
COMMUNITY
End: 2020-02-06 | Stop reason: HOSPADM

## 2020-01-31 RX ORDER — FERROUS SULFATE TAB EC 324 MG (65 MG FE EQUIVALENT) 324 (65 FE) MG
324 TABLET DELAYED RESPONSE ORAL
Status: DISCONTINUED | OUTPATIENT
Start: 2020-02-01 | End: 2020-02-06 | Stop reason: HOSPADM

## 2020-01-31 RX ORDER — PANTOPRAZOLE SODIUM 40 MG/1
40 TABLET, DELAYED RELEASE ORAL EVERY MORNING
Status: DISCONTINUED | OUTPATIENT
Start: 2020-02-01 | End: 2020-02-06 | Stop reason: HOSPADM

## 2020-01-31 RX ORDER — METHOCARBAMOL 500 MG/1
500 TABLET, FILM COATED ORAL 3 TIMES DAILY
Status: DISCONTINUED | OUTPATIENT
Start: 2020-01-31 | End: 2020-02-06 | Stop reason: HOSPADM

## 2020-01-31 RX ORDER — ACETAMINOPHEN 325 MG/1
650 TABLET ORAL EVERY 4 HOURS PRN
Status: DISCONTINUED | OUTPATIENT
Start: 2020-01-31 | End: 2020-02-06 | Stop reason: HOSPADM

## 2020-01-31 RX ORDER — IPRATROPIUM BROMIDE AND ALBUTEROL SULFATE 2.5; .5 MG/3ML; MG/3ML
3 SOLUTION RESPIRATORY (INHALATION) EVERY 4 HOURS PRN
COMMUNITY
End: 2020-05-29 | Stop reason: ALTCHOICE

## 2020-01-31 RX ORDER — OXYCODONE HYDROCHLORIDE 5 MG/1
5 TABLET ORAL EVERY 4 HOURS PRN
COMMUNITY
End: 2020-02-06 | Stop reason: HOSPADM

## 2020-01-31 RX ORDER — ACETAMINOPHEN 325 MG/1
650 TABLET ORAL EVERY 4 HOURS PRN
COMMUNITY
End: 2020-05-29

## 2020-01-31 RX ORDER — OMEPRAZOLE 20 MG/1
20 CAPSULE, DELAYED RELEASE ORAL DAILY
Status: ON HOLD | COMMUNITY
End: 2020-04-10

## 2020-01-31 RX ORDER — CEFDINIR 300 MG/1
300 CAPSULE ORAL 2 TIMES DAILY
COMMUNITY
End: 2020-02-06 | Stop reason: HOSPADM

## 2020-01-31 RX ORDER — LOSARTAN POTASSIUM 50 MG/1
25 TABLET ORAL DAILY
Status: DISCONTINUED | OUTPATIENT
Start: 2020-01-31 | End: 2020-02-06 | Stop reason: HOSPADM

## 2020-01-31 RX ORDER — PRAVASTATIN SODIUM 20 MG
10 TABLET ORAL DAILY
Status: DISCONTINUED | OUTPATIENT
Start: 2020-01-31 | End: 2020-02-06 | Stop reason: HOSPADM

## 2020-01-31 RX ORDER — MELATONIN
1000 DAILY
Status: DISCONTINUED | OUTPATIENT
Start: 2020-01-31 | End: 2020-02-06 | Stop reason: HOSPADM

## 2020-01-31 RX ORDER — DICYCLOMINE HYDROCHLORIDE 10 MG/1
20 CAPSULE ORAL ONCE
Status: COMPLETED | OUTPATIENT
Start: 2020-01-31 | End: 2020-01-31

## 2020-01-31 RX ORDER — ASPIRIN 81 MG/1
81 TABLET ORAL DAILY
Status: DISCONTINUED | OUTPATIENT
Start: 2020-01-31 | End: 2020-02-06 | Stop reason: HOSPADM

## 2020-01-31 RX ORDER — DULOXETIN HYDROCHLORIDE 60 MG/1
60 CAPSULE, DELAYED RELEASE ORAL DAILY
Status: DISCONTINUED | OUTPATIENT
Start: 2020-01-31 | End: 2020-02-06 | Stop reason: HOSPADM

## 2020-01-31 RX ORDER — HYDRALAZINE HYDROCHLORIDE 20 MG/ML
20 INJECTION INTRAMUSCULAR; INTRAVENOUS ONCE
Status: COMPLETED | OUTPATIENT
Start: 2020-01-31 | End: 2020-01-31

## 2020-01-31 RX ORDER — ONDANSETRON 4 MG/1
4 TABLET, FILM COATED ORAL EVERY 6 HOURS PRN
Status: DISCONTINUED | OUTPATIENT
Start: 2020-01-31 | End: 2020-02-06 | Stop reason: HOSPADM

## 2020-01-31 RX ORDER — DEXTROSE MONOHYDRATE 25 G/50ML
25 INJECTION, SOLUTION INTRAVENOUS
Status: DISCONTINUED | OUTPATIENT
Start: 2020-01-31 | End: 2020-02-06 | Stop reason: HOSPADM

## 2020-01-31 RX ORDER — NITROGLYCERIN 0.4 MG/1
0.4 TABLET SUBLINGUAL ONCE
Status: COMPLETED | OUTPATIENT
Start: 2020-01-31 | End: 2020-01-31

## 2020-01-31 RX ORDER — OXYCODONE HYDROCHLORIDE AND ACETAMINOPHEN 5; 325 MG/1; MG/1
1 TABLET ORAL ONCE
Status: COMPLETED | OUTPATIENT
Start: 2020-01-31 | End: 2020-01-31

## 2020-01-31 RX ORDER — PREGABALIN 75 MG/1
75 CAPSULE ORAL 2 TIMES DAILY
Status: DISCONTINUED | OUTPATIENT
Start: 2020-01-31 | End: 2020-02-06 | Stop reason: HOSPADM

## 2020-01-31 RX ORDER — LANOLIN ALCOHOL/MO/W.PET/CERES
3 CREAM (GRAM) TOPICAL NIGHTLY
Status: DISCONTINUED | OUTPATIENT
Start: 2020-01-31 | End: 2020-02-06 | Stop reason: HOSPADM

## 2020-01-31 RX ORDER — METHOCARBAMOL 500 MG/1
500 TABLET, FILM COATED ORAL 3 TIMES DAILY
Status: ON HOLD | COMMUNITY
End: 2020-04-10 | Stop reason: SDUPTHER

## 2020-01-31 RX ORDER — HYDROMORPHONE HCL 110MG/55ML
PATIENT CONTROLLED ANALGESIA SYRINGE INTRAVENOUS
Status: DISPENSED
Start: 2020-01-31 | End: 2020-01-31

## 2020-01-31 RX ORDER — CARVEDILOL 12.5 MG/1
12.5 TABLET ORAL 2 TIMES DAILY WITH MEALS
Status: DISCONTINUED | OUTPATIENT
Start: 2020-01-31 | End: 2020-02-06 | Stop reason: HOSPADM

## 2020-01-31 RX ORDER — ANASTROZOLE 1 MG/1
1 TABLET ORAL DAILY
Status: DISCONTINUED | OUTPATIENT
Start: 2020-01-31 | End: 2020-02-06 | Stop reason: HOSPADM

## 2020-01-31 RX ORDER — OXYCODONE HYDROCHLORIDE 5 MG/1
10 TABLET ORAL EVERY 4 HOURS PRN
Status: DISCONTINUED | OUTPATIENT
Start: 2020-01-31 | End: 2020-02-02

## 2020-01-31 RX ORDER — FERROUS SULFATE 325(65) MG
325 TABLET ORAL
COMMUNITY
End: 2020-05-29 | Stop reason: ALTCHOICE

## 2020-01-31 RX ORDER — LEVOTHYROXINE SODIUM 0.03 MG/1
25 TABLET ORAL
Status: DISCONTINUED | OUTPATIENT
Start: 2020-02-01 | End: 2020-02-06 | Stop reason: HOSPADM

## 2020-01-31 RX ORDER — ONDANSETRON 4 MG/1
4 TABLET, FILM COATED ORAL EVERY 6 HOURS PRN
COMMUNITY
End: 2020-04-10 | Stop reason: HOSPADM

## 2020-01-31 RX ORDER — FUROSEMIDE 10 MG/ML
80 INJECTION INTRAMUSCULAR; INTRAVENOUS ONCE
Status: COMPLETED | OUTPATIENT
Start: 2020-01-31 | End: 2020-01-31

## 2020-01-31 RX ORDER — UREA 10 %
3 LOTION (ML) TOPICAL NIGHTLY
COMMUNITY
End: 2020-05-29 | Stop reason: ALTCHOICE

## 2020-01-31 RX ORDER — FUROSEMIDE 10 MG/ML
40 INJECTION INTRAMUSCULAR; INTRAVENOUS ONCE
Status: DISCONTINUED | OUTPATIENT
Start: 2020-01-31 | End: 2020-01-31

## 2020-01-31 RX ORDER — FUROSEMIDE 10 MG/ML
40 INJECTION INTRAMUSCULAR; INTRAVENOUS ONCE
Status: COMPLETED | OUTPATIENT
Start: 2020-01-31 | End: 2020-01-31

## 2020-01-31 RX ADMIN — ENOXAPARIN SODIUM 40 MG: 40 INJECTION SUBCUTANEOUS at 18:49

## 2020-01-31 RX ADMIN — DULOXETINE HYDROCHLORIDE 60 MG: 60 CAPSULE, DELAYED RELEASE ORAL at 18:47

## 2020-01-31 RX ADMIN — Medication 3 MG: at 22:59

## 2020-01-31 RX ADMIN — ASPIRIN 81 MG: 81 TABLET, COATED ORAL at 18:45

## 2020-01-31 RX ADMIN — HYDRALAZINE HYDROCHLORIDE 20 MG: 20 INJECTION INTRAMUSCULAR; INTRAVENOUS at 11:50

## 2020-01-31 RX ADMIN — ANASTROZOLE 1 MG: 1 TABLET ORAL at 18:49

## 2020-01-31 RX ADMIN — PRAVASTATIN SODIUM 10 MG: 20 TABLET ORAL at 18:47

## 2020-01-31 RX ADMIN — OXYCODONE HYDROCHLORIDE AND ACETAMINOPHEN 1 TABLET: 5; 325 TABLET ORAL at 14:11

## 2020-01-31 RX ADMIN — PHENAZOPYRIDINE HYDROCHLORIDE 200 MG: 100 TABLET ORAL at 12:04

## 2020-01-31 RX ADMIN — FUROSEMIDE 40 MG: 10 INJECTION, SOLUTION INTRAMUSCULAR; INTRAVENOUS at 22:59

## 2020-01-31 RX ADMIN — INSULIN ASPART 2 UNITS: 100 INJECTION, SOLUTION INTRAVENOUS; SUBCUTANEOUS at 22:57

## 2020-01-31 RX ADMIN — LOSARTAN POTASSIUM 25 MG: 50 TABLET, FILM COATED ORAL at 18:46

## 2020-01-31 RX ADMIN — OXYCODONE HYDROCHLORIDE AND ACETAMINOPHEN 1 TABLET: 5; 325 TABLET ORAL at 11:49

## 2020-01-31 RX ADMIN — METHOCARBAMOL 500 MG: 500 TABLET, FILM COATED ORAL at 22:59

## 2020-01-31 RX ADMIN — MELATONIN 1000 UNITS: at 18:47

## 2020-01-31 RX ADMIN — OXYCODONE HYDROCHLORIDE 5 MG: 5 TABLET ORAL at 18:48

## 2020-01-31 RX ADMIN — PREGABALIN 75 MG: 75 CAPSULE ORAL at 22:59

## 2020-01-31 RX ADMIN — NITROGLYCERIN 1 INCH: 20 OINTMENT TOPICAL at 08:58

## 2020-01-31 RX ADMIN — FUROSEMIDE 80 MG: 100 INJECTION, SOLUTION INTRAMUSCULAR; INTRAVENOUS at 09:53

## 2020-01-31 RX ADMIN — NITROGLYCERIN 0.4 MG: 0.4 TABLET SUBLINGUAL at 09:50

## 2020-01-31 RX ADMIN — CARVEDILOL 12.5 MG: 12.5 TABLET, FILM COATED ORAL at 18:47

## 2020-01-31 RX ADMIN — DICYCLOMINE HYDROCHLORIDE 20 MG: 10 CAPSULE ORAL at 12:05

## 2020-01-31 RX ADMIN — METFORMIN HYDROCHLORIDE 500 MG: 500 TABLET ORAL at 18:49

## 2020-02-01 ENCOUNTER — APPOINTMENT (OUTPATIENT)
Dept: GENERAL RADIOLOGY | Facility: HOSPITAL | Age: 75
End: 2020-02-01

## 2020-02-01 ENCOUNTER — APPOINTMENT (OUTPATIENT)
Dept: ULTRASOUND IMAGING | Facility: HOSPITAL | Age: 75
End: 2020-02-01

## 2020-02-01 LAB
ALBUMIN SERPL-MCNC: 3.3 G/DL (ref 3.5–5.2)
ALBUMIN/GLOB SERPL: 1.1 G/DL
ALP SERPL-CCNC: 87 U/L (ref 39–117)
ALT SERPL W P-5'-P-CCNC: 5 U/L (ref 1–33)
ANION GAP SERPL CALCULATED.3IONS-SCNC: 12 MMOL/L (ref 5–15)
AST SERPL-CCNC: 11 U/L (ref 1–32)
BILIRUB SERPL-MCNC: 0.6 MG/DL (ref 0.2–1.2)
BUN BLD-MCNC: 11 MG/DL (ref 8–23)
BUN/CREAT SERPL: 14.9 (ref 7–25)
CALCIUM SPEC-SCNC: 9.7 MG/DL (ref 8.6–10.5)
CHLORIDE SERPL-SCNC: 103 MMOL/L (ref 98–107)
CO2 SERPL-SCNC: 30 MMOL/L (ref 22–29)
CREAT BLD-MCNC: 0.74 MG/DL (ref 0.57–1)
GFR SERPL CREATININE-BSD FRML MDRD: 76 ML/MIN/1.73
GLOBULIN UR ELPH-MCNC: 3 GM/DL
GLUCOSE BLD-MCNC: 175 MG/DL (ref 65–99)
GLUCOSE BLDC GLUCOMTR-MCNC: 139 MG/DL (ref 70–130)
GLUCOSE BLDC GLUCOMTR-MCNC: 156 MG/DL (ref 70–130)
GLUCOSE BLDC GLUCOMTR-MCNC: 174 MG/DL (ref 70–130)
GLUCOSE BLDC GLUCOMTR-MCNC: 222 MG/DL (ref 70–130)
MAGNESIUM SERPL-MCNC: 1.3 MG/DL (ref 1.6–2.4)
POTASSIUM BLD-SCNC: 3.1 MMOL/L (ref 3.5–5.2)
PROT SERPL-MCNC: 6.3 G/DL (ref 6–8.5)
SODIUM BLD-SCNC: 145 MMOL/L (ref 136–145)

## 2020-02-01 PROCEDURE — 72170 X-RAY EXAM OF PELVIS: CPT

## 2020-02-01 PROCEDURE — 97110 THERAPEUTIC EXERCISES: CPT

## 2020-02-01 PROCEDURE — 71045 X-RAY EXAM CHEST 1 VIEW: CPT

## 2020-02-01 PROCEDURE — 25600 CLTX DST RDL FX/EPHYS SEP WO: CPT | Performed by: ORTHOPAEDIC SURGERY

## 2020-02-01 PROCEDURE — 80053 COMPREHEN METABOLIC PANEL: CPT | Performed by: HOSPITALIST

## 2020-02-01 PROCEDURE — 97162 PT EVAL MOD COMPLEX 30 MIN: CPT

## 2020-02-01 PROCEDURE — 93970 EXTREMITY STUDY: CPT

## 2020-02-01 PROCEDURE — 73552 X-RAY EXAM OF FEMUR 2/>: CPT

## 2020-02-01 PROCEDURE — 25010000002 FUROSEMIDE PER 20 MG: Performed by: HOSPITALIST

## 2020-02-01 PROCEDURE — 25010000002 MAGNESIUM SULFATE 2 GM/50ML SOLUTION: Performed by: HOSPITALIST

## 2020-02-01 PROCEDURE — 73620 X-RAY EXAM OF FOOT: CPT

## 2020-02-01 PROCEDURE — 83735 ASSAY OF MAGNESIUM: CPT | Performed by: HOSPITALIST

## 2020-02-01 PROCEDURE — 82962 GLUCOSE BLOOD TEST: CPT

## 2020-02-01 PROCEDURE — 99232 SBSQ HOSP IP/OBS MODERATE 35: CPT | Performed by: HOSPITALIST

## 2020-02-01 PROCEDURE — 94799 UNLISTED PULMONARY SVC/PX: CPT

## 2020-02-01 PROCEDURE — 99221 1ST HOSP IP/OBS SF/LOW 40: CPT | Performed by: ORTHOPAEDIC SURGERY

## 2020-02-01 PROCEDURE — 25010000002 ENOXAPARIN PER 10 MG: Performed by: HOSPITALIST

## 2020-02-01 RX ORDER — FUROSEMIDE 10 MG/ML
40 INJECTION INTRAMUSCULAR; INTRAVENOUS ONCE
Status: COMPLETED | OUTPATIENT
Start: 2020-02-01 | End: 2020-02-01

## 2020-02-01 RX ORDER — POTASSIUM CHLORIDE 20 MEQ/1
40 TABLET, EXTENDED RELEASE ORAL
Status: COMPLETED | OUTPATIENT
Start: 2020-02-01 | End: 2020-02-01

## 2020-02-01 RX ORDER — MAGNESIUM SULFATE HEPTAHYDRATE 40 MG/ML
2 INJECTION, SOLUTION INTRAVENOUS ONCE
Status: COMPLETED | OUTPATIENT
Start: 2020-02-01 | End: 2020-02-01

## 2020-02-01 RX ADMIN — OXYCODONE HYDROCHLORIDE 5 MG: 5 TABLET ORAL at 14:25

## 2020-02-01 RX ADMIN — METFORMIN HYDROCHLORIDE 500 MG: 500 TABLET ORAL at 17:42

## 2020-02-01 RX ADMIN — MELATONIN 1000 UNITS: at 08:43

## 2020-02-01 RX ADMIN — POTASSIUM CHLORIDE 40 MEQ: 1500 TABLET, EXTENDED RELEASE ORAL at 08:43

## 2020-02-01 RX ADMIN — OXYCODONE HYDROCHLORIDE 10 MG: 5 TABLET ORAL at 19:05

## 2020-02-01 RX ADMIN — PREGABALIN 75 MG: 75 CAPSULE ORAL at 21:20

## 2020-02-01 RX ADMIN — PRAVASTATIN SODIUM 10 MG: 20 TABLET ORAL at 08:52

## 2020-02-01 RX ADMIN — OXYCODONE HYDROCHLORIDE 5 MG: 5 TABLET ORAL at 09:04

## 2020-02-01 RX ADMIN — FERROUS SULFATE TAB EC 324 MG (65 MG FE EQUIVALENT) 324 MG: 324 (65 FE) TABLET DELAYED RESPONSE at 08:43

## 2020-02-01 RX ADMIN — LOSARTAN POTASSIUM 25 MG: 50 TABLET, FILM COATED ORAL at 08:43

## 2020-02-01 RX ADMIN — OXYCODONE HYDROCHLORIDE 10 MG: 5 TABLET ORAL at 23:33

## 2020-02-01 RX ADMIN — ENOXAPARIN SODIUM 40 MG: 40 INJECTION SUBCUTANEOUS at 17:43

## 2020-02-01 RX ADMIN — ASPIRIN 81 MG: 81 TABLET, COATED ORAL at 08:42

## 2020-02-01 RX ADMIN — METHOCARBAMOL 500 MG: 500 TABLET, FILM COATED ORAL at 08:43

## 2020-02-01 RX ADMIN — CARVEDILOL 12.5 MG: 12.5 TABLET, FILM COATED ORAL at 08:43

## 2020-02-01 RX ADMIN — Medication 3 MG: at 21:20

## 2020-02-01 RX ADMIN — PANTOPRAZOLE SODIUM 40 MG: 40 TABLET, DELAYED RELEASE ORAL at 08:43

## 2020-02-01 RX ADMIN — ANASTROZOLE 1 MG: 1 TABLET ORAL at 08:54

## 2020-02-01 RX ADMIN — PREGABALIN 75 MG: 75 CAPSULE ORAL at 08:43

## 2020-02-01 RX ADMIN — LEVOTHYROXINE SODIUM 25 MCG: 25 TABLET ORAL at 08:52

## 2020-02-01 RX ADMIN — METFORMIN HYDROCHLORIDE 1000 MG: 500 TABLET, FILM COATED ORAL at 08:52

## 2020-02-01 RX ADMIN — MAGNESIUM SULFATE HEPTAHYDRATE 2 G: 40 INJECTION, SOLUTION INTRAVENOUS at 09:04

## 2020-02-01 RX ADMIN — METHOCARBAMOL 500 MG: 500 TABLET, FILM COATED ORAL at 16:38

## 2020-02-01 RX ADMIN — POTASSIUM CHLORIDE 40 MEQ: 1500 TABLET, EXTENDED RELEASE ORAL at 11:40

## 2020-02-01 RX ADMIN — CARVEDILOL 12.5 MG: 12.5 TABLET, FILM COATED ORAL at 17:42

## 2020-02-01 RX ADMIN — FUROSEMIDE 40 MG: 10 INJECTION, SOLUTION INTRAMUSCULAR; INTRAVENOUS at 17:43

## 2020-02-01 RX ADMIN — DULOXETINE HYDROCHLORIDE 60 MG: 60 CAPSULE, DELAYED RELEASE ORAL at 08:43

## 2020-02-01 RX ADMIN — FUROSEMIDE 40 MG: 10 INJECTION, SOLUTION INTRAMUSCULAR; INTRAVENOUS at 08:44

## 2020-02-01 RX ADMIN — METHOCARBAMOL 500 MG: 500 TABLET, FILM COATED ORAL at 21:20

## 2020-02-01 NOTE — NURSING NOTE
"Discharge Planning Assessment  [facility]     Patient Name: Jung Short  MRN: [id number]  Today's Date: 2/1/2020    Admit Date: 1/31/2020    Discharge Needs Assessment     Row Name 02/01/20 1025       Living Environment    Lives With  alone    Current Living Arrangements  home/apartment/condo    Primary Care Provided by  self    Provides Primary Care For  no one, unable/limited ability to care for self    Family Caregiver if Needed  child(shira), adult    Family Caregiver Names  Daughters Flory & Ilana    Quality of Family Relationships  helpful;supportive    Able to Return to Prior Arrangements  yes       Resource/Environmental Concerns    Resource/Environmental Concerns  none       Transition Planning    Patient/Family Anticipates Transition to  home    Transportation Anticipated  family or friend will provide       Discharge Needs Assessment    Readmission Within the Last 30 Days  no previous admission in last 30 days    Concerns to be Addressed  discharge planning    Equipment Currently Used at Home  wheelchair    Anticipated Changes Related to Illness  inability to care for self    Equipment Needed After Discharge  -- Continue to assess        Discharge Plan     Row Name 02/01/20 1032       Plan    Plan  Uncertain at this time    Provided post acute provider list?  Refused    Refused Provider List Comment  Patient states she is current with [facility] and she just was discharged from [place] Thursday 1/30    Patient/Family in Agreement with Plan  other (see comments) Continue to assess    Plan Comments  Spoke with Mrs Short at bedside.  She lives alone in a home in [place].  She was discharged from [facility] on Thursday 1/30.  She states \"I only got to be home for one day.  then I fell.\"  She uses a wheelchair to ambulate.  She is not on oxygen at home.  She state [facility] was supposed to start seeing her soon.  She states she cannot drive at the moment but normally she does " drive.  Her daughters live nearby and help her.  Her pharmacy is [facility] in [place].  There are no issues with paying for her prescriptions.  She has an advanced directive on file here at the hospital.  Her plan is uncertain at this time.  Will continue to follow        Destination      Coordination has not been started for this encounter.      Durable Medical Equipment      Coordination has not been started for this encounter.      Dialysis/Infusion      Coordination has not been started for this encounter.      Home Medical Care      Coordination has not been started for this encounter.      Therapy      Coordination has not been started for this encounter.      Community Resources      Coordination has not been started for this encounter.          Demographic Summary     Row Name 02/01/20 1025       General Information    Admission Type  inpatient    Arrived From  home    Referral Source  admission list    Preferred Language  English     Used During This Interaction  no       Contact Information    Permission Granted to Share Info With          Functional Status    No documentation.       Psychosocial    No documentation.       Abuse/Neglect    No documentation.       Legal    No documentation.       Substance Abuse    No documentation.       Patient Forms    No documentation.           Alondra Rivera RN

## 2020-02-01 NOTE — THERAPY EVALUATION
Acute Care - Physical Therapy Initial Evaluation   [place]     Patient Name: Jung Short  : 1945  MRN: [id number]  Today's Date: 2020   Onset of Illness/Injury or Date of Surgery: 20(right femur and foot fracture 2 months ago)  Date of Referral to PT: 20  Referring Physician: Dr. Giron      Admit Date: 2020    Visit Dx:     ICD-10-CM ICD-9-CM   1. Pleural effusion J90 511.9   2. Congestive heart failure, unspecified HF chronicity, unspecified heart failure type (CMS/HCC) I50.9 428.0   3. Hypoxia R09.02 799.02   4. Other closed fracture of distal end of right radius, initial encounter S52.591A 813.42   5. Hypertensive emergency I16.1 401.9     Patient Active Problem List   Diagnosis   • Abdominal bloating   • Abdominal mass   • Abdominal pain   • Abnormal gait   • Abnormal mammogram   • Chronic anemia   • Thoracic back pain   • Malignant neoplasm of upper-inner quadrant of right breast in female, estrogen receptor positive (CMS/HCC)   • Candidiasis   • Cardiomyopathy (CMS/HCC)   • Disc disorder of cervical region   • Neck pain   • Tenderness of chest wall   • Anemia due to stage 3 chronic kidney disease (CMS/HCC)   • Chronic pain   • Constipation   • Depression   • Type 2 diabetes mellitus (CMS/HCC)   • DM2 (diabetes mellitus, type 2) (CMS/HCC)   • Dyslipidemia   • Gastroesophageal reflux disease with esophagitis   • Generalized pain   • Gout   • Hypertension   • Hypothyroidism   • Incisional hernia   • Mass of breast   • Mitral valve insufficiency   • Nausea   • Adult body mass index greater than 30   • Osteopenia of spine   • Arthralgia of multiple joints   • Peripheral neuropathy   • Pulmonary hypertension (CMS/HCC)   • PITTS (dyspnea on exertion)   • Osteomyelitis (CMS/HCC)   • Tricuspid valve insufficiency   • Cobalamin deficiency   • Vitamin D deficiency   • Diabetes mellitus (CMS/HCC)   • Left knee pain   • Arthritis of knee   • DDD (degenerative disc disease), lumbar   •  Lumbar facet arthropathy   • Pain in shoulder   • Chronic gout of multiple sites   • Encounter for long-term (current) use of high-risk medication   • Constipation due to opioid therapy   • Syncope, psychogenic   • Closed fracture of patella   • Primary osteoarthritis of left knee   • Radius/ulna fracture, left, closed, initial encounter   • Rib pain on left side   • Preoperative cardiovascular examination   • Physical deconditioning   • Hyponatremia   • Acute URI   • Acute on chronic HFrEF (heart failure with reduced ejection fraction) (CMS/[facility])   • CKD (chronic kidney disease) stage 3, GFR 30-59 ml/min (CMS/[facility])   • Diastolic congestive heart failure (CMS/[facility])   • MRSA infection   • Pleural effusion   • Acute respiratory failure with hypoxia (CMS/[facility])     Past Medical History:   Diagnosis Date   • Anemia    • Benign breast disease    • Cancer (CMS/[facility]) 2015    Right breast   • Cellulitis of leg     May-2003 right lower extremity   • CHF (congestive heart failure) (CMS/[facility])     Dr Bates follows   • Chronic kidney disease     Dr Hannah follows   • Chronic kidney disease 5/24/2019   • Chronic ulcer of right foot (CMS/[facility])     Non-pressure, history of    • Depression    • Diabetic peripheral neuropathy (CMS/[facility])    • Diarrhea    • Diverticulosis    • Encounter for eye exam    • Femoral fracture (CMS/[facility])     right   • Fracture of left wrist     history of   • Gastroesophageal reflux    • Hiatal hernia    • History of bone density study 09/20/2012   • History of colonoscopy     done 6/03 recheck in 10 years.   Done july 2013 recheck in 5 years   • History of mammography, screening 09/20/2012   • Hospital discharge follow-up    • Hyperlipidemia    • Hypertension    • Hypothyroidism    • Impingement syndrome of right shoulder    • Joint pain    • Menopausal disorder    • Methicillin resistant Staphylococcus aureus infection 2012    after bladder surgery   • MRSA (methicillin resistant staph aureus) culture positive     • Nausea and vomiting    • Nonischemic cardiomyopathy (CMS/HCC)     Ejection fraction 10% per 2-D echo with Doppler however she did have cardiac catheterization April 2015 which showed ejection fraction 20% with global hypokinesis and severe mitral insufficiency   • Osteoarthritis     generalized, sched jania TKA   • Paroxysmal atrial fibrillation (CMS/HCC)     Dr Bates follows   • Postoperative infection     July of 2012 following her hysterectomy far vaginal wall prolapse. She was on antibiotics and wound dressings for 2 months.   • Shoulder pain, bilateral     has tears on both sides   • Syncope, psychogenic 4/19/2017   • Toe ulcer (CMS/HCC)     h/o to both feet, d/t shoes rubbing per patient   • Transient alteration of awareness 4/19/2017     Past Surgical History:   Procedure Laterality Date   • ABDOMINAL SURGERY  2012    had to be reopened after bladder surgery d/t infection   • AMPUTATION FOOT / TOE Right 11/2013    Rt foot amputation MTP first toe   • BLADDER SURGERY  2012   • BREAST BIOPSY     • BREAST SURGERY  06/29/2015    Percutaneous ultrasound-guided placement of metal localized clip 1st lesion   • CARDIAC CATHETERIZATION  2015   • CATARACT EXTRACTION Bilateral 2017   • DEBRIDEMENT  FOOT Right 01/2013    During hospitalization    • EPIDURAL BLOCK      4 chronic back pain and leg pain last epidurals done 5-2012 she had no benefit at all from this procedure.   • FEMUR FRACTURE SURGERY     • FOOT SURGERY Bilateral     several   • HERNIA REPAIR      At the abdomen February 2007 Dr. Mendez   • INCISIONAL HERNIA REPAIR  05/16/2014    Recurrent. Incarcerated. With mesh implantation   • MASTECTOMY Right 05/2015   • SHOULDER SURGERY Right     x2 RCR   • TOTAL ABDOMINAL HYSTERECTOMY      with oophorectomy-Done June-2012 secondary to vaginal wall prolapse.   • TOTAL KNEE ARTHROPLASTY Right    • TOTAL KNEE ARTHROPLASTY Left 4/17/2018    Procedure: TOTAL KNEE ARTHROPLASTY;  Surgeon: Live Chambres MD;  Location:  "[facility] OR;  Service: Orthopedics        PT ASSESSMENT (last 12 hours)      Physical Therapy Evaluation     Row Name 02/01/20 1240          PT Evaluation Time/Intention    Subjective Information  complains of;pain;weakness;fatigue  -LN     Document Type  evaluation  -LN     Mode of Treatment  physical therapy  -LN     Patient Effort  fair  -LN     Symptoms Noted During/After Treatment  increased pain;fatigue  -LN     Comment  Patient reports \"I can't walk.\" She reports she had just gotten home from rehab at [facility] and was only home 1 night when she fell out of wheelchair. She reports that she hadn't used a walker yet but was independent with sit pivot transfers from bed to wheelchair and was able to keep weight off right leg. \"I don't know how I'm going to do it if I can't use my right hand.\"   -LN     Row Name 02/01/20 4865          General Information    Patient Profile Reviewed?  yes  -LN     Onset of Illness/Injury or Date of Surgery  01/31/20 right femur and foot fracture 2 months ago  -LN     Referring Physician  Dr. Giron  -LN     Patient Observations  alert;cooperative;agree to therapy with encouragement  -LN     General Observations of Patient  Patient resting in bed with immobilizer/splint right wrist, O2, telemetry, IV site. Patient did have a pure wick catheter in- nursing removed prior to PT.   -LN     Prior Level of Function  independent:;transfer;bed mobility;w/c or scooter after ORIF right femur; see pertinent history of problem.   -LN     Equipment Currently Used at Home  wheelchair;commode, bedside  -LN     Pertinent History of Current Functional Problem  Patient had a fall 2 months ago in [place] after going to a football game and suffered a right femur and right foot fracture. Patient s/p ORIF right femur fracture; just got out of rehab at [facility] in [place] 1/30/2020 and reports she was only home 1 night when she was sitting in wheelchair and forgot to lock the wheels and reached forward to " "get something and she fell out of wheechair and injured right wrist. X-rays showed an impacted right distal radial fracture; Ortho evaluated wrist and put an immobilizer/splint on wrist; no surgery needed at this time.  Patient reports that she had been independent with all bed mobility and could transfer into her wheelchair independently- using a sit pivot technique and reaching for wheelchair with right arm. \"I was able to do it and not put any weight on my right leg.\"  She reports that she had not used a walker yet.  \"They tried a sliding board but I didn't like it and couldn't do it.\"  Prior to fall 2 months ago, patient states she was independent with all mobility and gait.  Had recent US doppler that was negative for DVT. Had x-rays today of right foot and right femur= ? Old healed fracture deformity of distal fibula; osteopenia and an ORIF right femur; healing mildly displaced femur fracture. Patient with history of right TKA, transmetatarsal amputation of second ray and a partial amputation of 1st MT right foot.  -LN     Existing Precautions/Restrictions  fall;non-weight bearing;oxygen therapy device and L/min  -LN     Risks Reviewed  patient:;LOB;increased discomfort  -LN     Benefits Reviewed  patient:;improve function;increase independence;increase strength;decrease risk of DVT;increase knowledge  -LN     Barriers to Rehab  previous functional deficit Is NWB right leg and right wrist  -LN     Row Name 02/01/20 1240          Relationship/Environment    Primary Source of Support/Comfort  child(shira)  -LN     Lives With  alone  -LN     Family Caregiver if Needed  child(shira), adult  -LN     Primary Roles/Responsibilities  homemaker  -LN     Concerns About Impact on Relationships  \"My daughters work so they can't help me much at home and there isn't anyone that can stay with me.\"   -LN     Row Name 02/01/20 1240          Resource/Environmental Concerns    Current Living Arrangements  home/apartment/condo  -LN  "    Row Name 02/01/20 1240          Living Environment    Living Arrangements  condominium;other (see comments) patio home  -LN     Home Accessibility  wheelchair accessible  -LN     Living Environment Comment  no stairs per patient  -LN     Row Name 02/01/20 1240          Cognitive Assessment/Interventions    Additional Documentation  Cognitive Assessment/Intervention (Group)  -LN     Row Name 02/01/20 1240          Cognitive Assessment/Intervention- PT/OT    Affect/Mental Status (Cognitive)  anxious  -LN     Orientation Status (Cognition)  oriented x 4  -LN     Follows Commands (Cognition)  follows one step commands;verbal cues/prompting required;physical/tactile prompts required  -LN     Personal Safety Interventions  gait belt;nonskid shoes/slippers when out of bed;supervised activity  -LN     Row Name 02/01/20 1240          Safety Issues, Functional Mobility    Impairments Affecting Function (Mobility)  balance;endurance/activity tolerance;pain;strength  -LN     Row Name 02/01/20 1240          Mobility Assessment/Treatment    Extremity Weight-bearing Status  right upper extremity;right lower extremity  -LN     Right Upper Extremity (Weight-bearing Status)  non weight-bearing (NWB) right wrist  -LN     Right Lower Extremity (Weight-bearing Status)  non weight-bearing (NWB)  -LN     Additional Documentation  -- has a boot to wear on right ankle/foot; needed assist to don and doff. -LN     Row Name 02/01/20 1240          Bed Mobility Assessment/Treatment    Bed Mobility Assessment/Treatment  bed mobility (all) activities;supine-sit;sit-supine  -LN     Supine-Sit [place] (Bed Mobility)  moderate assist (50% patient effort);1 person assist;1 person to manage equipment;verbal cues;nonverbal cues (demo/gesture)  -LN     Sit-Supine [place] (Bed Mobility)  moderate assist (50% patient effort);1 person assist;1 person to manage equipment;verbal cues;nonverbal cues (demo/gesture)  -LN     Bed Mobility, Safety  "Issues  decreased use of arms for pushing/pulling;decreased use of legs for bridging/pushing  -LN     Assistive Device (Bed Mobility)  bed rails;draw sheet;head of bed elevated  -LN     Comment (Bed Mobility)  Patient able to roll with only minimal assist but max assist of 2 to move up in bed; able to help a little with left leg.   -LN     Row Name 02/01/20 1240          Transfer Assessment/Treatment    Transfer Assessment/Treatment  sit-stand transfer;stand-sit transfer  -LN     Comment (Transfers)  attempted sit to stand 2 x using stephanie- walker on left but patient unable to come to stand without putting weight on right leg.  Patient then requested to lie back down stating \"If you could just give my wrist a little time to heal than I could stand.\"   -LN     Sit-Stand [place] (Transfers)  unable to assess patient unable to do this am; 2 attempts  -LN     Row Name 02/01/20 1240          Sit-Stand Transfer    Assistive Device (Sit-Stand Transfers)  walker, stephanie  -LN     Row Name 02/01/20 1240          Gait/Stairs Assessment/Training    Comment (Gait/Stairs)  Patient unable to attempt gait at this time.   -LN     Row Name 02/01/20 1240          General ROM    GENERAL ROM COMMENTS  Left LE WFL; right LE NT.  Bilateral shoulders limited; right wrist not assessed.   -     Row Name 02/01/20 1240          MMT (Manual Muscle Testing)    General MMT Comments  Left LE 4/5 grossly; right LE NT  -LN     Row Name 02/01/20 1240          Sensory Assessment/Intervention    Sensory General Assessment  no sensation deficits identified  -     Row Name 02/01/20 1240          Pain Assessment    Additional Documentation  Pain Scale: Numbers Pre/Post-Treatment (Group)  -     Row Name 02/01/20 1240          Pain Scale: Numbers Pre/Post-Treatment    Pain Scale: Numbers, Pretreatment  6/10  -LN     Pain Scale: Numbers, Post-Treatment  6/10  -LN     Pain Location - Side  Right  -LN     Pain Location  wrist and right leg  -LN     " Pre/Post Treatment Pain Comment  Patient reports pain primarily in right wrist but does report stil having some pain in right leg and foot.   -LN     Pain Intervention(s)  Medication (See MAR);Repositioned  -LN     Row Name 02/01/20 1240          Plan of Care Review    Plan of Care Reviewed With  patient  -LN     Outcome Summary  PT evaluation completed. Patient was able to transfer supine to sit with moderate assist of 1 and assist of 1 for lines and verbal and tactile cueing. She can sit at EOB independently. Patient attempted sit to stand 2 x using a stephanie walker on left, NWB right leg and right wrist but patient unable to stand without putting weight on right leg.  Patient will benefit from skilled PT services for functional mobility and transfer training, NWB right leg and right wrist and LE exercises with HEP and patient and family education.  Will consider trying a platform walker if patient can do without putting any weight on right wrist and may need to try a sliding board if patient will agree to assist with with sit pivot transfers. At this point, recommend that nursing using lilliam lift for any transfers to a chair for the safety of patient and staff.  Feel patient will benefit from a STR stay prior to going home secondary to living alone, but may need  an extended rehab stay until she can bear weight on right leg and wrist.  Will follow patient daily while in hospital and continue to assess for best way for her to transfer to wheelchair.   -LN     Row Name 02/01/20 1240          Physical Therapy Clinical Impression    Date of Referral to PT  02/01/20  -LN     PT Diagnosis (PT Clinical Impression)  Patient s/p fall with right distal radius fracture with recent history of fall with right femur and foot fracture and s/p ORIF right femur fracture 2 months ago.   -LN     Criteria for Skilled Interventions Met (PT Clinical Impression)  yes;treatment indicated  -LN     Pathology/Pathophysiology Noted (Describe  Specifically for Each System)  musculoskeletal;pulmonary  -LN     Impairments Found (describe specific impairments)  aerobic capacity/endurance;gait, locomotion, and balance;muscle performance;ROM  -LN     Functional Limitations in Following Categories (Describe Specific Limitations)  self-care;home management  -LN     Rehab Potential (PT Clinical Summary)  good, to achieve stated therapy goals  -LN     Predicted Duration of Therapy (PT)  5-7 days  -LN     Care Plan Review (PT)  evaluation/treatment results reviewed;care plan/treatment goals reviewed;risks/benefits reviewed;current/potential barriers reviewed;patient/other agree to care plan  -LN     Patient/Family Concerns, Equipment Needs at Discharge (PT)  -- TBA  -LN     Row Name 02/01/20 1240          Physical Therapy Goals    Bed Mobility Goal Selection (PT)  bed mobility, PT goal 1  -LN     Transfer Goal Selection (PT)  transfer, PT goal 1;transfer, PT goal 2  -LN     Row Name 02/01/20 1240          Bed Mobility Goal 1 (PT)    Activity/Assistive Device (Bed Mobility Goal 1, PT)  bed mobility activities, all;sit to supine;supine to sit  -LN     [place] Level/Cues Needed (Bed Mobility Goal 1, PT)  contact guard assist  -LN     Time Frame (Bed Mobility Goal 1, PT)  1 week  -LN     Progress/Outcomes (Bed Mobility Goal 1, PT)  goal ongoing  -LN     Row Name 02/01/20 1240          Transfer Goal 1 (PT)    Activity/Assistive Device (Transfer Goal 1, PT)  sit-to-stand/stand-to-sit;other (see comments) with appropriate AD  -LN     [place] Level/Cues Needed (Transfer Goal 1, PT)  minimum assist (75% or more patient effort);1 person assist NWB right leg and right wrist  -LN     Time Frame (Transfer Goal 1, PT)  1 week  -LN     Progress/Outcome (Transfer Goal 1, PT)  goal ongoing  -LN     Row Name 02/01/20 1240          Transfer Goal 2 (PT)    Activity/Assistive Device (Transfer Goal 2, PT)  bed-to-chair/chair-to-bed;wheelchair transfer with appropriate AD  -LN      [place] Level/Cues Needed (Transfer Goal 2, PT)  minimum assist (75% or more patient effort);1 person assist NWB right leg and right wrist  -LN     Time Frame (Transfer Goal 2, PT)  1 week  -LN     Progress/Outcome (Transfer Goal 2, PT)  goal ongoing  -LN     Row Name 02/01/20 1240          Patient Education Goal (PT)    Activity (Patient Education Goal, PT)  10-20 reps LE exercises  -LN     [place]/Cues/Accuracy (Memory Goal 2, PT)  demonstrates adequately;independent;verbalizes understanding  -LN     Time Frame (Patient Education Goal, PT)  1 week  -LN     Progress/Outcome (Patient Education Goal, PT)  goal ongoing  -LN     Row Name 02/01/20 1240          Positioning and Restraints    Pre-Treatment Position  in bed  -LN     Post Treatment Position  bed  -LN     In Bed  notified nsg;call light within reach;encouraged to call for assist;side rails up x2 helped set up her lunch  -LN       User Key  (r) = Recorded By, (t) = Taken By, (c) = Cosigned By    Initials Name Provider Type    Christine Dai, PT Physical Therapist          PT Recommendation and Plan  Anticipated Discharge Disposition (PT): skilled nursing facility, extended care facility, transitional care, other (see comments)(will continue to assess.)  Therapy Frequency (PT Clinical Impression): daily  Outcome Summary/Treatment Plan (PT)  Anticipated Equipment Needs at Discharge (PT): (TBA)  Patient/Family Concerns, Equipment Needs at Discharge (PT): (TBA)  Anticipated Discharge Disposition (PT): skilled nursing facility, extended care facility, transitional care, other (see comments)(will continue to assess.)  Plan of Care Reviewed With: patient  Outcome Summary: PT evaluation completed. Patient was able to transfer supine to sit with moderate assist of 1 and assist of 1 for lines and verbal and tactile cueing. She can sit at EOB independently. Patient attempted sit to stand 2 x using a stephanie walker on left, NWB right leg and right  wrist but patient unable to stand without putting weight on right leg.  Patient will benefit from skilled PT services for functional mobility and transfer training, NWB right leg and right wrist and LE exercises with HEP and patient and family education.  Will consider trying a platform walker if patient can do without putting any weight on right wrist and may need to try a sliding board if patient will agree to assist with with sit pivot transfers. At this point, recommend that nursing using lilliam lift for any transfers to a chair for the safety of patient and staff.  Feel patient will benefit from a STR stay prior to going home secondary to living alone, but may need  an extended rehab stay until she can bear weight on right leg and wrist.  Will follow patient daily while in hospital and continue to assess for best way for her to transfer to wheelchair.        Outcome Measures     Row Name 02/01/20 1240             How much help from another person do you currently need...    Turning from your back to your side while in flat bed without using bedrails?  3  -LN      Moving from lying on back to sitting on the side of a flat bed without bedrails?  2  -LN      Moving to and from a bed to a chair (including a wheelchair)?  1  -LN      Standing up from a chair using your arms (e.g., wheelchair, bedside chair)?  1  -LN      Climbing 3-5 steps with a railing?  1  -LN      To walk in hospital room?  1  -LN      AM-PAC 6 Clicks Score (PT)  9  -LN         Functional Assessment    Outcome Measure Options  AM-PAC 6 Clicks Basic Mobility (PT)  -LN        User Key  (r) = Recorded By, (t) = Taken By, (c) = Cosigned By    Initials Name Provider Type    Christine Dai, PT Physical Therapist         Time Calculation:   PT Charges     Row Name 02/01/20 1431             Time Calculation    Start Time  1240  -LN      Stop Time  1305  -LN      Time Calculation (min)  25 min  -LN        User Key  (r) = Recorded By, (t) =  Taken By, (c) = Cosigned By    Initials Name Provider Type    Christine Dai, PT Physical Therapist        Therapy Charges for Today     Code Description Service Date Service Provider Modifiers Qty    51538822862 HC PT THER PROC EA 15 MIN 2/1/2020 Christine Norman, PT GP 1    41620464541 HC PT EVAL MOD COMPLEXITY 1 2/1/2020 Christine Norman, PT GP 1    09326436397 HC PT THER SUPP EA 15 MIN 2/1/2020 Christine Norman, PT GP 1          PT G-Codes  Outcome Measure Options: AM-PAC 6 Clicks Basic Mobility (PT)  AM-PAC 6 Clicks Score (PT): 9      Christine Norman, PT  2/1/2020

## 2020-02-01 NOTE — PROGRESS NOTES
"Hospitalist Team      Patient Care Team:  Provider, No Known as PCP - General  Kylee Jensen MD as PCP - Claims Attributed  Lina Fisher MD as Referring Physician (General Surgery)  Live Chambers MD as Surgeon (Orthopedic Surgery)  Rafa Hannah MD as Consulting Physician (Nephrology)  Robbin Lo MD as Consulting Physician (Hematology and Oncology)        Chief Complaint: Follow-up Acute Hypoxic Respiratory Failure    Subjective    No acute events overnight.  She has tolerated the decrease of O2 to 2L w/ SaO2 noted of 91-95%.  She denies feeling dyspneic.  Denies chest pain.  Minimal wrist pain.  Tolerating PO.    Objective    Vital Signs  Temp:  [97.5 °F (36.4 °C)-98.9 °F (37.2 °C)] 97.6 °F (36.4 °C)  Heart Rate:  [] 83  Resp:  [16-18] 18  BP: (138-195)/() 166/80  Oxygen Therapy  SpO2: 91 %  Pulse Oximetry Type: Continuous  Device (Oxygen Therapy): nasal cannula  Flow (L/min): 1}    Flowsheet Rows      First Filed Value   Admission Height  165.1 cm (65\") Documented at 01/31/2020 0815   Admission Weight  99.3 kg (219 lb) Documented at 01/31/2020 0815          Physical Exam:    General: Appears stated age in NAD.  Lungs: Diminished throughout all fields.  No wheeze or rales.  No accessory use.  CV: Regular w/o murmur.  Radial and Pedal pulses are 2+ and symmetric.  Abdomen: Obese, soft, and non-tender w/ active bowel sounds.  MSK: No C/C/E  Neuro: CN II-XII grossly intact  Psych: Pleasant affect    Results Review:     I reviewed the patient's new clinical results.    Lab Results (last 24 hours)     Procedure Component Value Units Date/Time    Magnesium [[id number]]  (Abnormal) Collected:  02/01/20 0416    Specimen:  Blood Updated:  02/01/20 0835     Magnesium 1.3 mg/dL     POC Glucose Once [[id number]]  (Abnormal) Collected:  02/01/20 0723    Specimen:  Blood Updated:  02/01/20 0734     Glucose 139 mg/dL     Comprehensive Metabolic Panel [[id number]]  (Abnormal) Collected:  02/01/20 " 0416    Specimen:  Blood Updated:  02/01/20 0513     Glucose 175 mg/dL      BUN 11 mg/dL      Creatinine 0.74 mg/dL      Sodium 145 mmol/L      Potassium 3.1 mmol/L      Chloride 103 mmol/L      CO2 30.0 mmol/L      Calcium 9.7 mg/dL      Total Protein 6.3 g/dL      Albumin 3.30 g/dL      ALT (SGPT) 5 U/L      AST (SGOT) 11 U/L      Alkaline Phosphatase 87 U/L      Total Bilirubin 0.6 mg/dL      eGFR Non African Amer 76 mL/min/1.73      Globulin 3.0 gm/dL      A/G Ratio 1.1 g/dL      BUN/Creatinine Ratio 14.9     Anion Gap 12.0 mmol/L     Narrative:       GFR Normal >60  Chronic Kidney Disease <60  Kidney Failure <15      Blood Culture - Blood, Arm, Left [[id number]] Collected:  01/31/20 1250    Specimen:  Blood from Arm, Left Updated:  02/01/20 0100     Blood Culture No growth at less than 24 hours    POC Glucose Once [[id number]]  (Abnormal) Collected:  01/31/20 2058    Specimen:  Blood Updated:  01/31/20 2104     Glucose 172 mg/dL     Hemoglobin A1c [[id number]]  (Abnormal) Collected:  01/31/20 1250    Specimen:  Blood Updated:  01/31/20 1845     Hemoglobin A1C 6.30 %     Narrative:       Hemoglobin A1C Ranges:    Increased Risk for Diabetes  5.7% to 6.4%  Diabetes                     >= 6.5%  Diabetic Goal                < 7.0%    BNP [[id number]]  (Abnormal) Collected:  01/31/20 1250    Specimen:  Blood Updated:  01/31/20 1328     proBNP 15,217.0 pg/mL     Narrative:       Among patients with dyspnea, NT-proBNP is highly sensitive for the detection of acute congestive heart failure. In addition NT-proBNP of <300 pg/ml effectively rules out acute congestive heart failure with 99% negative predictive value.    Results may be falsely decreased if patient taking Biotin.      Comprehensive Metabolic Panel [[id number]]  (Abnormal) Collected:  01/31/20 1250    Specimen:  Blood Updated:  01/31/20 1321     Glucose 213 mg/dL      BUN 11 mg/dL      Creatinine 0.64 mg/dL      Sodium 140 mmol/L      Potassium 3.3 mmol/L       Chloride 100 mmol/L      CO2 26.9 mmol/L      Calcium 9.7 mg/dL      Total Protein 7.4 g/dL      Albumin 4.00 g/dL      ALT (SGPT) 7 U/L      AST (SGOT) 13 U/L      Alkaline Phosphatase 104 U/L      Total Bilirubin 0.9 mg/dL      eGFR Non African Amer 90 mL/min/1.73      Globulin 3.4 gm/dL      A/G Ratio 1.2 g/dL      BUN/Creatinine Ratio 17.2     Anion Gap 13.1 mmol/L     Narrative:       GFR Normal >60  Chronic Kidney Disease <60  Kidney Failure <15      D-dimer, Quantitative [[id number]]  (Abnormal) Collected:  01/31/20 1250    Specimen:  Blood Updated:  01/31/20 1315     D-Dimer, Quantitative 2.15 MCGFEU/mL     Narrative:       Can be elevated in, but is not diagnostic for deep vein thrombosis (DVT) or pulmonary embolis (PE).  It is also elevated in other medical conditions.  Clinical correlation is required.  The negative cut-off value for the D-Dimer is 0.50 mcg FEU/mL for DVT and PE.      Lactic Acid, Plasma [[id number]]  (Normal) Collected:  01/31/20 1250    Specimen:  Blood Updated:  01/31/20 1315     Lactate 1.3 mmol/L     CBC & Differential [[id number]] Collected:  01/31/20 1250    Specimen:  Blood Updated:  01/31/20 1258    Narrative:       The following orders were created for panel order CBC & Differential.  Procedure                               Abnormality         Status                     ---------                               -----------         ------                     CBC Auto Differential[[id number]]        Abnormal            Final result                 Please view results for these tests on the individual orders.    CBC Auto Differential [[id number]]  (Abnormal) Collected:  01/31/20 1250    Specimen:  Blood Updated:  01/31/20 1258     WBC 9.82 10*3/mm3      RBC 4.31 10*6/mm3      Hemoglobin 11.7 g/dL      Hematocrit 37.8 %      MCV 87.7 fL      MCH 27.1 pg      MCHC 31.0 g/dL      RDW 14.8 %      RDW-SD 47.1 fl      MPV 9.7 fL      Platelets 299 10*3/mm3      Neutrophil % 80.5 %       Lymphocyte % 9.8 %      Monocyte % 6.7 %      Eosinophil % 1.6 %      Basophil % 0.3 %      Immature Grans % 1.1 %      Neutrophils, Absolute 7.90 10*3/mm3      Lymphocytes, Absolute 0.96 10*3/mm3      Monocytes, Absolute 0.66 10*3/mm3      Eosinophils, Absolute 0.16 10*3/mm3      Basophils, Absolute 0.03 10*3/mm3      Immature Grans, Absolute 0.11 10*3/mm3      nRBC 0.3 /100 WBC     Procalcitonin [[id number]]  (Abnormal) Collected:  01/31/20 1130    Specimen:  Blood Updated:  01/31/20 1214     Procalcitonin 0.03 ng/mL     Narrative:       Results may be falsely decreased if patient taking Biotin.     Troponin [[id number]]  (Normal) Collected:  01/31/20 1130    Specimen:  Blood Updated:  01/31/20 1208     Troponin T 0.011 ng/mL     Narrative:       Troponin T Reference Range:  <= 0.03 ng/mL-   Negative for AMI  >0.03 ng/mL-     Abnormal for myocardial necrosis.  Clinicians would have to utilize clinical acumen, EKG, Troponin and serial changes to determine if it is an Acute Myocardial Infarction or myocardial injury due to an underlying chronic condition.       Results may be falsely decreased if patient taking Biotin.            Imaging Results (Last 24 Hours)     Procedure Component Value Units Date/Time    XR Chest 1 View [[id number]] Collected:  02/01/20 0631     Updated:  02/01/20 0634    Narrative:       CHEST X-RAY, 1/31/2020      HISTORY:    Respiratory failure. Pleural effusion. Follow-up cardiopulmonary status.      TECHNIQUE:  AP portable upright chest x-ray.      FINDINGS:  Small-to-moderate bilateral pleural effusions and bilateral lower lung atelectasis, unchanged since yesterday. Moderate cardiomegaly. Mildly prominent indistinct interstitial markings may reflect mild vascular congestion, correlate clinically. Right  axillary surgical clips.      Impression:       1. Cardiomegaly and possible mild vascular congestion.  2. Moderate bilateral pleural effusions and bibasilar pulmonary atelectasis.  3. No  significant change since yesterday.    Signer Name: Levy Mishra MD   Signed: 2/1/2020 6:31 AM   Workstation Name: [id number]    [facility]    XR Wrist 3+ View Right [[id number]] Collected:  01/31/20 0927     Updated:  01/31/20 0930    Narrative:       XR WRIST 3+ VW RIGHT-: 1/31/2020 9:15 AM     INDICATION:   Patient fell from wheelchair this morning with pain in right wrist.     COMPARISON:   None available.     FINDINGS:   3 views of the right wrist.  There is an acute fracture involving the  distal radius about 1 cm proximal to the end of the bone. The fracture  appears be lying transversely with mild impaction. The carpal bones are  normal. The ulna is normal. No bone erosion or destruction.  No foreign  body.       Impression:       Mild impaction-type fracture involving the distal radius about 1 cm from  the end of the bone..     This report was finalized on 1/31/2020 9:28 AM by Dr. Chi Mattson MD.       XR Chest 1 View [[id number]] Collected:  01/31/20 0927     Updated:  01/31/20 0929    Narrative:       AP PORTABLE CHEST, 01/31/2020     HISTORY:  Shortness of air for weeks     COMPARISON:  09/01/2019     TECHNIQUE:  AP portable chest x-ray.     FINDINGS:  There are low lung volumes. There are small bilateral effusions with  bibasilar atelectasis. There is mild interstitial prominence.       Impression:       Small left pleural effusions with bibasal atelectasis and mild  interstitial prominence.     This report was finalized on 1/31/2020 9:27 AM by Dr. Chi Mattson MD.             Xray reviewed personally by physician.      ECG/EMG Results (most recent)     Procedure Component Value Units Date/Time    ECG 12 Lead [[id number]] Collected:  01/31/20 0935     Updated:  01/31/20 1602    Narrative:       HEART RATE= 107  bpm  RR Interval= 564  ms  [place] Interval= 159  ms  P Horizontal Axis= 19  deg  P Front Axis= 40  deg  QRSD Interval= 108  ms  QT Interval= 338  ms  QRS Axis= -18  deg  T  Wave Axis= 82  deg  - ABNORMAL ECG -  Sinus tachycardia  Probable LVH with secondary repol abnrm  Anterior Q waves, possibly due to LVH  No change from prior tracing  Electronically Signed By: Aria Bates ([facility]) 31-Jan-2020 16:02:26  Date and Time of Study: 2020-01-31 09:35:46          Medication Review:   I have reviewed the patient's current medication list    Current Facility-Administered Medications:   •  acetaminophen (TYLENOL) tablet 650 mg, 650 mg, Oral, Q4H PRN, Rey Giron MD  •  anastrozole (ARIMIDEX) tablet 1 mg, 1 mg, Oral, Daily, Rey Giron MD, 1 mg at 01/31/20 1849  •  aspirin EC tablet 81 mg, 81 mg, Oral, Daily, Rey Giron MD, 81 mg at 02/01/20 0842  •  carvedilol (COREG) tablet 12.5 mg, 12.5 mg, Oral, BID With Meals, Rey Giron MD, 12.5 mg at 02/01/20 0843  •  cholecalciferol (VITAMIN D3) tablet 1,000 Units, 1,000 Units, Oral, Daily, Rey Giron MD, 1,000 Units at 02/01/20 0843  •  dextrose (D50W) 25 g/ 50mL Intravenous Solution 25 g, 25 g, Intravenous, Q15 Min PRN, Rey Giron MD  •  dextrose (GLUTOSE) oral gel 15 g, 15 g, Oral, Q15 Min PRN, Rey Giron MD  •  DULoxetine (CYMBALTA) DR capsule 60 mg, 60 mg, Oral, Daily, Rey Giron MD, 60 mg at 02/01/20 0843  •  enoxaparin (LOVENOX) syringe 40 mg, 40 mg, Subcutaneous, Q24H, Rey Giron MD, 40 mg at 01/31/20 1849  •  ferrous sulfate EC tablet 324 mg, 324 mg, Oral, Daily With Breakfast, Rey Giron MD, 324 mg at 02/01/20 0843  •  glucagon (GLUCAGEN) injection 1 mg, 1 mg, Subcutaneous, Q15 Min PRN, Rey Giron MD  •  insulin aspart (novoLOG) injection 0-7 Units, 0-7 Units, Subcutaneous, 4x Daily AC & at Bedtime, Rey Giron MD, 2 Units at 01/31/20 5814  •  ipratropium-albuterol (DUO-NEB) nebulizer solution 3 mL, 3 mL, Nebulization, Q4H PRN, Rey Giron MD  •  levothyroxine (SYNTHROID, LEVOTHROID) tablet 25 mcg, 25 mcg, Oral, Q AM, Rey Giron MD  •  losartan (COZAAR) tablet 25 mg, 25 mg,  Oral, Daily, Rey Giron MD, 25 mg at 02/01/20 0843  •  melatonin tablet 3 mg, 3 mg, Oral, Nightly, Rey Giron MD, 3 mg at 01/31/20 2259  •  metFORMIN (GLUCOPHAGE) tablet 1,000 mg, 1,000 mg, Oral, Daily With Breakfast, Rey Giron MD  •  metFORMIN (GLUCOPHAGE) tablet 500 mg, 500 mg, Oral, Daily With Dinner, Rey Giron MD, 500 mg at 01/31/20 1849  •  methocarbamol (ROBAXIN) tablet 500 mg, 500 mg, Oral, TID, Rey Giron MD, 500 mg at 02/01/20 0843  •  ondansetron (ZOFRAN) tablet 4 mg, 4 mg, Oral, Q6H PRN, Rey Giron MD  •  oxyCODONE (ROXICODONE) immediate release tablet 10 mg, 10 mg, Oral, Q4H PRN, Rey Giron MD  •  oxyCODONE (ROXICODONE) immediate release tablet 5 mg, 5 mg, Oral, Q4H PRN, Rey Giron MD, 5 mg at 01/31/20 1848  •  pantoprazole (PROTONIX) EC tablet 40 mg, 40 mg, Oral, QAM, Rey Giron MD, 40 mg at 02/01/20 0843  •  potassium chloride (K-DUR,KLOR-CON) CR tablet 40 mEq, 40 mEq, Oral, Q2H, Rey Giron MD, 40 mEq at 02/01/20 0843  •  pravastatin (PRAVACHOL) tablet 10 mg, 10 mg, Oral, Daily, Rey Giron MD, 10 mg at 01/31/20 1847  •  pregabalin (LYRICA) capsule 75 mg, 75 mg, Oral, BID, Rey Giron MD, 75 mg at 02/01/20 0843      Assessment/Plan     1.  Acute Hypoxic Respiratory Failure: I'm suspecting this is due to an acute exacerbation of CHF given repeat chest x-ray findings.  Doppler of the lower extremities was negative.  Although on the differential, PE is seems unlikely given de-escalation of O2 based on diuresis.  She has had a V/Q in the past, and given her reservation about repeat IV placement, we may pursue this.    2.  A/C dCHF Exacerbation: Good response to Lasix.  Repleting K+ and Mg++.  Watch bicarb.  Kidneys tolerating.    3.  Right Wrist Fracture: Await Ortho eval.    4.   Diabetes Mellitus, Type in Obese: A1c at goal.  Continue home regimen and SSI.    5.  HTN: Not at goal, but trend is better.  Continue to monitor on current  regimen.    6.  CKD III: Creatinine remains around baseline.    7.  Recent Rehab for Femur Fracture: Will have PT evaluate.       Plan for disposition: Pending clinical course.    Rey Giron MD  02/01/20  8:45 AM

## 2020-02-01 NOTE — PLAN OF CARE
"Continued Stay Note  [facility]     Patient Name: Jung Short  MRN: [id number]  Today's Date: 2/1/2020    Admit Date: 1/31/2020    Discharge Plan       Row Name 02/01/20 1032       Plan    Plan  Uncertain at this time    Provided post acute provider list?  Refused    Refused Provider List Comment  Patient states she is current with [facility] and she just was discharged from [facility] Thursday 1/30    Patient/Family in Agreement with Plan  other (see comments) Continue to assess    Plan Comments  Spoke with Mrs Short at bedside.  She lives alone in a home in [place].  She was discharged from [facility] on Thursday 1/30.  She states \"I only got to be home for one day.  then I fell.\"  She uses a wheelchair to ambulate.  She is not on oxygen at home.  She state [facility] was supposed to start seeing her soon.  She states she cannot drive at the moment but normally she does drive.  Her daughters live nearby and help her.  Her pharmacy is [facility] in [place].  There are no issues with paying for her prescriptions.  She has an advanced directive on file here at the hospital.  Her plan is uncertain at this time.  Will continue to follow            Discharge Codes    No documentation.               Alondra Rivera RN    "

## 2020-02-01 NOTE — CONSULTS
Orthopedic Consult      Patient: Jung Short    Date of Admission: 1/31/2020  8:13 AM    YOB: 1945    Medical Record Number: [id number]    Attending Physician: Rey Giron MD  Consulting Physician: Reyes      Chief Complaints: Pleural effusion [J90]  Hypoxia [R09.02]  Hypertensive emergency [I16.1]  Other closed fracture of distal end of right radius, initial encounter [S52.506N]  Congestive heart failure, unspecified HF chronicity, unspecified heart failure type (CMS/HCC) [I50.9]  Acute respiratory failure with hypoxia (CMS/HCC) [J96.01]      History of Present Illness: 74-year-old female known to me having undergone a total knee arthroplasty several years ago was admitted to the emergency room after having fallen from her wheelchair injuring her right wrist.  Patient apparently had sustained fractures to her right femur and right foot 2 months ago she is visiting her grandchild in [place] from football game and apparently fell resulting in a fracture to the femur and the foot.  According to the patient underwent surgery to the femur in [place] has been in rehab until yesterday she was discharged.  She has been nonweightbearing on that leg.  She states she is in a wheelchair and failed to lock the wheels when she reached forward to get something and she fell out of a chair with a wheelchair moved backwards injuring her right wrist.  X-rays of the wrist show an impacted right distal radial fracture.  Is not complaining of any other injuries associated with this fall.  Allergies:   Allergies   Allergen Reactions   • Dilaudid [Hydromorphone] Delirium and Confusion   • Latex Rash       Medications:   Home Medications:  No current facility-administered medications on file prior to encounter.      Current Outpatient Medications on File Prior to Encounter   Medication Sig   • acetaminophen (TYLENOL) 325 MG tablet Take 650 mg by mouth Every 4 (Four) Hours As Needed for Moderate Pain .   •  alendronate (FOSAMAX) 35 MG tablet take 1 tablet by mouth every week (Patient taking differently: Take 35 mg by mouth Every 7 (Seven) Days. On Monday)   • anastrozole (ARIMIDEX) 1 MG tablet TAKE 1 TABLET BY MOUTH EVERY DAY (Patient taking differently: Take  by mouth Daily.)   • aspirin 81 MG EC tablet Take 81 mg by mouth Daily.   • carvedilol (COREG) 12.5 MG tablet TAKE 1 TABLET BY MOUTH TWICE DAILY WITH MEALS (Patient taking differently: Take 12.5 mg by mouth 2 (Two) Times a Day With Meals.)   • cefdinir (OMNICEF) 300 MG capsule Take 300 mg by mouth 2 (Two) Times a Day. For 7 days to end on 2/01/2020   • DULoxetine (CYMBALTA) 60 MG capsule TAKE 1 CAPSULE BY MOUTH DAILY   • ferrous sulfate 325 (65 FE) MG tablet Take 325 mg by mouth Daily With Breakfast.   • ipratropium-albuterol (DUO-NEB) 0.5-2.5 mg/3 ml nebulizer Take 3 mL by nebulization Every 4 (Four) Hours As Needed for Wheezing or Shortness of Air.   • levothyroxine (SYNTHROID, LEVOTHROID) 25 MCG tablet Take 1 tablet by mouth Daily.   • losartan (COZAAR) 25 MG tablet TAKE 1 TABLET BY MOUTH DAILY   • melatonin 1 MG tablet Take 3 mg by mouth Every Night.   • metFORMIN (GLUCOPHAGE) 1000 MG tablet Take 1,000 mg by mouth Daily With Breakfast.   • metFORMIN (GLUCOPHAGE) 500 MG tablet Take 500 mg by mouth Daily With Dinner.   • methocarbamol (ROBAXIN) 500 MG tablet Take 500 mg by mouth 3 (Three) Times a Day.   • Multiple Vitamins-Minerals (MULTIVITAMIN ADULT PO) Take 1 tablet by mouth Daily.   • omeprazole (priLOSEC) 20 MG capsule Take 20 mg by mouth Daily.   • ondansetron (ZOFRAN) 4 MG tablet Take 4 mg by mouth Every 6 (Six) Hours As Needed for Nausea or Vomiting.   • oxyCODONE (ROXICODONE) 10 MG tablet Take 10 mg by mouth Every 4 (Four) Hours As Needed for Severe Pain .   • oxyCODONE (ROXICODONE) 5 MG immediate release tablet Take 5 mg by mouth Every 4 (Four) Hours As Needed for Moderate Pain .   • pravastatin (PRAVACHOL) 10 MG tablet Take 1 tablet by mouth Daily.    • pregabalin (LYRICA) 75 MG capsule Take 1 capsule by mouth 2 (Two) Times a Day.   • vitamin D (ERGOCALCIFEROL) 80255 units capsule capsule Take 1,000 Units by mouth Daily.   • bumetanide (BUMEX) 0.5 MG tablet TAKE 1 TABLET BY MOUTH DAILY   • glucose blood test strip Take BG daily     Current Medications:  Scheduled Meds:  anastrozole 1 mg Oral Daily   aspirin 81 mg Oral Daily   carvedilol 12.5 mg Oral BID With Meals   cholecalciferol 1,000 Units Oral Daily   DULoxetine 60 mg Oral Daily   enoxaparin 40 mg Subcutaneous Q24H   ferrous sulfate 324 mg Oral Daily With Breakfast   insulin aspart 0-7 Units Subcutaneous 4x Daily AC & at Bedtime   levothyroxine 25 mcg Oral Q AM   losartan 25 mg Oral Daily   melatonin 3 mg Oral Nightly   metFORMIN 1,000 mg Oral Daily With Breakfast   metFORMIN 500 mg Oral Daily With Dinner   methocarbamol 500 mg Oral TID   pantoprazole 40 mg Oral QAM   potassium chloride 40 mEq Oral Q2H   pravastatin 10 mg Oral Daily   pregabalin 75 mg Oral BID     Continuous Infusions:   PRN Meds:.•  acetaminophen  •  dextrose  •  dextrose  •  glucagon (human recombinant)  •  ipratropium-albuterol  •  ondansetron  •  oxyCODONE  •  oxyCODONE    Past Medical History:   Diagnosis Date   • Anemia    • Benign breast disease    • Cancer (CMS/HCC) 2015    Right breast   • Cellulitis of leg     May-2003 right lower extremity   • CHF (congestive heart failure) (CMS/HCC)     Dr Bates follows   • Chronic kidney disease     Dr Hannah follows   • Chronic kidney disease 5/24/2019   • Chronic ulcer of right foot (CMS/HCC)     Non-pressure, history of    • Depression    • Diabetic peripheral neuropathy (CMS/HCC)    • Diarrhea    • Diverticulosis    • Encounter for eye exam    • Femoral fracture (CMS/HCC)     right   • Fracture of left wrist     history of   • Gastroesophageal reflux    • Hiatal hernia    • History of bone density study 09/20/2012   • History of colonoscopy     done 6/03 recheck in 10 years.   Done  july 2013 recheck in 5 years   • History of mammography, screening 09/20/2012   • Hospital discharge follow-up    • Hyperlipidemia    • Hypertension    • Hypothyroidism    • Impingement syndrome of right shoulder    • Joint pain    • Menopausal disorder    • Methicillin resistant Staphylococcus aureus infection 2012    after bladder surgery   • MRSA (methicillin resistant staph aureus) culture positive    • Nausea and vomiting    • Nonischemic cardiomyopathy (CMS/HCC)     Ejection fraction 10% per 2-D echo with Doppler however she did have cardiac catheterization April 2015 which showed ejection fraction 20% with global hypokinesis and severe mitral insufficiency   • Osteoarthritis     generalized, sched jania TKA   • Paroxysmal atrial fibrillation (CMS/HCC)     Dr Bates follows   • Postoperative infection     July of 2012 following her hysterectomy far vaginal wall prolapse. She was on antibiotics and wound dressings for 2 months.   • Shoulder pain, bilateral     has tears on both sides   • Syncope, psychogenic 4/19/2017   • Toe ulcer (CMS/HCC)     h/o to both feet, d/t shoes rubbing per patient   • Transient alteration of awareness 4/19/2017        Past Surgical History:   Procedure Laterality Date   • ABDOMINAL SURGERY  2012    had to be reopened after bladder surgery d/t infection   • AMPUTATION FOOT / TOE Right 11/2013    Rt foot amputation MTP first toe   • BLADDER SURGERY  2012   • BREAST BIOPSY     • BREAST SURGERY  06/29/2015    Percutaneous ultrasound-guided placement of metal localized clip 1st lesion   • CARDIAC CATHETERIZATION  2015   • CATARACT EXTRACTION Bilateral 2017   • DEBRIDEMENT  FOOT Right 01/2013    During hospitalization    • EPIDURAL BLOCK      4 chronic back pain and leg pain last epidurals done 5-2012 she had no benefit at all from this procedure.   • FEMUR FRACTURE SURGERY     • FOOT SURGERY Bilateral     several   • HERNIA REPAIR      At the abdomen February 2007 Dr. Mendez   •  INCISIONAL HERNIA REPAIR  2014    Recurrent. Incarcerated. With mesh implantation   • MASTECTOMY Right 2015   • SHOULDER SURGERY Right     x2 RCR   • TOTAL ABDOMINAL HYSTERECTOMY      with oophorectomy-Done 2012 secondary to vaginal wall prolapse.   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   Tobacco Use   • Smoking status: Former Smoker     Packs/day: 3.00     Years: 30.00     Pack years: 90.00     Types: Cigarettes     Last attempt to quit:      Years since quittin.1   • Smokeless tobacco: Never Used   Substance and Sexual Activity   • Alcohol use: No     Comment: h/o quit    • Drug use: No   • Sexual activity: Defer     Partners: Male     Comment: celibate    Social History     Social History Narrative         Volunteers here at [facility] [place]     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    Lots of friends    2 daughters - lives in [place] [facility] and [place] (graphic design)    [employer]        Family History   Problem Relation Age of Onset   • Colonic polyp Mother    • Hypertension Mother    • Migraines Mother    • Mental illness Mother    • Depression Mother    • Coronary artery disease Father    • Other Father         Cardiac Disorder   • Colon cancer Father    • Kidney disease Father    • Cancer Father    • Breast cancer Maternal Aunt    • Colon cancer Brother    • Alcohol abuse Brother    • Arthritis Sister    • Hypertension Brother    • Lung disease Brother    • Alcohol abuse Brother    • Malig Hyperthermia Neg Hx          Review of Systems:   HEENT: Patient denies any headaches, vision changes, change in hearing, or tinnitus, Patient denies any rhinorrhea,epistaxis, sinus pain, mouth or dental problems, sore throat or hoarseness, or  dysphagia  Pulmonary: Patient denies any cough, congestion, SOA, or wheezing  Cardiovascular: Patient denies any chest pain, dyspnea, palpitations, weakness, intolerance of exercise, varicosities, swelling of extremities, known murmur  Gastrointestinal:  Patient denies nausea, vomiting, diarrhea, constipation, loss  of appetite, change in appetite, dysphagia, gas, heartburn, melena, change in bowel habits, use of laxatives or other drugs to alter the function of the gastrointestinal tract.  Genital/Urinary: Patient denies dysuria, change in color of urine, change in frequency of urination, pain with urgency, incontinence, retention, or nocturia.  Musculoskeletal: Patient denies increased warmth; redness; or swelling of joints; limitation of function; deformity; crepitation: pain in a joint or an extremity, the neck, or the back, especially with movement.  Neurological: Patient denies dizziness, tremor, ataxia, difficulty in speaking, change in speech, paresthesia, loss of sensation, seizures, syncope, changes in memory.  Endocrine system: Patient denies tremors, palpitations, intolerance of heat or cold, polyuria, polydipsia, polyphagia, diaphoresis, exophthalmos, or goiter.  Psychological: Patient denies thoughts/plans or harming self or other; depression,  insomnia, night terrors, van, memory loss, disorientation.  Skin: Patient denies any bruising, rashes, discoloration, pruritus, wounds, ulcers, decubiti, changes in the hair or nails  Hematopoietic: Patient denies history of spontaneous or excessive bleeding, epistaxis, hematuria, melena, fatigue, enlarged or tender lymph nodes, pallor, history of anemia.    Physical Exam: 74 y.o. female  General Appearance:    Alert, cooperative, in no acute distress                 Vitals:    01/31/20 2304 02/01/20 0536 02/01/20 0808 02/01/20 0838   BP: 167/92 175/90  166/80   BP Location: Left arm Left arm  Left arm   Patient Position: Lying Lying  Lying   Pulse: 90 78 87  83   Resp:  18     Temp: 97.5 °F (36.4 °C) 97.6 °F (36.4 °C)     TempSrc: Oral Oral     SpO2: 93% 95% 95% 91%   Weight:       Height:            Head:    Normocephalic, without obvious abnormality, atraumatic   Eyes:            Lids and lashes normal, conjunctivae and sclerae normal, no   icterus, no pallor, corneas clear, PERRLA   Ears:    Ears appear intact with no abnormalities noted   Throat:   No oral lesions, no thrush, oral mucosa moist   Neck:   No adenopathy, supple, trachea midline, no thyromegaly, no   carotid bruit, no JVD   Back:     No kyphosis present, no scoliosis present, no skin lesions,      erythema or scars, no tenderness to percussion or                   palpation,   range of motion normal   Lungs:     Clear to auscultation,respirations regular, even and                  unlabored    Heart:    Regular rhythm and normal rate, normal S1 and S2, no            murmur, no gallop, no rub, no click   Chest Wall:    No abnormalities observed   Abdomen:     Normal bowel sounds, no masses, no organomegaly, soft        non-tender, non-distended, no guarding, no rebound                tenderness   Rectal:     Deferred   Extremities:  Patient is in a molded right wrist immobilizer.  She has good capillary refill with no motor or sensory deficit but there is pain with active or passive range of motion of the digits.  There is some swelling noted no ecchymosis noted to the digits.  Patient has no pain in the foot no pain with passive internal or external rotation of her femur.  Her calf is nontender.   Pulses:   Pulses palpable and equal bilaterally   Skin:   No bleeding, bruising or rash   Lymph nodes:   No palpable adenopathy   Neurologic:   Cranial nerves 2 - 12 grossly intact, sensation intact, DTR       present and equal bilaterally           Diagnostic Tests: X-rays of the right wrist show an impacted right distal radial fracture  [unfilled]      [unfilled]    Assessment:  Patient Active Problem  List   Diagnosis   • Abdominal bloating   • Abdominal mass   • Abdominal pain   • Abnormal gait   • Abnormal mammogram   • Chronic anemia   • Thoracic back pain   • Malignant neoplasm of upper-inner quadrant of right breast in female, estrogen receptor positive (CMS/HCC)   • Candidiasis   • Cardiomyopathy (CMS/HCC)   • Disc disorder of cervical region   • Neck pain   • Tenderness of chest wall   • Anemia due to stage 3 chronic kidney disease (CMS/HCC)   • Chronic pain   • Constipation   • Depression   • Type 2 diabetes mellitus (CMS/HCC)   • DM2 (diabetes mellitus, type 2) (CMS/[facility])   • Dyslipidemia   • Gastroesophageal reflux disease with esophagitis   • Generalized pain   • Gout   • Hypertension   • Hypothyroidism   • Incisional hernia   • Mass of breast   • Mitral valve insufficiency   • Nausea   • Adult body mass index greater than 30   • Osteopenia of spine   • Arthralgia of multiple joints   • Peripheral neuropathy   • Pulmonary hypertension (CMS/[facility])   • PITTS (dyspnea on exertion)   • Osteomyelitis (CMS/[facility])   • Tricuspid valve insufficiency   • Cobalamin deficiency   • Vitamin D deficiency   • Diabetes mellitus (CMS/[facility])   • Left knee pain   • Arthritis of knee   • DDD (degenerative disc disease), lumbar   • Lumbar facet arthropathy   • Pain in shoulder   • Chronic gout of multiple sites   • Encounter for long-term (current) use of high-risk medication   • Constipation due to opioid therapy   • Syncope, psychogenic   • Closed fracture of patella   • Primary osteoarthritis of left knee   • Radius/ulna fracture, left, closed, initial encounter   • Rib pain on left side   • Preoperative cardiovascular examination   • Physical deconditioning   • Hyponatremia   • Acute URI   • Acute on chronic HFrEF (heart failure with reduced ejection fraction) (CMS/[facility])   • CKD (chronic kidney disease) stage 3, GFR 30-59 ml/min (CMS/[facility])   • Diastolic congestive heart failure (CMS/[facility])   • MRSA infection   • Pleural effusion   •  Acute respiratory failure with hypoxia (CMS/HCC)           Plan: Reviewed the results of the x-ray and exam with the patient.  She has an impacted right distal radial fracture that I think can be treated with a wrist immobilizer.  I do want to x-ray the right foot and femur to evaluate those injuries.  She was told that the femur fracture had not healed yet when last seen by her doctors in [place] so I want to get a baseline x-ray to determine the extent of healing and to ensure that alignment and fixation is unchanged following this fall.  Answered all questions.          Date: 2/1/2020    Live Chambers MD

## 2020-02-01 NOTE — PLAN OF CARE
Problem: Patient Care Overview  Goal: Plan of Care Review  Flowsheets (Taken 2/1/2020 1240)  Plan of Care Reviewed With: patient     Problem: Patient Care Overview  Goal: Plan of Care Review  2/1/2020 1429 by Christine Norman, PT  Flowsheets (Taken 2/1/2020 1240)  Plan of Care Reviewed With: patient  Outcome Summary: PT evaluation completed. Patient was able to transfer supine to sit with moderate assist of 1 and assist of 1 for lines and verbal and tactile cueing. She can sit at EOB independently. Patient attempted sit to stand 2 x using a stephanie walker on left, NWB right leg and right wrist but patient unable to stand without putting weight on right leg.  Patient will benefit from skilled PT services for functional mobility and transfer training, NWB right leg and right wrist and LE exercises with HEP and patient and family education.  Will consider trying a platform walker if patient can do without putting any weight on right wrist and may need to try a sliding board if patient will agree to assist with with sit pivot transfers. At this point, recommend that nursing using lilliam lift for any transfers to a chair for the safety of patient and staff.  Feel patient will benefit from a STR stay prior to going home secondary to living alone, but may need  an extended rehab stay until she can bear weight on right leg and wrist.  Will follow patient daily while in hospital and continue to assess for best way for her to transfer to wheelchair.

## 2020-02-02 ENCOUNTER — APPOINTMENT (OUTPATIENT)
Dept: GENERAL RADIOLOGY | Facility: HOSPITAL | Age: 75
End: 2020-02-02

## 2020-02-02 LAB
ANION GAP SERPL CALCULATED.3IONS-SCNC: 10.6 MMOL/L (ref 5–15)
BUN BLD-MCNC: 13 MG/DL (ref 8–23)
BUN/CREAT SERPL: 16.7 (ref 7–25)
CALCIUM SPEC-SCNC: 9.7 MG/DL (ref 8.6–10.5)
CHLORIDE SERPL-SCNC: 100 MMOL/L (ref 98–107)
CO2 SERPL-SCNC: 28.4 MMOL/L (ref 22–29)
CREAT BLD-MCNC: 0.78 MG/DL (ref 0.57–1)
GFR SERPL CREATININE-BSD FRML MDRD: 72 ML/MIN/1.73
GLUCOSE BLD-MCNC: 191 MG/DL (ref 65–99)
GLUCOSE BLDC GLUCOMTR-MCNC: 161 MG/DL (ref 70–130)
GLUCOSE BLDC GLUCOMTR-MCNC: 193 MG/DL (ref 70–130)
GLUCOSE BLDC GLUCOMTR-MCNC: 207 MG/DL (ref 70–130)
GLUCOSE BLDC GLUCOMTR-MCNC: 265 MG/DL (ref 70–130)
POTASSIUM BLD-SCNC: 4 MMOL/L (ref 3.5–5.2)
SODIUM BLD-SCNC: 139 MMOL/L (ref 136–145)

## 2020-02-02 PROCEDURE — 71045 X-RAY EXAM CHEST 1 VIEW: CPT

## 2020-02-02 PROCEDURE — 82962 GLUCOSE BLOOD TEST: CPT

## 2020-02-02 PROCEDURE — 29125 APPL SHORT ARM SPLINT STATIC: CPT | Performed by: ORTHOPAEDIC SURGERY

## 2020-02-02 PROCEDURE — 99232 SBSQ HOSP IP/OBS MODERATE 35: CPT | Performed by: ORTHOPAEDIC SURGERY

## 2020-02-02 PROCEDURE — 99232 SBSQ HOSP IP/OBS MODERATE 35: CPT | Performed by: FAMILY MEDICINE

## 2020-02-02 PROCEDURE — 94799 UNLISTED PULMONARY SVC/PX: CPT

## 2020-02-02 PROCEDURE — 97110 THERAPEUTIC EXERCISES: CPT

## 2020-02-02 PROCEDURE — 25010000002 FUROSEMIDE PER 20 MG: Performed by: FAMILY MEDICINE

## 2020-02-02 PROCEDURE — 25010000002 ENOXAPARIN PER 10 MG: Performed by: HOSPITALIST

## 2020-02-02 PROCEDURE — 80048 BASIC METABOLIC PNL TOTAL CA: CPT | Performed by: HOSPITALIST

## 2020-02-02 RX ORDER — NITROGLYCERIN 0.4 MG/1
0.4 TABLET SUBLINGUAL
Status: DISCONTINUED | OUTPATIENT
Start: 2020-02-02 | End: 2020-02-03

## 2020-02-02 RX ORDER — FUROSEMIDE 10 MG/ML
40 INJECTION INTRAMUSCULAR; INTRAVENOUS ONCE
Status: DISCONTINUED | OUTPATIENT
Start: 2020-02-02 | End: 2020-02-03

## 2020-02-02 RX ORDER — FUROSEMIDE 10 MG/ML
40 INJECTION INTRAMUSCULAR; INTRAVENOUS ONCE
Status: COMPLETED | OUTPATIENT
Start: 2020-02-02 | End: 2020-02-02

## 2020-02-02 RX ADMIN — MELATONIN 1000 UNITS: at 10:39

## 2020-02-02 RX ADMIN — OXYCODONE HYDROCHLORIDE 10 MG: 5 TABLET ORAL at 10:50

## 2020-02-02 RX ADMIN — METHOCARBAMOL 500 MG: 500 TABLET, FILM COATED ORAL at 16:15

## 2020-02-02 RX ADMIN — FUROSEMIDE 40 MG: 10 INJECTION, SOLUTION INTRAMUSCULAR; INTRAVENOUS at 16:15

## 2020-02-02 RX ADMIN — ENOXAPARIN SODIUM 40 MG: 40 INJECTION SUBCUTANEOUS at 21:47

## 2020-02-02 RX ADMIN — PRAVASTATIN SODIUM 10 MG: 20 TABLET ORAL at 10:39

## 2020-02-02 RX ADMIN — LEVOTHYROXINE SODIUM 25 MCG: 25 TABLET ORAL at 06:54

## 2020-02-02 RX ADMIN — DULOXETINE HYDROCHLORIDE 60 MG: 60 CAPSULE, DELAYED RELEASE ORAL at 10:38

## 2020-02-02 RX ADMIN — METFORMIN HYDROCHLORIDE 500 MG: 500 TABLET ORAL at 17:35

## 2020-02-02 RX ADMIN — OXYCODONE HYDROCHLORIDE 10 MG: 5 TABLET ORAL at 06:55

## 2020-02-02 RX ADMIN — FERROUS SULFATE TAB EC 324 MG (65 MG FE EQUIVALENT) 324 MG: 324 (65 FE) TABLET DELAYED RESPONSE at 10:38

## 2020-02-02 RX ADMIN — LOSARTAN POTASSIUM 25 MG: 50 TABLET, FILM COATED ORAL at 10:39

## 2020-02-02 RX ADMIN — ASPIRIN 81 MG: 81 TABLET, COATED ORAL at 10:38

## 2020-02-02 RX ADMIN — METFORMIN HYDROCHLORIDE 1000 MG: 500 TABLET, FILM COATED ORAL at 11:15

## 2020-02-02 RX ADMIN — PREGABALIN 75 MG: 75 CAPSULE ORAL at 10:38

## 2020-02-02 RX ADMIN — CARVEDILOL 12.5 MG: 12.5 TABLET, FILM COATED ORAL at 10:39

## 2020-02-02 RX ADMIN — METHOCARBAMOL 500 MG: 500 TABLET, FILM COATED ORAL at 21:47

## 2020-02-02 RX ADMIN — PANTOPRAZOLE SODIUM 40 MG: 40 TABLET, DELAYED RELEASE ORAL at 06:54

## 2020-02-02 RX ADMIN — CARVEDILOL 12.5 MG: 12.5 TABLET, FILM COATED ORAL at 17:31

## 2020-02-02 RX ADMIN — PREGABALIN 75 MG: 75 CAPSULE ORAL at 21:47

## 2020-02-02 RX ADMIN — METHOCARBAMOL 500 MG: 500 TABLET, FILM COATED ORAL at 10:38

## 2020-02-02 RX ADMIN — ANASTROZOLE 1 MG: 1 TABLET ORAL at 10:40

## 2020-02-02 NOTE — PLAN OF CARE
Pt reports her cast to (R ) distal arm was placed this morning and she continues to experience throbbing pain. Pt required to maintain NWB to ( R) distal UE and ( R) LE. Increased encouragement required to complete tasks through treatment session. Pt completes supine>sit Sup/CI with HOB elevated; sit>supine Min A to maintain NWB statues of ( R) LE. Attempted use of platform walker for sit<>stand transfers. Pt was able to maintain NWB status with increased cues, pt complains of pain pushing through ( R) elbow on platform, transfers completed Min  A + 1 from elevated bed height. Pt able to static balance x 2 up to 3 minutes each. Due to pts NWB status, limited mobility and assistance required at home pt would benefit from extended rehab stay until WB status changes.

## 2020-02-02 NOTE — PROGRESS NOTES
Orthopedic Progress Note   Chief Complaint: Right distal radial fracture    Subjective     Interval History: Patient is afebrile hemodynamically stable.  Still experiencing moderate pain in the right wrist.  X-rays of her right femur show ORIF of that periprosthetic femur fracture without significant callus formation.  Alignment is acceptable.  No fracture noted in the foot.          Objective     Vital Signs  Temp:  [97.5 °F (36.4 °C)-98.6 °F (37 °C)] 98.6 °F (37 °C)  Heart Rate:  [85-99] 85  Resp:  [18-20] 20  BP: (146-163)/(71-88) 163/83  Body mass index is 35.94 kg/m².    Intake/Output Summary (Last 24 hours) at 2/2/2020 0846  Last data filed at 2/2/2020 0558  Gross per 24 hour   Intake 840 ml   Output 3250 ml   Net -2410 ml     No intake/output data recorded.       Physical Exam:   General: patient awake, alert and cooperative   Cardiovascular: regular rhythm and rate   Pulm: clear to auscultation bilaterally   Abdomen: Benign.  Soft bowel sounds   Extremities: Right upper extremity still exhibits swelling about the wrist.  There is no motor or sensory deficit.  No ecchymosis.  Lower extremity shows good distal pulses no motor or sensory deficit the calf nontender.   Neurologic: Normal mood and behavior     Results Review:     I reviewed the patient's new clinical results.      WBC No results found for: WBCS   HGB Hemoglobin   Date Value Ref Range Status   01/31/2020 11.7 (L) 12.0 - 15.9 g/dL Final      HCT Hematocrit   Date Value Ref Range Status   01/31/2020 37.8 34.0 - 46.6 % Final      Platlets No results found for: LABPLAT     PT/INR:  No results found for: PROTIME/No results found for: INR    Sodium Sodium   Date Value Ref Range Status   02/02/2020 139 136 - 145 mmol/L Final   02/01/2020 145 136 - 145 mmol/L Final   01/31/2020 140 136 - 145 mmol/L Final      Potassium Potassium   Date Value Ref Range Status   02/02/2020 4.0 3.5 - 5.2 mmol/L Final   02/01/2020 3.1 (L) 3.5 - 5.2 mmol/L Final   01/31/2020  3.3 (L) 3.5 - 5.2 mmol/L Final      Chloride Chloride   Date Value Ref Range Status   02/02/2020 100 98 - 107 mmol/L Final   02/01/2020 103 98 - 107 mmol/L Final   01/31/2020 100 98 - 107 mmol/L Final      Bicarbonate No results found for: PLASMABICARB   BUN BUN   Date Value Ref Range Status   02/02/2020 13 8 - 23 mg/dL Final   02/01/2020 11 8 - 23 mg/dL Final   01/31/2020 11 8 - 23 mg/dL Final      Creatinine Creatinine   Date Value Ref Range Status   02/02/2020 0.78 0.57 - 1.00 mg/dL Final   02/01/2020 0.74 0.57 - 1.00 mg/dL Final   01/31/2020 0.64 0.57 - 1.00 mg/dL Final      Calcium Calcium   Date Value Ref Range Status   02/02/2020 9.7 8.6 - 10.5 mg/dL Final   02/01/2020 9.7 8.6 - 10.5 mg/dL Final   01/31/2020 9.7 8.6 - 10.5 mg/dL Final      Magnesium  AST  ALT  Bilirubin, Total  AlkPhos  Albumin    Amylase  Lipase    Radiology: Magnesium   Date Value Ref Range Status   02/01/2020 1.3 (L) 1.6 - 2.4 mg/dL Final     No components found for: AST.*  No components found for: ALT.*  No results found for: BILIRUBIN    No components found for: ALKPHOS.*  No components found for: ALBUMIN.*      No components found for: AMYLASE.*  No components found for: LIPASE.*            Imaging Results (Most Recent)     Procedure Component Value Units Date/Time    XR Femur 2 View Right [[id number]] Collected:  02/01/20 1227     Updated:  02/01/20 1229    Narrative:       CR Femur 2 Vws RT    INDICATION:   74-year-old female with right femur pain status post fall 2 months ago.    COMPARISON:   None available.    FINDINGS:   By views of the right femur.  Diffuse bony demineralization. No gross evidence of acute displaced fracture or dislocation. Total right knee arthroplasty. ORIF of the right femur with a long lateral sideplate and multiple associated screws. The hardware  spans a healing, mildly displaced fracture of the distal femoral metaphysis. The fracture line remains visualized with evidence of some callus formation around the  fracture site. No significant solid osseous bridging.  No foreign body.      Impression:         1. Osteopenia. No gross acute injury.  2. ORIF of the right femur spanning a healing, mildly displaced fracture of the distal femoral metaphysis. The fracture line remains well-visualized without evidence of callus formation. No significant solid osseous bridging.  3. Right knee arthroplasty.    Signer Name: Benito Zimmer MD   Signed: 2/1/2020 12:27 PM   Workstation Name: [facility]    Radiology [facility]    XR Foot 2 View Right [[id number]] Collected:  02/01/20 1224     Updated:  02/01/20 1226    Narrative:       CR Foot 2 Vws RT    INDICATION:   74-year-old female with right foot pains that is post fall 2 months ago.    COMPARISON:   None available    FINDINGS:   2 views of the right foot.  Diffuse bony demineralization. No gross evidence of a displaced fracture or dislocation. Transmetatarsal amputation of the second ray. Partial amputation of the first metatarsal. Suspected sequelae of old, healed distal  fibular fracture. Moderate tibiotalar arthrosis. Mild arthrosis throughout the midfoot. Plantar calcaneal spur.  No bone erosion or destruction.  No foreign body.      Impression:         1. Osteopenia. No gross evidence of a displaced fracture or dislocation.  2. Postsurgical changes of the first metatarsal and second ray.  3. Hindfoot and midfoot arthrosis, detailed above. Questionable old, healed fracture deformity of the distal fibula.    Signer Name: Benito Zimmer MD   Signed: 2/1/2020 12:24 PM   Workstation Name: [facility]    Radiology [facility]    XR Pelvis 1 or 2 View [[id number]] Collected:  02/01/20 1221     Updated:  02/01/20 1224    Narrative:       CR Pelvis 1 or 2 Vws    INDICATION:   74-year-old female with pelvic pain status post fall 2 months ago.    COMPARISON:   None.    FINDINGS:  AP view(s) of the pelvis.  Diffuse bony demineralization. No evidence of a  displaced fracture or dislocation. Bony pelvis and sacrum appear grossly intact. Partially imaged right femur ORIF. Mild degenerative changes in both hips. Soft tissues are  unremarkable. No bone erosion or destruction.        Impression:       Osteopenia. No evidence of a displaced fracture or dislocation. Mild bilateral hip arthrosis.    Signer Name: Benito Zimmer MD   Signed: 2/1/2020 12:21 PM   Workstation Name: [id number]    [facility]    US Venous Doppler Lower Extremity Bilateral (duplex) [[id number]] Collected:  02/01/20 1119     Updated:  02/01/20 1121    Narrative:       US Veins LE Duplex BILAT    HISTORY:   Fall yesterday with fracture of right wrist. Recent right femur fracture. Positive d-dimer.     TECHNIQUE:   Real-time ultrasound was performed of both lower extremities utilizing spectral and color Doppler with compression and augmentation techniques.    COMPARISON:  July 29, 2016    FINDINGS:  Right Lower Extremity:  There is no deep venous thrombus seen in the right lower extremity, including the right common femoral veins, right femoral veins and right popliteal veins.  Normal compressibility and respiratory phasicity was visualized.  No calf vein thrombus.    Left Lower Extremity:  There is no deep venous thrombus seen in the left lower extremity, including the left common femoral veins, left femoral veins and left popliteal veins.   Normal compressibility and respiratory phasicity was visualized.  No calf vein thrombus.        Impression:       No deep venous thrombus seen in either lower extremity.    Signer Name: Robbin Zimmer MD   Signed: 2/1/2020 11:19 AM   Workstation [facility]: [id number]    [facility]    XR Chest 1 View [[id number]] Collected:  02/01/20 0631     Updated:  02/01/20 0634    Narrative:       CHEST X-RAY, 1/31/2020      HISTORY:    Respiratory failure. Pleural effusion. Follow-up cardiopulmonary status.      TECHNIQUE:  AP  portable upright chest x-ray.      FINDINGS:  Small-to-moderate bilateral pleural effusions and bilateral lower lung atelectasis, unchanged since yesterday. Moderate cardiomegaly. Mildly prominent indistinct interstitial markings may reflect mild vascular congestion, correlate clinically. Right  axillary surgical clips.      Impression:       1. Cardiomegaly and possible mild vascular congestion.  2. Moderate bilateral pleural effusions and bibasilar pulmonary atelectasis.  3. No significant change since yesterday.    Signer Name: Levy Mishra MD   Signed: 2/1/2020 6:31 AM   Workstation Name: [id number]    [facility]    XR Wrist 3+ View Right [[id number]] Collected:  01/31/20 0927     Updated:  01/31/20 0930    Narrative:       XR WRIST 3+ VW RIGHT-: 1/31/2020 9:15 AM     INDICATION:   Patient fell from wheelchair this morning with pain in right wrist.     COMPARISON:   None available.     FINDINGS:   3 views of the right wrist.  There is an acute fracture involving the  distal radius about 1 cm proximal to the end of the bone. The fracture  appears be lying transversely with mild impaction. The carpal bones are  normal. The ulna is normal. No bone erosion or destruction.  No foreign  body.       Impression:       Mild impaction-type fracture involving the distal radius about 1 cm from  the end of the bone..     This report was finalized on 1/31/2020 9:28 AM by Dr. Chi Mattson MD.       XR Chest 1 View [[id number]] Collected:  01/31/20 0927     Updated:  01/31/20 0929    Narrative:       AP PORTABLE CHEST, 01/31/2020     HISTORY:  Shortness of air for weeks     COMPARISON:  09/01/2019     TECHNIQUE:  AP portable chest x-ray.     FINDINGS:  There are low lung volumes. There are small bilateral effusions with  bibasilar atelectasis. There is mild interstitial prominence.       Impression:       Small left pleural effusions with bibasal atelectasis and mild  interstitial prominence.      This report was finalized on 1/31/2020 9:27 AM by Dr. Chi Mattson MD.                       Assessment/Plan     Patient Active Problem List   Diagnosis Code   • Abdominal bloating R14.0   • Abdominal mass R19.00   • Abdominal pain R10.9   • Abnormal gait R26.9   • Abnormal mammogram R92.8   • Chronic anemia D64.9   • Thoracic back pain M54.6   • Malignant neoplasm of upper-inner quadrant of right breast in female, estrogen receptor positive (CMS/HCC) C50.211, Z17.0   • Candidiasis B37.9   • Cardiomyopathy (CMS/HCC) I42.9   • Disc disorder of cervical region M50.90   • Neck pain M54.2   • Tenderness of chest wall R07.89   • Anemia due to stage 3 chronic kidney disease (CMS/[facility]) N18.3, D63.1   • Chronic pain G89.29   • Constipation K59.00   • Depression F32.9   • Type 2 diabetes mellitus (CMS/[facility]) E11.9   • DM2 (diabetes mellitus, type 2) (CMS/[facility]) E11.9   • Dyslipidemia E78.5   • Gastroesophageal reflux disease with esophagitis K21.0   • Generalized pain R52   • Gout M10.9   • Hypertension I10   • Hypothyroidism E03.9   • Incisional hernia K43.2   • Mass of breast N63.0   • Mitral valve insufficiency I34.0   • Nausea R11.0   • Adult body mass index greater than 30 [id number]   • Osteopenia of spine M85.88   • Arthralgia of multiple joints M25.50   • Peripheral neuropathy G62.9   • Pulmonary hypertension (CMS/[facility]) I27.20   • PITTS (dyspnea on exertion) R06.09   • Osteomyelitis (CMS/[facility]) M86.9   • Tricuspid valve insufficiency I07.1   • Cobalamin deficiency E53.8   • Vitamin D deficiency E55.9   • Diabetes mellitus (CMS/[facility]) E11.9   • Left knee pain M25.562   • Arthritis of knee M17.10   • DDD (degenerative disc disease), lumbar M51.36   • Lumbar facet arthropathy M47.816   • Pain in shoulder M25.519   • Chronic gout of multiple sites M1A.09X0   • Encounter for long-term (current) use of high-risk medication Z79.899   • Constipation due to opioid therapy K59.03, T40.2X5A   • Syncope, psychogenic F48.8   • Closed fracture  of patella S82.009A   • Primary osteoarthritis of left knee M17.12   • Radius/ulna fracture, left, closed, initial encounter S52.92XA, S52.202A   • Rib pain on left side R07.81   • Preoperative cardiovascular examination Z01.810   • Physical deconditioning R53.81   • Hyponatremia E87.1   • Acute URI J06.9   • Acute on chronic HFrEF (heart failure with reduced ejection fraction) (CMS/HCC) I50.23   • CKD (chronic kidney disease) stage 3, GFR 30-59 ml/min (CMS/HCC) N18.3   • Diastolic congestive heart failure (CMS/HCC) I50.30   • MRSA infection A49.02   • Pleural effusion J90   • Acute respiratory failure with hypoxia (CMS/HCC) J96.01         We will remove the wrist immobilizer placed the patient in a short arm cast in the right wrist.  She should experience better comfort with the cast in the immobilizer which I think was putting too much pressure at the fracture site.  With regard to the right femur again there is not abundant callus summation states that she needs to remain nonweightbearing.  Is been 2 months now that she cannot use her right hand to assist with gait training she will need placement in rehab once again.    Live Chambers MD  02/02/20  8:46 AM          Dictated utilizing Dragon dictation

## 2020-02-02 NOTE — PLAN OF CARE
Problem: Patient Care Overview  Goal: Plan of Care Review  Outcome: Ongoing (interventions implemented as appropriate)  Flowsheets (Taken 2/2/2020 1038)  Plan of Care Reviewed With: patient  Outcome Summary: pts oxygen sats began to desat a few times during the night as pt slept, put her on 1L then she needed to be increased to 2L - pain medicine given once for wrist pain - pt utilizing purewick - pt refused insulin sliding scale

## 2020-02-02 NOTE — PROGRESS NOTES
"Daily Progress Note:      Chief complaint: Follow-up acute postoperative, CHF exacerbation, right wrist fracture, hypertension, diabetes, chronic kidney stage III    Subjective: Overnight events noted shortness of breath is improved no cough congestion     LOS: 2 days     Vital Signs  Temp:  [97.5 °F (36.4 °C)-98.6 °F (37 °C)] 98.6 °F (37 °C)  Heart Rate:  [82-99] 82  Resp:  [18-20] 20  BP: (139-163)/(71-88) 139/79  Oxygen Therapy  SpO2: 94 %  Pulse Oximetry Type: Continuous  Device (Oxygen Therapy): nasal cannula  Flow (L/min): 2}  Body mass index is 35.94 kg/m².  Flowsheet Rows      First Filed Value   Admission Height  165.1 cm (65\") Documented at 01/31/2020 0815   Admission Weight  99.3 kg (219 lb) Documented at 01/31/2020 0815                   Documented weights    01/31/20 0815 01/31/20 1502   Weight: 99.3 kg (219 lb) 98 kg (216 lb)           Patient Vitals for the past 24 hrs:   BP Temp Temp src Pulse Resp SpO2   02/02/20 1038 139/79 -- -- 82 -- --   02/02/20 0956 -- -- -- 82 -- 94 %   02/02/20 0558 163/83 98.6 °F (37 °C) Oral 85 20 93 %   02/02/20 0508 -- -- -- -- -- 93 %   02/02/20 0500 -- -- -- -- -- (!) 86 %   02/01/20 2353 162/87 98.3 °F (36.8 °C) Oral 89 20 92 %   02/01/20 2219 -- -- -- -- -- 96 %   02/01/20 2157 -- -- -- -- -- (!) 88 %   02/01/20 2024 -- -- -- -- -- 91 %   02/01/20 1925 163/88 98.4 °F (36.9 °C) Oral 99 20 90 %   02/01/20 1742 146/71 -- -- 89 -- --   02/01/20 1602 146/71 97.5 °F (36.4 °C) Oral 89 18 94 %       98 kg (216 lb)      Intake/Output Summary (Last 24 hours) at 2/2/2020 1444  Last data filed at 2/2/2020 1348  Gross per 24 hour   Intake 0 ml   Output 2450 ml   Net -2450 ml       Review of Systems   Constitutional: Positive for activity change. Negative for appetite change and fatigue.   HENT: Negative for congestion.    Respiratory: Negative for cough, chest tightness, shortness of breath and wheezing.    Cardiovascular: Negative for chest pain.   Gastrointestinal: Negative for " abdominal distention, abdominal pain, diarrhea, nausea and vomiting.   Endocrine: Negative for polyphagia and polyuria.   Genitourinary: Negative for frequency.   Musculoskeletal: Positive for arthralgias, back pain and gait problem.   Skin: Negative for rash.   Neurological: Negative for light-headedness.   Hematological: Does not bruise/bleed easily.   Psychiatric/Behavioral: Negative for agitation and behavioral problems.       Physical Exam   Constitutional: She appears well-developed and well-nourished.   Obese   HENT:   Head: Normocephalic.   Mouth/Throat: Oropharynx is clear and moist.   Eyes: Conjunctivae are normal.   Neck: Normal range of motion. No JVD present. No thyromegaly present.   Cardiovascular: Normal rate, regular rhythm and normal heart sounds.   No murmur heard.  Pulmonary/Chest: Effort normal and breath sounds normal. No respiratory distress. She has no wheezes. She has no rales.   Crackles at both bases posteriorly   Abdominal: Soft. Bowel sounds are normal. She exhibits no distension. There is no tenderness. There is no guarding.   Musculoskeletal: She exhibits edema (His bilateral lower extremity edema).   Left forearm in cast   Neurological: She is alert.   Skin: Skin is warm and dry. No rash noted.   Nursing note and vitals reviewed.      Medication Review:   I have reviewed the patient's current medication list  Scheduled Meds:  anastrozole 1 mg Oral Daily   aspirin 81 mg Oral Daily   carvedilol 12.5 mg Oral BID With Meals   cholecalciferol 1,000 Units Oral Daily   DULoxetine 60 mg Oral Daily   enoxaparin 40 mg Subcutaneous Q24H   ferrous sulfate 324 mg Oral Daily With Breakfast   insulin aspart 0-7 Units Subcutaneous 4x Daily AC & at Bedtime   levothyroxine 25 mcg Oral Q AM   losartan 25 mg Oral Daily   melatonin 3 mg Oral Nightly   metFORMIN 1,000 mg Oral Daily With Breakfast   metFORMIN 500 mg Oral Daily With Dinner   methocarbamol 500 mg Oral TID   pantoprazole 40 mg Oral QAM    pravastatin 10 mg Oral Daily   pregabalin 75 mg Oral BID     Continuous Infusions:   PRN Meds:.•  acetaminophen  •  dextrose  •  dextrose  •  glucagon (human recombinant)  •  ipratropium-albuterol  •  nitroglycerin  •  ondansetron  •  oxyCODONE  •  oxyCODONE      Labs:  Results from last 7 days   Lab Units 01/31/20  1250   WBC 10*3/mm3 9.82   HEMOGLOBIN g/dL 11.7*   HEMATOCRIT % 37.8   PLATELETS 10*3/mm3 299     Results from last 7 days   Lab Units 02/02/20  0731 02/01/20  0416 01/31/20  1250   SODIUM mmol/L 139 145 140   POTASSIUM mmol/L 4.0 3.1* 3.3*   CHLORIDE mmol/L 100 103 100   CO2 mmol/L 28.4 30.0* 26.9   BUN mg/dL 13 11 11   CREATININE mg/dL 0.78 0.74 0.64   CALCIUM mg/dL 9.7 9.7 9.7   BILIRUBIN mg/dL  --  0.6 0.9   ALK PHOS U/L  --  87 104   ALT (SGPT) U/L  --  5 7   AST (SGOT) U/L  --  11 13   GLUCOSE mg/dL 191* 175* 213*     Results from last 7 days   Lab Units 02/01/20  0416   MAGNESIUM mg/dL 1.3*       Lab Results (last 24 hours)     Procedure Component Value Units Date/Time    Blood Culture - Blood, Arm, Left [[id number]] Collected:  01/31/20 1250    Specimen:  Blood from Arm, Left Updated:  02/02/20 1300     Blood Culture No growth at 2 days    POC Glucose Once [[id number]]  (Abnormal) Collected:  02/02/20 1154    Specimen:  Blood Updated:  02/02/20 1210     Glucose 265 mg/dL     POC Glucose Once [[id number]]  (Abnormal) Collected:  02/02/20 0757    Specimen:  Blood Updated:  02/02/20 0812     Glucose 193 mg/dL     Basic Metabolic Panel [[id number]]  (Abnormal) Collected:  02/02/20 0731    Specimen:  Blood Updated:  02/02/20 0811     Glucose 191 mg/dL      BUN 13 mg/dL      Creatinine 0.78 mg/dL      Sodium 139 mmol/L      Potassium 4.0 mmol/L      Chloride 100 mmol/L      CO2 28.4 mmol/L      Calcium 9.7 mg/dL      eGFR Non African Amer 72 mL/min/1.73      BUN/Creatinine Ratio 16.7     Anion Gap 10.6 mmol/L     Narrative:       GFR Normal >60  Chronic Kidney Disease <60  Kidney Failure <15      POC  Glucose Once [[id number]]  (Abnormal) Collected:  02/01/20 2042    Specimen:  Blood Updated:  02/01/20 2049     Glucose 222 mg/dL     POC Glucose Once [[id number]]  (Abnormal) Collected:  02/01/20 1644    Specimen:  Blood Updated:  02/01/20 1656     Glucose 156 mg/dL         Results from last 7 days   Lab Units 01/31/20  1250 01/31/20  1130   TROPONIN T ng/mL  --  0.011   D DIMER QUANT MCGFEU/mL 2.15*  --          Results from last 7 days   Lab Units 01/31/20  1250   PROBNP pg/mL 15,217.0*         Results from last 7 days   Lab Units 02/01/20  0416   MAGNESIUM mg/dL 1.3*           Invalid input(s): LDLCALC      Results from last 7 days   Lab Units 01/31/20  1250   HEMOGLOBIN A1C % 6.30*     Glucose   Date/Time Value Ref Range Status   02/02/2020 1154 265 (H) 70 - 130 mg/dL Final   02/02/2020 0757 193 (H) 70 - 130 mg/dL Final   02/01/2020 2042 222 (H) 70 - 130 mg/dL Final   02/01/2020 1644 156 (H) 70 - 130 mg/dL Final   02/01/2020 1219 174 (H) 70 - 130 mg/dL Final   02/01/2020 0723 139 (H) 70 - 130 mg/dL Final   01/31/2020 2058 172 (H) 70 - 130 mg/dL Final     Results from last 7 days   Lab Units 01/31/20  1250 01/31/20  1130   PROCALCITONIN ng/mL  --  0.03*   LACTATE mmol/L 1.3  --      Results from last 7 days   Lab Units 01/31/20  1250   BLOODCX  No growth at 2 days                 Radiology:  Imaging Results (Last 24 Hours)     Procedure Component Value Units Date/Time    XR Chest 1 View [[id number]] Collected:  02/02/20 1213     Updated:  02/02/20 1215    Narrative:       CR Chest 1 Vw    INDICATION:   Acute onset of shortness of breath. History of CHF.     COMPARISON:    None available.    FINDINGS:  Single portable AP view(s) of the chest.    The heart size remains enlarged. Bibasilar atelectatic/infiltrative changes with bilateral pleural effusions. Modest improvement from prior study. No pneumothorax.       Impression:       Cardiomegaly with bilateral pleural effusions and bibasilar atelectatic/infiltrative  change. Modest improvement from prior study.    Signer Name: Robbin Zimmer MD   Signed: 2/2/2020 12:13 PM   Workstation Name: [id number]    [facility]          Cardiology:  ECG/EMG Results (last 24 hours)     ** No results found for the last 24 hours. **          I have reviewed recent labs results and consult notes.  Parts of this note may have been copied and pasted but patient was examined and interviewed by me today    Assessment and Plan:    1.  Hypoxic respiratory failure likely due to decompensated congestive heart failure she is feeling better still has some crackles at bases we will give her another dose of IV diuretics today    2.  Acute on chronic systolic congestive heart failure mildly decompensated    3.  Adult-onset diabetes mellitus continues to be hyperglycemic will adjust insulins accordingly continue Accu-Cheks    4.  Hypertension not at goal likely due to pain and some anxiety issues hold off on any medication changes for now    5.  Acute disease stage III at baseline nothing acute    6.  Left wrist fracture in cast    7.  Hypothyroidism nothing acute continue home medications    Much of this encounter note is an electronic transcription/translation of spoken language to printed text using Dragon Software

## 2020-02-02 NOTE — THERAPY TREATMENT NOTE
Acute Care - Physical Therapy Treatment Note  NOHEMY Diaz     Patient Name: Jung Short  : 1945  MRN: [id number]  Today's Date: 2020  Onset of Illness/Injury or Date of Surgery: 20(right femur and foot fracture 2 months ago)  Date of Referral to PT: 20  Referring Physician: Dr. Giron    Admit Date: 2020    Visit Dx:    ICD-10-CM ICD-9-CM   1. Pleural effusion J90 511.9   2. Congestive heart failure, unspecified HF chronicity, unspecified heart failure type (CMS/HCC) I50.9 428.0   3. Hypoxia R09.02 799.02   4. Other closed fracture of distal end of right radius, initial encounter S52.591A 813.42   5. Hypertensive emergency I16.1 401.9     Patient Active Problem List   Diagnosis   • Abdominal bloating   • Abdominal mass   • Abdominal pain   • Abnormal gait   • Abnormal mammogram   • Chronic anemia   • Thoracic back pain   • Malignant neoplasm of upper-inner quadrant of right breast in female, estrogen receptor positive (CMS/HCC)   • Candidiasis   • Cardiomyopathy (CMS/HCC)   • Disc disorder of cervical region   • Neck pain   • Tenderness of chest wall   • Anemia due to stage 3 chronic kidney disease (CMS/HCC)   • Chronic pain   • Constipation   • Depression   • Type 2 diabetes mellitus (CMS/HCC)   • DM2 (diabetes mellitus, type 2) (CMS/HCC)   • Dyslipidemia   • Gastroesophageal reflux disease with esophagitis   • Generalized pain   • Gout   • Hypertension   • Hypothyroidism   • Incisional hernia   • Mass of breast   • Mitral valve insufficiency   • Nausea   • Adult body mass index greater than 30   • Osteopenia of spine   • Arthralgia of multiple joints   • Peripheral neuropathy   • Pulmonary hypertension (CMS/HCC)   • PITTS (dyspnea on exertion)   • Osteomyelitis (CMS/HCC)   • Tricuspid valve insufficiency   • Cobalamin deficiency   • Vitamin D deficiency   • Diabetes mellitus (CMS/HCC)   • Left knee pain   • Arthritis of knee   • DDD (degenerative disc disease), lumbar   • Lumbar  facet arthropathy   • Pain in shoulder   • Chronic gout of multiple sites   • Encounter for long-term (current) use of high-risk medication   • Constipation due to opioid therapy   • Syncope, psychogenic   • Closed fracture of patella   • Primary osteoarthritis of left knee   • Radius/ulna fracture, left, closed, initial encounter   • Rib pain on left side   • Preoperative cardiovascular examination   • Physical deconditioning   • Hyponatremia   • Acute URI   • Acute on chronic HFrEF (heart failure with reduced ejection fraction) (CMS/[facility])   • CKD (chronic kidney disease) stage 3, GFR 30-59 ml/min (CMS/[facility])   • Diastolic congestive heart failure (CMS/[facility])   • MRSA infection   • Pleural effusion   • Acute respiratory failure with hypoxia (CMS/[facility])       Therapy Treatment    Rehabilitation Treatment Summary     Row Name 02/02/20 0935             Treatment Time/Intention    Discipline  physical therapy assistant  -KM      Document Type  therapy note (daily note)  -KM      Subjective Information  complains of;pain;fatigue  -KM      Mode of Treatment  physical therapy  -KM      Patient Effort  adequate  -KM      Existing Precautions/Restrictions  non-weight bearing NWB (R) distal UE, NWB (R) LE.   -KM      Patient Response to Treatment  Pt requiring encouragement to complete tasks.   -KM      Recorded by [KM] Ai Coley, MCKENZIE 02/02/20 1248      Row Name 02/02/20 0935             Cognitive Assessment/Intervention- PT/OT    Personal Safety Interventions  gait belt;nonskid shoes/slippers when out of bed  -KM      Recorded by [KM] Ai Coley PTA 02/02/20 1248      Row Name 02/02/20 0935             Bed Mobility Assessment/Treatment    Bed Mobility Assessment/Treatment  supine-sit;sit-supine  -KM      Supine-Sit [place] (Bed Mobility)  supervision;conditional independence  -KM      Sit-Supine [place] (Bed Mobility)  minimum assist (75% patient effort)  -KM      Assistive Device (Bed Mobility)  head of bed  elevated;bed rails  -KM      Comment (Bed Mobility)  Pt required Min A sit>supine to abide NWB status of (R)LE  -KM      Recorded by [KM] Ai Coley PTA 02/02/20 1248      Row Name 02/02/20 0935             Transfer Assessment/Treatment    Transfer Assessment/Treatment  sit-stand transfer;stand-sit transfer  -KM      Maintains Weight-bearing Status (Transfers)  able to maintain;verbal cues to maintain  -KM      Comment (Transfers)  transfers completed with first attempt of platform walker, VC tomaintain NWB status and push through elbow. Transfers completed from elevated bed height.   -KM      Recorded by [KM] Ai Coley PTA 02/02/20 1248      Row Name 02/02/20 0935             Sit-Stand Transfer    Sit-Stand [place] (Transfers)  minimum assist (75% patient effort);1 person assist;verbal cues  -KM      Assistive Device (Sit-Stand Transfers)  walker, rolling platform  -KM      Recorded by [KM] Ai Coley PTA 02/02/20 1248      Row Name 02/02/20 0935             Stand-Sit Transfer    Stand-Sit [place] (Transfers)  minimum assist (75% patient effort);1 person assist;verbal cues  -KM      Assistive Device (Stand-Sit Transfers)  walker, front-wheeled  -KM      Recorded by [KM] Ai Coley PTA 02/02/20 1248      Row Name 02/02/20 0935             Gait/Stairs Assessment/Training    Comment (Gait/Stairs)  Unable to complete at this time  -KM      Recorded by [KM] Ai Coley PTA 02/02/20 1248      Row Name 02/02/20 0935             Positioning and Restraints    Pre-Treatment Position  in bed  -KM      Post Treatment Position  bed  -KM      In Bed  supine;call light within reach;encouraged to call for assist  -KM      Recorded by [KM] Ai Coley PTA 02/02/20 1248      Row Name 02/02/20 0935             Plan of Care Review    Plan of Care Reviewed With  patient  -KM      Outcome Summary  PT:Pt reports her cast to (R ) distal arm was placed this morning and she continues to experience  throbbing pain. Pt required to maintain NWB to ( R) distal UE and ( R) LE. Increased encouragement required to complete tasks through treatment session. Pt completes supine>sit Sup/CI with HOB elevated; sit>supine Min A to maintain NWB statues of ( R) LE. Attempted use of platform walker for sit<>stand transfers. Pt was able to maintain NWB status with increased cues, pt complains of pain pushing through ( R) elbow on platform, transfers completed Min  A + 1 from elevated bed height. Pt able to static balance x 2 up to 3 minutes each. Due to pts NWB status, limited mobility and assistance required at home pt would benefit from extended rehab stay until WB status changes.  -KM      Recorded by [SANDRA] Ai Coley, MCKENZIE 02/02/20 1248        User Key  (r) = Recorded By, (t) = Taken By, (c) = Cosigned By    Initials Name Effective Dates Discipline    Ai Johnson, MCKENZIE 02/05/19 -  PT                       PT Recommendation and Plan     Plan of Care Reviewed With: patient  Outcome Summary: PT:Pt reports her cast to (R ) distal arm was placed this morning and she continues to experience throbbing pain. Pt required to maintain NWB to ( R) distal UE and ( R) LE. Increased encouragement required to complete tasks through treatment session. Pt completes supine>sit Sup/CI with HOB elevated; sit>supine Min A to maintain NWB statues of ( R) LE. Attempted use of platform walker for sit<>stand transfers. Pt was able to maintain NWB status with increased cues, pt complains of pain pushing through ( R) elbow on platform, transfers completed Min  A + 1 from elevated bed height. Pt able to static balance x 2 up to 3 minutes each. Due to pts NWB status, limited mobility and assistance required at home pt would benefit from extended rehab stay until WB status changes.  Outcome Measures     Row Name 02/02/20 0935 02/01/20 1240          How much help from another person do you currently need...    Turning from your back to your side  while in flat bed without using bedrails?  3  -KM  3  -LN     Moving from lying on back to sitting on the side of a flat bed without bedrails?  3  -KM  2  -LN     Moving to and from a bed to a chair (including a wheelchair)?  1  -KM  1  -LN     Standing up from a chair using your arms (e.g., wheelchair, bedside chair)?  2  -KM  1  -LN     Climbing 3-5 steps with a railing?  1  -KM  1  -LN     To walk in hospital room?  1  -KM  1  -LN     AM-PAC 6 Clicks Score (PT)  11  -KM  9  -LN        Functional Assessment    Outcome Measure Options  AM-PAC 6 Clicks Basic Mobility (PT)  -KM  AM-PAC 6 Clicks Basic Mobility (PT)  -LN       User Key  (r) = Recorded By, (t) = Taken By, (c) = Cosigned By    Initials Name Provider Type    Christine Dai, PT Physical Therapist    Ai Johnson PTA Physical Therapy Assistant         Time Calculation:   PT Charges     Row Name 02/02/20 1251             Time Calculation    Start Time  0935  -KM      Stop Time  0952  -KM      Time Calculation (min)  17 min  -KM        User Key  (r) = Recorded By, (t) = Taken By, (c) = Cosigned By    Initials Name Provider Type    Ai Johnson PTA Physical Therapy Assistant        Therapy Charges for Today     Code Description Service Date Service Provider Modifiers Qty    83411985733 HC PT THER PROC EA 15 MIN 2/2/2020 Ai Coley PTA GP 1    02650392796 HC PT THER SUPP EA 15 MIN 2/2/2020 Ai Coley PTA GP 1          PT G-Codes  Outcome Measure Options: AM-PAC 6 Clicks Basic Mobility (PT)  AM-PAC 6 Clicks Score (PT): 11    iA Coley PTA  2/2/2020

## 2020-02-03 ENCOUNTER — TELEPHONE (OUTPATIENT)
Dept: ORTHOPEDIC SURGERY | Facility: CLINIC | Age: 75
End: 2020-02-03

## 2020-02-03 ENCOUNTER — APPOINTMENT (OUTPATIENT)
Dept: NUCLEAR MEDICINE | Facility: HOSPITAL | Age: 75
End: 2020-02-03

## 2020-02-03 ENCOUNTER — APPOINTMENT (OUTPATIENT)
Dept: GENERAL RADIOLOGY | Facility: HOSPITAL | Age: 75
End: 2020-02-03

## 2020-02-03 LAB
ARTERIAL PATENCY WRIST A: POSITIVE
ATMOSPHERIC PRESS: 735 MMHG
BASE EXCESS BLDA CALC-SCNC: 8.5 MMOL/L (ref 0–2)
BDY SITE: ABNORMAL
BODY TEMPERATURE: 37 C
GAS FLOW AIRWAY: 3 LPM
GLUCOSE BLDC GLUCOMTR-MCNC: 168 MG/DL (ref 70–130)
GLUCOSE BLDC GLUCOMTR-MCNC: 193 MG/DL (ref 70–130)
GLUCOSE BLDC GLUCOMTR-MCNC: 197 MG/DL (ref 70–130)
GLUCOSE BLDC GLUCOMTR-MCNC: 214 MG/DL (ref 70–130)
HCO3 BLDA-SCNC: 34.2 MMOL/L (ref 20–26)
HGB BLDA-MCNC: 10 G/DL (ref 13.5–17.5)
MODALITY: ABNORMAL
PCO2 BLDA: 52.8 MM HG (ref 35–45)
PCO2 TEMP ADJ BLD: 52.8 MM HG (ref 35–45)
PH BLDA: 7.42 PH UNITS (ref 7.35–7.45)
PH, TEMP CORRECTED: 7.42 PH UNITS (ref 7.35–7.45)
PO2 BLDA: 79.9 MM HG (ref 83–108)
PO2 TEMP ADJ BLD: 79.9 MM HG (ref 83–108)
SAO2 % BLDCOA: 96.8 % (ref 94–99)
VENTILATOR MODE: ABNORMAL

## 2020-02-03 PROCEDURE — 0 TECHNETIUM ALBUMIN AGGREGATED: Performed by: HOSPITALIST

## 2020-02-03 PROCEDURE — 25010000002 ENOXAPARIN PER 10 MG: Performed by: HOSPITALIST

## 2020-02-03 PROCEDURE — 82962 GLUCOSE BLOOD TEST: CPT

## 2020-02-03 PROCEDURE — 82803 BLOOD GASES ANY COMBINATION: CPT

## 2020-02-03 PROCEDURE — 73100 X-RAY EXAM OF WRIST: CPT

## 2020-02-03 PROCEDURE — 97530 THERAPEUTIC ACTIVITIES: CPT

## 2020-02-03 PROCEDURE — A9539 TC99M PENTETATE: HCPCS | Performed by: HOSPITALIST

## 2020-02-03 PROCEDURE — A9540 TC99M MAA: HCPCS | Performed by: HOSPITALIST

## 2020-02-03 PROCEDURE — 94799 UNLISTED PULMONARY SVC/PX: CPT

## 2020-02-03 PROCEDURE — 36600 WITHDRAWAL OF ARTERIAL BLOOD: CPT

## 2020-02-03 PROCEDURE — 78582 LUNG VENTILAT&PERFUS IMAGING: CPT

## 2020-02-03 PROCEDURE — 99232 SBSQ HOSP IP/OBS MODERATE 35: CPT | Performed by: HOSPITALIST

## 2020-02-03 PROCEDURE — 99232 SBSQ HOSP IP/OBS MODERATE 35: CPT | Performed by: ORTHOPAEDIC SURGERY

## 2020-02-03 PROCEDURE — 25010000002 FUROSEMIDE PER 20 MG: Performed by: HOSPITALIST

## 2020-02-03 PROCEDURE — 0 TECHNETIUM TC 99M PENTETATE KIT: Performed by: HOSPITALIST

## 2020-02-03 RX ORDER — OXYCODONE AND ACETAMINOPHEN 7.5; 325 MG/1; MG/1
1 TABLET ORAL EVERY 4 HOURS PRN
Status: DISCONTINUED | OUTPATIENT
Start: 2020-02-03 | End: 2020-02-06 | Stop reason: HOSPADM

## 2020-02-03 RX ORDER — FUROSEMIDE 10 MG/ML
40 INJECTION INTRAMUSCULAR; INTRAVENOUS EVERY 12 HOURS
Status: DISCONTINUED | OUTPATIENT
Start: 2020-02-03 | End: 2020-02-05

## 2020-02-03 RX ADMIN — FUROSEMIDE 40 MG: 10 INJECTION, SOLUTION INTRAMUSCULAR; INTRAVENOUS at 23:48

## 2020-02-03 RX ADMIN — PREGABALIN 75 MG: 75 CAPSULE ORAL at 20:47

## 2020-02-03 RX ADMIN — OXYCODONE HYDROCHLORIDE AND ACETAMINOPHEN 1 TABLET: 7.5; 325 TABLET ORAL at 13:34

## 2020-02-03 RX ADMIN — PRAVASTATIN SODIUM 10 MG: 20 TABLET ORAL at 08:28

## 2020-02-03 RX ADMIN — PANTOPRAZOLE SODIUM 40 MG: 40 TABLET, DELAYED RELEASE ORAL at 06:28

## 2020-02-03 RX ADMIN — CARVEDILOL 12.5 MG: 12.5 TABLET, FILM COATED ORAL at 19:48

## 2020-02-03 RX ADMIN — ANASTROZOLE 1 MG: 1 TABLET ORAL at 09:52

## 2020-02-03 RX ADMIN — OXYCODONE HYDROCHLORIDE 5 MG: 5 TABLET ORAL at 05:04

## 2020-02-03 RX ADMIN — CARVEDILOL 12.5 MG: 12.5 TABLET, FILM COATED ORAL at 08:28

## 2020-02-03 RX ADMIN — METHOCARBAMOL 500 MG: 500 TABLET, FILM COATED ORAL at 17:13

## 2020-02-03 RX ADMIN — FUROSEMIDE 40 MG: 10 INJECTION, SOLUTION INTRAMUSCULAR; INTRAVENOUS at 11:23

## 2020-02-03 RX ADMIN — METFORMIN HYDROCHLORIDE 500 MG: 500 TABLET ORAL at 19:47

## 2020-02-03 RX ADMIN — Medication 3 MG: at 20:47

## 2020-02-03 RX ADMIN — LOSARTAN POTASSIUM 25 MG: 50 TABLET, FILM COATED ORAL at 08:29

## 2020-02-03 RX ADMIN — FERROUS SULFATE TAB EC 324 MG (65 MG FE EQUIVALENT) 324 MG: 324 (65 FE) TABLET DELAYED RESPONSE at 08:28

## 2020-02-03 RX ADMIN — METHOCARBAMOL 500 MG: 500 TABLET, FILM COATED ORAL at 20:47

## 2020-02-03 RX ADMIN — OXYCODONE HYDROCHLORIDE AND ACETAMINOPHEN 1 TABLET: 7.5; 325 TABLET ORAL at 08:42

## 2020-02-03 RX ADMIN — MELATONIN 1000 UNITS: at 08:28

## 2020-02-03 RX ADMIN — Medication 1 DOSE: at 19:34

## 2020-02-03 RX ADMIN — OXYCODONE HYDROCHLORIDE AND ACETAMINOPHEN 1 TABLET: 7.5; 325 TABLET ORAL at 20:47

## 2020-02-03 RX ADMIN — LEVOTHYROXINE SODIUM 25 MCG: 25 TABLET ORAL at 05:04

## 2020-02-03 RX ADMIN — DULOXETINE HYDROCHLORIDE 60 MG: 60 CAPSULE, DELAYED RELEASE ORAL at 08:27

## 2020-02-03 RX ADMIN — PREGABALIN 75 MG: 75 CAPSULE ORAL at 08:29

## 2020-02-03 RX ADMIN — METHOCARBAMOL 500 MG: 500 TABLET, FILM COATED ORAL at 08:28

## 2020-02-03 RX ADMIN — ENOXAPARIN SODIUM 40 MG: 40 INJECTION SUBCUTANEOUS at 20:47

## 2020-02-03 RX ADMIN — KIT FOR THE PREPARATION OF TECHNETIUM TC 99M PENTETATE 1 DOSE: 20 INJECTION, POWDER, LYOPHILIZED, FOR SOLUTION INTRAVENOUS; RESPIRATORY (INHALATION) at 19:00

## 2020-02-03 RX ADMIN — ASPIRIN 81 MG: 81 TABLET, COATED ORAL at 08:28

## 2020-02-03 RX ADMIN — METFORMIN HYDROCHLORIDE 1000 MG: 500 TABLET, FILM COATED ORAL at 08:28

## 2020-02-03 NOTE — PROGRESS NOTES
"Hospitalist Team      Patient Care Team:  Provider, No Known as PCP - General  Kylee Jensen MD as PCP - Claims Attributed  Lina Fisher MD as Referring Physician (General Surgery)  Live Chambers MD as Surgeon (Orthopedic Surgery)  Rafa Hannah MD as Consulting Physician (Nephrology)  Robbin Lo MD as Consulting Physician (Hematology and Oncology)        Chief Complaint:  Follow-up Acute Hypoxic Respiratory Failure; Right wrist fracture    Subjective    Feels well this morning.  She denies chest pain, and feels as though her breathing is better.  She is tolerating PO.    Objective    Vital Signs  Temp:  [97.4 °F (36.3 °C)-98 °F (36.7 °C)] 97.4 °F (36.3 °C)  Heart Rate:  [73-89] 74  Resp:  [16-18] 18  BP: (128-158)/(72-90) 146/74  Oxygen Therapy  SpO2: 97 %  Pulse Oximetry Type: Continuous  Device (Oxygen Therapy): nasal cannula  Flow (L/min): 2(lowered to 2L)}    Flowsheet Rows      First Filed Value   Admission Height  165.1 cm (65\") Documented at 01/31/2020 0815   Admission Weight  99.3 kg (219 lb) Documented at 01/31/2020 0815          Physical Exam:    General: Appears stated age in NAD  Lungs: Diminished at the left base.  I appreciate no wheeze or rales.  Normal excursion.  CV: Regular rate and rhythm.  Radial pulses are 2+ and symmetric.  Abdomen: Obese, soft and non-tender w/ active bowel sounds.  MSK: No C/C.  No LE asymmetry.  Neuro: CN II-XII grossly intact.  Psych: Pleasant affect.  AAOx3.    Results Review:     I reviewed the patient's new clinical results.    Lab Results (last 24 hours)     Procedure Component Value Units Date/Time    POC Glucose Once [[id number]]  (Abnormal) Collected:  02/03/20 0732    Specimen:  Blood Updated:  02/03/20 0742     Glucose 193 mg/dL     Blood Gas, Arterial [[id number]]  (Abnormal) Collected:  02/03/20 0337    Specimen:  Arterial Blood Updated:  02/03/20 0340     Site Left Radial     Kuldip's Test Positive     pH, Arterial 7.421 pH units      pCO2, " Arterial 52.8 mm Hg      Comment: 83 Value above reference range        pO2, Arterial 79.9 mm Hg      Comment: 84 Value below reference range        HCO3, Arterial 34.2 mmol/L      Comment: 83 Value above reference range        Base Excess, Arterial 8.5 mmol/L      Comment: 83 Value above reference range        O2 Saturation, Arterial 96.8 %      Hemoglobin, Blood Gas 10.0 g/dL      Temperature 37.0 C      Barometric Pressure for Blood Gas 735 mmHg      Modality Nasal Cannula     Flow Rate 3.0 lpm      Ventilator Mode NA     Comment: Meter: [id number]     :  [id number]        pCO2, Temperature Corrected 52.8 mm Hg      pH, Temp Corrected 7.421 pH Units      pO2, Temperature Corrected 79.9 mm Hg     POC Glucose Once [[id number]]  (Abnormal) Collected:  02/02/20 2048    Specimen:  Blood Updated:  02/02/20 2055     Glucose 207 mg/dL     POC Glucose Once [[id number]]  (Abnormal) Collected:  02/02/20 1644    Specimen:  Blood Updated:  02/02/20 1658     Glucose 161 mg/dL     Blood Culture - Blood, Arm, Left [[id number]] Collected:  01/31/20 1250    Specimen:  Blood from Arm, Left Updated:  02/02/20 1300     Blood Culture No growth at 2 days    POC Glucose Once [[id number]]  (Abnormal) Collected:  02/02/20 1154    Specimen:  Blood Updated:  02/02/20 1210     Glucose 265 mg/dL           Imaging Results (Last 24 Hours)     Procedure Component Value Units Date/Time    XR Wrist 2 View Right [[id number]] Collected:  02/03/20 0841     Updated:  02/03/20 0844    Narrative:       XR WRIST 2 VW RIGHT-: 2/3/2020 7:42 AM     INDICATION:   HISTORY right wrist fracture. Follow-up..     COMPARISON:   01/31/2020.     FINDINGS:   2 views of the right wrist.  Artifact related to overlying cast  material. Impacted distal radius fracture, similar to the prior study.  Perhaps some minimal callus formation, difficult to confirm due to  overlying cast material. Otherwise no significant interval change. The  bones are osteopenic.  Alignment preserved..  No foreign body.       Impression:          1. Distal right radius fracture with perhaps some interval callus  formation. No other significant interval change.     This report was finalized on 2/3/2020 8:42 AM by Dr. Kristopher Carr MD.       XR Chest 1 View [[id number]] Collected:  02/02/20 1213     Updated:  02/02/20 1215    Narrative:       CR Chest 1 Vw    INDICATION:   Acute onset of shortness of breath. History of CHF.     COMPARISON:    None available.    FINDINGS:  Single portable AP view(s) of the chest.    The heart size remains enlarged. Bibasilar atelectatic/infiltrative changes with bilateral pleural effusions. Modest improvement from prior study. No pneumothorax.       Impression:       Cardiomegaly with bilateral pleural effusions and bibasilar atelectatic/infiltrative change. Modest improvement from prior study.    Signer Name: Robbin Zimmer MD   Signed: 2/2/2020 12:13 PM   Workstation Name: [facility]    [facility] reviewed personally by physician.        Medication Review:   I have reviewed the patient's current medication list    Current Facility-Administered Medications:   •  acetaminophen (TYLENOL) tablet 650 mg, 650 mg, Oral, Q4H PRN, Rey Giron MD  •  anastrozole (ARIMIDEX) tablet 1 mg, 1 mg, Oral, Daily, Rey Giron MD, 1 mg at 02/02/20 1040  •  aspirin EC tablet 81 mg, 81 mg, Oral, Daily, Rey Giron MD, 81 mg at 02/03/20 0828  •  carvedilol (COREG) tablet 12.5 mg, 12.5 mg, Oral, BID With Meals, Rey Giron MD, 12.5 mg at 02/03/20 0828  •  cholecalciferol (VITAMIN D3) tablet 1,000 Units, 1,000 Units, Oral, Daily, Rey Giron MD, 1,000 Units at 02/03/20 0828  •  dextrose (D50W) 25 g/ 50mL Intravenous Solution 25 g, 25 g, Intravenous, Q15 Min PRN, Rey Giron MD  •  dextrose (GLUTOSE) oral gel 15 g, 15 g, Oral, Q15 Min PRN, Rey Giron MD  •  DULoxetine (CYMBALTA) DR capsule 60 mg, 60 mg, Oral, Daily,  Rey Giron MD, 60 mg at 02/03/20 0827  •  enoxaparin (LOVENOX) syringe 40 mg, 40 mg, Subcutaneous, Q24H, Rey Giron MD, 40 mg at 02/02/20 2147  •  ferrous sulfate EC tablet 324 mg, 324 mg, Oral, Daily With Breakfast, Rey Giron MD, 324 mg at 02/03/20 0828  •  furosemide (LASIX) injection 40 mg, 40 mg, Intravenous, Once, Caridad Vega MD  •  glucagon (GLUCAGEN) injection 1 mg, 1 mg, Subcutaneous, Q15 Min PRN, Rey Giron MD  •  insulin aspart (novoLOG) injection 0-7 Units, 0-7 Units, Subcutaneous, 4x Daily AC & at Bedtime, Rey Giron MD, 2 Units at 01/31/20 2257  •  ipratropium-albuterol (DUO-NEB) nebulizer solution 3 mL, 3 mL, Nebulization, Q4H PRN, Rey Giron MD  •  levothyroxine (SYNTHROID, LEVOTHROID) tablet 25 mcg, 25 mcg, Oral, Q AM, Rey Giron MD, 25 mcg at 02/03/20 0504  •  losartan (COZAAR) tablet 25 mg, 25 mg, Oral, Daily, Rey Giron MD, 25 mg at 02/03/20 0829  •  melatonin tablet 3 mg, 3 mg, Oral, Nightly, Rey Giron MD, 3 mg at 02/01/20 2120  •  metFORMIN (GLUCOPHAGE) tablet 1,000 mg, 1,000 mg, Oral, Daily With Breakfast, Rey Giron MD, 1,000 mg at 02/03/20 0828  •  metFORMIN (GLUCOPHAGE) tablet 500 mg, 500 mg, Oral, Daily With Dinner, Rey Giron MD, 500 mg at 02/02/20 1735  •  methocarbamol (ROBAXIN) tablet 500 mg, 500 mg, Oral, TID, Rey Giron MD, 500 mg at 02/03/20 0828  •  nitroglycerin (NITROSTAT) SL tablet 0.4 mg, 0.4 mg, Sublingual, Q5 Min PRN, Geoffrey Oconnor, DO  •  ondansetron (ZOFRAN) tablet 4 mg, 4 mg, Oral, Q6H PRN, Rey Giron MD  •  oxyCODONE-acetaminophen (PERCOCET) 7.5-325 MG per tablet 1 tablet, 1 tablet, Oral, Q4H PRN, Live Chambers MD, 1 tablet at 02/03/20 0842  •  pantoprazole (PROTONIX) EC tablet 40 mg, 40 mg, Oral, QAM, Rey Giron MD, 40 mg at 02/03/20 0628  •  pravastatin (PRAVACHOL) tablet 10 mg, 10 mg, Oral, Daily, Rey Giron MD, 10 mg at 02/03/20 0828  •  pregabalin (LYRICA) capsule 75 mg, 75  mg, Oral, BID, Rey Giron MD, 75 mg at 02/03/20 0829      Assessment/Plan     1.  Acute Hypoxic Respiratory Failure: Likely due to exacerbation of CHF.  Will check a V/Q also to exclude acute PE (unable to perform CT due to IV access limitations).  I've turned her down to 1L and will monitor.    2.  A/C dCHF Exacerbation: CXR improved.  Continue IV Lasix.  Creatinine and eLytes have been stable.  Continue to monitor.     3.  Right Wrist Fracture: Appreciate Dr. Braden help!  Now casted.      4.  Diabetes Mellitus, Type in Obese: Bedsides and AM not at goal.  Continue Metformin and sliding scale and monitor.     5.  HTN: Near goal on exam.  Continue to monitor trend on home regimen.     6.  CKD III: Check BMP in AM.     7.  Recent Rehab for Femur Fracture: PT following.       Plan for disposition: Likely back to rehab.  Case management aware.    Rey Giron MD  02/03/20  9:03 AM

## 2020-02-03 NOTE — PLAN OF CARE
Problem: Patient Care Overview  Goal: Plan of Care Review  Flowsheets (Taken 2/3/2020 4715)  Outcome Summary: PT tx: PT very confused about mobilty today and repots she has not stood in several months. Upon direct questions though she reports she used grab bars at home to stand pivot on her LLE w/c to toilet. Pt repeatedly siad I can not move however she was able to transfer supine to sit with use of bedrail and HOB raised and supervison. Pt able to sit EOB and complete BUE and BLE ex. Pt worked on standing with platform walker however pt reports she can not and gets confuse dwith motor planning and verbal cues. Pt will continue to benefit from skilled PT services daily to improveher mobility.

## 2020-02-03 NOTE — PLAN OF CARE
Problem: Patient Care Overview  Goal: Plan of Care Review  Outcome: Ongoing (interventions implemented as appropriate)  Flowsheets  Taken 2/3/2020 1650 by Rukhsana Wolfe RN  Progress: improving  Outcome Summary: patient complaining of pain to arm and leg, controlled ok on current regimen.  refusing to take sliding scale insulin.  heel boots placed to offload heels, and providing assistance turning patient.  Taken 2/3/2020 0945 by Fara Ramos PT  Plan of Care Reviewed With: patient     Problem: Patient Care Overview  Goal: Individualization and Mutuality  Outcome: Ongoing (interventions implemented as appropriate)  Flowsheets  Taken 2/3/2020 1650 by Rukhsana Wolfe RN  Patient Specific Interventions: turning patient, pain control  Taken 2/3/2020 0515 by Valentina Hopkins RN  Patient Specific Preferences: goes by [facility]     Problem: Fall Risk (Adult)  Goal: Absence of Fall  Outcome: Ongoing (interventions implemented as appropriate)  Flowsheets (Taken 2/3/2020 1650)  Absence of Fall: making progress toward outcome     Problem: Cardiac: Heart Failure (Adult)  Goal: Signs and Symptoms of Listed Potential Problems Will be Absent, Minimized or Managed (Cardiac: Heart Failure)  Outcome: Ongoing (interventions implemented as appropriate)  Flowsheets  Taken 1/31/2020 1721 by Fabi Alvarado RN  Problems Assessed (Heart Failure): all  Taken 2/3/2020 1650 by Rukhsana Wolfe RN  Problems Present (Heart Failure): functional decline/self-care deficit     Problem: Skin Injury Risk (Adult)  Goal: Skin Health and Integrity  Outcome: Ongoing (interventions implemented as appropriate)  Flowsheets (Taken 2/3/2020 1650)  Skin Health and Integrity: making progress toward outcome     Problem: Pain, Chronic (Adult)  Goal: Acceptable Pain/Comfort Level and Functional Ability  Outcome: Ongoing (interventions implemented as appropriate)  Flowsheets (Taken 2/3/2020 1650)  Acceptable Pain/Comfort Level and Functional Ability: making progress toward  outcome     Problem: Breathing Pattern Ineffective (Adult)  Goal: Effective Oxygenation/Ventilation  Outcome: Ongoing (interventions implemented as appropriate)  Flowsheets (Taken 2/3/2020 1650)  Effective Oxygenation/Ventilation: making progress toward outcome     Problem: Breathing Pattern Ineffective (Adult)  Goal: Anxiety/Fear Reduction  Outcome: Ongoing (interventions implemented as appropriate)  Flowsheets (Taken 2/3/2020 1650)  Anxiety/Fear Reduction: making progress toward outcome     Problem: Fracture Orthopaedic (Adult)  Goal: Signs and Symptoms of Listed Potential Problems Will be Absent, Minimized or Managed (Fracture Orthopaedic)  Outcome: Ongoing (interventions implemented as appropriate)  Flowsheets (Taken 2/3/2020 1650)  Problems Assessed (Orthopaedic Fracture): all  Problems Present (Orthopaedic Fracture): pain; situational response

## 2020-02-03 NOTE — PLAN OF CARE
Problem: Patient Care Overview  Goal: Plan of Care Review  Outcome: Ongoing (interventions implemented as appropriate)  Flowsheets  Taken 1/31/2020 1721 by Fabi Alvarado, RN  Progress: no change  Taken 2/3/2020 0515 by Valentina Hopkins, RN  Plan of Care Reviewed With: patient  Outcome Summary: pt lethargic and confused at start of shift - bed alarm placed - slept through night - woke this morning AOx4 - pain med given for right wrist - see note re: confusion and call to Dr. Oconnor

## 2020-02-03 NOTE — CONSULTS
"Diabetes Education  Assessment/Teaching    Patient Name:  Jung Short  YOB: 1945  MRN: [id number]  Admit Date:  1/31/2020      Assessment Date:  2/3/2020    Most Recent Value   General Information    Height  165.1 cm (65\")   Height Method  Stated   Weight  98 kg (216 lb)   Weight Method  Bed scale   Pregnancy Assessment   Diabetes History   What type of diabetes do you have?  Type 2   Length of Diabetes Diagnosis  10 + years [States that she was diagnosed in her 40's]   Current DM knowledge  good   Have you had diabetes education/teaching in the past?  no   Do you test your blood sugar at home?  yes   Frequency of checks  1-2 times a week   Typical readings  120's   Have you had low blood sugar? (<70mg/dl)  yes   How often do you have low blood sugar?  rare   Have you had high blood sugar? (>140mg/dl)  yes   How often do you have high blood sugar?  rare   How would you rate your diabetes control?  excellent   Education Preferences   Nutrition Information   Assessment Topics   Healthy Eating - Assessment  Needs education   Being Active - Assessment  Needs education   Taking Medication - Assessment  Competent   Problem Solving - Assessment  Needs education   Reducing Risk - Assessment  Needs education   Healthy Coping - Assessment  Competent   Monitoring - Assessment  Competent   DM Goals            Most Recent Value   DM Education Needs   Meter  Has own   Frequency of Testing  2 times a week   Blood Glucose Target Range  Fasting  and less than 180 2 hours after meals   Medication  Oral [metformin]   Problem Solving  Hypoglycemia, Hyperglycemia, Sick days, Signs, Symptoms, Treatment   Reducing Risks  A1C testing   Healthy Eating  RD consult   Physical Activity Frequency  Discussed exercise importance   Healthy Coping  Appropriate   Discharge Plan  Home   Motivation  Engaged   Teaching Method  Explanation, Discussion, Handouts   Patient Response  Verbalized understanding            Other " Comments:          Electronically signed by:  Florence Lyle RN  02/03/20 1:23 PM

## 2020-02-03 NOTE — NURSING NOTE
Notified by sister in law pt was hard to wake up.  Walking in room pt was sleeping with mouth gaped open with O2 saturation 76% with 2 L n/c on.  Pt awaken by gentle touch, oxygen saturation increased, pulse ox replaced.  Pt was very lethargic, falls back to sleep soon after any type of stimulus and increased confusion. Talked with daughter, who arrived soon after this event, mentioning 10 mg oxycodone builds up in pt's system, making her severely drowsy with increased confusion causing pt to get narcan her last stay here.  Dr. Chambers notified, 10 mg oxycodone dc'd.

## 2020-02-03 NOTE — CONSULTS
"Diabetes Education  Assessment/Teaching    Patient Name:  Jung Short  YOB: 1945  MRN: [id number]  Admit Date:  1/31/2020      Assessment Date:  2/3/2020    Most Recent Value   General Information    Height  165.1 cm (65\")   Height Method  Stated   Weight  98 kg (216 lb)   Weight Method  Bed scale   Pregnancy Assessment   Diabetes History   What type of diabetes do you have?  Type 2   Length of Diabetes Diagnosis  10 + years [States that she was diagnosed in her 40's]   Current DM knowledge  good   Have you had diabetes education/teaching in the past?  no   Do you test your blood sugar at home?  yes   Frequency of checks  1-2 times a week   Typical readings  120's   Have you had low blood sugar? (<70mg/dl)  yes   How often do you have low blood sugar?  rare   Have you had high blood sugar? (>140mg/dl)  yes   How often do you have high blood sugar?  rare   How would you rate your diabetes control?  excellent   Education Preferences   Nutrition Information   Assessment Topics   Healthy Eating - Assessment  Needs education   Being Active - Assessment  Needs education   Taking Medication - Assessment  Competent   Problem Solving - Assessment  Needs education   Reducing Risk - Assessment  Needs education   Healthy Coping - Assessment  Competent   Monitoring - Assessment  Competent   DM Goals            Most Recent Value   DM Education Needs   Meter  Has own   Frequency of Testing  2 times a week   Blood Glucose Target Range  Fasting  and less than 180 2 hours after meals   Medication  Oral [metformin]   Problem Solving  Hypoglycemia, Hyperglycemia, Sick days, Signs, Symptoms, Treatment   Reducing Risks  A1C testing   Healthy Eating  RD consult   Physical Activity Frequency  Discussed exercise importance   Healthy Coping  Appropriate   Discharge Plan  Home   Motivation  Engaged   Teaching Method  Explanation, Discussion, Handouts   Patient Response  Verbalized understanding            Other " Comments:          Electronically signed by:  Florence Lyle RN  02/03/20 1:22 PM

## 2020-02-03 NOTE — NURSING NOTE
Continued Stay Note  NOHEMY Diaz     Patient Name: Jung Short  MRN: [id number]  Today's Date: 2/3/2020    Admit Date: 1/31/2020    Discharge Plan     Row Name 02/03/20 1502       Plan    Plan Comments  Voicemail left with Sabi at [facility] concerning patient stay there last  month.  Plan is uncertain at this time.  Will continue to follow.        Discharge Codes    No documentation.             Alondra Rivera RN

## 2020-02-03 NOTE — NURSING NOTE
"Spoke to Dr. Oconnor about reading note regarding pt being lethargic and confused with oxygen sats of 76% during day shift.  Family thought it could be her makenna 10mg pain med stating \"this happens to pt when a build-up of pain med gets in her system and and she had to have narcan.\"  Dr. Chambers was called and pain med was discontinued.  At beginning of shift, pt was confused and sleepy still.  Her oxygen level dropped to 78 and she was placed on 3L.  Dr. Oconnor ordered ABG's to be collected.  Nothing outstanding found.      Pt woke up around 0500 and was AOx4.  No signs of confusion at this time.  She is back to baseline.    Valentina Hopkins RN  "

## 2020-02-03 NOTE — PROGRESS NOTES
Orthopedic Progress Note   Chief Complaint: Right distal radial fracture    Subjective     Interval History: Patient is afebrile hemodynamically stable.  The oxycodone 5 mg she states is not controlling her pain very well on the 10 mg was too high causing become very lethargic and somnolent.          Objective     Vital Signs  Temp:  [97.4 °F (36.3 °C)-98 °F (36.7 °C)] 97.4 °F (36.3 °C)  Heart Rate:  [73-89] 74  Resp:  [16-18] 18  BP: (128-158)/(72-90) 146/74  Body mass index is 35.94 kg/m².    Intake/Output Summary (Last 24 hours) at 2/3/2020 0707  Last data filed at 2/3/2020 0500  Gross per 24 hour   Intake 0 ml   Output 1800 ml   Net -1800 ml     No intake/output data recorded.       Physical Exam:   General: patient awake, alert and cooperative   Cardiovascular: regular rhythm and rate   Pulm: clear to auscultation bilaterally   Abdomen: Benign.  Soft bowel sounds   Extremities: Right upper extremity still has good distal pulses no motor or sensory deficit good capillary refill   Neurologic: Normal mood and behavior     Results Review:     I reviewed the patient's new clinical results.      WBC No results found for: WBCS   HGB Hemoglobin   Date Value Ref Range Status   01/31/2020 11.7 (L) 12.0 - 15.9 g/dL Final      HCT Hematocrit   Date Value Ref Range Status   01/31/2020 37.8 34.0 - 46.6 % Final      Platlets No results found for: LABPLAT     PT/INR:  No results found for: PROTIME/No results found for: INR    Sodium Sodium   Date Value Ref Range Status   02/02/2020 139 136 - 145 mmol/L Final   02/01/2020 145 136 - 145 mmol/L Final   01/31/2020 140 136 - 145 mmol/L Final      Potassium Potassium   Date Value Ref Range Status   02/02/2020 4.0 3.5 - 5.2 mmol/L Final   02/01/2020 3.1 (L) 3.5 - 5.2 mmol/L Final   01/31/2020 3.3 (L) 3.5 - 5.2 mmol/L Final      Chloride Chloride   Date Value Ref Range Status   02/02/2020 100 98 - 107 mmol/L Final   02/01/2020 103 98 - 107 mmol/L Final   01/31/2020 100 98 - 107 mmol/L  Final      Bicarbonate No results found for: PLASMABICARB   BUN BUN   Date Value Ref Range Status   02/02/2020 13 8 - 23 mg/dL Final   02/01/2020 11 8 - 23 mg/dL Final   01/31/2020 11 8 - 23 mg/dL Final      Creatinine Creatinine   Date Value Ref Range Status   02/02/2020 0.78 0.57 - 1.00 mg/dL Final   02/01/2020 0.74 0.57 - 1.00 mg/dL Final   01/31/2020 0.64 0.57 - 1.00 mg/dL Final      Calcium Calcium   Date Value Ref Range Status   02/02/2020 9.7 8.6 - 10.5 mg/dL Final   02/01/2020 9.7 8.6 - 10.5 mg/dL Final   01/31/2020 9.7 8.6 - 10.5 mg/dL Final      Magnesium  AST  ALT  Bilirubin, Total  AlkPhos  Albumin    Amylase  Lipase    Radiology: Magnesium   Date Value Ref Range Status   02/01/2020 1.3 (L) 1.6 - 2.4 mg/dL Final     No components found for: AST.*  No components found for: ALT.*  No results found for: BILIRUBIN    No components found for: ALKPHOS.*  No components found for: ALBUMIN.*      No components found for: AMYLASE.*  No components found for: LIPASE.*            Imaging Results (Most Recent)     Procedure Component Value Units Date/Time    XR Chest 1 View [[id number]] Collected:  02/02/20 1213     Updated:  02/02/20 1215    Narrative:       CR Chest 1 Vw    INDICATION:   Acute onset of shortness of breath. History of CHF.     COMPARISON:    None available.    FINDINGS:  Single portable AP view(s) of the chest.    The heart size remains enlarged. Bibasilar atelectatic/infiltrative changes with bilateral pleural effusions. Modest improvement from prior study. No pneumothorax.       Impression:       Cardiomegaly with bilateral pleural effusions and bibasilar atelectatic/infiltrative change. Modest improvement from prior study.    Signer Name: Robbin Zimmer MD   Signed: 2/2/2020 12:13 PM   Workstation Name: [id number]    [facility]    XR Femur 2 View Right [[id number]] Collected:  02/01/20 1227     Updated:  02/01/20 1229    Narrative:       CR Femur 2 Vws RT    INDICATION:    74-year-old female with right femur pain status post fall 2 months ago.    COMPARISON:   None available.    FINDINGS:   By views of the right femur.  Diffuse bony demineralization. No gross evidence of acute displaced fracture or dislocation. Total right knee arthroplasty. ORIF of the right femur with a long lateral sideplate and multiple associated screws. The hardware  spans a healing, mildly displaced fracture of the distal femoral metaphysis. The fracture line remains visualized with evidence of some callus formation around the fracture site. No significant solid osseous bridging.  No foreign body.      Impression:         1. Osteopenia. No gross acute injury.  2. ORIF of the right femur spanning a healing, mildly displaced fracture of the distal femoral metaphysis. The fracture line remains well-visualized without evidence of callus formation. No significant solid osseous bridging.  3. Right knee arthroplasty.    Signer Name: Benito Zimmer MD   Signed: 2/1/2020 12:27 PM   Workstation Name: [id number]    [facility]    XR Foot 2 View Right [[id number]] Collected:  02/01/20 1224     Updated:  02/01/20 1226    Narrative:       CR Foot 2 Vws RT    INDICATION:   74-year-old female with right foot pains that is post fall 2 months ago.    COMPARISON:   None available    FINDINGS:   2 views of the right foot.  Diffuse bony demineralization. No gross evidence of a displaced fracture or dislocation. Transmetatarsal amputation of the second ray. Partial amputation of the first metatarsal. Suspected sequelae of old, healed distal  fibular fracture. Moderate tibiotalar arthrosis. Mild arthrosis throughout the midfoot. Plantar calcaneal spur.  No bone erosion or destruction.  No foreign body.      Impression:         1. Osteopenia. No gross evidence of a displaced fracture or dislocation.  2. Postsurgical changes of the first metatarsal and second ray.  3. Hindfoot and midfoot arthrosis, detailed  above. Questionable old, healed fracture deformity of the distal fibula.    Signer Name: Benito Zimmer MD   Signed: 2/1/2020 12:24 PM   Workstation Name: [facility]    [facility]    XR Pelvis 1 or 2 View [[id number]] Collected:  02/01/20 1221     Updated:  02/01/20 1224    Narrative:       CR Pelvis 1 or 2 Vws    INDICATION:   74-year-old female with pelvic pain status post fall 2 months ago.    COMPARISON:   None.    FINDINGS:  AP view(s) of the pelvis.  Diffuse bony demineralization. No evidence of a displaced fracture or dislocation. Bony pelvis and sacrum appear grossly intact. Partially imaged right femur ORIF. Mild degenerative changes in both hips. Soft tissues are  unremarkable. No bone erosion or destruction.        Impression:       Osteopenia. No evidence of a displaced fracture or dislocation. Mild bilateral hip arthrosis.    Signer Name: Benito Zimmer MD   Signed: 2/1/2020 12:21 PM   Workstation Name: [facility]    [facility]    US Venous Doppler Lower Extremity Bilateral (duplex) [[id number]] Collected:  02/01/20 1119     Updated:  02/01/20 1121    Narrative:       US Veins LE Duplex BILAT    HISTORY:   Fall yesterday with fracture of right wrist. Recent right femur fracture. Positive d-dimer.     TECHNIQUE:   Real-time ultrasound was performed of both lower extremities utilizing spectral and color Doppler with compression and augmentation techniques.    COMPARISON:  July 29, 2016    FINDINGS:  Right Lower Extremity:  There is no deep venous thrombus seen in the right lower extremity, including the right common femoral veins, right femoral veins and right popliteal veins.  Normal compressibility and respiratory phasicity was visualized.  No calf vein thrombus.    Left Lower Extremity:  There is no deep venous thrombus seen in the left lower extremity, including the left common femoral veins, left femoral veins and left popliteal veins.   Normal  compressibility and respiratory phasicity was visualized.  No calf vein thrombus.        Impression:       No deep venous thrombus seen in either lower extremity.    Signer Name: Robbin Zimmer MD   Signed: 2/1/2020 11:19 AM   Workstation Name: [id number]    [facility]    XR Chest 1 View [[id number]] Collected:  02/01/20 0631     Updated:  02/01/20 0634    Narrative:       CHEST X-RAY, 1/31/2020      HISTORY:    Respiratory failure. Pleural effusion. Follow-up cardiopulmonary status.      TECHNIQUE:  AP portable upright chest x-ray.      FINDINGS:  Small-to-moderate bilateral pleural effusions and bilateral lower lung atelectasis, unchanged since yesterday. Moderate cardiomegaly. Mildly prominent indistinct interstitial markings may reflect mild vascular congestion, correlate clinically. Right  axillary surgical clips.      Impression:       1. Cardiomegaly and possible mild vascular congestion.  2. Moderate bilateral pleural effusions and bibasilar pulmonary atelectasis.  3. No significant change since yesterday.    Signer Name: Levy Mishra MD   Signed: 2/1/2020 6:31 AM   Workstation Name: [id number]    [facility]    XR Wrist 3+ View Right [[id number]] Collected:  01/31/20 0927     Updated:  01/31/20 0930    Narrative:       XR WRIST 3+ VW RIGHT-: 1/31/2020 9:15 AM     INDICATION:   Patient fell from wheelchair this morning with pain in right wrist.     COMPARISON:   None available.     FINDINGS:   3 views of the right wrist.  There is an acute fracture involving the  distal radius about 1 cm proximal to the end of the bone. The fracture  appears be lying transversely with mild impaction. The carpal bones are  normal. The ulna is normal. No bone erosion or destruction.  No foreign  body.       Impression:       Mild impaction-type fracture involving the distal radius about 1 cm from  the end of the bone..     This report was finalized on 1/31/2020 9:28 AM  by Dr. Chi Mattson MD.       XR Chest 1 View [[id number]] Collected:  01/31/20 0927     Updated:  01/31/20 0929    Narrative:       AP PORTABLE CHEST, 01/31/2020     HISTORY:  Shortness of air for weeks     COMPARISON:  09/01/2019     TECHNIQUE:  AP portable chest x-ray.     FINDINGS:  There are low lung volumes. There are small bilateral effusions with  bibasilar atelectasis. There is mild interstitial prominence.       Impression:       Small left pleural effusions with bibasal atelectasis and mild  interstitial prominence.     This report was finalized on 1/31/2020 9:27 AM by Dr. Chi Mattson MD.                       Assessment/Plan     Patient Active Problem List   Diagnosis Code   • Abdominal bloating R14.0   • Abdominal mass R19.00   • Abdominal pain R10.9   • Abnormal gait R26.9   • Abnormal mammogram R92.8   • Chronic anemia D64.9   • Thoracic back pain M54.6   • Malignant neoplasm of upper-inner quadrant of right breast in female, estrogen receptor positive (CMS/HCC) C50.211, Z17.0   • Candidiasis B37.9   • Cardiomyopathy (CMS/HCC) I42.9   • Disc disorder of cervical region M50.90   • Neck pain M54.2   • Tenderness of chest wall R07.89   • Anemia due to stage 3 chronic kidney disease (CMS/HCC) N18.3, D63.1   • Chronic pain G89.29   • Constipation K59.00   • Depression F32.9   • Type 2 diabetes mellitus (CMS/HCC) E11.9   • DM2 (diabetes mellitus, type 2) (CMS/HCC) E11.9   • Dyslipidemia E78.5   • Gastroesophageal reflux disease with esophagitis K21.0   • Generalized pain R52   • Gout M10.9   • Hypertension I10   • Hypothyroidism E03.9   • Incisional hernia K43.2   • Mass of breast N63.0   • Mitral valve insufficiency I34.0   • Nausea R11.0   • Adult body mass index greater than 30 [id number]   • Osteopenia of spine M85.88   • Arthralgia of multiple joints M25.50   • Peripheral neuropathy G62.9   • Pulmonary hypertension (CMS/HCC) I27.20   • PITTS (dyspnea on exertion) R06.09   • Osteomyelitis (CMS/HCC) M86.9   •  Tricuspid valve insufficiency I07.1   • Cobalamin deficiency E53.8   • Vitamin D deficiency E55.9   • Diabetes mellitus (CMS/HCC) E11.9   • Left knee pain M25.562   • Arthritis of knee M17.10   • DDD (degenerative disc disease), lumbar M51.36   • Lumbar facet arthropathy M47.816   • Pain in shoulder M25.519   • Chronic gout of multiple sites M1A.09X0   • Encounter for long-term (current) use of high-risk medication Z79.899   • Constipation due to opioid therapy K59.03, T40.2X5A   • Syncope, psychogenic F48.8   • Closed fracture of patella S82.009A   • Primary osteoarthritis of left knee M17.12   • Radius/ulna fracture, left, closed, initial encounter S52.92XA, S52.202A   • Rib pain on left side R07.81   • Preoperative cardiovascular examination Z01.810   • Physical deconditioning R53.81   • Hyponatremia E87.1   • Acute URI J06.9   • Acute on chronic HFrEF (heart failure with reduced ejection fraction) (CMS/HCC) I50.23   • CKD (chronic kidney disease) stage 3, GFR 30-59 ml/min (CMS/HCC) N18.3   • Diastolic congestive heart failure (CMS/HCC) I50.30   • MRSA infection A49.02   • Pleural effusion J90   • Acute respiratory failure with hypoxia (CMS/HCC) J96.01         We will try Percocet 7.5 to see if he can better control the pain without causing confusion and lethargy.  Awaiting decision regarding placement.    Live Chambers MD  02/03/20  7:07 AM          Dictated utilizing Dragon dictation

## 2020-02-03 NOTE — TELEPHONE ENCOUNTER
Patients family dropped off records from  when patient was seen for ankle.   Dr. Chambers said that when she does need to come back and see him he would see for ankle and wrist

## 2020-02-03 NOTE — THERAPY TREATMENT NOTE
Acute Care - Physical Therapy Treatment Note  NOHEMY Diaz     Patient Name: Jung Short  : 1945  MRN: [id number]  Today's Date: 2/3/2020  Onset of Illness/Injury or Date of Surgery: 20(right femur and foot fracture 2 months ago)  Date of Referral to PT: 20  Referring Physician: Dr. Giron    Admit Date: 2020    Visit Dx:    ICD-10-CM ICD-9-CM   1. Pleural effusion J90 511.9   2. Congestive heart failure, unspecified HF chronicity, unspecified heart failure type (CMS/HCC) I50.9 428.0   3. Hypoxia R09.02 799.02   4. Other closed fracture of distal end of right radius, initial encounter S52.591A 813.42   5. Hypertensive emergency I16.1 401.9     Patient Active Problem List   Diagnosis   • Abdominal bloating   • Abdominal mass   • Abdominal pain   • Abnormal gait   • Abnormal mammogram   • Chronic anemia   • Thoracic back pain   • Malignant neoplasm of upper-inner quadrant of right breast in female, estrogen receptor positive (CMS/HCC)   • Candidiasis   • Cardiomyopathy (CMS/HCC)   • Disc disorder of cervical region   • Neck pain   • Tenderness of chest wall   • Anemia due to stage 3 chronic kidney disease (CMS/HCC)   • Chronic pain   • Constipation   • Depression   • Type 2 diabetes mellitus (CMS/HCC)   • DM2 (diabetes mellitus, type 2) (CMS/HCC)   • Dyslipidemia   • Gastroesophageal reflux disease with esophagitis   • Generalized pain   • Gout   • Hypertension   • Hypothyroidism   • Incisional hernia   • Mass of breast   • Mitral valve insufficiency   • Nausea   • Adult body mass index greater than 30   • Osteopenia of spine   • Arthralgia of multiple joints   • Peripheral neuropathy   • Pulmonary hypertension (CMS/HCC)   • PITTS (dyspnea on exertion)   • Osteomyelitis (CMS/HCC)   • Tricuspid valve insufficiency   • Cobalamin deficiency   • Vitamin D deficiency   • Diabetes mellitus (CMS/HCC)   • Left knee pain   • Arthritis of knee   • DDD (degenerative disc disease), lumbar   • Lumbar  facet arthropathy   • Pain in shoulder   • Chronic gout of multiple sites   • Encounter for long-term (current) use of high-risk medication   • Constipation due to opioid therapy   • Syncope, psychogenic   • Closed fracture of patella   • Primary osteoarthritis of left knee   • Radius/ulna fracture, left, closed, initial encounter   • Rib pain on left side   • Preoperative cardiovascular examination   • Physical deconditioning   • Hyponatremia   • Acute URI   • Acute on chronic HFrEF (heart failure with reduced ejection fraction) (CMS/[facility])   • CKD (chronic kidney disease) stage 3, GFR 30-59 ml/min (CMS/[facility])   • Diastolic congestive heart failure (CMS/[facility])   • MRSA infection   • Pleural effusion   • Acute respiratory failure with hypoxia (CMS/[facility])       Therapy Treatment    Rehabilitation Treatment Summary     Row Name 02/03/20 8083             Treatment Time/Intention    Discipline  physical therapist  -      Document Type  therapy note (daily note)  -JW      Subjective Information  complains of;weakness  -JW      Mode of Treatment  physical therapy  -JW      Patient/Family Observations  pt supine in bed  -JW      Care Plan Review  care plan/treatment goals reviewed  -JW      Therapy Frequency (PT Clinical Impression)  daily  -JW      Patient Effort  adequate  -JW      Comment  Pt reports she can not move and that she can not do anything. Pt veyr focused on not using her Right leg and arm but unable to concentrate on being able to use her left arm and leg.  -JW      Existing Precautions/Restrictions  non-weight bearing NWB RUE (wrist fx) NWB RLE  -JW      Equipment Issued to Patient  walker, front wheeled with platform   -JW      Patient Response to Treatment  pt required max motivation to participate in therapy   -JW      Recorded by [JW] Fara Ramos, PT 02/03/20 1053      Row Name 02/03/20 4737             Cognitive Assessment/Intervention- PT/OT    Affect/Mental Status (Cognitive)  WNL  -JW      Orientation  Status (Cognition)  oriented x 3  -JW      Follows Commands (Cognition)  follows one step commands;25-49% accuracy  -JW      Personal Safety Interventions  gait belt;nonskid shoes/slippers when out of bed;other (see comments) education NWB RUE/LE  -JW      Recorded by [JW] Fara Ramos, PT 02/03/20 1053      Row Name 02/03/20 0945             Bed Mobility Assessment/Treatment    Supine-Sit [place] (Bed Mobility)  supervision;conditional independence  -JW      Sit-Supine [place] (Bed Mobility)  minimum assist (75% patient effort) for RLE  -JW      Bed Mobility, Safety Issues  decreased use of arms for pushing/pulling;decreased use of legs for bridging/pushing  -JW      Assistive Device (Bed Mobility)  head of bed elevated;bed rails  -JW      Recorded by [JW] Fara Ramos, PT 02/03/20 1053      Row Name 02/03/20 0945             Transfer Assessment/Treatment    Comment (Transfers)  Pt was not able to stand today and reports that she has not stood in months. Pt asked if she used her LLE for stand pivots at home and she reports she did. Pt very confused about how to stand only using LLE and not RLE/ Pt educated on platform walker use. Pt reports she can not do this.   -JW      Recorded by [JW] Fara Ramos, PT 02/03/20 1053      Row Name 02/03/20 0945             Gait/Stairs Assessment/Training    Comment (Gait/Stairs)  Unable   -JW      Recorded by [JW] Fara Ramos, PT 02/03/20 1053      Row Name 02/03/20 0945             Motor Skills Assessment/Interventions    Additional Documentation  Therapeutic Exercise (Group)  -JW      Recorded by [JW] Fara Ramos, PT 02/03/20 1053      Row Name 02/03/20 0945             Therapeutic Exercise    Therapeutic Exercise  seated, lower extremities;seated, upper extremities  -JW      Additional Documentation  Therapeutic Exercise (Row)  -JW      Recorded by [JW] Fara Ramos, PT 02/03/20 1053      Row Name 02/03/20 0945             Upper Extremity Seated Therapeutic Exercise     Performed, Seated Upper Extremity (Therapeutic Exercise)  shoulder flexion/extension  -JW      Exercise Type, Seated Upper Extremity (Therapeutic Exercise)  AROM (active range of motion)  -JW      Expected Outcomes, Seated Upper Extremity (Therapeutic Exercise)  improve motor control  -JW      Restrictions, Seated Upper Extremity (Therapeutic Exercise)  NWB right wrist  -JW      Sets/Reps Detail, Seated Upper Extremity (Therapeutic Exercise)  1/10  -JW      Recorded by [JW] Fara Ramos, PT 02/03/20 1053      Row Name 02/03/20 0945             Lower Extremity Seated Therapeutic Exercise    Performed, Seated Lower Extremity (Therapeutic Exercise)  LAQ (long arc quad), knee extension;ankle dorsiflexion/plantarflexion;hip abduction/adduction  -JW      Exercise Type, Seated Lower Extremity (Therapeutic Exercise)  AROM (active range of motion)  -JW      Expected Outcomes, Seated Lower Extremity (Therapeutic Exercise)  improve motor control  -JW      Restrictions, Seated Lower Extremity (Therapeutic Exercise)  NWB RLE  -JW      Sets/Reps Detail, Seated Lower Extremity (Therapeutic Exercise)  1/10  -JW      Recorded by [JW] Fara Ramos, PT 02/03/20 1053      Row Name 02/03/20 0945             Positioning and Restraints    Pre-Treatment Position  in bed  -JW      Post Treatment Position  bed  -JW      In Bed  supine;encouraged to call for assist;with nsg RN reports she does not need her bed alarm on at this time  -JW      Recorded by [JW] Fara Ramos, PT 02/03/20 1053      Row Name 02/03/20 0945             Pain Scale: Numbers Pre/Post-Treatment    Pain Scale: Numbers, Pretreatment  2/10  -JW      Pain Scale: Numbers, Post-Treatment  2/10  -JW      Pain Location - Side  Right  -JW      Pain Location  wrist  -JW      Pain Intervention(s)  Repositioned  -JW      Recorded by [JW] Fara Ramos, PT 02/03/20 1053      Row Name 02/03/20 0945             Coping    Observed Emotional State  anxious  -JW      Verbalized Emotional State   anxiety  -JENA      Recorded by [JW] Fara Ramos, PT 02/03/20 1053      Row Name 02/03/20 0945             Plan of Care Review    Plan of Care Reviewed With  patient  -JENA      Progress  no change  -JW      Outcome Summary  PT tx: PT very confused about mobilty today and repots she has not stood in several months. Upon direct questions though she reports she used grab bars at home to stand pivot on her LLE w/c to toilet. Pt repeatedly siad I can not move however she was able to transfer supine to sit with use of bedrail and HOB raised and supervison. Pt able to sit EOB and complete BUE and BLE ex. Pt worked on standing with platform walker however pt reports she can not and gets confuse dwith motor planning and verbal cues. Pt will continue to benefit from skilled PT services daily to improveher mobility.  -JENA      Recorded by [JW] Fara Ramos, PT 02/03/20 1053      Row Name 02/03/20 0945             Outcome Summary/Treatment Plan (PT)    Daily Summary of Progress (PT)  progress towards functional goals is fair  -JENA      Barriers to Overall Progress (PT)  ability to follow directions for mobility  -JENA      Plan for Continued Treatment (PT)  improve mobility  -JENA      Patient/Family Concerns, Equipment Needs at Discharge (PT)  Pt reports she can not move  -JENA      Anticipated Discharge Disposition (PT)  skilled nursing facility;extended care facility;transitional care;other (see comments)  -JENA      Recorded by [JW] Richard Fara, PT 02/03/20 1059        User Key  (r) = Recorded By, (t) = Taken By, (c) = Cosigned By    Initials Name Effective Dates Discipline    JW Fara Ramos, PT 06/20/18 -  PT                       PT Recommendation and Plan  Anticipated Discharge Disposition (PT): skilled nursing facility, extended care facility, transitional care, other (see comments)  Therapy Frequency (PT Clinical Impression): daily  Outcome Summary/Treatment Plan (PT)  Daily Summary of Progress (PT): progress towards functional  goals is fair  Barriers to Overall Progress (PT): ability to follow directions for mobility  Plan for Continued Treatment (PT): improve mobility  Patient/Family Concerns, Equipment Needs at Discharge (PT): Pt reports she can not move  Anticipated Discharge Disposition (PT): skilled nursing facility, extended care facility, transitional care, other (see comments)  Plan of Care Reviewed With: patient  Progress: no change  Outcome Summary: PT tx: PT very confused about mobilty today and repots she has not stood in several months. Upon direct questions though she reports she used grab bars at home to stand pivot on her LLE w/c to toilet. Pt repeatedly siad I can not move however she was able to transfer supine to sit with use of bedrail and HOB raised and supervison. Pt able to sit EOB and complete BUE and BLE ex. Pt worked on standing with platform walker however pt reports she can not and gets confuse dwith motor planning and verbal cues. Pt will continue to benefit from skilled PT services daily to improveher mobility.  Outcome Measures     Row Name 02/02/20 0935 02/01/20 1240          How much help from another person do you currently need...    Turning from your back to your side while in flat bed without using bedrails?  3  -KM  3  -LN     Moving from lying on back to sitting on the side of a flat bed without bedrails?  3  -KM  2  -LN     Moving to and from a bed to a chair (including a wheelchair)?  1  -KM  1  -LN     Standing up from a chair using your arms (e.g., wheelchair, bedside chair)?  2  -KM  1  -LN     Climbing 3-5 steps with a railing?  1  -KM  1  -LN     To walk in hospital room?  1  -KM  1  -LN     AM-PAC 6 Clicks Score (PT)  11  -KM  9  -LN        Functional Assessment    Outcome Measure Options  AM-PAC 6 Clicks Basic Mobility (PT)  -KM  AM-PAC 6 Clicks Basic Mobility (PT)  -LN       User Key  (r) = Recorded By, (t) = Taken By, (c) = Cosigned By    Initials Name Provider Type    Christine Dai  Monica, PT Physical Therapist    KM Ai Coley, PTA Physical Therapy Assistant         Time Calculation:   PT Charges     Row Name 02/03/20 1054             Time Calculation    Start Time  0940  -      Stop Time  1000  -      Time Calculation (min)  20 min  -         Timed Charges    16264 - PT Therapeutic Activity Minutes  20  -        User Key  (r) = Recorded By, (t) = Taken By, (c) = Cosigned By    Initials Name Provider Type     Fara Ramos, PT Physical Therapist        Therapy Charges for Today     Code Description Service Date Service Provider Modifiers Qty    [id number]  PT THERAPEUTIC ACT EA 15 MIN 2/3/2020 Fara Ramos, PT GP 1          PT G-Codes  Outcome Measure Options: AM-PAC 6 Clicks Basic Mobility (PT)  AM-PAC 6 Clicks Score (PT): 11    Fara Silva PT  2/3/2020

## 2020-02-03 NOTE — NURSING NOTE
Patient with yasemin scale of 16, a fracture to RLE, confusion, and other medical issues putting pt at high risk for PI development, especially to heels. Will recommend floating heels with heel protective boots for PI prevention. Discussed with MARILYN Milian.

## 2020-02-04 LAB
ALBUMIN SERPL-MCNC: 3 G/DL (ref 3.5–5.2)
ALBUMIN/GLOB SERPL: 0.9 G/DL
ALP SERPL-CCNC: 80 U/L (ref 39–117)
ALT SERPL W P-5'-P-CCNC: <5 U/L (ref 1–33)
ANION GAP SERPL CALCULATED.3IONS-SCNC: 8 MMOL/L (ref 5–15)
AST SERPL-CCNC: 10 U/L (ref 1–32)
BILIRUB SERPL-MCNC: 0.4 MG/DL (ref 0.2–1.2)
BUN BLD-MCNC: 15 MG/DL (ref 8–23)
BUN/CREAT SERPL: 18.5 (ref 7–25)
CALCIUM SPEC-SCNC: 9.7 MG/DL (ref 8.6–10.5)
CHLORIDE SERPL-SCNC: 98 MMOL/L (ref 98–107)
CO2 SERPL-SCNC: 31 MMOL/L (ref 22–29)
CREAT BLD-MCNC: 0.81 MG/DL (ref 0.57–1)
GFR SERPL CREATININE-BSD FRML MDRD: 69 ML/MIN/1.73
GLOBULIN UR ELPH-MCNC: 3.3 GM/DL
GLUCOSE BLD-MCNC: 161 MG/DL (ref 65–99)
GLUCOSE BLDC GLUCOMTR-MCNC: 136 MG/DL (ref 70–130)
GLUCOSE BLDC GLUCOMTR-MCNC: 151 MG/DL (ref 70–130)
GLUCOSE BLDC GLUCOMTR-MCNC: 182 MG/DL (ref 70–130)
GLUCOSE BLDC GLUCOMTR-MCNC: 184 MG/DL (ref 70–130)
MAGNESIUM SERPL-MCNC: 1.3 MG/DL (ref 1.6–2.4)
PLATELET # BLD AUTO: 215 10*3/MM3 (ref 140–450)
POTASSIUM BLD-SCNC: 3.6 MMOL/L (ref 3.5–5.2)
PROT SERPL-MCNC: 6.3 G/DL (ref 6–8.5)
SODIUM BLD-SCNC: 137 MMOL/L (ref 136–145)

## 2020-02-04 PROCEDURE — 97110 THERAPEUTIC EXERCISES: CPT

## 2020-02-04 PROCEDURE — 85049 AUTOMATED PLATELET COUNT: CPT | Performed by: HOSPITALIST

## 2020-02-04 PROCEDURE — 99231 SBSQ HOSP IP/OBS SF/LOW 25: CPT | Performed by: ORTHOPAEDIC SURGERY

## 2020-02-04 PROCEDURE — 25010000002 FUROSEMIDE PER 20 MG: Performed by: HOSPITALIST

## 2020-02-04 PROCEDURE — 80053 COMPREHEN METABOLIC PANEL: CPT | Performed by: HOSPITALIST

## 2020-02-04 PROCEDURE — 82962 GLUCOSE BLOOD TEST: CPT

## 2020-02-04 PROCEDURE — 25010000002 MAGNESIUM SULFATE 2 GM/50ML SOLUTION: Performed by: HOSPITALIST

## 2020-02-04 PROCEDURE — 25010000002 ENOXAPARIN PER 10 MG: Performed by: HOSPITALIST

## 2020-02-04 PROCEDURE — 99232 SBSQ HOSP IP/OBS MODERATE 35: CPT | Performed by: HOSPITALIST

## 2020-02-04 PROCEDURE — 83735 ASSAY OF MAGNESIUM: CPT | Performed by: HOSPITALIST

## 2020-02-04 RX ORDER — MAGNESIUM SULFATE HEPTAHYDRATE 40 MG/ML
2 INJECTION, SOLUTION INTRAVENOUS ONCE
Status: COMPLETED | OUTPATIENT
Start: 2020-02-04 | End: 2020-02-04

## 2020-02-04 RX ADMIN — DULOXETINE HYDROCHLORIDE 60 MG: 60 CAPSULE, DELAYED RELEASE ORAL at 08:12

## 2020-02-04 RX ADMIN — METHOCARBAMOL 500 MG: 500 TABLET, FILM COATED ORAL at 16:46

## 2020-02-04 RX ADMIN — LEVOTHYROXINE SODIUM 25 MCG: 25 TABLET ORAL at 06:30

## 2020-02-04 RX ADMIN — CARVEDILOL 12.5 MG: 12.5 TABLET, FILM COATED ORAL at 08:11

## 2020-02-04 RX ADMIN — METFORMIN HYDROCHLORIDE 1000 MG: 500 TABLET, FILM COATED ORAL at 08:12

## 2020-02-04 RX ADMIN — MAGNESIUM SULFATE HEPTAHYDRATE 2 G: 40 INJECTION, SOLUTION INTRAVENOUS at 09:41

## 2020-02-04 RX ADMIN — OXYCODONE HYDROCHLORIDE AND ACETAMINOPHEN 1 TABLET: 7.5; 325 TABLET ORAL at 14:03

## 2020-02-04 RX ADMIN — PREGABALIN 75 MG: 75 CAPSULE ORAL at 08:11

## 2020-02-04 RX ADMIN — PREGABALIN 75 MG: 75 CAPSULE ORAL at 20:07

## 2020-02-04 RX ADMIN — Medication 3 MG: at 20:07

## 2020-02-04 RX ADMIN — PRAVASTATIN SODIUM 10 MG: 20 TABLET ORAL at 08:11

## 2020-02-04 RX ADMIN — PANTOPRAZOLE SODIUM 40 MG: 40 TABLET, DELAYED RELEASE ORAL at 06:30

## 2020-02-04 RX ADMIN — FUROSEMIDE 40 MG: 10 INJECTION, SOLUTION INTRAMUSCULAR; INTRAVENOUS at 23:18

## 2020-02-04 RX ADMIN — ANASTROZOLE 1 MG: 1 TABLET ORAL at 09:41

## 2020-02-04 RX ADMIN — MELATONIN 1000 UNITS: at 08:12

## 2020-02-04 RX ADMIN — CARVEDILOL 12.5 MG: 12.5 TABLET, FILM COATED ORAL at 18:20

## 2020-02-04 RX ADMIN — FERROUS SULFATE TAB EC 324 MG (65 MG FE EQUIVALENT) 324 MG: 324 (65 FE) TABLET DELAYED RESPONSE at 08:11

## 2020-02-04 RX ADMIN — ENOXAPARIN SODIUM 40 MG: 40 INJECTION SUBCUTANEOUS at 20:07

## 2020-02-04 RX ADMIN — OXYCODONE HYDROCHLORIDE AND ACETAMINOPHEN 1 TABLET: 7.5; 325 TABLET ORAL at 08:19

## 2020-02-04 RX ADMIN — METFORMIN HYDROCHLORIDE 500 MG: 500 TABLET ORAL at 18:20

## 2020-02-04 RX ADMIN — METHOCARBAMOL 500 MG: 500 TABLET, FILM COATED ORAL at 20:07

## 2020-02-04 RX ADMIN — OXYCODONE HYDROCHLORIDE AND ACETAMINOPHEN 1 TABLET: 7.5; 325 TABLET ORAL at 18:20

## 2020-02-04 RX ADMIN — METHOCARBAMOL 500 MG: 500 TABLET, FILM COATED ORAL at 08:11

## 2020-02-04 RX ADMIN — LOSARTAN POTASSIUM 25 MG: 50 TABLET, FILM COATED ORAL at 08:11

## 2020-02-04 RX ADMIN — ASPIRIN 81 MG: 81 TABLET, COATED ORAL at 08:11

## 2020-02-04 RX ADMIN — FUROSEMIDE 40 MG: 10 INJECTION, SOLUTION INTRAMUSCULAR; INTRAVENOUS at 10:40

## 2020-02-04 NOTE — NURSING NOTE
Continued Stay Note  NOHEMY Diaz     Patient Name: Jung Short  MRN: [id number]  Today's Date: 2/4/2020    Admit Date: 1/31/2020    Discharge Plan     Row Name 02/04/20 1118       Plan    Plan  STR    Provided post acute provider list?  Yes    Post Acute Provider List  Inpatient Rehab    Post Acute Provider Quality & Resource List  Yes    Delivered To  Patient    Method of Delivery  In person    Plan Comments  Into room to discuss d/c plans.  Pt states she [facility].  Pt was given list of local facilities along with the quality indicators.  Pt states she wants to stay here in [place].  Pt  [facility] choice [facility], Second choice is [facility].  Referral called to [facility], waiting on return phone call.        Discharge Codes    No documentation.             Coleman Altamirano RN

## 2020-02-04 NOTE — PROGRESS NOTES
"Hospitalist Team      Patient Care Team:  Provider, No Known as PCP - General  Kylee Jensen MD as PCP - Claims Attributed  Lina Fisher MD as Referring Physician (General Surgery)  Live Chambers MD as Surgeon (Orthopedic Surgery)  Rafa Hannah MD as Consulting Physician (Nephrology)  Robbin Lo MD as Consulting Physician (Hematology and Oncology)        Chief Complaint:  Follow-up Acute Hypoxic Resp Failure; Right Wrist Fracture    Subjective    Continues to do well.  She denies chest pain and dyspnea.  She is tolerating PO.    Objective    Vital Signs  Temp:  [97.2 °F (36.2 °C)-98.4 °F (36.9 °C)] 98.2 °F (36.8 °C)  Heart Rate:  [71-82] 82  Resp:  [18] 18  BP: (123-134)/(64-76) 129/64  Oxygen Therapy  SpO2: 92 %  Pulse Oximetry Type: Continuous  Device (Oxygen Therapy): nasal cannula  Flow (L/min): 1}    Flowsheet Rows      First Filed Value   Admission Height  165.1 cm (65\") Documented at 01/31/2020 0815   Admission Weight  99.3 kg (219 lb) Documented at 01/31/2020 0815          Physical Exam:    General: Appears stated age in NAD.  Lungs: Diminished w/ fine bibasilar crackles.  No accessory use.  Normal excursion.  CV: Regular w/o murmur.  Radial pulses are 2+ and symmetric.  Abdomen: Obese, soft, and non-tender w/ active bowel sounds.  MSK: No C/C/E  Neuro: CN II-XII grossly intact  Psych: Pleasant affect.  AAOx3.    Results Review:     I reviewed the patient's new clinical results.    Lab Results (last 24 hours)     Procedure Component Value Units Date/Time    POC Glucose Once [[id number]]  (Abnormal) Collected:  02/04/20 0719    Specimen:  Blood Updated:  02/04/20 0728     Glucose 151 mg/dL     Comprehensive Metabolic Panel [[id number]]  (Abnormal) Collected:  02/04/20 0625    Specimen:  Blood Updated:  02/04/20 0712     Glucose 161 mg/dL      BUN 15 mg/dL      Creatinine 0.81 mg/dL      Sodium 137 mmol/L      Potassium 3.6 mmol/L      Chloride 98 mmol/L      CO2 31.0 mmol/L      Calcium " 9.7 mg/dL      Total Protein 6.3 g/dL      Albumin 3.00 g/dL      ALT (SGPT) <5 U/L      AST (SGOT) 10 U/L      Alkaline Phosphatase 80 U/L      Total Bilirubin 0.4 mg/dL      eGFR Non African Amer 69 mL/min/1.73      Globulin 3.3 gm/dL      A/G Ratio 0.9 g/dL      BUN/Creatinine Ratio 18.5     Anion Gap 8.0 mmol/L     Narrative:       GFR Normal >60  Chronic Kidney Disease <60  Kidney Failure <15      Magnesium [[id number]]  (Abnormal) Collected:  02/04/20 0625    Specimen:  Blood Updated:  02/04/20 0712     Magnesium 1.3 mg/dL     Platelet Count [[id number]]  (Normal) Collected:  02/04/20 0625    Specimen:  Blood Updated:  02/04/20 0657     Platelets 215 10*3/mm3     POC Glucose Once [[id number]]  (Abnormal) Collected:  02/03/20 2054    Specimen:  Blood Updated:  02/03/20 2100     Glucose 197 mg/dL     POC Glucose Once [[id number]]  (Abnormal) Collected:  02/03/20 1633    Specimen:  Blood Updated:  02/03/20 1643     Glucose 168 mg/dL     Blood Culture - Blood, Arm, Left [[id number]] Collected:  01/31/20 1250    Specimen:  Blood from Arm, Left Updated:  02/03/20 1300     Blood Culture No growth at 3 days    POC Glucose Once [[id number]]  (Abnormal) Collected:  02/03/20 1208    Specimen:  Blood Updated:  02/03/20 1227     Glucose 214 mg/dL             Medication Review:   I have reviewed the patient's current medication list    Current Facility-Administered Medications:   •  acetaminophen (TYLENOL) tablet 650 mg, 650 mg, Oral, Q4H PRN, Rey Giron MD  •  anastrozole (ARIMIDEX) tablet 1 mg, 1 mg, Oral, Daily, Rey Giron MD, 1 mg at 02/03/20 0952  •  aspirin EC tablet 81 mg, 81 mg, Oral, Daily, Rey Giron MD, 81 mg at 02/04/20 0811  •  carvedilol (COREG) tablet 12.5 mg, 12.5 mg, Oral, BID With Meals, Rey Giron MD, 12.5 mg at 02/04/20 0811  •  cholecalciferol (VITAMIN D3) tablet 1,000 Units, 1,000 Units, Oral, Daily, Rey Giron MD, 1,000 Units at 02/04/20 0812  •  dextrose (D50W) 25 g/ 50mL  Intravenous Solution 25 g, 25 g, Intravenous, Q15 Min PRN, Rey Giron MD  •  dextrose (GLUTOSE) oral gel 15 g, 15 g, Oral, Q15 Min PRN, Rey Giron MD  •  DULoxetine (CYMBALTA) DR capsule 60 mg, 60 mg, Oral, Daily, Rey Giron MD, 60 mg at 02/04/20 0812  •  enoxaparin (LOVENOX) syringe 40 mg, 40 mg, Subcutaneous, Q24H, Rey Giron MD, 40 mg at 02/03/20 2047  •  ferrous sulfate EC tablet 324 mg, 324 mg, Oral, Daily With Breakfast, Rey Giron MD, 324 mg at 02/04/20 0811  •  furosemide (LASIX) injection 40 mg, 40 mg, Intravenous, Q12H, Rey Giron MD, 40 mg at 02/03/20 2348  •  glucagon (GLUCAGEN) injection 1 mg, 1 mg, Subcutaneous, Q15 Min PRN, Rey Giron MD  •  insulin aspart (novoLOG) injection 0-7 Units, 0-7 Units, Subcutaneous, 4x Daily AC & at Bedtime, Rey Giron MD, 2 Units at 01/31/20 2257  •  ipratropium-albuterol (DUO-NEB) nebulizer solution 3 mL, 3 mL, Nebulization, Q4H PRN, Rey Giron MD  •  levothyroxine (SYNTHROID, LEVOTHROID) tablet 25 mcg, 25 mcg, Oral, Q AM, Rey Giron MD, 25 mcg at 02/04/20 0630  •  losartan (COZAAR) tablet 25 mg, 25 mg, Oral, Daily, Rey Giron MD, 25 mg at 02/04/20 0811  •  magnesium sulfate 2g/50 mL (PREMIX) infusion, 2 g, Intravenous, Once, Rey Giron MD  •  melatonin tablet 3 mg, 3 mg, Oral, Nightly, Rey Giron MD, 3 mg at 02/03/20 2047  •  metFORMIN (GLUCOPHAGE) tablet 1,000 mg, 1,000 mg, Oral, Daily With Breakfast, Rey Giron MD, 1,000 mg at 02/04/20 0812  •  metFORMIN (GLUCOPHAGE) tablet 500 mg, 500 mg, Oral, Daily With Dinner, Rey Giron MD, 500 mg at 02/03/20 1947  •  methocarbamol (ROBAXIN) tablet 500 mg, 500 mg, Oral, TID, Rey Giron MD, 500 mg at 02/04/20 0811  •  ondansetron (ZOFRAN) tablet 4 mg, 4 mg, Oral, Q6H PRN, Rey Giron MD  •  oxyCODONE-acetaminophen (PERCOCET) 7.5-325 MG per tablet 1 tablet, 1 tablet, Oral, Q4H PRN, Live Chambers MD, 1 tablet at 02/04/20 0819  •  pantoprazole  (PROTONIX) EC tablet 40 mg, 40 mg, Oral, QAM, Rey Giron MD, 40 mg at 02/04/20 0630  •  pravastatin (PRAVACHOL) tablet 10 mg, 10 mg, Oral, Daily, Rey Giron MD, 10 mg at 02/04/20 0811  •  pregabalin (LYRICA) capsule 75 mg, 75 mg, Oral, BID, Rey Giron MD, 75 mg at 02/04/20 0811      Assessment/Plan     1.  Acute Hypoxic Respiratory Failure: Could not due V/Q because of IV issues.  Will wean her down to 1L.  Given improvement w/ diuresis, PE seems less likely.  Will continue to monitor, and see if we can wean O2 to off today.    2.  A/C dCHF Exacerbation: Fine crackles noted on exam, but O2 requirements less.  Continue IV diuresis today, and then re-assess tomorrow.    3.  Right Wrist Fracture: Ortho following.    4.  Diabetes Mellitus, Type 2 in Obese: AM at goal.  Bedsides mostly at goal.  Continue to monitor on current regimen.    5.  HTN: At goal on exam.  Continue to monitor.    6.  CKD III: Creatinine tolerating diuresis.  CKDII?  Repeat BMP in AM.    7.  Obesity: Nutrition consult.    8.  Recent Rehab for Femur Fracture: PT following.  Given immobility issues, may be prudent to continue Lovenox at discharge.    Plan for disposition: Case Management following.    Rey Giron MD  02/04/20  9:27 AM

## 2020-02-04 NOTE — NURSING NOTE
Continued Stay Note  NOHEMY Diaz     Patient Name: Jung Short  MRN: [id number]  Today's Date: 2/4/2020    Admit Date: 1/31/2020    Discharge Plan     Row Name 02/04/20 1419       Plan    Plan Comments  Referral to [facility] called as well, notes state pt may require an extended stay due to mobility issues.          Discharge Codes    No documentation.             Coleman Altamirano RN

## 2020-02-04 NOTE — PLAN OF CARE
Problem: Patient Care Overview  Goal: Plan of Care Review  Outcome: Ongoing (interventions implemented as appropriate)  Flowsheets (Taken 2/4/2020 3969)  Progress: improving  Plan of Care Reviewed With: patient  Outcome Summary: wrist pain controlled with current pain med - rested well through the night - still refusing insulin sliding scale - heel boots in place - readjusting pt - VSS

## 2020-02-04 NOTE — PLAN OF CARE
Problem: Patient Care Overview  Goal: Plan of Care Review  Flowsheets (Taken 2/4/2020 6562)  Outcome Summary: PT: Patient requires maximal encouragement for participation.  Patient performs supine to sit with SBA with increased time and cues for sequencing.  Patient unable to safely attempt standing at this time, and continues to display difficulty with scooting along EOB in preparation for attempted sit pivot/sliding board transfer.  Patient unable to scoot and displays difficulty with motor planning during session.  At this time, recommend extended rehab stay at discharge due to NWB restrictions and current mobility.

## 2020-02-04 NOTE — THERAPY TREATMENT NOTE
Acute Care - Physical Therapy Treatment Note  NOHEMY Diaz     Patient Name: Jung Short  : 1945  MRN: [id number]  Today's Date: 2020  Onset of Illness/Injury or Date of Surgery: 20(right femur and foot fracture 2 months ago)  Date of Referral to PT: 20  Referring Physician: Dr. Giron    Admit Date: 2020    Visit Dx:    ICD-10-CM ICD-9-CM   1. Pleural effusion J90 511.9   2. Congestive heart failure, unspecified HF chronicity, unspecified heart failure type (CMS/HCC) I50.9 428.0   3. Hypoxia R09.02 799.02   4. Other closed fracture of distal end of right radius, initial encounter S52.591A 813.42   5. Hypertensive emergency I16.1 401.9     Patient Active Problem List   Diagnosis   • Abdominal bloating   • Abdominal mass   • Abdominal pain   • Abnormal gait   • Abnormal mammogram   • Chronic anemia   • Thoracic back pain   • Malignant neoplasm of upper-inner quadrant of right breast in female, estrogen receptor positive (CMS/HCC)   • Candidiasis   • Cardiomyopathy (CMS/HCC)   • Disc disorder of cervical region   • Neck pain   • Tenderness of chest wall   • Anemia due to stage 3 chronic kidney disease (CMS/HCC)   • Chronic pain   • Constipation   • Depression   • Type 2 diabetes mellitus (CMS/HCC)   • DM2 (diabetes mellitus, type 2) (CMS/HCC)   • Dyslipidemia   • Gastroesophageal reflux disease with esophagitis   • Generalized pain   • Gout   • Hypertension   • Hypothyroidism   • Incisional hernia   • Mass of breast   • Mitral valve insufficiency   • Nausea   • Adult body mass index greater than 30   • Osteopenia of spine   • Arthralgia of multiple joints   • Peripheral neuropathy   • Pulmonary hypertension (CMS/HCC)   • PITTS (dyspnea on exertion)   • Osteomyelitis (CMS/HCC)   • Tricuspid valve insufficiency   • Cobalamin deficiency   • Vitamin D deficiency   • Diabetes mellitus (CMS/HCC)   • Left knee pain   • Arthritis of knee   • DDD (degenerative disc disease), lumbar   • Lumbar  "facet arthropathy   • Pain in shoulder   • Chronic gout of multiple sites   • Encounter for long-term (current) use of high-risk medication   • Constipation due to opioid therapy   • Syncope, psychogenic   • Closed fracture of patella   • Primary osteoarthritis of left knee   • Radius/ulna fracture, left, closed, initial encounter   • Rib pain on left side   • Preoperative cardiovascular examination   • Physical deconditioning   • Hyponatremia   • Acute URI   • Acute on chronic HFrEF (heart failure with reduced ejection fraction) (CMS/[facility])   • CKD (chronic kidney disease) stage 3, GFR 30-59 ml/min (CMS/[facility])   • Diastolic congestive heart failure (CMS/[facility])   • MRSA infection   • Pleural effusion   • Acute respiratory failure with hypoxia (CMS/[facility])       Therapy Treatment    Rehabilitation Treatment Summary     Row Name 02/04/20 5097             Treatment Time/Intention    Discipline  physical therapist  -      Document Type  therapy note (daily note)  -JW      Subjective Information  complains of;weakness;fatigue  -      Mode of Treatment  physical therapy  -JW      Patient/Family Observations  pt supine, agreeable to therapy with encouragement  -      Care Plan Review  care plan/treatment goals reviewed;risks/benefits reviewed;patient/other agree to care plan  -      Patient Effort  adequate  -JW      Comment  pt reports \"I can not move at all\" however able to move within the bed following education and encouragement  -      Existing Precautions/Restrictions  non-weight bearing NWB Right UE/LE  -JW      Patient Response to Treatment  requires encouragement to participate  -JW      Recorded by [JW] Sarah Begum, PT 02/04/20 1248      Row Name 02/04/20 0810             Cognitive Assessment/Intervention- PT/OT    Follows Commands (Cognition)  follows one step commands;50-74% accuracy confusion with motor planning/sequencing  -      Personal Safety Interventions  gait belt;nonskid shoes/slippers when out " of bed  -JW      Recorded by [JW] Sarah Begum, PT 02/04/20 1248      Row Name 02/04/20 0850             Mobility Assessment/Intervention    Right Upper Extremity (Weight-bearing Status)  non weight-bearing (NWB)  -JW      Right Lower Extremity (Weight-bearing Status)  non weight-bearing (NWB)  -JW      Recorded by [JW] Sarah Begum, PT 02/04/20 1248      Row Name 02/04/20 0850             Bed Mobility Assessment/Treatment    Supine-Sit [place] (Bed Mobility)  supervision;verbal cues  -JW      Sit-Supine [place] (Bed Mobility)  contact guard;verbal cues  -JW      Bed Mobility, Safety Issues  decreased use of arms for pushing/pulling;decreased use of legs for bridging/pushing  -JW      Assistive Device (Bed Mobility)  head of bed elevated  -JW      Comment (Bed Mobility)  requires encouragement to attempt mobility with maximal independence   -JW      Recorded by [JW] Sarah Begum, PT 02/04/20 1248      Row Name 02/04/20 0850             Transfer Assessment/Treatment    Comment (Transfers)  pt unable to attempt standing safely.  Attempts scooting along EOB but unable to sequencing and maintain NWB.  Patient with difficulty with motor planning/sequencing  -JW      Recorded by [JW] Sarah Begum, PT 02/04/20 1248      Row Name 02/04/20 0850             Positioning and Restraints    Pre-Treatment Position  in bed  -JW      Post Treatment Position  bed  -JW      In Bed  supine;encouraged to call for assist;call light within reach  -JW      Recorded by [JW] Sarah Beugm, PT 02/04/20 1248      Row Name 02/04/20 0850             Pain Scale: Numbers Pre/Post-Treatment    Pre/Post Treatment Pain Comment  pt reports pain, does not rate  -JW      Recorded by [JW] Sarah Begum, PT 02/04/20 1248      Row Name 02/04/20 0850             Plan of Care Review    Plan of Care Reviewed With  patient  -JW      Outcome Summary  PT: Patient requires maximal encouragement for participation.  Patient performs supine  to sit with SBA with increased time and cues for sequencing.  Patient unable to safely attempt standing at this time, and continues to display difficulty with scooting along EOB in preparation for attempted sit pivot/sliding board transfer.  Patient unable to scoot and displays difficulty with motor planning during session.  At this time, recommend extended rehab stay at discharge due to NWB restrictions and current mobility.  -JW      Recorded by [JW] Sarah Begum, PT 02/04/20 1248      Row Name 02/04/20 0850             Outcome Summary/Treatment Plan (PT)    Daily Summary of Progress (PT)  progress towards functional goals is fair  -      Barriers to Overall Progress (PT)  command following/poor sequencing  -JW      Anticipated Discharge Disposition (PT)  -- extended rehab stay  -      Recorded by [JW] Sarah Begum, PT 02/04/20 1248        User Key  (r) = Recorded By, (t) = Taken By, (c) = Cosigned By    Initials Name Effective Dates Discipline     Sarah Begum, PT 04/03/18 -  PT                       PT Recommendation and Plan  Anticipated Discharge Disposition (PT): (extended rehab stay)  Outcome Summary/Treatment Plan (PT)  Daily Summary of Progress (PT): progress towards functional goals is fair  Barriers to Overall Progress (PT): command following/poor sequencing  Anticipated Discharge Disposition (PT): (extended rehab stay)  Plan of Care Reviewed With: patient  Outcome Summary: PT: Patient requires maximal encouragement for participation.  Patient performs supine to sit with SBA with increased time and cues for sequencing.  Patient unable to safely attempt standing at this time, and continues to display difficulty with scooting along EOB in preparation for attempted sit pivot/sliding board transfer.  Patient unable to scoot and displays difficulty with motor planning during session.  At this time, recommend extended rehab stay at discharge due to NWB restrictions and current mobility.  Outcome  Measures     Row Name 02/04/20 0850 02/02/20 0935          How much help from another person do you currently need...    Turning from your back to your side while in flat bed without using bedrails?  3  -JW  3  -KM     Moving from lying on back to sitting on the side of a flat bed without bedrails?  2  -JW  3  -KM     Moving to and from a bed to a chair (including a wheelchair)?  1  -JW  1  -KM     Standing up from a chair using your arms (e.g., wheelchair, bedside chair)?  1  -  2  -KM     Climbing 3-5 steps with a railing?  1  -JW  1  -KM     To walk in hospital room?  1  -  1  -KM     AM-PAC 6 Clicks Score (PT)  9  -  11  -KM        Functional Assessment    Outcome Measure Options  AM-PAC 6 Clicks Basic Mobility (PT)  -JW  AM-PAC 6 Clicks Basic Mobility (PT)  -KM       User Key  (r) = Recorded By, (t) = Taken By, (c) = Cosigned By    Initials Name Provider Type    Sarah Hull, PT Physical Therapist     Ai Coley, PTA Physical Therapy Assistant         Time Calculation:   PT Charges     Row Name 02/04/20 1250             Time Calculation    Start Time  0850  -      Stop Time  0907  -      Time Calculation (min)  17 min  -      PT Received On  02/04/20  -      PT - Next Appointment  02/05/20  -         Timed Charges    33631 - PT Therapeutic Exercise Minutes  17  -        User Key  (r) = Recorded By, (t) = Taken By, (c) = Cosigned By    Initials Name Provider Type     Sarah Begum, PT Physical Therapist        Therapy Charges for Today     Code Description Service Date Service Provider Modifiers Qty    [id number]  PT THER PROC EA 15 MIN 2/4/2020 Sarah Begum, PT GP 1          PT G-Codes  Outcome Measure Options: AM-PAC 6 Clicks Basic Mobility (PT)  AM-PAC 6 Clicks Score (PT): 9    Sarah Begum, PT  2/4/2020

## 2020-02-04 NOTE — PROGRESS NOTES
Orthopedic Progress Note   Chief Complaint: Right distal radial fracture, status post IM rodding right femur fracture    Subjective     Interval History: Patient states the wrist is feeling slightly better.  Switch pain medication to Percocet 7.5 and that is controlling the pain better          Objective     Vital Signs  Temp:  [97.2 °F (36.2 °C)-98.4 °F (36.9 °C)] 98.2 °F (36.8 °C)  Heart Rate:  [71-82] 82  Resp:  [18] 18  BP: (123-134)/(64-76) 129/64  Body mass index is 35.94 kg/m².    Intake/Output Summary (Last 24 hours) at 2/4/2020 1129  Last data filed at 2/4/2020 0900  Gross per 24 hour   Intake 960 ml   Output 1500 ml   Net -540 ml     I/O this shift:  In: 480 [P.O.:480]  Out: -        Physical Exam:   General: patient awake, alert and cooperative   Cardiovascular: regular rhythm and rate   Pulm: clear to auscultation bilaterally   Abdomen: Benign.  Soft bowel sounds   Extremities: Right upper extremity is in a short arm cast.  She has good distal pulses.   Neurologic: Normal mood and behavior     Results Review:     I reviewed the patient's new clinical results.      WBC No results found for: WBCS   HGB No results found for: HGB   HCT No results found for: HCT   Platlets No results found for: LABPLAT     PT/INR:  No results found for: PROTIME/No results found for: INR    Sodium Sodium   Date Value Ref Range Status   02/04/2020 137 136 - 145 mmol/L Final   02/02/2020 139 136 - 145 mmol/L Final      Potassium Potassium   Date Value Ref Range Status   02/04/2020 3.6 3.5 - 5.2 mmol/L Final   02/02/2020 4.0 3.5 - 5.2 mmol/L Final      Chloride Chloride   Date Value Ref Range Status   02/04/2020 98 98 - 107 mmol/L Final   02/02/2020 100 98 - 107 mmol/L Final      Bicarbonate No results found for: PLASMABICARB   BUN BUN   Date Value Ref Range Status   02/04/2020 15 8 - 23 mg/dL Final   02/02/2020 13 8 - 23 mg/dL Final      Creatinine Creatinine   Date Value Ref Range Status   02/04/2020 0.81 0.57 - 1.00 mg/dL  Final   02/02/2020 0.78 0.57 - 1.00 mg/dL Final      Calcium Calcium   Date Value Ref Range Status   02/04/2020 9.7 8.6 - 10.5 mg/dL Final   02/02/2020 9.7 8.6 - 10.5 mg/dL Final      Magnesium  AST  ALT  Bilirubin, Total  AlkPhos  Albumin    Amylase  Lipase    Radiology: Magnesium   Date Value Ref Range Status   02/04/2020 1.3 (L) 1.6 - 2.4 mg/dL Final     No components found for: AST.*  No components found for: ALT.*  No results found for: BILIRUBIN    No components found for: ALKPHOS.*  No components found for: ALBUMIN.*      No components found for: AMYLASE.*  No components found for: LIPASE.*            Imaging Results (Most Recent)     Procedure Component Value Units Date/Time    NM Lung Ventilation Perfusion [[id number]] Collected:  02/03/20 1914     Updated:  02/03/20 1916    Narrative:       INDICATION:   Acute onset short of breath. History of CHF. Unable to raise left arm due to fracture. Also IV blew per technologist during the perfusion injection.     DOSE:  *  27.2 mCi Tc99m DTPA. Aerosolized  *  5.5 mCi Tc99m MAA. Attempted IV administration    COMPARISON:   Chest x-ray 2/2/2020..    FINDINGS:   Ventilation:  There is heterogeneity of ventilation bilaterally most consistent with underlying chronic obstructive lung disease.    Perfusion:  The perfusion images are nondiagnostic since IV access was lost during attempted administration of the tracer. The study can be repeated in the morning after the dose has decayed or alternatively, the patient could undergo CT chest pulmonary  embolism protocol if they are candidate.      Impression:         1. The perfusion imaging is nondiagnostic since IV access was lost during the attempted injection of the tracer. The study could be repeated in the morning after the tracer has decayed, or alternatively, if renal function is adequate, CT chest pulmonary  embolism protocol could be obtained.    Signer Name: Zarina Mari MD   Signed: 2/3/2020 7:14 PM   [facility]  [email or web address]    [facility]    XR Wrist 2 View Right [[id number]] Collected:  02/03/20 0841     Updated:  02/03/20 0844    Narrative:       XR WRIST 2 VW RIGHT-: 2/3/2020 7:42 AM     INDICATION:   HISTORY right wrist fracture. Follow-up..     COMPARISON:   01/31/2020.     FINDINGS:   2 views of the right wrist.  Artifact related to overlying cast  material. Impacted distal radius fracture, similar to the prior study.  Perhaps some minimal callus formation, difficult to confirm due to  overlying cast material. Otherwise no significant interval change. The  bones are osteopenic. Alignment preserved..  No foreign body.       Impression:          1. Distal right radius fracture with perhaps some interval callus  formation. No other significant interval change.     This report was finalized on 2/3/2020 8:42 AM by Dr. Kristopher Carr MD.       XR Chest 1 View [[id number]] Collected:  02/02/20 1213     Updated:  02/02/20 1215    Narrative:       CR Chest 1 Vw    INDICATION:   Acute onset of shortness of breath. History of CHF.     COMPARISON:    None available.    FINDINGS:  Single portable AP view(s) of the chest.    The heart size remains enlarged. Bibasilar atelectatic/infiltrative changes with bilateral pleural effusions. Modest improvement from prior study. No pneumothorax.       Impression:       Cardiomegaly with bilateral pleural effusions and bibasilar atelectatic/infiltrative change. Modest improvement from prior study.    Signer Name: Robbin Zimmer MD   Signed: 2/2/2020 12:13 PM   [id number]    [facility]    XR Femur 2 View Right [[id number]] Collected:  02/01/20 1227     Updated:  02/01/20 1229    Narrative:       CR Femur 2 Vws RT    INDICATION:   74-year-old female with right femur pain status post fall 2 months ago.    COMPARISON:   None available.    FINDINGS:   By views of the right femur.  Diffuse bony demineralization. No gross evidence of  acute displaced fracture or dislocation. Total right knee arthroplasty. ORIF of the right femur with a long lateral sideplate and multiple associated screws. The hardware  spans a healing, mildly displaced fracture of the distal femoral metaphysis. The fracture line remains visualized with evidence of some callus formation around the fracture site. No significant solid osseous bridging.  No foreign body.      Impression:         1. Osteopenia. No gross acute injury.  2. ORIF of the right femur spanning a healing, mildly displaced fracture of the distal femoral metaphysis. The fracture line remains well-visualized without evidence of callus formation. No significant solid osseous bridging.  3. Right knee arthroplasty.    Signer Name: Benito Zimmer MD   Signed: 2/1/2020 12:27 PM   Workstation Name: [email or web address]    [facility]    XR Foot 2 View Right [[id number]] Collected:  02/01/20 1224     Updated:  02/01/20 1226    Narrative:       CR Foot 2 Vws RT    INDICATION:   74-year-old female with right foot pains that is post fall 2 months ago.    COMPARISON:   None available    FINDINGS:   2 views of the right foot.  Diffuse bony demineralization. No gross evidence of a displaced fracture or dislocation. Transmetatarsal amputation of the second ray. Partial amputation of the first metatarsal. Suspected sequelae of old, healed distal  fibular fracture. Moderate tibiotalar arthrosis. Mild arthrosis throughout the midfoot. Plantar calcaneal spur.  No bone erosion or destruction.  No foreign body.      Impression:         1. Osteopenia. No gross evidence of a displaced fracture or dislocation.  2. Postsurgical changes of the first metatarsal and second ray.  3. Hindfoot and midfoot arthrosis, detailed above. Questionable old, healed fracture deformity of the distal fibula.    Signer Name: Benito Zimmer MD   Signed: 2/1/2020 12:24 PM   Workstation Name: [id number]    [facility]   [place]    XR Pelvis 1 or 2 View [[id number]] Collected:  02/01/20 1221     Updated:  02/01/20 1224    Narrative:       CR Pelvis 1 or 2 Vws    INDICATION:   74-year-old female with pelvic pain status post fall 2 months ago.    COMPARISON:   None.    FINDINGS:  AP view(s) of the pelvis.  Diffuse bony demineralization. No evidence of a displaced fracture or dislocation. Bony pelvis and sacrum appear grossly intact. Partially imaged right femur ORIF. Mild degenerative changes in both hips. Soft tissues are  unremarkable. No bone erosion or destruction.        Impression:       Osteopenia. No evidence of a displaced fracture or dislocation. Mild bilateral hip arthrosis.    Signer Name: Benito Zimmer MD   Signed: 2/1/2020 12:21 PM   Workstation Name: [email or web address]    [facility]    US Venous Doppler Lower Extremity Bilateral (duplex) [[id number]] Collected:  02/01/20 1119     Updated:  02/01/20 1121    Narrative:       US Veins LE Duplex BILAT    HISTORY:   Fall yesterday with fracture of right wrist. Recent right femur fracture. Positive d-dimer.     TECHNIQUE:   Real-time ultrasound was performed of both lower extremities utilizing spectral and color Doppler with compression and augmentation techniques.    COMPARISON:  July 29, 2016    FINDINGS:  Right Lower Extremity:  There is no deep venous thrombus seen in the right lower extremity, including the right common femoral veins, right femoral veins and right popliteal veins.  Normal compressibility and respiratory phasicity was visualized.  No calf vein thrombus.    Left Lower Extremity:  There is no deep venous thrombus seen in the left lower extremity, including the left common femoral veins, left femoral veins and left popliteal veins.   Normal compressibility and respiratory phasicity was visualized.  No calf vein thrombus.        Impression:       No deep venous thrombus seen in either lower extremity.    Signer Name: Robbin Zimmer MD    Signed: 2/1/2020 11:19 AM   Workstation Name: [id number]    [facility]    XR Chest 1 View [[id number]] Collected:  02/01/20 0631     Updated:  02/01/20 0634    Narrative:       CHEST X-RAY, 1/31/2020      HISTORY:    Respiratory failure. Pleural effusion. Follow-up cardiopulmonary status.      TECHNIQUE:  AP portable upright chest x-ray.      FINDINGS:  Small-to-moderate bilateral pleural effusions and bilateral lower lung atelectasis, unchanged since yesterday. Moderate cardiomegaly. Mildly prominent indistinct interstitial markings may reflect mild vascular congestion, correlate clinically. Right  axillary surgical clips.      Impression:       1. Cardiomegaly and possible mild vascular congestion.  2. Moderate bilateral pleural effusions and bibasilar pulmonary atelectasis.  3. No significant change since yesterday.    Signer Name: Levy Mishra MD   Signed: 2/1/2020 6:31 AM   Workstation Name: [id number]    [facility]    XR Wrist 3+ View Right [[id number]] Collected:  01/31/20 0927     Updated:  01/31/20 0930    Narrative:       XR WRIST 3+ VW RIGHT-: 1/31/2020 9:15 AM     INDICATION:   Patient fell from wheelchair this morning with pain in right wrist.     COMPARISON:   None available.     FINDINGS:   3 views of the right wrist.  There is an acute fracture involving the  distal radius about 1 cm proximal to the end of the bone. The fracture  appears be lying transversely with mild impaction. The carpal bones are  normal. The ulna is normal. No bone erosion or destruction.  No foreign  body.       Impression:       Mild impaction-type fracture involving the distal radius about 1 cm from  the end of the bone..     This report was finalized on 1/31/2020 9:28 AM by Dr. Chi Mattson MD.       XR Chest 1 View [[id number]] Collected:  01/31/20 0927     Updated:  01/31/20 0929    Narrative:       AP PORTABLE CHEST, 01/31/2020     HISTORY:  Shortness of air for  weeks     COMPARISON:  09/01/2019     TECHNIQUE:  AP portable chest x-ray.     FINDINGS:  There are low lung volumes. There are small bilateral effusions with  bibasilar atelectasis. There is mild interstitial prominence.       Impression:       Small left pleural effusions with bibasal atelectasis and mild  interstitial prominence.     This report was finalized on 1/31/2020 9:27 AM by Dr. Chi Mattson MD.                       Assessment/Plan     Patient Active Problem List   Diagnosis Code   • Abdominal bloating R14.0   • Abdominal mass R19.00   • Abdominal pain R10.9   • Abnormal gait R26.9   • Abnormal mammogram R92.8   • Chronic anemia D64.9   • Thoracic back pain M54.6   • Malignant neoplasm of upper-inner quadrant of right breast in female, estrogen receptor positive (CMS/HCC) C50.211, Z17.0   • Candidiasis B37.9   • Cardiomyopathy (CMS/HCC) I42.9   • Disc disorder of cervical region M50.90   • Neck pain M54.2   • Tenderness of chest wall R07.89   • Anemia due to stage 3 chronic kidney disease (CMS/HCC) N18.3, D63.1   • Chronic pain G89.29   • Constipation K59.00   • Depression F32.9   • Type 2 diabetes mellitus (CMS/HCC) E11.9   • DM2 (diabetes mellitus, type 2) (CMS/HCC) E11.9   • Dyslipidemia E78.5   • Gastroesophageal reflux disease with esophagitis K21.0   • Generalized pain R52   • Gout M10.9   • Hypertension I10   • Hypothyroidism E03.9   • Incisional hernia K43.2   • Mass of breast N63.0   • Mitral valve insufficiency I34.0   • Nausea R11.0   • Adult body mass index greater than 30 [id number]   • Osteopenia of spine M85.88   • Arthralgia of multiple joints M25.50   • Peripheral neuropathy G62.9   • Pulmonary hypertension (CMS/HCC) I27.20   • PITTS (dyspnea on exertion) R06.09   • Osteomyelitis (CMS/HCC) M86.9   • Tricuspid valve insufficiency I07.1   • Cobalamin deficiency E53.8   • Vitamin D deficiency E55.9   • Diabetes mellitus (CMS/HCC) E11.9   • Left knee pain M25.562   • Arthritis of knee M17.10   •  DDD (degenerative disc disease), lumbar M51.36   • Lumbar facet arthropathy M47.816   • Pain in shoulder M25.519   • Chronic gout of multiple sites M1A.09X0   • Encounter for long-term (current) use of high-risk medication Z79.899   • Constipation due to opioid therapy K59.03, T40.2X5A   • Syncope, psychogenic F48.8   • Closed fracture of patella S82.009A   • Primary osteoarthritis of left knee M17.12   • Radius/ulna fracture, left, closed, initial encounter S52.92XA, S52.202A   • Rib pain on left side R07.81   • Preoperative cardiovascular examination Z01.810   • Physical deconditioning R53.81   • Hyponatremia E87.1   • Acute URI J06.9   • Acute on chronic HFrEF (heart failure with reduced ejection fraction) (CMS/[facility]) I50.23   • CKD (chronic kidney disease) stage 3, GFR 30-59 ml/min (CMS/[facility]) N18.3   • Diastolic congestive heart failure (CMS/[facility]) I50.30   • MRSA infection A49.02   • Pleural effusion J90   • Acute respiratory failure with hypoxia (CMS/[facility]) J96.01       Patient awaiting placement.  If discharged to outside facility will need to see in the office in 2 weeks with an x-ray of her right wrist and also an x-ray of her right femur.  Surgery was done in [place] over 2 months ago and recent x-rays show the fracture is not yet this healed so she will need to remain nonweightbearing on the right lower extremity till seen in the office and she returns I will need an x-ray of her right femur again done 1 day prior to the office visit to evaluate healing and determination about weightbearing status will be made.      Live Chambers MD  02/04/20  11:29 AM          Dictated utilizing Dragon dictation

## 2020-02-04 NOTE — PLAN OF CARE
Problem: Patient Care Overview  Goal: Plan of Care Review  Outcome: Ongoing (interventions implemented as appropriate)  Flowsheets  Taken 2/4/2020 1727  Progress: improving  Outcome Summary: patient awaiting insurance approval for rehab placement for strengthening.  continuing pain control measures and using a purewick.  IV site changed  Taken 2/4/2020 1403  Plan of Care Reviewed With: patient     Problem: Patient Care Overview  Goal: Individualization and Mutuality  Outcome: Ongoing (interventions implemented as appropriate)  Flowsheets  Taken 2/4/2020 1727 by Rukhsana Wolfe RN  Patient Specific Interventions: skin interventions and pain management  Taken 2/4/2020 0539 by Valentina Hopkins RN  Patient Specific Preferences: goes by Aron     Problem: Fall Risk (Adult)  Goal: Absence of Fall  Outcome: Ongoing (interventions implemented as appropriate)  Flowsheets (Taken 2/4/2020 1727)  Absence of Fall: making progress toward outcome     Problem: Cardiac: Heart Failure (Adult)  Goal: Signs and Symptoms of Listed Potential Problems Will be Absent, Minimized or Managed (Cardiac: Heart Failure)  Outcome: Ongoing (interventions implemented as appropriate)  Flowsheets  Taken 1/31/2020 1721 by Fabi Alvarado RN  Problems Assessed (Heart Failure): all  Taken 2/3/2020 1650 by Rukhsana Wolfe RN  Problems Present (Heart Failure): functional decline/self-care deficit     Problem: Skin Injury Risk (Adult)  Goal: Skin Health and Integrity  Outcome: Ongoing (interventions implemented as appropriate)  Flowsheets (Taken 2/4/2020 1727)  Skin Health and Integrity: making progress toward outcome     Problem: Pain, Chronic (Adult)  Goal: Acceptable Pain/Comfort Level and Functional Ability  Outcome: Ongoing (interventions implemented as appropriate)  Flowsheets (Taken 2/4/2020 1727)  Acceptable Pain/Comfort Level and Functional Ability: making progress toward outcome     Problem: Breathing Pattern Ineffective (Adult)  Goal: Effective  Oxygenation/Ventilation  Outcome: Ongoing (interventions implemented as appropriate)  Flowsheets (Taken 2/4/2020 1727)  Effective Oxygenation/Ventilation: making progress toward outcome     Problem: Breathing Pattern Ineffective (Adult)  Goal: Anxiety/Fear Reduction  Outcome: Ongoing (interventions implemented as appropriate)  Flowsheets (Taken 2/4/2020 1727)  Anxiety/Fear Reduction: making progress toward outcome     Problem: Fracture Orthopaedic (Adult)  Goal: Signs and Symptoms of Listed Potential Problems Will be Absent, Minimized or Managed (Fracture Orthopaedic)  Outcome: Ongoing (interventions implemented as appropriate)  Flowsheets (Taken 2/3/2020 1650)  Problems Assessed (Orthopaedic Fracture): all  Problems Present (Orthopaedic Fracture): pain;situational response

## 2020-02-05 LAB
ALBUMIN SERPL-MCNC: 3 G/DL (ref 3.5–5.2)
ALBUMIN/GLOB SERPL: 0.9 G/DL
ALP SERPL-CCNC: 81 U/L (ref 39–117)
ALT SERPL W P-5'-P-CCNC: <5 U/L (ref 1–33)
ANION GAP SERPL CALCULATED.3IONS-SCNC: 10.8 MMOL/L (ref 5–15)
AST SERPL-CCNC: 12 U/L (ref 1–32)
BACTERIA SPEC AEROBE CULT: NORMAL
BASOPHILS # BLD AUTO: 0.03 10*3/MM3 (ref 0–0.2)
BASOPHILS NFR BLD AUTO: 0.5 % (ref 0–1.5)
BILIRUB SERPL-MCNC: 0.3 MG/DL (ref 0.2–1.2)
BUN BLD-MCNC: 17 MG/DL (ref 8–23)
BUN/CREAT SERPL: 21.3 (ref 7–25)
CALCIUM SPEC-SCNC: 10.1 MG/DL (ref 8.6–10.5)
CHLORIDE SERPL-SCNC: 98 MMOL/L (ref 98–107)
CO2 SERPL-SCNC: 27.2 MMOL/L (ref 22–29)
CREAT BLD-MCNC: 0.8 MG/DL (ref 0.57–1)
DEPRECATED RDW RBC AUTO: 48.5 FL (ref 37–54)
EOSINOPHIL # BLD AUTO: 0.31 10*3/MM3 (ref 0–0.4)
EOSINOPHIL NFR BLD AUTO: 4.9 % (ref 0.3–6.2)
ERYTHROCYTE [DISTWIDTH] IN BLOOD BY AUTOMATED COUNT: 14.6 % (ref 12.3–15.4)
GFR SERPL CREATININE-BSD FRML MDRD: 70 ML/MIN/1.73
GLOBULIN UR ELPH-MCNC: 3.5 GM/DL
GLUCOSE BLD-MCNC: 154 MG/DL (ref 65–99)
GLUCOSE BLDC GLUCOMTR-MCNC: 131 MG/DL (ref 70–130)
GLUCOSE BLDC GLUCOMTR-MCNC: 163 MG/DL (ref 70–130)
GLUCOSE BLDC GLUCOMTR-MCNC: 172 MG/DL (ref 70–130)
GLUCOSE BLDC GLUCOMTR-MCNC: 233 MG/DL (ref 70–130)
HCT VFR BLD AUTO: 35.5 % (ref 34–46.6)
HGB BLD-MCNC: 10.6 G/DL (ref 12–15.9)
IMM GRANULOCYTES # BLD AUTO: 0.02 10*3/MM3 (ref 0–0.05)
IMM GRANULOCYTES NFR BLD AUTO: 0.3 % (ref 0–0.5)
LYMPHOCYTES # BLD AUTO: 1.48 10*3/MM3 (ref 0.7–3.1)
LYMPHOCYTES NFR BLD AUTO: 23.5 % (ref 19.6–45.3)
MAGNESIUM SERPL-MCNC: 1.5 MG/DL (ref 1.6–2.4)
MCH RBC QN AUTO: 26.9 PG (ref 26.6–33)
MCHC RBC AUTO-ENTMCNC: 29.9 G/DL (ref 31.5–35.7)
MCV RBC AUTO: 90.1 FL (ref 79–97)
MONOCYTES # BLD AUTO: 0.56 10*3/MM3 (ref 0.1–0.9)
MONOCYTES NFR BLD AUTO: 8.9 % (ref 5–12)
NEUTROPHILS # BLD AUTO: 3.89 10*3/MM3 (ref 1.7–7)
NEUTROPHILS NFR BLD AUTO: 61.9 % (ref 42.7–76)
NRBC BLD AUTO-RTO: 0 /100 WBC (ref 0–0.2)
PLATELET # BLD AUTO: 236 10*3/MM3 (ref 140–450)
PMV BLD AUTO: 10.2 FL (ref 6–12)
POTASSIUM BLD-SCNC: 3.8 MMOL/L (ref 3.5–5.2)
PROT SERPL-MCNC: 6.5 G/DL (ref 6–8.5)
RBC # BLD AUTO: 3.94 10*6/MM3 (ref 3.77–5.28)
SODIUM BLD-SCNC: 136 MMOL/L (ref 136–145)
WBC NRBC COR # BLD: 6.29 10*3/MM3 (ref 3.4–10.8)

## 2020-02-05 PROCEDURE — 63710000001 INSULIN ASPART PER 5 UNITS: Performed by: HOSPITALIST

## 2020-02-05 PROCEDURE — 82962 GLUCOSE BLOOD TEST: CPT

## 2020-02-05 PROCEDURE — 80053 COMPREHEN METABOLIC PANEL: CPT | Performed by: HOSPITALIST

## 2020-02-05 PROCEDURE — 83735 ASSAY OF MAGNESIUM: CPT | Performed by: INTERNAL MEDICINE

## 2020-02-05 PROCEDURE — 97530 THERAPEUTIC ACTIVITIES: CPT

## 2020-02-05 PROCEDURE — 25010000002 ENOXAPARIN PER 10 MG: Performed by: HOSPITALIST

## 2020-02-05 PROCEDURE — 85025 COMPLETE CBC W/AUTO DIFF WBC: CPT | Performed by: HOSPITALIST

## 2020-02-05 PROCEDURE — 99232 SBSQ HOSP IP/OBS MODERATE 35: CPT | Performed by: INTERNAL MEDICINE

## 2020-02-05 PROCEDURE — 94799 UNLISTED PULMONARY SVC/PX: CPT

## 2020-02-05 RX ORDER — FUROSEMIDE 40 MG/1
40 TABLET ORAL DAILY
Status: DISCONTINUED | OUTPATIENT
Start: 2020-02-06 | End: 2020-02-06 | Stop reason: HOSPADM

## 2020-02-05 RX ADMIN — OXYCODONE HYDROCHLORIDE AND ACETAMINOPHEN 1 TABLET: 7.5; 325 TABLET ORAL at 06:07

## 2020-02-05 RX ADMIN — CARVEDILOL 12.5 MG: 12.5 TABLET, FILM COATED ORAL at 17:02

## 2020-02-05 RX ADMIN — PANTOPRAZOLE SODIUM 40 MG: 40 TABLET, DELAYED RELEASE ORAL at 06:04

## 2020-02-05 RX ADMIN — CARVEDILOL 12.5 MG: 12.5 TABLET, FILM COATED ORAL at 08:52

## 2020-02-05 RX ADMIN — PRAVASTATIN SODIUM 10 MG: 20 TABLET ORAL at 09:01

## 2020-02-05 RX ADMIN — LOSARTAN POTASSIUM 25 MG: 50 TABLET, FILM COATED ORAL at 09:00

## 2020-02-05 RX ADMIN — ANASTROZOLE 1 MG: 1 TABLET ORAL at 09:00

## 2020-02-05 RX ADMIN — PREGABALIN 75 MG: 75 CAPSULE ORAL at 21:43

## 2020-02-05 RX ADMIN — METFORMIN HYDROCHLORIDE 500 MG: 500 TABLET ORAL at 17:02

## 2020-02-05 RX ADMIN — INSULIN ASPART 3 UNITS: 100 INJECTION, SOLUTION INTRAVENOUS; SUBCUTANEOUS at 21:43

## 2020-02-05 RX ADMIN — LEVOTHYROXINE SODIUM 25 MCG: 25 TABLET ORAL at 06:04

## 2020-02-05 RX ADMIN — OXYCODONE HYDROCHLORIDE AND ACETAMINOPHEN 1 TABLET: 7.5; 325 TABLET ORAL at 17:02

## 2020-02-05 RX ADMIN — ENOXAPARIN SODIUM 40 MG: 40 INJECTION SUBCUTANEOUS at 17:03

## 2020-02-05 RX ADMIN — DULOXETINE HYDROCHLORIDE 60 MG: 60 CAPSULE, DELAYED RELEASE ORAL at 08:52

## 2020-02-05 RX ADMIN — METHOCARBAMOL 500 MG: 500 TABLET, FILM COATED ORAL at 09:00

## 2020-02-05 RX ADMIN — METHOCARBAMOL 500 MG: 500 TABLET, FILM COATED ORAL at 17:02

## 2020-02-05 RX ADMIN — PREGABALIN 75 MG: 75 CAPSULE ORAL at 08:52

## 2020-02-05 RX ADMIN — INSULIN ASPART 2 UNITS: 100 INJECTION, SOLUTION INTRAVENOUS; SUBCUTANEOUS at 08:53

## 2020-02-05 RX ADMIN — ASPIRIN 81 MG: 81 TABLET, COATED ORAL at 08:52

## 2020-02-05 RX ADMIN — MELATONIN 1000 UNITS: at 08:52

## 2020-02-05 RX ADMIN — OXYCODONE HYDROCHLORIDE AND ACETAMINOPHEN 1 TABLET: 7.5; 325 TABLET ORAL at 11:57

## 2020-02-05 RX ADMIN — INSULIN ASPART 2 UNITS: 100 INJECTION, SOLUTION INTRAVENOUS; SUBCUTANEOUS at 11:57

## 2020-02-05 RX ADMIN — METHOCARBAMOL 500 MG: 500 TABLET, FILM COATED ORAL at 21:43

## 2020-02-05 RX ADMIN — FERROUS SULFATE TAB EC 324 MG (65 MG FE EQUIVALENT) 324 MG: 324 (65 FE) TABLET DELAYED RESPONSE at 08:52

## 2020-02-05 RX ADMIN — METFORMIN HYDROCHLORIDE 1000 MG: 500 TABLET, FILM COATED ORAL at 09:00

## 2020-02-05 RX ADMIN — Medication 3 MG: at 21:43

## 2020-02-05 NOTE — PROGRESS NOTES
"Adult Nutrition  Assessment/PES    Patient Name:  Jung Short  YOB: 1945  MRN: [id number]  Admit Date:  1/31/2020    Assessment Date:  2/5/2020    Comments: Pt. tolerating diet.  Diet education provided (see below):      Reason for Assessment     Row Name 02/05/20 1253          Reason for Assessment    Reason For Assessment  other (see comments)     Diagnosis  -- recent femur fracture, right wrist fracture, diabetes, obesity, hypertension, chronic CHF, CKD         Nutrition/Diet History     Row Name 02/05/20 1340          Nutrition/Diet History    Typical Food/Fluid Intake  follows a regular diet, not really motivated to change since \"I've lived this long\".           Anthropometrics     Row Name 02/05/20 1255          Anthropometrics    Height  165.1 cm (65\")     Weight  -- 216# 1-31-20        Ideal Body Weight (IBW)    Ideal Body Weight (IBW) (kg)  57.29        Body Mass Index (BMI)    BMI Assessment  BMI 35-39.9: obesity grade II 35.9         Labs/Tests/Procedures/Meds     Row Name 02/05/20 1256          Labs/Procedures/Meds    Lab Results Reviewed  reviewed     Lab Results Comments  qrln3b=6.3, magnesium 1.5        Medications    Pertinent Medications Reviewed  reviewed     Pertinent Medications Comments  lasix           Estimated/Assessed Needs     Row Name 02/05/20 1341 02/05/20 1255       Calculation Measurements    Height  --  165.1 cm (65\")       Estimated/Assessed Needs    Additional Documentation  Fluid Requirements (Group);[place]-St. Jeor Equation (Group);Protein Requirements (Group);Calorie Requirements (Group)  --       Calorie Requirements    Estimated Calorie Need Method  [place]-St Jeor no activity factor  --    Estimated Calorie Requirement Comment  1,472 estimated carbohydrate needs=166 grams  --       Protein Requirements    Est Protein Requirement Amount (gms/kg)  0.8 gm protein 79 grams per day  --       Fluid Requirements    Estimated Fluid Requirement Method  other (see " comments) 1,000-2,000 ml/day due to CHF  --        Nutrition Prescription Ordered     Row Name 02/05/20 1259          Nutrition Prescription PO    Common Modifiers  Cardiac;Consistent Carbohydrate;Renal         Evaluation of Received Nutrient/Fluid Intake     Row Name 02/05/20 1349          Fluid Intake Evaluation    Oral Fluid (mL)  840 which meets 84% of lower end of estimated fluid needs        PO Evaluation    Number of Meals  3     % PO Intake  92%               Problem/Interventions:  Problem 1     Row Name 02/05/20 1352          Nutrition Diagnoses Problem 1    Problem 1  Knowledge Deficit     Etiology (related to)  MNT for Treatment/Condition     Signs/Symptoms (evidenced by)  Potential Information Deficit               Intervention Goal     Row Name 02/05/20 1353          Intervention Goal    PO  PO intake (%)     PO Intake %  75 % or greater of meals         Nutrition Intervention     Row Name 02/05/20 1354          Nutrition Intervention    RD/Tech Action  Interview for preference;Follow Tx progress           Education/Evaluation     Row Name 02/05/20 1354          Education    Education  Provided education regarding     Provided education regarding  -- low sodium, weighing self daily, emphasis on non-starchy vegetables, limiting added sugars/high fat/fried foods, importance of portion control.  Expect poor compliance.         Monitor/Evaluation    Monitor  I&O;PO intake;Supplement intake;Weight;Skin status     Education Follow-up  -- provided with handouts:  Simple Steps to Reduce Sodium, Simples Rules for Diabetes, Seasoning Your Food, Food Labeling and RD contact information           Electronically signed by:  Ivory Luther RD  02/05/20 1:57 PM

## 2020-02-05 NOTE — PROGRESS NOTES
"Hospitalist Team      Patient Care Team:  Provider, No Known as PCP - General  Kylee Jensen MD as PCP - Claims Attributed  Lina Fisher MD as Referring Physician (General Surgery)  Live Chambers MD as Surgeon (Orthopedic Surgery)  Rafa Hannah MD as Consulting Physician (Nephrology)  Robbin Lo MD as Consulting Physician (Hematology and Oncology)        Chief Complaint:  Follow-up Acute Hypoxic Resp Failure; Right Wrist Fracture    Subjective    Breathing easier.  Still functionally limited given her broken wrist and fractured femur.    Objective    Vital Signs  Temp:  [97.1 °F (36.2 °C)-97.9 °F (36.6 °C)] 97.1 °F (36.2 °C)  Heart Rate:  [69-72] 71  Resp:  [18-20] 20  BP: (123-180)/(64-89) 153/89  Oxygen Therapy  SpO2: 96 %  Pulse Oximetry Type: Continuous  Device (Oxygen Therapy): nasal cannula  Flow (L/min): 1}    Flowsheet Rows      First Filed Value   Admission Height  165.1 cm (65\") Documented at 01/31/2020 0815   Admission Weight  99.3 kg (219 lb) Documented at 01/31/2020 0815          Physical Exam:    General: Appears stated age in NAD.  Lungs: Diminished w/ fine bibasilar crackles.  No accessory use.  Normal excursion.  CV: Regular w/o murmur.  Radial pulses are 2+ and symmetric.  Abdomen: Obese, soft, and non-tender w/ active bowel sounds.  MSK: Right hand with a cast, tender to palpate right leg  Neuro: CN II-XII grossly intact  Psych: Pleasant affect.  AAOx3.    Results Review:     I reviewed the patient's new clinical results.    Lab Results (last 24 hours)     Procedure Component Value Units Date/Time    Blood Culture - Blood, Arm, Left [[id number]] Collected:  01/31/20 1250    Specimen:  Blood from Arm, Left Updated:  02/05/20 1301     Blood Culture No growth at 5 days    POC Glucose Once [[id number]]  (Abnormal) Collected:  02/05/20 1139    Specimen:  Blood Updated:  02/05/20 1151     Glucose 172 mg/dL     Magnesium [[id number]]  (Abnormal) Collected:  02/05/20 0613    Specimen:  " Blood Updated:  02/05/20 0848     Magnesium 1.5 mg/dL     POC Glucose Once [[id number]]  (Abnormal) Collected:  02/05/20 0747    Specimen:  Blood Updated:  02/05/20 0753     Glucose 163 mg/dL     Comprehensive Metabolic Panel [[id number]]  (Abnormal) Collected:  02/05/20 0613    Specimen:  Blood Updated:  02/05/20 0706     Glucose 154 mg/dL      BUN 17 mg/dL      Creatinine 0.80 mg/dL      Sodium 136 mmol/L      Potassium 3.8 mmol/L      Chloride 98 mmol/L      CO2 27.2 mmol/L      Calcium 10.1 mg/dL      Total Protein 6.5 g/dL      Albumin 3.00 g/dL      ALT (SGPT) <5 U/L      AST (SGOT) 12 U/L      Alkaline Phosphatase 81 U/L      Total Bilirubin 0.3 mg/dL      eGFR Non African Amer 70 mL/min/1.73      Globulin 3.5 gm/dL      A/G Ratio 0.9 g/dL      BUN/Creatinine Ratio 21.3     Anion Gap 10.8 mmol/L     Narrative:       GFR Normal >60  Chronic Kidney Disease <60  Kidney Failure <15      CBC & Differential [[id number]] Collected:  02/05/20 0613    Specimen:  Blood Updated:  02/05/20 0643    Narrative:       The following orders were created for panel order CBC & Differential.  Procedure                               Abnormality         Status                     ---------                               -----------         ------                     CBC Auto Differential[[id number]]        Abnormal            Final result                 Please view results for these tests on the individual orders.    CBC Auto Differential [[id number]]  (Abnormal) Collected:  02/05/20 0613    Specimen:  Blood Updated:  02/05/20 0643     WBC 6.29 10*3/mm3      RBC 3.94 10*6/mm3      Hemoglobin 10.6 g/dL      Hematocrit 35.5 %      MCV 90.1 fL      MCH 26.9 pg      MCHC 29.9 g/dL      RDW 14.6 %      RDW-SD 48.5 fl      MPV 10.2 fL      Platelets 236 10*3/mm3      Neutrophil % 61.9 %      Lymphocyte % 23.5 %      Monocyte % 8.9 %      Eosinophil % 4.9 %      Basophil % 0.5 %      Immature Grans % 0.3 %      Neutrophils, Absolute 3.89  10*3/mm3      Lymphocytes, Absolute 1.48 10*3/mm3      Monocytes, Absolute 0.56 10*3/mm3      Eosinophils, Absolute 0.31 10*3/mm3      Basophils, Absolute 0.03 10*3/mm3      Immature Grans, Absolute 0.02 10*3/mm3      nRBC 0.0 /100 WBC     POC Glucose Once [[id number]]  (Abnormal) Collected:  02/04/20 2100    Specimen:  Blood Updated:  02/04/20 2106     Glucose 182 mg/dL     POC Glucose Once [[id number]]  (Abnormal) Collected:  02/04/20 1646    Specimen:  Blood Updated:  02/04/20 1655     Glucose 136 mg/dL             Medication Review:   I have reviewed the patient's current medication list    Current Facility-Administered Medications:   •  acetaminophen (TYLENOL) tablet 650 mg, 650 mg, Oral, Q4H PRN, Rey Giron MD  •  anastrozole (ARIMIDEX) tablet 1 mg, 1 mg, Oral, Daily, Rey Giron MD, 1 mg at 02/05/20 0900  •  aspirin EC tablet 81 mg, 81 mg, Oral, Daily, Rey Giron MD, 81 mg at 02/05/20 0852  •  carvedilol (COREG) tablet 12.5 mg, 12.5 mg, Oral, BID With Meals, Rey Giron MD, 12.5 mg at 02/05/20 0852  •  cholecalciferol (VITAMIN D3) tablet 1,000 Units, 1,000 Units, Oral, Daily, Rey Giron MD, 1,000 Units at 02/05/20 0852  •  dextrose (D50W) 25 g/ 50mL Intravenous Solution 25 g, 25 g, Intravenous, Q15 Min PRN, Rey Giron MD  •  dextrose (GLUTOSE) oral gel 15 g, 15 g, Oral, Q15 Min PRN, Rey Giron MD  •  DULoxetine (CYMBALTA) DR capsule 60 mg, 60 mg, Oral, Daily, Rey Giron MD, 60 mg at 02/05/20 0852  •  enoxaparin (LOVENOX) syringe 40 mg, 40 mg, Subcutaneous, Q24H, Rey Giorn MD, 40 mg at 02/04/20 2007  •  ferrous sulfate EC tablet 324 mg, 324 mg, Oral, Daily With Breakfast, Rey Giron MD, 324 mg at 02/05/20 0852  •  [START ON 2/6/2020] furosemide (LASIX) tablet 40 mg, 40 mg, Oral, Daily, [place], Srinivasan MAZA, DO  •  glucagon (GLUCAGEN) injection 1 mg, 1 mg, Subcutaneous, Q15 Min PRN, Rey Giron MD  •  insulin aspart (novoLOG) injection 0-7 Units, 0-7 Units,  Subcutaneous, 4x Daily AC & at Bedtime, Rey Giron MD, 2 Units at 02/05/20 1157  •  ipratropium-albuterol (DUO-NEB) nebulizer solution 3 mL, 3 mL, Nebulization, Q4H PRN, Rey Giron MD  •  levothyroxine (SYNTHROID, LEVOTHROID) tablet 25 mcg, 25 mcg, Oral, Q AM, Rey Giron MD, 25 mcg at 02/05/20 0604  •  losartan (COZAAR) tablet 25 mg, 25 mg, Oral, Daily, Rey Giron MD, 25 mg at 02/05/20 0900  •  melatonin tablet 3 mg, 3 mg, Oral, Nightly, Rey Giron MD, 3 mg at 02/04/20 2007  •  metFORMIN (GLUCOPHAGE) tablet 1,000 mg, 1,000 mg, Oral, Daily With Breakfast, Rey Giron MD, 1,000 mg at 02/05/20 0900  •  metFORMIN (GLUCOPHAGE) tablet 500 mg, 500 mg, Oral, Daily With Dinner, Rey Giron MD, 500 mg at 02/04/20 1820  •  methocarbamol (ROBAXIN) tablet 500 mg, 500 mg, Oral, TID, Rey Giron MD, 500 mg at 02/05/20 0900  •  ondansetron (ZOFRAN) tablet 4 mg, 4 mg, Oral, Q6H PRN, Rey Giron MD  •  oxyCODONE-acetaminophen (PERCOCET) 7.5-325 MG per tablet 1 tablet, 1 tablet, Oral, Q4H PRN, Live Chambers MD, 1 tablet at 02/05/20 1157  •  pantoprazole (PROTONIX) EC tablet 40 mg, 40 mg, Oral, QAM, Rey Giron MD, 40 mg at 02/05/20 0604  •  pravastatin (PRAVACHOL) tablet 10 mg, 10 mg, Oral, Daily, Rey Giron MD, 10 mg at 02/05/20 0901  •  pregabalin (LYRICA) capsule 75 mg, 75 mg, Oral, BID, Rey Giron MD, 75 mg at 02/05/20 0852      Assessment/Plan     1.  Acute Hypoxic Respiratory Failure: Elevated d-dimer likely due to acute fracture thus will not repeat VQ scan.  Given improvement w/ diuresis, PE seems less likely.  On 1 L of oxygen wean off as able.    2.  A/C dCHF Exacerbation: improving.given IV lasix this am, will stop and start PO lasix tomorrow      3.  Right Wrist Fracture: Ortho followed. Will see in office 2 weeks post d/c and will need x-ray of wrist prior to visit     4.  Diabetes Mellitus, Type 2 in Obese: BG stable follow.  Continue to monitor on current  regimen.    5.  HTN: At goal on exam.  Continue to monitor.    6.  CKD III: Creatinine tolerating diuresis.  CKDII?  Repeat BMP in AM.    7.  Obesity: Nutrition consult.    8.  Recent Rehab for Femur Fracture: PT following. Per ortho will need to be non-weightbearing until office follow up. Needs repeat femur x-ray prior to visit     Plan for disposition: Case Management following for placement    Srinivasan Merchant DO  02/05/20  2:27 PM

## 2020-02-05 NOTE — PLAN OF CARE
"  Problem: Patient Care Overview  Goal: Plan of Care Review  Flowsheets (Taken 2/5/2020 5316)  Progress: improving  Plan of Care Reviewed With: patient  Outcome Summary: PT: Pt was awake/alert supine in bed finishing breakfast. Pt agreeable to tx session. Pt initially appears appropriate, but upon further questioning is confused and unable to remember how she brioke her arm. Pt also reports that her leg is almost healed, but she doesn't think she can stand up. Pt was reminded of NWB status to R U/LE. Pt able to roll and transfer sup-sit using L UE only. Pt sat on EOB and performed ankle pumps and LAQ's x 10 reps each. Moderate crepitus noted proximal to R knee during AROM, but pt did not c/o pain. Pt agreeable to getting OOB, but unable to maintain NWB on R LE. Pt transferred sit-sup with SBA, then used Ben LE's to bridge and scoot up in the bed. Pt was reminded again that she is not supposed to bear weight or use her R leg. Pt responded \"I'm not\". Pt left sup in bed with R UE elevated on pillow, call light in reach, fall alarm on. Pt ever tx well this date with improved effort and participating with requested tasks. She needs less assist with bed mob and functional transfers, but is not able to remember or physically maintain NWB status to R U/LE.      "

## 2020-02-05 NOTE — NURSING NOTE
Continued Stay Note  NOHEMY Diaz     Patient Name: Jung Sohrt  MRN: [id number]  Today's Date: 2/5/2020    Admit Date: 1/31/2020    Discharge Plan     Row Name 02/05/20 1129       Plan    Plan  Extended stay     Plan Comments  [facility] has declined pt.  F/U phone call to Ciera at [facility] to check placement progress.  Ciera is working to get pt approved and will notify us when she is approved.          Discharge Codes    No documentation.             Coleman Altamirano RN

## 2020-02-05 NOTE — NURSING NOTE
Continued Stay Note  NOHEMY Diaz     Patient Name: Jung Short  MRN: [id number]  Today's Date: 2/5/2020    Admit Date: 1/31/2020    Discharge Plan     Row Name 02/05/20 1557       Plan    Plan  d/c to [facility]     Plan Comments  Phone call from Ciera at [facility] to confirm that pt has been accepted at [facility] and will be able to accept 2/6/2020.          Discharge Codes    No documentation.             Coleman Altamirano RN

## 2020-02-05 NOTE — THERAPY TREATMENT NOTE
Acute Care - Physical Therapy Treatment Note  NOHEMY Diaz     Patient Name: Jung Short  : 1945  MRN: [id number]  Today's Date: 2020  Onset of Illness/Injury or Date of Surgery: 20(right femur and foot fracture 2 months ago)  Date of Referral to PT: 20  Referring Physician: Dr. Giron    Admit Date: 2020    Visit Dx:    ICD-10-CM ICD-9-CM   1. Pleural effusion J90 511.9   2. Congestive heart failure, unspecified HF chronicity, unspecified heart failure type (CMS/HCC) I50.9 428.0   3. Hypoxia R09.02 799.02   4. Other closed fracture of distal end of right radius, initial encounter S52.591A 813.42   5. Hypertensive emergency I16.1 401.9     Patient Active Problem List   Diagnosis   • Abdominal bloating   • Abdominal mass   • Abdominal pain   • Abnormal gait   • Abnormal mammogram   • Chronic anemia   • Thoracic back pain   • Malignant neoplasm of upper-inner quadrant of right breast in female, estrogen receptor positive (CMS/HCC)   • Candidiasis   • Cardiomyopathy (CMS/HCC)   • Disc disorder of cervical region   • Neck pain   • Tenderness of chest wall   • Anemia due to stage 3 chronic kidney disease (CMS/HCC)   • Chronic pain   • Constipation   • Depression   • Type 2 diabetes mellitus (CMS/HCC)   • DM2 (diabetes mellitus, type 2) (CMS/HCC)   • Dyslipidemia   • Gastroesophageal reflux disease with esophagitis   • Generalized pain   • Gout   • Hypertension   • Hypothyroidism   • Incisional hernia   • Mass of breast   • Mitral valve insufficiency   • Nausea   • Adult body mass index greater than 30   • Osteopenia of spine   • Arthralgia of multiple joints   • Peripheral neuropathy   • Pulmonary hypertension (CMS/HCC)   • PITTS (dyspnea on exertion)   • Osteomyelitis (CMS/HCC)   • Tricuspid valve insufficiency   • Cobalamin deficiency   • Vitamin D deficiency   • Diabetes mellitus (CMS/HCC)   • Left knee pain   • Arthritis of knee   • DDD (degenerative disc disease), lumbar   • Lumbar  facet arthropathy   • Pain in shoulder   • Chronic gout of multiple sites   • Encounter for long-term (current) use of high-risk medication   • Constipation due to opioid therapy   • Syncope, psychogenic   • Closed fracture of patella   • Primary osteoarthritis of left knee   • Radius/ulna fracture, left, closed, initial encounter   • Rib pain on left side   • Preoperative cardiovascular examination   • Physical deconditioning   • Hyponatremia   • Acute URI   • Acute on chronic HFrEF (heart failure with reduced ejection fraction) (CMS/[facility])   • CKD (chronic kidney disease) stage 3, GFR 30-59 ml/min (CMS/[facility])   • Diastolic congestive heart failure (CMS/[facility])   • MRSA infection   • Pleural effusion   • Acute respiratory failure with hypoxia (CMS/[facility])       Therapy Treatment    Rehabilitation Treatment Summary     Row Name 02/05/20 0955             Treatment Time/Intention    Discipline  physical therapist  -JV      Document Type  therapy note (daily note)  -JV      Subjective Information  no complaints  -JV      Mode of Treatment  physical therapy;individual therapy  -JV      Patient/Family Observations  Pt awake and finishing breakfast tray, agreeable to tx.  -JV      Care Plan Review  care plan/treatment goals reviewed  -JV      Patient Effort  good  -JV      Recorded by [JV] Jordyn Villagomez, PT 02/05/20 5366      Row Name 02/05/20 0955             Cognitive Assessment/Intervention- PT/OT    Affect/Mental Status (Cognitive)  confused  -JV      Orientation Status (Cognition)  oriented to;person;place  -JV      Follows Commands (Cognition)  follows one step commands;increased processing time needed;physical/tactile prompts required  -JV      Recorded by [JV] Jordyn Villagomez, PT 02/05/20 1117      Row Name 02/05/20 0955             Safety Issues, Functional Mobility    Safety Issues Affecting Function (Mobility)  awareness of need for assistance;insight into deficits/self awareness;unable to maintain weight-bearing  restrictions  -JV      Impairments Affecting Function (Mobility)  balance;cognition;motor planning  -JV      Recorded by [DIRKV] Jordyn Villagomez, PT 02/05/20 1155      Row Name 02/05/20 0955             Mobility Assessment/Intervention    Right Upper Extremity (Weight-bearing Status)  non weight-bearing (NWB)  -JV      Right Lower Extremity (Weight-bearing Status)  non weight-bearing (NWB)  -JV      Recorded by [DIRKV] Jordyn Villagomez, PT 02/05/20 1155      Row Name 02/05/20 0955             Bed Mobility Assessment/Treatment    Supine-Sit [place] (Bed Mobility)  supervision;verbal cues  -JV      Sit-Supine [place] (Bed Mobility)  supervision;verbal cues  -JV      Bed Mobility, Safety Issues  unable to safely maintain weight bearing restrictions  -JV      Assistive Device (Bed Mobility)  bed rails;head of bed elevated  -JV      Recorded by [HARPER] Jordyn Villagomez, PT 02/05/20 1155      Row Name 02/05/20 0955             Transfer Assessment/Treatment    Maintains Weight-bearing Status (Transfers)  unable to maintain weight-bearing status  -JV      Recorded by [HARPER] Jordyn Villagomez, PT 02/05/20 1155      Row Name 02/05/20 0955             Lower Extremity Seated Therapeutic Exercise    Performed, Seated Lower Extremity (Therapeutic Exercise)  ankle dorsiflexion/plantarflexion;LAQ (long arc quad), knee extension  -JV      Exercise Type, Seated Lower Extremity (Therapeutic Exercise)  AROM (active range of motion)  -JV      Sets/Reps Detail, Seated Lower Extremity (Therapeutic Exercise)  10 reps each  -JV      Recorded by [HARPER] Jordyn Villagomez, PT 02/05/20 1155      Row Name 02/05/20 0955             Positioning and Restraints    Pre-Treatment Position  in bed  -JV      Post Treatment Position  bed  -JV      In Bed  supine;call light within reach;encouraged to call for assist;exit alarm on;RUE elevated  -JV      Recorded by [HARPER] Jordyn Villagomez, PT 02/05/20 1155      Row Name 02/05/20 0955             Pain Scale: Numbers  "Pre/Post-Treatment    Pain Scale: Numbers, Pretreatment  2/10  -JV      Pain Scale: Numbers, Post-Treatment  2/10  -JV      Pain Location  knee  -JV      Recorded by [HARPER] Jordyn Villagomez, PT 02/05/20 8884      Row Name 02/05/20 0955             Plan of Care Review    Plan of Care Reviewed With  patient  -JV      Progress  improving  -JV      Outcome Summary  PT: Pt was awake/alert supine in bed finishing breakfast. Pt agreeable to tx session. Pt initially appears appropriate, but upon further questioning is confused and unable to remember how she brioke her arm. Pt also reports that her leg is almost healed, but she doesn't think she can stand up. Pt was reminded of NWB status to R U/LE. Pt able to roll and transfer sup-sit using L UE only. Pt sat on EOB and performed ankle pumps and LAQ's x 10 reps each. Moderate crepitus noted proximal to R knee during AROM, but pt did not c/o pain. Pt agreeable to getting OOB, but unable to maintain NWB on R LE. Pt transferred sit-sup with SBA, then used Ben LE's to bridge and scoot up in the bed. Pt was reminded again that she is not supposed to bear weight or use her R leg. Pt responded \"I'm not\". Pt left sup in bed with R UE elevated on pillow, call light in reach, fall alarm on. Pt ever tx well this date with improved effort and participating with requested tasks. She needs less assist with bed mob and functional transfers, but is not able to remember or physically maintain NWB status to R U/LE.   -JV      Recorded by [DIRKV] Jordyn Villagomez, PT 02/05/20 1155      Row Name 02/05/20 0955             Outcome Summary/Treatment Plan (PT)    Daily Summary of Progress (PT)  progress toward functional goals is gradual  -JV      Plan for Continued Treatment (PT)  Cont plan as ever.  -JV      Recorded by [HARPER] Jordyn Villagomez, PT 02/05/20 8161        User Key  (r) = Recorded By, (t) = Taken By, (c) = Cosigned By    Initials Name Effective Dates Discipline    Jordyn Fuentes, PT 09/30/19 -  " "PT                       PT Recommendation and Plan     Outcome Summary/Treatment Plan (PT)  Daily Summary of Progress (PT): progress toward functional goals is gradual  Plan for Continued Treatment (PT): Cont plan as ever.  Plan of Care Reviewed With: patient  Progress: improving  Outcome Summary: PT: Pt was awake/alert supine in bed finishing breakfast. Pt agreeable to tx session. Pt initially appears appropriate, but upon further questioning is confused and unable to remember how she brioke her arm. Pt also reports that her leg is almost healed, but she doesn't think she can stand up. Pt was reminded of NWB status to R U/LE. Pt able to roll and transfer sup-sit using L UE only. Pt sat on EOB and performed ankle pumps and LAQ's x 10 reps each. Moderate crepitus noted proximal to R knee during AROM, but pt did not c/o pain. Pt agreeable to getting OOB, but unable to maintain NWB on R LE. Pt transferred sit-sup with SBA, then used Ben LE's to bridge and scoot up in the bed. Pt was reminded again that she is not supposed to bear weight or use her R leg. Pt responded \"I'm not\". Pt left sup in bed with R UE elevated on pillow, call light in reach, fall alarm on. Pt ever tx well this date with improved effort and participating with requested tasks. She needs less assist with bed mob and functional transfers, but is not able to remember or physically maintain NWB status to R U/LE.   Outcome Measures     Row Name 02/05/20 0955 02/04/20 0879          How much help from another person do you currently need...    Turning from your back to your side while in flat bed without using bedrails?  4  -JV  3  -JW     Moving from lying on back to sitting on the side of a flat bed without bedrails?  4  -JV  2  -JW     Moving to and from a bed to a chair (including a wheelchair)?  1  -JV  1  -JW     Standing up from a chair using your arms (e.g., wheelchair, bedside chair)?  1  -JV  1  -JW     Climbing 3-5 steps with a railing?  1  -JV  " 1  -JW     To walk in hospital room?  1  -JV  1  -JW     AM-PAC 6 Clicks Score (PT)  12  -JV  9  -JW        Functional Assessment    Outcome Measure Options  AM-PAC 6 Clicks Basic Mobility (PT)  -JV  AM-PAC 6 Clicks Basic Mobility (PT)  -JW       User Key  (r) = Recorded By, (t) = Taken By, (c) = Cosigned By    Initials Name Provider Type     Sarah Begum, PT Physical Therapist    Jordyn Fuentes PT Physical Therapist         Time Calculation:   PT Charges     Row Name 02/05/20 1159             Time Calculation    Start Time  0955  -JV      Stop Time  1010  -JV      Time Calculation (min)  15 min  -JV         Timed Charges    17796 - PT Therapeutic Activity Minutes  15  -JV        User Key  (r) = Recorded By, (t) = Taken By, (c) = Cosigned By    Initials Name Provider Type    Jordyn Fuentes, CORRIE Physical Therapist        Therapy Charges for Today     Code Description Service Date Service Provider Modifiers Qty    93120828522  PT THERAPEUTIC ACT EA 15 MIN 2/5/2020 Jordyn Villagomez, PT GP 1          PT G-Codes  Outcome Measure Options: AM-PAC 6 Clicks Basic Mobility (PT)  AM-PAC 6 Clicks Score (PT): 12    Jordyn Villagomez PT  2/5/2020

## 2020-02-05 NOTE — NURSING NOTE
Rn Spoke w/ Daughter, via phone, r/t D/C. During conversation, Daughter stated that patient does not do well r/t pain administration in leiu of CKD III. RN acknowledged understanding, will monitor patient closely, and will attempt to stagger Narcotic and tylenol to meet patient pain control. Daughter wa sin agreement.

## 2020-02-06 VITALS
SYSTOLIC BLOOD PRESSURE: 148 MMHG | OXYGEN SATURATION: 94 % | RESPIRATION RATE: 20 BRPM | HEART RATE: 72 BPM | BODY MASS INDEX: 35.99 KG/M2 | HEIGHT: 65 IN | TEMPERATURE: 98.1 F | DIASTOLIC BLOOD PRESSURE: 64 MMHG | WEIGHT: 216 LBS

## 2020-02-06 LAB
ANION GAP SERPL CALCULATED.3IONS-SCNC: 9.5 MMOL/L (ref 5–15)
BUN BLD-MCNC: 16 MG/DL (ref 8–23)
BUN/CREAT SERPL: 20.3 (ref 7–25)
CALCIUM SPEC-SCNC: 10.4 MG/DL (ref 8.6–10.5)
CHLORIDE SERPL-SCNC: 102 MMOL/L (ref 98–107)
CO2 SERPL-SCNC: 29.5 MMOL/L (ref 22–29)
CREAT BLD-MCNC: 0.79 MG/DL (ref 0.57–1)
GFR SERPL CREATININE-BSD FRML MDRD: 71 ML/MIN/1.73
GLUCOSE BLD-MCNC: 156 MG/DL (ref 65–99)
GLUCOSE BLDC GLUCOMTR-MCNC: 150 MG/DL (ref 70–130)
GLUCOSE BLDC GLUCOMTR-MCNC: 166 MG/DL (ref 70–130)
POTASSIUM BLD-SCNC: 3.5 MMOL/L (ref 3.5–5.2)
SODIUM BLD-SCNC: 141 MMOL/L (ref 136–145)

## 2020-02-06 PROCEDURE — 99239 HOSP IP/OBS DSCHRG MGMT >30: CPT | Performed by: INTERNAL MEDICINE

## 2020-02-06 PROCEDURE — 63710000001 INSULIN ASPART PER 5 UNITS: Performed by: HOSPITALIST

## 2020-02-06 PROCEDURE — 80048 BASIC METABOLIC PNL TOTAL CA: CPT | Performed by: INTERNAL MEDICINE

## 2020-02-06 PROCEDURE — 82962 GLUCOSE BLOOD TEST: CPT

## 2020-02-06 PROCEDURE — 25010000002 MAGNESIUM SULFATE IN D5W 1G/100ML (PREMIX) 1-5 GM/100ML-% SOLUTION: Performed by: INTERNAL MEDICINE

## 2020-02-06 PROCEDURE — 99024 POSTOP FOLLOW-UP VISIT: CPT | Performed by: ORTHOPAEDIC SURGERY

## 2020-02-06 RX ORDER — LANOLIN ALCOHOL/MO/W.PET/CERES
800 CREAM (GRAM) TOPICAL ONCE
Status: COMPLETED | OUTPATIENT
Start: 2020-02-06 | End: 2020-02-06

## 2020-02-06 RX ORDER — OXYCODONE AND ACETAMINOPHEN 7.5; 325 MG/1; MG/1
1 TABLET ORAL EVERY 4 HOURS PRN
Qty: 8 TABLET | Refills: 0 | Status: SHIPPED | OUTPATIENT
Start: 2020-02-06 | End: 2020-02-13

## 2020-02-06 RX ORDER — MAGNESIUM SULFATE 1 G/100ML
1 INJECTION INTRAVENOUS ONCE
Status: DISCONTINUED | OUTPATIENT
Start: 2020-02-06 | End: 2020-02-06

## 2020-02-06 RX ADMIN — LOSARTAN POTASSIUM 25 MG: 50 TABLET, FILM COATED ORAL at 09:13

## 2020-02-06 RX ADMIN — LEVOTHYROXINE SODIUM 25 MCG: 25 TABLET ORAL at 06:06

## 2020-02-06 RX ADMIN — Medication 800 MG: at 12:10

## 2020-02-06 RX ADMIN — ANASTROZOLE 1 MG: 1 TABLET ORAL at 09:21

## 2020-02-06 RX ADMIN — MELATONIN 1000 UNITS: at 09:12

## 2020-02-06 RX ADMIN — CARVEDILOL 12.5 MG: 12.5 TABLET, FILM COATED ORAL at 09:11

## 2020-02-06 RX ADMIN — ASPIRIN 81 MG: 81 TABLET, COATED ORAL at 09:11

## 2020-02-06 RX ADMIN — INSULIN ASPART 2 UNITS: 100 INJECTION, SOLUTION INTRAVENOUS; SUBCUTANEOUS at 09:09

## 2020-02-06 RX ADMIN — FERROUS SULFATE TAB EC 324 MG (65 MG FE EQUIVALENT) 324 MG: 324 (65 FE) TABLET DELAYED RESPONSE at 09:11

## 2020-02-06 RX ADMIN — OXYCODONE HYDROCHLORIDE AND ACETAMINOPHEN 1 TABLET: 7.5; 325 TABLET ORAL at 00:32

## 2020-02-06 RX ADMIN — METHOCARBAMOL 500 MG: 500 TABLET, FILM COATED ORAL at 09:11

## 2020-02-06 RX ADMIN — OXYCODONE HYDROCHLORIDE AND ACETAMINOPHEN 1 TABLET: 7.5; 325 TABLET ORAL at 09:21

## 2020-02-06 RX ADMIN — DULOXETINE HYDROCHLORIDE 60 MG: 60 CAPSULE, DELAYED RELEASE ORAL at 09:12

## 2020-02-06 RX ADMIN — METFORMIN HYDROCHLORIDE 1000 MG: 500 TABLET, FILM COATED ORAL at 09:12

## 2020-02-06 RX ADMIN — PANTOPRAZOLE SODIUM 40 MG: 40 TABLET, DELAYED RELEASE ORAL at 06:06

## 2020-02-06 RX ADMIN — PRAVASTATIN SODIUM 10 MG: 20 TABLET ORAL at 09:12

## 2020-02-06 RX ADMIN — PREGABALIN 75 MG: 75 CAPSULE ORAL at 09:13

## 2020-02-06 RX ADMIN — FUROSEMIDE 40 MG: 40 TABLET ORAL at 09:11

## 2020-02-06 RX ADMIN — INSULIN ASPART 2 UNITS: 100 INJECTION, SOLUTION INTRAVENOUS; SUBCUTANEOUS at 12:10

## 2020-02-06 NOTE — NURSING NOTE
Case Management Discharge Note      Final Note: d/c to [facility]     Provided post acute provider list?: Yes  Post Acute Provider List: Inpatient Rehab  Post Acute Provider Quality & Resource List: Yes  Delivered To: Patient  Method of Delivery: In person         Final Discharge Disposition Code: 03 - skilled nursing facility (SNF)

## 2020-02-06 NOTE — PLAN OF CARE
Problem: Patient Care Overview  Goal: Plan of Care Review  Outcome: Ongoing (interventions implemented as appropriate)  Flowsheets (Taken 2/6/2020 0625)  Progress: improving  Plan of Care Reviewed With: patient  Outcome Summary: Patient tolerating room air, patient pain stable with PO medication, Patient anticipates being transferred to new level of care today     Problem: Fall Risk (Adult)  Goal: Absence of Fall  Outcome: Ongoing (interventions implemented as appropriate)  Flowsheets (Taken 2/6/2020 0625)  Absence of Fall: making progress toward outcome     Problem: Cardiac: Heart Failure (Adult)  Goal: Signs and Symptoms of Listed Potential Problems Will be Absent, Minimized or Managed (Cardiac: Heart Failure)  Outcome: Ongoing (interventions implemented as appropriate)  Flowsheets (Taken 2/6/2020 0625)  Problems Assessed (Heart Failure): all  Problems Present (Heart Failure): functional decline/self-care deficit     Problem: Breathing Pattern Ineffective (Adult)  Goal: Effective Oxygenation/Ventilation  Outcome: Ongoing (interventions implemented as appropriate)  Flowsheets (Taken 2/6/2020 0625)  Effective Oxygenation/Ventilation: achieves outcome  Note:   Proper oxygenation on room air

## 2020-02-06 NOTE — PROGRESS NOTES
Orthopedic Progress Note   Chief Complaint: Right distal radial fracture, left femur fracture    Subjective     Interval History: Patient is stable this morning.  Right wrist is feeling better in the cast.  Again the femur fracture she had surgery over 2 months ago in [place] at this time is stable but again x-rays did not show sufficient callus will weightbearing can begin.  She has been accepted at [facility]          Objective     Vital Signs  Temp:  [97.3 °F (36.3 °C)-98 °F (36.7 °C)] 97.3 °F (36.3 °C)  Heart Rate:  [68-80] 80  Resp:  [20] 20  BP: (125-140)/(66-75) 140/75  Body mass index is 35.94 kg/m².    Intake/Output Summary (Last 24 hours) at 2/6/2020 0718  Last data filed at 2/6/2020 0702  Gross per 24 hour   Intake 240 ml   Output 1500 ml   Net -1260 ml     I/O this shift:  In: -   Out: 400 [Urine:400]       Physical Exam:   General: patient awake, alert and cooperative   Cardiovascular: regular rhythm and rate   Pulm: clear to auscultation bilaterally   Abdomen: Benign.  Soft bowel sounds   Extremities: Right upper extremity is in a short arm thumb spica cast.  She is good distal pulses no motor or sensory deficit.  Right lower extremity shows no motor or sensory deficit.   Neurologic: Normal mood and behavior     Results Review:     I reviewed the patient's new clinical results.      WBC No results found for: WBCS   HGB Hemoglobin   Date Value Ref Range Status   02/05/2020 10.6 (L) 12.0 - 15.9 g/dL Final      HCT Hematocrit   Date Value Ref Range Status   02/05/2020 35.5 34.0 - 46.6 % Final      Platlets No results found for: LABPLAT     PT/INR:  No results found for: PROTIME/No results found for: INR    Sodium Sodium   Date Value Ref Range Status   02/06/2020 141 136 - 145 mmol/L Final   02/05/2020 136 136 - 145 mmol/L Final   02/04/2020 137 136 - 145 mmol/L Final      Potassium Potassium   Date Value Ref Range Status   02/06/2020 3.5 3.5 - 5.2 mmol/L Final   02/05/2020 3.8 3.5 - 5.2 mmol/L Final    02/04/2020 3.6 3.5 - 5.2 mmol/L Final      Chloride Chloride   Date Value Ref Range Status   02/06/2020 102 98 - 107 mmol/L Final   02/05/2020 98 98 - 107 mmol/L Final   02/04/2020 98 98 - 107 mmol/L Final      Bicarbonate No results found for: PLASMABICARB   BUN BUN   Date Value Ref Range Status   02/06/2020 16 8 - 23 mg/dL Final   02/05/2020 17 8 - 23 mg/dL Final   02/04/2020 15 8 - 23 mg/dL Final      Creatinine Creatinine   Date Value Ref Range Status   02/06/2020 0.79 0.57 - 1.00 mg/dL Final   02/05/2020 0.80 0.57 - 1.00 mg/dL Final   02/04/2020 0.81 0.57 - 1.00 mg/dL Final      Calcium Calcium   Date Value Ref Range Status   02/06/2020 10.4 8.6 - 10.5 mg/dL Final   02/05/2020 10.1 8.6 - 10.5 mg/dL Final   02/04/2020 9.7 8.6 - 10.5 mg/dL Final      Magnesium  AST  ALT  Bilirubin, Total  AlkPhos  Albumin    Amylase  Lipase    Radiology: Magnesium   Date Value Ref Range Status   02/05/2020 1.5 (L) 1.6 - 2.4 mg/dL Final   02/04/2020 1.3 (L) 1.6 - 2.4 mg/dL Final     No components found for: AST.*  No components found for: ALT.*  No results found for: BILIRUBIN    No components found for: ALKPHOS.*  No components found for: ALBUMIN.*      No components found for: AMYLASE.*  No components found for: LIPASE.*            Imaging Results (Most Recent)     Procedure Component Value Units Date/Time    NM Lung Ventilation Perfusion [[id number]] Collected:  02/03/20 1914     Updated:  02/03/20 1916    Narrative:       INDICATION:   Acute onset short of breath. History of CHF. Unable to raise left arm due to fracture. Also IV blew per technologist during the perfusion injection.     DOSE:  *  27.2 mCi Tc99m DTPA. Aerosolized  *  5.5 mCi Tc99m MAA. Attempted IV administration    COMPARISON:   Chest x-ray 2/2/2020..    FINDINGS:   Ventilation:  There is heterogeneity of ventilation bilaterally most consistent with underlying chronic obstructive lung disease.    Perfusion:  The perfusion images are nondiagnostic since IV  access was lost during attempted administration of the tracer. The study can be repeated in the morning after the dose has decayed or alternatively, the patient could undergo CT chest pulmonary  embolism protocol if they are candidate.      Impression:         1. The perfusion imaging is nondiagnostic since IV access was lost during the attempted injection of the tracer. The study could be repeated in the morning after the tracer has decayed, or alternatively, if renal function is adequate, CT chest pulmonary  embolism protocol could be obtained.    Signer Name: Zarina Mari MD   Signed: 2/3/2020 7:14 PM   Workstation Name: [email or web address]    [facility]    XR Wrist 2 View Right [[id number]] Collected:  02/03/20 0841     Updated:  02/03/20 0844    Narrative:       XR WRIST 2 VW RIGHT-: 2/3/2020 7:42 AM     INDICATION:   HISTORY right wrist fracture. Follow-up..     COMPARISON:   01/31/2020.     FINDINGS:   2 views of the right wrist.  Artifact related to overlying cast  material. Impacted distal radius fracture, similar to the prior study.  Perhaps some minimal callus formation, difficult to confirm due to  overlying cast material. Otherwise no significant interval change. The  bones are osteopenic. Alignment preserved..  No foreign body.       Impression:          1. Distal right radius fracture with perhaps some interval callus  formation. No other significant interval change.     This report was finalized on 2/3/2020 8:42 AM by Dr. Kristopher Carr MD.       XR Chest 1 View [[id number]] Collected:  02/02/20 1213     Updated:  02/02/20 1215    Narrative:       CR Chest 1 Vw    INDICATION:   Acute onset of shortness of breath. History of CHF.     COMPARISON:    None available.    FINDINGS:  Single portable AP view(s) of the chest.    The heart size remains enlarged. Bibasilar atelectatic/infiltrative changes with bilateral pleural effusions. Modest improvement from prior study. No pneumothorax.        Impression:       Cardiomegaly with bilateral pleural effusions and bibasilar atelectatic/infiltrative change. Modest improvement from prior study.    Signer Name: Robbin Zimmer MD   Signed: 2/2/2020 12:13 PM   Workstation Name: [id number]    [facility]    XR Femur 2 View Right [[id number]] Collected:  02/01/20 1227     Updated:  02/01/20 1229    Narrative:       CR Femur 2 Vws RT    INDICATION:   74-year-old female with right femur pain status post fall 2 months ago.    COMPARISON:   None available.    FINDINGS:   By views of the right femur.  Diffuse bony demineralization. No gross evidence of acute displaced fracture or dislocation. Total right knee arthroplasty. ORIF of the right femur with a long lateral sideplate and multiple associated screws. The hardware  spans a healing, mildly displaced fracture of the distal femoral metaphysis. The fracture line remains visualized with evidence of some callus formation around the fracture site. No significant solid osseous bridging.  No foreign body.      Impression:         1. Osteopenia. No gross acute injury.  2. ORIF of the right femur spanning a healing, mildly displaced fracture of the distal femoral metaphysis. The fracture line remains well-visualized without evidence of callus formation. No significant solid osseous bridging.  3. Right knee arthroplasty.    Signer Name: Benito Zimmer MD   Signed: 2/1/2020 12:27 PM   Workstation Name: [id number]    [facility]    XR Foot 2 View Right [[id number]] Collected:  02/01/20 1224     Updated:  02/01/20 1226    Narrative:       CR Foot 2 Vws RT    INDICATION:   74-year-old female with right foot pains that is post fall 2 months ago.    COMPARISON:   None available    FINDINGS:   2 views of the right foot.  Diffuse bony demineralization. No gross evidence of a displaced fracture or dislocation. Transmetatarsal amputation of the second ray. Partial amputation of the first  metatarsal. Suspected sequelae of old, healed distal  fibular fracture. Moderate tibiotalar arthrosis. Mild arthrosis throughout the midfoot. Plantar calcaneal spur.  No bone erosion or destruction.  No foreign body.      Impression:         1. Osteopenia. No gross evidence of a displaced fracture or dislocation.  2. Postsurgical changes of the first metatarsal and second ray.  3. Hindfoot and midfoot arthrosis, detailed above. Questionable old, healed fracture deformity of the distal fibula.    Signer Name: Benito Zimmer MD   Signed: 2/1/2020 12:24 PM   Workstation Name: BOY"[employer]"[facility]    [facility]    XR Pelvis 1 or 2 View [[id number]] Collected:  02/01/20 1221     Updated:  02/01/20 1224    Narrative:       CR Pelvis 1 or 2 Vws    INDICATION:   74-year-old female with pelvic pain status post fall 2 months ago.    COMPARISON:   None.    FINDINGS:  AP view(s) of the pelvis.  Diffuse bony demineralization. No evidence of a displaced fracture or dislocation. Bony pelvis and sacrum appear grossly intact. Partially imaged right femur ORIF. Mild degenerative changes in both hips. Soft tissues are  unremarkable. No bone erosion or destruction.        Impression:       Osteopenia. No evidence of a displaced fracture or dislocation. Mild bilateral hip arthrosis.    Signer Name: Benito Zimmer MD   Signed: 2/1/2020 12:21 PM   Workstation Name: [facility]    [facility]    US Venous Doppler Lower Extremity Bilateral (duplex) [[id number]] Collected:  02/01/20 1119     Updated:  02/01/20 1121    Narrative:       US Veins LE Duplex BILAT    HISTORY:   Fall yesterday with fracture of right wrist. Recent right femur fracture. Positive d-dimer.     TECHNIQUE:   Real-time ultrasound was performed of both lower extremities utilizing spectral and color Doppler with compression and augmentation techniques.    COMPARISON:  July 29, 2016    FINDINGS:  Right Lower Extremity:  There is no  deep venous thrombus seen in the right lower extremity, including the right common femoral veins, right femoral veins and right popliteal veins.  Normal compressibility and respiratory phasicity was visualized.  No calf vein thrombus.    Left Lower Extremity:  There is no deep venous thrombus seen in the left lower extremity, including the left common femoral veins, left femoral veins and left popliteal veins.   Normal compressibility and respiratory phasicity was visualized.  No calf vein thrombus.        Impression:       No deep venous thrombus seen in either lower extremity.    Signer Name: Robbin Zimmer MD   Signed: 2/1/2020 11:19 AM   Workstation Name: [id number]    [facility]    XR Chest 1 View [[id number]] Collected:  02/01/20 0631     Updated:  02/01/20 0634    Narrative:       CHEST X-RAY, 1/31/2020      HISTORY:    Respiratory failure. Pleural effusion. Follow-up cardiopulmonary status.      TECHNIQUE:  AP portable upright chest x-ray.      FINDINGS:  Small-to-moderate bilateral pleural effusions and bilateral lower lung atelectasis, unchanged since yesterday. Moderate cardiomegaly. Mildly prominent indistinct interstitial markings may reflect mild vascular congestion, correlate clinically. Right  axillary surgical clips.      Impression:       1. Cardiomegaly and possible mild vascular congestion.  2. Moderate bilateral pleural effusions and bibasilar pulmonary atelectasis.  3. No significant change since yesterday.    Signer Name: Levy Mishra MD   Signed: 2/1/2020 6:31 AM   Workstation Name: [id number]    [facility]    XR Wrist 3+ View Right [[id number]] Collected:  01/31/20 0927     Updated:  01/31/20 0930    Narrative:       XR WRIST 3+ VW RIGHT-: 1/31/2020 9:15 AM     INDICATION:   Patient fell from wheelchair this morning with pain in right wrist.     COMPARISON:   None available.     FINDINGS:   3 views of the right wrist.  There is an  acute fracture involving the  distal radius about 1 cm proximal to the end of the bone. The fracture  appears be lying transversely with mild impaction. The carpal bones are  normal. The ulna is normal. No bone erosion or destruction.  No foreign  body.       Impression:       Mild impaction-type fracture involving the distal radius about 1 cm from  the end of the bone..     This report was finalized on 1/31/2020 9:28 AM by Dr. Chi Mattson MD.       XR Chest 1 View [[id number]] Collected:  01/31/20 0927     Updated:  01/31/20 0929    Narrative:       AP PORTABLE CHEST, 01/31/2020     HISTORY:  Shortness of air for weeks     COMPARISON:  09/01/2019     TECHNIQUE:  AP portable chest x-ray.     FINDINGS:  There are low lung volumes. There are small bilateral effusions with  bibasilar atelectasis. There is mild interstitial prominence.       Impression:       Small left pleural effusions with bibasal atelectasis and mild  interstitial prominence.     This report was finalized on 1/31/2020 9:27 AM by Dr. Chi Mattson MD.                       Assessment/Plan     Patient Active Problem List   Diagnosis Code   • Abdominal bloating R14.0   • Abdominal mass R19.00   • Abdominal pain R10.9   • Abnormal gait R26.9   • Abnormal mammogram R92.8   • Chronic anemia D64.9   • Thoracic back pain M54.6   • Malignant neoplasm of upper-inner quadrant of right breast in female, estrogen receptor positive (CMS/HCC) C50.211, Z17.0   • Candidiasis B37.9   • Cardiomyopathy (CMS/HCC) I42.9   • Disc disorder of cervical region M50.90   • Neck pain M54.2   • Tenderness of chest wall R07.89   • Anemia due to stage 3 chronic kidney disease (CMS/HCC) N18.3, D63.1   • Chronic pain G89.29   • Constipation K59.00   • Depression F32.9   • Type 2 diabetes mellitus (CMS/HCC) E11.9   • DM2 (diabetes mellitus, type 2) (CMS/HCC) E11.9   • Dyslipidemia E78.5   • Gastroesophageal reflux disease with esophagitis K21.0   • Generalized pain R52   • Gout M10.9   •  Hypertension I10   • Hypothyroidism E03.9   • Incisional hernia K43.2   • Mass of breast N63.0   • Mitral valve insufficiency I34.0   • Nausea R11.0   • Adult body mass index greater than 30 [id number]   • Osteopenia of spine M85.88   • Arthralgia of multiple joints M25.50   • Peripheral neuropathy G62.9   • Pulmonary hypertension (CMS/[facility]) I27.20   • PITTS (dyspnea on exertion) R06.09   • Osteomyelitis (CMS/[facility]) M86.9   • Tricuspid valve insufficiency I07.1   • Cobalamin deficiency E53.8   • Vitamin D deficiency E55.9   • Diabetes mellitus (CMS/[facility]) E11.9   • Left knee pain M25.562   • Arthritis of knee M17.10   • DDD (degenerative disc disease), lumbar M51.36   • Lumbar facet arthropathy M47.816   • Pain in shoulder M25.519   • Chronic gout of multiple sites M1A.09X0   • Encounter for long-term (current) use of high-risk medication Z79.899   • Constipation due to opioid therapy K59.03, T40.2X5A   • Syncope, psychogenic F48.8   • Closed fracture of patella S82.009A   • Primary osteoarthritis of left knee M17.12   • Radius/ulna fracture, left, closed, initial encounter S52.92XA, S52.202A   • Rib pain on left side R07.81   • Preoperative cardiovascular examination Z01.810   • Physical deconditioning R53.81   • Hyponatremia E87.1   • Acute URI J06.9   • Acute on chronic HFrEF (heart failure with reduced ejection fraction) (CMS/[facility]) I50.23   • CKD (chronic kidney disease) stage 3, GFR 30-59 ml/min (CMS/[facility]) N18.3   • Diastolic congestive heart failure (CMS/[facility]) I50.30   • MRSA infection A49.02   • Pleural effusion J90   • Acute respiratory failure with hypoxia (CMS/[facility]) J96.01       Patient is being transferred from [place] today and she will remain nonweightbearing on that right leg.  We will see in the office in 4 weeks with an x-ray to be done of the right wrist and the right femur 1 day before office visit and sent with patient.      Live Chambers MD  02/06/20  7:18 AM          Dictated utilizing Dragon dictation

## 2020-02-06 NOTE — DISCHARGE SUMMARY
Date of Admission: 1/31/2020    Date of Discharge:  2/6/2020    Length of stay:  LOS: 6 days     Presenting Problem:   Pleural effusion [J90]  Hypoxia [R09.02]  Hypertensive emergency [I16.1]  Other closed fracture of distal end of right radius, initial encounter [S52.531A]  Congestive heart failure, unspecified HF chronicity, unspecified heart failure type (CMS/HCC) [I50.9]  Acute respiratory failure with hypoxia (CMS/HCC) [J96.01]      Principal and Active Diagnosis During Hospital Stay:     Active Hospital Problems    Diagnosis  POA   • Pleural effusion [J90]  Yes   • Acute respiratory failure with hypoxia (CMS/HCC) [J96.01]  Yes      Resolved Hospital Problems   No resolved problems to display.     1.  Acute Hypoxic Respiratory Failure: resolved  Elevated d-dimer likely due to acute fracture thus will not repeat VQ scan.  Given improvement w/ diuresis, PE seems less likely.  on RA at d/c      2.  A/C dCHF Exacerbation: improving. Back on home po bumex at d/c      3.  Right Wrist Fracture: Ortho followed. Will see in office 4 weeks post d/c and will need x-ray of wrist prior to visit      4.  Diabetes Mellitus, Type 2 in Obese: BG stable follow.  Continue to monitor on current regimen.     5.  HTN: At goal on exam.  Continue to monitor.     6.  CKD III: Creatinine tolerating diuresis.  CKDII?  Repeat BMP in AM.     7.  Obesity: Nutrition consult.     8.  Recent Rehab for Femur Fracture: PT following. Per ortho will need to be non-weightbearing until office follow up. Needs repeat femur x-ray prior to visit       Hospital Course  Patient is a 74 y.o. female presented with shortness of breath and was treated with diuresis and was able to be titrated to room air.  She had additional issues of right wrist fracture and recent rehab for femur fracture.  Ortho will follow up with the patient in 4 weeks to monitor improvement.  She was very functionally limited and it was felt she would be better suited for rehab and  she will be transferred to rehab in stable condition for further treatment.        Procedures Performed:none         Consults:   Consults     Date and Time Order Name Status Description    1/31/2020 1830 Inpatient Orthopedic Surgery Consult            Pertinent Test Results:     Lab Results (last 72 hours)     Procedure Component Value Units Date/Time    POC Glucose Once [[id number]]  (Abnormal) Collected:  02/06/20 0739    Specimen:  Blood Updated:  02/06/20 0745     Glucose 150 mg/dL     Basic Metabolic Panel [[id number]]  (Abnormal) Collected:  02/06/20 0349    Specimen:  Blood Updated:  02/06/20 0428     Glucose 156 mg/dL      BUN 16 mg/dL      Creatinine 0.79 mg/dL      Sodium 141 mmol/L      Potassium 3.5 mmol/L      Chloride 102 mmol/L      CO2 29.5 mmol/L      Calcium 10.4 mg/dL      eGFR Non African Amer 71 mL/min/1.73      BUN/Creatinine Ratio 20.3     Anion Gap 9.5 mmol/L     Narrative:       GFR Normal >60  Chronic Kidney Disease <60  Kidney Failure <15      POC Glucose Once [[id number]]  (Abnormal) Collected:  02/05/20 2133    Specimen:  Blood Updated:  02/05/20 2138     Glucose 233 mg/dL     POC Glucose Once [[id number]]  (Abnormal) Collected:  02/05/20 1704    Specimen:  Blood Updated:  02/05/20 1715     Glucose 131 mg/dL     Blood Culture - Blood, Arm, Left [[id number]] Collected:  01/31/20 1250    Specimen:  Blood from Arm, Left Updated:  02/05/20 1301     Blood Culture No growth at 5 days    POC Glucose Once [[id number]]  (Abnormal) Collected:  02/05/20 1139    Specimen:  Blood Updated:  02/05/20 1151     Glucose 172 mg/dL     Magnesium [[id number]]  (Abnormal) Collected:  02/05/20 0613    Specimen:  Blood Updated:  02/05/20 0848     Magnesium 1.5 mg/dL     POC Glucose Once [[id number]]  (Abnormal) Collected:  02/05/20 0747    Specimen:  Blood Updated:  02/05/20 0753     Glucose 163 mg/dL     Comprehensive Metabolic Panel [[id number]]  (Abnormal) Collected:  02/05/20 0613    Specimen:  Blood Updated:   02/05/20 0706     Glucose 154 mg/dL      BUN 17 mg/dL      Creatinine 0.80 mg/dL      Sodium 136 mmol/L      Potassium 3.8 mmol/L      Chloride 98 mmol/L      CO2 27.2 mmol/L      Calcium 10.1 mg/dL      Total Protein 6.5 g/dL      Albumin 3.00 g/dL      ALT (SGPT) <5 U/L      AST (SGOT) 12 U/L      Alkaline Phosphatase 81 U/L      Total Bilirubin 0.3 mg/dL      eGFR Non African Amer 70 mL/min/1.73      Globulin 3.5 gm/dL      A/G Ratio 0.9 g/dL      BUN/Creatinine Ratio 21.3     Anion Gap 10.8 mmol/L     Narrative:       GFR Normal >60  Chronic Kidney Disease <60  Kidney Failure <15      CBC & Differential [[id number]] Collected:  02/05/20 0613    Specimen:  Blood Updated:  02/05/20 0643    Narrative:       The following orders were created for panel order CBC & Differential.  Procedure                               Abnormality         Status                     ---------                               -----------         ------                     CBC Auto Differential[[id number]]        Abnormal            Final result                 Please view results for these tests on the individual orders.    CBC Auto Differential [[id number]]  (Abnormal) Collected:  02/05/20 0613    Specimen:  Blood Updated:  02/05/20 0643     WBC 6.29 10*3/mm3      RBC 3.94 10*6/mm3      Hemoglobin 10.6 g/dL      Hematocrit 35.5 %      MCV 90.1 fL      MCH 26.9 pg      MCHC 29.9 g/dL      RDW 14.6 %      RDW-SD 48.5 fl      MPV 10.2 fL      Platelets 236 10*3/mm3      Neutrophil % 61.9 %      Lymphocyte % 23.5 %      Monocyte % 8.9 %      Eosinophil % 4.9 %      Basophil % 0.5 %      Immature Grans % 0.3 %      Neutrophils, Absolute 3.89 10*3/mm3      Lymphocytes, Absolute 1.48 10*3/mm3      Monocytes, Absolute 0.56 10*3/mm3      Eosinophils, Absolute 0.31 10*3/mm3      Basophils, Absolute 0.03 10*3/mm3      Immature Grans, Absolute 0.02 10*3/mm3      nRBC 0.0 /100 WBC     POC Glucose Once [[id number]]  (Abnormal) Collected:  02/04/20 2100     Specimen:  Blood Updated:  02/04/20 2106     Glucose 182 mg/dL     POC Glucose Once [[id number]]  (Abnormal) Collected:  02/04/20 1646    Specimen:  Blood Updated:  02/04/20 1655     Glucose 136 mg/dL     POC Glucose Once [[id number]]  (Abnormal) Collected:  02/04/20 1156    Specimen:  Blood Updated:  02/04/20 1221     Glucose 184 mg/dL     POC Glucose Once [[id number]]  (Abnormal) Collected:  02/04/20 0719    Specimen:  Blood Updated:  02/04/20 0728     Glucose 151 mg/dL     Comprehensive Metabolic Panel [[id number]]  (Abnormal) Collected:  02/04/20 0625    Specimen:  Blood Updated:  02/04/20 0712     Glucose 161 mg/dL      BUN 15 mg/dL      Creatinine 0.81 mg/dL      Sodium 137 mmol/L      Potassium 3.6 mmol/L      Chloride 98 mmol/L      CO2 31.0 mmol/L      Calcium 9.7 mg/dL      Total Protein 6.3 g/dL      Albumin 3.00 g/dL      ALT (SGPT) <5 U/L      AST (SGOT) 10 U/L      Alkaline Phosphatase 80 U/L      Total Bilirubin 0.4 mg/dL      eGFR Non African Amer 69 mL/min/1.73      Globulin 3.3 gm/dL      A/G Ratio 0.9 g/dL      BUN/Creatinine Ratio 18.5     Anion Gap 8.0 mmol/L     Narrative:       GFR Normal >60  Chronic Kidney Disease <60  Kidney Failure <15      Magnesium [[id number]]  (Abnormal) Collected:  02/04/20 0625    Specimen:  Blood Updated:  02/04/20 0712     Magnesium 1.3 mg/dL     Platelet Count [[id number]]  (Normal) Collected:  02/04/20 0625    Specimen:  Blood Updated:  02/04/20 0657     Platelets 215 10*3/mm3     POC Glucose Once [[id number]]  (Abnormal) Collected:  02/03/20 2054    Specimen:  Blood Updated:  02/03/20 2100     Glucose 197 mg/dL     POC Glucose Once [[id number]]  (Abnormal) Collected:  02/03/20 1633    Specimen:  Blood Updated:  02/03/20 1643     Glucose 168 mg/dL     POC Glucose Once [[id number]]  (Abnormal) Collected:  02/03/20 1208    Specimen:  Blood Updated:  02/03/20 1227     Glucose 214 mg/dL                Microbiology Results (last 10 days)     Procedure Component Value -  Date/Time    Blood Culture - Blood, Arm, Left [[id number]] Collected:  01/31/20 1250    Lab Status:  Final result Specimen:  Blood from Arm, Left Updated:  02/05/20 1301     Blood Culture No growth at 5 days            Results for orders placed during the hospital encounter of 09/10/19   Adult Transthoracic Echo Complete W/ Cont if Necessary Per Protocol    Narrative · Calculated EF = 52.0%.  · Left ventricular systolic function is normal.  · Left atrial cavity size is mildly dilated.  · Left ventricular diastolic dysfunction (grade II) consistent with   pseudonormalization.  · Mild-to-moderate mitral valve regurgitation is present  · Mild tricuspid valve regurgitation is present.  · Estimated right ventricular systolic pressure from tricuspid   regurgitation is mildly elevated (35-45 mmHg).  · Calculated right ventricular systolic pressure from tricuspid   regurgitation is 40.3 mmHg.          Imaging Results (All)     Procedure Component Value Units Date/Time    NM Lung Ventilation Perfusion [[id number]] Collected:  02/03/20 1914     Updated:  02/03/20 1916    Narrative:       INDICATION:   Acute onset short of breath. History of CHF. Unable to raise left arm due to fracture. Also IV blew per technologist during the perfusion injection.     DOSE:  *  27.2 mCi Tc99m DTPA. Aerosolized  *  5.5 mCi Tc99m MAA. Attempted IV administration    COMPARISON:   Chest x-ray 2/2/2020..    FINDINGS:   Ventilation:  There is heterogeneity of ventilation bilaterally most consistent with underlying chronic obstructive lung disease.    Perfusion:  The perfusion images are nondiagnostic since IV access was lost during attempted administration of the tracer. The study can be repeated in the morning after the dose has decayed or alternatively, the patient could undergo CT chest pulmonary  embolism protocol if they are candidate.      Impression:         1. The perfusion imaging is nondiagnostic since IV access was lost during the  attempted injection of the tracer. The study could be repeated in the morning after the tracer has decayed, or alternatively, if renal function is adequate, CT chest pulmonary  embolism protocol could be obtained.    Signer Name: Zarina Mari MD   Signed: 2/3/2020 7:14 PM   Workstation Name: [email or web address]    [facility]    XR Wrist 2 View Right [[id number]] Collected:  02/03/20 0841     Updated:  02/03/20 0844    Narrative:       XR WRIST 2 VW RIGHT-: 2/3/2020 7:42 AM     INDICATION:   HISTORY right wrist fracture. Follow-up..     COMPARISON:   01/31/2020.     FINDINGS:   2 views of the right wrist.  Artifact related to overlying cast  material. Impacted distal radius fracture, similar to the prior study.  Perhaps some minimal callus formation, difficult to confirm due to  overlying cast material. Otherwise no significant interval change. The  bones are osteopenic. Alignment preserved..  No foreign body.       Impression:          1. Distal right radius fracture with perhaps some interval callus  formation. No other significant interval change.     This report was finalized on 2/3/2020 8:42 AM by Dr. Kristopher Carr MD.       XR Chest 1 View [[id number]] Collected:  02/02/20 1213     Updated:  02/02/20 1215    Narrative:       CR Chest 1 Vw    INDICATION:   Acute onset of shortness of breath. History of CHF.     COMPARISON:    None available.    FINDINGS:  Single portable AP view(s) of the chest.    The heart size remains enlarged. Bibasilar atelectatic/infiltrative changes with bilateral pleural effusions. Modest improvement from prior study. No pneumothorax.       Impression:       Cardiomegaly with bilateral pleural effusions and bibasilar atelectatic/infiltrative change. Modest improvement from prior study.    Signer Name: Robbin Zimmer MD   Signed: 2/2/2020 12:13 PM   [email or web address]    [facility]    XR Femur 2 View Right [[id number]] Collected:  02/01/20  1227     Updated:  02/01/20 1229    Narrative:       CR Femur 2 Vws RT    INDICATION:   74-year-old female with right femur pain status post fall 2 months ago.    COMPARISON:   None available.    FINDINGS:   By views of the right femur.  Diffuse bony demineralization. No gross evidence of acute displaced fracture or dislocation. Total right knee arthroplasty. ORIF of the right femur with a long lateral sideplate and multiple associated screws. The hardware  spans a healing, mildly displaced fracture of the distal femoral metaphysis. The fracture line remains visualized with evidence of some callus formation around the fracture site. No significant solid osseous bridging.  No foreign body.      Impression:         1. Osteopenia. No gross acute injury.  2. ORIF of the right femur spanning a healing, mildly displaced fracture of the distal femoral metaphysis. The fracture line remains well-visualized without evidence of callus formation. No significant solid osseous bridging.  3. Right knee arthroplasty.    Signer Name: Benito Zimmer MD   Signed: 2/1/2020 12:27 PM   Workstation Name: [email or web address]    [facility]    XR Foot 2 View Right [[id number]] Collected:  02/01/20 1224     Updated:  02/01/20 1226    Narrative:       CR Foot 2 Vws RT    INDICATION:   74-year-old female with right foot pains that is post fall 2 months ago.    COMPARISON:   None available    FINDINGS:   2 views of the right foot.  Diffuse bony demineralization. No gross evidence of a displaced fracture or dislocation. Transmetatarsal amputation of the second ray. Partial amputation of the first metatarsal. Suspected sequelae of old, healed distal  fibular fracture. Moderate tibiotalar arthrosis. Mild arthrosis throughout the midfoot. Plantar calcaneal spur.  No bone erosion or destruction.  No foreign body.      Impression:         1. Osteopenia. No gross evidence of a displaced fracture or dislocation.  2. Postsurgical changes  of the first metatarsal and second ray.  3. Hindfoot and midfoot arthrosis, detailed above. Questionable old, healed fracture deformity of the distal fibula.    Signer Name: Benito Zimmer MD   Signed: 2/1/2020 12:24 PM   Workstation Name: [email or web address]    [facility]    XR Pelvis 1 or 2 View [[id number]] Collected:  02/01/20 1221     Updated:  02/01/20 1224    Narrative:       CR Pelvis 1 or 2 Vws    INDICATION:   74-year-old female with pelvic pain status post fall 2 months ago.    COMPARISON:   None.    FINDINGS:  AP view(s) of the pelvis.  Diffuse bony demineralization. No evidence of a displaced fracture or dislocation. Bony pelvis and sacrum appear grossly intact. Partially imaged right femur ORIF. Mild degenerative changes in both hips. Soft tissues are  unremarkable. No bone erosion or destruction.        Impression:       Osteopenia. No evidence of a displaced fracture or dislocation. Mild bilateral hip arthrosis.    Signer Name: Benito Zimmer MD   Signed: 2/1/2020 12:21 PM   Workstation Name: [facility]    [facility]    US Venous Doppler Lower Extremity Bilateral (duplex) [[id number]] Collected:  02/01/20 1119     Updated:  02/01/20 1121    Narrative:       US Veins LE Duplex BILAT    HISTORY:   Fall yesterday with fracture of right wrist. Recent right femur fracture. Positive d-dimer.     TECHNIQUE:   Real-time ultrasound was performed of both lower extremities utilizing spectral and color Doppler with compression and augmentation techniques.    COMPARISON:  July 29, 2016    FINDINGS:  Right Lower Extremity:  There is no deep venous thrombus seen in the right lower extremity, including the right common femoral veins, right femoral veins and right popliteal veins.  Normal compressibility and respiratory phasicity was visualized.  No calf vein thrombus.    Left Lower Extremity:  There is no deep venous thrombus seen in the left lower extremity, including the  left common femoral veins, left femoral veins and left popliteal veins.   Normal compressibility and respiratory phasicity was visualized.  No calf vein thrombus.        Impression:       No deep venous thrombus seen in either lower extremity.    Signer Name: Robbin Zimmer MD   Signed: 2/1/2020 11:19 AM   Workstation Name: [email or web address]    [facility]    XR Chest 1 View [[id number]] Collected:  02/01/20 0631     Updated:  02/01/20 0634    Narrative:       CHEST X-RAY, 1/31/2020      HISTORY:    Respiratory failure. Pleural effusion. Follow-up cardiopulmonary status.      TECHNIQUE:  AP portable upright chest x-ray.      FINDINGS:  Small-to-moderate bilateral pleural effusions and bilateral lower lung atelectasis, unchanged since yesterday. Moderate cardiomegaly. Mildly prominent indistinct interstitial markings may reflect mild vascular congestion, correlate clinically. Right  axillary surgical clips.      Impression:       1. Cardiomegaly and possible mild vascular congestion.  2. Moderate bilateral pleural effusions and bibasilar pulmonary atelectasis.  3. No significant change since yesterday.    Signer Name: Levy Mishra MD   Signed: 2/1/2020 6:31 AM   Workstation Name: [email or web address]    [facility]    XR Wrist 3+ View Right [[id number]] Collected:  01/31/20 0927     Updated:  01/31/20 0930    Narrative:       XR WRIST 3+ VW RIGHT-: 1/31/2020 9:15 AM     INDICATION:   Patient fell from wheelchair this morning with pain in right wrist.     COMPARISON:   None available.     FINDINGS:   3 views of the right wrist.  There is an acute fracture involving the  distal radius about 1 cm proximal to the end of the bone. The fracture  appears be lying transversely with mild impaction. The carpal bones are  normal. The ulna is normal. No bone erosion or destruction.  No foreign  body.       Impression:       Mild impaction-type fracture involving the distal radius about 1 cm  from  the end of the bone..     This report was finalized on 1/31/2020 9:28 AM by Dr. Chi Mattson MD.       XR Chest 1 View [[id number]] Collected:  01/31/20 0927     Updated:  01/31/20 0929    Narrative:       AP PORTABLE CHEST, 01/31/2020     HISTORY:  Shortness of air for weeks     COMPARISON:  09/01/2019     TECHNIQUE:  AP portable chest x-ray.     FINDINGS:  There are low lung volumes. There are small bilateral effusions with  bibasilar atelectasis. There is mild interstitial prominence.       Impression:       Small left pleural effusions with bibasal atelectasis and mild  interstitial prominence.     This report was finalized on 1/31/2020 9:27 AM by Dr. Chi Mattson MD.               Condition on Discharge:  Stable     Vital Signs  Temp:  [97.3 °F (36.3 °C)-98 °F (36.7 °C)] 97.3 °F (36.3 °C)  Heart Rate:  [68-80] 75  Resp:  [20] 20  BP: (125-140)/(66-75) 140/75    Physical Exam:  Physical Exam  General: Appears stated age in NAD.  Lungs: Diminished w/ fine bibasilar crackles.  No accessory use.  Normal excursion.  CV: Regular w/o murmur.  Radial pulses are 2+ and symmetric.  Abdomen: Obese, soft, and non-tender w/ active bowel sounds.  MSK: Right hand with a cast, tender to palpate right leg  Neuro: CN II-XII grossly intact  Psych: Pleasant affect.  AAOx3.    Discharge Disposition  Rehab Facility or Unit (DC - External)    Discharge Medications     Discharge Medications      New Medications      Instructions Start Date   oxyCODONE-acetaminophen 7.5-325 MG per tablet  Commonly known as:  PERCOCET   1 tablet, Oral, Every 4 Hours PRN         Changes to Medications      Instructions Start Date   alendronate 35 MG tablet  Commonly known as:  FOSAMAX  What changed:    · when to take this  · additional instructions   take 1 tablet by mouth every week      anastrozole 1 MG tablet  Commonly known as:  ARIMIDEX  What changed:  how much to take   TAKE 1 TABLET BY MOUTH EVERY DAY         Continue These Medications       Instructions Start Date   acetaminophen 325 MG tablet  Commonly known as:  TYLENOL   650 mg, Oral, Every 4 Hours PRN      aspirin 81 MG EC tablet   81 mg, Oral, Daily      bumetanide 0.5 MG tablet  Commonly known as:  BUMEX   0.5 mg, Oral, Daily      carvedilol 12.5 MG tablet  Commonly known as:  COREG   TAKE 1 TABLET BY MOUTH TWICE DAILY WITH MEALS      DULoxetine 60 MG capsule  Commonly known as:  CYMBALTA   60 mg, Oral, Daily      ferrous sulfate 325 (65 FE) MG tablet   325 mg, Oral, Daily With Breakfast      glucose blood test strip   Take BG daily      ipratropium-albuterol 0.5-2.5 mg/3 ml nebulizer  Commonly known as:  DUO-NEB   3 mL, Nebulization, Every 4 Hours PRN      levothyroxine 25 MCG tablet  Commonly known as:  SYNTHROID, LEVOTHROID   25 mcg, Oral, Daily      losartan 25 MG tablet  Commonly known as:  COZAAR   25 mg, Oral, Daily      melatonin 1 MG tablet   3 mg, Oral, Nightly      metFORMIN 1000 MG tablet  Commonly known as:  GLUCOPHAGE   1,000 mg, Oral, Daily With Breakfast      metFORMIN 500 MG tablet  Commonly known as:  GLUCOPHAGE   500 mg, Oral, Daily With Dinner      methocarbamol 500 MG tablet  Commonly known as:  ROBAXIN   500 mg, Oral, 3 Times Daily      MULTIVITAMIN ADULT PO   1 tablet, Oral, Daily      omeprazole 20 MG capsule  Commonly known as:  priLOSEC   20 mg, Oral, Daily      ondansetron 4 MG tablet  Commonly known as:  ZOFRAN   4 mg, Oral, Every 6 Hours PRN      pravastatin 10 MG tablet  Commonly known as:  PRAVACHOL   10 mg, Oral, Daily      pregabalin 75 MG capsule  Commonly known as:  LYRICA   75 mg, Oral, 2 Times Daily      vitamin D 1.25 MG (16169 UT) capsule capsule  Commonly known as:  ERGOCALCIFEROL   1,000 Units, Oral, Daily         Stop These Medications    cefdinir 300 MG capsule  Commonly known as:  OMNICEF     oxyCODONE 10 MG tablet  Commonly known as:  ROXICODONE     oxyCODONE 5 MG immediate release tablet  Commonly known as:  ROXICODONE            Discharge Diet:          Activity at Discharge:     Follow-up Appointments  No future appointments.  Additional Instructions for the Follow-ups that You Need to Schedule     Discharge Follow-up with Specified Provider: ortho; 1 Month   As directed      To:  ortho    Follow Up:  1 Month               Test Results Pending at Discharge       Risk for Readmission (LACE) Score: 15 (2/6/2020  6:01 AM)          Time: Discharge 36 min with face-to-face history exam, writing of prescriptions, and documenting discharge data including care coordination.      Srinivasan Merchant DO  02/06/20  9:44 AM

## 2020-02-07 ENCOUNTER — TELEPHONE (OUTPATIENT)
Dept: ORTHOPEDIC SURGERY | Facility: CLINIC | Age: 75
End: 2020-02-07

## 2020-02-07 ENCOUNTER — EPISODE CHANGES (OUTPATIENT)
Dept: CASE MANAGEMENT | Facility: OTHER | Age: 75
End: 2020-02-07

## 2020-02-07 NOTE — TELEPHONE ENCOUNTER
Should the patient be seen in 2 or 4 weeks for follow up on her right wrist and also right femur.      Please advise.

## 2020-02-10 ENCOUNTER — EPISODE CHANGES (OUTPATIENT)
Dept: CASE MANAGEMENT | Facility: OTHER | Age: 75
End: 2020-02-10

## 2020-02-19 ENCOUNTER — OFFICE VISIT (OUTPATIENT)
Dept: ORTHOPEDIC SURGERY | Facility: CLINIC | Age: 75
End: 2020-02-19

## 2020-02-19 ENCOUNTER — EPISODE CHANGES (OUTPATIENT)
Dept: CASE MANAGEMENT | Facility: OTHER | Age: 75
End: 2020-02-19

## 2020-02-19 VITALS — HEIGHT: 65 IN | BODY MASS INDEX: 35.99 KG/M2 | WEIGHT: 216 LBS

## 2020-02-19 DIAGNOSIS — R52 PAIN: Primary | ICD-10-CM

## 2020-02-19 DIAGNOSIS — S52.521A TRAUMATIC CLOSED NONDISP TORUS FRACTURE OF DISTAL RADIAL METAPHYSIS, RIGHT, INITIAL ENCOUNTER: ICD-10-CM

## 2020-02-19 DIAGNOSIS — M97.8XXA PERIPROSTHETIC SUBTROCHANTERIC FRACTURE OF FEMUR: ICD-10-CM

## 2020-02-19 DIAGNOSIS — Z96.649 PERIPROSTHETIC SUBTROCHANTERIC FRACTURE OF FEMUR: ICD-10-CM

## 2020-02-19 PROCEDURE — 99024 POSTOP FOLLOW-UP VISIT: CPT | Performed by: ORTHOPAEDIC SURGERY

## 2020-02-19 NOTE — PROGRESS NOTES
Subjective: Right distal radial fracture nondisplaced, right periprosthetic knee fracture     Patient ID: Jung Short is a 74 y.o. female.    Chief Complaint:    History of Present Illness patient seen for follow-up to injury to the right wrist.  Periprosthetic femur fracture occurred approximately 2 and 3:30 months ago treated at  and was home convalescing she fell from a wheelchair injuring her right wrist with a nondisplaced distal radial fracture that was treated with a cast.  She has been at the nursing facility nonweightbearing on the right leg.       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   HENT: Negative for hearing loss, nosebleeds, sore throat and tinnitus.    Eyes: Negative for pain and visual disturbance.   Respiratory: Negative for cough, shortness of breath and wheezing.    Cardiovascular: Negative for chest pain and palpitations.   Gastrointestinal: Negative for abdominal pain, diarrhea, nausea and vomiting.   Endocrine: Negative for cold intolerance, heat intolerance and polydipsia.   Genitourinary: Negative for difficulty urinating, dysuria and hematuria.   Musculoskeletal: Positive for arthralgias and myalgias. Negative for joint swelling.   Skin: Negative for rash and wound.   Allergic/Immunologic: Negative for environmental allergies.   Neurological: Negative for dizziness, syncope and numbness.   Hematological: Does not bruise/bleed easily.    Psychiatric/Behavioral: Negative for dysphoric mood and sleep disturbance. The patient is not nervous/anxious.          Past Medical History:   Diagnosis Date   • Anemia    • Benign breast disease    • Cancer (CMS/HCC) 2015    Right breast   • Cellulitis of leg     May-2003 right lower extremity   • CHF (congestive heart failure) (CMS/HCC)     Dr Bates follows   • Chronic kidney disease     Dr Hannah follows   • Chronic kidney disease 5/24/2019   • Chronic ulcer of right foot (CMS/HCC)     Non-pressure, history of    • Depression    • Diabetic peripheral neuropathy (CMS/HCC)    • Diarrhea    • Diverticulosis    • Encounter for eye exam    • Femoral fracture (CMS/HCC)     right   • Fracture of left wrist     history of   • Gastroesophageal reflux    • Hiatal hernia    • History of bone density study 09/20/2012   • History of colonoscopy     done 6/03 recheck in 10 years.   Done july 2013 recheck in 5 years   • History of mammography, screening 09/20/2012   • Hospital discharge follow-up    • Hyperlipidemia    • Hypertension    • Hypothyroidism    • Impingement syndrome of right shoulder    • Joint pain    • Menopausal disorder    • Methicillin resistant Staphylococcus aureus infection 2012    after bladder surgery   • MRSA (methicillin resistant staph aureus) culture positive    • Nausea and vomiting    • Nonischemic cardiomyopathy (CMS/HCC)     Ejection fraction 10% per 2-D echo with Doppler however she did have cardiac catheterization April 2015 which showed ejection fraction 20% with global hypokinesis and severe mitral insufficiency   • Osteoarthritis     generalized, sched jania TKA   • Paroxysmal atrial fibrillation (CMS/HCC)     Dr Bates follows   • Postoperative infection     July of 2012 following her hysterectomy far vaginal wall prolapse. She was on antibiotics and wound dressings for 2 months.   • Shoulder pain, bilateral     has tears on both sides   • Syncope, psychogenic 4/19/2017   • Toe  ulcer (CMS/HCC)     h/o to both feet, d/t shoes rubbing per patient   • Transient alteration of awareness 4/19/2017     Past Surgical History:   Procedure Laterality Date   • ABDOMINAL SURGERY  2012    had to be reopened after bladder surgery d/t infection   • AMPUTATION FOOT / TOE Right 11/2013    Rt foot amputation MTP first toe   • BLADDER SURGERY  2012   • BREAST BIOPSY     • BREAST SURGERY  06/29/2015    Percutaneous ultrasound-guided placement of metal localized clip 1st lesion   • CARDIAC CATHETERIZATION  2015   • CATARACT EXTRACTION Bilateral 2017   • DEBRIDEMENT  FOOT Right 01/2013    During hospitalization    • EPIDURAL BLOCK      4 chronic back pain and leg pain last epidurals done 5-2012 she had no benefit at all from this procedure.   • FEMUR FRACTURE SURGERY     • FOOT SURGERY Bilateral     several   • HERNIA REPAIR      At the abdomen February 2007 Dr. Mendez   • INCISIONAL HERNIA REPAIR  05/16/2014    Recurrent. Incarcerated. With mesh implantation   • MASTECTOMY Right 05/2015   • SHOULDER SURGERY Right     x2 RCR   • TOTAL ABDOMINAL HYSTERECTOMY      with oophorectomy-Done June-2012 secondary to vaginal wall prolapse.   • TOTAL KNEE ARTHROPLASTY Right    • TOTAL KNEE ARTHROPLASTY Left 4/17/2018    Procedure: TOTAL KNEE ARTHROPLASTY;  Surgeon: Live Chambers MD;  Location: [facility];  Service: Orthopedics     Family History   Problem Relation Age of Onset   • Colonic polyp Mother    • Hypertension Mother    • Migraines Mother    • Mental illness Mother    • Depression Mother    • Coronary artery disease Father    • Other Father         Cardiac Disorder   • Colon cancer Father    • Kidney disease Father    • Cancer Father    • Breast cancer Maternal Aunt    • Colon cancer Brother    • Alcohol abuse Brother    • Arthritis Sister    • Hypertension Brother    • Lung disease Brother    • Alcohol abuse Brother    • Malig Hyperthermia Neg Hx          Objective:  There were no vitals filed for this visit.       02/19/20  1320   Weight: 98 kg (216 lb)     Body mass index is 35.94 kg/m².        Ortho Exam   X-rays from the nursing facility brought today for me to review show abundant callus at the fracture site in the right distal femur.  Alignment is satisfactory although not anatomical but abundant callus is seen today.  Right wrist x-ray shows no displacement of the fracture.  No severe significant callus formation is seen.  The right leg has no motor or sensory deficit.  The cast is being changed to the right wrist and is in his edges are rubbing the skin.    Assessment:        1. Pain    2. Traumatic closed nondisp torus fracture of distal radial metaphysis, right, initial encounter    3. Periprosthetic subtrochanteric fracture of femur           Plan: She is to remain strictly nonweightbearing on the right leg but she is forming abundant callus at the fracture site so she should be ready for weightbearing when she returns in 4 weeks.  I want repeat x-rays of the right wrist and of the right femur hip the knee done on return.  Again nonweightbearing until seen.  New short arm cast applied to the right wrist.            Work Status:    LOUANN query complete.    Orders:  No orders of the defined types were placed in this encounter.      Medications:  No orders of the defined types were placed in this encounter.      Followup:  Return in about 4 weeks (around 3/18/2020).          Dictated utilizing Dragon dictation

## 2020-02-20 ENCOUNTER — TELEPHONE (OUTPATIENT)
Dept: ORTHOPEDIC SURGERY | Facility: CLINIC | Age: 75
End: 2020-02-20

## 2020-02-20 NOTE — TELEPHONE ENCOUNTER
Pt states when she was here, you said you would send in a rx for a cream to the pharmacy at the nursing home.  Chart doesn't show anything.  Do you want to send anything in for her?

## 2020-02-26 ENCOUNTER — TELEPHONE (OUTPATIENT)
Dept: ORTHOPEDIC SURGERY | Facility: CLINIC | Age: 75
End: 2020-02-26

## 2020-02-26 NOTE — TELEPHONE ENCOUNTER
Kandi Rainey, PT at [facility], seeing pt for PT at [facility], she is putting full wt bearing on the leg and the arm.  They have seen her do this on multiple times.  Patient is very arguementive and refuses to obey [facility] orders.  Do you have any suggestions.

## 2020-02-26 NOTE — TELEPHONE ENCOUNTER
Pt is having pain in the wrist that has the cast on, she is only Lyrica and Percocet 7.5/325.  She is in the [facility], I asked if they had been elevating it, and they said no, its just dangling down.,

## 2020-02-27 ENCOUNTER — TELEPHONE (OUTPATIENT)
Dept: ORTHOPEDIC SURGERY | Facility: CLINIC | Age: 75
End: 2020-02-27

## 2020-02-27 ENCOUNTER — OFFICE VISIT (OUTPATIENT)
Dept: ORTHOPEDIC SURGERY | Facility: CLINIC | Age: 75
End: 2020-02-27

## 2020-02-27 DIAGNOSIS — S52.521A TRAUMATIC CLOSED NONDISP TORUS FRACTURE OF DISTAL RADIAL METAPHYSIS, RIGHT, INITIAL ENCOUNTER: Primary | ICD-10-CM

## 2020-02-27 DIAGNOSIS — M97.8XXA PERIPROSTHETIC SUBTROCHANTERIC FRACTURE OF FEMUR: ICD-10-CM

## 2020-02-27 DIAGNOSIS — Z96.649 PERIPROSTHETIC SUBTROCHANTERIC FRACTURE OF FEMUR: ICD-10-CM

## 2020-02-27 PROCEDURE — 99024 POSTOP FOLLOW-UP VISIT: CPT | Performed by: NURSE PRACTITIONER

## 2020-02-27 NOTE — TELEPHONE ENCOUNTER
I recommend that they keep reminding and encouraging non weight-bearing to extremities as instructed. Thanks

## 2020-02-27 NOTE — TELEPHONE ENCOUNTER
Can you schedule this patient when the [facility] calls back?  I called but had to leave a message on their answering machine.

## 2020-02-27 NOTE — TELEPHONE ENCOUNTER
Pt herself is calling from the nursing home, states she wants the cast off, it is bothering her arthritis, there is no swelling, she just wants it off.  Do you want to see her?  The nursing home called yesterday several times.  See yesterdays phone notes.

## 2020-02-27 NOTE — TELEPHONE ENCOUNTER
Deepali from [facility] called back on 2-26-20 at 3:51 , states pt says cast is too tight.  I recommend they take her to the ER for evaluation.

## 2020-02-28 PROBLEM — M97.8XXA PERIPROSTHETIC SUBTROCHANTERIC FRACTURE OF FEMUR: Status: ACTIVE | Noted: 2020-02-28

## 2020-02-28 PROBLEM — Z96.649 PERIPROSTHETIC SUBTROCHANTERIC FRACTURE OF FEMUR: Status: ACTIVE | Noted: 2020-02-28

## 2020-02-28 PROBLEM — S52.521A: Status: ACTIVE | Noted: 2020-02-28

## 2020-02-28 NOTE — PROGRESS NOTES
CC: Follow-up Right distal radial fracture nondisplaced, right periprosthetic knee fracture    Interval history: Jung Short Returns to clinic with caretaker for follow-up right wrist.  Presents to clinic today with concerns of pain related to cast greatest at the dorsum of the thumb and pressure the fifth digit.  Denies that she is experiencing numbness or tingling right upper extremity.  Denies feeling with the cast is too tight.  Cast was placed on 2/19/2020.  Approximately 2 days after the cast was placed began noticing discomfort of the thumb.  States that when she was here last Dr. Chambers discussed possibly a half cast that she could remove with showering versus the cast she preferred the cast at that time but again was not experiencing the pain of the thumb she is currently experiencing.  Denies other concerns with this time.    Exam:  Right wrist examined out of cast  Pain relief with cast removal the dorsum of the thumb, metacarpal phalangeal joint and dorsum of the hand over fifth digit  Positive sensation light touch the dorsum and palmar surface surface of the hand  2+ distal radial pulse  Flex extend all digits right hand    Assessment: Nondisplaced fracture distal radius, right    Plan:  1.  We will transition patient to XO splint right upper extremity.  She does have follow-up with Dr. Chambers in the next 3 weeks encouraged to continue with appointment.  Long discussion with patient regarding avoidance of pushing herself up with her right upper and lower extremity.  May continue finger range of motion only but again strictly instructed avoidance of using right upper extremity to lift herself.  She verbalized understanding of all information agrees with plan of care.  Denies other concerns present this time.

## 2020-03-02 ENCOUNTER — EPISODE CHANGES (OUTPATIENT)
Dept: CASE MANAGEMENT | Facility: OTHER | Age: 75
End: 2020-03-02

## 2020-03-12 ENCOUNTER — EPISODE CHANGES (OUTPATIENT)
Dept: CASE MANAGEMENT | Facility: OTHER | Age: 75
End: 2020-03-12

## 2020-03-19 ENCOUNTER — OFFICE VISIT (OUTPATIENT)
Dept: ORTHOPEDIC SURGERY | Facility: CLINIC | Age: 75
End: 2020-03-19

## 2020-03-19 VITALS — TEMPERATURE: 98.2 F | BODY MASS INDEX: 35.99 KG/M2 | HEIGHT: 65 IN | WEIGHT: 216 LBS

## 2020-03-19 DIAGNOSIS — M97.8XXA PERIPROSTHETIC SUBTROCHANTERIC FRACTURE OF FEMUR: ICD-10-CM

## 2020-03-19 DIAGNOSIS — S82.891D CLOSED FRACTURE OF MALLEOLUS OF RIGHT ANKLE WITH ROUTINE HEALING, SUBSEQUENT ENCOUNTER: ICD-10-CM

## 2020-03-19 DIAGNOSIS — S52.521A TRAUMATIC CLOSED NONDISP TORUS FRACTURE OF DISTAL RADIAL METAPHYSIS, RIGHT, INITIAL ENCOUNTER: Primary | ICD-10-CM

## 2020-03-19 DIAGNOSIS — Z96.649 PERIPROSTHETIC SUBTROCHANTERIC FRACTURE OF FEMUR: ICD-10-CM

## 2020-03-19 PROCEDURE — 99024 POSTOP FOLLOW-UP VISIT: CPT | Performed by: ORTHOPAEDIC SURGERY

## 2020-03-19 NOTE — PROGRESS NOTES
Subjective: Fracture right distal radius, fracture right periprosthetic distal femur fracture, fracture right medial malleolus     Patient ID: Jung Short is a 74 y.o. female.    Chief Complaint:    History of Present Illness patient is approximately 6 months out from the distal radial fracture with 3-1/2 months out from the right distal periprosthetic femur fracture and the medial malleolar fracture.  She is been at the [facility] doing well.  The wrist is giving her some soreness due to stiffness but she is having minimal pain in the femur and the ankle and she states she is even taken a few steps with no pain.  Has been wearing a fracture boot to the right ankle for the minimally the fracture.       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   HENT: Negative for hearing loss, nosebleeds, sore throat and tinnitus.    Eyes: Negative for pain and visual disturbance.   Respiratory: Negative for cough, shortness of breath and wheezing.    Cardiovascular: Negative for chest pain and palpitations.   Gastrointestinal: Negative for abdominal pain, diarrhea, nausea and vomiting.   Endocrine: Negative for cold intolerance, heat intolerance and polydipsia.   Genitourinary: Negative for difficulty urinating, dysuria and hematuria.   Musculoskeletal: Positive for arthralgias and myalgias. Negative for joint swelling.   Skin: Negative for rash and wound.    Allergic/Immunologic: Negative for environmental allergies.   Neurological: Negative for dizziness, syncope and numbness.   Hematological: Does not bruise/bleed easily.   Psychiatric/Behavioral: Negative for dysphoric mood and sleep disturbance. The patient is not nervous/anxious.          Past Medical History:   Diagnosis Date   • Anemia    • Benign breast disease    • Cancer (CMS/HCC) 2015    Right breast   • Cellulitis of leg     May-2003 right lower extremity   • CHF (congestive heart failure) (CMS/HCC)     Dr Bates follows   • Chronic kidney disease     Dr Hannah follows   • Chronic kidney disease 5/24/2019   • Chronic ulcer of right foot (CMS/HCC)     Non-pressure, history of    • Depression    • Diabetic peripheral neuropathy (CMS/HCC)    • Diarrhea    • Diverticulosis    • Encounter for eye exam    • Femoral fracture (CMS/HCC)     right   • Fracture of left wrist     history of   • Gastroesophageal reflux    • Hiatal hernia    • History of bone density study 09/20/2012   • History of colonoscopy     done 6/03 recheck in 10 years.   Done july 2013 recheck in 5 years   • History of mammography, screening 09/20/2012   • Hospital discharge follow-up    • Hyperlipidemia    • Hypertension    • Hypothyroidism    • Impingement syndrome of right shoulder    • Joint pain    • Menopausal disorder    • Methicillin resistant Staphylococcus aureus infection 2012    after bladder surgery   • MRSA (methicillin resistant staph aureus) culture positive    • Nausea and vomiting    • Nonischemic cardiomyopathy (CMS/HCC)     Ejection fraction 10% per 2-D echo with Doppler however she did have cardiac catheterization April 2015 which showed ejection fraction 20% with global hypokinesis and severe mitral insufficiency   • Osteoarthritis     generalized, sched jania TKA   • Paroxysmal atrial fibrillation (CMS/HCC)     Dr Bates follows   • Postoperative infection     July of 2012 following her hysterectomy far vaginal wall  prolapse. She was on antibiotics and wound dressings for 2 months.   • Shoulder pain, bilateral     has tears on both sides   • Syncope, psychogenic 4/19/2017   • Toe ulcer (CMS/HCC)     h/o to both feet, d/t shoes rubbing per patient   • Transient alteration of awareness 4/19/2017     Past Surgical History:   Procedure Laterality Date   • ABDOMINAL SURGERY  2012    had to be reopened after bladder surgery d/t infection   • AMPUTATION FOOT / TOE Right 11/2013    Rt foot amputation MTP first toe   • BLADDER SURGERY  2012   • BREAST BIOPSY     • BREAST SURGERY  06/29/2015    Percutaneous ultrasound-guided placement of metal localized clip 1st lesion   • CARDIAC CATHETERIZATION  2015   • CATARACT EXTRACTION Bilateral 2017   • DEBRIDEMENT  FOOT Right 01/2013    During hospitalization    • EPIDURAL BLOCK      4 chronic back pain and leg pain last epidurals done 5-2012 she had no benefit at all from this procedure.   • FEMUR FRACTURE SURGERY     • FOOT SURGERY Bilateral     several   • HERNIA REPAIR      At the abdomen February 2007 Dr. Mendez   • INCISIONAL HERNIA REPAIR  05/16/2014    Recurrent. Incarcerated. With mesh implantation   • MASTECTOMY Right 05/2015   • SHOULDER SURGERY Right     x2 RCR   • TOTAL ABDOMINAL HYSTERECTOMY      with oophorectomy-Done June-2012 secondary to vaginal wall prolapse.   • TOTAL KNEE ARTHROPLASTY Right    • TOTAL KNEE ARTHROPLASTY Left 4/17/2018    Procedure: TOTAL KNEE ARTHROPLASTY;  Surgeon: Live Chambers MD;  Location: [facility];  Service: Orthopedics     Family History   Problem Relation Age of Onset   • Colonic polyp Mother    • Hypertension Mother    • Migraines Mother    • Mental illness Mother    • Depression Mother    • Coronary artery disease Father    • Other Father         Cardiac Disorder   • Colon cancer Father    • Kidney disease Father    • Cancer Father    • Breast cancer Maternal Aunt    • Colon cancer Brother    • Alcohol abuse Brother    • Arthritis Sister    •  Hypertension Brother    • Lung disease Brother    • Alcohol abuse Brother    • Malig Hyperthermia Neg Hx          Objective:  Vitals:    03/19/20 1330   Temp: 98.2 °F (36.8 °C)         03/19/20  1330   Weight: 98 kg (216 lb)     Body mass index is 35.94 kg/m².        Ortho Exam   X-rays from the nursing facility reviewed by me right distal femur show increasing callus formation at the fracture site with satisfactory alignment, x-rays of the right distal radius show some early callus formation acceptable alignment and x-rays of the ankle show some early callus formation.  No prior x-ray available for comparison today.  She is alert and oriented x3.  There is no motor deficit in the right wrist but there is stiffness and soreness with decreased range of motion when he has about 20 degrees of active motion.  She is good capillary refill.  The knee is asymptomatic and she has minimal tenderness over the medial ankle fracture.    Assessment:        1. Traumatic closed nondisp torus fracture of distal radial metaphysis, right, initial encounter    2. Periprosthetic subtrochanteric fracture of femur    3. Closed fracture of malleolus of right ankle with routine healing, subsequent encounter           Plan: At this point I want the nursing home to get some moist heat and range of motion exercises to the right wrist out of the splint.  This to be done twice a day again when she is discharged home, according to the patient which is good to be next Tuesday, home health is to continue the same.  As far as the right lower leg should begin to weight-bear as tolerated.  I have given her an active ankle brace that she can apply to the ankle as opposed to the fracture boot and she has no pain in the active ankle she can use that.  Also advised that this far as the right knee is concerned she can put as much weight as pain permits and that was emphasized as pain permits.  She is to return to see me in 4 weeks and if she is at home we  will get x-rays of the wrist the knee and the ankle on return.  Answered all questions.            Work Status:    LOUANN query complete.    Orders:  No orders of the defined types were placed in this encounter.      Medications:  No orders of the defined types were placed in this encounter.      Followup:  Return in about 4 weeks (around 4/16/2020).          Dictated utilizing [employer]

## 2020-03-26 ENCOUNTER — EPISODE CHANGES (OUTPATIENT)
Dept: CASE MANAGEMENT | Facility: OTHER | Age: 75
End: 2020-03-26

## 2020-04-01 ENCOUNTER — EPISODE CHANGES (OUTPATIENT)
Dept: CASE MANAGEMENT | Facility: OTHER | Age: 75
End: 2020-04-01

## 2020-04-10 ENCOUNTER — APPOINTMENT (OUTPATIENT)
Dept: GENERAL RADIOLOGY | Facility: HOSPITAL | Age: 75
End: 2020-04-10

## 2020-04-10 ENCOUNTER — EPISODE CHANGES (OUTPATIENT)
Dept: CASE MANAGEMENT | Facility: OTHER | Age: 75
End: 2020-04-10

## 2020-04-10 ENCOUNTER — HOSPITAL ENCOUNTER (OUTPATIENT)
Facility: HOSPITAL | Age: 75
Setting detail: OBSERVATION
Discharge: HOME | End: 2020-04-10
Attending: EMERGENCY MEDICINE | Admitting: HOSPITALIST

## 2020-04-10 VITALS
HEIGHT: 65 IN | TEMPERATURE: 97.4 F | WEIGHT: 207.01 LBS | BODY MASS INDEX: 34.49 KG/M2 | OXYGEN SATURATION: 100 % | SYSTOLIC BLOOD PRESSURE: 146 MMHG | HEART RATE: 80 BPM | RESPIRATION RATE: 16 BRPM | DIASTOLIC BLOOD PRESSURE: 80 MMHG

## 2020-04-10 DIAGNOSIS — S52.92XA RADIUS/ULNA FRACTURE, LEFT, CLOSED, INITIAL ENCOUNTER: ICD-10-CM

## 2020-04-10 DIAGNOSIS — S52.202A RADIUS/ULNA FRACTURE, LEFT, CLOSED, INITIAL ENCOUNTER: ICD-10-CM

## 2020-04-10 DIAGNOSIS — I50.9 ACUTE ON CHRONIC CONGESTIVE HEART FAILURE, UNSPECIFIED HEART FAILURE TYPE (HCC): Primary | ICD-10-CM

## 2020-04-10 LAB
ALBUMIN SERPL-MCNC: 3.9 G/DL (ref 3.5–5.2)
ALBUMIN/GLOB SERPL: 1.1 G/DL
ALP SERPL-CCNC: 114 U/L (ref 39–117)
ALT SERPL W P-5'-P-CCNC: 9 U/L (ref 1–33)
ANION GAP SERPL CALCULATED.3IONS-SCNC: 10.1 MMOL/L (ref 5–15)
ANION GAP SERPL CALCULATED.3IONS-SCNC: 16.7 MMOL/L (ref 5–15)
AST SERPL-CCNC: 20 U/L (ref 1–32)
BASOPHILS # BLD AUTO: 0.03 10*3/MM3 (ref 0–0.2)
BASOPHILS NFR BLD AUTO: 0.3 % (ref 0–1.5)
BILIRUB SERPL-MCNC: 0.5 MG/DL (ref 0.2–1.2)
BUN BLD-MCNC: 10 MG/DL (ref 8–23)
BUN BLD-MCNC: 9 MG/DL (ref 8–23)
BUN/CREAT SERPL: 11.4 (ref 7–25)
BUN/CREAT SERPL: 13 (ref 7–25)
CALCIUM SPEC-SCNC: 10 MG/DL (ref 8.6–10.5)
CALCIUM SPEC-SCNC: 9.8 MG/DL (ref 8.6–10.5)
CHLORIDE SERPL-SCNC: 102 MMOL/L (ref 98–107)
CHLORIDE SERPL-SCNC: 104 MMOL/L (ref 98–107)
CLUMPED PLATELETS: NORMAL
CO2 SERPL-SCNC: 18.3 MMOL/L (ref 22–29)
CO2 SERPL-SCNC: 27.9 MMOL/L (ref 22–29)
CREAT BLD-MCNC: 0.77 MG/DL (ref 0.57–1)
CREAT BLD-MCNC: 0.79 MG/DL (ref 0.57–1)
DEPRECATED RDW RBC AUTO: 51.4 FL (ref 37–54)
EOSINOPHIL # BLD AUTO: 0.2 10*3/MM3 (ref 0–0.4)
EOSINOPHIL NFR BLD AUTO: 1.9 % (ref 0.3–6.2)
ERYTHROCYTE [DISTWIDTH] IN BLOOD BY AUTOMATED COUNT: 15.8 % (ref 12.3–15.4)
GFR SERPL CREATININE-BSD FRML MDRD: 71 ML/MIN/1.73
GFR SERPL CREATININE-BSD FRML MDRD: 73 ML/MIN/1.73
GLOBULIN UR ELPH-MCNC: 3.6 GM/DL
GLUCOSE BLD-MCNC: 132 MG/DL (ref 65–99)
GLUCOSE BLD-MCNC: 228 MG/DL (ref 65–99)
GLUCOSE BLDC GLUCOMTR-MCNC: 138 MG/DL (ref 70–130)
GLUCOSE BLDC GLUCOMTR-MCNC: 138 MG/DL (ref 70–130)
HBA1C MFR BLD: 6.64 % (ref 4.8–5.6)
HCT VFR BLD AUTO: 41.8 % (ref 34–46.6)
HGB BLD-MCNC: 12.7 G/DL (ref 12–15.9)
IMM GRANULOCYTES # BLD AUTO: 0.12 10*3/MM3 (ref 0–0.05)
IMM GRANULOCYTES NFR BLD AUTO: 1.2 % (ref 0–0.5)
LARGE PLATELETS: NORMAL
LYMPHOCYTES # BLD AUTO: 1.05 10*3/MM3 (ref 0.7–3.1)
LYMPHOCYTES NFR BLD AUTO: 10.1 % (ref 19.6–45.3)
MCH RBC QN AUTO: 26.9 PG (ref 26.6–33)
MCHC RBC AUTO-ENTMCNC: 30.4 G/DL (ref 31.5–35.7)
MCV RBC AUTO: 88.6 FL (ref 79–97)
MONOCYTES # BLD AUTO: 0.55 10*3/MM3 (ref 0.1–0.9)
MONOCYTES NFR BLD AUTO: 5.3 % (ref 5–12)
NEUTROPHILS # BLD AUTO: 8.46 10*3/MM3 (ref 1.7–7)
NEUTROPHILS NFR BLD AUTO: 81.2 % (ref 42.7–76)
NRBC BLD AUTO-RTO: 0 /100 WBC (ref 0–0.2)
NT-PROBNP SERPL-MCNC: 4374 PG/ML (ref 5–1800)
PLATELET # BLD AUTO: 132 10*3/MM3 (ref 140–450)
PMV BLD AUTO: 10.4 FL (ref 6–12)
POTASSIUM BLD-SCNC: 3.7 MMOL/L (ref 3.5–5.2)
POTASSIUM BLD-SCNC: 4.3 MMOL/L (ref 3.5–5.2)
PROT SERPL-MCNC: 7.5 G/DL (ref 6–8.5)
RBC # BLD AUTO: 4.72 10*6/MM3 (ref 3.77–5.28)
RBC MORPH BLD: NORMAL
SMALL PLATELETS BLD QL SMEAR: ADEQUATE
SODIUM BLD-SCNC: 139 MMOL/L (ref 136–145)
SODIUM BLD-SCNC: 140 MMOL/L (ref 136–145)
TROPONIN T SERPL-MCNC: 0.02 NG/ML (ref 0–0.03)
TROPONIN T SERPL-MCNC: 0.03 NG/ML (ref 0–0.03)
TROPONIN T SERPL-MCNC: <0.01 NG/ML (ref 0–0.03)
TSH SERPL DL<=0.05 MIU/L-ACNC: 2.13 UIU/ML (ref 0.27–4.2)
WBC MORPH BLD: NORMAL
WBC NRBC COR # BLD: 10.41 10*3/MM3 (ref 3.4–10.8)

## 2020-04-10 PROCEDURE — G0378 HOSPITAL OBSERVATION PER HR: HCPCS

## 2020-04-10 PROCEDURE — 99285 EMERGENCY DEPT VISIT HI MDM: CPT

## 2020-04-10 PROCEDURE — 96374 THER/PROPH/DIAG INJ IV PUSH: CPT

## 2020-04-10 PROCEDURE — 83036 HEMOGLOBIN GLYCOSYLATED A1C: CPT | Performed by: NURSE PRACTITIONER

## 2020-04-10 PROCEDURE — 71045 X-RAY EXAM CHEST 1 VIEW: CPT

## 2020-04-10 PROCEDURE — 96372 THER/PROPH/DIAG INJ SC/IM: CPT

## 2020-04-10 PROCEDURE — 84484 ASSAY OF TROPONIN QUANT: CPT | Performed by: EMERGENCY MEDICINE

## 2020-04-10 PROCEDURE — 99234 HOSP IP/OBS SM DT SF/LOW 45: CPT | Performed by: HOSPITALIST

## 2020-04-10 PROCEDURE — 85007 BL SMEAR W/DIFF WBC COUNT: CPT | Performed by: EMERGENCY MEDICINE

## 2020-04-10 PROCEDURE — 25010000002 ENOXAPARIN PER 10 MG: Performed by: NURSE PRACTITIONER

## 2020-04-10 PROCEDURE — 94799 UNLISTED PULMONARY SVC/PX: CPT

## 2020-04-10 PROCEDURE — 85025 COMPLETE CBC W/AUTO DIFF WBC: CPT | Performed by: EMERGENCY MEDICINE

## 2020-04-10 PROCEDURE — 82962 GLUCOSE BLOOD TEST: CPT

## 2020-04-10 PROCEDURE — 84443 ASSAY THYROID STIM HORMONE: CPT | Performed by: NURSE PRACTITIONER

## 2020-04-10 PROCEDURE — 99284 EMERGENCY DEPT VISIT MOD MDM: CPT | Performed by: EMERGENCY MEDICINE

## 2020-04-10 PROCEDURE — 80053 COMPREHEN METABOLIC PANEL: CPT | Performed by: EMERGENCY MEDICINE

## 2020-04-10 PROCEDURE — 93010 ELECTROCARDIOGRAM REPORT: CPT | Performed by: INTERNAL MEDICINE

## 2020-04-10 PROCEDURE — 93005 ELECTROCARDIOGRAM TRACING: CPT | Performed by: EMERGENCY MEDICINE

## 2020-04-10 PROCEDURE — 83880 ASSAY OF NATRIURETIC PEPTIDE: CPT | Performed by: EMERGENCY MEDICINE

## 2020-04-10 PROCEDURE — 84484 ASSAY OF TROPONIN QUANT: CPT | Performed by: HOSPITALIST

## 2020-04-10 PROCEDURE — 25010000002 FUROSEMIDE PER 20 MG: Performed by: EMERGENCY MEDICINE

## 2020-04-10 RX ORDER — SODIUM CHLORIDE 0.9 % (FLUSH) 0.9 %
10 SYRINGE (ML) INJECTION EVERY 12 HOURS SCHEDULED
Status: DISCONTINUED | OUTPATIENT
Start: 2020-04-10 | End: 2020-04-10 | Stop reason: HOSPADM

## 2020-04-10 RX ORDER — OXYCODONE HYDROCHLORIDE AND ACETAMINOPHEN 5; 325 MG/1; MG/1
1 TABLET ORAL EVERY 6 HOURS PRN
Qty: 15 TABLET | Refills: 0 | Status: SHIPPED | OUTPATIENT
Start: 2020-04-10 | End: 2020-04-16 | Stop reason: SDUPTHER

## 2020-04-10 RX ORDER — LOSARTAN POTASSIUM 50 MG/1
25 TABLET ORAL DAILY
Status: DISCONTINUED | OUTPATIENT
Start: 2020-04-10 | End: 2020-04-10 | Stop reason: HOSPADM

## 2020-04-10 RX ORDER — SODIUM CHLORIDE 0.9 % (FLUSH) 0.9 %
10 SYRINGE (ML) INJECTION AS NEEDED
Status: DISCONTINUED | OUTPATIENT
Start: 2020-04-10 | End: 2020-04-10 | Stop reason: HOSPADM

## 2020-04-10 RX ORDER — FERROUS SULFATE TAB EC 324 MG (65 MG FE EQUIVALENT) 324 (65 FE) MG
324 TABLET DELAYED RESPONSE ORAL
Status: DISCONTINUED | OUTPATIENT
Start: 2020-04-10 | End: 2020-04-10 | Stop reason: HOSPADM

## 2020-04-10 RX ORDER — METHOCARBAMOL 500 MG/1
500 TABLET, FILM COATED ORAL 3 TIMES DAILY
Qty: 90 TABLET | Refills: 0 | Status: SHIPPED | OUTPATIENT
Start: 2020-04-10 | End: 2020-06-22 | Stop reason: SDUPTHER

## 2020-04-10 RX ORDER — ANASTROZOLE 1 MG/1
1 TABLET ORAL DAILY
Status: DISCONTINUED | OUTPATIENT
Start: 2020-04-10 | End: 2020-04-10 | Stop reason: HOSPADM

## 2020-04-10 RX ORDER — OXYCODONE AND ACETAMINOPHEN 7.5; 325 MG/1; MG/1
1 TABLET ORAL EVERY 4 HOURS PRN
Status: DISCONTINUED | OUTPATIENT
Start: 2020-04-10 | End: 2020-04-10 | Stop reason: HOSPADM

## 2020-04-10 RX ORDER — BISACODYL 5 MG/1
5 TABLET, DELAYED RELEASE ORAL DAILY PRN
Status: DISCONTINUED | OUTPATIENT
Start: 2020-04-10 | End: 2020-04-10 | Stop reason: HOSPADM

## 2020-04-10 RX ORDER — IPRATROPIUM BROMIDE AND ALBUTEROL SULFATE 2.5; .5 MG/3ML; MG/3ML
3 SOLUTION RESPIRATORY (INHALATION) EVERY 4 HOURS PRN
Status: DISCONTINUED | OUTPATIENT
Start: 2020-04-10 | End: 2020-04-10 | Stop reason: HOSPADM

## 2020-04-10 RX ORDER — ACETAMINOPHEN 325 MG/1
650 TABLET ORAL EVERY 4 HOURS PRN
Status: DISCONTINUED | OUTPATIENT
Start: 2020-04-10 | End: 2020-04-10 | Stop reason: HOSPADM

## 2020-04-10 RX ORDER — ACETAMINOPHEN 650 MG/1
650 SUPPOSITORY RECTAL EVERY 4 HOURS PRN
Status: DISCONTINUED | OUTPATIENT
Start: 2020-04-10 | End: 2020-04-10 | Stop reason: HOSPADM

## 2020-04-10 RX ORDER — CARVEDILOL 12.5 MG/1
12.5 TABLET ORAL 2 TIMES DAILY WITH MEALS
Status: DISCONTINUED | OUTPATIENT
Start: 2020-04-10 | End: 2020-04-10 | Stop reason: HOSPADM

## 2020-04-10 RX ORDER — NICOTINE POLACRILEX 4 MG
15 LOZENGE BUCCAL
Status: DISCONTINUED | OUTPATIENT
Start: 2020-04-10 | End: 2020-04-10 | Stop reason: HOSPADM

## 2020-04-10 RX ORDER — PRAVASTATIN SODIUM 20 MG
10 TABLET ORAL DAILY
Status: DISCONTINUED | OUTPATIENT
Start: 2020-04-10 | End: 2020-04-10 | Stop reason: HOSPADM

## 2020-04-10 RX ORDER — CALCIUM CARBONATE 200(500)MG
1 TABLET,CHEWABLE ORAL 2 TIMES DAILY PRN
Status: DISCONTINUED | OUTPATIENT
Start: 2020-04-10 | End: 2020-04-10 | Stop reason: HOSPADM

## 2020-04-10 RX ORDER — ONDANSETRON 4 MG/1
4 TABLET, FILM COATED ORAL EVERY 6 HOURS PRN
Status: DISCONTINUED | OUTPATIENT
Start: 2020-04-10 | End: 2020-04-10 | Stop reason: HOSPADM

## 2020-04-10 RX ORDER — NITROGLYCERIN 0.4 MG/1
0.4 TABLET SUBLINGUAL
Status: DISCONTINUED | OUTPATIENT
Start: 2020-04-10 | End: 2020-04-10 | Stop reason: HOSPADM

## 2020-04-10 RX ORDER — LEVOTHYROXINE SODIUM 0.03 MG/1
25 TABLET ORAL EVERY MORNING
Status: DISCONTINUED | OUTPATIENT
Start: 2020-04-10 | End: 2020-04-10 | Stop reason: HOSPADM

## 2020-04-10 RX ORDER — MELATONIN
1000 DAILY
Status: DISCONTINUED | OUTPATIENT
Start: 2020-04-10 | End: 2020-04-10 | Stop reason: HOSPADM

## 2020-04-10 RX ORDER — FUROSEMIDE 10 MG/ML
40 INJECTION INTRAMUSCULAR; INTRAVENOUS ONCE
Status: DISCONTINUED | OUTPATIENT
Start: 2020-04-10 | End: 2020-04-10 | Stop reason: HOSPADM

## 2020-04-10 RX ORDER — FUROSEMIDE 10 MG/ML
40 INJECTION INTRAMUSCULAR; INTRAVENOUS ONCE
Status: COMPLETED | OUTPATIENT
Start: 2020-04-10 | End: 2020-04-10

## 2020-04-10 RX ORDER — DULOXETIN HYDROCHLORIDE 60 MG/1
60 CAPSULE, DELAYED RELEASE ORAL DAILY
Status: DISCONTINUED | OUTPATIENT
Start: 2020-04-10 | End: 2020-04-10 | Stop reason: HOSPADM

## 2020-04-10 RX ORDER — DEXTROSE MONOHYDRATE 25 G/50ML
25 INJECTION, SOLUTION INTRAVENOUS
Status: DISCONTINUED | OUTPATIENT
Start: 2020-04-10 | End: 2020-04-10 | Stop reason: HOSPADM

## 2020-04-10 RX ORDER — DOCUSATE SODIUM 100 MG/1
100 CAPSULE, LIQUID FILLED ORAL 2 TIMES DAILY PRN
Status: DISCONTINUED | OUTPATIENT
Start: 2020-04-10 | End: 2020-04-10 | Stop reason: HOSPADM

## 2020-04-10 RX ORDER — ONDANSETRON 2 MG/ML
4 INJECTION INTRAMUSCULAR; INTRAVENOUS EVERY 6 HOURS PRN
Status: DISCONTINUED | OUTPATIENT
Start: 2020-04-10 | End: 2020-04-10 | Stop reason: HOSPADM

## 2020-04-10 RX ORDER — OXYCODONE AND ACETAMINOPHEN 7.5; 325 MG/1; MG/1
1 TABLET ORAL EVERY 4 HOURS PRN
COMMUNITY
End: 2020-04-10 | Stop reason: HOSPADM

## 2020-04-10 RX ORDER — POTASSIUM CHLORIDE 750 MG/1
10 TABLET, EXTENDED RELEASE ORAL DAILY
Status: DISCONTINUED | OUTPATIENT
Start: 2020-04-10 | End: 2020-04-10 | Stop reason: HOSPADM

## 2020-04-10 RX ORDER — SODIUM CHLORIDE 9 MG/ML
40 INJECTION, SOLUTION INTRAVENOUS AS NEEDED
Status: DISCONTINUED | OUTPATIENT
Start: 2020-04-10 | End: 2020-04-10 | Stop reason: HOSPADM

## 2020-04-10 RX ORDER — PREGABALIN 75 MG/1
75 CAPSULE ORAL 2 TIMES DAILY
Status: DISCONTINUED | OUTPATIENT
Start: 2020-04-10 | End: 2020-04-10 | Stop reason: HOSPADM

## 2020-04-10 RX ORDER — ASPIRIN 81 MG/1
81 TABLET ORAL DAILY
Status: DISCONTINUED | OUTPATIENT
Start: 2020-04-10 | End: 2020-04-10 | Stop reason: HOSPADM

## 2020-04-10 RX ORDER — POTASSIUM CHLORIDE 750 MG/1
10 TABLET, FILM COATED, EXTENDED RELEASE ORAL DAILY
COMMUNITY
End: 2020-05-29

## 2020-04-10 RX ORDER — BUMETANIDE 0.5 MG/1
1 TABLET ORAL DAILY
Qty: 30 TABLET | Refills: 0 | Status: SHIPPED | OUTPATIENT
Start: 2020-04-10 | End: 2020-04-28 | Stop reason: SDUPTHER

## 2020-04-10 RX ORDER — FLUOXETINE HYDROCHLORIDE 20 MG/1
20 CAPSULE ORAL NIGHTLY
COMMUNITY
End: 2020-04-10 | Stop reason: HOSPADM

## 2020-04-10 RX ORDER — AMOXICILLIN 250 MG
2 CAPSULE ORAL NIGHTLY PRN
Status: DISCONTINUED | OUTPATIENT
Start: 2020-04-10 | End: 2020-04-10 | Stop reason: HOSPADM

## 2020-04-10 RX ORDER — ACETAMINOPHEN 160 MG/5ML
650 SOLUTION ORAL EVERY 4 HOURS PRN
Status: DISCONTINUED | OUTPATIENT
Start: 2020-04-10 | End: 2020-04-10 | Stop reason: HOSPADM

## 2020-04-10 RX ORDER — MELATONIN
1000 DAILY
Qty: 30 TABLET | Refills: 0 | Status: SHIPPED | OUTPATIENT
Start: 2020-04-11 | End: 2020-05-14 | Stop reason: SDUPTHER

## 2020-04-10 RX ORDER — BISACODYL 10 MG
10 SUPPOSITORY, RECTAL RECTAL DAILY PRN
Status: DISCONTINUED | OUTPATIENT
Start: 2020-04-10 | End: 2020-04-10 | Stop reason: HOSPADM

## 2020-04-10 RX ORDER — LANOLIN ALCOHOL/MO/W.PET/CERES
3 CREAM (GRAM) TOPICAL NIGHTLY
Status: DISCONTINUED | OUTPATIENT
Start: 2020-04-10 | End: 2020-04-10 | Stop reason: HOSPADM

## 2020-04-10 RX ORDER — CHOLECALCIFEROL (VITAMIN D3) 125 MCG
5 CAPSULE ORAL NIGHTLY PRN
Status: DISCONTINUED | OUTPATIENT
Start: 2020-04-10 | End: 2020-04-10 | Stop reason: HOSPADM

## 2020-04-10 RX ORDER — OXYCODONE HYDROCHLORIDE AND ACETAMINOPHEN 5; 325 MG/1; MG/1
2 TABLET ORAL ONCE
Status: COMPLETED | OUTPATIENT
Start: 2020-04-10 | End: 2020-04-10

## 2020-04-10 RX ADMIN — OXYCODONE HYDROCHLORIDE AND ACETAMINOPHEN 2 TABLET: 5; 325 TABLET ORAL at 03:16

## 2020-04-10 RX ADMIN — PREGABALIN 75 MG: 75 CAPSULE ORAL at 08:35

## 2020-04-10 RX ADMIN — LOSARTAN POTASSIUM 25 MG: 50 TABLET, FILM COATED ORAL at 08:34

## 2020-04-10 RX ADMIN — ENOXAPARIN SODIUM 40 MG: 40 INJECTION SUBCUTANEOUS at 08:35

## 2020-04-10 RX ADMIN — ANASTROZOLE 1 MG: 1 TABLET ORAL at 11:21

## 2020-04-10 RX ADMIN — FUROSEMIDE 40 MG: 10 INJECTION, SOLUTION INTRAMUSCULAR; INTRAVENOUS at 02:36

## 2020-04-10 RX ADMIN — POTASSIUM CHLORIDE 10 MEQ: 10 TABLET, EXTENDED RELEASE ORAL at 08:33

## 2020-04-10 RX ADMIN — ASPIRIN 81 MG: 81 TABLET, COATED ORAL at 08:33

## 2020-04-10 RX ADMIN — OXYCODONE HYDROCHLORIDE AND ACETAMINOPHEN 1 TABLET: 7.5; 325 TABLET ORAL at 09:01

## 2020-04-10 RX ADMIN — FERROUS SULFATE TAB EC 324 MG (65 MG FE EQUIVALENT) 324 MG: 324 (65 FE) TABLET DELAYED RESPONSE at 08:35

## 2020-04-10 RX ADMIN — SODIUM CHLORIDE, PRESERVATIVE FREE 10 ML: 5 INJECTION INTRAVENOUS at 08:42

## 2020-04-10 RX ADMIN — MELATONIN 1000 UNITS: at 08:34

## 2020-04-10 RX ADMIN — LEVOTHYROXINE SODIUM 25 MCG: 25 TABLET ORAL at 08:34

## 2020-04-10 RX ADMIN — DULOXETINE HYDROCHLORIDE 60 MG: 60 CAPSULE, DELAYED RELEASE ORAL at 08:35

## 2020-04-10 RX ADMIN — METFORMIN HYDROCHLORIDE 1000 MG: 500 TABLET ORAL at 08:33

## 2020-04-10 RX ADMIN — PRAVASTATIN SODIUM 10 MG: 20 TABLET ORAL at 08:34

## 2020-04-10 RX ADMIN — CARVEDILOL 12.5 MG: 12.5 TABLET, FILM COATED ORAL at 08:34

## 2020-04-10 NOTE — PROGRESS NOTES
Pharmacy Dosing Lovenox    Pharmacy dosing Lovenox for VTE prophylaxis.   CrCl = 67.8 ml/min  Lovenox 40 mg daily is correct dose for patient. Will continue to follow and monitor.    Thank you-    Dionne Small PharmD, [facility]

## 2020-04-10 NOTE — NURSING NOTE
Continued Stay Note  NOHEMY Diaz     Patient Name: Jung Short  MRN: [id number]  Today's Date: 4/10/2020    Admit Date: 4/10/2020    Discharge Plan     Row Name 04/10/20 1229       Plan    Plan  plan home, assess needs    Patient/Family in Agreement with Plan  yes    Plan Comments  Spoke with patient at bedside, face sheet verified. Patient lives alone in a patio home and is fairly independent of ADLs. She was recently dc'd from [facility] r/t fractured ankle and wrist. Since sustaining her injuries she is using a rw and a cane. She states she has elevated toilet seat and a glucometer. She denies use of home 02, cpap/bipap. She states [facility] set up Home Health and she cannot recall the agency.Call to [facility] requesting HH agency arranged for patient at [place]. Patient has a living will on file. She uses [facility] and denies issues obtaining medications. She states she has been assisgend a new PCP but cannot recall her name- she states she is with Dr Toth office. Call placed to Dr Toth office and they confirm patient will be seeing Kathryn BRADY and has her initial appointment scheduled 5/29/20@1330. Face sheet updated.  CM will list PCP in comments of AVS for patient reference. CM # placed on white board, will continue to follow.        Discharge Codes    No documentation.             Blade Craig RN

## 2020-04-10 NOTE — ED PROVIDER NOTES
Subjective   Jung Short is a 74-year-old white female who presents secondary to shortness of breath.  Onset of symptoms was approximately 1 hour prior to ER arrival.  Patient has a history of CHF.  No recent illness.  Patient has been home approximately 2 weeks after a stint in rehab secondary to right upper and lower extremity fractures sustained in a fall.  Patient denies fever, chills, cough, congestion, chest pain, back pain or abdominal pain.  Patient called EMS to her home.  She was placed on 100% oxygen via nonrebreather.  She was stable in route.      History provided by:  Patient  Shortness of Breath   Severity:  Moderate  Duration:  1 hour  Timing:  Constant  Chronicity:  New  Context comment:  As described above  Relieved by:  Nothing  Associated symptoms: no abdominal pain, no chest pain, no claudication, no cough, no diaphoresis, no fever, no hemoptysis, no sputum production, no syncope, no vomiting and no wheezing    Risk factors: hx of cancer (Breast CA)    Risk factors: no recent alcohol use, no hx of PE/DVT, no prolonged immobilization and no tobacco use        Review of Systems   Constitutional: Negative.  Negative for diaphoresis and fever.   HENT: Negative.  Negative for rhinorrhea.    Eyes: Negative.  Negative for redness.   Respiratory: Positive for shortness of breath. Negative for cough, hemoptysis, sputum production and wheezing.    Cardiovascular: Negative for chest pain, claudication and syncope.   Gastrointestinal: Negative for abdominal pain and vomiting.   Endocrine: Negative.    Genitourinary: Negative.  Negative for difficulty urinating.   Musculoskeletal: Negative.  Negative for back pain.   Skin: Negative.  Negative for color change.   Neurological: Negative.  Negative for syncope.   Hematological: Negative.    Psychiatric/Behavioral: Negative.    All other systems reviewed and are negative.      Past Medical History:   Diagnosis Date   • Anemia    • Benign breast disease     • Cancer (CMS/Formerly [facility]) 2015    Right breast   • Cellulitis of leg     May-2003 right lower extremity   • CHF (congestive heart failure) (CMS/Formerly [facility])     Dr Bates follows   • Chronic kidney disease     Dr Hannah follows   • Chronic kidney disease 5/24/2019   • Chronic ulcer of right foot (CMS/Formerly [facility])     Non-pressure, history of    • Depression    • Diabetic peripheral neuropathy (CMS/Formerly [facility])    • Diarrhea    • Diverticulosis    • Encounter for eye exam    • Femoral fracture (CMS/Formerly [facility])     right   • Fracture of left wrist     history of   • Gastroesophageal reflux    • Hiatal hernia    • History of bone density study 09/20/2012   • History of colonoscopy     done 6/03 recheck in 10 years.   Done july 2013 recheck in 5 years   • History of mammography, screening 09/20/2012   • Hospital discharge follow-up    • Hyperlipidemia    • Hypertension    • Hypothyroidism    • Impingement syndrome of right shoulder    • Joint pain    • Menopausal disorder    • Methicillin resistant Staphylococcus aureus infection 2012    after bladder surgery   • MRSA (methicillin resistant staph aureus) culture positive    • Nausea and vomiting    • Nonischemic cardiomyopathy (CMS/Formerly [facility])     Ejection fraction 10% per 2-D echo with Doppler however she did have cardiac catheterization April 2015 which showed ejection fraction 20% with global hypokinesis and severe mitral insufficiency   • Osteoarthritis     generalized, sched jania TKA   • Paroxysmal atrial fibrillation (CMS/Formerly [facility])     Dr Bates follows   • Postoperative infection     July of 2012 following her hysterectomy far vaginal wall prolapse. She was on antibiotics and wound dressings for 2 months.   • Shoulder pain, bilateral     has tears on both sides   • Syncope, psychogenic 4/19/2017   • Toe ulcer (CMS/Formerly [facility])     h/o to both feet, d/t shoes rubbing per patient   • Transient alteration of awareness 4/19/2017       Allergies   Allergen Reactions   • Dilaudid [Hydromorphone] Delirium and Confusion    • Latex Rash       Past Surgical History:   Procedure Laterality Date   • ABDOMINAL SURGERY  2012    had to be reopened after bladder surgery d/t infection   • AMPUTATION FOOT / TOE Right 11/2013    Rt foot amputation MTP first toe   • BLADDER SURGERY  2012   • BREAST BIOPSY     • BREAST SURGERY  06/29/2015    Percutaneous ultrasound-guided placement of metal localized clip 1st lesion   • CARDIAC CATHETERIZATION  2015   • CATARACT EXTRACTION Bilateral 2017   • DEBRIDEMENT  FOOT Right 01/2013    During hospitalization    • EPIDURAL BLOCK      4 chronic back pain and leg pain last epidurals done 5-2012 she had no benefit at all from this procedure.   • FEMUR FRACTURE SURGERY     • FOOT SURGERY Bilateral     several   • HERNIA REPAIR      At the abdomen February 2007 Dr. Mendez   • INCISIONAL HERNIA REPAIR  05/16/2014    Recurrent. Incarcerated. With mesh implantation   • MASTECTOMY Right 05/2015   • SHOULDER SURGERY Right     x2 RCR   • TOTAL ABDOMINAL HYSTERECTOMY      with oophorectomy-Done June-2012 secondary to vaginal wall prolapse.   • TOTAL KNEE ARTHROPLASTY Right    • TOTAL KNEE ARTHROPLASTY Left 4/17/2018    Procedure: TOTAL KNEE ARTHROPLASTY;  Surgeon: Live Chambers MD;  Location: [facility];  Service: Orthopedics       Family History   Problem Relation Age of Onset   • Colonic polyp Mother    • Hypertension Mother    • Migraines Mother    • Mental illness Mother    • Depression Mother    • Coronary artery disease Father    • Other Father         Cardiac Disorder   • Colon cancer Father    • Kidney disease Father    • Cancer Father    • Breast cancer Maternal Aunt    • Colon cancer Brother    • Alcohol abuse Brother    • Arthritis Sister    • Hypertension Brother    • Lung disease Brother    • Alcohol abuse Brother    • Malig Hyperthermia Neg Hx        Social History     Socioeconomic History   • Marital status:      Spouse name: Not on file   • Number of children: 2   • Years of education: Not on file    • Highest education level: Not on file   Occupational History   • 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   Tobacco Use   • Smoking status: Former Smoker     Packs/day: 3.00     Years: 30.00     Pack years: 90.00     Types: Cigarettes     Last attempt to quit:      Years since quittin.2   • Smokeless tobacco: Never Used   Substance and Sexual Activity   • Alcohol use: No     Comment: h/o quit    • Drug use: No   • Sexual activity: Defer     Partners: Male     Comment: celibate   Social History Narrative         Volunteers here at [facility]     [place]  woman    Lots of friends    2 daughters - lives in [facility] and [place] (graphic design)    [employer]           Objective   Physical Exam   Constitutional: She is oriented to person, place, and time. She appears well-developed and well-nourished. She appears distressed (Patient appears in mild respiratory distress.).   74-year-old white female lying in bed.  Patient is a bit overweight.  She appears in fair health for age.  Vital signs notable for heart rate of 103, respiratory rate of 26, /89 and oxygen saturation 90%.   HENT:   Head: Normocephalic and atraumatic.   Right Ear: External ear normal.   Left Ear: External ear normal.   Nose: Nose normal.   Mouth/Throat: Oropharynx is clear and moist.   Eyes: Pupils are equal, round, and reactive to light. Conjunctivae and EOM are normal.   Neck: Normal range of motion. Neck supple.   Cardiovascular: Regular rhythm, normal heart sounds and intact distal pulses. Tachycardia present. Exam reveals no gallop and no friction rub.   No murmur heard.  Pulmonary/Chest: She is in respiratory distress. She has rales (Bilateral lung bases heard on posterior auscultation).   Abdominal: Soft. Bowel sounds are normal. She exhibits no distension. There is no tenderness.   Musculoskeletal: Normal range of motion.        Right lower leg: She exhibits edema (2-3+ pitting edema).    Neurological: She is alert and oriented to person, place, and time. She has normal reflexes.   Skin: Skin is warm and dry. She is not diaphoretic. There is pallor.   Psychiatric: She has a normal mood and affect. Her behavior is normal.   Nursing note and vitals reviewed.      Procedures     EKG 12-lead  Date 04/10/2020  Time 02: 48  Normal sinus rhythm  Normal rate  Leftward axis  Normal intervals  LVH by voltage with associated repull abnormalities  T wave inversion in leads aVL, V2 through V6  Abnormal EKG    Unchanged from EKG dated 1/31/2020      ED Course  ED Course as of Apr 10 0437   Fri Apr 10, 2020   0206 Patient has bibasilar crackles.  Obtaining full set of lab work, EKG and chest x-ray.  Giving Lasix 40 mg IV.    [SS]   0337 Patient was a very hard stick.  Not enough blood was obtained for a d-dimer or a type and screen.      CBC is unremarkable.  CMP notable for blood sugar of 228 and bicarb of 18.3.  Troponin is negative.  proBNP 4374.  Chest x-ray shows cardiomegaly with vascular congestion.  EKG is unchanged from old.Patient has diuresed 600 cc of urine.  Patient is not on oxygen at home.  She was taken off O2.  Her sats dropped into the upper 80s.  Thus she was placed back on 2 L of oxygen per minute via nasal cannula.  I feel patient requires hospitalization.  Calling hospitalist.    [SS]   0343 Ms. Short is feeling much better.  She has now diuresed 1300 cc in total.      [SS]   0418 Discussed with Jessenia Mcleod.  She will admit for further care.    [SS]      ED Course User Index  [SS] Peter Campbell MD      Labs Reviewed   COMPREHENSIVE METABOLIC PANEL - Abnormal; Notable for the following components:       Result Value    Glucose 228 (*)     CO2 18.3 (*)     Anion Gap 16.7 (*)     All other components within normal limits    Narrative:     GFR Normal >60  Chronic Kidney Disease <60  Kidney Failure <15     BNP (IN-HOUSE) - Abnormal; Notable for the following components:    proBNP 4,374.0  (*)     All other components within normal limits    Narrative:     Among patients with dyspnea, NT-proBNP is highly sensitive for the detection of acute congestive heart failure. In addition NT-proBNP of <300 pg/ml effectively rules out acute congestive heart failure with 99% negative predictive value.    Results may be falsely decreased if patient taking Biotin.     CBC WITH AUTO DIFFERENTIAL - Abnormal; Notable for the following components:    MCHC 30.4 (*)     RDW 15.8 (*)     Platelets 132 (*)     Neutrophil % 81.2 (*)     Lymphocyte % 10.1 (*)     Immature Grans % 1.2 (*)     Neutrophils, Absolute 8.46 (*)     Immature Grans, Absolute 0.12 (*)     All other components within normal limits   TROPONIN (IN-HOUSE) - Normal    Narrative:     Troponin T Reference Range:  <= 0.03 ng/mL-   Negative for AMI  >0.03 ng/mL-     Abnormal for myocardial necrosis.  Clinicians would have to utilize clinical acumen, EKG, Troponin and serial changes to determine if it is an Acute Myocardial Infarction or myocardial injury due to an underlying chronic condition.       Results may be falsely decreased if patient taking Biotin.     SCAN SLIDE   CBC AND DIFFERENTIAL    Narrative:     The following orders were created for panel order CBC & Differential.  Procedure                               Abnormality         Status                     ---------                               -----------         ------                     CBC Auto Differential[[id number]]        Abnormal            Final result                 Please view results for these tests on the individual orders.     Xr Chest 1 View    Result Date: 4/10/2020  Narrative: CHEST X-RAY, 4/10/2020  HISTORY:  74-year-old female in the ED with new onset shortness of air this morning. History of CHF.  TECHNIQUE: AP portable upright chest x-ray. COMPARISON: *  Chest x-ray, 2/2/2020. FINDINGS: Stable moderate cardiomegaly. Pulmonary venous redistribution and probable mild diffuse  interstitial edema, new or increased since the prior exam and suggesting mild vascular congestion. Chronically low lung volumes. No visible dense airspace consolidation or definite pleural effusion.     Impression: Cardiomegaly with likely mild vascular congestion including mild diffuse interstitial edema, new or increased since the prior exam. Signer Name: Levy Mishra MD  Signed: 4/10/2020 3:20 AM  Workstation Name: [id number]  [facility]    My differential diagnosis for dyspnea includes but is not limited to:  Asthma, COPD, pneumonia, pulmonary embolus, acute respiratory distress syndrome, pneumothorax, pleural effusion, pulmonary fibrosis, congestive heart failure, myocardial infarction, DKA, uremia, acidosis, sepsis, anemia, drug related, hyperventilation, CNS disease                                       MDM    Final diagnoses:   Acute on chronic congestive heart failure, unspecified heart failure type (CMS/HCC)            Peter Campbell MD  04/10/20 0428       Peter Campbell MD  04/10/20 0437

## 2020-04-10 NOTE — DISCHARGE SUMMARY
Jung Short  1945  [id number]    Hospitalists Discharge Summary    Date of Admission: 4/10/2020  Date of Discharge:  4/10/2020    Primary Discharge Diagnoses:  1.  Acute Hypoxia  2.  Acute-on-Chronic dCHF    Secondary Discharge Diagnoses:  1.  Diabetes Mellitus, Type 2  2.  GERD  3.  Hypothyroidism  4.  Hx/O Right Breast Cancer  5.  Hypothyroidism  6.  Vitamin D Deficiency    History of Present Illness (taken from H&P):  The patient is a 74-year-old female that presented via EMS to the emergency department secondary to acute onset shortness of air while at home using the restroom this evening.  She notes episodes of heart failure in past have not been as bad as this 1.  She notes 6 pound weight gain in the past 1 week.  She denies cough, chest pain, syncopal sensation, nausea, diaphoresis with this episode.  In ER she received a dose of IV Lasix with immediate diuresis according to ER provider.  Currently she is able to speak in full sentences.     She notes discharge approximately 1 week ago from [facility] secondary to rehab from right ankle fracture, right wrist fracture.  She reports multiple medication changes and feels that her medications were not correct.  She reports she was getting 1 mg of Bumex while at [facility] but upon return home was only taking 0.5 mg.  She further reports being unable to get some of her medications and having been given only 1 week prescriptions at discharge from [facility].     She has a known history of diastolic CHF, CKD, and ICM, hyperlipidemia, hypertension, GERD/HH, depression, episode of PAF, hypothyroidism, DM 2 with neuropathy.     She otherwise denies f/c/headache/rhinorrhea/nasal congestion/lightheadedness/syncopal sensation/cough/soa/n/v/d/chest pain/abdominal pain/recent illness/sick exposures/change in bowel or bladder habits/bloody emesis or bloody stools/change in medications or any other new concerns.    Hospital Course:  Ms. Short  was admitted to the Med/Surg unit.  She had good clinical response to IV Lasix given in the ER, and her Bumex dose at home was incorrect as she was to be taking 1mg instead of 0.5mg.  She was quickly weaned from O2.  AMI was excluded by serial biomarker study.    I reluctantly refilled her Percocet, but I told her she is far enough out from her break to begin weaning off of Percocet so I did write a script for a reduced dose.    PCP  Patient Care Team:  Kathryn Epstein APRN as PCP - General (Family Medicine)  Trini Zaidi APRN as PCP - Claims Attributed  Shanda Gerardo, MARILYN as Ambulatory  (Population Health)    Consults:   Consults     No orders found from 3/12/2020 to 4/11/2020.          Operations and Procedures Performed:       Xr Chest 1 View    Result Date: 4/10/2020  Narrative: CHEST X-RAY, 4/10/2020  HISTORY:  74-year-old female in the ED with new onset shortness of air this morning. History of CHF.  TECHNIQUE: AP portable upright chest x-ray. COMPARISON: *  Chest x-ray, 2/2/2020. FINDINGS: Stable moderate cardiomegaly. Pulmonary venous redistribution and probable mild diffuse interstitial edema, new or increased since the prior exam and suggesting mild vascular congestion. Chronically low lung volumes. No visible dense airspace consolidation or definite pleural effusion.     Impression: Cardiomegaly with likely mild vascular congestion including mild diffuse interstitial edema, new or increased since the prior exam. Signer Name: Levy Mishra MD  Signed: 4/10/2020 3:20 AM  Workstation Name: [email or web address]  [facility]      Allergies:  is allergic to dilaudid [hydromorphone] and latex.    Joey  reviewed    Discharge Medications:     Discharge Medications      New Medications      Instructions Start Date   cholecalciferol 25 MCG (1000 UT) tablet  Commonly known as:  VITAMIN D3  Replaces:  vitamin D 1.25 MG (60432 UT) capsule capsule   1,000 Units, Oral,  Daily   Start Date:  April 11, 2020     oxyCODONE-acetaminophen 5-325 MG per tablet  Commonly known as:  Percocet  Replaces:  oxyCODONE-acetaminophen 7.5-325 MG per tablet   1 tablet, Oral, Every 6 Hours PRN         Changes to Medications      Instructions Start Date   alendronate 35 MG tablet  Commonly known as:  FOSAMAX  What changed:    · when to take this  · additional instructions   take 1 tablet by mouth every week         Continue These Medications      Instructions Start Date   acetaminophen 325 MG tablet  Commonly known as:  TYLENOL   650 mg, Oral, Every 4 Hours PRN      anastrozole 1 MG tablet  Commonly known as:  ARIMIDEX   TAKE 1 TABLET BY MOUTH EVERY DAY      aspirin 81 MG EC tablet   81 mg, Oral, Daily      bumetanide 0.5 MG tablet  Commonly known as:  BUMEX   1 mg, Oral, Daily      carvedilol 12.5 MG tablet  Commonly known as:  COREG   TAKE 1 TABLET BY MOUTH TWICE DAILY WITH MEALS      DULoxetine 60 MG capsule  Commonly known as:  CYMBALTA   60 mg, Oral, Daily      ferrous sulfate 325 (65 FE) MG tablet   325 mg, Oral, Daily With Breakfast      glucose blood test strip   Take BG daily      ipratropium-albuterol 0.5-2.5 mg/3 ml nebulizer  Commonly known as:  DUO-NEB   3 mL, Nebulization, Every 4 Hours PRN      levothyroxine 25 MCG tablet  Commonly known as:  SYNTHROID, LEVOTHROID   25 mcg, Oral, Daily      losartan 25 MG tablet  Commonly known as:  COZAAR   25 mg, Oral, Daily      melatonin 1 MG tablet   3 mg, Oral, Nightly      metFORMIN 1000 MG tablet  Commonly known as:  GLUCOPHAGE   1,000 mg, Oral, Daily With Breakfast      metFORMIN 500 MG tablet  Commonly known as:  GLUCOPHAGE   500 mg, Oral, Daily With Dinner      methocarbamol 500 MG tablet  Commonly known as:  ROBAXIN   500 mg, Oral, 3 Times Daily      MULTIVITAMIN ADULT PO   1 tablet, Oral, Daily      potassium chloride 10 MEQ CR tablet  Commonly known as:  K-DUR   10 mEq, Oral, Daily      pravastatin 10 MG tablet  Commonly known as:   PRAVACHOL   10 mg, Oral, Daily      pregabalin 75 MG capsule  Commonly known as:  LYRICA   75 mg, Oral, 2 Times Daily         Stop These Medications    FLUoxetine 20 MG capsule  Commonly known as:  PROzac     ondansetron 4 MG tablet  Commonly known as:  ZOFRAN     oxyCODONE-acetaminophen 7.5-325 MG per tablet  Commonly known as:  PERCOCET  Replaced by:  oxyCODONE-acetaminophen 5-325 MG per tablet     vitamin D 1.25 MG (07964 UT) capsule capsule  Commonly known as:  ERGOCALCIFEROL  Replaced by:  cholecalciferol 25 MCG (1000 UT) tablet            Last Lab Results:   Lab Results (most recent)     Procedure Component Value Units Date/Time    Troponin [[id number]]  (Normal) Collected:  04/10/20 1543    Specimen:  Blood Updated:  04/10/20 1607     Troponin T 0.022 ng/mL     Narrative:       Troponin T Reference Range:  <= 0.03 ng/mL-   Negative for AMI  >0.03 ng/mL-     Abnormal for myocardial necrosis.  Clinicians would have to utilize clinical acumen, EKG, Troponin and serial changes to determine if it is an Acute Myocardial Infarction or myocardial injury due to an underlying chronic condition.       Results may be falsely decreased if patient taking Biotin.      Basic Metabolic Panel [[id number]]  (Abnormal) Collected:  04/10/20 1159    Specimen:  Blood Updated:  04/10/20 1233     Glucose 132 mg/dL      BUN 10 mg/dL      Creatinine 0.77 mg/dL      Sodium 140 mmol/L      Potassium 3.7 mmol/L      Chloride 102 mmol/L      CO2 27.9 mmol/L      Calcium 9.8 mg/dL      eGFR Non African Amer 73 mL/min/1.73      BUN/Creatinine Ratio 13.0     Anion Gap 10.1 mmol/L     Narrative:       GFR Normal >60  Chronic Kidney Disease <60  Kidney Failure <15      Troponin [[id number]]  (Normal) Collected:  04/10/20 1159    Specimen:  Blood Updated:  04/10/20 1230     Troponin T 0.029 ng/mL     Narrative:       Troponin T Reference Range:  <= 0.03 ng/mL-   Negative for AMI  >0.03 ng/mL-     Abnormal for myocardial necrosis.  Clinicians would  have to utilize clinical acumen, EKG, Troponin and serial changes to determine if it is an Acute Myocardial Infarction or myocardial injury due to an underlying chronic condition.       Results may be falsely decreased if patient taking Biotin.      POC Glucose Once [[id number]]  (Abnormal) Collected:  04/10/20 1131    Specimen:  Blood Updated:  04/10/20 1139     Glucose 138 mg/dL     POC Glucose Once [[id number]]  (Abnormal) Collected:  04/10/20 0840    Specimen:  Blood Updated:  04/10/20 0857     Glucose 138 mg/dL     Hemoglobin A1c [[id number]]  (Abnormal) Collected:  04/10/20 0243    Specimen:  Blood Updated:  04/10/20 0541     Hemoglobin A1C 6.64 %     Narrative:       Hemoglobin A1C Ranges:    Increased Risk for Diabetes  5.7% to 6.4%  Diabetes                     >= 6.5%  Diabetic Goal                < 7.0%    TSH [[id number]]  (Normal) Collected:  04/10/20 0243    Specimen:  Blood Updated:  04/10/20 0539     TSH 2.130 uIU/mL     BNP [[id number]]  (Abnormal) Collected:  04/10/20 0243    Specimen:  Blood Updated:  04/10/20 0329     proBNP 4,374.0 pg/mL     Narrative:       Among patients with dyspnea, NT-proBNP is highly sensitive for the detection of acute congestive heart failure. In addition NT-proBNP of <300 pg/ml effectively rules out acute congestive heart failure with 99% negative predictive value.    Results may be falsely decreased if patient taking Biotin.      Comprehensive Metabolic Panel [[id number]]  (Abnormal) Collected:  04/10/20 0243    Specimen:  Blood Updated:  04/10/20 0317     Glucose 228 mg/dL      BUN 9 mg/dL      Creatinine 0.79 mg/dL      Sodium 139 mmol/L      Potassium 4.3 mmol/L      Chloride 104 mmol/L      CO2 18.3 mmol/L      Calcium 10.0 mg/dL      Total Protein 7.5 g/dL      Albumin 3.90 g/dL      ALT (SGPT) 9 U/L      AST (SGOT) 20 U/L      Alkaline Phosphatase 114 U/L      Total Bilirubin 0.5 mg/dL      eGFR Non African Amer 71 mL/min/1.73      Globulin 3.6 gm/dL      A/G Ratio 1.1  g/dL      BUN/Creatinine Ratio 11.4     Anion Gap 16.7 mmol/L     Narrative:       GFR Normal >60  Chronic Kidney Disease <60  Kidney Failure <15      Scan Slide [[id number]] Collected:  04/10/20 0243    Specimen:  Blood Updated:  04/10/20 0313     RBC Morphology Normal     WBC Morphology Normal     Platelet Estimate Adequate     Clumped Platelets --     Comment: RARE        Large Platelets Slight/1+    CBC & Differential [[id number]] Collected:  04/10/20 0243    Specimen:  Blood Updated:  04/10/20 0251    Narrative:       The following orders were created for panel order CBC & Differential.  Procedure                               Abnormality         Status                     ---------                               -----------         ------                     CBC Auto Differential[[id number]]        Abnormal            Final result                 Please view results for these tests on the individual orders.    CBC Auto Differential [[id number]]  (Abnormal) Collected:  04/10/20 0243    Specimen:  Blood Updated:  04/10/20 0251     WBC 10.41 10*3/mm3      RBC 4.72 10*6/mm3      Hemoglobin 12.7 g/dL      Hematocrit 41.8 %      MCV 88.6 fL      MCH 26.9 pg      MCHC 30.4 g/dL      RDW 15.8 %      RDW-SD 51.4 fl      MPV 10.4 fL      Platelets 132 10*3/mm3      Neutrophil % 81.2 %      Lymphocyte % 10.1 %      Monocyte % 5.3 %      Eosinophil % 1.9 %      Basophil % 0.3 %      Immature Grans % 1.2 %      Neutrophils, Absolute 8.46 10*3/mm3      Lymphocytes, Absolute 1.05 10*3/mm3      Monocytes, Absolute 0.55 10*3/mm3      Eosinophils, Absolute 0.20 10*3/mm3      Basophils, Absolute 0.03 10*3/mm3      Immature Grans, Absolute 0.12 10*3/mm3      nRBC 0.0 /100 WBC         Imaging Results (Most Recent)     Procedure Component Value Units Date/Time    XR Chest 1 View [[id number]] Collected:  04/10/20 0320     Updated:  04/10/20 0322    Narrative:       CHEST X-RAY, 4/10/2020      HISTORY:    74-year-old female in the ED with  new onset shortness of air this morning. History of CHF.      TECHNIQUE:  AP portable upright chest x-ray.    COMPARISON:  *  Chest x-ray, 2/2/2020.    FINDINGS:  Stable moderate cardiomegaly. Pulmonary venous redistribution and probable mild diffuse interstitial edema, new or increased since the prior exam and suggesting mild vascular congestion. Chronically low lung volumes. No visible dense airspace  consolidation or definite pleural effusion.      Impression:       Cardiomegaly with likely mild vascular congestion including mild diffuse interstitial edema, new or increased since the prior exam.    Signer Name: Levy Mishra MD   Signed: 4/10/2020 3:20 AM   Workstation Name: [id number]    [facility]          PROCEDURES      Condition on Discharge:  Stable    Physical Exam at Discharge  Vital Signs  Temp:  [96.3 °F (35.7 °C)-97.7 °F (36.5 °C)] 97.4 °F (36.3 °C)  Heart Rate:  [] 80  Resp:  [16-26] 16  BP: (146-175)/(72-96) 146/80    Physical Exam:  Unchanged from this morning    Discharge Disposition  Home    Visiting Nurse:    No     Home PT/OT:  No     Home Safety Evaluation:  No     DME  None    Discharge Diet:      Dietary Orders (From admission, onward)     Start     Ordered    04/10/20 0500  Diet Regular; Cardiac, Consistent Carbohydrate  Diet Effective Now     Question Answer Comment   Diet Texture / Consistency Regular    Common Modifiers Cardiac    Common Modifiers Consistent Carbohydrate        04/10/20 6589                Activity at Discharge:  As tolerated      Follow-up Appointments  Future Appointments   Date Time Provider Department Center   4/16/2020 11:15 AM Live Chambers MD [facility] None   5/29/2020  1:30 PM Kathryn Epstein APRN [facility] None     Additional Instructions for the Follow-ups that You Need to Schedule     Discharge Follow-up with PCP   As directed       Currently Documented PCP:    Kathryn Epstein APRN    PCP Phone Number:      [phone]     Follow Up Details:  Needs to establish within 2 weeks               Test Results Pending at Discharge       Rey Giron MD  04/10/20  16:26

## 2020-04-10 NOTE — PROGRESS NOTES
Adult Nutrition  Assessment/PES    Patient Name:  Jung Short  YOB: 1945  MRN: [id number]  Admit Date:  4/10/2020    Assessment Date:  4/10/2020    Comments:  Good po intake. Declines edu needs, thinks is medication dosing error on her part with water pill.   Encouraged avoid direct salt and salty foods as well as watch for signs of swelling & wt gain.    Reason for Assessment     Row Name 04/10/20 1458          Reason for Assessment    Reason For Assessment  identified at risk by screening criteria CHF     Diagnosis  cardiac disease CHF, SOA, hypoxia DM         Nutrition/Diet History     Row Name 04/10/20 1500          Nutrition/Diet History    Typical Food/Fluid Intake  Spoke w pt at bedside. NKFA. Denies issue chew/swallowing. Reports appetite is good & she hoping for home today. Declines edu needs. Says she didn't take right dose of med after d/c from facility b/c changed.         Anthropometrics     Row Name 04/10/20 1501          Anthropometrics    Weight  93.9 kg (207 lb 0.2 oz)        Body Mass Index (BMI)    BMI Assessment  BMI 30-34.9: obesity grade I         Labs/Tests/Procedures/Meds     Row Name 04/10/20 1502          Labs/Procedures/Meds    Lab Results Reviewed  reviewed     Lab Results Comments  glu 132-228, HgA1c 6.6         Diagnostic Tests/Procedures    Diagnostic Test/Procedure Reviewed  reviewed        Medications    Pertinent Medications Reviewed  reviewed     Pertinent Medications Comments  glucophage, iron, vitamin D, lasix, novolog, melatonin           Estimated/Assessed Needs     Row Name 04/10/20 1503          Estimated/Assessed Needs    Additional Documentation  Calorie Requirements (Group);Protein Requirements (Group);Fluid Requirements (Group)        Calorie Requirements    Estimated Calorie Need Method  [employer]-[employer]     Estimated Calorie Requirement Comment  2625-1398 kcal ( mifflin 0-1.2)  158-190 gm CHO, 45% kcal         Protein Requirements    Est Protein  Requirement Amount (gms/kg)  0.8 gm protein 75 gm pro        Fluid Requirements    Estimated Fluid Requirement Method  other (see comments) 1000-2000ml CHF guidelines         Nutrition Prescription Ordered     Row Name 04/10/20 1505          Nutrition Prescription PO    Common Modifiers  Cardiac;Consistent Carbohydrate         Evaluation of Received Nutrient/Fluid Intake     Row Name 04/10/20 1505          Fluid Intake Evaluation    Oral Fluid (mL)  590 insufficient data        PO Evaluation    Number of Meals  2     % PO Intake  88               Problem/Interventions:  Problem 1     Row Name 04/10/20 1505          Nutrition Diagnoses Problem 1    Problem 1  Knowledge Deficit     Etiology (related to)  Medical Diagnosis     Signs/Symptoms (evidenced by)  Potential Information Deficit               Intervention Goal     Row Name 04/10/20 1505          Intervention Goal    General  Meet nutritional needs for age/condition;Provide information regarding MNT for treatment/condition     PO  PO intake (%)     PO Intake %  75 % or greater     Weight  Appropriate weight loss lasix          Nutrition Intervention     Row Name 04/10/20 1506          Nutrition Intervention    RD/Tech Action  Interview for preference;Follow Tx progress           Education/Evaluation     Row Name 04/10/20 1506          Education    Education  Education offered and refused;Education topics     Education Topics  CHF;Na+        Monitor/Evaluation    Monitor  Per protocol;I&O;PO intake;Pertinent labs;Weight;Symptoms           Electronically signed by:  Symone Dinero RD  04/10/20 15:06

## 2020-04-10 NOTE — H&P
[facility] HOSPITALIST     Provider, No Known    CHIEF COMPLAINT: Shortness of air    HISTORY OF PRESENT ILLNESS:  The patient is a 74-year-old female that presented via EMS to the emergency department secondary to acute onset shortness of air while at home using the restroom this evening.  She notes episodes of heart failure in past have not been as bad as this 1.  She notes 6 pound weight gain in the past 1 week.  She denies cough, chest pain, syncopal sensation, nausea, diaphoresis with this episode.  In ER she received a dose of IV Lasix with immediate diuresis according to ER provider.  Currently she is able to speak in full sentences.    She notes discharge approximately 1 week ago from [facility] secondary to rehab from right ankle fracture, right wrist fracture.  She reports multiple medication changes and feels that her medications were not correct.  She reports she was getting 1 mg of Bumex while at [facility] but upon return home was only taking 0.5 mg.  She further reports being unable to get some of her medications and having been given only 1 week prescriptions at discharge from [facility].    She has a known history of diastolic CHF, CKD, and ICM, hyperlipidemia, hypertension, GERD/HH, depression, episode of PAF, hypothyroidism, DM 2 with neuropathy.    She otherwise denies f/c/headache/rhinorrhea/nasal congestion/lightheadedness/syncopal sensation/cough/soa/n/v/d/chest pain/abdominal pain/recent illness/sick exposures/change in bowel or bladder habits/bloody emesis or bloody stools/change in medications or any other new concerns.    Past Medical History:   Diagnosis Date   • Anemia    • Benign breast disease    • Cancer (CMS/[facility]) 2015    Right breast   • Cellulitis of leg     May-2003 right lower extremity   • CHF (congestive heart failure) (CMS/HCC)     Dr Bates follows   • Chronic kidney disease     Dr Hannah follows   • Chronic kidney disease 5/24/2019   •  Chronic ulcer of right foot (CMS/HCC)     Non-pressure, history of    • Depression    • Diabetic peripheral neuropathy (CMS/HCC)    • Diarrhea    • Diverticulosis    • Encounter for eye exam    • Femoral fracture (CMS/HCC)     right   • Fracture of left wrist     history of   • Gastroesophageal reflux    • Hiatal hernia    • History of bone density study 09/20/2012   • History of colonoscopy     done 6/03 recheck in 10 years.   Done july 2013 recheck in 5 years   • History of mammography, screening 09/20/2012   • Hospital discharge follow-up    • Hyperlipidemia    • Hypertension    • Hypothyroidism    • Impingement syndrome of right shoulder    • Joint pain    • Menopausal disorder    • Methicillin resistant Staphylococcus aureus infection 2012    after bladder surgery   • MRSA (methicillin resistant staph aureus) culture positive    • Nausea and vomiting    • Nonischemic cardiomyopathy (CMS/HCC)     Ejection fraction 10% per 2-D echo with Doppler however she did have cardiac catheterization April 2015 which showed ejection fraction 20% with global hypokinesis and severe mitral insufficiency   • Osteoarthritis     generalized, sched jania TKA   • Paroxysmal atrial fibrillation (CMS/HCC)     Dr Bates follows   • Postoperative infection     July of 2012 following her hysterectomy far vaginal wall prolapse. She was on antibiotics and wound dressings for 2 months.   • Shoulder pain, bilateral     has tears on both sides   • Syncope, psychogenic 4/19/2017   • Toe ulcer (CMS/HCC)     h/o to both feet, d/t shoes rubbing per patient   • Transient alteration of awareness 4/19/2017     Past Surgical History:   Procedure Laterality Date   • ABDOMINAL SURGERY  2012    had to be reopened after bladder surgery d/t infection   • AMPUTATION FOOT / TOE Right 11/2013    Rt foot amputation MTP first toe   • BLADDER SURGERY  2012   • BREAST BIOPSY     • BREAST SURGERY  06/29/2015    Percutaneous ultrasound-guided placement of metal  localized clip 1st lesion   • CARDIAC CATHETERIZATION     • CATARACT EXTRACTION Bilateral 2017   • DEBRIDEMENT  FOOT Right 2013    During hospitalization    • EPIDURAL BLOCK      4 chronic back pain and leg pain last epidurals done  she had no benefit at all from this procedure.   • FEMUR FRACTURE SURGERY     • FOOT SURGERY Bilateral     several   • HERNIA REPAIR      At the abdomen 2007 Dr. Mendez   • INCISIONAL HERNIA REPAIR  2014    Recurrent. Incarcerated. With mesh implantation   • MASTECTOMY Right 2015   • SHOULDER SURGERY Right     x2 RCR   • TOTAL ABDOMINAL HYSTERECTOMY      with oophorectomy-Done 2012 secondary to vaginal wall prolapse.   • TOTAL KNEE ARTHROPLASTY Right    • TOTAL KNEE ARTHROPLASTY Left 2018    Procedure: TOTAL KNEE ARTHROPLASTY;  Surgeon: Live Chambers MD;  Location: [facility];  Service: Orthopedics     Family History   Problem Relation Age of Onset   • Colonic polyp Mother    • Hypertension Mother    • Migraines Mother    • Mental illness Mother    • Depression Mother    • Coronary artery disease Father    • Other Father         Cardiac Disorder   • Colon cancer Father    • Kidney disease Father    • Cancer Father    • Breast cancer Maternal Aunt    • Colon cancer Brother    • Alcohol abuse Brother    • Arthritis Sister    • Hypertension Brother    • Lung disease Brother    • Alcohol abuse Brother    • Malig Hyperthermia Neg Hx      Social History     Tobacco Use   • Smoking status: Former Smoker     Packs/day: 3.00     Years: 30.00     Pack years: 90.00     Types: Cigarettes     Last attempt to quit:      Years since quittin.2   • Smokeless tobacco: Never Used   Substance Use Topics   • Alcohol use: No     Comment: h/o quit    • Drug use: No     Medications Prior to Admission   Medication Sig Dispense Refill Last Dose   • acetaminophen (TYLENOL) 325 MG tablet Take 650 mg by mouth Every 4 (Four) Hours As Needed for Moderate Pain .    Taking   • alendronate (FOSAMAX) 35 MG tablet take 1 tablet by mouth every week (Patient taking differently: Take 35 mg by mouth Every 7 (Seven) Days. On Monday) 12 tablet 3 Taking   • anastrozole (ARIMIDEX) 1 MG tablet TAKE 1 TABLET BY MOUTH EVERY DAY (Patient taking differently: Take 1 mg by mouth Daily.) 90 tablet 2 Taking   • aspirin 81 MG EC tablet Take 81 mg by mouth Daily.   Taking   • bumetanide (BUMEX) 0.5 MG tablet TAKE 1 TABLET BY MOUTH DAILY 90 tablet 1 Taking   • carvedilol (COREG) 12.5 MG tablet TAKE 1 TABLET BY MOUTH TWICE DAILY WITH MEALS (Patient taking differently: Take 12.5 mg by mouth 2 (Two) Times a Day With Meals.) 180 tablet 0 Taking   • diclofenac (VOLTAREN) 1 % gel gel Apply 4 g topically to the appropriate area as directed 4 (Four) Times a Day. 100 g 5 Taking   • DULoxetine (CYMBALTA) 60 MG capsule TAKE 1 CAPSULE BY MOUTH DAILY 90 capsule 0 Taking   • ferrous sulfate 325 (65 FE) MG tablet Take 325 mg by mouth Daily With Breakfast.   Taking   • glucose blood test strip Take BG daily 50 each 12 Taking   • ipratropium-albuterol (DUO-NEB) 0.5-2.5 mg/3 ml nebulizer Take 3 mL by nebulization Every 4 (Four) Hours As Needed for Wheezing or Shortness of Air.   Taking   • levothyroxine (SYNTHROID, LEVOTHROID) 25 MCG tablet Take 1 tablet by mouth Daily. 90 tablet 1 Taking   • losartan (COZAAR) 25 MG tablet TAKE 1 TABLET BY MOUTH DAILY 90 tablet 0 Taking   • melatonin 1 MG tablet Take 3 mg by mouth Every Night.   Taking   • metFORMIN (GLUCOPHAGE) 1000 MG tablet Take 1,000 mg by mouth Daily With Breakfast.   Taking   • metFORMIN (GLUCOPHAGE) 500 MG tablet Take 500 mg by mouth Daily With Dinner.   Taking   • methocarbamol (ROBAXIN) 500 MG tablet Take 500 mg by mouth 3 (Three) Times a Day.   Taking   • Multiple Vitamins-Minerals (MULTIVITAMIN ADULT PO) Take 1 tablet by mouth Daily.   Taking   • omeprazole (priLOSEC) 20 MG capsule Take 20 mg by mouth Daily.   Taking   • ondansetron (ZOFRAN) 4 MG tablet  "Take 4 mg by mouth Every 6 (Six) Hours As Needed for Nausea or Vomiting.   Taking   • pravastatin (PRAVACHOL) 10 MG tablet Take 1 tablet by mouth Daily. 90 tablet 1 Taking   • pregabalin (LYRICA) 75 MG capsule Take 1 capsule by mouth 2 (Two) Times a Day. 60 capsule 1 Taking   • vitamin D (ERGOCALCIFEROL) 72129 units capsule capsule Take 1,000 Units by mouth Daily.   Taking     Allergies:  Dilaudid [hydromorphone] and Latex    Immunization History   Administered Date(s) Administered   • Flu Mist 09/17/2015   • Fluad Quad 09/27/2019   • Fluzone High Dose =>65 Years ([employer] ONLY) 08/25/2016, 08/31/2017, 10/05/2018   • PPD Test 04/20/2018   • Pneumococcal Conjugate 13-Valent (PCV13) 08/31/2017   • Pneumococcal Polysaccharide (PPSV23) 04/06/2018       REVIEW OF SYSTEMS:  Please see the above history of present illness for pertinent positives and negatives.  The remainder of the patient's systems have been reviewed and are negative.     Vital Signs  Temp:  [96.3 °F (35.7 °C)-97.6 °F (36.4 °C)] 96.3 °F (35.7 °C)  Heart Rate:  [] 81  Resp:  [18-26] 18  BP: (152-175)/(84-96) 162/96   Body mass index is 34.45 kg/m².    Flowsheet Rows      First Filed Value   Admission Height  170.2 cm (67\") Documented at 04/10/2020 0201   Admission Weight  97.1 kg (214 lb) Documented at 04/10/2020 0201           Physical Exam   Constitutional: She is oriented to person, place, and time. She appears well-nourished.   Appears much older than stated age, obese   HENT:   Head: Normocephalic and atraumatic.   Eyes: Pupils are equal, round, and reactive to light. EOM are normal.   Cardiovascular: Normal rate and regular rhythm.   Pulmonary/Chest: Effort normal.   Diminished bases   Abdominal: Soft. Bowel sounds are normal. She exhibits no distension. There is no tenderness. There is no guarding.   Musculoskeletal:   Trace pitting edema bilateral lower extremities  Right ankle obvious deformity, right lateral thigh incision well healed.  " Right wrist in immobilizer, CMS to all extremities intact   Neurological: She is alert and oriented to person, place, and time.   Skin: Skin is warm and dry. No erythema.   Psychiatric: She has a normal mood and affect. Her behavior is normal.   Vitals reviewed.    Emotional Behavior:    Judgement and Insight: Good   Mental Status:  Alertness alert   Memory: Fair   mood and Affect: Calm       Depression none               Anxiety none    Debilities:   Physical Weakness secondary to fractures   Handicaps none obvious   Disabilities none   Agitation none     Results Review:    I reviewed the patient's new clinical results.  Lab Results (most recent)     Procedure Component Value Units Date/Time    BNP [[id number]]  (Abnormal) Collected:  04/10/20 0243    Specimen:  Blood Updated:  04/10/20 0329     proBNP 4,374.0 pg/mL     Narrative:       Among patients with dyspnea, NT-proBNP is highly sensitive for the detection of acute congestive heart failure. In addition NT-proBNP of <300 pg/ml effectively rules out acute congestive heart failure with 99% negative predictive value.    Results may be falsely decreased if patient taking Biotin.      Troponin [[id number]]  (Normal) Collected:  04/10/20 0243    Specimen:  Blood Updated:  04/10/20 0321     Troponin T <0.010 ng/mL     Narrative:       Troponin T Reference Range:  <= 0.03 ng/mL-   Negative for AMI  >0.03 ng/mL-     Abnormal for myocardial necrosis.  Clinicians would have to utilize clinical acumen, EKG, Troponin and serial changes to determine if it is an Acute Myocardial Infarction or myocardial injury due to an underlying chronic condition.       Results may be falsely decreased if patient taking Biotin.      Comprehensive Metabolic Panel [[id number]]  (Abnormal) Collected:  04/10/20 0243    Specimen:  Blood Updated:  04/10/20 0317     Glucose 228 mg/dL      BUN 9 mg/dL      Creatinine 0.79 mg/dL      Sodium 139 mmol/L      Potassium 4.3 mmol/L      Chloride 104 mmol/L       CO2 18.3 mmol/L      Calcium 10.0 mg/dL      Total Protein 7.5 g/dL      Albumin 3.90 g/dL      ALT (SGPT) 9 U/L      AST (SGOT) 20 U/L      Alkaline Phosphatase 114 U/L      Total Bilirubin 0.5 mg/dL      eGFR Non African Amer 71 mL/min/1.73      Globulin 3.6 gm/dL      A/G Ratio 1.1 g/dL      BUN/Creatinine Ratio 11.4     Anion Gap 16.7 mmol/L     Narrative:       GFR Normal >60  Chronic Kidney Disease <60  Kidney Failure <15      Scan Slide [[id number]] Collected:  04/10/20 0243    Specimen:  Blood Updated:  04/10/20 0313     RBC Morphology Normal     WBC Morphology Normal     Platelet Estimate Adequate     Clumped Platelets --     Comment: RARE        Large Platelets Slight/1+    CBC & Differential [[id number]] Collected:  04/10/20 0243    Specimen:  Blood Updated:  04/10/20 0251    Narrative:       The following orders were created for panel order CBC & Differential.  Procedure                               Abnormality         Status                     ---------                               -----------         ------                     CBC Auto Differential[[id number]]        Abnormal            Final result                 Please view results for these tests on the individual orders.    CBC Auto Differential [[id number]]  (Abnormal) Collected:  04/10/20 0243    Specimen:  Blood Updated:  04/10/20 0251     WBC 10.41 10*3/mm3      RBC 4.72 10*6/mm3      Hemoglobin 12.7 g/dL      Hematocrit 41.8 %      MCV 88.6 fL      MCH 26.9 pg      MCHC 30.4 g/dL      RDW 15.8 %      RDW-SD 51.4 fl      MPV 10.4 fL      Platelets 132 10*3/mm3      Neutrophil % 81.2 %      Lymphocyte % 10.1 %      Monocyte % 5.3 %      Eosinophil % 1.9 %      Basophil % 0.3 %      Immature Grans % 1.2 %      Neutrophils, Absolute 8.46 10*3/mm3      Lymphocytes, Absolute 1.05 10*3/mm3      Monocytes, Absolute 0.55 10*3/mm3      Eosinophils, Absolute 0.20 10*3/mm3      Basophils, Absolute 0.03 10*3/mm3      Immature Grans, Absolute 0.12  10*3/mm3      nRBC 0.0 /100 WBC           Imaging Results (Most Recent)     Procedure Component Value Units Date/Time    XR Chest 1 View [[id number]] Collected:  04/10/20 0320     Updated:  04/10/20 0322    Narrative:       CHEST X-RAY, 4/10/2020      HISTORY:    74-year-old female in the ED with new onset shortness of air this morning. History of CHF.      TECHNIQUE:  AP portable upright chest x-ray.    COMPARISON:  *  Chest x-ray, 2/2/2020.    FINDINGS:  Stable moderate cardiomegaly. Pulmonary venous redistribution and probable mild diffuse interstitial edema, new or increased since the prior exam and suggesting mild vascular congestion. Chronically low lung volumes. No visible dense airspace  consolidation or definite pleural effusion.      Impression:       Cardiomegaly with likely mild vascular congestion including mild diffuse interstitial edema, new or increased since the prior exam.    Signer Name: Levy Mishra MD   Signed: 4/10/2020 3:20 AM   Workstation Name: [id number]    [facility]        reviewed    ECG/EMG Results (most recent)     Procedure Component Value Units Date/Time    ECG 12 Lead [[id number]] Collected:  04/10/20 0248     Updated:  04/10/20 0255    Narrative:       HEART RATE= 89  bpm  RR Interval= 676  ms  SD Interval= 191  ms  P Horizontal Axis= -1  deg  P Front Axis= 43  deg  QRSD Interval= 114  ms  QT Interval= 335  ms  QRS Axis= -28  deg  T Wave Axis= 123  deg  - ABNORMAL ECG -  Sinus rhythm  Probable LVH with secondary repol abnrm  Electronically Signed By:   Date and Time of Study: 2020-04-10 02:48:58        reviewed    Assessment/Plan   Acute hypoxia 2/2 A/C dCHF exacerbation:  History of NICM 2015/PAF:   Hypertension:  HLD:  Last Echo 8/17/2019 EF 52%, grade 2 diastolic dysfunction, mild to moderate MR, mild TR, mildly elevated RVSP  -Excellent diuresis in ER on Lasix 40 mg IV, repeat dose at 2pm today then re-evaluate ability to change to oral  Continue  home Coreg, aspirin, losartan, Pravachol  Daily weight, strict I and O  Repeat troponin x 1  Monitor     Thrombocytopenia: No active blood loss noted, recheck in a.m.    Mild HAGMA:  No infection suspected, recheck in a.m.    DM2 in obese with hyperglycemia and diabetic peripheral neuropathy: POA, A1c 6.3% 1/31/2020, repeat pending  Body mass index is 34.45 kg/m².   Continue home metformin 1000 mg daily in a.m., 500 mg in p.m., lyrica 75 mg twice daily  Add Accu-Cheks AC/at bedtime, low-dose sliding scale insulin    GERD/HH: No current acute issues, add Protonix, substitute for omeprazole    Hypothyroidism: Check TSH, continue levothyroxine 25 mcg daily    History of right breast cancer: S/p right mastectomy 2015, no current acute issues, allow home Arimidex    Depression: No current acute issues, continue home Cymbalta    Remote history of MRSA infection: No current acute issues    I discussed the patients findings and my recommendations with patient.     Lisa Mcleod, APRN  04/10/20  05:47

## 2020-04-10 NOTE — PLAN OF CARE
Continued Stay Note  NOHEMY Diaz     Patient Name: Jung Short  MRN: [id number]  Today's Date: 4/10/2020    Admit Date: 4/10/2020    Discharge Plan       Row Name 04/10/20 1229       Plan    Plan  plan home, assess needs    Patient/Family in Agreement with Plan  yes    Plan Comments  Spoke with patient at bedside, face sheet verified. Patient lives alone in a patio home and is fairly independent of ADLs. She was recently dc'd from [facility] r/t fractured ankle and wrist. Since sustaining her injuries she is using a rw and a cane. She states she has elevated toilet seat and a glucometer. She denies use of home 02, cpap/bipap. She states [facility] set up Home Health and she cannot recall the agency. She has a living will on file. She uses [facility] and denies issues obtaining medications. She states she has been assisgend a new PCP but cannot recall her name- she states she is with Dr Toth office. Call placed to Dr Toth office and they confirm patient will be seeing Kathryn BRADY and has her initial appointment scheduled 5/29/20@1330. Face sheet updated.  CM will list PCP in comments of AVS for patient reference. CM # placed on white board, will continue to follow.            Discharge Codes    No documentation.               Blade Craig RN

## 2020-04-10 NOTE — ED NOTES
Dr Goodrichs aware that a tiny amount of blood was obtained for pediatric green and purple tubes, and the other nurse did not find anything to stick.  MD ok with canceling D Dimer and type and screen since hgb resulted WNL.       Norma Wallace, RN  04/10/20 8693

## 2020-04-10 NOTE — PLAN OF CARE
Problem: Patient Care Overview  Goal: Plan of Care Review  Outcome: Ongoing (interventions implemented as appropriate)  Flowsheets (Taken 4/10/2020 0638)  Progress: no change  Plan of Care Reviewed With: patient  Outcome Summary: New ER admit

## 2020-04-10 NOTE — ED NOTES
Difficulty obtaining blood or better IV site on patient.  ER tech in to try to get blood.  Dr Campbell aware.     Norma Wallace, RN  04/10/20 7678

## 2020-04-10 NOTE — DISCHARGE INSTR - OTHER ORDERS
PCP is Kathryn BRADY  [facility]  [phone]    Initial appointment scheduled 5/29/20@ 1:30pm

## 2020-04-11 ENCOUNTER — READMISSION MANAGEMENT (OUTPATIENT)
Dept: CALL CENTER | Facility: HOSPITAL | Age: 75
End: 2020-04-11

## 2020-04-12 NOTE — NURSING NOTE
Case Management Discharge Note      Final Note: d/c home     Provided Post Acute Provider List?: N/A         Final Discharge Disposition Code: 01 - home or self-care

## 2020-04-13 ENCOUNTER — EPISODE CHANGES (OUTPATIENT)
Dept: CASE MANAGEMENT | Facility: OTHER | Age: 75
End: 2020-04-13

## 2020-04-13 ENCOUNTER — READMISSION MANAGEMENT (OUTPATIENT)
Dept: CALL CENTER | Facility: HOSPITAL | Age: 75
End: 2020-04-13

## 2020-04-13 NOTE — OUTREACH NOTE
CHF Week 1 Survey      Responses   [facility] discharged from?  [facility]   Does the patient have one of the following disease processes/diagnoses(primary or secondary)?  CHF   Is there a successful TCM telephone encounter documented?  No   CHF Week 1 attempt successful?  Yes   Call start time  1342   Call end time  1345   Discharge diagnosis  Acute HypoxiaAcute-on-Chronic dCHF   Is patient permission given to speak with other caregiver?  Yes   Meds reviewed with patient/caregiver?  Yes   Is the patient having any side effects they believe may be caused by any medication additions or changes?  No   Does the patient have all medications ordered at discharge?  Yes   Is the patient taking all medications as directed (includes completed medication regime)?  Yes   Does the patient have a primary care provider?   Yes   Does the patient have an appointment with their PCP within 7 days of discharge?  Greater than 7 days   Comments regarding PCP  She has appt May 23rd.    What is preventing the patient from scheduling follow up appointments within 7 days of discharge?  Earlier appointment not available   Nursing Interventions  Verified appointment date/time/provider   Has the patient kept scheduled appointments due by today?  Yes   Has home health visited the patient within 72 hours of discharge?  Yes   Has all DME been delivered?  Yes   Psychosocial issues?  No   Did the patient receive a copy of their discharge instructions?  Yes   Nursing interventions  Reviewed instructions with patient   What is the patient's perception of their health status since discharge?  Improving   Nursing interventions  Nurse provided patient education   Is the patient weighing daily?  Yes   Does the patient have scales?  Yes   Daily weight interventions  Education provided on importance of daily weight   Is the patient able to teach back Heart Failure diet management?  Yes   Is the patient able to teach back Heart Failure Zones?   Yes   Is the patient able to teach back signs and symptoms of worsening condition? (i.e. weight gain, shortness of air, etc.)  Yes   Is the patient/caregiver able to teach back the hierarchy of who to call/visit for symptoms/problems? PCP, Specialist, Home health nurse, Urgent Care, ED, 911  Yes   Additional teach back comments  She says she weighs every day, and  is doing better avoiding salt.     CHF Week 1 call completed?  Yes          Farzana Case RN

## 2020-04-16 ENCOUNTER — OFFICE VISIT (OUTPATIENT)
Dept: ORTHOPEDIC SURGERY | Facility: CLINIC | Age: 75
End: 2020-04-16

## 2020-04-16 DIAGNOSIS — S52.521A TRAUMATIC CLOSED NONDISP TORUS FRACTURE OF DISTAL RADIAL METAPHYSIS, RIGHT, INITIAL ENCOUNTER: ICD-10-CM

## 2020-04-16 DIAGNOSIS — S52.92XA RADIUS/ULNA FRACTURE, LEFT, CLOSED, INITIAL ENCOUNTER: ICD-10-CM

## 2020-04-16 DIAGNOSIS — M97.8XXD PERIPROSTHETIC SUPRACONDYLAR FRACTURE OF FEMUR, SUBSEQUENT ENCOUNTER: ICD-10-CM

## 2020-04-16 DIAGNOSIS — Z96.659 PERIPROSTHETIC SUPRACONDYLAR FRACTURE OF FEMUR, SUBSEQUENT ENCOUNTER: ICD-10-CM

## 2020-04-16 DIAGNOSIS — R52 PAIN: Primary | ICD-10-CM

## 2020-04-16 DIAGNOSIS — S82.891D CLOSED FRACTURE OF MALLEOLUS OF RIGHT ANKLE WITH ROUTINE HEALING, SUBSEQUENT ENCOUNTER: ICD-10-CM

## 2020-04-16 DIAGNOSIS — S52.202A RADIUS/ULNA FRACTURE, LEFT, CLOSED, INITIAL ENCOUNTER: ICD-10-CM

## 2020-04-16 PROCEDURE — 73552 X-RAY EXAM OF FEMUR 2/>: CPT | Performed by: ORTHOPAEDIC SURGERY

## 2020-04-16 PROCEDURE — 73610 X-RAY EXAM OF ANKLE: CPT | Performed by: ORTHOPAEDIC SURGERY

## 2020-04-16 PROCEDURE — 73110 X-RAY EXAM OF WRIST: CPT | Performed by: ORTHOPAEDIC SURGERY

## 2020-04-16 PROCEDURE — 99024 POSTOP FOLLOW-UP VISIT: CPT | Performed by: ORTHOPAEDIC SURGERY

## 2020-04-16 RX ORDER — OXYCODONE HYDROCHLORIDE AND ACETAMINOPHEN 5; 325 MG/1; MG/1
1 TABLET ORAL EVERY 6 HOURS PRN
Qty: 42 TABLET | Refills: 0 | Status: SHIPPED | OUTPATIENT
Start: 2020-04-16 | End: 2020-05-13 | Stop reason: SDUPTHER

## 2020-04-16 NOTE — PROGRESS NOTES
Subjective: Right distal radial fracture, right periprosthetic distal femur fracture, bilateral malleolar fracture right ankle     Patient ID: Jung Short is a 74 y.o. female.    Chief Complaint:    History of Present Illness patient seen for follow-up the above-stated issues.  She is now at home ambulating with a walker.  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   HENT: Negative for hearing loss, nosebleeds, sore throat and tinnitus.    Eyes: Negative for pain and visual disturbance.   Respiratory: Negative for cough, shortness of breath and wheezing.    Cardiovascular: Negative for chest pain and palpitations.   Gastrointestinal: Negative for abdominal pain, diarrhea, nausea and vomiting.   Endocrine: Negative for cold intolerance, heat intolerance and polydipsia.   Genitourinary: Negative for difficulty urinating, dysuria and hematuria.   Musculoskeletal: Positive for arthralgias. Negative for joint swelling and myalgias.   Skin: Negative for rash and wound.   Allergic/Immunologic: Negative for environmental allergies.   Neurological: Negative for dizziness, syncope and numbness.   Hematological: Does not bruise/bleed easily.   Psychiatric/Behavioral: Negative for dysphoric mood and sleep disturbance. The patient is not nervous/anxious.    All other  systems reviewed and are negative.        Past Medical History:   Diagnosis Date   • Anemia    • Benign breast disease    • Cancer (CMS/HCC) 2015    Right breast   • Cellulitis of leg     May-2003 right lower extremity   • CHF (congestive heart failure) (CMS/HCC)     Dr Bates follows   • Chronic kidney disease     Dr Hannah follows   • Chronic kidney disease 5/24/2019   • Chronic ulcer of right foot (CMS/HCC)     Non-pressure, history of    • Depression    • Diabetic peripheral neuropathy (CMS/HCC)    • Diarrhea    • Diverticulosis    • Encounter for eye exam    • Femoral fracture (CMS/HCC)     right   • Fracture of left wrist     history of   • Gastroesophageal reflux    • Hiatal hernia    • History of bone density study 09/20/2012   • History of colonoscopy     done 6/03 recheck in 10 years.   Done july 2013 recheck in 5 years   • History of mammography, screening 09/20/2012   • Hospital discharge follow-up    • Hyperlipidemia    • Hypertension    • Hypothyroidism    • Impingement syndrome of right shoulder    • Joint pain    • Menopausal disorder    • Methicillin resistant Staphylococcus aureus infection 2012    after bladder surgery   • MRSA (methicillin resistant staph aureus) culture positive    • Nausea and vomiting    • Nonischemic cardiomyopathy (CMS/HCC)     Ejection fraction 10% per 2-D echo with Doppler however she did have cardiac catheterization April 2015 which showed ejection fraction 20% with global hypokinesis and severe mitral insufficiency   • Osteoarthritis     generalized, sched jania TKA   • Paroxysmal atrial fibrillation (CMS/HCC)     Dr Bates follows   • Postoperative infection     July of 2012 following her hysterectomy far vaginal wall prolapse. She was on antibiotics and wound dressings for 2 months.   • Shoulder pain, bilateral     has tears on both sides   • Syncope, psychogenic 4/19/2017   • Toe ulcer (CMS/HCC)     h/o to both feet, d/t shoes rubbing per patient   • Transient  alteration of awareness 4/19/2017     Past Surgical History:   Procedure Laterality Date   • ABDOMINAL SURGERY  2012    had to be reopened after bladder surgery d/t infection   • AMPUTATION FOOT / TOE Right 11/2013    Rt foot amputation MTP first toe   • BLADDER SURGERY  2012   • BREAST BIOPSY     • BREAST SURGERY  06/29/2015    Percutaneous ultrasound-guided placement of metal localized clip 1st lesion   • CARDIAC CATHETERIZATION  2015   • CATARACT EXTRACTION Bilateral 2017   • DEBRIDEMENT  FOOT Right 01/2013    During hospitalization    • EPIDURAL BLOCK      4 chronic back pain and leg pain last epidurals done 5-2012 she had no benefit at all from this procedure.   • FEMUR FRACTURE SURGERY     • FOOT SURGERY Bilateral     several   • HERNIA REPAIR      At the abdomen February 2007 Dr. Mendez   • INCISIONAL HERNIA REPAIR  05/16/2014    Recurrent. Incarcerated. With mesh implantation   • MASTECTOMY Right 05/2015   • SHOULDER SURGERY Right     x2 RCR   • TOTAL ABDOMINAL HYSTERECTOMY      with oophorectomy-Done June-2012 secondary to vaginal wall prolapse.   • TOTAL KNEE ARTHROPLASTY Right    • TOTAL KNEE ARTHROPLASTY Left 4/17/2018    Procedure: TOTAL KNEE ARTHROPLASTY;  Surgeon: Live Chambers MD;  Location: [facility];  Service: Orthopedics     Family History   Problem Relation Age of Onset   • Colonic polyp Mother    • Hypertension Mother    • Migraines Mother    • Mental illness Mother    • Depression Mother    • Coronary artery disease Father    • Other Father         Cardiac Disorder   • Colon cancer Father    • Kidney disease Father    • Cancer Father    • Breast cancer Maternal Aunt    • Colon cancer Brother    • Alcohol abuse Brother    • Arthritis Sister    • Hypertension Brother    • Lung disease Brother    • Alcohol abuse Brother    • Malig Hyperthermia Neg Hx          Objective:  There were no vitals filed for this visit.  There were no vitals filed for this visit.  There is no height or weight on file to  calculate BMI.        Ortho Exam    AP latera oblique viewl of the right wrist shows abundant callus and compared to previous x-rays and further healing.  AP lateral oblique of the distal femoral periprosthetic fracture shows abundant callus and near complete healing compared to previous x-rays and the bimalleolar ankle fracture is showing some healing although there is mild displacement of the medial malleolus no prior x-rays available for comparison.  She is doing well she is complaining of multiple joint complaints of pain as expected but the knee is doing well and the ankles give her a minimal pain in the wrist seems most symptomatic.    Assessment:        1. Pain    2. Traumatic closed nondisp torus fracture of distal radial metaphysis, right, initial encounter    3. Closed fracture of malleolus of right ankle with routine healing, subsequent encounter    4. Periprosthetic supracondylar fracture of femur, subsequent encounter           Plan: Continue weightbearing as tolerated.  She will use the ankle and wrist brace as needed.  Return in 4 weeks with repeat x-ray of the wrist right femur and ankle answered all questions            Work Status:    LOUANN query complete.    Orders:  Orders Placed This Encounter   Procedures   • XR Ankle 3+ View Right   • XR Wrist 3+ View Right   • XR Femur 2 View Right       Medications:  No orders of the defined types were placed in this encounter.      Followup:  Return in about 4 weeks (around 5/14/2020).          Dictated utilizing Dragon dictation

## 2020-04-20 ENCOUNTER — READMISSION MANAGEMENT (OUTPATIENT)
Dept: CALL CENTER | Facility: HOSPITAL | Age: 75
End: 2020-04-20

## 2020-04-20 NOTE — OUTREACH NOTE
CHF Week 2 Survey      Responses   [facility] facility patient discharged from?  [place]   Does the patient have one of the following disease processes/diagnoses(primary or secondary)?  Other   Week 2 attempt successful?  No   Unsuccessful attempts  Attempt 1          Farzana Case RN

## 2020-04-21 ENCOUNTER — READMISSION MANAGEMENT (OUTPATIENT)
Dept: CALL CENTER | Facility: HOSPITAL | Age: 75
End: 2020-04-21

## 2020-04-21 NOTE — OUTREACH NOTE
CHF Week 2 Survey      Responses   [facility] patient discharged from?  [place]   Does the patient have one of the following disease processes/diagnoses(primary or secondary)?  Other   Week 2 attempt successful?  Yes   Call start time  1422   Call end time  1428   Discharge diagnosis  Acute HypoxiaAcute-on-Chronic dCHF   Meds reviewed with patient/caregiver?  Yes   Is the patient having any side effects they believe may be caused by any medication additions or changes?  No   Does the patient have all medications ordered at discharge?  Yes   Is the patient taking all medications as directed (includes completed medication regime)?  Yes   Does the patient have a primary care provider?   Yes   Does the patient have an appointment with their PCP within 7 days of discharge?  Yes   Comments regarding PCP  She has appt May 23rd. Pt states this is a new PCP.  Her past two PCP's left the practice.  She is in communication with her orthopedist and states he functions as her PCP basically.   Has the patient kept scheduled appointments due by today?  Yes   Psychosocial issues?  No   Did the patient receive a copy of their discharge instructions?  Yes   Nursing interventions  Reviewed instructions with patient   What is the patient's perception of their health status since discharge?  Improving   Is the patient weighing daily?  Yes   Does the patient have scales?  Yes   Is the patient able to teach back Heart Failure diet management?  Yes   Is the patient able to teach back Heart Failure Zones?  Yes   Is the patient/caregiver able to teach back the hierarchy of who to call/visit for symptoms/problems? PCP, Specialist, Home health nurse, Urgent Care, ED, 911  Yes   CHF Week 2 call completed?  Yes   Wrap up additional comments  Pt states she is doing well.  she is weighing every day.  she denies any needs at this time.           Marisa Christiansen RN   0

## 2020-04-27 ENCOUNTER — READMISSION MANAGEMENT (OUTPATIENT)
Dept: CALL CENTER | Facility: HOSPITAL | Age: 75
End: 2020-04-27

## 2020-04-27 NOTE — OUTREACH NOTE
CHF Week 3 Survey      Responses   [facility] patient discharged from?  [place]   Does the patient have one of the following disease processes/diagnoses(primary or secondary)?  Other   Week 3 attempt successful?  Yes   Call start time  1622   Call end time  1633   Discharge diagnosis  Acute HypoxiaAcute-on-Chronic dCHF   Meds reviewed with patient/caregiver?  Yes   Does the patient have all medications ordered at discharge?  Yes   Is the patient taking all medications as directed (includes completed medication regime)?  No   What is preventing the patient from taking all medications as directed?  Other   Nursing Interventions  Nurse provided patient education, Nurse notified pharmacy for assistance, Advised patient to call provider, Notified provider   Medication comments  Pt reports that Dr. Giron ordered Bumex BID but only gave her a Qty of 30, so she ran out at 15 days. She has no PCP as her PCP retired& her new PCP appt is 5/29. She is out of Bumex. She will try to call Dr Bates her Cardiologist. I will also try to message Cardiology r/t this issue. Pt does not remember her So[employer] password etc to access the SoThree feature.    Has the patient kept scheduled appointments due by today?  N/A   Comments  Pt reports doing well but worried now that is out of bumex that the SOA will return.   What is the patient's perception of their health status since discharge?  Improving   Is the patient weighing daily?  Yes   CHF Week 3 call completed?  Yes          Lisa Grissom RN

## 2020-04-28 ENCOUNTER — TELEPHONE (OUTPATIENT)
Dept: CARDIOLOGY | Facility: CLINIC | Age: 75
End: 2020-04-28

## 2020-04-28 RX ORDER — BUMETANIDE 0.5 MG/1
1 TABLET ORAL DAILY
Qty: 180 TABLET | Refills: 0 | Status: SHIPPED | OUTPATIENT
Start: 2020-04-28 | End: 2020-07-09 | Stop reason: SDUPTHER

## 2020-04-28 NOTE — TELEPHONE ENCOUNTER
----- Message from Aria Bates MD sent at 4/28/2020 10:14 AM EDT -----  Regarding: RE: Pt assistance with a med    Monica-     Can you send in for her?     rm    ----- Message -----  From: Lisa Grissom RN  Sent: 4/27/2020   4:40 PM EDT  To: Aria Bates MD  Subject: Pt assistance with a med                         Lisa Bates,    I am one of the follow up nurses and I spoke with the above patient today for her CHF Week 3 f/u post DC call.  She reports that at DC she was sent home on 2 Bumex daily but Dr. Giron only sent her with a Qty of 30, making her run out at 15 days post DC.  She has no PCP as she has a new patient appt at the end of May.  I gave her your number and advised her to contact you as she stated that you are her Cardiologist but I wanted to follow up to alert you of this issue as she reports that she is out of Bumex.    Thank you,  Lisa Grissom, RN

## 2020-05-05 ENCOUNTER — READMISSION MANAGEMENT (OUTPATIENT)
Dept: CALL CENTER | Facility: HOSPITAL | Age: 75
End: 2020-05-05

## 2020-05-05 NOTE — OUTREACH NOTE
CHF Week 4 Survey      Responses   [facility] patient discharged from?  [place]   Does the patient have one of the following disease processes/diagnoses(primary or secondary)?  Other   Week 4 attempt successful?  No          Tre Myrick RN

## 2020-05-06 ENCOUNTER — EPISODE CHANGES (OUTPATIENT)
Dept: CASE MANAGEMENT | Facility: OTHER | Age: 75
End: 2020-05-06

## 2020-05-08 ENCOUNTER — PATIENT OUTREACH (OUTPATIENT)
Dept: CASE MANAGEMENT | Facility: OTHER | Age: 75
End: 2020-05-08

## 2020-05-08 NOTE — OUTREACH NOTE
Care Plan Note      Responses   Care Gaps Addressed  Flu Shot, Pneumonia Vaccine, Colon Cancer Screening, Diabetic Eye Exam, Mammogram   Colon Cancer Screening Type  Colonoscopy   Colonoscopy Status  Up to Date (< 10 yrs)   Diabetic Eye Exam Status  Up to Date   Flu Shot Status  Up to Date   Mammogram Status  Up to Date   Pneumonia Vaccine Status  Up to Date   Specific Disease Process Teaching  Heart Failure   Other Patient Education/Resources   24/7 [facility] Nurse Call Line, MyChart, Advanced Care Planning   24/7 Nurse Call Line Education Method  Verbal   ACP Education Method  Verbal   MyChart Education Method  Verbal   Advanced Directives:  Patient Has   Medication Adherence  Medications understood        The main concerns and/or symptoms the patient would like to address are: Talked with patient. Patient recently discharged from 4/10/20 hospitalization for CHF. Patient completed home health services on 4/17/20.  Patient lives alone in patio apartment; independent with ADL's; light meal preparation; transportation and ambulates with cane or walker.  Patient compliant with medications; medical appointments; monitoring of blood sugar and daily weight. She reports no difficulty with fever; chest pain; SOB; appetite or sleeping. Patient confirms appointment with ortho Dr. Chambers on 5/11/20 following fracture of right ankle, femur and wrist. Patient reports to have slipped in garage 2 days ago; no LOC but has discomfort to chest area. Advised patient to contact PCP for recommendations and verbalized understanding. She states daughter will be obtaining a Life Alert system for patient.     Education/instruction provided by Care Coordinator: Reviewed with patient education regarding CHF; benefit of monitoring weight and BS; DM; diet; Life Alert system;  24/7 Nurse Line Telephone number; ACM contact information; Advance Directives;  My Chart; gaps in care; MWV and Case Management services. Patient verbalized  understanding and states to appreciate phone call. She declines further outreach at this time.  No further questions or concerns voiced at this time.     Follow Up Outreach Due: Follow up as needed.     Shanda Gerardo RN  Ambulatory     5/8/2020, 15:12

## 2020-05-10 DIAGNOSIS — R14.0 ABDOMINAL BLOATING: ICD-10-CM

## 2020-05-11 ENCOUNTER — OFFICE VISIT (OUTPATIENT)
Dept: ORTHOPEDIC SURGERY | Facility: CLINIC | Age: 75
End: 2020-05-11

## 2020-05-11 VITALS — WEIGHT: 207 LBS | HEIGHT: 65 IN | BODY MASS INDEX: 34.49 KG/M2

## 2020-05-11 DIAGNOSIS — Z96.659 PERIPROSTHETIC SUPRACONDYLAR FRACTURE OF FEMUR, SUBSEQUENT ENCOUNTER: ICD-10-CM

## 2020-05-11 DIAGNOSIS — R52 PAIN: Primary | ICD-10-CM

## 2020-05-11 DIAGNOSIS — M97.8XXD PERIPROSTHETIC SUPRACONDYLAR FRACTURE OF FEMUR, SUBSEQUENT ENCOUNTER: ICD-10-CM

## 2020-05-11 DIAGNOSIS — S52.521A TRAUMATIC CLOSED NONDISP TORUS FRACTURE OF DISTAL RADIAL METAPHYSIS, RIGHT, INITIAL ENCOUNTER: ICD-10-CM

## 2020-05-11 DIAGNOSIS — S82.891D CLOSED FRACTURE OF MALLEOLUS OF RIGHT ANKLE WITH ROUTINE HEALING, SUBSEQUENT ENCOUNTER: ICD-10-CM

## 2020-05-11 PROCEDURE — 99213 OFFICE O/P EST LOW 20 MIN: CPT | Performed by: ORTHOPAEDIC SURGERY

## 2020-05-11 PROCEDURE — 73610 X-RAY EXAM OF ANKLE: CPT | Performed by: ORTHOPAEDIC SURGERY

## 2020-05-11 PROCEDURE — 73552 X-RAY EXAM OF FEMUR 2/>: CPT | Performed by: ORTHOPAEDIC SURGERY

## 2020-05-11 PROCEDURE — 73110 X-RAY EXAM OF WRIST: CPT | Performed by: ORTHOPAEDIC SURGERY

## 2020-05-11 RX ORDER — LEVOTHYROXINE SODIUM 0.03 MG/1
25 TABLET ORAL DAILY
Qty: 90 TABLET | Refills: 1 | Status: SHIPPED | OUTPATIENT
Start: 2020-05-11 | End: 2020-08-10

## 2020-05-11 NOTE — PROGRESS NOTES
Subjective: Right periprosthetic distal femur fracture, right distal radial fracture, right medial malleolar fracture     Patient ID: Jung Short is a 74 y.o. female.    Chief Complaint:    History of Present Illness patient is almost 6 months following the periprosthetic femur fracture medial malleolar fracture treated in [place] and a little over 3 months out from right distal radial fracture.  This time she is doing well ambulating with a cane although she did fall in the garage over the weekend is having some soreness in the buttocks and in the wrist.  However for the most part she is doing well and is now able to ambulate with only the cane.  Still having some soreness in the right leg with occasional swelling.       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   HENT: Negative for hearing loss, nosebleeds, sore throat and tinnitus.    Eyes: Negative for pain and visual disturbance.   Respiratory: Negative for cough, shortness of breath and wheezing.    Cardiovascular: Negative for chest pain and palpitations.   Gastrointestinal: Negative for abdominal pain, diarrhea, nausea and vomiting.   Endocrine: Negative for cold intolerance, heat intolerance and polydipsia.   Genitourinary: Negative for difficulty urinating, dysuria and hematuria.   Musculoskeletal: Positive for arthralgias and myalgias. Negative for joint swelling.   Skin: Negative for rash and wound.    Allergic/Immunologic: Negative for environmental allergies.   Neurological: Negative for dizziness, syncope and numbness.   Hematological: Does not bruise/bleed easily.   Psychiatric/Behavioral: Negative for dysphoric mood and sleep disturbance. The patient is not nervous/anxious.          Past Medical History:   Diagnosis Date   • Anemia    • Benign breast disease    • Cancer (CMS/HCC) 2015    Right breast   • Cellulitis of leg     May-2003 right lower extremity   • CHF (congestive heart failure) (CMS/HCC)     Dr Bates follows   • Chronic kidney disease     Dr Hannah follows   • Chronic kidney disease 5/24/2019   • Chronic ulcer of right foot (CMS/HCC)     Non-pressure, history of    • Depression    • Diabetic peripheral neuropathy (CMS/HCC)    • Diarrhea    • Diverticulosis    • Encounter for eye exam    • Femoral fracture (CMS/HCC)     right   • Fracture of left wrist     history of   • Gastroesophageal reflux    • Hiatal hernia    • History of bone density study 09/20/2012   • History of colonoscopy     done 6/03 recheck in 10 years.   Done july 2013 recheck in 5 years   • History of mammography, screening 09/20/2012   • Hospital discharge follow-up    • Hyperlipidemia    • Hypertension    • Hypothyroidism    • Impingement syndrome of right shoulder    • Joint pain    • Menopausal disorder    • Methicillin resistant Staphylococcus aureus infection 2012    after bladder surgery   • MRSA (methicillin resistant staph aureus) culture positive    • Nausea and vomiting    • Nonischemic cardiomyopathy (CMS/HCC)     Ejection fraction 10% per 2-D echo with Doppler however she did have cardiac catheterization April 2015 which showed ejection fraction 20% with global hypokinesis and severe mitral insufficiency   • Osteoarthritis     generalized, sched jania TKA   • Paroxysmal atrial fibrillation (CMS/HCC)     Dr Bates follows   • Postoperative infection     July of 2012 following her hysterectomy far vaginal wall  prolapse. She was on antibiotics and wound dressings for 2 months.   • Shoulder pain, bilateral     has tears on both sides   • Syncope, psychogenic 4/19/2017   • Toe ulcer (CMS/HCC)     h/o to both feet, d/t shoes rubbing per patient   • Transient alteration of awareness 4/19/2017     Past Surgical History:   Procedure Laterality Date   • ABDOMINAL SURGERY  2012    had to be reopened after bladder surgery d/t infection   • AMPUTATION FOOT / TOE Right 11/2013    Rt foot amputation MTP first toe   • BLADDER SURGERY  2012   • BREAST BIOPSY     • BREAST SURGERY  06/29/2015    Percutaneous ultrasound-guided placement of metal localized clip 1st lesion   • CARDIAC CATHETERIZATION  2015   • CATARACT EXTRACTION Bilateral 2017   • DEBRIDEMENT  FOOT Right 01/2013    During hospitalization    • EPIDURAL BLOCK      4 chronic back pain and leg pain last epidurals done 5-2012 she had no benefit at all from this procedure.   • FEMUR FRACTURE SURGERY     • FOOT SURGERY Bilateral     several   • HERNIA REPAIR      At the abdomen February 2007 Dr. Mendez   • INCISIONAL HERNIA REPAIR  05/16/2014    Recurrent. Incarcerated. With mesh implantation   • MASTECTOMY Right 05/2015   • SHOULDER SURGERY Right     x2 RCR   • TOTAL ABDOMINAL HYSTERECTOMY      with oophorectomy-Done June-2012 secondary to vaginal wall prolapse.   • TOTAL KNEE ARTHROPLASTY Right    • TOTAL KNEE ARTHROPLASTY Left 4/17/2018    Procedure: TOTAL KNEE ARTHROPLASTY;  Surgeon: Live Chambers MD;  Location: [facility];  Service: Orthopedics     Family History   Problem Relation Age of Onset   • Colonic polyp Mother    • Hypertension Mother    • Migraines Mother    • Mental illness Mother    • Depression Mother    • Coronary artery disease Father    • Other Father         Cardiac Disorder   • Colon cancer Father    • Kidney disease Father    • Cancer Father    • Breast cancer Maternal Aunt    • Colon cancer Brother    • Alcohol abuse Brother    • Arthritis Sister    •  Hypertension Brother    • Lung disease Brother    • Alcohol abuse Brother    • Malig Hyperthermia Neg Hx          Objective:  There were no vitals filed for this visit.      05/11/20  0903   Weight: 93.9 kg (207 lb)     Body mass index is 34.45 kg/m².        Ortho Exam   AP lateral oblique view of the right wrist shows consolidation at the fracture site compared to previous x-rays.  AP and lateral of the right femur shows a supracondylar paresthetic femur fracture has healed with increasing callus compared to previous x-rays and AP lateral and medial ligament shows consolidation at the fracture site compared to previous x-rays.  She is alert and oriented x3.  The right leg shows no swelling today she has 0 120 degrees of motion of the knee with no instability.  She has no motor or sensory deficit.  Distal some swelling to the ankle but there is minimal tenderness over the medial malleolus with no instability.  She has good capillary refill.  With regard to the wrist there is some tenderness along the ulnar styloid since she is fallen but again the x-rays did not show any new injury.  She has probably 20 degrees of dorsiflexion and 15 internal degrees of volar flexion.  No motor or sensory deficit in the right upper extremity.  She has good capillary refill.    Assessment:        1. Pain    2. Traumatic closed nondisp torus fracture of distal radial metaphysis, right, initial encounter    3. Closed fracture of malleolus of right ankle with routine healing, subsequent encounter    4. Periprosthetic supracondylar fracture of femur, subsequent encounter           Plan: This time I recommend she continue activity as tolerated.  I recommend she wear the wrist splint as needed for comfort she should try to leave it off in the house for increasing range of motion.  Continue the cane as needed.  As I discussed with her there is no urgency in getting rid of the cane when she ambulates to help assist with her ambulation  prevent further falls.  Again activity as tolerated she was told to return to see me on an as-needed basis but I assured her all fractures have healed.  Answered all questions            Work Status:    LOUANN query complete.    Orders:  Orders Placed This Encounter   Procedures   • XR Wrist 3+ View Right   • XR Femur 2 View Right   • XR Ankle 3+ View Right       Medications:  No orders of the defined types were placed in this encounter.      Followup:  Return if symptoms worsen or fail to improve.          Dictated utilizing Dragon dictation

## 2020-05-13 ENCOUNTER — TELEPHONE (OUTPATIENT)
Dept: ORTHOPEDIC SURGERY | Facility: CLINIC | Age: 75
End: 2020-05-13

## 2020-05-13 DIAGNOSIS — S52.92XA RADIUS/ULNA FRACTURE, LEFT, CLOSED, INITIAL ENCOUNTER: ICD-10-CM

## 2020-05-13 DIAGNOSIS — S52.202A RADIUS/ULNA FRACTURE, LEFT, CLOSED, INITIAL ENCOUNTER: ICD-10-CM

## 2020-05-13 RX ORDER — OXYCODONE HYDROCHLORIDE AND ACETAMINOPHEN 5; 325 MG/1; MG/1
1 TABLET ORAL EVERY 6 HOURS PRN
Qty: 42 TABLET | Refills: 0 | Status: SHIPPED | OUTPATIENT
Start: 2020-05-13 | End: 2020-06-01 | Stop reason: SDUPTHER

## 2020-05-13 NOTE — TELEPHONE ENCOUNTER
Patient calling stating she went by the pharmacy expecting an RX for pain medication but there wasn't anything. Can you please advise.

## 2020-05-14 NOTE — TELEPHONE ENCOUNTER
Pt called and requested refills for cholecalciferol (VITAMIN D3) 25 MCG (1000 UT) tablet, metFORMIN (GLUCOPHAGE) 1000 MG tablet and metFORMIN (GLUCOPHAGE) 500 MG tablet    [id number] [place] [address] AT [facility] & [address] - [phone]  - [phone] FX     Pt call back   [phone]

## 2020-05-15 RX ORDER — MELATONIN
1000 DAILY
Qty: 30 TABLET | Refills: 0 | Status: SHIPPED | OUTPATIENT
Start: 2020-05-15 | End: 2020-07-07 | Stop reason: SDUPTHER

## 2020-05-15 NOTE — TELEPHONE ENCOUNTER
Pt is scheduled to establish care w/ me later this month. Please call and schedule for lab appt prior to visit w/ me.

## 2020-05-18 ENCOUNTER — TELEPHONE (OUTPATIENT)
Dept: INTERNAL MEDICINE | Facility: CLINIC | Age: 75
End: 2020-05-18

## 2020-05-18 NOTE — TELEPHONE ENCOUNTER
[facility] TO SCHEDULE LAB    ----- Message from Sofía Mcclure CMA sent at 5/15/2020  5:43 PM EDT -----  Pt needs lab apt before seeing Kathryn later this month. Thank you

## 2020-05-22 ENCOUNTER — LAB (OUTPATIENT)
Dept: INTERNAL MEDICINE | Facility: CLINIC | Age: 75
End: 2020-05-22

## 2020-05-22 DIAGNOSIS — I15.9 SECONDARY HYPERTENSION: ICD-10-CM

## 2020-05-22 DIAGNOSIS — I50.9 CHRONIC CONGESTIVE HEART FAILURE, UNSPECIFIED HEART FAILURE TYPE (HCC): ICD-10-CM

## 2020-05-22 DIAGNOSIS — E55.9 VITAMIN D DEFICIENCY: ICD-10-CM

## 2020-05-22 DIAGNOSIS — E11.8 CONTROLLED DIABETES MELLITUS TYPE 2 WITH COMPLICATIONS, UNSPECIFIED WHETHER LONG TERM INSULIN USE (HCC): Primary | ICD-10-CM

## 2020-05-22 DIAGNOSIS — D64.9 ANEMIA, UNSPECIFIED TYPE: ICD-10-CM

## 2020-05-22 DIAGNOSIS — E53.8 COBALAMIN DEFICIENCY: ICD-10-CM

## 2020-05-22 DIAGNOSIS — E78.5 HYPERLIPIDEMIA, UNSPECIFIED HYPERLIPIDEMIA TYPE: ICD-10-CM

## 2020-05-22 DIAGNOSIS — N18.9 CHRONIC KIDNEY DISEASE, UNSPECIFIED CKD STAGE: ICD-10-CM

## 2020-05-26 ENCOUNTER — OFFICE VISIT (OUTPATIENT)
Dept: ORTHOPEDIC SURGERY | Facility: CLINIC | Age: 75
End: 2020-05-26

## 2020-05-26 VITALS — BODY MASS INDEX: 34.49 KG/M2 | HEIGHT: 65 IN | WEIGHT: 207 LBS

## 2020-05-26 DIAGNOSIS — R52 PAIN: Primary | ICD-10-CM

## 2020-05-26 DIAGNOSIS — M70.61 TROCHANTERIC BURSITIS OF RIGHT HIP: ICD-10-CM

## 2020-05-26 PROCEDURE — 73502 X-RAY EXAM HIP UNI 2-3 VIEWS: CPT | Performed by: ORTHOPAEDIC SURGERY

## 2020-05-26 PROCEDURE — 99214 OFFICE O/P EST MOD 30 MIN: CPT | Performed by: ORTHOPAEDIC SURGERY

## 2020-05-26 RX ORDER — METHYLPREDNISOLONE 4 MG/1
TABLET ORAL
Qty: 21 TABLET | Refills: 0 | Status: SHIPPED | OUTPATIENT
Start: 2020-05-26 | End: 2020-06-09

## 2020-05-26 NOTE — PROGRESS NOTES
Subjective: Right lateral hip pain     Patient ID: Jung Short is a 74 y.o. female.    Chief Complaint:    History of Present Illness 74-year-old female known to me was seen for new problem involving the right hip.  She is fallen twice in the past couple of weeks.  Both occurred in her garage when she tripped over rebecca.  First time landed on her knees and was able to get up although she had some pain that resolved in about 5 to 7 days later about 7 to 10 days ago she fell and landed on the right hip and has had persistent right lateral hip pain since that time.  At some soreness also to the right wrist but that is resolved.  No other injuries associated with the fall.       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   HENT: Negative for hearing loss, nosebleeds, sore throat and tinnitus.    Eyes: Negative for pain and visual disturbance.   Respiratory: Negative for cough, shortness of breath and wheezing.    Cardiovascular: Negative for chest pain and palpitations.   Gastrointestinal: Negative for abdominal pain, diarrhea, nausea and vomiting.   Endocrine: Negative for cold intolerance, heat intolerance and polydipsia.   Genitourinary: Negative for difficulty urinating, dysuria and hematuria.   Musculoskeletal: Positive for arthralgias and myalgias. Negative for joint swelling.   Skin: Negative for rash and wound.   Allergic/Immunologic: Negative for environmental  allergies.   Neurological: Negative for dizziness, syncope and numbness.   Hematological: Does not bruise/bleed easily.   Psychiatric/Behavioral: Negative for dysphoric mood and sleep disturbance. The patient is not nervous/anxious.          Past Medical History:   Diagnosis Date   • Anemia    • Benign breast disease    • Cancer (CMS/HCC) 2015    Right breast   • Cellulitis of leg     May-2003 right lower extremity   • CHF (congestive heart failure) (CMS/HCC)     Dr Bates follows   • Chronic kidney disease     Dr Hannah follows   • Chronic kidney disease 5/24/2019   • Chronic ulcer of right foot (CMS/HCC)     Non-pressure, history of    • Depression    • Diabetic peripheral neuropathy (CMS/HCC)    • Diarrhea    • Diverticulosis    • Encounter for eye exam    • Femoral fracture (CMS/HCC)     right   • Fracture of left wrist     history of   • Gastroesophageal reflux    • Hiatal hernia    • History of bone density study 09/20/2012   • History of colonoscopy     done 6/03 recheck in 10 years.   Done july 2013 recheck in 5 years   • History of mammography, screening 09/20/2012   • Hospital discharge follow-up    • Hyperlipidemia    • Hypertension    • Hypothyroidism    • Impingement syndrome of right shoulder    • Joint pain    • Menopausal disorder    • Methicillin resistant Staphylococcus aureus infection 2012    after bladder surgery   • MRSA (methicillin resistant staph aureus) culture positive    • Nausea and vomiting    • Nonischemic cardiomyopathy (CMS/HCC)     Ejection fraction 10% per 2-D echo with Doppler however she did have cardiac catheterization April 2015 which showed ejection fraction 20% with global hypokinesis and severe mitral insufficiency   • Osteoarthritis     generalized, sched jania TKA   • Paroxysmal atrial fibrillation (CMS/HCC)     Dr Bates follows   • Postoperative infection     July of 2012 following her hysterectomy far vaginal wall prolapse. She was on antibiotics and wound  dressings for 2 months.   • Shoulder pain, bilateral     has tears on both sides   • Syncope, psychogenic 4/19/2017   • Toe ulcer (CMS/HCC)     h/o to both feet, d/t shoes rubbing per patient   • Transient alteration of awareness 4/19/2017     Past Surgical History:   Procedure Laterality Date   • ABDOMINAL SURGERY  2012    had to be reopened after bladder surgery d/t infection   • AMPUTATION FOOT / TOE Right 11/2013    Rt foot amputation MTP first toe   • BLADDER SURGERY  2012   • BREAST BIOPSY     • BREAST SURGERY  06/29/2015    Percutaneous ultrasound-guided placement of metal localized clip 1st lesion   • CARDIAC CATHETERIZATION  2015   • CATARACT EXTRACTION Bilateral 2017   • DEBRIDEMENT  FOOT Right 01/2013    During hospitalization    • EPIDURAL BLOCK      4 chronic back pain and leg pain last epidurals done 5-2012 she had no benefit at all from this procedure.   • FEMUR FRACTURE SURGERY     • FOOT SURGERY Bilateral     several   • HERNIA REPAIR      At the abdomen February 2007 Dr. Mendez   • INCISIONAL HERNIA REPAIR  05/16/2014    Recurrent. Incarcerated. With mesh implantation   • MASTECTOMY Right 05/2015   • SHOULDER SURGERY Right     x2 RCR   • TOTAL ABDOMINAL HYSTERECTOMY      with oophorectomy-Done June-2012 secondary to vaginal wall prolapse.   • TOTAL KNEE ARTHROPLASTY Right    • TOTAL KNEE ARTHROPLASTY Left 4/17/2018    Procedure: TOTAL KNEE ARTHROPLASTY;  Surgeon: Live Chambers MD;  [facility];  Service: Orthopedics     Family History   Problem Relation Age of Onset   • Colonic polyp Mother    • Hypertension Mother    • Migraines Mother    • Mental illness Mother    • Depression Mother    • Coronary artery disease Father    • Other Father         Cardiac Disorder   • Colon cancer Father    • Kidney disease Father    • Cancer Father    • Breast cancer Maternal Aunt    • Colon cancer Brother    • Alcohol abuse Brother    • Arthritis Sister    • Hypertension Brother    • Lung disease Brother     • Alcohol abuse Brother    • Malig Hyperthermia Neg Hx          Objective:  There were no vitals filed for this visit.      05/26/20  1153   Weight: 93.9 kg (207 lb)     Body mass index is 34.45 kg/m².        Ortho Exam   AP lateral oblique view of the right hip shows no acute changes.  No fractures are noted over the greater trochanter where her pain is associated.  She is alert and oriented x3.  There is no motor deficit in the right lower extremity.  There is marked pain to palpation of the right greater trochanter and pain with abduction against resistance with a positive Sarah's test.  Negative Stinchfield test.  No groin pain with passive internal or external rotation of the leg and she can internally rotate to about 30 degrees.  Hip flexors and extensors are 5/5.  Hip abductors are 3 out of 5 secondary to pain.  Quad function is 5/5.  Calf is nontender.  She has good distal pulses.  Skin is cool to touch.    Assessment:        1. Pain    2. Trochanteric bursitis of right hip           Plan: Spent over 15 minutes with the patient reviewing her x-rays and history and exam.  She has developed an acute trochanteric bursitis as result of the injury.  After reviewing treatment options of a cortisone injection versus a steroid pack she like to try the oral steroids first.  Medrol Dosepak was sent to her pharmacy.  She is to return next week if her pain is not resolved for cortisone injection.  Answered all questions.            Work Status:    LOUANN query complete.    Orders:  Orders Placed This Encounter   Procedures   • XR Hip With or Without Pelvis 2 - 3 View Right       Medications:  New Medications Ordered This Visit   Medications   • methylPREDNISolone (MEDROL, JULIAN,) 4 MG tablet     Sig: Use as directed by package instructions     Dispense:  21 tablet     Refill:  0       Followup:  Return if symptoms worsen or fail to improve.          Dictated utilizing Dragon dictation

## 2020-05-27 LAB
25(OH)D3+25(OH)D2 SERPL-MCNC: 33.5 NG/ML (ref 30–100)
ALBUMIN SERPL-MCNC: 4.3 G/DL (ref 3.5–5.2)
ALBUMIN/GLOB SERPL: 1.5 G/DL
ALP SERPL-CCNC: 83 U/L (ref 39–117)
ALT SERPL-CCNC: 8 U/L (ref 1–33)
AST SERPL-CCNC: 14 U/L (ref 1–32)
BASOPHILS # BLD AUTO: 0.04 10*3/MM3 (ref 0–0.2)
BASOPHILS NFR BLD AUTO: 0.6 % (ref 0–1.5)
BILIRUB SERPL-MCNC: 0.5 MG/DL (ref 0.2–1.2)
BUN SERPL-MCNC: 13 MG/DL (ref 8–23)
BUN/CREAT SERPL: 16.7 (ref 7–25)
CALCIUM SERPL-MCNC: 10.1 MG/DL (ref 8.6–10.5)
CHLORIDE SERPL-SCNC: 103 MMOL/L (ref 98–107)
CHOLEST SERPL-MCNC: 147 MG/DL (ref 0–200)
CHOLEST/HDLC SERPL: 2.67 {RATIO}
CO2 SERPL-SCNC: 29.7 MMOL/L (ref 22–29)
CREAT SERPL-MCNC: 0.78 MG/DL (ref 0.57–1)
EOSINOPHIL # BLD AUTO: 0.24 10*3/MM3 (ref 0–0.4)
EOSINOPHIL NFR BLD AUTO: 3.6 % (ref 0.3–6.2)
ERYTHROCYTE [DISTWIDTH] IN BLOOD BY AUTOMATED COUNT: 14.5 % (ref 12.3–15.4)
GLOBULIN SER CALC-MCNC: 2.8 GM/DL
GLUCOSE SERPL-MCNC: 154 MG/DL (ref 65–99)
HBA1C MFR BLD: 6.6 % (ref 4.8–5.6)
HCT VFR BLD AUTO: 37.1 % (ref 34–46.6)
HDLC SERPL-MCNC: 55 MG/DL (ref 40–60)
HGB BLD-MCNC: 11.8 G/DL (ref 12–15.9)
IMM GRANULOCYTES # BLD AUTO: 0.03 10*3/MM3 (ref 0–0.05)
IMM GRANULOCYTES NFR BLD AUTO: 0.5 % (ref 0–0.5)
LDLC SERPL CALC-MCNC: 61 MG/DL (ref 0–100)
LYMPHOCYTES # BLD AUTO: 1.73 10*3/MM3 (ref 0.7–3.1)
LYMPHOCYTES NFR BLD AUTO: 26.1 % (ref 19.6–45.3)
MCH RBC QN AUTO: 27.2 PG (ref 26.6–33)
MCHC RBC AUTO-ENTMCNC: 31.8 G/DL (ref 31.5–35.7)
MCV RBC AUTO: 85.5 FL (ref 79–97)
MONOCYTES # BLD AUTO: 0.52 10*3/MM3 (ref 0.1–0.9)
MONOCYTES NFR BLD AUTO: 7.8 % (ref 5–12)
NEUTROPHILS # BLD AUTO: 4.08 10*3/MM3 (ref 1.7–7)
NEUTROPHILS NFR BLD AUTO: 61.4 % (ref 42.7–76)
NRBC BLD AUTO-RTO: 0 /100 WBC (ref 0–0.2)
PLATELET # BLD AUTO: 234 10*3/MM3 (ref 140–450)
POTASSIUM SERPL-SCNC: 3.5 MMOL/L (ref 3.5–5.2)
PROT SERPL-MCNC: 7.1 G/DL (ref 6–8.5)
RBC # BLD AUTO: 4.34 10*6/MM3 (ref 3.77–5.28)
SODIUM SERPL-SCNC: 141 MMOL/L (ref 136–145)
T4 FREE SERPL-MCNC: 1.57 NG/DL (ref 0.93–1.7)
TRIGL SERPL-MCNC: 153 MG/DL (ref 0–150)
TSH SERPL DL<=0.005 MIU/L-ACNC: 0.86 UIU/ML (ref 0.27–4.2)
VIT B12 SERPL-MCNC: 319 PG/ML (ref 211–946)
VLDLC SERPL CALC-MCNC: 30.6 MG/DL
WBC # BLD AUTO: 6.64 10*3/MM3 (ref 3.4–10.8)

## 2020-05-29 ENCOUNTER — TELEPHONE (OUTPATIENT)
Dept: ORTHOPEDIC SURGERY | Facility: CLINIC | Age: 75
End: 2020-05-29

## 2020-05-29 ENCOUNTER — OFFICE VISIT (OUTPATIENT)
Dept: INTERNAL MEDICINE | Facility: CLINIC | Age: 75
End: 2020-05-29

## 2020-05-29 VITALS
OXYGEN SATURATION: 96 % | DIASTOLIC BLOOD PRESSURE: 68 MMHG | WEIGHT: 195 LBS | SYSTOLIC BLOOD PRESSURE: 122 MMHG | HEIGHT: 67 IN | BODY MASS INDEX: 30.61 KG/M2 | HEART RATE: 72 BPM

## 2020-05-29 DIAGNOSIS — I10 ESSENTIAL HYPERTENSION: Primary | ICD-10-CM

## 2020-05-29 DIAGNOSIS — M25.50 ARTHRALGIA OF MULTIPLE JOINTS: ICD-10-CM

## 2020-05-29 DIAGNOSIS — E55.9 VITAMIN D DEFICIENCY: ICD-10-CM

## 2020-05-29 DIAGNOSIS — G89.29 OTHER CHRONIC PAIN: ICD-10-CM

## 2020-05-29 DIAGNOSIS — I25.10 ATHEROSCLEROSIS OF NATIVE CORONARY ARTERY OF NATIVE HEART WITHOUT ANGINA PECTORIS: ICD-10-CM

## 2020-05-29 DIAGNOSIS — I34.0 NONRHEUMATIC MITRAL VALVE REGURGITATION: ICD-10-CM

## 2020-05-29 DIAGNOSIS — M1A.39X0 CHRONIC GOUT DUE TO RENAL IMPAIRMENT OF MULTIPLE SITES WITHOUT TOPHUS: ICD-10-CM

## 2020-05-29 DIAGNOSIS — F32.9 MAJOR DEPRESSIVE DISORDER WITH CURRENT ACTIVE EPISODE, UNSPECIFIED DEPRESSION EPISODE SEVERITY, UNSPECIFIED WHETHER RECURRENT: ICD-10-CM

## 2020-05-29 DIAGNOSIS — E11.69 TYPE 2 DIABETES MELLITUS WITH OTHER SPECIFIED COMPLICATION, WITHOUT LONG-TERM CURRENT USE OF INSULIN (HCC): ICD-10-CM

## 2020-05-29 DIAGNOSIS — E53.8 COBALAMIN DEFICIENCY: ICD-10-CM

## 2020-05-29 DIAGNOSIS — E78.5 DYSLIPIDEMIA: ICD-10-CM

## 2020-05-29 DIAGNOSIS — I36.1 NONRHEUMATIC TRICUSPID VALVE REGURGITATION: ICD-10-CM

## 2020-05-29 DIAGNOSIS — N18.30 CKD (CHRONIC KIDNEY DISEASE) STAGE 3, GFR 30-59 ML/MIN (HCC): ICD-10-CM

## 2020-05-29 DIAGNOSIS — I50.32 CHRONIC DIASTOLIC CONGESTIVE HEART FAILURE (HCC): ICD-10-CM

## 2020-05-29 DIAGNOSIS — E03.9 ACQUIRED HYPOTHYROIDISM: ICD-10-CM

## 2020-05-29 PROBLEM — J06.9 ACUTE URI: Status: RESOLVED | Noted: 2019-01-29 | Resolved: 2020-05-29

## 2020-05-29 PROBLEM — S52.202A RADIUS/ULNA FRACTURE, LEFT, CLOSED, INITIAL ENCOUNTER: Status: RESOLVED | Noted: 2017-10-21 | Resolved: 2020-05-29

## 2020-05-29 PROBLEM — S52.92XA RADIUS/ULNA FRACTURE, LEFT, CLOSED, INITIAL ENCOUNTER: Status: RESOLVED | Noted: 2017-10-21 | Resolved: 2020-05-29

## 2020-05-29 PROBLEM — Z01.810 PREOPERATIVE CARDIOVASCULAR EXAMINATION: Status: RESOLVED | Noted: 2018-04-09 | Resolved: 2020-05-29

## 2020-05-29 PROBLEM — R07.81 RIB PAIN ON LEFT SIDE: Status: RESOLVED | Noted: 2017-11-27 | Resolved: 2020-05-29

## 2020-05-29 PROBLEM — F48.8: Status: RESOLVED | Noted: 2017-04-19 | Resolved: 2020-05-29

## 2020-05-29 PROBLEM — S82.009A CLOSED FRACTURE OF PATELLA: Status: RESOLVED | Noted: 2017-09-05 | Resolved: 2020-05-29

## 2020-05-29 PROBLEM — E87.1 HYPONATREMIA: Status: RESOLVED | Noted: 2018-05-10 | Resolved: 2020-05-29

## 2020-05-29 PROCEDURE — 99214 OFFICE O/P EST MOD 30 MIN: CPT | Performed by: NURSE PRACTITIONER

## 2020-05-29 NOTE — TELEPHONE ENCOUNTER
Patient calling requesting pain medication, she is out of her 90 day global billing period from surgery/fracture care. It was explained to the patient that Dr. Chambers is not in town but he will be back on Monday.    Thanks.

## 2020-06-01 ENCOUNTER — TELEPHONE (OUTPATIENT)
Dept: ORTHOPEDIC SURGERY | Facility: CLINIC | Age: 75
End: 2020-06-01

## 2020-06-01 DIAGNOSIS — S52.202A RADIUS/ULNA FRACTURE, LEFT, CLOSED, INITIAL ENCOUNTER: ICD-10-CM

## 2020-06-01 DIAGNOSIS — S52.92XA RADIUS/ULNA FRACTURE, LEFT, CLOSED, INITIAL ENCOUNTER: ICD-10-CM

## 2020-06-01 DIAGNOSIS — G89.29 OTHER CHRONIC PAIN: Primary | ICD-10-CM

## 2020-06-01 RX ORDER — OXYCODONE HYDROCHLORIDE AND ACETAMINOPHEN 5; 325 MG/1; MG/1
1 TABLET ORAL EVERY 6 HOURS PRN
Qty: 42 TABLET | Refills: 0 | Status: SHIPPED | OUTPATIENT
Start: 2020-06-01 | End: 2020-06-09

## 2020-06-01 NOTE — TELEPHONE ENCOUNTER
Patient is calling leaving a voicemail asking for an RX refill of pain medication until she can be seen by pain management on 06.09.2020.    DX:Right periprosthetic distal femur fracture, right distal radial fracture, right medial malleolar fracture-Trochanteric bursitis of right hip.    She last filled oxyCODONE-acetaminophen (Percocet) 5-325 MG per tablet -Sig: Take 1 tablet by mouth Every 6 (Six) Hours As Needed for Severe Pain. Dispense Quantity: 42 tablets. Filled on 05.13.2020.    Please advise.

## 2020-06-02 RX ORDER — PREGABALIN 75 MG/1
CAPSULE ORAL
Qty: 60 CAPSULE | Refills: 5 | Status: SHIPPED | OUTPATIENT
Start: 2020-06-02 | End: 2020-12-28 | Stop reason: SDUPTHER

## 2020-06-03 DIAGNOSIS — R14.0 ABDOMINAL BLOATING: ICD-10-CM

## 2020-06-03 RX ORDER — CARVEDILOL 12.5 MG/1
12.5 TABLET ORAL 2 TIMES DAILY WITH MEALS
Qty: 60 TABLET | Refills: 0 | Status: SHIPPED | OUTPATIENT
Start: 2020-06-03 | End: 2020-06-04

## 2020-06-04 RX ORDER — CARVEDILOL 12.5 MG/1
TABLET ORAL
Qty: 180 TABLET | Refills: 0 | Status: SHIPPED | OUTPATIENT
Start: 2020-06-04 | End: 2020-09-21

## 2020-06-06 ENCOUNTER — RESULTS ENCOUNTER (OUTPATIENT)
Dept: INTERNAL MEDICINE | Facility: CLINIC | Age: 75
End: 2020-06-06

## 2020-06-06 DIAGNOSIS — E78.5 DYSLIPIDEMIA: ICD-10-CM

## 2020-06-06 DIAGNOSIS — E53.8 COBALAMIN DEFICIENCY: ICD-10-CM

## 2020-06-06 DIAGNOSIS — I10 ESSENTIAL HYPERTENSION: ICD-10-CM

## 2020-06-06 DIAGNOSIS — E03.9 ACQUIRED HYPOTHYROIDISM: ICD-10-CM

## 2020-06-06 DIAGNOSIS — E55.9 VITAMIN D DEFICIENCY: ICD-10-CM

## 2020-06-06 DIAGNOSIS — N18.30 CKD (CHRONIC KIDNEY DISEASE) STAGE 3, GFR 30-59 ML/MIN (HCC): ICD-10-CM

## 2020-06-09 ENCOUNTER — OFFICE VISIT (OUTPATIENT)
Dept: PAIN MEDICINE | Facility: CLINIC | Age: 75
End: 2020-06-09

## 2020-06-09 VITALS
BODY MASS INDEX: 29.66 KG/M2 | HEART RATE: 83 BPM | RESPIRATION RATE: 20 BRPM | WEIGHT: 189 LBS | TEMPERATURE: 96.5 F | OXYGEN SATURATION: 97 % | SYSTOLIC BLOOD PRESSURE: 119 MMHG | HEIGHT: 67 IN | DIASTOLIC BLOOD PRESSURE: 73 MMHG

## 2020-06-09 DIAGNOSIS — M47.816 LUMBAR FACET ARTHROPATHY: ICD-10-CM

## 2020-06-09 DIAGNOSIS — M1A.39X0 CHRONIC GOUT DUE TO RENAL IMPAIRMENT OF MULTIPLE SITES WITHOUT TOPHUS: ICD-10-CM

## 2020-06-09 DIAGNOSIS — R52 GENERALIZED PAIN: ICD-10-CM

## 2020-06-09 DIAGNOSIS — G89.29 OTHER CHRONIC PAIN: Primary | ICD-10-CM

## 2020-06-09 DIAGNOSIS — M25.50 ARTHRALGIA OF MULTIPLE JOINTS: ICD-10-CM

## 2020-06-09 DIAGNOSIS — Z79.899 ENCOUNTER FOR LONG-TERM (CURRENT) USE OF HIGH-RISK MEDICATION: ICD-10-CM

## 2020-06-09 DIAGNOSIS — M51.36 DDD (DEGENERATIVE DISC DISEASE), LUMBAR: ICD-10-CM

## 2020-06-09 PROCEDURE — 99214 OFFICE O/P EST MOD 30 MIN: CPT | Performed by: NURSE PRACTITIONER

## 2020-06-09 RX ORDER — HYDROCODONE BITARTRATE AND ACETAMINOPHEN 5; 325 MG/1; MG/1
1 TABLET ORAL EVERY 8 HOURS PRN
Qty: 90 TABLET | Refills: 0 | Status: SHIPPED | OUTPATIENT
Start: 2020-06-09 | End: 2020-07-09 | Stop reason: SDUPTHER

## 2020-06-09 NOTE — PROGRESS NOTES
CHIEF COMPLAINT  F/u joint pain. Pt sts pain has worsened since last ov. Pt sts had a fall 11/30/19, pt sts was standing in doorway then fell, she sts she can't remember anything about the fall. Pt sts she broke her right ankle, right femur. Pt sts she went to  hospital and was admitted x 10 days, then d/c'd to rehab x 2 months. Pt had another fall and broke her right wrist at home, was in a w/c was reaching for something on the table and fell out of her w/c. Pt went to hospital after fall, was evaluated and was also told she was in heart failure.      Subjective   Jugn Short is a 74 y.o. female  who presents for follow-up.She has a history of chronic back and joint pain. Reports her pain is worse since last office visit.    Patient was last evaluated in the office on 11/11/2019.  She has a history of chronic back and joint pain.  A routine urine drug screen was obtained that day.  She was refilled on hydrocodone 10-3 25 1 p.o. every 6 hours as needed.    In the system there is an admission from the ED to the hospital on 11/30/2019 to [facility].  The diagnosis is displaced supracondylar fracture without intercondylar extension of lower right end of femur.  Also periprosthetic fracture around internal prosthetic right knee joint.  Also displaced fracture trimalleolar of right lower extremity. Patient was in [place] for [employer] football game.     There is another hospital admission from the ED at [facility] on 12/11/2019.  Complaining of right hip, leg, ankle pain and back pain status post fall from wheelchair.  LOC is unknown.    There is an ED to hospital admission at [facility] on 1/31/2020--diagnosis of pleural effusion, hypoxia, hypertensive emergency, other closed fracture of distal end of right radius initial encounter.  Length of stay was 6 days.  Diagnosis of acute hypoxic respiratory failure with elevated d-dimer likely due to acute fracture.  Improved with  diuresis.  Acute on chronic CHF exacerbation which is improving.  Right wrist fracture which will be followed by Ortho.  Recent rehab for femur fracture, PT is following.  Per Ortho will need to be nonweightbearing until office follow-up.    There is an ED to hospital admission on 4/10/2020 at [facility].  Patient was diagnosed with acute hypoxia and acute on chronic CHF.  Presented to ED secondary to acute onset of shortness of air.  Notes discharge approximately 1 week ago from [facility] secondary to rehab from right ankle fracture and right wrist fracture.    Review Dr. Live Chambers office visit on 5/26/2020--patient has fallen twice in the past couple of weeks.  Has right hip pain.  Both falls occurred in her garage where she tripped over rebecca.  Still having some soreness of the right wrist but that has resolved.  Patient has developed acute trochanteric bursitis as result of injury.  After reviewing treatment options she would like to try the oral steroids first.  If no improvement consider cortisone injection.    Reviewed Kathryn BRADY (PCP) ofice visit 5-29-20--patient presents to establish care.  Prior PCP was Dr. Kylee Jensen.  Patient has had numerous hospital admissions in the last year.  Was previously seen by pain management but has not been seen since before her falls.  Has been prescribed pain medication from various rehab facilities and then by Dr. Braden.  She did ask for refill of Percocet.  Reviewed she needed to follow-up with pain management or Ortho in regards to this.  Routine labs obtained.  Follow-up in 4 months or sooner if needed.    Reports she has not drank any alcohol for every 30 years.    Complains of pain in her back and joints. Today her pain is 6/10VAS.  Describes her pain as nearly continuous aching and throbbing. Pain increases with walking, activity, standing, weightbearing; pain decreases with medication, rest and heat.   Prior to her trauma, she was taking  Hydrocodone 10/325 4/day and cymbalta 30 mg daily and pregabalin 75 mg BID(had issues with cost with this medication). Since her trauma, she has been prescribed intermittent Percocet.  She has been taking percocet 5/325 2-3/day. Denies any side effects from the regimen. It helps decrease her pain mildly to moderately. ADL's by self. Denies any bowel or bladder changes. Living by self.      She reports two recent falls on her garage floor.  She states she kept tripping over the same place. However, she has now put red paint over the area and has not tripped over that area since then.     Patient remained masked during entire encounter. No cough present. I donned a mask and gloves throughout entire visit. Prior to donning mask and gloves, hand hygiene was performed, as well as when it was doffed.  I was closer than 6 feet, but not for an extended period of time. No obvious exposure to any bodily fluids.    Joint Pain   This is a chronic problem. The current episode started more than 1 year ago (today pain is 6/10VAS- joints). The problem occurs constantly. The problem has been unchanged (worse since last office visit-- multiple fractures since last office visit). Associated symptoms include arthralgias (joint pain in shoulders, arms), numbness (bilat legs and feet) and weakness (occ). Pertinent negatives include no abdominal pain, chest pain, chills, congestion, coughing, fatigue, fever, headaches, joint swelling, nausea, neck pain, sore throat or vomiting. Nothing aggravates the symptoms. She has tried oral narcotics, position changes and rest for the symptoms. The treatment provided moderate relief.   Back Pain   This is a chronic problem. The current episode started more than 1 year ago. The problem occurs constantly. The problem has been gradually worsening (worse since last office visit) since onset. The pain is present in the lumbar spine. The pain radiates to the right knee,  right foot and right thigh. The pain is at a severity of 6/10. The pain is moderate. Associated symptoms include numbness (bilat legs and feet) and weakness (occ). Pertinent negatives include no abdominal pain, chest pain, dysuria, fever or headaches. Risk factors include lack of exercise and obesity. She has tried analgesics for the symptoms. The treatment provided moderate relief.      11/30/19 she was admitted to  d/t a femur fracture. She was then transferred to [facility] where she experienced a R wrist fracture.     01/31/2020 she was again hospitalized, this time for ARF. Following inpatient she was transferred to [facility] and rehab facility.     04/10/2020 she was admitted for acute hypoxia and acute of chronic dCHF.        PEG Assessment   What number best describes your pain on average in the past week?5  What number best describes how, during the past week, pain has interfered with your enjoyment of life?5  What number best describes how, during the past week, pain has interfered with your general activity?  5    The following portions of the patient's history were reviewed and updated as appropriate: allergies, current medications, past family history, past medical history, past social history, past surgical history and problem list.    Review of Systems   Constitutional: Positive for activity change (dec). Negative for chills, fatigue and fever.   HENT: Negative for congestion and sore throat.    Respiratory: Negative for cough and shortness of breath.    Cardiovascular: Negative for chest pain.   Gastrointestinal: Negative for abdominal pain, nausea and vomiting.   Genitourinary: Negative for difficulty urinating and dysuria.   Musculoskeletal: Positive for arthralgias (joint pain in shoulders, arms). Negative for back pain, joint swelling and neck pain.   Neurological: Positive for weakness (occ) and numbness (bilat legs and feet). Negative for dizziness and headaches.  "  Psychiatric/Behavioral: Negative for agitation, sleep disturbance and suicidal ideas. The patient is not nervous/anxious.        Vitals:    06/09/20 0854   BP: 119/73   Pulse: 83   Resp: 20   Temp: 96.5 °F (35.8 °C)   SpO2: 97%   Weight: 85.7 kg (189 lb)  Comment: pt refused wt in office, stated wt   Height: 168.9 cm (66.5\")   PainSc:   6   PainLoc: Comment: joint     Objective   Physical Exam   Constitutional: She is oriented to person, place, and time. Vital signs are normal. She appears well-developed and well-nourished. She is cooperative.   HENT:   Head: Normocephalic and atraumatic.   Nose: Nose normal.   Eyes: Conjunctivae and lids are normal.   Cardiovascular: Normal rate.   Pulmonary/Chest: Effort normal.   Abdominal: Normal appearance.   Musculoskeletal:        Lumbar back: She exhibits decreased range of motion.   Widespread joint changes with no acute synovitis   Neurological: She is alert and oriented to person, place, and time. Gait (cane) abnormal.   Skin: Skin is warm, dry and intact.   Psychiatric: She has a normal mood and affect. Her speech is normal and behavior is normal. Judgment and thought content normal. Cognition and memory are normal.   Nursing note and vitals reviewed.      Assessment/Plan   Jung was seen today for joint pain.    Diagnoses and all orders for this visit:    Other chronic pain    DDD (degenerative disc disease), lumbar    Lumbar facet arthropathy    Chronic gout due to renal impairment of multiple sites without tophus    Arthralgia of multiple joints    Generalized pain    Encounter for long-term (current) use of high-risk medication    Other orders  -     HYDROcodone-acetaminophen (NORCO) 5-325 MG per tablet; Take 1 tablet by mouth Every 8 (Eight) Hours As Needed for Severe Pain .      --- The urine drug screen confirmation from 11-11-19 has been reviewed and the result is APPROPRIATE based on patient history and LOUANN report  --- Discontinue Percocet.  --- Trial of " Hydrocodone 5/325 q8hrs PRN. Discussed medication with the patient.  Included in this discussion was the potential for side effects and adverse events.  Patient verbalized understanding and wished to proceed.  Prescription will be sent to pharmacy.  --- Follow-up 1 month or sooner if needed.         LOUANN REPORT    As part of the patient's treatment plan, I am prescribing controlled substances. The patient has been made aware of appropriate use of such medications, including potential risk of somnolence, limited ability to drive and/or work safely, and the potential for dependence or overdose. It has also bee made clear that these medications are for use by this patient only, without concomitant use of alcohol or other substances unless prescribed.     Patient has completed prescribing agreement detailing terms of continued prescribing of controlled substances, including monitoring LOUANN reports, urine drug screening, and pill counts if necessary. The patient is aware that inappropriate use will results in cessation of prescribing such medications.    LOUANN report has been reviewed and scanned into the patient's chart.    As the clinician, I personally reviewed the LOUANN from 6-8-20 .    History and physical exam exhibit continued safe and appropriate use of controlled substances.      EMR Dragon/Transcription disclaimer:   Much of this encounter note is an electronic transcription/translation of spoken language to printed text. The electronic translation of spoken language may permit erroneous, or at times, nonsensical words or phrases to be inadvertently transcribed; Although I have reviewed the note for such errors, some may still exist.

## 2020-06-16 DIAGNOSIS — R14.0 ABDOMINAL BLOATING: ICD-10-CM

## 2020-06-17 ENCOUNTER — TELEPHONE (OUTPATIENT)
Dept: ONCOLOGY | Facility: CLINIC | Age: 75
End: 2020-06-17

## 2020-06-17 RX ORDER — ANASTROZOLE 1 MG/1
TABLET ORAL
OUTPATIENT
Start: 2020-06-17

## 2020-06-17 RX ORDER — ANASTROZOLE 1 MG/1
TABLET ORAL
Qty: 30 TABLET | Refills: 0 | Status: SHIPPED | OUTPATIENT
Start: 2020-06-17 | End: 2020-07-16

## 2020-06-17 NOTE — TELEPHONE ENCOUNTER
----- Message from Judie Mercedes sent at 6/17/2020  9:31 AM EDT -----  Please call pt to set up an appointment with Dr Lo. Pt was supposed to be seen 6 months ago. I do not see an appointment in the system. Pt hasn't been seen since 6/2019. I am only going to approve enough until she is seen (one month to start out with). Thanks Judie.

## 2020-06-18 RX ORDER — DULOXETIN HYDROCHLORIDE 60 MG/1
60 CAPSULE, DELAYED RELEASE ORAL DAILY
Qty: 90 CAPSULE | Refills: 1 | Status: SHIPPED | OUTPATIENT
Start: 2020-06-18 | End: 2020-12-04 | Stop reason: SDUPTHER

## 2020-06-22 NOTE — TELEPHONE ENCOUNTER
PATIENT CALLING IN TO REQUEST REFILLS OF:    methocarbamol (ROBAXIN) 500 MG tablet    [id number] - [place], [place] - [address] AT [facility] & [address] - [phone]  - [phone]

## 2020-06-23 RX ORDER — PRAVASTATIN SODIUM 10 MG
10 TABLET ORAL DAILY
Qty: 90 TABLET | Refills: 3 | Status: SHIPPED | OUTPATIENT
Start: 2020-06-23 | End: 2021-07-19

## 2020-06-23 RX ORDER — METHOCARBAMOL 500 MG/1
500 TABLET, FILM COATED ORAL 3 TIMES DAILY
Qty: 90 TABLET | Refills: 0 | Status: SHIPPED | OUTPATIENT
Start: 2020-06-23 | End: 2020-08-06 | Stop reason: SDUPTHER

## 2020-06-23 RX ORDER — LOSARTAN POTASSIUM 25 MG/1
25 TABLET ORAL DAILY
Qty: 90 TABLET | Refills: 3 | Status: SHIPPED | OUTPATIENT
Start: 2020-06-23 | End: 2021-01-28

## 2020-06-28 NOTE — THERAPY TREATMENT NOTE
Acute Care - Occupational Therapy Treatment Note  NOHEMY Diaz     Patient Name: Jung Short  : 1945  MRN: [id number]  Today's Date: 2018  Onset of Illness/Injury or Date of Surgery: 18  Date of Referral to OT: 18  Referring Physician: Reyes    Admit Date: 2018       ICD-10-CM ICD-9-CM   1. Primary osteoarthritis of left knee M17.12 715.16     Patient Active Problem List   Diagnosis   • Abdominal bloating   • Abdominal mass   • Abdominal pain   • Abnormal gait   • Abnormal mammogram   • Anemia   • Atherosclerosis of coronary artery   • Thoracic back pain   • Malignant neoplasm of upper-inner quadrant of right breast in female, estrogen receptor positive   • Candidiasis   • Cardiomyopathy   • Disc disorder of cervical region   • Neck pain   • Tenderness of chest wall   • Chronic kidney disease, stage III (moderate)   • Chronic pain   • Constipation   • Generalized osteoarthritis   • Depression   • Type 2 diabetes mellitus   • Controlled type 2 diabetes mellitus without complication   • Dyslipidemia   • Gastroesophageal reflux disease with esophagitis   • Generalized pain   • Gout   • Hypertension   • Hypothyroidism   • Incisional hernia   • Mass of breast   • Mitral valve insufficiency   • Nausea   • Adult body mass index greater than 30   • Osteopenia of spine   • Arthralgia of multiple joints   • Peripheral neuropathy   • Pulmonary hypertension   • Osteomyelitis   • Tricuspid valve insufficiency   • Cobalamin deficiency   • Vitamin D deficiency   • Diabetes mellitus   • Left knee pain   • Arthritis of knee   • DDD (degenerative disc disease), lumbar   • Lumbar facet arthropathy   • Pain in shoulder   • Chronic gout of multiple sites   • Constipation due to opioid therapy   • Syncope, psychogenic   • Transient alteration of awareness   • Closed fracture of patella   • Primary osteoarthritis of left knee   • Radius/ulna fracture, left, closed, initial encounter   • Rib pain on left  side   • Preoperative cardiovascular examination     Past Medical History:   Diagnosis Date   • Anemia    • Benign breast disease    • Cancer 2015    Right breast   • Cellulitis of leg     May-2003 right lower extremity   • CHF (congestive heart failure)     Dr Bates follows   • Chronic kidney disease     Dr Hannah follows   • Chronic ulcer of right foot     Non-pressure, history of    • Depression    • Diabetic peripheral neuropathy    • Diarrhea    • Diverticulosis    • Encounter for eye exam    • Fracture of left wrist     history of   • Gastroesophageal reflux    • Hiatal hernia    • History of bone density study 09/20/2012   • History of colonoscopy     done 6/03 recheck in 10 years.   Done july 2013 recheck in 5 years   • History of mammography, screening 09/20/2012   • Hospital discharge follow-up    • Hyperlipidemia    • Hypertension    • Hypothyroidism    • Impingement syndrome of right shoulder    • Joint pain    • Menopausal disorder    • Methicillin resistant Staphylococcus aureus infection 2012    after bladder surgery   • MRSA (methicillin resistant staph aureus) culture positive    • Nausea and vomiting    • Nonischemic cardiomyopathy     Ejection fraction 10% per 2-D echo with Doppler however she did have cardiac catheterization April 2015 which showed ejection fraction 20% with global hypokinesis and severe mitral insufficiency   • Osteoarthritis     generalized, sched jania TKA   • Paroxysmal atrial fibrillation     Dr Bates follows   • Postoperative infection     July of 2012 following her hysterectomy far vaginal wall prolapse. She was on antibiotics and wound dressings for 2 months.   • Shoulder pain, bilateral     has tears on both sides   • Syncope, psychogenic 4/19/2017   • Toe ulcer     h/o to both feet, d/t shoes rubbing per patient   • Transient alteration of awareness 4/19/2017     Past Surgical History:   Procedure Laterality Date   • ABDOMINAL SURGERY  2012    had to be reopened  "after bladder surgery d/t infection   • AMPUTATION FOOT / TOE Right 11/2013    Rt foot amputation MTP first toe   • BLADDER SURGERY  2012   • BREAST BIOPSY     • BREAST SURGERY  06/29/2015    Percutaneous ultrasound-guided placement of metal localized clip 1st lesion   • CARDIAC CATHETERIZATION  2015   • CATARACT EXTRACTION Bilateral 2017   • DEBRIDEMENT  FOOT Right 01/2013    During hospitalization    • EPIDURAL BLOCK      4 chronic back pain and leg pain last epidurals done 5-2012 she had no benefit at all from this procedure.   • FOOT SURGERY Bilateral     several   • HERNIA REPAIR      At the abdomen February 2007 Dr. Mendez   • INCISIONAL HERNIA REPAIR  05/16/2014    Recurrent. Incarcerated. With mesh implantation   • MASTECTOMY Right 05/2015   • SHOULDER SURGERY Right     x2 RCR   • TOTAL ABDOMINAL HYSTERECTOMY      with oophorectomy-Done June-2012 secondary to vaginal wall prolapse.   • TOTAL KNEE ARTHROPLASTY Right    • TOTAL KNEE ARTHROPLASTY Left 4/17/2018    Procedure: TOTAL KNEE ARTHROPLASTY;  Surgeon: Live Chambers MD;  Location: [facility];  Service: Orthopedics       Therapy Treatment          Rehabilitation Treatment Summary     Row Name 04/20/18 1015             Treatment Time/Intention    Discipline occupational therapist  -SD      Document Type therapy note (daily note)  -SD      Mode of Treatment occupational therapy  -SD      Patient Effort good  -SD      Comment Pt stated she is feeling much better today.  \"Apparently i was pretty confused the last couple days.  I don't remember much of it.\"  -SD      Existing Precautions/Restrictions fall  -SD      Recorded by [SD] Mukund Stokes, OTR 04/20/18 1114 04/20/18 1114      Row Name 04/20/18 1015             Functional Mobility    Functional Mobility- Ind. Level contact guard assist  -SD      Functional Mobility- Device rolling walker  -SD      Functional Mobility-Distance (Feet) 50  -SD      Recorded by [SD] Mukund Stokes, OTR 04/20/18 1114 " 04/20/18 1114      Row Name 04/20/18 1015             Grooming Assessment/Training    [place] Level (Grooming) set up;hair care, combing/brushing   applying makeup  -SD      Grooming Position other (see comments)   sitting in recliner  -SD      Recorded by [SD] Mukund Stokes, OTR 04/20/18 1114 04/20/18 1114      Row Name 04/20/18 1015             Positioning and Restraints    Pre-Treatment Position sitting in chair/recliner  -SD      Post Treatment Position chair  -SD      In Chair sitting;with PT  -SD      Recorded by [SD] Mukund Stokes, OTR 04/20/18 1114 04/20/18 1114      Row Name                Wound 04/17/18 1323 Left knee incision    Wound - Properties Group Date first assessed: 04/17/18 [JM] Time first assessed: 1323 [JM] Side: Left [JM] Location: knee [JM] Type: incision [JM] Recorded by:  [JM] Niki Colindres RN 04/17/18 1323 04/17/18 1323    Row Name 04/20/18 1015             Outcome Summary/Treatment Plan (OT)    Daily Summary of Progress (OT) progress towards functional goals is fair  -SD      Plan for Continued Treatment (OT) rec continued adl retraining/AE education  -SD      Anticipated Equipment Needs at Discharge (OT) bathing equipment;dressing equipment  -SD      Anticipated Discharge Disposition (OT) skilled nursing facility (SNF)  -SD      Recorded by [SD] Mukund Stokes, OTR 04/20/18 1114 04/20/18 1114        User Key  (r) = Recorded By, (t) = Taken By, (c) = Cosigned By    Initials Name Effective Dates Discipline    SD Mukund Stokes, OTR 06/22/16 -  OT    JM Niki Colindres RN 06/16/16 -  Nurse        Wound 04/17/18 1323 Left knee incision (Active)   Dressing Appearance dry;intact 4/20/2018  9:10 AM   Closure ANN 4/20/2018  9:10 AM             OT Rehab Goals     Row Name 04/20/18 1015             Transfer Goal 1 (OT)    Activity/Assistive Device (Transfer Goal 1, OT) sit-to-stand/stand-to-sit  -SD      [place] Level/Cues Needed (Transfer Goal 1, OT) contact guard assist  -SD       Progress/Outcome (Transfer Goal 1, OT) goal met  -SD         Dressing Goal 1 (OT)    Activity/Assistive Device (Dressing Goal 1, OT) lower body dressing;long handled shoe horn;reacher;sock-aid  -SD      [place]/Cues Needed (Dressing Goal 1, OT) minimum assist (75% or more patient effort)  -SD      Time Frame (Dressing Goal 1, OT) 3 days  -SD      Progress/Outcome (Dressing Goal 1, OT) goal ongoing  -SD         Patient Education Goal (OT)    Activity (Patient Education Goal, OT) pt will vebalize understanding of long handled ae for use with lb adls  -SD      [place]/Cues/Accuracy (Memory Goal 2, OT) verbalizes understanding  -SD      Progress/Outcome (Patient Education Goal, OT) goal ongoing  -SD        User Key  (r) = Recorded By, (t) = Taken By, (c) = Cosigned By    Initials Name Provider Type Discipline    RUBEN Stokes, OTR Occupational Therapist OT        Occupational Therapy Education     Title: PT OT SLP Therapies (Done)     Topic: Occupational Therapy (Done)     Point: ADL training (Done)     Description: Instruct learner(s) on proper safety adaptation and remediation techniques during self care or transfers.   Instruct in proper use of assistive devices.   Learning Progress Summary     Learner Status Readiness Method Response Comment Documented by    Patient Done Acceptance E VU education regarding OT services and compensatory strategies/AE use for adl's following TK surgery SD 04/20/18 1114     Done Acceptance E VU pt and sister educated on safety with functional transfers and mobility and adls  04/19/18 1319     Done Acceptance E VU pt and daughters educated on benefits of activity, safety with functional transfers and mobility, adls and long handled ae  04/18/18 1402    Family Done Acceptance E VU pt and daughters educated on benefits of activity, safety with functional transfers and mobility, adls and long handled ae  04/18/18 1402          Point: Precautions (Done)      "Description: Instruct learner(s) on prescribed precautions during self-care and functional transfers.   Learning Progress Summary     Learner Status Readiness Method Response Comment Documented by    Patient Done Acceptance E VU pt and sister educated on safety with functional transfers and mobility and adls  04/19/18 1319     Done Acceptance E VU pt and daughters educated on benefits of activity, safety with functional transfers and mobility, adls and long handled ae  04/18/18 1402    Family Done Acceptance E VU pt and daughters educated on benefits of activity, safety with functional transfers and mobility, adls and long handled ae  04/18/18 1402                      User Key     Initials Effective Dates Name Provider Type Discipline    SD 06/22/16 -  Mukund Stokes, OTR Occupational Therapist OT     06/22/16 -  Niharika Butcher, OTR Occupational Therapist OT                OT Recommendation and Plan  Outcome Summary/Treatment Plan (OT)  Daily Summary of Progress (OT): progress towards functional goals is fair  Plan for Continued Treatment (OT): rec continued adl retraining/AE education  Anticipated Equipment Needs at Discharge (OT): bathing equipment, dressing equipment  Anticipated Discharge Disposition (OT): skilled nursing facility (SNF)  Daily Summary of Progress (OT): progress towards functional goals is fair  Outcome Summary: OT:  Pt reports she is feeling better today.  She stated \"apparently I was pretty confused the last couple days\".  Pt was able to perform functional transfers/mobility with CGA . Eduction provided regarding compensatory strategies and use of adaptive equipment to manage daily tasks.  Pt plans to go to SNf unit for continued therapy prior to returning home.         Outcome Measures     Row Name 04/20/18 1015 04/19/18 1305 04/19/18 0900       How much help from another person do you currently need...    Turning from your back to your side while in flat bed without using " bedrails?  -- 3  -KM  --    Moving from lying on back to sitting on the side of a flat bed without bedrails?  -- 3  -KM  --    Moving to and from a bed to a chair (including a wheelchair)?  -- 3  -KM  --    Standing up from a chair using your arms (e.g., wheelchair, bedside chair)?  -- 3  -KM  --    Climbing 3-5 steps with a railing?  -- 2  -KM  --    To walk in hospital room?  -- 3  -KM  --    AM-PAC 6 Clicks Score  -- 17  -KM  --       How much help from another is currently needed...    Putting on and taking off regular lower body clothing? 2  -SD  -- 2  -JJ    Bathing (including washing, rinsing, and drying) 2  -SD  -- 2  -JJ    Toileting (which includes using toilet bed pan or urinal) 3  -SD  -- 3  -JJ    Putting on and taking off regular upper body clothing 4  -SD  -- 4  -JJ    Taking care of personal grooming (such as brushing teeth) 4  -SD  -- 4  -JJ    Eating meals 4  -SD  -- 4  -JJ    Score 19  -SD  -- 19  -JJ       Functional Assessment    Outcome Measure Options  -- AM-PAC 6 Clicks Basic Mobility (PT)  -KM  --    Row Name 04/19/18 0855 04/18/18 1400 04/18/18 1305       How much help from another person do you currently need...    Turning from your back to your side while in flat bed without using bedrails? 3  -KM 3  -LH 2  -KM    Moving from lying on back to sitting on the side of a flat bed without bedrails? 3  -KM 2  -LH 2  -KM    Moving to and from a bed to a chair (including a wheelchair)? 3  -KM 2  -LH 2  -KM    Standing up from a chair using your arms (e.g., wheelchair, bedside chair)? 2  -KM 2  -LH 2  -KM    Climbing 3-5 steps with a railing? 2  -KM 2  -LH 2  -KM    To walk in hospital room? 3  -KM 2  -LH 2  -KM    AM-PAC 6 Clicks Score 16  -KM 13  -LH 12  -KM       Functional Assessment    Outcome Measure Options AM-PAC 6 Clicks Basic Mobility (PT)  -KM AM-PAC 6 Clicks Basic Mobility (PT)  - AM-PAC 6 Clicks Basic Mobility (PT)  -SANDRA    Row Name 04/18/18 0936             How much help from  another is currently needed...    Putting on and taking off regular lower body clothing? 2  -JJ      Bathing (including washing, rinsing, and drying) 2  -JJ      Toileting (which includes using toilet bed pan or urinal) 3  -JJ      Putting on and taking off regular upper body clothing 4  -JJ      Taking care of personal grooming (such as brushing teeth) 4  -JJ      Eating meals 4  -JJ      Score 19  -JJ         Functional Assessment    Outcome Measure Options AM-PAC 6 Clicks Daily Activity (OT)  -JJ        User Key  (r) = Recorded By, (t) = Taken By, (c) = Cosigned By    Initials Name Provider Type     Anna Childress, PT Physical Therapist    KM Abimbola Singletary, PTA Physical Therapy Assistant    RUBEN Stokes, OTR Occupational Therapist    LUPIS Butcher, OTR Occupational Therapist           Time Calculation:         Time Calculation- OT     Row Name 04/20/18 1109             Time Calculation- OT    OT Start Time 0957  -SD      OT Stop Time 1015  -SD      OT Time Calculation (min) 18 min  -SD        User Key  (r) = Recorded By, (t) = Taken By, (c) = Cosigned By    Initials Name Provider Type    RUBEN Stokes, OTR Occupational Therapist           Therapy Charges for Today     Code Description Service Date Service Provider Modifiers Qty    [id number]  OT SELF CARE/MGMT/TRAIN EA 15 MIN 4/20/2018 GUNJAN Jennings GO 1               GUNJAN Rainey  4/20/2018   pending echo, d/c w/ gi f/u, ppi

## 2020-07-07 RX ORDER — MELATONIN
1000 DAILY
Qty: 90 TABLET | Refills: 3 | Status: SHIPPED | OUTPATIENT
Start: 2020-07-07 | End: 2021-07-19

## 2020-07-08 ENCOUNTER — OFFICE VISIT (OUTPATIENT)
Dept: CARDIOLOGY | Facility: CLINIC | Age: 75
End: 2020-07-08

## 2020-07-08 VITALS
HEART RATE: 69 BPM | DIASTOLIC BLOOD PRESSURE: 84 MMHG | BODY MASS INDEX: 31.66 KG/M2 | RESPIRATION RATE: 16 BRPM | OXYGEN SATURATION: 97 % | SYSTOLIC BLOOD PRESSURE: 140 MMHG | WEIGHT: 197 LBS | HEIGHT: 66 IN

## 2020-07-08 DIAGNOSIS — I10 ESSENTIAL HYPERTENSION: ICD-10-CM

## 2020-07-08 DIAGNOSIS — I25.10 ATHEROSCLEROSIS OF NATIVE CORONARY ARTERY OF NATIVE HEART WITHOUT ANGINA PECTORIS: ICD-10-CM

## 2020-07-08 DIAGNOSIS — I42.8 OTHER CARDIOMYOPATHY (HCC): ICD-10-CM

## 2020-07-08 DIAGNOSIS — N18.30 CKD (CHRONIC KIDNEY DISEASE) STAGE 3, GFR 30-59 ML/MIN (HCC): ICD-10-CM

## 2020-07-08 DIAGNOSIS — I50.33 ACUTE ON CHRONIC DIASTOLIC CONGESTIVE HEART FAILURE (HCC): Primary | ICD-10-CM

## 2020-07-08 PROCEDURE — 93000 ELECTROCARDIOGRAM COMPLETE: CPT | Performed by: NURSE PRACTITIONER

## 2020-07-08 PROCEDURE — 99214 OFFICE O/P EST MOD 30 MIN: CPT | Performed by: NURSE PRACTITIONER

## 2020-07-08 NOTE — PROGRESS NOTES
Date of Office Visit: 2020  Encounter Provider: JOSE ANTONIO Leigh  Place of Service: [facility] CARDIOLOGY  Patient Name: Jung Shrot  :1945  Primary Cardiologist: Dr. Bates    CC:  Annual cardiac evaluation    Dear Kathryn    HPI: Jung Short is a pleasant 75 y.o. female who presents 2020 for cardiac follow up. She is a new patient to me and I have reviewed her past medical records.  She is a patient of Dr. Bates.     She presented to [facility] in 2015 with acute congestive heart failure, systolic and diastolic. She had a 2-D echocardiogram which revealed an ejection fraction of 10-15%, moderate left ventricular dilation, severe mitral and tricuspid insufficiency, and her aortic valve was normal. She was transferred to [facility] for a cardiac catheterization. 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 She also had a right heart catheterization done which revealed an RV pressure of 50/20, PA pressure 50/30 with the main of 38 and a capillary wedge pressure of 31 mmHg. Her right atrial pressure was 20 mmHg. Her cardiac output was 3.9 liters per minute. She was treated medically and diuresed.       She had a 2-D echocardiogram with Doppler performed on 2015 which showed her ejection fraction to be 50%. There was normal segmental wall motion. There was moderate left atrial dilation. A small patent foramen ovale/atrial septal defect by saline contrast study and trace mitral and tricuspid insufficiency. This is a significant improvement from her prior echocardiogram.       She is on anastrozole for right breast  "cancer.       She had normal renal ultrasound ordered by Dr. Hannah 01/09/2017.  She has chronic renal insufficiency and follows with Dr. Hannah.  She had a carotid duplex study done April 2017 which showed mild carotid arteries.  She had a Holter monitor done just benign 4/2017.     Echo 9/2019:  · Calculated EF = 52.0%.  · Left ventricular systolic function is normal.  · Left atrial cavity size is mildly dilated.  · Left ventricular diastolic dysfunction (grade II) consistent with pseudonormalization.  · Mild-to-moderate mitral valve regurgitation is present  · Mild tricuspid valve regurgitation is present.  · Estimated right ventricular systolic pressure from tricuspid regurgitation is mildly elevated (35-45 mmHg).  · Calculated right ventricular systolic pressure from tricuspid regurgitation is 40.3 mmHg.     She had a fall off of some porch steps on 11/30/2019 while visiting her grandchildren in [place].  She does not really know what happened all she knows that she went up on the ground with her leg twisted \"every which way\".  She states she broke her right femur and now has a batool and she broke her ankle in 3 places.  She was in rehab for 2 months and then had to be discharged because her Medicare ran out.  She states she was home for a couple of days and was feeling short of air and could not sleep well the next evening.  She got up in her wheelchair to go to the kitchen because she just could not breathe and as she was reaching across her kitchen table the wheelchair went out from underneath of her and she fell and broke her right wrist.  She was then admitted again to the hospital and again discharged this time to [facility] for 2 months.  She states it is been a long road and she still uses a cane and has a noticeable limp when walking.  She states about 1 to 2 months ago she fell twice in her garage.  She has not fallen since then.  She also reports a history of chronic back pain from previous back " surgery.    She presented to the emergency department at [facility] on 4/10/2020 with shortness of air.  She does have a history of CHF but had not been bothered by this for a while.  She had noted a 6 pound weight gain over the last week.  She received IV Lasix in the ER with good response.    She notes episodes of heart failure in past have not been as bad as this 1.  She notes 6 pound weight gain in the past 1 week.  She denies cough, chest pain, syncopal sensation, nausea, diaphoresis with this episode.  In ER she received a dose of IV Lasix with immediate diuresis according to ER provider.  Currently she is able to speak in full sentences.  She was placed on oxygen and monitored overnight.  She was able to be discharged home the next day.     She is here for her annual follow-up.  She denies any shortness of air, dizziness, lightheadedness, chest pain or chest pressure.  She denies having any syncopal or presyncopal episodes, she states she has not fallen in the last 2 months.  She does complain of fatigue.  She continues to use a cane for ambulation and has a noticeable limp.  She does state that she will feel some occasional palpitations.  She has had some right calf swelling for the last 2 weeks.  She states she has not been able to sleep in her bed for months because of her back.  She is falls asleep many nights in a recliner and then notices that her legs wind up not being elevated.  She does state that when her legs are elevated overnight as they should be the swelling does respond.      Past Medical History:   Diagnosis Date   • Anemia    • Benign breast disease    • Cancer (CMS/HCC) 2015    Right breast   • Cellulitis of leg     May-2003 right lower extremity   • CHF (congestive heart failure) (CMS/HCC)     Dr Bates follows   • Chronic kidney disease     Dr Hannah follows   • Chronic ulcer of right foot (CMS/HCC)     Non-pressure, history of    • Closed fracture of patella 9/5/2017   •  Depression    • Diabetic peripheral neuropathy (CMS/HCC)    • Diverticulosis    • Femoral fracture (CMS/HCC)     right   • Fracture of left wrist     history of   • Gastroesophageal reflux    • Hiatal hernia    • Hyperlipidemia    • Hypertension    • Hypothyroidism    • Impingement syndrome of right shoulder    • Joint pain    • Menopausal disorder    • Methicillin resistant Staphylococcus aureus infection 2012    after bladder surgery   • Nonischemic cardiomyopathy (CMS/HCC)     Ejection fraction 10% per 2-D echo with Doppler however she did have cardiac catheterization April 2015 which showed ejection fraction 20% with global hypokinesis and severe mitral insufficiency   • Osteoarthritis     generalized, sched jania TKA   • Osteomyelitis (CMS/HCC) 2/2/2016   • Paroxysmal atrial fibrillation (CMS/HCC)     Dr Bates follows   • Radius/ulna fracture, left, closed, initial encounter 10/21/2017   • Shoulder pain, bilateral     has tears on both sides   • Syncope, psychogenic 4/19/2017   • Toe ulcer (CMS/HCC)     h/o to both feet, d/t shoes rubbing per patient   • Transient alteration of awareness 4/19/2017       Past Surgical History:   Procedure Laterality Date   • ABDOMINAL SURGERY  2012    had to be reopened after bladder surgery d/t infection   • AMPUTATION FOOT / TOE Right 11/2013    Rt foot amputation MTP first toe   • BLADDER SURGERY  2012   • BREAST BIOPSY     • BREAST SURGERY  06/29/2015    Percutaneous ultrasound-guided placement of metal localized clip 1st lesion   • CARDIAC CATHETERIZATION  2015   • CATARACT EXTRACTION Bilateral 2017   • DEBRIDEMENT  FOOT Right 01/2013    During hospitalization    • EPIDURAL BLOCK      4 chronic back pain and leg pain last epidurals done 5-2012 she had no benefit at all from this procedure.   • FEMUR FRACTURE SURGERY     • FOOT SURGERY Bilateral     several   • HERNIA REPAIR      At the abdomen February 2007 Dr. Mendez   • INCISIONAL HERNIA REPAIR  05/16/2014    Recurrent.  Incarcerated. With mesh implantation   • MASTECTOMY Right 2015   • SHOULDER SURGERY Right     x2 RCR   • TOTAL ABDOMINAL HYSTERECTOMY      with oophorectomy-Done 2012 secondary to vaginal wall prolapse.   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   Tobacco Use   • Smoking status: Former Smoker     Packs/day: 3.00     Years: 30.00     Pack years: 90.00     Types: Cigarettes     Last attempt to quit:      Years since quittin.5   • Smokeless tobacco: Never Used   • Tobacco comment: daily caffiene   Substance and Sexual Activity   • Alcohol use: No     Comment: h/o quit    • Drug use: No   • Sexual activity: Defer     Partners: Male     Comment: celibate   Social History Narrative         Volunteers here at [facility]     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    Lots of friends    2 daughters - lives in [facility] (graphic design)    [employer]       Family History   Problem Relation Age of Onset   • Colonic polyp Mother    • Hypertension Mother    • Migraines Mother    • Mental illness Mother    • Depression Mother    • Coronary artery disease Father    • Other Father         Cardiac Disorder   • Colon cancer Father    • Kidney disease Father    • Cancer Father    • Breast cancer Maternal Aunt    • Colon cancer Brother    • Alcohol abuse Brother    • Arthritis Sister    • Hypertension Brother    • Lung disease Brother    • Alcohol abuse Brother    • Malig Hyperthermia Neg Hx        The following portion of the patient's history were reviewed and updated as  appropriate: past medical history, past surgical history, past social history, past family history, allergies, current medications, and problem list.    Review of Systems   Constitution: Positive for malaise/fatigue. Negative for diaphoresis and fever.   HENT: Negative for congestion, hearing loss, hoarse voice, nosebleeds and sore throat.    Eyes: Negative for photophobia, vision loss in left eye, vision loss in right eye and visual disturbance.   Cardiovascular: Positive for leg swelling and palpitations. Negative for chest pain, dyspnea on exertion, irregular heartbeat, near-syncope, orthopnea, paroxysmal nocturnal dyspnea and syncope.   Respiratory: Negative for cough, hemoptysis, shortness of breath, sleep disturbances due to breathing, snoring, sputum production and wheezing.    Endocrine: Negative for cold intolerance, heat intolerance, polydipsia, polyphagia and polyuria.   Hematologic/Lymphatic: Negative for bleeding problem. Does not bruise/bleed easily.   Skin: Negative for color change, dry skin, poor wound healing, rash and suspicious lesions.   Musculoskeletal: Positive for back pain, falls, joint pain and muscle weakness. Negative for arthritis, gout, joint swelling, muscle cramps and myalgias.   Gastrointestinal: Negative for bloating, abdominal pain, constipation, diarrhea, dysphagia, melena, nausea and vomiting.   Neurological: Positive for weakness. Negative for excessive daytime sleepiness, dizziness, headaches, light-headedness, loss of balance, numbness, paresthesias, seizures and vertigo.   Psychiatric/Behavioral: Negative for depression, memory loss and substance abuse. The patient is not nervous/anxious.        Allergies   Allergen Reactions   • Dilaudid [Hydromorphone] Delirium and Confusion   • Latex Rash         Current Outpatient Medications:   •  anastrozole (ARIMIDEX) 1 MG tablet, TAKE 1 TABLET BY MOUTH EVERY DAY, Disp: 30 tablet, Rfl: 0  •  aspirin 81 MG EC tablet, Take 81 mg by mouth  "Daily., Disp: , Rfl:   •  bumetanide (BUMEX) 0.5 MG tablet, Take 2 tablets by mouth Daily., Disp: 180 tablet, Rfl: 0  •  carvedilol (COREG) 12.5 MG tablet, TAKE 1 TABLET BY MOUTH TWICE DAILY WITH MEALS, Disp: 180 tablet, Rfl: 0  •  cholecalciferol (VITAMIN D3) 25 MCG (1000 UT) tablet, Take 1 tablet by mouth Daily., Disp: 90 tablet, Rfl: 3  •  DULoxetine (CYMBALTA) 60 MG capsule, TAKE 1 CAPSULE BY MOUTH DAILY, Disp: 90 capsule, Rfl: 1  •  esomeprazole (nexIUM) 20 MG capsule, Take 20 mg by mouth Every Morning Before Breakfast., Disp: , Rfl:   •  glucose blood test strip, Take BG daily, Disp: 50 each, Rfl: 12  •  HYDROcodone-acetaminophen (NORCO) 5-325 MG per tablet, Take 1 tablet by mouth Every 8 (Eight) Hours As Needed for Severe Pain ., Disp: 90 tablet, Rfl: 0  •  levothyroxine (SYNTHROID, LEVOTHROID) 25 MCG tablet, TAKE 1 TABLET BY MOUTH DAILY, Disp: 90 tablet, Rfl: 1  •  losartan (COZAAR) 25 MG tablet, TAKE 1 TABLET BY MOUTH DAILY, Disp: 90 tablet, Rfl: 3  •  metFORMIN (GLUCOPHAGE) 1000 MG tablet, Take 1 tablet by mouth Daily With Breakfast., Disp: 30 tablet, Rfl: 2  •  metFORMIN (GLUCOPHAGE) 500 MG tablet, Take 1 tablet by mouth Daily With Dinner., Disp: 30 tablet, Rfl: 2  •  methocarbamol (ROBAXIN) 500 MG tablet, Take 1 tablet by mouth 3 (Three) Times a Day., Disp: 90 tablet, Rfl: 0  •  Multiple Vitamins-Minerals (MULTIVITAMIN ADULT PO), Take 1 tablet by mouth Daily., Disp: , Rfl:   •  pravastatin (PRAVACHOL) 10 MG tablet, TAKE 1 TABLET BY MOUTH DAILY, Disp: 90 tablet, Rfl: 3  •  pregabalin (LYRICA) 75 MG capsule, TAKE 1 CAPSULE BY MOUTH TWICE DAILY, Disp: 60 capsule, Rfl: 5        Objective:     Vitals:    07/08/20 1336   BP: 140/84   Pulse: 69   Resp: 16   SpO2: 97%   Weight: 89.4 kg (197 lb)   Height: 167.6 cm (66\")     Body mass index is 31.8 kg/m².      Physical Exam   Constitutional: She is oriented to person, place, and time. She appears well-developed and well-nourished. No distress.   obese   HENT: "   Head: Normocephalic and atraumatic.   Right Ear: External ear normal.   Left Ear: External ear normal.   Nose: Nose normal.   Eyes: Pupils are equal, round, and reactive to light. Conjunctivae are normal. Right eye exhibits no discharge. Left eye exhibits no discharge.   Neck: Normal range of motion. Neck supple. No JVD present. No tracheal deviation present. No thyromegaly present.   Cardiovascular: Normal rate, regular rhythm, normal heart sounds and intact distal pulses. Exam reveals no gallop and no friction rub.   No murmur heard.  Pulmonary/Chest: Effort normal and breath sounds normal. No respiratory distress. She has no wheezes. She has no rales. She exhibits no tenderness.   Abdominal: Soft. Bowel sounds are normal. She exhibits no distension. There is no tenderness.   Musculoskeletal: She exhibits edema. She exhibits no tenderness or deformity.   Uses a walker for ambulation/limp   Lymphadenopathy:     She has no cervical adenopathy.   Neurological: She is alert and oriented to person, place, and time. Gait abnormal. Coordination normal.   Skin: Skin is warm and dry. No rash noted. No erythema.   Psychiatric: She has a normal mood and affect. Her speech is normal and behavior is normal. Judgment and thought content normal. Cognition and memory are normal.             ECG 12 Lead  Date/Time: 7/8/2020 1:43 PM  Performed by: Lina Valencia APRN  Authorized by: Lina Valencia APRN   Comparison: compared with previous ECG from 4/10/2020  Similar to previous ECG  Rhythm: sinus rhythm  Rate: normal  Conduction: non-specific intraventricular conduction delay  T inversion: aVL  T flattening: V5 and V6  QRS axis: normal  Other findings: left ventricular hypertrophy    Clinical impression: abnormal EKG              Assessment:       Diagnosis Plan   1. Acute on chronic diastolic congestive heart failure (CMS/HCC)     2. Atherosclerosis of native coronary artery of native heart without angina pectoris     3. Other  cardiomyopathy (CMS/[facility])     4. Essential hypertension     5. CKD (chronic kidney disease) stage 3, GFR 30-59 ml/min (CMS/[facility])            Plan:       1. H/o Nonischemic cardiomyopathy. Her ejection fraction was 10-20%. At this time she is New York Heart Association class 2a. Her repeat echocardiogram 6/2015 showed LVEF 50% with trace mitral and tricuspid insufficiency. Echo 9/19 EF 52% with normal LV systolic function.  Lower extremity edema, will give extra 0.5 mg of Bumex x3 days.  She will call me on Monday with an update.  Also encouraged to elevate legs when sitting.  2. Hypertension- stable.  Receiving extra diuretic for 3 days.  Will reassess when she calls with update..  3. DM, type 2.  4. Anomalous coronary artery.   5. H/o right breast cancer. Sees Dr. Draper.   6. Severe OA of the knees.  7. CKD, sees Dr. Hannah.   8.  Left knee OA      As always, it has been a pleasure to participate in your patient's care. Thank you.       Sincerely,       JOSE ANTONIO Leigh        Dictated utilizing Dragon dictation

## 2020-07-09 ENCOUNTER — TELEPHONE (OUTPATIENT)
Dept: CARDIOLOGY | Facility: CLINIC | Age: 75
End: 2020-07-09

## 2020-07-09 ENCOUNTER — OFFICE VISIT (OUTPATIENT)
Dept: PAIN MEDICINE | Facility: CLINIC | Age: 75
End: 2020-07-09

## 2020-07-09 VITALS
WEIGHT: 195 LBS | RESPIRATION RATE: 20 BRPM | OXYGEN SATURATION: 96 % | SYSTOLIC BLOOD PRESSURE: 165 MMHG | BODY MASS INDEX: 31.34 KG/M2 | TEMPERATURE: 98.4 F | DIASTOLIC BLOOD PRESSURE: 90 MMHG | HEIGHT: 66 IN | HEART RATE: 87 BPM

## 2020-07-09 DIAGNOSIS — M25.50 ARTHRALGIA OF MULTIPLE JOINTS: ICD-10-CM

## 2020-07-09 DIAGNOSIS — G89.29 OTHER CHRONIC PAIN: Primary | ICD-10-CM

## 2020-07-09 DIAGNOSIS — Z79.899 ENCOUNTER FOR LONG-TERM (CURRENT) USE OF HIGH-RISK MEDICATION: ICD-10-CM

## 2020-07-09 DIAGNOSIS — G62.9 PERIPHERAL POLYNEUROPATHY: ICD-10-CM

## 2020-07-09 DIAGNOSIS — M51.36 DDD (DEGENERATIVE DISC DISEASE), LUMBAR: ICD-10-CM

## 2020-07-09 PROCEDURE — 99214 OFFICE O/P EST MOD 30 MIN: CPT | Performed by: NURSE PRACTITIONER

## 2020-07-09 RX ORDER — HYDROCODONE BITARTRATE AND ACETAMINOPHEN 5; 325 MG/1; MG/1
1 TABLET ORAL EVERY 6 HOURS PRN
Qty: 120 TABLET | Refills: 0 | Status: SHIPPED | OUTPATIENT
Start: 2020-07-09 | End: 2020-08-10 | Stop reason: SDUPTHER

## 2020-07-09 RX ORDER — BUMETANIDE 0.5 MG/1
1 TABLET ORAL DAILY
Qty: 190 TABLET | Refills: 3 | Status: SHIPPED | OUTPATIENT
Start: 2020-07-09 | End: 2020-10-07 | Stop reason: SDUPTHER

## 2020-07-09 NOTE — TELEPHONE ENCOUNTER
Pt reports that she already takes 2 tablets of bumex daily. Pt reports that you wanted her to increase her dose of bumex. She reports that you instructed her to take 2 tablets daily.     She wants to know if she needs to continue the same as she has been doing or should she take an additional 2 tablets daily, totaling 4 tablets of Bumex 0.5mg daily

## 2020-07-09 NOTE — PROGRESS NOTES
"CHIEF COMPLAINT  F/u joint pain. Pt sts pain has slightly improved since last ov.     Subjective   Jung Short is a 75 y.o. female  who presents for follow-up.  She has a history of chronic back and joint pain. REports her pain is slightly improvedsince last office visit. \"it's been a bad month, I don't know why.\" Is having nearly continuous swelling of right leg. Saw cardio yesterday.  At last office visit, was given a trial of Hydrocodone 5/325 q8hrs PRN (down from previous Hydrocodone 10/325 q6hrs PRN).    Complains of pain in her low back, joints and extremities. Today her pain is 5/10VAS. Describes her pain as continuous aching and throbbing with intermittent sharp pain. Pain increases with walking, standing, activity, prolonged position; pain decreases with medication, rest and leg elevation. Has been taking Hydrocodone 5/325 2-3/day(does not last between doses), Robaxin 500 mg daily and Lyrica  75 mg BID, as well as Cymbalta 60 mg daily.  Denies any side effects from the regimen. The regimen helps decrease her pain by 40-50% but admits it does not always last between doses, 4-6 hours. ADL's by self. Denies any bowel or bladder changes.     Orthopedist is Dr Braden. Concerned she has re-injured her right wrist. Having increased pain and difficulty rotating.    Patient remained masked during entire encounter. No cough present. I donned a mask and gloves throughout entire visit. Prior to donning mask and gloves, hand hygiene was performed, as well as when it was doffed.  I was closer than 6 feet, but not for an extended period of time. No obvious exposure to any bodily fluids.    Joint Pain   This is a chronic problem. The current episode started more than 1 year ago (today pain is 5/10VAS- joints). The problem occurs constantly. The problem has been unchanged (worse since last office visit). Associated symptoms include arthralgias (joint pain in shoulders, arms), numbness (bilat legs and feet) and " weakness (occ). Pertinent negatives include no abdominal pain, chest pain, chills, congestion, coughing, fatigue, fever, headaches, joint swelling, nausea, neck pain, sore throat or vomiting. Nothing aggravates the symptoms. She has tried oral narcotics, position changes and rest for the symptoms. The treatment provided moderate relief.   Back Pain   This is a chronic problem. The current episode started more than 1 year ago. The problem occurs constantly. The problem has been gradually worsening (worse since last office visit) since onset. The pain is present in the lumbar spine. The pain radiates to the right knee, right foot and right thigh. The pain is moderate. Associated symptoms include numbness (bilat legs and feet) and weakness (occ). Pertinent negatives include no abdominal pain, chest pain, dysuria, fever or headaches. Risk factors include lack of exercise and obesity. She has tried analgesics for the symptoms. The treatment provided moderate relief.        PEG Assessment   What number best describes your pain on average in the past week?5  What number best describes how, during the past week, pain has interfered with your enjoyment of life?5  What number best describes how, during the past week, pain has interfered with your general activity?  5    The following portions of the patient's history were reviewed and updated as appropriate: allergies, current medications, past family history, past medical history, past social history, past surgical history and problem list.    Review of Systems   Constitutional: Positive for activity change (dec). Negative for chills, fatigue and fever.   HENT: Negative for congestion and sore throat.    Respiratory: Negative for cough and shortness of breath.    Cardiovascular: Negative for chest pain.   Gastrointestinal: Negative for abdominal pain, nausea and vomiting.   Genitourinary: Negative for difficulty urinating and dysuria.   Musculoskeletal: Positive for  "arthralgias (joint pain in shoulders, arms) and gait problem (cane). Negative for back pain, joint swelling and neck pain.   Neurological: Positive for weakness (occ) and numbness (bilat legs and feet). Negative for dizziness and headaches.   Psychiatric/Behavioral: Negative for agitation, sleep disturbance and suicidal ideas. The patient is not nervous/anxious.        Vitals:    07/09/20 1417   BP: 165/90  Comment: pt sts had bp meds today   Pulse: 87   Resp: 20   Temp: 98.4 °F (36.9 °C)   SpO2: 96%   Weight: 88.5 kg (195 lb)  Comment: pt refused wt in office, std wt   Height: 167.6 cm (66\")   PainSc:   5   PainLoc: Comment: joint     Objective   Physical Exam   Constitutional: She is oriented to person, place, and time. Vital signs are normal. She appears well-developed and well-nourished. She is cooperative.   HENT:   Head: Normocephalic and atraumatic.   Nose: Nose normal.   Eyes: Conjunctivae and lids are normal.   Cardiovascular: Normal rate.   Pulmonary/Chest: Effort normal.   Abdominal: Normal appearance.   Musculoskeletal:        Right wrist: She exhibits decreased range of motion, tenderness and bony tenderness.        Left wrist: She exhibits decreased range of motion and tenderness.        Lumbar back: She exhibits decreased range of motion.   Widespread joint changes with no acute synovitis   Neurological: She is alert and oriented to person, place, and time. Gait (cane) abnormal.   Skin: Skin is warm, dry and intact.   Psychiatric: She has a normal mood and affect. Her speech is normal and behavior is normal. Judgment and thought content normal. Cognition and memory are normal.   Nursing note and vitals reviewed.      Assessment/Plan   Jung was seen today for joint pain.    Diagnoses and all orders for this visit:    Other chronic pain  -     Oral Fluid Drug Screen - Saliva, Oral Cavity; Future  -     Oral Fluid Drug Screen - Saliva, Oral Cavity    Arthralgia of multiple joints  -     Oral Fluid Drug " Screen - Saliva, Oral Cavity; Future  -     Oral Fluid Drug Screen - Saliva, Oral Cavity    DDD (degenerative disc disease), lumbar  -     Oral Fluid Drug Screen - Saliva, Oral Cavity; Future  -     Oral Fluid Drug Screen - Saliva, Oral Cavity    Peripheral polyneuropathy  -     Oral Fluid Drug Screen - Saliva, Oral Cavity; Future  -     Oral Fluid Drug Screen - Saliva, Oral Cavity    Encounter for long-term (current) use of high-risk medication  -     Oral Fluid Drug Screen - Saliva, Oral Cavity; Future  -     Oral Fluid Drug Screen - Saliva, Oral Cavity    Other orders  -     HYDROcodone-acetaminophen (NORCO) 5-325 MG per tablet; Take 1 tablet by mouth Every 6 (Six) Hours As Needed for Severe Pain .      --- Routine oral drug screen in office today as part of monitoring requirements for controlled substances.   This specimen will be sent to [facility] laboratory for confirmation.     --- Refill Hydrocodone but increase to q6hrs PRN. Patient appears stable with current regimen. No adverse effects. Regarding continuation of opioids, there is no evidence of aberrant behavior or any red flags.  The patient continues with appropriate response to opioid therapy. ADL's remain intact by self.   --- Follow-up 2 months or sooner if needed.       LOUANN REPORT    As part of the patient's treatment plan, I am prescribing controlled substances. The patient has been made aware of appropriate use of such medications, including potential risk of somnolence, limited ability to drive and/or work safely, and the potential for dependence or overdose. It has also bee made clear that these medications are for use by this patient only, without concomitant use of alcohol or other substances unless prescribed.     Patient has completed prescribing agreement detailing terms of continued prescribing of controlled substances, including monitoring LOUANN reports, urine drug screening, and pill counts if necessary. The patient is aware that  inappropriate use will results in cessation of prescribing such medications.    LOUANN report has been reviewed and scanned into the patient's chart.    As the clinician, I personally reviewed the LOUANN from 7-8-20 .    History and physical exam exhibit continued safe and appropriate use of controlled substances.      EMR Dragon/Transcription disclaimer:   Much of this encounter note is an electronic transcription/translation of spoken language to printed text. The electronic translation of spoken language may permit erroneous, or at times, nonsensical words or phrases to be inadvertently transcribed; Although I have reviewed the note for such errors, some may still exist.

## 2020-07-13 ENCOUNTER — EPISODE CHANGES (OUTPATIENT)
Dept: CASE MANAGEMENT | Facility: OTHER | Age: 75
End: 2020-07-13

## 2020-07-13 NOTE — TELEPHONE ENCOUNTER
Pt is calling for updates. Pt states that her swelling went down a little bit.    Called pt back to get more info but no answer.      Thanks  Monica FREEMAN

## 2020-07-15 ENCOUNTER — APPOINTMENT (OUTPATIENT)
Dept: LAB | Facility: HOSPITAL | Age: 75
End: 2020-07-15

## 2020-07-15 NOTE — TELEPHONE ENCOUNTER
She states her swelling has gone down quite a bit and her leg looks just about back to normal.  She states her calf is still a little misshapen it seems bigger on one side than the other.  She states she has 5 very small blisters on the back of her leg.  Her leg is not warm and it does not hurt.  She states she has had this in the past about 5 years ago.  I have asked her to follow-up with her primary care for further evaluation and treatment.  I also stated that in the future if she gains 2 pounds overnight 2 days in a row to give me a call.  I have instructed her that she can take an extra Bumex in those instances.  She states she does have ankle edema during the day but it goes down at night.  She also thinks it is from the trauma from her fall.

## 2020-07-16 RX ORDER — ANASTROZOLE 1 MG/1
TABLET ORAL
OUTPATIENT
Start: 2020-07-16

## 2020-07-16 RX ORDER — ANASTROZOLE 1 MG/1
TABLET ORAL
Qty: 30 TABLET | Refills: 0 | Status: SHIPPED | OUTPATIENT
Start: 2020-07-16 | End: 2020-10-01

## 2020-07-20 ENCOUNTER — OFFICE VISIT (OUTPATIENT)
Dept: ORTHOPEDIC SURGERY | Facility: CLINIC | Age: 75
End: 2020-07-20

## 2020-07-20 VITALS
BODY MASS INDEX: 31.34 KG/M2 | SYSTOLIC BLOOD PRESSURE: 121 MMHG | HEIGHT: 66 IN | HEART RATE: 67 BPM | WEIGHT: 195 LBS | DIASTOLIC BLOOD PRESSURE: 67 MMHG

## 2020-07-20 DIAGNOSIS — S60.211A CONTUSION OF RIGHT WRIST, INITIAL ENCOUNTER: ICD-10-CM

## 2020-07-20 DIAGNOSIS — G89.21 CHRONIC PAIN DUE TO TRAUMA: Primary | ICD-10-CM

## 2020-07-20 DIAGNOSIS — L03.115 CELLULITIS OF RIGHT LEG: ICD-10-CM

## 2020-07-20 PROCEDURE — 73130 X-RAY EXAM OF HAND: CPT | Performed by: ORTHOPAEDIC SURGERY

## 2020-07-20 PROCEDURE — 73110 X-RAY EXAM OF WRIST: CPT | Performed by: ORTHOPAEDIC SURGERY

## 2020-07-20 PROCEDURE — 99214 OFFICE O/P EST MOD 30 MIN: CPT | Performed by: ORTHOPAEDIC SURGERY

## 2020-07-20 NOTE — PROGRESS NOTES
Subjective: Right wrist pain, right leg swelling     Patient ID: Jung Short is a 75 y.o. female.    Chief Complaint:    History of Present Illness 75-year-old female known to me is seen for new problem involving her right wrist and some swelling she has had in the right leg.  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  States the thumb actually turned black and blue although significantly better still having some pain in the IP joint of the thumb.  Also relates for the past several weeks if not months he has had intermittent swelling in that right leg and states when she wakens in the morning the lateral aspect of the leg will be swollen shut with some indentations and she even had some blisters on the posterior aspect of the leg which is since resolved.  Was seen by cardiology is doing just increased her diuretics which seem to help but she has some residual swelling and some mild redness to the lower leg.       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   HENT: Negative for hearing loss, nosebleeds, sore throat and tinnitus.    Eyes: Negative for pain and visual  disturbance.   Respiratory: Negative for cough, shortness of breath and wheezing.    Cardiovascular: Negative for chest pain and palpitations.   Gastrointestinal: Negative for abdominal pain, diarrhea, nausea and vomiting.   Endocrine: Negative for cold intolerance, heat intolerance and polydipsia.   Genitourinary: Negative for difficulty urinating, dysuria and hematuria.   Musculoskeletal: Positive for arthralgias and myalgias. Negative for joint swelling.   Skin: Negative for rash and wound.   Allergic/Immunologic: Negative for environmental allergies.   Neurological: Negative for dizziness, syncope and numbness.   Hematological: Does not bruise/bleed easily.   Psychiatric/Behavioral: Negative for dysphoric mood and sleep disturbance. The patient is not nervous/anxious.          Past Medical History:   Diagnosis Date   • Anemia    • Benign breast disease    • Cancer (CMS/[facility]) 2015    Right breast   • Cellulitis of leg     May-2003 right lower extremity   • CHF (congestive heart failure) (CMS/[facility])     Dr Bates follows   • Chronic kidney disease     Dr Hannah follows   • Chronic ulcer of right foot (CMS/[facility])     Non-pressure, history of    • Closed fracture of patella 9/5/2017   • Depression    • Diabetic peripheral neuropathy (CMS/[facility])    • Diverticulosis    • Femoral fracture (CMS/[facility])     right   • Fracture of left wrist     history of   • Gastroesophageal reflux    • Hiatal hernia    • Hyperlipidemia    • Hypertension    • Hypothyroidism    • Impingement syndrome of right shoulder    • Joint pain    • Menopausal disorder    • Methicillin resistant Staphylococcus aureus infection 2012    after bladder surgery   • Nonischemic cardiomyopathy (CMS/[facility])     Ejection fraction 10% per 2-D echo with Doppler however she did have cardiac catheterization April 2015 which showed ejection fraction 20% with global hypokinesis and severe mitral insufficiency   • Osteoarthritis     generalized, sched jania TKA   •  Osteomyelitis (CMS/HCC) 2/2/2016   • Paroxysmal atrial fibrillation (CMS/[facility])     Dr Bates follows   • Radius/ulna fracture, left, closed, initial encounter 10/21/2017   • Shoulder pain, bilateral     has tears on both sides   • Syncope, psychogenic 4/19/2017   • Toe ulcer (CMS/[facility])     h/o to both feet, d/t shoes rubbing per patient   • Transient alteration of awareness 4/19/2017     Past Surgical History:   Procedure Laterality Date   • ABDOMINAL SURGERY  2012    had to be reopened after bladder surgery d/t infection   • AMPUTATION FOOT / TOE Right 11/2013    Rt foot amputation MTP first toe   • BLADDER SURGERY  2012   • BREAST BIOPSY     • BREAST SURGERY  06/29/2015    Percutaneous ultrasound-guided placement of metal localized clip 1st lesion   • CARDIAC CATHETERIZATION  2015   • CATARACT EXTRACTION Bilateral 2017   • DEBRIDEMENT  FOOT Right 01/2013    During hospitalization    • EPIDURAL BLOCK      4 chronic back pain and leg pain last epidurals done 5-2012 she had no benefit at all from this procedure.   • FEMUR FRACTURE SURGERY     • FOOT SURGERY Bilateral     several   • HERNIA REPAIR      At the abdomen February 2007 Dr. Mendez   • INCISIONAL HERNIA REPAIR  05/16/2014    Recurrent. Incarcerated. With mesh implantation   • MASTECTOMY Right 05/2015   • SHOULDER SURGERY Right     x2 RCR   • TOTAL ABDOMINAL HYSTERECTOMY      with oophorectomy-Done June-2012 secondary to vaginal wall prolapse.   • TOTAL KNEE ARTHROPLASTY Right    • TOTAL KNEE ARTHROPLASTY Left 4/17/2018    Procedure: TOTAL KNEE ARTHROPLASTY;  Surgeon: Live Chambers MD;  Location: [facility];  Service: Orthopedics     Family History   Problem Relation Age of Onset   • Colonic polyp Mother    • Hypertension Mother    • Migraines Mother    • Mental illness Mother    • Depression Mother    • Coronary artery disease Father    • Other Father         Cardiac Disorder   • Colon cancer Father    • Kidney disease Father    • Cancer Father    • Breast  cancer Maternal Aunt    • Colon cancer Brother    • Alcohol abuse Brother    • Arthritis Sister    • Hypertension Brother    • Lung disease Brother    • Alcohol abuse Brother    • Malig Hyperthermia Neg Hx          Objective:  Vitals:    07/20/20 0831   BP: 121/67   Pulse: 67         07/20/20  0831   Weight: 88.5 kg (195 lb)     Body mass index is 31.47 kg/m².        Ortho Exam   AP lateral oblique view of the wrist show no evidence of any acute injury particularly involving the old fracture or the thumb itself.  There is moderate arthritis throughout the hand and wrist itself.  Prior to previous x-rays with no new injuries.  She is alert and oriented x3.  With regard to the right wrist and hand there is no swelling erythema or bruising noted to the thumb but there is mild to moderate pain at the IP joint and with flexion of the IP joint against resistance.  Mildly positive grind test.  Pain with apposition of the thumb against resistance.  She is good capillary refill no motor or sensory deficit.  Has some residual tenderness over the ulnar styloid secondary to radial shortening, compression fracture of the radius.  This is unchanged.  With regard to the leg is no swelling noted now but there is some mild erythema to the lower leg.  Her calf remains nontender.  She has no motor or sensory deficit.  Good capillary refill.  Is no blisters noted on the leg.    Assessment:        1. Chronic pain due to trauma    2. Contusion of right wrist, initial encounter    3. Cellulitis of right leg           Plan: Spent over 15 minutes with the patient face-to-face reviewing her wrist and her leg.  With regard to the wrist I assured her there is no acute fracture but she has osteoarthritis of the wrist and hand aggravated by the fall.  She is in renal failure I did recommend she try Voltaren gel but she must contact her nephrologist first issue he has no opposition to her using that medication in light of her renal failure.  If  she can use at home applied to that wrist and hand 4 times a day.  With regard to the Advised to get into see her primary care physician as soon as possible to evaluate his intermittent swelling that she is having in the leg.  Return to see me as needed.  Answered all questions.            Work Status:    LOUANN query complete.    Orders:  Orders Placed This Encounter   Procedures   • XR Hand 3+ View Right   • XR Wrist 3+ View Right       Medications:  No orders of the defined types were placed in this encounter.      Followup:  Return if symptoms worsen or fail to improve.          Dictated utilizing Dragon dictation

## 2020-07-21 ENCOUNTER — TELEPHONE (OUTPATIENT)
Dept: ORTHOPEDIC SURGERY | Facility: CLINIC | Age: 75
End: 2020-07-21

## 2020-07-21 NOTE — TELEPHONE ENCOUNTER
Wanted you to know that all the fingers on the right hand are numb, especially the middle finger, it is completely numb.

## 2020-07-22 ENCOUNTER — OFFICE VISIT (OUTPATIENT)
Dept: ONCOLOGY | Facility: CLINIC | Age: 75
End: 2020-07-22

## 2020-07-22 ENCOUNTER — LAB (OUTPATIENT)
Dept: LAB | Facility: HOSPITAL | Age: 75
End: 2020-07-22

## 2020-07-22 VITALS
BODY MASS INDEX: 31.66 KG/M2 | TEMPERATURE: 98.2 F | OXYGEN SATURATION: 98 % | HEIGHT: 66 IN | SYSTOLIC BLOOD PRESSURE: 155 MMHG | HEART RATE: 75 BPM | RESPIRATION RATE: 14 BRPM | DIASTOLIC BLOOD PRESSURE: 78 MMHG | WEIGHT: 197 LBS

## 2020-07-22 DIAGNOSIS — Z17.0 MALIGNANT NEOPLASM OF UPPER-INNER QUADRANT OF RIGHT BREAST IN FEMALE, ESTROGEN RECEPTOR POSITIVE (HCC): Primary | ICD-10-CM

## 2020-07-22 DIAGNOSIS — C50.211 MALIGNANT NEOPLASM OF UPPER-INNER QUADRANT OF RIGHT BREAST IN FEMALE, ESTROGEN RECEPTOR POSITIVE (HCC): Primary | ICD-10-CM

## 2020-07-22 DIAGNOSIS — M85.88 OSTEOPENIA OF SPINE: ICD-10-CM

## 2020-07-22 LAB
BASOPHILS # BLD AUTO: 0.02 10*3/MM3 (ref 0–0.2)
BASOPHILS NFR BLD AUTO: 0.3 % (ref 0–1.5)
DEPRECATED RDW RBC AUTO: 47.7 FL (ref 37–54)
EOSINOPHIL # BLD AUTO: 0.16 10*3/MM3 (ref 0–0.4)
EOSINOPHIL NFR BLD AUTO: 2.5 % (ref 0.3–6.2)
ERYTHROCYTE [DISTWIDTH] IN BLOOD BY AUTOMATED COUNT: 15.1 % (ref 12.3–15.4)
HCT VFR BLD AUTO: 36.5 % (ref 34–46.6)
HGB BLD-MCNC: 11.5 G/DL (ref 12–15.9)
IMM GRANULOCYTES # BLD AUTO: 0.04 10*3/MM3 (ref 0–0.05)
IMM GRANULOCYTES NFR BLD AUTO: 0.6 % (ref 0–0.5)
LYMPHOCYTES # BLD AUTO: 1.57 10*3/MM3 (ref 0.7–3.1)
LYMPHOCYTES NFR BLD AUTO: 24.9 % (ref 19.6–45.3)
MCH RBC QN AUTO: 27.3 PG (ref 26.6–33)
MCHC RBC AUTO-ENTMCNC: 31.5 G/DL (ref 31.5–35.7)
MCV RBC AUTO: 86.7 FL (ref 79–97)
MONOCYTES # BLD AUTO: 0.53 10*3/MM3 (ref 0.1–0.9)
MONOCYTES NFR BLD AUTO: 8.4 % (ref 5–12)
NEUTROPHILS NFR BLD AUTO: 3.98 10*3/MM3 (ref 1.7–7)
NEUTROPHILS NFR BLD AUTO: 63.3 % (ref 42.7–76)
NRBC BLD AUTO-RTO: 0 /100 WBC (ref 0–0.2)
PLATELET # BLD AUTO: 181 10*3/MM3 (ref 140–450)
PMV BLD AUTO: 9.2 FL (ref 6–12)
RBC # BLD AUTO: 4.21 10*6/MM3 (ref 3.77–5.28)
WBC # BLD AUTO: 6.3 10*3/MM3 (ref 3.4–10.8)

## 2020-07-22 PROCEDURE — 36415 COLL VENOUS BLD VENIPUNCTURE: CPT

## 2020-07-22 PROCEDURE — 85025 COMPLETE CBC W/AUTO DIFF WBC: CPT

## 2020-07-22 PROCEDURE — 99213 OFFICE O/P EST LOW 20 MIN: CPT | Performed by: INTERNAL MEDICINE

## 2020-07-22 NOTE — PROGRESS NOTES
Subjective:     Reason for follow up:   1. 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    * Arimidex initiated in 08/2015    2. Osteopenia on Fosomax     History of Present Ilness: Jung Short presents for follow-up of breast cancer.  She has been on Arimidex since August 2015 medicine reasonably well other than vasomotor symptoms.  She was previously on Fosamax for osteopenia but states Fosamax was discontinued during a previous hospital stay.  She denies changes to the self breast exam on the left or the mastectomy site on the right.  She has chronic arthralgias related to arthritis but nothing out of ordinary for her.  She has chronic dyspnea on exertion but nothing again out of the ordinary for her.  Mammography is due next month which she will schedule.    Reviewed, confirmed and updated history (past medical, social and family)   Past Medical   Past Medical History:   Diagnosis Date   • Anemia    • Benign breast disease    • Cancer (CMS/HCC) 2015    Right breast   • Cellulitis of leg     May-2003 right lower extremity   • CHF (congestive heart failure) (CMS/HCC)     Dr Bates follows   • Chronic kidney disease     Dr Hannah follows   • Chronic ulcer of right foot (CMS/HCC)     Non-pressure, history of    • Closed fracture of patella 9/5/2017   • Depression    • Diabetic peripheral neuropathy (CMS/HCC)    • Diverticulosis    • Femoral fracture (CMS/HCC)     right   • Fracture of left wrist     history of   • Gastroesophageal reflux    • Hiatal hernia    • Hyperlipidemia    • Hypertension    • Hypothyroidism    • Impingement syndrome of right shoulder    • Joint pain    • Menopausal disorder    • Methicillin resistant Staphylococcus aureus infection 2012    after bladder surgery   • Nonischemic cardiomyopathy (CMS/HCC)     Ejection fraction 10% per 2-D  echo with Doppler however she did have cardiac catheterization April 2015 which showed ejection fraction 20% with global hypokinesis and severe mitral insufficiency   • Osteoarthritis     generalized, sched jania TKA   • Osteomyelitis (CMS/[facility]) 2/2/2016   • Paroxysmal atrial fibrillation (CMS/[facility])     Dr Bates follows   • Radius/ulna fracture, left, closed, initial encounter 10/21/2017   • Shoulder pain, bilateral     has tears on both sides   • Syncope, psychogenic 4/19/2017   • Toe ulcer (CMS/[facility])     h/o to both feet, d/t shoes rubbing per patient   • Transient alteration of awareness 4/19/2017     Patient Active Problem List   Diagnosis   • Chronic anemia   • Thoracic back pain   • Malignant neoplasm of upper-inner quadrant of right breast in female, estrogen receptor positive (CMS/[facility])   • Cardiomyopathy (CMS/[facility])   • Disc disorder of cervical region   • Neck pain   • Chronic pain   • Depression   • DM2 (diabetes mellitus, type 2) (CMS/[facility])   • Dyslipidemia   • Gastroesophageal reflux disease with esophagitis   • Generalized pain   • Hypertension   • Hypothyroidism   • Incisional hernia   • Mitral valve insufficiency   • Nausea   • Osteopenia of spine   • Arthralgia of multiple joints   • Peripheral neuropathy   • Pulmonary hypertension (CMS/[facility])   • PITTS (dyspnea on exertion)   • Tricuspid valve insufficiency   • Cobalamin deficiency   • Vitamin D deficiency   • Left knee pain   • Arthritis of knee   • DDD (degenerative disc disease), lumbar   • Lumbar facet arthropathy   • Pain in shoulder   • Chronic gout of multiple sites   • Encounter for long-term (current) use of high-risk medication   • Primary osteoarthritis of left knee   • Physical deconditioning   • Acute on chronic HFrEF (heart failure with reduced ejection fraction) (CMS/[facility])   • CKD (chronic kidney disease) stage 3, GFR 30-59 ml/min (CMS/[facility])   • Diastolic congestive heart failure (CMS/[facility])   • Pleural effusion   • Acute respiratory failure  with hypoxia (CMS/HCC)   • Periprosthetic subtrochanteric fracture of femur   • Traumatic closed nondisp torus fracture of distal radial metaphysis, right, initial encounter   • Acute on chronic congestive heart failure (CMS/HCC)   • Atherosclerotic heart disease of native coronary artery without angina pectoris     Social History   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   Tobacco Use   • Smoking status: Former Smoker     Packs/day: 3.00     Years: 30.00     Pack years: 90.00     Types: Cigarettes     Last attempt to quit:      Years since quittin.5   • Smokeless tobacco: Never Used   • Tobacco comment: daily caffiene   Substance and Sexual Activity   • Alcohol use: No     Comment: h/o quit    • Drug use: No   • Sexual activity: Defer     Partners: Male     Comment: celibate   Social History Narrative         Volunteers here at [facility] [employer] [facility]     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    Lots of friends    2 daughters - lives in [facility] (graphic design)    Hinduism Ep[employer]      Family History  Family History   Problem Relation Age of Onset   • Colonic polyp Mother    • Hypertension Mother    • Migraines Mother    • Mental illness Mother    • Depression Mother    • Coronary artery disease Father    • Other Father         Cardiac Disorder   • Colon cancer Father    • Kidney disease Father    • Cancer Father    • Breast cancer Maternal Aunt    • Colon cancer Brother    • Alcohol abuse Brother    • Arthritis Sister    • Hypertension Brother    • Lung disease Brother    • Alcohol abuse Brother    • Malig Hyperthermia Neg Hx      Allergies  Allergies   Allergen Reactions   • Dilaudid [Hydromorphone] Delirium and Confusion   • Latex Rash       Medications: The current  "medication list was reviewed in the EMR.    Review of Systems  Review of Systems   Constitutional: Positive for fatigue. Negative for activity change, appetite change, chills, diaphoresis, fever and unexpected weight change.   Respiratory: Positive for shortness of breath. Negative for cough and chest tightness.    Cardiovascular: Negative for chest pain, palpitations and leg swelling.   Gastrointestinal: Negative for abdominal pain, blood in stool, constipation, diarrhea, nausea and vomiting.   Musculoskeletal: Positive for arthralgias and back pain. Negative for joint swelling and myalgias.   Neurological: Positive for numbness.   Hematological: Negative for adenopathy. Does not bruise/bleed easily.          Objective:     Vitals:    07/22/20 0941   BP: 155/78   Pulse: 75   Resp: 14   Temp: 98.2 °F (36.8 °C)   TempSrc: Tympanic   SpO2: 98%   Weight: 89.4 kg (197 lb)  Comment: pt stated   Height: 167.6 cm (65.98\")   PainSc:   5   PainLoc: Leg  Comment: legs, knees, hips     Current Status 7/22/2020   ECOG score 1   GENERAL: female comfortable, no acute distress  SKIN:  Warm, without rashes, purpura or petechiae.   LYMPHATICS:  No cervical, supraclavicular, axillary adenopathy.  RESP:  Lungs clear to auscultation. Good airflow. Normal effort.   CHEST: Mediport -No; Breast exam - Yes, describe: status post left mastectomy without palpable masses or skin changes.  Right breast without nodules, color change, or pain.  CARDIAC:  Regular rate and rhythm without murmurs, rubs or gallops. Normal S1,S2. Lower extremity edema:  No  PSYCHIATRIC:  Normal affect and mood; alert and oriented x 3; Insight and judgement appropriate        Labs and Imaging  Results for orders placed or performed in visit on 07/22/20   CBC Auto Differential   Result Value Ref Range    WBC 6.30 3.40 - 10.80 10*3/mm3    RBC 4.21 3.77 - 5.28 10*6/mm3    Hemoglobin 11.5 (L) 12.0 - 15.9 g/dL    Hematocrit 36.5 34.0 - 46.6 %    MCV 86.7 79.0 - 97.0 fL    " MCH 27.3 26.6 - 33.0 pg    MCHC 31.5 31.5 - 35.7 g/dL    RDW 15.1 12.3 - 15.4 %    RDW-SD 47.7 37.0 - 54.0 fl    MPV 9.2 6.0 - 12.0 fL    Platelets 181 140 - 450 10*3/mm3    Neutrophil % 63.3 42.7 - 76.0 %    Lymphocyte % 24.9 19.6 - 45.3 %    Monocyte % 8.4 5.0 - 12.0 %    Eosinophil % 2.5 0.3 - 6.2 %    Basophil % 0.3 0.0 - 1.5 %    Immature Grans % 0.6 (H) 0.0 - 0.5 %    Neutrophils, Absolute 3.98 1.70 - 7.00 10*3/mm3    Lymphocytes, Absolute 1.57 0.70 - 3.10 10*3/mm3    Monocytes, Absolute 0.53 0.10 - 0.90 10*3/mm3    Eosinophils, Absolute 0.16 0.00 - 0.40 10*3/mm3    Basophils, Absolute 0.02 0.00 - 0.20 10*3/mm3    Immature Grans, Absolute 0.04 0.00 - 0.05 10*3/mm3    nRBC 0.0 0.0 - 0.2 /100 WBC     Left screening mammography 8/21/2019 was negative    Assessment/Plan     Assessment:   1. Stage I, pT1cN0, invasive ductal carcinoma with dcis of the right breast, ER positive, HER-2/gonzalez positive.    * Status post right mastectomy with axillary dissection on 07/16/2015. Not a candidate for chemo/Herceptin therapy due to comorbidities   *The patient has completed 5 years of adjuvant antiestrogen therapy with Arimidex.  She can discontinue the medication at this time                                       .  2.  History of osteopenia: The patient was previously on Fosamax but discontinued during a previous hospital stay  Plan:     The patient can discontinue Arimidex at this time as she is completed 5 years.  I will defer osteopenia follow-up to her PCP.  The patient was recommended to continue a monthly self chest wall and breast exam.  I recommended she continue her yearly screening mammography of the left breast.  I will defer follow-up care to her PCP and see the patient back on an as-needed basis at this time.    The patient in problems new to this examiner today.

## 2020-08-03 ENCOUNTER — OFFICE VISIT (OUTPATIENT)
Dept: INTERNAL MEDICINE | Facility: CLINIC | Age: 75
End: 2020-08-03

## 2020-08-03 VITALS
WEIGHT: 199 LBS | HEART RATE: 84 BPM | BODY MASS INDEX: 31.98 KG/M2 | HEIGHT: 66 IN | DIASTOLIC BLOOD PRESSURE: 82 MMHG | SYSTOLIC BLOOD PRESSURE: 120 MMHG | OXYGEN SATURATION: 99 %

## 2020-08-03 DIAGNOSIS — R60.0 EDEMA OF RIGHT LOWER EXTREMITY: ICD-10-CM

## 2020-08-03 DIAGNOSIS — R19.7 DIARRHEA, UNSPECIFIED TYPE: Primary | ICD-10-CM

## 2020-08-03 DIAGNOSIS — R19.04 LEFT LOWER QUADRANT ABDOMINAL MASS: ICD-10-CM

## 2020-08-03 PROCEDURE — 99214 OFFICE O/P EST MOD 30 MIN: CPT | Performed by: NURSE PRACTITIONER

## 2020-08-03 NOTE — PROGRESS NOTES
"Jung Short is a 75 y.o. female presenting today for   Chief Complaint   Patient presents with   • Diarrhea     x 6 months    • Pain     pain and swelling in right leg        Subjective    History of Present Illness     Pt reports R LE edema. Sts \"My ankle stays swollen all the time.\"  The onset of this was \"several weeks.\"  Pt sts she has had similar experiences in the past w/ this same leg.  She sts will sometimes subside overnight.    Pt also c/o daily diarrhea x \"months.\"  She characterizes this as \"explosive\" and containing mucus.  This has been anywhere from 2-4+x/day.  She has been incontinent of her stool.  There is no bleeding.    She notes an abd mass.  She sts this is getting larger and is intermittently painful.    The following portions of the patient's history were reviewed and updated as appropriate: allergies, current medications, problem list, past medical history, past surgical history, family history, and social history.    Review of Systems   Constitutional: Negative.  Negative for chills and fever.   HENT: Negative.    Eyes: Negative.    Respiratory: Negative.  Negative for shortness of breath.    Cardiovascular: Positive for leg swelling. Negative for chest pain and palpitations.   Gastrointestinal: Positive for abdominal distention and diarrhea. Negative for nausea and vomiting.   Endocrine: Negative.    Genitourinary: Negative.    Musculoskeletal: Negative.    Skin: Negative.    Neurological: Negative.    Hematological: Negative.    Psychiatric/Behavioral: Negative.          Objective    Vitals:    08/03/20 1333   BP: 120/82   BP Location: Left arm   Patient Position: Sitting   Cuff Size: Large Adult   Pulse: 84   SpO2: 99%   Weight: 90.3 kg (199 lb)   Height: 167.6 cm (65.98\")     Body mass index is 32.14 kg/m².  Nursing notes and vitals reviewed.    Physical Exam   Constitutional: She is oriented to person, place, and time. She appears well-developed and well-nourished. No distress. "   Cardiovascular: Regular rhythm, S1 normal and S2 normal.   Pulmonary/Chest: Effort normal and breath sounds normal.   Abdominal: Soft. Bowel sounds are normal. There is no hepatosplenomegaly.       Large old well-healed surgical incision. When pt is standing, there is a large firm palpable mass in the LLQ. This seems to originate from the area underlying the surgical incision. This is not palpable when the pt is supine.   Neurological: She is alert and oriented to person, place, and time.   Psychiatric: She has a normal mood and affect. Her behavior is normal. Thought content normal. She is attentive.         Assessment and Plan    Jung was seen today for diarrhea and pain.    Diagnoses and all orders for this visit:    Diarrhea, unspecified type  -     CBC & Differential  -     Comprehensive Metabolic Panel  -     Clostridium Difficile Toxin, PCR - Stool, Per Rectum  -     Gastrointestinal Panel, PCR - Stool, Per Rectum  -     C-reactive Protein  -     Sedimentation Rate  -     Inflammatory Bowel Disease Panel    Edema of right lower extremity  -     Compression Knee High Stockings    Left lower quadrant abdominal mass  -     CT Abdomen Pelvis With Contrast; Future            Medications, including side effects, were discussed with the patient. Patient verbalized understanding.  The plan of care was discussed. All questions were answered. Patient verbalized understanding.        RTO after imaging.

## 2020-08-06 RX ORDER — METHOCARBAMOL 500 MG/1
500 TABLET, FILM COATED ORAL 3 TIMES DAILY
Qty: 90 TABLET | Refills: 3 | Status: SHIPPED | OUTPATIENT
Start: 2020-08-06 | End: 2021-02-18

## 2020-08-07 DIAGNOSIS — R14.0 ABDOMINAL BLOATING: ICD-10-CM

## 2020-08-08 LAB
ADV 40+41 DNA STL QL NAA+NON-PROBE: NOT DETECTED
ALBUMIN SERPL-MCNC: 4.2 G/DL (ref 3.5–5.2)
ALBUMIN/GLOB SERPL: 1.9 G/DL
ALP SERPL-CCNC: 89 U/L (ref 39–117)
ALT SERPL-CCNC: 8 U/L (ref 1–33)
AST SERPL-CCNC: 11 U/L (ref 1–32)
ASTRO TYP 1-8 RNA STL QL NAA+NON-PROBE: NOT DETECTED
BAKER'S YEAST IGA QN: 33.2 UNITS (ref 0–24.9)
BAKER'S YEAST IGG QN: 56.4 UNITS (ref 0–24.9)
BASOPHILS # BLD AUTO: 0.02 10*3/MM3 (ref 0–0.2)
BASOPHILS NFR BLD AUTO: 0.3 % (ref 0–1.5)
BILIRUB SERPL-MCNC: 0.5 MG/DL (ref 0–1.2)
BUN SERPL-MCNC: 12 MG/DL (ref 8–23)
BUN/CREAT SERPL: 18.5 (ref 7–25)
C CAYETANENSIS DNA STL QL NAA+NON-PROBE: NOT DETECTED
C COLI+JEJ+UPSA DNA STL QL NAA+NON-PROBE: NOT DETECTED
C DIF TOX TCDA+TCDB STL QL NAA+NON-PROBE: NOT DETECTED
C DIFF TOX GENS STL QL NAA+PROBE: NEGATIVE
CALCIUM SERPL-MCNC: 9.7 MG/DL (ref 8.6–10.5)
CHLORIDE SERPL-SCNC: 102 MMOL/L (ref 98–107)
CO2 SERPL-SCNC: 26.8 MMOL/L (ref 22–29)
CREAT SERPL-MCNC: 0.65 MG/DL (ref 0.57–1)
CRP SERPL-MCNC: 0.81 MG/DL (ref 0–0.5)
CRYPTOSP DNA STL QL NAA+NON-PROBE: NOT DETECTED
E COLI O157 DNA STL QL NAA+NON-PROBE: NORMAL
E HISTOLYT DNA STL QL NAA+NON-PROBE: NOT DETECTED
EAEC PAA PLAS AGGR+AATA ST NAA+NON-PRB: NOT DETECTED
EC STX1+STX2 GENES STL QL NAA+NON-PROBE: NOT DETECTED
EOSINOPHIL # BLD AUTO: 0.16 10*3/MM3 (ref 0–0.4)
EOSINOPHIL NFR BLD AUTO: 2.8 % (ref 0.3–6.2)
EPEC EAE GENE STL QL NAA+NON-PROBE: NOT DETECTED
ERYTHROCYTE [DISTWIDTH] IN BLOOD BY AUTOMATED COUNT: 14.8 % (ref 12.3–15.4)
ERYTHROCYTE [SEDIMENTATION RATE] IN BLOOD BY WESTERGREN METHOD: 30 MM/HR (ref 0–30)
ETEC LTA+ST1A+ST1B TOX ST NAA+NON-PROBE: NOT DETECTED
G LAMBLIA DNA STL QL NAA+NON-PROBE: NOT DETECTED
GLOBULIN SER CALC-MCNC: 2.2 GM/DL
GLUCOSE SERPL-MCNC: 143 MG/DL (ref 65–99)
HCT VFR BLD AUTO: 34.6 % (ref 34–46.6)
HGB BLD-MCNC: 11.2 G/DL (ref 12–15.9)
IMM GRANULOCYTES # BLD AUTO: 0.03 10*3/MM3 (ref 0–0.05)
IMM GRANULOCYTES NFR BLD AUTO: 0.5 % (ref 0–0.5)
LYMPHOCYTES # BLD AUTO: 1.44 10*3/MM3 (ref 0.7–3.1)
LYMPHOCYTES NFR BLD AUTO: 25 % (ref 19.6–45.3)
MCH RBC QN AUTO: 27.2 PG (ref 26.6–33)
MCHC RBC AUTO-ENTMCNC: 32.4 G/DL (ref 31.5–35.7)
MCV RBC AUTO: 84 FL (ref 79–97)
MONOCYTES # BLD AUTO: 0.48 10*3/MM3 (ref 0.1–0.9)
MONOCYTES NFR BLD AUTO: 8.3 % (ref 5–12)
NEUTROPHILS # BLD AUTO: 3.64 10*3/MM3 (ref 1.7–7)
NEUTROPHILS NFR BLD AUTO: 63.1 % (ref 42.7–76)
NOROVIRUS GI+II RNA STL QL NAA+NON-PROBE: NOT DETECTED
NRBC BLD AUTO-RTO: 0 /100 WBC (ref 0–0.2)
P SHIGELLOIDES DNA STL QL NAA+NON-PROBE: NOT DETECTED
P-ANCA ATYPICAL TITR SER IF: ABNORMAL TITER
PLATELET # BLD AUTO: 227 10*3/MM3 (ref 140–450)
POTASSIUM SERPL-SCNC: 4.1 MMOL/L (ref 3.5–5.2)
PROT SERPL-MCNC: 6.4 G/DL (ref 6–8.5)
RBC # BLD AUTO: 4.12 10*6/MM3 (ref 3.77–5.28)
RVA RNA STL QL NAA+NON-PROBE: NOT DETECTED
S ENT+BONG DNA STL QL NAA+NON-PROBE: NOT DETECTED
SAPO I+II+IV+V RNA STL QL NAA+NON-PROBE: NOT DETECTED
SHIGELLA SP+EIEC IPAH ST NAA+NON-PROBE: NOT DETECTED
SODIUM SERPL-SCNC: 140 MMOL/L (ref 136–145)
V CHOL+PARA+VUL DNA STL QL NAA+NON-PROBE: NOT DETECTED
V CHOLERAE DNA STL QL NAA+NON-PROBE: NOT DETECTED
WBC # BLD AUTO: 5.77 10*3/MM3 (ref 3.4–10.8)
Y ENTEROCOL DNA STL QL NAA+NON-PROBE: NOT DETECTED

## 2020-08-10 ENCOUNTER — HOSPITAL ENCOUNTER (OUTPATIENT)
Dept: CT IMAGING | Facility: HOSPITAL | Age: 75
Discharge: HOME | End: 2020-08-10
Admitting: NURSE PRACTITIONER

## 2020-08-10 DIAGNOSIS — R19.04 LEFT LOWER QUADRANT ABDOMINAL MASS: ICD-10-CM

## 2020-08-10 DIAGNOSIS — R19.7 DIARRHEA, UNSPECIFIED TYPE: ICD-10-CM

## 2020-08-10 DIAGNOSIS — R14.0 ABDOMINAL BLOATING: Primary | ICD-10-CM

## 2020-08-10 DIAGNOSIS — R85.7 ABNORMAL INFLAMMATORY BOWEL DISEASE PANEL: ICD-10-CM

## 2020-08-10 PROCEDURE — 0 IOPAMIDOL PER 1 ML: Performed by: NURSE PRACTITIONER

## 2020-08-10 PROCEDURE — 74177 CT ABD & PELVIS W/CONTRAST: CPT

## 2020-08-10 RX ORDER — HYDROCODONE BITARTRATE AND ACETAMINOPHEN 5; 325 MG/1; MG/1
1 TABLET ORAL EVERY 6 HOURS PRN
Qty: 120 TABLET | Refills: 0 | Status: SHIPPED | OUTPATIENT
Start: 2020-08-10 | End: 2020-09-09 | Stop reason: SDUPTHER

## 2020-08-10 RX ORDER — LEVOTHYROXINE SODIUM 0.03 MG/1
25 TABLET ORAL DAILY
Qty: 90 TABLET | Refills: 3 | Status: SHIPPED | OUTPATIENT
Start: 2020-08-10 | End: 2021-08-30

## 2020-08-10 RX ADMIN — IOPAMIDOL 100 ML: 755 INJECTION, SOLUTION INTRAVENOUS at 11:40

## 2020-08-10 NOTE — TELEPHONE ENCOUNTER
Medication Refill Request    Date of phone call: 8/10/20    Medication being requested: Norco 5-325 mg   si tab po q 6 hrs prn   Qty: 120    Date of last visit: 20    Date of last refill: 20    LOUANN up to date?: yes    Next Follow up?: 20    Any new pertinent information? (i.e, new medication allergies, new use of medications, change in patient's health or condition, non-compliance or inconsistency with prescribing agreement?):

## 2020-08-11 ENCOUNTER — TELEPHONE (OUTPATIENT)
Dept: INTERNAL MEDICINE | Facility: CLINIC | Age: 75
End: 2020-08-11

## 2020-08-11 DIAGNOSIS — E11.69 TYPE 2 DIABETES MELLITUS WITH OTHER SPECIFIED COMPLICATION, WITHOUT LONG-TERM CURRENT USE OF INSULIN: Primary | ICD-10-CM

## 2020-08-11 NOTE — TELEPHONE ENCOUNTER
PATIENT CALLED BACK AFTER RECEIVING ANOTHER CALL. I DID CALL OFFICE ZABRINA WAS WITH A PATIENT.    PLEASE CALL HER BACK -[phone]

## 2020-08-11 NOTE — TELEPHONE ENCOUNTER
Patient returned call to Leta regarding Ct results. Warm Transferred and was told she was unavailable     Please advise     [phone]

## 2020-08-11 NOTE — TELEPHONE ENCOUNTER
Ilana with [facility] calls to requeset compression # for recent script on this patient.  Please write on script compression of 15-20 or 20-30 and fax script back to her at [facility].

## 2020-08-18 ENCOUNTER — TRANSCRIBE ORDERS (OUTPATIENT)
Dept: ADMINISTRATIVE | Facility: HOSPITAL | Age: 75
End: 2020-08-18

## 2020-08-18 ENCOUNTER — LAB (OUTPATIENT)
Dept: LAB | Facility: HOSPITAL | Age: 75
End: 2020-08-18

## 2020-08-18 DIAGNOSIS — IMO0002 TYPE II DIABETES MELLITUS WITH RENAL MANIFESTATIONS, UNCONTROLLED: ICD-10-CM

## 2020-08-18 DIAGNOSIS — N18.2 CHRONIC KIDNEY DISEASE, STAGE II (MILD): Primary | ICD-10-CM

## 2020-08-18 DIAGNOSIS — N18.2 CHRONIC KIDNEY DISEASE, STAGE II (MILD): ICD-10-CM

## 2020-08-18 LAB
ANION GAP SERPL CALCULATED.3IONS-SCNC: 9.3 MMOL/L (ref 5–15)
BILIRUB UR QL STRIP: NEGATIVE
BUN SERPL-MCNC: 13 MG/DL (ref 8–23)
BUN/CREAT SERPL: 18.3 (ref 7–25)
CALCIUM SPEC-SCNC: 9.7 MG/DL (ref 8.6–10.5)
CHLORIDE SERPL-SCNC: 106 MMOL/L (ref 98–107)
CLARITY UR: CLEAR
CO2 SERPL-SCNC: 26.7 MMOL/L (ref 22–29)
COLOR UR: YELLOW
CREAT SERPL-MCNC: 0.71 MG/DL (ref 0.57–1)
CREAT UR-MCNC: 15.9 MG/DL
GFR SERPL CREATININE-BSD FRML MDRD: 80 ML/MIN/1.73
GLUCOSE SERPL-MCNC: 131 MG/DL (ref 65–99)
GLUCOSE UR STRIP-MCNC: NEGATIVE MG/DL
HGB UR QL STRIP.AUTO: NEGATIVE
KETONES UR QL STRIP: NEGATIVE
LEUKOCYTE ESTERASE UR QL STRIP.AUTO: NEGATIVE
NITRITE UR QL STRIP: NEGATIVE
PH UR STRIP.AUTO: 6 [PH] (ref 5–8)
POTASSIUM SERPL-SCNC: 3.8 MMOL/L (ref 3.5–5.2)
PROT UR QL STRIP: NEGATIVE
PROT UR-MCNC: 8.7 MG/DL
SODIUM SERPL-SCNC: 142 MMOL/L (ref 136–145)
SP GR UR STRIP: 1.01 (ref 1–1.03)
UROBILINOGEN UR QL STRIP: NORMAL

## 2020-08-18 PROCEDURE — 36415 COLL VENOUS BLD VENIPUNCTURE: CPT

## 2020-08-18 PROCEDURE — 80048 BASIC METABOLIC PNL TOTAL CA: CPT

## 2020-08-18 PROCEDURE — 81003 URINALYSIS AUTO W/O SCOPE: CPT

## 2020-08-18 PROCEDURE — 84156 ASSAY OF PROTEIN URINE: CPT

## 2020-08-18 PROCEDURE — 82570 ASSAY OF URINE CREATININE: CPT

## 2020-08-24 ENCOUNTER — TELEPHONE (OUTPATIENT)
Dept: INTERNAL MEDICINE | Facility: CLINIC | Age: 75
End: 2020-08-24

## 2020-08-24 NOTE — TELEPHONE ENCOUNTER
PATIENT NEEDS A PRESCRIPTION FOR ORTHOPEDIC SHOES SO HER INSURANCE WILL COVER THEM.   [facility]     SHE HAD THE RX FOR THE STOCKINGS SENT ALREADY.     PATIENT CALLBACK [phone]

## 2020-08-28 ENCOUNTER — TELEPHONE (OUTPATIENT)
Dept: INTERNAL MEDICINE | Facility: CLINIC | Age: 75
End: 2020-08-28

## 2020-08-28 NOTE — TELEPHONE ENCOUNTER
PATIENT CALLED AND STATES SHE HAS CALLED [facility] TO SEE IF HE PRESCRIPTION FOR ORTHOPEDIC [place]ENNIS SHOES HAS BEEN SENT AND [facility] HAS NOT RECEIVED THE PRESCRIPTION.    PLEASE SEND TO [address]    FAX # [phone]    PATIENT CALL BACK NUMBER  [phone]

## 2020-09-09 ENCOUNTER — OFFICE VISIT (OUTPATIENT)
Dept: PAIN MEDICINE | Facility: CLINIC | Age: 75
End: 2020-09-09

## 2020-09-09 VITALS
BODY MASS INDEX: 32.02 KG/M2 | RESPIRATION RATE: 20 BRPM | WEIGHT: 199.2 LBS | HEART RATE: 85 BPM | DIASTOLIC BLOOD PRESSURE: 81 MMHG | SYSTOLIC BLOOD PRESSURE: 146 MMHG | TEMPERATURE: 96.9 F | OXYGEN SATURATION: 93 % | HEIGHT: 66 IN

## 2020-09-09 DIAGNOSIS — M47.816 LUMBAR FACET ARTHROPATHY: ICD-10-CM

## 2020-09-09 DIAGNOSIS — M25.50 ARTHRALGIA OF MULTIPLE JOINTS: ICD-10-CM

## 2020-09-09 DIAGNOSIS — Z79.899 ENCOUNTER FOR LONG-TERM (CURRENT) USE OF HIGH-RISK MEDICATION: ICD-10-CM

## 2020-09-09 DIAGNOSIS — G89.29 OTHER CHRONIC PAIN: Primary | ICD-10-CM

## 2020-09-09 DIAGNOSIS — M51.36 DDD (DEGENERATIVE DISC DISEASE), LUMBAR: ICD-10-CM

## 2020-09-09 PROCEDURE — 99214 OFFICE O/P EST MOD 30 MIN: CPT | Performed by: NURSE PRACTITIONER

## 2020-09-09 RX ORDER — METHOCARBAMOL 500 MG/1
TABLET, FILM COATED ORAL
COMMUNITY
Start: 2020-08-06 | End: 2020-09-27

## 2020-09-09 RX ORDER — VITAMIN B COMPLEX
TABLET ORAL
COMMUNITY
Start: 2020-07-07 | End: 2020-10-01 | Stop reason: SDUPTHER

## 2020-09-09 RX ORDER — DULOXETIN HYDROCHLORIDE 60 MG/1
CAPSULE, DELAYED RELEASE ORAL
COMMUNITY
Start: 2020-06-18 | End: 2020-09-27

## 2020-09-09 RX ORDER — PREGABALIN 75 MG/1
CAPSULE ORAL
COMMUNITY
Start: 2020-08-05 | End: 2020-09-27 | Stop reason: SDUPTHER

## 2020-09-09 RX ORDER — PRAVASTATIN SODIUM 10 MG
TABLET ORAL
COMMUNITY
Start: 2020-06-23 | End: 2020-09-27

## 2020-09-09 RX ORDER — HYDROCODONE BITARTRATE AND ACETAMINOPHEN 5; 325 MG/1; MG/1
1 TABLET ORAL EVERY 6 HOURS PRN
Qty: 120 TABLET | Refills: 0 | Status: SHIPPED | OUTPATIENT
Start: 2020-09-09 | End: 2020-10-08 | Stop reason: SDUPTHER

## 2020-09-09 RX ORDER — LOSARTAN POTASSIUM 25 MG/1
TABLET ORAL
COMMUNITY
Start: 2020-06-23 | End: 2020-09-27

## 2020-09-09 NOTE — PROGRESS NOTES
CHIEF COMPLAINT  F/u back and joint pain. Pt sts pain has remained the same since last ov.     Subjective   Jung Short is a 75 y.o. female  who presents for follow-up.  She has a history of chronic back and joint pain. Reports her pain her pain is unchanged since last office.     At last office visit, she was reporting increased pain. Her pain medication was changed from hydrocodone 5/325 q8hrs PRN to q6hrs PRN.    Complains of pain in her low back and joints. Today her pain is 6/10VAS. Describes her pain as nearly continuous throbbing. Pain increases with activity, household chores, being on feet; pain decreases with medication and rest. Continues with Hydrocodone 5/325 3-4/day, Robaxin 500 mg daily and Lyrica  75 mg BID, as well as Cymbalta 60 mg daily.  Denies any side effects from the regimen. The regimen helps decrease her pain moderately. ADL's by self. Denies any bowel or bladder changes.     She has questions about no longer taking Hydrocodone 10/325 4/day (what she was taking prior to accident). Extensive discussion regarding decreasing due to daily morphine equivalency and also safety related to falls.     Since last office visit, had CT-Abd/pelvis with PCP. Referred to GI(Coni). This appt is pending.    Patient remained masked during entire encounter. No cough present. I donned a mask and gloves throughout entire visit. Prior to donning mask and gloves, hand hygiene was performed, as well as when it was doffed.  I was closer than 6 feet, but not for an extended period of time. No obvious exposure to any bodily fluids.    Joint Pain   This is a chronic problem. The current episode started more than 1 year ago (today pain is 5/10VAS- joints). The problem occurs constantly. The problem has been unchanged (unchanged since last evaluation). Associated symptoms include arthralgias (joint), joint swelling (right ankle) and numbness (bilat fingers). Pertinent negatives include no abdominal pain,  chest pain, chills, congestion, coughing, fatigue, fever, headaches, nausea, neck pain, sore throat, vomiting or weakness. Nothing aggravates the symptoms. She has tried oral narcotics, position changes and rest for the symptoms. The treatment provided moderate relief.   Back Pain   This is a chronic problem. The current episode started more than 1 year ago. The problem occurs constantly. The problem has been gradually worsening (unchanged since last evaluation) since onset. The pain is present in the lumbar spine. The pain radiates to the right knee, right foot and right thigh. The pain is at a severity of 6/10. The pain is moderate. Associated symptoms include numbness (bilat fingers). Pertinent negatives include no abdominal pain, chest pain, dysuria, fever, headaches or weakness. Risk factors include lack of exercise and obesity. She has tried analgesics for the symptoms. The treatment provided moderate relief.      PEG Assessment   What number best describes your pain on average in the past week?7  What number best describes how, during the past week, pain has interfered with your enjoyment of life?7  What number best describes how, during the past week, pain has interfered with your general activity?  7    The following portions of the patient's history were reviewed and updated as appropriate: allergies, current medications, past family history, past medical history, past social history, past surgical history and problem list.    Review of Systems   Constitutional: Negative for activity change, chills, fatigue and fever.   HENT: Negative for congestion and sore throat.    Eyes: Negative for visual disturbance.   Respiratory: Negative for cough and shortness of breath.    Cardiovascular: Positive for leg swelling (right leg). Negative for chest pain.   Gastrointestinal: Negative for abdominal pain, constipation, diarrhea, nausea and vomiting.   Genitourinary: Negative for difficulty urinating and dysuria.  "  Musculoskeletal: Positive for arthralgias (joint), back pain, gait problem (cane) and joint swelling (right ankle). Negative for neck pain.   Allergic/Immunologic: Negative for immunocompromised state.   Neurological: Positive for numbness (bilat fingers). Negative for dizziness, weakness, light-headedness and headaches.   Psychiatric/Behavioral: Negative for agitation, sleep disturbance and suicidal ideas. The patient is not nervous/anxious.      I have reviewed and confirmed the accuracy of the ROS as documented by the MA/LPN/RN JOSE ANTONIO Castaneda      Vitals:    09/09/20 1428   BP: 146/81   Pulse: 85   Resp: 20   Temp: 96.9 °F (36.1 °C)   SpO2: 93%   Weight: 90.4 kg (199 lb 3.2 oz)  Comment: pt refused wt in office, std wt   Height: 167.6 cm (65.98\")   PainSc:   6   PainLoc: Back  Comment: and joint     Objective   Physical Exam   Constitutional: She is oriented to person, place, and time. Vital signs are normal. She appears well-developed and well-nourished. She is cooperative.   HENT:   Head: Normocephalic and atraumatic.   Nose: Nose normal.   Eyes: Conjunctivae and lids are normal.   Cardiovascular: Normal rate.   Pulmonary/Chest: Effort normal.   Abdominal: Normal appearance.   Musculoskeletal:        Right wrist: She exhibits decreased range of motion, tenderness and bony tenderness.        Left wrist: She exhibits decreased range of motion and tenderness.        Lumbar back: She exhibits decreased range of motion.   Widespread joint changes with no acute synovitis   Neurological: She is alert and oriented to person, place, and time. Gait (cane) abnormal.   Skin: Skin is warm, dry and intact.   Psychiatric: She has a normal mood and affect. Her speech is normal and behavior is normal. Judgment and thought content normal. Cognition and memory are normal.   Nursing note and vitals reviewed.      Assessment/Plan   Jung was seen today for back pain and joint pain.    Diagnoses and all orders for this " visit:    Other chronic pain    Arthralgia of multiple joints    DDD (degenerative disc disease), lumbar    Lumbar facet arthropathy    Encounter for long-term (current) use of high-risk medication    Other orders  -     HYDROcodone-acetaminophen (NORCO) 5-325 MG per tablet; Take 1 tablet by mouth Every 6 (Six) Hours As Needed for Severe Pain .      --- The oral drug screen confirmation from 7-8-20 has been reviewed and the result is APPROPRIATE based on patient history and LOUANN report  --- Refill hydrocodone. Patient appears stable with current regimen. No adverse effects. Regarding continuation of opioids, there is no evidence of aberrant behavior or any red flags.  The patient continues with appropriate response to opioid therapy. ADL's remain intact by self.   --- Continue with other specialists as planned.  --- Extensive discussion/education regarding opioids and current dosing level.   --- Follow-up 2 months or sooner if needed.       LOUANN REPORT  As part of the patient's treatment plan, I am prescribing controlled substances. The patient has been made aware of appropriate use of such medications, including potential risk of somnolence, limited ability to drive and/or work safely, and the potential for dependence or overdose. It has also bee made clear that these medications are for use by this patient only, without concomitant use of alcohol or other substances unless prescribed.     Patient has completed prescribing agreement detailing terms of continued prescribing of controlled substances, including monitoring LOUANN reports, urine drug screening, and pill counts if necessary. The patient is aware that inappropriate use will results in cessation of prescribing such medications.    LOUANN report has been reviewed and scanned into the patient's chart.    As the clinician, I personally reviewed the LOUANN from 9-8-20 while the patient was in the office today.    History and physical exam exhibit continued  safe and appropriate use of controlled substances.        EMR Dragon/Transcription disclaimer:   Much of this encounter note is an electronic transcription/translation of spoken language to printed text. The electronic translation of spoken language may permit erroneous, or at times, nonsensical words or phrases to be inadvertently transcribed; Although I have reviewed the note for such errors, some may still exist.

## 2020-09-19 DIAGNOSIS — R14.0 ABDOMINAL BLOATING: ICD-10-CM

## 2020-09-21 ENCOUNTER — HOSPITAL ENCOUNTER (OUTPATIENT)
Dept: GENERAL RADIOLOGY | Facility: HOSPITAL | Age: 75
Discharge: HOME | End: 2020-09-21
Admitting: INTERNAL MEDICINE

## 2020-09-21 ENCOUNTER — OFFICE VISIT (OUTPATIENT)
Dept: GASTROENTEROLOGY | Facility: CLINIC | Age: 75
End: 2020-09-21

## 2020-09-21 VITALS — WEIGHT: 200 LBS | HEIGHT: 66 IN | BODY MASS INDEX: 32.14 KG/M2 | TEMPERATURE: 98.5 F

## 2020-09-21 DIAGNOSIS — I50.23 ACUTE ON CHRONIC HFREF (HEART FAILURE WITH REDUCED EJECTION FRACTION) (HCC): ICD-10-CM

## 2020-09-21 DIAGNOSIS — R19.7 DIARRHEA, UNSPECIFIED TYPE: ICD-10-CM

## 2020-09-21 DIAGNOSIS — Z86.010 PERSONAL HISTORY OF COLONIC POLYPS: Primary | ICD-10-CM

## 2020-09-21 DIAGNOSIS — J96.01 ACUTE RESPIRATORY FAILURE WITH HYPOXIA (HCC): ICD-10-CM

## 2020-09-21 DIAGNOSIS — E11.69 TYPE 2 DIABETES MELLITUS WITH OTHER SPECIFIED COMPLICATION, WITHOUT LONG-TERM CURRENT USE OF INSULIN (HCC): ICD-10-CM

## 2020-09-21 DIAGNOSIS — N18.30 CKD (CHRONIC KIDNEY DISEASE) STAGE 3, GFR 30-59 ML/MIN (HCC): ICD-10-CM

## 2020-09-21 DIAGNOSIS — I42.8 OTHER CARDIOMYOPATHY (HCC): ICD-10-CM

## 2020-09-21 DIAGNOSIS — K21.00 GASTROESOPHAGEAL REFLUX DISEASE WITH ESOPHAGITIS: ICD-10-CM

## 2020-09-21 PROCEDURE — 74018 RADEX ABDOMEN 1 VIEW: CPT

## 2020-09-21 PROCEDURE — 99204 OFFICE O/P NEW MOD 45 MIN: CPT | Performed by: INTERNAL MEDICINE

## 2020-09-21 RX ORDER — CARVEDILOL 12.5 MG/1
TABLET ORAL
Qty: 180 TABLET | Refills: 0 | Status: SHIPPED | OUTPATIENT
Start: 2020-09-21 | End: 2020-10-07

## 2020-09-21 NOTE — PROGRESS NOTES
PATIENT INFORMATION  Jung Short       - 1945    CHIEF COMPLAINT  Chief Complaint   Patient presents with   • Abdominal Pain   • Diarrhea       HISTORY OF PRESENT ILLNESS  Rerviewed her Ortho issues in the l;ast 12 months and 2 hosp since she was out of rehab  And those were CHF and Right wrist fracture.    9 months of diarrhea with 3-4 times a day and rare nocturnal and feels its bertter and is skipping 2 -3 days aweek  Over the last 2 weeks. Even when it was bad would still skip a day every two to 3 weeks and has had incontinence spells, and is wearing depends now.    Still on chronic pain meds and doesn't recall abx  Over her last two hospitalizartion.          PERTINENT LABS  Lab Results   Component Value Date    HGB 11.2 (L) 2020    MCV 84.0 2020     2020    ALT 8 2020    AST 11 2020    HGBA1C 6.60 (H) 2020    INR 1.09 2019    TRIG 153 (H) 2020    FERRITIN 109.8 2016    IRON 71 2017    TIBC 290 2017        REVIEW OF SYSTEMS  Review of Systems   Gastrointestinal: Positive for abdominal pain and diarrhea.   All other systems reviewed and are negative.        ACTIVE PROBLEMS  Patient Active Problem List    Diagnosis   • Acute on chronic congestive heart failure (CMS/[facility]) [I50.9]   • Periprosthetic subtrochanteric fracture of femur [M97.8XXA, Z96.649]   • Traumatic closed nondisp torus fracture of distal radial metaphysis, right, initial encounter [S52.161A]   • Pleural effusion [J90]   • Acute respiratory failure with hypoxia (CMS/[facility]) [J96.01]   • Diastolic congestive heart failure (CMS/[facility]) [I50.30]   • CKD (chronic kidney disease) stage 3, GFR 30-59 ml/min (CMS/[facility]) [N18.3]   • Acute on chronic HFrEF (heart failure with reduced ejection fraction) (CMS/[facility]) [I50.23]   • Physical deconditioning [R53.81]   • Primary osteoarthritis of left knee [M17.12]   • Atherosclerotic heart disease of native coronary artery without  angina pectoris [I25.10]   • Encounter for long-term (current) use of high-risk medication [Z79.899]   • Pain in shoulder [M25.519]   • Chronic gout of multiple sites [M1A.09X0]   • Left knee pain [M25.562]   • Arthritis of knee [M17.10]   • DDD (degenerative disc disease), lumbar [M51.36]   • Lumbar facet arthropathy [M47.816]   • Chronic anemia [D64.9]   • Thoracic back pain [M54.6]   • Cardiomyopathy (CMS/HCC) [I42.9]   • Disc disorder of cervical region [M50.90]   • Neck pain [M54.2]   • Chronic pain [G89.29]   • Depression [F32.9]   • DM2 (diabetes mellitus, type 2) (CMS/HCC) [E11.9]   • Dyslipidemia [E78.5]   • Gastroesophageal reflux disease with esophagitis [K21.0]   • Generalized pain [R52]   • Hypertension [I10]   • Hypothyroidism [E03.9]   • Incisional hernia [K43.2]   • Mitral valve insufficiency [I34.0]   • Nausea [R11.0]   • Osteopenia of spine [M85.88]   • Arthralgia of multiple joints [M25.50]   • Peripheral neuropathy [G62.9]   • Pulmonary hypertension (CMS/HCC) [I27.20]   • PITTS (dyspnea on exertion) [R06.00]   • Tricuspid valve insufficiency [I07.1]   • Cobalamin deficiency [E53.8]   • Vitamin D deficiency [E55.9]   • Malignant neoplasm of upper-inner quadrant of right breast in female, estrogen receptor positive (CMS/HCC) [C50.211, Z17.0]         PAST MEDICAL HISTORY  Past Medical History:   Diagnosis Date   • Anemia    • Benign breast disease    • Cancer (CMS/HCC) 2015    Right breast   • Cellulitis of leg     May-2003 right lower extremity   • CHF (congestive heart failure) (CMS/HCC)     Dr Bates follows   • Chronic kidney disease     Dr Hannah follows   • Chronic ulcer of right foot (CMS/HCC)     Non-pressure, history of    • Closed fracture of patella 9/5/2017   • Colon polyp    • Depression    • Diabetic peripheral neuropathy (CMS/HCC)    • Diverticulosis    • Femoral fracture (CMS/HCC)     right   • Fracture of left wrist     history of   • Gastroesophageal reflux    • Hiatal hernia    •  Hyperlipidemia    • Hypertension    • Hypothyroidism    • Impingement syndrome of right shoulder    • Joint pain    • Menopausal disorder    • Methicillin resistant Staphylococcus aureus infection 2012    after bladder surgery   • Nonischemic cardiomyopathy (CMS/HCC)     Ejection fraction 10% per 2-D echo with Doppler however she did have cardiac catheterization April 2015 which showed ejection fraction 20% with global hypokinesis and severe mitral insufficiency   • Osteoarthritis     generalized, sched jania TKA   • Osteomyelitis (CMS/HCC) 2/2/2016   • Paroxysmal atrial fibrillation (CMS/HCC)     Dr Bates follows   • Radius/ulna fracture, left, closed, initial encounter 10/21/2017   • Shoulder pain, bilateral     has tears on both sides   • Syncope, psychogenic 4/19/2017   • Toe ulcer (CMS/HCC)     h/o to both feet, d/t shoes rubbing per patient   • Transient alteration of awareness 4/19/2017         SURGICAL HISTORY  Past Surgical History:   Procedure Laterality Date   • ABDOMINAL SURGERY  2012    had to be reopened after bladder surgery d/t infection   • AMPUTATION FOOT / TOE Right 11/2013    Rt foot amputation MTP first toe   • BLADDER SURGERY  2012   • BREAST BIOPSY     • BREAST SURGERY  06/29/2015    Percutaneous ultrasound-guided placement of metal localized clip 1st lesion   • CARDIAC CATHETERIZATION  2015   • CATARACT EXTRACTION Bilateral 2017   • COLONOSCOPY     • DEBRIDEMENT  FOOT Right 01/2013    During hospitalization    • EPIDURAL BLOCK      4 chronic back pain and leg pain last epidurals done 5-2012 she had no benefit at all from this procedure.   • FEMUR FRACTURE SURGERY     • FOOT SURGERY Bilateral     several   • HERNIA REPAIR      At the abdomen February 2007 Dr. Mendez   • INCISIONAL HERNIA REPAIR  05/16/2014    Recurrent. Incarcerated. With mesh implantation   • MASTECTOMY Right 05/2015   • SHOULDER SURGERY Right     x2 RCR   • TOTAL ABDOMINAL HYSTERECTOMY      with oophorectomy-Done June-2012  secondary to vaginal wall prolapse.   • TOTAL KNEE ARTHROPLASTY Right    • TOTAL KNEE ARTHROPLASTY Left 2018    Procedure: TOTAL KNEE ARTHROPLASTY;  Surgeon: Live Chambers MD;  Location: [facility];  Service: Orthopedics         FAMILY HISTORY  Family History   Problem Relation Age of Onset   • Colonic polyp Mother    • Hypertension Mother    • Migraines Mother    • Mental illness Mother    • Depression Mother    • Coronary artery disease Father    • Other Father         Cardiac Disorder   • Colon cancer Father    • Kidney disease Father    • Cancer Father    • Breast cancer Maternal Aunt    • Colon cancer Brother    • Alcohol abuse Brother    • Arthritis Sister    • Hypertension Brother    • Lung disease Brother    • Alcohol abuse Brother    • Malig Hyperthermia Neg Hx          SOCIAL HISTORY  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   Tobacco Use   • Smoking status: Former Smoker     Packs/day: 3.00     Years: 30.00     Pack years: 90.00     Types: Cigarettes     Quit date:      Years since quittin.7   • Smokeless tobacco: Never Used   • Tobacco comment: daily caffiene   Substance and Sexual Activity   • Alcohol use: No     Comment: h/o quit    • Drug use: No   • Sexual activity: Defer     Partners: Male     Comment: celibate         CURRENT MEDICATIONS    Current Outpatient Medications:   •  anastrozole (ARIMIDEX) 1 MG tablet, TAKE 1 TABLET BY MOUTH EVERY DAY, Disp: 30 tablet, Rfl: 0  •  aspirin 81 MG EC tablet, Take 81 mg by mouth Daily., Disp: , Rfl:   •  bumetanide (BUMEX) 0.5 MG tablet, Take 2 tablets by mouth Daily., Disp: 190 tablet, Rfl: 3  •  carvedilol (COREG) 12.5 MG tablet, TAKE 1 TABLET BY MOUTH TWICE DAILY WITH MEALS, Disp: 180 tablet, Rfl: 0  •  Cholecalciferol (VITAMIN D) 25 MCG (1000 UT) tablet, TK 1 T PO D, Disp: , Rfl:   •  cholecalciferol (VITAMIN D3) 25 MCG (1000 UT) tablet, Take 1 tablet by mouth Daily., Disp: 90 tablet, Rfl:  3  •  DULoxetine (CYMBALTA) 60 MG capsule, TAKE 1 CAPSULE BY MOUTH DAILY, Disp: 90 capsule, Rfl: 1  •  DULoxetine (CYMBALTA) 60 MG capsule, TK 1 C PO D, Disp: , Rfl:   •  esomeprazole (nexIUM) 20 MG capsule, Take 20 mg by mouth Every Morning Before Breakfast., Disp: , Rfl:   •  glucose blood test strip, Take BG daily, Disp: 50 each, Rfl: 12  •  HYDROcodone-acetaminophen (NORCO) 5-325 MG per tablet, Take 1 tablet by mouth Every 6 (Six) Hours As Needed for Severe Pain ., Disp: 120 tablet, Rfl: 0  •  levothyroxine (SYNTHROID, LEVOTHROID) 25 MCG tablet, TAKE 1 TABLET BY MOUTH DAILY, Disp: 90 tablet, Rfl: 3  •  losartan (COZAAR) 25 MG tablet, TAKE 1 TABLET BY MOUTH DAILY, Disp: 90 tablet, Rfl: 3  •  losartan (COZAAR) 25 MG tablet, TK 1 T PO D, Disp: , Rfl:   •  metFORMIN (GLUCOPHAGE) 1000 MG tablet, Take 1 tablet by mouth Daily With Breakfast., Disp: 30 tablet, Rfl: 2  •  metFORMIN (GLUCOPHAGE) 1000 MG tablet, TK 1 T PO D WITH JESUS, Disp: , Rfl:   •  metFORMIN (GLUCOPHAGE) 500 MG tablet, Take 1 tablet by mouth Daily With Dinner., Disp: 30 tablet, Rfl: 2  •  metFORMIN (GLUCOPHAGE) 500 MG tablet, TK 1 T PO D WITH DINNER, Disp: , Rfl:   •  methocarbamol (ROBAXIN) 500 MG tablet, Take 1 tablet by mouth 3 (Three) Times a Day., Disp: 90 tablet, Rfl: 3  •  methocarbamol (ROBAXIN) 500 MG tablet, TK 1 T PO  TID, Disp: , Rfl:   •  Multiple Vitamins-Minerals (MULTIVITAMIN ADULT PO), Take 1 tablet by mouth Daily., Disp: , Rfl:   •  OXYCODONE ER PO, Take  by mouth As Needed., Disp: , Rfl:   •  pravastatin (PRAVACHOL) 10 MG tablet, TAKE 1 TABLET BY MOUTH DAILY, Disp: 90 tablet, Rfl: 3  •  pravastatin (PRAVACHOL) 10 MG tablet, TK 1 T PO D, Disp: , Rfl:   •  pregabalin (LYRICA) 75 MG capsule, TAKE 1 CAPSULE BY MOUTH TWICE DAILY, Disp: 60 capsule, Rfl: 5  •  pregabalin (LYRICA) 75 MG capsule, TK 1 C PO BID, Disp: , Rfl:     ALLERGIES  Dilaudid [hydromorphone] and Latex    VITALS  Vitals:    09/21/20 1441   Temp: 98.5 °F (36.9 °C)  "  [facility]: Temporal   Weight: 90.7 kg (200 lb)   Height: 167.6 cm (65.98\")       LAST RESULTS   Lab on 08/18/2020   Component Date Value Ref Range Status   • Creatinine, Urine 08/18/2020 15.9  mg/dL Final   • Glucose 08/18/2020 131* 65 - 99 mg/dL Final   • BUN 08/18/2020 13  8 - 23 mg/dL Final   • Creatinine 08/18/2020 0.71  0.57 - 1.00 mg/dL Final   • Sodium 08/18/2020 142  136 - 145 mmol/L Final   • Potassium 08/18/2020 3.8  3.5 - 5.2 mmol/L Final   • Chloride 08/18/2020 106  98 - 107 mmol/L Final   • CO2 08/18/2020 26.7  22.0 - 29.0 mmol/L Final   • Calcium 08/18/2020 9.7  8.6 - 10.5 mg/dL Final   • eGFR Non African Amer 08/18/2020 80  >60 mL/min/1.73 Final   • BUN/Creatinine Ratio 08/18/2020 18.3  7.0 - 25.0 Final   • Anion Gap 08/18/2020 9.3  5.0 - 15.0 mmol/L Final   • Color, UA 08/18/2020 Yellow  Yellow, Straw Final   • Appearance, UA 08/18/2020 Clear  Clear Final   • pH, UA 08/18/2020 6.0  5.0 - 8.0 Final   • Specific Gravity, UA 08/18/2020 1.009  1.005 - 1.030 Final   • Glucose, UA 08/18/2020 Negative  Negative Final   • Ketones, UA 08/18/2020 Negative  Negative Final   • Bilirubin, UA 08/18/2020 Negative  Negative Final   • Blood, UA 08/18/2020 Negative  Negative Final   • Protein, UA 08/18/2020 Negative  Negative Final   • Leuk Esterase, UA 08/18/2020 Negative  Negative Final   • Nitrite, UA 08/18/2020 Negative  Negative Final   • Urobilinogen, UA 08/18/2020 0.2 E.U./dL  0.2 - 1.0 E.U./dL Final   • Total Protein, Urine 08/18/2020 8.7  mg/dL Final     No results found.    PHYSICAL EXAM  Debilities/Disabilities Identified: None  Emotional Behavior: Appropriate  Wt Readings from Last 3 Encounters:   09/21/20 90.7 kg (200 lb)   09/09/20 90.4 kg (199 lb 3.2 oz)   08/03/20 90.3 kg (199 lb)     Ht Readings from Last 1 Encounters:   09/21/20 167.6 cm (65.98\")     Body mass index is 32.3 kg/m².  Physical Exam  Constitutional:       Appearance: She is well-developed.   HENT:      Head: Normocephalic and atraumatic. "   Eyes:      General: No scleral icterus.     Pupils: Pupils are equal, round, and reactive to light.   Neck:      Musculoskeletal: Normal range of motion and neck supple.      Thyroid: No thyromegaly.   Cardiovascular:      Rate and Rhythm: Normal rate and regular rhythm.      Heart sounds: Normal heart sounds. No murmur. No gallop.    Pulmonary:      Effort: Pulmonary effort is normal.      Breath sounds: Normal breath sounds. No wheezing or rales.   Abdominal:      General: Bowel sounds are normal. There is no distension or abdominal bruit.      Palpations: Abdomen is soft. Abdomen is not rigid. There is no shifting dullness, fluid wave, hepatomegaly, splenomegaly, mass or pulsatile mass.      Tenderness: There is no abdominal tenderness. There is no guarding or rebound. Negative signs include Ulrich's sign.      Hernia: A hernia is present. There is no hernia in the ventral area.      Comments: Ventral   Musculoskeletal: Normal range of motion.   Lymphadenopathy:      Cervical: No cervical adenopathy.   Skin:     General: Skin is warm and dry.      Findings: No erythema or rash.   Neurological:      Mental Status: She is alert and oriented to person, place, and time.         CLINICAL DATA REVIEWED   reviewed previous lab results and integrated with today's visit, reviewed notes from other physicians and/or last GI encounter, reviewed previous endoscopy results and available photos, reviewed surgical pathology results from previous biopsies    ASSESSMENT  Diagnoses and all orders for this visit:    Personal history of colonic polyps    CKD (chronic kidney disease) stage 3, GFR 30-59 ml/min (CMS/HCC)    Other cardiomyopathy (CMS/HCC)    Acute on chronic HFrEF (heart failure with reduced ejection fraction) (CMS/HCC)    Acute respiratory failure with hypoxia (CMS/HCC)    Gastroesophageal reflux disease with esophagitis    Type 2 diabetes mellitus with other specified complication, without long-term current use of  insulin (CMS/HCC)    Diarrhea, unspecified type  -     Clostridium Difficile Toxin, PCR - Stool, Per Rectum  -     Gastrointestinal Panel, PCR - Stool, Per Rectum; Future  -     XR Abdomen KUB; Future          PLAN  Will check a KUB and SS but no medication change and instructed in KEGEL and reviewed her CT and images    No follow-ups on file.    I have discussed the above plan with the patient.  They verbalize understanding and are in agreement with the plan.  They have been advised to contact the office for any questions, concerns, or changes related to their health.

## 2020-09-24 LAB
25(OH)D3+25(OH)D2 SERPL-MCNC: 44.5 NG/ML (ref 30–100)
ALBUMIN SERPL-MCNC: 4 G/DL (ref 3.7–4.7)
ALBUMIN/CREAT UR: 594 MG/G CREAT (ref 0–29)
ALBUMIN/GLOB SERPL: 1.5 {RATIO} (ref 1.2–2.2)
ALP SERPL-CCNC: 97 IU/L (ref 39–117)
ALT SERPL-CCNC: 11 IU/L (ref 0–32)
APPEARANCE UR: CLEAR
AST SERPL-CCNC: 15 IU/L (ref 0–40)
BACTERIA #/AREA URNS HPF: ABNORMAL /[HPF]
BACTERIA UR CULT: NORMAL
BACTERIA UR CULT: NORMAL
BASOPHILS # BLD AUTO: 0 X10E3/UL (ref 0–0.2)
BASOPHILS NFR BLD AUTO: 0 %
BILIRUB SERPL-MCNC: 0.6 MG/DL (ref 0–1.2)
BILIRUB UR QL STRIP: NEGATIVE
BUN SERPL-MCNC: 7 MG/DL (ref 8–27)
BUN/CREAT SERPL: 9 (ref 12–28)
CALCIUM SERPL-MCNC: 9.4 MG/DL (ref 8.7–10.3)
CHLORIDE SERPL-SCNC: 104 MMOL/L (ref 96–106)
CHOLEST SERPL-MCNC: 146 MG/DL (ref 100–199)
CHOLEST/HDLC SERPL: 2.5 RATIO (ref 0–4.4)
CO2 SERPL-SCNC: 26 MMOL/L (ref 20–29)
COLOR UR: YELLOW
CREAT SERPL-MCNC: 0.76 MG/DL (ref 0.57–1)
CREAT UR-MCNC: 193.5 MG/DL
EOSINOPHIL # BLD AUTO: 0.2 X10E3/UL (ref 0–0.4)
EOSINOPHIL NFR BLD AUTO: 3 %
EPI CELLS #/AREA URNS HPF: ABNORMAL /HPF (ref 0–10)
ERYTHROCYTE [DISTWIDTH] IN BLOOD BY AUTOMATED COUNT: 14.1 % (ref 11.7–15.4)
GLOBULIN SER CALC-MCNC: 2.6 G/DL (ref 1.5–4.5)
GLUCOSE SERPL-MCNC: 131 MG/DL (ref 65–99)
GLUCOSE UR QL: ABNORMAL
HCT VFR BLD AUTO: 35.9 % (ref 34–46.6)
HDLC SERPL-MCNC: 59 MG/DL
HGB BLD-MCNC: 11.5 G/DL (ref 11.1–15.9)
HGB UR QL STRIP: NEGATIVE
IMM GRANULOCYTES # BLD AUTO: 0 X10E3/UL (ref 0–0.1)
IMM GRANULOCYTES NFR BLD AUTO: 0 %
KETONES UR QL STRIP: NEGATIVE
LDLC SERPL CALC-MCNC: 63 MG/DL (ref 0–99)
LEUKOCYTE ESTERASE UR QL STRIP: ABNORMAL
LYMPHOCYTES # BLD AUTO: 1.3 X10E3/UL (ref 0.7–3.1)
LYMPHOCYTES NFR BLD AUTO: 22 %
MCH RBC QN AUTO: 28.5 PG (ref 26.6–33)
MCHC RBC AUTO-ENTMCNC: 32 G/DL (ref 31.5–35.7)
MCV RBC AUTO: 89 FL (ref 79–97)
MICRO URNS: ABNORMAL
MICROALBUMIN UR-MCNC: 1149.3 UG/ML
MONOCYTES # BLD AUTO: 0.5 X10E3/UL (ref 0.1–0.9)
MONOCYTES NFR BLD AUTO: 8 %
NEUTROPHILS # BLD AUTO: 4.2 X10E3/UL (ref 1.4–7)
NEUTROPHILS NFR BLD AUTO: 67 %
NITRITE UR QL STRIP: NEGATIVE
PH UR STRIP: 5.5 [PH] (ref 5–7.5)
PLATELET # BLD AUTO: 224 X10E3/UL (ref 150–450)
POTASSIUM SERPL-SCNC: 3.6 MMOL/L (ref 3.5–5.2)
PROT SERPL-MCNC: 6.6 G/DL (ref 6–8.5)
PROT UR QL STRIP: ABNORMAL
RBC # BLD AUTO: 4.04 X10E6/UL (ref 3.77–5.28)
RBC #/AREA URNS HPF: ABNORMAL /HPF (ref 0–2)
SODIUM SERPL-SCNC: 143 MMOL/L (ref 134–144)
SP GR UR: 1.03 (ref 1–1.03)
T4 FREE SERPL-MCNC: 1.65 NG/DL (ref 0.82–1.77)
TRIGL SERPL-MCNC: 142 MG/DL (ref 0–149)
TSH SERPL DL<=0.005 MIU/L-ACNC: 1.5 UIU/ML (ref 0.45–4.5)
URINALYSIS REFLEX: ABNORMAL
UROBILINOGEN UR STRIP-MCNC: 1 MG/DL (ref 0.2–1)
VIT B12 SERPL-MCNC: 262 PG/ML (ref 232–1245)
VLDLC SERPL CALC-MCNC: 24 MG/DL (ref 5–40)
WBC # BLD AUTO: 6.2 X10E3/UL (ref 3.4–10.8)
WBC #/AREA URNS HPF: ABNORMAL /HPF (ref 0–5)

## 2020-09-27 ENCOUNTER — HOSPITAL ENCOUNTER (EMERGENCY)
Facility: HOSPITAL | Age: 75
Discharge: HOME | End: 2020-09-27
Attending: EMERGENCY MEDICINE | Admitting: EMERGENCY MEDICINE

## 2020-09-27 ENCOUNTER — APPOINTMENT (OUTPATIENT)
Dept: GENERAL RADIOLOGY | Facility: HOSPITAL | Age: 75
End: 2020-09-27

## 2020-09-27 ENCOUNTER — EPISODE CHANGES (OUTPATIENT)
Dept: CASE MANAGEMENT | Facility: OTHER | Age: 75
End: 2020-09-27

## 2020-09-27 VITALS
WEIGHT: 198 LBS | SYSTOLIC BLOOD PRESSURE: 179 MMHG | HEIGHT: 65 IN | DIASTOLIC BLOOD PRESSURE: 106 MMHG | HEART RATE: 95 BPM | BODY MASS INDEX: 32.99 KG/M2 | OXYGEN SATURATION: 96 % | RESPIRATION RATE: 20 BRPM | TEMPERATURE: 98.3 F

## 2020-09-27 DIAGNOSIS — I50.33 ACUTE ON CHRONIC DIASTOLIC HEART FAILURE (HCC): Primary | ICD-10-CM

## 2020-09-27 LAB
ALBUMIN SERPL-MCNC: 4 G/DL (ref 3.5–5.2)
ALBUMIN/GLOB SERPL: 1.3 G/DL
ALP SERPL-CCNC: 81 U/L (ref 39–117)
ALT SERPL W P-5'-P-CCNC: 10 U/L (ref 1–33)
ANION GAP SERPL CALCULATED.3IONS-SCNC: 13.3 MMOL/L (ref 5–15)
APTT PPP: 36.1 SECONDS (ref 24.3–38.1)
AST SERPL-CCNC: 16 U/L (ref 1–32)
BASOPHILS # BLD AUTO: 0.02 10*3/MM3 (ref 0–0.2)
BASOPHILS NFR BLD AUTO: 0.3 % (ref 0–1.5)
BILIRUB SERPL-MCNC: 1.2 MG/DL (ref 0–1.2)
BUN SERPL-MCNC: 8 MG/DL (ref 8–23)
BUN/CREAT SERPL: 9.9 (ref 7–25)
CALCIUM SPEC-SCNC: 9.8 MG/DL (ref 8.6–10.5)
CHLORIDE SERPL-SCNC: 103 MMOL/L (ref 98–107)
CO2 SERPL-SCNC: 26.7 MMOL/L (ref 22–29)
CREAT SERPL-MCNC: 0.81 MG/DL (ref 0.57–1)
DEPRECATED RDW RBC AUTO: 48 FL (ref 37–54)
EOSINOPHIL # BLD AUTO: 0.08 10*3/MM3 (ref 0–0.4)
EOSINOPHIL NFR BLD AUTO: 1.1 % (ref 0.3–6.2)
ERYTHROCYTE [DISTWIDTH] IN BLOOD BY AUTOMATED COUNT: 14.4 % (ref 12.3–15.4)
GFR SERPL CREATININE-BSD FRML MDRD: 69 ML/MIN/1.73
GLOBULIN UR ELPH-MCNC: 3.2 GM/DL
GLUCOSE SERPL-MCNC: 167 MG/DL (ref 65–99)
HCT VFR BLD AUTO: 37.5 % (ref 34–46.6)
HGB BLD-MCNC: 11.6 G/DL (ref 12–15.9)
IMM GRANULOCYTES # BLD AUTO: 0.03 10*3/MM3 (ref 0–0.05)
IMM GRANULOCYTES NFR BLD AUTO: 0.4 % (ref 0–0.5)
INR PPP: 1.22 (ref 0.9–1.1)
LIPASE SERPL-CCNC: 13 U/L (ref 13–60)
LYMPHOCYTES # BLD AUTO: 1.65 10*3/MM3 (ref 0.7–3.1)
LYMPHOCYTES NFR BLD AUTO: 22.9 % (ref 19.6–45.3)
MCH RBC QN AUTO: 28 PG (ref 26.6–33)
MCHC RBC AUTO-ENTMCNC: 30.9 G/DL (ref 31.5–35.7)
MCV RBC AUTO: 90.4 FL (ref 79–97)
MONOCYTES # BLD AUTO: 0.48 10*3/MM3 (ref 0.1–0.9)
MONOCYTES NFR BLD AUTO: 6.7 % (ref 5–12)
NEUTROPHILS NFR BLD AUTO: 4.94 10*3/MM3 (ref 1.7–7)
NEUTROPHILS NFR BLD AUTO: 68.6 % (ref 42.7–76)
NT-PROBNP SERPL-MCNC: ABNORMAL PG/ML (ref 0–1800)
PLATELET # BLD AUTO: 227 10*3/MM3 (ref 140–450)
PMV BLD AUTO: 9.6 FL (ref 6–12)
POTASSIUM SERPL-SCNC: 3.4 MMOL/L (ref 3.5–5.2)
PROT SERPL-MCNC: 7.2 G/DL (ref 6–8.5)
PROTHROMBIN TIME: 15 SECONDS (ref 12.1–15)
RBC # BLD AUTO: 4.15 10*6/MM3 (ref 3.77–5.28)
SODIUM SERPL-SCNC: 143 MMOL/L (ref 136–145)
TROPONIN T SERPL-MCNC: <0.01 NG/ML (ref 0–0.03)
WBC # BLD AUTO: 7.2 10*3/MM3 (ref 3.4–10.8)

## 2020-09-27 PROCEDURE — 83690 ASSAY OF LIPASE: CPT | Performed by: EMERGENCY MEDICINE

## 2020-09-27 PROCEDURE — 85610 PROTHROMBIN TIME: CPT | Performed by: EMERGENCY MEDICINE

## 2020-09-27 PROCEDURE — 99284 EMERGENCY DEPT VISIT MOD MDM: CPT | Performed by: EMERGENCY MEDICINE

## 2020-09-27 PROCEDURE — 83880 ASSAY OF NATRIURETIC PEPTIDE: CPT | Performed by: EMERGENCY MEDICINE

## 2020-09-27 PROCEDURE — 93005 ELECTROCARDIOGRAM TRACING: CPT | Performed by: EMERGENCY MEDICINE

## 2020-09-27 PROCEDURE — 99284 EMERGENCY DEPT VISIT MOD MDM: CPT

## 2020-09-27 PROCEDURE — 84484 ASSAY OF TROPONIN QUANT: CPT | Performed by: EMERGENCY MEDICINE

## 2020-09-27 PROCEDURE — 96374 THER/PROPH/DIAG INJ IV PUSH: CPT

## 2020-09-27 PROCEDURE — 85025 COMPLETE CBC W/AUTO DIFF WBC: CPT | Performed by: EMERGENCY MEDICINE

## 2020-09-27 PROCEDURE — 80053 COMPREHEN METABOLIC PANEL: CPT | Performed by: EMERGENCY MEDICINE

## 2020-09-27 PROCEDURE — 85730 THROMBOPLASTIN TIME PARTIAL: CPT | Performed by: EMERGENCY MEDICINE

## 2020-09-27 PROCEDURE — 71046 X-RAY EXAM CHEST 2 VIEWS: CPT

## 2020-09-27 PROCEDURE — 93010 ELECTROCARDIOGRAM REPORT: CPT | Performed by: INTERNAL MEDICINE

## 2020-09-27 RX ORDER — BUMETANIDE 0.25 MG/ML
2 INJECTION INTRAMUSCULAR; INTRAVENOUS ONCE
Status: COMPLETED | OUTPATIENT
Start: 2020-09-27 | End: 2020-09-27

## 2020-09-27 RX ORDER — SODIUM CHLORIDE 0.9 % (FLUSH) 0.9 %
10 SYRINGE (ML) INJECTION AS NEEDED
Status: DISCONTINUED | OUTPATIENT
Start: 2020-09-27 | End: 2020-09-27 | Stop reason: HOSPADM

## 2020-09-27 RX ADMIN — BUMETANIDE 2 MG: 0.25 INJECTION INTRAMUSCULAR; INTRAVENOUS at 13:48

## 2020-09-28 ENCOUNTER — PATIENT OUTREACH (OUTPATIENT)
Dept: CASE MANAGEMENT | Facility: OTHER | Age: 75
End: 2020-09-28

## 2020-09-28 DIAGNOSIS — E11.69 TYPE 2 DIABETES MELLITUS WITH OTHER SPECIFIED COMPLICATION, WITHOUT LONG-TERM CURRENT USE OF INSULIN (HCC): Primary | ICD-10-CM

## 2020-09-28 NOTE — OUTREACH NOTE
Care Plan Note      Responses   Annual Wellness Visit:   Patient Will Schedule   AWV Materials  Send Materials   Care Gaps Addressed  Flu Shot, Mammogram, Diabetic Eye Exam, Diabetic A1C, Colon Cancer Screening   Colon Cancer Screening Type  Colonoscopy   Colonoscopy Status  Up to Date (< 10 yrs)   HbA1c Status  Up to Date-within defined limits   Diabetic Eye Exam Status  Patient will Complete   Flu Shot Status  Patient will Complete   Mammogram Status  Patient will Complete   Specific Disease Process Teaching  Heart Failure, Hypertension, Diabetes   Other Patient Education/Resources   24/7 [facility] Nurse Call Line, Advanced Care Planning, [facility]   24/7 Nurse Call Line Education Method  Send Materials   ACP Education Method  -- [Completed]   MyChart Education Method  -- [Active]   Does patient have depression diagnosis?  Yes   Advanced Directives:  Patient Has   Ed Visits past 12 months:  1   Hospitalizations past 12 months  2 or 3   Medication Adherence  Medications understood        The main concerns and/or symptoms the patient would like to address are: Talked with patient. Discussed 9/27/20 ED visit regarding acute on chronic diastolic CHF . Patient states to be compliant with ED recommendations; SOB has  Improved and has PCP appointment 10/1/20 for follow up and recommendations. She reports no difficulty with chest pain; SOB; appetite or sleeping.  Patient lives alone; independent with ADL's; meal preparation; transportation and ambulates with a cane . She states to be  compliant with medications; medical appointments;monitoring of blood pressure; blood sugar and weight. She states blood sugars have been ranging between 120-140.     Education/instruction provided by Care Coordinator: Reviewed with patient ED recommendations; education regarding DM; HTN; CHF;  COVID 19 precautions; 24/7 Nurse Line Telephone number; ACM contact information; Advance Directives; My Chart; gaps in care; MWV and Case  Management services.  Patient verbalized understanding and states to appreciate phone call.  No further questions or concerns voiced at this time.     Follow Up Outreach Due: Follow up as needed.     Shanda Gerardo RN  Ambulatory     9/28/2020, 17:41 EDT

## 2020-10-01 ENCOUNTER — OFFICE VISIT (OUTPATIENT)
Dept: INTERNAL MEDICINE | Facility: CLINIC | Age: 75
End: 2020-10-01

## 2020-10-01 VITALS
BODY MASS INDEX: 32.65 KG/M2 | WEIGHT: 196 LBS | DIASTOLIC BLOOD PRESSURE: 72 MMHG | RESPIRATION RATE: 18 BRPM | SYSTOLIC BLOOD PRESSURE: 130 MMHG | OXYGEN SATURATION: 98 % | HEART RATE: 81 BPM | HEIGHT: 65 IN | TEMPERATURE: 97 F

## 2020-10-01 DIAGNOSIS — I10 ESSENTIAL HYPERTENSION: ICD-10-CM

## 2020-10-01 DIAGNOSIS — C50.211 MALIGNANT NEOPLASM OF UPPER-INNER QUADRANT OF RIGHT BREAST IN FEMALE, ESTROGEN RECEPTOR POSITIVE (HCC): ICD-10-CM

## 2020-10-01 DIAGNOSIS — Z17.0 MALIGNANT NEOPLASM OF UPPER-INNER QUADRANT OF RIGHT BREAST IN FEMALE, ESTROGEN RECEPTOR POSITIVE (HCC): ICD-10-CM

## 2020-10-01 DIAGNOSIS — E03.9 ACQUIRED HYPOTHYROIDISM: ICD-10-CM

## 2020-10-01 DIAGNOSIS — E11.69 TYPE 2 DIABETES MELLITUS WITH OTHER SPECIFIED COMPLICATION, WITHOUT LONG-TERM CURRENT USE OF INSULIN (HCC): ICD-10-CM

## 2020-10-01 DIAGNOSIS — Z12.31 ENCOUNTER FOR SCREENING MAMMOGRAM FOR MALIGNANT NEOPLASM OF BREAST: ICD-10-CM

## 2020-10-01 DIAGNOSIS — E55.9 VITAMIN D DEFICIENCY: ICD-10-CM

## 2020-10-01 DIAGNOSIS — E53.8 COBALAMIN DEFICIENCY: ICD-10-CM

## 2020-10-01 DIAGNOSIS — E78.5 DYSLIPIDEMIA: ICD-10-CM

## 2020-10-01 DIAGNOSIS — I50.33 ACUTE ON CHRONIC DIASTOLIC CONGESTIVE HEART FAILURE (HCC): Primary | ICD-10-CM

## 2020-10-01 PROCEDURE — 99214 OFFICE O/P EST MOD 30 MIN: CPT | Performed by: NURSE PRACTITIONER

## 2020-10-01 NOTE — PROGRESS NOTES
Subjective   Jung Short is a 75 y.o. female presenting today for follow up of   Chief Complaint   Patient presents with   • Hospital Follow Up Visit   • Follow-up   • Hypertension   • Hyperlipidemia   • Diabetes       History of Present Illness     Patient Active Problem List   Diagnosis   • Chronic anemia   • Thoracic back pain   • Malignant neoplasm of upper-inner quadrant of right breast in female, estrogen receptor positive (CMS/HCC)   • Cardiomyopathy (CMS/HCC)   • Disc disorder of cervical region   • Neck pain   • Chronic pain   • Depression   • DM2 (diabetes mellitus, type 2) (CMS/HCC)   • Dyslipidemia   • Gastroesophageal reflux disease with esophagitis   • Generalized pain   • Hypertension   • Hypothyroidism   • Incisional hernia   • Mitral valve insufficiency   • Nausea   • Osteopenia of spine   • Arthralgia of multiple joints   • Peripheral neuropathy   • Pulmonary hypertension (CMS/HCC)   • PITTS (dyspnea on exertion)   • Tricuspid valve insufficiency   • Cobalamin deficiency   • Vitamin D deficiency   • Left knee pain   • Arthritis of knee   • DDD (degenerative disc disease), lumbar   • Lumbar facet arthropathy   • Pain in shoulder   • Chronic gout of multiple sites   • Encounter for long-term (current) use of high-risk medication   • Primary osteoarthritis of left knee   • Physical deconditioning   • Acute on chronic HFrEF (heart failure with reduced ejection fraction) (CMS/HCC)   • CKD (chronic kidney disease) stage 3, GFR 30-59 ml/min   • Diastolic congestive heart failure (CMS/HCC)   • Pleural effusion   • Acute respiratory failure with hypoxia (CMS/HCC)   • Periprosthetic subtrochanteric fracture of femur   • Traumatic closed nondisp torus fracture of distal radial metaphysis, right, initial encounter   • Acute on chronic congestive heart failure (CMS/HCC)   • Atherosclerotic heart disease of native coronary artery without angina pectoris         Current Outpatient Medications:   •  aspirin  81 MG EC tablet, Take 81 mg by mouth Daily., Disp: , Rfl:   •  bumetanide (BUMEX) 0.5 MG tablet, Take 2 tablets by mouth Daily., Disp: 190 tablet, Rfl: 3  •  carvedilol (COREG) 12.5 MG tablet, TAKE 1 TABLET BY MOUTH TWICE DAILY WITH MEALS, Disp: 180 tablet, Rfl: 0  •  cholecalciferol (VITAMIN D3) 25 MCG (1000 UT) tablet, Take 1 tablet by mouth Daily., Disp: 90 tablet, Rfl: 3  •  DULoxetine (CYMBALTA) 60 MG capsule, TAKE 1 CAPSULE BY MOUTH DAILY, Disp: 90 capsule, Rfl: 1  •  esomeprazole (nexIUM) 20 MG capsule, Take 20 mg by mouth Every Morning Before Breakfast., Disp: , Rfl:   •  glucose blood test strip, Take BG daily, Disp: 50 each, Rfl: 12  •  HYDROcodone-acetaminophen (NORCO) 5-325 MG per tablet, Take 1 tablet by mouth Every 6 (Six) Hours As Needed for Severe Pain ., Disp: 120 tablet, Rfl: 0  •  levothyroxine (SYNTHROID, LEVOTHROID) 25 MCG tablet, TAKE 1 TABLET BY MOUTH DAILY, Disp: 90 tablet, Rfl: 3  •  losartan (COZAAR) 25 MG tablet, TAKE 1 TABLET BY MOUTH DAILY, Disp: 90 tablet, Rfl: 3  •  metFORMIN (GLUCOPHAGE) 1000 MG tablet, Take 1 tablet by mouth Daily With Breakfast., Disp: 30 tablet, Rfl: 2  •  metFORMIN (GLUCOPHAGE) 500 MG tablet, Take 1 tablet by mouth Daily With Dinner., Disp: 30 tablet, Rfl: 2  •  methocarbamol (ROBAXIN) 500 MG tablet, Take 1 tablet by mouth 3 (Three) Times a Day., Disp: 90 tablet, Rfl: 3  •  Multiple Vitamins-Minerals (MULTIVITAMIN ADULT PO), Take 1 tablet by mouth Daily., Disp: , Rfl:   •  pravastatin (PRAVACHOL) 10 MG tablet, TAKE 1 TABLET BY MOUTH DAILY, Disp: 90 tablet, Rfl: 3  •  pregabalin (LYRICA) 75 MG capsule, TAKE 1 CAPSULE BY MOUTH TWICE DAILY, Disp: 60 capsule, Rfl: 5    Pt notes an ER visit since her last appt.    On 09/27/2020 she presented w/ c/o 2 days of increasing SOB. Dx was acute of chronic CHF. She responded well to one dose of Bumex. She was released after 6 hours. She sts she feels well today. Denies CP, SOB, new/worsening LE edema, palpitations, HA, or  "dizziness. She was instructed to schedule f/u w/ Dr. Btaes. She has not done this. Review of her prior records shows that she has not had Cardiology f/u since 05/19.     She has HTN. Her current therapy includes carvedilol and losartan. She tolerates this regimen w/o issue.    She has DM2. She takes Metformin 1000mg at breakfast and 500mg in the evenings. She does not self monitor BG. She tolerates this regimen w/o issue.    She has hypothyroidism and is on Levothyroxine.    She has HLD and is on statin therapy and ASA. Doing well with this.    She has a hx of Vit B12 def. No supp currently.    She has Vit D def and takes OTC supp for this.        The following portions of the patient's history were reviewed and updated as appropriate: allergies, current medications, past family history, past medical history, past social history, past surgical history and problem list.    Review of Systems   Constitutional: Positive for fatigue.   HENT: Negative.    Eyes: Negative.    Respiratory: Negative.    Cardiovascular: Negative.    Gastrointestinal: Negative.    Endocrine: Negative.    Genitourinary: Negative.    Musculoskeletal: Positive for arthralgias and myalgias.   Skin: Negative.    Neurological: Positive for weakness.   Hematological: Negative.    Psychiatric/Behavioral: Negative.        Objective   Vitals:    10/01/20 1349   BP: 130/72   BP Location: Left arm   Patient Position: Sitting   Cuff Size: Adult   Pulse: 81   Resp: 18   Temp: 97 °F (36.1 °C)   TempSrc: Temporal   SpO2: 98%   Weight: 88.9 kg (196 lb) This weight is reported by the patient. The patient refused to be weighed.   Height: 165.1 cm (65\")     Body mass index is 32.62 kg/m².  Nursing notes and vital reviewed.    Physical Exam  Constitutional:       General: She is not in acute distress.     Appearance: She is well-developed.   Neck:      Musculoskeletal: Neck supple.      Thyroid: No thyroid mass or thyromegaly.   Cardiovascular:      Rate and " Rhythm: Regular rhythm.      Heart sounds: S1 normal and S2 normal.   Pulmonary:      Effort: Pulmonary effort is normal.      Breath sounds: Normal breath sounds.   Lymphadenopathy:      Cervical: No cervical adenopathy.   Neurological:      Mental Status: She is alert and oriented to person, place, and time.   Psychiatric:         Attention and Perception: She is attentive.         Behavior: Behavior normal.         Thought Content: Thought content normal.         Recent Results (from the past 672 hour(s))   CBC & Differential    Collection Time: 09/22/20 11:56 AM    Specimen: Blood   Result Value Ref Range    WBC 6.2 3.4 - 10.8 x10E3/uL    RBC 4.04 3.77 - 5.28 x10E6/uL    Hemoglobin 11.5 11.1 - 15.9 g/dL    Hematocrit 35.9 34.0 - 46.6 %    MCV 89 79 - 97 fL    MCH 28.5 26.6 - 33.0 pg    MCHC 32.0 31.5 - 35.7 g/dL    RDW 14.1 11.7 - 15.4 %    Platelets 224 150 - 450 x10E3/uL    Neutrophil Rel % 67 Not Estab. %    Lymphocyte Rel % 22 Not Estab. %    Monocyte Rel % 8 Not Estab. %    Eosinophil Rel % 3 Not Estab. %    Basophil Rel % 0 Not Estab. %    Neutrophils Absolute 4.2 1.4 - 7.0 x10E3/uL    Lymphocytes Absolute 1.3 0.7 - 3.1 x10E3/uL    Monocytes Absolute 0.5 0.1 - 0.9 x10E3/uL    Eosinophils Absolute 0.2 0.0 - 0.4 x10E3/uL    Basophils Absolute 0.0 0.0 - 0.2 x10E3/uL    Immature Granulocyte Rel % 0 Not Estab. %    Immature Grans Absolute 0.0 0.0 - 0.1 x10E3/uL   Comprehensive Metabolic Panel    Collection Time: 09/22/20 11:56 AM    Specimen: Blood   Result Value Ref Range    Glucose 131 (H) 65 - 99 mg/dL    BUN 7 (L) 8 - 27 mg/dL    Creatinine 0.76 0.57 - 1.00 mg/dL    eGFR Non African Am 76 >59 mL/min/1.73    eGFR African Am 88 >59 mL/min/1.73    BUN/Creatinine Ratio 9 (L) 12 - 28    Sodium 143 134 - 144 mmol/L    Potassium 3.6 3.5 - 5.2 mmol/L    Chloride 104 96 - 106 mmol/L    Total CO2 26 20 - 29 mmol/L    Calcium 9.4 8.7 - 10.3 mg/dL    Total Protein 6.6 6.0 - 8.5 g/dL    Albumin 4.0 3.7 - 4.7 g/dL     Globulin 2.6 1.5 - 4.5 g/dL    A/G Ratio 1.5 1.2 - 2.2    Total Bilirubin 0.6 0.0 - 1.2 mg/dL    Alkaline Phosphatase 97 39 - 117 IU/L    AST (SGOT) 15 0 - 40 IU/L    ALT (SGPT) 11 0 - 32 IU/L   Lipid Panel With / Chol / HDL Ratio    Collection Time: 09/22/20 11:56 AM    Specimen: Blood   Result Value Ref Range    Total Cholesterol 146 100 - 199 mg/dL    Triglycerides 142 0 - 149 mg/dL    HDL Cholesterol 59 >39 mg/dL    VLDL Cholesterol Sergio 24 5 - 40 mg/dL    LDL Chol Calc (NIH) 63 0 - 99 mg/dL    Chol/HDL Ratio 2.5 0.0 - 4.4 ratio   TSH    Collection Time: 09/22/20 11:56 AM    Specimen: Blood   Result Value Ref Range    TSH 1.500 0.450 - 4.500 uIU/mL   T4, Free    Collection Time: 09/22/20 11:56 AM    Specimen: Blood   Result Value Ref Range    Free T4 1.65 0.82 - 1.77 ng/dL   Vitamin B12    Collection Time: 09/22/20 11:56 AM    Specimen: Blood   Result Value Ref Range    Vitamin B-12 262 232 - 1,245 pg/mL   Vitamin D 25 Hydroxy    Collection Time: 09/22/20 11:56 AM    Specimen: Blood   Result Value Ref Range    25 Hydroxy, Vitamin D 44.5 30.0 - 100.0 ng/mL   Urinalysis With Culture If Indicated -    Collection Time: 09/22/20 11:56 AM    Specimen: Urine   Result Value Ref Range    Specific Gravity, UA 1.029 1.005 - 1.030    pH, UA 5.5 5.0 - 7.5    Color, UA Yellow Yellow    Appearance, UA Clear Clear    Leukocytes, UA Trace (A) Negative    Protein 3+ (A) Negative/Trace    Glucose, UA Trace (A) Negative    Ketones Negative Negative    Blood, UA Negative Negative    Bilirubin, UA Negative Negative    Urobilinogen, UA 1.0 0.2 - 1.0 mg/dL    Nitrite, UA Negative Negative    Microscopic Examination See below:     Urinalysis Reflex Comment    Microalbumin / Creatinine Urine Ratio - Urine, Clean Catch    Collection Time: 09/22/20 11:56 AM    Specimen: Urine, Clean Catch   Result Value Ref Range    Creatinine, Urine 193.5 Not Estab. mg/dL    Microalbumin, Urine 1,149.3 Not Estab. ug/mL    Microalbumin/Creatinine Ratio 594  (H) 0 - 29 mg/g creat   Microscopic Examination -    Collection Time: 09/22/20 11:56 AM   Result Value Ref Range    WBC, UA 11-30 (A) 0 - 5 /hpf    RBC, UA 0-2 0 - 2 /hpf    Epithelial Cells (non renal) 0-10 0 - 10 /hpf    Bacteria, UA Few None seen/Few   Urine culture, Comprehensive - ,    Collection Time: 09/22/20 11:56 AM   Result Value Ref Range    Urine Culture Final report     Result 1 Comment    Comprehensive Metabolic Panel    Collection Time: 09/27/20  1:49 PM    Specimen: Blood   Result Value Ref Range    Glucose 167 (H) 65 - 99 mg/dL    BUN 8 8 - 23 mg/dL    Creatinine 0.81 0.57 - 1.00 mg/dL    Sodium 143 136 - 145 mmol/L    Potassium 3.4 (L) 3.5 - 5.2 mmol/L    Chloride 103 98 - 107 mmol/L    CO2 26.7 22.0 - 29.0 mmol/L    Calcium 9.8 8.6 - 10.5 mg/dL    Total Protein 7.2 6.0 - 8.5 g/dL    Albumin 4.00 3.50 - 5.20 g/dL    ALT (SGPT) 10 1 - 33 U/L    AST (SGOT) 16 1 - 32 U/L    Alkaline Phosphatase 81 39 - 117 U/L    Total Bilirubin 1.2 0.0 - 1.2 mg/dL    eGFR Non African Amer 69 >60 mL/min/1.73    Globulin 3.2 gm/dL    A/G Ratio 1.3 g/dL    BUN/Creatinine Ratio 9.9 7.0 - 25.0    Anion Gap 13.3 5.0 - 15.0 mmol/L   Protime-INR    Collection Time: 09/27/20  1:49 PM    Specimen: Blood   Result Value Ref Range    Protime 15.0 12.1 - 15.0 Seconds    INR 1.22 (H) 0.90 - 1.10   aPTT    Collection Time: 09/27/20  1:49 PM    Specimen: Blood   Result Value Ref Range    PTT 36.1 24.3 - 38.1 seconds   Lipase    Collection Time: 09/27/20  1:49 PM    Specimen: Blood   Result Value Ref Range    Lipase 13 13 - 60 U/L   BNP    Collection Time: 09/27/20  1:49 PM    Specimen: Blood   Result Value Ref Range    proBNP 20,371.0 (H) 0.0-1,800.0 pg/mL   Troponin    Collection Time: 09/27/20  1:49 PM    Specimen: Blood   Result Value Ref Range    Troponin T <0.010 0.000 - 0.030 ng/mL   CBC Auto Differential    Collection Time: 09/27/20  1:49 PM    Specimen: Blood   Result Value Ref Range    WBC 7.20 3.40 - 10.80 10*3/mm3    RBC  4.15 3.77 - 5.28 10*6/mm3    Hemoglobin 11.6 (L) 12.0 - 15.9 g/dL    Hematocrit 37.5 34.0 - 46.6 %    MCV 90.4 79.0 - 97.0 fL    MCH 28.0 26.6 - 33.0 pg    MCHC 30.9 (L) 31.5 - 35.7 g/dL    RDW 14.4 12.3 - 15.4 %    RDW-SD 48.0 37.0 - 54.0 fl    MPV 9.6 6.0 - 12.0 fL    Platelets 227 140 - 450 10*3/mm3    Neutrophil % 68.6 42.7 - 76.0 %    Lymphocyte % 22.9 19.6 - 45.3 %    Monocyte % 6.7 5.0 - 12.0 %    Eosinophil % 1.1 0.3 - 6.2 %    Basophil % 0.3 0.0 - 1.5 %    Immature Grans % 0.4 0.0 - 0.5 %    Neutrophils, Absolute 4.94 1.70 - 7.00 10*3/mm3    Lymphocytes, Absolute 1.65 0.70 - 3.10 10*3/mm3    Monocytes, Absolute 0.48 0.10 - 0.90 10*3/mm3    Eosinophils, Absolute 0.08 0.00 - 0.40 10*3/mm3    Basophils, Absolute 0.02 0.00 - 0.20 10*3/mm3    Immature Grans, Absolute 0.03 0.00 - 0.05 10*3/mm3     Each of these lab results were discussed individually in detail with the patient.      Assessment/Plan   Diagnoses and all orders for this visit:    1. Acute on chronic diastolic congestive heart failure (CMS/HCC) (Primary)  Comments:  - currently asymptomatic  - advised she needs ASAP f/u w/ Cards    2. Essential hypertension  Comments:  - well controlled    3. Type 2 diabetes mellitus with other specified complication, without long-term current use of insulin (CMS/HCC)  Comments:  - well controlled    4. Acquired hypothyroidism  Comments:  - well controlled    5. Dyslipidemia  Comments:  - well controlled    6. Cobalamin deficiency  Comments:  - well controlled    7. Vitamin D deficiency  Comments:  - well controlled    8. Encounter for screening mammogram for malignant neoplasm of breast  -     Mammo Screening Digital Tomosynthesis Bilateral With CAD; Future    9. Malignant neoplasm of upper-inner quadrant of right breast in female, estrogen receptor positive (CMS/HCC)  -     Mammo Screening Digital Tomosynthesis Bilateral With CAD; Future      Except as noted above, pt will continue current medications as noted in  the medication list.    Continue to follow with specialty care as directed by them.    Advised to contact pharmacy for flu vaccine as our office is out of stock.        Medications, including side effects, were discussed with the patient. Patient verbalized understanding.  The plan of care was discussed. All questions were answered. Patient verbalized understanding.      Return in about 3 months (around 1/1/2021) for Medicare Wellness.

## 2020-10-05 ENCOUNTER — TELEPHONE (OUTPATIENT)
Dept: CARDIOLOGY | Facility: CLINIC | Age: 75
End: 2020-10-05

## 2020-10-05 NOTE — TELEPHONE ENCOUNTER
FYI: Patient calling and stating she went to the ED a few ago and they told her to call your office and make you aware that she has had 3 spells of SOA in the last 8 months.     Nex appointment is 12/28/2020. Would you like to see this patient before then?    Thank you, Chel Shrestha LPN

## 2020-10-06 NOTE — PROGRESS NOTES
Date of Office Visit: 10/07/2020  Encounter Provider: JOSE ANTONIO Leigh  Place of Service: [facility] CARDIOLOGY  Patient Name: Jung Short  :1945  Primary Cardiologist: Dr. Bates    CC:  dyspnea    Dear Kathryn    HPI: Jung Short is a pleasant 75 y.o. female who presents 10/07/2020 for cardiac follow up.      She presented to [facility] in 2015 with acute congestive heart failure, systolic and diastolic. She had a 2-D echocardiogram which revealed an ejection fraction of 10-15%, moderate left ventricular dilation, severe mitral and tricuspid insufficiency, and her aortic valve was normal. She was transferred to [facility] for a cardiac catheterization. 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 She also had a right heart catheterization done which revealed an RV pressure of 50/20, PA pressure 50/30 with the main of 38 and a capillary wedge pressure of 31 mmHg. Her right atrial pressure was 20 mmHg. Her cardiac output was 3.9 liters per minute. She was treated medically and diuresed.       She had a 2-D echocardiogram with Doppler performed on 2015 which showed her ejection fraction to be 50%. There was normal segmental wall motion. There was moderate left atrial dilation. A small patent foramen ovale/atrial septal defect by saline contrast study and trace mitral and tricuspid insufficiency. This is a significant improvement from her prior echocardiogram.       She is on anastrozole for right breast cancer.       She had normal renal ultrasound ordered by Dr. Hannah 2017.  She has chronic renal insufficiency and follows  "with Dr. Hannah.  She had a carotid duplex study done April 2017 which showed mild carotid arteries.  She had a Holter monitor done just benign 4/2017.      Echo 9/2019:  · Calculated EF = 52.0%.  · Left ventricular systolic function is normal.  · Left atrial cavity size is mildly dilated.  · Left ventricular diastolic dysfunction (grade II) consistent with pseudonormalization.  · Mild-to-moderate mitral valve regurgitation is present  · Mild tricuspid valve regurgitation is present.  · Estimated right ventricular systolic pressure from tricuspid regurgitation is mildly elevated (35-45 mmHg).  · Calculated right ventricular systolic pressure from tricuspid regurgitation is 40.3 mmHg.     She had a fall off of some porch steps on 11/30/2019 while visiting her grandchildren in [place].  She does not really know what happened all she knows that she went up on the ground with her leg twisted \"every which way\".  She states she broke her right femur and now has a batool and she broke her ankle in 3 places.  She was in rehab for 2 months and then had to be discharged because her Medicare ran out.  She states she was home for a couple of days and was feeling short of air and could not sleep well the next evening.  She got up in her wheelchair to go to the kitchen because she just could not breathe and as she was reaching across her kitchen table the wheelchair went out from underneath of her and she fell and broke her right wrist.  She was then admitted again to the hospital and again discharged this time to [facility] for 2 months.  She states it is been a long road and she still uses a cane and has a noticeable limp when walking.  She states about 1 to 2 months ago she fell twice in her garage.  She has not fallen since then.  She also reports a history of chronic back pain from previous back surgery.     She presented to the emergency department at [facility] on 4/10/2020 with shortness of air.  She does have a " "history of CHF but had not been bothered by this for a while.  She had noted a 6 pound weight gain over the last week.  She received IV Lasix in the ER with good response.  She noted episodes of heart failure in past have not been as bad as this one.  She denies cough, chest pain, syncopal sensation, nausea, diaphoresis with this episode.     She was placed on oxygen and monitored overnight.  She was able to be discharged home the next day.     She was seen in the emergency department on 9/27/2020 with complaints of shortness of air for the last 2 days.  Labs were notable for a proBNP 20,371.  CBC with an H&H 11.6/37.5, CMP with a glucose of 167 potassium 3.4, otherwise unremarkable.  She had a normal lipase and a negative troponin.  A chest x-ray did show - Cardiac enlargement with hilar prominence that may be pulmonary vascular congestion with trace small left pleural effusion but overall improvement since most recent study of April.  She received IV Bumex with good response.  She was able to be discharged that same day.    She is here today to follow-up.  She states she is feeling like she is having more episodes of congestive heart failure and shortness of breath and increasing intervals.  She denies any palpitations, lower extremity edema, dizziness or lightheadedness.  She does not have any chest pain or chest pressure.  Her biggest complaints are intermittent shortness of air and fatigue.  She states she cannot lie flat to sleep.  She has been sleeping in a recliner \"for very long time\".  She has been taking his medications as directed.  She states her blood pressures at home are usually 120-130/70-80.  She states occasionally she will get a \"160\" but that is not very often.  I do note a lipid panel from September 2020 with a total cholesterol 146, triglycerides 142, HDL 59, LDL 63.  She denies any unexplained bleeding, dark or tarry stools.          Past Medical History:   Diagnosis Date   • Anemia    • " Benign breast disease    • Cancer (CMS/HCC) 2015    Right breast   • Cellulitis of leg     May-2003 right lower extremity   • CHF (congestive heart failure) (CMS/HCC)     Dr Bates follows   • Chronic kidney disease     Dr Hannah follows   • Chronic ulcer of right foot (CMS/HCC)     Non-pressure, history of    • Closed fracture of patella 9/5/2017   • Colon polyp    • Depression    • Diabetic peripheral neuropathy (CMS/HCC)    • Diverticulosis    • Femoral fracture (CMS/HCC)     right   • Fracture of left wrist     history of   • Gastroesophageal reflux    • Hiatal hernia    • Hyperlipidemia    • Hypertension    • Hypothyroidism    • Impingement syndrome of right shoulder    • Joint pain    • Menopausal disorder    • Methicillin resistant Staphylococcus aureus infection 2012    after bladder surgery   • Nonischemic cardiomyopathy (CMS/HCC)     Ejection fraction 10% per 2-D echo with Doppler however she did have cardiac catheterization April 2015 which showed ejection fraction 20% with global hypokinesis and severe mitral insufficiency   • Osteoarthritis     generalized, sched jania TKA   • Osteomyelitis (CMS/HCC) 2/2/2016   • Paroxysmal atrial fibrillation (CMS/HCC)     Dr Bates follows   • Radius/ulna fracture, left, closed, initial encounter 10/21/2017   • Shoulder pain, bilateral     has tears on both sides   • Syncope, psychogenic 4/19/2017   • Toe ulcer (CMS/HCC)     h/o to both feet, d/t shoes rubbing per patient   • Transient alteration of awareness 4/19/2017       Past Surgical History:   Procedure Laterality Date   • ABDOMINAL SURGERY  2012    had to be reopened after bladder surgery d/t infection   • AMPUTATION FOOT / TOE Right 11/2013    Rt foot amputation MTP first toe   • BLADDER SURGERY  2012   • BREAST BIOPSY     • BREAST SURGERY  06/29/2015    Percutaneous ultrasound-guided placement of metal localized clip 1st lesion   • CARDIAC CATHETERIZATION  2015   • CATARACT EXTRACTION Bilateral 2017   •  COLONOSCOPY     • DEBRIDEMENT  FOOT Right 2013    During hospitalization    • EPIDURAL BLOCK      4 chronic back pain and leg pain last epidurals done  she had no benefit at all from this procedure.   • FEMUR FRACTURE SURGERY     • FOOT SURGERY Bilateral     several   • HERNIA REPAIR      At the abdomen 2007 Dr. Mendez   • INCISIONAL HERNIA REPAIR  2014    Recurrent. Incarcerated. With mesh implantation   • MASTECTOMY Right 2015   • SHOULDER SURGERY Right     x2 RCR   • TOTAL ABDOMINAL HYSTERECTOMY      with oophorectomy-Done 2012 secondary to vaginal wall prolapse.   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   Tobacco Use   • Smoking status: Former Smoker     Packs/day: 3.00     Years: 30.00     Pack years: 90.00     Types: Cigarettes     Quit date:      Years since quittin.7   • Smokeless tobacco: Never Used   • Tobacco comment: daily caffiene   Substance and Sexual Activity   • Alcohol use: No     Comment: h/o quit    • Drug use: No   • Sexual activity: Defer     Partners: Male     Comment: celibate   Social History Narrative         Volunteers here at [facility]     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    Lots of friends    2 daughters - lives in [place] [facility] and [place] (graphic design)    Religion [employer]       Family History   Problem Relation Age of Onset   • Colonic polyp Mother    • Hypertension Mother    • Migraines Mother    • Mental illness Mother    • Depression Mother    • Coronary artery disease Father    • Other Father         Cardiac Disorder    • Colon cancer Father    • Kidney disease Father    • Cancer Father    • Breast cancer Maternal Aunt    • Colon cancer Brother    • Alcohol abuse Brother    • Arthritis Sister    • Hypertension Brother    • Lung disease Brother    • Alcohol abuse Brother    • Malig Hyperthermia Neg Hx        The following portion of the patient's history were reviewed and updated as appropriate: past medical history, past surgical history, past social history, past family history, allergies, current medications, and problem list.    Review of Systems   Constitution: Positive for malaise/fatigue. Negative for diaphoresis and fever.   HENT: Negative for congestion, hearing loss, hoarse voice, nosebleeds and sore throat.    Eyes: Negative for photophobia, vision loss in left eye, vision loss in right eye and visual disturbance.   Cardiovascular: Positive for orthopnea. Negative for chest pain, dyspnea on exertion, irregular heartbeat, leg swelling, near-syncope, palpitations, paroxysmal nocturnal dyspnea and syncope.   Respiratory: Positive for shortness of breath. Negative for cough, hemoptysis, sleep disturbances due to breathing, snoring, sputum production and wheezing.    Endocrine: Negative for cold intolerance, heat intolerance, polydipsia, polyphagia and polyuria.   Hematologic/Lymphatic: Negative for bleeding problem. Does not bruise/bleed easily.   Skin: Negative for color change, dry skin, poor wound healing, rash and suspicious lesions.   Musculoskeletal: Negative for arthritis, back pain, falls, gout, joint pain, joint swelling, muscle cramps, muscle weakness and myalgias.   Gastrointestinal: Negative for bloating, abdominal pain, constipation, diarrhea, dysphagia, melena, nausea and vomiting.   Neurological: Negative for excessive daytime sleepiness, dizziness, headaches, light-headedness, loss of balance, numbness, paresthesias, seizures, vertigo and weakness.   Psychiatric/Behavioral: Negative for depression, memory  loss and substance abuse. The patient is not nervous/anxious.        Allergies   Allergen Reactions   • Dilaudid [Hydromorphone] Delirium and Confusion   • Latex Rash         Current Outpatient Medications:   •  aspirin 81 MG EC tablet, Take 81 mg by mouth Daily., Disp: , Rfl:   •  bumetanide (BUMEX) 0.5 MG tablet, Take 3 tablets by mouth Daily., Disp: 190 tablet, Rfl: 3  •  cholecalciferol (VITAMIN D3) 25 MCG (1000 UT) tablet, Take 1 tablet by mouth Daily., Disp: 90 tablet, Rfl: 3  •  DULoxetine (CYMBALTA) 60 MG capsule, TAKE 1 CAPSULE BY MOUTH DAILY, Disp: 90 capsule, Rfl: 1  •  esomeprazole (nexIUM) 20 MG capsule, Take 20 mg by mouth Every Morning Before Breakfast., Disp: , Rfl:   •  glucose blood test strip, Take BG daily, Disp: 50 each, Rfl: 12  •  HYDROcodone-acetaminophen (NORCO) 5-325 MG per tablet, Take 1 tablet by mouth Every 6 (Six) Hours As Needed for Severe Pain ., Disp: 120 tablet, Rfl: 0  •  levothyroxine (SYNTHROID, LEVOTHROID) 25 MCG tablet, TAKE 1 TABLET BY MOUTH DAILY, Disp: 90 tablet, Rfl: 3  •  losartan (COZAAR) 25 MG tablet, TAKE 1 TABLET BY MOUTH DAILY, Disp: 90 tablet, Rfl: 3  •  metFORMIN (GLUCOPHAGE) 1000 MG tablet, Take 1 tablet by mouth Daily With Breakfast., Disp: 30 tablet, Rfl: 2  •  metFORMIN (GLUCOPHAGE) 500 MG tablet, Take 1 tablet by mouth Daily With Dinner., Disp: 30 tablet, Rfl: 2  •  methocarbamol (ROBAXIN) 500 MG tablet, Take 1 tablet by mouth 3 (Three) Times a Day., Disp: 90 tablet, Rfl: 3  •  Multiple Vitamins-Minerals (MULTIVITAMIN ADULT PO), Take 1 tablet by mouth Daily., Disp: , Rfl:   •  pravastatin (PRAVACHOL) 10 MG tablet, TAKE 1 TABLET BY MOUTH DAILY, Disp: 90 tablet, Rfl: 3  •  pregabalin (LYRICA) 75 MG capsule, TAKE 1 CAPSULE BY MOUTH TWICE DAILY, Disp: 60 capsule, Rfl: 5  •  carvedilol (COREG) 25 MG tablet, Take 1 tablet by mouth 2 (Two) Times a Day., Disp: 180 tablet, Rfl: 3        Objective:     Vitals:    10/07/20 1413   BP: 160/86   Pulse: 76   Resp: 16   SpO2:  "95%   Weight: 90.7 kg (200 lb)   Height: 165.1 cm (65\")     Body mass index is 33.28 kg/m².      Vitals signs reviewed.   Constitutional:       General: Not in acute distress.     Appearance: Well-developed and well-groomed. Obese.   Eyes:      General:         Right eye: No discharge.         Left eye: No discharge.      Conjunctiva/sclera: Conjunctivae normal.   HENT:      Head: Normocephalic and atraumatic.      Right Ear: External ear normal.      Left Ear: External ear normal.      Nose: Nose normal.   Neck:      Musculoskeletal: Normal range of motion and neck supple.      Thyroid: No thyromegaly.      Vascular: No JVD.      Trachea: No tracheal deviation.      Lymphadenopathy: No cervical adenopathy.   Pulmonary:      Effort: Pulmonary effort is normal. No respiratory distress.      Breath sounds: Normal breath sounds. No wheezing. No rales.   Chest:      Chest wall: Not tender to palpatation.   Cardiovascular:      Normal rate. Regular rhythm.      No gallop.   Pulses:     Intact distal pulses.   Edema:     Peripheral edema absent.   Abdominal:      General: There is no distension.      Palpations: Abdomen is soft.      Tenderness: There is no abdominal tenderness.   Musculoskeletal: Normal range of motion.         General: No tenderness or deformity.   Skin:     General: Skin is warm and dry.      Findings: No erythema or rash.   Neurological:      Mental Status: Alert and oriented to person, place, and time.      Coordination: Coordination normal.   Psychiatric:         Behavior: Behavior normal.         Thought Content: Thought content normal.         Judgment: Judgment normal.             Procedures      Assessment:       Diagnosis Plan   1. Acute on chronic HFrEF (heart failure with reduced ejection fraction) (CMS/HCC)  Adult Transthoracic Echo Complete W/ Cont if Necessary Per Protocol   2. Other cardiomyopathy (CMS/HCC)  Adult Transthoracic Echo Complete W/ Cont if Necessary Per Protocol   3. " Atherosclerosis of native coronary artery of native heart without angina pectoris     4. Essential hypertension     5. Pulmonary hypertension (CMS/HCC)  Adult Transthoracic Echo Complete W/ Cont if Necessary Per Protocol   6. Nonrheumatic mitral valve regurgitation  Adult Transthoracic Echo Complete W/ Cont if Necessary Per Protocol   7. Nonrheumatic tricuspid valve regurgitation  Adult Transthoracic Echo Complete W/ Cont if Necessary Per Protocol   8. Abdominal bloating            Plan:       1. H/o Nonischemic cardiomyopathy. Her ejection fraction was 10-20%. At this time she is New York Heart Association class 2a. Her repeat echocardiogram 6/2015 showed LVEF 50% with trace mitral and tricuspid insufficiency. Echo 9/19 EF 52% with normal LV systolic function. She is concerned because she is having more episodes of CHF with shorter intervals in between.  Will increase carvedilol to 25 mg BID and increase bumes to 1.5 mg daily.  Will check an echo.  2. Hypertension- stable.  States home BP normally 120-130/70-80  3. DM, type 2.  4. Anomalous coronary artery.   5. H/o right breast cancer. Sees Dr. Draper.   6. Severe OA of the knees.  7. CKD - creatinine 9/27/2020- 0.81  8.  Left knee OA   9.  Valvular disease - check echo.    Increase BB and diuretic, check echo.    Keep appt with  for December 2020    As always, it has been a pleasure to participate in your patient's care. Thank you.       Sincerely,       JOSE ANTONIO Leigh      Current Outpatient Medications:   •  aspirin 81 MG EC tablet, Take 81 mg by mouth Daily., Disp: , Rfl:   •  bumetanide (BUMEX) 0.5 MG tablet, Take 3 tablets by mouth Daily., Disp: 190 tablet, Rfl: 3  •  cholecalciferol (VITAMIN D3) 25 MCG (1000 UT) tablet, Take 1 tablet by mouth Daily., Disp: 90 tablet, Rfl: 3  •  DULoxetine (CYMBALTA) 60 MG capsule, TAKE 1 CAPSULE BY MOUTH DAILY, Disp: 90 capsule, Rfl: 1  •  esomeprazole (nexIUM) 20 MG capsule, Take 20 mg by mouth Every Morning Before  Breakfast., Disp: , Rfl:   •  glucose blood test strip, Take BG daily, Disp: 50 each, Rfl: 12  •  HYDROcodone-acetaminophen (NORCO) 5-325 MG per tablet, Take 1 tablet by mouth Every 6 (Six) Hours As Needed for Severe Pain ., Disp: 120 tablet, Rfl: 0  •  levothyroxine (SYNTHROID, LEVOTHROID) 25 MCG tablet, TAKE 1 TABLET BY MOUTH DAILY, Disp: 90 tablet, Rfl: 3  •  losartan (COZAAR) 25 MG tablet, TAKE 1 TABLET BY MOUTH DAILY, Disp: 90 tablet, Rfl: 3  •  metFORMIN (GLUCOPHAGE) 1000 MG tablet, Take 1 tablet by mouth Daily With Breakfast., Disp: 30 tablet, Rfl: 2  •  metFORMIN (GLUCOPHAGE) 500 MG tablet, Take 1 tablet by mouth Daily With Dinner., Disp: 30 tablet, Rfl: 2  •  methocarbamol (ROBAXIN) 500 MG tablet, Take 1 tablet by mouth 3 (Three) Times a Day., Disp: 90 tablet, Rfl: 3  •  Multiple Vitamins-Minerals (MULTIVITAMIN ADULT PO), Take 1 tablet by mouth Daily., Disp: , Rfl:   •  pravastatin (PRAVACHOL) 10 MG tablet, TAKE 1 TABLET BY MOUTH DAILY, Disp: 90 tablet, Rfl: 3  •  pregabalin (LYRICA) 75 MG capsule, TAKE 1 CAPSULE BY MOUTH TWICE DAILY, Disp: 60 capsule, Rfl: 5  •  carvedilol (COREG) 25 MG tablet, Take 1 tablet by mouth 2 (Two) Times a Day., Disp: 180 tablet, Rfl: 3    Dictated utilizing Dragon dictation

## 2020-10-07 ENCOUNTER — OFFICE VISIT (OUTPATIENT)
Dept: CARDIOLOGY | Facility: CLINIC | Age: 75
End: 2020-10-07

## 2020-10-07 VITALS
BODY MASS INDEX: 33.32 KG/M2 | OXYGEN SATURATION: 95 % | RESPIRATION RATE: 16 BRPM | HEART RATE: 76 BPM | HEIGHT: 65 IN | DIASTOLIC BLOOD PRESSURE: 86 MMHG | WEIGHT: 200 LBS | SYSTOLIC BLOOD PRESSURE: 160 MMHG

## 2020-10-07 DIAGNOSIS — I25.10 ATHEROSCLEROSIS OF NATIVE CORONARY ARTERY OF NATIVE HEART WITHOUT ANGINA PECTORIS: ICD-10-CM

## 2020-10-07 DIAGNOSIS — I27.20 PULMONARY HYPERTENSION (HCC): ICD-10-CM

## 2020-10-07 DIAGNOSIS — I36.1 NONRHEUMATIC TRICUSPID VALVE REGURGITATION: ICD-10-CM

## 2020-10-07 DIAGNOSIS — E11.69 TYPE 2 DIABETES MELLITUS WITH OTHER SPECIFIED COMPLICATION, WITHOUT LONG-TERM CURRENT USE OF INSULIN (HCC): Primary | ICD-10-CM

## 2020-10-07 DIAGNOSIS — I10 ESSENTIAL HYPERTENSION: ICD-10-CM

## 2020-10-07 DIAGNOSIS — I50.23 ACUTE ON CHRONIC HFREF (HEART FAILURE WITH REDUCED EJECTION FRACTION) (HCC): Primary | ICD-10-CM

## 2020-10-07 DIAGNOSIS — R14.0 ABDOMINAL BLOATING: ICD-10-CM

## 2020-10-07 DIAGNOSIS — I34.0 NONRHEUMATIC MITRAL VALVE REGURGITATION: ICD-10-CM

## 2020-10-07 DIAGNOSIS — I42.8 OTHER CARDIOMYOPATHY (HCC): ICD-10-CM

## 2020-10-07 PROCEDURE — 99214 OFFICE O/P EST MOD 30 MIN: CPT | Performed by: NURSE PRACTITIONER

## 2020-10-07 RX ORDER — CARVEDILOL 25 MG/1
25 TABLET ORAL 2 TIMES DAILY
Qty: 180 TABLET | Refills: 3 | Status: SHIPPED | OUTPATIENT
Start: 2020-10-07 | End: 2021-12-07

## 2020-10-07 RX ORDER — BUMETANIDE 0.5 MG/1
1.5 TABLET ORAL DAILY
Qty: 190 TABLET | Refills: 3 | Status: SHIPPED | OUTPATIENT
Start: 2020-10-07 | End: 2021-07-14

## 2020-10-08 RX ORDER — HYDROCODONE BITARTRATE AND ACETAMINOPHEN 5; 325 MG/1; MG/1
1 TABLET ORAL EVERY 6 HOURS PRN
Qty: 120 TABLET | Refills: 0 | Status: SHIPPED | OUTPATIENT
Start: 2020-10-08 | End: 2020-11-09 | Stop reason: SDUPTHER

## 2020-10-08 NOTE — TELEPHONE ENCOUNTER
Medication Refill Request    Date of phone call: 10/8/2020    Medication being requested: Hydrocodsone-apap 5/325 sig: q6hrs  Qty: 120    Date of last visit: 9/9/2020    Date of last refill: 9/9/2020    LOUANN up to date?: 9/4/2020    Next Follow up?: 11/9/2020    Any new pertinent information? (i.e, new medication allergies, new use of medications, change in patient's health or condition, non-compliance or inconsistency with prescribing agreement?): patient wanted to know if this can be sent today as she is leaving [facility] this afternoon    Reviewed ALVAREZ and LOUANN. Both updated and appropriate. Refill appropriate.      NEED TO GIVE APPROPRIATE NOTICE

## 2020-10-14 NOTE — PROGRESS NOTES
Jung Short is a 72 y.o. female, who presents with a chief complaint of   Chief Complaint   Patient presents with   • Hypertension       HPI     74 yo female with pmhx recent complications from deconditioning with her L knee replacement. She had arf on her last labs, on xarelto and ace inhibitor. She is continually nauseated.  Her bowels finally moved last night.  She is constantly nauseated.   No cp. Has lost more weight. She feels hungry.  Popcorn does not make her sick.   Found to be hyponatremic on her labs.     The following portions of the patient's history were reviewed and updated as appropriate: allergies, current medications, past family history, past medical history, past social history, past surgical history and problem list.    Allergies: Dilaudid [hydromorphone] and Latex    Current Outpatient Prescriptions:   •  anastrozole (ARIMIDEX) 1 MG tablet, Take 1 mg by mouth Daily., Disp: , Rfl:   •  bisacodyl (DULCOLAX) 10 MG suppository, Insert 1 suppository into the rectum Daily As Needed for Constipation., Disp: 8 suppository, Rfl: 0  •  bumetanide (BUMEX) 2 MG tablet, Take 2 mg by mouth Daily., Disp: , Rfl:   •  carvedilol (COREG) 12.5 MG tablet, Take 12.5 mg by mouth 2 (Two) Times a Day With Meals., Disp: , Rfl:   •  docusate sodium 100 MG capsule, Take 100 mg by mouth 2 (Two) Times a Day., Disp: , Rfl:   •  DULoxetine (CYMBALTA) 60 MG capsule, Take 60 mg by mouth Daily., Disp: , Rfl:   •  esomeprazole (nexIUM) 20 MG capsule, Take 20 mg by mouth Every Morning Before Breakfast., Disp: , Rfl:   •  glimepiride (AMARYL) 1 MG tablet, Take 1 tablet by mouth 2 (Two) Times a Day. (Patient taking differently: Take 1 mg by mouth 2 (Two) Times a Day.), Disp: 180 tablet, Rfl: 1  •  HYDROcodone-acetaminophen (NORCO)  MG per tablet, Take 1 tablet by mouth Every 6 (Six) Hours., Disp: 50 tablet, Rfl: 0  •  levothyroxine (SYNTHROID, LEVOTHROID) 25 MCG tablet, Take 25 mcg by mouth Daily., Disp: , Rfl:  "  •  magnesium hydroxide (MILK OF MAGNESIA) 400 MG/5ML suspension, Take 30 mL by mouth Daily As Needed for Constipation., Disp: 355 mL, Rfl: 0  •  magnesium oxide (MAG-OX) 400 MG tablet, Take 400 mg by mouth Daily., Disp: , Rfl:   •  ondansetron (ZOFRAN) 8 MG tablet, Take 1 tablet by mouth Every 8 (Eight) Hours As Needed for Nausea or Vomiting., Disp: 30 tablet, Rfl: 2  •  pravastatin (PRAVACHOL) 10 MG tablet, Take 10 mg by mouth Every Night., Disp: , Rfl:   •  spironolactone (ALDACTONE) 25 MG tablet, Take 25 mg by mouth Daily., Disp: , Rfl:   •  sulfamethoxazole-trimethoprim (BACTRIM DS) 800-160 MG per tablet, Take 1 tablet by mouth 2 (Two) Times a Day., Disp: 30 tablet, Rfl: 2  Medications Discontinued During This Encounter   Medication Reason   • lisinopril (PRINIVIL,ZESTRIL) 5 MG tablet Side effects   • rivaroxaban (XARELTO) 10 MG tablet *Therapy completed       Review of Systems   Constitutional: Positive for fatigue and unexpected weight change.   HENT: Negative.    Respiratory: Negative.    Cardiovascular: Positive for leg swelling. Negative for chest pain.   Gastrointestinal: Positive for nausea. Negative for vomiting.   Genitourinary: Negative for decreased urine volume.        Creatinine trending up, seeing Dr. Hannah   Musculoskeletal: Negative for arthralgias and myalgias.        Doing great with flexion with PT.   Skin: Positive for color change.        Darkening anterior L shin   All other systems reviewed and are negative.            /70   Pulse 80   Temp 98.3 °F (36.8 °C)   Resp 22   Ht 165.1 cm (65\")   Wt 96 kg (211 lb 9.6 oz)   SpO2 98%   BMI 35.21 kg/m²       Physical Exam   Constitutional: She is oriented to person, place, and time. She appears well-developed and well-nourished.   HENT:   Head: Normocephalic and atraumatic.   Right Ear: External ear normal.   Left Ear: External ear normal.   Mouth/Throat: Oropharynx is clear and moist.   Eyes: Pupils are equal, round, and reactive to " light. Right eye exhibits no discharge. Left eye exhibits no discharge.   Neck: Normal range of motion.   Cardiovascular: Normal rate and regular rhythm.    No murmur heard.  Pulmonary/Chest: Effort normal and breath sounds normal. No respiratory distress.   Abdominal: Soft.   Musculoskeletal: She exhibits edema.   Minimal edema at knee on L, hyperpigmentation on anterior calf but no erythema    Neurological: She is alert and oriented to person, place, and time.   Skin: Capillary refill takes less than 2 seconds.   Psychiatric: She has a normal mood and affect. Her behavior is normal.   Nursing note and vitals reviewed.      Lab Results (most recent)     None          Results for orders placed or performed in visit on 05/04/18   Comprehensive metabolic panel   Result Value Ref Range    Glucose 223 (H) 65 - 99 mg/dL    BUN 35 (H) 8 - 23 mg/dL    Creatinine 2.33 (H) 0.57 - 1.00 mg/dL    eGFR Non African Am 20 (L) >60 mL/min/1.73    eGFR African Am 25 (L) >60 mL/min/1.73    BUN/Creatinine Ratio 15.0 7.0 - 25.0    Sodium 127 (L) 136 - 145 mmol/L    Potassium 4.9 3.5 - 5.2 mmol/L    Chloride 91 (L) 98 - 107 mmol/L    Total CO2 23.1 22.0 - 29.0 mmol/L    Calcium 10.0 8.8 - 10.5 mg/dL    Total Protein 7.3 6.0 - 8.5 g/dL    Albumin 4.00 3.50 - 5.20 g/dL    Globulin 3.3 gm/dL    A/G Ratio 1.2 g/dL    Total Bilirubin 0.4 0.2 - 1.2 mg/dL    Alkaline Phosphatase 122 40 - 129 U/L    AST (SGOT) 15 5 - 32 U/L    ALT (SGPT) 9 5 - 33 U/L   TSH   Result Value Ref Range    TSH 1.300 0.270 - 4.200 mIU/mL   POCT Glucose   Result Value Ref Range    Glucose 224 (A) 70 - 130 mg/dL   POC Urinalysis Dipstick, Automated   Result Value Ref Range    Color Yellow Yellow, Straw, Dark Yellow, Ciera    Clarity, UA Clear Clear    Glucose, UA Negative Negative, 1000 mg/dL (3+) mg/dL    Bilirubin Negative Negative    Ketones, UA Negative Negative    Specific Gravity  1.025 1.005 - 1.030    Blood, UA Negative Negative    pH, Urine 5.5 5.0 - 8.0     Protein, POC Negative Negative mg/dL    Urobilinogen, UA Normal Normal    Leukocytes Trace (A) Negative    Nitrite, UA Negative Negative   CBC w AUTO Differential   Result Value Ref Range    WBC 11.91 (H) 4.80 - 10.80 10*3/mm3    RBC 3.52 (L) 4.20 - 5.40 10*6/mm3    Hemoglobin 10.2 (L) 12.0 - 16.0 g/dL    Hematocrit 32.5 (L) 37.0 - 47.0 %    MCV 92.3 81.0 - 99.0 fL    MCH 29.0 27.0 - 31.0 pg    MCHC 31.4 31.0 - 37.0 g/dL    RDW 14.4 11.5 - 14.5 %    Platelets 569 (H) 140 - 500 10*3/mm3    Neutrophil Rel % 70.5 (H) 45.0 - 70.0 %    Lymphocyte Rel % 17.3 (L) 20.0 - 45.0 %    Monocyte Rel % 8.2 (H) 3.0 - 8.0 %    Eosinophil Rel % 2.8 0.0 - 4.0 %    Basophil Rel % 0.4 0.0 - 2.0 %    Neutrophils Absolute 8.39 (H) 1.50 - 8.30 10*3/mm3    Lymphocytes Absolute 2.06 0.60 - 4.80 10*3/mm3    Monocytes Absolute 0.98 0.00 - 1.00 10*3/mm3    Eosinophils Absolute 0.33 (H) 0.10 - 0.30 10*3/mm3    Basophils Absolute 0.05 0.00 - 0.20 10*3/mm3    Immature Granulocyte Rel % 0.8 (H) 0.0 - 0.5 %    Immature Grans Absolute 0.10 (H) 0.00 - 0.03 10*3/mm3    nRBC 0.0 0.0 - 0.0 /100 WBC           Jung was seen today for hypertension.    Diagnoses and all orders for this visit:    Chronic kidney disease, stage III (moderate)  -     Comprehensive metabolic panel  -     POC Urinalysis Dipstick, Automated  -     Magnesium  -     Phosphorus    Nausea  -     US Gallbladder  -     Comprehensive metabolic panel  -     Amylase  -     Lipase  -     CBC w AUTO Differential  -     POC Urinalysis Dipstick, Automated  -     XR Chest PA & Lateral    Lower abdominal pain    Type 2 diabetes mellitus without complication, without long-term current use of insulin    Vitamin D deficiency    Hyponatremia  -     Comprehensive metabolic panel  -     XR Chest PA & Lateral    Suspect some pain med associated constipation    Reassurance about hyperpigmentation    Contact Dr. Hannah after her renal labs return  BP great today off ace -   Cholelithiasis in past  imaging, with nausea, make sure no inflammation today  May need CT and surgery consult  Add segun tea and continue zofran  ER for worsening of symptoms      No Follow-up on file.    Bubba Avalos MD  05/10/2018     Split-Thickness Skin Graft Text: The defect edges were debeveled with a #15 scalpel blade.  Given the location of the defect, shape of the defect and the proximity to free margins a split thickness skin graft was deemed most appropriate.  Using a sterile surgical marker, the primary defect shape was transferred to the donor site. The split thickness graft was then harvested.  The skin graft was then placed in the primary defect and oriented appropriately.

## 2020-11-02 ENCOUNTER — APPOINTMENT (OUTPATIENT)
Dept: MAMMOGRAPHY | Facility: HOSPITAL | Age: 75
End: 2020-11-02

## 2020-11-05 ENCOUNTER — APPOINTMENT (OUTPATIENT)
Dept: CARDIOLOGY | Facility: HOSPITAL | Age: 75
End: 2020-11-05

## 2020-11-09 ENCOUNTER — OFFICE VISIT (OUTPATIENT)
Dept: PAIN MEDICINE | Facility: CLINIC | Age: 75
End: 2020-11-09

## 2020-11-09 DIAGNOSIS — M25.50 ARTHRALGIA OF MULTIPLE JOINTS: ICD-10-CM

## 2020-11-09 DIAGNOSIS — Z79.899 ENCOUNTER FOR LONG-TERM (CURRENT) USE OF HIGH-RISK MEDICATION: ICD-10-CM

## 2020-11-09 DIAGNOSIS — M51.36 DDD (DEGENERATIVE DISC DISEASE), LUMBAR: ICD-10-CM

## 2020-11-09 DIAGNOSIS — G89.29 OTHER CHRONIC PAIN: Primary | ICD-10-CM

## 2020-11-09 DIAGNOSIS — G62.9 PERIPHERAL POLYNEUROPATHY: ICD-10-CM

## 2020-11-09 DIAGNOSIS — M47.816 LUMBAR FACET ARTHROPATHY: ICD-10-CM

## 2020-11-09 PROCEDURE — 99443 PR PHYS/QHP TELEPHONE EVALUATION 21-30 MIN: CPT | Performed by: NURSE PRACTITIONER

## 2020-11-09 RX ORDER — HYDROCODONE BITARTRATE AND ACETAMINOPHEN 5; 325 MG/1; MG/1
1 TABLET ORAL EVERY 6 HOURS PRN
Qty: 120 TABLET | Refills: 0 | Status: SHIPPED | OUTPATIENT
Start: 2020-11-09 | End: 2020-12-14 | Stop reason: SDUPTHER

## 2020-11-09 NOTE — PROGRESS NOTES
TELEPHONE VISIT    You have chosen to receive care through a telephone visit. Do you consent to use a telephone visit for your medical care today? Yes  Patient is in quarantine for COVID  CHIEF COMPLAINT  Back and joint pain    Subjective   Jung Short is a 75 y.o. female  who presents for a telephonic follow-up.She has a history of chronic back and joint pain. Reports her pain is WORSE since last evaluation.    Complains of pain in her low back and joints(ankle, hip, hands). Today her pain is 5/10VAS. Describes her pain as nearly continuous throbbing. Also complains of continued swelling of right ankle. Continues with Hydrocodone 5/325 3-4/day, Robaxin 500 mg daily and Lyrica  75 mg BID, as well as Cymbalta 60 mg daily.  Denies any side effects from the regimen. The regimen helps decreases her pain moderately.  ADL's by self. Denies any bowel or bladder changes.  Enjoys going to [facility].    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 Used to have neuropathy in legs, now having right hand. Prescribed Lyrica.    Orthopedist is Dr Live Braden.    She reports 6 pills from last refill on 10-8-20    Joint Pain  This is a chronic problem. The current episode started more than 1 year ago (today pain is 5/10VAS- joints). The problem occurs constantly. The problem has been unchanged. Associated symptoms include arthralgias, congestion, joint swelling and numbness (bilat fingers). Pertinent negatives include no abdominal pain, chest pain, chills, coughing, fatigue, fever, headaches, nausea, neck pain, vomiting or weakness. Nothing aggravates the symptoms. She has tried oral narcotics, position changes and rest for the symptoms. The treatment provided moderate relief.   Back Pain  This is a chronic problem. The current episode started more than 1 year ago. The problem occurs constantly. The problem has been gradually worsening since onset. The pain is present in the lumbar spine.  The pain radiates to the right knee, right foot and right thigh. The pain is moderate. Associated symptoms include numbness (bilat fingers). Pertinent negatives include no abdominal pain, chest pain, dysuria, fever, headaches or weakness. Risk factors include lack of exercise and obesity. She has tried analgesics for the symptoms. The treatment provided moderate relief.      The following portions of the patient's history were reviewed and updated as appropriate: allergies, current medications, past family history, past medical history, past social history, past surgical history and problem list.    Review of Systems   Constitutional: Negative for chills, fatigue and fever.   HENT: Positive for congestion.    Respiratory: Negative for cough and shortness of breath.    Cardiovascular: Negative for chest pain.   Gastrointestinal: Negative for abdominal pain, constipation, nausea and vomiting.   Genitourinary: Negative for difficulty urinating and dysuria.   Musculoskeletal: Positive for arthralgias, back pain, gait problem and joint swelling. Negative for neck pain.   Neurological: Positive for numbness (bilat fingers). Negative for weakness and headaches.   Psychiatric/Behavioral: Negative for sleep disturbance. The patient is not nervous/anxious.      Vitals:    11/09/20 1450   PainSc:   5   PainLoc: Back  Comment: joint       Objective   Physical Exam  As this is a telephone check-in, the ability to perform a routine physical exam is extremely limited. The patient seems alert and is oriented appropriately.   On this phone call there is not any evidence of respiratory distress.   The patient seems of normal mood.   The remainder of a routine physical exam is deferred.      Assessment/Plan   Diagnoses and all orders for this visit:    1. Other chronic pain (Primary)    2. Arthralgia of multiple joints    3. Peripheral polyneuropathy    4. Lumbar facet arthropathy    5. DDD (degenerative disc disease), lumbar    6.  Encounter for long-term (current) use of high-risk medication      ----------------  Our practice is offering alternative &/or electronic methods to continue to follow our patients while at the same time further the efforts toward social distancing, in accordance with our organizational policies, professional societies' guidance, and [facility] mandates.  I support the Healthy at Home campaign and in this visit I have counseled the patient on our needs to limit in-person office visits and physical encounters with medical facilities whenever possible.  I have also educated the patient on the medical necessities of maintaining social distancing while we continue to function during this crisis period.      The patient was counseled on the need to consider telehealth options. The patient was offered the opportunity for a Video Visit using [employer]. The patient had obstacles which preclude consideration for a Video Visit. As a Video Visit was not practical, the patient was offered the option for a Telephone Check-In. The patient agreed to a Telephone Check-In. The patient was counseled on the need for a check-in visit. The patient was educated about our efforts to comply with monitoring standards when prescribing potent medications.    TIME:  Total Time:  31 minutes. Topics discussed are outlined in the Assessment/Plan section of the note.      ----------------    --- The oral drug screen confirmation from 7-8-20 has been reviewed and the result is APPROPRIATE based on patient history and LOUANN report  --- Refill Hydrocodone. Patient appears stable with current regimen. No adverse effects. Regarding continuation of opioids, there is no evidence of aberrant behavior or any red flags.  The patient continues with appropriate response to opioid therapy. ADL's remain intact by self.   --- Follow-up 2 months or sooner if needed       LOUANN REPORT  As part of the patient's treatment plan, I am prescribing controlled  substances. The patient has been made aware of appropriate use of such medications, including potential risk of somnolence, limited ability to drive and/or work safely, and the potential for dependence or overdose. It has also bee made clear that these medications are for use by this patient only, without concomitant use of alcohol or other substances unless prescribed.     Patient has completed prescribing agreement detailing terms of continued prescribing of controlled substances, including monitoring LOUANN reports, urine drug screening, and pill counts if necessary. The patient is aware that inappropriate use will results in cessation of prescribing such medications.    LOUANN report has been reviewed and scanned into the patient's chart.    As the clinician, I personally reviewed the LOUANN from 11-9-20 while the patient was in the office today.    History and physical exam exhibit continued safe and appropriate use of controlled substances.    -------    EMR Dragon/Transcription disclaimer:   Much of this encounter note is an electronic transcription/translation of spoken language to printed text. The electronic translation of spoken language may permit erroneous, or at times, nonsensical words or phrases to be inadvertently transcribed; Although I have reviewed the note for such errors, some may still exist.

## 2020-12-04 DIAGNOSIS — R14.0 ABDOMINAL BLOATING: ICD-10-CM

## 2020-12-04 DIAGNOSIS — E11.69 TYPE 2 DIABETES MELLITUS WITH OTHER SPECIFIED COMPLICATION, WITHOUT LONG-TERM CURRENT USE OF INSULIN (HCC): ICD-10-CM

## 2020-12-06 RX ORDER — DULOXETIN HYDROCHLORIDE 60 MG/1
60 CAPSULE, DELAYED RELEASE ORAL DAILY
Qty: 90 CAPSULE | Refills: 1 | Status: SHIPPED | OUTPATIENT
Start: 2020-12-06 | End: 2021-04-30

## 2020-12-14 RX ORDER — HYDROCODONE BITARTRATE AND ACETAMINOPHEN 5; 325 MG/1; MG/1
1 TABLET ORAL EVERY 6 HOURS PRN
Qty: 120 TABLET | Refills: 0 | Status: SHIPPED | OUTPATIENT
Start: 2020-12-14 | End: 2021-01-11 | Stop reason: SDUPTHER

## 2020-12-14 NOTE — TELEPHONE ENCOUNTER
Medication Refill Request    Date of phone call: 20    Medication being requested: norco 5/325mg si tab po q 6 hours prn   Qty: 120    Date of last visit: 20    Date of last refill:     LOUANN up to date?:     Next Follow up?: 20    Any new pertinent information? (i.e, new medication allergies, new use of medications, change in patient's health or condition, non-compliance or inconsistency with prescribing agreement?):     Reviewed UDS and LOUANN. Both updated and appropriate. Refill appropriate.

## 2020-12-16 ENCOUNTER — TELEPHONE (OUTPATIENT)
Dept: INTERNAL MEDICINE | Facility: CLINIC | Age: 75
End: 2020-12-16

## 2020-12-16 NOTE — TELEPHONE ENCOUNTER
[facility]    CERTIFICATE OF MED NECESSITY WAS FAXED BACK TO US ON 12/8     IT IS FOR DIABETIC SHOES    WAS SIGNED OFF ON BY JOSE ANTONIO AND SHE SAID UNTIL 2021 JOSE ANTONIO CANNOT SIGN OFF ON THIS.    PLEASE ADVISE    [phone]

## 2020-12-21 ENCOUNTER — TELEPHONE (OUTPATIENT)
Dept: INTERNAL MEDICINE | Facility: CLINIC | Age: 75
End: 2020-12-21

## 2020-12-21 NOTE — TELEPHONE ENCOUNTER
SUN WITH [employer] IS NEEDING CERTIFICATE OF MED NECESSITY FAXED TO HER      SUN WITH [employer]  [phone]  [phone]

## 2020-12-28 DIAGNOSIS — I50.23 ACUTE ON CHRONIC HFREF (HEART FAILURE WITH REDUCED EJECTION FRACTION) (HCC): ICD-10-CM

## 2020-12-28 DIAGNOSIS — I42.8 OTHER CARDIOMYOPATHY (HCC): Primary | ICD-10-CM

## 2020-12-28 DIAGNOSIS — G89.29 OTHER CHRONIC PAIN: ICD-10-CM

## 2020-12-28 RX ORDER — PREGABALIN 75 MG/1
75 CAPSULE ORAL 2 TIMES DAILY
Qty: 60 CAPSULE | Refills: 5 | Status: SHIPPED | OUTPATIENT
Start: 2020-12-28 | End: 2021-07-08

## 2021-01-08 DIAGNOSIS — E11.69 TYPE 2 DIABETES MELLITUS WITH OTHER SPECIFIED COMPLICATION, WITHOUT LONG-TERM CURRENT USE OF INSULIN (HCC): ICD-10-CM

## 2021-01-08 DIAGNOSIS — I10 ESSENTIAL HYPERTENSION: Primary | ICD-10-CM

## 2021-01-08 DIAGNOSIS — E78.5 DYSLIPIDEMIA: ICD-10-CM

## 2021-01-08 DIAGNOSIS — E55.9 VITAMIN D DEFICIENCY: ICD-10-CM

## 2021-01-08 DIAGNOSIS — E03.9 ACQUIRED HYPOTHYROIDISM: ICD-10-CM

## 2021-01-11 ENCOUNTER — OFFICE VISIT (OUTPATIENT)
Dept: PAIN MEDICINE | Facility: CLINIC | Age: 76
End: 2021-01-11

## 2021-01-11 VITALS
BODY MASS INDEX: 34.83 KG/M2 | DIASTOLIC BLOOD PRESSURE: 76 MMHG | HEART RATE: 68 BPM | OXYGEN SATURATION: 95 % | WEIGHT: 204 LBS | TEMPERATURE: 96.8 F | HEIGHT: 64 IN | RESPIRATION RATE: 20 BRPM | SYSTOLIC BLOOD PRESSURE: 138 MMHG

## 2021-01-11 DIAGNOSIS — M25.50 ARTHRALGIA OF MULTIPLE JOINTS: ICD-10-CM

## 2021-01-11 DIAGNOSIS — M47.816 LUMBAR FACET ARTHROPATHY: ICD-10-CM

## 2021-01-11 DIAGNOSIS — Z79.899 ENCOUNTER FOR LONG-TERM (CURRENT) USE OF HIGH-RISK MEDICATION: ICD-10-CM

## 2021-01-11 DIAGNOSIS — M51.36 DDD (DEGENERATIVE DISC DISEASE), LUMBAR: ICD-10-CM

## 2021-01-11 DIAGNOSIS — G89.29 OTHER CHRONIC PAIN: Primary | ICD-10-CM

## 2021-01-11 PROCEDURE — 99214 OFFICE O/P EST MOD 30 MIN: CPT | Performed by: NURSE PRACTITIONER

## 2021-01-11 RX ORDER — HYDROCODONE BITARTRATE AND ACETAMINOPHEN 5; 325 MG/1; MG/1
1 TABLET ORAL EVERY 6 HOURS PRN
Qty: 120 TABLET | Refills: 0 | Status: SHIPPED | OUTPATIENT
Start: 2021-01-11 | End: 2021-02-04 | Stop reason: SDUPTHER

## 2021-01-11 NOTE — PROGRESS NOTES
"CHIEF COMPLAINT  F/u back and joint pain. Pt sts pain has worsened since last ov.     Subjective   Jung Short is a 75 y.o. female  who presents for follow-up.  She has a history of chronic back and joint pain. Reports her pain is WORSE since last evaluation.  Reports is having newer neck pain, hurts turning to the left. Reports fall. Did not go to hospital.    \"for some reason I fall.\" Fell while out in yard with 2 daughters and son in law. Reports her foot slipped on mud. Fell onto butt and hit back of head on grass and mud. She states she usually falls \"over something.\" Reports her right foot is usually the biggest cause of the falls. Has joined a gym. Has been working on walking on a treadmill. Could not ride on air bike.     Complains of pain in her low back and joints. Today her pain is 6/10VAS.  Describes the pain as nearly continuous throbbing. Pain increases with walking, standing, activity, household chores; pain decreases with medication and rest.  Continues with Hydrocodone 5/325 3-4/day, Robaxin 500 mg daily and Lyrica  75 mg BID, as well as Cymbalta 60 mg daily.  Denies any side effects from the regimen. The regimen helps decreases her pain by 30-50%.   ADL's by self. Denies any bowel or bladder changes.      Due for labwork later this week. Due for bloodwork and urine tests.     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 Used to have neuropathy in legs, now having right hand. Prescribed Lyrica.     Orthopedist is Dr Live Braden.    Enjoys going to [facility].    Patient remained masked during entire encounter. No cough present. I donned a mask and eye protection throughout entire visit. Prior to donning mask and eye protection, hand hygiene was performed, as well as when it was doffed.  I was closer than 6 feet, but not for an extended period of time. No obvious exposure to any bodily fluids.    Joint Pain  This is a chronic problem. The current episode " started more than 1 year ago (today pain is 5/10VAS- joints). The problem occurs constantly. The problem has been unchanged. Associated symptoms include arthralgias (joint) and joint swelling. Pertinent negatives include no abdominal pain, chest pain, chills, congestion, coughing, fatigue, fever, headaches, nausea, neck pain, numbness, vomiting or weakness. Nothing aggravates the symptoms. She has tried oral narcotics, position changes and rest for the symptoms. The treatment provided moderate relief.   Back Pain  This is a chronic problem. The current episode started more than 1 year ago. The problem occurs constantly. The problem has been gradually worsening since onset. The pain is present in the lumbar spine. The pain radiates to the right knee, right foot and right thigh. The pain is at a severity of 6/10. The pain is moderate. Pertinent negatives include no abdominal pain, chest pain, dysuria, fever, headaches, numbness or weakness. Risk factors include lack of exercise and obesity. She has tried analgesics for the symptoms. The treatment provided moderate relief.      PEG Assessment   What number best describes your pain on average in the past week?6  What number best describes how, during the past week, pain has interfered with your enjoyment of life?6  What number best describes how, during the past week, pain has interfered with your general activity?  6    The following portions of the patient's history were reviewed and updated as appropriate: allergies, current medications, past family history, past medical history, past social history, past surgical history and problem list.    Review of Systems   Constitutional: Negative for activity change, chills, fatigue and fever.   HENT: Negative for congestion.    Eyes: Negative for visual disturbance.   Respiratory: Negative for cough and shortness of breath.    Cardiovascular: Negative for chest pain.   Gastrointestinal: Negative for abdominal pain,  "constipation, diarrhea, nausea and vomiting.   Genitourinary: Negative for difficulty urinating and dysuria.   Musculoskeletal: Positive for arthralgias (joint), back pain and joint swelling. Negative for neck pain.   Allergic/Immunologic: Negative for immunocompromised state.   Neurological: Negative for dizziness, weakness, light-headedness, numbness and headaches.   Psychiatric/Behavioral: Negative for agitation, sleep disturbance and suicidal ideas. The patient is not nervous/anxious.      I have reviewed and confirmed the accuracy of the ROS as documented by the MA/LPN/RN JOSE ANTONIO Castaneda    Vitals:    01/11/21 1327   BP: 138/76   Pulse: 68   Resp: 20   Temp: 96.8 °F (36 °C)   SpO2: 95%   Weight: 92.5 kg (204 lb)  Comment: PT REFUSED WT IN OFFICE, PT STD WT, WEIGHED AT HOME THIS AM   Height: 162.6 cm (64\")   PainSc:   6   PainLoc: Back  Comment: AND JOINT     Objective   Physical Exam  Constitutional:       Appearance: She is well-developed.   HENT:      Head: Normocephalic and atraumatic.   Pulmonary:      Effort: Pulmonary effort is normal.   Musculoskeletal:      Lumbar back: She exhibits decreased range of motion and tenderness.      Comments: Widespread OA changes with no acute synovitis   Neurological:      Mental Status: She is alert.      Gait: Gait abnormal (cane).   Psychiatric:         Speech: Speech normal.         Behavior: Behavior normal.         Thought Content: Thought content normal.         Judgment: Judgment normal.         Assessment/Plan   Diagnoses and all orders for this visit:    1. Other chronic pain (Primary)    2. DDD (degenerative disc disease), lumbar    3. Lumbar facet arthropathy    4. Arthralgia of multiple joints    5. Encounter for long-term (current) use of high-risk medication    Other orders  -     HYDROcodone-acetaminophen (NORCO) 5-325 MG per tablet; Take 1 tablet by mouth Every 6 (Six) Hours As Needed for Severe Pain . DNF until 1-12-21  Dispense: 120 tablet; " Refill: 0      --- The urine drug screen confirmation from 7-8-20 has been reviewed and the result is APPROPRIATE based on patient history and LOUANN report  --- The patient signed an updated copy of the controlled substance agreement on 1-11-21.  --- Reviewed weight check ins  --- reviewed refill protocols.   --- refill Hydrocodone. Patient appears stable with current regimen. No adverse effects. Regarding continuation of opioids, there is no evidence of aberrant behavior or any red flags.  The patient continues with appropriate response to opioid therapy. ADL's remain intact by self.   --- Follow-up 2 months or sooner if needed.       LOUANN REPORT  As part of the patient's treatment plan, I am prescribing controlled substances. The patient has been made aware of appropriate use of such medications, including potential risk of somnolence, limited ability to drive and/or work safely, and the potential for dependence or overdose. It has also bee made clear that these medications are for use by this patient only, without concomitant use of alcohol or other substances unless prescribed.     Patient has completed prescribing agreement detailing terms of continued prescribing of controlled substances, including monitoring LOUANN reports, urine drug screening, and pill counts if necessary. The patient is aware that inappropriate use will results in cessation of prescribing such medications.    LOUANN report has been reviewed and scanned into the patient's chart.    As the clinician, I personally reviewed the LOUANN from 1-11-21 while the patient was in the office today.    History and physical exam exhibit continued safe and appropriate use of controlled substances.          EMR Dragon/Transcription disclaimer:   Much of this encounter note is an electronic transcription/translation of spoken language to printed text. The electronic translation of spoken language may permit erroneous, or at times, nonsensical words or  phrases to be inadvertently transcribed; Although I have reviewed the note for such errors, some may still exist.

## 2021-01-12 ENCOUNTER — HOSPITAL ENCOUNTER (OUTPATIENT)
Dept: CARDIOLOGY | Facility: HOSPITAL | Age: 76
Discharge: HOME | End: 2021-01-12
Admitting: NURSE PRACTITIONER

## 2021-01-12 VITALS — BODY MASS INDEX: 34.83 KG/M2 | WEIGHT: 204 LBS | HEIGHT: 64 IN

## 2021-01-12 DIAGNOSIS — I42.8 OTHER CARDIOMYOPATHY (HCC): ICD-10-CM

## 2021-01-12 DIAGNOSIS — I50.23 ACUTE ON CHRONIC HFREF (HEART FAILURE WITH REDUCED EJECTION FRACTION) (HCC): ICD-10-CM

## 2021-01-12 LAB
AORTIC DIMENSIONLESS INDEX: 0.7 (DI)
BH CV ECHO MEAS - ACS: 1.9 CM
BH CV ECHO MEAS - AO MAX PG: 10 MMHG
BH CV ECHO MEAS - AO MEAN PG (FULL): 3 MMHG
BH CV ECHO MEAS - AO MEAN PG: 5 MMHG
BH CV ECHO MEAS - AO ROOT AREA (BSA CORRECTED): 1.9
BH CV ECHO MEAS - AO ROOT AREA: 11.3 CM^2
BH CV ECHO MEAS - AO ROOT DIAM: 3.8 CM
BH CV ECHO MEAS - AO V2 MAX: 155 CM/SEC
BH CV ECHO MEAS - AO V2 MEAN: 101 CM/SEC
BH CV ECHO MEAS - AO V2 VTI: 30.4 CM
BH CV ECHO MEAS - ASC AORTA: 2.5 CM
BH CV ECHO MEAS - AVA(I,A): 2.4 CM^2
BH CV ECHO MEAS - AVA(I,D): 2.4 CM^2
BH CV ECHO MEAS - BSA(HAYCOCK): 2.1 M^2
BH CV ECHO MEAS - BSA: 2 M^2
BH CV ECHO MEAS - BZI_BMI: 35 KILOGRAMS/M^2
BH CV ECHO MEAS - BZI_METRIC_HEIGHT: 162.6 CM
BH CV ECHO MEAS - BZI_METRIC_WEIGHT: 92.5 KG
BH CV ECHO MEAS - CONTRAST EF 4CH: 43 CM2
BH CV ECHO MEAS - EDV(CUBED): 167.3 ML
BH CV ECHO MEAS - EDV(MOD-SP2): 95.8 ML
BH CV ECHO MEAS - EDV(MOD-SP4): 120 ML
BH CV ECHO MEAS - EDV(TEICH): 148 ML
BH CV ECHO MEAS - EF(CUBED): 41.1 %
BH CV ECHO MEAS - EF(MOD-BP): 43 %
BH CV ECHO MEAS - EF(MOD-SP2): 37.6 %
BH CV ECHO MEAS - EF(MOD-SP4): 46.9 %
BH CV ECHO MEAS - EF(TEICH): 33.6 %
BH CV ECHO MEAS - ESV(CUBED): 98.6 ML
BH CV ECHO MEAS - ESV(MOD-SP2): 59.8 ML
BH CV ECHO MEAS - ESV(MOD-SP4): 63.7 ML
BH CV ECHO MEAS - ESV(TEICH): 98.3 ML
BH CV ECHO MEAS - FS: 16.2 %
BH CV ECHO MEAS - IVS/LVPW: 1.1
BH CV ECHO MEAS - IVSD: 1.2 CM
BH CV ECHO MEAS - LAT PEAK E' VEL: 4.5 CM/SEC
BH CV ECHO MEAS - LV DIASTOLIC VOL/BSA (35-75): 60.8 ML/M^2
BH CV ECHO MEAS - LV MASS(C)D: 274.8 GRAMS
BH CV ECHO MEAS - LV MASS(C)DI: 139.3 GRAMS/M^2
BH CV ECHO MEAS - LV MAX PG: 4 MMHG
BH CV ECHO MEAS - LV MEAN PG: 2 MMHG
BH CV ECHO MEAS - LV SYSTOLIC VOL/BSA (12-30): 32.3 ML/M^2
BH CV ECHO MEAS - LV V1 MAX: 100 CM/SEC
BH CV ECHO MEAS - LV V1 MEAN: 63.7 CM/SEC
BH CV ECHO MEAS - LV V1 VTI: 20.8 CM
BH CV ECHO MEAS - LVIDD: 5.5 CM
BH CV ECHO MEAS - LVIDS: 4.6 CM
BH CV ECHO MEAS - LVLD AP2: 7.3 CM
BH CV ECHO MEAS - LVLD AP4: 8.4 CM
BH CV ECHO MEAS - LVLS AP2: 6.8 CM
BH CV ECHO MEAS - LVLS AP4: 7.6 CM
BH CV ECHO MEAS - LVOT AREA (M): 3.5 CM^2
BH CV ECHO MEAS - LVOT AREA: 3.5 CM^2
BH CV ECHO MEAS - LVOT DIAM: 2.1 CM
BH CV ECHO MEAS - LVPWD: 1.2 CM
BH CV ECHO MEAS - MED PEAK E' VEL: 4 CM/SEC
BH CV ECHO MEAS - MR MAX PG: 108.6 MMHG
BH CV ECHO MEAS - MR MAX VEL: 521 CM/SEC
BH CV ECHO MEAS - MV A DUR: 0.16 SEC
BH CV ECHO MEAS - MV A MAX VEL: 97.5 CM/SEC
BH CV ECHO MEAS - MV DEC SLOPE: 397 CM/SEC^2
BH CV ECHO MEAS - MV DEC TIME: 0.24 SEC
BH CV ECHO MEAS - MV E MAX VEL: 95.1 CM/SEC
BH CV ECHO MEAS - MV E/A: 0.98
BH CV ECHO MEAS - MV MEAN PG: 3 MMHG
BH CV ECHO MEAS - MV P1/2T MAX VEL: 126 CM/SEC
BH CV ECHO MEAS - MV P1/2T: 93 MSEC
BH CV ECHO MEAS - MV V2 MEAN: 76.4 CM/SEC
BH CV ECHO MEAS - MV V2 VTI: 41.3 CM
BH CV ECHO MEAS - MVA P1/2T LCG: 1.7 CM^2
BH CV ECHO MEAS - MVA(P1/2T): 2.4 CM^2
BH CV ECHO MEAS - MVA(VTI): 1.7 CM^2
BH CV ECHO MEAS - PA ACC TIME: 0.11 SEC
BH CV ECHO MEAS - PA MAX PG: 4.5 MMHG
BH CV ECHO MEAS - PA PR(ACCEL): 31.3 MMHG
BH CV ECHO MEAS - PA V2 MAX: 106 CM/SEC
BH CV ECHO MEAS - PULM A REVS DUR: 0.12 SEC
BH CV ECHO MEAS - PULM A REVS VEL: 23 CM/SEC
BH CV ECHO MEAS - PULM DIAS VEL: 59.8 CM/SEC
BH CV ECHO MEAS - PULM S/D: 1.2
BH CV ECHO MEAS - PULM SYS VEL: 71.4 CM/SEC
BH CV ECHO MEAS - QP/QS: 0.81
BH CV ECHO MEAS - RAP SYSTOLE: 3 MMHG
BH CV ECHO MEAS - RV MEAN PG: 1 MMHG
BH CV ECHO MEAS - RV V1 MEAN: 52.5 CM/SEC
BH CV ECHO MEAS - RV V1 VTI: 16.9 CM
BH CV ECHO MEAS - RVOT AREA: 3.5 CM^2
BH CV ECHO MEAS - RVOT DIAM: 2.1 CM
BH CV ECHO MEAS - RVSP: 25.5 MMHG
BH CV ECHO MEAS - SI(AO): 174.8 ML/M^2
BH CV ECHO MEAS - SI(CUBED): 34.8 ML/M^2
BH CV ECHO MEAS - SI(LVOT): 36.5 ML/M^2
BH CV ECHO MEAS - SI(MOD-SP2): 18.3 ML/M^2
BH CV ECHO MEAS - SI(MOD-SP4): 28.5 ML/M^2
BH CV ECHO MEAS - SI(TEICH): 25.2 ML/M^2
BH CV ECHO MEAS - SV(AO): 344.8 ML
BH CV ECHO MEAS - SV(CUBED): 68.7 ML
BH CV ECHO MEAS - SV(LVOT): 72 ML
BH CV ECHO MEAS - SV(MOD-SP2): 36 ML
BH CV ECHO MEAS - SV(MOD-SP4): 56.3 ML
BH CV ECHO MEAS - SV(RVOT): 58.5 ML
BH CV ECHO MEAS - SV(TEICH): 49.7 ML
BH CV ECHO MEAS - TAPSE (>1.6): 1.3 CM
BH CV ECHO MEAS - TR MAX VEL: 237 CM/SEC
BH CV ECHO MEASUREMENTS AVERAGE E/E' RATIO: 22.38
BH CV XLRA - RV BASE: 3.3 CM
BH CV XLRA - TDI S': 8.1 CM/SEC
LEFT ATRIUM VOLUME INDEX: 37.3 ML/M2
MAXIMAL PREDICTED HEART RATE: 143 BPM
STRESS TARGET HR: 122 BPM

## 2021-01-12 PROCEDURE — 25010000002 PERFLUTREN (DEFINITY) 8.476 MG IN SODIUM CHLORIDE 0.9 % 10 ML INJECTION: Performed by: NURSE PRACTITIONER

## 2021-01-12 PROCEDURE — 93306 TTE W/DOPPLER COMPLETE: CPT

## 2021-01-12 PROCEDURE — 93306 TTE W/DOPPLER COMPLETE: CPT | Performed by: INTERNAL MEDICINE

## 2021-01-12 RX ADMIN — PERFLUTREN 2 ML: 6.52 INJECTION, SUSPENSION INTRAVENOUS at 15:11

## 2021-01-14 LAB
25(OH)D3+25(OH)D2 SERPL-MCNC: 36.1 NG/ML (ref 30–100)
ALBUMIN SERPL-MCNC: 4.1 G/DL (ref 3.7–4.7)
ALBUMIN/CREAT UR: 157 MG/G CREAT (ref 0–29)
ALBUMIN/GLOB SERPL: 1.4 {RATIO} (ref 1.2–2.2)
ALP SERPL-CCNC: 75 IU/L (ref 39–117)
ALT SERPL-CCNC: 9 IU/L (ref 0–32)
APPEARANCE UR: CLEAR
AST SERPL-CCNC: 16 IU/L (ref 0–40)
BACTERIA #/AREA URNS HPF: ABNORMAL /[HPF]
BACTERIA UR CULT: NORMAL
BACTERIA UR CULT: NORMAL
BILIRUB SERPL-MCNC: 0.5 MG/DL (ref 0–1.2)
BILIRUB UR QL STRIP: NEGATIVE
BUN SERPL-MCNC: 13 MG/DL (ref 8–27)
BUN/CREAT SERPL: 17 (ref 12–28)
CALCIUM SERPL-MCNC: 9.7 MG/DL (ref 8.7–10.3)
CASTS URNS MICRO: ABNORMAL
CASTS URNS QL MICRO: PRESENT /LPF
CHLORIDE SERPL-SCNC: 100 MMOL/L (ref 96–106)
CHOLEST SERPL-MCNC: 183 MG/DL (ref 100–199)
CHOLEST/HDLC SERPL: 3.4 RATIO (ref 0–4.4)
CO2 SERPL-SCNC: 27 MMOL/L (ref 20–29)
COLOR UR: YELLOW
CREAT SERPL-MCNC: 0.75 MG/DL (ref 0.57–1)
CREAT UR-MCNC: 12.2 MG/DL
EPI CELLS #/AREA URNS HPF: ABNORMAL /HPF (ref 0–10)
ERYTHROCYTE [DISTWIDTH] IN BLOOD BY AUTOMATED COUNT: 14.8 % (ref 11.7–15.4)
GLOBULIN SER CALC-MCNC: 2.9 G/DL (ref 1.5–4.5)
GLUCOSE SERPL-MCNC: 151 MG/DL (ref 65–99)
GLUCOSE UR QL: NEGATIVE
HBA1C MFR BLD: 6.7 % (ref 4.8–5.6)
HCT VFR BLD AUTO: 38.5 % (ref 34–46.6)
HDLC SERPL-MCNC: 54 MG/DL
HGB BLD-MCNC: 12.4 G/DL (ref 11.1–15.9)
HGB UR QL STRIP: NEGATIVE
KETONES UR QL STRIP: NEGATIVE
LDLC SERPL CALC-MCNC: 100 MG/DL (ref 0–99)
LEUKOCYTE ESTERASE UR QL STRIP: ABNORMAL
MCH RBC QN AUTO: 28.2 PG (ref 26.6–33)
MCHC RBC AUTO-ENTMCNC: 32.2 G/DL (ref 31.5–35.7)
MCV RBC AUTO: 88 FL (ref 79–97)
MICRO URNS: ABNORMAL
MICROALBUMIN UR-MCNC: 19.1 UG/ML
MUCOUS THREADS URNS QL MICRO: PRESENT
NITRITE UR QL STRIP: NEGATIVE
PH UR STRIP: 6.5 [PH] (ref 5–7.5)
PLATELET # BLD AUTO: 222 X10E3/UL (ref 150–450)
POTASSIUM SERPL-SCNC: 3.9 MMOL/L (ref 3.5–5.2)
PROT SERPL-MCNC: 7 G/DL (ref 6–8.5)
PROT UR QL STRIP: NEGATIVE
RBC # BLD AUTO: 4.4 X10E6/UL (ref 3.77–5.28)
RBC #/AREA URNS HPF: ABNORMAL /HPF (ref 0–2)
SODIUM SERPL-SCNC: 142 MMOL/L (ref 134–144)
SP GR UR: 1.01 (ref 1–1.03)
T4 FREE SERPL-MCNC: 1.63 NG/DL (ref 0.82–1.77)
TRIGL SERPL-MCNC: 165 MG/DL (ref 0–149)
TSH SERPL DL<=0.005 MIU/L-ACNC: 1.69 UIU/ML (ref 0.45–4.5)
URINALYSIS REFLEX: ABNORMAL
UROBILINOGEN UR STRIP-MCNC: 0.2 MG/DL (ref 0.2–1)
VLDLC SERPL CALC-MCNC: 29 MG/DL (ref 5–40)
WBC # BLD AUTO: 5.7 X10E3/UL (ref 3.4–10.8)
WBC #/AREA URNS HPF: ABNORMAL /HPF (ref 0–5)

## 2021-01-15 DIAGNOSIS — I42.8 OTHER CARDIOMYOPATHY (HCC): Primary | ICD-10-CM

## 2021-01-19 ENCOUNTER — TRANSCRIBE ORDERS (OUTPATIENT)
Dept: LAB | Facility: HOSPITAL | Age: 76
End: 2021-01-19

## 2021-01-19 ENCOUNTER — OFFICE VISIT (OUTPATIENT)
Dept: INTERNAL MEDICINE | Facility: CLINIC | Age: 76
End: 2021-01-19

## 2021-01-19 VITALS
SYSTOLIC BLOOD PRESSURE: 126 MMHG | DIASTOLIC BLOOD PRESSURE: 68 MMHG | BODY MASS INDEX: 33.87 KG/M2 | HEIGHT: 64 IN | HEART RATE: 80 BPM | WEIGHT: 198.4 LBS | RESPIRATION RATE: 18 BRPM | TEMPERATURE: 96.9 F | OXYGEN SATURATION: 98 %

## 2021-01-19 DIAGNOSIS — N18.30 STAGE 3 CHRONIC KIDNEY DISEASE, UNSPECIFIED WHETHER STAGE 3A OR 3B CKD (HCC): ICD-10-CM

## 2021-01-19 DIAGNOSIS — Z01.818 OTHER SPECIFIED PRE-OPERATIVE EXAMINATION: Primary | ICD-10-CM

## 2021-01-19 DIAGNOSIS — E78.5 DYSLIPIDEMIA: Chronic | ICD-10-CM

## 2021-01-19 DIAGNOSIS — Z00.00 ENCOUNTER FOR MEDICARE ANNUAL WELLNESS EXAM: Primary | ICD-10-CM

## 2021-01-19 DIAGNOSIS — E03.9 ACQUIRED HYPOTHYROIDISM: ICD-10-CM

## 2021-01-19 DIAGNOSIS — I10 ESSENTIAL HYPERTENSION: Chronic | ICD-10-CM

## 2021-01-19 DIAGNOSIS — R85.7 ABNORMAL INFLAMMATORY BOWEL DISEASE PANEL: ICD-10-CM

## 2021-01-19 DIAGNOSIS — R19.7 DIARRHEA, UNSPECIFIED TYPE: ICD-10-CM

## 2021-01-19 DIAGNOSIS — E55.9 VITAMIN D DEFICIENCY: ICD-10-CM

## 2021-01-19 DIAGNOSIS — E11.69 TYPE 2 DIABETES MELLITUS WITH OTHER SPECIFIED COMPLICATION, WITHOUT LONG-TERM CURRENT USE OF INSULIN (HCC): ICD-10-CM

## 2021-01-19 PROBLEM — I42.9 MYOCARDIOPATHY: Status: ACTIVE | Noted: 2021-01-19

## 2021-01-19 PROCEDURE — 99214 OFFICE O/P EST MOD 30 MIN: CPT | Performed by: NURSE PRACTITIONER

## 2021-01-19 PROCEDURE — G0439 PPPS, SUBSEQ VISIT: HCPCS | Performed by: NURSE PRACTITIONER

## 2021-01-22 ENCOUNTER — LAB (OUTPATIENT)
Dept: LAB | Facility: HOSPITAL | Age: 76
End: 2021-01-22

## 2021-01-22 DIAGNOSIS — Z01.818 OTHER SPECIFIED PRE-OPERATIVE EXAMINATION: ICD-10-CM

## 2021-01-22 LAB — SARS-COV-2 ORF1AB RESP QL NAA+PROBE: NOT DETECTED

## 2021-01-22 PROCEDURE — C9803 HOPD COVID-19 SPEC COLLECT: HCPCS

## 2021-01-22 PROCEDURE — U0004 COV-19 TEST NON-CDC HGH THRU: HCPCS | Performed by: OBSTETRICS & GYNECOLOGY

## 2021-01-25 ENCOUNTER — HOSPITAL ENCOUNTER (OUTPATIENT)
Facility: HOSPITAL | Age: 76
Setting detail: HOSPITAL OUTPATIENT SURGERY
Discharge: HOME | End: 2021-01-25
Attending: INTERNAL MEDICINE | Admitting: NURSE PRACTITIONER

## 2021-01-25 VITALS
DIASTOLIC BLOOD PRESSURE: 73 MMHG | WEIGHT: 201 LBS | BODY MASS INDEX: 32.3 KG/M2 | HEART RATE: 68 BPM | OXYGEN SATURATION: 94 % | RESPIRATION RATE: 16 BRPM | HEIGHT: 66 IN | SYSTOLIC BLOOD PRESSURE: 155 MMHG | TEMPERATURE: 98.4 F

## 2021-01-25 DIAGNOSIS — I42.8 OTHER CARDIOMYOPATHY (HCC): ICD-10-CM

## 2021-01-25 LAB
GLUCOSE BLDC GLUCOMTR-MCNC: 137 MG/DL (ref 70–130)
HCT VFR BLDA CALC: 32 % (ref 38–51)
HCT VFR BLDA CALC: 32 % (ref 38–51)
HGB BLDA-MCNC: 10.9 G/DL (ref 12–17)
HGB BLDA-MCNC: 10.9 G/DL (ref 12–17)
SAO2 % BLDA: 72 % (ref 95–98)
SAO2 % BLDA: 99 % (ref 95–98)

## 2021-01-25 PROCEDURE — 99153 MOD SED SAME PHYS/QHP EA: CPT | Performed by: INTERNAL MEDICINE

## 2021-01-25 PROCEDURE — 82962 GLUCOSE BLOOD TEST: CPT

## 2021-01-25 PROCEDURE — 25010000002 FENTANYL CITRATE (PF) 100 MCG/2ML SOLUTION: Performed by: INTERNAL MEDICINE

## 2021-01-25 PROCEDURE — 0 IOPAMIDOL PER 1 ML: Performed by: INTERNAL MEDICINE

## 2021-01-25 PROCEDURE — C1894 INTRO/SHEATH, NON-LASER: HCPCS | Performed by: INTERNAL MEDICINE

## 2021-01-25 PROCEDURE — 99152 MOD SED SAME PHYS/QHP 5/>YRS: CPT | Performed by: INTERNAL MEDICINE

## 2021-01-25 PROCEDURE — C1769 GUIDE WIRE: HCPCS | Performed by: INTERNAL MEDICINE

## 2021-01-25 PROCEDURE — 25010000002 MIDAZOLAM PER 1 MG: Performed by: INTERNAL MEDICINE

## 2021-01-25 PROCEDURE — 25010000002 HEPARIN (PORCINE) PER 1000 UNITS: Performed by: INTERNAL MEDICINE

## 2021-01-25 PROCEDURE — 93460 R&L HRT ART/VENTRICLE ANGIO: CPT | Performed by: INTERNAL MEDICINE

## 2021-01-25 PROCEDURE — 85014 HEMATOCRIT: CPT

## 2021-01-25 PROCEDURE — 85018 HEMOGLOBIN: CPT

## 2021-01-25 RX ORDER — SODIUM CHLORIDE 0.9 % (FLUSH) 0.9 %
3 SYRINGE (ML) INJECTION EVERY 12 HOURS SCHEDULED
Status: DISCONTINUED | OUTPATIENT
Start: 2021-01-25 | End: 2021-01-25 | Stop reason: HOSPADM

## 2021-01-25 RX ORDER — LIDOCAINE HYDROCHLORIDE 20 MG/ML
INJECTION, SOLUTION INFILTRATION; PERINEURAL AS NEEDED
Status: DISCONTINUED | OUTPATIENT
Start: 2021-01-25 | End: 2021-01-25 | Stop reason: HOSPADM

## 2021-01-25 RX ORDER — ACETAMINOPHEN 325 MG/1
650 TABLET ORAL EVERY 4 HOURS PRN
Status: DISCONTINUED | OUTPATIENT
Start: 2021-01-25 | End: 2021-01-25 | Stop reason: HOSPADM

## 2021-01-25 RX ORDER — MIDAZOLAM HYDROCHLORIDE 1 MG/ML
INJECTION INTRAMUSCULAR; INTRAVENOUS AS NEEDED
Status: DISCONTINUED | OUTPATIENT
Start: 2021-01-25 | End: 2021-01-25 | Stop reason: HOSPADM

## 2021-01-25 RX ORDER — FENTANYL CITRATE 50 UG/ML
INJECTION, SOLUTION INTRAMUSCULAR; INTRAVENOUS AS NEEDED
Status: DISCONTINUED | OUTPATIENT
Start: 2021-01-25 | End: 2021-01-25 | Stop reason: HOSPADM

## 2021-01-25 RX ORDER — SODIUM CHLORIDE 0.9 % (FLUSH) 0.9 %
10 SYRINGE (ML) INJECTION AS NEEDED
Status: DISCONTINUED | OUTPATIENT
Start: 2021-01-25 | End: 2021-01-25 | Stop reason: HOSPADM

## 2021-01-25 RX ORDER — SODIUM CHLORIDE 9 MG/ML
75 INJECTION, SOLUTION INTRAVENOUS CONTINUOUS
Status: DISCONTINUED | OUTPATIENT
Start: 2021-01-25 | End: 2021-01-25 | Stop reason: HOSPADM

## 2021-01-25 RX ADMIN — SODIUM CHLORIDE 75 ML/HR: 9 INJECTION, SOLUTION INTRAVENOUS at 07:26

## 2021-01-25 NOTE — H&P
Patient Name: Jung Short  Age/Sex: 75 y.o. female  : 1945  MRN: [id number]    Date of Admission: 2021  Date of Encounter Visit: 21  Encounter Provider: Nicholas Andrade MD  Place of Service: [facility]      Referring Provider: Nicholas Andrade MD  Patient Care Team:  Kathryn Epstein APRN as PCP - General (Family Medicine)  Aria Bates MD as Consulting Physician (Cardiology)  Rafa Hannah MD as Consulting Physician (Nephrology)  Live Chambers MD as Surgeon (Orthopedic Surgery)  Trini Zaidi APRN as Nurse Practitioner (Pain Medicine)  Kaitlynn Frias DPM as Consulting Physician (Podiatry)    Subjective:   Admitted for: Cardiac catheterization    Chief Complaint: Shortness of breath  History of Present Illness:  Jung Short is a 75 y.o. female with a history of a dilated nonischemic cardiomyopathy.  Patient recently had echocardiogram showing worsening of left ventricular systolic function.  Patient referred for right and left heart catheterization.        Past Medical History:  Past Medical History:   Diagnosis Date   • Anemia    • Benign breast disease    • Cancer (CMS/HCC)     Right breast   • Cellulitis of leg     May-2003 right lower extremity   • CHF (congestive heart failure) (CMS/HCC)     Dr Bates follows   • Chronic kidney disease     Dr Hannah follows   • Chronic ulcer of right foot (CMS/HCC)     Non-pressure, history of    • Closed fracture of patella 2017   • Colon polyp    • Depression    • Diabetic peripheral neuropathy (CMS/HCC)    • Diverticulosis    • Femoral fracture (CMS/HCC)     right   • Fracture of left wrist     history of   • Gastroesophageal reflux    • Generalized pain 2016   • Hiatal hernia    • Hyperlipidemia    • Hypertension    • Hypothyroidism    • Impingement syndrome of right shoulder    • Joint pain    • Left knee pain 2016   • Menopausal  disorder    • Methicillin resistant Staphylococcus aureus infection 2012    after bladder surgery   • Nonischemic cardiomyopathy (CMS/HCC)     Ejection fraction 10% per 2-D echo with Doppler however she did have cardiac catheterization April 2015 which showed ejection fraction 20% with global hypokinesis and severe mitral insufficiency   • Osteoarthritis     generalized, sched jania TKA   • Osteomyelitis (CMS/HCC) 2/2/2016   • Pain in shoulder 8/2/2016   • Paroxysmal atrial fibrillation (CMS/HCC)     Dr Bates follows   • Radius/ulna fracture, left, closed, initial encounter 10/21/2017   • Shoulder pain, bilateral     has tears on both sides   • Syncope, psychogenic 4/19/2017   • Thoracic back pain 2/2/2016   • Toe ulcer (CMS/HCC)     h/o to both feet, d/t shoes rubbing per patient   • Transient alteration of awareness 4/19/2017       Past Surgical History:   Procedure Laterality Date   • ABDOMINAL SURGERY  2012    had to be reopened after bladder surgery d/t infection   • AMPUTATION FOOT / TOE Right 11/2013    Rt foot amputation MTP first toe   • BLADDER SURGERY  2012   • BREAST BIOPSY     • BREAST SURGERY  06/29/2015    Percutaneous ultrasound-guided placement of metal localized clip 1st lesion   • CARDIAC CATHETERIZATION  2015   • CATARACT EXTRACTION Bilateral 2017   • COLONOSCOPY     • DEBRIDEMENT  FOOT Right 01/2013    During hospitalization    • EPIDURAL BLOCK      4 chronic back pain and leg pain last epidurals done 5-2012 she had no benefit at all from this procedure.   • FEMUR FRACTURE SURGERY     • FOOT SURGERY Bilateral     several   • HERNIA REPAIR      At the abdomen February 2007 Dr. Mendez   • INCISIONAL HERNIA REPAIR  05/16/2014    Recurrent. Incarcerated. With mesh implantation   • MASTECTOMY Right 05/2015   • MASTECTOMY MODIFIED RADICAL W/ AXILLARY LYMPH NODES W/ OR W/O PECTORALIS MINOR Right    • SHOULDER SURGERY Right     x2 RCR   • TOTAL ABDOMINAL HYSTERECTOMY      with oophorectomy-Done  June-2012 secondary to vaginal wall prolapse.   • TOTAL KNEE ARTHROPLASTY Right    • TOTAL KNEE ARTHROPLASTY Left 4/17/2018    Procedure: TOTAL KNEE ARTHROPLASTY;  Surgeon: Live Chambers MD;  Location: [facility];  Service: Orthopedics       Home Medications:   Medications Prior to Admission   Medication Sig Dispense Refill Last Dose   • aspirin 81 MG EC tablet Take 81 mg by mouth Daily.   1/24/2021 at Unknown time   • bumetanide (BUMEX) 0.5 MG tablet Take 3 tablets by mouth Daily. 190 tablet 3 1/24/2021 at Unknown time   • carvedilol (COREG) 25 MG tablet Take 1 tablet by mouth 2 (Two) Times a Day. 180 tablet 3 1/24/2021 at Unknown time   • cholecalciferol (VITAMIN D3) 25 MCG (1000 UT) tablet Take 1 tablet by mouth Daily. 90 tablet 3 1/24/2021 at Unknown time   • DULoxetine (CYMBALTA) 60 MG capsule Take 1 capsule by mouth Daily. 90 capsule 1 1/24/2021 at Unknown time   • esomeprazole (nexIUM) 20 MG capsule Take 20 mg by mouth Every Morning Before Breakfast.   1/24/2021 at Unknown time   • HYDROcodone-acetaminophen (NORCO) 5-325 MG per tablet Take 1 tablet by mouth Every 6 (Six) Hours As Needed for Severe Pain . DNF until 1-12-21 120 tablet 0 1/25/2021 at Unknown time   • levothyroxine (SYNTHROID, LEVOTHROID) 25 MCG tablet TAKE 1 TABLET BY MOUTH DAILY 90 tablet 3 1/24/2021 at Unknown time   • losartan (COZAAR) 25 MG tablet TAKE 1 TABLET BY MOUTH DAILY 90 tablet 3 1/24/2021 at Unknown time   • metFORMIN (GLUCOPHAGE) 1000 MG tablet Take 1 tablet by mouth Daily With Breakfast. 30 tablet 2 1/24/2021 at Unknown time   • methocarbamol (ROBAXIN) 500 MG tablet Take 1 tablet by mouth 3 (Three) Times a Day. 90 tablet 3 1/24/2021 at Unknown time   • Multiple Vitamins-Minerals (MULTIVITAMIN ADULT PO) Take 1 tablet by mouth Daily.   1/24/2021 at Unknown time   • pravastatin (PRAVACHOL) 10 MG tablet TAKE 1 TABLET BY MOUTH DAILY 90 tablet 3 1/24/2021 at Unknown time   • pregabalin (LYRICA) 75 MG capsule Take 1 capsule by mouth 2  (Two) Times a Day. 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   Tobacco Use   • Smoking status: Former Smoker     Packs/day: 3.00     Years: 30.00     Pack years: 90.00     Types: Cigarettes     Quit date:      Years since quittin.0   • Smokeless tobacco: Never Used   • Tobacco comment: daily caffiene   Substance and Sexual Activity   • Alcohol use: No     Comment: h/o quit    • Drug use: No   • Sexual activity: Defer     Partners: Male     Comment: celibate   Social History Narrative         Volunteers here at [employer] [facility] [place]     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    Lots of friends    2 daughters - lives in [place] and [place] (graphic design)    V[employer]        Past Family History:  Family History   Problem Relation Age of Onset   • Colonic polyp Mother    • Hypertension Mother    • Migraines Mother    • Mental illness Mother    • Depression Mother    • Coronary artery disease Father    • Other Father         Cardiac Disorder   • Colon cancer Father    • Kidney disease Father    • Cancer Father    • Breast cancer Maternal Aunt    • Colon cancer Brother    • Alcohol abuse Brother    • Arthritis Sister    • Hypertension Brother    • Lung disease Brother    • Alcohol abuse Brother    • Malig Hyperthermia Neg Hx        Review of Systems: All systems reviewed. Pertinent positives identified in HPI. All other systems are negative.     REVIEW OF SYSTEMS:   CONSTITUTIONAL: No weight loss, fever, chills, weakness or fatigue.   HEENT: Eyes: No  visual loss, blurred vision, double vision or yellow sclerae. Ears, Nose, Throat: No hearing loss, sneezing, congestion, runny nose or sore throat.   SKIN: No rash or itching.     RESPIRATORY: No shortness of breath, hemoptysis, cough or sputum.   GASTROINTESTINAL: No anorexia, nausea, vomiting or diarrhea. No abdominal pain, bright red blood per rectum or melena.  GENITOURINARY: No burning on urination, hematuria or increased frequency.  NEUROLOGICAL: No headache, dizziness, syncope, paralysis, ataxia, numbness or tingling in the extremities. No change in bowel or bladder control.   MUSCULOSKELETAL: No muscle, back pain, joint pain or stiffness.   HEMATOLOGIC: No anemia, bleeding or bruising.   LYMPHATICS: No enlarged nodes. No history of splenectomy.   PSYCHIATRIC: No history of depression, anxiety, hallucinations.   ENDOCRINOLOGIC: No reports of sweating, cold or heat intolerance. No polyuria or polydipsia.       Objective:     Objective:     No intake or output data in the 24 hours ending 01/25/21 0713  Body mass index is 32.02 kg/m².  There were no vitals filed for this visit.        Physical Exam:   Vitals signs reviewed.   Constitutional:       Appearance: Well-developed.   Eyes:      Pupils: Pupils are equal, round, and reactive to light.   HENT:      Head: Normocephalic.   Neck:      Musculoskeletal: Normal range of motion.      Thyroid: No thyromegaly.      Vascular: No carotid bruit or JVD.   Pulmonary:      Effort: Pulmonary effort is normal.      Breath sounds: Normal breath sounds.   Cardiovascular:      Normal rate. Regular rhythm.      No gallop.   Pulses:     Intact distal pulses.   Edema:     Peripheral edema absent.   Abdominal:      General: Bowel sounds are normal.      Palpations: Abdomen is soft.   Skin:     General: Skin is warm and dry.      Findings: No erythema.   Neurological:      Mental Status: Alert and oriented to person, place, and time.           Lab Review:             Assessment:    Assessment/Plan       1.  Cardiomyopathy            Plan:     Right and left heart catheterization.    Thank you for allowing me to participate in the care of Jung Short. Feel free to contact me directly with any further questions or concerns.    Nicholas Andrade MD  [facility]  01/25/21  07:13 EST

## 2021-01-28 ENCOUNTER — OFFICE VISIT (OUTPATIENT)
Dept: CARDIOLOGY | Facility: CLINIC | Age: 76
End: 2021-01-28

## 2021-01-28 VITALS
BODY MASS INDEX: 32.02 KG/M2 | DIASTOLIC BLOOD PRESSURE: 90 MMHG | RESPIRATION RATE: 16 BRPM | WEIGHT: 199.2 LBS | OXYGEN SATURATION: 96 % | HEIGHT: 66 IN | HEART RATE: 77 BPM | SYSTOLIC BLOOD PRESSURE: 170 MMHG

## 2021-01-28 DIAGNOSIS — N18.30 STAGE 3 CHRONIC KIDNEY DISEASE, UNSPECIFIED WHETHER STAGE 3A OR 3B CKD (HCC): ICD-10-CM

## 2021-01-28 DIAGNOSIS — I42.8 OTHER CARDIOMYOPATHY (HCC): ICD-10-CM

## 2021-01-28 DIAGNOSIS — E78.5 DYSLIPIDEMIA: ICD-10-CM

## 2021-01-28 DIAGNOSIS — I36.1 NONRHEUMATIC TRICUSPID VALVE REGURGITATION: ICD-10-CM

## 2021-01-28 DIAGNOSIS — I27.20 PULMONARY HYPERTENSION (HCC): ICD-10-CM

## 2021-01-28 DIAGNOSIS — I50.23 ACUTE ON CHRONIC HFREF (HEART FAILURE WITH REDUCED EJECTION FRACTION) (HCC): Primary | ICD-10-CM

## 2021-01-28 PROCEDURE — 99214 OFFICE O/P EST MOD 30 MIN: CPT | Performed by: NURSE PRACTITIONER

## 2021-01-28 RX ORDER — SACUBITRIL AND VALSARTAN 24; 26 MG/1; MG/1
1 TABLET, FILM COATED ORAL 2 TIMES DAILY
Qty: 60 TABLET | Refills: 6 | COMMUNITY
Start: 2021-01-28 | End: 2021-04-30

## 2021-01-28 NOTE — PROGRESS NOTES
Date of Office Visit: 2021  Encounter Provider: JOSE ANTONIO Leigh  Place of Service: [facility] CARDIOLOGY  Patient Name: Jung Short  :1945  Primary Cardiologist: Dr. Bates    CC:  Follow up cardiac cath/med changes     Dear Kathryn    HPI: Jung Short is a pleasant 75 y.o. female who presents 2021 for cardiac follow up.    She presented to [facility] in 2015 with acute congestive heart failure, systolic and diastolic. She had a 2-D echocardiogram which revealed an ejection fraction of 10-15%, moderate left ventricular dilation, severe mitral and tricuspid insufficiency, and her aortic valve was normal. She was transferred to [facility] for a cardiac catheterization. 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 She also had a right heart catheterization done which revealed an RV pressure of 50/20, PA pressure 50/30 with the main of 38 and a capillary wedge pressure of 31 mmHg. Her right atrial pressure was 20 mmHg. Her cardiac output was 3.9 liters per minute. She was treated medically and diuresed.       She had a 2-D echocardiogram with Doppler performed on 2015 which showed her ejection fraction to be 50%. There was normal segmental wall motion. There was moderate left atrial dilation. A small patent foramen ovale/atrial septal defect by saline contrast study and trace mitral and tricuspid insufficiency. This is a significant improvement from her prior echocardiogram.       She is on anastrozole for right breast cancer.       She had normal renal ultrasound ordered by Dr. Hannah 2017.  She has chronic renal  "insufficiency and follows with Dr. Hannah.  She had a carotid duplex study done April 2017 which showed mild carotid arteries.  She had a Holter monitor done just benign 4/2017.      Echo 9/2019:  · Calculated EF = 52.0%.  · Left ventricular systolic function is normal.  · Left atrial cavity size is mildly dilated.  · Left ventricular diastolic dysfunction (grade II) consistent with pseudonormalization.  · Mild-to-moderate mitral valve regurgitation is present  · Mild tricuspid valve regurgitation is present.  · Estimated right ventricular systolic pressure from tricuspid regurgitation is mildly elevated (35-45 mmHg).  · Calculated right ventricular systolic pressure from tricuspid regurgitation is 40.3 mmHg.     She had a fall off of some porch steps on 11/30/2019 while visiting her grandchildren in [place].  She does not really know what happened all she knows that she went up on the ground with her leg twisted \"every which way\".  She states she broke her right femur and now has a batool and she broke her ankle in 3 places.  She was in rehab for 2 months and then had to be discharged because her Medicare ran out.  She states she was home for a couple of days and was feeling short of air and could not sleep well the next evening.  She got up in her wheelchair to go to the kitchen because she just could not breathe and as she was reaching across her kitchen table the wheelchair went out from underneath of her and she fell and broke her right wrist.  She was then admitted again to the hospital and again discharged this time to [place] for 2 months.  She states it is been a long road and she still uses a cane and has a noticeable limp when walking.  She states about 1 to 2 months ago she fell twice in her garage.  She has not fallen since then.  She also reports a history of chronic back pain from previous back surgery.     She presented to the emergency department at [facility] on 4/10/2020 with shortness of " "air.  She does have a history of CHF but had not been bothered by this for a while.  She had noted a 6 pound weight gain over the last week.  She received IV Lasix in the ER with good response.  She noted episodes of heart failure in past have not been as bad as this one.  She denies cough, chest pain, syncopal sensation, nausea, diaphoresis with this episode.     She was placed on oxygen and monitored overnight.  She was able to be discharged home the next day.     She was seen in the emergency department on 9/27/2020 with complaints of shortness of air for the last 2 days.  Labs were notable for a proBNP 20,371.  CBC with an H&H 11.6/37.5, CMP with a glucose of 167 potassium 3.4, otherwise unremarkable.  She had a normal lipase and a negative troponin.  A chest x-ray did show - Cardiac enlargement with hilar prominence that may be pulmonary vascular congestion with trace small left pleural effusion but overall improvement since most recent study of April.  She received IV Bumex with good response.  She was able to be discharged that same day.     I saw her 10/2020. She stated she felt she was having more episodes of congestive heart failure and shortness of breath and increasing intervals..  Her biggest complaints are intermittent shortness of air and fatigue.  She states she cannot lie flat to sleep.  She has been sleeping in a recliner \"for very long time\".    She states her blood pressures at home are usually 120-130/70-80.  She states occasionally she will get a \"160\" but that is not very often.  I do note a lipid panel from September 2020 with a total cholesterol 146, triglycerides 142, HDL 59, LDL 63.  She denies any unexplained bleeding, dark or tarry stools. An echo was performed as below that showed significant changes in her EF. She was then sent for a cath.     Echo 1/12/21 Interpretation Summary    · Left ventricular wall thickness is consistent with concentric hypertrophy.  · Estimated right " ventricular systolic pressure from tricuspid regurgitation is normal (<35 mmHg).  · Left atrial volume is moderately increased.  · Left ventricular diastolic function was normal.  · The left ventricular cavity is mildly dilated.  · Estimated left ventricular EF was in disagreement with the calculated left ventricular EF. Left ventricular ejection fraction appears to be 21 - 25%. Left ventricular systolic function is severely decreased.  · The following left ventricular wall segments are hypokinetic: mid anterior, apical anterior, basal anterolateral, mid anterolateral, apical lateral, basal inferolateral, mid inferolateral, apical inferior, mid inferior, apical septal, basal inferoseptal, mid inferoseptal, apex hypokinetic, mid anteroseptal, basal anterior, basal inferior and basal inferoseptal.       1/25/21 cardiac catheterization  Coronary Findings  Diagnostic  Dominance: Right  Left Main   The left main coronary artery arises from the right cusp. There is mild calcification but no stenosis.   Left Anterior Descending   The left anterior descending coronary artery and diagonal branches are mildly calcified but free of significant disease.   Left Circumflex   The left circumflex coronary artery is a large vessel that is free of disease. The terminal marginal branch is large and free of disease.   Right Coronary Artery   The right coronary artery is mildly calcified. Minimal luminal irregularities are noted. The posterior descending branch is large, tortuous with minimal luminal irregularities. The posterior lateral branches are normal. The posterior lateral segment artery is minimally irregular.   Intervention    No interventions have been documented.  Left Heart Findings    Left Ventricle The left ventricular ejection fraction is less than 20% by visual estimation. There are wall motion abnormalities noted in the left ventricle.The overall size of the left ventricleIs enlarged.  There is severe diffuse global  hypokinesia noted with an estimated ejection fraction of around 20%.  Moderate mitral regurgitation is noted     Patient is here to discuss the results of her cardiac catheterization and treatment plan.  As above her EF had decreased to 20%.  She continues to have complaints of fatigue and shortness of breath.  She has some chronic edema in her lower extremities.  This is unchanged.  She denies any palpitations, she cannot feel her heart racing or skipping she has not had any dizziness or lightheadedness.  She has not had any chest pain or chest pressure.  She has not had any syncope or presyncopal episodes.  She is taking her medications as directed.  She does states she has not taken any blood pressure medicines this morning.  Her right wrist has a mild bruise, she has good radial pulses and brisk capillary refill in her right hand.    Past Medical History:   Diagnosis Date   • Anemia    • Benign breast disease    • Cancer (CMS/HCC) 2015    Right breast   • Cellulitis of leg     May-2003 right lower extremity   • CHF (congestive heart failure) (CMS/HCC)     Dr Bates follows   • Chronic kidney disease     Dr Hannah follows   • Chronic ulcer of right foot (CMS/HCC)     Non-pressure, history of    • Closed fracture of patella 9/5/2017   • Colon polyp    • Depression    • Diabetic peripheral neuropathy (CMS/HCC)    • Diverticulosis    • Femoral fracture (CMS/HCC)     right   • Fracture of left wrist     history of   • Gastroesophageal reflux    • Generalized pain 2/2/2016   • Hiatal hernia    • Hyperlipidemia    • Hypertension    • Hypothyroidism    • Impingement syndrome of right shoulder    • Joint pain    • Left knee pain 5/17/2016   • Menopausal disorder    • Methicillin resistant Staphylococcus aureus infection 2012    after bladder surgery   • Nonischemic cardiomyopathy (CMS/HCC)     Ejection fraction 10% per 2-D echo with Doppler however she did have cardiac catheterization April 2015 which showed ejection  fraction 20% with global hypokinesis and severe mitral insufficiency   • Osteoarthritis     generalized, sched jania TKA   • Osteomyelitis (CMS/HCC) 2/2/2016   • Pain in shoulder 8/2/2016   • Paroxysmal atrial fibrillation (CMS/[facility])     Dr Bates follows   • Radius/ulna fracture, left, closed, initial encounter 10/21/2017   • Shoulder pain, bilateral     has tears on both sides   • Syncope, psychogenic 4/19/2017   • Thoracic back pain 2/2/2016   • Toe ulcer (CMS/[facility])     h/o to both feet, d/t shoes rubbing per patient   • Transient alteration of awareness 4/19/2017       Past Surgical History:   Procedure Laterality Date   • ABDOMINAL SURGERY  2012    had to be reopened after bladder surgery d/t infection   • AMPUTATION FOOT / TOE Right 11/2013    Rt foot amputation MTP first toe   • BLADDER SURGERY  2012   • BREAST BIOPSY     • BREAST SURGERY  06/29/2015    Percutaneous ultrasound-guided placement of metal localized clip 1st lesion   • CARDIAC CATHETERIZATION  2015   • CARDIAC CATHETERIZATION N/A 1/25/2021    Procedure: Right and Left Heart Cath;  Surgeon: Nicholas Andrade MD;  Location: [facility] CATH INVASIVE LOC[facility];  Service: Cardiovascular;  Laterality: N/A;  drop in EF, with hx of NICM, SOA fatigue.  Discussed with    • CARDIAC CATHETERIZATION N/A 1/25/2021    Procedure: Left ventriculography;  Surgeon: Nicholas Andrade MD;  Location:  [facility] CATH INVASIVE LOCATION;  Service: Cardiovascular;  Laterality: N/A;   • CARDIAC CATHETERIZATION N/A 1/25/2021    Procedure: Coronary angiography;  Surgeon: Nicholas Andrade MD;  Location:  [facility] CATH INVASIVE LOCATION;  Service: Cardiovascular;  Laterality: N/A;   • CATARACT EXTRACTION Bilateral 2017   • COLONOSCOPY     • DEBRIDEMENT  FOOT Right 01/2013    During hospitalization    • EPIDURAL BLOCK      4 chronic back pain and leg pain last epidurals done 5-2012 she had no benefit at all from this procedure.   • FEMUR FRACTURE SURGERY     • FOOT SURGERY  Bilateral     several   • HERNIA REPAIR      At the abdomen 2007 Dr. Mendez   • INCISIONAL HERNIA REPAIR  2014    Recurrent. Incarcerated. With mesh implantation   • MASTECTOMY Right 2015   • MASTECTOMY MODIFIED RADICAL W/ AXILLARY LYMPH NODES W/ OR W/O PECTORALIS MINOR Right    • SHOULDER SURGERY Right     x2 RCR   • TOTAL ABDOMINAL HYSTERECTOMY      with oophorectomy-Done 2012 secondary to vaginal wall prolapse.   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   Tobacco Use   • Smoking status: Former Smoker     Packs/day: 3.00     Years: 30.00     Pack years: 90.00     Types: Cigarettes     Quit date:      Years since quittin.1   • Smokeless tobacco: Never Used   • Tobacco comment: daily caffiene   Substance and Sexual Activity   • Alcohol use: No     Comment: h/o quit    • Drug use: No   • Sexual activity: Defer     Partners: Male     Comment: celibate   Social History Narrative         Volunteers here at [facility]     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    Lots of friends    2 daughters - lives in [facility] and [place] (graphic design)    [employer]       Family History   Problem Relation Age of Onset   • Colonic polyp Mother    • Hypertension Mother    • Migraines Mother    • Mental illness Mother    • Depression Mother    • Coronary artery disease Father    • Other Father         Cardiac Disorder   • Colon cancer Father    • Kidney disease Father    • Cancer Father    • Breast cancer Maternal Aunt    • Colon cancer Brother    • Alcohol abuse Brother    •  Arthritis Sister    • Hypertension Brother    • Lung disease Brother    • Alcohol abuse Brother    • Malig Hyperthermia Neg Hx        The following portion of the patient's history were reviewed and updated as appropriate: past medical history, past surgical history, past social history, past family history, allergies, current medications, and problem list.    Review of Systems   Constitution: Positive for malaise/fatigue. Negative for diaphoresis and fever.   HENT: Negative for congestion, hearing loss, hoarse voice, nosebleeds and sore throat.    Eyes: Negative for photophobia, vision loss in left eye, vision loss in right eye and visual disturbance.   Cardiovascular: Positive for leg swelling (chronic). Negative for chest pain, dyspnea on exertion, irregular heartbeat, near-syncope, orthopnea, palpitations, paroxysmal nocturnal dyspnea and syncope.   Respiratory: Positive for shortness of breath. Negative for cough, hemoptysis, sleep disturbances due to breathing, snoring, sputum production and wheezing.    Endocrine: Negative for cold intolerance, heat intolerance, polydipsia, polyphagia and polyuria.   Hematologic/Lymphatic: Negative for bleeding problem. Does not bruise/bleed easily.   Skin: Negative for color change, dry skin, poor wound healing, rash and suspicious lesions.   Musculoskeletal: Negative for arthritis, back pain, falls, gout, joint pain, joint swelling, muscle cramps, muscle weakness and myalgias.   Gastrointestinal: Negative for bloating, abdominal pain, constipation, diarrhea, dysphagia, melena, nausea and vomiting.   Neurological: Negative for excessive daytime sleepiness, dizziness, headaches, light-headedness, loss of balance, numbness, paresthesias, seizures, vertigo and weakness.   Psychiatric/Behavioral: Negative for depression, memory loss and substance abuse. The patient is not nervous/anxious.        Allergies   Allergen Reactions   • Dilaudid [Hydromorphone] Delirium and Confusion    • Latex Rash         Current Outpatient Medications:   •  aspirin 81 MG EC tablet, Take 81 mg by mouth Daily., Disp: , Rfl:   •  bumetanide (BUMEX) 0.5 MG tablet, Take 3 tablets by mouth Daily., Disp: 190 tablet, Rfl: 3  •  carvedilol (COREG) 25 MG tablet, Take 1 tablet by mouth 2 (Two) Times a Day., Disp: 180 tablet, Rfl: 3  •  cholecalciferol (VITAMIN D3) 25 MCG (1000 UT) tablet, Take 1 tablet by mouth Daily., Disp: 90 tablet, Rfl: 3  •  DULoxetine (CYMBALTA) 60 MG capsule, Take 1 capsule by mouth Daily., Disp: 90 capsule, Rfl: 1  •  esomeprazole (nexIUM) 20 MG capsule, Take 20 mg by mouth Every Morning Before Breakfast., Disp: , Rfl:   •  glucose blood test strip, Take BG daily, Disp: 50 each, Rfl: 12  •  HYDROcodone-acetaminophen (NORCO) 5-325 MG per tablet, Take 1 tablet by mouth Every 6 (Six) Hours As Needed for Severe Pain . DNF until 1-12-21, Disp: 120 tablet, Rfl: 0  •  levothyroxine (SYNTHROID, LEVOTHROID) 25 MCG tablet, TAKE 1 TABLET BY MOUTH DAILY, Disp: 90 tablet, Rfl: 3  •  losartan (COZAAR) 25 MG tablet, TAKE 1 TABLET BY MOUTH DAILY, Disp: 90 tablet, Rfl: 3  •  metFORMIN (GLUCOPHAGE) 1000 MG tablet, Take 1 tablet by mouth Daily With Breakfast., Disp: 30 tablet, Rfl: 2  •  methocarbamol (ROBAXIN) 500 MG tablet, Take 1 tablet by mouth 3 (Three) Times a Day., Disp: 90 tablet, Rfl: 3  •  Multiple Vitamins-Minerals (MULTIVITAMIN ADULT PO), Take 1 tablet by mouth Daily., Disp: , Rfl:   •  pravastatin (PRAVACHOL) 10 MG tablet, TAKE 1 TABLET BY MOUTH DAILY, Disp: 90 tablet, Rfl: 3  •  pregabalin (LYRICA) 75 MG capsule, Take 1 capsule by mouth 2 (Two) Times a Day., Disp: 60 capsule, Rfl: 5        Objective:     There were no vitals filed for this visit.  There is no height or weight on file to calculate BMI.      Vitals signs reviewed.   Constitutional:       General: Not in acute distress.     Appearance: Normal and healthy appearance. Well-developed, well-groomed and not in distress. Obese.   Eyes:       General:         Right eye: No discharge.         Left eye: No discharge.      Conjunctiva/sclera: Conjunctivae normal.   HENT:      Head: Normocephalic and atraumatic.      Right Ear: External ear normal.      Left Ear: External ear normal.      Nose: Nose normal.   Neck:      Musculoskeletal: Normal range of motion and neck supple.      Thyroid: No thyromegaly.      Vascular: No JVD.      Trachea: No tracheal deviation.      Lymphadenopathy: No cervical adenopathy.   Pulmonary:      Effort: Pulmonary effort is normal. No respiratory distress.      Breath sounds: Normal breath sounds. No wheezing. No rales.   Chest:      Chest wall: Not tender to palpatation.   Cardiovascular:      Normal rate. Regular rhythm.      No gallop.   Pulses:     Intact distal pulses.   Edema:     Peripheral edema absent.   Abdominal:      General: There is no distension.      Palpations: Abdomen is soft.      Tenderness: There is no abdominal tenderness.   Musculoskeletal: Normal range of motion.         General: No tenderness or deformity.   Skin:     General: Skin is warm and dry.      Findings: No erythema or rash.   Neurological:      Mental Status: Alert and oriented to person, place, and time.      Coordination: Coordination normal.      Gait: Gait abnormal.      Comments: Uses a cane for ambulation   Psychiatric:         Attention and Perception: Attention and perception normal.         Mood and Affect: Mood and affect normal.         Speech: Speech normal.         Behavior: Behavior normal. Behavior is cooperative.         Thought Content: Thought content normal.         Cognition and Memory: Cognition and memory normal.         Judgment: Judgment normal.             Procedures      Assessment:       Diagnosis Plan   1. Acute on chronic HFrEF (heart failure with reduced ejection fraction) (CMS/HCC)     2. Other cardiomyopathy (CMS/HCC)     3. Stage 3 chronic kidney disease, unspecified whether stage 3a or 3b CKD (CMS/HCC)     4.  Dyslipidemia     5. Pulmonary hypertension (CMS/HCC)     6. Nonrheumatic tricuspid valve regurgitation            Plan:       1. H/o Nonischemic cardiomyopathy. Her ejection fraction was 10-20%. At this time she is [place] Heart Association class 2a. Her repeat echocardiogram 6/2015 showed LVEF 50% with trace mitral and tricuspid insufficiency. Echo 9/19 EF 52% with normal LV systolic function. Echo 1/21 showed a decrease in her EF to 21-25% with multiple wall motion abnormalities.  Cardiac cath 1/21 showed left ventricle ejection fraction of less than 20% by visual estimation.  There were wall motion abnormalities noted in the left ventricle.  The overall size of the left ventricle is enlarged.  There was  severe diffuse global hypokinesis noted with an estimated EF of 21 to 25%.  Moderate mitral regurgitation.  Will stop losartan and start Entresto 24/26 MG BID.  Her last lab value showed a normal creatinine of 0.75 on 1/12/21.  She will have labs in 1 week as well as BP check in our office.  I have given her a month of samples.  She will start the Entresto 1/29/21.  She states she has not taken her losartan since yesterday morning. Written instructions given to patient. Will plan on Echo in 3 months   2. Hypertension- well controlled  3. DM, type 2.  4. Anomalous coronary artery.   5. H/o right breast cancer. Sees Dr. Draper.   6. Severe OA of the knees. Uses a can for ambulation  7. CKD - creatinine 1/12/21 0.75  8.  Left knee OA   9.  Valvular disease -  Moderate mitral valve regurgitation on echo 1/21.    Stop losartan, Start Entresto 1/29/21.  Labs and BP check in 1 week.    As always, it has been a pleasure to participate in your patient's care. Thank you.       Sincerely,       JOSE ANTONIO Leigh      Current Outpatient Medications:   •  aspirin 81 MG EC tablet, Take 81 mg by mouth Daily., Disp: , Rfl:   •  bumetanide (BUMEX) 0.5 MG tablet, Take 3 tablets by mouth Daily., Disp: 190 tablet, Rfl: 3  •   carvedilol (COREG) 25 MG tablet, Take 1 tablet by mouth 2 (Two) Times a Day., Disp: 180 tablet, Rfl: 3  •  cholecalciferol (VITAMIN D3) 25 MCG (1000 UT) tablet, Take 1 tablet by mouth Daily., Disp: 90 tablet, Rfl: 3  •  DULoxetine (CYMBALTA) 60 MG capsule, Take 1 capsule by mouth Daily., Disp: 90 capsule, Rfl: 1  •  esomeprazole (nexIUM) 20 MG capsule, Take 20 mg by mouth Every Morning Before Breakfast., Disp: , Rfl:   •  glucose blood test strip, Take BG daily, Disp: 50 each, Rfl: 12  •  HYDROcodone-acetaminophen (NORCO) 5-325 MG per tablet, Take 1 tablet by mouth Every 6 (Six) Hours As Needed for Severe Pain . DNF until 1-12-21, Disp: 120 tablet, Rfl: 0  •  levothyroxine (SYNTHROID, LEVOTHROID) 25 MCG tablet, TAKE 1 TABLET BY MOUTH DAILY, Disp: 90 tablet, Rfl: 3  •  metFORMIN (GLUCOPHAGE) 1000 MG tablet, Take 1 tablet by mouth Daily With Breakfast., Disp: 30 tablet, Rfl: 2  •  methocarbamol (ROBAXIN) 500 MG tablet, Take 1 tablet by mouth 3 (Three) Times a Day., Disp: 90 tablet, Rfl: 3  •  Multiple Vitamins-Minerals (MULTIVITAMIN ADULT PO), Take 1 tablet by mouth Daily., Disp: , Rfl:   •  pravastatin (PRAVACHOL) 10 MG tablet, TAKE 1 TABLET BY MOUTH DAILY, Disp: 90 tablet, Rfl: 3  •  pregabalin (LYRICA) 75 MG capsule, Take 1 capsule by mouth 2 (Two) Times a Day., Disp: 60 capsule, Rfl: 5  •  sacubitril-valsartan (Entresto) 24-26 MG tablet, Take 1 tablet by mouth 2 (Two) Times a Day., Disp: 60 tablet, Rfl: 6    Dictated utilizing Dragon dictation

## 2021-02-01 ENCOUNTER — OFFICE VISIT (OUTPATIENT)
Dept: GASTROENTEROLOGY | Facility: CLINIC | Age: 76
End: 2021-02-01

## 2021-02-01 VITALS — WEIGHT: 199.2 LBS | TEMPERATURE: 97.9 F | BODY MASS INDEX: 32.02 KG/M2 | HEIGHT: 66 IN

## 2021-02-01 DIAGNOSIS — I42.8 OTHER CARDIOMYOPATHY (HCC): ICD-10-CM

## 2021-02-01 DIAGNOSIS — G62.9 PERIPHERAL POLYNEUROPATHY: ICD-10-CM

## 2021-02-01 DIAGNOSIS — R15.9 FULL INCONTINENCE OF FECES: Primary | ICD-10-CM

## 2021-02-01 DIAGNOSIS — K21.00 GASTROESOPHAGEAL REFLUX DISEASE WITH ESOPHAGITIS WITHOUT HEMORRHAGE: ICD-10-CM

## 2021-02-01 PROCEDURE — 99213 OFFICE O/P EST LOW 20 MIN: CPT | Performed by: INTERNAL MEDICINE

## 2021-02-01 NOTE — PROGRESS NOTES
PATIENT INFORMATION  Jung Short       - 1945    CHIEF COMPLAINT  Chief Complaint   Patient presents with   • Follow-up     4 mo follow up on Gerd; Diarrhea       HISTORY OF PRESENT ILLNESS  Recent fatigue and cardiology repeated her Echo and dropped her EF to 21% so went thru cath and was given Entresto.     Her Diarrhea not as bad and improved to once a week but more consistent with fecal incontinence. And will still skip days out of the week but its not that it wakes her up but just that she cant control it.       Reviewed a more aggressive approach to using fleet enema and glycerin supp and or a stimulant alxative to make her self go daily and then should be done for the rest of the day.      REVIEWED PERTINENT RESULTS/ LABS  No results found for: CASEREPORT, FINALDX  Lab Results   Component Value Date    HGB 10.9 (L) 2021    MCV 88 2021     2021    ALT 9 2021    AST 16 2021    HGBA1C 6.7 (H) 2021    INR 1.22 (H) 2020    TRIG 165 (H) 2021    FERRITIN 109.8 2016    IRON 71 2017    TIBC 290 2017      No results found.    REVIEW OF SYSTEMS  Review of Systems   All other systems reviewed and are negative.        ACTIVE PROBLEMS  Patient Active Problem List    Diagnosis   • Myocardiopathy (CMS/[facility]) [I42.9]   • Acute on chronic congestive heart failure (CMS/[facility]) [I50.9]   • Periprosthetic subtrochanteric fracture of femur [M97.8XXA, Z96.649]   • Traumatic closed nondisp torus fracture of distal radial metaphysis, right, initial encounter [S52.521A]   • Pleural effusion [J90]   • Acute respiratory failure with hypoxia (CMS/[facility]) [J96.01]   • Diastolic congestive heart failure (CMS/HCC) [I50.30]   • CKD (chronic kidney disease) stage 3, GFR 30-59 ml/min (CMS/[facility]) [N18.30]   • Acute on chronic HFrEF (heart failure with reduced ejection fraction) (CMS/[facility]) [I50.23]   • Physical deconditioning [R53.81]   • Primary osteoarthritis of  left knee [M17.12]   • Atherosclerotic heart disease of native coronary artery without angina pectoris [I25.10]   • Encounter for long-term (current) use of high-risk medication [Z79.899]   • Chronic gout of multiple sites [M1A.09X0]   • Arthritis of knee [M17.10]   • DDD (degenerative disc disease), lumbar [M51.36]   • Lumbar facet arthropathy [M47.816]   • Chronic anemia [D64.9]   • Cardiomyopathy (CMS/HCC) [I42.9]   • Disc disorder of cervical region [M50.90]   • Neck pain [M54.2]   • Chronic pain [G89.29]   • Depression [F32.9]   • DM2 (diabetes mellitus, type 2) (CMS/HCC) [E11.9]   • Dyslipidemia [E78.5]   • Gastroesophageal reflux disease with esophagitis [K21.00]   • Hypertension [I10]   • Hypothyroidism [E03.9]   • Incisional hernia [K43.2]   • Mitral valve insufficiency [I34.0]   • Nausea [R11.0]   • Osteopenia of spine [M85.88]   • Arthralgia of multiple joints [M25.50]   • Peripheral neuropathy [G62.9]   • Pulmonary hypertension (CMS/HCC) [I27.20]   • PITTS (dyspnea on exertion) [R06.00]   • Tricuspid valve insufficiency [I07.1]   • Cobalamin deficiency [E53.8]   • Vitamin D deficiency [E55.9]   • Malignant neoplasm of upper-inner quadrant of right breast in female, estrogen receptor positive (CMS/HCC) [C50.211, Z17.0]         PAST MEDICAL HISTORY  Past Medical History:   Diagnosis Date   • Anemia    • Benign breast disease    • Cancer (CMS/HCC) 2015    Right breast   • Cellulitis of leg     May-2003 right lower extremity   • CHF (congestive heart failure) (CMS/HCC)     Dr Bates follows   • Chronic kidney disease     Dr Hannah follows   • Chronic ulcer of right foot (CMS/HCC)     Non-pressure, history of    • Closed fracture of patella 9/5/2017   • Colon polyp    • Depression    • Diabetic peripheral neuropathy (CMS/HCC)    • Diverticulosis    • Femoral fracture (CMS/HCC)     right   • Fracture of left wrist     history of   • Gastroesophageal reflux    • Generalized pain 2/2/2016   • Hiatal hernia    •  Hyperlipidemia    • Hypertension    • Hypothyroidism    • Impingement syndrome of right shoulder    • Joint pain    • Left knee pain 5/17/2016   • Menopausal disorder    • Methicillin resistant Staphylococcus aureus infection 2012    after bladder surgery   • Nonischemic cardiomyopathy (CMS/HCC)     Ejection fraction 10% per 2-D echo with Doppler however she did have cardiac catheterization April 2015 which showed ejection fraction 20% with global hypokinesis and severe mitral insufficiency   • Osteoarthritis     generalized, sched jania TKA   • Osteomyelitis (CMS/HCC) 2/2/2016   • Pain in shoulder 8/2/2016   • Paroxysmal atrial fibrillation (CMS/HCC)     Dr Bates follows   • Radius/ulna fracture, left, closed, initial encounter 10/21/2017   • Shoulder pain, bilateral     has tears on both sides   • Syncope, psychogenic 4/19/2017   • Thoracic back pain 2/2/2016   • Toe ulcer (CMS/HCC)     h/o to both feet, d/t shoes rubbing per patient   • Transient alteration of awareness 4/19/2017         SURGICAL HISTORY  Past Surgical History:   Procedure Laterality Date   • ABDOMINAL SURGERY  2012    had to be reopened after bladder surgery d/t infection   • AMPUTATION FOOT / TOE Right 11/2013    Rt foot amputation MTP first toe   • BLADDER SURGERY  2012   • BREAST BIOPSY     • BREAST SURGERY  06/29/2015    Percutaneous ultrasound-guided placement of metal localized clip 1st lesion   • CARDIAC CATHETERIZATION  2015   • CARDIAC CATHETERIZATION N/A 1/25/2021    Procedure: Right and Left Heart Cath;  Surgeon: Nicholas Andrade MD;  Location:  [facility];  Service: Cardiovascular;  Laterality: N/A;  drop in EF, with hx of NICM, SOA fatigue.  Discussed with    • CARDIAC CATHETERIZATION N/A 1/25/2021    Procedure: Left ventriculography;  Surgeon: Nicholas Andrade MD;  Location:  [facility];  Service: Cardiovascular;  Laterality: N/A;   • CARDIAC CATHETERIZATION N/A 1/25/2021     Procedure: Coronary angiography;  Surgeon: Nicholas Andrade MD;  Location:  [facility];  Service: Cardiovascular;  Laterality: N/A;   • CATARACT EXTRACTION Bilateral    • COLONOSCOPY     • DEBRIDEMENT  FOOT Right 2013    During hospitalization    • EPIDURAL BLOCK      4 chronic back pain and leg pain last epidurals done  she had no benefit at all from this procedure.   • FEMUR FRACTURE SURGERY     • FOOT SURGERY Bilateral     several   • HERNIA REPAIR      At the abdomen 2007 Dr. Mendez   • INCISIONAL HERNIA REPAIR  2014    Recurrent. Incarcerated. With mesh implantation   • MASTECTOMY Right 2015   • MASTECTOMY MODIFIED RADICAL W/ AXILLARY LYMPH NODES W/ OR W/O PECTORALIS MINOR Right    • SHOULDER SURGERY Right     x2 RCR   • TOTAL ABDOMINAL HYSTERECTOMY      with oophorectomy-Done 2012 secondary to vaginal wall prolapse.   • TOTAL KNEE ARTHROPLASTY Right    • TOTAL KNEE ARTHROPLASTY Left 2018    Procedure: TOTAL KNEE ARTHROPLASTY;  Surgeon: Live Chambers MD;  Location:  [facility];  Service: Orthopedics         FAMILY HISTORY  Family History   Problem Relation Age of Onset   • Colonic polyp Mother    • Hypertension Mother    • Migraines Mother    • Mental illness Mother    • Depression Mother    • Coronary artery disease Father    • Other Father         Cardiac Disorder   • Colon cancer Father    • Kidney disease Father    • Cancer Father    • Breast cancer Maternal Aunt    • Colon cancer Brother    • Alcohol abuse Brother    • Arthritis Sister    • Hypertension Brother    • Lung disease Brother    • Alcohol abuse Brother    • Malig Hyperthermia Neg Hx          SOCIAL HISTORY  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   Tobacco Use   • Smoking status: Former Smoker     Packs/day: 3.00     Years: 30.00     Pack years: 90.00     Types: Cigarettes     Quit date:      Years since quittin.1   • Smokeless  tobacco: Never Used   • Tobacco comment: daily caffiene   Substance and Sexual Activity   • Alcohol use: No     Comment: h/o quit 1980s   • Drug use: No   • Sexual activity: Defer     Partners: Male     Comment: celibate         CURRENT MEDICATIONS    Current Outpatient Medications:   •  aspirin 81 MG EC tablet, Take 81 mg by mouth Daily., Disp: , Rfl:   •  bumetanide (BUMEX) 0.5 MG tablet, Take 3 tablets by mouth Daily., Disp: 190 tablet, Rfl: 3  •  carvedilol (COREG) 25 MG tablet, Take 1 tablet by mouth 2 (Two) Times a Day., Disp: 180 tablet, Rfl: 3  •  cholecalciferol (VITAMIN D3) 25 MCG (1000 UT) tablet, Take 1 tablet by mouth Daily., Disp: 90 tablet, Rfl: 3  •  DULoxetine (CYMBALTA) 60 MG capsule, Take 1 capsule by mouth Daily., Disp: 90 capsule, Rfl: 1  •  esomeprazole (nexIUM) 20 MG capsule, Take 20 mg by mouth Every Morning Before Breakfast., Disp: , Rfl:   •  glucose blood test strip, Take BG daily, Disp: 50 each, Rfl: 12  •  HYDROcodone-acetaminophen (NORCO) 5-325 MG per tablet, Take 1 tablet by mouth Every 6 (Six) Hours As Needed for Severe Pain . DNF until 1-12-21, Disp: 120 tablet, Rfl: 0  •  levothyroxine (SYNTHROID, LEVOTHROID) 25 MCG tablet, TAKE 1 TABLET BY MOUTH DAILY, Disp: 90 tablet, Rfl: 3  •  metFORMIN (GLUCOPHAGE) 1000 MG tablet, Take 1 tablet by mouth Daily With Breakfast., Disp: 30 tablet, Rfl: 2  •  methocarbamol (ROBAXIN) 500 MG tablet, Take 1 tablet by mouth 3 (Three) Times a Day., Disp: 90 tablet, Rfl: 3  •  Multiple Vitamins-Minerals (MULTIVITAMIN ADULT PO), Take 1 tablet by mouth Daily., Disp: , Rfl:   •  pravastatin (PRAVACHOL) 10 MG tablet, TAKE 1 TABLET BY MOUTH DAILY, Disp: 90 tablet, Rfl: 3  •  pregabalin (LYRICA) 75 MG capsule, Take 1 capsule by mouth 2 (Two) Times a Day., Disp: 60 capsule, Rfl: 5  •  sacubitril-valsartan (Entresto) 24-26 MG tablet, Take 1 tablet by mouth 2 (Two) Times a Day., Disp: 60 tablet, Rfl: 6    ALLERGIES  Dilaudid [hydromorphone] and  "Latex    VITALS  Vitals:    02/01/21 1435   Temp: 97.9 °F (36.6 °C)   TempSrc: Temporal   Weight: 90.4 kg (199 lb 3.2 oz)   Height: 167.6 cm (65.98\")       PHYSICAL EXAM  Debilities/Disabilities Identified: None  Emotional Behavior: Appropriate  Wt Readings from Last 3 Encounters:   02/01/21 90.4 kg (199 lb 3.2 oz)   01/28/21 90.4 kg (199 lb 3.2 oz)   01/25/21 91.2 kg (201 lb)     Ht Readings from Last 1 Encounters:   02/01/21 167.6 cm (65.98\")     Body mass index is 32.17 kg/m².  Physical Exam  Constitutional:       Appearance: She is well-developed. She is not diaphoretic.   HENT:      Head: Normocephalic and atraumatic.   Eyes:      General: No scleral icterus.     Conjunctiva/sclera: Conjunctivae normal.      Pupils: Pupils are equal, round, and reactive to light.   Neck:      Musculoskeletal: Normal range of motion and neck supple.      Thyroid: No thyromegaly.   Cardiovascular:      Rate and Rhythm: Normal rate and regular rhythm.      Heart sounds: Normal heart sounds. No murmur. No gallop.    Pulmonary:      Effort: Pulmonary effort is normal.      Breath sounds: Normal breath sounds. No wheezing or rales.   Abdominal:      General: Bowel sounds are normal. There is no distension or abdominal bruit.      Palpations: Abdomen is soft. There is no shifting dullness, fluid wave or mass.      Tenderness: There is no abdominal tenderness. There is no guarding. Negative signs include Ulrich's sign.      Hernia: There is no hernia in the ventral area.   Musculoskeletal: Normal range of motion.   Lymphadenopathy:      Cervical: No cervical adenopathy.   Skin:     General: Skin is warm and dry.      Findings: No erythema or rash.   Neurological:      Mental Status: She is alert and oriented to person, place, and time.   Psychiatric:         Mood and Affect: Mood normal.         Behavior: Behavior normal.         CLINICAL DATA REVIEWED   reviewed previous lab results and integrated with today's visit, reviewed notes " from other physicians and/or last GI encounter, reviewed previous endoscopy results and available photos, reviewed surgical pathology results from previous biopsies    ASSESSMENT  Diagnoses and all orders for this visit:    Full incontinence of feces    Other cardiomyopathy (CMS/HCC)    Peripheral polyneuropathy    Gastroesophageal reflux disease with esophagitis without hemorrhage          PLAN  Will try to get her to move her bowels on schedule each AM and if this isnt successful will discuss with Colorectal.    No follow-ups on file.    I have discussed the above plan with the patient.  They verbalize understanding and are in agreement with the plan.  They have been advised to contact the office for any questions, concerns, or changes related to their health.

## 2021-02-04 ENCOUNTER — LAB (OUTPATIENT)
Dept: LAB | Facility: HOSPITAL | Age: 76
End: 2021-02-04

## 2021-02-04 ENCOUNTER — TELEPHONE (OUTPATIENT)
Dept: PAIN MEDICINE | Facility: CLINIC | Age: 76
End: 2021-02-04

## 2021-02-04 ENCOUNTER — DOCUMENTATION (OUTPATIENT)
Dept: CARDIOLOGY | Facility: CLINIC | Age: 76
End: 2021-02-04

## 2021-02-04 DIAGNOSIS — I34.0 NONRHEUMATIC MITRAL VALVE REGURGITATION: ICD-10-CM

## 2021-02-04 DIAGNOSIS — I42.9 CARDIOMYOPATHY, UNSPECIFIED TYPE (HCC): Primary | ICD-10-CM

## 2021-02-04 DIAGNOSIS — I50.23 ACUTE ON CHRONIC HFREF (HEART FAILURE WITH REDUCED EJECTION FRACTION) (HCC): ICD-10-CM

## 2021-02-04 DIAGNOSIS — I42.8 OTHER CARDIOMYOPATHY (HCC): ICD-10-CM

## 2021-02-04 DIAGNOSIS — N18.30 STAGE 3 CHRONIC KIDNEY DISEASE, UNSPECIFIED WHETHER STAGE 3A OR 3B CKD (HCC): ICD-10-CM

## 2021-02-04 DIAGNOSIS — I36.1 NONRHEUMATIC TRICUSPID VALVE REGURGITATION: ICD-10-CM

## 2021-02-04 LAB
ANION GAP SERPL CALCULATED.3IONS-SCNC: 9.2 MMOL/L (ref 5–15)
BUN SERPL-MCNC: 12 MG/DL (ref 8–23)
BUN/CREAT SERPL: 18.5 (ref 7–25)
CALCIUM SPEC-SCNC: 9.4 MG/DL (ref 8.6–10.5)
CHLORIDE SERPL-SCNC: 107 MMOL/L (ref 98–107)
CO2 SERPL-SCNC: 23.8 MMOL/L (ref 22–29)
CREAT SERPL-MCNC: 0.65 MG/DL (ref 0.57–1)
GFR SERPL CREATININE-BSD FRML MDRD: 88 ML/MIN/1.73
GLUCOSE SERPL-MCNC: 137 MG/DL (ref 65–99)
POTASSIUM SERPL-SCNC: 4 MMOL/L (ref 3.5–5.2)
SODIUM SERPL-SCNC: 140 MMOL/L (ref 136–145)

## 2021-02-04 PROCEDURE — 80048 BASIC METABOLIC PNL TOTAL CA: CPT

## 2021-02-04 PROCEDURE — 36415 COLL VENOUS BLD VENIPUNCTURE: CPT

## 2021-02-04 NOTE — PROGRESS NOTES
Pt has stopped losartan and started entresto and directed. She states that she has intermittent dizzines and nausea that seems to be getting better with each day. She completed lab work today as directed.    118/70, 73 93%    She is tolerating the Entresto well.  BP is stable and WNL.  She had labs drawn today.  Will await those results.

## 2021-02-04 NOTE — TELEPHONE ENCOUNTER
Caller: JOSSELIN GUAJARDO    Relationship: SELF    Best call back number: [phone]    Medication needed:   Requested Prescriptions      No prescriptions requested or ordered in this encounter       When do you need the refill by: 02/10/21    What details did the patient provide when requesting the medication: PATIENT IS REQUESTING REFILL WILL RUN OUT ON 02/10/21    Does the patient have less than a 3 day supply:  [] Yes  [x] No    What is the patient's preferred pharmacy:    [facility]

## 2021-02-04 NOTE — TELEPHONE ENCOUNTER
Medication Refill Request    Date of phone call: 2/4/21    Medication being requested: Norco 5-325 mg sig: q6hrs PRN  Qty: 120    Date of last visit: 1/11/21    Date of last refill: 1/12/21    LOUANN up to date?: 1/5/21    Next Follow up?: 3/10/21    Any new pertinent information? (i.e, new medication allergies, new use of medications, change in patient's health or condition, non-compliance or inconsistency with prescribing agreement?): n/a

## 2021-02-09 RX ORDER — HYDROCODONE BITARTRATE AND ACETAMINOPHEN 5; 325 MG/1; MG/1
1 TABLET ORAL EVERY 6 HOURS PRN
Qty: 120 TABLET | Refills: 0 | Status: SHIPPED | OUTPATIENT
Start: 2021-02-09 | End: 2021-03-10 | Stop reason: SDUPTHER

## 2021-02-18 RX ORDER — METHOCARBAMOL 500 MG/1
500 TABLET, FILM COATED ORAL 3 TIMES DAILY PRN
Qty: 90 TABLET | Refills: 0 | Status: SHIPPED | OUTPATIENT
Start: 2021-02-18 | End: 2021-04-10

## 2021-03-10 ENCOUNTER — OFFICE VISIT (OUTPATIENT)
Dept: PAIN MEDICINE | Facility: CLINIC | Age: 76
End: 2021-03-10

## 2021-03-10 VITALS
TEMPERATURE: 97.8 F | BODY MASS INDEX: 34.39 KG/M2 | OXYGEN SATURATION: 96 % | HEIGHT: 66 IN | SYSTOLIC BLOOD PRESSURE: 160 MMHG | HEART RATE: 84 BPM | DIASTOLIC BLOOD PRESSURE: 82 MMHG | WEIGHT: 214 LBS | RESPIRATION RATE: 18 BRPM

## 2021-03-10 DIAGNOSIS — M51.36 DDD (DEGENERATIVE DISC DISEASE), LUMBAR: ICD-10-CM

## 2021-03-10 DIAGNOSIS — M25.50 ARTHRALGIA OF MULTIPLE JOINTS: ICD-10-CM

## 2021-03-10 DIAGNOSIS — G89.29 OTHER CHRONIC PAIN: Primary | ICD-10-CM

## 2021-03-10 DIAGNOSIS — R29.6 FREQUENT FALLS: ICD-10-CM

## 2021-03-10 DIAGNOSIS — Z79.899 ENCOUNTER FOR LONG-TERM (CURRENT) USE OF HIGH-RISK MEDICATION: ICD-10-CM

## 2021-03-10 DIAGNOSIS — G62.9 PERIPHERAL POLYNEUROPATHY: ICD-10-CM

## 2021-03-10 DIAGNOSIS — S91.301A WOUND OF RIGHT FOOT: ICD-10-CM

## 2021-03-10 DIAGNOSIS — M47.816 LUMBAR FACET ARTHROPATHY: ICD-10-CM

## 2021-03-10 PROCEDURE — 99214 OFFICE O/P EST MOD 30 MIN: CPT | Performed by: NURSE PRACTITIONER

## 2021-03-10 PROCEDURE — 80305 DRUG TEST PRSMV DIR OPT OBS: CPT | Performed by: NURSE PRACTITIONER

## 2021-03-10 RX ORDER — HYDROCODONE BITARTRATE AND ACETAMINOPHEN 5; 325 MG/1; MG/1
1 TABLET ORAL EVERY 6 HOURS PRN
Qty: 120 TABLET | Refills: 0 | Status: SHIPPED | OUTPATIENT
Start: 2021-03-10 | End: 2021-04-05 | Stop reason: SDUPTHER

## 2021-03-10 NOTE — PROGRESS NOTES
"CHIEF COMPLAINT  Back and joint pain has increased since last visit, states she fell 2 times in one day    Subjective   Jung Short is a 75 y.o. female  who presents for follow-up.  She has a history of back pain and joint pain. Reports her pain is worse. Also complains of two recent falls. She believes her right foot is causing her to fall, wearing walking shoe and uses cane. Has another wound on her foot now. Is reporting a new wound on her right foot. Used to see wound care. Needs new referral.     Complains of pain in her low back and joints. Today her pain is 6/10VAS. Describes the pain as nearly continuous aching and throbbing.  Pain increases with activity, household chores, walking/standing; pain decreases with medication, rest.   Continues with Hydrocodone 5/325 3-4/day, Robaxin 500 mg daily and Lyrica  75 mg BID, as well as Cymbalta 60 mg daily.  Denies any side effects from the regimen. The regimen helps decreases her pain by 30-40%.  ADL's by self. Denies any bowel or bladder changes.      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 Used to have neuropathy in legs, now having right hand. Prescribed Lyrica.     Orthopedist is Dr Live Braden.     Enjoys going to [employer] football games.     Just completed her second COVID vaccine today.    Reports her EF is at 20%. \"I may die at any time.\"    Patient remained masked during entire encounter. No cough present. I donned a mask and eye protection throughout entire visit. Prior to donning mask and eye protection, hand hygiene was performed, as well as when it was doffed.  I was closer than 6 feet, but not for an extended period of time. No obvious exposure to any bodily fluids.    Joint Pain  This is a chronic problem. The current episode started more than 1 year ago. The problem occurs constantly. The problem has been gradually worsening. Associated symptoms include arthralgias (joint) and joint swelling. Pertinent negatives " include no abdominal pain, chest pain, chills, congestion, coughing, fatigue, fever, headaches, nausea, neck pain, numbness, vomiting or weakness. Nothing aggravates the symptoms. She has tried oral narcotics, position changes and rest for the symptoms. The treatment provided moderate relief.   Back Pain  This is a chronic problem. The current episode started more than 1 year ago. The problem occurs constantly. The problem has been gradually worsening since onset. The pain is present in the lumbar spine. The pain radiates to the right knee, right foot and right thigh. The pain is at a severity of 6/10. The pain is moderate. Pertinent negatives include no abdominal pain, chest pain, dysuria, fever, headaches, numbness or weakness. Risk factors include lack of exercise and obesity. She has tried analgesics for the symptoms. The treatment provided moderate relief.        PEG Assessment   What number best describes your pain on average in the past week?4  What number best describes how, during the past week, pain has interfered with your enjoyment of life?4  What number best describes how, during the past week, pain has interfered with your general activity?  3    The following portions of the patient's history were reviewed and updated as appropriate: allergies, current medications, past family history, past medical history, past social history, past surgical history and problem list.    Review of Systems   Constitutional: Negative for chills, fatigue and fever.   HENT: Negative for congestion.    Respiratory: Negative for cough and shortness of breath.    Cardiovascular: Negative for chest pain.   Gastrointestinal: Negative for abdominal pain, constipation, diarrhea, nausea and vomiting.   Genitourinary: Negative for difficulty urinating, dyspareunia and dysuria.   Musculoskeletal: Positive for arthralgias (joint), back pain and joint swelling. Negative for neck pain.   Neurological: Negative for dizziness, weakness,  "light-headedness, numbness and headaches.   Psychiatric/Behavioral: Negative for confusion, hallucinations, self-injury, sleep disturbance and suicidal ideas. The patient is not nervous/anxious.    All other systems reviewed and are negative.    I have reviewed and confirmed the accuracy of the ROS as documented by the MA/LPN/RN JOSE ANTONIO Castaneda    Vitals:    03/10/21 1300   BP: 160/82   Pulse: 84   Resp: 18   Temp: 97.8 °F (36.6 °C)   SpO2: 96%   Weight: 97.1 kg (214 lb)   Height: 167.6 cm (65.98\")   PainSc:   6   PainLoc: Back     Objective   Physical Exam  Constitutional:       Appearance: She is well-developed.   HENT:      Head: Normocephalic and atraumatic.   Musculoskeletal:      Lumbar back: Tenderness present. Decreased range of motion.      Comments: Widespread OA changes with no acute synovitis    Walking shoe of right foot   Neurological:      Mental Status: She is alert.      Gait: Gait abnormal (cane).   Psychiatric:         Speech: Speech normal.         Behavior: Behavior normal.         Thought Content: Thought content normal.         Judgment: Judgment normal.         Assessment/Plan   Diagnoses and all orders for this visit:    1. Other chronic pain (Primary)  -     POC Urine Drug Screen, Triage  -     Urine Drug Screen Confirmation - Urine, Clean Catch; Future    2. Arthralgia of multiple joints  -     POC Urine Drug Screen, Triage  -     Urine Drug Screen Confirmation - Urine, Clean Catch; Future    3. DDD (degenerative disc disease), lumbar  -     POC Urine Drug Screen, Triage  -     Urine Drug Screen Confirmation - Urine, Clean Catch; Future    4. Lumbar facet arthropathy  -     POC Urine Drug Screen, Triage  -     Urine Drug Screen Confirmation - Urine, Clean Catch; Future    5. Peripheral polyneuropathy  -     POC Urine Drug Screen, Triage  -     Urine Drug Screen Confirmation - Urine, Clean Catch; Future    6. Encounter for long-term (current) use of high-risk medication  -     POC " Urine Drug Screen, Triage  -     Urine Drug Screen Confirmation - Urine, Clean Catch; Future    7. Wound of right foot  -     Ambulatory Referral to Podiatry  -     POC Urine Drug Screen, Triage  -     Urine Drug Screen Confirmation - Urine, Clean Catch; Future    8. Frequent falls  -     Ambulatory Referral to Neurology  -     POC Urine Drug Screen, Triage  -     Urine Drug Screen Confirmation - Urine, Clean Catch; Future    Other orders  -     HYDROcodone-acetaminophen (NORCO) 5-325 MG per tablet; Take 1 tablet by mouth Every 6 (Six) Hours As Needed for Severe Pain . DNF until 3-11-21  Dispense: 120 tablet; Refill: 0      --- Routine UDS in office today as part of monitoring requirements for controlled substances.  The specimen was viewed and the immunoassay result reviewed and is +OPI.  This specimen will be sent to [facility] laboratory for confirmation.     --- The patient signed an updated copy of the controlled substance agreement on 1-11-21  --- Referral to foot specialist. Patient was able to make an appointment today.  --- Refill Hydrocodone DNF.  Patient appears stable with current regimen. No adverse effects. Regarding continuation of opioids, there is no evidence of aberrant behavior or any red flags.  The patient continues with appropriate response to opioid therapy. ADL's remain intact by self.   --- Referral to neurology due to falls.  --- Follow-up 2 months or sooner if needed.           LOUANN REPORT  As part of the patient's treatment plan, I am prescribing controlled substances. The patient has been made aware of appropriate use of such medications, including potential risk of somnolence, limited ability to drive and/or work safely, and the potential for dependence or overdose. It has also bee made clear that these medications are for use by this patient only, without concomitant use of alcohol or other substances unless prescribed.     Patient has completed prescribing agreement detailing terms  of continued prescribing of controlled substances, including monitoring LOUANN reports, urine drug screening, and pill counts if necessary. The patient is aware that inappropriate use will results in cessation of prescribing such medications.    LOUANN report has been reviewed and scanned into the patient's chart.    As the clinician, I personally reviewed the LOUANN from 3-10-21 while the patient was in the office today.    History and physical exam exhibit continued safe and appropriate use of controlled substances.        EMR Dragon/Transcription disclaimer:   Much of this encounter note is an electronic transcription/translation of spoken language to printed text. The electronic translation of spoken language may permit erroneous, or at times, nonsensical words or phrases to be inadvertently transcribed; Although I have reviewed the note for such errors, some may still exist.

## 2021-03-17 ENCOUNTER — TELEPHONE (OUTPATIENT)
Dept: INTERNAL MEDICINE | Facility: CLINIC | Age: 76
End: 2021-03-17

## 2021-03-17 NOTE — TELEPHONE ENCOUNTER
Caller: Jung Short    Relationship to patient: Self    Best call back number: [phone]    Patient is needing: Patient called in and said she is going to have surgery to remove a toe possibly. She is waiting on the x-ray results to make sure the disease is not in her bones. She is worried about it affecting her walking.She said the Dr's name is Dr. Elmo To. Please call patient and advise.

## 2021-03-22 ENCOUNTER — TELEPHONE (OUTPATIENT)
Dept: CARDIOLOGY | Facility: CLINIC | Age: 76
End: 2021-03-22

## 2021-04-02 ENCOUNTER — HOSPITAL ENCOUNTER (EMERGENCY)
Facility: HOSPITAL | Age: 76
Discharge: HOME | End: 2021-04-02
Attending: EMERGENCY MEDICINE | Admitting: EMERGENCY MEDICINE

## 2021-04-02 ENCOUNTER — APPOINTMENT (OUTPATIENT)
Dept: GENERAL RADIOLOGY | Facility: HOSPITAL | Age: 76
End: 2021-04-02

## 2021-04-02 VITALS
SYSTOLIC BLOOD PRESSURE: 147 MMHG | WEIGHT: 204 LBS | RESPIRATION RATE: 20 BRPM | HEART RATE: 87 BPM | BODY MASS INDEX: 34.83 KG/M2 | TEMPERATURE: 97.8 F | OXYGEN SATURATION: 97 % | DIASTOLIC BLOOD PRESSURE: 82 MMHG | HEIGHT: 64 IN

## 2021-04-02 DIAGNOSIS — M79.671 RIGHT FOOT PAIN: ICD-10-CM

## 2021-04-02 DIAGNOSIS — M79.644 PAIN OF RIGHT THUMB: Primary | ICD-10-CM

## 2021-04-02 PROCEDURE — 73140 X-RAY EXAM OF FINGER(S): CPT

## 2021-04-02 PROCEDURE — 99282 EMERGENCY DEPT VISIT SF MDM: CPT | Performed by: EMERGENCY MEDICINE

## 2021-04-02 PROCEDURE — 73630 X-RAY EXAM OF FOOT: CPT

## 2021-04-02 PROCEDURE — 99282 EMERGENCY DEPT VISIT SF MDM: CPT

## 2021-04-02 NOTE — ED PROVIDER NOTES
Subjective   History of Present Illness  75-year-old female with chronic pain due to arthritis and back pain on chronic pain management presents to the ER complaining of right thumb MCP joint and right distal foot pain after having a fall yesterday.  She denies hitting her head did not have any loss of consciousness says that she thought she was okay but last night she felt like the thumb and the foot started to hurt.  No difficulty walking compared to normal always uses a cane.  Review of Systems   Musculoskeletal: Positive for arthralgias. Negative for neck pain.       Past Medical History:   Diagnosis Date   • Anemia    • Benign breast disease    • Cancer (CMS/HCC) 2015    Right breast   • Cellulitis of leg     May-2003 right lower extremity   • CHF (congestive heart failure) (CMS/HCC)     Dr Bates follows   • Chronic kidney disease     Dr Hannah follows   • Chronic ulcer of right foot (CMS/HCC)     Non-pressure, history of    • Closed fracture of patella 9/5/2017   • Colon polyp    • Depression    • Diabetic peripheral neuropathy (CMS/HCC)    • Diverticulosis    • Femoral fracture (CMS/HCC)     right   • Fracture of left wrist     history of   • Gastroesophageal reflux    • Generalized pain 2/2/2016   • Hiatal hernia    • Hyperlipidemia    • Hypertension    • Hypothyroidism    • Impingement syndrome of right shoulder    • Joint pain    • Left knee pain 5/17/2016   • Menopausal disorder    • Methicillin resistant Staphylococcus aureus infection 2012    after bladder surgery   • Nonischemic cardiomyopathy (CMS/HCC)     Ejection fraction 10% per 2-D echo with Doppler however she did have cardiac catheterization April 2015 which showed ejection fraction 20% with global hypokinesis and severe mitral insufficiency   • Osteoarthritis     generalized, sched jania TKA   • Osteomyelitis (CMS/HCC) 2/2/2016   • Pain in shoulder 8/2/2016   • Paroxysmal atrial fibrillation (CMS/HCC)     Dr Bates follows   • Radius/ulna  fracture, left, closed, initial encounter 10/21/2017   • Shoulder pain, bilateral     has tears on both sides   • Syncope, psychogenic 4/19/2017   • Thoracic back pain 2/2/2016   • Toe ulcer (CMS/HCC)     h/o to both feet, d/t shoes rubbing per patient   • Transient alteration of awareness 4/19/2017       Allergies   Allergen Reactions   • Dilaudid [Hydromorphone] Delirium and Confusion   • Latex Rash       Past Surgical History:   Procedure Laterality Date   • ABDOMINAL SURGERY  2012    had to be reopened after bladder surgery d/t infection   • AMPUTATION FOOT / TOE Right 11/2013    Rt foot amputation MTP first toe   • BLADDER SURGERY  2012   • BREAST BIOPSY     • BREAST SURGERY  06/29/2015    Percutaneous ultrasound-guided placement of metal localized clip 1st lesion   • CARDIAC CATHETERIZATION  2015   • CARDIAC CATHETERIZATION N/A 1/25/2021    Procedure: Right and Left Heart Cath;  Surgeon: Nicholas Andrade MD;  Location:  [facility];  Service: Cardiovascular;  Laterality: N/A;  drop in EF, with hx of NICM, SOA fatigue.  Discussed with RM   • CARDIAC CATHETERIZATION N/A 1/25/2021    Procedure: Left ventriculography;  Surgeon: Nicholas Andrade MD;  Location:  [facility];  Service: Cardiovascular;  Laterality: N/A;   • CARDIAC CATHETERIZATION N/A 1/25/2021    Procedure: Coronary angiography;  Surgeon: Nicholas Andrade MD;  Location:  [facility];  Service: Cardiovascular;  Laterality: N/A;   • CATARACT EXTRACTION Bilateral 2017   • COLONOSCOPY     • DEBRIDEMENT  FOOT Right 01/2013    During hospitalization    • EPIDURAL BLOCK      4 chronic back pain and leg pain last epidurals done 5-2012 she had no benefit at all from this procedure.   • FEMUR FRACTURE SURGERY     • FOOT SURGERY Bilateral     several   • HERNIA REPAIR      At the abdomen February 2007 Dr. Mendez   • INCISIONAL HERNIA REPAIR  05/16/2014    Recurrent. Incarcerated. With mesh  implantation   • MASTECTOMY Right 2015   • MASTECTOMY MODIFIED RADICAL W/ AXILLARY LYMPH NODES W/ OR W/O PECTORALIS MINOR Right    • SHOULDER SURGERY Right     x2 RCR   • TOTAL ABDOMINAL HYSTERECTOMY      with oophorectomy-Done 2012 secondary to vaginal wall prolapse.   • TOTAL KNEE ARTHROPLASTY Right    • TOTAL KNEE ARTHROPLASTY Left 2018    Procedure: TOTAL KNEE ARTHROPLASTY;  Surgeon: Live Chambers MD;  Location: [facility];  Service: Orthopedics       Family History   Problem Relation Age of Onset   • Colonic polyp Mother    • Hypertension Mother    • Migraines Mother    • Mental illness Mother    • Depression Mother    • Coronary artery disease Father    • Other Father         Cardiac Disorder   • Colon cancer Father    • Kidney disease Father    • Cancer Father    • Breast cancer Maternal Aunt    • Colon cancer Brother    • Alcohol abuse Brother    • Arthritis Sister    • Hypertension Brother    • Lung disease Brother    • Alcohol abuse Brother    • Malig Hyperthermia Neg Hx        Social History     Socioeconomic History   • Marital status:      Spouse name: Not on file   • Number of children: 2   • Years of education: Not on file   • Highest education level: Not on file   Tobacco Use   • Smoking status: Former Smoker     Packs/day: 3.00     Years: 30.00     Pack years: 90.00     Types: Cigarettes     Quit date:      Years since quittin.2   • Smokeless tobacco: Never Used   • Tobacco comment: daily caffiene   Vaping Use   • Vaping Use: Never used   Substance and Sexual Activity   • Alcohol use: No     Comment: h/o quit    • Drug use: No   • Sexual activity: Defer     Partners: Male     Comment: celibate           Objective    ED Triage Vitals [21 1846]   Temp Heart Rate Resp BP SpO2   98.3 °F (36.8 °C) 93 20 138/90 98 %      Temp src Heart Rate Source Patient Position BP Location FiO2 (%)   Oral Monitor Sitting Left arm --       Physical Exam  INITIAL VITAL SIGNS:  Reviewed by me.  Pulse ox normal  GENERAL: Alert. Well developed and well nourished. No respiratory distress.  HEAD: Normocephalic.   EYES: No conjunctival injection.  ENT: Oral mucosa is moist.  NECK: Supple. Full range of motion.  RESPIRATORY: Non-labored respirations.  EXTREMITIES: No pain in the snuffbox right wrist.  Mild discomfort to palpation of the right thumb MCP joint no swelling or erythema has full range of motion.  Appears to have arthritic changes in bilateral hands.  Pain in right distal foot over second and third metatarsals.  Has surgical scar over site of second metatarsal that has been partially amputated per patient.  SKIN: Warm and dry. No rashes. No diaphoresis.  NEUROLOGIC: Alert.  Walks with a cane but has a fairly normal gait  XR Foot 3+ View Right    Result Date: 4/2/2021  CR Foot Comp Min 3 Vws RT INDICATION: Distal foot pain, fall COMPARISON: X-ray from 2/1/2020 FINDINGS: 3 views of the right foot. The bones are osteopenic. Again seen is transmetatarsal amputation of the second digit. Severe hallux valgus deformity with amputation of the distal first metatarsal is stable. The bones are grossly osteopenic. There is a prominent plantar spur. Degenerative changes of the midfoot are again identified.     There does not appear to be definite acute fracture or subluxation on plain film. Chronic degenerative change with amputation do not appear definitely changed from prior. Signer Name: Ivory Christiansen MD  Signed: 4/2/2021 7:30 PM  Workstation Name: [id number]  [facility]    XR Finger 2+ View Right    Result Date: 4/2/2021  CR Fingers Min 2 Vws RT INDICATION: Acute right finger pain. COMPARISON: X-ray from 2/3/2020 FINDINGS: 3 views of the right first finger.  No definite fracture or dislocation. Osseous irregularity involving the the first distal interphalangeal joint. There is degenerative change involving the radiocarpal interface and carpometacarpal joints. Old distal  radial fracture is seen. No bone erosion or destruction.  No foreign body.     No definite acute fracture or subluxation. Osseous irregularity around the first distal interphalangeal joint is thought to likely be chronic. Signer Name: Ivory Christiansen MD  Signed: 4/2/2021 7:33 PM  Workstation Name: [id number]  [facility]      Procedures           ED Course                                           MDM  No evidence of fractures in this well-appearing 75-year-old female who had a mechanical fall.  Negative x-rays, no  pain over scaphoid on right thumb.  Already has pain medicine at home.  Will encourage her to use ice as well  Final diagnoses:   Pain of right thumb   Right foot pain       ED Disposition  ED Disposition     ED Disposition Condition Comment    Discharge Good           Kathryn Epstein, APRN  [address]  [facility] [address] [phone]    Call   To schedule follow-up appointment         Medication List      No changes were made to your prescriptions during this visit.          Alan Craig MD  04/02/21 1952

## 2021-04-02 NOTE — DISCHARGE INSTRUCTIONS
Continue to take your home pain medications.  Put ice on your thumb and foot as needed for pain.  Please follow-up with your primary care physician.

## 2021-04-05 ENCOUNTER — TELEPHONE (OUTPATIENT)
Dept: PAIN MEDICINE | Facility: CLINIC | Age: 76
End: 2021-04-05

## 2021-04-05 ENCOUNTER — PATIENT OUTREACH (OUTPATIENT)
Dept: CASE MANAGEMENT | Facility: OTHER | Age: 76
End: 2021-04-05

## 2021-04-05 NOTE — OUTREACH NOTE
Patient Outreach Note  Talked with patient. Discussed 4/2/21 ED visit regarding pain to right thumb and foot following fall. Patent states to be compliant with ED recommendations and discusses fall. She states thumb is better. Conversation brief as patient is getting ready for physician appointment regarding right foot and requests return phone call. Will continue outreach.      Shanda Gerardo RN  Ambulatory     4/5/2021, 10:42 EDT

## 2021-04-05 NOTE — TELEPHONE ENCOUNTER
Caller: JOSSELIN GUAJARDO    Relationship: SELF    Best call back number: [phone]    Medication needed:  HYDROcodone-acetaminophen (NORCO) 5-325 MG per tablet       When do you need the refill by: 4/8/21    Does the patient have less than a 3 day supply:  [x] Yes  [] No    What is the patient's preferred pharmacy:     [facility] - [place], [place] - [address] - [phone] [facility] [phone] FX

## 2021-04-05 NOTE — TELEPHONE ENCOUNTER
Medication Refill Request    Date of phone call: 21    Medication being requested: Norco 5-325 mg  si tab po q 6 hrs prn  Qty: 120    Date of last visit: 3/10/21    Date of last refill:     LOUANN up to date?: no    Next Follow up?: 5/10/21    Any new pertinent information? (i.e, new medication allergies, new use of medications, change in patient's health or condition, non-compliance or inconsistency with prescribing agreement?):

## 2021-04-06 ENCOUNTER — PATIENT OUTREACH (OUTPATIENT)
Dept: CASE MANAGEMENT | Facility: OTHER | Age: 76
End: 2021-04-06

## 2021-04-06 NOTE — OUTREACH NOTE
Care Plan Note      Responses   Annual Wellness Visit:   Patient Has Completed   Care Gaps Addressed  Diabetic Eye Exam, Mammogram, Diabetic A1C, Flu Shot, Pneumonia Vaccine   HbA1c Status  Up to Date-within defined limits   HbA1c Completion at [facility] or Other  [facility]   Diabetic Eye Exam Status  Up to Date   Flu Shot Status  Up to Date   Mammogram Status  Patient will Complete   Pneumonia Vaccine Status  Up to Date   Specific Disease Process Teaching  Hypertension, Diabetes   Other Patient Education/Resources   MyChart, 24/[facility] Nurse Call Line, Advanced Care Planning   24/7 Nurse Call Line Education Method  Verbal   ACP Education Method  -- [Completed]   MyChart Education Method  Verbal [Active]   Does patient have depression diagnosis?  Yes   Advanced Directives:  Patient Has   Ed Visits past 12 months:  2 or 3   Hospitalizations past 12 months  2 or 3   Medication Adherence  Medications understood        The main concerns and/or symptoms the patient would like to address are: Talked with patient. Discussed 4/2/21  ED visit regarding pain to right thumb with fall and pain to  right foot. Patient states to be compliant with ED recommendations; went to wound care center regarding right foot and will return in 2 weeks.Patient attends [facility] regarding wound of 3rd right toe.  Patient changes dressing daily and attends wound care center now every other week. Patient voiced intent to follow up with PCP. Patient states improvement regarding right thumb and no difficulty with mobility or discomfort. Patient states right foot is swollen and bruised; is elevating foot and resting. She is able to ambulate with cane as needed. Patient lives alone; independent with ADL's; meal preparation and transportation. Patient compliant with medications; medical appointments; monitoring of blood pressue and blood sugar. She reports difficulty sleeping and no difficulty with chest pain; SOB or appetite.      Education/instruction provided by Care Coordinator: Reviewed with patient ED  AVS recommendations; education provided; fall and safety precautions; Life Alert; COVID 19 precautions; 24/7 Nurse Line Telephone number; ACM contact information; Advance Directives; My Chart; gaps in care; MWV and Case Management services.  Patient verbalized understanding and states to appreciate phone call.  SDOH reviewed and reports no difficulty with transportation; food or financial resources. No further questions or concerns voiced at this time.     Follow Up Outreach Due: Follow up as needed.     Shanda Gerardo RN  Ambulatory     4/6/2021, 14:04 EDT

## 2021-04-07 RX ORDER — HYDROCODONE BITARTRATE AND ACETAMINOPHEN 5; 325 MG/1; MG/1
1 TABLET ORAL EVERY 6 HOURS PRN
Qty: 120 TABLET | Refills: 0 | Status: SHIPPED | OUTPATIENT
Start: 2021-04-07 | End: 2021-05-10 | Stop reason: SDUPTHER

## 2021-04-09 DIAGNOSIS — E11.69 TYPE 2 DIABETES MELLITUS WITH OTHER SPECIFIED COMPLICATION, WITHOUT LONG-TERM CURRENT USE OF INSULIN (HCC): ICD-10-CM

## 2021-04-10 RX ORDER — METHOCARBAMOL 500 MG/1
500 TABLET, FILM COATED ORAL 3 TIMES DAILY PRN
Qty: 90 TABLET | Refills: 0 | Status: SHIPPED | OUTPATIENT
Start: 2021-04-10 | End: 2021-06-10

## 2021-04-16 ENCOUNTER — OFFICE VISIT (OUTPATIENT)
Dept: NEUROLOGY | Facility: CLINIC | Age: 76
End: 2021-04-16

## 2021-04-16 VITALS
OXYGEN SATURATION: 95 % | BODY MASS INDEX: 35.17 KG/M2 | HEIGHT: 64 IN | WEIGHT: 206 LBS | HEART RATE: 84 BPM | DIASTOLIC BLOOD PRESSURE: 70 MMHG | SYSTOLIC BLOOD PRESSURE: 138 MMHG

## 2021-04-16 DIAGNOSIS — G62.9 NEUROPATHY: Primary | ICD-10-CM

## 2021-04-16 DIAGNOSIS — E08.40 DIABETES MELLITUS DUE TO UNDERLYING CONDITION WITH DIABETIC NEUROPATHY, WITHOUT LONG-TERM CURRENT USE OF INSULIN (HCC): ICD-10-CM

## 2021-04-16 PROCEDURE — 99205 OFFICE O/P NEW HI 60 MIN: CPT | Performed by: PSYCHIATRY & NEUROLOGY

## 2021-04-16 NOTE — PROGRESS NOTES
Subjective:     Patient ID: Jung Short is a 75 y.o. female.    Ms. Short 75-year-old right-handed female with history of osteoarthritis, bilateral cataract status post removal, breast cancer diagnosed in 2016 status post surgery, right-sided carpal tunnel surgery, CHF, diabetes complicated by neuropathy, diverticulitis, fibromyalgia, gout, headaches, hearing loss and tinnitus, chronic kidney disease, hypertension, sleep apnea who presents today as a new patient for the evaluation of falls.  The patient also reports that she has had foot surgery and a broken ankle.  She has had 2 injuries to her right foot.  She has broken her right wrist as well as her left wrist.  She reports that she has degenerative disc disease.  Reviewed patient's records.  I reviewed the referring providers note from March 10, 2021.  I reports that the patient has had 2 recent falls.  She feels like it is her right foot causing her to fall.  She was told that she has neuropathy.  She had a CT of her C-spine in 2019 that showed mild to moderate degenerative changes that was limited by motion artifact.  On January 12, 2021 her TSH was normal and her hemoglobin A1c was 6.7.  On September 22, 2020 her B12 was 262.  She had a brain MRI in 2019 that showed a venous anomaly.  The patient reports that she broke her back several years ago and has neck pain.  She does see a spine specialist but she has never had surgery.  Falls started when she was diagnosed with diabetes years ago.  Had just moved into a patio home.  That was around 2015.  Reports that one fall was due to a cardiac arrest.  Worsened recently.  Last fall was about a week ago.  Feels like past falls was from her being clumsy.  Has been in/out of the hospital in the past year.  Is not sure how many falls she's had in the past year.  Has broken several bones due to falls.  Always falls to the right.  No LOC or dizziness.  Thinks that it has to do with his right foot.  It has been  operated on.  Can get herself back up.    Has done PT in the past.        The following portions of the patient's history were reviewed and updated as appropriate: allergies, current medications, past family history, past medical history, past social history, past surgical history and problem list.    Review of Systems   Constitutional: Positive for chills and fatigue. Negative for appetite change.   HENT: Positive for dental problem, hearing loss and nosebleeds.    Eyes: Positive for redness. Negative for photophobia and visual disturbance.   Respiratory: Positive for shortness of breath. Negative for choking and chest tightness.    Cardiovascular: Positive for leg swelling. Negative for chest pain and palpitations.   Gastrointestinal: Positive for abdominal pain, blood in stool, diarrhea and rectal pain.   Genitourinary: Positive for menstrual problem.   Musculoskeletal: Positive for back pain, joint swelling, neck pain and neck stiffness.   Neurological: Negative for dizziness, tremors, seizures, syncope, facial asymmetry, speech difficulty, weakness, light-headedness, numbness and headaches.   Psychiatric/Behavioral: Negative for agitation, behavioral problems, confusion, decreased concentration, dysphoric mood, hallucinations, self-injury, sleep disturbance and suicidal ideas. The patient is not nervous/anxious and is not hyperactive.     I reviewed the ROS documented by the MA.  All other systems negative.      Objective:    Neurologic Exam    Physical Exam   **The patient is wearing a mask**  Constitutional:  Vital signs reviewed.  No apparent distress.  Well groomed.  Eyes:  No injection, no icterus.    Respiratory:  Normal effort.  Clear to auscultation bilaterally.  Cardiovascular:  Regular rate and rhythm.  No murmurs.  No carotid bruits. Symmetric radial pulses.  Musculoskeletal: Antalgic gait.  Romberg negative.  Muscle tone and bulk normal in the bilateral upper and lower extremities.  Strength is 5/5  in the bilateral upper and lower extremities proximally and distally unless otherwise specified in the neurological exam.  Skin:  No rashes.  Warm, dry, and intact.  Psychiatric:  Good mood.  Normal affect.    Neurologic:  Mental status-  The patient is alert and oriented to person, place and time. Attention/concentration is within normal limits.  Speech is fluent without dysarthria.  The patient is able to name, repeat and follow complex commands without difficulty.  Immediate memory intact.  The patient recalled 2 out of 3 words after 4 minutes.  Fund of knowledge normal.  Cranial nerves- Pupils equally round and reactive to light with intact accomodation.  Visual fields intact.  Extraocular movements intact.  Facial sensation intact.   Hearing intact to finger-rub bilaterally.  SCM and trapezius are 5/5 bilaterally.    Motor-  See musculoskeletal above.  No tremor.  Reflexes- 2+ in the bilateral biceps, brachioradialis, patellar and absent achilles.  Toes down-going bilaterally.  Sensation-decreased to pinprick and vibration in the distal bilateral lower extremities.  Coordination- Intact to finger tapping (slower on the right) and heel knee shin bilaterally.   Gait- See musculoskeletal exam above.           Assessment/Plan:    The patient is a 75-year-old right-handed female with extensive past medical history who presents to the neurology clinic today for the evaluation of falls.    1.  Falls-the patient's clinical presentation is likely multifactorial and includes her orthopedic issues as well as a possible peripheral neuropathy.  She may have a hereditary form (high arches and hammer toes) of neuropathy or it could be diabetic.  I would like to get further lab work to look for other causes of neuropathy.  The patient is also open to an EMG for a definitive diagnosis.  Because she tends to fall to the right, it is possible that she has a right lumbar radiculopathy.  The patient is not interested in surgery and  therefore further evaluation may not be beneficial with an MRI.  The patient has had a recent neck CT that did not show any severe cervical stenosis.  The patient can follow-up with one of the nurse practitioners after her testing is completed.    A total of 60 minutes of time was spent on this encounter today.  This included reviewing the patient's records, face-to-face time, and documentation.       Problems Addressed this Visit     None      Visit Diagnoses     Neuropathy    -  Primary    Relevant Orders    Vitamin B12    Protein Elec + Interp, Serum    Immunofixation, Serum    RPR    Heavy Metals, Blood    Cryoglobulin    EMG    Nerve Conduction Test    Diabetes mellitus due to underlying condition with diabetic neuropathy, without long-term current use of insulin (CMS/[facility])         Relevant Orders    EMG    Nerve Conduction Test      Diagnoses       Codes Comments    Neuropathy    -  Primary ICD-10-CM: G62.9  ICD-9-CM: 355.9     Diabetes mellitus due to underlying condition with diabetic neuropathy, without long-term current use of insulin (CMS/[facility])      ICD-10-CM: E08.40  ICD-9-CM: 249.60, 357.2

## 2021-04-22 ENCOUNTER — HOSPITAL ENCOUNTER (OUTPATIENT)
Dept: CARDIOLOGY | Facility: HOSPITAL | Age: 76
Discharge: HOME | End: 2021-04-22
Admitting: NURSE PRACTITIONER

## 2021-04-22 VITALS
BODY MASS INDEX: 35 KG/M2 | DIASTOLIC BLOOD PRESSURE: 82 MMHG | WEIGHT: 205.03 LBS | HEIGHT: 64 IN | HEART RATE: 82 BPM | SYSTOLIC BLOOD PRESSURE: 157 MMHG

## 2021-04-22 DIAGNOSIS — I36.1 NONRHEUMATIC TRICUSPID VALVE REGURGITATION: ICD-10-CM

## 2021-04-22 DIAGNOSIS — I42.9 CARDIOMYOPATHY, UNSPECIFIED TYPE (HCC): ICD-10-CM

## 2021-04-22 DIAGNOSIS — I34.0 NONRHEUMATIC MITRAL VALVE REGURGITATION: ICD-10-CM

## 2021-04-22 LAB
BH CV ECHO MEAS - AO MAX PG: 9 MMHG
BH CV ECHO MEAS - AO MEAN PG (FULL): 3 MMHG
BH CV ECHO MEAS - AO MEAN PG: 5 MMHG
BH CV ECHO MEAS - AO ROOT AREA (BSA CORRECTED): 1.7
BH CV ECHO MEAS - AO ROOT AREA: 8.6 CM^2
BH CV ECHO MEAS - AO ROOT DIAM: 3.3 CM
BH CV ECHO MEAS - AO V2 MAX: 152 CM/SEC
BH CV ECHO MEAS - AO V2 MEAN: 108 CM/SEC
BH CV ECHO MEAS - AO V2 VTI: 33.8 CM
BH CV ECHO MEAS - ASC AORTA: 3.3 CM
BH CV ECHO MEAS - AVA(I,A): 1.7 CM^2
BH CV ECHO MEAS - AVA(I,D): 1.7 CM^2
BH CV ECHO MEAS - BSA(HAYCOCK): 2.1 M^2
BH CV ECHO MEAS - BSA: 2 M^2
BH CV ECHO MEAS - BZI_BMI: 35.4 KILOGRAMS/M^2
BH CV ECHO MEAS - BZI_METRIC_HEIGHT: 162 CM
BH CV ECHO MEAS - BZI_METRIC_WEIGHT: 93 KG
BH CV ECHO MEAS - EDV(CUBED): 198.2 ML
BH CV ECHO MEAS - EDV(MOD-SP2): 192 ML
BH CV ECHO MEAS - EDV(MOD-SP4): 174 ML
BH CV ECHO MEAS - EDV(TEICH): 168.5 ML
BH CV ECHO MEAS - EF(CUBED): 28.6 %
BH CV ECHO MEAS - EF(MOD-BP): 36.9 %
BH CV ECHO MEAS - EF(MOD-SP2): 34.9 %
BH CV ECHO MEAS - EF(MOD-SP4): 35.6 %
BH CV ECHO MEAS - EF(TEICH): 22.8 %
BH CV ECHO MEAS - ESV(CUBED): 141.4 ML
BH CV ECHO MEAS - ESV(MOD-SP2): 125 ML
BH CV ECHO MEAS - ESV(MOD-SP4): 112 ML
BH CV ECHO MEAS - ESV(TEICH): 130.1 ML
BH CV ECHO MEAS - FS: 10.6 %
BH CV ECHO MEAS - IVS/LVPW: 1
BH CV ECHO MEAS - IVSD: 1.2 CM
BH CV ECHO MEAS - LAT PEAK E' VEL: 6.2 CM/SEC
BH CV ECHO MEAS - LV DIASTOLIC VOL/BSA (35-75): 88.2 ML/M^2
BH CV ECHO MEAS - LV MASS(C)D: 277.9 GRAMS
BH CV ECHO MEAS - LV MASS(C)DI: 140.9 GRAMS/M^2
BH CV ECHO MEAS - LV MAX PG: 3.6 MMHG
BH CV ECHO MEAS - LV MEAN PG: 2 MMHG
BH CV ECHO MEAS - LV SYSTOLIC VOL/BSA (12-30): 56.8 ML/M^2
BH CV ECHO MEAS - LV V1 MAX: 95 CM/SEC
BH CV ECHO MEAS - LV V1 MEAN: 72 CM/SEC
BH CV ECHO MEAS - LV V1 VTI: 18.3 CM
BH CV ECHO MEAS - LVIDD: 5.8 CM
BH CV ECHO MEAS - LVIDS: 5.2 CM
BH CV ECHO MEAS - LVLD AP2: 9.1 CM
BH CV ECHO MEAS - LVLD AP4: 8.5 CM
BH CV ECHO MEAS - LVLS AP2: 8.1 CM
BH CV ECHO MEAS - LVLS AP4: 8.1 CM
BH CV ECHO MEAS - LVOT AREA (M): 3.1 CM^2
BH CV ECHO MEAS - LVOT AREA: 3.1 CM^2
BH CV ECHO MEAS - LVOT DIAM: 2 CM
BH CV ECHO MEAS - LVPWD: 1.1 CM
BH CV ECHO MEAS - MED PEAK E' VEL: 3.8 CM/SEC
BH CV ECHO MEAS - MV A DUR: 156 SEC
BH CV ECHO MEAS - MV A MAX VEL: 112 CM/SEC
BH CV ECHO MEAS - MV DEC SLOPE: 373 CM/SEC^2
BH CV ECHO MEAS - MV DEC TIME: 254 SEC
BH CV ECHO MEAS - MV E MAX VEL: 94.1 CM/SEC
BH CV ECHO MEAS - MV E/A: 0.84
BH CV ECHO MEAS - MV MEAN PG: 2 MMHG
BH CV ECHO MEAS - MV P1/2T MAX VEL: 101 CM/SEC
BH CV ECHO MEAS - MV P1/2T: 79.3 MSEC
BH CV ECHO MEAS - MV V2 MEAN: 70.8 CM/SEC
BH CV ECHO MEAS - MV V2 VTI: 44.5 CM
BH CV ECHO MEAS - MVA P1/2T LCG: 2.2 CM^2
BH CV ECHO MEAS - MVA(P1/2T): 2.8 CM^2
BH CV ECHO MEAS - MVA(VTI): 1.3 CM^2
BH CV ECHO MEAS - PA ACC TIME: 0.07 SEC
BH CV ECHO MEAS - PA MAX PG: 6.3 MMHG
BH CV ECHO MEAS - PA PR(ACCEL): 49.3 MMHG
BH CV ECHO MEAS - PA V2 MAX: 125 CM/SEC
BH CV ECHO MEAS - QP/QS: 1.2
BH CV ECHO MEAS - RV MEAN PG: 2 MMHG
BH CV ECHO MEAS - RV V1 MEAN: 55 CM/SEC
BH CV ECHO MEAS - RV V1 VTI: 16.3 CM
BH CV ECHO MEAS - RVOT AREA: 4.2 CM^2
BH CV ECHO MEAS - RVOT DIAM: 2.3 CM
BH CV ECHO MEAS - SI(AO): 146.6 ML/M^2
BH CV ECHO MEAS - SI(CUBED): 28.8 ML/M^2
BH CV ECHO MEAS - SI(LVOT): 29.2 ML/M^2
BH CV ECHO MEAS - SI(MOD-SP2): 34 ML/M^2
BH CV ECHO MEAS - SI(MOD-SP4): 31.4 ML/M^2
BH CV ECHO MEAS - SI(TEICH): 19.5 ML/M^2
BH CV ECHO MEAS - SV(AO): 289.1 ML
BH CV ECHO MEAS - SV(CUBED): 56.7 ML
BH CV ECHO MEAS - SV(LVOT): 57.5 ML
BH CV ECHO MEAS - SV(MOD-SP2): 67 ML
BH CV ECHO MEAS - SV(MOD-SP4): 62 ML
BH CV ECHO MEAS - SV(RVOT): 67.7 ML
BH CV ECHO MEAS - SV(TEICH): 38.5 ML
BH CV ECHO MEAS - TAPSE (>1.6): 2 CM
BH CV ECHO MEASUREMENTS AVERAGE E/E' RATIO: 18.82
BH CV XLRA - RV BASE: 4.1 CM
BH CV XLRA - TDI S': 10.9 CM/SEC
LEFT ATRIUM VOLUME INDEX: 62 ML/M2
MAXIMAL PREDICTED HEART RATE: 143 BPM
STRESS TARGET HR: 122 BPM

## 2021-04-22 PROCEDURE — 93306 TTE W/DOPPLER COMPLETE: CPT | Performed by: INTERNAL MEDICINE

## 2021-04-22 PROCEDURE — 93306 TTE W/DOPPLER COMPLETE: CPT

## 2021-04-22 PROCEDURE — 25010000002 PERFLUTREN (DEFINITY) 8.476 MG IN SODIUM CHLORIDE (PF) 0.9 % 10 ML INJECTION: Performed by: NURSE PRACTITIONER

## 2021-04-22 RX ADMIN — SODIUM CHLORIDE 2 ML: 9 INJECTION INTRAMUSCULAR; INTRAVENOUS; SUBCUTANEOUS at 15:04

## 2021-04-23 ENCOUNTER — TELEPHONE (OUTPATIENT)
Dept: NEUROLOGY | Facility: CLINIC | Age: 76
End: 2021-04-23

## 2021-04-23 NOTE — TELEPHONE ENCOUNTER
Caller: Jung Short    Relationship: Self    Best call back number:[phone]    What is the best time to reach you: ANYTIME BEFORE 5PM.     Who are you requesting to speak with (clinical staff, provider,  specific staff member): LILLIANA    What was the call regarding: A TEST THAT SHE IS NEEDING TO GET DONE, THAT IS ALL THE INFO SHE WANTED TO PROVIDE- AND THAT SHE WAS LAST SEEN Friday.      Do you require a callback: YES

## 2021-04-23 NOTE — TELEPHONE ENCOUNTER
Patient called in. She has decided against having the EMG completed. Since it wouldn't change her care plan she does not want to put herself through this. She asked what if anything she would need to do next other than the labs you ordered.     I will cancel the order.     Please review.   Thank you

## 2021-04-26 ENCOUNTER — TELEPHONE (OUTPATIENT)
Dept: CARDIOLOGY | Facility: CLINIC | Age: 76
End: 2021-04-26

## 2021-04-26 NOTE — PROGRESS NOTES
Left message that echo had improved and to call back to discuss or can discuss further with RM at her appt on 4/30/2021.

## 2021-04-30 ENCOUNTER — OFFICE VISIT (OUTPATIENT)
Dept: CARDIOLOGY | Facility: CLINIC | Age: 76
End: 2021-04-30

## 2021-04-30 VITALS
WEIGHT: 210 LBS | SYSTOLIC BLOOD PRESSURE: 142 MMHG | RESPIRATION RATE: 16 BRPM | OXYGEN SATURATION: 98 % | HEIGHT: 64 IN | HEART RATE: 84 BPM | BODY MASS INDEX: 35.85 KG/M2 | DIASTOLIC BLOOD PRESSURE: 86 MMHG

## 2021-04-30 DIAGNOSIS — I42.8 NICM (NONISCHEMIC CARDIOMYOPATHY) (HCC): Primary | ICD-10-CM

## 2021-04-30 DIAGNOSIS — E78.5 DYSLIPIDEMIA: ICD-10-CM

## 2021-04-30 DIAGNOSIS — R14.0 ABDOMINAL BLOATING: ICD-10-CM

## 2021-04-30 DIAGNOSIS — I10 ESSENTIAL HYPERTENSION: ICD-10-CM

## 2021-04-30 DIAGNOSIS — E11.9 TYPE 2 DIABETES MELLITUS WITHOUT COMPLICATION, WITHOUT LONG-TERM CURRENT USE OF INSULIN (HCC): ICD-10-CM

## 2021-04-30 PROBLEM — I50.23 ACUTE ON CHRONIC HFREF (HEART FAILURE WITH REDUCED EJECTION FRACTION): Status: RESOLVED | Noted: 2019-08-17 | Resolved: 2021-04-30

## 2021-04-30 PROBLEM — I50.9 ACUTE ON CHRONIC CONGESTIVE HEART FAILURE: Status: RESOLVED | Noted: 2020-04-10 | Resolved: 2021-04-30

## 2021-04-30 PROCEDURE — 99214 OFFICE O/P EST MOD 30 MIN: CPT | Performed by: INTERNAL MEDICINE

## 2021-04-30 PROCEDURE — 93000 ELECTROCARDIOGRAM COMPLETE: CPT | Performed by: INTERNAL MEDICINE

## 2021-04-30 RX ORDER — SACUBITRIL AND VALSARTAN 49; 51 MG/1; MG/1
1 TABLET, FILM COATED ORAL 2 TIMES DAILY
Qty: 60 TABLET | Refills: 11 | Status: SHIPPED | OUTPATIENT
Start: 2021-04-30 | End: 2022-09-19 | Stop reason: SDUPTHER

## 2021-04-30 RX ORDER — DULOXETIN HYDROCHLORIDE 60 MG/1
60 CAPSULE, DELAYED RELEASE ORAL DAILY
Qty: 90 CAPSULE | Refills: 0 | Status: SHIPPED | OUTPATIENT
Start: 2021-04-30 | End: 2021-08-30

## 2021-04-30 NOTE — PROGRESS NOTES
Date of Office Visit: 2021  Encounter Provider: Aria Bates MD  Place of Service: [facility] CARDIOLOGY  Patient Name: Jung Short  :1945      Patient ID:  Jung Short is a 75 y.o. female is here for  followup for cardiomyopathy        History of Present Illness    She presented to [facility] in 2015 with acute congestive heart failure, systolic and diastolic. She had a 2-D echocardiogram which revealed an ejection fraction of 10-15%, moderate left ventricular dilation, severe mitral and tricuspid insufficiency, and her aortic valve was normal. She was transferred to [facility] for a cardiac catheterization. 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 She also had a right heart catheterization done which revealed an RV pressure of 50/20, PA pressure 50/30 with the main of 38 and a capillary wedge pressure of 31 mmHg. Her right atrial pressure was 20 mmHg. Her cardiac output was 3.9 liters per minute. She was treated medically and diuresed.         She had a 2-D echocardiogram with Doppler  performed on 2015 which showed her ejection fraction to be 50%. There was normal  segmental wall motion. There was moderate left atrial dilation. A small patent foramen  ovale/atrial septal defect by saline contrast study and trace mitral and tricuspid  insufficiency. This is a significant improvement from her prior echocardiogram.       She is on anastrozole for right breast cancer.       She had normal renal ultrasound ordered by Dr. Hannah 2017.  She has chronic renal insufficiency and follows with Dr. Hannah.  She had a  carotid duplex study done April 2017 which showed mild carotid arteries.  She had a Holter monitor done just benign 4/2017.     BMP done 2/4/2021 showed glucose 137, otherwise normal BMP.  Complete set labs in 1/12/2021 showed glucose 151, otherwise normal CMP, hemoglobin A1c 6.7, normal TSH and free T4, total cholesterol 183, HDL 54, , triglycerides 165, normal CBC.      Echocardiogram done 1/12/2021 showed mild concentric left ventricular hypertrophy, moderate left atrial margin, normal diastolic function, mild left ventricular dilation, ejection fraction 20-25%, severely reduced ejection fraction global hypokinesis.  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  Repeat echocardiogram done 4/22/2021 showed moderate left ventricular dilation, mild concentric left ventricle hypertrophy, grade 2 diastolic dysfunction, ejection fraction 37%, moderate reduction of left ventricular systolic function, severe left atrial enlargement, normal saline contrast study, mild right atrial larger, mild right ventricular enlargement, global hypokinesis.  She was started on Entresto.    She likes to plant and tend to flowers.  She is missing toes on the right foot and tends to fall to the right.  There is no swelling in her legs left greater than right.  She does not feel her heart racing or skipping.  She is mildly short winded with activity but nothing like she was prior.  She has no orthopnea.  She has no chest pain or pressure.  He said no fevers, chills or cough.    Past Medical History:   Diagnosis Date   • Anemia    • Benign breast disease    • Cancer (CMS/[facility]) 2015    Right breast   • Cellulitis of leg     May-2003 right lower extremity   • CHF (congestive heart failure) (CMS/[facility])     Dr Bates follows   • Chronic kidney disease     Dr Hannah follows   • Chronic ulcer of right foot (CMS/[facility])     Non-pressure, history  of    • Closed fracture of patella 9/5/2017   • Cluster headache    • Colon polyp    • Depression    • Diabetic peripheral neuropathy (CMS/HCC)    • Difficulty walking    • Diverticulosis    • Femoral fracture (CMS/HCC)     right   • Fibromyalgia, primary    • Fracture of left wrist     history of   • Gastroesophageal reflux    • Generalized pain 2/2/2016   • Hiatal hernia    • Hyperlipidemia    • Hypertension    • Hypothyroidism    • Impingement syndrome of right shoulder    • Joint pain    • Left knee pain 5/17/2016   • Menopausal disorder    • Methicillin resistant Staphylococcus aureus infection 2012    after bladder surgery   • Nonischemic cardiomyopathy (CMS/HCC)     Ejection fraction 10% per 2-D echo with Doppler however she did have cardiac catheterization April 2015 which showed ejection fraction 20% with global hypokinesis and severe mitral insufficiency   • Osteoarthritis     generalized, sched jania TKA   • Osteomyelitis (CMS/HCC) 2/2/2016   • Pain in shoulder 8/2/2016   • Paroxysmal atrial fibrillation (CMS/HCC)     Dr Bates follows   • Radius/ulna fracture, left, closed, initial encounter 10/21/2017   • Shoulder pain, bilateral     has tears on both sides   • Sleep apnea    • Syncope, psychogenic 4/19/2017   • Thoracic back pain 2/2/2016   • Toe ulcer (CMS/HCC)     h/o to both feet, d/t shoes rubbing per patient   • Transient alteration of awareness 4/19/2017         Past Surgical History:   Procedure Laterality Date   • ABDOMINAL SURGERY  2012    had to be reopened after bladder surgery d/t infection   • AMPUTATION FOOT / TOE Right 11/2013    Rt foot amputation MTP first toe   • BLADDER SURGERY  2012   • BREAST BIOPSY     • BREAST SURGERY  06/29/2015    Percutaneous ultrasound-guided placement of metal localized clip 1st lesion   • CARDIAC CATHETERIZATION  2015   • CARDIAC CATHETERIZATION N/A 1/25/2021    Procedure: Right and Left Heart Cath;  Surgeon: Nicholas Andrade MD;  Location: [facility]  CATH INVASIVE LOCATION;  Service: Cardiovascular;  Laterality: N/A;  drop in EF, with hx of NICM, SOA fatigue.  Discussed with    • CARDIAC CATHETERIZATION N/A 1/25/2021    Procedure: Left ventriculography;  Surgeon: Nicholas Andrade MD;  Location: [facility] CATH INVASIVE LOCATION;  Service: Cardiovascular;  Laterality: N/A;   • CARDIAC CATHETERIZATION N/A 1/25/2021    Procedure: Coronary angiography;  Surgeon: Nicholas Andrade MD;  Location: [facility] CATH INVASIVE LOCATION;  Service: Cardiovascular;  Laterality: N/A;   • CATARACT EXTRACTION Bilateral 2017   • COLONOSCOPY     • DEBRIDEMENT  FOOT Right 01/2013    During hospitalization    • EPIDURAL BLOCK      4 chronic back pain and leg pain last epidurals done 5-2012 she had no benefit at all from this procedure.   • FEMUR FRACTURE SURGERY     • FOOT SURGERY Bilateral     several   • HERNIA REPAIR      At the abdomen February 2007 Dr. Mendez   • INCISIONAL HERNIA REPAIR  05/16/2014    Recurrent. Incarcerated. With mesh implantation   • MASTECTOMY Right 05/2015   • MASTECTOMY MODIFIED RADICAL W/ AXILLARY LYMPH NODES W/ OR W/O PECTORALIS MINOR Right    • SHOULDER SURGERY Right     x2 RCR   • TOTAL ABDOMINAL HYSTERECTOMY      with oophorectomy-Done June-2012 secondary to vaginal wall prolapse.   • TOTAL KNEE ARTHROPLASTY Right    • TOTAL KNEE ARTHROPLASTY Left 4/17/2018    Procedure: TOTAL KNEE ARTHROPLASTY;  Surgeon: Live Chambers MD;  Location: [facility];  Service: Orthopedics       Current Outpatient Medications on File Prior to Visit   Medication Sig Dispense Refill   • aspirin 81 MG EC tablet Take 81 mg by mouth Daily.     • bumetanide (BUMEX) 0.5 MG tablet Take 3 tablets by mouth Daily. 190 tablet 3   • carvedilol (COREG) 25 MG tablet Take 1 tablet by mouth 2 (Two) Times a Day. 180 tablet 3   • cholecalciferol (VITAMIN D3) 25 MCG (1000 UT) tablet Take 1 tablet by mouth Daily. 90 tablet 3   • esomeprazole (nexIUM) 20 MG capsule Take 20 mg by mouth Every  Morning Before Breakfast.     • glucose blood test strip Take BG daily 50 each 12   • HYDROcodone-acetaminophen (NORCO) 5-325 MG per tablet Take 1 tablet by mouth Every 6 (Six) Hours As Needed for Severe Pain . DNF until 4-10-21 120 tablet 0   • levothyroxine (SYNTHROID, LEVOTHROID) 25 MCG tablet TAKE 1 TABLET BY MOUTH DAILY 90 tablet 3   • metFORMIN (GLUCOPHAGE) 1000 MG tablet Take 1 tablet by mouth Daily With Breakfast. 30 tablet 2   • metFORMIN (GLUCOPHAGE) 500 MG tablet TAKE 1 TABLET BY MOUTH DAILY WITH DINNER. 30 tablet 1   • methocarbamol (ROBAXIN) 500 MG tablet TAKE 1 TABLET BY MOUTH 3 (THREE) TIMES A DAY AS NEEDED FOR MUSCLE SPASMS. 90 tablet 0   • Multiple Vitamins-Minerals (MULTIVITAMIN ADULT PO) Take 1 tablet by mouth Daily.     • pravastatin (PRAVACHOL) 10 MG tablet TAKE 1 TABLET BY MOUTH DAILY 90 tablet 3   • pregabalin (LYRICA) 75 MG capsule Take 1 capsule by mouth 2 (Two) Times a Day. 60 capsule 5   • [DISCONTINUED] sacubitril-valsartan (Entresto) 24-26 MG tablet Take 1 tablet by mouth 2 (Two) Times a Day. 60 tablet 6   • [DISCONTINUED] DULoxetine (CYMBALTA) 60 MG capsule Take 1 capsule by mouth Daily. 90 capsule 1     No current facility-administered medications on file prior to visit.       Social History     Socioeconomic History   • Marital status:      Spouse name: Not on file   • Number of children: 2   • Years of education: Not on file   • Highest education level: Not on file   Tobacco Use   • Smoking status: Former Smoker     Packs/day: 3.00     Years: 30.00     Pack years: 90.00     Types: Cigarettes     Quit date:      Years since quittin.3   • Smokeless tobacco: Never Used   • Tobacco comment: daily caffiene   Vaping Use   • Vaping Use: Never used   Substance and Sexual Activity   • Alcohol use: No     Comment: h/o quit    • Drug use: No   • Sexual activity: Defer     Partners: Male     Comment: morteza Quiñones    ECG 12 Lead    Date/Time: 2021  "2:18 PM  Performed by: Aria Bates MD  Authorized by: Aria Bates MD   Comparison: compared with previous ECG   Similar to previous ECG  Rhythm: sinus rhythm  Ectopy: atrial premature contractions and infrequent PVCs  Conduction: non-specific intraventricular conduction delay  QRS axis: left  Other findings: left ventricular hypertrophy    Clinical impression: abnormal EKG                Objective:      Vitals:    04/30/21 1405   BP: 142/86   BP Location: Left arm   Patient Position: Sitting   Cuff Size: Large Adult   Pulse: 84   Resp: 16   SpO2: 98%   Weight: 95.3 kg (210 lb)   Height: 162 cm (63.78\")     Body mass index is 36.3 kg/m².    Vitals reviewed.   Constitutional:       General: Not in acute distress.     Appearance: Well-developed. Not diaphoretic.   Eyes:      General: No scleral icterus.     Conjunctiva/sclera: Conjunctivae normal.   HENT:      Head: Normocephalic and atraumatic.   Neck:      Thyroid: No thyromegaly.      Vascular: No carotid bruit or JVD.      Lymphadenopathy: No cervical adenopathy.   Pulmonary:      Effort: Pulmonary effort is normal. No respiratory distress.      Breath sounds: Normal breath sounds. No wheezing. No rhonchi. No rales.   Chest:      Chest wall: Not tender to palpatation.   Cardiovascular:      Normal rate. Regular rhythm.      Murmurs: There is no murmur.      No gallop.   Pulses:     Intact distal pulses.   Edema:     Peripheral edema present.     Pretibial: 1+ edema of the left pretibial area.     Ankle: 2+ edema of the left ankle.  Abdominal:      General: Bowel sounds are normal. There is no distension or abdominal bruit.      Palpations: Abdomen is soft. There is no abdominal mass.      Tenderness: There is no abdominal tenderness.   Musculoskeletal:         General: No deformity.      Extremities: No clubbing present.     Cervical back: Neck supple. Skin:     General: Skin is warm and dry. There is no cyanosis.      Coloration: Skin is not " pale.      Findings: No rash.   Neurological:      Mental Status: Alert and oriented to person, place, and time.      Cranial Nerves: No cranial nerve deficit.   Psychiatric:         Judgment: Judgment normal.         Lab Review:       Assessment:      Diagnosis Plan   1. NICM (nonischemic cardiomyopathy) (CMS/HCC)  Basic Metabolic Panel   2. Essential hypertension     3. Dyslipidemia     4. Type 2 diabetes mellitus without complication, without long-term current use of insulin (CMS/[facility])  Basic Metabolic Panel             1. Nonischemic cardiomyopathy, recurrent. Her ejection fraction is now 37%.    2. Hypertension, BP is too high.   Goal less than 120/80  3. DM, type 2.  4. Anomalous coronary artery, LV off RCC.   5. H/o right breast cancer. Sees Dr. Draper.   6. Severe OA of the knees.  7. CKD, sees Dr. Hannah.       Plan:       Increase Entresto to 49/51 twice daily, and next visit, stop Metformin 500 mg and change to Jardiance 10 mg daily.  See Ravinder in 3 months and me in 6 months, check metabolic panel in 1 month.

## 2021-05-10 ENCOUNTER — OFFICE VISIT (OUTPATIENT)
Dept: PAIN MEDICINE | Facility: CLINIC | Age: 76
End: 2021-05-10

## 2021-05-10 ENCOUNTER — OFFICE VISIT (OUTPATIENT)
Dept: GASTROENTEROLOGY | Facility: CLINIC | Age: 76
End: 2021-05-10

## 2021-05-10 VITALS — WEIGHT: 206 LBS | BODY MASS INDEX: 35.6 KG/M2

## 2021-05-10 DIAGNOSIS — M25.50 ARTHRALGIA OF MULTIPLE JOINTS: ICD-10-CM

## 2021-05-10 DIAGNOSIS — M47.816 LUMBAR FACET ARTHROPATHY: ICD-10-CM

## 2021-05-10 DIAGNOSIS — Z79.899 ENCOUNTER FOR LONG-TERM (CURRENT) USE OF HIGH-RISK MEDICATION: ICD-10-CM

## 2021-05-10 DIAGNOSIS — R15.9 FULL INCONTINENCE OF FECES: ICD-10-CM

## 2021-05-10 DIAGNOSIS — G89.29 OTHER CHRONIC PAIN: Primary | ICD-10-CM

## 2021-05-10 DIAGNOSIS — K59.1 FUNCTIONAL DIARRHEA: Primary | ICD-10-CM

## 2021-05-10 PROCEDURE — 99442 PR PHYS/QHP TELEPHONE EVALUATION 11-20 MIN: CPT | Performed by: NURSE PRACTITIONER

## 2021-05-10 PROCEDURE — 99213 OFFICE O/P EST LOW 20 MIN: CPT | Performed by: INTERNAL MEDICINE

## 2021-05-10 RX ORDER — HYDROCODONE BITARTRATE AND ACETAMINOPHEN 5; 325 MG/1; MG/1
1 TABLET ORAL EVERY 6 HOURS PRN
Qty: 120 TABLET | Refills: 0 | Status: SHIPPED | OUTPATIENT
Start: 2021-05-10 | End: 2021-06-10 | Stop reason: SDUPTHER

## 2021-05-10 RX ORDER — ALOSETRON HYDROCHLORIDE 0.5 MG/1
0.5 TABLET, FILM COATED ORAL 2 TIMES DAILY
Qty: 60 TABLET | Refills: 11 | Status: SHIPPED | OUTPATIENT
Start: 2021-05-10 | End: 2022-01-13

## 2021-05-10 NOTE — PROGRESS NOTES
TELEPHONE VISIT    You have chosen to receive care through a telephone visit. Do you consent to use a telephone visit for your medical care today? Yes    CHIEF COMPLAINT  Back and joint pain    Subjective   Jung Short is a 75 y.o. female  who presents for a telephonic follow-up.She has a history of chronic back and joint pain. Reports her pain is WORSE since last evaluation.     Complains of pain in her joints and back. Today her pain is 6/10VAS. Describes her pain as continuous throbbing.  Continues with Hydrocodone 5/325 3-4/day, Robaxin 500 mg daily and Lyrica  75 mg BID, as well as Cymbalta 60 mg daily.  Denies any side effects from the regimen. The regimen helps decreases her pain by 30-40%.  ADL's by self. Denies any bowel or bladder changes.     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 Used to have neuropathy in legs, now having right hand. Prescribed Lyrica.     Orthopedist is Dr Live Braden.     Enjoys going to [employer] games.      Just completed her second COVID vaccine today.     Reports her EF is at 20% as of 3-10-21.     She is upset today. She had difficulty buttoning her blouse due to neuropathy. She has been seen by Dr Metz. Is refusing EMG and to follow-up. She is also upset today because her car is out of gas. Called office right before her appointment to change to telehealth visit due to this.     Joint Pain  This is a chronic problem. The current episode started more than 1 year ago. The problem occurs constantly. The problem has been gradually worsening. Associated symptoms include arthralgias (joint) and joint swelling. Pertinent negatives include no abdominal pain, chest pain, chills, congestion, coughing, fatigue, fever, headaches, nausea, neck pain, numbness, vomiting or weakness. Nothing aggravates the symptoms. She has tried oral narcotics, position changes and rest for the symptoms. The treatment provided moderate relief.   Back Pain  This  is a chronic problem. The current episode started more than 1 year ago. The problem occurs constantly. The problem has been gradually worsening since onset. The pain is present in the lumbar spine. The pain radiates to the right knee, right foot and right thigh. The pain is at a severity of 6/10. The pain is moderate. Pertinent negatives include no abdominal pain, chest pain, dysuria, fever, headaches, numbness or weakness. Risk factors include lack of exercise and obesity. She has tried analgesics for the symptoms. The treatment provided moderate relief.      The following portions of the patient's history were reviewed and updated as appropriate: allergies, current medications, past family history, past medical history, past social history, past surgical history and problem list.    Review of Systems   Constitutional: Negative for chills, fatigue and fever.   HENT: Negative for congestion.    Respiratory: Negative for cough.    Cardiovascular: Negative for chest pain.   Gastrointestinal: Negative for abdominal pain, nausea and vomiting.   Genitourinary: Negative for dysuria.   Musculoskeletal: Positive for arthralgias (joint), back pain and joint swelling. Negative for neck pain.   Neurological: Negative for weakness, numbness and headaches.       There were no vitals filed for this visit.    Objective   Physical Exam  Constitutional:       Appearance: She is well-developed.   Neurological:      Mental Status: She is alert and oriented to person, place, and time.   Psychiatric:      Comments: Tearful--- car ran out of gas             Assessment/Plan   Diagnoses and all orders for this visit:    1. Other chronic pain (Primary)    2. Arthralgia of multiple joints    3. Lumbar facet arthropathy    4. Encounter for long-term (current) use of high-risk medication          ----------------      Our practice is offering alternative &/or electronic methods to continue to follow our patients while at the same time further  the efforts toward social distancing, in accordance with our organizational policies, professional societies' guidance, and [facility] mandates.  I support the Healthy at Home campaign and in this visit I have counseled the patient on our needs to limit in-person office visits and physical encounters with medical facilities whenever possible.  I have also educated the patient on the medical necessities of maintaining social distancing while we continue to function during this crisis period.      The patient was counseled on the need to consider telehealth options. The patient was offered the opportunity for a Video Visit using [employer]. As a Video Visit was not practical, the patient was offered the option for a Telephone Visit. The patient agreed to a Telephone Encounter. The patient was counseled on the need for a telehealth visit for necessary monitoring. The patient was educated about our efforts to comply with monitoring standards when prescribing potent medications. During these pandemic conditions, telephone encounters are federally mandated as acceptable alternative to videoconference or in-person encounters to ensure that an individual can receive appropriate care and be monitored regardless of access to technology.      TIME:  Total Time:  13 minutes. Topics discussed are outlined in the Assessment/Plan section of the note.      ----------------    --- The urine drug screen confirmation from 3-10-21 has been reviewed and the result is APPROPRIATE based on patient history and LOUANN report  --- The patient signed an updated copy of the controlled substance agreement on 1-11-21  --- Refill Hydrocodone. Patient appears stable with current regimen. No adverse effects. Regarding continuation of opioids, there is no evidence of aberrant behavior or any red flags.  The patient continues with appropriate response to opioid therapy. ADL's remain intact by self.   --- Recommend she proceed with neurological  workup.   --- Follow-up 1 month or sooner if needed (OFFICE).         LOUANN REPORT  As part of the patient's treatment plan, I am prescribing controlled substances. The patient has been made aware of appropriate use of such medications, including potential risk of somnolence, limited ability to drive and/or work safely, and the potential for dependence or overdose. It has also bee made clear that these medications are for use by this patient only, without concomitant use of alcohol or other substances unless prescribed.     Patient has completed prescribing agreement detailing terms of continued prescribing of controlled substances, including monitoring LOUANN reports, urine drug screening, and pill counts if necessary. The patient is aware that inappropriate use will results in cessation of prescribing such medications.    LOUANN report has been reviewed and scanned into the patient's chart.    As the clinician, I personally reviewed the LOUANN from 5-10-21 while the patient was in the office today.    History and physical exam exhibit continued safe and appropriate use of controlled substances.    -------    EMR Dragon/Transcription disclaimer:   Much of this encounter note is an electronic transcription/translation of spoken language to printed text. The electronic translation of spoken language may permit erroneous, or at times, nonsensical words or phrases to be inadvertently transcribed; Although I have reviewed the note for such errors, some may still exist.

## 2021-05-10 NOTE — PROGRESS NOTES
PATIENT INFORMATION  Jung Short       - 1945    CHIEF COMPLAINT  Chief Complaint   Patient presents with   • Fecal Incontinence       HISTORY OF PRESENT ILLNESS  Her for follow up of fecal incontinence but has since been busy with crdiology and had a cath and has had entresto started and increased but from her cath EF 20% is back to 37% on entresto and has increased the dose recently.     Her bowels are particularly regular and goes early Am and when she wakes up. Rarely ha  lday frequency including mucous but no blood  And worries about her Commode stains but no blood.    Does get a response to imodium and does complain of the incontinence 1-2 x  A week.    All of it is functional so will try alosetron prior to a referral to Colorectal Surgery          REVIEWED PERTINENT RESULTS/ LABS  No results found for: CASEREPORT, FINALDX  Lab Results   Component Value Date    HGB 10.9 (L) 2021    MCV 88 2021     2021    ALT 9 2021    AST 16 2021    HGBA1C 6.7 (H) 2021    INR 1.22 (H) 2020    TRIG 165 (H) 2021    FERRITIN 109.8 2016    IRON 71 2017    TIBC 290 2017      Adult Transthoracic Echo Complete W/ Cont if Necessary Per Protocol    Result Date: 2021  Narrative: · The left ventricular cavity is moderately dilated. · Left ventricular wall thickness is consistent with mild concentric hypertrophy. · Left ventricular diastolic function is consistent with (grade II w/high LAP) pseudonormalization. · The following left ventricular wall segments are hypokinetic: mid anterior, apical anterior, basal anterolateral, mid anterolateral, apical lateral, basal inferolateral, mid inferolateral, apical inferior, mid inferior, apical septal, basal inferoseptal, mid inferoseptal, apex hypokinetic, mid anteroseptal, basal anterior, basal inferior and basal inferoseptal. · Calculated left ventricular EF = 36.9% Estimated left ventricular EF was  in agreement with the calculated left ventricular EF. Left ventricular systolic function is moderately decreased. · The right ventricular cavity is mildly dilated. · Left atrial volume is severely increased. · The right atrial cavity is mildly dilated. · Saline test results are negative.        REVIEW OF SYSTEMS  Review of Systems   Constitutional: Negative for chills, diaphoresis and unexpected weight change.   HENT: Negative for hearing loss, nosebleeds, sore throat and tinnitus.    Eyes: Negative for pain and visual disturbance.   Respiratory: Negative for cough, shortness of breath and wheezing.    Cardiovascular: Negative for chest pain and palpitations.   Gastrointestinal: Positive for diarrhea. Negative for abdominal pain, nausea and vomiting.   Endocrine: Negative for cold intolerance, heat intolerance and polydipsia.   Genitourinary: Negative for difficulty urinating, dyspareunia and hematuria.   Musculoskeletal: Negative for arthralgias.   Skin: Negative for rash and wound.   Allergic/Immunologic: Negative for environmental allergies.   Neurological: Negative for dizziness, syncope and numbness.   Hematological: Does not bruise/bleed easily.   Psychiatric/Behavioral: Negative for dysphoric mood and sleep disturbance. The patient is not nervous/anxious.          ACTIVE PROBLEMS  Patient Active Problem List    Diagnosis    • NICM (nonischemic cardiomyopathy) (CMS/[facility]) [I42.8]    • Periprosthetic subtrochanteric fracture of femur [M97.8XXA, Z96.649]    • Traumatic closed nondisp torus fracture of distal radial metaphysis, right, initial encounter [S52.521A]    • Pleural effusion [J90]    • Acute respiratory failure with hypoxia (CMS/[facility]) [J96.01]    • Diastolic congestive heart failure (CMS/[facility]) [I50.30]    • CKD (chronic kidney disease) stage 3, GFR 30-59 ml/min (CMS/[facility]) [N18.30]    • Physical deconditioning [R53.81]    • Primary osteoarthritis of left knee [M17.12]    • Atherosclerotic heart disease of  native coronary artery without angina pectoris [I25.10]    • Encounter for long-term (current) use of high-risk medication [Z79.899]    • Chronic gout of multiple sites [M1A.09X0]    • Arthritis of knee [M17.10]    • DDD (degenerative disc disease), lumbar [M51.36]    • Lumbar facet arthropathy [M47.816]    • Chronic anemia [D64.9]    • Disc disorder of cervical region [M50.90]    • Neck pain [M54.2]    • Chronic pain [G89.29]    • Depression [F32.9]    • Type 2 diabetes mellitus without complication, without long-term current use of insulin (CMS/HCC) [E11.9]    • Dyslipidemia [E78.5]    • Gastroesophageal reflux disease with esophagitis [K21.00]    • Hypertension [I10]    • Hypothyroidism [E03.9]    • Incisional hernia [K43.2]    • Mitral valve insufficiency [I34.0]    • Nausea [R11.0]    • Osteopenia of spine [M85.88]    • Arthralgia of multiple joints [M25.50]    • Peripheral neuropathy [G62.9]    • Pulmonary hypertension (CMS/HCC) [I27.20]    • PITTS (dyspnea on exertion) [R06.00]    • Tricuspid valve insufficiency [I07.1]    • Cobalamin deficiency [E53.8]    • Vitamin D deficiency [E55.9]    • Malignant neoplasm of upper-inner quadrant of right breast in female, estrogen receptor positive (CMS/HCC) [C50.211, Z17.0]          PAST MEDICAL HISTORY  Past Medical History:   Diagnosis Date   • Anemia    • Benign breast disease    • Cancer (CMS/HCC) 2015    Right breast   • Cellulitis of leg     May-2003 right lower extremity   • CHF (congestive heart failure) (CMS/HCC)     Dr Bates follows   • Chronic kidney disease     Dr Hannah follows   • Chronic ulcer of right foot (CMS/HCC)     Non-pressure, history of    • Closed fracture of patella 9/5/2017   • Cluster headache    • Colon polyp    • Depression    • Diabetic peripheral neuropathy (CMS/HCC)    • Difficulty walking    • Diverticulosis    • Femoral fracture (CMS/HCC)     right   • Fibromyalgia, primary    • Fracture of left wrist     history of   •  Gastroesophageal reflux    • Generalized pain 2/2/2016   • Hiatal hernia    • Hyperlipidemia    • Hypertension    • Hypothyroidism    • Impingement syndrome of right shoulder    • Joint pain    • Left knee pain 5/17/2016   • Menopausal disorder    • Methicillin resistant Staphylococcus aureus infection 2012    after bladder surgery   • Nonischemic cardiomyopathy (CMS/HCC)     Ejection fraction 10% per 2-D echo with Doppler however she did have cardiac catheterization April 2015 which showed ejection fraction 20% with global hypokinesis and severe mitral insufficiency   • Osteoarthritis     generalized, sched jania TKA   • Osteomyelitis (CMS/HCC) 2/2/2016   • Pain in shoulder 8/2/2016   • Paroxysmal atrial fibrillation (CMS/HCC)     Dr Bates follows   • Radius/ulna fracture, left, closed, initial encounter 10/21/2017   • Shoulder pain, bilateral     has tears on both sides   • Sleep apnea    • Syncope, psychogenic 4/19/2017   • Thoracic back pain 2/2/2016   • Toe ulcer (CMS/HCC)     h/o to both feet, d/t shoes rubbing per patient   • Transient alteration of awareness 4/19/2017         SURGICAL HISTORY  Past Surgical History:   Procedure Laterality Date   • ABDOMINAL SURGERY  2012    had to be reopened after bladder surgery d/t infection   • AMPUTATION FOOT / TOE Right 11/2013    Rt foot amputation MTP first toe   • BLADDER SURGERY  2012   • BREAST BIOPSY     • BREAST SURGERY  06/29/2015    Percutaneous ultrasound-guided placement of metal localized clip 1st lesion   • CARDIAC CATHETERIZATION  2015   • CARDIAC CATHETERIZATION N/A 1/25/2021    Procedure: Right and Left Heart Cath;  Surgeon: Nicholas Andrade MD;  Location: [facility] CATH INVASIVE LOCATION;  Service: Cardiovascular;  Laterality: N/A;  drop in EF, with hx of NICM, SOA fatigue.  Discussed with RM   • CARDIAC CATHETERIZATION N/A 1/25/2021    Procedure: Left ventriculography;  Surgeon: Nicholas Andrade MD;  Location:  [facility] CATH INVASIVE LOCATION;   Service: Cardiovascular;  Laterality: N/A;   • CARDIAC CATHETERIZATION N/A 1/25/2021    Procedure: Coronary angiography;  Surgeon: Nicholas Andrade MD;  Location: [facility] INVASIVE LOCATION;  Service: Cardiovascular;  Laterality: N/A;   • CATARACT EXTRACTION Bilateral 2017   • COLONOSCOPY     • DEBRIDEMENT  FOOT Right 01/2013    During hospitalization    • EPIDURAL BLOCK      4 chronic back pain and leg pain last epidurals done 5-2012 she had no benefit at all from this procedure.   • FEMUR FRACTURE SURGERY     • FOOT SURGERY Bilateral     several   • HERNIA REPAIR      At the abdomen February 2007 Dr. Mendez   • INCISIONAL HERNIA REPAIR  05/16/2014    Recurrent. Incarcerated. With mesh implantation   • MASTECTOMY Right 05/2015   • MASTECTOMY MODIFIED RADICAL W/ AXILLARY LYMPH NODES W/ OR W/O PECTORALIS MINOR Right    • SHOULDER SURGERY Right     x2 RCR   • TOTAL ABDOMINAL HYSTERECTOMY      with oophorectomy-Done June-2012 secondary to vaginal wall prolapse.   • TOTAL KNEE ARTHROPLASTY Right    • TOTAL KNEE ARTHROPLASTY Left 4/17/2018    Procedure: TOTAL KNEE ARTHROPLASTY;  Surgeon: Live Chambers MD;  Location: [facility] OR;  Service: Orthopedics         FAMILY HISTORY  Family History   Problem Relation Age of Onset   • Colonic polyp Mother    • Hypertension Mother    • Migraines Mother    • Mental illness Mother    • Depression Mother    • Arthritis Mother    • Coronary artery disease Father    • Other Father         Cardiac Disorder   • Colon cancer Father    • Kidney disease Father    • Cancer Father    • Diabetes Father    • Heart disease Father    • Hypertension Father    • Breast cancer Maternal Aunt    • Colon cancer Brother    • Alcohol abuse Brother    • Arthritis Sister    • Hypertension Brother    • Lung disease Brother    • Alcohol abuse Brother    • Malig Hyperthermia Neg Hx          SOCIAL HISTORY  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   Tobacco  Use   • Smoking status: Former Smoker     Packs/day: 3.00     Years: 30.00     Pack years: 90.00     Types: Cigarettes     Quit date:      Years since quittin.3   • Smokeless tobacco: Never Used   • Tobacco comment: daily caffiene   Vaping Use   • Vaping Use: Never used   Substance and Sexual Activity   • Alcohol use: No     Comment: h/o quit    • Drug use: No   • Sexual activity: Defer     Partners: Male     Comment: celibate         CURRENT MEDICATIONS    Current Outpatient Medications:   •  aspirin 81 MG EC tablet, Take 81 mg by mouth Daily., Disp: , Rfl:   •  bumetanide (BUMEX) 0.5 MG tablet, Take 3 tablets by mouth Daily., Disp: 190 tablet, Rfl: 3  •  carvedilol (COREG) 25 MG tablet, Take 1 tablet by mouth 2 (Two) Times a Day., Disp: 180 tablet, Rfl: 3  •  cholecalciferol (VITAMIN D3) 25 MCG (1000 UT) tablet, Take 1 tablet by mouth Daily., Disp: 90 tablet, Rfl: 3  •  DULoxetine (CYMBALTA) 60 MG capsule, TAKE 1 CAPSULE BY MOUTH DAILY., Disp: 90 capsule, Rfl: 0  •  esomeprazole (nexIUM) 20 MG capsule, Take 20 mg by mouth Every Morning Before Breakfast., Disp: , Rfl:   •  glucose blood test strip, Take BG daily, Disp: 50 each, Rfl: 12  •  HYDROcodone-acetaminophen (NORCO) 5-325 MG per tablet, Take 1 tablet by mouth Every 6 (Six) Hours As Needed for Severe Pain ., Disp: 120 tablet, Rfl: 0  •  levothyroxine (SYNTHROID, LEVOTHROID) 25 MCG tablet, TAKE 1 TABLET BY MOUTH DAILY, Disp: 90 tablet, Rfl: 3  •  metFORMIN (GLUCOPHAGE) 1000 MG tablet, Take 1 tablet by mouth Daily With Breakfast., Disp: 30 tablet, Rfl: 2  •  metFORMIN (GLUCOPHAGE) 500 MG tablet, TAKE 1 TABLET BY MOUTH DAILY WITH DINNER., Disp: 30 tablet, Rfl: 1  •  methocarbamol (ROBAXIN) 500 MG tablet, TAKE 1 TABLET BY MOUTH 3 (THREE) TIMES A DAY AS NEEDED FOR MUSCLE SPASMS., Disp: 90 tablet, Rfl: 0  •  Multiple Vitamins-Minerals (MULTIVITAMIN ADULT PO), Take 1 tablet by mouth Daily., Disp: , Rfl:   •  pravastatin (PRAVACHOL) 10 MG tablet, TAKE 1  "TABLET BY MOUTH DAILY, Disp: 90 tablet, Rfl: 3  •  pregabalin (LYRICA) 75 MG capsule, Take 1 capsule by mouth 2 (Two) Times a Day., Disp: 60 capsule, Rfl: 5  •  sacubitril-valsartan (Entresto) 49-51 MG tablet, Take 1 tablet by mouth 2 (Two) Times a Day., Disp: 60 tablet, Rfl: 11  •  alosetron (LOTRONEX) 0.5 MG tablet, Take 1 tablet by mouth 2 (Two) Times a Day., Disp: 60 tablet, Rfl: 11    ALLERGIES  Dilaudid [hydromorphone] and Latex    VITALS  Vitals:    05/10/21 1356   Weight: 93.4 kg (206 lb)       PHYSICAL EXAM  Debilities/Disabilities Identified: None  Emotional Behavior: Appropriate  Wt Readings from Last 3 Encounters:   05/10/21 93.4 kg (206 lb)   04/30/21 95.3 kg (210 lb)   04/22/21 93 kg (205 lb 0.4 oz)     Ht Readings from Last 1 Encounters:   04/30/21 162 cm (63.78\")     Body mass index is 35.6 kg/m².  Physical Exam  Constitutional:       Appearance: She is well-developed. She is not diaphoretic.   Eyes:      General: No scleral icterus.     Conjunctiva/sclera: Conjunctivae normal.      Pupils: Pupils are equal, round, and reactive to light.   Neck:      Thyroid: No thyromegaly.   Cardiovascular:      Rate and Rhythm: Normal rate and regular rhythm.      Heart sounds: Normal heart sounds. No murmur heard.   No gallop.    Pulmonary:      Effort: Pulmonary effort is normal.      Breath sounds: Normal breath sounds. No wheezing or rales.   Abdominal:      General: Bowel sounds are normal. There is no distension or abdominal bruit.      Palpations: Abdomen is soft. There is no shifting dullness, fluid wave or mass.      Tenderness: There is no abdominal tenderness. There is no guarding. Negative signs include Ulrich's sign.      Hernia: There is no hernia in the ventral area.   Musculoskeletal:         General: Normal range of motion.      Cervical back: Normal range of motion and neck supple.   Lymphadenopathy:      Cervical: No cervical adenopathy.   Skin:     General: Skin is warm and dry.      Findings: " No erythema or rash.   Neurological:      Mental Status: She is alert and oriented to person, place, and time.         CLINICAL DATA REVIEWED   reviewed previous lab results and integrated with today's visit, reviewed notes from other physicians and/or last GI encounter, reviewed previous endoscopy results and available photos, reviewed surgical pathology results from previous biopsies    ASSESSMENT  Diagnoses and all orders for this visit:    Functional diarrhea    Full incontinence of feces    Other orders  -     alosetron (LOTRONEX) 0.5 MG tablet; Take 1 tablet by mouth 2 (Two) Times a Day.          PLAN  Will try Alosetron but coverage looks sketchy  Return in about 4 months (around 9/10/2021).    I have discussed the above plan with the patient.  They verbalize understanding and are in agreement with the plan.  They have been advised to contact the office for any questions, concerns, or changes related to their health.

## 2021-05-11 ENCOUNTER — TELEPHONE (OUTPATIENT)
Dept: GASTROENTEROLOGY | Facility: CLINIC | Age: 76
End: 2021-05-11

## 2021-06-10 ENCOUNTER — OFFICE VISIT (OUTPATIENT)
Dept: PAIN MEDICINE | Facility: CLINIC | Age: 76
End: 2021-06-10

## 2021-06-10 VITALS
OXYGEN SATURATION: 96 % | HEIGHT: 64 IN | RESPIRATION RATE: 16 BRPM | TEMPERATURE: 96 F | SYSTOLIC BLOOD PRESSURE: 131 MMHG | BODY MASS INDEX: 36.33 KG/M2 | HEART RATE: 89 BPM | DIASTOLIC BLOOD PRESSURE: 75 MMHG | WEIGHT: 212.8 LBS

## 2021-06-10 DIAGNOSIS — Z79.899 ENCOUNTER FOR LONG-TERM (CURRENT) USE OF HIGH-RISK MEDICATION: ICD-10-CM

## 2021-06-10 DIAGNOSIS — G89.29 OTHER CHRONIC PAIN: Primary | ICD-10-CM

## 2021-06-10 DIAGNOSIS — M25.569 KNEE PAIN, UNSPECIFIED CHRONICITY, UNSPECIFIED LATERALITY: ICD-10-CM

## 2021-06-10 DIAGNOSIS — M51.36 DDD (DEGENERATIVE DISC DISEASE), LUMBAR: ICD-10-CM

## 2021-06-10 DIAGNOSIS — M47.816 LUMBAR FACET ARTHROPATHY: ICD-10-CM

## 2021-06-10 DIAGNOSIS — M25.50 ARTHRALGIA OF MULTIPLE JOINTS: ICD-10-CM

## 2021-06-10 PROCEDURE — 99214 OFFICE O/P EST MOD 30 MIN: CPT | Performed by: NURSE PRACTITIONER

## 2021-06-10 RX ORDER — HYDROCODONE BITARTRATE AND ACETAMINOPHEN 5; 325 MG/1; MG/1
1 TABLET ORAL EVERY 6 HOURS PRN
Qty: 120 TABLET | Refills: 0 | Status: SHIPPED | OUTPATIENT
Start: 2021-06-10 | End: 2021-07-06 | Stop reason: SDUPTHER

## 2021-06-10 RX ORDER — METHOCARBAMOL 500 MG/1
500 TABLET, FILM COATED ORAL 3 TIMES DAILY PRN
Qty: 90 TABLET | Refills: 0 | Status: SHIPPED | OUTPATIENT
Start: 2021-06-10 | End: 2021-08-05

## 2021-06-10 NOTE — PROGRESS NOTES
"CHIEF COMPLAINT  F/U back and joint pain- patient states that her pain has worsened since her last visit.     Subjective   Jung Short is a 75 y.o. female  who presents for follow-up.  She has a history of chronic back and joint pain. Reports her pain is worse since last evaluation.  She later states that her pain has been unchanged since last evaluation until fall, except for her hands are aching and numb all the time.  Complains of numbness of fingertips. \"it's diabetes, that's what they said.\"    She reports a fall earlier today while at her [employer] parlor. \"I fell into furniture and it all ended up on top of me. I hurt every where now.\"  She believes her right ankle turned and gave out. This has been an on-going issue.  \"this was a bad one.\" \"I decided i'm going to call Dr Braden and see what they can do.\" She has a large hematoma on her right knee as well.     Complains of pain in her low back, joints, legs and hands. Today her pain is 6/10VAS.  Describes her pain as continuous aching. Pain increases with activity, household chores, walking/standing, activity; pain decreases with medication, rest. Continues with Hydrocodone 5/325 3-4/day, Robaxin 500 mg daily and Lyrica  75 mg BID, as well as Cymbalta 60 mg daily.  Denies any side effects from the regimen. The regimen helps decreases her pain by 30-40%. \"it's much more bearable. If I don't take it, I couldn't do anything.\"   ADL's by self. Denies any bowel or bladder changes.     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 Used to have neuropathy in legs, now having right hand. Prescribed Lyrica.     Orthopedist is Dr Live Braden.     Enjoys going to [employer].      Completed her COVID vaccines.     Reports her EF is at 20% as of 3-10-21.     Patient remained masked during entire encounter. No cough present. I donned a mask and eye protection throughout entire visit. Prior to donning mask and eye protection, hand " hygiene was performed, as well as when it was doffed.  I was closer than 6 feet, but not for an extended period of time. No obvious exposure to any bodily fluids.    Joint Pain  This is a chronic problem. The current episode started more than 1 year ago. The problem occurs constantly. The problem has been gradually worsening (worse since last evaluation). Associated symptoms include arthralgias (joint), joint swelling, numbness and weakness. Pertinent negatives include no abdominal pain, chest pain, chills, congestion, coughing, fatigue, fever, headaches, nausea, neck pain or vomiting. Nothing aggravates the symptoms. She has tried oral narcotics, position changes and rest for the symptoms. The treatment provided moderate relief.   Back Pain  This is a chronic problem. The current episode started more than 1 year ago. The problem occurs constantly. The problem has been gradually worsening (wosre since last evaluation) since onset. The pain is present in the lumbar spine. The pain radiates to the right knee, right foot and right thigh. The pain is at a severity of 6/10. The pain is moderate. Associated symptoms include numbness and weakness. Pertinent negatives include no abdominal pain, chest pain, dysuria, fever or headaches. Risk factors include lack of exercise and obesity. She has tried analgesics for the symptoms. The treatment provided moderate relief.      PEG Assessment   What number best describes your pain on average in the past week?6  What number best describes how, during the past week, pain has interfered with your enjoyment of life?0  What number best describes how, during the past week, pain has interfered with your general activity?  0    The following portions of the patient's history were reviewed and updated as appropriate: allergies, current medications, past family history, past medical history, past social history, past surgical history and problem list.    Review of Systems   Constitutional:  "Positive for activity change (decreased). Negative for chills, fatigue and fever.   HENT: Negative for congestion.    Eyes: Negative for visual disturbance.   Respiratory: Negative for cough, chest tightness and shortness of breath.    Cardiovascular: Negative for chest pain.   Gastrointestinal: Positive for diarrhea. Negative for abdominal pain, constipation, nausea and vomiting.   Genitourinary: Negative for difficulty urinating, dyspareunia and dysuria.   Musculoskeletal: Positive for arthralgias (joint), back pain and joint swelling. Negative for neck pain.   Neurological: Positive for weakness and numbness. Negative for dizziness, light-headedness and headaches.   Psychiatric/Behavioral: Positive for sleep disturbance. Negative for agitation. The patient is not nervous/anxious.      I have reviewed and confirmed the accuracy of the ROS as documented by the MA/LPN/RN JOSE ANTONIO Castaneda      Vitals:    06/10/21 1348   BP: 131/75   Pulse: 89   Resp: 16   Temp: 96 °F (35.6 °C)   SpO2: 96%   Weight: 96.5 kg (212 lb 12.8 oz)   Height: 162.6 cm (64\")   PainSc:   6   PainLoc: Leg         Objective   Physical Exam  Constitutional:       Appearance: She is well-developed.   HENT:      Head: Normocephalic and atraumatic.   Musculoskeletal:      Lumbar back: Tenderness present. Decreased range of motion.      Right knee: Swelling, erythema, ecchymosis and bony tenderness present. Decreased range of motion. Tenderness present over the lateral joint line.        Legs:       Comments: Widespread OA changes with no acute synovitis   Neurological:      Mental Status: She is alert.      Gait: Gait abnormal (cane).   Psychiatric:         Speech: Speech normal.         Behavior: Behavior normal.         Thought Content: Thought content normal.         Judgment: Judgment normal.         Assessment/Plan   Diagnoses and all orders for this visit:    1. Other chronic pain (Primary)    2. DDD (degenerative disc disease), " lumbar    3. Lumbar facet arthropathy    4. Arthralgia of multiple joints    5. Encounter for long-term (current) use of high-risk medication    6. Knee pain, unspecified chronicity, unspecified laterality  -     Ambulatory Referral to Orthopedic Surgery    Other orders  -     HYDROcodone-acetaminophen (NORCO) 5-325 MG per tablet; Take 1 tablet by mouth Every 6 (Six) Hours As Needed for Severe Pain .  Dispense: 120 tablet; Refill: 0      --- The urine drug screen confirmation from 3-10-21 has been reviewed and the result is APPROPRIATE based on patient history and LOUANN report  --- The patient signed an updated copy of the controlled substance agreement on 1-11-21  --- Evaluation with Dr Braden for hematoma of right lateral knee and right ankle pain/ROm/weakness.  --- Refill Hydrocodone. Patient appears stable with current regimen. No adverse effects. Regarding continuation of opioids, there is no evidence of aberrant behavior or any red flags.  The patient continues with appropriate response to opioid therapy. ADL's remain intact by self.   --- Follow-up 2 months or sooner if needed.     LOUANN REPORT  As part of the patient's treatment plan, I am prescribing controlled substances. The patient has been made aware of appropriate use of such medications, including potential risk of somnolence, limited ability to drive and/or work safely, and the potential for dependence or overdose. It has also bee made clear that these medications are for use by this patient only, without concomitant use of alcohol or other substances unless prescribed.     Patient has completed prescribing agreement detailing terms of continued prescribing of controlled substances, including monitoring LOUANN reports, urine drug screening, and pill counts if necessary. The patient is aware that inappropriate use will results in cessation of prescribing such medications.    As the clinician, I personally reviewed the LOUANN from 6-10-21 while the  patient was in the office today.    History and physical exam exhibit continued safe and appropriate use of controlled substances.        EMR Dragon/Transcription disclaimer:   Much of this encounter note is an electronic transcription/translation of spoken language to printed text. The electronic translation of spoken language may permit erroneous, or at times, nonsensical words or phrases to be inadvertently transcribed; Although I have reviewed the note for such errors, some may still exist.

## 2021-06-14 ENCOUNTER — OFFICE VISIT (OUTPATIENT)
Dept: ORTHOPEDIC SURGERY | Facility: CLINIC | Age: 76
End: 2021-06-14

## 2021-06-14 VITALS — BODY MASS INDEX: 35.85 KG/M2 | HEIGHT: 64 IN | WEIGHT: 210 LBS

## 2021-06-14 DIAGNOSIS — G89.21 CHRONIC PAIN DUE TO TRAUMA: Primary | ICD-10-CM

## 2021-06-14 DIAGNOSIS — M19.071 PRIMARY OSTEOARTHRITIS OF RIGHT ANKLE: ICD-10-CM

## 2021-06-14 PROCEDURE — 99211 OFF/OP EST MAY X REQ PHY/QHP: CPT | Performed by: ORTHOPAEDIC SURGERY

## 2021-06-14 NOTE — PROGRESS NOTES
Subjective: Chronic right ankle pain     Patient ID: Jung Short is a 75 y.o. female.    Chief Complaint:    History of Present Illness 75-year-old female known to me although not been seen for over a year return chronic right ankle pain.  Seen last year noted to have nondisplaced medial malleolar fracture develop osteoarthritis of the ankle.  She did not complicated course over the past year.  She has developed congestive heart failure with an injection ratio 20:30 percent.  She has been under the care for chronic ankle pain multiple feet by the podiatrist.  States she was see in the pain clinic last month noted to have on x-ray arthritis of the ankle is good to have injections done in his office but she has not been back to see him.  She is fallen multiple times as result of the ankle resulting in contusions to her leg and her head.       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   HENT: Negative for hearing loss, nosebleeds, sore throat and tinnitus.    Eyes: Negative for pain and visual disturbance.   Respiratory: Negative for cough, shortness of breath and wheezing.    Cardiovascular: Negative for chest pain and palpitations.   Gastrointestinal: Negative for abdominal pain, diarrhea, nausea and vomiting.   Endocrine: Negative for cold intolerance, heat intolerance and polydipsia.   Genitourinary:  Negative for difficulty urinating, dysuria and hematuria.   Musculoskeletal: Positive for arthralgias and myalgias. Negative for joint swelling.   Skin: Negative for rash and wound.   Allergic/Immunologic: Negative for environmental allergies.   Neurological: Negative for dizziness, syncope and numbness.   Hematological: Does not bruise/bleed easily.   Psychiatric/Behavioral: Negative for dysphoric mood and sleep disturbance. The patient is not nervous/anxious.          Past Medical History:   Diagnosis Date   • Anemia    • Benign breast disease    • Cancer (CMS/[facility]) 2015    Right breast   • Cellulitis of leg     May-2003 right lower extremity   • CHF (congestive heart failure) (CMS/[facility])     Dr Bates follows   • Chronic kidney disease     Dr Hannah follows   • Chronic ulcer of right foot (CMS/[facility])     Non-pressure, history of    • Closed fracture of patella 9/5/2017   • Cluster headache    • Colon polyp    • Depression    • Diabetic peripheral neuropathy (CMS/[facility])    • Difficulty walking    • Diverticulosis    • Femoral fracture (CMS/[facility])     right   • Fibromyalgia, primary    • Fracture of left wrist     history of   • Gastroesophageal reflux    • Generalized pain 2/2/2016   • Hiatal hernia    • Hyperlipidemia    • Hypertension    • Hypothyroidism    • Impingement syndrome of right shoulder    • Joint pain    • Left knee pain 5/17/2016   • Menopausal disorder    • Methicillin resistant Staphylococcus aureus infection 2012    after bladder surgery   • Nonischemic cardiomyopathy (CMS/[facility])     Ejection fraction 10% per 2-D echo with Doppler however she did have cardiac catheterization April 2015 which showed ejection fraction 20% with global hypokinesis and severe mitral insufficiency   • Osteoarthritis     generalized, sched jania TKA   • Osteomyelitis (CMS/[facility]) 2/2/2016   • Pain in shoulder 8/2/2016   • Paroxysmal atrial fibrillation (CMS/[facility])     Dr Bates follows   • Radius/ulna fracture, left, closed,  initial encounter 10/21/2017   • Shoulder pain, bilateral     has tears on both sides   • Sleep apnea    • Syncope, psychogenic 4/19/2017   • Thoracic back pain 2/2/2016   • Toe ulcer (CMS/[facility])     h/o to both feet, d/t shoes rubbing per patient   • Transient alteration of awareness 4/19/2017     Past Surgical History:   Procedure Laterality Date   • ABDOMINAL SURGERY  2012    had to be reopened after bladder surgery d/t infection   • AMPUTATION FOOT / TOE Right 11/2013    Rt foot amputation MTP first toe   • BLADDER SURGERY  2012   • BREAST BIOPSY     • BREAST SURGERY  06/29/2015    Percutaneous ultrasound-guided placement of metal localized clip 1st lesion   • CARDIAC CATHETERIZATION  2015   • CARDIAC CATHETERIZATION N/A 1/25/2021    Procedure: Right and Left Heart Cath;  Surgeon: Nicholas Andrade MD;  Location:  [facility];  Service: Cardiovascular;  Laterality: N/A;  drop in EF, with hx of NICM, SOA fatigue.  Discussed with    • CARDIAC CATHETERIZATION N/A 1/25/2021    Procedure: Left ventriculography;  Surgeon: Nicholas Andrade MD;  Location:  [facility];  Service: Cardiovascular;  Laterality: N/A;   • CARDIAC CATHETERIZATION N/A 1/25/2021    Procedure: Coronary angiography;  Surgeon: Nicholas Andrade MD;  Location:  [facility];  Service: Cardiovascular;  Laterality: N/A;   • CATARACT EXTRACTION Bilateral 2017   • COLONOSCOPY     • DEBRIDEMENT  FOOT Right 01/2013    During hospitalization    • EPIDURAL BLOCK      4 chronic back pain and leg pain last epidurals done 5-2012 she had no benefit at all from this procedure.   • FEMUR FRACTURE SURGERY     • FOOT SURGERY Bilateral     several   • HERNIA REPAIR      At the abdomen February 2007 Dr. Mendez   • INCISIONAL HERNIA REPAIR  05/16/2014    Recurrent. Incarcerated. With mesh implantation   • MASTECTOMY Right 05/2015   • MASTECTOMY MODIFIED RADICAL W/ AXILLARY LYMPH NODES W/ OR W/O PECTORALIS MINOR  Right    • SHOULDER SURGERY Right     x2 RCR   • TOTAL ABDOMINAL HYSTERECTOMY      with oophorectomy-Done June-2012 secondary to vaginal wall prolapse.   • TOTAL KNEE ARTHROPLASTY Right    • TOTAL KNEE ARTHROPLASTY Left 4/17/2018    Procedure: TOTAL KNEE ARTHROPLASTY;  Surgeon: Live Chambers MD;  Location: [facility] [place];  Service: Orthopedics     Family History   Problem Relation Age of Onset   • Colonic polyp Mother    • Hypertension Mother    • Migraines Mother    • Mental illness Mother    • Depression Mother    • Arthritis Mother    • Coronary artery disease Father    • Other Father         Cardiac Disorder   • Colon cancer Father    • Kidney disease Father    • Cancer Father    • Diabetes Father    • Heart disease Father    • Hypertension Father    • Breast cancer Maternal Aunt    • Colon cancer Brother    • Alcohol abuse Brother    • Arthritis Sister    • Hypertension Brother    • Lung disease Brother    • Alcohol abuse Brother    • Malig Hyperthermia Neg Hx          Objective:  There were no vitals filed for this visit.      06/14/21  1415   Weight: 95.3 kg (210 lb)     Body mass index is 36.05 kg/m².        Ortho Exam   X-rays done over a year ago show arthritis in that ankle and foot.  The patient did not want a recent x-ray that she just had some done last month at [facility].  Again complains of pain in the ankle with ambulation feeling of the ankle giving way.    Assessment:        1. Chronic pain due to trauma    2. Primary osteoarthritis of right ankle           Plan: We will place her ankle brace for support but with the discomfort she is having for over a year with the constant falls and so forth I feel this needs evaluation by foot and ankle specialist and I referred her to Dr. Tab Hernadez or.  Answered all questions Tray or his and his office for evaluation            Work Status:    LOUANN query complete.    Orders:  Orders Placed This Encounter   Procedures   • Ambulatory Referral to Orthopedic  Surgery       Medications:  No orders of the defined types were placed in this encounter.      Followup:  Return if symptoms worsen or fail to improve.          Dictated utilizing Dragon dictation

## 2021-06-22 ENCOUNTER — TELEPHONE (OUTPATIENT)
Dept: CARDIOLOGY | Facility: CLINIC | Age: 76
End: 2021-06-22

## 2021-06-22 ENCOUNTER — TELEPHONE (OUTPATIENT)
Dept: INTERNAL MEDICINE | Facility: CLINIC | Age: 76
End: 2021-06-22

## 2021-06-22 DIAGNOSIS — I42.8 NICM (NONISCHEMIC CARDIOMYOPATHY) (HCC): Primary | ICD-10-CM

## 2021-06-22 NOTE — TELEPHONE ENCOUNTER
Pt calls [facility] fax [phone]. Pt also needs to find out when Allsion wants to see her again. I checked PN but didn't see anything noted for follow up. If we can send rx for DM shoe over so pt doesn't have to wait until Kathryn returns that would be great. thanks

## 2021-06-22 NOTE — TELEPHONE ENCOUNTER
Patient phoned requesting information regarding the lab work recommended at her last visit of 04/30/202, what the lab work was and when it was to be performed.  Review of your last note indicates the patient was to have a metabolic panel in one month.  I reviewed orders related to that visit and I do not see an order for the metabolic panel.  Please add the order for the metabolic panel.  The patient requests a return call at her mobile # of [phone] to advise her.   Thanks

## 2021-06-23 DIAGNOSIS — L84 PRE-ULCERATIVE CORN OR CALLOUS: ICD-10-CM

## 2021-06-23 DIAGNOSIS — E11.43 TYPE 2 DIABETES MELLITUS WITH AUTONOMIC NEUROPATHY, UNSPECIFIED WHETHER LONG TERM INSULIN USE (HCC): Primary | ICD-10-CM

## 2021-06-23 NOTE — TELEPHONE ENCOUNTER
Patient is advised via voice mail the lab order is now present and available for her to obtain her metabolic panel.   Thanks so much

## 2021-06-28 DIAGNOSIS — E11.69 TYPE 2 DIABETES MELLITUS WITH OTHER SPECIFIED COMPLICATION, WITHOUT LONG-TERM CURRENT USE OF INSULIN (HCC): ICD-10-CM

## 2021-07-06 NOTE — TELEPHONE ENCOUNTER
Medication Refill Request    Date of phone call: 21    Medication being requested: norco 5/325mg si tab po q 6 hours prn   Qty: 120    Date of last visit: 6/10/21    Date of last refill:     LOUANN up to date?:     Next Follow up?: 8/10/21    Any new pertinent information? (i.e, new medication allergies, new use of medications, change in patient's health or condition, non-compliance or inconsistency with prescribing agreement?):

## 2021-07-07 RX ORDER — HYDROCODONE BITARTRATE AND ACETAMINOPHEN 5; 325 MG/1; MG/1
1 TABLET ORAL EVERY 6 HOURS PRN
Qty: 120 TABLET | Refills: 0 | Status: SHIPPED | OUTPATIENT
Start: 2021-07-07 | End: 2021-08-10 | Stop reason: SDUPTHER

## 2021-07-08 DIAGNOSIS — G89.29 OTHER CHRONIC PAIN: ICD-10-CM

## 2021-07-08 RX ORDER — PREGABALIN 75 MG/1
75 CAPSULE ORAL 2 TIMES DAILY
Qty: 60 CAPSULE | Refills: 4 | Status: SHIPPED | OUTPATIENT
Start: 2021-07-08 | End: 2021-12-21

## 2021-07-14 RX ORDER — BUMETANIDE 0.5 MG/1
1.5 TABLET ORAL DAILY
Qty: 252 TABLET | Refills: 0 | Status: SHIPPED | OUTPATIENT
Start: 2021-07-14 | End: 2021-09-21

## 2021-07-19 RX ORDER — PRAVASTATIN SODIUM 10 MG
TABLET ORAL
Qty: 90 TABLET | Refills: 0 | Status: SHIPPED | OUTPATIENT
Start: 2021-07-19 | End: 2021-10-29

## 2021-07-19 RX ORDER — RESVER/WINE/BFL/GRPSD/PC/C/POM 200MG-60MG
CAPSULE ORAL
Qty: 90 TABLET | Refills: 0 | Status: SHIPPED | OUTPATIENT
Start: 2021-07-19 | End: 2021-10-29

## 2021-07-22 ENCOUNTER — LAB (OUTPATIENT)
Dept: LAB | Facility: HOSPITAL | Age: 76
End: 2021-07-22

## 2021-07-22 DIAGNOSIS — I42.8 NICM (NONISCHEMIC CARDIOMYOPATHY) (HCC): ICD-10-CM

## 2021-07-22 LAB
ANION GAP SERPL CALCULATED.3IONS-SCNC: 9.7 MMOL/L (ref 5–15)
BUN SERPL-MCNC: 11 MG/DL (ref 8–23)
BUN/CREAT SERPL: 15.7 (ref 7–25)
CALCIUM SPEC-SCNC: 8.9 MG/DL (ref 8.6–10.5)
CHLORIDE SERPL-SCNC: 106 MMOL/L (ref 98–107)
CO2 SERPL-SCNC: 26.3 MMOL/L (ref 22–29)
CREAT SERPL-MCNC: 0.7 MG/DL (ref 0.57–1)
GFR SERPL CREATININE-BSD FRML MDRD: 81 ML/MIN/1.73
GLUCOSE SERPL-MCNC: 157 MG/DL (ref 65–99)
POTASSIUM SERPL-SCNC: 3.5 MMOL/L (ref 3.5–5.2)
SODIUM SERPL-SCNC: 142 MMOL/L (ref 136–145)

## 2021-07-22 PROCEDURE — 36415 COLL VENOUS BLD VENIPUNCTURE: CPT

## 2021-07-22 PROCEDURE — 80048 BASIC METABOLIC PNL TOTAL CA: CPT

## 2021-07-28 DIAGNOSIS — E11.69 TYPE 2 DIABETES MELLITUS WITH OTHER SPECIFIED COMPLICATION, WITHOUT LONG-TERM CURRENT USE OF INSULIN (HCC): ICD-10-CM

## 2021-07-30 ENCOUNTER — OFFICE VISIT (OUTPATIENT)
Dept: CARDIOLOGY | Facility: CLINIC | Age: 76
End: 2021-07-30

## 2021-07-30 VITALS
HEIGHT: 64 IN | RESPIRATION RATE: 16 BRPM | DIASTOLIC BLOOD PRESSURE: 72 MMHG | HEART RATE: 65 BPM | OXYGEN SATURATION: 97 % | SYSTOLIC BLOOD PRESSURE: 128 MMHG | BODY MASS INDEX: 35.85 KG/M2 | WEIGHT: 210 LBS

## 2021-07-30 DIAGNOSIS — E78.5 DYSLIPIDEMIA: ICD-10-CM

## 2021-07-30 DIAGNOSIS — I42.8 NICM (NONISCHEMIC CARDIOMYOPATHY) (HCC): ICD-10-CM

## 2021-07-30 DIAGNOSIS — I25.10 ATHEROSCLEROSIS OF NATIVE CORONARY ARTERY OF NATIVE HEART WITHOUT ANGINA PECTORIS: Primary | ICD-10-CM

## 2021-07-30 DIAGNOSIS — I36.1 NONRHEUMATIC TRICUSPID VALVE REGURGITATION: ICD-10-CM

## 2021-07-30 DIAGNOSIS — I50.32 CHRONIC DIASTOLIC CONGESTIVE HEART FAILURE (HCC): ICD-10-CM

## 2021-07-30 DIAGNOSIS — I10 ESSENTIAL HYPERTENSION: ICD-10-CM

## 2021-07-30 DIAGNOSIS — I34.0 NONRHEUMATIC MITRAL VALVE REGURGITATION: ICD-10-CM

## 2021-07-30 PROCEDURE — 99214 OFFICE O/P EST MOD 30 MIN: CPT | Performed by: NURSE PRACTITIONER

## 2021-07-30 PROCEDURE — 93000 ELECTROCARDIOGRAM COMPLETE: CPT | Performed by: NURSE PRACTITIONER

## 2021-07-30 NOTE — PROGRESS NOTES
Date of Office Visit: 2021  Encounter Provider: JOSE ANTONIO Leigh  Place of Service: [facility] CARDIOLOGY  Patient Name: Jung Short  :1945  Primary Cardiologist: Dr. Bates    CC:  3 month follow up    Dear Kathryn    HPI: Jung Short is a pleasant 76 y.o. female who presents 2021 for cardiac follow up.  I reviewed her past medical records including lab, notes and testing in preparation for today's visit.    She presented to [facility] in 2015 with acute congestive heart failure, systolic and diastolic. She had a 2-D echocardiogram which revealed an ejection fraction of 10-15%, moderate left ventricular dilation, severe mitral and tricuspid insufficiency, and her aortic valve was normal. She was transferred to [facility] for a cardiac catheterization. 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 She also had a right heart catheterization done which revealed an RV pressure of 50/20, PA pressure 50/30 with the main of 38 and a capillary wedge pressure of 31 mmHg. Her right atrial pressure was 20 mmHg. Her cardiac output was 3.9 liters per minute. She was treated medically and diuresed.       She had a 2-D echocardiogram with Doppler performed on 2015 which showed her ejection fraction to be 50%. There was normal segmental wall motion. There was moderate left atrial dilation. A small patent foramen ovale/atrial septal defect by saline contrast study and trace mitral and tricuspid insufficiency. This is a significant improvement from her prior echocardiogram.       She is on anastrozole for right breast cancer.  "      She had normal renal ultrasound ordered by Dr. Hannah 01/09/2017.  She has chronic renal insufficiency and follows with Dr. Hannah.  She had a carotid duplex study done April 2017 which showed mild carotid arteries.  She had a Holter monitor done just benign 4/2017.     BMP done 2/4/2021 showed glucose 137, otherwise normal BMP.  Complete set labs in 1/12/2021 showed glucose 151, otherwise normal CMP, hemoglobin A1c 6.7, normal TSH and free T4, total cholesterol 183, HDL 54, , triglycerides 165, normal CBC.       Echocardiogram done 1/12/2021 showed mild concentric left ventricular hypertrophy, moderate left atrial margin, normal diastolic function, mild left ventricular dilation, ejection fraction 20-25%, severely reduced ejection fraction global hypokinesis.  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  Repeat echocardiogram done 4/22/2021 showed moderate left ventricular dilation, mild concentric left ventricle hypertrophy, grade 2 diastolic dysfunction, ejection fraction 37%, moderate reduction of left ventricular systolic function, severe left atrial enlargement, normal saline contrast study, mild right atrial larger, mild right ventricular enlargement, global hypokinesis.  She was started on Entresto.     She was seen by Dr. Bates in April her EF had improved a lot on her echo.  Her Entresto was then titrated to 49/51.  A repeat BMP showed stable renal function.      She presents today for 3-month follow-up.  Overall she states she is doing fairly well.  She is having a lot of trouble with her left ankle which is the when she broke in 3 places.  She currently has a brace on that that helps to keep it from swelling.  She also states she has a \"sore on her great toe and on the bottom of her foot that she has been treated for as well.  She denies any palpitations.  She does get short of breath with " exertion.  She denies any dizziness or lightheadedness, chest pain or chest pressure.  She complains of fatigue.  She states she is taken her medications as directed.  Repeat on her blood pressure showed 128/72.    Past Medical History:   Diagnosis Date   • Anemia    • Benign breast disease    • Cancer (CMS/HCC) 2015    Right breast   • Cellulitis of leg     May-2003 right lower extremity   • CHF (congestive heart failure) (CMS/[facility])     Dr Bates follows   • Chronic kidney disease     Dr Hannah follows   • Chronic ulcer of right foot (CMS/HCC)     Non-pressure, history of    • Closed fracture of patella 9/5/2017   • Cluster headache    • Colon polyp    • Depression    • Diabetic peripheral neuropathy (CMS/HCC)    • Difficulty walking    • Diverticulosis    • Femoral fracture (CMS/HCC)     right   • Fibromyalgia, primary    • Fracture of left wrist     history of   • Gastroesophageal reflux    • Generalized pain 2/2/2016   • Hiatal hernia    • Hyperlipidemia    • Hypertension    • Hypothyroidism    • Impingement syndrome of right shoulder    • Joint pain    • Left knee pain 5/17/2016   • Menopausal disorder    • Methicillin resistant Staphylococcus aureus infection 2012    after bladder surgery   • Nonischemic cardiomyopathy (CMS/[facility])     Ejection fraction 10% per 2-D echo with Doppler however she did have cardiac catheterization April 2015 which showed ejection fraction 20% with global hypokinesis and severe mitral insufficiency   • Osteoarthritis     generalized, sched jania TKA   • Osteomyelitis (CMS/HCC) 2/2/2016   • Pain in shoulder 8/2/2016   • Paroxysmal atrial fibrillation (CMS/HCC)     Dr Bates follows   • Radius/ulna fracture, left, closed, initial encounter 10/21/2017   • Shoulder pain, bilateral     has tears on both sides   • Sleep apnea    • Syncope, psychogenic 4/19/2017   • Thoracic back pain 2/2/2016   • Toe ulcer (CMS/HCC)     h/o to both feet, d/t shoes rubbing per patient   • Transient  alteration of awareness 4/19/2017       Past Surgical History:   Procedure Laterality Date   • ABDOMINAL SURGERY  2012    had to be reopened after bladder surgery d/t infection   • AMPUTATION FOOT / TOE Right 11/2013    Rt foot amputation MTP first toe   • BLADDER SURGERY  2012   • BREAST BIOPSY     • BREAST SURGERY  06/29/2015    Percutaneous ultrasound-guided placement of metal localized clip 1st lesion   • CARDIAC CATHETERIZATION  2015   • CARDIAC CATHETERIZATION N/A 1/25/2021    Procedure: Right and Left Heart Cath;  Surgeon: Nicholas Andrade MD;  Location:  [facility] CATH [facility]VAS[facility];  Service: Cardiovascular;  Laterality: N/A;  drop in EF, with hx of NICM, SOA fatigue.  Discussed with    • CARDIAC CATHETERIZATION N/A 1/25/2021    Procedure: Left ventriculography;  Surgeon: Nicholas Andrade MD;  Location:  [facility] C[facility]VAS[facility];  Service: Cardiovascular;  Laterality: N/A;   • CARDIAC CATHETERIZATION N/A 1/25/2021    Procedure: Coronary angiography;  Surgeon: Nicholas Andrade MD;  Location:  [facility] CATH INVASIVE [facility]ATION;  Service: Cardiovascular;  Laterality: N/A;   • CATARACT EXTRACTION Bilateral 2017   • COLONOSCOPY     • DEBRIDEMENT  FOOT Right 01/2013    During hospitalization    • EPIDURAL BLOCK      4 chronic back pain and leg pain last epidurals done 5-2012 she had no benefit at all from this procedure.   • FEMUR FRACTURE SURGERY     • FOOT SURGERY Bilateral     several   • HERNIA REPAIR      At the abdomen February 2007 Dr. Mendez   • INCISIONAL HERNIA REPAIR  05/16/2014    Recurrent. Incarcerated. With mesh implantation   • MASTECTOMY Right 05/2015   • MASTECTOMY MODIFIED RADICAL W/ AXILLARY LYMPH NODES W/ OR W/O PECTORALIS MINOR Right    • SHOULDER SURGERY Right     x2 RCR   • TOTAL ABDOMINAL HYSTERECTOMY      with oophorectomy-Done June-2012 secondary to vaginal wall prolapse.   • TOTAL KNEE ARTHROPLASTY Right    • TOTAL KNEE ARTHROPLASTY Left 4/17/2018    Procedure: TOTAL KNEE  ARTHROPLASTY;  Surgeon: Live Chambers MD;  Location: [facility];  Service: Orthopedics       Social History     Socioeconomic History   • Marital status:      Spouse name: Not on file   • Number of children: 2   • Years of education: Not on file   • Highest education level: Not on file   Tobacco Use   • Smoking status: Former Smoker     Packs/day: 3.00     Years: 30.00     Pack years: 90.00     Types: Cigarettes     Quit date:      Years since quittin.6   • Smokeless tobacco: Never Used   • Tobacco comment: daily caffiene   Vaping Use   • Vaping Use: Never used   Substance and Sexual Activity   • Alcohol use: No     Comment: h/o quit    • Drug use: No   • Sexual activity: Defer     Partners: Male     Comment: celibate       Family History   Problem Relation Age of Onset   • Colonic polyp Mother    • Hypertension Mother    • Migraines Mother    • Mental illness Mother    • Depression Mother    • Arthritis Mother    • Coronary artery disease Father    • Other Father         Cardiac Disorder   • Colon cancer Father    • Kidney disease Father    • Cancer Father    • Diabetes Father    • Heart disease Father    • Hypertension Father    • Breast cancer Maternal Aunt    • Colon cancer Brother    • Alcohol abuse Brother    • Arthritis Sister    • Hypertension Brother    • Lung disease Brother    • Alcohol abuse Brother    • Malig Hyperthermia Neg Hx        The following portion of the patient's history were reviewed and updated as appropriate: past medical history, past surgical history, past social history, past family history, allergies, current medications, and problem list.    Review of Systems   Constitutional: Positive for malaise/fatigue. Negative for diaphoresis and fever.   HENT: Negative for congestion, hearing loss, hoarse voice, nosebleeds and sore throat.    Eyes: Negative for photophobia, vision loss in left eye, vision loss in right eye and visual disturbance.   Cardiovascular: Positive  for leg swelling (ankles). Negative for chest pain, dyspnea on exertion, irregular heartbeat, near-syncope, orthopnea, palpitations, paroxysmal nocturnal dyspnea and syncope.   Respiratory: Positive for shortness of breath (with exertion). Negative for cough, hemoptysis, sleep disturbances due to breathing, snoring, sputum production and wheezing.    Endocrine: Negative for cold intolerance, heat intolerance, polydipsia, polyphagia and polyuria.   Hematologic/Lymphatic: Negative for bleeding problem. Does not bruise/bleed easily.   Skin: Negative for color change, dry skin, poor wound healing, rash and suspicious lesions.   Musculoskeletal: Positive for joint pain. Negative for arthritis, back pain, falls, gout, joint swelling, muscle cramps, muscle weakness and myalgias.   Gastrointestinal: Negative for bloating, abdominal pain, constipation, diarrhea, dysphagia, melena, nausea and vomiting.   Neurological: Negative for excessive daytime sleepiness, dizziness, headaches, light-headedness, loss of balance, numbness, paresthesias, seizures, vertigo and weakness.   Psychiatric/Behavioral: Negative for depression, memory loss and substance abuse. The patient is not nervous/anxious.        Allergies   Allergen Reactions   • Dilaudid [Hydromorphone] Delirium and Confusion   • Latex Rash         Current Outpatient Medications:   •  alosetron (LOTRONEX) 0.5 MG tablet, Take 1 tablet by mouth 2 (Two) Times a Day., Disp: 60 tablet, Rfl: 11  •  aspirin 81 MG EC tablet, Take 81 mg by mouth Daily., Disp: , Rfl:   •  bumetanide (BUMEX) 0.5 MG tablet, TAKE 3 TABLETS BY MOUTH DAILY, Disp: 252 tablet, Rfl: 0  •  carvedilol (COREG) 25 MG tablet, Take 1 tablet by mouth 2 (Two) Times a Day., Disp: 180 tablet, Rfl: 3  •  D-1000 Extra Strength 25 MCG (1000 UT) tablet, TAKE 1 TABLET BY MOUTH DAILY, Disp: 90 tablet, Rfl: 0  •  DULoxetine (CYMBALTA) 60 MG capsule, TAKE 1 CAPSULE BY MOUTH DAILY., Disp: 90 capsule, Rfl: 0  •  esomeprazole  (nexIUM) 20 MG capsule, Take 20 mg by mouth Every Morning Before Breakfast., Disp: , Rfl:   •  glucose blood test strip, Take BG daily, Disp: 50 each, Rfl: 12  •  HYDROcodone-acetaminophen (NORCO) 5-325 MG per tablet, Take 1 tablet by mouth Every 6 (Six) Hours As Needed for Severe Pain ., Disp: 120 tablet, Rfl: 0  •  levothyroxine (SYNTHROID, LEVOTHROID) 25 MCG tablet, TAKE 1 TABLET BY MOUTH DAILY, Disp: 90 tablet, Rfl: 3  •  metFORMIN (GLUCOPHAGE) 1000 MG tablet, Take 1 tablet by mouth Daily With Breakfast., Disp: 30 tablet, Rfl: 2  •  metFORMIN (GLUCOPHAGE) 500 MG tablet, TAKE 1 TABLET BY MOUTH DAILY WITH DINNER., Disp: 30 tablet, Rfl: 0  •  methocarbamol (ROBAXIN) 500 MG tablet, TAKE 1 TABLET BY MOUTH 3 (THREE) TIMES A DAY AS NEEDED FOR MUSCLE SPASMS., Disp: 90 tablet, Rfl: 0  •  Multiple Vitamins-Minerals (MULTIVITAMIN ADULT PO), Take 1 tablet by mouth Daily., Disp: , Rfl:   •  pravastatin (PRAVACHOL) 10 MG tablet, TAKE 1 TABLET BY MOUTH AT BEDTIME, Disp: 90 tablet, Rfl: 0  •  pregabalin (LYRICA) 75 MG capsule, TAKE 1 CAPSULE BY MOUTH 2 (TWO) TIMES A DAY., Disp: 60 capsule, Rfl: 4  •  sacubitril-valsartan (Entresto) 49-51 MG tablet, Take 1 tablet by mouth 2 (Two) Times a Day., Disp: 60 tablet, Rfl: 11        Objective:     There were no vitals filed for this visit.  There is no height or weight on file to calculate BMI.      Vitals reviewed.   Constitutional:       General: Not in acute distress.     Appearance: Healthy appearance. Obese.   Eyes:      General:         Right eye: No discharge.         Left eye: No discharge.      Conjunctiva/sclera: Conjunctivae normal.   HENT:      Head: Normocephalic and atraumatic.      Right Ear: External ear normal.      Left Ear: External ear normal.      Nose: Nose normal.   Neck:      Thyroid: No thyromegaly.      Vascular: No JVD.      Trachea: No tracheal deviation.      Lymphadenopathy: No cervical adenopathy.   Pulmonary:      Effort: Pulmonary effort is normal. No  respiratory distress.      Breath sounds: Normal breath sounds. No wheezing. No rales.   Chest:      Chest wall: Not tender to palpatation.   Cardiovascular:      Normal rate. Regular rhythm.      No gallop.   Pulses:     Intact distal pulses.   Edema:     Peripheral edema present.     Pretibial: trace edema of the left pretibial area.     Ankle: trace edema of the left ankle.  Abdominal:      General: There is no distension.      Palpations: Abdomen is soft.      Tenderness: There is no abdominal tenderness.   Musculoskeletal: Normal range of motion.         General: No tenderness or deformity.      Cervical back: Normal range of motion and neck supple.        Legs:       Comments: Brace, walking brace Skin:     General: Skin is warm and dry.      Findings: No erythema or rash.   Neurological:      Mental Status: Alert and oriented to person, place, and time.      Coordination: Coordination normal.   Psychiatric:         Attention and Perception: Attention normal.         Mood and Affect: Mood normal.         Speech: Speech normal.         Behavior: Behavior normal.         Thought Content: Thought content normal.         Cognition and Memory: Cognition normal.         Judgment: Judgment normal.               ECG 12 Lead    Date/Time: 7/30/2021 1:42 PM  Performed by: Lina Valencia APRN  Authorized by: Lina Valencia APRN   Comparison: compared with previous ECG from 4/30/2021  Similar to previous ECG  Rhythm: sinus rhythm  Rate: normal  Conduction: conduction normal  Conduction: left anterior fascicular block  ST Segments: ST segments normal  T inversion: III  T flattening: V1, V5 and V6  QRS axis: left  Other findings: left ventricular hypertrophy    Clinical impression: abnormal EKG              Assessment:       Diagnosis Plan   1. Atherosclerosis of native coronary artery of native heart without angina pectoris     2. Chronic diastolic congestive heart failure (CMS/HCC)     3. NICM (nonischemic cardiomyopathy)  (CMS/[facility])     4. Nonrheumatic mitral valve regurgitation     5. Nonrheumatic tricuspid valve regurgitation     6. Essential hypertension     7. Dyslipidemia            Plan:       1. Nonischemic cardiomyopathy, recurrent. Her ejection fraction is now 37%.  We will repeat an echo to see further improvement on her EF with the titration of Entresto.  2. Hypertension -   Goal less than 120/80-on recheck 128/72  3. DM, type 2.  4. Anomalous coronary artery, LV off RCC.   5. H/o right breast cancer. Sees Dr. Draper.   6. Severe OA of the knees.  7. CKD, sees Dr. Hannah.     Check echo  As always, it has been a pleasure to participate in your patient's care. Thank you.       Sincerely,       JOSE ANTONIO Leigh      Current Outpatient Medications:   •  alosetron (LOTRONEX) 0.5 MG tablet, Take 1 tablet by mouth 2 (Two) Times a Day., Disp: 60 tablet, Rfl: 11  •  aspirin 81 MG EC tablet, Take 81 mg by mouth Daily., Disp: , Rfl:   •  bumetanide (BUMEX) 0.5 MG tablet, TAKE 3 TABLETS BY MOUTH DAILY (Patient taking differently: Take 1.5 mg by mouth 2 (Two) Times a Day.), Disp: 252 tablet, Rfl: 0  •  carvedilol (COREG) 25 MG tablet, Take 1 tablet by mouth 2 (Two) Times a Day., Disp: 180 tablet, Rfl: 3  •  D-1000 Extra Strength 25 MCG (1000 UT) tablet, TAKE 1 TABLET BY MOUTH DAILY, Disp: 90 tablet, Rfl: 0  •  DULoxetine (CYMBALTA) 60 MG capsule, TAKE 1 CAPSULE BY MOUTH DAILY., Disp: 90 capsule, Rfl: 0  •  esomeprazole (nexIUM) 20 MG capsule, Take 20 mg by mouth Every Morning Before Breakfast., Disp: , Rfl:   •  glucose blood test strip, Take BG daily, Disp: 50 each, Rfl: 12  •  HYDROcodone-acetaminophen (NORCO) 5-325 MG per tablet, Take 1 tablet by mouth Every 6 (Six) Hours As Needed for Severe Pain ., Disp: 120 tablet, Rfl: 0  •  levothyroxine (SYNTHROID, LEVOTHROID) 25 MCG tablet, TAKE 1 TABLET BY MOUTH DAILY, Disp: 90 tablet, Rfl: 3  •  metFORMIN (GLUCOPHAGE) 1000 MG tablet, Take 1 tablet by mouth Daily With Breakfast., Disp: 30  tablet, Rfl: 2  •  metFORMIN (GLUCOPHAGE) 500 MG tablet, TAKE 1 TABLET BY MOUTH DAILY WITH DINNER., Disp: 30 tablet, Rfl: 0  •  methocarbamol (ROBAXIN) 500 MG tablet, TAKE 1 TABLET BY MOUTH 3 (THREE) TIMES A DAY AS NEEDED FOR MUSCLE SPASMS., Disp: 90 tablet, Rfl: 0  •  Multiple Vitamins-Minerals (MULTIVITAMIN ADULT PO), Take 1 tablet by mouth Daily., Disp: , Rfl:   •  pravastatin (PRAVACHOL) 10 MG tablet, TAKE 1 TABLET BY MOUTH AT BEDTIME, Disp: 90 tablet, Rfl: 0  •  pregabalin (LYRICA) 75 MG capsule, TAKE 1 CAPSULE BY MOUTH 2 (TWO) TIMES A DAY., Disp: 60 capsule, Rfl: 4  •  sacubitril-valsartan (Entresto) 49-51 MG tablet, Take 1 tablet by mouth 2 (Two) Times a Day., Disp: 60 tablet, Rfl: 11    Dictated utilizing Dragon dictation

## 2021-08-02 ENCOUNTER — TELEPHONE (OUTPATIENT)
Dept: ORTHOPEDIC SURGERY | Facility: CLINIC | Age: 76
End: 2021-08-02

## 2021-08-02 NOTE — TELEPHONE ENCOUNTER
"Patient calling stating that the ankle brace that she was provided in the office an \"aircast\" has rubbed a place on her foot and she is requesting a different brace, she would also like to know if you can order some PT gait training for here at [facility].    Please advise.   "

## 2021-08-03 DIAGNOSIS — M19.071 PRIMARY OSTEOARTHRITIS OF RIGHT ANKLE: ICD-10-CM

## 2021-08-03 DIAGNOSIS — G89.21 CHRONIC PAIN DUE TO TRAUMA: Primary | ICD-10-CM

## 2021-08-03 NOTE — TELEPHONE ENCOUNTER
Patient is requesting an order for PT here at [facility] for strengthening and gait training for her bilateral extremities.     Are you able to do this or should patient contact her PCP?    Please advise.

## 2021-08-05 RX ORDER — METHOCARBAMOL 500 MG/1
500 TABLET, FILM COATED ORAL 3 TIMES DAILY PRN
Qty: 90 TABLET | Refills: 0 | Status: SHIPPED | OUTPATIENT
Start: 2021-08-05 | End: 2021-09-13

## 2021-08-09 ENCOUNTER — TELEPHONE (OUTPATIENT)
Dept: INTERNAL MEDICINE | Facility: CLINIC | Age: 76
End: 2021-08-09

## 2021-08-09 DIAGNOSIS — E78.5 DYSLIPIDEMIA: Primary | ICD-10-CM

## 2021-08-09 DIAGNOSIS — E11.9 TYPE 2 DIABETES MELLITUS WITHOUT COMPLICATION, WITHOUT LONG-TERM CURRENT USE OF INSULIN (HCC): ICD-10-CM

## 2021-08-09 DIAGNOSIS — E55.9 VITAMIN D DEFICIENCY: ICD-10-CM

## 2021-08-09 DIAGNOSIS — E53.8 COBALAMIN DEFICIENCY: ICD-10-CM

## 2021-08-09 DIAGNOSIS — E03.9 ACQUIRED HYPOTHYROIDISM: ICD-10-CM

## 2021-08-09 DIAGNOSIS — I10 ESSENTIAL HYPERTENSION: ICD-10-CM

## 2021-08-10 ENCOUNTER — HOSPITAL ENCOUNTER (OUTPATIENT)
Dept: CARDIOLOGY | Facility: HOSPITAL | Age: 76
Discharge: HOME | End: 2021-08-10
Admitting: NURSE PRACTITIONER

## 2021-08-10 ENCOUNTER — OFFICE VISIT (OUTPATIENT)
Dept: PAIN MEDICINE | Facility: CLINIC | Age: 76
End: 2021-08-10

## 2021-08-10 VITALS
RESPIRATION RATE: 18 BRPM | HEIGHT: 64 IN | TEMPERATURE: 96.4 F | OXYGEN SATURATION: 97 % | DIASTOLIC BLOOD PRESSURE: 66 MMHG | BODY MASS INDEX: 36.05 KG/M2 | HEART RATE: 76 BPM | SYSTOLIC BLOOD PRESSURE: 134 MMHG

## 2021-08-10 VITALS
SYSTOLIC BLOOD PRESSURE: 157 MMHG | HEIGHT: 64 IN | BODY MASS INDEX: 35.76 KG/M2 | WEIGHT: 209.44 LBS | DIASTOLIC BLOOD PRESSURE: 74 MMHG | HEART RATE: 68 BPM

## 2021-08-10 DIAGNOSIS — Z79.899 ENCOUNTER FOR LONG-TERM (CURRENT) USE OF HIGH-RISK MEDICATION: ICD-10-CM

## 2021-08-10 DIAGNOSIS — M47.816 LUMBAR FACET ARTHROPATHY: ICD-10-CM

## 2021-08-10 DIAGNOSIS — M25.50 ARTHRALGIA OF MULTIPLE JOINTS: ICD-10-CM

## 2021-08-10 DIAGNOSIS — G89.29 OTHER CHRONIC PAIN: Primary | ICD-10-CM

## 2021-08-10 DIAGNOSIS — I42.8 NICM (NONISCHEMIC CARDIOMYOPATHY) (HCC): ICD-10-CM

## 2021-08-10 DIAGNOSIS — I50.32 CHRONIC DIASTOLIC CONGESTIVE HEART FAILURE (HCC): ICD-10-CM

## 2021-08-10 DIAGNOSIS — M51.36 DDD (DEGENERATIVE DISC DISEASE), LUMBAR: ICD-10-CM

## 2021-08-10 LAB
BH CV ECHO MEAS - ACS: 2.1 CM
BH CV ECHO MEAS - AO MAX PG (FULL): 7.6 MMHG
BH CV ECHO MEAS - AO MAX PG: 11.3 MMHG
BH CV ECHO MEAS - AO MEAN PG (FULL): 4 MMHG
BH CV ECHO MEAS - AO MEAN PG: 6 MMHG
BH CV ECHO MEAS - AO ROOT AREA (BSA CORRECTED): 1.8
BH CV ECHO MEAS - AO ROOT AREA: 10.2 CM^2
BH CV ECHO MEAS - AO ROOT DIAM: 3.6 CM
BH CV ECHO MEAS - AO V2 MAX: 168 CM/SEC
BH CV ECHO MEAS - AO V2 MEAN: 115 CM/SEC
BH CV ECHO MEAS - AO V2 VTI: 35.1 CM
BH CV ECHO MEAS - ASC AORTA: 3.6 CM
BH CV ECHO MEAS - AVA(I,A): 1.9 CM^2
BH CV ECHO MEAS - AVA(I,D): 1.9 CM^2
BH CV ECHO MEAS - AVA(V,A): 2 CM^2
BH CV ECHO MEAS - AVA(V,D): 2 CM^2
BH CV ECHO MEAS - BSA(HAYCOCK): 2.1 M^2
BH CV ECHO MEAS - BSA: 2 M^2
BH CV ECHO MEAS - BZI_BMI: 36.2 KILOGRAMS/M^2
BH CV ECHO MEAS - BZI_METRIC_HEIGHT: 162 CM
BH CV ECHO MEAS - BZI_METRIC_WEIGHT: 95 KG
BH CV ECHO MEAS - EDV(CUBED): 121.3 ML
BH CV ECHO MEAS - EDV(MOD-SP2): 79.8 ML
BH CV ECHO MEAS - EDV(MOD-SP4): 94.1 ML
BH CV ECHO MEAS - EDV(TEICH): 115.5 ML
BH CV ECHO MEAS - EF(CUBED): 60.2 %
BH CV ECHO MEAS - EF(MOD-BP): 37.3 %
BH CV ECHO MEAS - EF(MOD-SP2): 34.3 %
BH CV ECHO MEAS - EF(MOD-SP4): 36 %
BH CV ECHO MEAS - EF(TEICH): 51.6 %
BH CV ECHO MEAS - ESV(CUBED): 48.2 ML
BH CV ECHO MEAS - ESV(MOD-SP2): 52.4 ML
BH CV ECHO MEAS - ESV(MOD-SP4): 60.2 ML
BH CV ECHO MEAS - ESV(TEICH): 55.9 ML
BH CV ECHO MEAS - FS: 26.5 %
BH CV ECHO MEAS - IVS/LVPW: 1.4
BH CV ECHO MEAS - IVSD: 1.4 CM
BH CV ECHO MEAS - LAT PEAK E' VEL: 4.7 CM/SEC
BH CV ECHO MEAS - LV DIASTOLIC VOL/BSA (35-75): 47.3 ML/M^2
BH CV ECHO MEAS - LV MASS(C)D: 218.6 GRAMS
BH CV ECHO MEAS - LV MASS(C)DI: 109.9 GRAMS/M^2
BH CV ECHO MEAS - LV MAX PG: 3.7 MMHG
BH CV ECHO MEAS - LV MEAN PG: 2 MMHG
BH CV ECHO MEAS - LV SYSTOLIC VOL/BSA (12-30): 30.3 ML/M^2
BH CV ECHO MEAS - LV V1 MAX: 95.6 CM/SEC
BH CV ECHO MEAS - LV V1 MEAN: 69.5 CM/SEC
BH CV ECHO MEAS - LV V1 VTI: 19.5 CM
BH CV ECHO MEAS - LVIDD: 5 CM
BH CV ECHO MEAS - LVIDS: 3.6 CM
BH CV ECHO MEAS - LVLD AP2: 8.2 CM
BH CV ECHO MEAS - LVLD AP4: 8.4 CM
BH CV ECHO MEAS - LVLS AP2: 7.6 CM
BH CV ECHO MEAS - LVLS AP4: 7.7 CM
BH CV ECHO MEAS - LVOT AREA (M): 3.5 CM^2
BH CV ECHO MEAS - LVOT AREA: 3.5 CM^2
BH CV ECHO MEAS - LVOT DIAM: 2.1 CM
BH CV ECHO MEAS - LVPWD: 0.96 CM
BH CV ECHO MEAS - MED PEAK E' VEL: 3.4 CM/SEC
BH CV ECHO MEAS - MV A MAX VEL: 105 CM/SEC
BH CV ECHO MEAS - MV DEC SLOPE: 234 CM/SEC^2
BH CV ECHO MEAS - MV DEC TIME: 322 SEC
BH CV ECHO MEAS - MV E MAX VEL: 70.2 CM/SEC
BH CV ECHO MEAS - MV E/A: 0.67
BH CV ECHO MEAS - MV MEAN PG: 2 MMHG
BH CV ECHO MEAS - MV P1/2T MAX VEL: 85.1 CM/SEC
BH CV ECHO MEAS - MV P1/2T: 106.5 MSEC
BH CV ECHO MEAS - MV V2 MEAN: 59.9 CM/SEC
BH CV ECHO MEAS - MV V2 VTI: 40 CM
BH CV ECHO MEAS - MVA P1/2T LCG: 2.6 CM^2
BH CV ECHO MEAS - MVA(P1/2T): 2.1 CM^2
BH CV ECHO MEAS - MVA(VTI): 1.7 CM^2
BH CV ECHO MEAS - PA ACC TIME: 0.05 SEC
BH CV ECHO MEAS - PA MAX PG: 4.2 MMHG
BH CV ECHO MEAS - PA PR(ACCEL): 57.4 MMHG
BH CV ECHO MEAS - PA V2 MAX: 103 CM/SEC
BH CV ECHO MEAS - QP/QS: 1.3
BH CV ECHO MEAS - RV MEAN PG: 2 MMHG
BH CV ECHO MEAS - RV V1 MEAN: 74.2 CM/SEC
BH CV ECHO MEAS - RV V1 VTI: 19.6 CM
BH CV ECHO MEAS - RVOT AREA: 4.5 CM^2
BH CV ECHO MEAS - RVOT DIAM: 2.4 CM
BH CV ECHO MEAS - SI(AO): 179.6 ML/M^2
BH CV ECHO MEAS - SI(CUBED): 36.7 ML/M^2
BH CV ECHO MEAS - SI(LVOT): 33.9 ML/M^2
BH CV ECHO MEAS - SI(MOD-SP2): 13.8 ML/M^2
BH CV ECHO MEAS - SI(MOD-SP4): 17 ML/M^2
BH CV ECHO MEAS - SI(TEICH): 30 ML/M^2
BH CV ECHO MEAS - SV(AO): 357.3 ML
BH CV ECHO MEAS - SV(CUBED): 73.1 ML
BH CV ECHO MEAS - SV(LVOT): 67.5 ML
BH CV ECHO MEAS - SV(MOD-SP2): 27.4 ML
BH CV ECHO MEAS - SV(MOD-SP4): 33.9 ML
BH CV ECHO MEAS - SV(RVOT): 88.7 ML
BH CV ECHO MEAS - SV(TEICH): 59.6 ML
BH CV ECHO MEAS - TAPSE (>1.6): 1.8 CM
BH CV ECHO MEASUREMENTS AVERAGE E/E' RATIO: 17.33
BH CV XLRA - RV BASE: 3.7 CM
BH CV XLRA - RV LENGTH: 6.3 CM
BH CV XLRA - TDI S': 8.7 CM/SEC
LEFT ATRIUM VOLUME INDEX: 47.8 ML/M2
LV EF 2D ECHO EST: 37 %
MAXIMAL PREDICTED HEART RATE: 142 BPM
STRESS TARGET HR: 121 BPM

## 2021-08-10 PROCEDURE — 93306 TTE W/DOPPLER COMPLETE: CPT | Performed by: INTERNAL MEDICINE

## 2021-08-10 PROCEDURE — 25010000002 PERFLUTREN (DEFINITY) 8.476 MG IN SODIUM CHLORIDE (PF) 0.9 % 10 ML INJECTION: Performed by: NURSE PRACTITIONER

## 2021-08-10 PROCEDURE — 99214 OFFICE O/P EST MOD 30 MIN: CPT | Performed by: NURSE PRACTITIONER

## 2021-08-10 PROCEDURE — 93306 TTE W/DOPPLER COMPLETE: CPT

## 2021-08-10 RX ORDER — HYDROCODONE BITARTRATE AND ACETAMINOPHEN 5; 325 MG/1; MG/1
1 TABLET ORAL EVERY 6 HOURS PRN
Qty: 120 TABLET | Refills: 0 | Status: SHIPPED | OUTPATIENT
Start: 2021-08-10 | End: 2021-09-07 | Stop reason: SDUPTHER

## 2021-08-10 RX ADMIN — SODIUM CHLORIDE 2 ML: 9 INJECTION INTRAMUSCULAR; INTRAVENOUS; SUBCUTANEOUS at 17:13

## 2021-08-10 NOTE — PROGRESS NOTES
"CHIEF COMPLAINT  Follow-up for back and joint pain.    Subjective   Jung Short is a 76 y.o. female  who presents for follow-up.  She has a history of chronic back and joint pain. Reports her pain pattern is VARIABLE since last evaluation.    Reports she has two new sores on bottom of right foot. Left foot sores from last evaluation are improved. She is currently in wound care and wearing soft shoe. (Dr Elmo To). Reports they are discussing amputation of right small toe.   Also having swelling of right lower leg.  Was supposed to be wearing ankle brace as well but decided not to wear today. Seen by Dr Chambers mid June.  Was placed in another brace for right ankle pain. Referred to foot specialist(Dr Hernadez or Dr Feliz).    She expresses concerns multiple times about cost of Entresto.     Complains of pain in her low back and joints. Today her pain is 4/10VAS. Describes her pain as continuous throbbing with intermittent sharp and burning pain. \"I have so many pains I don't even know which one to tell you is this worse.\" Pain increases with activity, walking/standing; pain decreases with medication, rest. Continues with Hydrocodone 5/325 3-4/day, Robaxin 500 mg daily and Lyrica  75 mg 1-2/day, as well as Cymbalta 60 mg daily.  Denies any side effects from the regimen, including constipation and somnolence. \"I no longer can control my bowels.\" Under care of Dr Newberry.  The regimen helps decreases her pain by 40%.  ADL's by self. Denies any bladder incontinence.     Is leaving for a cruise on 9-1-21.     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 Used to have neuropathy in legs, now having right hand. Prescribed Lyrica.     Used to enjoy going to [employer] football games.      Completed her COVID vaccines.     Reports her EF is at 20% as of 3-10-21; 37% as of 7-30-21 office visit with Lina BRADY. Due to repeat echo today.     She states she also has \"A spot on my kidney and " "liver.\" Followed by Dr Newberry.     Patient remained masked during entire encounter. No cough present. I donned a mask and eye protection throughout entire visit. Prior to donning mask and eye protection, hand hygiene was performed, as well as when it was doffed.  I was closer than 6 feet, but not for an extended period of time. No obvious exposure to any bodily fluids.    Joint Pain  This is a chronic problem. The current episode started more than 1 year ago. The problem occurs constantly. The problem has been gradually worsening. Associated symptoms include arthralgias (joint), fatigue, joint swelling, neck pain and numbness (bilateral hands). Pertinent negatives include no abdominal pain, chest pain, chills, congestion, coughing, fever, headaches, nausea, vomiting or weakness. Nothing aggravates the symptoms. She has tried oral narcotics, position changes and rest for the symptoms. The treatment provided moderate relief.   Back Pain  This is a chronic problem. The current episode started more than 1 year ago. The problem occurs constantly. The problem has been gradually worsening since onset. The pain is present in the lumbar spine. The pain radiates to the right knee, right foot and right thigh. The pain is at a severity of 4/10. The pain is moderate. Associated symptoms include numbness (bilateral hands). Pertinent negatives include no abdominal pain, chest pain, dysuria, fever, headaches or weakness. Risk factors include lack of exercise and obesity. She has tried analgesics for the symptoms. The treatment provided moderate relief.          PEG Assessment   What number best describes your pain on average in the past week?4  What number best describes how, during the past week, pain has interfered with your enjoyment of life?2  What number best describes how, during the past week, pain has interfered with your general activity?  5    The following portions of the patient's history were reviewed and updated " "as appropriate: allergies, current medications, past family history, past medical history, past social history, past surgical history and problem list.    Review of Systems   Constitutional: Positive for fatigue. Negative for chills and fever.   HENT: Negative for congestion.    Eyes: Negative for visual disturbance.   Respiratory: Negative for cough and shortness of breath.    Cardiovascular: Positive for leg swelling. Negative for chest pain and palpitations.   Gastrointestinal: Positive for diarrhea. Negative for abdominal pain, constipation, nausea and vomiting.   Genitourinary: Negative for difficulty urinating and dysuria.   Musculoskeletal: Positive for arthralgias (joint), back pain, joint swelling and neck pain.   Neurological: Positive for numbness (bilateral hands). Negative for weakness and headaches.   Psychiatric/Behavioral: Positive for sleep disturbance. Negative for suicidal ideas. The patient is not nervous/anxious.      I have reviewed and confirmed the accuracy of the ROS as documented by the MA/LPN/RN Trini Zaidi, JOSE ANTONIO      Vitals:    08/10/21 1351   BP: 134/66   Pulse: 76   Resp: 18   Temp: 96.4 °F (35.8 °C)   SpO2: 97%   Height: 162.6 cm (64\")   PainSc:   4   PainLoc: Leg     Objective   Physical Exam  Constitutional:       Appearance: She is well-developed.   HENT:      Head: Normocephalic and atraumatic.   Musculoskeletal:      Lumbar back: Tenderness present. Decreased range of motion.      Right knee: Swelling, erythema, ecchymosis and bony tenderness present. Decreased range of motion. Tenderness present over the lateral joint line.      Comments: Widespread OA changes with no acute synovitis    Wearing right soft sole shoe.   Neurological:      Mental Status: She is alert.      Gait: Gait abnormal (cane).   Psychiatric:         Speech: Speech normal.         Behavior: Behavior normal.         Assessment/Plan   Diagnoses and all orders for this visit:    1. Other chronic pain " (Primary)    2. DDD (degenerative disc disease), lumbar    3. Arthralgia of multiple joints    4. Lumbar facet arthropathy    5. Encounter for long-term (current) use of high-risk medication    Other orders  -     HYDROcodone-acetaminophen (NORCO) 5-325 MG per tablet; Take 1 tablet by mouth Every 6 (Six) Hours As Needed for Severe Pain .  Dispense: 120 tablet; Refill: 0      --- The urine drug screen confirmation from 3-10-21 has been reviewed and the result is APPROPRIATE based on patient history and LOUANN report  --- The patient signed an updated copy of the controlled substance agreement on 1-11-21  --- Continue with other specialists as planned.  --- Refill Hydrocodone. Patient appears stable with current regimen. No adverse effects. Regarding continuation of opioids, there is no evidence of aberrant behavior or any red flags.  The patient continues with appropriate response to opioid therapy. ADL's remain intact by self.   --- Follow-up 2 months or sooner if needed       LOUANN REPORT  As part of the patient's treatment plan, I am prescribing controlled substances. The patient has been made aware of appropriate use of such medications, including potential risk of somnolence, limited ability to drive and/or work safely, and the potential for dependence or overdose. It has also bee made clear that these medications are for use by this patient only, without concomitant use of alcohol or other substances unless prescribed.     Patient has completed prescribing agreement detailing terms of continued prescribing of controlled substances, including monitoring LOUANN reports, urine drug screening, and pill counts if necessary. The patient is aware that inappropriate use will results in cessation of prescribing such medications.    As the clinician, I personally reviewed the LOUANN from 8-10-21 while the patient was in the office today.    History and physical exam exhibit continued safe and appropriate use of  controlled substances.       Dictated utilizing Dragon dictation.

## 2021-08-11 ENCOUNTER — APPOINTMENT (OUTPATIENT)
Dept: PHYSICAL THERAPY | Facility: HOSPITAL | Age: 76
End: 2021-08-11

## 2021-08-12 NOTE — PROGRESS NOTES
There is no improvement in her EF since the titration of the Entresto to 49/51 mg twice daily.  Dr. Bates's last note stated at this visit stop some Metformin and start Jardiance 10 mg.  I sent a note to her PCP because patient takes at 1000 mg of Metformin in the morning and 500 at dinner.  I did not know if the Jardiance 10 mg would be equivalent if I stopped both of her doses of metformin.  Awaiting for PCP response.

## 2021-08-17 NOTE — PROGRESS NOTES
Spoke to patient with results. I told her I discussed with her PCP Kathryn maxwell that we want to switch her over to Jardiance and stop the Metformin. Kathryn has an appointment with patient next week to discuss the results of her recent lab work and to make these medication changes. Patient will keep appointment with Dr. Bates in October. At that point we may need to increase/titrate Entresto.

## 2021-08-23 ENCOUNTER — OFFICE VISIT (OUTPATIENT)
Dept: INTERNAL MEDICINE | Facility: CLINIC | Age: 76
End: 2021-08-23

## 2021-08-23 VITALS
TEMPERATURE: 96.8 F | SYSTOLIC BLOOD PRESSURE: 140 MMHG | OXYGEN SATURATION: 98 % | HEIGHT: 64 IN | BODY MASS INDEX: 36.19 KG/M2 | WEIGHT: 212 LBS | HEART RATE: 91 BPM | DIASTOLIC BLOOD PRESSURE: 70 MMHG

## 2021-08-23 DIAGNOSIS — I50.32 CHRONIC DIASTOLIC CONGESTIVE HEART FAILURE (HCC): ICD-10-CM

## 2021-08-23 DIAGNOSIS — E55.9 VITAMIN D DEFICIENCY: ICD-10-CM

## 2021-08-23 DIAGNOSIS — E03.9 ACQUIRED HYPOTHYROIDISM: ICD-10-CM

## 2021-08-23 DIAGNOSIS — I10 ESSENTIAL HYPERTENSION: Primary | ICD-10-CM

## 2021-08-23 DIAGNOSIS — N18.30 STAGE 3 CHRONIC KIDNEY DISEASE, UNSPECIFIED WHETHER STAGE 3A OR 3B CKD (HCC): ICD-10-CM

## 2021-08-23 DIAGNOSIS — E53.8 COBALAMIN DEFICIENCY: ICD-10-CM

## 2021-08-23 DIAGNOSIS — E78.5 DYSLIPIDEMIA: ICD-10-CM

## 2021-08-23 DIAGNOSIS — E11.9 TYPE 2 DIABETES MELLITUS WITHOUT COMPLICATION, WITHOUT LONG-TERM CURRENT USE OF INSULIN (HCC): ICD-10-CM

## 2021-08-23 PROCEDURE — 99214 OFFICE O/P EST MOD 30 MIN: CPT | Performed by: NURSE PRACTITIONER

## 2021-08-23 NOTE — PROGRESS NOTES
Subjective   Jung Short is a 76 y.o. female presenting today for follow up of   Chief Complaint   Patient presents with   • Follow-up   • Hypertension   • Hyperlipidemia   • Diabetes       History of Present Illness     Patient Active Problem List   Diagnosis   • Chronic anemia   • Malignant neoplasm of upper-inner quadrant of right breast in female, estrogen receptor positive (CMS/HCC)   • Disc disorder of cervical region   • Neck pain   • Chronic pain   • Depression   • Type 2 diabetes mellitus without complication, without long-term current use of insulin (CMS/HCC)   • Dyslipidemia   • Gastroesophageal reflux disease with esophagitis   • Hypertension   • Hypothyroidism   • Incisional hernia   • Mitral valve insufficiency   • Nausea   • Osteopenia of spine   • Arthralgia of multiple joints   • Peripheral neuropathy   • Pulmonary hypertension (CMS/HCC)   • PITTS (dyspnea on exertion)   • Tricuspid valve insufficiency   • Cobalamin deficiency   • Vitamin D deficiency   • Arthritis of knee   • DDD (degenerative disc disease), lumbar   • Lumbar facet arthropathy   • Chronic gout of multiple sites   • Encounter for long-term (current) use of high-risk medication   • Primary osteoarthritis of left knee   • Physical deconditioning   • CKD (chronic kidney disease) stage 3, GFR 30-59 ml/min (CMS/HCC)   • Diastolic congestive heart failure (CMS/HCC)   • Pleural effusion   • Acute respiratory failure with hypoxia (CMS/HCC)   • Periprosthetic subtrochanteric fracture of femur   • Traumatic closed nondisp torus fracture of distal radial metaphysis, right, initial encounter   • Atherosclerotic heart disease of native coronary artery without angina pectoris   • NICM (nonischemic cardiomyopathy) (CMS/HCC)       Current Outpatient Medications on File Prior to Visit   Medication Sig   • alosetron (LOTRONEX) 0.5 MG tablet Take 1 tablet by mouth 2 (Two) Times a Day.   • aspirin 81 MG EC tablet Take 81 mg by mouth Daily.   •  bumetanide (BUMEX) 0.5 MG tablet TAKE 3 TABLETS BY MOUTH DAILY (Patient taking differently: Take 1.5 mg by mouth 2 (Two) Times a Day.)   • carvedilol (COREG) 25 MG tablet Take 1 tablet by mouth 2 (Two) Times a Day.   • D-1000 Extra Strength 25 MCG (1000 UT) tablet TAKE 1 TABLET BY MOUTH DAILY   • DULoxetine (CYMBALTA) 60 MG capsule TAKE 1 CAPSULE BY MOUTH DAILY.   • esomeprazole (nexIUM) 20 MG capsule Take 20 mg by mouth Every Morning Before Breakfast.   • HYDROcodone-acetaminophen (NORCO) 5-325 MG per tablet Take 1 tablet by mouth Every 6 (Six) Hours As Needed for Severe Pain .   • levothyroxine (SYNTHROID, LEVOTHROID) 25 MCG tablet TAKE 1 TABLET BY MOUTH DAILY   • Multiple Vitamins-Minerals (MULTIVITAMIN ADULT PO) Take 1 tablet by mouth Daily.   • pravastatin (PRAVACHOL) 10 MG tablet TAKE 1 TABLET BY MOUTH AT BEDTIME   • pregabalin (LYRICA) 75 MG capsule TAKE 1 CAPSULE BY MOUTH 2 (TWO) TIMES A DAY.   • sacubitril-valsartan (Entresto) 49-51 MG tablet Take 1 tablet by mouth 2 (Two) Times a Day.   • glucose blood test strip Take BG daily   • methocarbamol (ROBAXIN) 500 MG tablet TAKE 1 TABLET BY MOUTH 3 (THREE) TIMES A DAY AS NEEDED FOR MUSCLE SPASMS.   • [DISCONTINUED] metFORMIN (GLUCOPHAGE) 1000 MG tablet Take 1 tablet by mouth Daily With Breakfast.   • [DISCONTINUED] metFORMIN (GLUCOPHAGE) 500 MG tablet TAKE 1 TABLET BY MOUTH DAILY WITH DINNER.     No current facility-administered medications on file prior to visit.      She has HTN. Her current therapy includes carvedilol and losartan. She tolerates this regimen w/o issue.     She has DM2. She takes Metformin 1000mg at breakfast and 500mg in the evenings. She does not self monitor BG. She tolerates this regimen w/o issue.     She has hypothyroidism and is on Levothyroxine.     She has HLD and is on statin therapy and ASA. Doing well with this.     She has a hx of Vit B12 def. No supp currently.     She has Vit D def and takes OTC supp for this.    The following  "portions of the patient's history were reviewed and updated as appropriate: allergies, current medications, past family history, past medical history, past social history, past surgical history and problem list.        Objective   Vitals:    08/23/21 1250   BP: 140/70   Pulse: 91   Temp: 96.8 °F (36 °C)   SpO2: 98%   Weight: 96.2 kg (212 lb)   Height: 162 cm (63.78\")       BP Readings from Last 3 Encounters:   08/23/21 140/70   08/10/21 157/74   08/10/21 134/66        Wt Readings from Last 3 Encounters:   08/23/21 96.2 kg (212 lb)   08/10/21 95 kg (209 lb 7 oz)   07/30/21 95.3 kg (210 lb)        Body mass index is 36.64 kg/m².  Nursing notes and vitals reviewed.    Physical Exam  Constitutional:       General: She is not in acute distress.     Appearance: She is well-developed.   Neck:      Thyroid: No thyroid mass or thyromegaly.   Cardiovascular:      Rate and Rhythm: Regular rhythm.      Heart sounds: S1 normal and S2 normal.   Pulmonary:      Effort: Pulmonary effort is normal.      Breath sounds: Normal breath sounds.   Musculoskeletal:      Cervical back: Neck supple.   Lymphadenopathy:      Cervical: No cervical adenopathy.   Neurological:      Mental Status: She is alert and oriented to person, place, and time.   Psychiatric:         Attention and Perception: She is attentive.         Behavior: Behavior normal.         Thought Content: Thought content normal.         Recent Results (from the past 672 hour(s))   Adult Transthoracic Echo Complete W/ Cont if Necessary Per Protocol    Collection Time: 08/10/21  5:11 PM   Result Value Ref Range    BSA 2.0 m^2    IVSd 1.4 cm    LVIDd 5.0 cm    LVIDs 3.6 cm    LVPWd 0.96 cm    IVS/LVPW 1.4     FS 26.5 %    EDV(Teich) 115.5 ml    ESV(Teich) 55.9 ml    EF(Teich) 51.6 %    EDV(cubed) 121.3 ml    ESV(cubed) 48.2 ml    EF(cubed) 60.2 %    LV mass(C)d 218.6 grams    LV mass(C)dI 109.9 grams/m^2    SV(Teich) 59.6 ml    SI(Teich) 30.0 ml/m^2    SV(cubed) 73.1 ml    " SI(cubed) 36.7 ml/m^2    Ao root diam 3.6 cm    Ao root area 10.2 cm^2    ACS 2.1 cm    asc Aorta Diam 3.6 cm    LVOT diam 2.1 cm    LVOT area 3.5 cm^2    LVOT area(traced) 3.5 cm^2    RVOT diam 2.4 cm    RVOT area 4.5 cm^2    LVLd ap4 8.4 cm    EDV(MOD-sp4) 94.1 ml    LVLs ap4 7.7 cm    ESV(MOD-sp4) 60.2 ml    EF(MOD-sp4) 36.0 %    LVLd ap2 8.2 cm    EDV(MOD-sp2) 79.8 ml    LVLs ap2 7.6 cm    ESV(MOD-sp2) 52.4 ml    EF(MOD-sp2) 34.3 %    SV(MOD-sp4) 33.9 ml    SI(MOD-sp4) 17.0 ml/m^2    SV(MOD-sp2) 27.4 ml    SI(MOD-sp2) 13.8 ml/m^2    Ao root area (BSA corrected) 1.8     LV Huff Vol (BSA corrected) 47.3 ml/m^2    LV Sys Vol (BSA corrected) 30.3 ml/m^2    TAPSE (>1.6) 1.8 cm    EF(MOD-bp) 37.3 %    MV E max tobi 70.2 cm/sec    MV A max tobi 105.0 cm/sec    MV E/A 0.67     MV V2 mean 59.9 cm/sec    MV mean PG 2.0 mmHg    MV V2 VTI 40.0 cm    MVA(VTI) 1.7 cm^2    MV P1/2t max tobi 85.1 cm/sec    MV P1/2t 106.5 msec    MVA(P1/2t) 2.1 cm^2    MV dec slope 234.0 cm/sec^2    MV dec time 322 sec    Ao pk tobi 168.0 cm/sec    Ao max PG 11.3 mmHg    Ao max PG (full) 7.6 mmHg    Ao V2 mean 115.0 cm/sec    Ao mean PG 6.0 mmHg    Ao mean PG (full) 4.0 mmHg    Ao V2 VTI 35.1 cm    ERNESTINA(I,A) 1.9 cm^2    ERNESTINA(I,D) 1.9 cm^2    ERNESTINA(V,A) 2.0 cm^2    ERNESTINA(V,D) 2.0 cm^2    LV V1 max PG 3.7 mmHg    LV V1 mean PG 2.0 mmHg    LV V1 max 95.6 cm/sec    LV V1 mean 69.5 cm/sec    LV V1 VTI 19.5 cm    SV(Ao) 357.3 ml    SI(Ao) 179.6 ml/m^2    SV(LVOT) 67.5 ml    SV(RVOT) 88.7 ml    SI(LVOT) 33.9 ml/m^2    PA V2 max 103.0 cm/sec    PA max PG 4.2 mmHg    PA acc time 0.05 sec    RV V1 mean PG 2.0 mmHg    RV V1 mean 74.2 cm/sec    RV V1 VTI 19.6 cm    PA pr(Accel) 57.4 mmHg    Qp/Qs 1.3     MVA P1/2T LCG 2.6 cm^2    RV Base 3.7 cm    RV Length 6.3 cm    Lat Peak E' Tobi 4.7 cm/sec    Med Peak E' Tobi 3.4 cm/sec    RV S' 8.7 cm/sec     CV ECHO SHAKIRA - BZI_BMI 36.2 kilograms/m^2     CV ECHO SHAKIRA - BSA(HAYCOCK) 2.1 m^2     CV ECHO SHAKIRA -  BZI_METRIC_WEIGHT 95.0 kg     CV ECHO SHAKIRA - BZI_METRIC_HEIGHT 162.0 cm    Avg E/e' ratio 17.33     Target HR (85%) 121 bpm    Max. Pred. HR (100%) 142 bpm    LA Volume Index 47.8 mL/m2    Echo EF Estimated 37 %   Comprehensive Metabolic Panel    Collection Time: 08/16/21 10:30 AM    Specimen: Blood   Result Value Ref Range    Glucose 120 (H) 65 - 99 mg/dL    BUN 9 8 - 23 mg/dL    Creatinine 0.76 0.57 - 1.00 mg/dL    eGFR Non African Am 74 >60 mL/min/1.73    eGFR African Am 89 >60 mL/min/1.73    BUN/Creatinine Ratio 11.8 7.0 - 25.0    Sodium 143 136 - 145 mmol/L    Potassium 3.9 3.5 - 5.2 mmol/L    Chloride 107 98 - 107 mmol/L    Total CO2 28.2 22.0 - 29.0 mmol/L    Calcium 10.0 8.6 - 10.5 mg/dL    Total Protein 6.2 6.0 - 8.5 g/dL    Albumin 3.90 3.50 - 5.20 g/dL    Globulin 2.3 gm/dL    A/G Ratio 1.7 g/dL    Total Bilirubin 0.6 0.0 - 1.2 mg/dL    Alkaline Phosphatase 67 39 - 117 U/L    AST (SGOT) 16 1 - 32 U/L    ALT (SGPT) 8 1 - 33 U/L   Hemoglobin A1c    Collection Time: 08/16/21 10:30 AM    Specimen: Blood   Result Value Ref Range    Hemoglobin A1C 6.80 (H) 4.80 - 5.60 %   Lipid Panel With / Chol / HDL Ratio    Collection Time: 08/16/21 10:30 AM    Specimen: Blood   Result Value Ref Range    Total Cholesterol 154 0 - 200 mg/dL    Triglycerides 92 0 - 150 mg/dL    HDL Cholesterol 53 40 - 60 mg/dL    VLDL Cholesterol Sergio 17 5 - 40 mg/dL    LDL Chol Calc (NIH) 84 0 - 100 mg/dL    Chol/HDL Ratio 2.91    TSH    Collection Time: 08/16/21 10:30 AM    Specimen: Blood   Result Value Ref Range    TSH 1.760 0.270 - 4.200 uIU/mL   T4, Free    Collection Time: 08/16/21 10:30 AM    Specimen: Blood   Result Value Ref Range    Free T4 1.36 0.93 - 1.70 ng/dL   Vitamin B12    Collection Time: 08/16/21 10:30 AM    Specimen: Blood   Result Value Ref Range    Vitamin B-12 273 211 - 946 pg/mL   Vitamin D 25 Hydroxy    Collection Time: 08/16/21 10:30 AM    Specimen: Blood   Result Value Ref Range    25 Hydroxy, Vitamin D 46.5 30.0 -  100.0 ng/ml         Assessment/Plan   Diagnoses and all orders for this visit:    1. Essential hypertension (Primary)  Comments:  - controlled  Orders:  -     Comprehensive Metabolic Panel; Future    2. Dyslipidemia  Comments:  - controlled    3. Chronic diastolic congestive heart failure (CMS/[facility])  Comments:  - UTD w/ Cards  - Cards request d/c Metformin and start Jardiance    4. Type 2 diabetes mellitus without complication, without long-term current use of insulin (CMS/[facility])  Comments:  - controlled  - Stop Metformin at request of Cards  - start Jardiance  Orders:  -     empagliflozin (JARDIANCE) 10 MG tablet tablet; Take 1 tablet by mouth Daily.  Dispense: 30 tablet; Refill: 1  -     Comprehensive Metabolic Panel; Future  -     Hemoglobin A1c; Future    5. Acquired hypothyroidism  Comments:  - controlled    6. Cobalamin deficiency  Comments:  - controlled    7. Vitamin D deficiency  Comments:  - controlled    8. Stage 3 chronic kidney disease, unspecified whether stage 3a or 3b CKD (CMS/[facility])  Comments:  - stable   Orders:  -     Comprehensive Metabolic Panel; Future        Except as noted above, pt will continue current medications as noted in the medication list. I will continue to authorize refills as needed.        Medications, including side effects, were discussed with the patient. Patient verbalized understanding.  The plan of care was discussed. All questions were answered. Patient verbalized understanding.      Return in about 6 weeks (around 10/4/2021) for DM; fasting lab one week prior.

## 2021-08-30 DIAGNOSIS — R14.0 ABDOMINAL BLOATING: ICD-10-CM

## 2021-08-30 DIAGNOSIS — E11.69 TYPE 2 DIABETES MELLITUS WITH OTHER SPECIFIED COMPLICATION, WITHOUT LONG-TERM CURRENT USE OF INSULIN (HCC): ICD-10-CM

## 2021-08-30 DIAGNOSIS — E03.9 ACQUIRED HYPOTHYROIDISM: Primary | ICD-10-CM

## 2021-08-30 RX ORDER — LEVOTHYROXINE SODIUM 0.03 MG/1
25 TABLET ORAL DAILY
Qty: 90 TABLET | Refills: 3 | Status: SHIPPED | OUTPATIENT
Start: 2021-08-30 | End: 2022-09-26

## 2021-08-30 RX ORDER — DULOXETIN HYDROCHLORIDE 60 MG/1
60 CAPSULE, DELAYED RELEASE ORAL DAILY
Qty: 90 CAPSULE | Refills: 3 | Status: SHIPPED | OUTPATIENT
Start: 2021-08-30 | End: 2022-09-26

## 2021-09-07 RX ORDER — HYDROCODONE BITARTRATE AND ACETAMINOPHEN 5; 325 MG/1; MG/1
1 TABLET ORAL EVERY 6 HOURS PRN
Qty: 120 TABLET | Refills: 0 | Status: SHIPPED | OUTPATIENT
Start: 2021-09-07 | End: 2021-10-04

## 2021-09-07 NOTE — TELEPHONE ENCOUNTER
Medication Refill Request    Date of phone call: 9/7/2021    Medication being requested: hydro/apap 5-325 mg sig: take 1 tab q 6 hrs prn  Qty: 120    Date of last visit: 8/10/2021    Date of last refill: 8/10/2021    LOUANN up to date?: yes    Next Follow up?: 10/5/2021    Any new pertinent information? (i.e, new medication allergies, new use of medications, change in patient's health or condition, non-compliance or inconsistency with prescribing agreement?):

## 2021-09-13 ENCOUNTER — OFFICE VISIT (OUTPATIENT)
Dept: GASTROENTEROLOGY | Facility: CLINIC | Age: 76
End: 2021-09-13

## 2021-09-13 VITALS
BODY MASS INDEX: 36.19 KG/M2 | DIASTOLIC BLOOD PRESSURE: 90 MMHG | WEIGHT: 212 LBS | HEIGHT: 64 IN | SYSTOLIC BLOOD PRESSURE: 170 MMHG

## 2021-09-13 DIAGNOSIS — K58.2 IRRITABLE BOWEL SYNDROME WITH BOTH CONSTIPATION AND DIARRHEA: Primary | ICD-10-CM

## 2021-09-13 PROCEDURE — 99213 OFFICE O/P EST LOW 20 MIN: CPT | Performed by: INTERNAL MEDICINE

## 2021-09-13 NOTE — PROGRESS NOTES
PATIENT INFORMATION  Jung Short       - 1945    CHIEF COMPLAINT  Chief Complaint   Patient presents with   • Diarrhea   • Fecal Incontinence       HISTORY OF PRESENT ILLNESS  Recent Cruise but had normal BM prior to and has had some more of those even after. On the Cruise she took imodium and it worked enough to eleminate her Bowel movements all together. She has moved formed stool since her return over the last week and only one BM since!!    Very poor historian and under direct questioning she gets around to 3 small BM 2 days after she got home and then she took imodium pror to going to the [employer] game on Sat.    Have reviewed her use of Imodium that works well for her and will need to use Miralax to keep her from going 3+ days with out a bm      REVIEWED PERTINENT RESULTS/ LABS  No results found for: CASEREPORT, FINALDX  Lab Results   Component Value Date    HGB 10.9 (L) 2021    MCV 88 2021     2021    ALT 8 2021    AST 16 2021    HGBA1C 6.80 (H) 2021    INR 1.22 (H) 2020    TRIG 92 2021    FERRITIN 109.8 2016    IRON 71 2017    TIBC 290 2017      No results found.    REVIEW OF SYSTEMS  Review of Systems   Constitutional: Negative for activity change, chills, fever and unexpected weight change.   HENT: Negative for congestion.    Eyes: Negative for visual disturbance.   Respiratory: Negative for shortness of breath.    Cardiovascular: Negative for chest pain and palpitations.   Gastrointestinal: Positive for abdominal distention, abdominal pain, anal bleeding, constipation and diarrhea. Negative for blood in stool.   Endocrine: Negative for cold intolerance and heat intolerance.   Genitourinary: Negative for hematuria.   Musculoskeletal: Negative for gait problem.   Skin: Negative for color change.   Allergic/Immunologic: Negative for immunocompromised state.   Neurological: Negative for weakness and light-headedness.    Hematological: Negative for adenopathy.   Psychiatric/Behavioral: Negative for sleep disturbance. The patient is not nervous/anxious.          ACTIVE PROBLEMS  Patient Active Problem List    Diagnosis    • NICM (nonischemic cardiomyopathy) (CMS/[facility]) [I42.8]    • Periprosthetic subtrochanteric fracture of femur [M97.8XXA, Z96.649]    • Traumatic closed nondisp torus fracture of distal radial metaphysis, right, initial encounter [S52.521A]    • Pleural effusion [J90]    • Acute respiratory failure with hypoxia (CMS/[facility]) [J96.01]    • Diastolic congestive heart failure (CMS/[facility]) [I50.30]    • CKD (chronic kidney disease) stage 3, GFR 30-59 ml/min (CMS/[facility]) [N18.30]    • Physical deconditioning [R53.81]    • Primary osteoarthritis of left knee [M17.12]    • Atherosclerotic heart disease of native coronary artery without angina pectoris [I25.10]    • Encounter for long-term (current) use of high-risk medication [Z79.899]    • Chronic gout of multiple sites [M1A.09X0]    • Arthritis of knee [M17.10]    • DDD (degenerative disc disease), lumbar [M51.36]    • Lumbar facet arthropathy [M47.816]    • Chronic anemia [D64.9]    • Disc disorder of cervical region [M50.90]    • Neck pain [M54.2]    • Chronic pain [G89.29]    • Depression [F32.9]    • Type 2 diabetes mellitus without complication, without long-term current use of insulin (CMS/[facility]) [E11.9]    • Dyslipidemia [E78.5]    • Gastroesophageal reflux disease with esophagitis [K21.00]    • Hypertension [I10]    • Hypothyroidism [E03.9]    • Incisional hernia [K43.2]    • Mitral valve insufficiency [I34.0]    • Nausea [R11.0]    • Osteopenia of spine [M85.88]    • Arthralgia of multiple joints [M25.50]    • Peripheral neuropathy [G62.9]    • Pulmonary hypertension (CMS/[facility]) [I27.20]    • PITTS (dyspnea on exertion) [R06.00]    • Tricuspid valve insufficiency [I07.1]    • Cobalamin deficiency [E53.8]    • Vitamin D deficiency [E55.9]    • Malignant neoplasm of upper-inner  quadrant of right breast in female, estrogen receptor positive (CMS/[facility]) [C50.211, Z17.0]          PAST MEDICAL HISTORY  Past Medical History:   Diagnosis Date   • Anemia    • Benign breast disease    • Cancer (CMS/HCC) 2015    Right breast   • Cellulitis of leg     May-2003 right lower extremity   • CHF (congestive heart failure) (CMS/[facility])     Dr Bates follows   • Chronic kidney disease     Dr Hannah follows   • Chronic ulcer of right foot (CMS/[facility])     Non-pressure, history of    • Closed fracture of patella 9/5/2017   • Cluster headache    • Colon polyp    • Depression    • Diabetic peripheral neuropathy (CMS/[facility])    • Difficulty walking    • Diverticulosis    • Femoral fracture (CMS/HCC)     right   • Fibromyalgia, primary    • Fracture of left wrist     history of   • Gastroesophageal reflux    • Generalized pain 2/2/2016   • Hiatal hernia    • Hyperlipidemia    • Hypertension    • Hypothyroidism    • Impingement syndrome of right shoulder    • Joint pain    • Left knee pain 5/17/2016   • Menopausal disorder    • Methicillin resistant Staphylococcus aureus infection 2012    after bladder surgery   • Nonischemic cardiomyopathy (CMS/[facility])     Ejection fraction 10% per 2-D echo with Doppler however she did have cardiac catheterization April 2015 which showed ejection fraction 20% with global hypokinesis and severe mitral insufficiency   • Osteoarthritis     generalized, sched jania TKA   • Osteomyelitis (CMS/[facility]) 2/2/2016   • Pain in shoulder 8/2/2016   • Paroxysmal atrial fibrillation (CMS/[facility])     Dr Bates follows   • Radius/ulna fracture, left, closed, initial encounter 10/21/2017   • Shoulder pain, bilateral     has tears on both sides   • Sleep apnea    • Syncope, psychogenic 4/19/2017   • Thoracic back pain 2/2/2016   • Toe ulcer (CMS/[facility])     h/o to both feet, d/t shoes rubbing per patient   • Transient alteration of awareness 4/19/2017         SURGICAL HISTORY  Past Surgical History:   Procedure  Laterality Date   • ABDOMINAL SURGERY  2012    had to be reopened after bladder surgery d/t infection   • AMPUTATION FOOT / TOE Right 11/2013    Rt foot amputation MTP first toe   • BLADDER SURGERY  2012   • BREAST BIOPSY     • BREAST SURGERY  06/29/2015    Percutaneous ultrasound-guided placement of metal localized clip 1st lesion   • CARDIAC CATHETERIZATION  2015   • CARDIAC CATHETERIZATION N/A 1/25/2021    Procedure: Right and Left Heart Cath;  Surgeon: Nicholas Andrade MD;  Location:  [facility] CATH [facility]VASIVE [facility];  Service: Cardiovascular;  Laterality: N/A;  drop in EF, with hx of NICM, SOA fatigue.  Discussed with    • CARDIAC CATHETERIZATION N/A 1/25/2021    Procedure: Left ventriculography;  Surgeon: Nicholas Andrade MD;  Location:  [facility]ATH [facility]VAS[facility];  Service: Cardiovascular;  Laterality: N/A;   • CARDIAC CATHETERIZATION N/A 1/25/2021    Procedure: Coronary angiography;  Surgeon: Nicholas Andrade MD;  Location:  [facility] CATH [facility]VASIVE [facility];  Service: Cardiovascular;  Laterality: N/A;   • CATARACT EXTRACTION Bilateral 2017   • COLONOSCOPY     • DEBRIDEMENT  FOOT Right 01/2013    During hospitalization    • EPIDURAL BLOCK      4 chronic back pain and leg pain last epidurals done 5-2012 she had no benefit at all from this procedure.   • FEMUR FRACTURE SURGERY     • FOOT SURGERY Bilateral     several   • HERNIA REPAIR      At the abdomen February 2007 Dr. Mendez   • INCISIONAL HERNIA REPAIR  05/16/2014    Recurrent. Incarcerated. With mesh implantation   • MASTECTOMY Right 05/2015   • MASTECTOMY MODIFIED RADICAL W/ AXILLARY LYMPH NODES W/ OR W/O PECTORALIS MINOR Right    • SHOULDER SURGERY Right     x2 RCR   • TOTAL ABDOMINAL HYSTERECTOMY      with oophorectomy-Done June-2012 secondary to vaginal wall prolapse.   • TOTAL KNEE ARTHROPLASTY Right    • TOTAL KNEE ARTHROPLASTY Left 4/17/2018    Procedure: TOTAL KNEE ARTHROPLASTY;  Surgeon: Live Chambers MD;  Location: [facility] OR;  Service:  Orthopedics         FAMILY HISTORY  Family History   Problem Relation Age of Onset   • Colonic polyp Mother    • Hypertension Mother    • Migraines Mother    • Mental illness Mother    • Depression Mother    • Arthritis Mother    • Coronary artery disease Father    • Other Father         Cardiac Disorder   • Colon cancer Father    • Kidney disease Father    • Cancer Father    • Diabetes Father    • Heart disease Father    • Hypertension Father    • Breast cancer Maternal Aunt    • Colon cancer Brother    • Alcohol abuse Brother    • Arthritis Sister    • Hypertension Brother    • Lung disease Brother    • Alcohol abuse Brother    • Malig Hyperthermia Neg Hx          SOCIAL HISTORY  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   Tobacco Use   • Smoking status: Former Smoker     Packs/day: 3.00     Years: 30.00     Pack years: 90.00     Types: Cigarettes     Quit date:      Years since quittin.7   • Smokeless tobacco: Never Used   • Tobacco comment: daily caffiene   Vaping Use   • Vaping Use: Never used   Substance and Sexual Activity   • Alcohol use: No     Comment: h/o quit    • Drug use: No   • Sexual activity: Defer     Partners: Male     Comment: celibate         CURRENT MEDICATIONS    Current Outpatient Medications:   •  alosetron (LOTRONEX) 0.5 MG tablet, Take 1 tablet by mouth 2 (Two) Times a Day., Disp: 60 tablet, Rfl: 11  •  aspirin 81 MG EC tablet, Take 81 mg by mouth Daily., Disp: , Rfl:   •  bumetanide (BUMEX) 0.5 MG tablet, TAKE 3 TABLETS BY MOUTH DAILY (Patient taking differently: Take 1.5 mg by mouth 2 (Two) Times a Day.), Disp: 252 tablet, Rfl: 0  •  carvedilol (COREG) 25 MG tablet, Take 1 tablet by mouth 2 (Two) Times a Day., Disp: 180 tablet, Rfl: 3  •  D-1000 Extra Strength 25 MCG (1000 UT) tablet, TAKE 1 TABLET BY MOUTH DAILY, Disp: 90 tablet, Rfl: 0  •  Diphenoxylate-Atropine (LOMOTIL PO), Take  by mouth Daily., Disp: , Rfl:   •   "DULoxetine (CYMBALTA) 60 MG capsule, TAKE 1 CAPSULE BY MOUTH DAILY., Disp: 90 capsule, Rfl: 3  •  empagliflozin (JARDIANCE) 10 MG tablet tablet, Take 1 tablet by mouth Daily., Disp: 30 tablet, Rfl: 1  •  esomeprazole (nexIUM) 20 MG capsule, Take 20 mg by mouth Every Morning Before Breakfast., Disp: , Rfl:   •  HYDROcodone-acetaminophen (NORCO) 5-325 MG per tablet, Take 1 tablet by mouth Every 6 (Six) Hours As Needed for Severe Pain . DNF until 9-9-21, Disp: 120 tablet, Rfl: 0  •  levothyroxine (SYNTHROID, LEVOTHROID) 25 MCG tablet, TAKE 1 TABLET BY MOUTH DAILY, Disp: 90 tablet, Rfl: 3  •  Multiple Vitamins-Minerals (MULTIVITAMIN ADULT PO), Take 1 tablet by mouth Daily., Disp: , Rfl:   •  pravastatin (PRAVACHOL) 10 MG tablet, TAKE 1 TABLET BY MOUTH AT BEDTIME, Disp: 90 tablet, Rfl: 0  •  pregabalin (LYRICA) 75 MG capsule, TAKE 1 CAPSULE BY MOUTH 2 (TWO) TIMES A DAY., Disp: 60 capsule, Rfl: 4  •  sacubitril-valsartan (Entresto) 49-51 MG tablet, Take 1 tablet by mouth 2 (Two) Times a Day., Disp: 60 tablet, Rfl: 11    ALLERGIES  Dilaudid [hydromorphone] and Latex    VITALS  Vitals:    09/13/21 1338   BP: 170/90   BP Location: Left arm   Patient Position: Sitting   Cuff Size: Adult   Weight: 96.2 kg (212 lb)   Height: 162.6 cm (64\")       PHYSICAL EXAM  Debilities/Disabilities Identified: None  Emotional Behavior: Appropriate  Wt Readings from Last 3 Encounters:   09/13/21 96.2 kg (212 lb)   08/23/21 96.2 kg (212 lb)   08/10/21 95 kg (209 lb 7 oz)     Ht Readings from Last 1 Encounters:   09/13/21 162.6 cm (64\")     Body mass index is 36.39 kg/m².  Physical Exam  Constitutional:       Appearance: She is well-developed. She is not diaphoretic.   HENT:      Head: Normocephalic and atraumatic.   Eyes:      General: No scleral icterus.     Conjunctiva/sclera: Conjunctivae normal.      Pupils: Pupils are equal, round, and reactive to light.   Neck:      Thyroid: No thyromegaly.   Cardiovascular:      Rate and Rhythm: Normal " rate and regular rhythm.      Heart sounds: Normal heart sounds. No murmur heard.   No gallop.    Pulmonary:      Effort: Pulmonary effort is normal.      Breath sounds: Normal breath sounds. No wheezing or rales.   Abdominal:      General: Bowel sounds are normal. There is no distension or abdominal bruit.      Palpations: Abdomen is soft. There is no shifting dullness, fluid wave or mass.      Tenderness: There is no abdominal tenderness. There is no guarding. Negative signs include Ulrich's sign.      Hernia: There is no hernia in the ventral area.   Musculoskeletal:         General: Normal range of motion.      Cervical back: Normal range of motion and neck supple.   Lymphadenopathy:      Cervical: No cervical adenopathy.   Skin:     General: Skin is warm and dry.      Findings: No erythema or rash.   Neurological:      Mental Status: She is alert and oriented to person, place, and time.   Psychiatric:         Mood and Affect: Mood normal.         Behavior: Behavior normal.         CLINICAL DATA REVIEWED   reviewed previous lab results and integrated with today's visit, reviewed notes from other physicians and/or last GI encounter, reviewed previous endoscopy results and available photos, reviewed surgical pathology results from previous biopsies    ASSESSMENT  Diagnoses and all orders for this visit:    Irritable bowel syndrome with both constipation and diarrhea    Other orders  -     Diphenoxylate-Atropine (LOMOTIL PO); Take  by mouth Daily.          PLAN  Will encourage her to find betterPart D and at this point am fine using Imodium at some dose and miarlax if she skips 1-2 days    No follow-ups on file.    I have discussed the above plan with the patient.  They verbalize understanding and are in agreement with the plan.  They have been advised to contact the office for any questions, concerns, or changes related to their health.

## 2021-09-17 ENCOUNTER — TELEPHONE (OUTPATIENT)
Dept: INTERNAL MEDICINE | Facility: CLINIC | Age: 76
End: 2021-09-17

## 2021-09-17 NOTE — TELEPHONE ENCOUNTER
Caller: [facility]    Relationship to patient: Other    Best call back number: [phone]    Patient is needing: [facility] IS CALLING TO OBTAIN CLINICALS IN ORDER TO PROCESS A PRIOR AUTHORIZATION FOR THE FOLLOWING MEDICATION.      FARXIGA    PLEASE ADVISE.    CASE ID [id number]

## 2021-09-17 NOTE — TELEPHONE ENCOUNTER
Was unable to get ahold of [facility]. Got through to a rep, but then was hung up on. Will try again on Monday.

## 2021-09-21 RX ORDER — BUMETANIDE 0.5 MG/1
1.5 TABLET ORAL 2 TIMES DAILY
Qty: 270 TABLET | Refills: 1 | Status: SHIPPED | OUTPATIENT
Start: 2021-09-21 | End: 2022-03-23

## 2021-09-24 ENCOUNTER — TELEPHONE (OUTPATIENT)
Dept: INTERNAL MEDICINE | Facility: CLINIC | Age: 76
End: 2021-09-24

## 2021-09-24 NOTE — TELEPHONE ENCOUNTER
Hub staff attempted to follow warm transfer process and was unsuccessful     Caller: Jung Short    Relationship to patient: Self    Best call back number: [phone]     Patient is needing: RETURNING SHERRIE'S PHONE CALL REGARDING MEDICATION        
90

## 2021-10-04 ENCOUNTER — OFFICE VISIT (OUTPATIENT)
Dept: CARDIOLOGY | Facility: CLINIC | Age: 76
End: 2021-10-04

## 2021-10-04 VITALS
DIASTOLIC BLOOD PRESSURE: 80 MMHG | HEIGHT: 64 IN | HEART RATE: 65 BPM | WEIGHT: 210.8 LBS | SYSTOLIC BLOOD PRESSURE: 142 MMHG | BODY MASS INDEX: 35.99 KG/M2

## 2021-10-04 DIAGNOSIS — I10 PRIMARY HYPERTENSION: ICD-10-CM

## 2021-10-04 DIAGNOSIS — E11.9 TYPE 2 DIABETES MELLITUS WITHOUT COMPLICATION, WITHOUT LONG-TERM CURRENT USE OF INSULIN (HCC): ICD-10-CM

## 2021-10-04 DIAGNOSIS — I50.20 HFREF (HEART FAILURE WITH REDUCED EJECTION FRACTION) (HCC): Primary | ICD-10-CM

## 2021-10-04 DIAGNOSIS — I42.8 NICM (NONISCHEMIC CARDIOMYOPATHY) (HCC): ICD-10-CM

## 2021-10-04 PROBLEM — I50.30 DIASTOLIC CONGESTIVE HEART FAILURE (HCC): Status: RESOLVED | Noted: 2019-09-01 | Resolved: 2021-10-04

## 2021-10-04 PROCEDURE — 99214 OFFICE O/P EST MOD 30 MIN: CPT | Performed by: INTERNAL MEDICINE

## 2021-10-04 PROCEDURE — 93000 ELECTROCARDIOGRAM COMPLETE: CPT | Performed by: INTERNAL MEDICINE

## 2021-10-04 NOTE — PROGRESS NOTES
Date of Office Visit: 10/04/2021  Encounter Provider: Aria Bates MD  Place of Service: [facility] CARDIOLOGY  Patient Name: Jung Short  :1945      Patient ID:  Jung Short is a 76 y.o. female is here for  followup for nonischemic cardiomyopathy.        History of Present Illness    She presented to [facility] in 2015 with acute congestive heart failure, systolic and diastolic. She had a 2-D echocardiogram which revealed an ejection fraction of 10-15%, moderate left ventricular dilation, severe mitral and tricuspid insufficiency, and her aortic valve was normal. She was transferred to [facility] for a cardiac catheterization. 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 She also had a right heart catheterization done which revealed an RV pressure of 50/20, PA pressure 50/30 with the main of 38 and a capillary wedge pressure of 31 mmHg. Her right atrial pressure was 20 mmHg. Her cardiac output was 3.9 liters per minute. She was treated medically and diuresed.         She had a 2-D echocardiogram with Doppler  performed on 2015 which showed her ejection fraction to be 50%. There was normal  segmental wall motion. There was moderate left atrial dilation. A small patent foramen  ovale/atrial septal defect by saline contrast study and trace mitral and tricuspid  insufficiency. This is a significant improvement from her prior echocardiogram.       She is on anastrozole for right breast cancer.       She had normal renal ultrasound ordered by Dr. Hannah 2017.  She has chronic renal insufficiency and follows with Dr. Hannah.   She had a carotid duplex study done April 2017 which showed mild carotid arteries.  She had a Holter monitor done just benign 4/2017.     BMP done 2/4/2021 showed glucose 137, otherwise normal BMP.  Complete set labs in 1/12/2021 showed glucose 151, otherwise normal CMP, hemoglobin A1c 6.7, normal TSH and free T4, total cholesterol 183, HDL 54, , triglycerides 165, normal CBC.       Echocardiogram done 1/12/2021 showed mild concentric left ventricular hypertrophy, moderate left atrial enlargement, normal diastolic function, mild left ventricular dilation, ejection fraction 20-25%, severely reduced ejection fraction global hypokinesis.  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  Repeat echocardiogram done 4/22/2021 showed moderate left ventricular dilation, mild concentric left ventricle hypertrophy, grade 2 diastolic dysfunction, ejection fraction 37%, moderate reduction of left ventricular systolic function, severe left atrial enlargement, normal saline contrast study, mild right atrial enlargement, mild right ventricular enlargement, global hypokinesis.  She was started on Entresto.  Repeat echo done 8/10/1 showed ejection fraction of 37% with grade 1 diastolic dysfunction, moderate global hypokinesis.  No significant underlying valve disease.    Labs in 9/27/2021 show normal CMP, hemoglobin A1c 6.7.  Labs in 8/16/2021 show unremarkable CMP, hemoglobin A1c 6.8, normal TSH and free T4, total cholesterol 154, HDL 53, LDL 84, triglycerides 92.     She has had a little bit of a rough morning.  Her blood pressures probably elevated due to that.  She said she was in the middle taking a shower and the water went off.  She is pretty sure the hit a water main near where she lives.  She has had no chest pain or pressure.  She has no orthopnea or PND.  She has had no tachycardia or dizziness.  She has had no falls.   She is had no syncope.  She is taking her medications as directed without difficulty but does have difficulty affording the medications.  We have been giving her samples.  She has no fevers, chills or cough.    Past Medical History:   Diagnosis Date   • Anemia    • Benign breast disease    • Cancer ([facility]) 2015    Right breast   • Cellulitis of leg     May-2003 right lower extremity   • CHF (congestive heart failure) ([facility])     Dr Bates follows   • Chronic kidney disease     Dr Hannah follows   • Chronic ulcer of right foot ([facility])     Non-pressure, history of    • Closed fracture of patella 9/5/2017   • Cluster headache    • Colon polyp    • Depression    • Diabetic peripheral neuropathy ([facility])    • Difficulty walking    • Diverticulosis    • Femoral fracture ([facility])     right   • Fibromyalgia, primary    • Fracture of left wrist     history of   • Gastroesophageal reflux    • Generalized pain 2/2/2016   • Hiatal hernia    • Hyperlipidemia    • Hypertension    • Hypothyroidism    • Impingement syndrome of right shoulder    • Joint pain    • Left knee pain 5/17/2016   • Menopausal disorder    • Methicillin resistant Staphylococcus aureus infection 2012    after bladder surgery   • Nonischemic cardiomyopathy ([facility])     Ejection fraction 10% per 2-D echo with Doppler however she did have cardiac catheterization April 2015 which showed ejection fraction 20% with global hypokinesis and severe mitral insufficiency   • Osteoarthritis     generalized, sched jania TKA   • Osteomyelitis ([facility]) 2/2/2016   • Pain in shoulder 8/2/2016   • Paroxysmal atrial fibrillation ([facility])     Dr Bates follows   • Radius/ulna fracture, left, closed, initial encounter 10/21/2017   • Shoulder pain, bilateral     has tears on both sides   • Sleep apnea    • Syncope, psychogenic 4/19/2017   • Thoracic back pain 2/2/2016   • Toe ulcer ([facility])     h/o to both feet, d/t shoes rubbing per patient   • Transient alteration of awareness 4/19/2017          Past Surgical History:   Procedure Laterality Date   • ABDOMINAL SURGERY  2012    had to be reopened after bladder surgery d/t infection   • AMPUTATION FOOT / TOE Right 11/2013    Rt foot amputation MTP first toe   • BLADDER SURGERY  2012   • BREAST BIOPSY     • BREAST SURGERY  06/29/2015    Percutaneous ultrasound-guided placement of metal localized clip 1st lesion   • CARDIAC CATHETERIZATION  2015   • CARDIAC CATHETERIZATION N/A 1/25/2021    Procedure: Right and Left Heart Cath;  Surgeon: Nicholas Andrade MD;  Location:  [facility] CATH INVASIVE LOCATION;  Service: Cardiovascular;  Laterality: N/A;  drop in EF, with hx of NICM, SOA fatigue.  Discussed with    • CARDIAC CATHETERIZATION N/A 1/25/2021    Procedure: Left ventriculography;  Surgeon: Nicholas Andrade MD;  Location:  [facility] CATH INVASIVE LOCATION;  Service: Cardiovascular;  Laterality: N/A;   • CARDIAC CATHETERIZATION N/A 1/25/2021    Procedure: Coronary angiography;  Surgeon: Nicholas Andrade MD;  Location:  [facility] CATH INVASIVE LOCATION;  Service: Cardiovascular;  Laterality: N/A;   • CATARACT EXTRACTION Bilateral 2017   • COLONOSCOPY     • DEBRIDEMENT  FOOT Right 01/2013    During hospitalization    • EPIDURAL BLOCK      4 chronic back pain and leg pain last epidurals done 5-2012 she had no benefit at all from this procedure.   • FEMUR FRACTURE SURGERY     • FOOT SURGERY Bilateral     several   • HERNIA REPAIR      At the abdomen February 2007 Dr. Mendez   • INCISIONAL HERNIA REPAIR  05/16/2014    Recurrent. Incarcerated. With mesh implantation   • MASTECTOMY Right 05/2015   • MASTECTOMY MODIFIED RADICAL W/ AXILLARY LYMPH NODES W/ OR W/O PECTORALIS MINOR Right    • SHOULDER SURGERY Right     x2 RCR   • TOTAL ABDOMINAL HYSTERECTOMY      with oophorectomy-Done June-2012 secondary to vaginal wall prolapse.   • TOTAL KNEE ARTHROPLASTY Right    • TOTAL KNEE ARTHROPLASTY Left 4/17/2018    Procedure: TOTAL KNEE ARTHROPLASTY;  Surgeon: Live  MD Reyes;  Location: [facility];  Service: Orthopedics       Current Outpatient Medications on File Prior to Visit   Medication Sig Dispense Refill   • alosetron (LOTRONEX) 0.5 MG tablet Take 1 tablet by mouth 2 (Two) Times a Day. 60 tablet 11   • aspirin 81 MG EC tablet Take 81 mg by mouth Daily.     • bumetanide (BUMEX) 0.5 MG tablet Take 3 tablets by mouth 2 (Two) Times a Day. 270 tablet 1   • carvedilol (COREG) 25 MG tablet Take 1 tablet by mouth 2 (Two) Times a Day. 180 tablet 3   • D-1000 Extra Strength 25 MCG (1000 UT) tablet TAKE 1 TABLET BY MOUTH DAILY 90 tablet 0   • DULoxetine (CYMBALTA) 60 MG capsule TAKE 1 CAPSULE BY MOUTH DAILY. 90 capsule 3   • empagliflozin (JARDIANCE) 10 MG tablet tablet Take 1 tablet by mouth Daily. 30 tablet 1   • esomeprazole (nexIUM) 20 MG capsule Take 20 mg by mouth Every Morning Before Breakfast.     • levothyroxine (SYNTHROID, LEVOTHROID) 25 MCG tablet TAKE 1 TABLET BY MOUTH DAILY 90 tablet 3   • Multiple Vitamins-Minerals (MULTIVITAMIN ADULT PO) Take 1 tablet by mouth Daily.     • pravastatin (PRAVACHOL) 10 MG tablet TAKE 1 TABLET BY MOUTH AT BEDTIME 90 tablet 0   • pregabalin (LYRICA) 75 MG capsule TAKE 1 CAPSULE BY MOUTH 2 (TWO) TIMES A DAY. 60 capsule 4   • sacubitril-valsartan (Entresto) 49-51 MG tablet Take 1 tablet by mouth 2 (Two) Times a Day. 60 tablet 11   • [DISCONTINUED] Diphenoxylate-Atropine (LOMOTIL PO) Take  by mouth Daily.     • [DISCONTINUED] HYDROcodone-acetaminophen (NORCO) 5-325 MG per tablet Take 1 tablet by mouth Every 6 (Six) Hours As Needed for Severe Pain . DNF until 9-9-21 120 tablet 0     No current facility-administered medications on file prior to visit.       Social History     Socioeconomic History   • Marital status:      Spouse name: Not on file   • Number of children: 2   • Years of education: Not on file   • Highest education level: Not on file   Tobacco Use   • Smoking status: Former Smoker     Packs/day: 3.00     Years: 30.00      "Pack years: 90.00     Types: Cigarettes     Quit date:      Years since quittin.7   • Smokeless tobacco: Never Used   Vaping Use   • Vaping Use: Never used   Substance and Sexual Activity   • Alcohol use: No     Comment: Caffeine use: 3 cups daily    • Drug use: No   • Sexual activity: Defer     Partners: Male     Comment: celibate           ROS    Procedures    ECG 12 Lead    Date/Time: 10/4/2021 11:51 AM  Performed by: Aria Bates MD  Authorized by: Aria Bates MD   Comparison: compared with previous ECG   Similar to previous ECG  Ectopy: unifocal PVCs  T flattening: all  QRS axis: left    Clinical impression: abnormal EKG                Objective:      Vitals:    10/04/21 1134   BP: 142/80   BP Location: Left arm   Pulse: 65   Weight: 95.6 kg (210 lb 12.8 oz)   Height: 162.6 cm (64\")     Body mass index is 36.18 kg/m².    Vitals reviewed.   Constitutional:       General: Not in acute distress.     Appearance: Well-developed. Not diaphoretic.   Eyes:      General: No scleral icterus.     Conjunctiva/sclera: Conjunctivae normal.   HENT:      Head: Normocephalic and atraumatic.   Neck:      Thyroid: No thyromegaly.      Vascular: No carotid bruit or JVD.      Lymphadenopathy: No cervical adenopathy.   Pulmonary:      Effort: Pulmonary effort is normal. No respiratory distress.      Breath sounds: Normal breath sounds. No wheezing. No rhonchi. No rales.   Chest:      Chest wall: Not tender to palpatation.   Cardiovascular:      Normal rate. Regular rhythm.      Murmurs: There is no murmur.      No gallop.   Pulses:     Intact distal pulses.   Edema:     Peripheral edema absent.   Abdominal:      General: Bowel sounds are normal. There is no distension or abdominal bruit.      Palpations: Abdomen is soft. There is no abdominal mass.      Tenderness: There is no abdominal tenderness.   Musculoskeletal:         General: No deformity.      Extremities: No clubbing present.     Cervical back: " Neck supple. Skin:     General: Skin is warm and dry. There is no cyanosis.      Coloration: Skin is not pale.      Findings: No rash.   Neurological:      Mental Status: Alert and oriented to person, place, and time.      Cranial Nerves: No cranial nerve deficit.   Psychiatric:         Judgment: Judgment normal.         Lab Review:       Assessment:      Diagnosis Plan   1. HFrEF (heart failure with reduced ejection fraction) ([facility])     2. Primary hypertension     3. NICM (nonischemic cardiomyopathy) ([facility])     4. Type 2 diabetes mellitus without complication, without long-term current use of insulin ([facility])       1. Nonischemic cardiomyopathy, recurrent. Her ejection fraction is now 37%.  she is in no HF today.   2. Hypertension.   Goal less than 120/80.  Her blood pressure is high today because when she was in the middle of her shower her water went off.  3. DM, type 2.  4. Anomalous coronary artery, LV off RCC.   5. H/o right breast cancer. Sees Dr. Draper.   6. Severe OA of the knees.  7. CKD, sees Dr. Hannah.      Plan:       Keep medications as they are including Entresto 49/51 twice daily and Jardiance 10 mg daily.  See Ravinder in 6 months.  Overall, I think she looks pretty good.

## 2021-10-05 ENCOUNTER — OFFICE VISIT (OUTPATIENT)
Dept: PAIN MEDICINE | Facility: CLINIC | Age: 76
End: 2021-10-05

## 2021-10-05 VITALS
WEIGHT: 210 LBS | HEIGHT: 64 IN | OXYGEN SATURATION: 95 % | HEART RATE: 66 BPM | BODY MASS INDEX: 35.85 KG/M2 | SYSTOLIC BLOOD PRESSURE: 122 MMHG | DIASTOLIC BLOOD PRESSURE: 61 MMHG | TEMPERATURE: 96.7 F

## 2021-10-05 DIAGNOSIS — M47.816 LUMBAR FACET ARTHROPATHY: ICD-10-CM

## 2021-10-05 DIAGNOSIS — M25.50 ARTHRALGIA OF MULTIPLE JOINTS: ICD-10-CM

## 2021-10-05 DIAGNOSIS — G89.29 OTHER CHRONIC PAIN: Primary | ICD-10-CM

## 2021-10-05 DIAGNOSIS — Z79.899 ENCOUNTER FOR LONG-TERM (CURRENT) USE OF HIGH-RISK MEDICATION: ICD-10-CM

## 2021-10-05 DIAGNOSIS — M51.36 DDD (DEGENERATIVE DISC DISEASE), LUMBAR: ICD-10-CM

## 2021-10-05 PROCEDURE — 80305 DRUG TEST PRSMV DIR OPT OBS: CPT | Performed by: NURSE PRACTITIONER

## 2021-10-05 PROCEDURE — 99214 OFFICE O/P EST MOD 30 MIN: CPT | Performed by: NURSE PRACTITIONER

## 2021-10-05 RX ORDER — HYDROCODONE BITARTRATE AND ACETAMINOPHEN 5; 325 MG/1; MG/1
1 TABLET ORAL EVERY 6 HOURS PRN
Qty: 120 TABLET | Refills: 0 | Status: SHIPPED | OUTPATIENT
Start: 2021-10-05 | End: 2021-11-02 | Stop reason: SDUPTHER

## 2021-10-05 NOTE — PROGRESS NOTES
"CHIEF COMPLAINT  F/U back and joint pain- patient states that her pain has worsened since her last visit.   Patient was over 40 minutes late for appointment and worked in  [employer] Aron Short is a 76 y.o. female  who presents for follow-up.  She has a history of chronic back and joint pain. Reports her pain is worse since last evaluation. She believes her pain is worse due to inactivity.  Still has a sore on the bottom of her right foot. Still in wound care. Wearing walking shoe. Under care of Dr Elmo To.  Has been told to stay off foot as much as possible to help with healing.     Complains of pain in her low back, joints and extremities. Today her pain is 6/10VAS. Describes her pain as continuous throbbing with intermittent sharp and burning pain. Pain increases with activity, housework, walking/standing; pain decreases with medication and rest.  Continues with Hydrocodone 5/325 3-4/day, Robaxin 500 mg daily and Lyrica  75 mg 1-2/day, as well as Cymbalta 60 mg daily.  Denies any side effects from the regimen, including constipation and somnolence. Has had issues with bowel incontinence. Under care of Dr Newberry.  The regimen helps decreases her pain by 40%.  ADL's by self. Denies any bladder incontinence.     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 Used to have neuropathy in legs, now having right hand. Prescribed Lyrica.     Used to enjoy going to [employer] football games.      Completed her COVID vaccines.     Reports her EF is at 20% as of 3-10-21; 37% as of 7-30-21 office visit with Lina BRADY.      She states she also has \"A spot on my kidney and liver.\" Followed by Dr Newberry.      Patient remained masked during entire encounter. No cough present. I donned a mask and eye protection throughout entire visit. Prior to donning mask and eye protection, hand hygiene was performed, as well as when it was doffed.  I was closer than 6 feet, but not for an extended " period of time. No obvious exposure to any bodily fluids.    Joint Pain  This is a chronic problem. The current episode started more than 1 year ago. The problem occurs constantly. The problem has been gradually worsening (worse since last evaluation). Associated symptoms include arthralgias (joint), fatigue, joint swelling, neck pain and weakness. Pertinent negatives include no abdominal pain, chest pain, chills, congestion, coughing, fever, headaches, nausea, numbness or vomiting. Nothing aggravates the symptoms. She has tried oral narcotics, position changes and rest for the symptoms. The treatment provided moderate relief.   Back Pain  This is a chronic problem. The current episode started more than 1 year ago. The problem occurs constantly. The problem has been gradually worsening (worse since last evaluation) since onset. The pain is present in the lumbar spine. The pain radiates to the right knee, right foot and right thigh. The pain is at a severity of 6/10. The pain is moderate. Associated symptoms include weakness. Pertinent negatives include no abdominal pain, chest pain, dysuria, fever, headaches or numbness. Risk factors include lack of exercise and obesity. She has tried analgesics for the symptoms. The treatment provided moderate relief.      PEG Assessment   What number best describes your pain on average in the past week?7  What number best describes how, during the past week, pain has interfered with your enjoyment of life?5  What number best describes how, during the past week, pain has interfered with your general activity?  8    The following portions of the patient's history were reviewed and updated as appropriate: allergies, current medications, past family history, past medical history, past social history, past surgical history and problem list.    Review of Systems   Constitutional: Positive for activity change (decreased) and fatigue. Negative for chills and fever.   HENT: Negative for  "congestion.    Eyes: Negative for visual disturbance.   Respiratory: Negative for cough, chest tightness and shortness of breath.    Cardiovascular: Negative for chest pain.   Gastrointestinal: Positive for diarrhea. Negative for abdominal pain, constipation, nausea and vomiting.   Genitourinary: Negative for difficulty urinating, dyspareunia and dysuria.   Musculoskeletal: Positive for arthralgias (joint), back pain, joint swelling and neck pain.   Neurological: Positive for weakness. Negative for dizziness, light-headedness, numbness and headaches.   Psychiatric/Behavioral: Positive for sleep disturbance. Negative for agitation, self-injury and suicidal ideas. The patient is not nervous/anxious.      I have reviewed and confirmed the accuracy of the ROS as documented by the MA/LPN/RN Trini Zaidi, JOSE ANTONIO      Vitals:    10/05/21 1246   BP: 122/61   Pulse: 66   Temp: 96.7 °F (35.9 °C)   SpO2: 95%   Weight: 95.3 kg (210 lb)  Comment: patient states   Height: 162.6 cm (64\")   PainSc:   6   PainLoc: Knee         Objective   Physical Exam  Constitutional:       Appearance: She is well-developed.   HENT:      Head: Normocephalic and atraumatic.   Musculoskeletal:      Lumbar back: Tenderness present. Decreased range of motion.      Right knee: Swelling, erythema, ecchymosis and bony tenderness present. Decreased range of motion. Tenderness present over the lateral joint line.      Comments: Widespread OA changes with no acute synovitis    Wearing right soft sole shoe.   Neurological:      Mental Status: She is alert.      Gait: Gait abnormal (cane).   Psychiatric:         Speech: Speech normal.         Behavior: Behavior normal.         Assessment/Plan   Diagnoses and all orders for this visit:    1. Other chronic pain (Primary)  -     Urine Drug Screen Confirmation - Urine, Clean Catch; Future  -     POC Urine Drug Screen, Triage    2. DDD (degenerative disc disease), lumbar  -     Urine Drug Screen Confirmation - " Urine, Clean Catch; Future  -     POC Urine Drug Screen, Triage    3. Lumbar facet arthropathy  -     Urine Drug Screen Confirmation - Urine, Clean Catch; Future  -     POC Urine Drug Screen, Triage    4. Arthralgia of multiple joints  -     Urine Drug Screen Confirmation - Urine, Clean Catch; Future  -     POC Urine Drug Screen, Triage    5. Encounter for long-term (current) use of high-risk medication  -     Urine Drug Screen Confirmation - Urine, Clean Catch; Future  -     POC Urine Drug Screen, Triage    Other orders  -     HYDROcodone-acetaminophen (NORCO) 5-325 MG per tablet; Take 1 tablet by mouth Every 6 (Six) Hours As Needed for Moderate Pain .  Dispense: 120 tablet; Refill: 0      --- Routine UDS in office today as part of monitoring requirements for controlled substances.  The specimen was viewed and the immunoassay result reviewed and is +OPI(APPROPRIATE).  This specimen will be sent to [facility] laboratory for confirmation.     --- The patient signed an updated copy of the controlled substance agreement on 1-11-21  --- Refill hydrocodone DNF. Patient appears stable with current regimen. No adverse effects. Regarding continuation of opioids, there is no evidence of aberrant behavior or any red flags.  The patient continues with appropriate response to opioid therapy. ADL's remain intact by self.   --- Follow-up 2 months or sooner if needed.       LOUANN REPORT  As part of the patient's treatment plan, I am prescribing controlled substances. The patient has been made aware of appropriate use of such medications, including potential risk of somnolence, limited ability to drive and/or work safely, and the potential for dependence or overdose. It has also bee made clear that these medications are for use by this patient only, without concomitant use of alcohol or other substances unless prescribed.     Patient has completed prescribing agreement detailing terms of continued prescribing of controlled  substances, including monitoring LOUANN reports, urine drug screening, and pill counts if necessary. The patient is aware that inappropriate use will results in cessation of prescribing such medications.    As the clinician, I personally reviewed the LOUANN from 10-5-21 while the patient was in the office today.    History and physical exam exhibit continued safe and appropriate use of controlled substances.       Dictated utilizing Dragon dictation.

## 2021-10-07 ENCOUNTER — OFFICE VISIT (OUTPATIENT)
Dept: INTERNAL MEDICINE | Facility: CLINIC | Age: 76
End: 2021-10-07

## 2021-10-07 VITALS
HEIGHT: 64 IN | RESPIRATION RATE: 16 BRPM | HEART RATE: 69 BPM | OXYGEN SATURATION: 96 % | WEIGHT: 210 LBS | TEMPERATURE: 97.6 F | DIASTOLIC BLOOD PRESSURE: 70 MMHG | BODY MASS INDEX: 35.85 KG/M2 | SYSTOLIC BLOOD PRESSURE: 130 MMHG

## 2021-10-07 DIAGNOSIS — I10 PRIMARY HYPERTENSION: Primary | ICD-10-CM

## 2021-10-07 DIAGNOSIS — E78.5 DYSLIPIDEMIA: ICD-10-CM

## 2021-10-07 DIAGNOSIS — E03.9 ACQUIRED HYPOTHYROIDISM: ICD-10-CM

## 2021-10-07 DIAGNOSIS — T36.95XA ANTIBIOTIC-ASSOCIATED DIARRHEA: ICD-10-CM

## 2021-10-07 DIAGNOSIS — K52.1 ANTIBIOTIC-ASSOCIATED DIARRHEA: ICD-10-CM

## 2021-10-07 DIAGNOSIS — N18.30 STAGE 3 CHRONIC KIDNEY DISEASE, UNSPECIFIED WHETHER STAGE 3A OR 3B CKD (HCC): ICD-10-CM

## 2021-10-07 DIAGNOSIS — E11.9 TYPE 2 DIABETES MELLITUS WITHOUT COMPLICATION, WITHOUT LONG-TERM CURRENT USE OF INSULIN (HCC): ICD-10-CM

## 2021-10-07 DIAGNOSIS — Z23 NEED FOR INFLUENZA VACCINATION: ICD-10-CM

## 2021-10-07 PROCEDURE — 99214 OFFICE O/P EST MOD 30 MIN: CPT | Performed by: NURSE PRACTITIONER

## 2021-10-07 PROCEDURE — 90662 IIV NO PRSV INCREASED AG IM: CPT | Performed by: NURSE PRACTITIONER

## 2021-10-07 PROCEDURE — G0008 ADMIN INFLUENZA VIRUS VAC: HCPCS | Performed by: NURSE PRACTITIONER

## 2021-10-07 RX ORDER — AMOXICILLIN AND CLAVULANATE POTASSIUM 875; 125 MG/1; MG/1
TABLET, FILM COATED ORAL
COMMUNITY
Start: 2021-09-22 | End: 2022-01-13

## 2021-10-07 NOTE — PROGRESS NOTES
Subjective   Jung Short is a 76 y.o. female presenting today for follow up of   Chief Complaint   Patient presents with   • Follow-up   • Hypertension   • Diabetes       History of Present Illness     Patient Active Problem List   Diagnosis   • Chronic anemia   • Malignant neoplasm of upper-inner quadrant of right breast in female, estrogen receptor positive (Formerly [facility])   • Disc disorder of cervical region   • Neck pain   • Chronic pain   • Depression   • Type 2 diabetes mellitus without complication, without long-term current use of insulin (Formerly [facility])   • Dyslipidemia   • Gastroesophageal reflux disease with esophagitis   • Hypertension   • Hypothyroidism   • Incisional hernia   • Mitral valve insufficiency   • Nausea   • Osteopenia of spine   • Arthralgia of multiple joints   • Peripheral neuropathy   • Pulmonary hypertension ([facility])   • PITTS (dyspnea on exertion)   • Tricuspid valve insufficiency   • Cobalamin deficiency   • Vitamin D deficiency   • HFrEF (heart failure with reduced ejection fraction) ([facility]ly [facility])   • Arthritis of knee   • DDD (degenerative disc disease), lumbar   • Lumbar facet arthropathy   • Chronic gout of multiple sites   • Encounter for long-term (current) use of high-risk medication   • Primary osteoarthritis of left knee   • Physical deconditioning   • CKD (chronic kidney disease) stage 3, GFR 30-59 ml/min ([facility])   • Pleural effusion   • Acute respiratory failure with hypoxia ([facility])   • Periprosthetic subtrochanteric fracture of femur   • Traumatic closed nondisp torus fracture of distal radial metaphysis, right, initial encounter   • Atherosclerotic heart disease of native coronary artery without angina pectoris   • NICM (nonischemic cardiomyopathy) (Formerly [facility])       Current Outpatient Medications on File Prior to Visit   Medication Sig   • alosetron (LOTRONEX) 0.5 MG tablet Take 1 tablet by mouth 2 (Two) Times a Day.   • amoxicillin-clavulanate (AUGMENTIN) 875-125 MG per tablet TAKE 1 TABLET BY MOUTH TWICE  DAILY FOR 14 DAYS   • aspirin 81 MG EC tablet Take 81 mg by mouth Daily.   • bumetanide (BUMEX) 0.5 MG tablet Take 3 tablets by mouth 2 (Two) Times a Day.   • carvedilol (COREG) 25 MG tablet Take 1 tablet by mouth 2 (Two) Times a Day.   • D-1000 Extra Strength 25 MCG (1000 UT) tablet TAKE 1 TABLET BY MOUTH DAILY   • DULoxetine (CYMBALTA) 60 MG capsule TAKE 1 CAPSULE BY MOUTH DAILY.   • empagliflozin (JARDIANCE) 10 MG tablet tablet Take 1 tablet by mouth Daily.   • esomeprazole (nexIUM) 20 MG capsule Take 20 mg by mouth Every Morning Before Breakfast.   • HYDROcodone-acetaminophen (NORCO) 5-325 MG per tablet Take 1 tablet by mouth Every 6 (Six) Hours As Needed for Moderate Pain .   • levothyroxine (SYNTHROID, LEVOTHROID) 25 MCG tablet TAKE 1 TABLET BY MOUTH DAILY   • Multiple Vitamins-Minerals (MULTIVITAMIN ADULT PO) Take 1 tablet by mouth Daily.   • pravastatin (PRAVACHOL) 10 MG tablet TAKE 1 TABLET BY MOUTH AT BEDTIME   • pregabalin (LYRICA) 75 MG capsule TAKE 1 CAPSULE BY MOUTH 2 (TWO) TIMES A DAY.   • sacubitril-valsartan (Entresto) 49-51 MG tablet Take 1 tablet by mouth 2 (Two) Times a Day.     No current facility-administered medications on file prior to visit.      Pt returns for 6 wk f/u of HTN and DM. At last visit we d/c'ed Metformin and started Jardiance. She is tolerating this well.    She noticed resolution of chronic diarrhea after stopping Metformin. She has been prescribed abx by Podiatry and diarrhea has recurred w/ Augmentin.    The following portions of the patient's history were reviewed and updated as appropriate: allergies, current medications, past family history, past medical history, past social history, past surgical history and problem list.    Review of Systems   Respiratory: Negative for shortness of breath.         No orthopnea   Cardiovascular: Negative for chest pain and palpitations.   Neurological: Negative for weakness.       Objective   Vitals:    10/07/21 1402   BP: 130/70  "  Pulse: 69   Resp: 16   Temp: 97.6 °F (36.4 °C)   SpO2: 96%   Weight: 95.3 kg (210 lb)   Height: 162.6 cm (64.02\")       BP Readings from Last 3 Encounters:   10/07/21 130/70   10/05/21 122/61   10/04/21 142/80        Wt Readings from Last 3 Encounters:   10/07/21 95.3 kg (210 lb)   10/05/21 95.3 kg (210 lb)   10/04/21 95.6 kg (210 lb 12.8 oz)        Body mass index is 36.03 kg/m².  Nursing notes and vitals reviewed.    Physical Exam  Constitutional:       General: She is not in acute distress.     Appearance: She is well-developed.   Neck:      Thyroid: No thyroid mass or thyromegaly.   Cardiovascular:      Rate and Rhythm: Regular rhythm.      Heart sounds: S1 normal and S2 normal.   Pulmonary:      Effort: Pulmonary effort is normal.      Breath sounds: Normal breath sounds.   Musculoskeletal:      Cervical back: Neck supple.   Lymphadenopathy:      Cervical: No cervical adenopathy.   Neurological:      Mental Status: She is alert and oriented to person, place, and time.   Psychiatric:         Attention and Perception: She is attentive.         Behavior: Behavior normal.         Thought Content: Thought content normal.         Recent Results (from the past 672 hour(s))   Comprehensive Metabolic Panel    Collection Time: 09/27/21 11:19 AM    Specimen: Blood   Result Value Ref Range    Glucose 177 (H) 65 - 99 mg/dL    BUN 15 8 - 23 mg/dL    Creatinine 0.83 0.57 - 1.00 mg/dL    eGFR Non African Am 66 >60 mL/min/1.73    eGFR African Am 81 >60 mL/min/1.73    BUN/Creatinine Ratio 18.1 7.0 - 25.0    Sodium 145 136 - 145 mmol/L    Potassium 4.1 3.5 - 5.2 mmol/L    Chloride 106 98 - 107 mmol/L    Total CO2 27.8 22.0 - 29.0 mmol/L    Calcium 10.2 8.6 - 10.5 mg/dL    Total Protein 6.4 6.0 - 8.5 g/dL    Albumin 4.00 3.50 - 5.20 g/dL    Globulin 2.4 gm/dL    A/G Ratio 1.7 g/dL    Total Bilirubin 0.2 0.0 - 1.2 mg/dL    Alkaline Phosphatase 84 39 - 117 U/L    AST (SGOT) 13 1 - 32 U/L    ALT (SGPT) 9 1 - 33 U/L   Hemoglobin A1c "    Collection Time: 09/27/21 11:19 AM    Specimen: Blood   Result Value Ref Range    Hemoglobin A1C 6.70 (H) 4.80 - 5.60 %   POC Urine Drug Screen, Triage    Collection Time: 10/05/21  1:30 PM    Specimen: Urine   Result Value Ref Range    Methamphetaine Screen, Urine Negative Negative    POC Amphetamines Negative Negative    Barbiturates Screen Negative Negative    Benzodiazepine Screen Negative Negative    Cocaine Screen Negative Negative    Methadone Screen Negative Negative    Opiate Screen Positive (A) Negative    Oxycodone, Screen Negative Negative    Phencyclidine (PCP) Screen Negative Negative    Propoxyphene Screen Negative Negative    THC, Screen Negative Negative    Tricyclic Antidepressants Screen Negative Negative         Assessment/Plan   Diagnoses and all orders for this visit:    1. Primary hypertension (Primary)  Comments:  - controlled    2. Type 2 diabetes mellitus without complication, without long-term current use of insulin (HCC)  Comments:  - controlled    3. Antibiotic-associated diarrhea  Comments:  - yogurt each day    4. Need for influenza vaccination  -     Fluzone High-Dose 65+yrs ([id number])        Except as noted above, pt will continue current medications as noted in the medication list. I will continue to authorize refills as needed.        Medications, including side effects, were discussed with the patient. Patient verbalized understanding.  The plan of care was discussed. All questions were answered. Patient verbalized understanding.      Return in about 3 months (around 1/19/2022) for fatsing labs one week prior to, Medicare Wellness.

## 2021-10-29 RX ORDER — RESVER/WINE/BFL/GRPSD/PC/C/POM 200MG-60MG
CAPSULE ORAL
Qty: 90 TABLET | Refills: 3 | Status: SHIPPED | OUTPATIENT
Start: 2021-10-29 | End: 2022-11-15

## 2021-10-29 RX ORDER — PRAVASTATIN SODIUM 10 MG
TABLET ORAL
Qty: 90 TABLET | Refills: 3 | Status: SHIPPED | OUTPATIENT
Start: 2021-10-29 | End: 2022-11-15

## 2021-11-02 RX ORDER — HYDROCODONE BITARTRATE AND ACETAMINOPHEN 5; 325 MG/1; MG/1
1 TABLET ORAL EVERY 6 HOURS PRN
Qty: 120 TABLET | Refills: 0 | Status: SHIPPED | OUTPATIENT
Start: 2021-11-02 | End: 2021-12-06 | Stop reason: SDUPTHER

## 2021-11-02 NOTE — TELEPHONE ENCOUNTER
Medication Refill Request    Date of phone call: 11/02/2021    Medication being requested: hydrocodone 5-325 mg sig: Take 1 tablet by mouth Every 6 (Six) Hours As Needed for Moderate Pain   Qty: 120    Date of last visit: 10/05/2021    Date of last refill:     LOUANN up to date?:     Next Follow up?: 12/06/2021    Any new pertinent information? (i.e, new medication allergies, new use of medications, change in patient's health or condition, non-compliance or inconsistency with prescribing agreement?): per pt can you please fill by Saturday morning , pt will be leaving to [place] and she wont be back until late Sunday and pharmacy will be closed . Thanks .

## 2021-11-24 ENCOUNTER — TELEPHONE (OUTPATIENT)
Dept: CARDIOLOGY | Facility: CLINIC | Age: 76
End: 2021-11-24

## 2021-11-24 NOTE — TELEPHONE ENCOUNTER
The patient phoned stating her BP had been elevated daily over the last week in the 170s-180s/80s-70s in the last week.   She is being treated for a wound on her foot weekly over the last 7 weeks.   She feels well without any additional symptoms.   Please advise.   Thanks

## 2021-11-29 ENCOUNTER — TELEPHONE (OUTPATIENT)
Dept: PAIN MEDICINE | Facility: CLINIC | Age: 76
End: 2021-11-29

## 2021-11-29 NOTE — TELEPHONE ENCOUNTER
Instructions reviewed with the patient and she verbalized understanding.    Thank you,  Lakshmi Ford RN  [facility]  Triage

## 2021-12-02 ENCOUNTER — TELEPHONE (OUTPATIENT)
Dept: PAIN MEDICINE | Facility: CLINIC | Age: 76
End: 2021-12-02

## 2021-12-02 NOTE — TELEPHONE ENCOUNTER
Caller: JOSSELIN GUAJARDO    Relationship: SELF    Best call back number:     Requested Prescriptions:   Requested Prescriptions      No prescriptions requested or ordered in this encounter     HYDROcodone-acetaminophen (NORCO) 5-325 MG        Pharmacy where request should be sent:  [facility] - [place], [place] - [address]       Does the patient have less than a 3 day supply:  [] Yes  [x] No    Allan Walker Rep   12/02/21 11:34 EST

## 2021-12-06 RX ORDER — HYDROCODONE BITARTRATE AND ACETAMINOPHEN 5; 325 MG/1; MG/1
1 TABLET ORAL EVERY 6 HOURS PRN
Qty: 120 TABLET | Refills: 0 | Status: SHIPPED | OUTPATIENT
Start: 2021-12-06 | End: 2021-12-29 | Stop reason: SDUPTHER

## 2021-12-06 NOTE — TELEPHONE ENCOUNTER
Reviewed last office visit on 10/5/2021. UDS and LOUANN reviewed and are appropriate. Due to JOSE ANTONIO Barrett being out of office, will refill appropriately.

## 2021-12-06 NOTE — TELEPHONE ENCOUNTER
Medication Refill Request    Date of phone call: 21    Medication being requested: norco 5/325mg si tab po q 6 hours prn   Qty: 120    Date of last visit: 10/5/21    Date of last refill:     LOUANN up to date?:     Next Follow up?: 22    Any new pertinent information? (i.e, new medication allergies, new use of medications, change in patient's health or condition, non-compliance or inconsistency with prescribing agreement?): pt called this am. We had to cancel her appt today due to Trini out of office, she sts she is out of meds and needs refill today. Can you please send for Trini? Please advise. Thank you:)

## 2021-12-07 RX ORDER — CARVEDILOL 25 MG/1
TABLET ORAL
Qty: 180 TABLET | Refills: 1 | Status: SHIPPED | OUTPATIENT
Start: 2021-12-07 | End: 2022-06-13

## 2021-12-14 ENCOUNTER — TELEPHONE (OUTPATIENT)
Dept: INTERNAL MEDICINE | Facility: CLINIC | Age: 76
End: 2021-12-14

## 2021-12-14 NOTE — TELEPHONE ENCOUNTER
Caller: Jung Short    Relationship to patient: Self    Best call back number: [phone]    Date of positive COVID19 test: 12/13    Date if possible COVID19 exposure: HAS BEEN HAVING SYMPTOMS FOR 4 DAYS    COVID19 symptoms: SHORTNESS OF BREATH, SORE THROAT, COUGH, SINUS DRAINAGE, HEADACHE, FEVER    Date of initial quarantine: 12/10    Additional information or concerns: PATIENT IS WANTING TO GET ORDERS FOR THE MONOCLONAL ANTIBODY INFUSION.   PATIENT DOES NOT KNOW WHERE TO GO.   PLEASE ADVISE PATIENT.     What is the patients preferred pharmacy:     [facility] [place] - [place], [place] - [address] [facility] - [phone] [facility] [phone]   [phone]

## 2021-12-15 ENCOUNTER — TELEMEDICINE (OUTPATIENT)
Dept: INTERNAL MEDICINE | Facility: CLINIC | Age: 76
End: 2021-12-15

## 2021-12-15 ENCOUNTER — TRANSCRIBE ORDERS (OUTPATIENT)
Dept: ADMINISTRATIVE | Facility: HOSPITAL | Age: 76
End: 2021-12-15

## 2021-12-15 VITALS — OXYGEN SATURATION: 96 % | DIASTOLIC BLOOD PRESSURE: 71 MMHG | SYSTOLIC BLOOD PRESSURE: 140 MMHG | HEART RATE: 64 BPM

## 2021-12-15 DIAGNOSIS — U07.1 CLINICAL DIAGNOSIS OF SEVERE ACUTE RESPIRATORY SYNDROME CORONAVIRUS 2 (SARS-COV-2) DISEASE: Primary | ICD-10-CM

## 2021-12-15 DIAGNOSIS — U07.1 COVID-19 VIRUS INFECTION: Primary | ICD-10-CM

## 2021-12-15 PROCEDURE — 99213 OFFICE O/P EST LOW 20 MIN: CPT | Performed by: INTERNAL MEDICINE

## 2021-12-16 ENCOUNTER — HOSPITAL ENCOUNTER (EMERGENCY)
Facility: HOSPITAL | Age: 76
Discharge: NURSING, REHAB OR LONG-TERM CARE | End: 2021-12-16
Attending: EMERGENCY MEDICINE | Admitting: EMERGENCY MEDICINE

## 2021-12-16 ENCOUNTER — HOSPITAL ENCOUNTER (OUTPATIENT)
Dept: INFUSION THERAPY | Facility: HOSPITAL | Age: 76
Discharge: HOME | End: 2021-12-16
Admitting: INTERNAL MEDICINE

## 2021-12-16 VITALS
DIASTOLIC BLOOD PRESSURE: 87 MMHG | TEMPERATURE: 97.6 F | RESPIRATION RATE: 18 BRPM | OXYGEN SATURATION: 97 % | HEART RATE: 60 BPM | SYSTOLIC BLOOD PRESSURE: 173 MMHG

## 2021-12-16 VITALS
SYSTOLIC BLOOD PRESSURE: 179 MMHG | WEIGHT: 208 LBS | HEIGHT: 65 IN | RESPIRATION RATE: 18 BRPM | OXYGEN SATURATION: 96 % | DIASTOLIC BLOOD PRESSURE: 91 MMHG | TEMPERATURE: 98.4 F | HEART RATE: 62 BPM | BODY MASS INDEX: 34.66 KG/M2

## 2021-12-16 DIAGNOSIS — U07.1 COVID-19 VIRUS INFECTION: ICD-10-CM

## 2021-12-16 DIAGNOSIS — S50.11XA CONTUSION OF RIGHT ELBOW AND FOREARM, INITIAL ENCOUNTER: Primary | ICD-10-CM

## 2021-12-16 PROCEDURE — 25010000002 INJECTION, CASIRIVIMAB AND IMDEVIMAB, 1200 MG: Performed by: INTERNAL MEDICINE

## 2021-12-16 PROCEDURE — M0243 CASIRIVI AND IMDEVI INFUSION: HCPCS | Performed by: INTERNAL MEDICINE

## 2021-12-16 PROCEDURE — 99282 EMERGENCY DEPT VISIT SF MDM: CPT | Performed by: EMERGENCY MEDICINE

## 2021-12-16 PROCEDURE — 99282 EMERGENCY DEPT VISIT SF MDM: CPT

## 2021-12-16 RX ORDER — DIPHENHYDRAMINE HCL 50 MG
50 CAPSULE ORAL ONCE AS NEEDED
Status: DISCONTINUED | OUTPATIENT
Start: 2021-12-16 | End: 2021-12-18 | Stop reason: HOSPADM

## 2021-12-16 RX ORDER — DIPHENHYDRAMINE HYDROCHLORIDE 50 MG/ML
50 INJECTION INTRAMUSCULAR; INTRAVENOUS ONCE AS NEEDED
Status: DISCONTINUED | OUTPATIENT
Start: 2021-12-16 | End: 2021-12-18 | Stop reason: HOSPADM

## 2021-12-16 RX ORDER — EPINEPHRINE 1 MG/ML
0.3 INJECTION, SOLUTION, CONCENTRATE INTRAVENOUS AS NEEDED
Status: DISCONTINUED | OUTPATIENT
Start: 2021-12-16 | End: 2021-12-18 | Stop reason: HOSPADM

## 2021-12-16 RX ADMIN — IMDEVIMAB: 300 INJECTION, SOLUTION, CONCENTRATE INTRAVENOUS at 16:22

## 2021-12-16 NOTE — ED NOTES
Pt presents to the ED with complaints of a fall outside our facility on the way in for her outpatient antibody infusion. Pt reports right arm pain near the elbow and shoulder. PT reporting she doesn't think she needs xrays however she does not want to refuse them since the accident happened on hospital property. Pt able to move her arm with limited ROM.      Virginia Winkler, RN  12/16/21 2437

## 2021-12-16 NOTE — NURSING NOTE
"Pt arrived to [facility] for SQ regeneron per MD order. Pt given handout titled, \"Fact Sheet for Patients, Parents and Caregivers: Emergency Use Authorization of Regen-cov\" prior to administration of SQ regeneron. RN educated pt on injection process, to not rcv any covid vaccine for 90 days, to go to ER for any issues with worsening breathing and to call PCP if symptoms are not improving. Pt vu. Pt denies any questions at this time.     MD order located in media tab.    Upon arrival, pt states she tripped in area of blue [facility] near outpatient covid clinic. Pt's daughter escorted pt to entrance door where RN was inside awaiting pt's arrival. Per pt and her daughter, pt did not fall to ground. Pt states she tripped and hit her R hand on side of dumper. Pt washed hands. No open wounds noted. Full movement noted in hands. RN offered pt to be escorted via wheelchair to ER to be properly evaluated. Pt declined to go to ER. Pt states she \"did not hit anything other than my hand\". RN again offered pt to go to ER to be evaluated and she declined. RN called Junior, House Supervisor, to inform her of situation.    Regeneron given x4 injections consecutively, each at a different injection sites. See MAR for additional administration information. Pt tolerated each injection without any issues. Pt instructed to remain in room for 1 hour for post monitoring period. Call light within reach.     Post monitoring complete. VS obtained and documented.  AVS given to pt. At discharge, pt states she now has pain in her R arm that she did not have before arrival. Pt also noticed two bruises on R arm. She expressed concern about pain. Rn advised pt to be evaluated in ER. Security visited pt in room to discuss tripping incident. House Supervisor notified. Pt escorted to room 7 in ER via wheelchair by RN in stable condition.    "

## 2021-12-17 ENCOUNTER — PATIENT OUTREACH (OUTPATIENT)
Dept: CASE MANAGEMENT | Facility: OTHER | Age: 76
End: 2021-12-17

## 2021-12-17 NOTE — OUTREACH NOTE
Ambulatory Case Management Note  Patient Outreach  Talked with patient. Discussed 12/16/21 ED visit regarding contusion to right elbow and COVID 19 virus infection. Patient states to have tested positive for COVID 19; contacted PCP; had.COVID infusion on 12/16/21 and discharged home following ED visit. Patient states to continue with quarantine; nonproductive cough; episodes of low grade fever;; nausea; and no difficulty with chest pain;  SOB;sleeping; or headaches.     Reviewed ED AVS recommendations; quarantine education; education regarding being free of fever for 24 hours without use of Tylenol; hydration;  24/7 Nurse Triage Line; COVID 19 information telephone line; ACM contact information;  My Chart; Telehealth appointment availability;  affordability of food; medications; transportation; continued monitoring of symptoms; evaluation of worsening symptoms and establishing contact with PCP for follow up recommendations.Patient states has contacted PCP and will follow up. Patient verbalized understanding and appreciation for patient outreach. No further questions or concerns voiced at this time.      General & Health Literacy Assessment    Questions/Answers      Most Recent Value   Assessment Completed With Patient   Living Arrangement Alone  [Patient lives alone,  independent with ADL's,  light  meal preparation,  transportation,  ambulates with cane receiving assistance as needed.]   Type of Residence Private Residence   Equiptment Used at Home Cane,  Walker   Bed or Wheelchair Confined No   Difficulty Keeping Appointments No        Care Evaluation    Questions/Answers      Most Recent Value   Annual Wellness Visit:  Patient Has Completed   Care Gaps Addressed Other (See Comment),  Flu Shot,  Pneumonia Vaccine,  Colon Cancer Screening,  Diabetic A1C  [COVID 19 vaccine completed]   Colon Cancer Screening Type Colonoscopy   Colonoscopy Status Up to Date (< 10 yrs)   HbA1c Status Up to Date-within defined limits    HbA1c Completion at [facility] or Other [facility]   Flu Shot Status Up to Date   Pneumonia Vaccine Status Up to Date   Other Patient Education/Resources  24/7 [facility] Healthcare Nurse Call Line,  Advanced Care Planning,  [facility]   24/7 Nurse Call Line Education Method Verbal   ACP Education Method Verbal  [Completed]   [facility] Verbal  [Active]   Advanced Directives: Patient Has   Medication Adherence Medications understood  [ Patient states to be compliant with medications,  medical appointments and no barriers regarding food,  medications or transportation. ]   Healthy Lifestyle (Self-Efficacy) recognizes when to stop activity,  recognizes when to contact medical assistance,  self-reports important symptoms to medical professional      SDOH updated and reviewed with the patient during this program:     Financial Resource Strain: Low Risk    • Difficulty of Paying Living Expenses: Not hard at all       Food Insecurity: No Food Insecurity   • Worried About Running Out of Food in the Last Year: Never true   • Ran Out of Food in the Last Year: Never true       Transportation Needs: No Transportation Needs   • Lack of Transportation (Medical): No   • Lack of Transportation (Non-Medical): No             Shanda Gerardo RN  Ambulatory Case Management    12/17/2021, 11:51 EST

## 2021-12-17 NOTE — ED PROVIDER NOTES
EMERGENCY DEPARTMENT ENCOUNTER      Room Number: 07/07      HPI:    Chief complaint: Arm injury    Location: Right elbow and proximal forearm    Quality/Severity: Minor    Timing/Duration: Injury occurred just prior to arrival    Modifying Factors: Movement of elbow causes pain    Associated Symptoms: Bruising    Narrative: Pt is a 76 y.o. female who presents complaining of right elbow pain as noted above.  Patient states that she was entering our hospital facility for a monoclonal antibody infusion when she slipped on some mud and fell into a large garbage can.  Patient denies falling to the ground and is only complaining of right elbow pain.  No head, neck or back pain.      PMD: Kathryn Epstein APRN    REVIEW OF SYSTEMS  Review of Systems   Constitutional: Positive for activity change (Current quarantine for COVID-19) and fatigue.   Respiratory: Positive for cough. Negative for shortness of breath.    Cardiovascular: Negative for chest pain.   Musculoskeletal:        Current right elbow injury   Neurological: Negative for dizziness, syncope and light-headedness.   All other systems reviewed and are negative.      PAST MEDICAL HISTORY  Active Ambulatory Problems     Diagnosis Date Noted   • Chronic anemia 02/02/2016   • Malignant neoplasm of upper-inner quadrant of right breast in female, estrogen receptor positive (HCC) 06/24/2015   • Disc disorder of cervical region 02/02/2016   • Neck pain 02/02/2016   • Chronic pain 02/02/2016   • Depression 02/02/2016   • Type 2 diabetes mellitus without complication, without long-term current use of insulin (HCC) 02/02/2016   • Dyslipidemia 02/02/2016   • Gastroesophageal reflux disease with esophagitis 02/02/2016   • Hypertension 02/02/2016   • Hypothyroidism 02/02/2016   • Incisional hernia 02/02/2016   • Mitral valve insufficiency 02/02/2016   • Nausea 02/02/2016   • Osteopenia of spine 02/02/2016   • Arthralgia of multiple joints 02/02/2016   • Peripheral  neuropathy 02/02/2016   • Pulmonary hypertension ([facility]) 02/02/2016   • PITTS (dyspnea on exertion) 02/02/2016   • Tricuspid valve insufficiency 02/02/2016   • Cobalamin deficiency 02/02/2016   • Vitamin D deficiency 02/02/2016   • HFrEF (heart failure with reduced ejection fraction) ([facility]) 02/02/2016   • Arthritis of knee 05/17/2016   • DDD (degenerative disc disease), lumbar 05/17/2016   • Lumbar facet arthropathy 05/17/2016   • Chronic gout of multiple sites 08/02/2016   • Encounter for long-term (current) use of high-risk medication 09/22/2016   • Primary osteoarthritis of left knee 09/05/2017   • Physical deconditioning 04/20/2018   • CKD (chronic kidney disease) stage 3, GFR 30-59 ml/min ([facility]) 08/30/2019   • Pleural effusion 01/31/2020   • Acute respiratory failure with hypoxia ([facility]) 01/31/2020   • Periprosthetic subtrochanteric fracture of femur 02/28/2020   • Traumatic closed nondisp torus fracture of distal radial metaphysis, right, initial encounter 02/28/2020   • Atherosclerotic heart disease of native coronary artery without angina pectoris 12/01/2016   • NICM (nonischemic cardiomyopathy) ([facility]) 01/19/2021     Resolved Ambulatory Problems     Diagnosis Date Noted   • Abdominal bloating 02/02/2016   • Abdominal mass 02/02/2016   • Abdominal pain 02/02/2016   • Abnormal gait 02/02/2016   • Abnormal mammogram 02/02/2016   • Acute bronchitis 02/02/2016   • Acute upper respiratory infection 02/02/2016   • Infection due to fungus 02/02/2016   • Atherosclerosis of coronary artery 02/02/2016   • Hematochezia 02/02/2016   • Thoracic back pain 02/02/2016   • Candidiasis 02/02/2016   • Cardiomyopathy ([facility]) 02/02/2016   • Contusion of chest wall 02/02/2016   • Tenderness of chest wall 02/02/2016   • Anemia due to stage 3 chronic kidney disease ([facility]) 02/02/2016   • Congestive heart failure ([facility]) 02/02/2016   • Constipation 02/02/2016   • Generalized osteoarthritis 02/02/2016   • Degeneration of intervertebral disc  02/02/2016   • Type 2 diabetes mellitus ([facility]) 02/02/2016   • Diarrhea 02/02/2016   • Fall in home 02/02/2016   • Fall on same level from slipping, tripping or stumbling 02/02/2016   • Generalized pain 02/02/2016   • Gout 02/02/2016   • Injury of head 02/02/2016   • Hyperkalemia 02/02/2016   • Hypokalemia 02/02/2016   • Hypomagnesemia 02/02/2016   • Infection of toe 02/02/2016   • Mass of breast 02/02/2016   • Muscle pain 02/02/2016   • Nausea and vomiting 02/02/2016   • Gangrene of toe ([facility]) 02/02/2016   • Adult body mass index greater than 30 02/02/2016   • Painful breathing 02/02/2016   • Skin lesion 02/02/2016   • Rib pain 02/02/2016   • Hematoma of scalp 02/02/2016   • Seborrheic keratosis 02/02/2016   • Osteomyelitis ([facility]) 02/02/2016   • Urinary tract infection 02/02/2016   • Weight loss 02/02/2016   • Weight gain 02/02/2016   • Diabetes mellitus ([facility]) 02/02/2016   • Left knee pain 05/17/2016   • Pain in shoulder 08/02/2016   • Shortness of breath 08/16/2016   • History of cancer 09/22/2016   • Constipation due to opioid therapy 10/20/2016   • Syncope, psychogenic 04/19/2017   • Transient alteration of awareness 04/19/2017   • Closed fracture of patella 09/05/2017   • Radius/ulna fracture, left, closed, initial encounter 10/21/2017   • Rib pain on left side 11/27/2017   • Encounter for long-term (current) use of medications 01/18/2018   • Preoperative cardiovascular examination 04/09/2018   • Osteoarthritis of left knee 04/17/2018   • Hyponatremia 05/10/2018   • Needs flu shot 10/05/2018   • Acute URI 01/29/2019   • Chronic kidney disease 05/24/2019   • Chest pain 08/16/2019   • Acute on chronic HFrEF (heart failure with reduced ejection fraction) ([facility]) 08/17/2019   • ADRIENNE (acute kidney injury) ([facility]) 09/01/2019   • Diastolic congestive heart failure ([facility]) 09/01/2019   • MRSA infection 01/29/2013   • Acute on chronic congestive heart failure (HCC) 04/10/2020     Past Medical History:   Diagnosis Date   • Anemia     • Benign breast disease    • Cancer (HCC) 2015   • Cellulitis of leg    • CHF (congestive heart failure) (HCC)    • Chronic ulcer of right foot (HCC)    • Cluster headache    • Colon polyp    • Diabetic peripheral neuropathy (HCC)    • Difficulty walking    • Diverticulosis    • Femoral fracture (HCC)    • Fibromyalgia, primary    • Fracture of left wrist    • Gastroesophageal reflux    • Hiatal hernia    • Hyperlipidemia    • Impingement syndrome of right shoulder    • Joint pain    • Menopausal disorder    • Methicillin resistant Staphylococcus aureus infection 2012   • Nonischemic cardiomyopathy (HCC)    • Osteoarthritis    • Paroxysmal atrial fibrillation (HCC)    • Shoulder pain, bilateral    • Sleep apnea    • Toe ulcer (HCC)        PAST SURGICAL HISTORY  Past Surgical History:   Procedure Laterality Date   • ABDOMINAL SURGERY  2012    had to be reopened after bladder surgery d/t infection   • AMPUTATION FOOT / TOE Right 11/2013    Rt foot amputation MTP first toe   • BLADDER SURGERY  2012   • BREAST BIOPSY     • BREAST SURGERY  06/29/2015    Percutaneous ultrasound-guided placement of metal localized clip 1st lesion   • CARDIAC CATHETERIZATION  2015   • CARDIAC CATHETERIZATION N/A 1/25/2021    Procedure: Right and Left Heart Cath;  Surgeon: Nicholas Andrade MD;  Location: [facility] CATH INVASIVE LOCATION;  Service: Cardiovascular;  Laterality: N/A;  drop in EF, with hx of NICM, SOA fatigue.  Discussed with    • CARDIAC CATHETERIZATION N/A 1/25/2021    Procedure: Left ventriculography;  Surgeon: Nicholas Andrade MD;  Location:  [facility] CATH INVASIVE LOCATION;  Service: Cardiovascular;  Laterality: N/A;   • CARDIAC CATHETERIZATION N/A 1/25/2021    Procedure: Coronary angiography;  Surgeon: Nicholas Andrade MD;  Location:  [facility] CATH INVASIVE LOCATION;  Service: Cardiovascular;  Laterality: N/A;   • CATARACT EXTRACTION Bilateral 2017   • COLONOSCOPY     • DEBRIDEMENT  FOOT Right 01/2013    During  hospitalization    • EPIDURAL BLOCK      4 chronic back pain and leg pain last epidurals done  she had no benefit at all from this procedure.   • FEMUR FRACTURE SURGERY     • FOOT SURGERY Bilateral     several   • HERNIA REPAIR      At the abdomen 2007 Dr. Mendez   • INCISIONAL HERNIA REPAIR  2014    Recurrent. Incarcerated. With mesh implantation   • MASTECTOMY Right 2015   • MASTECTOMY MODIFIED RADICAL W/ AXILLARY LYMPH NODES W/ OR W/O PECTORALIS MINOR Right    • SHOULDER SURGERY Right     x2 RCR   • TOTAL ABDOMINAL HYSTERECTOMY      with oophorectomy-Done 2012 secondary to vaginal wall prolapse.   • TOTAL KNEE ARTHROPLASTY Right    • TOTAL KNEE ARTHROPLASTY Left 2018    Procedure: TOTAL KNEE ARTHROPLASTY;  Surgeon: Live Chambers MD;  Location: [facility];  Service: Orthopedics       FAMILY HISTORY  Family History   Problem Relation Age of Onset   • Colonic polyp Mother    • Hypertension Mother    • Migraines Mother    • Mental illness Mother    • Depression Mother    • Arthritis Mother    • Coronary artery disease Father    • Other Father         Cardiac Disorder   • Colon cancer Father    • Kidney disease Father    • Cancer Father    • Diabetes Father    • Heart disease Father    • Hypertension Father    • Breast cancer Maternal Aunt    • Colon cancer Brother    • Alcohol abuse Brother    • Arthritis Sister    • Hypertension Brother    • Lung disease Brother    • Alcohol abuse Brother    • Malig Hyperthermia Neg Hx        SOCIAL HISTORY  Social History     Socioeconomic History   • Marital status:    • Number of children: 2   Tobacco Use   • Smoking status: Former Smoker     Packs/day: 3.00     Years: 30.00     Pack years: 90.00     Types: Cigarettes     Quit date:      Years since quittin.9   • Smokeless tobacco: Never Used   Vaping Use   • Vaping Use: Never used   Substance and Sexual Activity   • Alcohol use: No     Comment: Caffeine use: 3 cups daily    • Drug  use: No   • Sexual activity: Defer     Partners: Male     Comment: celibate       ALLERGIES  Dilaudid [hydromorphone] and Latex    PHYSICAL EXAM  ED Triage Vitals [12/16/21 1845]   Temp Heart Rate Resp BP SpO2   98.4 °F (36.9 °C) 62 18 179/91 96 %      Temp src Heart Rate Source Patient Position BP Location FiO2 (%)   Oral Monitor Sitting Left arm --       Physical Exam  Constitutional:       Appearance: She is obese.   Musculoskeletal:      Cervical back: Normal range of motion and neck supple.      Comments: Examination of the right elbow does show a 2 x 3 cm tender area of swelling just distal to the radial head.  Full range of motion maintained in the elbow joint and no palpable effusions.  Neuromuscular vascular exams intact distally.  Full range of motion maintained in the right shoulder.   Skin:     General: Skin is warm and dry.   Neurological:      General: No focal deficit present.      Mental Status: She is alert and oriented to person, place, and time.   Psychiatric:         Mood and Affect: Mood normal.         Behavior: Behavior normal.         Thought Content: Thought content normal.         Judgment: Judgment normal.         LAB RESULTS  Results for orders placed or performed in visit on 10/05/21   POC Urine Drug Screen, Triage    Specimen: Urine   Result Value Ref Range    Methamphetaine Screen, Urine Negative Negative    POC Amphetamines Negative Negative    Barbiturates Screen Negative Negative    Benzodiazepine Screen Negative Negative    Cocaine Screen Negative Negative    Methadone Screen Negative Negative    Opiate Screen Positive (A) Negative    Oxycodone, Screen Negative Negative    Phencyclidine (PCP) Screen Negative Negative    Propoxyphene Screen Negative Negative    THC, Screen Negative Negative    Tricyclic Antidepressants Screen Negative Negative         I ordered the above labs and reviewed the results    RADIOLOGY  No results found.    I ordered the above radiologic testing and  reviewed the results    PROCEDURES  Procedures      PROGRESS AND CONSULTS  ED Course as of 12/16/21 2048   Thu Dec 16, 2021   1939 Diagnosis of contusion discussed with patient along with treatment plan, expectations and warnings. It should be noted that full PPE was worn during face-to-face contact with this patient. [ML]      ED Course User Index  [ML] Rm Chowdhury MD           MEDICAL DECISION MAKING  Results were reviewed/discussed with the patient and they were also made aware of online access. Pt also made aware that some labs, such as cultures, will not be resulted during ER visit and follow up with PMD is necessary.     MDM       DIAGNOSIS  Final diagnoses:   Contusion of right elbow and forearm, initial encounter   COVID-19 virus infection       Latest Documented Vital Signs:  As of 20:48 EST  BP- 179/91 HR- 62 Temp- 98.4 °F (36.9 °C) (Oral) O2 sat- 96%    DISPOSITION  Discharged in stable condition       Medication List      No changes were made to your prescriptions during this visit.          Follow-up Information     Kathryn Epstein, APRN.    Specialties: Family Medicine, Internal Medicine, Emergency Medicine  Why: As needed  Contact information:  [address] [facility] [address]  [place] [address]  [phone]                            Rm Chowdhury MD  12/16/21 2048

## 2021-12-21 DIAGNOSIS — G89.29 OTHER CHRONIC PAIN: ICD-10-CM

## 2021-12-21 RX ORDER — PREGABALIN 75 MG/1
75 CAPSULE ORAL 2 TIMES DAILY
Qty: 60 CAPSULE | Refills: 3 | Status: SHIPPED | OUTPATIENT
Start: 2021-12-21 | End: 2022-04-28

## 2021-12-29 RX ORDER — HYDROCODONE BITARTRATE AND ACETAMINOPHEN 5; 325 MG/1; MG/1
1 TABLET ORAL EVERY 6 HOURS PRN
Qty: 120 TABLET | Refills: 0 | Status: SHIPPED | OUTPATIENT
Start: 2021-12-29 | End: 2022-02-02 | Stop reason: SDUPTHER

## 2021-12-29 NOTE — TELEPHONE ENCOUNTER
Medication Refill Request    Date of phone call: 21    Medication being requested: Norco 5-325 mg   si tab po q 6 hrs prn  Qty: 120    Date of last visit: 10/5/21    Date of last refill:     LOUANN up to date?: no    Next Follow up?: 22    Any new pertinent information? (i.e, new medication allergies, new use of medications, change in patient's health or condition, non-compliance or inconsistency with prescribing agreement?):

## 2021-12-30 ENCOUNTER — APPOINTMENT (OUTPATIENT)
Dept: CT IMAGING | Facility: HOSPITAL | Age: 76
End: 2021-12-30

## 2021-12-30 ENCOUNTER — HOSPITAL ENCOUNTER (EMERGENCY)
Facility: HOSPITAL | Age: 76
Discharge: HOME | End: 2021-12-30
Attending: EMERGENCY MEDICINE | Admitting: EMERGENCY MEDICINE

## 2021-12-30 ENCOUNTER — PATIENT OUTREACH (OUTPATIENT)
Dept: CASE MANAGEMENT | Facility: OTHER | Age: 76
End: 2021-12-30

## 2021-12-30 ENCOUNTER — APPOINTMENT (OUTPATIENT)
Dept: GENERAL RADIOLOGY | Facility: HOSPITAL | Age: 76
End: 2021-12-30

## 2021-12-30 VITALS
TEMPERATURE: 98 F | BODY MASS INDEX: 36.64 KG/M2 | RESPIRATION RATE: 20 BRPM | DIASTOLIC BLOOD PRESSURE: 76 MMHG | SYSTOLIC BLOOD PRESSURE: 162 MMHG | HEIGHT: 65 IN | WEIGHT: 219.9 LBS | OXYGEN SATURATION: 97 % | HEART RATE: 71 BPM

## 2021-12-30 DIAGNOSIS — R03.0 ELEVATED BLOOD PRESSURE READING: ICD-10-CM

## 2021-12-30 DIAGNOSIS — R07.9 CHEST PAIN, UNSPECIFIED TYPE: Primary | ICD-10-CM

## 2021-12-30 LAB
ALBUMIN SERPL-MCNC: 3.9 G/DL (ref 3.5–5.2)
ALBUMIN/GLOB SERPL: 1.2 G/DL
ALP SERPL-CCNC: 116 U/L (ref 39–117)
ALT SERPL W P-5'-P-CCNC: 10 U/L (ref 1–33)
ANION GAP SERPL CALCULATED.3IONS-SCNC: 12.5 MMOL/L (ref 5–15)
AST SERPL-CCNC: 15 U/L (ref 1–32)
BASOPHILS # BLD AUTO: 0.03 10*3/MM3 (ref 0–0.2)
BASOPHILS NFR BLD AUTO: 0.6 % (ref 0–1.5)
BILIRUB SERPL-MCNC: 0.4 MG/DL (ref 0–1.2)
BUN SERPL-MCNC: 13 MG/DL (ref 8–23)
BUN/CREAT SERPL: 16.7 (ref 7–25)
CALCIUM SPEC-SCNC: 10 MG/DL (ref 8.6–10.5)
CHLORIDE SERPL-SCNC: 104 MMOL/L (ref 98–107)
CO2 SERPL-SCNC: 27.5 MMOL/L (ref 22–29)
CREAT SERPL-MCNC: 0.78 MG/DL (ref 0.57–1)
D DIMER PPP FEU-MCNC: 1.3 MCGFEU/ML (ref 0–0.46)
DEPRECATED RDW RBC AUTO: 46.8 FL (ref 37–54)
EOSINOPHIL # BLD AUTO: 0.18 10*3/MM3 (ref 0–0.4)
EOSINOPHIL NFR BLD AUTO: 3.4 % (ref 0.3–6.2)
ERYTHROCYTE [DISTWIDTH] IN BLOOD BY AUTOMATED COUNT: 14.1 % (ref 12.3–15.4)
GFR SERPL CREATININE-BSD FRML MDRD: 71 ML/MIN/1.73
GLOBULIN UR ELPH-MCNC: 3.3 GM/DL
GLUCOSE SERPL-MCNC: 151 MG/DL (ref 65–99)
HCT VFR BLD AUTO: 38.3 % (ref 34–46.6)
HGB BLD-MCNC: 12 G/DL (ref 12–15.9)
IMM GRANULOCYTES # BLD AUTO: 0.02 10*3/MM3 (ref 0–0.05)
IMM GRANULOCYTES NFR BLD AUTO: 0.4 % (ref 0–0.5)
LYMPHOCYTES # BLD AUTO: 1.5 10*3/MM3 (ref 0.7–3.1)
LYMPHOCYTES NFR BLD AUTO: 28 % (ref 19.6–45.3)
MCH RBC QN AUTO: 28.5 PG (ref 26.6–33)
MCHC RBC AUTO-ENTMCNC: 31.3 G/DL (ref 31.5–35.7)
MCV RBC AUTO: 91 FL (ref 79–97)
MONOCYTES # BLD AUTO: 0.44 10*3/MM3 (ref 0.1–0.9)
MONOCYTES NFR BLD AUTO: 8.2 % (ref 5–12)
NEUTROPHILS NFR BLD AUTO: 3.18 10*3/MM3 (ref 1.7–7)
NEUTROPHILS NFR BLD AUTO: 59.4 % (ref 42.7–76)
NRBC BLD AUTO-RTO: 0 /100 WBC (ref 0–0.2)
NT-PROBNP SERPL-MCNC: 878.1 PG/ML (ref 0–1800)
PLATELET # BLD AUTO: 181 10*3/MM3 (ref 140–450)
PMV BLD AUTO: 9.6 FL (ref 6–12)
POTASSIUM SERPL-SCNC: 3.1 MMOL/L (ref 3.5–5.2)
PROT SERPL-MCNC: 7.2 G/DL (ref 6–8.5)
QT INTERVAL: 385 MS
RBC # BLD AUTO: 4.21 10*6/MM3 (ref 3.77–5.28)
SODIUM SERPL-SCNC: 144 MMOL/L (ref 136–145)
TROPONIN T SERPL-MCNC: <0.01 NG/ML (ref 0–0.03)
WBC NRBC COR # BLD: 5.35 10*3/MM3 (ref 3.4–10.8)

## 2021-12-30 PROCEDURE — 83880 ASSAY OF NATRIURETIC PEPTIDE: CPT | Performed by: EMERGENCY MEDICINE

## 2021-12-30 PROCEDURE — 25010000002 IOPAMIDOL 61 % SOLUTION: Performed by: EMERGENCY MEDICINE

## 2021-12-30 PROCEDURE — 99283 EMERGENCY DEPT VISIT LOW MDM: CPT | Performed by: EMERGENCY MEDICINE

## 2021-12-30 PROCEDURE — 96374 THER/PROPH/DIAG INJ IV PUSH: CPT

## 2021-12-30 PROCEDURE — 71046 X-RAY EXAM CHEST 2 VIEWS: CPT

## 2021-12-30 PROCEDURE — 84484 ASSAY OF TROPONIN QUANT: CPT | Performed by: EMERGENCY MEDICINE

## 2021-12-30 PROCEDURE — 85025 COMPLETE CBC W/AUTO DIFF WBC: CPT | Performed by: EMERGENCY MEDICINE

## 2021-12-30 PROCEDURE — 71275 CT ANGIOGRAPHY CHEST: CPT

## 2021-12-30 PROCEDURE — 99283 EMERGENCY DEPT VISIT LOW MDM: CPT

## 2021-12-30 PROCEDURE — 93005 ELECTROCARDIOGRAM TRACING: CPT | Performed by: EMERGENCY MEDICINE

## 2021-12-30 PROCEDURE — 85379 FIBRIN DEGRADATION QUANT: CPT | Performed by: EMERGENCY MEDICINE

## 2021-12-30 PROCEDURE — 80053 COMPREHEN METABOLIC PANEL: CPT | Performed by: EMERGENCY MEDICINE

## 2021-12-30 PROCEDURE — 93010 ELECTROCARDIOGRAM REPORT: CPT | Performed by: INTERNAL MEDICINE

## 2021-12-30 RX ORDER — LABETALOL HYDROCHLORIDE 5 MG/ML
10 INJECTION, SOLUTION INTRAVENOUS ONCE
Status: COMPLETED | OUTPATIENT
Start: 2021-12-30 | End: 2021-12-30

## 2021-12-30 RX ADMIN — IOPAMIDOL 100 ML: 612 INJECTION, SOLUTION INTRAVENOUS at 06:16

## 2021-12-30 RX ADMIN — LABETALOL HYDROCHLORIDE 10 MG: 5 INJECTION INTRAVENOUS at 05:48

## 2021-12-30 NOTE — OUTREACH NOTE
Ambulatory Case Management Note    Patient Outreach    Reviewed ED visit. Came home and rested. Patient follows cardiologist, Jana. Plans to call Lucian BRADY after New Years with an update. Follow up chart review scheduled for 1/3/21. Recommended patient checks her B/P daily and send results to APRN and cardiology. Reviewed returning to ED for elevated B/P with chest pain in the future.     There are no recently modified care plans to display for this patient.      Sabi Batista RN  Ambulatory Case Management    12/30/2021, 17:41 EST

## 2022-01-04 ENCOUNTER — PATIENT OUTREACH (OUTPATIENT)
Dept: CASE MANAGEMENT | Facility: OTHER | Age: 77
End: 2022-01-04

## 2022-01-05 ENCOUNTER — TELEPHONE (OUTPATIENT)
Dept: CARDIOLOGY | Facility: CLINIC | Age: 77
End: 2022-01-05

## 2022-01-05 NOTE — TELEPHONE ENCOUNTER
The patient had COVID-19 in mid-December and underwent the monoclonal antibodies infusions.  Recently the patient has noted her BP readings to be increased @210s-140s/90s-70s, and she was seen in the ER on 12/30/21 for her increased BP and a headache.  She was advised by the ER doctor to let you know of the issue in case you needed to see the patient, or change her meds.  Please advise.   Thank you

## 2022-01-13 ENCOUNTER — OFFICE VISIT (OUTPATIENT)
Dept: CARDIOLOGY | Facility: CLINIC | Age: 77
End: 2022-01-13

## 2022-01-13 VITALS
OXYGEN SATURATION: 97 % | DIASTOLIC BLOOD PRESSURE: 88 MMHG | SYSTOLIC BLOOD PRESSURE: 162 MMHG | RESPIRATION RATE: 16 BRPM | HEIGHT: 65 IN | WEIGHT: 220 LBS | HEART RATE: 65 BPM | BODY MASS INDEX: 36.65 KG/M2

## 2022-01-13 DIAGNOSIS — E11.9 TYPE 2 DIABETES MELLITUS WITHOUT COMPLICATION, WITHOUT LONG-TERM CURRENT USE OF INSULIN: ICD-10-CM

## 2022-01-13 DIAGNOSIS — I25.10 ATHEROSCLEROSIS OF NATIVE CORONARY ARTERY OF NATIVE HEART WITHOUT ANGINA PECTORIS: Primary | ICD-10-CM

## 2022-01-13 DIAGNOSIS — I34.0 NONRHEUMATIC MITRAL VALVE REGURGITATION: ICD-10-CM

## 2022-01-13 DIAGNOSIS — E78.5 DYSLIPIDEMIA: ICD-10-CM

## 2022-01-13 DIAGNOSIS — N18.30 STAGE 3 CHRONIC KIDNEY DISEASE, UNSPECIFIED WHETHER STAGE 3A OR 3B CKD: ICD-10-CM

## 2022-01-13 DIAGNOSIS — I36.1 NONRHEUMATIC TRICUSPID VALVE REGURGITATION: ICD-10-CM

## 2022-01-13 DIAGNOSIS — I42.8 NICM (NONISCHEMIC CARDIOMYOPATHY): ICD-10-CM

## 2022-01-13 DIAGNOSIS — I10 PRIMARY HYPERTENSION: ICD-10-CM

## 2022-01-13 DIAGNOSIS — I50.20 HFREF (HEART FAILURE WITH REDUCED EJECTION FRACTION): ICD-10-CM

## 2022-01-13 PROCEDURE — 93000 ELECTROCARDIOGRAM COMPLETE: CPT | Performed by: NURSE PRACTITIONER

## 2022-01-13 PROCEDURE — 99214 OFFICE O/P EST MOD 30 MIN: CPT | Performed by: NURSE PRACTITIONER

## 2022-01-13 RX ORDER — AMLODIPINE BESYLATE 5 MG/1
5 TABLET ORAL DAILY
Qty: 30 TABLET | Refills: 11 | Status: SHIPPED | OUTPATIENT
Start: 2022-01-13 | End: 2022-02-08
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
## 2022-01-14 ENCOUNTER — TELEPHONE (OUTPATIENT)
Dept: PAIN MEDICINE | Facility: CLINIC | Age: 77
End: 2022-01-14

## 2022-01-14 NOTE — TELEPHONE ENCOUNTER
Caller: Jung Short    Relationship to patient: Self    Best call back number [phone]  Patient is needing: TO R/S APPT.  PATIENT COVID POSITIVE 1.11.22 AND WANTS TO R/S        UNABLE TO WARM TRANSFER

## 2022-02-01 RX ORDER — HYDROCODONE BITARTRATE AND ACETAMINOPHEN 5; 325 MG/1; MG/1
1 TABLET ORAL EVERY 6 HOURS PRN
Qty: 120 TABLET | Refills: 0 | Status: CANCELLED | OUTPATIENT
Start: 2022-02-01

## 2022-02-01 NOTE — TELEPHONE ENCOUNTER
Caller: Jung Short    Relationship: Self    Best call back number: [phone]    Requested Prescriptions:   Requested Prescriptions     Pending Prescriptions Disp Refills   • HYDROcodone-acetaminophen (NORCO) 5-325 MG per tablet 120 tablet 0     Sig: Take 1 tablet by mouth Every 6 (Six) Hours As Needed for Moderate Pain .        Pharmacy where request should be sent:  [facility]     PATIENT WAS SCHEDULED FOR 2.3.22 BUT HAD TO R/S TO 2.7.22 DUE TO WEATHER    Does the patient have less than a 3 day supply:  [x] Yes  [] No    Allan Powers Rep   02/01/22 15:06 EST

## 2022-02-01 NOTE — TELEPHONE ENCOUNTER
Ms. Short is rescheduled for tomorrow. She states that she wasn't given tomorrow as an option when she previously rescheduled.

## 2022-02-01 NOTE — TELEPHONE ENCOUNTER
She can either reschedule to tomorrow and get a refill. Or wait until her appt on 2-7-22. Thanks. YUNIER

## 2022-02-02 ENCOUNTER — OFFICE VISIT (OUTPATIENT)
Dept: PAIN MEDICINE | Facility: CLINIC | Age: 77
End: 2022-02-02

## 2022-02-02 VITALS
OXYGEN SATURATION: 97 % | SYSTOLIC BLOOD PRESSURE: 166 MMHG | WEIGHT: 212 LBS | HEART RATE: 68 BPM | HEIGHT: 65 IN | DIASTOLIC BLOOD PRESSURE: 81 MMHG | TEMPERATURE: 96.6 F | BODY MASS INDEX: 35.32 KG/M2

## 2022-02-02 DIAGNOSIS — G89.29 OTHER CHRONIC PAIN: Primary | ICD-10-CM

## 2022-02-02 DIAGNOSIS — M51.36 DDD (DEGENERATIVE DISC DISEASE), LUMBAR: ICD-10-CM

## 2022-02-02 DIAGNOSIS — M47.816 LUMBAR FACET ARTHROPATHY: ICD-10-CM

## 2022-02-02 DIAGNOSIS — M25.50 ARTHRALGIA OF MULTIPLE JOINTS: ICD-10-CM

## 2022-02-02 DIAGNOSIS — Z79.899 ENCOUNTER FOR LONG-TERM (CURRENT) USE OF HIGH-RISK MEDICATION: ICD-10-CM

## 2022-02-02 PROCEDURE — 99214 OFFICE O/P EST MOD 30 MIN: CPT | Performed by: NURSE PRACTITIONER

## 2022-02-02 RX ORDER — HYDROCODONE BITARTRATE AND ACETAMINOPHEN 5; 325 MG/1; MG/1
1 TABLET ORAL EVERY 6 HOURS PRN
Qty: 120 TABLET | Refills: 0 | Status: SHIPPED | OUTPATIENT
Start: 2022-02-02 | End: 2022-02-25 | Stop reason: SDUPTHER
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
## 2022-02-04 ENCOUNTER — TELEPHONE (OUTPATIENT)
Dept: CARDIOLOGY | Facility: CLINIC | Age: 77
End: 2022-02-04

## 2022-02-04 NOTE — TELEPHONE ENCOUNTER
Patient called on 1/31/22 at 3:05pm calling with a update on how she is doing since starting Amlodipine.    She stated that her BP has still been running high (179/74 HR-73 and 146/69 HR-65).  She stated that she has been taking the amlodipine since her last visit and she has been having some dizziness and HA.  She stated that the HA are getting better, she noticed that they were bad when her BP was up.  She stated that the dizziness is almost gone.  She stated that she needed refills on her Entresto and Jardiance.  Please advise.    CB: [phone]    Thanks,  Gunnar

## 2022-02-08 RX ORDER — AMLODIPINE BESYLATE 5 MG/1
TABLET ORAL
Qty: 30 TABLET | Refills: 11
Start: 2022-02-08 | End: 2022-03-21 | Stop reason: SDUPTHER

## 2022-02-08 NOTE — TELEPHONE ENCOUNTER
Called and spoke with the patient and told her the change.  She verbalized understanding.  Updated medication list.    Gunnar

## 2022-02-09 ENCOUNTER — APPOINTMENT (OUTPATIENT)
Dept: BONE DENSITY | Facility: HOSPITAL | Age: 77
End: 2022-02-09

## 2022-02-16 ENCOUNTER — APPOINTMENT (OUTPATIENT)
Dept: BONE DENSITY | Facility: HOSPITAL | Age: 77
End: 2022-02-16

## 2022-02-16 ENCOUNTER — HOSPITAL ENCOUNTER (OUTPATIENT)
Dept: MAMMOGRAPHY | Facility: HOSPITAL | Age: 77
End: 2022-02-16

## 2022-02-22 ENCOUNTER — APPOINTMENT (OUTPATIENT)
Dept: BONE DENSITY | Facility: HOSPITAL | Age: 77
End: 2022-02-22

## 2022-02-22 ENCOUNTER — HOSPITAL ENCOUNTER (OUTPATIENT)
Dept: MAMMOGRAPHY | Facility: HOSPITAL | Age: 77
Discharge: HOME | End: 2022-02-22

## 2022-02-22 PROCEDURE — 77063 BREAST TOMOSYNTHESIS BI: CPT

## 2022-02-22 PROCEDURE — 77067 SCR MAMMO BI INCL CAD: CPT

## 2022-02-22 PROCEDURE — 77080 DXA BONE DENSITY AXIAL: CPT

## 2022-02-25 ENCOUNTER — TELEPHONE (OUTPATIENT)
Dept: CARDIOLOGY | Facility: CLINIC | Age: 77
End: 2022-02-25

## 2022-02-25 DIAGNOSIS — M81.0 AGE-RELATED OSTEOPOROSIS WITHOUT CURRENT PATHOLOGICAL FRACTURE: Primary | ICD-10-CM

## 2022-02-25 NOTE — TELEPHONE ENCOUNTER
The patient routinely gets samples of Entresto and Jardience; medications are quite expensive.  She called on 2/24/22 to obtain samples.  She was provided samples of Entresto, but the office is completely out of Jardience.   The patient was informed of this today, and she requested we advise you immediately as she is not supposed to go without her Jardience or Entrest, which are too costly for her to afford on her fixed income. She was advised we have requested further samples of both.   Thank you

## 2022-02-25 NOTE — TELEPHONE ENCOUNTER
It is unfortunate that if we do not have the samples we simply do not have samples.  Please ask her to call her primary care for help with the Jardiance.  We will be happy to provide anything we can if we have it.

## 2022-03-01 RX ORDER — HYDROCODONE BITARTRATE AND ACETAMINOPHEN 5; 325 MG/1; MG/1
1 TABLET ORAL EVERY 6 HOURS PRN
Qty: 120 TABLET | Refills: 0 | Status: SHIPPED | OUTPATIENT
Start: 2022-03-01 | End: 2022-03-31 | Stop reason: SDUPTHER

## 2022-03-07 ENCOUNTER — HOSPITAL ENCOUNTER (OUTPATIENT)
Dept: INFUSION THERAPY | Facility: HOSPITAL | Age: 77
Discharge: HOME | End: 2022-03-07
Admitting: NURSE PRACTITIONER

## 2022-03-07 VITALS
TEMPERATURE: 97.4 F | RESPIRATION RATE: 16 BRPM | WEIGHT: 216 LBS | DIASTOLIC BLOOD PRESSURE: 66 MMHG | OXYGEN SATURATION: 97 % | SYSTOLIC BLOOD PRESSURE: 116 MMHG | BODY MASS INDEX: 35.99 KG/M2 | HEIGHT: 65 IN | HEART RATE: 64 BPM

## 2022-03-07 DIAGNOSIS — M81.0 AGE-RELATED OSTEOPOROSIS WITHOUT CURRENT PATHOLOGICAL FRACTURE: Primary | ICD-10-CM

## 2022-03-07 LAB
ALBUMIN SERPL-MCNC: 3.7 G/DL (ref 3.5–5.2)
ALBUMIN/GLOB SERPL: 1.2 G/DL
ALP SERPL-CCNC: 110 U/L (ref 39–117)
ALT SERPL W P-5'-P-CCNC: 9 U/L (ref 1–33)
ANION GAP SERPL CALCULATED.3IONS-SCNC: 9.5 MMOL/L (ref 5–15)
AST SERPL-CCNC: 14 U/L (ref 1–32)
BILIRUB SERPL-MCNC: 0.3 MG/DL (ref 0–1.2)
BUN SERPL-MCNC: 13 MG/DL (ref 8–23)
BUN/CREAT SERPL: 14.6 (ref 7–25)
CALCIUM SPEC-SCNC: 9.7 MG/DL (ref 8.6–10.5)
CHLORIDE SERPL-SCNC: 107 MMOL/L (ref 98–107)
CO2 SERPL-SCNC: 24.5 MMOL/L (ref 22–29)
CREAT SERPL-MCNC: 0.89 MG/DL (ref 0.57–1)
EGFRCR SERPLBLD CKD-EPI 2021: 66.5 ML/MIN/1.73
GLOBULIN UR ELPH-MCNC: 3 GM/DL
GLUCOSE SERPL-MCNC: 169 MG/DL (ref 65–99)
MAGNESIUM SERPL-MCNC: 1.6 MG/DL (ref 1.6–2.4)
PHOSPHATE SERPL-MCNC: 3.7 MG/DL (ref 2.5–4.5)
POTASSIUM SERPL-SCNC: 3.8 MMOL/L (ref 3.5–5.2)
PROT SERPL-MCNC: 6.7 G/DL (ref 6–8.5)
SODIUM SERPL-SCNC: 141 MMOL/L (ref 136–145)

## 2022-03-07 PROCEDURE — 83735 ASSAY OF MAGNESIUM: CPT | Performed by: NURSE PRACTITIONER

## 2022-03-07 PROCEDURE — 80053 COMPREHEN METABOLIC PANEL: CPT | Performed by: NURSE PRACTITIONER

## 2022-03-07 PROCEDURE — 36415 COLL VENOUS BLD VENIPUNCTURE: CPT

## 2022-03-07 PROCEDURE — 84100 ASSAY OF PHOSPHORUS: CPT | Performed by: NURSE PRACTITIONER

## 2022-03-07 PROCEDURE — 96372 THER/PROPH/DIAG INJ SC/IM: CPT

## 2022-03-07 PROCEDURE — 25010000002 DENOSUMAB 60 MG/ML SOLUTION PREFILLED SYRINGE: Performed by: NURSE PRACTITIONER

## 2022-03-07 RX ADMIN — DENOSUMAB 60 MG: 60 INJECTION SUBCUTANEOUS at 14:06

## 2022-03-07 NOTE — EXTERNAL PATIENT INSTRUCTIONS
Patient Education   Table of Contents       Denosumab injection       Prolia injection     To view videos and all your education online visit,   [email or web address]   or scan this QR code with your smartphone.                  Denosumab injection     What is this medicine?   DENOSUMAB (den oh aidan mab) slows bone breakdown. Prolia is used to treat osteoporosis in women after menopause and in men, and in people who are taking corticosteroids for 6 months or more. Xgeva is used to treat a high calcium level due to cancer and to prevent bone fractures and other bone problems caused by multiple myeloma or cancer bone metastases. Xgeva is also used to treat giant cell tumor of the bone.   This medicine may be used for other purposes; ask your health care provider or pharmacist if you have questions.   COMMON BRAND NAME(S): Prolia, XGEVA   What should I tell my health care provider before I take this medicine?   They need to know if you have any of these conditions:         dental disease       having surgery or tooth extraction       infection       kidney disease       low levels of calcium or Vitamin D in the blood       malnutrition       on hemodialysis       skin conditions or sensitivity       thyroid or parathyroid disease       an unusual reaction to denosumab, other medicines, foods, dyes, or preservatives       pregnant or trying to get pregnant       breast-feeding     How should I use this medicine?   This medicine is for injection under the skin. It is given by a health care professional in a hospital or clinic setting.   A special MedGuide will be given to you before each treatment. Be sure to read this information carefully each time.   For Prolia, talk to your pediatrician regarding the use of this medicine in children. Special care may be needed. For Xgeva, talk to your pediatrician regarding the use of this medicine in children. While this drug may be prescribed for children as young as 13  years for selected conditions, precautions do apply.   Overdosage: If you think you have taken too much of this medicine contact a poison control center or emergency room at once.   NOTE: This medicine is only for you. Do not share this medicine with others.   What if I miss a dose?   It is important not to miss your dose. Call your doctor or health care professional if you are unable to keep an appointment.   What may interact with this medicine?   Do not take this medicine with any of the following medications:         other medicines containing denosumab     This medicine may also interact with the following medications:         medicines that lower your chance of fighting infection       steroid medicines like prednisone or cortisone     This list may not describe all possible interactions. Give your health care provider a list of all the medicines, herbs, non-prescription drugs, or dietary supplements you use. Also tell them if you smoke, drink alcohol, or use illegal drugs. Some items may interact with your medicine.   What should I watch for while using this medicine?   Visit your doctor or health care professional for regular checks on your progress. Your doctor or health care professional may order blood tests and other tests to see how you are doing.   Call your doctor or health care professional for advice if you get a fever, chills or sore throat, or other symptoms of a cold or flu. Do not treat yourself. This drug may decrease your body's ability to fight infection. Try to avoid being around people who are sick.   You should make sure you get enough calcium and vitamin D while you are taking this medicine, unless your doctor tells you not to. Discuss the foods you eat and the vitamins you take with your health care professional.   See your dentist regularly. Brush and floss your teeth as directed. Before you have any dental work done, tell your dentist you are receiving this medicine.   Do not become  pregnant while taking this medicine or for 5 months after stopping it. Talk with your doctor or health care professional about your birth control options while taking this medicine. Women should inform their doctor if they wish to become pregnant or think they might be pregnant. There is a potential for serious side effects to an unborn child. Talk to your health care professional or pharmacist for more information.   What side effects may I notice from receiving this medicine?   Side effects that you should report to your doctor or health care professional as soon as possible:         allergic reactions like skin rash, itching or hives, swelling of the face, lips, or tongue       bone pain       breathing problems       dizziness       jaw pain, especially after dental work       redness, blistering, peeling of the skin       signs and symptoms of infection like fever or chills; cough; sore throat; pain or trouble passing urine       signs of low calcium like fast heartbeat, muscle cramps or muscle pain; pain, tingling, numbness in the hands or feet; seizures       unusual bleeding or bruising       unusually weak or tired     Side effects that usually do not require medical attention (report to your doctor or health care professional if they continue or are bothersome):         constipation       diarrhea       headache       joint pain       loss of appetite       muscle pain       runny nose       tiredness       upset stomach     This list may not describe all possible side effects. Call your doctor for medical advice about side effects. You may report side effects to FDA at 9-472-FDA-7583.   Where should I keep my medicine?   This medicine is only given in a clinic, doctor's office, or other health care setting and will not be stored at home.   NOTE: This sheet is a summary. It may not cover all possible information. If you have questions about this medicine, talk to your doctor, pharmacist, or health care  provider.     ? 2021 [employer]vier/Gold Standard (2019-04-26 16:10:44)

## 2022-03-07 NOTE — NURSING NOTE
NURSING PROGRESS NOTE      Pt to [facility] per  scheduled appointment.  Pt ID verified by (2) identifiers. Allergies, medications and medical history reviewed and verified.Labs obtained. Reviewed labs  Ok to give prolia. . Tolerated prescribed treatment without incident. Patient received AVS, Pt verbalized understanding.  Discharged to home @1430.Condition Satisfactory .  Brooklyn Munguia RN

## 2022-03-21 RX ORDER — AMLODIPINE BESYLATE 5 MG/1
7.5 TABLET ORAL DAILY
Qty: 45 TABLET | Refills: 4 | Status: SHIPPED | OUTPATIENT
Start: 2022-03-21 | End: 2022-03-31

## 2022-03-21 NOTE — TELEPHONE ENCOUNTER
Rx Refill Note  Requested Prescriptions     Pending Prescriptions Disp Refills   • amLODIPine (NORVASC) 5 MG tablet 45 tablet 11     Si.5 tablets Daily. 5 mg in AM and 2.5 mg in PM      Last office visit with prescribing clinician: 2022      Next office visit with prescribing clinician: 2022            Janet Felton MA  22, 08:48 EDT

## 2022-03-23 RX ORDER — BUMETANIDE 0.5 MG/1
1.5 TABLET ORAL DAILY
Qty: 270 TABLET | Refills: 1 | Status: SHIPPED | OUTPATIENT
Start: 2022-03-23 | End: 2022-09-26

## 2022-03-31 ENCOUNTER — OFFICE VISIT (OUTPATIENT)
Dept: PAIN MEDICINE | Facility: CLINIC | Age: 77
End: 2022-03-31

## 2022-03-31 VITALS
HEIGHT: 65 IN | HEART RATE: 71 BPM | SYSTOLIC BLOOD PRESSURE: 124 MMHG | RESPIRATION RATE: 18 BRPM | OXYGEN SATURATION: 94 % | TEMPERATURE: 97.2 F | DIASTOLIC BLOOD PRESSURE: 82 MMHG | BODY MASS INDEX: 35.94 KG/M2

## 2022-03-31 DIAGNOSIS — M47.816 LUMBAR FACET ARTHROPATHY: ICD-10-CM

## 2022-03-31 DIAGNOSIS — Z79.899 ENCOUNTER FOR LONG-TERM (CURRENT) USE OF HIGH-RISK MEDICATION: ICD-10-CM

## 2022-03-31 DIAGNOSIS — M25.50 ARTHRALGIA OF MULTIPLE JOINTS: ICD-10-CM

## 2022-03-31 DIAGNOSIS — G89.29 OTHER CHRONIC PAIN: Primary | ICD-10-CM

## 2022-03-31 DIAGNOSIS — M51.36 DDD (DEGENERATIVE DISC DISEASE), LUMBAR: ICD-10-CM

## 2022-03-31 PROCEDURE — 80305 DRUG TEST PRSMV DIR OPT OBS: CPT | Performed by: NURSE PRACTITIONER

## 2022-03-31 PROCEDURE — 99214 OFFICE O/P EST MOD 30 MIN: CPT | Performed by: NURSE PRACTITIONER

## 2022-03-31 RX ORDER — HYDROCODONE BITARTRATE AND ACETAMINOPHEN 5; 325 MG/1; MG/1
1 TABLET ORAL EVERY 6 HOURS PRN
Qty: 120 TABLET | Refills: 0 | Status: SHIPPED | OUTPATIENT
Start: 2022-03-31 | End: 2022-04-26 | Stop reason: SDUPTHER

## 2022-03-31 RX ORDER — AMLODIPINE BESYLATE 2.5 MG/1
2.5 TABLET ORAL NIGHTLY
COMMUNITY
Start: 2022-03-23 | End: 2022-04-28 | Stop reason: ALTCHOICE

## 2022-03-31 NOTE — PROGRESS NOTES
CHIEF COMPLAINT  Follow-up for back and joint pain.    Subjective   Jung Short is a 76 y.o. female  who presents for follow-up.  She has a history of chronic back and joint pain. Reports her pain pattern is WORSE since last evaluation. Injured left foot two weeks ago. Was taking trash out. Was wearing right wound boot and this caught on driveway. Fell and broke two toes in left foot.  Also still having pain in left lateral foot. Placed in walking shoe.Remains under care of foot specialist. Had a recent MRI of right foot to follow osteomyelitis status. Will require another cast of right foot.  Goes back next week for follow up.    Complains of pain in her low back and joints. Today her pain is 5/10VAS. Describes her ankle as continuous throbbing and aching and burning with intermittent sharp pain. Pain increases with walking, standing, activity, chores; pain decreases with medication and rest.  Continues with Hydrocodone 5/325 3-4/day, Robaxin 500 mg daily and Lyrica  75 mg 1-2/day(prescribed by PCP), as well as Cymbalta 60 mg daily.  Denies any side effects from the regimen, including constipation and somnolence. The regimen helps decreases her pain by 40%.  ADL's by self. Denies any bladder incontinence.  No longer having bowel incontinence. This resolved after she stopped taking Metformin.    Could not tolerate gabapentin--- altered mental status.      Previously was on Hydrocodone 10/325 6/day. Has been able to wean down to hydrocodone 5/325 4/day.     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 Used to have neuropathy in legs, now having right hand. Prescribed Lyrica. Also has a history of surgery of right wrist.     Has had multiple broken bones and orthopedic surgeries over the years.     Reports her EF is at 20% as of 3-10-21; 37% as of 7-30-21 office visit with Lina BRADY. Has been having issues with her blood pressure lately. Still be followed by Dr Bates and  Lina BRADY.    Patient remained masked during entire encounter. No cough present. I donned a mask and eye protection throughout entire visit. Prior to donning mask and eye protection, hand hygiene was performed, as well as when it was doffed.  I was closer than 6 feet, but not for an extended period of time. No obvious exposure to any bodily fluids.    Joint Pain  This is a chronic problem. The current episode started more than 1 year ago. The problem occurs constantly. The problem has been gradually worsening (worse since last evaluation). Associated symptoms include arthralgias, chest pain, fatigue, joint swelling, neck pain, numbness and weakness. Pertinent negatives include no abdominal pain, chills, congestion, coughing, fever, headaches, nausea or vomiting. Nothing aggravates the symptoms. She has tried oral narcotics, position changes and rest for the symptoms. The treatment provided moderate relief.   Back Pain  This is a chronic problem. The current episode started more than 1 year ago. The problem occurs constantly. The problem has been gradually worsening (unchanged since last evaluation) since onset. The pain is present in the lumbar spine. The pain radiates to the right knee, right foot and right thigh. The pain is at a severity of 5/10. The pain is moderate. Associated symptoms include chest pain, numbness and weakness. Pertinent negatives include no abdominal pain, dysuria, fever or headaches. Risk factors include lack of exercise and obesity. She has tried analgesics for the symptoms. The treatment provided moderate relief.      PEG Assessment   What number best describes your pain on average in the past week?5  What number best describes how, during the past week, pain has interfered with your enjoyment of life?0  What number best describes how, during the past week, pain has interfered with your general activity?  0    The following portions of the patient's history were reviewed and updated  "as appropriate: allergies, current medications, past family history, past medical history, past social history, past surgical history and problem list.    Review of Systems   Constitutional: Positive for fatigue. Negative for chills and fever.   HENT: Negative for congestion.    Eyes: Positive for visual disturbance (blurry vision).   Respiratory: Negative for cough and shortness of breath.    Cardiovascular: Positive for chest pain.   Gastrointestinal: Negative for abdominal pain, constipation, diarrhea, nausea and vomiting.   Genitourinary: Negative for difficulty urinating and dysuria.   Musculoskeletal: Positive for arthralgias, back pain, joint swelling and neck pain.   Neurological: Positive for weakness and numbness. Negative for headaches.   Psychiatric/Behavioral: Positive for sleep disturbance. Negative for suicidal ideas. The patient is nervous/anxious.      I have reviewed and confirmed the accuracy of the ROS as documented by the MA/LPN/RN Trini Zaidi, JOSE ANTONIO    Vitals:    03/31/22 1332   BP: 124/82   Pulse: 71   Resp: 18   Temp: 97.2 °F (36.2 °C)   SpO2: 94%   Height: 165.1 cm (65\")   PainSc:   5   PainLoc: Ankle     Objective   Physical Exam  Constitutional:       Appearance: She is well-developed.   HENT:      Head: Normocephalic and atraumatic.   Musculoskeletal:      Right wrist: Swelling present. Decreased range of motion.      Lumbar back: Tenderness present. Decreased range of motion.      Right knee: Swelling, erythema, ecchymosis and bony tenderness present. Decreased range of motion. Tenderness present over the lateral joint line.      Comments: Widespread OA changes with no acute synovitis    Walking shoes of both feet   Neurological:      Mental Status: She is alert.      Gait: Gait abnormal (cane).   Psychiatric:         Speech: Speech normal.         Behavior: Behavior normal.         Assessment/Plan   Diagnoses and all orders for this visit:    1. Other chronic pain (Primary)    2. " DDD (degenerative disc disease), lumbar    3. Lumbar facet arthropathy    4. Arthralgia of multiple joints    5. Encounter for long-term (current) use of high-risk medication    Other orders  -     HYDROcodone-acetaminophen (NORCO) 5-325 MG per tablet; Take 1 tablet by mouth Every 6 (Six) Hours As Needed for Moderate Pain .  Dispense: 120 tablet; Refill: 0      --- Routine UDS in office today as part of monitoring requirements for controlled substances.  The specimen was viewed and the immunoassay result reviewed and is +OPI(APPROPRIATE).  This specimen will be sent to [facility] for confirmation.     --- The patient signed an updated copy of the controlled substance agreement on 3-31-22  --- A medication management agreement is signed by the patient and the provider today.  Reviewed with the patient that this contract is specific to controlled substances, specifically pain medication.  Reviewed core of pain medicine is to decrease pain and increase functional level.  Reviewed the medication must be picked up as a written prescription from this office and will not be called into any pharmacy.  Reviewed the use of Joey within the office setting.  Reviewed causes for potential of discontinuation of opiates,  including but not limited to consideration for diversion, obtaining other controlled substances from other license practitioners, or  inappropriate office behavior.  Patient understands to use a controlled substance as prescribed by physician and to avoid improper use of controlled substances.  Patient will also verbalized understanding that prescriptions to be filled at the same pharmacy  unless office staff is made aware of this.  Patient understands they can be submitted to random urine drug screens and/or random pill counts on any request.  Patient understands they are not to receive early refills.  The patient must produce an official police report for any effort to replace controlled substances  that are lost or stolen.  No use of illegal street drugs while receiving controlled substances from this prescribing provider.  The patient is to take medication as prescribed  and not deviate from the normal schedule without consultation from the provider.  Patient is not to share the medication with others or to take medication with alcohol or other sedatives.  Use caution when driving or operating machinery.  Alert office if the patient becomes pregnant or begins nursing a child.  Reviewed the use of controlled substances recreates a risk for respiratory depression, which may result in serious harm or even death.  Must always keep the prescription in the original container.  Patient is to store controlled substances in a locked cabinet or other secure storage unit that is cool, dry and out of sunlight.  Patient must immediately notify office if any controlled substances is stolen or improperly taken by another individual.  Reviewed risk for physical dependence, tolerance and addiction.  --- Refill Hydrocodone DNF. Patient appears stable with current regimen. No adverse effects. Regarding continuation of opioids, there is no evidence of aberrant behavior or any red flags.  The patient continues with appropriate response to opioid therapy. ADL's remain intact by self.   --- Continue with other specialists as planned.  --- Follow-up 2 months or sooner if needed       LOUANN REPORT  As part of the patient's treatment plan, I am prescribing controlled substances. The patient has been made aware of appropriate use of such medications, including potential risk of somnolence, limited ability to drive and/or work safely, and the potential for dependence or overdose. It has also bee made clear that these medications are for use by this patient only, without concomitant use of alcohol or other substances unless prescribed.     Patient has completed prescribing agreement detailing terms of continued prescribing of controlled  substances, including monitoring LOUANN reports, urine drug screening, and pill counts if necessary. The patient is aware that inappropriate use will results in cessation of prescribing such medications.    As the clinician, I personally reviewed the LOUANN from 3-31-22 while the patient was in the office today.    History and physical exam exhibit continued safe and appropriate use of controlled substances.       Dictated utilizing Dragon dictation.     This document is intended for medical expert use only. Reading of this document by patients and/or patient's family without participating medical staff guidance may result in misinterpretation and unintended morbidity.   Any interpretation of such data is the responsibility of the patient and/or family member responsible for the patient in concert with their primary or specialist providers, not to be left for sources of online searches such as [employer], [employer] or similar queries. Relying on these approaches to knowledge may result in misinterpretation, misguided goals of care and even death should patients or family members try recommendations outside of the realm of professional medical care in a supervised way.

## 2022-04-05 ENCOUNTER — OFFICE VISIT (OUTPATIENT)
Dept: CARDIOLOGY | Facility: CLINIC | Age: 77
End: 2022-04-05

## 2022-04-05 VITALS
HEART RATE: 67 BPM | OXYGEN SATURATION: 97 % | RESPIRATION RATE: 16 BRPM | SYSTOLIC BLOOD PRESSURE: 150 MMHG | BODY MASS INDEX: 36.15 KG/M2 | WEIGHT: 217 LBS | HEIGHT: 65 IN | DIASTOLIC BLOOD PRESSURE: 80 MMHG

## 2022-04-05 DIAGNOSIS — I10 PRIMARY HYPERTENSION: ICD-10-CM

## 2022-04-05 DIAGNOSIS — I42.8 NICM (NONISCHEMIC CARDIOMYOPATHY): ICD-10-CM

## 2022-04-05 DIAGNOSIS — E78.5 DYSLIPIDEMIA: ICD-10-CM

## 2022-04-05 DIAGNOSIS — I36.1 NONRHEUMATIC TRICUSPID VALVE REGURGITATION: ICD-10-CM

## 2022-04-05 DIAGNOSIS — I25.10 ATHEROSCLEROSIS OF NATIVE CORONARY ARTERY OF NATIVE HEART WITHOUT ANGINA PECTORIS: ICD-10-CM

## 2022-04-05 DIAGNOSIS — I34.0 NONRHEUMATIC MITRAL VALVE REGURGITATION: ICD-10-CM

## 2022-04-05 DIAGNOSIS — I50.20 HFREF (HEART FAILURE WITH REDUCED EJECTION FRACTION): Primary | ICD-10-CM

## 2022-04-05 PROCEDURE — 99214 OFFICE O/P EST MOD 30 MIN: CPT | Performed by: NURSE PRACTITIONER

## 2022-04-05 PROCEDURE — 93000 ELECTROCARDIOGRAM COMPLETE: CPT | Performed by: NURSE PRACTITIONER

## 2022-04-05 RX ORDER — AMLODIPINE BESYLATE 5 MG/1
5 TABLET ORAL DAILY
Qty: 30 TABLET | Refills: 11 | Status: SHIPPED | OUTPATIENT
Start: 2022-04-05 | End: 2022-09-19
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
## 2022-04-12 ENCOUNTER — HOSPITAL ENCOUNTER (OUTPATIENT)
Dept: CARDIOLOGY | Facility: HOSPITAL | Age: 77
Discharge: HOME | End: 2022-04-12
Admitting: NURSE PRACTITIONER

## 2022-04-12 VITALS
WEIGHT: 216.05 LBS | SYSTOLIC BLOOD PRESSURE: 179 MMHG | HEIGHT: 65 IN | BODY MASS INDEX: 36 KG/M2 | DIASTOLIC BLOOD PRESSURE: 88 MMHG

## 2022-04-12 DIAGNOSIS — I42.8 NICM (NONISCHEMIC CARDIOMYOPATHY): ICD-10-CM

## 2022-04-12 DIAGNOSIS — I50.20 HFREF (HEART FAILURE WITH REDUCED EJECTION FRACTION): ICD-10-CM

## 2022-04-12 LAB
AORTIC DIMENSIONLESS INDEX: 0.52 (DI)
BH CV ECHO MEAS - AO MAX PG: 9.4 MMHG
BH CV ECHO MEAS - AO MEAN PG: 5.1 MMHG
BH CV ECHO MEAS - AO V2 MAX: 153.2 CM/SEC
BH CV ECHO MEAS - AO V2 VTI: 29.9 CM
BH CV ECHO MEAS - AVA(I,D): 2.5 CM2
BH CV ECHO MEAS - EDV(CUBED): 54.1 ML
BH CV ECHO MEAS - EDV(MOD-SP2): 99.3 ML
BH CV ECHO MEAS - EDV(MOD-SP4): 125 ML
BH CV ECHO MEAS - EF(MOD-BP): 47.8 %
BH CV ECHO MEAS - EF(MOD-SP2): 49.3 %
BH CV ECHO MEAS - EF(MOD-SP4): 46.4 %
BH CV ECHO MEAS - ESV(CUBED): 26.3 ML
BH CV ECHO MEAS - ESV(MOD-SP2): 50.3 ML
BH CV ECHO MEAS - ESV(MOD-SP4): 67 ML
BH CV ECHO MEAS - FS: 21.4 %
BH CV ECHO MEAS - IVS/LVPW: 1.01 CM
BH CV ECHO MEAS - IVSD: 1.42 CM
BH CV ECHO MEAS - LAT PEAK E' VEL: 5.3 CM/SEC
BH CV ECHO MEAS - LV DIASTOLIC VOL/BSA (35-75): 61.2 CM2
BH CV ECHO MEAS - LV MASS(C)D: 194.9 GRAMS
BH CV ECHO MEAS - LV MAX PG: 2.7 MMHG
BH CV ECHO MEAS - LV MEAN PG: 1.57 MMHG
BH CV ECHO MEAS - LV SYSTOLIC VOL/BSA (12-30): 32.8 CM2
BH CV ECHO MEAS - LV V1 MAX: 82 CM/SEC
BH CV ECHO MEAS - LV V1 VTI: 15.7 CM
BH CV ECHO MEAS - LVIDD: 3.8 CM
BH CV ECHO MEAS - LVIDS: 3 CM
BH CV ECHO MEAS - LVOT AREA: 4.8 CM2
BH CV ECHO MEAS - LVOT DIAM: 2.48 CM
BH CV ECHO MEAS - LVPWD: 1.4 CM
BH CV ECHO MEAS - MED PEAK E' VEL: 3.2 CM/SEC
BH CV ECHO MEAS - MV A MAX VEL: 93.6 CM/SEC
BH CV ECHO MEAS - MV DEC SLOPE: 165.2 CM/SEC2
BH CV ECHO MEAS - MV DEC TIME: 0.17 MSEC
BH CV ECHO MEAS - MV E MAX VEL: 58.8 CM/SEC
BH CV ECHO MEAS - MV E/A: 0.63
BH CV ECHO MEAS - MV MEAN PG: 1.48 MMHG
BH CV ECHO MEAS - MV V2 VTI: 36.6 CM
BH CV ECHO MEAS - MVA(VTI): 2.07 CM2
BH CV ECHO MEAS - PA ACC TIME: 0.06 SEC
BH CV ECHO MEAS - PA PR(ACCEL): 50.5 MMHG
BH CV ECHO MEAS - PA V2 MAX: 128 CM/SEC
BH CV ECHO MEAS - RV V1 VTI: 14.2 CM
BH CV ECHO MEAS - RVOT DIAM: 2.7 CM
BH CV ECHO MEAS - SI(MOD-SP2): 24 ML/M2
BH CV ECHO MEAS - SI(MOD-SP4): 28.4 ML/M2
BH CV ECHO MEAS - SV(LVOT): 75.7 ML
BH CV ECHO MEAS - SV(MOD-SP2): 49 ML
BH CV ECHO MEAS - SV(MOD-SP4): 58 ML
BH CV ECHO MEAS - SV(RVOT): 83.9 ML
BH CV ECHO MEAS - TAPSE (>1.6): 1.65 CM
BH CV ECHO MEASUREMENTS AVERAGE E/E' RATIO: 13.84
BH CV XLRA - RV BASE: 3.4 CM
BH CV XLRA - TDI S': 8.4 CM/SEC
LEFT ATRIUM VOLUME INDEX: 32.7 ML/M2
LV EF 2D ECHO EST: 40 %
MAXIMAL PREDICTED HEART RATE: 142 BPM
SINUS: 3.3 CM
STRESS TARGET HR: 121 BPM

## 2022-04-12 PROCEDURE — 93306 TTE W/DOPPLER COMPLETE: CPT

## 2022-04-12 PROCEDURE — 25010000002 PERFLUTREN (DEFINITY) 8.476 MG IN SODIUM CHLORIDE (PF) 0.9 % 10 ML INJECTION: Performed by: NURSE PRACTITIONER

## 2022-04-12 PROCEDURE — 93306 TTE W/DOPPLER COMPLETE: CPT | Performed by: INTERNAL MEDICINE

## 2022-04-12 RX ADMIN — SODIUM CHLORIDE 2 ML: 9 INJECTION INTRAMUSCULAR; INTRAVENOUS; SUBCUTANEOUS at 18:27

## 2022-04-14 ENCOUNTER — TELEPHONE (OUTPATIENT)
Dept: CARDIOLOGY | Facility: CLINIC | Age: 77
End: 2022-04-14

## 2022-04-14 NOTE — TELEPHONE ENCOUNTER
Called and left VM. Will continue to try to reach patient.     Nivia Todd RN  Triage [facility] [place]

## 2022-04-14 NOTE — TELEPHONE ENCOUNTER
Reviewed results and recommendations with Jung Short.  Patient verbalized understanding.    Thank you,  Niharika White RN  Triage Nurse [facility] [place]

## 2022-04-26 DIAGNOSIS — G89.29 OTHER CHRONIC PAIN: ICD-10-CM

## 2022-04-26 RX ORDER — HYDROCODONE BITARTRATE AND ACETAMINOPHEN 5; 325 MG/1; MG/1
1 TABLET ORAL EVERY 6 HOURS PRN
Qty: 120 TABLET | Refills: 0 | Status: SHIPPED | OUTPATIENT
Start: 2022-04-26 | End: 2022-05-27 | Stop reason: SDUPTHER

## 2022-04-26 NOTE — TELEPHONE ENCOUNTER
Medication Refill Request    Date of phone call: 22    Medication being requested: norco 5/325mg si tab po q 6 hours prn   Qty: 120    Date of last visit: 3/31/22    Date of last refill:     LOUANN up to date?:     Next Follow up?: 22    Any new pertinent information? (i.e, new medication allergies, new use of medications, change in patient's health or condition, non-compliance or inconsistency with prescribing agreement?):

## 2022-04-28 ENCOUNTER — OFFICE VISIT (OUTPATIENT)
Dept: INTERNAL MEDICINE | Facility: CLINIC | Age: 77
End: 2022-04-28

## 2022-04-28 VITALS
TEMPERATURE: 98 F | HEART RATE: 73 BPM | BODY MASS INDEX: 37.32 KG/M2 | HEIGHT: 65 IN | DIASTOLIC BLOOD PRESSURE: 74 MMHG | SYSTOLIC BLOOD PRESSURE: 144 MMHG | OXYGEN SATURATION: 96 % | WEIGHT: 224 LBS

## 2022-04-28 DIAGNOSIS — E78.5 DYSLIPIDEMIA: ICD-10-CM

## 2022-04-28 DIAGNOSIS — E03.9 ACQUIRED HYPOTHYROIDISM: ICD-10-CM

## 2022-04-28 DIAGNOSIS — E53.8 COBALAMIN DEFICIENCY: ICD-10-CM

## 2022-04-28 DIAGNOSIS — E55.9 VITAMIN D DEFICIENCY: ICD-10-CM

## 2022-04-28 DIAGNOSIS — E11.9 TYPE 2 DIABETES MELLITUS WITHOUT COMPLICATION, WITHOUT LONG-TERM CURRENT USE OF INSULIN: ICD-10-CM

## 2022-04-28 DIAGNOSIS — I10 PRIMARY HYPERTENSION: Primary | ICD-10-CM

## 2022-04-28 PROCEDURE — 99214 OFFICE O/P EST MOD 30 MIN: CPT | Performed by: NURSE PRACTITIONER

## 2022-04-28 RX ORDER — PREGABALIN 75 MG/1
75 CAPSULE ORAL 2 TIMES DAILY
Qty: 60 CAPSULE | Refills: 5 | Status: SHIPPED | OUTPATIENT
Start: 2022-04-28 | End: 2022-12-01

## 2022-04-28 NOTE — TELEPHONE ENCOUNTER
Rx Refill Note  Requested Prescriptions     Pending Prescriptions Disp Refills    pregabalin (LYRICA) 75 MG capsule [Pharmacy Med Name: PREGABALIN CAP 75MG] 60 capsule 2     Sig: TAKE 1 CAPSULE BY MOUTH 2 (TWO) TIMES A DAY.      Last office visit with prescribing clinician: 10/7/2021      Next office visit with prescribing clinician: 4/28/2022            Janeth Dyer MA  04/28/22, 10:40 EDT

## 2022-04-28 NOTE — PROGRESS NOTES
Subjective   Jung Short is a 76 y.o. female presenting today for follow up of   Chief Complaint   Patient presents with   • Hypertension   • Hyperlipidemia   • Diabetes       History of Present Illness     Patient Active Problem List   Diagnosis   • Chronic anemia   • Malignant neoplasm of upper-inner quadrant of right breast in female, estrogen receptor positive ([facility])   • Disc disorder of cervical region   • Neck pain   • Chronic pain   • Depression   • Type 2 diabetes mellitus without complication, without long-term current use of insulin ([facility])   • Dyslipidemia   • Gastroesophageal reflux disease with esophagitis   • Hypertension   • Hypothyroidism   • Incisional hernia   • Mitral valve insufficiency   • Nausea   • Osteopenia of spine   • Arthralgia of multiple joints   • Peripheral neuropathy   • Pulmonary hypertension ([facility])   • PITTS (dyspnea on exertion)   • Tricuspid valve insufficiency   • Cobalamin deficiency   • Vitamin D deficiency   • HFrEF (heart failure with reduced ejection fraction) ([facility])   • Arthritis of knee   • DDD (degenerative disc disease), lumbar   • Lumbar facet arthropathy   • Chronic gout of multiple sites   • Encounter for long-term (current) use of high-risk medication   • Primary osteoarthritis of left knee   • Physical deconditioning   • CKD (chronic kidney disease) stage 3, GFR 30-59 ml/min ([facility])   • Pleural effusion   • Acute respiratory failure with hypoxia ([facility])   • Periprosthetic subtrochanteric fracture of femur   • Traumatic closed nondisp torus fracture of distal radial metaphysis, right, initial encounter   • Atherosclerotic heart disease of native coronary artery without angina pectoris   • NICM (nonischemic cardiomyopathy) ([facility])   • Age-related osteoporosis without current pathological fracture       Outpatient Medications Marked as Taking for the 4/28/22 encounter (Office Visit) with Kathryn Epstein APRN   Medication Sig Dispense Refill   • amLODIPine (NORVASC) 5  MG tablet Take 1 tablet by mouth Daily. Take one 5 mg tab in the morning with one 2.5 mg tab at HS. 30 tablet 11   • aspirin 81 MG EC tablet Take 81 mg by mouth Daily.     • bumetanide (BUMEX) 0.5 MG tablet Take 3 tablets by mouth Daily. 270 tablet 1   • carvedilol (COREG) 25 MG tablet TAKE 1 TABLET BY MOUTH TWICE DAILY 180 tablet 1   • D-1000 Extra Strength 25 MCG (1000 UT) tablet TAKE 1 TABLET BY MOUTH DAILY 90 tablet 3   • DULoxetine (CYMBALTA) 60 MG capsule TAKE 1 CAPSULE BY MOUTH DAILY. 90 capsule 3   • empagliflozin (JARDIANCE) 10 MG tablet tablet Take 1 tablet by mouth Daily. 30 tablet 1   • esomeprazole (nexIUM) 20 MG capsule Take 20 mg by mouth Every Morning Before Breakfast.     • glipizide (GLUCOTROL XL) 5 MG ER tablet Take 1 tablet by mouth Daily With Breakfast. 90 tablet 1   • HYDROcodone-acetaminophen (NORCO) 5-325 MG per tablet Take 1 tablet by mouth Every 6 (Six) Hours As Needed for Moderate Pain . 120 tablet 0   • levothyroxine (SYNTHROID, LEVOTHROID) 25 MCG tablet TAKE 1 TABLET BY MOUTH DAILY 90 tablet 3   • Multiple Vitamins-Minerals (MULTIVITAMIN ADULT PO) Take 1 tablet by mouth Daily.     • pravastatin (PRAVACHOL) 10 MG tablet TAKE 1 TABLET BY MOUTH AT BEDTIME 90 tablet 3   • sacubitril-valsartan (Entresto) 49-51 MG tablet Take 1 tablet by mouth 2 (Two) Times a Day. 60 tablet 11   • [DISCONTINUED] pregabalin (LYRICA) 75 MG capsule TAKE 1 CAPSULE BY MOUTH 2 (TWO) TIMES A DAY. 60 capsule 3     She has HTN and CHF. Her current therapy includes amlodipine, bumex, carvedilol and entresto. She tolerates this regimen w/o issue.     She has DM2. She takes glipizide and Jardiance. She does not self monitor BG. She tolerates this regimen w/o issue.     She has hypothyroidism and is on Levothyroxine.     She has HLD and is on statin therapy and ASA. Doing well with this.       The following portions of the patient's history were reviewed and updated as appropriate: allergies, current medications, past  "family history, past medical history, past social history, past surgical history and problem list.        Objective   Vitals:    04/28/22 1312   BP: 144/74   Pulse: 73   Temp: 98 °F (36.7 °C)   TempSrc: Infrared   SpO2: 96%   Weight: 102 kg (224 lb)   Height: 165.1 cm (65\")       BP Readings from Last 3 Encounters:   04/28/22 144/74   04/12/22 179/88   04/05/22 150/80        Wt Readings from Last 3 Encounters:   04/28/22 102 kg (224 lb)   04/12/22 98 kg (216 lb 0.8 oz)   04/05/22 98.4 kg (217 lb)        Body mass index is 37.28 kg/m².  Nursing notes and vitals reviewed.    Physical Exam  Constitutional:       General: She is not in acute distress.     Appearance: She is well-developed.   Cardiovascular:      Rate and Rhythm: Regular rhythm.      Heart sounds: S1 normal and S2 normal.   Pulmonary:      Effort: Pulmonary effort is normal.      Breath sounds: Normal breath sounds.   Neurological:      Mental Status: She is alert and oriented to person, place, and time.   Psychiatric:         Attention and Perception: She is attentive.         Behavior: Behavior normal.         Thought Content: Thought content normal.         Recent Results (from the past 672 hour(s))   Adult Transthoracic Echo Complete W/ Cont if Necessary Per Protocol    Collection Time: 04/12/22  6:25 PM   Result Value Ref Range    Target HR (85%) 121 bpm    Max. Pred. HR (100%) 142 bpm    RV S' 8.4 cm/sec    RV Base 3.4 cm    Sinus 3.3 cm    Dimensionless Index 0.52 (DI)    LA Volume Index 32.7 mL/m2    Avg E/e' ratio 13.84     Ao pk tobi 153.2 cm/sec    Ao V2 VTI 29.9 cm    ERNESTINA(I,D) 2.5 cm2    EDV(cubed) 54.1 ml    EDV(MOD-sp2) 99.3 ml    EDV(MOD-sp4) 125.0 ml    EF(MOD-bp) 47.8 %    EF(MOD-sp2) 49.3 %    EF(MOD-sp4) 46.4 %    ESV(cubed) 26.3 ml    ESV(MOD-sp2) 50.3 ml    ESV(MOD-sp4) 67.0 ml    IVS/LVPW 1.01 cm    Lat Peak E' Tobi 5.3 cm/sec    LV mass(C)d 194.9 grams    LV V1 max PG 2.7 mmHg    LV V1 mean PG 1.57 mmHg    LV V1 max 82.0 cm/sec    " LVPWd 1.40 cm    Med Peak E' Tobi 3.2 cm/sec    MV dec slope 165.2 cm/sec2    MV dec time 0.17 msec    MV V2 VTI 36.6 cm    MVA(VTI) 2.07 cm2    PA acc time 0.06 sec    PA pr(Accel) 50.5 mmHg    PA V2 max 128.0 cm/sec    RV V1 VTI 14.2 cm    SI(MOD-sp2) 24.0 ml/m2    SI(MOD-sp4) 28.4 ml/m2    SV(LVOT) 75.7 ml    SV(MOD-sp2) 49.0 ml    SV(MOD-sp4) 58.0 ml    SV(RVOT) 83.9 ml    Ao max PG 9.4 mmHg    Ao mean PG 5.1 mmHg    FS 21.4 %    IVSd 1.42 cm    LV V1 VTI 15.7 cm    LVIDd 3.8 cm    LVIDs 3.0 cm    LVOT area 4.8 cm2    LVOT diam 2.48 cm    MV E/A 0.63     MV mean PG 1.48 mmHg    RVOT diam 2.7 cm    MV A max tobi 93.6 cm/sec    MV E max tobi 58.8 cm/sec    LV Huff Vol (BSA corrected) 61.2 cm2    LV Sys Vol (BSA corrected) 32.8 cm2    TAPSE (>1.6) 1.65 cm    Echo EF Estimated 40 %   Comprehensive Metabolic Panel    Collection Time: 04/21/22  9:46 AM    Specimen: Blood   Result Value Ref Range    Glucose 165 (H) 65 - 99 mg/dL    BUN 11 8 - 27 mg/dL    Creatinine 0.94 0.57 - 1.00 mg/dL    EGFR Result 62 >59 mL/min/1.73    BUN/Creatinine Ratio 12 12 - 28    Sodium 141 134 - 144 mmol/L    Potassium 3.6 3.5 - 5.2 mmol/L    Chloride 105 96 - 106 mmol/L    Total CO2 24 20 - 29 mmol/L    Calcium 8.7 8.7 - 10.3 mg/dL    Total Protein 6.5 6.0 - 8.5 g/dL    Albumin 3.9 3.7 - 4.7 g/dL    Globulin 2.6 1.5 - 4.5 g/dL    A/G Ratio 1.5 1.2 - 2.2    Total Bilirubin 0.3 0.0 - 1.2 mg/dL    Alkaline Phosphatase 87 44 - 121 IU/L    AST (SGOT) 16 0 - 40 IU/L    ALT (SGPT) 10 0 - 32 IU/L   Hemoglobin A1c    Collection Time: 04/21/22  9:46 AM    Specimen: Blood   Result Value Ref Range    Hemoglobin A1C 6.6 (H) 4.8 - 5.6 %   Lipid Panel With / Chol / HDL Ratio    Collection Time: 04/21/22  9:46 AM    Specimen: Blood   Result Value Ref Range    Total Cholesterol 183 100 - 199 mg/dL    Triglycerides 243 (H) 0 - 149 mg/dL    HDL Cholesterol 52 >39 mg/dL    VLDL Cholesterol Sergio 41 (H) 5 - 40 mg/dL    LDL Chol Calc (NIH) 90 0 - 99 mg/dL     Chol/HDL Ratio 3.5 0.0 - 4.4 ratio         Assessment/Plan   Diagnoses and all orders for this visit:    1. Primary hypertension (Primary)    2. Dyslipidemia    3. Type 2 diabetes mellitus without complication, without long-term current use of insulin (HCC)        Except as noted above, pt will continue current medications as noted in the medication list. I will continue to authorize refills as needed.        Medications, including side effects, were discussed with the patient. Patient verbalized understanding.  The plan of care was discussed. All questions were answered. Patient verbalized understanding.      Return in about 6 months (around 10/28/2022) for fasting labs one week prior to.

## 2022-05-12 ENCOUNTER — OFFICE VISIT (OUTPATIENT)
Dept: ORTHOPEDIC SURGERY | Facility: CLINIC | Age: 77
End: 2022-05-12

## 2022-05-12 VITALS — HEIGHT: 65 IN | WEIGHT: 224 LBS | BODY MASS INDEX: 37.32 KG/M2

## 2022-05-12 DIAGNOSIS — M19.012 ARTHRITIS OF LEFT ACROMIOCLAVICULAR JOINT: ICD-10-CM

## 2022-05-12 DIAGNOSIS — M19.012 GLENOHUMERAL ARTHRITIS, LEFT: ICD-10-CM

## 2022-05-12 DIAGNOSIS — M75.52 SUBACROMIAL BURSITIS OF LEFT SHOULDER JOINT: ICD-10-CM

## 2022-05-12 DIAGNOSIS — M25.512 ACUTE PAIN OF LEFT SHOULDER: Primary | ICD-10-CM

## 2022-05-12 PROCEDURE — 20610 DRAIN/INJ JOINT/BURSA W/O US: CPT | Performed by: ORTHOPAEDIC SURGERY

## 2022-05-12 PROCEDURE — 73030 X-RAY EXAM OF SHOULDER: CPT | Performed by: ORTHOPAEDIC SURGERY

## 2022-05-12 PROCEDURE — 99214 OFFICE O/P EST MOD 30 MIN: CPT | Performed by: ORTHOPAEDIC SURGERY

## 2022-05-12 RX ORDER — LIDOCAINE HYDROCHLORIDE 10 MG/ML
4 INJECTION, SOLUTION EPIDURAL; INFILTRATION; INTRACAUDAL; PERINEURAL
Status: COMPLETED | OUTPATIENT
Start: 2022-05-12 | End: 2022-05-12

## 2022-05-12 RX ORDER — BETAMETHASONE SODIUM PHOSPHATE AND BETAMETHASONE ACETATE 3; 3 MG/ML; MG/ML
6 INJECTION, SUSPENSION INTRA-ARTICULAR; INTRALESIONAL; INTRAMUSCULAR; SOFT TISSUE
Status: COMPLETED | OUTPATIENT
Start: 2022-05-12 | End: 2022-05-12

## 2022-05-12 RX ADMIN — BETAMETHASONE SODIUM PHOSPHATE AND BETAMETHASONE ACETATE 6 MG: 3; 3 INJECTION, SUSPENSION INTRA-ARTICULAR; INTRALESIONAL; INTRAMUSCULAR; SOFT TISSUE at 14:10

## 2022-05-12 RX ADMIN — LIDOCAINE HYDROCHLORIDE 4 ML: 10 INJECTION, SOLUTION EPIDURAL; INFILTRATION; INTRACAUDAL; PERINEURAL at 14:10

## 2022-05-12 NOTE — PROGRESS NOTES
Subjective: Left shoulder pain     Patient ID: Jung Short is a 76 y.o. female.    Chief Complaint:    History of Present Illness 76-year-old female is seen for new problem involving her left shoulder.  She fell about a week ago injuring the shoulder developed immediate pain and discomfort which has persisted.  Patient presents today for evaluation.  Back in 2016 had an MRI that shoulder showed glenohumeral arthritis and thinning of the rotator cuff and biceps tenodesis but no rotator cuff tear.  Has done relatively well to this latest injury.       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         Past Medical History:   Diagnosis Date   • Age-related osteoporosis without current pathological fracture 2022   • Anemia    • Benign breast disease    • Cancer ([facility])     Right breast   • Cellulitis of leg     May- right lower extremity   • CHF (congestive heart failure) ([facility])     Dr Bates follows   • Chronic kidney disease     Dr Hannah follows   • Chronic ulcer of right foot ([facility])     Non-pressure, history of    • Closed fracture of patella 2017   • Cluster headache    • Colon polyp    • Depression    • Diabetic peripheral neuropathy ([facility])    • Difficulty walking    • Diverticulosis    • Femoral fracture ([facility])     right   • Fibromyalgia, primary    • Fracture of left wrist     history of   • Gastroesophageal reflux    • Generalized pain  2/2/2016   • Hiatal hernia    • Hyperlipidemia    • Hypertension    • Hypothyroidism    • Impingement syndrome of right shoulder    • Joint pain    • Left knee pain 5/17/2016   • Menopausal disorder    • Methicillin resistant Staphylococcus aureus infection 2012    after bladder surgery   • Nonischemic cardiomyopathy (HCC)     Ejection fraction 10% per 2-D echo with Doppler however she did have cardiac catheterization April 2015 which showed ejection fraction 20% with global hypokinesis and severe mitral insufficiency   • Osteoarthritis     generalized, sched jania TKA   • Osteomyelitis (HCC) 2/2/2016   • Pain in shoulder 8/2/2016   • Paroxysmal atrial fibrillation (HCC)     Dr Bates follows   • Radius/ulna fracture, left, closed, initial encounter 10/21/2017   • Shoulder pain, bilateral     has tears on both sides   • Sleep apnea    • Syncope, psychogenic 4/19/2017   • Thoracic back pain 2/2/2016   • Toe ulcer (HCC)     h/o to both feet, d/t shoes rubbing per patient   • Transient alteration of awareness 4/19/2017     Past Surgical History:   Procedure Laterality Date   • ABDOMINAL SURGERY  2012    had to be reopened after bladder surgery d/t infection   • AMPUTATION FOOT / TOE Right 11/2013    Rt foot amputation MTP first toe   • BLADDER SURGERY  2012   • BREAST BIOPSY     • BREAST SURGERY  06/29/2015    Percutaneous ultrasound-guided placement of metal localized clip 1st lesion   • CARDIAC CATHETERIZATION  2015   • CARDIAC CATHETERIZATION N/A 1/25/2021    Procedure: Right and Left Heart Cath;  Surgeon: Nicholas Andrade MD;  Location:  [facility] CATH [facility]VASIVE [facility];  Service: Cardiovascular;  Laterality: N/A;  drop in EF, with hx of NICM, SOA fatigue.  Discussed with    • CARDIAC CATHETERIZATION N/A 1/25/2021    Procedure: Left ventriculography;  Surgeon: Nicholas Andrade MD;  Location:  [facility] CATH [facility]VAS[facility];  Service: Cardiovascular;  Laterality: N/A;   • CARDIAC CATHETERIZATION N/A 1/25/2021     Procedure: Coronary angiography;  Surgeon: Nicholas Andrade MD;  Location:  [facility] CATH INVASIVE [facility]ATION;  Service: Cardiovascular;  Laterality: N/A;   • CATARACT EXTRACTION Bilateral 2017   • COLONOSCOPY     • DEBRIDEMENT  FOOT Right 01/2013    During hospitalization    • EPIDURAL BLOCK      4 chronic back pain and leg pain last epidurals done 5-2012 she had no benefit at all from this procedure.   • FEMUR FRACTURE SURGERY     • FOOT SURGERY Bilateral     several   • HERNIA REPAIR      At the abdomen February 2007 Dr. Mendez   • INCISIONAL HERNIA REPAIR  05/16/2014    Recurrent. Incarcerated. With mesh implantation   • MASTECTOMY Right 05/2015   • MASTECTOMY MODIFIED RADICAL W/ AXILLARY LYMPH NODES W/ OR W/O PECTORALIS MINOR Right    • SHOULDER SURGERY Right     x2 RCR   • TOTAL ABDOMINAL HYSTERECTOMY      with oophorectomy-Done June-2012 secondary to vaginal wall prolapse.   • TOTAL KNEE ARTHROPLASTY Right    • TOTAL KNEE ARTHROPLASTY Left 4/17/2018    Procedure: TOTAL KNEE ARTHROPLASTY;  Surgeon: Live Chambers MD;  Location:  [facility] OR;  Service: Orthopedics     Family History   Problem Relation Age of Onset   • Colonic polyp Mother    • Hypertension Mother    • Migraines Mother    • Mental illness Mother    • Depression Mother    • Arthritis Mother    • Coronary artery disease Father    • Other Father         Cardiac Disorder   • Colon cancer Father    • Kidney disease Father    • Cancer Father    • Diabetes Father    • Heart disease Father    • Hypertension Father    • Breast cancer Maternal Aunt    • Colon cancer Brother    • Alcohol abuse Brother    • Arthritis Sister    • Hypertension Brother    • Lung disease Brother    • Alcohol abuse Brother    • Malig Hyperthermia Neg Hx          Objective:  There were no vitals filed for this visit.      05/12/22  1339   Weight: 102 kg (224 lb)     Body mass index is 37.28 kg/m².        Ortho Exam   AP lateral outlet view does show glenohumeral arthritis primarily  involving the glenoid and some AC arthritis but no acute changes.  No prior x-rays available for comparison.  She is alert and oriented x3.  She does have carpal tunnel symptoms but notes no new numbness in the hand she has good capillary refill.  She can abduct and extend only about 30 degrees and a markedly positive Zazueta with passive range of motion.  She has full extension can flex the elbow to about 120 degrees.  External rotation is 20 to 25 degrees and internal Tatian is to L5.  Skin is cool to touch there is no ecchymosis or bruising.    Assessment:      Large Joint Arthrocentesis: L subacromial bursa  Date/Time: 5/12/2022 2:10 PM  Consent given by: patient  Site marked: site marked  Timeout: Immediately prior to procedure a time out was called to verify the correct patient, procedure, equipment, support staff and site/side marked as required   Supporting Documentation  Indications: pain   Procedure Details  Location: shoulder - L subacromial bursa  Preparation: Patient was prepped and draped in the usual sterile fashion  Needle size: 22 G  Approach: posterior  Medications administered: 4 mL lidocaine PF 1% 1 %; 6 mg betamethasone acetate-betamethasone sodium phosphate 6 (3-3) MG/ML  Patient tolerance: patient tolerated the procedure well with no immediate complications              1. Acute pain of left shoulder    2. Glenohumeral arthritis, left    3. Subacromial bursitis of left shoulder joint    4. Arthritis of left acromioclavicular joint           Plan: Reviewed the x-rays with the patient and the findings and I reviewed the MRI done 6 years ago again which shows glenohumeral arthritis with rotator cuff tendinitis and bursitis but no rotator cuff tear.  After reviewing treatment options we will proceed with a cortisone injection of 4 cc lidocaine and 1 cc Celestone as she is diabetic.  Postinjection instructions given to the patient.  Return to see me as needed.  Answered all questions             Work Status:    LOUANN query complete.    Orders:  Orders Placed This Encounter   Procedures   • Large Joint Arthrocentesis: L subacromial bursa   • XR Shoulder 2+ View Left       Medications:  No orders of the defined types were placed in this encounter.      Followup:  Return if symptoms worsen or fail to improve.          Dictated utilizing Dragon dictation

## 2022-05-26 ENCOUNTER — APPOINTMENT (OUTPATIENT)
Dept: GENERAL RADIOLOGY | Facility: HOSPITAL | Age: 77
End: 2022-05-26

## 2022-05-26 ENCOUNTER — HOSPITAL ENCOUNTER (EMERGENCY)
Facility: HOSPITAL | Age: 77
Discharge: HOME | End: 2022-05-26
Attending: EMERGENCY MEDICINE | Admitting: EMERGENCY MEDICINE

## 2022-05-26 VITALS
HEART RATE: 69 BPM | WEIGHT: 225.6 LBS | BODY MASS INDEX: 37.59 KG/M2 | SYSTOLIC BLOOD PRESSURE: 148 MMHG | TEMPERATURE: 98.5 F | DIASTOLIC BLOOD PRESSURE: 103 MMHG | OXYGEN SATURATION: 98 % | HEIGHT: 65 IN | RESPIRATION RATE: 18 BRPM

## 2022-05-26 DIAGNOSIS — S90.30XA CONTUSION OF DORSUM OF FOOT: ICD-10-CM

## 2022-05-26 DIAGNOSIS — S93.401A SPRAIN OF RIGHT ANKLE, UNSPECIFIED LIGAMENT, INITIAL ENCOUNTER: Primary | ICD-10-CM

## 2022-05-26 PROCEDURE — 99282 EMERGENCY DEPT VISIT SF MDM: CPT

## 2022-05-26 PROCEDURE — 73610 X-RAY EXAM OF ANKLE: CPT

## 2022-05-26 PROCEDURE — 25010000002 KETOROLAC TROMETHAMINE PER 15 MG

## 2022-05-26 PROCEDURE — 96372 THER/PROPH/DIAG INJ SC/IM: CPT

## 2022-05-26 PROCEDURE — 73030 X-RAY EXAM OF SHOULDER: CPT

## 2022-05-26 PROCEDURE — 73630 X-RAY EXAM OF FOOT: CPT

## 2022-05-26 PROCEDURE — 99283 EMERGENCY DEPT VISIT LOW MDM: CPT

## 2022-05-26 RX ORDER — KETOROLAC TROMETHAMINE 30 MG/ML
30 INJECTION, SOLUTION INTRAMUSCULAR; INTRAVENOUS ONCE
Status: COMPLETED | OUTPATIENT
Start: 2022-05-26 | End: 2022-05-26

## 2022-05-26 RX ADMIN — KETOROLAC TROMETHAMINE 30 MG: 30 INJECTION, SOLUTION INTRAMUSCULAR; INTRAVENOUS at 16:48

## 2022-05-26 NOTE — ED PROVIDER NOTES
EMERGENCY DEPARTMENT ENCOUNTER      Room Number: 09/09    History is provided by the patient, no translation services needed    HPI:    Chief complaint: Fall    Location: Left shoulder, right ankle and foot    Quality/Severity: Small localized dorsal swelling    Timing/Duration: Immediately PTA    Modifying Factors: Could not bear weight.    Associated Symptoms: Denies chest pain, shortness of breath, abdominal pain, head injury, LOC.    Narrative: Pt is a 76 y.o. female who presents via EMS complaining of a fall that occurred immediately prior to arrival while the patient was walking towards her doctor's office.  Patient states she is unsure of what caused her to fall, but she remembers the entire incident.  Reports left shoulder pain and right ankle and foot pain.  Could not bear weight after the incident.  Denies slurred speech, disorientation, weakness, sensory deficit, head injury, LOC, chest pain, shortness of breath, abdominal pain.  Not on anticoagulation.      PMD: Kathryn Epstein APRN    REVIEW OF SYSTEMS  Review of Systems   Constitutional: Negative for chills and fever.   Eyes: Negative for pain and visual disturbance.   Respiratory: Negative for cough and shortness of breath.    Cardiovascular: Negative for chest pain and leg swelling.   Gastrointestinal: Negative for abdominal pain, constipation, diarrhea, nausea and vomiting.   Genitourinary: Negative for dysuria and flank pain.   Musculoskeletal: Positive for arthralgias, gait problem and myalgias.   Skin: Negative for rash and wound.   Neurological: Negative for dizziness, syncope and headaches.   Psychiatric/Behavioral: Negative for suicidal ideas. The patient is not nervous/anxious.          PAST MEDICAL HISTORY  Active Ambulatory Problems     Diagnosis Date Noted   • Chronic anemia 02/02/2016   • Malignant neoplasm of upper-inner quadrant of right breast in female, estrogen receptor positive (HCC) 06/24/2015   • Disc disorder of cervical  region 02/02/2016   • Neck pain 02/02/2016   • Chronic pain 02/02/2016   • Depression 02/02/2016   • Type 2 diabetes mellitus without complication, without long-term current use of insulin ([facility]) 02/02/2016   • Dyslipidemia 02/02/2016   • Gastroesophageal reflux disease with esophagitis 02/02/2016   • Hypertension 02/02/2016   • Hypothyroidism 02/02/2016   • Incisional hernia 02/02/2016   • Mitral valve insufficiency 02/02/2016   • Nausea 02/02/2016   • Osteopenia of spine 02/02/2016   • Arthralgia of multiple joints 02/02/2016   • Peripheral neuropathy 02/02/2016   • Pulmonary hypertension ([facility]) 02/02/2016   • PITTS (dyspnea on exertion) 02/02/2016   • Tricuspid valve insufficiency 02/02/2016   • Cobalamin deficiency 02/02/2016   • Vitamin D deficiency 02/02/2016   • HFrEF (heart failure with reduced ejection fraction) ([facility]) 02/02/2016   • Arthritis of knee 05/17/2016   • DDD (degenerative disc disease), lumbar 05/17/2016   • Lumbar facet arthropathy 05/17/2016   • Chronic gout of multiple sites 08/02/2016   • Encounter for long-term (current) use of high-risk medication 09/22/2016   • Primary osteoarthritis of left knee 09/05/2017   • Physical deconditioning 04/20/2018   • CKD (chronic kidney disease) stage 3, GFR 30-59 ml/min ([facility]) 08/30/2019   • Pleural effusion 01/31/2020   • Acute respiratory failure with hypoxia ([facility]) 01/31/2020   • Periprosthetic subtrochanteric fracture of femur 02/28/2020   • Traumatic closed nondisp torus fracture of distal radial metaphysis, right, initial encounter 02/28/2020   • Atherosclerotic heart disease of native coronary artery without angina pectoris 12/01/2016   • NICM (nonischemic cardiomyopathy) ([facility]) 01/19/2021   • Age-related osteoporosis without current pathological fracture 02/25/2022     Resolved Ambulatory Problems     Diagnosis Date Noted   • Abdominal bloating 02/02/2016   • Abdominal mass 02/02/2016   • Abdominal pain 02/02/2016   • Abnormal gait 02/02/2016   • Abnormal  mammogram 02/02/2016   • Acute bronchitis 02/02/2016   • Acute upper respiratory infection 02/02/2016   • Infection due to fungus 02/02/2016   • Atherosclerosis of coronary artery 02/02/2016   • Hematochezia 02/02/2016   • Thoracic back pain 02/02/2016   • Candidiasis 02/02/2016   • Cardiomyopathy ([facility]) 02/02/2016   • Contusion of chest wall 02/02/2016   • Tenderness of chest wall 02/02/2016   • Anemia due to stage 3 chronic kidney disease ([facility]) 02/02/2016   • Congestive heart failure ([facility]) 02/02/2016   • Constipation 02/02/2016   • Generalized osteoarthritis 02/02/2016   • Degeneration of intervertebral disc 02/02/2016   • Type 2 diabetes mellitus ([facility]) 02/02/2016   • Diarrhea 02/02/2016   • Fall in home 02/02/2016   • Fall on same level from slipping, tripping or stumbling 02/02/2016   • Generalized pain 02/02/2016   • Gout 02/02/2016   • Injury of head 02/02/2016   • Hyperkalemia 02/02/2016   • Hypokalemia 02/02/2016   • Hypomagnesemia 02/02/2016   • Infection of toe 02/02/2016   • Mass of breast 02/02/2016   • Muscle pain 02/02/2016   • Nausea and vomiting 02/02/2016   • Gangrene of toe ([facility]) 02/02/2016   • Adult body mass index greater than 30 02/02/2016   • Painful breathing 02/02/2016   • Skin lesion 02/02/2016   • Rib pain 02/02/2016   • Hematoma of scalp 02/02/2016   • Seborrheic keratosis 02/02/2016   • Osteomyelitis ([facility]) 02/02/2016   • Urinary tract infection 02/02/2016   • Weight loss 02/02/2016   • Weight gain 02/02/2016   • Diabetes mellitus ([facility]) 02/02/2016   • Left knee pain 05/17/2016   • Pain in shoulder 08/02/2016   • Shortness of breath 08/16/2016   • History of cancer 09/22/2016   • Constipation due to opioid therapy 10/20/2016   • Syncope, psychogenic 04/19/2017   • Transient alteration of awareness 04/19/2017   • Closed fracture of patella 09/05/2017   • Radius/ulna fracture, left, closed, initial encounter 10/21/2017   • Rib pain on left side 11/27/2017   • Encounter for long-term (current)  use of medications 01/18/2018   • Preoperative cardiovascular examination 04/09/2018   • Osteoarthritis of left knee 04/17/2018   • Hyponatremia 05/10/2018   • Needs flu shot 10/05/2018   • Acute URI 01/29/2019   • Chronic kidney disease 05/24/2019   • Chest pain 08/16/2019   • Acute on chronic HFrEF (heart failure with reduced ejection fraction) ([facility]) 08/17/2019   • ADRIENNE (acute kidney injury) ([facility]) 09/01/2019   • Diastolic congestive heart failure ([facility]) 09/01/2019   • MRSA infection 01/29/2013   • Acute on chronic congestive heart failure ([facility]) 04/10/2020     Past Medical History:   Diagnosis Date   • Anemia    • Benign breast disease    • Cancer ([facility]) 2015   • Cellulitis of leg    • CHF (congestive heart failure) ([facility])    • Chronic ulcer of right foot ([facility])    • Cluster headache    • Colon polyp    • Diabetic peripheral neuropathy ([facility])    • Difficulty walking    • Diverticulosis    • Femoral fracture ([facility])    • Fibromyalgia, primary    • Fracture of left wrist    • Gastroesophageal reflux    • Hiatal hernia    • Hyperlipidemia    • Impingement syndrome of right shoulder    • Joint pain    • Menopausal disorder    • Methicillin resistant Staphylococcus aureus infection 2012   • Nonischemic cardiomyopathy ([facility])    • Osteoarthritis    • Paroxysmal atrial fibrillation ([facility])    • Shoulder pain, bilateral    • Sleep apnea    • Toe ulcer ([facility])        PAST SURGICAL HISTORY  Past Surgical History:   Procedure Laterality Date   • ABDOMINAL SURGERY  2012    had to be reopened after bladder surgery d/t infection   • AMPUTATION FOOT / TOE Right 11/2013    Rt foot amputation MTP first toe   • BLADDER SURGERY  2012   • BREAST BIOPSY     • BREAST SURGERY  06/29/2015    Percutaneous ultrasound-guided placement of metal localized clip 1st lesion   • CARDIAC CATHETERIZATION  2015   • CARDIAC CATHETERIZATION N/A 1/25/2021    Procedure: Right and Left Heart Cath;  Surgeon: Nicholas Andrade MD;  Location: [facility] INVASIVE  LOCATION;  Service: Cardiovascular;  Laterality: N/A;  drop in EF, with hx of NICM, SOA fatigue.  Discussed with    • CARDIAC CATHETERIZATION N/A 1/25/2021    Procedure: Left ventriculography;  Surgeon: Nicholas Andrade MD;  Location:  [facility] CATH INVASIVE LOCATION;  Service: Cardiovascular;  Laterality: N/A;   • CARDIAC CATHETERIZATION N/A 1/25/2021    Procedure: Coronary angiography;  Surgeon: Nicholas Andrade MD;  Location:  [facility] CATH INVASIVE LOCATION;  Service: Cardiovascular;  Laterality: N/A;   • CATARACT EXTRACTION Bilateral 2017   • COLONOSCOPY     • DEBRIDEMENT  FOOT Right 01/2013    During hospitalization    • EPIDURAL BLOCK      4 chronic back pain and leg pain last epidurals done 5-2012 she had no benefit at all from this procedure.   • FEMUR FRACTURE SURGERY     • FOOT SURGERY Bilateral     several   • HERNIA REPAIR      At the abdomen February 2007 Dr. Mendez   • INCISIONAL HERNIA REPAIR  05/16/2014    Recurrent. Incarcerated. With mesh implantation   • MASTECTOMY Right 05/2015   • MASTECTOMY MODIFIED RADICAL W/ AXILLARY LYMPH NODES W/ OR W/O PECTORALIS MINOR Right    • SHOULDER SURGERY Right     x2 RCR   • TOTAL ABDOMINAL HYSTERECTOMY      with oophorectomy-Done June-2012 secondary to vaginal wall prolapse.   • TOTAL KNEE ARTHROPLASTY Right    • TOTAL KNEE ARTHROPLASTY Left 4/17/2018    Procedure: TOTAL KNEE ARTHROPLASTY;  Surgeon: Live Chambers MD;  Location: [facility];  Service: Orthopedics       FAMILY HISTORY  Family History   Problem Relation Age of Onset   • Colonic polyp Mother    • Hypertension Mother    • Migraines Mother    • Mental illness Mother    • Depression Mother    • Arthritis Mother    • Coronary artery disease Father    • Other Father         Cardiac Disorder   • Colon cancer Father    • Kidney disease Father    • Cancer Father    • Diabetes Father    • Heart disease Father    • Hypertension Father    • Breast cancer Maternal Aunt    • Colon cancer Brother    • Alcohol  abuse Brother    • Arthritis Sister    • Hypertension Brother    • Lung disease Brother    • Alcohol abuse Brother    • Malig Hyperthermia Neg Hx        SOCIAL HISTORY  Social History     Socioeconomic History   • Marital status:    • Number of children: 2   Tobacco Use   • Smoking status: Former Smoker     Packs/day: 3.00     Years: 30.00     Pack years: 90.00     Types: Cigarettes     Quit date:      Years since quittin.4   • Smokeless tobacco: Never Used   Vaping Use   • Vaping Use: Never used   Substance and Sexual Activity   • Alcohol use: No     Comment: Caffeine use: 3 cups daily    • Drug use: No   • Sexual activity: Defer     Partners: Male     Comment: celibate       ALLERGIES  Dilaudid [hydromorphone] and Latex    No current facility-administered medications for this encounter.    Current Outpatient Medications:   •  amLODIPine (NORVASC) 5 MG tablet, Take 1 tablet by mouth Daily. Take one 5 mg tab in the morning with one 2.5 mg tab at HS., Disp: 30 tablet, Rfl: 11  •  aspirin 81 MG EC tablet, Take 81 mg by mouth Daily., Disp: , Rfl:   •  bumetanide (BUMEX) 0.5 MG tablet, Take 3 tablets by mouth Daily., Disp: 270 tablet, Rfl: 1  •  carvedilol (COREG) 25 MG tablet, TAKE 1 TABLET BY MOUTH TWICE DAILY, Disp: 180 tablet, Rfl: 1  •  D-1000 Extra Strength 25 MCG (1000 UT) tablet, TAKE 1 TABLET BY MOUTH DAILY, Disp: 90 tablet, Rfl: 3  •  DULoxetine (CYMBALTA) 60 MG capsule, TAKE 1 CAPSULE BY MOUTH DAILY., Disp: 90 capsule, Rfl: 3  •  empagliflozin (JARDIANCE) 10 MG tablet tablet, Take 1 tablet by mouth Daily., Disp: 30 tablet, Rfl: 1  •  esomeprazole (nexIUM) 20 MG capsule, Take 20 mg by mouth Every Morning Before Breakfast., Disp: , Rfl:   •  glipizide (GLUCOTROL XL) 5 MG ER tablet, Take 1 tablet by mouth Daily With Breakfast., Disp: 90 tablet, Rfl: 1  •  HYDROcodone-acetaminophen (NORCO) 5-325 MG per tablet, Take 1 tablet by mouth Every 6 (Six) Hours As Needed for Moderate Pain ., Disp: 120  tablet, Rfl: 0  •  levothyroxine (SYNTHROID, LEVOTHROID) 25 MCG tablet, TAKE 1 TABLET BY MOUTH DAILY, Disp: 90 tablet, Rfl: 3  •  Multiple Vitamins-Minerals (MULTIVITAMIN ADULT PO), Take 1 tablet by mouth Daily., Disp: , Rfl:   •  pravastatin (PRAVACHOL) 10 MG tablet, TAKE 1 TABLET BY MOUTH AT BEDTIME, Disp: 90 tablet, Rfl: 3  •  pregabalin (LYRICA) 75 MG capsule, TAKE 1 CAPSULE BY MOUTH 2 (TWO) TIMES A DAY., Disp: 60 capsule, Rfl: 5  •  sacubitril-valsartan (Entresto) 49-51 MG tablet, Take 1 tablet by mouth 2 (Two) Times a Day., Disp: 60 tablet, Rfl: 11    PHYSICAL EXAM  ED Triage Vitals [05/26/22 1546]   Temp Heart Rate Resp BP SpO2   98.5 °F (36.9 °C) 69 18 148/83 97 %      Temp src Heart Rate Source Patient Position BP Location FiO2 (%)   Oral Monitor Sitting Right arm --       Physical Exam  Constitutional:       General: She is not in acute distress.     Appearance: Normal appearance. She is not ill-appearing.   HENT:      Head: Normocephalic and atraumatic.   Eyes:      Extraocular Movements: Extraocular movements intact.      Pupils: Pupils are equal, round, and reactive to light.   Cardiovascular:      Rate and Rhythm: Normal rate and regular rhythm.   Pulmonary:      Effort: Pulmonary effort is normal.      Breath sounds: Normal breath sounds.   Abdominal:      General: There is no distension.      Palpations: Abdomen is soft.      Tenderness: There is no abdominal tenderness. There is no guarding.   Musculoskeletal:         General: Swelling and tenderness present. No deformity or signs of injury.      Cervical back: Normal range of motion.      Comments: Tenderness of anterior right ankle and lateral foot.  Contusion on dorsum of left foot that appeared after he removed the compressive dressings.  Swelled from approximately 1 cm in size to 4 cm in size over the course of a couple hours while in the ED. Moving left arm freely without pain.   Skin:     General: Skin is warm and dry.      Findings: No  bruising.      Comments: Well appearing ulcer on bottom of right foot covered in gauze and compressive dressings.   Neurological:      General: No focal deficit present.      Mental Status: She is alert and oriented to person, place, and time.      Cranial Nerves: No cranial nerve deficit.      Sensory: No sensory deficit.      Motor: No weakness.      Coordination: Coordination normal.   Psychiatric:         Mood and Affect: Mood normal.         Behavior: Behavior normal.           LAB RESULTS  Lab Results (last 24 hours)     ** No results found for the last 24 hours. **          RADIOLOGY  XR Shoulder 2+ View Left    Result Date: 5/26/2022  CR Shoulder Comp Min 2 Vws LT INDICATION: Fall today with right shoulder pain COMPARISON: None available. FINDINGS: AP internal rotation, AP external rotation, views of the left shoulder.   No obvious fracture or dislocation.  The acromioclavicular and glenohumeral articulations are within normal limits.  No foreign body.     No acute radiographic findings. Signer Name: Robbin Zimmer MD  Signed: 5/26/2022 5:02 PM  Workstation Name: [facility]  Radiology [facility]    XR Ankle 3+ View Right    Result Date: 5/26/2022  CR Ankle Min 3 Vws RT INDICATION: Fall today with right ankle pain COMPARISON: April 2, 2021 FINDINGS: AP, lateral, and oblique view(s) of the right ankle. Bony structures are diffusely demineralized. No acute fracture or dislocation. No bone erosion or destruction. No unexpected foreign body.     No acute radiographic findings. Signer Name: Robbin Zimmer MD  Signed: 5/26/2022 4:56 PM  Workstation Name: [facility]  Radiology [facility]    XR Foot 3+ View Right    Result Date: 5/26/2022  CR Foot Comp Min 3 Vws RT INDICATION: Fall today with right foot pain COMPARISON: April 2, 2021 FINDINGS: AP, lateral, and oblique views of the right foot. Bony structures are diffusely demineralized. Prior amputation of the second toe. Extensive  osteoarthritic changes of the metatarsophalangeal and interphalangeal joints. No fracture or dislocation.  No lytic bone lesions. No periosteal reaction. No unexpected foreign body.     1.  No clearly acute radiographic findings. 2.  Extensive demineralization of the osseous structures of the right foot. Prior amputation of the second toe. No lytic bone lesions. No periosteal reaction. Signer Name: Robbin Zimmer MD  Signed: 5/26/2022 5:00 PM  Workstation Name: [id number]  [facility]      I ordered the above radiologic testing and reviewed the results    PROCEDURES  Procedures      PROGRESS AND CONSULTS           MEDICAL DECISION MAKING    MDM  Number of Diagnoses or Management Options  Contusion of dorsum of foot  Sprain of right ankle, unspecified ligament, initial encounter  Diagnosis management comments: Differential diagnosis includes but is not limited to: Fracture, dislocation, sprain, strain, contusion       Amount and/or Complexity of Data Reviewed  Tests in the radiology section of CPT®: reviewed    Risk of Complications, Morbidity, and/or Mortality  Presenting problems: low  Diagnostic procedures: low    Patient Progress  Patient progress: stable         DIAGNOSIS  Final diagnoses:   Sprain of right ankle, unspecified ligament, initial encounter   Contusion of dorsum of foot       Latest Documented Vital Signs:  As of 17:54 EDT  BP- (!) 148/103 HR- 69 Temp- 98.5 °F (36.9 °C) (Oral) O2 sat- 98%    DISPOSITION  Discharged home.    Discussed pertinent findings with the patient/family.  Patient/Family voiced understanding of need to follow-up for recheck and further testing as needed.  Return to the Emergency Department warnings were given.         Medication List      No changes were made to your prescriptions during this visit.              Follow-up Information     Schedule an appointment as soon as possible for a visit  with Kathryn Epstein APRN.    Specialties: Family  Medicine, Internal Medicine, Emergency Medicine  Contact information:  [address]  [address]  [place] [address]  [phone]                           Dictated utilizing Dragon dictation     Farzana Granados PA-C  05/26/22 5964

## 2022-05-26 NOTE — DISCHARGE INSTRUCTIONS
Refer to the attached instructions for further information.  Return to the emergency department for worsening symptoms or other medical emergencies.  Use ice frequently for generalized swelling.  Use compression wrap as needed for area of hematoma.  Follow-up with your PCP.

## 2022-05-26 NOTE — ED NOTES
Pt complains of L shoulder pain and R foot pain after a fall. Pt denies any LOC or hitting her head.

## 2022-05-27 RX ORDER — HYDROCODONE BITARTRATE AND ACETAMINOPHEN 5; 325 MG/1; MG/1
1 TABLET ORAL EVERY 6 HOURS PRN
Qty: 120 TABLET | Refills: 0 | Status: SHIPPED | OUTPATIENT
Start: 2022-05-27 | End: 2022-06-23 | Stop reason: SDUPTHER

## 2022-05-27 NOTE — TELEPHONE ENCOUNTER
This is the pt who fell outside of the office yesterday prior to Trini's appt. Pt appt has been rescheduled but earliest appt with Trini not until 6/23/22. Can you please refill pt med for Trini? Thank you.

## 2022-05-27 NOTE — TELEPHONE ENCOUNTER
Incoming Refill Request      Medication requested (name and dose): Hydrocodone (NORCO) 5-325 MG     Pharmacy where request should be sent: [facility]     Additional details provided by patient: patient was suppose to come to office visit 05/26 but fell in lobby and stated she will run out of meds on 5/31    Best call back number: [phone]    Does the patient have less than a 3 day supply:  [x] Yes  [] No    Allan DACOSTA Rep  05/27/22, 10:27 EDT

## 2022-05-28 NOTE — TELEPHONE ENCOUNTER
Reviewed Trini BRADY last office visit on 3/31/2022. ALVAREZ and LOUANN reviewed and are appropriate. Due to provider being out of office, will refill appropriately.

## 2022-06-13 RX ORDER — CARVEDILOL 25 MG/1
TABLET ORAL
Qty: 180 TABLET | Refills: 0 | Status: SHIPPED | OUTPATIENT
Start: 2022-06-13 | End: 2022-10-03

## 2022-06-23 ENCOUNTER — OFFICE VISIT (OUTPATIENT)
Dept: PAIN MEDICINE | Facility: CLINIC | Age: 77
End: 2022-06-23

## 2022-06-23 VITALS
DIASTOLIC BLOOD PRESSURE: 68 MMHG | HEART RATE: 74 BPM | OXYGEN SATURATION: 97 % | SYSTOLIC BLOOD PRESSURE: 123 MMHG | TEMPERATURE: 96.9 F | BODY MASS INDEX: 37.54 KG/M2 | HEIGHT: 65 IN

## 2022-06-23 DIAGNOSIS — M25.50 ARTHRALGIA OF MULTIPLE JOINTS: ICD-10-CM

## 2022-06-23 DIAGNOSIS — M47.816 LUMBAR FACET ARTHROPATHY: ICD-10-CM

## 2022-06-23 DIAGNOSIS — G89.29 OTHER CHRONIC PAIN: Primary | ICD-10-CM

## 2022-06-23 DIAGNOSIS — Z79.899 ENCOUNTER FOR LONG-TERM (CURRENT) USE OF HIGH-RISK MEDICATION: ICD-10-CM

## 2022-06-23 DIAGNOSIS — M51.36 DDD (DEGENERATIVE DISC DISEASE), LUMBAR: ICD-10-CM

## 2022-06-23 PROCEDURE — 99214 OFFICE O/P EST MOD 30 MIN: CPT | Performed by: NURSE PRACTITIONER

## 2022-06-23 RX ORDER — HYDROCODONE BITARTRATE AND ACETAMINOPHEN 5; 325 MG/1; MG/1
1 TABLET ORAL EVERY 6 HOURS PRN
Qty: 120 TABLET | Refills: 0 | Status: SHIPPED | OUTPATIENT
Start: 2022-06-23 | End: 2022-07-27 | Stop reason: SDUPTHER

## 2022-06-23 NOTE — PROGRESS NOTES
CHIEF COMPLAINT  F/U back pain- patient states that her pain has worsened since her last visit.   Subjective   Jung Short is a 76 y.o. female  who presents for follow-up.  She has a history of chronic back and joint pain. Reports her pain is worse since last evaluation.     Patient had a fall coming to clinic on 5-26-22. She could not get up and had to have EMS called. Went to ED. Diagnosed with right ankle sprain. She is still having ongoing issues with swelling, pain and weight-bearing.  She does go to wound care. Reports her ankle has remained horrible and is back and blue. Generally keeps right leg wrapped.  Has not seen orthopedist.  Is also still having intermittent left shoulder pain.  Using a walker or cane at all times with ambulation.    Complains of pain in her low back, joints, right ankle/foot. Today her pain is 8/10VAS(non-tearful, normal respiratory rate).  Describes her pain as continuous throbbing and aching. Pain increases with activity, movement, chores, walking/standing(tries not to if possible). Continues with Hydrocodone 5/325 3-4/day, Robaxin 500 mg daily and Lyrica  75 mg 1-2/day(prescribed by PCP), as well as Cymbalta 60 mg daily.  Denies any side effects from the regimen, including constipation and somnolence. The regimen helps decreases her pain moderately.  ADL's by self. Denies any bladder incontinence.  No longer having bowel incontinence. This resolved after she stopped taking Metformin.    Could not tolerate gabapentin--- altered mental status.      Previously was on Hydrocodone 10/325 6/day. Has been able to wean down to hydrocodone 5/325 4/day.     Has had multiple broken bones and orthopedic surgeries over the years. Has had been in long-term wound care for multiple wounds and partial amputations of right foot.     Reports her EF is at 20% as of 3-10-21; 37% as of 7-30-21 office visit with Lina BRADY. Has been having issues with her blood pressure lately. Still be  followed by Dr Bates and Lina BRADY.    Previously had LESI at [facility] with no benefit.     Patient remained masked during entire encounter. No cough present. I donned a mask and eye protection throughout entire visit. Prior to donning mask and eye protection, hand hygiene was performed, as well as when it was doffed.  I was closer than 6 feet, but not for an extended period of time. No obvious exposure to any bodily fluids.    History of Present Illness     PEG Assessment   What number best describes your pain on average in the past week?7  What number best describes how, during the past week, pain has interfered with your enjoyment of life?8  What number best describes how, during the past week, pain has interfered with your general activity?  8    The following portions of the patient's history were reviewed and updated as appropriate: allergies, current medications, past family history, past medical history, past social history, past surgical history and problem list.    Review of Systems   Constitutional: Positive for activity change (decreased). Negative for chills, fatigue and fever.   HENT: Negative for congestion.    Eyes: Negative for visual disturbance.   Respiratory: Negative for chest tightness and shortness of breath.    Cardiovascular: Positive for leg swelling. Negative for chest pain.   Gastrointestinal: Positive for diarrhea. Negative for abdominal pain and constipation.   Genitourinary: Negative for difficulty urinating, dyspareunia and dysuria.   Musculoskeletal: Positive for back pain.   Neurological: Negative for dizziness, weakness, light-headedness, numbness and headaches.   Psychiatric/Behavioral: Positive for sleep disturbance. Negative for agitation. The patient is not nervous/anxious.      I have reviewed and confirmed the accuracy of the ROS as documented by the MA/LPN/JOSE ANTONIO Zee      Vitals:    06/23/22 1341   BP: 123/68   Pulse: 74   Temp: 96.9 °F  "(36.1 °C)   SpO2: 97%   Weight: Comment: unable to stand for weight   Height: 165.1 cm (65\")   PainSc:   8   PainLoc: Foot       Objective   Physical Exam  Constitutional:       Appearance: She is well-developed.   HENT:      Head: Normocephalic and atraumatic.   Musculoskeletal:      Right wrist: Swelling present. Decreased range of motion.      Lumbar back: Tenderness present. Decreased range of motion.      Right knee: Swelling, erythema, ecchymosis and bony tenderness present. Decreased range of motion. Tenderness present over the lateral joint line.      Comments: Widespread OA changes with no acute synovitis    Walking shoes of right foot. RLE below knee is wrapped in ace wrap. C/D/I.  Diabetic shoe of left foot.   Neurological:      Mental Status: She is alert.      Gait: Gait abnormal (in wheelchair today--- has cane if needed but did not use while in office today).   Psychiatric:         Speech: Speech normal.         Behavior: Behavior normal.       Assessment & Plan   Diagnoses and all orders for this visit:    1. Other chronic pain (Primary)    2. DDD (degenerative disc disease), lumbar    3. Lumbar facet arthropathy    4. Arthralgia of multiple joints    5. Encounter for long-term (current) use of high-risk medication    Other orders  -     HYDROcodone-acetaminophen (NORCO) 5-325 MG per tablet; Take 1 tablet by mouth Every 6 (Six) Hours As Needed for Moderate Pain .  Dispense: 120 tablet; Refill: 0      --- The urine drug screen confirmation from 4-6-22 has been reviewed and the result is APPROPRIATE based on patient history and LOUANN report  --- The patient signed an updated copy of the controlled substance agreement on 3-31-22  --- Encouraged patient to see orthopedist.  --- Refill Hydrocodone DNF. Patient appears stable with current regimen. No adverse effects. Regarding continuation of opioids, there is no evidence of aberrant behavior or any red flags.  The patient continues with appropriate " response to opioid therapy. ADL's remain intact by self.   --- Follow-up 2 months or sooner if needed       LOUANN REPORT  As part of the patient's treatment plan, I am prescribing controlled substances. The patient has been made aware of appropriate use of such medications, including potential risk of somnolence, limited ability to drive and/or work safely, and the potential for dependence or overdose. It has also been made clear that these medications are for use by this patient only, without concomitant use of alcohol or other substances unless prescribed.     Patient has completed prescribing agreement detailing terms of continued prescribing of controlled substances, including monitoring LOUANN reports, urine drug screening, and pill counts if necessary. The patient is aware that inappropriate use will results in cessation of prescribing such medications.    As the clinician, I personally reviewed the LOUANN from 6-23-22 while the patient was in the office today.    History and physical exam exhibit continued safe and appropriate use of controlled substances.       Dictated utilizing Dragon dictation.     This document is intended for medical expert use only. Reading of this document by patients and/or patient's family without participating medical staff guidance may result in misinterpretation and unintended morbidity.   Any interpretation of such data is the responsibility of the patient and/or family member responsible for the patient in concert with their primary or specialist providers, not to be left for sources of online searches such as [employer], [employer] or similar queries. Relying on these approaches to knowledge may result in misinterpretation, misguided goals of care and even death should patients or family members try recommendations outside of the realm of professional medical care in a supervised way.

## 2022-07-01 ENCOUNTER — HOSPITAL ENCOUNTER (EMERGENCY)
Facility: HOSPITAL | Age: 77
Discharge: HOME | End: 2022-07-02
Attending: EMERGENCY MEDICINE | Admitting: EMERGENCY MEDICINE

## 2022-07-01 DIAGNOSIS — S33.5XXA LUMBAR SPRAIN, INITIAL ENCOUNTER: ICD-10-CM

## 2022-07-01 DIAGNOSIS — S93.601A SPRAIN OF RIGHT FOOT, INITIAL ENCOUNTER: Primary | ICD-10-CM

## 2022-07-01 DIAGNOSIS — S23.9XXA THORACIC BACK SPRAIN, INITIAL ENCOUNTER: ICD-10-CM

## 2022-07-01 PROCEDURE — 99283 EMERGENCY DEPT VISIT LOW MDM: CPT

## 2022-07-02 ENCOUNTER — APPOINTMENT (OUTPATIENT)
Dept: GENERAL RADIOLOGY | Facility: HOSPITAL | Age: 77
End: 2022-07-02

## 2022-07-02 VITALS
BODY MASS INDEX: 37.32 KG/M2 | HEIGHT: 65 IN | DIASTOLIC BLOOD PRESSURE: 62 MMHG | SYSTOLIC BLOOD PRESSURE: 139 MMHG | HEART RATE: 73 BPM | WEIGHT: 224 LBS | TEMPERATURE: 98.5 F | RESPIRATION RATE: 18 BRPM | OXYGEN SATURATION: 93 %

## 2022-07-02 PROCEDURE — 72100 X-RAY EXAM L-S SPINE 2/3 VWS: CPT

## 2022-07-02 PROCEDURE — 73552 X-RAY EXAM OF FEMUR 2/>: CPT

## 2022-07-02 PROCEDURE — 73590 X-RAY EXAM OF LOWER LEG: CPT

## 2022-07-02 PROCEDURE — 72072 X-RAY EXAM THORAC SPINE 3VWS: CPT

## 2022-07-02 PROCEDURE — 73630 X-RAY EXAM OF FOOT: CPT

## 2022-07-02 RX ORDER — CYCLOBENZAPRINE HCL 10 MG
10 TABLET ORAL ONCE
Status: COMPLETED | OUTPATIENT
Start: 2022-07-02 | End: 2022-07-02

## 2022-07-02 RX ORDER — ACETAMINOPHEN 500 MG
1000 TABLET ORAL ONCE
Status: COMPLETED | OUTPATIENT
Start: 2022-07-02 | End: 2022-07-02

## 2022-07-02 RX ADMIN — ACETAMINOPHEN 1000 MG: 500 TABLET ORAL at 02:06

## 2022-07-02 RX ADMIN — CYCLOBENZAPRINE 10 MG: 10 TABLET, FILM COATED ORAL at 02:26

## 2022-07-02 NOTE — DISCHARGE INSTRUCTIONS
Take the Norco as needed as directed for pain.  Follow-up follow-up with  on Tuesday.  Nonweightbearing on the right foot for 2 days and advance as tolerated.  Apply ice for 20 minutes every 2 hours while awake for 2 to 3 days, then apply moist heat.  Follow-up with Kathryn Epstein in 1 week.

## 2022-07-02 NOTE — ED PROVIDER NOTES
Subjective   History of Present Illness  History of Present Illness    Chief complaint: Fall with foot pain    Location: Right foot    Quality/Severity: Moderate, sharp    Timing/Onset/Duration: 3 PM    Modifying Factors: Hurts to move, feels better to remain still    Associated Symptoms: No loss of consciousness.  No neck or back pain.  No chest pain or shortness of breath.  No abdominal pain.  No numbness, tingling, weakness, or change in bladder or bowel function.    Narrative: This 76-year-old had a fall today at 1500.  Patient landed on the right side and complains of right leg pain, chronic right foot pain.  Patient is not on blood thinners.  She did not hit her head.  There was no loss of consciousness.    PCP: Kathryn Epstein  Review of Systems   HENT: Negative for nosebleeds and rhinorrhea.    Respiratory: Negative for shortness of breath.    Cardiovascular: Negative for chest pain.   Gastrointestinal: Negative for abdominal pain, nausea and vomiting.   Genitourinary: Negative for difficulty urinating.   Musculoskeletal: Negative for back pain and neck pain.   Skin: Positive for wound (Healing diabetic ulcer bottom of the right foot).   Neurological: Negative for weakness, numbness and headaches.   Psychiatric/Behavioral: Negative for confusion.        Medication List      ASK your doctor about these medications    amLODIPine 5 MG tablet  Commonly known as: NORVASC  Take 1 tablet by mouth Daily. Take one 5 mg tab in the morning with one 2.5 mg tab at HS.     aspirin 81 MG EC tablet     bumetanide 0.5 MG tablet  Commonly known as: BUMEX  Take 3 tablets by mouth Daily.     carvedilol 25 MG tablet  Commonly known as: COREG  TAKE 1 TABLET BY MOUTH TWICE DAILY     D-1000 Extra Strength 25 MCG (1000 UT) tablet  Generic drug: cholecalciferol  TAKE 1 TABLET BY MOUTH DAILY     DULoxetine 60 MG capsule  Commonly known as: CYMBALTA  TAKE 1 CAPSULE BY MOUTH DAILY.     empagliflozin 10 MG tablet tablet  Commonly known as:  JARDIANCE  Take 1 tablet by mouth Daily.     Entresto 49-51 MG tablet  Generic drug: sacubitril-valsartan  Take 1 tablet by mouth 2 (Two) Times a Day.     esomeprazole 20 MG capsule  Commonly known as: nexIUM     glipizide 5 MG ER tablet  Commonly known as: GLUCOTROL XL  Take 1 tablet by mouth Daily With Breakfast.     HYDROcodone-acetaminophen 5-325 MG per tablet  Commonly known as: NORCO  Take 1 tablet by mouth Every 6 (Six) Hours As Needed for Moderate Pain .     levothyroxine 25 MCG tablet  Commonly known as: SYNTHROID, LEVOTHROID  TAKE 1 TABLET BY MOUTH DAILY     multivitamin with minerals tablet tablet     pravastatin 10 MG tablet  Commonly known as: PRAVACHOL  TAKE 1 TABLET BY MOUTH AT BEDTIME     pregabalin 75 MG capsule  Commonly known as: LYRICA  TAKE 1 CAPSULE BY MOUTH 2 (TWO) TIMES A DAY.            Past Medical History:   Diagnosis Date   • Age-related osteoporosis without current pathological fracture 2/25/2022   • Anemia    • Benign breast disease    • Cancer ([facility]) 2015    Right breast   • Cellulitis of leg     May-2003 right lower extremity   • CHF (congestive heart failure) ([facility])     Dr Bates follows   • Chronic kidney disease     Dr Hannah follows   • Chronic ulcer of right foot ([facility])     Non-pressure, history of    • Closed fracture of patella 9/5/2017   • Cluster headache    • Colon polyp    • Depression    • Diabetic peripheral neuropathy ([facility])    • Difficulty walking    • Diverticulosis    • Femoral fracture ([facility])     right   • Fibromyalgia, primary    • Fracture of left wrist     history of   • Gastroesophageal reflux    • Generalized pain 2/2/2016   • Hiatal hernia    • Hyperlipidemia    • Hypertension    • Hypothyroidism    • Impingement syndrome of right shoulder    • Joint pain    • Left knee pain 5/17/2016   • Menopausal disorder    • Methicillin resistant Staphylococcus aureus infection 2012    after bladder surgery   • Nonischemic cardiomyopathy ([facility])     Ejection fraction 10% per  2-D echo with Doppler however she did have cardiac catheterization April 2015 which showed ejection fraction 20% with global hypokinesis and severe mitral insufficiency   • Osteoarthritis     generalized, sched jania TKA   • Osteomyelitis (HCC) 2/2/2016   • Pain in shoulder 8/2/2016   • Paroxysmal atrial fibrillation (HCC)     Dr Bates follows   • Radius/ulna fracture, left, closed, initial encounter 10/21/2017   • Shoulder pain, bilateral     has tears on both sides   • Sleep apnea    • Syncope, psychogenic 4/19/2017   • Thoracic back pain 2/2/2016   • Toe ulcer (HCC)     h/o to both feet, d/t shoes rubbing per patient   • Transient alteration of awareness 4/19/2017       Allergies   Allergen Reactions   • Dilaudid [Hydromorphone] Delirium and Confusion   • Latex Rash       Past Surgical History:   Procedure Laterality Date   • ABDOMINAL SURGERY  2012    had to be reopened after bladder surgery d/t infection   • AMPUTATION FOOT / TOE Right 11/2013    Rt foot amputation MTP first toe   • BLADDER SURGERY  2012   • BREAST BIOPSY     • BREAST SURGERY  06/29/2015    Percutaneous ultrasound-guided placement of metal localized clip 1st lesion   • CARDIAC CATHETERIZATION  2015   • CARDIAC CATHETERIZATION N/A 1/25/2021    Procedure: Right and Left Heart Cath;  Surgeon: Nicholas Andrade MD;  Location:  [facility];  Service: Cardiovascular;  Laterality: N/A;  drop in EF, with hx of NICM, SOA fatigue.  Discussed with    • CARDIAC CATHETERIZATION N/A 1/25/2021    Procedure: Left ventriculography;  Surgeon: Nicholas Andrade MD;  Location:  [facility];  Service: Cardiovascular;  Laterality: N/A;   • CARDIAC CATHETERIZATION N/A 1/25/2021    Procedure: Coronary angiography;  Surgeon: Nicholas Andrade MD;  Location:  [facility];  Service: Cardiovascular;  Laterality: N/A;   • CATARACT EXTRACTION Bilateral 2017   • COLONOSCOPY     • DEBRIDEMENT  FOOT Right 01/2013     During hospitalization    • EPIDURAL BLOCK      4 chronic back pain and leg pain last epidurals done  she had no benefit at all from this procedure.   • FEMUR FRACTURE SURGERY     • FOOT SURGERY Bilateral     several   • HERNIA REPAIR      At the abdomen 2007 Dr. Mendez   • INCISIONAL HERNIA REPAIR  2014    Recurrent. Incarcerated. With mesh implantation   • MASTECTOMY Right 2015   • MASTECTOMY MODIFIED RADICAL W/ AXILLARY LYMPH NODES W/ OR W/O PECTORALIS MINOR Right    • SHOULDER SURGERY Right     x2 RCR   • TOTAL ABDOMINAL HYSTERECTOMY      with oophorectomy-Done 2012 secondary to vaginal wall prolapse.   • TOTAL KNEE ARTHROPLASTY Right    • TOTAL KNEE ARTHROPLASTY Left 2018    Procedure: TOTAL KNEE ARTHROPLASTY;  Surgeon: Live Chambers MD;  Location: [facility];  Service: Orthopedics       Family History   Problem Relation Age of Onset   • Colonic polyp Mother    • Hypertension Mother    • Migraines Mother    • Mental illness Mother    • Depression Mother    • Arthritis Mother    • Coronary artery disease Father    • Other Father         Cardiac Disorder   • Colon cancer Father    • Kidney disease Father    • Cancer Father    • Diabetes Father    • Heart disease Father    • Hypertension Father    • Breast cancer Maternal Aunt    • Colon cancer Brother    • Alcohol abuse Brother    • Arthritis Sister    • Hypertension Brother    • Lung disease Brother    • Alcohol abuse Brother    • Malig Hyperthermia Neg Hx        Social History     Socioeconomic History   • Marital status:    • Number of children: 2   Tobacco Use   • Smoking status: Former Smoker     Packs/day: 3.00     Years: 30.00     Pack years: 90.00     Types: Cigarettes     Quit date:      Years since quittin.5   • Smokeless tobacco: Never Used   Vaping Use   • Vaping Use: Never used   Substance and Sexual Activity   • Alcohol use: No     Comment: Caffeine use: 3 cups daily    • Drug use: No   • Sexual  activity: Defer     Partners: Male     Comment: celibate           Objective   Physical Exam  Nursing note reviewed. Vitals reviewed: The temperature is 98.5 °F, pulse 71, respirations 20, /70, room air pulse ox 97%.   Constitutional:       Appearance: Normal appearance.   HENT:      Head: Atraumatic.      Right Ear: Tympanic membrane normal.      Left Ear: Tympanic membrane normal.      Mouth/Throat:      Mouth: Mucous membranes are moist.   Eyes:      Extraocular Movements: Extraocular movements intact.      Pupils: Pupils are equal, round, and reactive to light.   Neck:      Comments: There is no tenderness, deformity, or bony step-offs upon palpation of the cervical, thoracic, lumbar sacrococcygeal spine.  Cardiovascular:      Rate and Rhythm: Normal rate and regular rhythm.      Heart sounds: Normal heart sounds.   Pulmonary:      Effort: Pulmonary effort is normal.      Breath sounds: Normal breath sounds.   Abdominal:      General: Bowel sounds are normal. There is no distension.      Palpations: There is no mass.      Tenderness: There is no abdominal tenderness. There is no guarding or rebound.      Hernia: No hernia is present.   Musculoskeletal:      Cervical back: Normal range of motion and neck supple. No tenderness.      Comments: There is tenderness upon palpation of the right ankle and foot.  Patient complains of muscle spasm.  She cannot localize the pain any further than that.   Skin:     General: Skin is warm and dry.      Capillary Refill: Capillary refill takes less than 2 seconds.   Neurological:      General: No focal deficit present.      Mental Status: She is alert and oriented to person, place, and time.         Procedures           ED Course      02:14 EDT, 07/02/22:  The patient was reassessed.  She now has complaints of mid back pain..  Her vital signs reviewed and are stable.  The patient's diagnosis of fall with right foot sprain was discussed with her.  The patient should not  bear weight on the right foot for 2 days, then advance as tolerated.  The patient should follow-up with  next week.  She should return to the emergency department if there is worsening pain, numbness, tingling, weakness, change in color or temperature, worse in any way at all.  The patient should take her Norco for pain.  We will give the patient a muscle relaxant and get an x-ray of her thoracic lumbar spine.     03:33 EDT, 07/02/22:  The x-rays of the thoracic and lumbar spine are unremarkable.                                MDM    Final diagnoses:   Sprain of right foot, initial encounter       ED Disposition  ED Disposition     None          No follow-up provider specified.       Medication List      No changes were made to your prescriptions during this visit.          Eduardo Harrington MD  07/02/22 4941

## 2022-07-02 NOTE — ED NOTES
"MD Juany at bedside. Pt states \"now my back in hurting me\". Pt moving around and thrashing in the bed. Pt restless. See new orders.  "

## 2022-07-05 ENCOUNTER — PATIENT OUTREACH (OUTPATIENT)
Dept: CASE MANAGEMENT | Facility: OTHER | Age: 77
End: 2022-07-05

## 2022-07-05 NOTE — OUTREACH NOTE
AMBULATORY CASE MANAGEMENT NOTE    Name and Relationship of Patient/Support Person: Han Joelyesica Sharp - Self    Patient Outreach  RN-ACM outreach with patient. Discussed  7/1/22 ED visit regarding sprain of right foot and back sprain. Patient treated and discharged home. Patient states to be compliant with ED recommendations; has 7/13/22 Dr To (podiatry) and states will follow up sooner as needed. Patient states to have swelling to right leg; pain;  be applying compression wrap to leg; unable to bear weight due to pain and states will advance WBAT and currently using wheelchair. Patient states to have been using cane in the past and voiced intent to use walker when advancing WBAT. Patient states no difficulty with chest pain; SOB; appetite or sleeping. Reviewed with patient ED AVS recommendations; education;home health services;  role of RN-ACM and HRCM case management services. Patient verbalized understanding and states to appreciate patient outreach. No further question or concerns voiced at this time.    Adult Patient Profile  Questions/Answers    Flowsheet Row Most Recent Value   Symptoms/Conditions Managed at Home musculoskeletal   Musculoskeletal Symptoms/Conditions mobility limited, unsteady gait, back pain   Musculoskeletal Management Strategies other (see comments), medication therapy  [Physician follow up]   Barriers to Taking Medication as Prescribed none   Equipment Currently Used at Home cane, straight, walker, standard, wheelchair   Name of Support/Comfort Primary Source Daughters/ Flory and Ilana   People in Home alone        Social Work Assessment  Questions/Answers    Flowsheet Row Most Recent Value   People in Home alone   Functional Status Comments Patient states to be independent with ADL's ,  light meal preparation,  receiving assistance with transportation,  and using wheelchair due to fall . Plans to advance to ambulate with walker   Equipment Currently Used at Home cane, straight,  walker, standard, wheelchair        Send Education  Questions/Answers    Flowsheet Row Most Recent Value   Annual Wellness Visit:  Patient Has Completed   Other Patient Education/Resources  24/7 [facility] Nurse Call Line, Advanced Care Planning, MyChart   24/7 Nurse Call Line Education Method Verbal   ACP Education Method Verbal   MyChart Education Method Verbal   Advanced Directives: Patient Has      SDOH updated and reviewed with the patient during this program:  Financial Resource Strain: Low Risk    • Difficulty of Paying Living Expenses: Not hard at all      Food Insecurity: No Food Insecurity   • Worried About Running Out of Food in the Last Year: Never true   • Ran Out of Food in the Last Year: Never true      Transportation Needs: No Transportation Needs   • Lack of Transportation (Medical): No   • Lack of Transportation (Non-Medical): No       Education Documentation  Cholecalciferol, taught by Shanda Gerardo RN at 7/5/2022 12:54 PM.  Learner: Patient  Readiness: Acceptance  Method: Explanation  Response: Verbalizes Understanding    Unresolved/Worsening Symptoms, taught by Shanda Gerardo RN at 7/5/2022 12:54 PM.  Learner: Patient  Readiness: Acceptance  Method: Explanation  Response: Verbalizes Understanding    Safety, taught by Shanda Gerardo RN at 7/5/2022 12:54 PM.  Learner: Patient  Readiness: Acceptance  Method: Explanation  Response: Verbalizes Understanding    Medication Management, taught by Shanda Gerardo RN at 7/5/2022 12:54 PM.  Learner: Patient  Readiness: Acceptance  Method: Explanation  Response: Verbalizes Understanding    Home Safety, taught by Shanda Gerardo RN at 7/5/2022 12:54 PM.  Learner: Patient  Readiness: Acceptance  Method: Explanation  Response: Verbalizes Understanding    Energy Conservation, taught by Shanda Gerardo RN at 7/5/2022 12:54 PM.  Learner: Patient  Readiness: Acceptance  Method: Explanation  Response: Verbalizes  Understanding          SILVERIO FREEMAN  Ambulatory Case Management    7/5/2022, 12:55 EDT

## 2022-07-25 DIAGNOSIS — E11.9 TYPE 2 DIABETES MELLITUS WITHOUT COMPLICATION, WITHOUT LONG-TERM CURRENT USE OF INSULIN: ICD-10-CM

## 2022-07-25 RX ORDER — GLIPIZIDE 5 MG/1
5 TABLET, FILM COATED, EXTENDED RELEASE ORAL
Qty: 90 TABLET | Refills: 3 | Status: SHIPPED | OUTPATIENT
Start: 2022-07-25

## 2022-07-25 NOTE — TELEPHONE ENCOUNTER
Rx Refill Note  Requested Prescriptions     Pending Prescriptions Disp Refills    glipizide (GLUCOTROL XL) 5 MG ER tablet [Pharmacy Med Name: GLIPIZIDE ER 5MG] 90 tablet 0     Sig: Take 1 tablet by mouth Daily With Breakfast.      Last office visit with prescribing clinician: 4/28/2022      Next office visit with prescribing clinician: 10/28/2022            PALMA SANTOS MA  07/25/22, 13:32 EDT

## 2022-07-27 RX ORDER — HYDROCODONE BITARTRATE AND ACETAMINOPHEN 5; 325 MG/1; MG/1
1 TABLET ORAL EVERY 6 HOURS PRN
Qty: 120 TABLET | Refills: 0 | Status: SHIPPED | OUTPATIENT
Start: 2022-07-30 | End: 2022-08-29 | Stop reason: SDUPTHER

## 2022-07-27 NOTE — TELEPHONE ENCOUNTER
Medication Refill Request    Date of phone call: 7/27/2022    Medication being requested: hydro/apap 5-325 mg sig: take 1 tab q 6 hrs prn  Qty: 120    Date of last visit: 6/23/2022    Date of last refill: 6/30/2022    LOUANN up to date?: yes    Next Follow up?: 8/23/2022    Any new pertinent information? (i.e, new medication allergies, new use of medications, change in patient's health or condition, non-compliance or inconsistency with prescribing agreement?):

## 2022-07-29 ENCOUNTER — OFFICE VISIT (OUTPATIENT)
Dept: CARDIOLOGY | Facility: CLINIC | Age: 77
End: 2022-07-29

## 2022-07-29 VITALS
HEIGHT: 65 IN | SYSTOLIC BLOOD PRESSURE: 118 MMHG | WEIGHT: 220 LBS | DIASTOLIC BLOOD PRESSURE: 60 MMHG | BODY MASS INDEX: 36.65 KG/M2 | HEART RATE: 62 BPM

## 2022-07-29 DIAGNOSIS — I42.8 NICM (NONISCHEMIC CARDIOMYOPATHY): ICD-10-CM

## 2022-07-29 DIAGNOSIS — I50.20 HFREF (HEART FAILURE WITH REDUCED EJECTION FRACTION): ICD-10-CM

## 2022-07-29 DIAGNOSIS — E78.5 DYSLIPIDEMIA: ICD-10-CM

## 2022-07-29 DIAGNOSIS — I10 PRIMARY HYPERTENSION: Primary | ICD-10-CM

## 2022-07-29 PROCEDURE — 93000 ELECTROCARDIOGRAM COMPLETE: CPT | Performed by: INTERNAL MEDICINE

## 2022-07-29 PROCEDURE — 99214 OFFICE O/P EST MOD 30 MIN: CPT | Performed by: INTERNAL MEDICINE

## 2022-07-29 NOTE — PROGRESS NOTES
Date of Office Visit: 2022  Encounter Provider: Aria Bates MD  Place of Service: [facility] CARDIOLOGY  Patient Name: Jung Short  :1945      Patient ID:  Jung Short is a 77 y.o. female is here for  followup for nonischemic cardiomyopathy.        History of Present Illness    She presented to [facility] in 2015 with acute congestive heart failure, systolic and diastolic. She had a 2-D echocardiogram which revealed an ejection fraction of 10-15%, moderate left ventricular dilation, severe mitral and tricuspid insufficiency, and her aortic valve was normal. She was transferred to [facility] for a cardiac catheterization. 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 She also had a right heart catheterization done which revealed an RV pressure of 50/20, PA pressure 50/30 with the main of 38 and a capillary wedge pressure of 31 mmHg. Her right atrial pressure was 20 mmHg. Her cardiac output was 3.9 liters per minute. She was treated medically and diuresed.         She had a 2-D echocardiogram with Doppler  performed on 2015 which showed her ejection fraction to be 50%. There was normal  segmental wall motion. There was moderate left atrial dilation. A small patent foramen  ovale/atrial septal defect by saline contrast study and trace mitral and tricuspid  insufficiency. This is a significant improvement from her prior echocardiogram.       She is on anastrozole for right breast cancer.       She had normal renal ultrasound ordered by Dr. Hannah 2017.  She has chronic renal insufficiency and follows with Dr. Hannah.   She had a carotid duplex study done April 2017 which showed mild carotid arteries.  She had a Holter monitor done just benign 4/2017.      Echocardiogram done 1/12/2021 showed mild concentric left ventricular hypertrophy, moderate left atrial enlargement, normal diastolic function, mild left ventricular dilation, ejection fraction 20-25%, severely reduced ejection fraction global hypokinesis.  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  Repeat echocardiogram done 4/22/2021 showed moderate left ventricular dilation, mild concentric left ventricle hypertrophy, grade 2 diastolic dysfunction, ejection fraction 37%, moderate reduction of left ventricular systolic function, severe left atrial enlargement, normal saline contrast study, mild right atrial enlargement, mild right ventricular enlargement, global hypokinesis.  She was started on Entresto.      She states she had COVID in 12/2021 and received the IV antibody infusions the week before Christmas.       On 12/30/2021, she presented to the emergency department with high blood pressure.  She states it was 200 over 90s.  She actually woke up around 2 AM that morning with a throbbing pain in her left arm.  When she took her blood pressure it was systolic 203.  She checked it several times after the that over the next 2 hours and that it was persistently elevated.  She also had some aching chest pain that was quite brief.  Since her blood pressure was not coming down she decided to come to the ER.  She felt that since she had had COVID and especially the last couple weeks that her systolic blood pressure has remained elevated.  It has been consistently 170/80 systolic.  She was given 10 mg of labetalol IV and her blood pressure responded well.  She states by the time she left it was 140 systolic. Work-up was unremarkable.  Chest x-rays was negative for any acute  findings.  She did have a mildly elevated D-dimer at 1.30.  Her CTA was negative for PE.  Troponin was negative, BN P8 178.1.  CMP with a glucose of 151, sodium 144 and mildly decreased potassium at 3.1.  CBC was unremarkable.  She was discharged home.    She saw Ravinder a couple of visits after this and her blood pressure still remained high with adjustments made to the medications.  Echo done 4/12/2020 showed ejection fraction 40% with moderate left atrial enlargement, mild mitral insufficiency, pericardial effusion less than 1 cm no evidence of tamponade, moderate global hypokinesis noted.  Labs on 4/21/2022showed glucose 165, hemoglobin A1c 6.6, otherwise normal CMP, HDL 52, LDL 90, total cholesterol 183, triglycerides 243.  She was in the emergency department 5/26/2022 after a fall.  She was also in the emergency department for right foot pain after a fall 7/2/2022.    She is weak because she was sitting around for a long time with an ulcer on her right foot but she feels like she needs physical therapy at this time to get strengthen again.  She has no orthopnea or PND and no exertional dyspnea.  She has no chest pain or pressure.  She does not feel her heart racing or skipping.  She is had multiple falls because of weakness in her right foot.  She has had no fevers, chills or cough is noticed some slight urine discomfort when she urinates.  She is taking her medications as directed without difficulty.    Past Medical History:   Diagnosis Date   • Age-related osteoporosis without current pathological fracture 2/25/2022   • Anemia    • Benign breast disease    • Cancer ([facility]) 2015    Right breast   • Cellulitis of leg     May-2003 right lower extremity   • CHF (congestive heart failure) ([facility])     Dr Bates follows   • Chronic kidney disease     Dr Hannha follows   • Chronic ulcer of right foot ([facility])     Non-pressure, history of    • Closed fracture of patella 9/5/2017   • Cluster headache    • Colon polyp    •  Depression    • Diabetic peripheral neuropathy (HCC)    • Difficulty walking    • Diverticulosis    • Femoral fracture (HCC)     right   • Fibromyalgia, primary    • Fracture of left wrist     history of   • Gastroesophageal reflux    • Generalized pain 2/2/2016   • Hiatal hernia    • Hyperlipidemia    • Hypertension    • Hypothyroidism    • Impingement syndrome of right shoulder    • Joint pain    • Left knee pain 5/17/2016   • Menopausal disorder    • Methicillin resistant Staphylococcus aureus infection 2012    after bladder surgery   • Nonischemic cardiomyopathy (HCC)     Ejection fraction 10% per 2-D echo with Doppler however she did have cardiac catheterization April 2015 which showed ejection fraction 20% with global hypokinesis and severe mitral insufficiency   • Osteoarthritis     generalized, sched jania TKA   • Osteomyelitis (HCC) 2/2/2016   • Pain in shoulder 8/2/2016   • Paroxysmal atrial fibrillation ([facility])     Dr Bates follows   • Radius/ulna fracture, left, closed, initial encounter 10/21/2017   • Shoulder pain, bilateral     has tears on both sides   • Sleep apnea    • Syncope, psychogenic 4/19/2017   • Thoracic back pain 2/2/2016   • Toe ulcer (HCC)     h/o to both feet, d/t shoes rubbing per patient   • Transient alteration of awareness 4/19/2017         Past Surgical History:   Procedure Laterality Date   • ABDOMINAL SURGERY  2012    had to be reopened after bladder surgery d/t infection   • AMPUTATION FOOT / TOE Right 11/2013    Rt foot amputation MTP first toe   • BLADDER SURGERY  2012   • BREAST BIOPSY     • BREAST SURGERY  06/29/2015    Percutaneous ultrasound-guided placement of metal localized clip 1st lesion   • CARDIAC CATHETERIZATION  2015   • CARDIAC CATHETERIZATION N/A 1/25/2021    Procedure: Right and Left Heart Cath;  Surgeon: Nicholas Andrade MD;  Location: [facility] INVASIVE LOCATION;  Service: Cardiovascular;  Laterality: N/A;  drop in EF, with hx of NICM, SOA fatigue.   Discussed with    • CARDIAC CATHETERIZATION N/A 1/25/2021    Procedure: Left ventriculography;  Surgeon: Nicholas Andrade MD;  Location: [facility] CATH INVASIVE LOCATION;  Service: Cardiovascular;  Laterality: N/A;   • CARDIAC CATHETERIZATION N/A 1/25/2021    Procedure: Coronary angiography;  Surgeon: Nicholas Andrade MD;  Location:  [facility] CATH INVASIVE LOCATION;  Service: Cardiovascular;  Laterality: N/A;   • CATARACT EXTRACTION Bilateral 2017   • COLONOSCOPY     • DEBRIDEMENT  FOOT Right 01/2013    During hospitalization    • EPIDURAL BLOCK      4 chronic back pain and leg pain last epidurals done 5-2012 she had no benefit at all from this procedure.   • FEMUR FRACTURE SURGERY     • FOOT SURGERY Bilateral     several   • HERNIA REPAIR      At the abdomen February 2007 Dr. Mendez   • INCISIONAL HERNIA REPAIR  05/16/2014    Recurrent. Incarcerated. With mesh implantation   • MASTECTOMY Right 05/2015   • MASTECTOMY MODIFIED RADICAL W/ AXILLARY LYMPH NODES W/ OR W/O PECTORALIS MINOR Right    • SHOULDER SURGERY Right     x2 RCR   • TOTAL ABDOMINAL HYSTERECTOMY      with oophorectomy-Done June-2012 secondary to vaginal wall prolapse.   • TOTAL KNEE ARTHROPLASTY Right    • TOTAL KNEE ARTHROPLASTY Left 4/17/2018    Procedure: TOTAL KNEE ARTHROPLASTY;  Surgeon: Live Chambers MD;  Location: [facility];  Service: Orthopedics       Current Outpatient Medications on File Prior to Visit   Medication Sig Dispense Refill   • amLODIPine (NORVASC) 5 MG tablet Take 1 tablet by mouth Daily. Take one 5 mg tab in the morning with one 2.5 mg tab at HS. 30 tablet 11   • aspirin 81 MG EC tablet Take 81 mg by mouth Daily.     • bumetanide (BUMEX) 0.5 MG tablet Take 3 tablets by mouth Daily. 270 tablet 1   • carvedilol (COREG) 25 MG tablet TAKE 1 TABLET BY MOUTH TWICE DAILY 180 tablet 0   • D-1000 Extra Strength 25 MCG (1000 UT) tablet TAKE 1 TABLET BY MOUTH DAILY 90 tablet 3   • DULoxetine (CYMBALTA) 60 MG capsule TAKE 1 CAPSULE BY  MOUTH DAILY. 90 capsule 3   • empagliflozin (JARDIANCE) 10 MG tablet tablet Take 1 tablet by mouth Daily. 30 tablet 1   • esomeprazole (nexIUM) 20 MG capsule Take 20 mg by mouth Every Morning Before Breakfast.     • glipizide (GLUCOTROL XL) 5 MG ER tablet TAKE 1 TABLET BY MOUTH DAILY WITH BREAKFAST. 90 tablet 3   • [START ON 2022] HYDROcodone-acetaminophen (NORCO) 5-325 MG per tablet Take 1 tablet by mouth Every 6 (Six) Hours As Needed for Moderate Pain . 120 tablet 0   • levothyroxine (SYNTHROID, LEVOTHROID) 25 MCG tablet TAKE 1 TABLET BY MOUTH DAILY 90 tablet 3   • Multiple Vitamins-Minerals (MULTIVITAMIN ADULT PO) Take 1 tablet by mouth Daily.     • pravastatin (PRAVACHOL) 10 MG tablet TAKE 1 TABLET BY MOUTH AT BEDTIME 90 tablet 3   • pregabalin (LYRICA) 75 MG capsule TAKE 1 CAPSULE BY MOUTH 2 (TWO) TIMES A DAY. 60 capsule 5   • sacubitril-valsartan (Entresto) 49-51 MG tablet Take 1 tablet by mouth 2 (Two) Times a Day. (Patient taking differently: Take 2 tablets by mouth 2 (Two) Times a Day.) 60 tablet 11     No current facility-administered medications on file prior to visit.       Social History     Socioeconomic History   • Marital status:    • Number of children: 2   Tobacco Use   • Smoking status: Former Smoker     Packs/day: 3.00     Years: 30.00     Pack years: 90.00     Types: Cigarettes     Quit date:      Years since quittin.6   • Smokeless tobacco: Never Used   Vaping Use   • Vaping Use: Never used   Substance and Sexual Activity   • Alcohol use: No     Comment: Caffeine use: 3 cups daily    • Drug use: No   • Sexual activity: Defer     Partners: Male     Comment: celibate           ROS    Procedures    ECG 12 Lead    Date/Time: 2022 11:05 AM  Performed by: Aria Bates MD  Authorized by: Aria Bates MD   Comparison: compared with previous ECG   Similar to previous ECG  Rhythm: sinus rhythm  Ectopy: infrequent PVCs  Conduction: left anterior fascicular  "block    Clinical impression: abnormal EKG                Objective:      Vitals:    07/29/22 1041   BP: 118/60   Pulse: 62   Weight: 99.8 kg (220 lb)   Height: 165.1 cm (65\")     Body mass index is 36.61 kg/m².    Vitals reviewed.   Constitutional:       General: Not in acute distress.     Appearance: Well-developed. Not diaphoretic.   Eyes:      General: No scleral icterus.     Conjunctiva/sclera: Conjunctivae normal.   HENT:      Head: Normocephalic and atraumatic.   Neck:      Thyroid: No thyromegaly.      Vascular: No carotid bruit or JVD.      Lymphadenopathy: No cervical adenopathy.   Pulmonary:      Effort: Pulmonary effort is normal. No respiratory distress.      Breath sounds: Normal breath sounds. No wheezing. No rhonchi. No rales.   Chest:      Chest wall: Not tender to palpatation.   Cardiovascular:      Normal rate. Regular rhythm.      No gallop.   Edema:     Peripheral edema absent.   Abdominal:      General: Bowel sounds are normal. There is no distension or abdominal bruit.      Palpations: Abdomen is soft. There is no abdominal mass.      Tenderness: There is no abdominal tenderness.   Musculoskeletal:         General: No deformity.      Extremities: No clubbing present.     Cervical back: Neck supple. Skin:     General: Skin is warm and dry. There is no cyanosis.      Coloration: Skin is not pale.      Findings: No rash.   Neurological:      Mental Status: Alert and oriented to person, place, and time.      Cranial Nerves: No cranial nerve deficit.   Psychiatric:         Judgment: Judgment normal.         Lab Review:       Assessment:      Diagnosis Plan   1. Primary hypertension     2. HFrEF (heart failure with reduced ejection fraction) ([facility])     3. Dyslipidemia     4. NICM (nonischemic cardiomyopathy) ([facility])       1. Nonischemic cardiomyopathy, recurrent. Her ejection fraction is now 40%.  she is in no HF today.   2. Hypertension.   Goal less than 120/80.  Her blood pressure is controlled.   3. " DM, type 2.  4. Anomalous coronary artery, LV off RCC.   5. H/o right breast cancer. Sees Dr. Draper.   6. Severe OA of the knees.  7. CKD, sees Dr. Hannah.      Plan:       Overall doing well, no decompensated heart failure, no medication changes, see Ravinder in 6 months, recommended that she follow-up with her PCP for physical therapy for her right foot which is weak.  In addition, she needs her urine checked that she thinks she may have urinary tract infection.  I did tell her that Jardiance could make her more prone to that (specifically candidal infections) so we will have to watch this.

## 2022-08-01 ENCOUNTER — OFFICE VISIT (OUTPATIENT)
Dept: INTERNAL MEDICINE | Facility: CLINIC | Age: 77
End: 2022-08-01

## 2022-08-01 VITALS
WEIGHT: 225.8 LBS | HEIGHT: 65 IN | DIASTOLIC BLOOD PRESSURE: 68 MMHG | HEART RATE: 69 BPM | BODY MASS INDEX: 37.62 KG/M2 | TEMPERATURE: 97.4 F | OXYGEN SATURATION: 96 % | SYSTOLIC BLOOD PRESSURE: 118 MMHG

## 2022-08-01 DIAGNOSIS — R39.9 UTI SYMPTOMS: Primary | ICD-10-CM

## 2022-08-01 DIAGNOSIS — G89.29 CHRONIC PAIN IN RIGHT FOOT: ICD-10-CM

## 2022-08-01 DIAGNOSIS — M79.671 CHRONIC PAIN IN RIGHT FOOT: ICD-10-CM

## 2022-08-01 LAB
BILIRUB BLD-MCNC: NEGATIVE MG/DL
CLARITY, POC: CLEAR
COLOR UR: YELLOW
EXPIRATION DATE: ABNORMAL
GLUCOSE UR STRIP-MCNC: ABNORMAL MG/DL
KETONES UR QL: NEGATIVE
LEUKOCYTE EST, POC: ABNORMAL
Lab: ABNORMAL
NITRITE UR-MCNC: NEGATIVE MG/ML
PH UR: 5.5 [PH] (ref 5–8)
PROT UR STRIP-MCNC: NEGATIVE MG/DL
RBC # UR STRIP: ABNORMAL /UL
SP GR UR: 1.01 (ref 1–1.03)
UROBILINOGEN UR QL: NORMAL

## 2022-08-01 PROCEDURE — 99214 OFFICE O/P EST MOD 30 MIN: CPT | Performed by: NURSE PRACTITIONER

## 2022-08-01 PROCEDURE — 81003 URINALYSIS AUTO W/O SCOPE: CPT | Performed by: NURSE PRACTITIONER

## 2022-08-01 NOTE — PROGRESS NOTES
"Jung Short is a 77 y.o. female presenting today for   Chief Complaint   Patient presents with   • Difficulty Urinating     S/s, frequency, discomfort when going        Subjective    History of Present Illness       1.5 wk h/o urinary frequency, urgency, and incontinence. +\"faint\" discomfort.  No OTCs.  She is drinking a lot of sweetened beverages and soft drinks and eating a lot of sugary foods and foods high in carbs.    She c/o chronic R foot pain and swelling.       The following portions of the patient's history were reviewed and updated as appropriate: allergies, current medications, problem list, past medical history, past surgical history, family history, and social history.    Review of Systems   Constitutional: Negative for chills and fever.   Gastrointestinal: Negative for nausea and vomiting.   Genitourinary: Positive for dysuria, frequency, urgency and urinary incontinence. Negative for hematuria.         Objective    Vitals:    08/01/22 1322   BP: 118/68   Pulse: 69   Temp: 97.4 °F (36.3 °C)   SpO2: 96%   Weight: 102 kg (225 lb 12.8 oz)   Height: 165.1 cm (65\")     Body mass index is 37.58 kg/m².  Nursing notes and vitals reviewed.    Physical Exam  Constitutional:       General: She is not in acute distress.     Appearance: She is well-developed.   Cardiovascular:      Rate and Rhythm: Regular rhythm.      Heart sounds: S1 normal and S2 normal.   Pulmonary:      Effort: Pulmonary effort is normal.      Breath sounds: Normal breath sounds.   Abdominal:      General: Bowel sounds are normal.      Palpations: Abdomen is soft.      Tenderness: There is no abdominal tenderness. There is no right CVA tenderness or left CVA tenderness.   Neurological:      Mental Status: She is alert and oriented to person, place, and time.   Psychiatric:         Attention and Perception: She is attentive.         Behavior: Behavior normal.         Thought Content: Thought content normal.       Recent Results (from " the past 24 hour(s))   POC Urinalysis Dipstick, Automated    Collection Time: 08/01/22  2:06 PM    Specimen: Urine   Result Value Ref Range    Color Yellow Yellow, Straw, Dark Yellow, Ciera    Clarity, UA Clear Clear    Specific Gravity  1.010 1.005 - 1.030    pH, Urine 5.5 5.0 - 8.0    Leukocytes Trace (A) Negative    Nitrite, UA Negative Negative    Protein, POC Negative Negative mg/dL    Glucose, UA >=1000 mg/dL (3+) (A) Negative mg/dL    Ketones, UA Negative Negative    Urobilinogen, UA Normal Normal    Bilirubin Negative Negative    Blood, UA Trace (A) Negative    Lot Number [id number]     Expiration Date 2/28/23          Assessment and Plan    Diagnoses and all orders for this visit:    1. UTI symptoms (Primary)  -     POC Urinalysis Dipstick, Automated  -     Urine Culture - Urine, Urine, Clean Catch; Future  -     Urine Culture - Urine, Urine, Clean Catch    2. Chronic pain in right foot  -     Ambulatory Referral to Physical Therapy Evaluate and treat      1. UA with only trace leuks but 3+ glucose. Will culture. Hold off on abx for now until culture result is available. We discussed avoidance of sweetened beverages and soft drinks. I also encouraged her to reduce her intake of carbs and sugary foods.    2. Will refer to PT.      Medications, including side effects, were discussed with the patient. Patient verbalized understanding.  The plan of care was discussed. All questions were answered. Patient verbalized understanding.        Return if symptoms worsen or fail to improve.

## 2022-08-04 LAB
BACTERIA UR CULT: ABNORMAL
BACTERIA UR CULT: ABNORMAL
OTHER ANTIBIOTIC SUSC ISLT: ABNORMAL

## 2022-08-04 RX ORDER — SULFAMETHOXAZOLE AND TRIMETHOPRIM 800; 160 MG/1; MG/1
1 TABLET ORAL 2 TIMES DAILY
Qty: 14 TABLET | Refills: 0 | Status: SHIPPED | OUTPATIENT
Start: 2022-08-04 | End: 2022-08-11

## 2022-08-11 ENCOUNTER — PATIENT OUTREACH (OUTPATIENT)
Dept: CASE MANAGEMENT | Facility: OTHER | Age: 77
End: 2022-08-11

## 2022-08-11 NOTE — OUTREACH NOTE
AMBULATORY CASE MANAGEMENT NOTE    Name and Relationship of Patient/Support Person: Jung Short - Self  Patient Outreach  RN-ACM outreach with patient. Patient states to be doing well. Patient compliant with medications and  medical appointments. Patient followed up with podiatry; cardiology and PCP and received recommendations. Conversation brief as patient states to be doing well; states to appreciate patient outreach and declines need for further patient outreach. No further questions or concerns voiced at this time.       SILVERIO FREEMAN  Ambulatory Case Management    8/11/2022, 16:32 EDT

## 2022-08-12 ENCOUNTER — HOSPITAL ENCOUNTER (OUTPATIENT)
Dept: PHYSICAL THERAPY | Facility: HOSPITAL | Age: 77
Setting detail: THERAPIES SERIES
Discharge: HOME | End: 2022-08-12

## 2022-08-12 DIAGNOSIS — G89.29 CHRONIC PAIN IN RIGHT FOOT: Primary | ICD-10-CM

## 2022-08-12 DIAGNOSIS — M79.671 CHRONIC PAIN IN RIGHT FOOT: Primary | ICD-10-CM

## 2022-08-12 PROCEDURE — 97110 THERAPEUTIC EXERCISES: CPT

## 2022-08-12 PROCEDURE — 97162 PT EVAL MOD COMPLEX 30 MIN: CPT

## 2022-08-12 NOTE — THERAPY EVALUATION
Outpatient Physical Therapy Ortho Initial Evaluation  NOHEMY Diaz     Patient Name: Jung Short  : 1945  MRN: [id number]  Today's Date: 2022      Visit Date: 2022    Patient Active Problem List   Diagnosis   • Chronic anemia   • Malignant neoplasm of upper-inner quadrant of right breast in female, estrogen receptor positive ([facility])   • Disc disorder of cervical region   • Neck pain   • Chronic pain   • Depression   • Type 2 diabetes mellitus without complication, without long-term current use of insulin ([facility])   • Dyslipidemia   • Gastroesophageal reflux disease with esophagitis   • Hypertension   • Hypothyroidism   • Incisional hernia   • Mitral valve insufficiency   • Nausea   • Osteopenia of spine   • Arthralgia of multiple joints   • Peripheral neuropathy   • Pulmonary hypertension ([facility])   • PITTS (dyspnea on exertion)   • Tricuspid valve insufficiency   • Cobalamin deficiency   • Vitamin D deficiency   • HFrEF (heart failure with reduced ejection fraction) ([facility])   • Arthritis of knee   • DDD (degenerative disc disease), lumbar   • Lumbar facet arthropathy   • Chronic gout of multiple sites   • Encounter for long-term (current) use of high-risk medication   • Primary osteoarthritis of left knee   • Physical deconditioning   • CKD (chronic kidney disease) stage 3, GFR 30-59 ml/min ([facility])   • Pleural effusion   • Acute respiratory failure with hypoxia ([facility])   • Periprosthetic subtrochanteric fracture of femur   • Traumatic closed nondisp torus fracture of distal radial metaphysis, right, initial encounter   • Atherosclerotic heart disease of native coronary artery without angina pectoris   • NICM (nonischemic cardiomyopathy) ([facility])   • Age-related osteoporosis without current pathological fracture        Past Medical History:   Diagnosis Date   • Age-related osteoporosis without current pathological fracture 2022   • Anemia    • Benign breast disease    • Cancer ([facility]) 2015    Right  breast   • Cellulitis of leg     May-2003 right lower extremity   • CHF (congestive heart failure) ([facility])     Dr Bates follows   • Chronic kidney disease     Dr Hannah follows   • Chronic ulcer of right foot ([facility])     Non-pressure, history of    • Closed fracture of patella 9/5/2017   • Cluster headache    • Colon polyp    • Depression    • Diabetic peripheral neuropathy ([facility])    • Difficulty walking    • Diverticulosis    • Femoral fracture ([facility])     right   • Fibromyalgia, primary    • Fracture of left wrist     history of   • Gastroesophageal reflux    • Generalized pain 2/2/2016   • Hiatal hernia    • Hyperlipidemia    • Hypertension    • Hypothyroidism    • Impingement syndrome of right shoulder    • Joint pain    • Left knee pain 5/17/2016   • Menopausal disorder    • Methicillin resistant Staphylococcus aureus infection 2012    after bladder surgery   • Nonischemic cardiomyopathy ([facility])     Ejection fraction 10% per 2-D echo with Doppler however she did have cardiac catheterization April 2015 which showed ejection fraction 20% with global hypokinesis and severe mitral insufficiency   • Osteoarthritis     generalized, sched jania TKA   • Osteomyelitis ([facility]) 2/2/2016   • Pain in shoulder 8/2/2016   • Paroxysmal atrial fibrillation ([facility])     Dr Bates follows   • Radius/ulna fracture, left, closed, initial encounter 10/21/2017   • Shoulder pain, bilateral     has tears on both sides   • Sleep apnea    • Syncope, psychogenic 4/19/2017   • Thoracic back pain 2/2/2016   • Toe ulcer ([facility])     h/o to both feet, d/t shoes rubbing per patient   • Transient alteration of awareness 4/19/2017        Past Surgical History:   Procedure Laterality Date   • ABDOMINAL SURGERY  2012    had to be reopened after bladder surgery d/t infection   • AMPUTATION FOOT / TOE Right 11/2013    Rt foot amputation MTP first toe   • BLADDER SURGERY  2012   • BREAST BIOPSY     • BREAST SURGERY  06/29/2015    Percutaneous ultrasound-guided  placement of metal localized clip 1st lesion   • CARDIAC CATHETERIZATION  2015   • CARDIAC CATHETERIZATION N/A 1/25/2021    Procedure: Right and Left Heart Cath;  Surgeon: Nicholas Andrade MD;  Location:  [facility] C[facility]AS[facility];  Service: Cardiovascular;  Laterality: N/A;  drop in EF, with hx of NICM, SOA fatigue.  Discussed with    • CARDIAC CATHETERIZATION N/A 1/25/2021    Procedure: Left ventriculography;  Surgeon: Nicholas Andrade MD;  Location:  [facility] C[facility]VAS[facility];  Service: Cardiovascular;  Laterality: N/A;   • CARDIAC CATHETERIZATION N/A 1/25/2021    Procedure: Coronary angiography;  Surgeon: Nicholas Andrade MD;  Location:  [facility] CATH [facility]VASIVE [facility]ATION;  Service: Cardiovascular;  Laterality: N/A;   • CATARACT EXTRACTION Bilateral 2017   • COLONOSCOPY     • DEBRIDEMENT  FOOT Right 01/2013    During hospitalization    • EPIDURAL BLOCK      4 chronic back pain and leg pain last epidurals done 5-2012 she had no benefit at all from this procedure.   • FEMUR FRACTURE SURGERY     • FOOT SURGERY Bilateral     several   • HERNIA REPAIR      At the abdomen February 2007 Dr. Mendez   • INCISIONAL HERNIA REPAIR  05/16/2014    Recurrent. Incarcerated. With mesh implantation   • MASTECTOMY Right 05/2015   • MASTECTOMY MODIFIED RADICAL W/ AXILLARY LYMPH NODES W/ OR W/O PECTORALIS MINOR Right    • SHOULDER SURGERY Right     x2 RCR   • TOTAL ABDOMINAL HYSTERECTOMY      with oophorectomy-Done June-2012 secondary to vaginal wall prolapse.   • TOTAL KNEE ARTHROPLASTY Right    • TOTAL KNEE ARTHROPLASTY Left 4/17/2018    Procedure: TOTAL KNEE ARTHROPLASTY;  Surgeon: Live Chambers MD;  Location: [facility];  Service: Orthopedics       Visit Dx:     ICD-10-CM ICD-9-CM   1. Chronic pain in right foot  M79.671 729.5    G89.29 338.29          Patient History     Row Name 08/12/22 0900             History    Chief Complaint Pain;Difficulty Walking;Difficulty with daily activities;Joint stiffness;Impaired  "sensation;Joint swelling;Muscle tenderness;Muscle weakness;Tightness;Swelling  -LN      Type of Pain Ankle pain;Foot pain;Lower Extremity / Leg  R leg  -LN      Date Current Problem(s) Began --  about 2 months  -LN      Brief Description of Current Complaint Pt with increased c/o right foot pain x  about 2 months; pt had 2 accidents with right foot- \"I was at [employer] and got out of elevator and walking down [address] and fell and I twisted my right foot really bad and then I had a point that swelled up on the top of foot and it has gone down but it is still there.\" This happened about 2 months ago and then 1 month ago, she walked outside and she reports she has cracks in her driveway and caught her heel in one of the cracks and twisted it again. Had a boot/surgical shoe on both times (wearing due to wound on bottom of foot). She also reports a 3rd fall where she broke multiple toes on left foot.  X-rays of right ankle were negative for any fx. \"It bruised and swelled.\" \"I couldn't wear a shoe or sock for awhile.\" Pt had very limited ambulation and was using a walker and had to sleep in living room and used a BSC for weeks. Pt is a diabetic and had a sore on bottom of right foot so I had to stay with leg up in chair. \"It has been about a year since I have been in my chair.\" Pt with hx of B TKA and had a fx right femur bone and had ORIF and hx of right ankle fx (\"3 places\") about a year ago. Pt referred to PT for chronic right foot pain. Patient reports her legs are really weak.  No recent falls. \"The wound is healed now on the bottom of my foot but I have noticed a small spot in the same area so I am worried it is going to open up again.\" \"I see the wound doctor next week.\" Pt also with hx of diabetic neuropathy B legs so has decreased sensation in B lower legs/feet. \"I feel like I am so weak because I was pretty much in my chair all the time for the past year because of my foot wound.\"  \"I usually wear a compression " "stocking but I didn't put it on today since I was coming here.\" \"The swelling isn't too bad today.\" She also has a hx of cellulitis in legs and s/p right 5th toe amputation and wound debridement in 11/2013.  -LN      Previous treatment for THIS PROBLEM Rehabilitation;Medication  -LN      Patient/Caregiver Goals Relieve pain;Improve mobility;Improve strength;Return to prior level of function;Know what to do to help the symptoms;Decrease swelling  -LN      Patient/Caregiver Goals Comment \"To be able to walk better.\"  -LN      Occupation/sports/leisure activities retired;likes to garden  -LN      Patient seeing anyone else for problem(s)? PCP  -LN      How has patient tried to help current problem? elevation; CP/MH; pain medicine  -LN      What clinical tests have you had for this problem? X-ray  -LN      Results of Clinical Tests negative for fx  -LN      Related/Recent Hospitalizations No  -LN      Surgery/Hospitalization hx of B TKA; ORIF right femur fx; s/p 5th toe amputation (11/2013).  -LN      History of Previous Related Injuries hx of falls- 3 recent falls  (2 that injured right ankle/foot).  -LN              Pain     Pain Location Foot;Leg  Right  -LN      Pain at Present 6  -LN      Pain at Best 2  -LN      Pain at Worst 10  -LN      Pain Frequency Constant/continuous  -LN      Pain Description Burning;Aching;Sharp  burning in toes; sometimes sharp pain  -LN      What Performance Factors Make the Current Problem(s) WORSE? walking; standing; weightbearing; stairs  -LN      What Performance Factors Make the Current Problem(s) BETTER? rest; leg elevation; pain meds  -LN      Tolerance Time- Standing limited  -LN      Tolerance Time- Sitting good tolerance  -LN      Tolerance Time- Walking limited; uses cane; trouble with stairs fawad with going up stairs  -LN      Tolerance Time- Lying hasn't slept in bed for 2 years; \"I have a very bad back.\"  -LN      Is your sleep disturbed? Yes  -LN      What position do you " sleep in? --  reclined  -LN      Difficulties at work? retired  -LN      Difficulties with ADL's? has to hold onto something or sit down with dressing, but can do ADLs without assist.  -LN      Difficulties with recreational activities? yes- trouble walking outside to water her flowers  -LN              Fall Risk Assessment    Any falls in the past year: Yes  -LN      Number of falls reported in the last 12 months 3  -LN      Factors that contributed to the fall: Tripped;Uneven surface;Lost balance  -LN              Services    Prior Rehab/Home Health Experiences Yes  -LN      When was the prior experience with Rehab/Home Health after her TKA; for shoulder and back in past.  -LN      Where was the prior experience with Rehab/Home Health [facility]  -LN      Are you currently receiving Home Health services No  -LN      Do you plan to receive Home Health services in the near future No  -LN              Daily Activities    Primary Language English  -LN      Are you able to read Yes  -LN      Are you able to write Yes  -LN      How does patient learn best? Reading;Pictures/Video  -LN      Teaching needs identified Home Exercise Program;Management of Condition;Falls Prevention;Other (comment)  Risks and benefits of treatment explained to patient.  -LN      Patient is concerned about/has problems with Climbing Stairs;Difficulty with self care (i.e. bathing, dressing, toileting:;Flexibility;Performing home management (household chores, shopping, care of dependents);Performing job responsibilities/community activities (work, school,;Performing sports, recreation, and play activities;Standing;Walking;Transfers (getting out of a chair, bed)  -LN      Does patient have problems with the following? None  -LN      Barriers to learning None  -LN      Pt Participated in POC and Goals Yes  -LN              Safety    Are you being hurt, hit, or frightened by anyone at home or in your life? No  -LN      Are you being  "neglected by a caregiver No  -LN      Have you had any of the following issues with N/A  -LN            User Key  (r) = Recorded By, (t) = Taken By, (c) = Cosigned By    Initials Name Provider Type    Christine Dai, PT Physical Therapist                 PT Ortho     Row Name 08/12/22 0900       Subjective Comments    Subjective Comments Pt c/o constant pain in right ankle/foot/leg. She also reports pain in LB; left shoulder & elbow; right wrist and B legs. \"When I walk my right leg turns out.\" \"I really haven't left my house much in the past 3 years.\"  -LN       Precautions and Contraindications    Precautions/Limitations fall precautions;other (see comments)  has diabetic neuropathy in feet/legs with decreased sensation  -LN       Subjective Pain    Able to rate subjective pain? yes  -LN    Pre-Treatment Pain Level 6  -LN       Posture/Observations    Observations Edema;Incision healing;Muscle atrophy  -LN    Posture/Observations Comments well healed incisions noted B knees and lateral right leg; moderate edema noted right lower leg/ankle. muscle atrophy noted B thighs/Quads. Discoloration noted B lower legs (cellulitis).  -LN       Foot/Ankle Palpation    Distal Tib/Fib Joint Right:;Tender;Swollen  -LN    Subtalar Joint Right:;Tender  -LN    Lateral Malleolus Right:;Tender;Swollen  -LN    Medial Gastroc Right:;Tender;Swollen  -LN    Lateral Gastroc Right:;Tender;Swollen  -LN    Achilles' Tendon Right:;Tender  -LN       Ankle Accessory Motions    Subtalar medial/lateral tilt Right:;Hypermobile;Right pain  -LN       Ankle/Foot Special Tests    Talar tilt test (instability) Right:;Positive  -LN    Chaudhary test (Achilles’ tendon rupture) Right:;Negative  -LN    Scott’s sign (DVT) Right:;Negative  -LN       Right Lower Ext    Rt Ankle Dorsiflexion AROM 6 degrees  -LN    Rt Ankle Plantarflexion AROM 36 degrees- with pain  -LN    Rt Ankle Inversion AROM 3 degrees with  pain  -LN    Rt Ankle Eversion AROM 15 " degrees  -LN       Left Lower Ext    Lt Ankle Dorsiflexion AROM 6 degrees  -LN    Lt Ankle Plantarflexion AROM 50 degrees  -LN    Lt Ankle Inversion AROM 15 degrees  -LN    Lt Ankle Eversion AROM 15 degrees  -LN       MMT Right Lower Ext    Rt Hip Flexion MMT, Gross Movement (4/5) good  -LN    Rt Hip Extension MMT, Gross Movement (4/5) good  -LN    Rt Hip ABduction MMT, Gross Movement (4/5) good  -LN    Rt Hip ADduction MMT, Gross Movement (4/5) good  -LN    Rt Knee Extension MMT, Gross Movement (4+/5) good plus  -LN    Rt Knee Flexion MMT, Gross Movement (4-/5) good minus  -LN    Rt Ankle Plantarflexion MMT, Gross Movement (4/5) good  -LN    Rt Ankle Dorsiflexion MMT, Gross Movement (4/5) good  -LN    Rt Ankle Subtalar Inversion MT, Gross Movement (3-/5) fair minus  -LN    Rt Ankle Subtalar Eversion MMT, Gross Movement (4-/5) good minus  -LN       MMT Left Lower Ext    Lt Hip Flexion MMT, Gross Movement (4/5) good  -LN    Lt Hip Extension MMT, Gross Movement (4/5) good  -LN    Lt Hip ABduction MMT, Gross Movement (4/5) good  -LN    Lt Hip ADduction MMT, Gross Movement (4/5) good  -LN    Lt Knee Extension MMT, Gross Movement (4+/5) good plus  -LN    Lt Knee Flexion MMT, Gross Movement (4+/5) good plus  -LN    Lt Ankle Plantarflexion MMT, Gross Movement (4+/5) good plus  -LN    Lt Ankle Dorsiflexion MMT, Gross Movement (4+/5) good plus  -LN    Lt Ankle Subtalar Inversion MMT, Gross Movement (4+/5) good plus  -LN    Lt Ankle Subtalar Eversion MMT, Gross Movement (4+/5) good plus  -LN       Sensation    Light Touch Severe deficits in the RLE  significant decreased sensation in right foot and lower leg  -LN    Additional Comments hx of diabetic neuropathy  -LN       Lower Extremity Flexibility    Hamstrings Right:;Moderately limited  -LN    ITB Right:;Moderately limited  -LN    Gastrocnemius Right:;Moderately limited  -LN    Soleus Right:;Moderately limited  -LN       Girth    Girth Measured? --  moderate edema noted  "right lower leg and ankle/foot  -LN       Transfers    Sit-Stand [place] (Transfers) independent  -LN    Stand-Sit [place] (Transfers) independent  -LN    Transfers, Sit-Stand-Sit, Assist Device straight cane  \"Hurry cane\"  -LN    Comment, (Transfers) 1 episode of LOB when she was donning her sweater but she was able to catch herself by grabbing onto chair.  -LN       Gait/Stairs (Locomotion)    [place] Level (Gait) modified independence  -LN    Assistive Device (Gait) cane, straight  \"Hurry cane\"  -LN    Deviations/Abnormal Patterns (Gait) right sided deviations;antalgic;gait speed decreased;stride length decreased;lisa decreased;weight shifting decreased  -LN    Right Sided Gait Deviations forward flexed posture  pt tends to ambulate with right leg ER  -LN    Comment, (Gait/Stairs) Pt ambulates with SPC (hurry cane) independently but with slow gait speed and decreased stride length R with right leg ER. FF posture noted.  -LN          User Key  (r) = Recorded By, (t) = Taken By, (c) = Cosigned By    Initials Name Provider Type    LN Christine Norman, PT Physical Therapist                            Therapy Education  Education Details: Pt to work on her HEP 2 x day as tolerated and elevate right leg/foot as needed for edema control.  Given: HEP, Symptoms/condition management, Pain management, Edema management, Posture/body mechanics  Program: New  How Provided: Verbal, Demonstration, Written  Provided to: Patient  Level of Understanding: Teach back education performed, Verbalized, Demonstrated      PT OP Goals     Row Name 08/12/22 0900          PT Short Term Goals    STG Date to Achieve 08/26/22  -LN     STG 1 Pt to verbally report decreased pain in right foot/ankle to <5/10 with ADLs & everyday activities, including walking.  -LN     STG 2 Pt independent with initial HEP issued by therapist.  -LN     STG 3 Right ankle ROM improved to 42 degrees plantarflexion and 6 degrees inversion.  -LN "     STG 4 Pt to have improved right LE/ankle strength by 1/2 muscle grade.  -LN     STG 5 Pt able to ambulate with SPC with improved gait speed and improved stride length right foot and only minimal antalgic gait noted.  -LN            Long Term Goals    LTG Date to Achieve 09/09/22  -LN     LTG 1 Pt to verbally report decreased pain in right foot/ankle to <3/10 with ADLs & everyday activities, including walking.  -LN     LTG 2 Pt independent with more advanced  HEP issued by therapist (including standing/closed chain exercises).  -LN     LTG 3 Right ankle ROM improved to 50 degrees pf and 10 degrees inversion.  -LN     LTG 4 Right ankle strength improved to 4-4+/5 to give her more stability in ankle and decreased risk of falling.  -LN     LTG 5 Pt able to ambulate with SPC with good gait speed and improved stride length right foot and only nominal antalgic gait noted.  -LN     LTG 6 Pt able to ambulate with at least 50% less right hip ER.  -LN            Time Calculation    PT Goal Re-Cert Due Date 09/09/22  -LN           User Key  (r) = Recorded By, (t) = Taken By, (c) = Cosigned By    Initials Name Provider Type    Christine Dai, PT Physical Therapist                 PT Assessment/Plan     Row Name 08/12/22 0900          PT Assessment    Functional Limitations Decreased safety during functional activities;Impaired gait;Impaired locomotion;Limitation in home management;Limitations in community activities;Limitations in functional capacity and performance;Performance in leisure activities;Performance in self-care ADL  -LN     Impairments Balance;Edema;Endurance;Gait;Impaired flexibility;Locomotion;Muscle strength;Pain;Posture;Range of motion;Sensation  -LN     Assessment Comments Pt presents with hx of chronic right foot/ankle pain that has been increased x 2 months due to 2 recent falls, where she twisted her right ankle/foot causing pain and swelling. Pt presents with constant pain right  ankle/foot/leg (also with hx of TKA; ankle fx, and s/p ORIF femur fx), with decreased right ankle ROM (Pf and inversion); decreased right ankle and LE strength; gait deficit; decreased functional mobility and gait with decreased balance and overall decreased endurance secondary to pt reports that she has pretty much been in a chair at home with leg elevated due to her having a foot ulcer that was slow to heal for past year. Pt tends to ambulate with FF posture, decreased gait speed, decreased stride length R, and tends to ambulate with right leg ER. This wound is now healed. Pt is a diabetic and has neuropathy in B lower legs/feet which affects her sensation. Pt with moderate edema noted right lower leg and ankle/foot and sx of cellulitis B lower legs. She will benefit from skilled PT to work on pain relief and right ankle ROM; strength of right ankle and LE, gait, balance, and stair training to increase her independence and safety with mobility and gait and decrease her fall risk. Pt with sx of right ankle/foot sprain/strain.  -LN     Please refer to paper survey for additional self-reported information Yes  -LN     Rehab Potential Good  but guarded due to her pain being chronic  -LN     Patient/caregiver participated in establishment of treatment plan and goals Yes  -LN     Patient would benefit from skilled therapy intervention Yes  -LN            PT Plan    PT Frequency 1x/week;2x/week  -LN     Predicted Duration of Therapy Intervention (PT) 6 weeks  -LN     Planned CPT's? PT EVAL MOD COMPLELITY: 79453;PT RE-EVAL: 64816;PT THER PROC EA 15 MIN: 83614;PT MANUAL THERAPY EA 15 MIN: 35720;PT GAIT TRAINING EA 15 MIN: 49764;PT HOT OR COLD PACK TREAT MCARE  -LN     Physical Therapy Interventions (Optional Details) gait training;home exercise program;manual therapy techniques;modalities;patient/family education;postural re-education;ROM (Range of Motion);stair training;strengthening;stretching  -LN     PT Plan Comments  "See pt 1-2 x week for therapeutic exercises/ankle and LE strengthening; gait, balance, & stair training; manual therapy; MH PRN (but with caution due to decreased sensation from diabetic neuropathy); pt education. No ESTIM due to her decreased sensation. -LN           User Key  (r) = Recorded By, (t) = Taken By, (c) = Cosigned By    Initials Name Provider Type    LN Christine Norman, PT Physical Therapist                   OP Exercises     Row Name 08/12/22 0900             Precautions    Existing Precautions/Restrictions fall;other (see comments)  hx of diabetes/diabetic neuropathy B legs/feet  -LN              Subjective Comments    Subjective Comments Pt c/o constant pain in right ankle/foot/leg. She also reports pain in LB; left shoulder & elbow; right wrist and B legs. \"When I walk my right leg turns out.\" \"I really haven't left my house much in the past 3 years.\"  -LN              Subjective Pain    Able to rate subjective pain? yes  -LN      Pre-Treatment Pain Level 6  -LN              Total Minutes    51502 - PT Therapeutic Exercise Minutes 10  -LN              Exercise 1    Exercise Name 1 AP  -LN      Cueing 1 Verbal;Tactile;Demo  -LN      Reps 1 10  -LN              Exercise 2    Exercise Name 2 NWB right gastroc stretch  -LN      Cueing 2 Verbal;Tactile;Demo  -LN      Reps 2 5  -LN      Time 2 10 sec  -LN      Additional Comments can do supine or seated  -LN              Exercise 3    Exercise Name 3 LAQ  -LN      Cueing 3 Verbal;Tactile;Demo  -LN      Reps 3 10 each leg  -LN      Additional Comments HEP  -LN              Exercise 4    Exercise Name 4 seated ball squeeze  -LN      Cueing 4 Verbal;Demo  -LN      Reps 4 10  -LN      Time 4 5 sec  -LN      Additional Comments HEP  -LN              Exercise 5    Exercise Name 5 seated marching  -LN      Cueing 5 Verbal;Tactile;Demo  -LN      Reps 5 10  -LN      Additional Comments HEP  -LN            User Key  (r) = Recorded By, (t) = Taken By, (c) = " Cosigned By    Initials Name Provider Type    LN Christine Norman, PT Physical Therapist                              Outcome Measure Options: Lower Extremity Functional Scale (LEFS)  Lower Extremity Functional Index  Any of your usual work, housework or school activities: Quite a bit of difficulty  Your usual hobbies, recreational or sporting activities: Moderate difficulty  Getting into or out of the bath: Quite a bit of difficulty  Walking between rooms: Moderate difficulty  Putting on your shoes or socks: No difficulty  Squatting: Extreme difficulty or unable to perform activity  Lifting an object, like a bag of groceries from the floor: Moderate difficulty  Performing light activities around your home: Moderate difficulty  Performing heavy activities around your home: Quite a bit of difficulty  Getting into or out of a car: Moderate difficulty  Walking 2 blocks: Quite a bit of difficulty  Walking a mile: Extreme difficulty or unable to perform activity  Going up or down 10 stairs (about 1 flight of stairs): Extreme difficulty or unable to perform activity  Standing for 1 hour: Extreme difficulty or unable to perform activity  Sitting for 1 hour: No difficulty  Running on even ground: Extreme difficulty or unable to perform activity  Running on uneven ground: Extreme difficulty or unable to perform activity  Making sharp turns while running fast: Extreme difficulty or unable to perform activity  Hopping: Extreme difficulty or unable to perform activity  Rolling over in bed: No difficulty  Total: 26      Time Calculation:     Start Time: 0910  Stop Time: 1010  Time Calculation (min): 60 min  Timed Charges  14502 - PT Therapeutic Exercise Minutes: 10  Total Minutes  Timed Charges Total Minutes: 10   Total Minutes: 10     Therapy Charges for Today     Code Description Service Date Service Provider Modifiers Qty    67074755338  PT THER PROC EA 15 MIN 8/12/2022 Christine Norman, PT GP 1    [id number]  HC PT EVAL MOD COMPLEXITY 3 8/12/2022 Christine Norman, PT GP 1          PT G-Codes  Outcome Measure Options: Lower Extremity Functional Scale (LEFS)  Total: 26         Christine Norman, PT  8/12/2022

## 2022-08-16 ENCOUNTER — APPOINTMENT (OUTPATIENT)
Dept: PHYSICAL THERAPY | Facility: HOSPITAL | Age: 77
End: 2022-08-16

## 2022-08-16 ENCOUNTER — DOCUMENTATION (OUTPATIENT)
Dept: PHYSICAL THERAPY | Facility: HOSPITAL | Age: 77
End: 2022-08-16

## 2022-08-16 NOTE — SIGNIFICANT NOTE
Pt called and canceled today's PT appointment secondary to not feeling well. Next scheduled PT appointment is on Friday, 8/19/22.

## 2022-08-19 ENCOUNTER — HOSPITAL ENCOUNTER (OUTPATIENT)
Dept: PHYSICAL THERAPY | Facility: HOSPITAL | Age: 77
Setting detail: THERAPIES SERIES
Discharge: HOME | End: 2022-08-19

## 2022-08-19 DIAGNOSIS — G89.29 CHRONIC PAIN IN RIGHT FOOT: Primary | ICD-10-CM

## 2022-08-19 DIAGNOSIS — M79.671 CHRONIC PAIN IN RIGHT FOOT: Primary | ICD-10-CM

## 2022-08-19 PROCEDURE — 97140 MANUAL THERAPY 1/> REGIONS: CPT

## 2022-08-19 PROCEDURE — 97110 THERAPEUTIC EXERCISES: CPT

## 2022-08-19 NOTE — THERAPY TREATMENT NOTE
Outpatient Physical Therapy Ortho Treatment Note  NOHEMY Diaz     Patient Name: Jung Short  : 1945  MRN: [id number]  Today's Date: 2022      Visit Date: 2022    Visit Dx:    ICD-10-CM ICD-9-CM   1. Chronic pain in right foot  M79.671 729.5    G89.29 338.29       Patient Active Problem List   Diagnosis   • Chronic anemia   • Malignant neoplasm of upper-inner quadrant of right breast in female, estrogen receptor positive ([facility])   • Disc disorder of cervical region   • Neck pain   • Chronic pain   • Depression   • Type 2 diabetes mellitus without complication, without long-term current use of insulin ([facility])   • Dyslipidemia   • Gastroesophageal reflux disease with esophagitis   • Hypertension   • Hypothyroidism   • Incisional hernia   • Mitral valve insufficiency   • Nausea   • Osteopenia of spine   • Arthralgia of multiple joints   • Peripheral neuropathy   • Pulmonary hypertension ([facility])   • PITTS (dyspnea on exertion)   • Tricuspid valve insufficiency   • Cobalamin deficiency   • Vitamin D deficiency   • HFrEF (heart failure with reduced ejection fraction) ([facility])   • Arthritis of knee   • DDD (degenerative disc disease), lumbar   • Lumbar facet arthropathy   • Chronic gout of multiple sites   • Encounter for long-term (current) use of high-risk medication   • Primary osteoarthritis of left knee   • Physical deconditioning   • CKD (chronic kidney disease) stage 3, GFR 30-59 ml/min ([facility])   • Pleural effusion   • Acute respiratory failure with hypoxia ([facility])   • Periprosthetic subtrochanteric fracture of femur   • Traumatic closed nondisp torus fracture of distal radial metaphysis, right, initial encounter   • Atherosclerotic heart disease of native coronary artery without angina pectoris   • NICM (nonischemic cardiomyopathy) ([facility])   • Age-related osteoporosis without current pathological fracture        Past Medical History:   Diagnosis Date   • Age-related osteoporosis without current  pathological fracture 2/25/2022   • Anemia    • Benign breast disease    • Cancer ([facility]) 2015    Right breast   • Cellulitis of leg     May-2003 right lower extremity   • CHF (congestive heart failure) ([facility])     Dr Bates follows   • Chronic kidney disease     Dr Hannah follows   • Chronic ulcer of right foot ([facility])     Non-pressure, history of    • Closed fracture of patella 9/5/2017   • Cluster headache    • Colon polyp    • Depression    • Diabetic peripheral neuropathy ([facility])    • Difficulty walking    • Diverticulosis    • Femoral fracture ([facility])     right   • Fibromyalgia, primary    • Fracture of left wrist     history of   • Gastroesophageal reflux    • Generalized pain 2/2/2016   • Hiatal hernia    • Hyperlipidemia    • Hypertension    • Hypothyroidism    • Impingement syndrome of right shoulder    • Joint pain    • Left knee pain 5/17/2016   • Menopausal disorder    • Methicillin resistant Staphylococcus aureus infection 2012    after bladder surgery   • Nonischemic cardiomyopathy ([facility])     Ejection fraction 10% per 2-D echo with Doppler however she did have cardiac catheterization April 2015 which showed ejection fraction 20% with global hypokinesis and severe mitral insufficiency   • Osteoarthritis     generalized, sched jania TKA   • Osteomyelitis ([facility]) 2/2/2016   • Pain in shoulder 8/2/2016   • Paroxysmal atrial fibrillation ([facility])     Dr Bates follows   • Radius/ulna fracture, left, closed, initial encounter 10/21/2017   • Shoulder pain, bilateral     has tears on both sides   • Sleep apnea    • Syncope, psychogenic 4/19/2017   • Thoracic back pain 2/2/2016   • Toe ulcer ([facility])     h/o to both feet, d/t shoes rubbing per patient   • Transient alteration of awareness 4/19/2017        Past Surgical History:   Procedure Laterality Date   • ABDOMINAL SURGERY  2012    had to be reopened after bladder surgery d/t infection   • AMPUTATION FOOT / TOE Right 11/2013    Rt foot amputation MTP first toe   •  "BLADDER SURGERY  2012   • BREAST BIOPSY     • BREAST SURGERY  06/29/2015    Percutaneous ultrasound-guided placement of metal localized clip 1st lesion   • CARDIAC CATHETERIZATION  2015   • CARDIAC CATHETERIZATION N/A 1/25/2021    Procedure: Right and Left Heart Cath;  Surgeon: Nicholas Andrade MD;  Location: [facility] CATH INVASIVE LOCATION;  Service: Cardiovascular;  Laterality: N/A;  drop in EF, with hx of NICM, SOA fatigue.  Discussed with    • CARDIAC CATHETERIZATION N/A 1/25/2021    Procedure: Left ventriculography;  Surgeon: Nicholas Andrade MD;  Location:  [facility] CATH INVASIVE LOCATION;  Service: Cardiovascular;  Laterality: N/A;   • CARDIAC CATHETERIZATION N/A 1/25/2021    Procedure: Coronary angiography;  Surgeon: Nicholas Andrade MD;  Location: [facility] CATH INVASIVE LOCATION;  Service: Cardiovascular;  Laterality: N/A;   • CATARACT EXTRACTION Bilateral 2017   • COLONOSCOPY     • DEBRIDEMENT  FOOT Right 01/2013    During hospitalization    • EPIDURAL BLOCK      4 chronic back pain and leg pain last epidurals done 5-2012 she had no benefit at all from this procedure.   • FEMUR FRACTURE SURGERY     • FOOT SURGERY Bilateral     several   • HERNIA REPAIR      At the abdomen February 2007 Dr. Mendez   • INCISIONAL HERNIA REPAIR  05/16/2014    Recurrent. Incarcerated. With mesh implantation   • MASTECTOMY Right 05/2015   • MASTECTOMY MODIFIED RADICAL W/ AXILLARY LYMPH NODES W/ OR W/O PECTORALIS MINOR Right    • SHOULDER SURGERY Right     x2 RCR   • TOTAL ABDOMINAL HYSTERECTOMY      with oophorectomy-Done June-2012 secondary to vaginal wall prolapse.   • TOTAL KNEE ARTHROPLASTY Right    • TOTAL KNEE ARTHROPLASTY Left 4/17/2018    Procedure: TOTAL KNEE ARTHROPLASTY;  Surgeon: Live Chambers MD;  Location: [facility];  Service: Orthopedics        PT Ortho     Row Name 08/19/22 1400       Subjective Comments    Subjective Comments \"It's not any different.\" \"I am still not completely over whatever I had but I am " "better.'\" Pt had stomach issues/diarrhea earlier this week.  -LN       Precautions and Contraindications    Precautions/Limitations fall precautions;other (see comments)  has diabetic neuropathy in B feet and legs/decreased sensation  -LN       Subjective Pain    Able to rate subjective pain? yes  -LN    Pre-Treatment Pain Level 6  -LN    Subjective Pain Comment \"It starting hurting some last night.\" \"It hurts in my ankle alot.\"Pain fawad in R medial malleoulus area.  -LN          User Key  (r) = Recorded By, (t) = Taken By, (c) = Cosigned By    Initials Name Provider Type    Christine Dai, PT Physical Therapist                             PT Assessment/Plan     Row Name 08/19/22 1500          PT Assessment    Assessment Comments Pt tolerated treatment fairly well but did have some c/o LBP after doing exercises lying down. Right ankle pain persists, primarily medial malleolus area. Pt still with min to mod antalgic gait and right leg tends to ER; she ambulates well with her cane. Gastroc/Achilles tightness persists.  -LN            PT Plan    PT Frequency 1x/week;2x/week  -LN     PT Plan Comments Continue per POC. Progress with exercises as tolerated. Consider adding floor bike next visit and add HS curls and hip IR vs TB as tolerated.  -LN           User Key  (r) = Recorded By, (t) = Taken By, (c) = Cosigned By    Initials Name Provider Type    Christine Dai, PT Physical Therapist                   OP Exercises     Row Name 08/19/22 1500 08/19/22 1400          Precautions    Existing Precautions/Restrictions -- fall  -LN            Subjective Comments    Subjective Comments -- \"It's not any different.\" \"I am still not completely over whatever I had but I am better.'  -LN            Subjective Pain    Able to rate subjective pain? -- yes  -LN     Pre-Treatment Pain Level -- 6  -LN     Subjective Pain Comment -- \"It starting hurting some last night.\" \"It hurts in my ankle alot.\"Pain fawad in " medial malleoulus area.  -LN            Total Minutes    69847 - PT Therapeutic Exercise Minutes -- 30  -LN     47330 - PT Manual Therapy Minutes 15  -LN --            Exercise 1    Exercise Name 1 -- AP  -LN     Cueing 1 -- Verbal;Tactile;Demo  -LN     Reps 1 -- 10  -LN            Exercise 2    Exercise Name 2 -- NWB right gastroc stretch  -LN     Cueing 2 -- Verbal;Tactile;Demo  -LN     Reps 2 -- 5  -LN     Time 2 -- 10 sec  -LN            Exercise 3    Exercise Name 3 -- LAQ  -LN     Cueing 3 -- Verbal;Tactile;Demo  -LN     Reps 3 -- 10 each leg  -LN            Exercise 4    Exercise Name 4 -- seated ball squeeze  -LN     Cueing 4 -- Verbal;Demo  -LN     Reps 4 -- 10  -LN     Time 4 -- 5 sec  -LN            Exercise 5    Exercise Name 5 -- seated marching  -LN     Cueing 5 -- Verbal;Tactile;Demo  -LN     Reps 5 -- 10  -LN            Exercise 6    Exercise Name 6 -- df vs yellow LF TB  -LN     Cueing 6 -- Verbal;Tactile;Demo  -LN     Reps 6 -- 10  -LN            Exercise 7    Exercise Name 7 -- pf vs yellow LF TB  -LN     Cueing 7 -- Verbal;Tactile;Demo  -LN     Reps 7 -- 10  -LN            Exercise 8    Exercise Name 8 -- SAQ  -LN     Cueing 8 -- Verbal;Tactile;Demo  -LN     Reps 8 -- 10  -LN     Time 8 -- 5 sec  -LN            Exercise 9    Exercise Name 9 -- SLR  -LN     Cueing 9 -- Verbal;Tactile;Demo  -LN     Reps 9 -- 5  -LN     Time 9 -- 2 sec  -LN            Exercise 10    Exercise Name 10 -- seated calf raise  -LN     Cueing 10 -- Verbal;Tactile;Demo  -LN     Reps 10 -- 10  -LN           User Key  (r) = Recorded By, (t) = Taken By, (c) = Cosigned By    Initials Name Provider Type    LN Christine Norman, PT Physical Therapist                         Manual Rx (last 36 hours)     Manual Treatments     Row Name 08/19/22 1500             Total Minutes    17126 - PT Manual Therapy Minutes 15  -LN              Manual Rx 1    Manual Rx 1 Location Right ankle df/pf  -LN      Manual Rx 1 Type gentle PROM   -LN      Manual Rx 1 Duration 10 reps x 5 sec  -LN              Manual Rx 2    Manual Rx 2 Location Right ankle inversion/eversion  -LN      Manual Rx 2 Type gentle PROM  -LN      Manual Rx 2 Duration 10 reps x 5 sec  -LN              Manual Rx 3    Manual Rx 3 Location gentle foot mobs  -LN      Manual Rx 3 Duration 1-2 min  -LN              Manual Rx 4    Manual Rx 4 Location gentle posterior talus mobs  -LN      Manual Rx 4 Duration 3 x 5 osc  -LN            User Key  (r) = Recorded By, (t) = Taken By, (c) = Cosigned By    Initials Name Provider Type    Christine Dai, PT Physical Therapist                    Therapy Education  Education Details: P to add ankle pf vs TB, SAQ, SLR (if it doesn't bother her LB), and seated calf raises to HEP. Pt to work on HEP 1-2 x day as tolerated. Pt to elevate right leg/foot as needed for elevation. Issued yellow LF TB for home.  Given: HEP, Symptoms/condition management, Pain management, Posture/body mechanics  Program: New, Reinforced, Progressed (added ankle df and pf vs yellow LF TB, SAQ, SLR, and seated calf raises.)  How Provided: Verbal, Demonstration, Written  Provided to: Patient  Level of Understanding: Teach back education performed, Verbalized, Demonstrated              Time Calculation:   Start Time: 1410  Stop Time: 1505  Time Calculation (min): 55 min  Timed Charges  60065 - PT Therapeutic Exercise Minutes: 30  62577 - PT Manual Therapy Minutes: 15  Total Minutes  Timed Charges Total Minutes: 15   Total Minutes: 15  Therapy Charges for Today     Code Description Service Date Service Provider Modifiers Qty    75197041923 HC PT MANUAL THERAPY EA 15 MIN 8/19/2022 Christine Norman, PT GP 1    72089892454 HC PT THER PROC EA 15 MIN 8/19/2022 Christine Norman, PT GP 2                    Christine Norman, PT  8/19/2022

## 2022-08-23 ENCOUNTER — OFFICE VISIT (OUTPATIENT)
Dept: PAIN MEDICINE | Facility: CLINIC | Age: 77
End: 2022-08-23

## 2022-08-23 VITALS
SYSTOLIC BLOOD PRESSURE: 126 MMHG | HEIGHT: 65 IN | BODY MASS INDEX: 37.32 KG/M2 | OXYGEN SATURATION: 97 % | RESPIRATION RATE: 18 BRPM | TEMPERATURE: 98 F | WEIGHT: 224 LBS | HEART RATE: 68 BPM | DIASTOLIC BLOOD PRESSURE: 67 MMHG

## 2022-08-23 DIAGNOSIS — R53.81 PHYSICAL DECONDITIONING: ICD-10-CM

## 2022-08-23 DIAGNOSIS — M51.36 DDD (DEGENERATIVE DISC DISEASE), LUMBAR: Primary | ICD-10-CM

## 2022-08-23 DIAGNOSIS — Z79.899 ENCOUNTER FOR LONG-TERM (CURRENT) USE OF HIGH-RISK MEDICATION: ICD-10-CM

## 2022-08-23 DIAGNOSIS — M47.816 LUMBAR FACET ARTHROPATHY: ICD-10-CM

## 2022-08-23 PROCEDURE — 99213 OFFICE O/P EST LOW 20 MIN: CPT | Performed by: PHYSICIAN ASSISTANT

## 2022-08-23 NOTE — PROGRESS NOTES
CHIEF COMPLAINT  Follow up for back pain, pt states back pain is worse since last ov. Pt states she's had a fall 4 weeks ago.     Subjective   Jung Short is a 77 y.o. female  who presents for follow-up.  She has a history of chronic back and joint pain.  Patient states that she has had worsening pain as compared to her last office visit which she attributes to her recent fall approximately 4 weeks ago.  Patient states that she has been seen by her PCP and sent to physical therapy which we will initiate next week for strengthening of the right lower extremity.    This patient illustrates multijoint pain as well as pain in a transverse distribution across the lumbosacral spine extending into the bilateral buttocks and hips.  Also continues with complaints of right ankle/foot pain from previous sprain.    Patient continues with hydrocodone 5/325 mg/day which she is tolerating well without adverse effects.  She finds that her current medications allowed her to maintain her activities of daily living and physical functioning.    Today pain is 5/10 VAS in severity.      Back Pain  This is a chronic problem. The current episode started more than 1 year ago. The problem occurs constantly. The problem has been gradually worsening since onset. The pain is present in the lumbar spine. The quality of the pain is described as shooting and aching. The pain does not radiate. The pain is at a severity of 5/10. The pain is moderate. The pain is the same all the time. The symptoms are aggravated by bending, position, standing and twisting. Associated symptoms include weakness (Right lower extremity). Pertinent negatives include no chest pain, fever or numbness.        PEG Assessment   What number best describes your pain on average in the past week?5  What number best describes how, during the past week, pain has interfered with your enjoyment of life?6  What number best describes how, during the past week, pain has  "interfered with your general activity?  6    Review of Pertinent Medical Data ---  No imaging reviewed today    The following portions of the patient's history were reviewed and updated as appropriate: allergies, current medications, past family history, past medical history, past social history, past surgical history and problem list.    Review of Systems   Constitutional: Negative for fever.   HENT: Negative for congestion.    Respiratory: Negative for shortness of breath.    Cardiovascular: Negative for chest pain.   Gastrointestinal: Negative for constipation and diarrhea.   Genitourinary: Negative for difficulty urinating and dyspareunia.   Musculoskeletal: Positive for back pain.   Neurological: Positive for weakness (Right lower extremity). Negative for numbness.   Psychiatric/Behavioral: Positive for sleep disturbance. Negative for suicidal ideas.     I have reviewed and confirmed the accuracy of the ROS as documented by the MA/LPN/RN MANJINDER Tong    Vitals:    08/23/22 1355   BP: 126/67   Pulse: 68   Resp: 18   Temp: 98 °F (36.7 °C)   SpO2: 97%   Weight: 102 kg (224 lb)   Height: 165.1 cm (65\")   PainSc:   5   PainLoc: Back         Objective   Physical Exam  Vitals and nursing note reviewed.   Constitutional:       Appearance: She is obese.   HENT:      Head: Normocephalic.   Pulmonary:      Effort: Pulmonary effort is normal.   Musculoskeletal:      Lumbar back: Spasms and tenderness present. Decreased range of motion.   Skin:     General: Skin is warm and dry.   Neurological:      General: No focal deficit present.      Mental Status: She is alert and oriented to person, place, and time.      Cranial Nerves: Cranial nerves are intact.      Sensory: Sensation is intact.      Motor: Weakness present.      Gait: Gait abnormal (Ambulates with cane).   Psychiatric:         Mood and Affect: Mood normal.         Behavior: Behavior normal.         Thought Content: Thought content normal.         Judgment: " Judgment normal.         Assessment & Plan   Diagnoses and all orders for this visit:    1. DDD (degenerative disc disease), lumbar (Primary)    2. Lumbar facet arthropathy    3. Encounter for long-term (current) use of high-risk medication    4. Physical deconditioning      --- Follow-up 2 months for medication management    --- Refill Norco 5/325 mg/day #120/month FD 8/29/2022. Patient appears stable with current regimen. No adverse effects. Regarding continuation of opioids, there is no evidence of aberrant behavior or any red flags.  The patient continues with appropriate response to opioid therapy. ADL's remain intact by self.     --- Obtain updated CSA at next visit     The urine drug screen confirmation from 3/31/2022 has been reviewed and the result is appropriate based on patient history and LOUANN report    LOUANN REPORT  As part of the patient's treatment plan, I am prescribing controlled substances. The patient has been made aware of appropriate use of such medications, including potential risk of somnolence, limited ability to drive and/or work safely, and the potential for dependence or overdose. It has also been made clear that these medications are for use by this patient only, without concomitant use of alcohol or other substances unless prescribed.     Patient has completed prescribing agreement detailing terms of continued prescribing of controlled substances, including monitoring LOUANN reports, urine drug screening, and pill counts if necessary. The patient is aware that inappropriate use will results in cessation of prescribing such medications.    As the clinician, I personally reviewed the LOUANN from 8/23/2022 while the patient was in the office today.    History and physical exam exhibit continued safe and appropriate use of controlled substances.     Dictated utilizing Dragon dictation.     This document is intended for medical expert use only. Reading of this document by patients and/or  patient's family without participating medical staff guidance may result in misinterpretation and unintended morbidity.   Any interpretation of such data is the responsibility of the patient and/or family member responsible for the patient in concert with their primary or specialist providers, not to be left for sources of online searches such as [employer], [employer] or similar queries. Relying on these approaches to knowledge may result in misinterpretation, misguided goals of care and even death should patients or family members try recommendations outside of the realm of professional medical care in a supervised way.    Patient remained masked during entire encounter. No cough present. I donned a mask and eye protection throughout entire visit. Prior to donning mask and eye protection, hand hygiene was performed, as well as when it was doffed.  I was closer than 6 feet, but not for an extended period of time. No obvious exposure to any bodily fluids.

## 2022-08-29 ENCOUNTER — HOSPITAL ENCOUNTER (OUTPATIENT)
Dept: PHYSICAL THERAPY | Facility: HOSPITAL | Age: 77
Setting detail: THERAPIES SERIES
Discharge: HOME | End: 2022-08-29

## 2022-08-29 DIAGNOSIS — M79.671 CHRONIC PAIN IN RIGHT FOOT: Primary | ICD-10-CM

## 2022-08-29 DIAGNOSIS — G89.29 CHRONIC PAIN IN RIGHT FOOT: Primary | ICD-10-CM

## 2022-08-29 PROCEDURE — 97110 THERAPEUTIC EXERCISES: CPT

## 2022-08-29 PROCEDURE — 97140 MANUAL THERAPY 1/> REGIONS: CPT

## 2022-08-29 RX ORDER — HYDROCODONE BITARTRATE AND ACETAMINOPHEN 5; 325 MG/1; MG/1
1 TABLET ORAL EVERY 6 HOURS PRN
Qty: 120 TABLET | Refills: 0 | Status: SHIPPED | OUTPATIENT
Start: 2022-08-29 | End: 2022-09-21 | Stop reason: SDUPTHER

## 2022-08-29 NOTE — THERAPY TREATMENT NOTE
Outpatient Physical Therapy Ortho Treatment Note  NOHEMY Diaz     Patient Name: Jung Short  : 1945  MRN: [id number]  Today's Date: 2022      Visit Date: 2022    Visit Dx:    ICD-10-CM ICD-9-CM   1. Chronic pain in right foot  M79.671 729.5    G89.29 338.29       Patient Active Problem List   Diagnosis   • Chronic anemia   • Malignant neoplasm of upper-inner quadrant of right breast in female, estrogen receptor positive ([facility])   • Disc disorder of cervical region   • Neck pain   • Chronic pain   • Depression   • Type 2 diabetes mellitus without complication, without long-term current use of insulin ([facility])   • Dyslipidemia   • Gastroesophageal reflux disease with esophagitis   • Hypertension   • Hypothyroidism   • Incisional hernia   • Mitral valve insufficiency   • Nausea   • Osteopenia of spine   • Arthralgia of multiple joints   • Peripheral neuropathy   • Pulmonary hypertension ([facility])   • PITTS (dyspnea on exertion)   • Tricuspid valve insufficiency   • Cobalamin deficiency   • Vitamin D deficiency   • HFrEF (heart failure with reduced ejection fraction) ([facility])   • Arthritis of knee   • DDD (degenerative disc disease), lumbar   • Lumbar facet arthropathy   • Chronic gout of multiple sites   • Encounter for long-term (current) use of high-risk medication   • Primary osteoarthritis of left knee   • Physical deconditioning   • CKD (chronic kidney disease) stage 3, GFR 30-59 ml/min ([facility])   • Pleural effusion   • Acute respiratory failure with hypoxia ([facility])   • Periprosthetic subtrochanteric fracture of femur   • Traumatic closed nondisp torus fracture of distal radial metaphysis, right, initial encounter   • Atherosclerotic heart disease of native coronary artery without angina pectoris   • NICM (nonischemic cardiomyopathy) ([facility])   • Age-related osteoporosis without current pathological fracture        Past Medical History:   Diagnosis Date   • Age-related osteoporosis without current  pathological fracture 2/25/2022   • Anemia    • Benign breast disease    • Cancer ([facility]) 2015    Right breast   • Cellulitis of leg     May-2003 right lower extremity   • CHF (congestive heart failure) ([facility])     Dr Bates follows   • Chronic kidney disease     Dr Hannah follows   • Chronic ulcer of right foot ([facility])     Non-pressure, history of    • Closed fracture of patella 9/5/2017   • Cluster headache    • Colon polyp    • Depression    • Diabetic peripheral neuropathy ([facility])    • Difficulty walking    • Diverticulosis    • Femoral fracture ([facility])     right   • Fibromyalgia, primary    • Fracture of left wrist     history of   • Gastroesophageal reflux    • Generalized pain 2/2/2016   • Hiatal hernia    • Hyperlipidemia    • Hypertension    • Hypothyroidism    • Impingement syndrome of right shoulder    • Joint pain    • Left knee pain 5/17/2016   • Menopausal disorder    • Methicillin resistant Staphylococcus aureus infection 2012    after bladder surgery   • Nonischemic cardiomyopathy ([facility])     Ejection fraction 10% per 2-D echo with Doppler however she did have cardiac catheterization April 2015 which showed ejection fraction 20% with global hypokinesis and severe mitral insufficiency   • Osteoarthritis     generalized, sched jania TKA   • Osteomyelitis ([facility]) 2/2/2016   • Pain in shoulder 8/2/2016   • Paroxysmal atrial fibrillation ([facility])     Dr Bates follows   • Radius/ulna fracture, left, closed, initial encounter 10/21/2017   • Shoulder pain, bilateral     has tears on both sides   • Sleep apnea    • Syncope, psychogenic 4/19/2017   • Thoracic back pain 2/2/2016   • Toe ulcer ([facility])     h/o to both feet, d/t shoes rubbing per patient   • Transient alteration of awareness 4/19/2017        Past Surgical History:   Procedure Laterality Date   • ABDOMINAL SURGERY  2012    had to be reopened after bladder surgery d/t infection   • AMPUTATION FOOT / TOE Right 11/2013    Rt foot amputation MTP first toe   •  "BLADDER SURGERY  2012   • BREAST BIOPSY     • BREAST SURGERY  06/29/2015    Percutaneous ultrasound-guided placement of metal localized clip 1st lesion   • CARDIAC CATHETERIZATION  2015   • CARDIAC CATHETERIZATION N/A 1/25/2021    Procedure: Right and Left Heart Cath;  Surgeon: Nicholas Andrade MD;  Location: [facility] CATH INVASIVE LOCATION;  Service: Cardiovascular;  Laterality: N/A;  drop in EF, with hx of NICM, SOA fatigue.  Discussed with    • CARDIAC CATHETERIZATION N/A 1/25/2021    Procedure: Left ventriculography;  Surgeon: Nicholas Andrade MD;  Location:  [facility] CATH INVASIVE LOCATION;  Service: Cardiovascular;  Laterality: N/A;   • CARDIAC CATHETERIZATION N/A 1/25/2021    Procedure: Coronary angiography;  Surgeon: Nicholas Andrade MD;  Location: [facility] CATH INVASIVE LOCATION;  Service: Cardiovascular;  Laterality: N/A;   • CATARACT EXTRACTION Bilateral 2017   • COLONOSCOPY     • DEBRIDEMENT  FOOT Right 01/2013    During hospitalization    • EPIDURAL BLOCK      4 chronic back pain and leg pain last epidurals done 5-2012 she had no benefit at all from this procedure.   • FEMUR FRACTURE SURGERY     • FOOT SURGERY Bilateral     several   • HERNIA REPAIR      At the abdomen February 2007 Dr. Mendez   • INCISIONAL HERNIA REPAIR  05/16/2014    Recurrent. Incarcerated. With mesh implantation   • MASTECTOMY Right 05/2015   • MASTECTOMY MODIFIED RADICAL W/ AXILLARY LYMPH NODES W/ OR W/O PECTORALIS MINOR Right    • SHOULDER SURGERY Right     x2 RCR   • TOTAL ABDOMINAL HYSTERECTOMY      with oophorectomy-Done June-2012 secondary to vaginal wall prolapse.   • TOTAL KNEE ARTHROPLASTY Right    • TOTAL KNEE ARTHROPLASTY Left 4/17/2018    Procedure: TOTAL KNEE ARTHROPLASTY;  Surgeon: Live Chambers MD;  Location: [facility];  Service: Orthopedics        PT Ortho     Row Name 08/29/22 1400       Subjective Comments    Subjective Comments \"I didn't do any of those exercises mainly because I don't get onto the floor.\" " "\"I sleep in a chair.\" Pt c/o pain along medial right foot and reports swelling in her ankle. \"My legs are just so weak.\"  \"I promised my dtr that I would clean off my bed and start trying to sleep in me bed.\"  -LN       Precautions and Contraindications    Precautions/Limitations fall precautions;other (see comments)  has diabetic neuropathy and decreased sensation in B feet and legs  -LN       Subjective Pain    Able to rate subjective pain? yes  -LN    Pre-Treatment Pain Level 6  -LN    Subjective Pain Comment Pain in right ankle and along medial right foot.  -LN          User Key  (r) = Recorded By, (t) = Taken By, (c) = Cosigned By    Initials Name Provider Type    Christine Dai, PT Physical Therapist                             PT Assessment/Plan     Row Name 08/29/22 1500          PT Assessment    Assessment Comments Pt continues with c/o R ankle and foot pain and minimal edema noted in area of medial malleolus. Pt with fair tolerance to exercises mainly due to c/o pain and she fatigues very quickly. She is motivated to do exercises though. Pt continues to ambulate with right leg in ER but can walk with leg straighter if she concentrates. She has weakness in B legs and will benefit from leg strengthening; also spoke with her about the importance of her doing exercises at home to help get her legs stronger.  -LN            PT Plan    PT Frequency 1x/week;2x/week  -LN     PT Plan Comments Continue per POC. Progress with exercises as tolerated. Consider adding floor bike next visit as tolerated.  -LN           User Key  (r) = Recorded By, (t) = Taken By, (c) = Cosigned By    Initials Name Provider Type    Christine Dai, PT Physical Therapist                 Modalities     Row Name 08/29/22 1400             Precautions    Existing Precautions/Restrictions fall  -LN              Ice    Patient denies application of Ice Yes  -LN      Patient reports will apply ice at home to involved area " "Yes  -LN            User Key  (r) = Recorded By, (t) = Taken By, (c) = Cosigned By    Initials Name Provider Type    LN Christine Norman, PT Physical Therapist               OP Exercises     Row Name 08/29/22 1400 08/29/22 1300          Precautions    Existing Precautions/Restrictions fall  -LN --            Subjective Comments    Subjective Comments \"I didn't do any of those exercises mainly because I don't get onto the floor.\" \"I sleep in a chair.\" Pt c/o pain along medial right foot and reports swelling in her ankle. \"My legs are just so weak.\"  \"I promised my dtr that I would clean off my bed and start trying to sleep in me bed.\"  -LN --            Subjective Pain    Able to rate subjective pain? yes  -LN --     Pre-Treatment Pain Level 6  -LN --     Subjective Pain Comment Pain in right ankle and along medial right foot.  -LN --            Total Minutes    54898 - PT Therapeutic Exercise Minutes 40  -LN --     65206 - PT Manual Therapy Minutes -- 15  -LN            Exercise 1    Exercise Name 1 AP  -LN --     Cueing 1 Verbal;Tactile;Demo  -LN --     Reps 1 10  -LN --            Exercise 2    Exercise Name 2 NWB right gastroc stretch  -LN --     Cueing 2 Verbal;Tactile;Demo  -LN --     Reps 2 5  -LN --     Time 2 10 sec  -LN --            Exercise 3    Exercise Name 3 LAQ  -LN --     Cueing 3 Verbal;Tactile;Demo  -LN --     Reps 3 10 each leg  -LN --            Exercise 4    Exercise Name 4 seated ball squeeze  -LN --     Cueing 4 Verbal;Demo  -LN --     Reps 4 10  -LN --     Time 4 5 sec  -LN --            Exercise 5    Exercise Name 5 seated marching  -LN --     Cueing 5 Verbal;Tactile;Demo  -LN --     Reps 5 10  -LN --            Exercise 6    Exercise Name 6 df vs yellow LF TB  -LN --     Cueing 6 Verbal;Tactile;Demo  -LN --     Reps 6 10  -LN --            Exercise 7    Exercise Name 7 pf vs yellow LF TB  -LN --     Cueing 7 Verbal;Tactile;Demo  -LN --     Reps 7 10  -LN --            Exercise 8    " Exercise Name 8 SAQ  -LN --     Cueing 8 Verbal;Tactile;Demo  -LN --     Reps 8 10  -LN --     Time 8 5 sec  -LN --            Exercise 9    Exercise Name 9 SLR  -LN --     Cueing 9 Verbal;Tactile;Demo  -LN --     Reps 9 7  -LN --     Time 9 2 sec  -LN --            Exercise 10    Exercise Name 10 seated calf raise  -LN --     Cueing 10 Verbal;Tactile;Demo  -LN --     Reps 10 15  -LN --            Exercise 11    Exercise Name 11 Seated HS curls vs TB  -LN --     Cueing 11 Verbal;Tactile;Demo  -LN --     Reps 11 10  -LN --     Time 11 2-3 sec  -LN --            Exercise 12    Exercise Name 12 Seated hip IR vs TB  -LN --     Cueing 12 Verbal;Tactile;Demo  -LN --     Reps 12 10  -LN --     Time 12 2-3 sec  -LN --     Additional Comments yellow TB LF  -LN --            Exercise 13    Exercise Name 13 inversion with TB  -LN --     Cueing 13 Verbal;Tactile;Demo  -LN --     Reps 13 10  -LN --     Time 13 5 sec  -LN --     Additional Comments yellow TB LF  -LN --            Exercise 14    Exercise Name 14 eversion with TB  -LN --     Cueing 14 Verbal;Tactile;Demo  -LN --     Reps 14 10  -LN --     Time 14 5 sec  -LN --     Additional Comments yellow TB LF  -LN --           User Key  (r) = Recorded By, (t) = Taken By, (c) = Cosigned By    Initials Name Provider Type    LN Christine Norman, PT Physical Therapist                         Manual Rx (last 36 hours)     Manual Treatments     Row Name 08/29/22 1300             Total Minutes    21717 - PT Manual Therapy Minutes 15  -LN              Manual Rx 1    Manual Rx 1 Location Left ankle df/pf  -LN      Manual Rx 1 Type gentle PROM  -LN      Manual Rx 1 Duration 10 reps x 5 sec  -LN              Manual Rx 2    Manual Rx 2 Location Left ankle inversion/eversion  -LN      Manual Rx 2 Type gentle PROM  -LN      Manual Rx 2 Duration 10 reps x 5 sec  -LN              Manual Rx 3    Manual Rx 3 Location gentle foot mobs  -LN      Manual Rx 3 Duration 1-2 min  -LN               Manual Rx 4    Manual Rx 4 Location gentle posterior talus mobs  -LN      Manual Rx 4 Duration 3 x 5 osc  -LN            User Key  (r) = Recorded By, (t) = Taken By, (c) = Cosigned By    Initials Name Provider Type    Christine Dai, PT Physical Therapist                    Therapy Education  Education Details: Did not add any new exercises to HEP since she hasn't been doing her exercises; pt to focus on the exercises that have already been given her and she is to try and work on HEP daily. Instructed pt to do her exercises reclined in her chair and to not try and get down on floor to do them. If she gets her bed cleaned off, she is to do them in her bed. To use ice to right ankle & foot as needed for swelling.  Given: HEP, Symptoms/condition management, Pain management, Posture/body mechanics  Program: New, Reinforced, Progressed  How Provided: Verbal, Demonstration  Provided to: Patient  Level of Understanding: Teach back education performed, Verbalized, Demonstrated              Time Calculation:   Start Time: 1412  Stop Time: 1510  Time Calculation (min): 58 min  Timed Charges  55899 - PT Therapeutic Exercise Minutes: 40  47377 - PT Manual Therapy Minutes: 15  Total Minutes  Timed Charges Total Minutes: 40   Total Minutes: 40  Therapy Charges for Today     Code Description Service Date Service Provider Modifiers Qty    [id number]  PT THER PROC EA 15 MIN 8/29/2022 Christine Norman, PT GP 3    [id number]  PT MANUAL THERAPY EA 15 MIN 8/29/2022 Christine Norman, PT GP 1                    Christine Norman, PT  8/29/2022

## 2022-08-29 NOTE — TELEPHONE ENCOUNTER
Medication Refill Request    Date of phone call: 22    Medication being requested: Norco 5-325 mg    si tab po q 6 hrs prn  Qty: 120    Date of last visit: 22    Date of last refill:     LOUANN up to date?: no    Next Follow up?: 10/24/22    Any new pertinent information? (i.e, new medication allergies, new use of medications, change in patient's health or condition, non-compliance or inconsistency with prescribing agreement?):

## 2022-08-30 ENCOUNTER — TELEPHONE (OUTPATIENT)
Dept: INTERNAL MEDICINE | Facility: CLINIC | Age: 77
End: 2022-08-30

## 2022-08-30 ENCOUNTER — HOSPITAL ENCOUNTER (EMERGENCY)
Facility: HOSPITAL | Age: 77
Discharge: HOME | End: 2022-08-30
Attending: EMERGENCY MEDICINE | Admitting: EMERGENCY MEDICINE

## 2022-08-30 ENCOUNTER — APPOINTMENT (OUTPATIENT)
Dept: GENERAL RADIOLOGY | Facility: HOSPITAL | Age: 77
End: 2022-08-30

## 2022-08-30 VITALS
HEIGHT: 66 IN | RESPIRATION RATE: 18 BRPM | BODY MASS INDEX: 34.87 KG/M2 | TEMPERATURE: 99.1 F | DIASTOLIC BLOOD PRESSURE: 75 MMHG | WEIGHT: 217 LBS | SYSTOLIC BLOOD PRESSURE: 150 MMHG | OXYGEN SATURATION: 96 % | HEART RATE: 66 BPM

## 2022-08-30 DIAGNOSIS — S39.012A LUMBOSACRAL STRAIN, INITIAL ENCOUNTER: Primary | ICD-10-CM

## 2022-08-30 PROCEDURE — 73502 X-RAY EXAM HIP UNI 2-3 VIEWS: CPT

## 2022-08-30 PROCEDURE — 72110 X-RAY EXAM L-2 SPINE 4/>VWS: CPT

## 2022-08-30 PROCEDURE — 99283 EMERGENCY DEPT VISIT LOW MDM: CPT

## 2022-08-30 RX ORDER — CYCLOBENZAPRINE HCL 10 MG
10 TABLET ORAL ONCE
Status: COMPLETED | OUTPATIENT
Start: 2022-08-30 | End: 2022-08-30

## 2022-08-30 RX ORDER — CYCLOBENZAPRINE HCL 10 MG
10 TABLET ORAL 3 TIMES DAILY PRN
Qty: 21 TABLET | Refills: 0 | Status: SHIPPED | OUTPATIENT
Start: 2022-08-30 | End: 2023-04-04

## 2022-08-30 RX ADMIN — CYCLOBENZAPRINE 10 MG: 10 TABLET, FILM COATED ORAL at 22:28

## 2022-08-31 ENCOUNTER — PATIENT OUTREACH (OUTPATIENT)
Dept: CASE MANAGEMENT | Facility: OTHER | Age: 77
End: 2022-08-31

## 2022-08-31 NOTE — OUTREACH NOTE
"AMBULATORY CASE MANAGEMENT NOTE    Name and Relationship of Patient/Support Person: Svetlana Shortkermit Sharp - [place]    Patient Outreach  RN-ACM outreach with patient.  Discussed 8/30/22 ED visit regarding lumbosacral strain. Patient treated and discharged home. Patient states \"legs gave way\" and fell . She states no LOC; or head injury . Patient states to be compliant with ED recommendations; states to have been sedated following use of Flexeril in ED and not sure if she will have Flexeril prescription filled. Patient voiced intent to follow up with PCP as needed. Patient states no difficulty with headache; chest pain; SOB; appetite or sleeping.. Reviewed with patient ED AVS recommendations;Life Alert;  education and 24/7 Nurse Line Telephone number. Patient verbalized understanding and states to appreciate patient outreach .No further questions or concerns voiced at this time.   Education Documentation  Provider Follow-Up, taught by Shanda Gerardo RN at 8/31/2022  4:03 PM.  Learner: Patient  Readiness: Acceptance  Method: Explanation  Response: Verbalizes Understanding    Medication Management, taught by Shanda Gerardo RN at 8/31/2022  4:03 PM.  Learner: Patient  Readiness: Acceptance  Method: Explanation  Response: Verbalizes Understanding    Blood Pressure Monitoring, taught by Shanda Gerardo RN at 8/31/2022  4:03 PM.  Learner: Patient  Readiness: Acceptance  Method: Explanation  Response: Verbalizes Understanding    back health, taught by Shanda Gerardo RN at 8/31/2022  4:03 PM.  Learner: Patient  Readiness: Acceptance  Method: Explanation  Response: Verbalizes Understanding    Unresolved/Worsening Symptoms, taught by Shanda Gerardo RN at 8/31/2022  4:03 PM.  Learner: Patient  Readiness: Acceptance  Method: Explanation  Response: Verbalizes Understanding    Safety, taught by Shanda Gerardo RN at 8/31/2022  4:03 PM.  Learner: Patient  Readiness: Acceptance  Method: Explanation  Response: " Verbalizes Understanding    Medication Management, taught by Shanda Gerardo, RN at 8/31/2022  4:03 PM.  Learner: Patient  Readiness: Acceptance  Method: Explanation  Response: Verbalizes Understanding    Home Safety, taught by Shanda Gerardo, RN at 8/31/2022  4:03 PM.  Learner: Patient  Readiness: Acceptance  Method: Explanation  Response: Verbalizes Understanding    Energy Conservation, taught by Shanda Gerardo, RN at 8/31/2022  4:03 PM.  Learner: Patient  Readiness: Acceptance  Method: Explanation  Response: Verbalizes Understanding          SHANDA FREEMAN  Ambulatory Case Management    8/31/2022, 16:03 EDT

## 2022-09-02 ENCOUNTER — APPOINTMENT (OUTPATIENT)
Dept: PHYSICAL THERAPY | Facility: HOSPITAL | Age: 77
End: 2022-09-02

## 2022-09-02 ENCOUNTER — DOCUMENTATION (OUTPATIENT)
Dept: PHYSICAL THERAPY | Facility: HOSPITAL | Age: 77
End: 2022-09-02
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
## 2022-09-07 ENCOUNTER — HOSPITAL ENCOUNTER (OUTPATIENT)
Dept: PHYSICAL THERAPY | Facility: HOSPITAL | Age: 77
Setting detail: THERAPIES SERIES
Discharge: HOME | End: 2022-09-07

## 2022-09-07 DIAGNOSIS — G89.29 CHRONIC PAIN IN RIGHT FOOT: Primary | ICD-10-CM

## 2022-09-07 DIAGNOSIS — M79.671 CHRONIC PAIN IN RIGHT FOOT: Primary | ICD-10-CM

## 2022-09-07 PROCEDURE — 97140 MANUAL THERAPY 1/> REGIONS: CPT

## 2022-09-07 PROCEDURE — 97110 THERAPEUTIC EXERCISES: CPT

## 2022-09-07 NOTE — THERAPY TREATMENT NOTE
Outpatient Physical Therapy Ortho Treatment Note  NOHEMY Diaz     Patient Name: Jung Short  : 1945  MRN: [id number]  Today's Date: 2022      Visit Date: 2022    Visit Dx:    ICD-10-CM ICD-9-CM   1. Chronic pain in right foot  M79.671 729.5    G89.29 338.29       Patient Active Problem List   Diagnosis   • Chronic anemia   • Malignant neoplasm of upper-inner quadrant of right breast in female, estrogen receptor positive ([facility])   • Disc disorder of cervical region   • Neck pain   • Chronic pain   • Depression   • Type 2 diabetes mellitus without complication, without long-term current use of insulin ([facility])   • Dyslipidemia   • Gastroesophageal reflux disease with esophagitis   • Hypertension   • Hypothyroidism   • Incisional hernia   • Mitral valve insufficiency   • Nausea   • Osteopenia of spine   • Arthralgia of multiple joints   • Peripheral neuropathy   • Pulmonary hypertension ([facility])   • PITTS (dyspnea on exertion)   • Tricuspid valve insufficiency   • Cobalamin deficiency   • Vitamin D deficiency   • HFrEF (heart failure with reduced ejection fraction) ([facility])   • Arthritis of knee   • DDD (degenerative disc disease), lumbar   • Lumbar facet arthropathy   • Chronic gout of multiple sites   • Encounter for long-term (current) use of high-risk medication   • Primary osteoarthritis of left knee   • Physical deconditioning   • CKD (chronic kidney disease) stage 3, GFR 30-59 ml/min ([facility])   • Pleural effusion   • Acute respiratory failure with hypoxia ([facility])   • Periprosthetic subtrochanteric fracture of femur   • Traumatic closed nondisp torus fracture of distal radial metaphysis, right, initial encounter   • Atherosclerotic heart disease of native coronary artery without angina pectoris   • NICM (nonischemic cardiomyopathy) ([facility])   • Age-related osteoporosis without current pathological fracture        Past Medical History:   Diagnosis Date   • Age-related osteoporosis without current  pathological fracture 02/25/2022   • Anemia    • Benign breast disease    • Cancer ([facility]) 2015    Right breast   • Cellulitis of leg     May-2003 right lower extremity   • CHF (congestive heart failure) ([facility])     Dr Bates follows   • Chronic kidney disease     Dr Hannah follows   • Chronic ulcer of right foot ([facility])     Non-pressure, history of    • Closed fracture of patella 09/05/2017   • Cluster headache    • Colon polyp    • Depression    • Diabetic peripheral neuropathy ([facility])    • Difficulty walking    • Diverticulosis    • Femoral fracture ([facility])     right   • Fibromyalgia, primary    • Fracture of left wrist     history of   • Gastroesophageal reflux    • Generalized pain 02/02/2016   • Hiatal hernia    • Hyperlipidemia    • Hypertension    • Hypothyroidism    • Impingement syndrome of right shoulder    • Joint pain    • Left knee pain 05/17/2016   • Menopausal disorder    • Methicillin resistant Staphylococcus aureus infection 2012    after bladder surgery   • Nonischemic cardiomyopathy ([facility])     Ejection fraction 10% per 2-D echo with Doppler however she did have cardiac catheterization April 2015 which showed ejection fraction 20% with global hypokinesis and severe mitral insufficiency   • Osteoarthritis     generalized, sched jania TKA   • Osteomyelitis ([facility]) 02/02/2016   • Pain in shoulder 08/02/2016   • Paroxysmal atrial fibrillation ([facility])     Dr Bates follows   • Radius/ulna fracture, left, closed, initial encounter 10/21/2017   • Shoulder pain, bilateral     has tears on both sides   • Sleep apnea    • Syncope, psychogenic 04/19/2017   • Thoracic back pain 02/02/2016   • Thoracic injuries    • Toe ulcer ([facility])     h/o to both feet, d/t shoes rubbing per patient   • Transient alteration of awareness 04/19/2017        Past Surgical History:   Procedure Laterality Date   • ABDOMINAL SURGERY  2012    had to be reopened after bladder surgery d/t infection   • AMPUTATION FOOT / TOE Right 11/2013    Rt  "foot amputation MTP first toe   • BLADDER SURGERY  2012   • BREAST BIOPSY     • BREAST SURGERY  06/29/2015    Percutaneous ultrasound-guided placement of metal localized clip 1st lesion   • CARDIAC CATHETERIZATION  2015   • CARDIAC CATHETERIZATION N/A 1/25/2021    Procedure: Right and Left Heart Cath;  Surgeon: Nicholas Andrade MD;  Location:  [facility] CATH INVASIVE LOCATION;  Service: Cardiovascular;  Laterality: N/A;  drop in EF, with hx of NICM, SOA fatigue.  Discussed with    • CARDIAC CATHETERIZATION N/A 1/25/2021    Procedure: Left ventriculography;  Surgeon: Nicholas Andrade MD;  Location:  [facility] CATH INVASIVE LOCATION;  Service: Cardiovascular;  Laterality: N/A;   • CARDIAC CATHETERIZATION N/A 1/25/2021    Procedure: Coronary angiography;  Surgeon: Nicholas Andrade MD;  Location: [facility] CATH INVASIVE LOCATION;  Service: Cardiovascular;  Laterality: N/A;   • CATARACT EXTRACTION Bilateral 2017   • COLONOSCOPY     • DEBRIDEMENT  FOOT Right 01/2013    During hospitalization    • EPIDURAL BLOCK      4 chronic back pain and leg pain last epidurals done 5-2012 she had no benefit at all from this procedure.   • FEMUR FRACTURE SURGERY     • FOOT SURGERY Bilateral     several   • HERNIA REPAIR      At the abdomen February 2007 Dr. Mendez   • INCISIONAL HERNIA REPAIR  05/16/2014    Recurrent. Incarcerated. With mesh implantation   • MASTECTOMY Right 05/2015   • MASTECTOMY MODIFIED RADICAL W/ AXILLARY LYMPH NODES W/ OR W/O PECTORALIS MINOR Right    • SHOULDER SURGERY Right     x2 RCR   • TOTAL ABDOMINAL HYSTERECTOMY      with oophorectomy-Done June-2012 secondary to vaginal wall prolapse.   • TOTAL KNEE ARTHROPLASTY Right    • TOTAL KNEE ARTHROPLASTY Left 4/17/2018    Procedure: TOTAL KNEE ARTHROPLASTY;  Surgeon: Live Chambers MD;  Location: [facility];  Service: Orthopedics        PT Ortho     Row Name 09/07/22 1400       Subjective Comments    Subjective Comments \"I had a fall  last Monday night.\" \"I fell " "right down on my bottom; I don't know what happened.\" \"I ended up at the ER the next day and they said they couldn't find anything wrong.\" \"I have been having spasms in my back and legs.\"  \"I thought I heard 2 pops in my stomach and I don't know what that was.\" Pt has not been able to do any of her exercises since then because she has been in too much pain in her back and left leg. She had x-rays of her back and left hip and no acute injury was seen. \"I'm not sure what I can do here today.\" \"I guess I need to make an appointment with my doctor.\"  -LN       Precautions and Contraindications    Precautions/Limitations fall precautions; hx of diabetic neuropathy with decreased sensation in feet/legs.  -LN       Subjective Pain    Able to rate subjective pain? yes  -LN    Pre-Treatment Pain Level 6  -LN    Subjective Pain Comment Right ankle and foot  -LN       Posture/Observations    Posture/Observations Comments pt ambulated into PT clinic today using 2 SPC; mobility and gait very guarded and slow.  -LN          User Key  (r) = Recorded By, (t) = Taken By, (c) = Cosigned By    Initials Name Provider Type    Christine Dai, PT Physical Therapist                             PT Assessment/Plan     Row Name 09/07/22 1400          PT Assessment    Assessment Comments Pt with decreased overall exercise tolerance today due to having persistent LBP and left leg pain after a fall last week at home; did go to ER and x-rays did not show any acute injury/fx.  She had limited tolerance to doing exercises on treatment table and needed assist to transfer to sit and had a lot of pain with sitting up from treatment table; pain in her LB. (pt was also 30 min late which limited her treatment time). Her mobility was very guarded and slow today. She tolerated gentle exercises to right foot/ankle fairly well.  -LN            PT Plan    PT Frequency 1x/week;2x/week  -LN     PT Plan Comments Continue per POC. Progress with " "exercises as tolerated. Consider adding floor bike next visit as tolerated. Resume all exercises as tolerated.  -LN           User Key  (r) = Recorded By, (t) = Taken By, (c) = Cosigned By    Initials Name Provider Type    Christine Dai, PT Physical Therapist                   OP Exercises     Row Name 09/07/22 1400             Precautions    Existing Precautions/Restrictions fall  -LN              Subjective Comments    Subjective Comments \"I had a fall  last Monday night.\" \"I fell right down on my bottom; I don't know what happened.\" \"I ended up at the ER the next day and they said they couldn't find anything wrong.\" \"I have been having spasms in my back and legs.\"  \"I thought I heard 2 pops in my stomach and I don't know what that was.\" Pt has not been able to do any of her exercises since then because she has been in too much pain in her back and left leg. She had x-rays of her back and left hip and no acute injury was seen. \"I'm not sure what I can do here today.\" \"I guess I need to make an appointment with my doctor.\"  -LN              Subjective Pain    Able to rate subjective pain? yes  -LN      Pre-Treatment Pain Level 6  -LN      Subjective Pain Comment Right ankle and foot  -LN              Total Minutes    81487 - PT Therapeutic Exercise Minutes 20  -LN      15150 - PT Manual Therapy Minutes 10  -LN              Exercise 1    Exercise Name 1 AP  -LN      Cueing 1 Verbal;Tactile;Demo  -LN      Reps 1 10  -LN              Exercise 2    Exercise Name 2 --  -LN      Cueing 2 --  -LN      Reps 2 --  -LN      Time 2 --  -LN              Exercise 3    Exercise Name 3 --  -LN      Cueing 3 --  -LN      Reps 3 --  -LN              Exercise 4    Exercise Name 4 seated ball squeeze  -LN      Cueing 4 Verbal;Demo  -LN      Reps 4 10  -LN      Time 4 5 sec  -LN              Exercise 5    Exercise Name 5 --  -LN      Cueing 5 --  -LN      Reps 5 --  -LN              Exercise 6    Exercise Name 6 df vs " yellow LF TB  -LN      Cueing 6 Verbal;Tactile;Demo  -LN      Reps 6 10  -LN              Exercise 7    Exercise Name 7 pf vs yellow LF TB  -LN      Cueing 7 Verbal;Tactile;Demo  -LN      Reps 7 10  -LN              Exercise 8    Exercise Name 8 SAQ  -LN      Cueing 8 Verbal;Tactile;Demo  -LN      Reps 8 10  -LN      Time 8 5 sec  -LN              Exercise 9    Exercise Name 9 --  -LN      Cueing 9 --  -LN      Reps 9 --  -LN      Time 9 --  -LN              Exercise 10    Exercise Name 10 seated calf raise  -LN      Cueing 10 Verbal;Tactile;Demo  -LN      Reps 10 10  -LN              Exercise 11    Exercise Name 11 --  -LN      Cueing 11 --  -LN      Reps 11 --  -LN      Time 11 --  -LN              Exercise 12    Exercise Name 12 --  -LN      Cueing 12 --  -LN      Reps 12 --  -LN      Time 12 --  -LN              Exercise 13    Exercise Name 13 inversion with TB  -LN      Cueing 13 Verbal;Tactile;Demo  -LN      Reps 13 10  -LN      Time 13 5 sec  -LN      Additional Comments yellow TB LF  -LN              Exercise 14    Exercise Name 14 eversion with TB  -LN      Cueing 14 Verbal;Tactile;Demo  -LN      Reps 14 10  -LN      Time 14 5 sec  -LN      Additional Comments yellow TB LF  -LN            User Key  (r) = Recorded By, (t) = Taken By, (c) = Cosigned By    Initials Name Provider Type    Christine Dai, PT Physical Therapist                         Manual Rx (last 36 hours)     Manual Treatments     Row Name 09/07/22 1400             Total Minutes    08569 - PT Manual Therapy Minutes 10  -LN              Manual Rx 1    Manual Rx 1 Location Right ankle df/pf  -LN      Manual Rx 1 Type gentle PROM  -LN      Manual Rx 1 Duration 10 reps x 5 sec  -LN              Manual Rx 2    Manual Rx 2 Location Right  ankle inversion/eversion  -LN      Manual Rx 2 Type gentle PROM  -LN      Manual Rx 2 Duration 10 reps x 5 sec  -LN              Manual Rx 3    Manual Rx 3 Location gentle foot mobs  -LN      Manual Rx  3 Duration 1-2 min  -LN              Manual Rx 4    Manual Rx 4 Location gentle posterior talus mobs  -LN      Manual Rx 4 Duration 3 x 5 osc  -LN            User Key  (r) = Recorded By, (t) = Taken By, (c) = Cosigned By    Initials Name Provider Type    Christine Dai, PT Physical Therapist                    Therapy Education  Education Details: Pt to work on right ankle/foot exercises as she can tolerate at home. Strongly encouraged her to call her PCP and make an appt. due to having a fall last week and still having a lot of LBP and left hip/leg pain and also c/o some abdominal pain. She is to also try using MHP on her LB as needed for pain relief. To use ice to right ankle and foot as needed for edema control.  Given: HEP, Symptoms/condition management, Pain management  Program: Reinforced  How Provided: Verbal, Demonstration  Provided to: Patient  Level of Understanding: Teach back education performed, Verbalized, Demonstrated              Time Calculation:   Start Time: 1430  Stop Time: 1500  Time Calculation (min): 30 min  Timed Charges  57861 - PT Therapeutic Exercise Minutes: 20  19401 - PT Manual Therapy Minutes: 10  Total Minutes  Timed Charges Total Minutes: 30   Total Minutes: 30  Therapy Charges for Today     Code Description Service Date Service Provider Modifiers Qty    84703529012 HC PT THER PROC EA 15 MIN 9/7/2022 Christine Norman, PT GP 1    94337423934 HC PT MANUAL THERAPY EA 15 MIN 9/7/2022 Christine Norman, PT GP 1                    Christine Norman, PT  9/7/2022

## 2022-09-08 ENCOUNTER — HOSPITAL ENCOUNTER (OUTPATIENT)
Dept: INFUSION THERAPY | Facility: HOSPITAL | Age: 77
Discharge: HOME | End: 2022-09-08

## 2022-09-08 DIAGNOSIS — M81.0 AGE-RELATED OSTEOPOROSIS WITHOUT CURRENT PATHOLOGICAL FRACTURE: Primary | ICD-10-CM

## 2022-09-13 ENCOUNTER — HOSPITAL ENCOUNTER (OUTPATIENT)
Dept: PHYSICAL THERAPY | Facility: HOSPITAL | Age: 77
Setting detail: THERAPIES SERIES
Discharge: HOME | End: 2022-09-13

## 2022-09-13 ENCOUNTER — OFFICE VISIT (OUTPATIENT)
Dept: INTERNAL MEDICINE | Facility: CLINIC | Age: 77
End: 2022-09-13

## 2022-09-13 VITALS
HEIGHT: 66 IN | DIASTOLIC BLOOD PRESSURE: 76 MMHG | BODY MASS INDEX: 35.23 KG/M2 | SYSTOLIC BLOOD PRESSURE: 128 MMHG | TEMPERATURE: 98 F | WEIGHT: 219.2 LBS | RESPIRATION RATE: 18 BRPM | HEART RATE: 73 BPM | OXYGEN SATURATION: 97 %

## 2022-09-13 DIAGNOSIS — M51.36 DDD (DEGENERATIVE DISC DISEASE), LUMBAR: ICD-10-CM

## 2022-09-13 DIAGNOSIS — M47.816 LUMBAR FACET ARTHROPATHY: ICD-10-CM

## 2022-09-13 DIAGNOSIS — S39.92XA LOWER BACK INJURY, INITIAL ENCOUNTER: ICD-10-CM

## 2022-09-13 DIAGNOSIS — M81.0 AGE-RELATED OSTEOPOROSIS WITHOUT CURRENT PATHOLOGICAL FRACTURE: Chronic | ICD-10-CM
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
## 2022-09-13 DIAGNOSIS — G89.29 CHRONIC PAIN IN RIGHT FOOT: Primary | ICD-10-CM

## 2022-09-13 DIAGNOSIS — R32 URINARY INCONTINENCE, UNSPECIFIED TYPE: ICD-10-CM

## 2022-09-13 DIAGNOSIS — M79.671 CHRONIC PAIN IN RIGHT FOOT: Primary | ICD-10-CM

## 2022-09-13 LAB
BILIRUB BLD-MCNC: NEGATIVE MG/DL
CLARITY, POC: CLEAR
COLOR UR: YELLOW
EXPIRATION DATE: ABNORMAL
GLUCOSE UR STRIP-MCNC: ABNORMAL MG/DL
KETONES UR QL: NEGATIVE
LEUKOCYTE EST, POC: NEGATIVE
Lab: ABNORMAL
NITRITE UR-MCNC: NEGATIVE MG/ML
PH UR: 6 [PH] (ref 5–8)
PROT UR STRIP-MCNC: NEGATIVE MG/DL
RBC # UR STRIP: ABNORMAL /UL
SP GR UR: 1.02 (ref 1–1.03)
UROBILINOGEN UR QL: ABNORMAL

## 2022-09-13 PROCEDURE — 99214 OFFICE O/P EST MOD 30 MIN: CPT | Performed by: NURSE PRACTITIONER

## 2022-09-13 PROCEDURE — 81003 URINALYSIS AUTO W/O SCOPE: CPT | Performed by: NURSE PRACTITIONER

## 2022-09-13 PROCEDURE — 97110 THERAPEUTIC EXERCISES: CPT

## 2022-09-13 RX ORDER — LORAZEPAM 0.5 MG/1
TABLET ORAL
Qty: 1 TABLET | Refills: 0 | Status: SHIPPED | OUTPATIENT
Start: 2022-09-13 | End: 2023-01-19

## 2022-09-13 NOTE — PROGRESS NOTES
Subjective   Jung Short is a 77 y.o. female presenting today for follow up of   Chief Complaint   Patient presents with   • Lumbosacral strain     Follow up visit from the ED. She was seen on 8/30       History of Present Illness       Outpatient Medications Marked as Taking for the 9/13/22 encounter (Office Visit) with Kathryn Epstein APRN   Medication Sig Dispense Refill   • amLODIPine (NORVASC) 5 MG tablet Take 1 tablet by mouth Daily. Take one 5 mg tab in the morning with one 2.5 mg tab at HS. 30 tablet 11   • aspirin 81 MG EC tablet Take 81 mg by mouth Daily.     • bumetanide (BUMEX) 0.5 MG tablet Take 3 tablets by mouth Daily. 270 tablet 1   • carvedilol (COREG) 25 MG tablet TAKE 1 TABLET BY MOUTH TWICE DAILY 180 tablet 0   • cyclobenzaprine (FLEXERIL) 10 MG tablet Take 1 tablet by mouth 3 (Three) Times a Day As Needed for Muscle Spasms. 21 tablet 0   • D-1000 Extra Strength 25 MCG (1000 UT) tablet TAKE 1 TABLET BY MOUTH DAILY 90 tablet 3   • DULoxetine (CYMBALTA) 60 MG capsule TAKE 1 CAPSULE BY MOUTH DAILY. 90 capsule 3   • empagliflozin (JARDIANCE) 10 MG tablet tablet Take 1 tablet by mouth Daily. 30 tablet 1   • esomeprazole (nexIUM) 20 MG capsule Take 20 mg by mouth Every Morning Before Breakfast.     • glipizide (GLUCOTROL XL) 5 MG ER tablet TAKE 1 TABLET BY MOUTH DAILY WITH BREAKFAST. 90 tablet 3   • HYDROcodone-acetaminophen (NORCO) 5-325 MG per tablet Take 1 tablet by mouth Every 6 (Six) Hours As Needed for Moderate Pain . 120 tablet 0   • levothyroxine (SYNTHROID, LEVOTHROID) 25 MCG tablet TAKE 1 TABLET BY MOUTH DAILY 90 tablet 3   • Multiple Vitamins-Minerals (MULTIVITAMIN ADULT PO) Take 1 tablet by mouth Daily.     • pravastatin (PRAVACHOL) 10 MG tablet TAKE 1 TABLET BY MOUTH AT BEDTIME 90 tablet 3   • pregabalin (LYRICA) 75 MG capsule TAKE 1 CAPSULE BY MOUTH 2 (TWO) TIMES A DAY. 60 capsule 5   • sacubitril-valsartan (Entresto) 49-51 MG tablet Take 1 tablet by mouth 2 (Two) Times a  "Day. (Patient taking differently: Take 2 tablets by mouth 2 (Two) Times a Day.) 60 tablet 11     Aron presents for ER f/u.    She reports falling 2 weeks ago. The first occurred on Saturday a.m. while she was preparing for a yard sale. She fell straight to her knees and really does not know what prompted her fall. Two days later on Monday she went to sit down on a chair that was not there falling directly to her buttocks. The next day she came to the ED here at [facility].  ED with Giovany Owusu MD (08/30/2022)  No relief w/ muscle relaxers prescribed by ER.  She is prescribed Hydrocodone per PM. She notes relief but is taking more than her allotted amount per day.     She notes worsening pain. There is radiation to the L LE to the mid-thigh. Denies numbness. No bowel incontinence. She has experienced some urine leakage but this was present off and on some quite sometime.    She notes a remote (more than 40 yrs ago) h/o a \"broken back\" following a fall. No prior back surgery.      The following portions of the patient's history were reviewed and updated as appropriate: allergies, current medications, past family history, past medical history, past social history, past surgical history and problem list.        Objective   Vitals:    09/13/22 1049   BP: 128/76   BP Location: Left arm   Patient Position: Sitting   Cuff Size: Large Adult   Pulse: 73   Resp: 18   Temp: 98 °F (36.7 °C)   TempSrc: Infrared   SpO2: 97%   Weight: 99.4 kg (219 lb 3.2 oz)   Height: 167.6 cm (66\")       BP Readings from Last 3 Encounters:   09/13/22 128/76   08/30/22 150/75   08/23/22 126/67        Wt Readings from Last 3 Encounters:   09/13/22 99.4 kg (219 lb 3.2 oz)   08/30/22 98.4 kg (217 lb)   08/23/22 102 kg (224 lb)        Body mass index is 35.38 kg/m².  Nursing notes and vitals reviewed.    Physical Exam  Constitutional:       General: She is not in acute distress.     Appearance: She is well-developed.   Pulmonary:      Effort: " Pulmonary effort is normal.   Neurological:      Mental Status: She is alert.      Sensory: Sensation is intact.      Motor: Weakness present.      Gait: Gait abnormal.   Psychiatric:         Attention and Perception: She is attentive.         Speech: Speech normal.         XR Spine Lumbar Complete 4+VW    Result Date: 8/30/2022  Degenerative changes of the lumbar spine. No acute findings. Signer Name: Kristopher Carr MD  Signed: 8/30/2022 10:17 PM  Workstation Name: [email or web address] [id number]  Radiology [facility]    XR Hip With or Without Pelvis 2 - 3 View Left    Result Date: 8/30/2022  1. Osteoporosis and degenerative change. No acute fracture. Signer Name: Kristopher Carr MD  Signed: 8/30/2022 10:19 PM  Workstation Name: [email or web address]    Recent Results (from the past 24 hour(s))   POC Urinalysis Dipstick, Automated    Collection Time: 09/13/22 11:52 AM    Specimen: Urine   Result Value Ref Range    Color Yellow Yellow, Straw, Dark Yellow, Ciera    Clarity, UA Clear Clear    Specific Gravity  1.025 1.005 - 1.030    pH, Urine 6.0 5.0 - 8.0    Leukocytes Negative Negative    Nitrite, UA Negative Negative    Protein, POC Negative Negative mg/dL    Glucose,  mg/dL (A) Negative mg/dL    Ketones, UA Negative Negative    Urobilinogen, UA 0.2 E.U./dL Normal, 0.2 E.U./dL    Bilirubin Negative Negative    Blood, UA Trace (A) Negative    Lot Number [id number]     Expiration Date [id number]          Assessment & Plan   Diagnoses and all orders for this visit:    1. Fall, initial encounter (Primary)  -     MRI Lumbar Spine Without Contrast; Future    2. Lower back injury, initial encounter  -     MRI Lumbar Spine Without Contrast; Future  -     LORazepam (Ativan) 0.5 MG tablet; Take one 30 minutes to planned procedure  Dispense: 1 tablet; Refill: 0    3. DDD (degenerative disc disease), lumbar  -     MRI Lumbar Spine Without Contrast; Future  -     LORazepam (Ativan) 0.5 MG tablet; Take one 30  minutes to planned procedure  Dispense: 1 tablet; Refill: 0    4. Lumbar facet arthropathy  -     MRI Lumbar Spine Without Contrast; Future  -     LORazepam (Ativan) 0.5 MG tablet; Take one 30 minutes to planned procedure  Dispense: 1 tablet; Refill: 0    5. Age-related osteoporosis without current pathological fracture  -     MRI Lumbar Spine Without Contrast; Future    6. Urinary incontinence, unspecified type  -     POC Urinalysis Dipstick, Automated  -     Urine Culture - , Urine, Clean Catch        She will need to call PM to discussed her usage of Hydrocodone.      Medications, including side effects, were discussed with the patient. Patient verbalized understanding.  The plan of care was discussed. All questions were answered. Patient verbalized understanding.      Return if symptoms worsen or fail to improve.

## 2022-09-13 NOTE — THERAPY TREATMENT NOTE
Outpatient Physical Therapy Ortho Treatment Note  NOHEMY Diaz     Patient Name: Jung Short  : 1945  MRN: [id number]  Today's Date: 2022      Visit Date: 2022    Visit Dx:    ICD-10-CM ICD-9-CM   1. Chronic pain in right foot  M79.671 729.5    G89.29 338.29       Patient Active Problem List   Diagnosis   • Chronic anemia   • Malignant neoplasm of upper-inner quadrant of right breast in female, estrogen receptor positive ([facility])   • Disc disorder of cervical region   • Neck pain   • Chronic pain   • Depression   • Type 2 diabetes mellitus without complication, without long-term current use of insulin ([facility])   • Dyslipidemia   • Gastroesophageal reflux disease with esophagitis   • Hypertension   • Hypothyroidism   • Incisional hernia   • Mitral valve insufficiency   • Nausea   • Arthralgia of multiple joints   • Peripheral neuropathy   • Pulmonary hypertension ([facility])   • PITTS (dyspnea on exertion)   • Tricuspid valve insufficiency   • Cobalamin deficiency   • Vitamin D deficiency   • HFrEF (heart failure with reduced ejection fraction) ([facility])   • Arthritis of knee   • DDD (degenerative disc disease), lumbar   • Lumbar facet arthropathy   • Chronic gout of multiple sites   • Encounter for long-term (current) use of high-risk medication   • Primary osteoarthritis of left knee   • Physical deconditioning   • CKD (chronic kidney disease) stage 3, GFR 30-59 ml/min ([facility])   • Pleural effusion   • Acute respiratory failure with hypoxia ([facility])   • Periprosthetic subtrochanteric fracture of femur   • Traumatic closed nondisp torus fracture of distal radial metaphysis, right, initial encounter   • Atherosclerotic heart disease of native coronary artery without angina pectoris   • NICM (nonischemic cardiomyopathy) ([facility])   • Age-related osteoporosis without current pathological fracture        Past Medical History:   Diagnosis Date   • Age-related osteoporosis without current pathological fracture 2022    • Anemia    • Benign breast disease    • Cancer ([facility]) 2015    Right breast   • Cellulitis of leg     May-2003 right lower extremity   • CHF (congestive heart failure) ([facility])     Dr Bates follows   • Chronic kidney disease     Dr Hannah follows   • Chronic ulcer of right foot ([facility])     Non-pressure, history of    • Closed fracture of patella 09/05/2017   • Cluster headache    • Colon polyp    • Depression    • Diabetic peripheral neuropathy ([facility])    • Difficulty walking    • Diverticulosis    • Femoral fracture ([facility])     right   • Fibromyalgia, primary    • Fracture of left wrist     history of   • Gastroesophageal reflux    • Generalized pain 02/02/2016   • Hiatal hernia    • Hyperlipidemia    • Hypertension    • Hypothyroidism    • Impingement syndrome of right shoulder    • Joint pain    • Left knee pain 05/17/2016   • Menopausal disorder    • Methicillin resistant Staphylococcus aureus infection 2012    after bladder surgery   • Nonischemic cardiomyopathy ([facility])     Ejection fraction 10% per 2-D echo with Doppler however she did have cardiac catheterization April 2015 which showed ejection fraction 20% with global hypokinesis and severe mitral insufficiency   • Osteoarthritis     generalized, sched jania TKA   • Osteomyelitis ([facility]) 02/02/2016   • Pain in shoulder 08/02/2016   • Paroxysmal atrial fibrillation ([facility])     Dr Bates follows   • Radius/ulna fracture, left, closed, initial encounter 10/21/2017   • Shoulder pain, bilateral     has tears on both sides   • Sleep apnea    • Syncope, psychogenic 04/19/2017   • Thoracic back pain 02/02/2016   • Thoracic injuries    • Toe ulcer ([facility])     h/o to both feet, d/t shoes rubbing per patient   • Transient alteration of awareness 04/19/2017        Past Surgical History:   Procedure Laterality Date   • ABDOMINAL SURGERY  2012    had to be reopened after bladder surgery d/t infection   • AMPUTATION FOOT / TOE Right 11/2013    Rt foot amputation MTP first toe   •  BLADDER SURGERY  2012   • BREAST BIOPSY     • BREAST SURGERY  06/29/2015    Percutaneous ultrasound-guided placement of metal localized clip 1st lesion   • CARDIAC CATHETERIZATION  2015   • CARDIAC CATHETERIZATION N/A 1/25/2021    Procedure: Right and Left Heart Cath;  Surgeon: Nicholas Andrade MD;  Location:  [facility] CATH INVASIVE LOCATION;  Service: Cardiovascular;  Laterality: N/A;  drop in EF, with hx of NICM, SOA fatigue.  Discussed with    • CARDIAC CATHETERIZATION N/A 1/25/2021    Procedure: Left ventriculography;  Surgeon: Nicholas Andrade MD;  Location:  [facility] CATH INVASIVE LOCATION;  Service: Cardiovascular;  Laterality: N/A;   • CARDIAC CATHETERIZATION N/A 1/25/2021    Procedure: Coronary angiography;  Surgeon: Nicholas Andrade MD;  [facility];  Service: Cardiovascular;  Laterality: N/A;   • CATARACT EXTRACTION Bilateral 2017   • COLONOSCOPY     • DEBRIDEMENT  FOOT Right 01/2013    During hospitalization    • EPIDURAL BLOCK      4 chronic back pain and leg pain last epidurals done 5-2012 she had no benefit at all from this procedure.   • FEMUR FRACTURE SURGERY     • FOOT SURGERY Bilateral     several   • HERNIA REPAIR      At the abdomen February 2007 Dr. Mendez   • INCISIONAL HERNIA REPAIR  05/16/2014    Recurrent. Incarcerated. With mesh implantation   • MASTECTOMY Right 05/2015   • MASTECTOMY MODIFIED RADICAL W/ AXILLARY LYMPH NODES W/ OR W/O PECTORALIS MINOR Right    • SHOULDER SURGERY Right     x2 RCR   • TOTAL ABDOMINAL HYSTERECTOMY      with oophorectomy-Done June-2012 secondary to vaginal wall prolapse.   • TOTAL KNEE ARTHROPLASTY Right    • TOTAL KNEE ARTHROPLASTY Left 4/17/2018    Procedure: TOTAL KNEE ARTHROPLASTY;  Surgeon: Live Chambers MD;  Location: [facility];  Service: Orthopedics        PT Ortho     Row Name 09/13/22 1600       Subjective Comments    Subjective Comments pt states her foot/ankle has still been hurting and her back is especially  hurting since she fell 2 weeks ago  -       Precautions and Contraindications    Precautions/Limitations fall precautions  -          User Key  (r) = Recorded By, (t) = Taken By, (c) = Cosigned By    Initials Name Provider Type    Max Augustin PTA Physical Therapist Assistant                             PT Assessment/Plan     Row Name 09/13/22 1641          PT Assessment    Assessment Comments pt tolerated completed ex's well; continues with significant back pain due to a fall a couple of weeks ago - pt states it has continued to worsen and MD has ordered an MRI; this pain somewhat limits her tolerance to positioning for ex's but today she completed limited ex's due to having groceries in the car  -            PT Plan    PT Plan Comments Cont per POC  -           User Key  (r) = Recorded By, (t) = Taken By, (c) = Cosigned By    Initials Name Provider Type     Max Cristina PTA Physical Therapist Assistant                   OP Exercises     Row Name 09/13/22 1643 09/13/22 1600          Precautions    Existing Precautions/Restrictions -- fall  -            Subjective Comments    Subjective Comments -- pt states her foot/ankle has still been hurting and her back is especially hurting since she fell 2 weeks ago  -            Total Minutes    04964 - PT Therapeutic Exercise Minutes 33  -MH --            Exercise 1    Exercise Name 1 -- AP  -MH     Cueing 1 -- Verbal;Tactile;Demo  -     Reps 1 -- 10  -MH            Exercise 2    Exercise Name 2 -- NWB right gastroc stretch  -     Cueing 2 -- Verbal;Tactile;Demo  -     Reps 2 -- 5  -MH     Time 2 -- 10 sec  -            Exercise 3    Exercise Name 3 -- LAQ  -     Cueing 3 -- Verbal;Tactile;Demo  -     Reps 3 -- 10 each leg  -            Exercise 4    Exercise Name 4 -- seated ball squeeze  -     Cueing 4 -- Verbal;Demo  -     Reps 4 -- 15  -MH     Time 4 -- 5 sec  -            Exercise 5    Exercise Name 5 -- seated marching  -             Exercise 6    Exercise Name 6 -- df vs yellow LF TB  -     Cueing 6 -- Verbal;Tactile;Demo  -     Reps 6 -- 10  -MH            Exercise 7    Exercise Name 7 -- pf vs yellow LF TB  -     Cueing 7 -- Verbal;Tactile;Demo  -     Reps 7 -- 10  -MH            Exercise 8    Exercise Name 8 -- SAQ  -MH     Cueing 8 -- Verbal;Tactile;Demo  -     Reps 8 -- 10  -     Time 8 -- 5 sec  -            Exercise 9    Exercise Name 9 -- SLR  -            Exercise 10    Exercise Name 10 -- seated calf raise  -            Exercise 11    Exercise Name 11 -- Seated HS curls vs TB  -            Exercise 12    Exercise Name 12 -- Seated hip IR vs TB  -            Exercise 13    Exercise Name 13 -- inversion with TB  -     Cueing 13 -- Verbal;Tactile;Demo  -     Reps 13 -- 10  -     Time 13 -- 5 sec  -     Additional Comments -- yellow TB LF  -            Exercise 14    Exercise Name 14 -- eversion with TB  -     Cueing 14 -- Verbal;Tactile;Demo  -     Reps 14 -- 10  -     Time 14 -- 5 sec  -     Additional Comments -- yellow TB LF  -           User Key  (r) = Recorded By, (t) = Taken By, (c) = Cosigned By    Initials Name Provider Type     Max Cristina PTA Physical Therapist Assistant                                                Time Calculation:   Start Time: 1508  Stop Time: 1547  Time Calculation (min): 39 min  Timed Charges  18335 - PT Therapeutic Exercise Minutes: 33  Total Minutes  Timed Charges Total Minutes: 33   Total Minutes: 33  Therapy Charges for Today     Code Description Service Date Service Provider Modifiers Qty    [id number] HC PT THER PROC EA 15 MIN 9/13/2022 Max Cristina PTA GP 2                    Max Cristina PTA  9/13/2022

## 2022-09-15 ENCOUNTER — HOSPITAL ENCOUNTER (OUTPATIENT)
Dept: INFUSION THERAPY | Facility: HOSPITAL | Age: 77
Discharge: HOME | End: 2022-09-15
Admitting: NURSE PRACTITIONER

## 2022-09-15 VITALS
OXYGEN SATURATION: 97 % | BODY MASS INDEX: 34.7 KG/M2 | DIASTOLIC BLOOD PRESSURE: 68 MMHG | HEART RATE: 65 BPM | TEMPERATURE: 97.2 F | WEIGHT: 215 LBS | SYSTOLIC BLOOD PRESSURE: 123 MMHG | RESPIRATION RATE: 17 BRPM

## 2022-09-15 DIAGNOSIS — M81.0 AGE-RELATED OSTEOPOROSIS WITHOUT CURRENT PATHOLOGICAL FRACTURE: Primary | ICD-10-CM

## 2022-09-15 LAB
ALBUMIN SERPL-MCNC: 3.8 G/DL (ref 3.5–5.2)
ALBUMIN/GLOB SERPL: 1.3 G/DL
ALP SERPL-CCNC: 117 U/L (ref 39–117)
ALT SERPL W P-5'-P-CCNC: 8 U/L (ref 1–33)
ANION GAP SERPL CALCULATED.3IONS-SCNC: 8.3 MMOL/L (ref 5–15)
AST SERPL-CCNC: 16 U/L (ref 1–32)
BILIRUB SERPL-MCNC: 0.4 MG/DL (ref 0–1.2)
BUN SERPL-MCNC: 13 MG/DL (ref 8–23)
BUN/CREAT SERPL: 13.5 (ref 7–25)
CALCIUM SPEC-SCNC: 10 MG/DL (ref 8.6–10.5)
CHLORIDE SERPL-SCNC: 105 MMOL/L (ref 98–107)
CO2 SERPL-SCNC: 27.7 MMOL/L (ref 22–29)
CREAT SERPL-MCNC: 0.96 MG/DL (ref 0.57–1)
EGFRCR SERPLBLD CKD-EPI 2021: 60.3 ML/MIN/1.73
GLOBULIN UR ELPH-MCNC: 3 GM/DL
GLUCOSE SERPL-MCNC: 205 MG/DL (ref 65–99)
MAGNESIUM SERPL-MCNC: 1.6 MG/DL (ref 1.6–2.4)
PHOSPHATE SERPL-MCNC: 3.8 MG/DL (ref 2.5–4.5)
POTASSIUM SERPL-SCNC: 3.3 MMOL/L (ref 3.5–5.2)
PROT SERPL-MCNC: 6.8 G/DL (ref 6–8.5)
SODIUM SERPL-SCNC: 141 MMOL/L (ref 136–145)

## 2022-09-15 PROCEDURE — 96372 THER/PROPH/DIAG INJ SC/IM: CPT

## 2022-09-15 PROCEDURE — 84100 ASSAY OF PHOSPHORUS: CPT | Performed by: NURSE PRACTITIONER

## 2022-09-15 PROCEDURE — 83735 ASSAY OF MAGNESIUM: CPT | Performed by: NURSE PRACTITIONER

## 2022-09-15 PROCEDURE — 25010000002 DENOSUMAB 60 MG/ML SOLUTION PREFILLED SYRINGE: Performed by: NURSE PRACTITIONER

## 2022-09-15 PROCEDURE — 80053 COMPREHEN METABOLIC PANEL: CPT | Performed by: NURSE PRACTITIONER

## 2022-09-15 RX ADMIN — DENOSUMAB 60 MG: 60 INJECTION SUBCUTANEOUS at 11:04

## 2022-09-15 NOTE — PATIENT INSTRUCTIONS
"  Call [facility] at [phone] if you have any problems or concerns.    We know you have a Choice in healthcare and appreciate you using [facility].  Our purpose is to provide you \"Excellent Care\".  We hope that you will always choose us in the future and continue to recommend us to your family and friends.             "
Normal

## 2022-09-15 NOTE — NURSING NOTE
NURSING PROGRESS NOTE:    PATIENT ARRIVED AMBULATORY TO [facility] AT 1025 FOR SCHEDULED INJECTION.  PROCEDURE PERFORMED WITHOUT INCIDENT.  DISCHARGED HOME AT 1115.  MARILYN PATRICIO

## 2022-09-16 ENCOUNTER — TELEPHONE (OUTPATIENT)
Dept: PAIN MEDICINE | Facility: CLINIC | Age: 77
End: 2022-09-16

## 2022-09-16 LAB
BACTERIA UR CULT: NORMAL
BACTERIA UR CULT: NORMAL

## 2022-09-16 NOTE — TELEPHONE ENCOUNTER
Patient called states she fell 8/29/22. She was seen at the ED 8/30/22, she was told their were no fractures. She was also seen by PCP who ordered MRI, that is scheduled 10/4/22. Pt reports pain has increased since fall. Pt also states she has had to take more medication than prescribed states she has been taking her hydrocodone 5mg 6 times/ day and now has about 19 pills left. She is requesting early refill. Patient has been notified that she is to take medication as prescribed (QID) and that this request may be denied.

## 2022-09-19 ENCOUNTER — DOCUMENTATION (OUTPATIENT)
Dept: PAIN MEDICINE | Facility: CLINIC | Age: 77
End: 2022-09-19

## 2022-09-19 DIAGNOSIS — E11.9 TYPE 2 DIABETES MELLITUS WITHOUT COMPLICATION, WITHOUT LONG-TERM CURRENT USE OF INSULIN: ICD-10-CM

## 2022-09-19 RX ORDER — SACUBITRIL AND VALSARTAN 49; 51 MG/1; MG/1
1 TABLET, FILM COATED ORAL 2 TIMES DAILY
Qty: 30 TABLET | Refills: 11 | COMMUNITY
Start: 2022-09-19 | End: 2022-12-09 | Stop reason: SDUPTHER

## 2022-09-19 RX ORDER — AMLODIPINE BESYLATE 5 MG/1
TABLET ORAL
Qty: 90 TABLET | Refills: 0 | Status: SHIPPED | OUTPATIENT
Start: 2022-09-19 | End: 2022-12-19

## 2022-09-19 RX ORDER — AMLODIPINE BESYLATE 2.5 MG/1
TABLET ORAL
Qty: 90 TABLET | Refills: 0 | Status: SHIPPED | OUTPATIENT
Start: 2022-09-19 | End: 2022-12-19

## 2022-09-19 NOTE — TELEPHONE ENCOUNTER
Patient needs to alternate with ice/heat, tylenol and ibuprofen as tolerated.  No early refills--must take medication as prescribed.

## 2022-09-19 NOTE — TELEPHONE ENCOUNTER
Pt notified. She states she will run out of medication in 2 days, Pt has been advised of above msg and to seek emergency care if pain is severe.

## 2022-09-19 NOTE — TELEPHONE ENCOUNTER
Please run 2 yr Joey.  Per JOI Bonilla's previous note pt had been on 6/day        Upon review of JOI Bonilla's previous notes pt had successfully self decreased her pain meds.  I reviewed the ED note and they treated her for lumbar strain which would be most treated with muscle relaxant--please ask did the flexeril help--if not I would recommend a different muscle relaxant.  This ONE only will allow an early refill--will not increase to 6/day however.

## 2022-09-19 NOTE — TELEPHONE ENCOUNTER
Rx Refill Note  Requested Prescriptions     Pending Prescriptions Disp Refills   • amLODIPine (NORVASC) 2.5 MG tablet [Pharmacy Med Name: AMLODIPINE 2.5MG] 90 tablet 0     Sig: TAKE 1 TABLET BY MOUTH EVERY EVENING   • amLODIPine (NORVASC) 5 MG tablet [Pharmacy Med Name: AMLODIPINE 5MG] 90 tablet 0     Sig: TAKE 1 TABLET BY MOUTH EVERY MORNING      Last office visit with prescribing clinician: 4/5/2022      Next office visit with prescribing clinician: 1/20/2023            Janet Felton MA  09/19/22, 09:25 EDT

## 2022-09-20 ENCOUNTER — DOCUMENTATION (OUTPATIENT)
Dept: PHYSICAL THERAPY | Facility: HOSPITAL | Age: 77
End: 2022-09-20

## 2022-09-20 ENCOUNTER — APPOINTMENT (OUTPATIENT)
Dept: PHYSICAL THERAPY | Facility: HOSPITAL | Age: 77
End: 2022-09-20

## 2022-09-20 NOTE — TELEPHONE ENCOUNTER
Patient notified. She was not able to tolerate Flexeril, states this medication caused sedation and dizziness, however she does not want to try a different muscle relaxer.

## 2022-09-20 NOTE — SIGNIFICANT NOTE
Pt called to cancel today's appt stating she is in too much pain with her back.  Will call to reschedule or may wait until after she receives results of her back MRI.

## 2022-09-21 DIAGNOSIS — M47.816 LUMBAR FACET ARTHROPATHY: ICD-10-CM

## 2022-09-21 DIAGNOSIS — Z79.899 ENCOUNTER FOR LONG-TERM (CURRENT) USE OF HIGH-RISK MEDICATION: Primary | ICD-10-CM

## 2022-09-21 RX ORDER — HYDROCODONE BITARTRATE AND ACETAMINOPHEN 5; 325 MG/1; MG/1
1 TABLET ORAL EVERY 4 HOURS PRN
Qty: 120 TABLET | Refills: 0 | Status: SHIPPED | OUTPATIENT
Start: 2022-09-21 | End: 2022-10-19 | Stop reason: SDUPTHER

## 2022-09-23 DIAGNOSIS — R14.0 ABDOMINAL BLOATING: ICD-10-CM

## 2022-09-23 DIAGNOSIS — E03.9 ACQUIRED HYPOTHYROIDISM: ICD-10-CM

## 2022-09-26 RX ORDER — LEVOTHYROXINE SODIUM 0.03 MG/1
25 TABLET ORAL DAILY
Qty: 90 TABLET | Refills: 3 | Status: SHIPPED | OUTPATIENT
Start: 2022-09-26

## 2022-09-26 RX ORDER — BUMETANIDE 0.5 MG/1
1.5 TABLET ORAL DAILY
Qty: 270 TABLET | Refills: 0 | Status: SHIPPED | OUTPATIENT
Start: 2022-09-26 | End: 2023-01-24

## 2022-09-26 RX ORDER — DULOXETIN HYDROCHLORIDE 60 MG/1
60 CAPSULE, DELAYED RELEASE ORAL DAILY
Qty: 90 CAPSULE | Refills: 3 | Status: SHIPPED | OUTPATIENT
Start: 2022-09-26

## 2022-09-26 NOTE — TELEPHONE ENCOUNTER
Rx Refill Note  Requested Prescriptions     Pending Prescriptions Disp Refills   • bumetanide (BUMEX) 0.5 MG tablet [Pharmacy Med Name: BUMETANIDE 0.5MG] 270 tablet 0     Sig: TAKE 3 TABLETS BY MOUTH DAILY.      Last office visit with prescribing clinician: 7/29/2022       Next office visit with prescribing clinician: 1/20/2023            Janet Felton MA  09/26/22, 08:47 EDT

## 2022-10-03 RX ORDER — CARVEDILOL 25 MG/1
TABLET ORAL
Qty: 180 TABLET | Refills: 0 | Status: SHIPPED | OUTPATIENT
Start: 2022-10-03 | End: 2023-01-12

## 2022-10-04 ENCOUNTER — HOSPITAL ENCOUNTER (OUTPATIENT)
Dept: MRI IMAGING | Facility: HOSPITAL | Age: 77
Discharge: HOME | End: 2022-10-04
Admitting: NURSE PRACTITIONER

## 2022-10-04 DIAGNOSIS — M81.0 AGE-RELATED OSTEOPOROSIS WITHOUT CURRENT PATHOLOGICAL FRACTURE: Chronic | ICD-10-CM

## 2022-10-04 DIAGNOSIS — M51.36 DDD (DEGENERATIVE DISC DISEASE), LUMBAR: ICD-10-CM

## 2022-10-04 DIAGNOSIS — M47.816 LUMBAR FACET ARTHROPATHY: ICD-10-CM

## 2022-10-04 DIAGNOSIS — S39.92XA LOWER BACK INJURY, INITIAL ENCOUNTER: ICD-10-CM
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
## 2022-10-04 PROCEDURE — 72148 MRI LUMBAR SPINE W/O DYE: CPT

## 2022-10-06 ENCOUNTER — TELEPHONE (OUTPATIENT)
Dept: INTERNAL MEDICINE | Facility: CLINIC | Age: 77
End: 2022-10-06

## 2022-10-06 NOTE — TELEPHONE ENCOUNTER
Hub to share with pt, please inform pt that PCP needs pt Needs appt to discuss MRI results of the lumbar spine. Pt is to call and make an appointment.

## 2022-10-07 ENCOUNTER — OFFICE VISIT (OUTPATIENT)
Dept: INTERNAL MEDICINE | Facility: CLINIC | Age: 77
End: 2022-10-07

## 2022-10-07 ENCOUNTER — TELEPHONE (OUTPATIENT)
Dept: CARDIOLOGY | Facility: CLINIC | Age: 77
End: 2022-10-07

## 2022-10-07 VITALS
WEIGHT: 218 LBS | HEIGHT: 66 IN | HEART RATE: 73 BPM | TEMPERATURE: 97.5 F | DIASTOLIC BLOOD PRESSURE: 70 MMHG | SYSTOLIC BLOOD PRESSURE: 145 MMHG | OXYGEN SATURATION: 98 % | BODY MASS INDEX: 35.03 KG/M2

## 2022-10-07 DIAGNOSIS — M47.816 LUMBAR FACET ARTHROPATHY: ICD-10-CM

## 2022-10-07 DIAGNOSIS — Z23 NEED FOR VACCINATION: ICD-10-CM

## 2022-10-07 DIAGNOSIS — M48.061 SPINAL STENOSIS OF LUMBAR REGION WITHOUT NEUROGENIC CLAUDICATION: ICD-10-CM

## 2022-10-07 DIAGNOSIS — S32.040A CLOSED COMPRESSION FRACTURE OF L4 VERTEBRA, INITIAL ENCOUNTER: ICD-10-CM

## 2022-10-07 DIAGNOSIS — M80.00XA AGE-RELATED OSTEOPOROSIS WITH CURRENT PATHOLOGICAL FRACTURE, INITIAL ENCOUNTER: Primary | ICD-10-CM

## 2022-10-07 PROCEDURE — 99214 OFFICE O/P EST MOD 30 MIN: CPT | Performed by: NURSE PRACTITIONER

## 2022-10-07 PROCEDURE — 90662 IIV NO PRSV INCREASED AG IM: CPT | Performed by: NURSE PRACTITIONER

## 2022-10-07 PROCEDURE — G0008 ADMIN INFLUENZA VIRUS VAC: HCPCS | Performed by: NURSE PRACTITIONER

## 2022-10-07 NOTE — TELEPHONE ENCOUNTER
Patient called and stated that she needs samples of entresto and jardiance.      Patient stated that she is taking 2 BID of the Entresto, this was documented in the last office note but was not clear as to what you would like for her to take.  Is she supposed to be taking 1 or 2 BID?  Please advise.        Plan:       Overall doing well, no decompensated heart failure, no medication changes, see Ravinder in 6 months, recommended that she follow-up with her PCP for physical therapy for her right foot which is weak.  In addition, she needs her urine checked that she thinks she may have urinary tract infection.  I did tell her that Jardiance could make her more prone to that (specifically candidal infections) so we will have to watch this.      Gunnar

## 2022-10-07 NOTE — PROGRESS NOTES
Subjective   Jung Short is a 77 y.o. female presenting today for follow up of   Chief Complaint   Patient presents with   • Results     Mri    • Fall     Back pain       History of Present Illness     Outpatient Medications Marked as Taking for the 10/7/22 encounter (Office Visit) with Kathryn Epstein APRN   Medication Sig Dispense Refill   • amLODIPine (NORVASC) 2.5 MG tablet TAKE 1 TABLET BY MOUTH EVERY EVENING 90 tablet 0   • amLODIPine (NORVASC) 5 MG tablet TAKE 1 TABLET BY MOUTH EVERY MORNING 90 tablet 0   • aspirin 81 MG EC tablet Take 81 mg by mouth Daily.     • bumetanide (BUMEX) 0.5 MG tablet TAKE 3 TABLETS BY MOUTH DAILY. 270 tablet 0   • carvedilol (COREG) 25 MG tablet TAKE 1 TABLET BY MOUTH TWICE DAILY 180 tablet 0   • cyclobenzaprine (FLEXERIL) 10 MG tablet Take 1 tablet by mouth 3 (Three) Times a Day As Needed for Muscle Spasms. 21 tablet 0   • D-1000 Extra Strength 25 MCG (1000 UT) tablet TAKE 1 TABLET BY MOUTH DAILY 90 tablet 3   • DULoxetine (CYMBALTA) 60 MG capsule TAKE 1 CAPSULE BY MOUTH DAILY. 90 capsule 3   • empagliflozin (JARDIANCE) 10 MG tablet tablet Take 1 tablet by mouth Daily. 30 tablet 0   • esomeprazole (nexIUM) 20 MG capsule Take 20 mg by mouth Every Morning Before Breakfast.     • glipizide (GLUCOTROL XL) 5 MG ER tablet TAKE 1 TABLET BY MOUTH DAILY WITH BREAKFAST. 90 tablet 3   • HYDROcodone-acetaminophen (NORCO) 5-325 MG per tablet Take 1 tablet by mouth Every 4 (Four) Hours As Needed for Moderate Pain. Take 1 tablet by mouth every 4-6 hours as needed for moderate pain secondary to fall; #135/month--must last 30 days 120 tablet 0   • levothyroxine (SYNTHROID, LEVOTHROID) 25 MCG tablet TAKE 1 TABLET BY MOUTH DAILY 90 tablet 3   • LORazepam (Ativan) 0.5 MG tablet Take one 30 minutes to planned procedure 1 tablet 0   • Multiple Vitamins-Minerals (MULTIVITAMIN ADULT PO) Take 1 tablet by mouth Daily.     • pravastatin (PRAVACHOL) 10 MG tablet TAKE 1 TABLET BY MOUTH AT  "BEDTIME 90 tablet 3   • pregabalin (LYRICA) 75 MG capsule TAKE 1 CAPSULE BY MOUTH 2 (TWO) TIMES A DAY. 60 capsule 5   • sacubitril-valsartan (Entresto) 49-51 MG tablet Take 1 tablet by mouth 2 (Two) Times a Day. 30 tablet 11     She continues to have uncontrolled LBP w/ bilat sciatica despite hydrocodone.    The following portions of the patient's history were reviewed and updated as appropriate: allergies, current medications, past family history, past medical history, past social history, past surgical history and problem list.        Objective   Vitals:    10/07/22 1531   BP: 145/70   Pulse: 73   Temp: 97.5 °F (36.4 °C)   SpO2: 98%   Weight: 98.9 kg (218 lb)   Height: 167.6 cm (65.98\")       BP Readings from Last 3 Encounters:   10/07/22 145/70   09/15/22 123/68   09/13/22 128/76        Wt Readings from Last 3 Encounters:   10/07/22 98.9 kg (218 lb)   09/15/22 97.5 kg (215 lb)   10/04/22 97.5 kg (215 lb)        Body mass index is 35.2 kg/m².  Nursing notes and vitals reviewed.    Physical Exam  Constitutional:       General: She is not in acute distress.     Appearance: She is well-developed.   Pulmonary:      Effort: Pulmonary effort is normal.   Neurological:      Mental Status: She is alert.   Psychiatric:         Attention and Perception: She is attentive.         Speech: Speech normal.         Recent Results (from the past 672 hour(s))   Urine Culture - , Urine, Clean Catch    Collection Time: 09/13/22 11:30 AM    Specimen: Urine, Clean Catch    UR   Result Value Ref Range    Urine Culture Final report     Result 1 Comment    POC Urinalysis Dipstick, Automated    Collection Time: 09/13/22 11:52 AM    Specimen: Urine   Result Value Ref Range    Color Yellow Yellow, Straw, Dark Yellow, Ciera    Clarity, UA Clear Clear    Specific Gravity  1.025 1.005 - 1.030    pH, Urine 6.0 5.0 - 8.0    Leukocytes Negative Negative    Nitrite, UA Negative Negative    Protein, POC Negative Negative mg/dL    Glucose,  " mg/dL (A) Negative mg/dL    Ketones, UA Negative Negative    Urobilinogen, UA 0.2 E.U./dL Normal, 0.2 E.U./dL    Bilirubin Negative Negative    Blood, UA Trace (A) Negative    Lot Number [id number]     Expiration Date [id number]    Comprehensive Metabolic Panel    Collection Time: 09/15/22 10:20 AM    Specimen: Blood   Result Value Ref Range    Glucose 205 (H) 65 - 99 mg/dL    BUN 13 8 - 23 mg/dL    Creatinine 0.96 0.57 - 1.00 mg/dL    Sodium 141 136 - 145 mmol/L    Potassium 3.3 (L) 3.5 - 5.2 mmol/L    Chloride 105 98 - 107 mmol/L    CO2 27.7 22.0 - 29.0 mmol/L    Calcium 10.0 8.6 - 10.5 mg/dL    Total Protein 6.8 6.0 - 8.5 g/dL    Albumin 3.80 3.50 - 5.20 g/dL    ALT (SGPT) 8 1 - 33 U/L    AST (SGOT) 16 1 - 32 U/L    Alkaline Phosphatase 117 39 - 117 U/L    Total Bilirubin 0.4 0.0 - 1.2 mg/dL    Globulin 3.0 gm/dL    A/G Ratio 1.3 g/dL    BUN/Creatinine Ratio 13.5 7.0 - 25.0    Anion Gap 8.3 5.0 - 15.0 mmol/L    eGFR 60.3 >60.0 mL/min/1.73   Phosphorus    Collection Time: 09/15/22 10:20 AM    Specimen: Blood   Result Value Ref Range    Phosphorus 3.8 2.5 - 4.5 mg/dL   Magnesium    Collection Time: 09/15/22 10:20 AM    Specimen: Blood   Result Value Ref Range    Magnesium 1.6 1.6 - 2.4 mg/dL     XR Spine Thoracic 3 View    Result Date: 7/2/2022  1. No visible acute osseous abnormality. 2. Chronic compression fracture at T5. 3. Demineralization suggesting osteoporosis. Signer Name: Levy Mishra MD  Signed: 7/2/2022 3:26 AM  Workstation Name: [email or web address]  [facility]    XR Spine Lumbar 2 or 3 View    Result Date: 7/2/2022  1. No acute osseous abnormality. 2. Multilevel degenerative changes including advanced lower lumbar degenerative facet arthropathy and grade 1 anterolisthesis at L4-5. Signer Name: Levy Mishra MD  Signed: 7/2/2022 3:28 AM  Workstation Name: [email or web address]  [facility]    XR Femur 2 View Right    Result Date: 7/2/2022  1. No acute osseous  abnormality. 2. Soft tissue edema. 3. ORIF old healed distal femur fracture. Right TKA. Signer Name: Levy Mishra MD  Signed: 7/2/2022 2:03 AM  Workstation Name: [facility]  [facility]    XR Tibia Fibula 2 View Right    Result Date: 7/2/2022  1. No acute osseous abnormality. 2. Soft tissue edema throughout the leg. Signer Name: Levy Mishra MD  Signed: 7/2/2022 2:02 AM  Workstation [facility]: [facility]  [facility]    XR Foot 3+ View Right    Result Date: 7/2/2022  1. No acute osseous abnormality is visible. 2. Chronic diabetic midfoot arthropathy is unchanged. 3. Soft tissue swelling throughout the foot. Demineralization. 4. Postoperative changes as noted. Signer Name: Levy Mishra MD  Signed: 7/2/2022 2:00 AM  Workstation Name: [facility]    MRI Lumbar Spine Without Contrast    Result Date: 10/5/2022  1.  There is a recent appearing compression fracture at L4, overall moderate, measurements above. There is disruption of the posterior cortex with retropulsed cortex/edema in the anterior epidural space at L4 resulting in severe canal stenosis. Additionally there is degenerative change at L3-4 and L4-5 with severe canal stenosis at L3-4 and mild to moderate canal stenosis at L4-5. For reasons discussed above, this is more likely to be an osteoporotic type compression fracture despite history of breast cancer. Clinical correlation and follow-up is recommended to confirm. 2.  Multiple level foraminal impingement, some of which is severe. See level by level discussion. Signer Name: Zarina Mari MD  Signed: 10/5/2022 2:40 PM  Workstation Name: [id number]  [facility]    XR Spine Lumbar Complete 4+VW    Result Date: 8/30/2022  Degenerative changes of the lumbar spine. No acute findings. Signer Name: Kristopher Carr MD  Signed: 8/30/2022 10:17 PM  Workstation Name: [email or web address]  [facility]    XR Hip With or Without Pelvis 2 - 3 View Left    Result Date: 8/30/2022  1. Osteoporosis and degenerative change. No acute fracture. Signer Name: Kristopher Carr MD  Signed: 8/30/2022 10:19 PM  Workstation Name: [email or web address]  [facility]      Assessment & Plan   Diagnoses and all orders for this visit:    1. Age-related osteoporosis with current pathological fracture, initial encounter (Primary)    2. Closed compression fracture of L4 vertebra, initial encounter (HCC)  -     Ambulatory Referral to Spine Surgery    3. Spinal stenosis of lumbar region without neurogenic claudication  -     Ambulatory Referral to Spine Surgery    4. Lumbar facet arthropathy  -     Ambulatory Referral to Spine Surgery    5. Need for vaccination  -     Fluzone High-Dose 65+yrs ([id number])      Cont Prolia  F/U w/ PM.      Medications, including side effects, were discussed with the patient. Patient verbalized understanding.  The plan of care was discussed. All questions were answered. Patient verbalized understanding.      Return for As Previously Scheduled.    I spent 38 minutes caring for Jung on this date of service. This time includes time spent by me in the following activities:preparing for the visit, reviewing tests, performing a medically appropriate examination and/or evaluation , counseling and educating the patient/family/caregiver, ordering medications, tests, or procedures, referring and communicating with other health care professionals , documenting information in the medical record, independently interpreting results and communicating that information with the patient/family/caregiver and care coordination

## 2022-10-07 NOTE — TELEPHONE ENCOUNTER
----- Message from Allan Ghosh sent at 10/5/2022  4:02 PM EDT -----  Regarding: Samples  Hi Phelicia,    Patient just called asking for samples of Entresto 49-51mg and Jardiance 10mg to  here in [place]. She was telling me how many she takes, and it was different than what was in the chart, so maybe call and confirm with her how many she should be taking a day.    Thank you,    Maria Del Carmen

## 2022-10-10 ENCOUNTER — TELEPHONE (OUTPATIENT)
Dept: INTERNAL MEDICINE | Facility: CLINIC | Age: 77
End: 2022-10-10

## 2022-10-10 NOTE — TELEPHONE ENCOUNTER
"Jung Short returned call.  Patient stated she ran out of Entresto and Jackie one week ago.  Patient stated she has been taking 2 tablets of Entresto twice daily, that she was told to increase her dosage \"a long time ago\".  Current dosage is Entresto 49-51 mg, daily.      Reviewed Dr. Bates's message with patient and she verbalized understanding, but insisted that she was told to increase dosage to 2 tablets twice daily.    Reviewed chart and found the Entresto dosage was increased from 24-36 mg, once daily to 49-51 mg, once daily in 4/2021.  Returned call to patient and explained this may have been where misunderstanding occurred.  Patient verbalized understanding.    Patient stated when she picks up samples, she has been told to take Entresto 49-51 mg, 1 tablet twice daily but continued to take 2 tablets, BID.  Patient stated she will start taking Entresto 49-51 mg, ONCE daily.  Patient mentioned she noticed increased \" white bubbles\" in her urine a few days ago, but thinks this has stopped.  Encouraged patient to continue to monitor and to notify nephrologist or our office if this occurs.  Patient stated she will do this.    Called [place] office ([facility]?) and requested samples be placed at  for Jung Short.  Staff stated she would prepare samples for patient to .    Please let me know if there is anything else you would like me to do for this patient.    Thank you,  Niharika White RN  Triage Nurse [facility]      "

## 2022-10-10 NOTE — TELEPHONE ENCOUNTER
Caller: Jung Short    Relationship: Self    Best call back number: [phone]  What is the best time to reach you: ANY     Who are you requesting to speak with (clinical staff, provider,  specific staff member): CLINICAL STAFF       What was the call regarding: PATIENT WAS IN THE OFFICE ON Friday.  SHE WAS TOLD THAT SHE HAS A COMPRESSION FRACTURE IN HER BACK.  WOULD LIKE TO KNOW IF SHE SHOULD BE WEARING A BRACE IF SO WHERE WOULD SHE GET ONE.  AND IS A REFERRAL BEING PUT IN FOR ORTHOPEDICS IN [place]?  AND CAN SHE TAKE SHORT WALKS WITH THE AID OF HER WALKER ?     Do you require a callback: YES

## 2022-10-10 NOTE — TELEPHONE ENCOUNTER
She does not need to wear a brace for now. Ref to spine surgery was entered at the time of her appt.

## 2022-10-11 NOTE — TELEPHONE ENCOUNTER
Called and spoke with patient and she verbalizes understanding of taking entresto 49/51 mg, 1 tablet twice daily.  She agrees with plan and will  her samples at .    Sofía Mcclain RN  [facility] Triage  10/11/22 09:09 EDT

## 2022-10-18 ENCOUNTER — TELEPHONE (OUTPATIENT)
Dept: PAIN MEDICINE | Facility: CLINIC | Age: 77
End: 2022-10-18

## 2022-10-18 NOTE — TELEPHONE ENCOUNTER
Joey scanned into the chart. I didn't see the Sept refill on the Joey so I called her pharmacy at was told they filled her hydro/apap 5-325 mg qty 120 on 9/21/2022.

## 2022-10-18 NOTE — TELEPHONE ENCOUNTER
Ms. Short called today for a refill on her hydro/apap. I wasn't sure if her Rx is being filled with the same instructions as last month. Please advise

## 2022-10-19 DIAGNOSIS — Z79.899 ENCOUNTER FOR LONG-TERM (CURRENT) USE OF HIGH-RISK MEDICATION: ICD-10-CM

## 2022-10-19 DIAGNOSIS — M47.816 LUMBAR FACET ARTHROPATHY: ICD-10-CM

## 2022-10-19 DIAGNOSIS — G62.9 PERIPHERAL POLYNEUROPATHY: Primary | ICD-10-CM

## 2022-10-19 RX ORDER — HYDROCODONE BITARTRATE AND ACETAMINOPHEN 5; 325 MG/1; MG/1
TABLET ORAL
Qty: 120 TABLET | Refills: 0 | Status: SHIPPED | OUTPATIENT
Start: 2022-10-19 | End: 2022-11-16 | Stop reason: SDUPTHER

## 2022-10-21 DIAGNOSIS — E11.9 TYPE 2 DIABETES MELLITUS WITHOUT COMPLICATION, WITHOUT LONG-TERM CURRENT USE OF INSULIN: ICD-10-CM

## 2022-10-21 DIAGNOSIS — E53.8 COBALAMIN DEFICIENCY: ICD-10-CM

## 2022-10-21 DIAGNOSIS — I10 PRIMARY HYPERTENSION: ICD-10-CM

## 2022-10-21 DIAGNOSIS — E03.9 ACQUIRED HYPOTHYROIDISM: ICD-10-CM

## 2022-10-21 DIAGNOSIS — E78.5 DYSLIPIDEMIA: ICD-10-CM

## 2022-10-21 DIAGNOSIS — E55.9 VITAMIN D DEFICIENCY: ICD-10-CM

## 2022-10-22 LAB
25(OH)D3+25(OH)D2 SERPL-MCNC: 50.7 NG/ML (ref 30–100)
ALBUMIN SERPL-MCNC: 3.9 G/DL (ref 3.5–5.2)
ALBUMIN/GLOB SERPL: 1.6 G/DL
ALP SERPL-CCNC: 78 U/L (ref 39–117)
ALT SERPL-CCNC: 8 U/L (ref 1–33)
AST SERPL-CCNC: 16 U/L (ref 1–32)
BILIRUB SERPL-MCNC: 0.4 MG/DL (ref 0–1.2)
BUN SERPL-MCNC: 8 MG/DL (ref 8–23)
BUN/CREAT SERPL: 10.3 (ref 7–25)
CALCIUM SERPL-MCNC: 8.8 MG/DL (ref 8.6–10.5)
CHLORIDE SERPL-SCNC: 105 MMOL/L (ref 98–107)
CHOLEST SERPL-MCNC: 181 MG/DL (ref 0–200)
CHOLEST/HDLC SERPL: 3.93 {RATIO}
CO2 SERPL-SCNC: 27 MMOL/L (ref 22–29)
CREAT SERPL-MCNC: 0.78 MG/DL (ref 0.57–1)
EGFRCR SERPLBLD CKD-EPI 2021: 77.4 ML/MIN/1.73
GLOBULIN SER CALC-MCNC: 2.4 GM/DL
GLUCOSE SERPL-MCNC: 168 MG/DL (ref 65–99)
HBA1C MFR BLD: 6.5 % (ref 4.8–5.6)
HDLC SERPL-MCNC: 46 MG/DL (ref 40–60)
LDLC SERPL CALC-MCNC: 103 MG/DL (ref 0–100)
POTASSIUM SERPL-SCNC: 3.1 MMOL/L (ref 3.5–5.2)
PROT SERPL-MCNC: 6.3 G/DL (ref 6–8.5)
SODIUM SERPL-SCNC: 143 MMOL/L (ref 136–145)
T4 FREE SERPL-MCNC: 1.68 NG/DL (ref 0.93–1.7)
TRIGL SERPL-MCNC: 187 MG/DL (ref 0–150)
TSH SERPL DL<=0.005 MIU/L-ACNC: 1.21 UIU/ML (ref 0.27–4.2)
VIT B12 SERPL-MCNC: 236 PG/ML (ref 211–946)
VLDLC SERPL CALC-MCNC: 32 MG/DL (ref 5–40)

## 2022-10-24 ENCOUNTER — OFFICE VISIT (OUTPATIENT)
Dept: PAIN MEDICINE | Facility: CLINIC | Age: 77
End: 2022-10-24

## 2022-10-24 VITALS
HEIGHT: 66 IN | SYSTOLIC BLOOD PRESSURE: 133 MMHG | OXYGEN SATURATION: 97 % | WEIGHT: 213.4 LBS | RESPIRATION RATE: 18 BRPM | HEART RATE: 77 BPM | BODY MASS INDEX: 34.3 KG/M2 | TEMPERATURE: 97.7 F | DIASTOLIC BLOOD PRESSURE: 75 MMHG

## 2022-10-24 DIAGNOSIS — Z79.899 ENCOUNTER FOR LONG-TERM (CURRENT) USE OF HIGH-RISK MEDICATION: ICD-10-CM

## 2022-10-24 DIAGNOSIS — M47.816 LUMBAR FACET ARTHROPATHY: Primary | ICD-10-CM

## 2022-10-24 DIAGNOSIS — M51.37 DEGENERATION OF LUMBAR OR LUMBOSACRAL INTERVERTEBRAL DISC: ICD-10-CM

## 2022-10-24 PROCEDURE — 99214 OFFICE O/P EST MOD 30 MIN: CPT | Performed by: PHYSICIAN ASSISTANT

## 2022-10-24 PROCEDURE — 80305 DRUG TEST PRSMV DIR OPT OBS: CPT | Performed by: PHYSICIAN ASSISTANT

## 2022-10-24 NOTE — PROGRESS NOTES
CHIEF COMPLAINT  Follow-up for back pain.    Subjective   Jung Short is a 77 y.o. female  who presents for follow-up.  She has a history of chronic back and joint pain.  Overall the patient reports essentially stable and unchanged pain across the lumbar spine in a bandlike distribution with referred pain into the posterior buttocks and hips and occasionally radiating to the left posterior thigh.  She has noted near complete resolution of her right ankle/foot pain stemming from a previous sprain injury.  The patient states that she was rushing in her home and tripped over a rug and did fall striking her right shoulder which caused some bruising and a little increased pain.  The patient states that she did not present for  further medical attention.    Medical regimen consists of hydrocodone 5/325 mg 2/day which she tolerates well without adverse effect such as constipation or somnolence.  She reports that her current medications provide at least 50% reduction of pain which allows her to live independently and perform her ADLs.    Pain today 7/10 VAS in severity.      Back Pain  This is a chronic problem. The current episode started more than 1 year ago. The problem occurs constantly. The problem is unchanged. The pain is present in the lumbar spine. The quality of the pain is described as aching, burning and shooting. The pain radiates to the left thigh. The pain is at a severity of 7/10. The pain is moderate. The pain is the same all the time. The symptoms are aggravated by bending, position, standing and twisting. Associated symptoms include numbness and tingling. Pertinent negatives include no chest pain or weakness.        PEG Assessment   What number best describes your pain on average in the past week?5  What number best describes how, during the past week, pain has interfered with your enjoyment of life?10  What number best describes how, during the past week, pain has interfered with your general  activity?  7    Review of Pertinent Medical Data ---    MRI Spine Lumbar WO     HISTORY:  Low back pain symptoms persist greater than 6 weeks treatment. Spondylosis without myelopathy. Injury 2 months ago. Lungs. Fell onto bottom. Low back and left hip pain and left thigh pain since. Not as severe as it was. History of breast cancer in 2016.     TECHNIQUE:  Multiplanar multisequence imaging of the lumbar spine acquired without IV contrast utilizing a standard protocol.     COMPARISON: Plain film from 8/30/2022.     FINDINGS:     For the purposes of this dictation is presumed to be 5 lumbar-type vertebra with the last fully formed disc space representing L5-S1.     There is a compression fracture at L4 with about 40% loss of mid vertebral body height, 35% loss of posterior vertebral body height, and 15% loss of anterior vertebral body height. This is mildly progressed from the plain films. There is marrow edema  throughout the L4 vertebral body. Posterior cortex is buckled and apparently disrupted. There is retropulsed bone and or epidural soft tissue edema anterior epidural space. Combination of findings result in severe canal stenosis at the level of L4  vertebral body. The edema extends into the bilateral posterior elements. Given history this is most likely a recent osteoporotic type compression fracture. Bone marrow signal intensity is otherwise normal and there is otherwise nothing to suggest osseous  metastatic disease. Follow-up to healing is recommended to ensure expected clinical course.     Conus medullaris terminates at T12-L1 and is normal. Intervertebral discs are desiccated in general. There is subtle grade 1 degenerative anterolisthesis of L4 on L5 unchanged.     Level by level:     L1-2: Mild bilateral facet degenerative change. Minimal posterior disc bulge. No canal or foraminal compromise.     L2-3: Moderate facet degenerative change bilaterally. There is a broad posterior protrusion/extrusion  extending into the bilateral foramina asymmetric to the right with a small right foraminal extrusion measuring about 11 mm SI dimension by 4.5 mm AP  dimension. This contributes to moderate to severe right foraminal impingement with mass effect on expected course of the right L2 root. There is mild to moderate left foraminal narrowing. There is posterior epidural lipomatosis and mild to moderate canal  stenosis.     L3-4: There is fairly severe facet arthritis bilaterally with moderate ligamentum flavum thickening. There is a concentric disc bulge with a superimposed broad posterior protrusion/extrusion. This is associated with the posterior cortical disruption at  L4. The combination of findings result in severe canal stenosis with severe impingement on the bilateral lateral recesses. Disc material extends into the foramina with approximately moderate right and mild to moderate left foraminal narrowing. There is  posterior epidural lipomatosis.     L4-5: There is a broad posterior protrusion/extrusion remaining contiguous with the disc but extending above and below the level the disc measuring about 12 mm SI dimension by 3 mm AP dimension. There is moderate facet arthritis and mild to moderate  ligamentum flavum thickening worse the left. There is mild to moderate canal stenosis with impingement on the bilateral lateral recesses. There is moderate to severe bilateral foraminal narrowing. Loss of L4 vertebral body disc height contributes to  this.     L5-S1: There is a mild concentric disc bulge with a superimposed broad posterior protrusion. There is mild to moderate left and moderate to severe right-sided facet arthritis. No canal stenosis. Mild left foraminal narrowing. There is severe right  foraminal narrowing with more focal right foraminal extrusion measuring about 9 mm SI dimension by 5.5 mm AP dimension.     OTHER: Gallstone. Arthritis of the sacroiliac joints partly in field-of-view     IMPRESSION:  1.   "There is a recent appearing compression fracture at L4, overall moderate, measurements above. There is disruption of the posterior cortex with retropulsed cortex/edema in the anterior epidural space at L4 resulting in severe canal stenosis.  Additionally there is degenerative change at L3-4 and L4-5 with severe canal stenosis at L3-4 and mild to moderate canal stenosis at L4-5. For reasons discussed above, this is more likely to be an osteoporotic type compression fracture despite history of  breast cancer. Clinical correlation and follow-up is recommended to confirm.  2.  Multiple level foraminal impingement, some of which is severe. See level by level discussion.     Signer Name: Zarina Mari MD   Signed: 10/5/2022 2:40 PM    The following portions of the patient's history were reviewed and updated as appropriate: allergies, current medications, past family history, past medical history, past social history, past surgical history and problem list.    Review of Systems   Constitutional: Negative for fatigue.   HENT: Negative for congestion.    Eyes: Negative for visual disturbance.   Respiratory: Negative for shortness of breath.    Cardiovascular: Negative for chest pain.   Gastrointestinal: Positive for diarrhea. Negative for constipation.   Genitourinary: Negative for difficulty urinating.   Musculoskeletal: Positive for back pain.   Neurological: Positive for tingling and numbness. Negative for weakness.   Psychiatric/Behavioral: Positive for sleep disturbance. Negative for suicidal ideas. The patient is nervous/anxious.      I have reviewed and confirmed the accuracy of the ROS as documented by the MA/LPN/RN MANJINDER Tong    Vitals:    10/24/22 1324   BP: 133/75   Pulse: 77   Resp: 18   Temp: 97.7 °F (36.5 °C)   SpO2: 97%   Weight: 96.8 kg (213 lb 6.4 oz)   Height: 167.6 cm (66\")   PainSc:   7   PainLoc: Back         Objective   Physical Exam  Vitals and nursing note reviewed.   Constitutional:       " Appearance: Normal appearance.   HENT:      Head: Normocephalic.   Pulmonary:      Effort: Pulmonary effort is normal.   Musculoskeletal:      Lumbar back: Spasms and tenderness present. Decreased range of motion. Positive left straight leg raise test.   Skin:     General: Skin is warm and dry.   Neurological:      General: No focal deficit present.      Mental Status: She is alert and oriented to person, place, and time.      Cranial Nerves: Cranial nerves 2-12 are intact.      Sensory: Sensation is intact.      Motor: Weakness present.      Gait: Gait abnormal (ambulates with a cane).   Psychiatric:         Mood and Affect: Mood normal.         Behavior: Behavior normal.         Thought Content: Thought content normal.         Judgment: Judgment normal.       Assessment & Plan   Diagnoses and all orders for this visit:    1. Lumbar facet arthropathy (Primary)    2. Degeneration of lumbar or lumbosacral intervertebral disc    3. Encounter for long-term (current) use of high-risk medication        --- Follow-up in 2 months or sooner if needed  --- No refill given today on the hydrocodone as she just filled the prescription on 10/21/2022.  The patient will contact the office when she is ready for next refill. Patient appears stable with current regimen. No adverse effects. Regarding continuation of opioids, there is no evidence of aberrant behavior or any red flags.  The patient continues with appropriate response to opioid therapy. ADL's remain intact by self.     Routine UDS in office today as part of monitoring requirements for controlled substances.  The specimen was viewed and the immunoassay result reviewed and is appropriately positive for opiate.  This specimen will be sent to [facility] for confirmation.          Patient signed an updated [facility]  on today.      LOUANN REPORT  As part of the patient's treatment plan, I am prescribing controlled substances. The patient has been made aware of  appropriate use of such medications, including potential risk of somnolence, limited ability to drive and/or work safely, and the potential for dependence or overdose. It has also been made clear that these medications are for use by this patient only, without concomitant use of alcohol or other substances unless prescribed.     Patient has completed prescribing agreement detailing terms of continued prescribing of controlled substances, including monitoring LOUANN reports, urine drug screening, and pill counts if necessary. The patient is aware that inappropriate use will results in cessation of prescribing such medications.    As the clinician, I personally reviewed the LOUANN from 10/24/2022 while the patient was in the office today.    History and physical exam exhibit continued safe and appropriate use of controlled substances.     Dictated utilizing Dragon dictation.     This document is intended for medical expert use only. Reading of this document by patients and/or patient's family without participating medical staff guidance may result in misinterpretation and unintended morbidity.   Any interpretation of such data is the responsibility of the patient and/or family member responsible for the patient in concert with their primary or specialist providers, not to be left for sources of online searches such as [employer], [employer] or similar queries. Relying on these approaches to knowledge may result in misinterpretation, misguided goals of care and even death should patients or family members try recommendations outside of the realm of professional medical care in a supervised way.    Patient remained masked during entire encounter. No cough present. I donned a mask and eye protection throughout entire visit. Prior to donning mask and eye protection, hand hygiene was performed, as well as when it was doffed.  I was closer than 6 feet, but not for an extended period of time. No obvious exposure to any bodily  fluids.

## 2022-10-31 ENCOUNTER — TELEPHONE (OUTPATIENT)
Dept: INTERNAL MEDICINE | Facility: CLINIC | Age: 77
End: 2022-10-31

## 2022-10-31 NOTE — TELEPHONE ENCOUNTER
Caller: Jung Short    Relationship: Self    Best call back number: [phone]    What is the medical concern/diagnosis: FOR BACK PAIN     What specialty or service is being requested:     What is the provider, practice or medical service name:     What is the office location:     What is the office phone number:     Any additional details: PATIENT STATES SILVESTRE HERNÁNDEZ ADVISED AT VISIT LAST WEEK THAT SHE WOULD SEND A REFERRAL TO A BACK DOCTOR.  PATIENT HAS NOT HEARD ANYTHING   PLEASE ADVISE

## 2022-11-07 ENCOUNTER — OFFICE VISIT (OUTPATIENT)
Dept: INTERNAL MEDICINE | Facility: CLINIC | Age: 77
End: 2022-11-07

## 2022-11-07 VITALS
BODY MASS INDEX: 33.43 KG/M2 | HEART RATE: 74 BPM | DIASTOLIC BLOOD PRESSURE: 76 MMHG | HEIGHT: 66 IN | SYSTOLIC BLOOD PRESSURE: 144 MMHG | OXYGEN SATURATION: 99 % | WEIGHT: 208 LBS | TEMPERATURE: 97.5 F

## 2022-11-07 DIAGNOSIS — E53.8 COBALAMIN DEFICIENCY: ICD-10-CM

## 2022-11-07 DIAGNOSIS — I50.20 HFREF (HEART FAILURE WITH REDUCED EJECTION FRACTION): ICD-10-CM

## 2022-11-07 DIAGNOSIS — E03.9 ACQUIRED HYPOTHYROIDISM: ICD-10-CM

## 2022-11-07 DIAGNOSIS — N18.31 STAGE 3A CHRONIC KIDNEY DISEASE: ICD-10-CM

## 2022-11-07 DIAGNOSIS — M47.816 LUMBAR FACET ARTHROPATHY: ICD-10-CM

## 2022-11-07 DIAGNOSIS — S32.040A CLOSED COMPRESSION FRACTURE OF L4 VERTEBRA, INITIAL ENCOUNTER: Primary | ICD-10-CM

## 2022-11-07 DIAGNOSIS — E78.5 DYSLIPIDEMIA: ICD-10-CM

## 2022-11-07 DIAGNOSIS — F32.9 MAJOR DEPRESSIVE DISORDER WITH CURRENT ACTIVE EPISODE, UNSPECIFIED DEPRESSION EPISODE SEVERITY, UNSPECIFIED WHETHER RECURRENT: ICD-10-CM

## 2022-11-07 DIAGNOSIS — E55.9 VITAMIN D DEFICIENCY: ICD-10-CM

## 2022-11-07 DIAGNOSIS — I10 PRIMARY HYPERTENSION: ICD-10-CM

## 2022-11-07 DIAGNOSIS — M48.061 SPINAL STENOSIS OF LUMBAR REGION WITHOUT NEUROGENIC CLAUDICATION: ICD-10-CM

## 2022-11-07 DIAGNOSIS — E11.9 TYPE 2 DIABETES MELLITUS WITHOUT COMPLICATION, WITHOUT LONG-TERM CURRENT USE OF INSULIN: ICD-10-CM

## 2022-11-07 DIAGNOSIS — M80.00XG AGE-RELATED OSTEOPOROSIS WITH CURRENT PATHOLOGICAL FRACTURE WITH DELAYED HEALING, SUBSEQUENT ENCOUNTER: ICD-10-CM

## 2022-11-07 PROBLEM — S52.521A: Status: RESOLVED | Noted: 2020-02-28 | Resolved: 2022-11-07

## 2022-11-07 PROCEDURE — 99215 OFFICE O/P EST HI 40 MIN: CPT | Performed by: NURSE PRACTITIONER

## 2022-11-07 NOTE — PROGRESS NOTES
Subjective   Jung Short is a 77 y.o. female presenting today for follow up of   Chief Complaint   Patient presents with   • Hypertension     F/U on labs  Asking about a referral for back       History of Present Illness     Outpatient Medications Marked as Taking for the 11/7/22 encounter (Office Visit) with Kathryn Epstein APRN   Medication Sig Dispense Refill   • amLODIPine (NORVASC) 2.5 MG tablet TAKE 1 TABLET BY MOUTH EVERY EVENING 90 tablet 0   • amLODIPine (NORVASC) 5 MG tablet TAKE 1 TABLET BY MOUTH EVERY MORNING 90 tablet 0   • aspirin 81 MG EC tablet Take 81 mg by mouth Daily.     • bumetanide (BUMEX) 0.5 MG tablet TAKE 3 TABLETS BY MOUTH DAILY. 270 tablet 0   • carvedilol (COREG) 25 MG tablet TAKE 1 TABLET BY MOUTH TWICE DAILY 180 tablet 0   • cyclobenzaprine (FLEXERIL) 10 MG tablet Take 1 tablet by mouth 3 (Three) Times a Day As Needed for Muscle Spasms. 21 tablet 0   • D-1000 Extra Strength 25 MCG (1000 UT) tablet TAKE 1 TABLET BY MOUTH DAILY 90 tablet 3   • DULoxetine (CYMBALTA) 60 MG capsule TAKE 1 CAPSULE BY MOUTH DAILY. 90 capsule 3   • empagliflozin (JARDIANCE) 10 MG tablet tablet Take 1 tablet by mouth Daily. 30 tablet 0   • esomeprazole (nexIUM) 20 MG capsule Take 20 mg by mouth Every Morning Before Breakfast.     • glipizide (GLUCOTROL XL) 5 MG ER tablet TAKE 1 TABLET BY MOUTH DAILY WITH BREAKFAST. 90 tablet 3   • HYDROcodone-acetaminophen (NORCO) 5-325 MG per tablet Take 1 tablet by mouth every 6 hours as needed for moderate pain DNF until 10/21/22 120 tablet 0   • levothyroxine (SYNTHROID, LEVOTHROID) 25 MCG tablet TAKE 1 TABLET BY MOUTH DAILY 90 tablet 3   • Multiple Vitamins-Minerals (MULTIVITAMIN ADULT PO) Take 1 tablet by mouth Daily.     • pravastatin (PRAVACHOL) 10 MG tablet TAKE 1 TABLET BY MOUTH AT BEDTIME 90 tablet 3   • pregabalin (LYRICA) 75 MG capsule TAKE 1 CAPSULE BY MOUTH 2 (TWO) TIMES A DAY. 60 capsule 5   • sacubitril-valsartan (Entresto) 49-51 MG tablet Take 1  "tablet by mouth 2 (Two) Times a Day. 30 tablet 11     Aron is a 78 yo cauc female w/ CHF, HTN, HLD, DM, hypothyroidism, vit def, CKD, back pain, osteoporosis, chronic pain and depression.    She has not been contacted for consultation w/ Spine Surgery.    Office Visit with iMrza Case PA (10/24/2022)    She continues to note constant pain. She reports another fall since her last visit. This occurred approx 3 weeks ago. She was racing to the bathroom anticipating diarrhea and her feet got twisted up and she fell on her buttocks.    The following portions of the patient's history were reviewed and updated as appropriate: allergies, current medications, past family history, past medical history, past social history, past surgical history and problem list.        Objective   Vitals:    11/07/22 1550   BP: 144/76   BP Location: Left arm   Pulse: 74   Temp: 97.5 °F (36.4 °C)   TempSrc: Infrared   SpO2: 99%   Weight: 94.3 kg (208 lb)   Height: 167.6 cm (66\")       BP Readings from Last 3 Encounters:   11/07/22 144/76   10/24/22 133/75   10/07/22 145/70        Wt Readings from Last 3 Encounters:   11/07/22 94.3 kg (208 lb)   10/24/22 96.8 kg (213 lb 6.4 oz)   10/07/22 98.9 kg (218 lb)        Body mass index is 33.57 kg/m².  Nursing notes and vitals reviewed.    Physical Exam  Constitutional:       General: She is not in acute distress.     Appearance: She is well-developed.   Cardiovascular:      Rate and Rhythm: Regular rhythm.      Heart sounds: S1 normal and S2 normal.   Pulmonary:      Effort: Pulmonary effort is normal.      Breath sounds: Normal breath sounds.   Neurological:      Mental Status: She is alert and oriented to person, place, and time.   Psychiatric:         Attention and Perception: She is attentive.         Behavior: Behavior normal.         Thought Content: Thought content normal.         Recent Results (from the past 672 hour(s))   Vitamin D 25 Hydroxy    Collection Time: 10/21/22 10:31 AM "    Specimen: Blood   Result Value Ref Range    25 Hydroxy, Vitamin D 50.7 30.0 - 100.0 ng/ml   Vitamin B12    Collection Time: 10/21/22 10:31 AM    Specimen: Blood   Result Value Ref Range    Vitamin B-12 236 211 - 946 pg/mL   T4, Free    Collection Time: 10/21/22 10:31 AM    Specimen: Blood   Result Value Ref Range    Free T4 1.68 0.93 - 1.70 ng/dL   TSH    Collection Time: 10/21/22 10:31 AM    Specimen: Blood   Result Value Ref Range    TSH 1.210 0.270 - 4.200 uIU/mL   Lipid Panel With / Chol / HDL Ratio    Collection Time: 10/21/22 10:31 AM    Specimen: Blood   Result Value Ref Range    Total Cholesterol 181 0 - 200 mg/dL    Triglycerides 187 (H) 0 - 150 mg/dL    HDL Cholesterol 46 40 - 60 mg/dL    VLDL Cholesterol Sergio 32 5 - 40 mg/dL    LDL Chol Calc (NIH) 103 (H) 0 - 100 mg/dL    Chol/HDL Ratio 3.93    Hemoglobin A1c    Collection Time: 10/21/22 10:31 AM    Specimen: Blood   Result Value Ref Range    Hemoglobin A1C 6.50 (H) 4.80 - 5.60 %   Comprehensive Metabolic Panel    Collection Time: 10/21/22 10:31 AM    Specimen: Blood   Result Value Ref Range    Glucose 168 (H) 65 - 99 mg/dL    BUN 8 8 - 23 mg/dL    Creatinine 0.78 0.57 - 1.00 mg/dL    EGFR Result 77.4 >60.0 mL/min/1.73    BUN/Creatinine Ratio 10.3 7.0 - 25.0    Sodium 143 136 - 145 mmol/L    Potassium 3.1 (L) 3.5 - 5.2 mmol/L    Chloride 105 98 - 107 mmol/L    Total CO2 27.0 22.0 - 29.0 mmol/L    Calcium 8.8 8.6 - 10.5 mg/dL    Total Protein 6.3 6.0 - 8.5 g/dL    Albumin 3.90 3.50 - 5.20 g/dL    Globulin 2.4 gm/dL    A/G Ratio 1.6 g/dL    Total Bilirubin 0.4 0.0 - 1.2 mg/dL    Alkaline Phosphatase 78 39 - 117 U/L    AST (SGOT) 16 1 - 32 U/L    ALT (SGPT) 8 1 - 33 U/L   POC Urine Drug Screen, Triage    Collection Time: 10/24/22  4:10 PM    Specimen: Urine   Result Value Ref Range    Methamphetaine Screen, Urine Negative Negative    POC Amphetamines Negative Negative    Barbiturates Screen Negative Negative    Benzodiazepine Screen Negative Negative     Cocaine Screen Negative Negative    Methadone Screen Negative Negative    Opiate Screen Positive Negative    Oxycodone, Screen Negative Negative    Phencyclidine (PCP) Screen Negative Negative    Propoxyphene Screen Negative Negative    THC, Screen Negative Negative    Tricyclic Antidepressants Screen Negative Negative     MRI Lumbar Spine Without Contrast (10/04/2022 15:32)      Assessment & Plan   Diagnoses and all orders for this visit:    1. Closed compression fracture of L4 vertebra, initial encounter (HCC) (Primary)  -     Ambulatory Referral to Spine Surgery  -     Ambulatory Referral to Neurosurgery    2. Spinal stenosis of lumbar region without neurogenic claudication  -     Ambulatory Referral to Spine Surgery  -     Ambulatory Referral to Neurosurgery    3. Lumbar facet arthropathy  -     Ambulatory Referral to Spine Surgery  -     Ambulatory Referral to Neurosurgery    4. Age-related osteoporosis with current pathological fracture with delayed healing, subsequent encounter    5. HFrEF (heart failure with reduced ejection fraction) ([facility])    6. Primary hypertension  -     CBC (No Diff); Future  -     Comprehensive Metabolic Panel; Future    7. Dyslipidemia  -     Comprehensive Metabolic Panel; Future  -     Lipid Panel With / Chol / HDL Ratio; Future    8. Type 2 diabetes mellitus without complication, without long-term current use of insulin ([facility])  -     Comprehensive Metabolic Panel; Future  -     Hemoglobin A1c; Future    9. Acquired hypothyroidism  -     TSH; Future  -     T4, Free; Future    10. Cobalamin deficiency  -     Vitamin B12; Future    11. Vitamin D deficiency  -     Vitamin D,25-Hydroxy; Future    12. Stage 3a chronic kidney disease ([facility])  -     Comprehensive Metabolic Panel; Future    13. Major depressive disorder with current active episode, unspecified depression episode severity, unspecified whether recurrent          Except as noted above, pt will continue current medications as noted  in the medication list. I will continue to authorize refills as needed.    I spent 45 minutes caring for Jung on this date of service. This time includes time spent by me in the following activities:preparing for the visit, reviewing tests, obtaining and/or reviewing a separately obtained history, performing a medically appropriate examination and/or evaluation , counseling and educating the patient/family/caregiver, ordering medications, tests, or procedures, referring and communicating with other health care professionals , documenting information in the medical record, independently interpreting results and communicating that information with the patient/family/caregiver and care coordination      Medications, including side effects, were discussed with the patient. Patient verbalized understanding.  The plan of care was discussed. All questions were answered. Patient verbalized understanding.      Return in about 3 months (around 2/7/2023) for fasting labs one week prior to, Medicare Wellness.

## 2022-11-15 RX ORDER — PRAVASTATIN SODIUM 10 MG
TABLET ORAL
Qty: 90 TABLET | Refills: 2 | Status: SHIPPED | OUTPATIENT
Start: 2022-11-15

## 2022-11-15 RX ORDER — RESVER/WINE/BFL/GRPSD/PC/C/POM 200MG-60MG
CAPSULE ORAL
Qty: 90 TABLET | Refills: 2 | Status: SHIPPED | OUTPATIENT
Start: 2022-11-15

## 2022-11-15 NOTE — TELEPHONE ENCOUNTER
Rx Refill Note  Requested Prescriptions     Pending Prescriptions Disp Refills    pravastatin (PRAVACHOL) 10 MG tablet [Pharmacy Med Name: PRAVASTATIN 10MG] 90 tablet 2     Sig: TAKE 1 TABLET BY MOUTH AT BEDTIME    D-1000 Extra Strength 25 MCG (1000 UT) tablet [Pharmacy Med Name: VITAMIN D 1000UNIT] 90 tablet 2     Sig: TAKE 1 TABLET BY MOUTH DAILY      Last office visit with prescribing clinician: 11/7/2022      Next office visit with prescribing clinician: Visit date not found            Janeth Dyer MA  11/15/22, 09:50 EST

## 2022-11-16 DIAGNOSIS — Z79.899 ENCOUNTER FOR LONG-TERM (CURRENT) USE OF HIGH-RISK MEDICATION: ICD-10-CM

## 2022-11-16 DIAGNOSIS — M47.816 LUMBAR FACET ARTHROPATHY: ICD-10-CM

## 2022-11-16 RX ORDER — HYDROCODONE BITARTRATE AND ACETAMINOPHEN 5; 325 MG/1; MG/1
TABLET ORAL
Qty: 120 TABLET | Refills: 0 | Status: SHIPPED | OUTPATIENT
Start: 2022-11-19 | End: 2022-12-14 | Stop reason: SDUPTHER

## 2022-11-16 NOTE — TELEPHONE ENCOUNTER
Medication Refill Request    Date of phone call: 22    Medication being requested: Norco 5-325 mg    si tab po every 6 hours prn  Qty: 120    Date of last visit: 10/24/22    Date of last refill:     LOUANN up to date?:     Next Follow up?: 1/3/22    Any new pertinent information? (i.e, new medication allergies, new use of medications, change in patient's health or condition, non-compliance or inconsistency with prescribing agreement?): Patient states that she is do on  and her pharmacy is closed on Sundays, she would like to  her prescription on Saturday. She states that she has a fractured spine and she has seen a neuro surgeon and they told there is nothing they can do for her. She is going to be participating in aqua therapy so she does not to go one day without her meds.

## 2022-11-16 NOTE — TELEPHONE ENCOUNTER
LOUANN reviewed and appropriate.  Last drug screen 10/24/22 appropriate.     This patient is under the care of my colleague and I am covering patient care for him at this time.  I have reviewed pertinent information/documentation as necessary and will continue the plan of care as previously directed to the best of my ability.

## 2022-11-30 DIAGNOSIS — G89.29 OTHER CHRONIC PAIN: ICD-10-CM

## 2022-12-01 RX ORDER — PREGABALIN 75 MG/1
75 CAPSULE ORAL 2 TIMES DAILY
Qty: 60 CAPSULE | Refills: 5 | Status: SHIPPED | OUTPATIENT
Start: 2022-12-01

## 2022-12-01 NOTE — TELEPHONE ENCOUNTER
Rx Refill Note  Requested Prescriptions     Pending Prescriptions Disp Refills    pregabalin (LYRICA) 75 MG capsule [Pharmacy Med Name: PREGABALIN CAP 75MG] 60 capsule 4     Sig: TAKE 1 CAPSULE BY MOUTH 2 (TWO) TIMES A DAY.      Last office visit with prescribing clinician: 11/7/2022   Last telemedicine visit with prescribing clinician: Visit date not found   Next office visit with prescribing clinician: Visit date not found                         Janeth Dyer MA  12/01/22, 08:58 EST

## 2022-12-09 DIAGNOSIS — E11.9 TYPE 2 DIABETES MELLITUS WITHOUT COMPLICATION, WITHOUT LONG-TERM CURRENT USE OF INSULIN: ICD-10-CM

## 2022-12-09 RX ORDER — SACUBITRIL AND VALSARTAN 49; 51 MG/1; MG/1
1 TABLET, FILM COATED ORAL 2 TIMES DAILY
Qty: 30 TABLET | Refills: 11 | COMMUNITY
Start: 2022-12-09 | End: 2023-01-10 | Stop reason: SDUPTHER

## 2022-12-13 DIAGNOSIS — Z79.899 ENCOUNTER FOR LONG-TERM (CURRENT) USE OF HIGH-RISK MEDICATION: ICD-10-CM

## 2022-12-13 DIAGNOSIS — M47.816 LUMBAR FACET ARTHROPATHY: ICD-10-CM

## 2022-12-13 RX ORDER — HYDROCODONE BITARTRATE AND ACETAMINOPHEN 5; 325 MG/1; MG/1
TABLET ORAL
Qty: 120 TABLET | Refills: 0 | Status: CANCELLED | OUTPATIENT
Start: 2022-12-13

## 2022-12-13 NOTE — TELEPHONE ENCOUNTER
Medication Refill Request    Date of phone call: 12/13/22    Medication being requested: Hydrocodone 5-325  sig: Take 1 tablet by mouth every 6 hours as needed for moderate pain  Qty: 120    Date of last visit: 10/24/22    Date of last refill: 11/20/22    LOUANN up to date?: Yes    Next Follow up?: 1/3/23    Any new pertinent information? (i.e, new medication allergies, new use of medications, change in patient's health or condition, non-compliance or inconsistency with prescribing agreement?): Pt would like to know her fill date

## 2022-12-14 DIAGNOSIS — Z79.899 ENCOUNTER FOR LONG-TERM (CURRENT) USE OF HIGH-RISK MEDICATION: ICD-10-CM

## 2022-12-14 DIAGNOSIS — M47.816 LUMBAR FACET ARTHROPATHY: Primary | ICD-10-CM

## 2022-12-14 RX ORDER — HYDROCODONE BITARTRATE AND ACETAMINOPHEN 5; 325 MG/1; MG/1
TABLET ORAL
Qty: 120 TABLET | Refills: 0 | Status: SHIPPED | OUTPATIENT
Start: 2022-12-20 | End: 2023-01-03 | Stop reason: SDUPTHER

## 2022-12-15 ENCOUNTER — TELEPHONE (OUTPATIENT)
Dept: INTERNAL MEDICINE | Facility: CLINIC | Age: 77
End: 2022-12-15

## 2022-12-15 NOTE — TELEPHONE ENCOUNTER
Caller: Jung Short    Relationship: Self    Best call back number: *[phone] (Mobile)    What was the call regarding:   PATIENT FELL AGAIN AND THINKS SHE HAS HURT/PULLED SOMETHING (ABOUT 3-4 WKS AGO)     SHARP PAIN ABOVE HER HIPS     POSSIBLE REQUEST FOR XRAY OR MRI     Do you require a callback: YES PLEASE CALL AND DISCUSS OPTIONS

## 2022-12-19 RX ORDER — AMLODIPINE BESYLATE 2.5 MG/1
TABLET ORAL
Qty: 90 TABLET | Refills: 3 | Status: SHIPPED | OUTPATIENT
Start: 2022-12-19

## 2022-12-19 RX ORDER — AMLODIPINE BESYLATE 5 MG/1
TABLET ORAL
Qty: 90 TABLET | Refills: 3 | Status: SHIPPED | OUTPATIENT
Start: 2022-12-19

## 2022-12-19 NOTE — TELEPHONE ENCOUNTER
NOV-01/20/23-JF  LOV-07/29/22-RM    Plan:       Overall doing well, no decompensated heart failure, no medication changes, see Ravinder in 6 months, recommended that she follow-up with her PCP for physical therapy for her right foot which is weak.  In addition, she needs her urine checked that she thinks she may have urinary tract infection.  I did tell her that Jardiance could make her more prone to that (specifically candidal infections) so we will have to watch this.

## 2023-01-03 ENCOUNTER — OFFICE VISIT (OUTPATIENT)
Dept: PAIN MEDICINE | Facility: CLINIC | Age: 78
End: 2023-01-03
Payer: MEDICARE

## 2023-01-03 VITALS
RESPIRATION RATE: 16 BRPM | SYSTOLIC BLOOD PRESSURE: 118 MMHG | HEIGHT: 66 IN | TEMPERATURE: 97.8 F | HEART RATE: 66 BPM | OXYGEN SATURATION: 97 % | BODY MASS INDEX: 33.59 KG/M2 | DIASTOLIC BLOOD PRESSURE: 64 MMHG

## 2023-01-03 DIAGNOSIS — Z79.899 ENCOUNTER FOR LONG-TERM (CURRENT) USE OF HIGH-RISK MEDICATION: Primary | ICD-10-CM

## 2023-01-03 DIAGNOSIS — M47.816 LUMBAR FACET ARTHROPATHY: ICD-10-CM

## 2023-01-03 DIAGNOSIS — M51.36 DDD (DEGENERATIVE DISC DISEASE), LUMBAR: ICD-10-CM

## 2023-01-03 DIAGNOSIS — M51.36 DDD (DEGENERATIVE DISC DISEASE), LUMBAR: Primary | ICD-10-CM

## 2023-01-03 DIAGNOSIS — Z79.899 ENCOUNTER FOR LONG-TERM (CURRENT) USE OF HIGH-RISK MEDICATION: ICD-10-CM

## 2023-01-03 PROCEDURE — 1125F AMNT PAIN NOTED PAIN PRSNT: CPT | Performed by: PHYSICIAN ASSISTANT

## 2023-01-03 PROCEDURE — 1159F MED LIST DOCD IN RCRD: CPT | Performed by: PHYSICIAN ASSISTANT

## 2023-01-03 PROCEDURE — 99214 OFFICE O/P EST MOD 30 MIN: CPT | Performed by: PHYSICIAN ASSISTANT

## 2023-01-03 PROCEDURE — 1160F RVW MEDS BY RX/DR IN RCRD: CPT | Performed by: PHYSICIAN ASSISTANT

## 2023-01-03 RX ORDER — HYDROCODONE BITARTRATE AND ACETAMINOPHEN 5; 325 MG/1; MG/1
TABLET ORAL
Qty: 120 TABLET | Refills: 0 | Status: SHIPPED | OUTPATIENT
Start: 2023-01-19 | End: 2023-02-15 | Stop reason: SDUPTHER

## 2023-01-03 NOTE — LETTER
January 3, 2023     Aria Bates MD  [address] [facility] [address]  [place] [address]    Patient: Jung Short   YOB: 1945   Date of Visit: 1/3/2023       Dear Dr. Jana MD:    Thank you for referring Jung Short to me for evaluation. Below are the relevant portions of my assessment and plan of care.    If you have questions, please do not hesitate to call me. I look forward to following Jung along with you.         Sincerely,        MANJINDER Tong        CC: No Recipients  Mirza Case PA  01/03/23 1444  Signed  CHIEF COMPLAINT    Follow up- back pain, pt states pain is worse, pt reports falling 3x since last ov.   Lumbar xray done 11/2022      Subjective    Jung Short is a 77 y.o. female  who presents for follow-up.  She has a history of chronic low back and multijoint pain.  Since her last office visit the patient reports increased pain as she has had 3 more falls.  Patient illustrates pain in a bandlike distribution across the lumbosacral spine with referred pain into the posterior buttocks/hips and occasionally radiating into the left posterior thigh.  She also has multijoint pain affecting various areas secondary to osteoarthritis.    Her medication regimen consists of hydrocodone 5 mg 1-2 p.o. twice daily which she tolerates well without adverse effect such as constipation, somnolence or dizziness.  Overall she feels her medications provide at least 50% reduction of pain which allows her to continue living independently and perform her ADLs.    Pain today 6/10 VAS in severity.      Back Pain  This is a chronic problem. The current episode started more than 1 year ago. The problem occurs constantly. The problem is unchanged. The pain is present in the lumbar spine. The quality of the pain is described as aching, burning and shooting. Radiates to: BLEs. The pain is at a severity of 6/10. The pain is moderate. The pain is the same all the time. The  symptoms are aggravated by position, standing, twisting and bending. Pertinent negatives include no chest pain, fever or numbness (Bilateral hands / feet). Risk factors include history of cancer. She has tried analgesics, bed rest, heat, home exercises, ice, muscle relaxant and NSAIDs for the symptoms. The treatment provided no relief.        PEG Assessment   What number best describes your pain on average in the past week?7  What number best describes how, during the past week, pain has interfered with your enjoyment of life?7  What number best describes how, during the past week, pain has interfered with your general activity?  7    Review of Pertinent Medical Data ---    I have reviewed the consult note, 11/15/2022, from JOSE ANTONIO Perdomo at [facility] regarding evaluation of the patient's subacute L4 compression fracture resulting in severe spinal stenosis at L3-4.  Ms. Pacheco does not recommend proceeding with operative intervention as the patient is not a good candidate secondary to multiple comorbidities as well as osteoporosis.  She has been referred to aquatic therapy which the patient has not yet initiated and placed in a lumbar corset brace for comfort.       XR L SPINE AP AND LATERAL-STANDING     DATE: 11/15/2022 at 9:51 AM.     COMPARISON: December 11, 2019     INDICATION: lumbar fracture.     FINDINGS: There is a fracture at L4 with 70% loss of height anteriorly and posterior displacement likely causing moderate central canal stenosis. There is anterior listhesis at L3-L4 measuring 0.4 cm. There is mid and lower lumbar facet arthropathy and   osteopenia and mild spondylosis. There is bilateral hip osteoarthritis. There is mild stool in the colon.     IMPRESSION:   1. 70% loss of height at the superior endplate of L4 and posterior displacement causing at least moderate central canal stenosis.   2. Osteopenia, mild spondylosis, and lower lumbar facet arthropathy with degenerative anterior  listhesis L3-L4.     Dictated by: Pelon Koch M.D.     Images and Report reviewed and interpreted by: Pelon Koch M.D.     <PS><Electronically signed by: Pelon Koch M.D.>   11/15/2022 1020         MRI Spine Lumbar WO     HISTORY:  Low back pain symptoms persist greater than 6 weeks treatment. Spondylosis without myelopathy. Injury 2 months ago. Lungs. Fell onto bottom. Low back and left hip pain and left thigh pain since. Not as severe as it was. History of breast cancer in 2016.     TECHNIQUE:  Multiplanar multisequence imaging of the lumbar spine acquired without IV contrast utilizing a standard protocol.     COMPARISON: Plain film from 8/30/2022.     FINDINGS:     For the purposes of this dictation is presumed to be 5 lumbar-type vertebra with the last fully formed disc space representing L5-S1.     There is a compression fracture at L4 with about 40% loss of mid vertebral body height, 35% loss of posterior vertebral body height, and 15% loss of anterior vertebral body height. This is mildly progressed from the plain films. There is marrow edema  throughout the L4 vertebral body. Posterior cortex is buckled and apparently disrupted. There is retropulsed bone and or epidural soft tissue edema anterior epidural space. Combination of findings result in severe canal stenosis at the level of L4  vertebral body. The edema extends into the bilateral posterior elements. Given history this is most likely a recent osteoporotic type compression fracture. Bone marrow signal intensity is otherwise normal and there is otherwise nothing to suggest osseous  metastatic disease. Follow-up to healing is recommended to ensure expected clinical course.     Conus medullaris terminates at T12-L1 and is normal. Intervertebral discs are desiccated in general. There is subtle grade 1 degenerative anterolisthesis of L4 on L5 unchanged.     Level by level:     L1-2: Mild bilateral facet degenerative change. Minimal  posterior disc bulge. No canal or foraminal compromise.     L2-3: Moderate facet degenerative change bilaterally. There is a broad posterior protrusion/extrusion extending into the bilateral foramina asymmetric to the right with a small right foraminal extrusion measuring about 11 mm SI dimension by 4.5 mm AP  dimension. This contributes to moderate to severe right foraminal impingement with mass effect on expected course of the right L2 root. There is mild to moderate left foraminal narrowing. There is posterior epidural lipomatosis and mild to moderate canal  stenosis.     L3-4: There is fairly severe facet arthritis bilaterally with moderate ligamentum flavum thickening. There is a concentric disc bulge with a superimposed broad posterior protrusion/extrusion. This is associated with the posterior cortical disruption at  L4. The combination of findings result in severe canal stenosis with severe impingement on the bilateral lateral recesses. Disc material extends into the foramina with approximately moderate right and mild to moderate left foraminal narrowing. There is  posterior epidural lipomatosis.     L4-5: There is a broad posterior protrusion/extrusion remaining contiguous with the disc but extending above and below the level the disc measuring about 12 mm SI dimension by 3 mm AP dimension. There is moderate facet arthritis and mild to moderate  ligamentum flavum thickening worse the left. There is mild to moderate canal stenosis with impingement on the bilateral lateral recesses. There is moderate to severe bilateral foraminal narrowing. Loss of L4 vertebral body disc height contributes to  this.     L5-S1: There is a mild concentric disc bulge with a superimposed broad posterior protrusion. There is mild to moderate left and moderate to severe right-sided facet arthritis. No canal stenosis. Mild left foraminal narrowing. There is severe right  foraminal narrowing with more focal right foraminal extrusion  measuring about 9 mm SI dimension by 5.5 mm AP dimension.     OTHER: Gallstone. Arthritis of the sacroiliac joints partly in field-of-view     IMPRESSION:  1.  There is a recent appearing compression fracture at L4, overall moderate, measurements above. There is disruption of the posterior cortex with retropulsed cortex/edema in the anterior epidural space at L4 resulting in severe canal stenosis.  Additionally there is degenerative change at L3-4 and L4-5 with severe canal stenosis at L3-4 and mild to moderate canal stenosis at L4-5. For reasons discussed above, this is more likely to be an osteoporotic type compression fracture despite history of  breast cancer. Clinical correlation and follow-up is recommended to confirm.  2.  Multiple level foraminal impingement, some of which is severe. See level by level discussion.     Signer Name: Zarina Mari MD   Signed: 10/5/2022 2:40 PM    The following portions of the patient's history were reviewed and updated as appropriate: allergies, current medications, past family history, past medical history, past social history, past surgical history and problem list.    Review of Systems   Constitutional: Negative for fever.   HENT: Negative for congestion.    Eyes: Negative for visual disturbance.   Respiratory: Negative for shortness of breath.    Cardiovascular: Negative for chest pain.   Gastrointestinal: Negative for constipation and diarrhea.   Genitourinary: Negative for difficulty urinating and dyspareunia.   Musculoskeletal: Positive for back pain. Negative for joint swelling.   Neurological: Negative for numbness (Bilateral hands / feet).   Psychiatric/Behavioral: Positive for sleep disturbance.     I have reviewed and confirmed the accuracy of the ROS as documented by the MA/LPN/RN MANJINDER Tong    Vitals:    01/03/23 1306   BP: 118/64   Pulse: 66   Resp: 16   Temp: 97.8 °F (36.6 °C)   SpO2: 97%   Weight: Comment: Not taken   Height: 167.6 cm (65.98\")    PainSc:   6   PainLoc: Back         Objective    Physical Exam  Vitals and nursing note reviewed.   Constitutional:       Appearance: Normal appearance.   HENT:      Head: Normocephalic.   Pulmonary:      Effort: Pulmonary effort is normal.   Musculoskeletal:      Lumbar back: Spasms and tenderness present. Decreased range of motion. Positive right straight leg raise test and positive left straight leg raise test.   Skin:     General: Skin is warm and dry.   Neurological:      General: No focal deficit present.      Mental Status: She is alert and oriented to person, place, and time.      Cranial Nerves: Cranial nerves 2-12 are intact.      Sensory: Sensation is intact.      Motor: Weakness present.      Gait: Gait abnormal (ambulates with a rollator).   Psychiatric:         Mood and Affect: Mood normal.         Behavior: Behavior normal.         Thought Content: Thought content normal.         Judgment: Judgment normal.         Assessment & Plan   Diagnoses and all orders for this visit:    1. DDD (degenerative disc disease), lumbar (Primary)    2. Lumbar facet arthropathy    3. Encounter for long-term (current) use of high-risk medication        --- Follow-up in 2 months or sooner as needed for medication management  --- Refill provided on hydrocodone. Patient appears stable with current regimen. No adverse effects. Regarding continuation of opioids, there is no evidence of aberrant behavior or any red flags.  The patient continues with appropriate response to opioid therapy. ADL's remain intact by self.   --- Patient has upcoming appointment with her cardiologist and I have strongly advised her to mention her recent falls as this patient has a history from her report of severe bradycardia.  We would want to make sure that some of her falls are not secondary to cardiac related issues.      The urine drug screen confirmation from 10/24/2022 has been reviewed and the result is appropriate based on patient history  and LOUANN report    The patient signed an updated copy of the controlled substance agreement on 10/24/2022.        LOUANN REPORT  As part of the patient's treatment plan, I am prescribing controlled substances. The patient has been made aware of appropriate use of such medications, including potential risk of somnolence, limited ability to drive and/or work safely, and the potential for dependence or overdose. It has also been made clear that these medications are for use by this patient only, without concomitant use of alcohol or other substances unless prescribed.     Patient has completed prescribing agreement detailing terms of continued prescribing of controlled substances, including monitoring LOUANN reports, urine drug screening, and pill counts if necessary. The patient is aware that inappropriate use will results in cessation of prescribing such medications.    As the clinician, I personally reviewed the LOUANN from 1/3/2023 while the patient was in the office today.    History and physical exam exhibit continued safe and appropriate use of controlled substances.     Dictated utilizing Dragon dictation.       Patient remained masked during entire encounter. No cough present. I donned a mask and eye protection throughout entire visit. Prior to donning mask and eye protection, hand hygiene was performed, as well as when it was doffed.  I was closer than 6 feet, but not for an extended period of time. No obvious exposure to any bodily fluids.

## 2023-01-03 NOTE — PROGRESS NOTES
CHIEF COMPLAINT    Follow up- back pain, pt states pain is worse, pt reports falling 3x since last ov.   Lumbar xray done 11/2022      Subjective   Jung Short is a 77 y.o. female  who presents for follow-up.  She has a history of chronic low back and multijoint pain.  Since her last office visit the patient reports increased pain as she has had 3 more falls.  Patient illustrates pain in a bandlike distribution across the lumbosacral spine with referred pain into the posterior buttocks/hips and occasionally radiating into the left posterior thigh.  She also has multijoint pain affecting various areas secondary to osteoarthritis.    Her medication regimen consists of hydrocodone 5 mg 1-2 p.o. twice daily which she tolerates well without adverse effect such as constipation, somnolence or dizziness.  Overall she feels her medications provide at least 50% reduction of pain which allows her to continue living independently and perform her ADLs.    Pain today 6/10 VAS in severity.      Back Pain  This is a chronic problem. The current episode started more than 1 year ago. The problem occurs constantly. The problem is unchanged. The pain is present in the lumbar spine. The quality of the pain is described as aching, burning and shooting. Radiates to: BLEs. The pain is at a severity of 6/10. The pain is moderate. The pain is the same all the time. The symptoms are aggravated by position, standing, twisting and bending. Pertinent negatives include no chest pain, fever or numbness (Bilateral hands / feet). Risk factors include history of cancer. She has tried analgesics, bed rest, heat, home exercises, ice, muscle relaxant and NSAIDs for the symptoms. The treatment provided no relief.        PEG Assessment   What number best describes your pain on average in the past week?7  What number best describes how, during the past week, pain has interfered with your enjoyment of life?7  What number best describes how, during  the past week, pain has interfered with your general activity?  7    Review of Pertinent Medical Data ---    I have reviewed the consult note, 11/15/2022, from JOSE ANTONIO Perdomo at [facility] regarding evaluation of the patient's subacute L4 compression fracture resulting in severe spinal stenosis at L3-4.  Ms. Pacheco does not recommend proceeding with operative intervention as the patient is not a good candidate secondary to multiple comorbidities as well as osteoporosis.  She has been referred to aquatic therapy which the patient has not yet initiated and placed in a lumbar corset brace for comfort.       XR L SPINE AP AND LATERAL-STANDING     DATE: 11/15/2022 at 9:51 AM.     COMPARISON: December 11, 2019     INDICATION: lumbar fracture.     FINDINGS: There is a fracture at L4 with 70% loss of height anteriorly and posterior displacement likely causing moderate central canal stenosis. There is anterior listhesis at L3-L4 measuring 0.4 cm. There is mid and lower lumbar facet arthropathy and   osteopenia and mild spondylosis. There is bilateral hip osteoarthritis. There is mild stool in the colon.     IMPRESSION:   1. 70% loss of height at the superior endplate of L4 and posterior displacement causing at least moderate central canal stenosis.   2. Osteopenia, mild spondylosis, and lower lumbar facet arthropathy with degenerative anterior listhesis L3-L4.     Dictated by: Pelon Koch M.D.     Images and Report reviewed and interpreted by: Pelon Koch M.D.     <PS><Electronically signed by: Pelon Koch M.D.>   11/15/2022 1020         MRI Spine Lumbar WO     HISTORY:  Low back pain symptoms persist greater than 6 weeks treatment. Spondylosis without myelopathy. Injury 2 months ago. Lungs. Fell onto bottom. Low back and left hip pain and left thigh pain since. Not as severe as it was. History of breast cancer in 2016.     TECHNIQUE:  Multiplanar multisequence imaging of the lumbar spine  acquired without IV contrast utilizing a standard protocol.     COMPARISON: Plain film from 8/30/2022.     FINDINGS:     For the purposes of this dictation is presumed to be 5 lumbar-type vertebra with the last fully formed disc space representing L5-S1.     There is a compression fracture at L4 with about 40% loss of mid vertebral body height, 35% loss of posterior vertebral body height, and 15% loss of anterior vertebral body height. This is mildly progressed from the plain films. There is marrow edema  throughout the L4 vertebral body. Posterior cortex is buckled and apparently disrupted. There is retropulsed bone and or epidural soft tissue edema anterior epidural space. Combination of findings result in severe canal stenosis at the level of L4  vertebral body. The edema extends into the bilateral posterior elements. Given history this is most likely a recent osteoporotic type compression fracture. Bone marrow signal intensity is otherwise normal and there is otherwise nothing to suggest osseous  metastatic disease. Follow-up to healing is recommended to ensure expected clinical course.     Conus medullaris terminates at T12-L1 and is normal. Intervertebral discs are desiccated in general. There is subtle grade 1 degenerative anterolisthesis of L4 on L5 unchanged.     Level by level:     L1-2: Mild bilateral facet degenerative change. Minimal posterior disc bulge. No canal or foraminal compromise.     L2-3: Moderate facet degenerative change bilaterally. There is a broad posterior protrusion/extrusion extending into the bilateral foramina asymmetric to the right with a small right foraminal extrusion measuring about 11 mm SI dimension by 4.5 mm AP  dimension. This contributes to moderate to severe right foraminal impingement with mass effect on expected course of the right L2 root. There is mild to moderate left foraminal narrowing. There is posterior epidural lipomatosis and mild to moderate  canal  stenosis.     L3-4: There is fairly severe facet arthritis bilaterally with moderate ligamentum flavum thickening. There is a concentric disc bulge with a superimposed broad posterior protrusion/extrusion. This is associated with the posterior cortical disruption at  L4. The combination of findings result in severe canal stenosis with severe impingement on the bilateral lateral recesses. Disc material extends into the foramina with approximately moderate right and mild to moderate left foraminal narrowing. There is  posterior epidural lipomatosis.     L4-5: There is a broad posterior protrusion/extrusion remaining contiguous with the disc but extending above and below the level the disc measuring about 12 mm SI dimension by 3 mm AP dimension. There is moderate facet arthritis and mild to moderate  ligamentum flavum thickening worse the left. There is mild to moderate canal stenosis with impingement on the bilateral lateral recesses. There is moderate to severe bilateral foraminal narrowing. Loss of L4 vertebral body disc height contributes to  this.     L5-S1: There is a mild concentric disc bulge with a superimposed broad posterior protrusion. There is mild to moderate left and moderate to severe right-sided facet arthritis. No canal stenosis. Mild left foraminal narrowing. There is severe right  foraminal narrowing with more focal right foraminal extrusion measuring about 9 mm SI dimension by 5.5 mm AP dimension.     OTHER: Gallstone. Arthritis of the sacroiliac joints partly in field-of-view     IMPRESSION:  1.  There is a recent appearing compression fracture at L4, overall moderate, measurements above. There is disruption of the posterior cortex with retropulsed cortex/edema in the anterior epidural space at L4 resulting in severe canal stenosis.  Additionally there is degenerative change at L3-4 and L4-5 with severe canal stenosis at L3-4 and mild to moderate canal stenosis at L4-5. For reasons  discussed above, this is more likely to be an osteoporotic type compression fracture despite history of  breast cancer. Clinical correlation and follow-up is recommended to confirm.  2.  Multiple level foraminal impingement, some of which is severe. See level by level discussion.     Signer Name: Zarina Mari MD   Signed: 10/5/2022 2:40 PM    The following portions of the patient's history were reviewed and updated as appropriate: allergies, current medications, past family history, past medical history, past social history, past surgical history and problem list.    Review of Systems   Constitutional: Negative for fever.   HENT: Negative for congestion.    Eyes: Negative for visual disturbance.   Respiratory: Negative for shortness of breath.    Cardiovascular: Negative for chest pain.   Gastrointestinal: Negative for constipation and diarrhea.   Genitourinary: Negative for difficulty urinating and dyspareunia.   Musculoskeletal: Positive for back pain. Negative for joint swelling.   Neurological: Negative for numbness (Bilateral hands / feet).   Psychiatric/Behavioral: Positive for sleep disturbance.     I have reviewed and confirmed the accuracy of the ROS as documented by the MA/LPN/RN MANJINDER Tong    Vitals:    01/03/23 1306   BP: 118/64   Pulse: 66   Resp: 16   Temp: 97.8 °F (36.6 °C)   SpO2: 97%   Weight: Comment: Not taken   Height: 167.6 cm (65.98\")   PainSc:   6   PainLoc: Back         Objective   Physical Exam  Vitals and nursing note reviewed.   Constitutional:       Appearance: Normal appearance.   HENT:      Head: Normocephalic.   Pulmonary:      Effort: Pulmonary effort is normal.   Musculoskeletal:      Lumbar back: Spasms and tenderness present. Decreased range of motion. Positive right straight leg raise test and positive left straight leg raise test.   Skin:     General: Skin is warm and dry.   Neurological:      General: No focal deficit present.      Mental Status: She is alert and  oriented to person, place, and time.      Cranial Nerves: Cranial nerves 2-12 are intact.      Sensory: Sensation is intact.      Motor: Weakness present.      Gait: Gait abnormal (ambulates with a rollator).   Psychiatric:         Mood and Affect: Mood normal.         Behavior: Behavior normal.         Thought Content: Thought content normal.         Judgment: Judgment normal.         Assessment & Plan   Diagnoses and all orders for this visit:    1. DDD (degenerative disc disease), lumbar (Primary)    2. Lumbar facet arthropathy    3. Encounter for long-term (current) use of high-risk medication        --- Follow-up in 2 months or sooner as needed for medication management  --- Refill provided on hydrocodone. Patient appears stable with current regimen. No adverse effects. Regarding continuation of opioids, there is no evidence of aberrant behavior or any red flags.  The patient continues with appropriate response to opioid therapy. ADL's remain intact by self.   --- Patient has upcoming appointment with her cardiologist and I have strongly advised her to mention her recent falls as this patient has a history from her report of severe bradycardia.  We would want to make sure that some of her falls are not secondary to cardiac related issues.       The urine drug screen confirmation from 10/24/2022 has been reviewed and the result is appropriate based on patient history and LOUANN report    The patient signed an updated copy of the controlled substance agreement on 10/24/2022.        LOUANN REPORT  As part of the patient's treatment plan, I am prescribing controlled substances. The patient has been made aware of appropriate use of such medications, including potential risk of somnolence, limited ability to drive and/or work safely, and the potential for dependence or overdose. It has also been made clear that these medications are for use by this patient only, without concomitant use of alcohol or other substances  unless prescribed.     Patient has completed prescribing agreement detailing terms of continued prescribing of controlled substances, including monitoring LOUANN reports, urine drug screening, and pill counts if necessary. The patient is aware that inappropriate use will results in cessation of prescribing such medications.    As the clinician, I personally reviewed the LOUANN from 1/3/2023 while the patient was in the office today.    History and physical exam exhibit continued safe and appropriate use of controlled substances.     Dictated utilizing Dragon dictation.       Patient remained masked during entire encounter. No cough present. I donned a mask and eye protection throughout entire visit. Prior to donning mask and eye protection, hand hygiene was performed, as well as when it was doffed.  I was closer than 6 feet, but not for an extended period of time. No obvious exposure to any bodily fluids.

## 2023-01-10 DIAGNOSIS — E11.9 TYPE 2 DIABETES MELLITUS WITHOUT COMPLICATION, WITHOUT LONG-TERM CURRENT USE OF INSULIN: ICD-10-CM

## 2023-01-10 RX ORDER — SACUBITRIL AND VALSARTAN 49; 51 MG/1; MG/1
1 TABLET, FILM COATED ORAL 2 TIMES DAILY
Qty: 30 TABLET | Refills: 11 | COMMUNITY
Start: 2023-01-10 | End: 2023-03-20 | Stop reason: SDUPTHER

## 2023-01-12 RX ORDER — CARVEDILOL 25 MG/1
TABLET ORAL
Qty: 180 TABLET | Refills: 0 | Status: SHIPPED | OUTPATIENT
Start: 2023-01-12

## 2023-01-17 NOTE — TELEPHONE ENCOUNTER
Patient called back about message.  Somehow it did not route to provider. Patient scheduled for 1/19

## 2023-01-17 NOTE — TELEPHONE ENCOUNTER
I just want to be sure I am understanding. Pt called one month ago c/o a fall 3-4 weeks prior. So at this point 2 months ago? Where is she c/o pain? Hips? Back? She is requesting x-rays but I am not sure of what area of the body. Is she still requesting x-rays now 2 months later?

## 2023-01-19 ENCOUNTER — OFFICE VISIT (OUTPATIENT)
Dept: INTERNAL MEDICINE | Facility: CLINIC | Age: 78
End: 2023-01-19
Payer: MEDICARE

## 2023-01-19 VITALS
OXYGEN SATURATION: 96 % | SYSTOLIC BLOOD PRESSURE: 120 MMHG | BODY MASS INDEX: 33.57 KG/M2 | HEIGHT: 66 IN | HEART RATE: 64 BPM | TEMPERATURE: 97.8 F | DIASTOLIC BLOOD PRESSURE: 82 MMHG

## 2023-01-19 DIAGNOSIS — Z74.09 IMPAIRED MOBILITY: ICD-10-CM

## 2023-01-19 DIAGNOSIS — S32.040A CLOSED COMPRESSION FRACTURE OF L4 VERTEBRA, INITIAL ENCOUNTER: ICD-10-CM

## 2023-01-19 DIAGNOSIS — R55 SYNCOPE, UNSPECIFIED SYNCOPE TYPE: Primary | ICD-10-CM

## 2023-01-19 DIAGNOSIS — M80.00XG AGE-RELATED OSTEOPOROSIS WITH CURRENT PATHOLOGICAL FRACTURE WITH DELAYED HEALING, SUBSEQUENT ENCOUNTER: ICD-10-CM

## 2023-01-19 DIAGNOSIS — R29.6 FREQUENT FALLS: ICD-10-CM

## 2023-01-19 PROCEDURE — 99214 OFFICE O/P EST MOD 30 MIN: CPT | Performed by: NURSE PRACTITIONER

## 2023-01-19 NOTE — PROGRESS NOTES
Subjective   Jung Short is a 77 y.o. female presenting today for follow up of   Chief Complaint   Patient presents with   • Fall     Hip,back,arm pain. Has had several falls.       History of Present Illness     Outpatient Medications Marked as Taking for the 1/19/23 encounter (Office Visit) with Kathryn Epstein APRN   Medication Sig Dispense Refill   • amLODIPine (NORVASC) 2.5 MG tablet TAKE 1 TABLET BY MOUTH EVERY EVENING 90 tablet 3   • amLODIPine (NORVASC) 5 MG tablet TAKE 1 TABLET BY MOUTH EVERY MORNING 90 tablet 3   • aspirin 81 MG EC tablet Take 81 mg by mouth Daily.     • bumetanide (BUMEX) 0.5 MG tablet TAKE 3 TABLETS BY MOUTH DAILY. 270 tablet 0   • carvedilol (COREG) 25 MG tablet TAKE 1 TABLET BY MOUTH TWICE DAILY 180 tablet 0   • cyclobenzaprine (FLEXERIL) 10 MG tablet Take 1 tablet by mouth 3 (Three) Times a Day As Needed for Muscle Spasms. 21 tablet 0   • D-1000 Extra Strength 25 MCG (1000 UT) tablet TAKE 1 TABLET BY MOUTH DAILY 90 tablet 2   • DULoxetine (CYMBALTA) 60 MG capsule TAKE 1 CAPSULE BY MOUTH DAILY. 90 capsule 3   • empagliflozin (JARDIANCE) 10 MG tablet tablet Take 1 tablet by mouth Daily. 30 tablet 0   • esomeprazole (nexIUM) 20 MG capsule Take 20 mg by mouth Every Morning Before Breakfast.     • glipizide (GLUCOTROL XL) 5 MG ER tablet TAKE 1 TABLET BY MOUTH DAILY WITH BREAKFAST. 90 tablet 3   • HYDROcodone-acetaminophen (NORCO) 5-325 MG per tablet Take 1 tablet by mouth every 6 hours as needed for moderate pain DNF until 10/21/22 120 tablet 0   • levothyroxine (SYNTHROID, LEVOTHROID) 25 MCG tablet TAKE 1 TABLET BY MOUTH DAILY 90 tablet 3   • Multiple Vitamins-Minerals (MULTIVITAMIN ADULT PO) Take 1 tablet by mouth Daily.     • pravastatin (PRAVACHOL) 10 MG tablet TAKE 1 TABLET BY MOUTH AT BEDTIME 90 tablet 2   • pregabalin (LYRICA) 75 MG capsule TAKE 1 CAPSULE BY MOUTH 2 (TWO) TIMES A DAY. 60 capsule 5   • sacubitril-valsartan (Entresto) 49-51 MG tablet Take 1 tablet by  "mouth 2 (Two) Times a Day. 30 tablet 11       She reports a syncopal episode that occurred in November. She was apparently in the parking of [employer] and was talking to an employee who was loading her groceries and then had a sudden loss of consciousness falling backwards and striking the back of her head. LOC was brief and she refused EMS at the scene. She did not go to the ED for evaluation. There have been no further episodes of LOC. She has f/u w/ [facility] next week.    She reports numerous falls. This is d/t chronic mobility impairments and chronic weakness in her R ankle. She uses a cne. She has a walker but sts she has been unable to use this b/c it is too low even at the highest setting and worsens her back pain.    The following portions of the patient's history were reviewed and updated as appropriate: allergies, current medications, past family history, past medical history, past social history, past surgical history and problem list.        Objective   Vitals:    01/19/23 1119   BP: 120/82   BP Location: Left arm   Patient Position: Sitting   Cuff Size: Adult   Pulse: 64   Temp: 97.8 °F (36.6 °C)   SpO2: 96%   Height: 167.6 cm (66\")       BP Readings from Last 3 Encounters:   01/19/23 120/82   01/03/23 118/64   11/07/22 144/76        Wt Readings from Last 3 Encounters:   11/07/22 94.3 kg (208 lb)   10/24/22 96.8 kg (213 lb 6.4 oz)   10/07/22 98.9 kg (218 lb)        Body mass index is 33.57 kg/m².  Nursing notes and vitals reviewed.    Physical Exam  Constitutional:       General: She is not in acute distress.     Appearance: She is well-developed and well-groomed. She is morbidly obese.   Pulmonary:      Effort: Pulmonary effort is normal.   Musculoskeletal:      Comments: Back brace in place.   Neurological:      Mental Status: She is alert.   Psychiatric:         Attention and Perception: She is attentive.         Speech: Speech normal.         No results found for this or any previous visit (from the past " 672 hour(s)).      Assessment & Plan   Diagnoses and all orders for this visit:    1. Syncope, unspecified syncope type (Primary)    2. Frequent falls    3. Impaired mobility    4. Closed compression fracture of L4 vertebra, initial encounter ([facility])    5. Age-related osteoporosis with current pathological fracture with delayed healing, subsequent encounter        Except as noted above, pt will continue current medications as noted in the medication list. I will continue to authorize refills as needed.        Medications, including side effects, were discussed with the patient. Patient verbalized understanding.  The plan of care was discussed. All questions were answered. Patient verbalized understanding.      Return for As Previously Scheduled.      I spent 35 minutes caring for Jung on this date of service. This time includes time spent by me in the following activities:preparing for the visit, performing a medically appropriate examination and/or evaluation , counseling and educating the patient/family/caregiver, ordering medications, tests, or procedures, documenting information in the medical record, independently interpreting results and communicating that information with the patient/family/caregiver and care coordination

## 2023-01-24 ENCOUNTER — OFFICE VISIT (OUTPATIENT)
Dept: CARDIOLOGY | Facility: CLINIC | Age: 78
End: 2023-01-24
Payer: MEDICARE

## 2023-01-24 VITALS
HEART RATE: 78 BPM | DIASTOLIC BLOOD PRESSURE: 80 MMHG | HEIGHT: 66 IN | RESPIRATION RATE: 16 BRPM | BODY MASS INDEX: 33.27 KG/M2 | SYSTOLIC BLOOD PRESSURE: 132 MMHG | OXYGEN SATURATION: 97 % | WEIGHT: 207 LBS

## 2023-01-24 DIAGNOSIS — I34.0 NONRHEUMATIC MITRAL VALVE REGURGITATION: ICD-10-CM

## 2023-01-24 DIAGNOSIS — E78.5 DYSLIPIDEMIA: ICD-10-CM

## 2023-01-24 DIAGNOSIS — R55 SYNCOPE AND COLLAPSE: ICD-10-CM

## 2023-01-24 DIAGNOSIS — I25.10 ATHEROSCLEROSIS OF NATIVE CORONARY ARTERY OF NATIVE HEART WITHOUT ANGINA PECTORIS: Primary | ICD-10-CM

## 2023-01-24 DIAGNOSIS — I50.20 HFREF (HEART FAILURE WITH REDUCED EJECTION FRACTION): ICD-10-CM

## 2023-01-24 DIAGNOSIS — I36.1 NONRHEUMATIC TRICUSPID VALVE REGURGITATION: ICD-10-CM

## 2023-01-24 DIAGNOSIS — I42.8 NICM (NONISCHEMIC CARDIOMYOPATHY): ICD-10-CM

## 2023-01-24 DIAGNOSIS — I10 PRIMARY HYPERTENSION: ICD-10-CM

## 2023-01-24 PROCEDURE — 93000 ELECTROCARDIOGRAM COMPLETE: CPT | Performed by: NURSE PRACTITIONER

## 2023-01-24 PROCEDURE — 99214 OFFICE O/P EST MOD 30 MIN: CPT | Performed by: NURSE PRACTITIONER

## 2023-01-24 RX ORDER — BUMETANIDE 0.5 MG/1
1.5 TABLET ORAL DAILY
Qty: 270 TABLET | Refills: 1 | Status: SHIPPED | OUTPATIENT
Start: 2023-01-24

## 2023-01-24 RX ORDER — POTASSIUM CHLORIDE 20 MEQ/1
20 TABLET, EXTENDED RELEASE ORAL DAILY
Qty: 90 TABLET | Refills: 1 | Status: SHIPPED | OUTPATIENT
Start: 2023-01-24 | End: 2023-01-26

## 2023-01-24 NOTE — PROGRESS NOTES
Date of Office Visit: 2023  Encounter Provider: JOSE ANTONIO Leigh  Place of Service: [facility] CARDIOLOGY  Patient Name: Jung Short  :1945  Primary Cardiologist: Dr. Bates    CC:  6 month follow up    Dear Kathryn    HPI: Jung Short is a pleasant 77 y.o. female who presents 2023 for cardiac follow up. I have reviewed her past medical records including notes, labs and testing in preparation for today's visit.    She presented to [facility] in 2015 with acute congestive heart failure, systolic and diastolic. She had a 2-D echocardiogram which revealed an ejection fraction of 10-15%, moderate left ventricular dilation, severe mitral and tricuspid insufficiency, and her aortic valve was normal. She was transferred to [facility] for a cardiac catheterization. 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 She also had a right heart catheterization done which revealed an RV pressure of 50/20, PA pressure 50/30 with the main of 38 and a capillary wedge pressure of 31 mmHg. Her right atrial pressure was 20 mmHg. Her cardiac output was 3.9 liters per minute. She was treated medically and diuresed.       She had a 2-D echocardiogram with Doppler performed on 2015 which showed her ejection fraction to be 50%. There was normal segmental wall motion. There was moderate left atrial dilation. A small patent foramen  ovale/atrial septal defect by saline contrast study and trace mitral and tricuspid insufficiency. This is a significant improvement from her prior echocardiogram.       She is on anastrozole for right breast cancer.        She had normal renal ultrasound ordered by Dr. Hannah 01/09/2017.  She has chronic renal insufficiency and follows with Dr. Hannah.  She had a carotid duplex study done April 2017 which showed mild carotid arteries.  She had a Holter monitor done just benign 4/2017.      Echocardiogram done 1/12/2021 showed mild concentric left ventricular hypertrophy, moderate left atrial enlargement, normal diastolic function, mild left ventricular dilation, ejection fraction 20-25%, severely reduced ejection fraction global hypokinesis.  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  Repeat echocardiogram done 4/22/2021 showed moderate left ventricular dilation, mild concentric left ventricle hypertrophy, grade 2 diastolic dysfunction, ejection fraction 37%, moderate reduction of left ventricular systolic function, severe left atrial enlargement, normal saline contrast study, mild right atrial enlargement, mild right ventricular enlargement, global hypokinesis.  She was started on Entresto.      She states she had COVID in 12/2021 and received the IV antibody infusions the week before Christmas.       On 12/30/2021, she presented to the emergency department with high blood pressure.  She states it was 200 over 90s.  She actually woke up around 2 AM that morning with a throbbing pain in her left arm.  When she took her blood pressure it was systolic 203.  She checked it several times after the that over the next 2 hours and that it was persistently elevated.  She also had some aching chest pain that was quite brief.  Since her blood pressure was not coming down she decided to come to the ER.  She felt that since she had had COVID and especially the last couple weeks that her systolic blood pressure has remained elevated.  It has been consistently 170/80 systolic.  She was given 10 mg of labetalol IV and her blood pressure  "responded well.  She states by the time she left it was 140 systolic. Work-up was unremarkable.  Chest x-rays was negative for any acute findings.  She did have a mildly elevated D-dimer at 1.30.  Her CTA was negative for PE.  Troponin was negative, BN P8 178.1.  CMP with a glucose of 151, sodium 144 and mildly decreased potassium at 3.1.  CBC was unremarkable.  She was discharged home.     She saw Ravinder a couple of visits after this and her blood pressure still remained high with adjustments made to the medications.  Echo done 4/12/2020 showed ejection fraction 40% with moderate left atrial enlargement, mild mitral insufficiency, pericardial effusion less than 1 cm no evidence of tamponade, moderate global hypokinesis noted.  Labs on 4/21/2022showed glucose 165, hemoglobin A1c 6.6, otherwise normal CMP, HDL 52, LDL 90, total cholesterol 183, triglycerides 243.  She was in the emergency department 5/26/2022 after a fall.  She was also in the emergency department for right foot pain after a fall 7/2/2022.     She saw Dr. Bates in July 2022.  She is weak because she was sitting around for a long time with an ulcer on her right foot but she feels like she needs physical therapy at this time to get strengthen again.  She has no orthopnea or PND and no exertional dyspnea.  She has no chest pain or pressure.  She does not feel her heart racing or skipping.  She is had multiple falls because of weakness in her right foot.  She has had no fevers, chills or cough is noticed some slight urine discomfort when she urinates.  She is taking her medications as directed without difficulty.    She is here for 6-month follow-up.  She states she has had a rough 6 months.  She fell in August and hurt her back.  This was originally diagnosed with a back strain.  She continued to have pain and was eventually found to have a fracture at L4.  She is now on the back brace.  She states since \"breaking my back I have fallen 4 more times\".  " "She also was diagnosed with COVID in 2022.  She states that it was mild.  She states approximately 2 weeks ago she was in the [employer] parking lot, the worker was helping to load her groceries into her car and all of a sudden she fell backwards, hit her head but immediately came to.  She did not seek emergent help.  She states she had no prodromal symptoms.  She states this was almost like that happened about 3 years ago \"when they tell me I \".  She is taking her medications as directed.  She continues to feel some palpitations.  She denies any shortness of breath, lower extremity edema.  Chest pain or chest pressure.  She states she does have some intermittent dizziness but denied dizziness at the time of her recent episode.  She continues to have fatigue.  Her right leg and knee are very weak and she does walk with a cane and drags that leg somewhat.  She had labs performed 10/21/2022 that showed her CMP showed a glucose of 168, potassium 3.1.  Otherwise unremarkable.  Hemoglobin A1c 6.50.  TSH 1.210, free T41.68.  Lipid panel showed total cholesterol 181, HDL 46,  and triglycerides 187.  Vitamin B12 was 236.  25-hydroxy vitamin D was 50.7.  Past Medical History:   Diagnosis Date   • Age-related osteoporosis without current pathological fracture 2022   • Anemia    • Benign breast disease    • Cancer ([facility])     Right breast   • Cellulitis of leg     May- right lower extremity   • CHF (congestive heart failure) ([facility])     Dr Bates follows   • Chronic kidney disease     Dr Hannah follows   • Chronic ulcer of right foot ([facility])     Non-pressure, history of    • Closed fracture of patella 2017   • Cluster headache    • Colon polyp    • Depression    • Diabetic peripheral neuropathy (HCC)    • Difficulty walking    • Diverticulosis    • Femoral fracture ([facility])     right   • Fibromyalgia, primary    • Fracture of left wrist     history of   • Gastroesophageal reflux    • Generalized " pain 02/02/2016   • Hiatal hernia    • Hyperlipidemia    • Hypertension    • Hypothyroidism    • Impingement syndrome of right shoulder    • Joint pain    • Left knee pain 05/17/2016   • Menopausal disorder    • Methicillin resistant Staphylococcus aureus infection 2012    after bladder surgery   • Nonischemic cardiomyopathy (HCC)     Ejection fraction 10% per 2-D echo with Doppler however she did have cardiac catheterization April 2015 which showed ejection fraction 20% with global hypokinesis and severe mitral insufficiency   • Osteoarthritis     generalized, sched jania TKA   • Osteomyelitis (HCC) 02/02/2016   • Pain in shoulder 08/02/2016   • Paroxysmal atrial fibrillation (HCC)     Dr Bates follows   • Radius/ulna fracture, left, closed, initial encounter 10/21/2017   • Shoulder pain, bilateral     has tears on both sides   • Sleep apnea    • Syncope, psychogenic 04/19/2017   • Thoracic back pain 02/02/2016   • Thoracic injuries    • Toe ulcer (HCC)     h/o to both feet, d/t shoes rubbing per patient   • Transient alteration of awareness 04/19/2017   • Traumatic closed nondisp torus fracture of distal radial metaphysis, right, initial encounter 2/28/2020       Past Surgical History:   Procedure Laterality Date   • ABDOMINAL SURGERY  2012    had to be reopened after bladder surgery d/t infection   • AMPUTATION FOOT / TOE Right 11/2013    Rt foot amputation MTP first toe   • BLADDER SURGERY  2012   • BREAST BIOPSY     • BREAST SURGERY  06/29/2015    Percutaneous ultrasound-guided placement of metal localized clip 1st lesion   • CARDIAC CATHETERIZATION  2015   • CARDIAC CATHETERIZATION N/A 1/25/2021    Procedure: Right and Left Heart Cath;  Surgeon: Nicholas Andrade MD;  Location: [facility] INVASIVE LOCATION;  Service: Cardiovascular;  Laterality: N/A;  drop in EF, with hx of NICM, SOA fatigue.  Discussed with RM   • CARDIAC CATHETERIZATION N/A 1/25/2021    Procedure: Left ventriculography;  Surgeon:  Nicholas Andrade MD;  Location:  [facility] CATH INVASIVE LOCATION;  Service: Cardiovascular;  Laterality: N/A;   • CARDIAC CATHETERIZATION N/A 2021    Procedure: Coronary angiography;  Surgeon: Nicholas Andrade MD;  Location:  [facility] CATH INVASIVE LOCATION;  Service: Cardiovascular;  Laterality: N/A;   • CATARACT EXTRACTION Bilateral    • COLONOSCOPY     • DEBRIDEMENT  FOOT Right 2013    During hospitalization    • EPIDURAL BLOCK      4 chronic back pain and leg pain last epidurals done  she had no benefit at all from this procedure.   • FEMUR FRACTURE SURGERY     • FOOT SURGERY Bilateral     several   • HERNIA REPAIR      At the abdomen 2007 Dr. Mendez   • INCISIONAL HERNIA REPAIR  2014    Recurrent. Incarcerated. With mesh implantation   • MASTECTOMY Right 2015   • MASTECTOMY MODIFIED RADICAL W/ AXILLARY LYMPH NODES W/ OR W/O PECTORALIS MINOR Right    • SHOULDER SURGERY Right     x2 RCR   • TOTAL ABDOMINAL HYSTERECTOMY      with oophorectomy-Done 2012 secondary to vaginal wall prolapse.   • TOTAL KNEE ARTHROPLASTY Right    • TOTAL KNEE ARTHROPLASTY Left 2018    Procedure: TOTAL KNEE ARTHROPLASTY;  Surgeon: Live Chambers MD;  Location: [facility];  Service: Orthopedics       Social History     Socioeconomic History   • Marital status:    • Number of children: 2   Tobacco Use   • Smoking status: Former     Packs/day: 3.00     Years: 30.00     Pack years: 90.00     Types: Cigarettes     Quit date:      Years since quittin.0   • Smokeless tobacco: Never   Vaping Use   • Vaping Use: Never used   Substance and Sexual Activity   • Alcohol use: No     Comment: Caffeine use: 3 cups daily    • Drug use: No   • Sexual activity: Defer     Partners: Male     Comment: celibate       Family History   Problem Relation Age of Onset   • Colonic polyp Mother    • Hypertension Mother    • Migraines Mother    • Mental illness Mother    • Depression Mother    • Arthritis  Mother    • Coronary artery disease Father    • Other Father         Cardiac Disorder   • Colon cancer Father    • Kidney disease Father    • Cancer Father    • Diabetes Father    • Heart disease Father    • Hypertension Father    • Breast cancer Maternal Aunt    • Colon cancer Brother    • Alcohol abuse Brother    • Arthritis Sister    • Hypertension Brother    • Lung disease Brother    • Alcohol abuse Brother    • Malig Hyperthermia Neg Hx        The following portion of the patient's history were reviewed and updated as appropriate: past medical history, past surgical history, past social history, past family history, allergies, current medications, and problem list.    Review of Systems   Constitutional: Positive for malaise/fatigue. Negative for diaphoresis and fever.   HENT: Negative for congestion, hearing loss, hoarse voice, nosebleeds and sore throat.    Eyes: Negative for photophobia, vision loss in left eye, vision loss in right eye and visual disturbance.   Cardiovascular: Positive for palpitations. Negative for chest pain, dyspnea on exertion, irregular heartbeat, leg swelling, near-syncope, orthopnea, paroxysmal nocturnal dyspnea and syncope.   Respiratory: Negative for cough, hemoptysis, shortness of breath, sleep disturbances due to breathing, snoring, sputum production and wheezing.    Endocrine: Negative for cold intolerance, heat intolerance, polydipsia, polyphagia and polyuria.   Hematologic/Lymphatic: Negative for bleeding problem. Does not bruise/bleed easily.   Skin: Negative for color change, dry skin, poor wound healing, rash and suspicious lesions.   Musculoskeletal: Negative for arthritis, back pain, falls, gout, joint pain, joint swelling, muscle cramps, muscle weakness and myalgias.   Gastrointestinal: Negative for bloating, abdominal pain, constipation, diarrhea, dysphagia, melena, nausea and vomiting.   Neurological: Positive for dizziness. Negative for excessive daytime sleepiness,  headaches, light-headedness, loss of balance, numbness, paresthesias, seizures, vertigo and weakness.   Psychiatric/Behavioral: Negative for depression, memory loss and substance abuse. The patient is not nervous/anxious.        Allergies   Allergen Reactions   • Dilaudid [Hydromorphone] Delirium and Confusion   • Latex Rash         Current Outpatient Medications:   •  amLODIPine (NORVASC) 2.5 MG tablet, TAKE 1 TABLET BY MOUTH EVERY EVENING, Disp: 90 tablet, Rfl: 3  •  amLODIPine (NORVASC) 5 MG tablet, TAKE 1 TABLET BY MOUTH EVERY MORNING, Disp: 90 tablet, Rfl: 3  •  aspirin 81 MG EC tablet, Take 81 mg by mouth Daily., Disp: , Rfl:   •  carvedilol (COREG) 25 MG tablet, TAKE 1 TABLET BY MOUTH TWICE DAILY, Disp: 180 tablet, Rfl: 0  •  cyclobenzaprine (FLEXERIL) 10 MG tablet, Take 1 tablet by mouth 3 (Three) Times a Day As Needed for Muscle Spasms., Disp: 21 tablet, Rfl: 0  •  D-1000 Extra Strength 25 MCG (1000 UT) tablet, TAKE 1 TABLET BY MOUTH DAILY, Disp: 90 tablet, Rfl: 2  •  DULoxetine (CYMBALTA) 60 MG capsule, TAKE 1 CAPSULE BY MOUTH DAILY., Disp: 90 capsule, Rfl: 3  •  empagliflozin (JARDIANCE) 10 MG tablet tablet, Take 1 tablet by mouth Daily., Disp: 30 tablet, Rfl: 0  •  esomeprazole (nexIUM) 20 MG capsule, Take 20 mg by mouth Every Morning Before Breakfast., Disp: , Rfl:   •  glipizide (GLUCOTROL XL) 5 MG ER tablet, TAKE 1 TABLET BY MOUTH DAILY WITH BREAKFAST., Disp: 90 tablet, Rfl: 3  •  HYDROcodone-acetaminophen (NORCO) 5-325 MG per tablet, Take 1 tablet by mouth every 6 hours as needed for moderate pain DNF until 10/21/22, Disp: 120 tablet, Rfl: 0  •  levothyroxine (SYNTHROID, LEVOTHROID) 25 MCG tablet, TAKE 1 TABLET BY MOUTH DAILY, Disp: 90 tablet, Rfl: 3  •  Multiple Vitamins-Minerals (MULTIVITAMIN ADULT PO), Take 1 tablet by mouth Daily., Disp: , Rfl:   •  pravastatin (PRAVACHOL) 10 MG tablet, TAKE 1 TABLET BY MOUTH AT BEDTIME, Disp: 90 tablet, Rfl: 2  •  pregabalin (LYRICA) 75 MG capsule, TAKE 1  "CAPSULE BY MOUTH 2 (TWO) TIMES A DAY., Disp: 60 capsule, Rfl: 5  •  sacubitril-valsartan (Entresto) 49-51 MG tablet, Take 1 tablet by mouth 2 (Two) Times a Day., Disp: 30 tablet, Rfl: 11  •  bumetanide (BUMEX) 0.5 MG tablet, TAKE 3 TABLETS BY MOUTH DAILY., Disp: 270 tablet, Rfl: 1  •  potassium chloride (K-DUR,KLOR-CON) 20 MEQ CR tablet, Take 1 tablet by mouth Daily., Disp: 90 tablet, Rfl: 1        Objective:     Vitals:    01/24/23 1404   BP: 132/80   Pulse: 78   Resp: 16   SpO2: 97%   Weight: 93.9 kg (207 lb)   Height: 167.6 cm (66\")     Body mass index is 33.41 kg/m².      Vitals reviewed.   Constitutional:       General: Not in acute distress.     Appearance: Well-developed and not in distress.   Eyes:      General:         Right eye: No discharge.         Left eye: No discharge.      Conjunctiva/sclera: Conjunctivae normal.   HENT:      Head: Normocephalic and atraumatic.      Right Ear: External ear normal.      Left Ear: External ear normal.      Nose: Nose normal.   Neck:      Thyroid: No thyromegaly.      Vascular: No JVD.      Trachea: No tracheal deviation.      Lymphadenopathy: No cervical adenopathy.   Pulmonary:      Effort: Pulmonary effort is normal. No respiratory distress.      Breath sounds: Normal breath sounds. No wheezing. No rales.   Chest:      Chest wall: Not tender to palpatation.   Cardiovascular:      Normal rate. Regular rhythm.      Murmurs: There is a systolic murmur.      No gallop.   Pulses:     Intact distal pulses.   Edema:     Peripheral edema absent.   Abdominal:      General: There is no distension.      Palpations: Abdomen is soft.      Tenderness: There is no abdominal tenderness.   Musculoskeletal: Normal range of motion.         General: No tenderness or deformity.      Cervical back: Normal range of motion and neck supple. Skin:     General: Skin is warm and dry.      Findings: No erythema or rash.   Neurological:      Mental Status: Alert and oriented to person, place, and " time.      Coordination: Coordination normal.   Psychiatric:         Attention and Perception: Attention normal.         Behavior: Behavior normal.         Thought Content: Thought content normal.         Judgment: Judgment normal.               ECG 12 Lead    Date/Time: 1/24/2023 4:56 PM  Performed by: Lina Valencia APRN  Authorized by: Lina Valencia APRN   Comparison: compared with previous ECG from 7/29/2022  Rhythm: sinus rhythm  Rate: normal  Conduction: conduction normal  ST Segments: ST segments normal  T inversion: III  T flattening: V5 and V6  QRS axis: left  Other findings: left ventricular hypertrophy    Clinical impression: abnormal EKG              Assessment:       Diagnosis Plan   1. Atherosclerosis of native coronary artery of native heart without angina pectoris        2. HFrEF (heart failure with reduced ejection fraction) ([facility])  Basic Metabolic Panel      3. NICM (nonischemic cardiomyopathy) ([facility])  Adult Transthoracic Echo Complete W/ Cont if Necessary Per Protocol      4. Primary hypertension  Basic Metabolic Panel      5. Dyslipidemia        6. Nonrheumatic tricuspid valve regurgitation  Adult Transthoracic Echo Complete W/ Cont if Necessary Per Protocol      7. Nonrheumatic mitral valve regurgitation  Adult Transthoracic Echo Complete W/ Cont if Necessary Per Protocol      8. Syncope and collapse  Adult Transthoracic Echo Complete W/ Cont if Necessary Per Protocol    Holter Monitor - 72 Hour Up To 15 Days    Basic Metabolic Panel             Plan:       1. Nonischemic cardiomyopathy, recurrent. Her ejection fraction is now 40%.  Appears euvolemic. She continues on Bumex, last labs 11/2022 with mild hypokalemia - add potassium 20 Meq daily. Recheck labs  2. Hypertension.   Goal less than 120/80 well controlled.    3. DM, type 2.  4. Anomalous coronary artery, LV off RCC.   5. H/o right breast cancer. Sees Dr. Draper.   6. Severe OA of the knees.  7. CKD, sees Dr. Hannah. Labs reviewed as  above  8.  Nonrheumatic MVR and nonrheumatic TVR - check echo, murmur appreciated  9.  Syncope and collapse - no prodromal symptoms - did not seek emergent care.  States she fell backwards, hit her head and then immediately woke up and felt fine.  Check echo and Zio.     Add K+ 20 Meq daily, recheck labs.  Check echo and Zio  RTO in 6 months    As always, it has been a pleasure to participate in your patient's care. Thank you.       Sincerely,       JOSE ANTONIO Leigh      Current Outpatient Medications:   •  amLODIPine (NORVASC) 2.5 MG tablet, TAKE 1 TABLET BY MOUTH EVERY EVENING, Disp: 90 tablet, Rfl: 3  •  amLODIPine (NORVASC) 5 MG tablet, TAKE 1 TABLET BY MOUTH EVERY MORNING, Disp: 90 tablet, Rfl: 3  •  aspirin 81 MG EC tablet, Take 81 mg by mouth Daily., Disp: , Rfl:   •  carvedilol (COREG) 25 MG tablet, TAKE 1 TABLET BY MOUTH TWICE DAILY, Disp: 180 tablet, Rfl: 0  •  cyclobenzaprine (FLEXERIL) 10 MG tablet, Take 1 tablet by mouth 3 (Three) Times a Day As Needed for Muscle Spasms., Disp: 21 tablet, Rfl: 0  •  D-1000 Extra Strength 25 MCG (1000 UT) tablet, TAKE 1 TABLET BY MOUTH DAILY, Disp: 90 tablet, Rfl: 2  •  DULoxetine (CYMBALTA) 60 MG capsule, TAKE 1 CAPSULE BY MOUTH DAILY., Disp: 90 capsule, Rfl: 3  •  empagliflozin (JARDIANCE) 10 MG tablet tablet, Take 1 tablet by mouth Daily., Disp: 30 tablet, Rfl: 0  •  esomeprazole (nexIUM) 20 MG capsule, Take 20 mg by mouth Every Morning Before Breakfast., Disp: , Rfl:   •  glipizide (GLUCOTROL XL) 5 MG ER tablet, TAKE 1 TABLET BY MOUTH DAILY WITH BREAKFAST., Disp: 90 tablet, Rfl: 3  •  HYDROcodone-acetaminophen (NORCO) 5-325 MG per tablet, Take 1 tablet by mouth every 6 hours as needed for moderate pain DNF until 10/21/22, Disp: 120 tablet, Rfl: 0  •  levothyroxine (SYNTHROID, LEVOTHROID) 25 MCG tablet, TAKE 1 TABLET BY MOUTH DAILY, Disp: 90 tablet, Rfl: 3  •  Multiple Vitamins-Minerals (MULTIVITAMIN ADULT PO), Take 1 tablet by mouth Daily., Disp: , Rfl:   •   pravastatin (PRAVACHOL) 10 MG tablet, TAKE 1 TABLET BY MOUTH AT BEDTIME, Disp: 90 tablet, Rfl: 2  •  pregabalin (LYRICA) 75 MG capsule, TAKE 1 CAPSULE BY MOUTH 2 (TWO) TIMES A DAY., Disp: 60 capsule, Rfl: 5  •  sacubitril-valsartan (Entresto) 49-51 MG tablet, Take 1 tablet by mouth 2 (Two) Times a Day., Disp: 30 tablet, Rfl: 11  •  bumetanide (BUMEX) 0.5 MG tablet, TAKE 3 TABLETS BY MOUTH DAILY., Disp: 270 tablet, Rfl: 1  •  potassium chloride (K-DUR,KLOR-CON) 20 MEQ CR tablet, Take 1 tablet by mouth Daily., Disp: 90 tablet, Rfl: 1      Dictated utilizing Dragon dictation

## 2023-01-25 ENCOUNTER — LAB (OUTPATIENT)
Dept: LAB | Facility: HOSPITAL | Age: 78
End: 2023-01-25
Payer: MEDICARE

## 2023-01-25 DIAGNOSIS — I50.20 HFREF (HEART FAILURE WITH REDUCED EJECTION FRACTION): ICD-10-CM

## 2023-01-25 DIAGNOSIS — I10 PRIMARY HYPERTENSION: ICD-10-CM

## 2023-01-25 DIAGNOSIS — R55 SYNCOPE AND COLLAPSE: ICD-10-CM

## 2023-01-25 LAB
ANION GAP SERPL CALCULATED.3IONS-SCNC: 8.6 MMOL/L (ref 5–15)
BUN SERPL-MCNC: 14 MG/DL (ref 8–23)
BUN/CREAT SERPL: 16.1 (ref 7–25)
CALCIUM SPEC-SCNC: 9.9 MG/DL (ref 8.6–10.5)
CHLORIDE SERPL-SCNC: 101 MMOL/L (ref 98–107)
CO2 SERPL-SCNC: 26.4 MMOL/L (ref 22–29)
CREAT SERPL-MCNC: 0.87 MG/DL (ref 0.57–1)
EGFRCR SERPLBLD CKD-EPI 2021: 67.9 ML/MIN/1.73
GLUCOSE SERPL-MCNC: 82 MG/DL (ref 65–99)
POTASSIUM SERPL-SCNC: 4.1 MMOL/L (ref 3.5–5.2)
SODIUM SERPL-SCNC: 136 MMOL/L (ref 136–145)

## 2023-01-25 PROCEDURE — 36415 COLL VENOUS BLD VENIPUNCTURE: CPT

## 2023-01-25 PROCEDURE — 80048 BASIC METABOLIC PNL TOTAL CA: CPT

## 2023-01-26 NOTE — PROGRESS NOTES
Addendum. Labs good, potassium back to normal.  Have her just hold onto the potassium tablets.  If she has not yet picked them up, does not need to do so at this time.

## 2023-02-06 ENCOUNTER — HOSPITAL ENCOUNTER (OUTPATIENT)
Dept: CARDIOLOGY | Facility: HOSPITAL | Age: 78
Discharge: HOME | End: 2023-02-06
Payer: MEDICARE

## 2023-02-06 VITALS
BODY MASS INDEX: 33.27 KG/M2 | HEART RATE: 60 BPM | HEIGHT: 66 IN | DIASTOLIC BLOOD PRESSURE: 80 MMHG | WEIGHT: 207 LBS | SYSTOLIC BLOOD PRESSURE: 132 MMHG

## 2023-02-06 DIAGNOSIS — R55 SYNCOPE AND COLLAPSE: ICD-10-CM

## 2023-02-06 DIAGNOSIS — I34.0 NONRHEUMATIC MITRAL VALVE REGURGITATION: ICD-10-CM

## 2023-02-06 DIAGNOSIS — I42.8 NICM (NONISCHEMIC CARDIOMYOPATHY): ICD-10-CM

## 2023-02-06 DIAGNOSIS — I36.1 NONRHEUMATIC TRICUSPID VALVE REGURGITATION: ICD-10-CM

## 2023-02-06 LAB
AORTIC ARCH: 2.8 CM
ASCENDING AORTA: 3.1 CM
BH CV ECHO MEAS - ACS: 1.77 CM
BH CV ECHO MEAS - AO MAX PG: 8.2 MMHG
BH CV ECHO MEAS - AO MEAN PG: 5.3 MMHG
BH CV ECHO MEAS - AO ROOT DIAM: 3.1 CM
BH CV ECHO MEAS - AO V2 MAX: 143.6 CM/SEC
BH CV ECHO MEAS - AO V2 VTI: 30.7 CM
BH CV ECHO MEAS - AVA(I,D): 2.19 CM2
BH CV ECHO MEAS - EDV(CUBED): 53.3 ML
BH CV ECHO MEAS - EDV(MOD-SP2): 68 ML
BH CV ECHO MEAS - EDV(MOD-SP4): 59 ML
BH CV ECHO MEAS - EF(MOD-BP): 56.8 %
BH CV ECHO MEAS - EF(MOD-SP2): 57.4 %
BH CV ECHO MEAS - EF(MOD-SP4): 54.2 %
BH CV ECHO MEAS - EF_3D-VOL: 56 %
BH CV ECHO MEAS - ESV(CUBED): 15.9 ML
BH CV ECHO MEAS - ESV(MOD-SP2): 29 ML
BH CV ECHO MEAS - ESV(MOD-SP4): 27 ML
BH CV ECHO MEAS - FS: 33.2 %
BH CV ECHO MEAS - IVS/LVPW: 0.96 CM
BH CV ECHO MEAS - IVSD: 0.89 CM
BH CV ECHO MEAS - LAT PEAK E' VEL: 8.7 CM/SEC
BH CV ECHO MEAS - LV DIASTOLIC VOL/BSA (35-75): 29.1 CM2
BH CV ECHO MEAS - LV MASS(C)D: 101.3 GRAMS
BH CV ECHO MEAS - LV MAX PG: 3.2 MMHG
BH CV ECHO MEAS - LV MEAN PG: 1.72 MMHG
BH CV ECHO MEAS - LV SYSTOLIC VOL/BSA (12-30): 13.3 CM2
BH CV ECHO MEAS - LV V1 MAX: 89.6 CM/SEC
BH CV ECHO MEAS - LV V1 VTI: 20.9 CM
BH CV ECHO MEAS - LVIDD: 3.8 CM
BH CV ECHO MEAS - LVIDS: 2.5 CM
BH CV ECHO MEAS - LVOT AREA: 3.2 CM2
BH CV ECHO MEAS - LVOT DIAM: 2.02 CM
BH CV ECHO MEAS - LVPWD: 0.93 CM
BH CV ECHO MEAS - MED PEAK E' VEL: 5.9 CM/SEC
BH CV ECHO MEAS - MV A DUR: 0.14 SEC
BH CV ECHO MEAS - MV A MAX VEL: 101.7 CM/SEC
BH CV ECHO MEAS - MV DEC SLOPE: 238.1 CM/SEC2
BH CV ECHO MEAS - MV DEC TIME: 0.26 MSEC
BH CV ECHO MEAS - MV E MAX VEL: 78.1 CM/SEC
BH CV ECHO MEAS - MV E/A: 0.77
BH CV ECHO MEAS - MV MAX PG: 4.1 MMHG
BH CV ECHO MEAS - MV MEAN PG: 1.41 MMHG
BH CV ECHO MEAS - MV P1/2T: 97.7 MSEC
BH CV ECHO MEAS - MV V2 VTI: 30.4 CM
BH CV ECHO MEAS - MVA(P1/2T): 2.25 CM2
BH CV ECHO MEAS - MVA(VTI): 2.2 CM2
BH CV ECHO MEAS - PA ACC TIME: 0.11 SEC
BH CV ECHO MEAS - PA PR(ACCEL): 27.9 MMHG
BH CV ECHO MEAS - PA V2 MAX: 115.5 CM/SEC
BH CV ECHO MEAS - PULM A REVS DUR: 0.13 SEC
BH CV ECHO MEAS - PULM A REVS VEL: 20.5 CM/SEC
BH CV ECHO MEAS - PULM DIAS VEL: 19.6 CM/SEC
BH CV ECHO MEAS - PULM S/D: 2.16
BH CV ECHO MEAS - PULM SYS VEL: 42.4 CM/SEC
BH CV ECHO MEAS - QP/QS: 0.66
BH CV ECHO MEAS - RAP SYSTOLE: 3 MMHG
BH CV ECHO MEAS - RV MAX PG: 3.3 MMHG
BH CV ECHO MEAS - RV V1 MAX: 90.7 CM/SEC
BH CV ECHO MEAS - RV V1 VTI: 16 CM
BH CV ECHO MEAS - RVOT DIAM: 1.87 CM
BH CV ECHO MEAS - RVSP: 20 MMHG
BH CV ECHO MEAS - SI(MOD-SP2): 19.2 ML/M2
BH CV ECHO MEAS - SI(MOD-SP4): 15.8 ML/M2
BH CV ECHO MEAS - SUP REN AO DIAM: 1.8 CM
BH CV ECHO MEAS - SV(LVOT): 67 ML
BH CV ECHO MEAS - SV(MOD-SP2): 39 ML
BH CV ECHO MEAS - SV(MOD-SP4): 32 ML
BH CV ECHO MEAS - SV(RVOT): 44.1 ML
BH CV ECHO MEAS - TAPSE (>1.6): 1.51 CM
BH CV ECHO MEAS - TR MAX PG: 17.1 MMHG
BH CV ECHO MEAS - TR MAX VEL: 206.6 CM/SEC
BH CV ECHO MEASUREMENTS AVERAGE E/E' RATIO: 10.7
BH CV XLRA - RV BASE: 2.7 CM
BH CV XLRA - RV LENGTH: 5.9 CM
BH CV XLRA - RV MID: 3 CM
BH CV XLRA - TDI S': 10.9 CM/SEC
LEFT ATRIUM VOLUME INDEX: 41.5 ML/M2
MAXIMAL PREDICTED HEART RATE: 141 BPM
SINUS: 2.9 CM
STJ: 2.21 CM
STRESS TARGET HR: 120 BPM

## 2023-02-06 PROCEDURE — 93306 TTE W/DOPPLER COMPLETE: CPT

## 2023-02-06 PROCEDURE — 93246 EXT ECG>7D<15D RECORDING: CPT

## 2023-02-06 PROCEDURE — 93306 TTE W/DOPPLER COMPLETE: CPT | Performed by: INTERNAL MEDICINE

## 2023-02-06 NOTE — PROGRESS NOTES
Please call and let her know great improvement in overall function of her heart, EF is now back to normal.Continue on her medications.  Await results of her monitor.

## 2023-02-07 ENCOUNTER — TELEPHONE (OUTPATIENT)
Dept: CARDIOLOGY | Facility: CLINIC | Age: 78
End: 2023-02-07
Payer: MEDICARE

## 2023-02-07 NOTE — TELEPHONE ENCOUNTER
Echo results    ----- Message from JOSE ANTONIO Ellis sent at 2/6/2023  4:16 PM EST -----  Please call and let her know great improvement in overall function of her heart, EF is now back to normal.Continue on her medications.  Await results of her monitor.

## 2023-02-07 NOTE — TELEPHONE ENCOUNTER
Notified patient of results, patient verbalizes understanding.    Sofía Mcclain RN  [facility]  02/07/23 14:20 EST

## 2023-02-07 NOTE — TELEPHONE ENCOUNTER
Attempted to call patient, no answer.  Left a voicemail for patient to call back.  Will continue to try to reach patient.    Sofía Mcclain RN  [facility] T[facility]  02/07/23 09:37 EST

## 2023-02-15 DIAGNOSIS — Z79.899 ENCOUNTER FOR LONG-TERM (CURRENT) USE OF HIGH-RISK MEDICATION: ICD-10-CM

## 2023-02-15 DIAGNOSIS — M47.816 LUMBAR FACET ARTHROPATHY: ICD-10-CM

## 2023-02-15 NOTE — TELEPHONE ENCOUNTER
Medication Refill Request    Date of phone call: 2/15/23    Medication being requested: Hydrocodone 5-325 sig: Take 1 tablet by mouth every 6 hours as needed for moderate pain   Qty: 120    Date of last visit: 1/3/23    Date of last refill:     LOUANN up to date?:     Next Follow up?: 3/14/23    Any new pertinent information? (i.e, new medication allergies, new use of medications, change in patient's health or condition, non-compliance or inconsistency with prescribing agreement?):

## 2023-02-16 RX ORDER — HYDROCODONE BITARTRATE AND ACETAMINOPHEN 5; 325 MG/1; MG/1
TABLET ORAL
Qty: 120 TABLET | Refills: 0 | Status: SHIPPED | OUTPATIENT
Start: 2023-02-18 | End: 2023-03-14 | Stop reason: SDUPTHER

## 2023-03-02 LAB
BH CV STRESS DURATION MIN STAGE 1: 3
BH CV STRESS DURATION SEC STAGE 1: 0
BH CV STRESS GRADE STAGE 1: 10
BH CV STRESS METS STAGE 1: 5
BH CV STRESS PROTOCOL 1: NORMAL
BH CV STRESS SPEED STAGE 1: 1.7
BH CV STRESS STAGE 1: 1
MAXIMAL PREDICTED HEART RATE: 141 BPM
STRESS TARGET HR: 120 BPM

## 2023-03-02 PROCEDURE — 93248 EXT ECG>7D<15D REV&INTERPJ: CPT | Performed by: INTERNAL MEDICINE

## 2023-03-03 ENCOUNTER — TELEPHONE (OUTPATIENT)
Dept: CARDIOLOGY | Facility: CLINIC | Age: 78
End: 2023-03-03
Payer: MEDICARE

## 2023-03-03 NOTE — TELEPHONE ENCOUNTER
----- Message from JOSE ANTONIO Ellis sent at 3/3/2023  1:34 PM EST -----  Please call and let her know that her Holter monitor was very benign.  It did state that she has PVCs 1.5% of the time but we have her on the carvedilol 25 mg twice a day.  No changes needed at this time.

## 2023-03-14 ENCOUNTER — OFFICE VISIT (OUTPATIENT)
Dept: PAIN MEDICINE | Facility: CLINIC | Age: 78
End: 2023-03-14
Payer: MEDICARE

## 2023-03-14 VITALS
SYSTOLIC BLOOD PRESSURE: 162 MMHG | BODY MASS INDEX: 33.91 KG/M2 | HEART RATE: 67 BPM | TEMPERATURE: 96.2 F | HEIGHT: 66 IN | RESPIRATION RATE: 20 BRPM | DIASTOLIC BLOOD PRESSURE: 74 MMHG | WEIGHT: 211 LBS | OXYGEN SATURATION: 98 %

## 2023-03-14 DIAGNOSIS — M47.816 LUMBAR FACET ARTHROPATHY: ICD-10-CM

## 2023-03-14 DIAGNOSIS — Z79.899 ENCOUNTER FOR LONG-TERM (CURRENT) USE OF HIGH-RISK MEDICATION: ICD-10-CM

## 2023-03-14 DIAGNOSIS — M51.36 DDD (DEGENERATIVE DISC DISEASE), LUMBAR: Primary | ICD-10-CM

## 2023-03-14 PROCEDURE — 1159F MED LIST DOCD IN RCRD: CPT | Performed by: PHYSICIAN ASSISTANT

## 2023-03-14 PROCEDURE — 3078F DIAST BP <80 MM HG: CPT | Performed by: PHYSICIAN ASSISTANT

## 2023-03-14 PROCEDURE — 3077F SYST BP >= 140 MM HG: CPT | Performed by: PHYSICIAN ASSISTANT

## 2023-03-14 PROCEDURE — 1160F RVW MEDS BY RX/DR IN RCRD: CPT | Performed by: PHYSICIAN ASSISTANT

## 2023-03-14 PROCEDURE — 99214 OFFICE O/P EST MOD 30 MIN: CPT | Performed by: PHYSICIAN ASSISTANT

## 2023-03-14 PROCEDURE — 1125F AMNT PAIN NOTED PAIN PRSNT: CPT | Performed by: PHYSICIAN ASSISTANT

## 2023-03-14 RX ORDER — HYDROCODONE BITARTRATE AND ACETAMINOPHEN 5; 325 MG/1; MG/1
TABLET ORAL
Qty: 120 TABLET | Refills: 0 | Status: SHIPPED | OUTPATIENT
Start: 2023-03-20

## 2023-03-14 NOTE — PROGRESS NOTES
CHIEF COMPLAINT  Back pain   Patient reports fluctuating pain states it all depends on her daily activities . Currently in aquatic therapy .     Subjective   Jung Short is a 77 y.o. female  who presents for follow-up.  She has a history of chronic low back and multijoint pain.  Patient continues to report pain in a bandlike distribution across lumbosacral spine which is referred into the posterior buttocks/hips and occasionally will radiate into the left posterior thigh.  Secondary complaints of multijoint pain affecting various areas secondary to osteoarthritis.  Patient has noted some increased pain which fluctuates based on her daily activities.    Her current medication regimen is hydrocodone 5 mg 1-2 p.o. twice daily which she tolerates well without adverse effects such as constipation or somnolence.  Overall the patient feels her medicines provide 50% reduction of pain which allows her to continue living independently and perform her ADLs.    Patient is currently doing aqua therapy at [facility] twice weekly along with some light exercises which she feels is helping somewhat with her strength. She is using her LSO brace during periods of activity which is helping to decrease her back pain and is providing some stability and support.    Recent cardiac echocardiogram and 2 week Holter monitor tests were stable when compared to her previous tests.    Pain today is 5/10 VAS in severity.    Back Pain  This is a chronic problem. The current episode started more than 1 year ago. The problem occurs constantly. The problem has been waxing and waning since onset. The pain is present in the lumbar spine. The quality of the pain is described as aching and burning (Throbbing). The pain radiates to the left thigh. The pain is at a severity of 5/10. The pain is moderate. The pain is worse during the day. The symptoms are aggravated by position and standing. Stiffness is present all day. Associated symptoms include  weakness. Pertinent negatives include no abdominal pain, chest pain, dysuria, fever, headaches or numbness.   Joint Pain  This is a chronic problem. The current episode started more than 1 year ago. The problem occurs constantly. The problem has been waxing and waning. Associated symptoms include arthralgias and weakness. Pertinent negatives include no abdominal pain, chest pain, congestion, coughing, fatigue, fever, headaches or numbness. The symptoms are aggravated by walking and standing.        PEG Assessment   What number best describes your pain on average in the past week?6  What number best describes how, during the past week, pain has interfered with your enjoyment of life?7  What number best describes how, during the past week, pain has interfered with your general activity?  7    Review of Pertinent Medical Data ---    XR L SPINE AP AND LATERAL-STANDING     DATE: 11/15/2022 at 9:51 AM.     COMPARISON: December 11, 2019     INDICATION: lumbar fracture.     FINDINGS: There is a fracture at L4 with 70% loss of height anteriorly and posterior displacement likely causing moderate central canal stenosis. There is anterior listhesis at L3-L4 measuring 0.4 cm. There is mid and lower lumbar facet arthropathy and   osteopenia and mild spondylosis. There is bilateral hip osteoarthritis. There is mild stool in the colon.     IMPRESSION:   1. 70% loss of height at the superior endplate of L4 and posterior displacement causing at least moderate central canal stenosis.   2. Osteopenia, mild spondylosis, and lower lumbar facet arthropathy with degenerative anterior listhesis L3-L4.     Dictated by: Pelon Koch M.D.     Images and Report reviewed and interpreted by: Pelon Koch M.D.     <PS><Electronically signed by: Pelon Koch M.D.>   11/15/2022 1020            MRI Spine Lumbar WO     HISTORY:  Low back pain symptoms persist greater than 6 weeks treatment. Spondylosis without myelopathy. Injury 2  months ago. Lungs. Fell onto bottom. Low back and left hip pain and left thigh pain since. Not as severe as it was. History of breast cancer in 2016.     TECHNIQUE:  Multiplanar multisequence imaging of the lumbar spine acquired without IV contrast utilizing a standard protocol.     COMPARISON: Plain film from 8/30/2022.     FINDINGS:     For the purposes of this dictation is presumed to be 5 lumbar-type vertebra with the last fully formed disc space representing L5-S1.     There is a compression fracture at L4 with about 40% loss of mid vertebral body height, 35% loss of posterior vertebral body height, and 15% loss of anterior vertebral body height. This is mildly progressed from the plain films. There is marrow edema  throughout the L4 vertebral body. Posterior cortex is buckled and apparently disrupted. There is retropulsed bone and or epidural soft tissue edema anterior epidural space. Combination of findings result in severe canal stenosis at the level of L4  vertebral body. The edema extends into the bilateral posterior elements. Given history this is most likely a recent osteoporotic type compression fracture. Bone marrow signal intensity is otherwise normal and there is otherwise nothing to suggest osseous  metastatic disease. Follow-up to healing is recommended to ensure expected clinical course.     Conus medullaris terminates at T12-L1 and is normal. Intervertebral discs are desiccated in general. There is subtle grade 1 degenerative anterolisthesis of L4 on L5 unchanged.     Level by level:     L1-2: Mild bilateral facet degenerative change. Minimal posterior disc bulge. No canal or foraminal compromise.     L2-3: Moderate facet degenerative change bilaterally. There is a broad posterior protrusion/extrusion extending into the bilateral foramina asymmetric to the right with a small right foraminal extrusion measuring about 11 mm SI dimension by 4.5 mm AP  dimension. This contributes to moderate to  severe right foraminal impingement with mass effect on expected course of the right L2 root. There is mild to moderate left foraminal narrowing. There is posterior epidural lipomatosis and mild to moderate canal  stenosis.     L3-4: There is fairly severe facet arthritis bilaterally with moderate ligamentum flavum thickening. There is a concentric disc bulge with a superimposed broad posterior protrusion/extrusion. This is associated with the posterior cortical disruption at  L4. The combination of findings result in severe canal stenosis with severe impingement on the bilateral lateral recesses. Disc material extends into the foramina with approximately moderate right and mild to moderate left foraminal narrowing. There is  posterior epidural lipomatosis.     L4-5: There is a broad posterior protrusion/extrusion remaining contiguous with the disc but extending above and below the level the disc measuring about 12 mm SI dimension by 3 mm AP dimension. There is moderate facet arthritis and mild to moderate  ligamentum flavum thickening worse the left. There is mild to moderate canal stenosis with impingement on the bilateral lateral recesses. There is moderate to severe bilateral foraminal narrowing. Loss of L4 vertebral body disc height contributes to  this.     L5-S1: There is a mild concentric disc bulge with a superimposed broad posterior protrusion. There is mild to moderate left and moderate to severe right-sided facet arthritis. No canal stenosis. Mild left foraminal narrowing. There is severe right  foraminal narrowing with more focal right foraminal extrusion measuring about 9 mm SI dimension by 5.5 mm AP dimension.     OTHER: Gallstone. Arthritis of the sacroiliac joints partly in field-of-view     IMPRESSION:  1.  There is a recent appearing compression fracture at L4, overall moderate, measurements above. There is disruption of the posterior cortex with retropulsed cortex/edema in the anterior epidural  "space at L4 resulting in severe canal stenosis.  Additionally there is degenerative change at L3-4 and L4-5 with severe canal stenosis at L3-4 and mild to moderate canal stenosis at L4-5. For reasons discussed above, this is more likely to be an osteoporotic type compression fracture despite history of  breast cancer. Clinical correlation and follow-up is recommended to confirm.  2.  Multiple level foraminal impingement, some of which is severe. See level by level discussion.     Signer Name: Zarina Mari MD   Signed: 10/5/2022 2:40 PM    The following portions of the patient's history were reviewed and updated as appropriate: allergies, current medications, past family history, past medical history, past social history, past surgical history and problem list.    Review of Systems   Constitutional: Negative for activity change, fatigue and fever.   HENT: Negative for congestion.    Eyes: Negative for visual disturbance.   Respiratory: Negative for cough and chest tightness.    Cardiovascular: Negative for chest pain.   Gastrointestinal: Negative for abdominal pain, constipation and diarrhea.   Genitourinary: Negative for difficulty urinating and dysuria.   Musculoskeletal: Positive for arthralgias and back pain.   Neurological: Positive for weakness. Negative for dizziness, light-headedness, numbness and headaches.   Psychiatric/Behavioral: Positive for sleep disturbance. Negative for agitation and suicidal ideas. The patient is not nervous/anxious.      I have reviewed and confirmed the accuracy of the ROS as documented by the MA/LPN/RN MANJINDER Tong    Vitals:    03/14/23 1254   BP: 162/74   BP Location: Left arm   Patient Position: Sitting   Cuff Size: Adult   Pulse: 67   Resp: 20   Temp: 96.2 °F (35.7 °C)   TempSrc: Temporal   SpO2: 98%   Weight: 95.7 kg (211 lb)   Height: 167.6 cm (66\")   PainSc:   5         Objective   Physical Exam  Vitals and nursing note reviewed.   Constitutional:       Appearance: " Normal appearance.   HENT:      Head: Normocephalic.   Pulmonary:      Effort: Pulmonary effort is normal.   Musculoskeletal:      Lumbar back: Spasms and tenderness present. Decreased range of motion. Scoliosis present.   Skin:     General: Skin is warm and dry.   Neurological:      General: No focal deficit present.      Mental Status: She is alert and oriented to person, place, and time.      Cranial Nerves: Cranial nerves 2-12 are intact.      Sensory: Sensation is intact.      Motor: Weakness present.      Gait: Gait abnormal (Antalgic gait ambulating with a rollator).   Psychiatric:         Mood and Affect: Mood normal.         Behavior: Behavior normal.         Thought Content: Thought content normal.         Judgment: Judgment normal.         Assessment & Plan   Diagnoses and all orders for this visit:    1. DDD (degenerative disc disease), lumbar (Primary)    2. Lumbar facet arthropathy    3. Encounter for long-term (current) use of high-risk medication        --- Follow-up in 2 months or sooner as needed  --- Refill provided today on hydrocodone. Patient appears stable with current regimen. No adverse effects. Regarding continuation of opioids, there is no evidence of aberrant behavior or any red flags.  The patient continues with appropriate response to opioid therapy. ADL's remain intact by self.        The urine drug screen confirmation from 10/24/2022 has been reviewed and the result is appropriate based on patient history and LOUANN report     The patient signed an updated copy of the controlled substance agreement on 10/24/2022.      LOUANN REPORT  As part of the patient's treatment plan, I am prescribing controlled substances. The patient has been made aware of appropriate use of such medications, including potential risk of somnolence, limited ability to drive and/or work safely, and the potential for dependence or overdose. It has also been made clear that these medications are for use by this  patient only, without concomitant use of alcohol or other substances unless prescribed.     Patient has completed prescribing agreement detailing terms of continued prescribing of controlled substances, including monitoring LOUANN reports, urine drug screening, and pill counts if necessary. The patient is aware that inappropriate use will results in cessation of prescribing such medications.    As the clinician, I personally reviewed the LOUANN from 3/14/2023 while the patient was in the office today.    History and physical exam exhibit continued safe and appropriate use of controlled substances.     Dictated utilizing Dragon dictation.       Patient remained masked during entire encounter. No cough present. I donned a mask and eye protection throughout entire visit. Prior to donning mask and eye protection, hand hygiene was performed, as well as when it was doffed.  I was closer than 6 feet, but not for an extended period of time. No obvious exposure to any bodily fluids.

## 2023-03-17 ENCOUNTER — TELEPHONE (OUTPATIENT)
Dept: INTERNAL MEDICINE | Facility: CLINIC | Age: 78
End: 2023-03-17

## 2023-03-17 DIAGNOSIS — M81.0 AGE-RELATED OSTEOPOROSIS WITHOUT CURRENT PATHOLOGICAL FRACTURE: Primary | ICD-10-CM

## 2023-03-17 NOTE — TELEPHONE ENCOUNTER
"Caller: Jung Short \"SHEILA\"    Relationship: Self    Best call back number: [phone]    What orders are you requesting (i.e. lab or imaging): PROLIA INJECTION    In what timeframe would the patient need to come in: PATIENT STATES THAT SHE WAS DUE AFTER 3/15/23    Where will you receive your lab/imaging services: [facility]     "

## 2023-03-20 ENCOUNTER — APPOINTMENT (OUTPATIENT)
Dept: CT IMAGING | Facility: HOSPITAL | Age: 78
End: 2023-03-20
Payer: MEDICARE

## 2023-03-20 ENCOUNTER — APPOINTMENT (OUTPATIENT)
Dept: GENERAL RADIOLOGY | Facility: HOSPITAL | Age: 78
End: 2023-03-20
Payer: MEDICARE

## 2023-03-20 ENCOUNTER — HOSPITAL ENCOUNTER (EMERGENCY)
Facility: HOSPITAL | Age: 78
Discharge: HOME | End: 2023-03-20
Attending: EMERGENCY MEDICINE | Admitting: EMERGENCY MEDICINE
Payer: MEDICARE

## 2023-03-20 VITALS
DIASTOLIC BLOOD PRESSURE: 92 MMHG | WEIGHT: 210 LBS | RESPIRATION RATE: 16 BRPM | HEIGHT: 65 IN | OXYGEN SATURATION: 98 % | TEMPERATURE: 98.1 F | HEART RATE: 62 BPM | BODY MASS INDEX: 34.99 KG/M2 | SYSTOLIC BLOOD PRESSURE: 138 MMHG

## 2023-03-20 DIAGNOSIS — S52.224A CLOSED NONDISPLACED TRANSVERSE FRACTURE OF SHAFT OF RIGHT ULNA, INITIAL ENCOUNTER: Primary | ICD-10-CM

## 2023-03-20 DIAGNOSIS — S05.11XA CONTUSION OF RIGHT ORBITAL TISSUES, INITIAL ENCOUNTER: ICD-10-CM
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
## 2023-03-20 DIAGNOSIS — M25.511 ACUTE PAIN OF RIGHT SHOULDER: ICD-10-CM

## 2023-03-20 PROCEDURE — 99283 EMERGENCY DEPT VISIT LOW MDM: CPT

## 2023-03-20 PROCEDURE — 70486 CT MAXILLOFACIAL W/O DYE: CPT

## 2023-03-20 PROCEDURE — 70450 CT HEAD/BRAIN W/O DYE: CPT

## 2023-03-20 PROCEDURE — 72125 CT NECK SPINE W/O DYE: CPT

## 2023-03-20 PROCEDURE — 73110 X-RAY EXAM OF WRIST: CPT

## 2023-03-20 PROCEDURE — 73130 X-RAY EXAM OF HAND: CPT

## 2023-03-20 PROCEDURE — 63710000001 ONDANSETRON ODT 4 MG TABLET DISPERSIBLE: Performed by: NURSE PRACTITIONER

## 2023-03-20 PROCEDURE — 73030 X-RAY EXAM OF SHOULDER: CPT

## 2023-03-20 RX ORDER — HYDROCODONE BITARTRATE AND ACETAMINOPHEN 7.5; 325 MG/1; MG/1
1 TABLET ORAL EVERY 6 HOURS PRN
Status: DISCONTINUED | OUTPATIENT
Start: 2023-03-20 | End: 2023-03-20 | Stop reason: HOSPADM

## 2023-03-20 RX ORDER — SACUBITRIL AND VALSARTAN 49; 51 MG/1; MG/1
1 TABLET, FILM COATED ORAL 2 TIMES DAILY
Qty: 30 TABLET | Refills: 0 | COMMUNITY
Start: 2023-03-20

## 2023-03-20 RX ORDER — ONDANSETRON 4 MG/1
4 TABLET, ORALLY DISINTEGRATING ORAL ONCE
Status: COMPLETED | OUTPATIENT
Start: 2023-03-20 | End: 2023-03-20

## 2023-03-20 RX ADMIN — ONDANSETRON 4 MG: 4 TABLET, ORALLY DISINTEGRATING ORAL at 14:24

## 2023-03-20 RX ADMIN — HYDROCODONE BITARTRATE AND ACETAMINOPHEN 1 TABLET: 7.5; 325 TABLET ORAL at 14:24

## 2023-03-20 NOTE — DISCHARGE INSTRUCTIONS
Leave splint in place until follow up with Orthopedic surgery.    Do not get splint wet    Take your home pain medication as needed    Can continue to use ice pack to casted area    Return Precautions    Although you are being discharged from the ED today, I encourage you to return for worsening symptoms.  Things can, and do, change such that treatment at home with medication may not be adequate.      Specifically, return for any of the following:    Chest pain, shortness of breath, pain or nausea and vomiting not controlled by medications provided.    Please make a follow up with your Primary Care Provider for a blood pressure recheck.

## 2023-03-20 NOTE — ED PROVIDER NOTES
EMERGENCY DEPARTMENT ENCOUNTER    Room Number:  06/06  Date seen:  3/20/2023  Time seen: 14:10 EDT  PCP: Kathryn Epstein APRN  Historian: Patient    HPI:  Chief complaint: Right upper extremity pain status post fall  A complete HPI/ROS/PMH/PSH/SH/FH are unobtainable due to: n/a  Context:Jung Short is a 77 y.o. female with history of osteoporosis, right breast cancer, neck pain, chronic pain, type 2 diabetes, hypertension, pulmonary hypertension who presents to the ED with c/o mechanical fall PTA when was was walking with her cane and her right foot gave out.  States she fell down onto RUE.  She has moderate right wrist pain on lateral side and pain with movement.  The pain goes up towards her shoulder.  She has no numbness/tingling. Pain is not made better/worse by anything.  She did hit her right eyebrow but denies LOC or use of blood thinners. Denies any preceding symptoms of lightheadedness or dizziness.       The patient was placed in a mask in triage, hand hygiene was performed before and after my interaction with the patient.  I wore a mask, safety glasses and gloves during my entire interaction with the patient.    Social determinants of health which may impact assessment: n/a    Review of prior external notes (non-ED): n/a    Review of prior external test results outside of this encounter: n/a    ALLERGIES  Dilaudid [hydromorphone] and Latex    PAST MEDICAL HISTORY  Active Ambulatory Problems     Diagnosis Date Noted   • Chronic anemia 02/02/2016   • Malignant neoplasm of upper-inner quadrant of right breast in female, estrogen receptor positive (HCC) 06/24/2015   • Disc disorder of cervical region 02/02/2016   • Neck pain 02/02/2016   • Chronic pain 02/02/2016   • Depression 02/02/2016   • Type 2 diabetes mellitus without complication, without long-term current use of insulin (HCC) 02/02/2016   • Dyslipidemia 02/02/2016   • Gastroesophageal reflux disease with esophagitis 02/02/2016   •  Primary hypertension 02/02/2016   • Hypothyroidism 02/02/2016   • Incisional hernia 02/02/2016   • Mitral valve insufficiency 02/02/2016   • Nausea 02/02/2016   • Arthralgia of multiple joints 02/02/2016   • Peripheral neuropathy 02/02/2016   • Pulmonary hypertension ([facility]) 02/02/2016   • PITTS (dyspnea on exertion) 02/02/2016   • Tricuspid valve insufficiency 02/02/2016   • Cobalamin deficiency 02/02/2016   • Vitamin D deficiency 02/02/2016   • HFrEF (heart failure with reduced ejection fraction) ([facility]) 02/02/2016   • Arthritis of knee 05/17/2016   • DDD (degenerative disc disease), lumbar 05/17/2016   • Lumbar facet arthropathy 05/17/2016   • Chronic gout of multiple sites 08/02/2016   • Encounter for long-term (current) use of high-risk medication 09/22/2016   • Primary osteoarthritis of left knee 09/05/2017   • Physical deconditioning 04/20/2018   • CKD (chronic kidney disease) stage 3, GFR 30-59 ml/min ([facility]) 08/30/2019   • Pleural effusion 01/31/2020   • Acute respiratory failure with hypoxia ([facility]) 01/31/2020   • Periprosthetic subtrochanteric fracture of femur 02/28/2020   • Atherosclerotic heart disease of native coronary artery without angina pectoris 12/01/2016   • NICM (nonischemic cardiomyopathy) ([facility]) 01/19/2021   • Age-related osteoporosis without current pathological fracture 02/25/2022     Resolved Ambulatory Problems     Diagnosis Date Noted   • Abdominal bloating 02/02/2016   • Abdominal mass 02/02/2016   • Abdominal pain 02/02/2016   • Abnormal gait 02/02/2016   • Abnormal mammogram 02/02/2016   • Acute bronchitis 02/02/2016   • Acute upper respiratory infection 02/02/2016   • Infection due to fungus 02/02/2016   • Atherosclerosis of coronary artery 02/02/2016   • Hematochezia 02/02/2016   • Thoracic back pain 02/02/2016   • Candidiasis 02/02/2016   • Cardiomyopathy ([facility]) 02/02/2016   • Contusion of chest wall 02/02/2016   • Tenderness of chest wall 02/02/2016   • Anemia due to stage 3 chronic kidney  disease ([facility]) 02/02/2016   • Congestive heart failure ([facility]) 02/02/2016   • Constipation 02/02/2016   • Generalized osteoarthritis 02/02/2016   • Degeneration of intervertebral disc 02/02/2016   • Type 2 diabetes mellitus ([facility]) 02/02/2016   • Diarrhea 02/02/2016   • Fall in home 02/02/2016   • Fall on same level from slipping, tripping or stumbling 02/02/2016   • Generalized pain 02/02/2016   • Gout 02/02/2016   • Injury of head 02/02/2016   • Hyperkalemia 02/02/2016   • Hypokalemia 02/02/2016   • Hypomagnesemia 02/02/2016   • Infection of toe 02/02/2016   • Mass of breast 02/02/2016   • Muscle pain 02/02/2016   • Nausea and vomiting 02/02/2016   • Gangrene of toe ([facility]) 02/02/2016   • Adult body mass index greater than 30 02/02/2016   • Osteopenia of spine 02/02/2016   • Painful breathing 02/02/2016   • Skin lesion 02/02/2016   • Rib pain 02/02/2016   • Hematoma of scalp 02/02/2016   • Seborrheic keratosis 02/02/2016   • Osteomyelitis ([facility]) 02/02/2016   • Urinary tract infection 02/02/2016   • Weight loss 02/02/2016   • Weight gain 02/02/2016   • Diabetes mellitus ([facility]) 02/02/2016   • Left knee pain 05/17/2016   • Pain in shoulder 08/02/2016   • Shortness of breath 08/16/2016   • History of cancer 09/22/2016   • Constipation due to opioid therapy 10/20/2016   • Syncope, psychogenic 04/19/2017   • Transient alteration of awareness 04/19/2017   • Closed fracture of patella 09/05/2017   • Radius/ulna fracture, left, closed, initial encounter 10/21/2017   • Rib pain on left side 11/27/2017   • Encounter for long-term (current) use of medications 01/18/2018   • Preoperative cardiovascular examination 04/09/2018   • Osteoarthritis of left knee 04/17/2018   • Hyponatremia 05/10/2018   • Needs flu shot 10/05/2018   • Acute URI 01/29/2019   • Chronic kidney disease 05/24/2019   • Chest pain 08/16/2019   • Acute on chronic HFrEF (heart failure with reduced ejection fraction) ([facility]) 08/17/2019   • ADRIENNE (acute kidney injury) ([facility])  09/01/2019   • Diastolic congestive heart failure (HCC) 09/01/2019   • MRSA infection 01/29/2013   • Traumatic closed nondisp torus fracture of distal radial metaphysis, right, initial encounter 02/28/2020   • Acute on chronic congestive heart failure (HCC) 04/10/2020     Past Medical History:   Diagnosis Date   • Anemia    • Benign breast disease    • Cancer ([facility]) 2015   • Cellulitis of leg    • CHF (congestive heart failure) ([facility])    • Chronic ulcer of right foot ([facility])    • Cluster headache    • Colon polyp    • Diabetic peripheral neuropathy ([facility])    • Difficulty walking    • Diverticulosis    • Femoral fracture ([facility])    • Fibromyalgia, primary    • Fracture of left wrist    • Gastroesophageal reflux    • Hiatal hernia    • Hyperlipidemia    • Hypertension    • Impingement syndrome of right shoulder    • Joint pain    • Menopausal disorder    • Methicillin resistant Staphylococcus aureus infection 2012   • Nonischemic cardiomyopathy ([facility])    • Osteoarthritis    • Paroxysmal atrial fibrillation ([facility])    • Shoulder pain, bilateral    • Sleep apnea    • Thoracic injuries    • Toe ulcer ([facility])        PAST SURGICAL HISTORY  Past Surgical History:   Procedure Laterality Date   • ABDOMINAL SURGERY  2012    had to be reopened after bladder surgery d/t infection   • AMPUTATION FOOT / TOE Right 11/2013    Rt foot amputation MTP first toe   • BLADDER SURGERY  2012   • BREAST BIOPSY     • BREAST SURGERY  06/29/2015    Percutaneous ultrasound-guided placement of metal localized clip 1st lesion   • CARDIAC CATHETERIZATION  2015   • CARDIAC CATHETERIZATION N/A 1/25/2021    Procedure: Right and Left Heart Cath;  Surgeon: Nicholas Andrade MD;  Location: [facility] INVASIVE LOCATION;  Service: Cardiovascular;  Laterality: N/A;  drop in EF, with hx of NICM, SOA fatigue.  Discussed with    • CARDIAC CATHETERIZATION N/A 1/25/2021    Procedure: Left ventriculography;  Surgeon: Nicholas Andrade MD;  Location: [facility]  INVASIVE LOCATION;  Service: Cardiovascular;  Laterality: N/A;   • CARDIAC CATHETERIZATION N/A 1/25/2021    Procedure: Coronary angiography;  Surgeon: Nicholas Andrade MD;  Location: [facility] INVASIVE LOCATION;  Service: Cardiovascular;  Laterality: N/A;   • CATARACT EXTRACTION Bilateral 2017   • COLONOSCOPY     • DEBRIDEMENT  FOOT Right 01/2013    During hospitalization    • EPIDURAL BLOCK      4 chronic back pain and leg pain last epidurals done 5-2012 she had no benefit at all from this procedure.   • FEMUR FRACTURE SURGERY     • FOOT SURGERY Bilateral     several   • HERNIA REPAIR      At the abdomen February 2007 Dr. Mendez   • INCISIONAL HERNIA REPAIR  05/16/2014    Recurrent. Incarcerated. With mesh implantation   • MASTECTOMY Right 05/2015   • MASTECTOMY MODIFIED RADICAL W/ AXILLARY LYMPH NODES W/ OR W/O PECTORALIS MINOR Right    • SHOULDER SURGERY Right     x2 RCR   • TOTAL ABDOMINAL HYSTERECTOMY      with oophorectomy-Done June-2012 secondary to vaginal wall prolapse.   • TOTAL KNEE ARTHROPLASTY Right    • TOTAL KNEE ARTHROPLASTY Left 4/17/2018    Procedure: TOTAL KNEE ARTHROPLASTY;  Surgeon: Live Chambers MD;  Location: [facility] OR;  Service: Orthopedics       FAMILY HISTORY  Family History   Problem Relation Age of Onset   • Colonic polyp Mother    • Hypertension Mother    • Migraines Mother    • Mental illness Mother    • Depression Mother    • Arthritis Mother    • Coronary artery disease Father    • Other Father         Cardiac Disorder   • Colon cancer Father    • Kidney disease Father    • Cancer Father    • Diabetes Father    • Heart disease Father    • Hypertension Father    • Breast cancer Maternal Aunt    • Colon cancer Brother    • Alcohol abuse Brother    • Arthritis Sister    • Hypertension Brother    • Lung disease Brother    • Alcohol abuse Brother    • Malig Hyperthermia Neg Hx        SOCIAL HISTORY  Social History     Socioeconomic History   • Marital status:    • Number of  children: 2   Tobacco Use   • Smoking status: Former     Packs/day: 3.00     Years: 30.00     Pack years: 90.00     Types: Cigarettes     Quit date:      Years since quittin.2   • Smokeless tobacco: Never   Vaping Use   • Vaping Use: Never used   Substance and Sexual Activity   • Alcohol use: No     Comment: Caffeine use: 3 cups daily    • Drug use: No   • Sexual activity: Defer     Partners: Male     Comment: celibate       REVIEW OF SYSTEMS  Review of Systems    All systems reviewed and negative except for those discussed in HPI.     PHYSICAL EXAM    I have reviewed the triage vital signs and nursing notes.  ED Triage Vitals [23 1402]   Temp Heart Rate Resp BP SpO2   98.1 °F (36.7 °C) 59 18 146/71 98 %      Temp src Heart Rate Source Patient Position BP Location FiO2 (%)   -- -- -- -- --     Physical Exam  Musculoskeletal:        Arms:       Comments: Pain and swelling as documented.  No loss sensation.           GENERAL: not distressed  HENT: nares patent, mm moist, no facial wounds.    EYES: no scleral icterus, PERRL, EOMI.  Right orbital ecchymosis and edema.  Does not affect her visual field  NECK: no ROM limitations, no cervical spinal tenderness  CV: regular rhythm, regular rate, no murmur  RESPIRATORY: normal effort, no wheezing  ABDOMEN: soft, non-tender  : deferred  MUSCULOSKELETAL: see diagram  Right shoulder can shrug, it is painful to move.  The right proximal humerus is without bony tenderness.  The right hand is normal.  She can fully flex and extend the right elbow.  The LUE is normal. Radial pulses 2+. Brisk capillary refill to digits both hands.  NEURO: alert, moves all extremities, follows commands  SKIN: warm, dry    Splint - Cast - Strapping    Date/Time: 3/20/2023 3:49 PM  Performed by: Scarlet Wilkerson APRN  Authorized by: Alonzo Zazueta MD     Consent:     Consent obtained:  Verbal    Consent given by:  Patient    Risks, benefits, and alternatives were discussed: yes       Risks discussed:  Discoloration, numbness, pain and swelling    Alternatives discussed:  No treatment  Universal protocol:     Patient identity confirmed:  Verbally with patient and arm band  Pre-procedure details:     Distal neurologic exam:  Normal    Distal perfusion: distal pulses strong    Procedure details:     Location:  Wrist    Wrist location:  R wrist    Strapping: no      Cast type:  Short arm    Splint type:  Ulnar gutter    Supplies:  Cotton padding, fiberglass, elastic bandage and sling    Attestation: Splint applied and adjusted personally by me    Post-procedure details:     Distal neurologic exam:  Normal    Distal perfusion: distal pulses strong      Procedure completion:  Tolerated      RADIOLOGY RESULTS  XR Shoulder 2+ View Right    Result Date: 3/20/2023  XR SHOULDER 2+ VW RIGHT-: 3/20/2023 2:44 PM  INDICATION: Right shoulder pain. Previous shoulder surgery.  COMPARISON: None available.  FINDINGS: 2 views of the right shoulder.   No fracture or dislocation.  Period there is narrowing of the acromiohumeral space suggesting rotator cuff tear..  No foreign body.      No fracture or dislocation  Degenerative changes with narrowing of the acromiohumeral space.  This report was finalized on 3/20/2023 3:01 PM by Dr. Chi Mattson MD.      XR Wrist 3+ View Right    Result Date: 3/20/2023  XR WRIST 3+ VW RIGHT-: 3/20/2023 2:46 PM  INDICATION: Pain in wrist and distal forearm.  COMPARISON: None available.  FINDINGS: 3 views of the right wrist.  There is an acute nondisplaced fracture through the distal ulna shaft about 7 cm proximal to the end of the bone.. No bone erosion or destruction.  No foreign body.      Ulnar shaft fracture  This report was finalized on 3/20/2023 2:59 PM by Dr. Chi Mattson MD.      XR Hand 3+ View Right    Result Date: 3/20/2023  XR HAND 3+ VW RIGHT-: 3/20/2023 2:46 PM  INDICATION: Fall today with pain in right.  COMPARISON: None available.  FINDINGS: 3 views of the right hand.   There is a nondisplaced fracture through the ulnar shaft about 7 cm proximal to the end of the bone. There is degenerative change of the first carpometacarpal joint. There is degenerative change at the first IP joint.. No bone erosion or destruction.  No foreign body.      Ulnar shaft fracture  Degenerative changes first CMC joint and first IP joint.  This report was finalized on 3/20/2023 3:03 PM by Dr. Chi Mattson MD.      CT Head Without Contrast    Result Date: 3/20/2023  CT Head WO HISTORY: Fall today with trauma to right side of head with pain TECHNIQUE: Axial unenhanced head CT. Radiation dose reduction techniques included automated exposure control or exposure modulation based on body size. Count of known CT and cardiac nuc med studies performed in previous 12 months: 0. Time of scan: 3:12 PM COMPARISON: None. FINDINGS: No intracranial hemorrhage, mass, or infarct. No hydrocephalus or extra-axial fluid collection. Brain parenchymal density is normal. The skull base, calvarium, and extracranial soft tissues are normal.     Normal, negative unenhanced head CT. Signer Name: Chi Mattson MD  Signed: 3/20/2023 3:30 PM  Workstation Name: [id number]  [facility]    CT Cervical Spine Without Contrast    Result Date: 3/20/2023  CT Spine Cervical WO INDICATION: Fall today with facial trauma and neck pain TECHNIQUE: CT of the cervical spine without IV contrast. Coronal and sagittal reconstructions were obtained.  Radiation dose reduction techniques included automated exposure control or exposure modulation based on body size. Count of known CT and cardiac nuc med studies performed in previous 12 months: 0. COMPARISON: None available. FINDINGS: The alignment is normal. No fracture is visible. Soft tissues are normal. There are anterior osteophyte formations at C2-C6.     Degenerative changes. No acute findings Signer Name: Chi Mattson MD  Signed: 3/20/2023 3:32 PM  Workstation Name: [id number]   [facility]    CT Facial Bones Without Contrast    Result Date: 3/20/2023  CT Max Facial Area WO INDICATION: Fall today with right-sided facial trauma and pain TECHNIQUE: Maxillofacial CT without IV contrast. Coronal and sagittal reconstructions were obtained.  Radiation dose reduction techniques included automated exposure control or exposure modulation based on body size. Count of known CT and cardiac nuc med studies performed in previous 12 months: 0. COMPARISON:  None available. FINDINGS: No facial or nasal bone fractures identified. There is some soft tissue swelling anterior to the right lobe. Sinuses are clear.     Preorbital soft tissue swelling on the right side otherwise normal Signer Name: Chi Mattson MD  Signed: 3/20/2023 3:31 PM  Workstation Name: [id number]  [facility]       Ordered the above noted radiological studies.  Independently interpreted by me .  My findings will be discussed in the medical decision section below.     PROGRESS, DATA ANALYSIS, CONSULTS AND MEDICAL DECISION MAKING    Please note that this section constitutes my independent interpretation of clinical data including lab results, radiology, EKG's.  This constitutes my independent professional opinion regarding differential diagnosis and management of this patient.  It may include any factors such as history from outside sources, review of external records, social determinants of health, management of medications, response to those treatments, and discussions with other providers.      ED Course as of 03/20/23 1601   Mon Mar 20, 2023   1537 I have viewed the patient's images in PACS.  My independent interpretation:  CT  head - negative for bleed/skull fracture  CT c-spine - negative for fracture  CT facial bones - no acute orbital fracture  Right shoulder -no fracture, arthritic changes  Right hand-no acute fracture  Right wrist-ulnar shaft fracture that is nondisplaced [EW]      ED  Course User Index  [EW] Rayle, ScarletJOSE ANTONIO Walter       Orders placed during this visit:  Orders Placed This Encounter   Procedures   • Splint Cast Strapping   • [facility] 02.  Shoulder Immobilizer/Sling   • CT Head Without Contrast   • CT Cervical Spine Without Contrast   • CT Facial Bones Without Contrast   • XR Shoulder 2+ View Right   • XR Wrist 3+ View Right   • XR Hand 3+ View Right   • Apply ice to affected area          DDX: right forearm/wrist fracture, right shoulder dislocation, proximal right humerus fracture, CHI, cervical spine fracture    MDM  The fall reported seems strictly mechanical.  Pt reports her right foot gives out and she had just stepped over a curb and was walking down an incline.  Imaging as documented.  I splinted the ulnar shaft fracture.  The RUE compartments are all soft, she has no loss of sensation.  She will follow up with ortho.  No h/o anticoagulation.         DIAGNOSIS  Final diagnoses:   Fall (on)(from) sidewalk curb, initial encounter   Closed nondisplaced transverse fracture of shaft of right ulna, initial encounter   Contusion of right orbital tissues, initial encounter   Acute pain of right shoulder       FOLLOW-UP  Alan Arce MD  [address] [address]  [phone]    Schedule an appointment as soon as possible for a visit in 1 week          Latest Documented Vital Signs:  As of 16:01 EDT  BP- 138/92 HR- 62 Temp- 98.1 °F (36.7 °C) O2 sat- 98%    Please note that portions of this were completed with a voice recognition program.     Note Disclaimer: At [facility], we believe that sharing information builds trust and better relationships. You are receiving this note because you are receiving care at [facility] or recently visited. It is possible you will see health information before a provider has talked with you about it. This kind of information can be easy to misunderstand. To help you fully understand what it means for  your health, we urge you to discuss this note with your provider.                 Scarlet Wilkerson, APRN  03/20/23 6960

## 2023-03-21 ENCOUNTER — OFFICE VISIT (OUTPATIENT)
Dept: ORTHOPEDIC SURGERY | Facility: CLINIC | Age: 78
End: 2023-03-21
Payer: MEDICARE

## 2023-03-21 VITALS
WEIGHT: 210 LBS | DIASTOLIC BLOOD PRESSURE: 72 MMHG | HEART RATE: 65 BPM | SYSTOLIC BLOOD PRESSURE: 129 MMHG | BODY MASS INDEX: 34.99 KG/M2 | HEIGHT: 65 IN

## 2023-03-21 DIAGNOSIS — S52.224A CLOSED NONDISPLACED TRANSVERSE FRACTURE OF SHAFT OF RIGHT ULNA, INITIAL ENCOUNTER: Primary | ICD-10-CM

## 2023-03-21 PROCEDURE — 25530 CLTX ULNAR SHFT FX W/O MNPJ: CPT | Performed by: ORTHOPAEDIC SURGERY

## 2023-03-21 PROCEDURE — 99214 OFFICE O/P EST MOD 30 MIN: CPT | Performed by: ORTHOPAEDIC SURGERY

## 2023-03-21 PROCEDURE — 1159F MED LIST DOCD IN RCRD: CPT | Performed by: ORTHOPAEDIC SURGERY

## 2023-03-21 PROCEDURE — 3078F DIAST BP <80 MM HG: CPT | Performed by: ORTHOPAEDIC SURGERY

## 2023-03-21 PROCEDURE — 1160F RVW MEDS BY RX/DR IN RCRD: CPT | Performed by: ORTHOPAEDIC SURGERY

## 2023-03-21 PROCEDURE — 3074F SYST BP LT 130 MM HG: CPT | Performed by: ORTHOPAEDIC SURGERY

## 2023-03-21 NOTE — PROGRESS NOTES
Subjective: Right ulnar shaft fracture     Patient ID: Jung Short is a 77 y.o. female.    Chief Complaint:    History of Present Illness 77-year-old female known to me is seen for a new problem results of the injury sustained yesterday.  Apparently she was walking out of [employer] using her walker to assist with her ambulation when her right ankle gave causing her to fall forward.  She struck her forearm and her face.  There is no loss of consciousness.  Developed immediate forearm pain.  Was seen in the emergency room where x-rays demonstrated nondisplaced distal ulnar shaft fracture.  All other x-rays were negative.       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   Tobacco Use   • Smoking status: Former     Packs/day: 3.00     Years: 30.00     Pack years: 90.00     Types: Cigarettes     Quit date:      Years since quittin.2   • Smokeless tobacco: Never   Vaping Use   • Vaping Use: Never used   Substance and Sexual Activity   • Alcohol use: No     Comment: Caffeine use: 3 cups daily    • Drug use: No   • Sexual activity: Defer     Partners: Male     Comment: celibate      Review of Systems      Past Medical History:   Diagnosis Date   • Age-related osteoporosis without current pathological fracture 2022   • Anemia    • Benign breast disease    • Cancer ([facility])     Right breast   • Cellulitis of leg     May- right lower extremity   • CHF (congestive heart failure) ([facility])     Dr Bates follows   • Chronic kidney disease     Dr Hannah follows   • Chronic ulcer of right foot ([facility])     Non-pressure, history of    • Closed fracture of patella 2017   • Cluster headache    • Colon polyp    • Depression    • Diabetic peripheral neuropathy ([facility])    • Difficulty walking    • Diverticulosis    • Femoral fracture (HCC)     right   • Fibromyalgia, primary    • Fracture of left wrist     history of   • Gastroesophageal reflux    • Generalized  pain 02/02/2016   • Hiatal hernia    • Hyperlipidemia    • Hypertension    • Hypothyroidism    • Impingement syndrome of right shoulder    • Joint pain    • Left knee pain 05/17/2016   • Menopausal disorder    • Methicillin resistant Staphylococcus aureus infection 2012    after bladder surgery   • Nonischemic cardiomyopathy (HCC)     Ejection fraction 10% per 2-D echo with Doppler however she did have cardiac catheterization April 2015 which showed ejection fraction 20% with global hypokinesis and severe mitral insufficiency   • Osteoarthritis     generalized, sched jania TKA   • Osteomyelitis (HCC) 02/02/2016   • Pain in shoulder 08/02/2016   • Paroxysmal atrial fibrillation (HCC)     Dr Bates follows   • Radius/ulna fracture, left, closed, initial encounter 10/21/2017   • Shoulder pain, bilateral     has tears on both sides   • Sleep apnea    • Syncope, psychogenic 04/19/2017   • Thoracic back pain 02/02/2016   • Thoracic injuries    • Toe ulcer (HCC)     h/o to both feet, d/t shoes rubbing per patient   • Transient alteration of awareness 04/19/2017   • Traumatic closed nondisp torus fracture of distal radial metaphysis, right, initial encounter 2/28/2020     Past Surgical History:   Procedure Laterality Date   • ABDOMINAL SURGERY  2012    had to be reopened after bladder surgery d/t infection   • AMPUTATION FOOT / TOE Right 11/2013    Rt foot amputation MTP first toe   • BLADDER SURGERY  2012   • BREAST BIOPSY     • BREAST SURGERY  06/29/2015    Percutaneous ultrasound-guided placement of metal localized clip 1st lesion   • CARDIAC CATHETERIZATION  2015   • CARDIAC CATHETERIZATION N/A 1/25/2021    Procedure: Right and Left Heart Cath;  Surgeon: Nicholas Andrade MD;  Location: [facility] INVASIVE LOCATION;  Service: Cardiovascular;  Laterality: N/A;  drop in EF, with hx of NICM, SOA fatigue.  Discussed with RM   • CARDIAC CATHETERIZATION N/A 1/25/2021    Procedure: Left ventriculography;  Surgeon:  Nicholas Andrade MD;  Location:  [facility] CATH INVASIVE LOCATION;  Service: Cardiovascular;  Laterality: N/A;   • CARDIAC CATHETERIZATION N/A 1/25/2021    Procedure: Coronary angiography;  Surgeon: Nicholas Andrade MD;  Location:  [facility] CATH INVASIVE LOCATION;  Service: Cardiovascular;  Laterality: N/A;   • CATARACT EXTRACTION Bilateral 2017   • COLONOSCOPY     • DEBRIDEMENT  FOOT Right 01/2013    During hospitalization    • EPIDURAL BLOCK      4 chronic back pain and leg pain last epidurals done 5-2012 she had no benefit at all from this procedure.   • FEMUR FRACTURE SURGERY     • FOOT SURGERY Bilateral     several   • HERNIA REPAIR      At the abdomen February 2007 Dr. Mendez   • INCISIONAL HERNIA REPAIR  05/16/2014    Recurrent. Incarcerated. With mesh implantation   • MASTECTOMY Right 05/2015   • MASTECTOMY MODIFIED RADICAL W/ AXILLARY LYMPH NODES W/ OR W/O PECTORALIS MINOR Right    • SHOULDER SURGERY Right     x2 RCR   • TOTAL ABDOMINAL HYSTERECTOMY      with oophorectomy-Done June-2012 secondary to vaginal wall prolapse.   • TOTAL KNEE ARTHROPLASTY Right    • TOTAL KNEE ARTHROPLASTY Left 4/17/2018    Procedure: TOTAL KNEE ARTHROPLASTY;  Surgeon: Live Chambers MD;  Location:  [facility] OR;  Service: Orthopedics     Family History   Problem Relation Age of Onset   • Colonic polyp Mother    • Hypertension Mother    • Migraines Mother    • Mental illness Mother    • Depression Mother    • Arthritis Mother    • Coronary artery disease Father    • Other Father         Cardiac Disorder   • Colon cancer Father    • Kidney disease Father    • Cancer Father    • Diabetes Father    • Heart disease Father    • Hypertension Father    • Breast cancer Maternal Aunt    • Colon cancer Brother    • Alcohol abuse Brother    • Arthritis Sister    • Hypertension Brother    • Lung disease Brother    • Alcohol abuse Brother    • Malig Hyperthermia Neg Hx          Objective:  Vitals:    03/21/23 1359   BP: 129/72   Pulse: 65          03/21/23  1359   Weight: 95.3 kg (210 lb)     Body mass index is 34.95 kg/m².        Ortho Exam   She is alert and oriented x3.  Head is normocephalic.  She does have some ecchymosis around the right eye but there is no significant swelling.  The sclera clear.  Right upper extremity has no motor or sensory deficit but there is moderate pain.  She is in a posterior short arm splint and range of motion to the elbow or rotation causes moderate pain.  She has good capillary refill.  There is no ecchymosis or bruising to the arm after the short of the splint was removed for inspection of the    Assessment:        1. Closed nondisplaced transverse fracture of shaft of right ulna, initial encounter           Plan: I reviewed the history with the patient and her daughter her x-rays and her exam.  She has a nondisplaced ulnar shaft fracture.  Recommend treatment options converted to a long-arm posterior fiberglass splint.  After cast padding application for padding the long-arm posterior splint was applied with the elbow at 90 degrees in the wrist neutral position.  She was instructed on keeping the arm elevated above the level of the heart and the cradle sling.  She gets pain medicine through pain management.  She will return to see me in 2 weeks with an x-ray to ensure that the fracture remains nondisplaced.  Patient was in agreement            Work Status:    LOUANN query complete.    Orders:  No orders of the defined types were placed in this encounter.      Medications:  No orders of the defined types were placed in this encounter.      Followup:  Return in about 2 weeks (around 4/4/2023).          Dictated utilizing Dragon dictation

## 2023-03-31 ENCOUNTER — PATIENT OUTREACH (OUTPATIENT)
Dept: CASE MANAGEMENT | Facility: OTHER | Age: 78
End: 2023-03-31
Payer: MEDICARE

## 2023-03-31 NOTE — OUTREACH NOTE
"AMBULATORY CASE MANAGEMENT NOTE    Name and Relationship of Patient/Support Person: Jung Short \"SHEILA\" - Self  Patient Outreach  RN-ACM outreach with patient.  Discussed 3/20/23  ED visit regarding closed nondisplaced transverse fracture of shaft of right ulna; contusion of right orbital tissues and pain right shoulder with fall. Patient treated and discharged home . Patient states to be compliant with ED recommendations and states to have had 3/21/23 ortho appointment with 4/4/23 scheduled follow up. Patient states to have splint and sling in place to right arm; stating no difficulty with numbness or tingling but does have discomfort. Patient states to have contusion to right eye but states no difficulty with vision. Patient states to have broken glasses with fall, ambulating with walker; lives alone and has assistance from daughter as needed. Patient states to be compliant with medications and no difficulty with fever; chest pain; SOB; appetite but does have difficulty with sleeping as she has in the past. Reviewed with patient ED AVS recommendations; education; role of RN-ACM and HRCM case management services. Patient verbalized understanding and states to appreciate outreach. Patient declines needs for further outreach at this time. No further questions voiced at this time.    Adult Patient Profile  Questions/Answers    Flowsheet Row Most Recent Value   Symptoms/Conditions Managed at Home musculoskeletal   Musculoskeletal Symptoms/Conditions unsteady gait, fracture  [Nondisplaced transverse fracture of shaft of right ulna]   Musculoskeletal Management Strategies medical device, other (see comments)  [Splint,  Physician follow up]   Barriers to Taking Medication as Prescribed none   Primary Source of Support/Comfort child(shira)   Name of Support/Comfort Primary Source Patient states to have assistance from daughter   People in Home alone        Social Work Assessment  Questions/Answers    Flowsheet Row " Most Recent Value   People in Home alone   Functional Status Comments Patient states to be independent with ADLs,  light meal preparation,  ambulating with walker and receiving assistance with transporation from daughter.        Send Education  Questions/Answers    Flowsheet Row Most Recent Value   Other Patient Education/Resources  [facility], [place] Nurse Call Line, Advanced Care Planning, MyChart   Advanced Directives: Patient Has      SDOH updated and reviewed with the patient during this program:  Financial Resource Strain: Low Risk    • Difficulty of Paying Living Expenses: Not hard at all      Food Insecurity: No Food Insecurity   • Worried About Running Out of Food in the Last Year: Never true   • Ran Out of Food in the Last Year: Never true      Transportation Needs: No Transportation Needs   • Lack of Transportation (Medical): No   • Lack of Transportation (Non-Medical): No     Education Documentation  Unresolved/Worsening Symptoms, taught by Shanda Gerardo RN at 3/31/2023  1:12 PM.  Learner: Patient  Readiness: Acceptance  Method: Explanation  Response: Verbalizes Understanding    Provider Follow-Up, taught by Shanda Gerardo RN at 3/31/2023  1:12 PM.  Learner: Patient  Readiness: Acceptance  Method: Explanation  Response: Verbalizes Understanding    Home Safety, taught by Shanda Gerardo RN at 3/31/2023  1:12 PM.  Learner: Patient  Readiness: Acceptance  Method: Explanation  Response: Verbalizes Understanding    Fluid/Food Intake, taught by Shanda Gerardo RN at 3/31/2023  1:12 PM.  Learner: Patient  Readiness: Acceptance  Method: Explanation  Response: Verbalizes Understanding    Activity, taught by Shanda Gerardo RN at 3/31/2023  1:12 PM.  Learner: Patient  Readiness: Acceptance  Method: Explanation  Response: Verbalizes Understanding    back health, taught by Shanda Gerardo RN at 3/31/2023  1:12 PM.  Learner: Patient  Readiness: Acceptance  Method: Explanation  Response:  Verbalizes Understanding    Energy Conservation, taught by Shanda Gerardo, RN at 3/31/2023  1:12 PM.  Learner: Patient  Readiness: Acceptance  Method: Explanation  Response: Verbalizes Understanding          Shanda FREEMAN  Ambulatory Case Management    3/31/2023, 13:12 EDT

## 2023-04-04 ENCOUNTER — HOSPITAL ENCOUNTER (OUTPATIENT)
Dept: INFUSION THERAPY | Facility: HOSPITAL | Age: 78
Discharge: HOME | End: 2023-04-04
Admitting: NURSE PRACTITIONER
Payer: MEDICARE

## 2023-04-04 ENCOUNTER — OFFICE VISIT (OUTPATIENT)
Dept: ORTHOPEDIC SURGERY | Facility: CLINIC | Age: 78
End: 2023-04-04
Payer: MEDICARE

## 2023-04-04 VITALS
SYSTOLIC BLOOD PRESSURE: 112 MMHG | OXYGEN SATURATION: 97 % | DIASTOLIC BLOOD PRESSURE: 56 MMHG | HEART RATE: 74 BPM | TEMPERATURE: 97.4 F | WEIGHT: 200 LBS | HEIGHT: 65 IN | BODY MASS INDEX: 33.32 KG/M2 | RESPIRATION RATE: 18 BRPM

## 2023-04-04 VITALS — HEIGHT: 65 IN | WEIGHT: 210 LBS | BODY MASS INDEX: 34.99 KG/M2

## 2023-04-04 DIAGNOSIS — M81.0 AGE-RELATED OSTEOPOROSIS WITHOUT CURRENT PATHOLOGICAL FRACTURE: Primary | ICD-10-CM

## 2023-04-04 DIAGNOSIS — S52.224A CLOSED NONDISPLACED TRANSVERSE FRACTURE OF SHAFT OF RIGHT ULNA, INITIAL ENCOUNTER: Primary | ICD-10-CM

## 2023-04-04 LAB
25(OH)D3 SERPL-MCNC: 53.4 NG/ML (ref 30–100)
ALBUMIN SERPL-MCNC: 3.8 G/DL (ref 3.5–5.2)
ALBUMIN/GLOB SERPL: 1.4 G/DL
ALP SERPL-CCNC: 76 U/L (ref 39–117)
ALT SERPL W P-5'-P-CCNC: 7 U/L (ref 1–33)
ANION GAP SERPL CALCULATED.3IONS-SCNC: 12.5 MMOL/L (ref 5–15)
AST SERPL-CCNC: 14 U/L (ref 1–32)
BILIRUB SERPL-MCNC: 0.5 MG/DL (ref 0–1.2)
BUN SERPL-MCNC: 15 MG/DL (ref 8–23)
BUN/CREAT SERPL: 13 (ref 7–25)
CALCIUM SPEC-SCNC: 9.9 MG/DL (ref 8.6–10.5)
CHLORIDE SERPL-SCNC: 99 MMOL/L (ref 98–107)
CO2 SERPL-SCNC: 25.5 MMOL/L (ref 22–29)
CREAT SERPL-MCNC: 1.15 MG/DL (ref 0.57–1)
EGFRCR SERPLBLD CKD-EPI 2021: 48.6 ML/MIN/1.73
GLOBULIN UR ELPH-MCNC: 2.8 GM/DL
GLUCOSE SERPL-MCNC: 146 MG/DL (ref 65–99)
MAGNESIUM SERPL-MCNC: 1.4 MG/DL (ref 1.6–2.4)
PHOSPHATE SERPL-MCNC: 3.4 MG/DL (ref 2.5–4.5)
POTASSIUM SERPL-SCNC: 3.2 MMOL/L (ref 3.5–5.2)
PROT SERPL-MCNC: 6.6 G/DL (ref 6–8.5)
SODIUM SERPL-SCNC: 137 MMOL/L (ref 136–145)

## 2023-04-04 PROCEDURE — 83735 ASSAY OF MAGNESIUM: CPT | Performed by: NURSE PRACTITIONER

## 2023-04-04 PROCEDURE — 82306 VITAMIN D 25 HYDROXY: CPT | Performed by: NURSE PRACTITIONER

## 2023-04-04 PROCEDURE — 1160F RVW MEDS BY RX/DR IN RCRD: CPT | Performed by: ORTHOPAEDIC SURGERY

## 2023-04-04 PROCEDURE — 29125 APPL SHORT ARM SPLINT STATIC: CPT | Performed by: ORTHOPAEDIC SURGERY

## 2023-04-04 PROCEDURE — 96372 THER/PROPH/DIAG INJ SC/IM: CPT

## 2023-04-04 PROCEDURE — 84100 ASSAY OF PHOSPHORUS: CPT | Performed by: NURSE PRACTITIONER

## 2023-04-04 PROCEDURE — 1159F MED LIST DOCD IN RCRD: CPT | Performed by: ORTHOPAEDIC SURGERY

## 2023-04-04 PROCEDURE — 73090 X-RAY EXAM OF FOREARM: CPT | Performed by: ORTHOPAEDIC SURGERY

## 2023-04-04 PROCEDURE — 80053 COMPREHEN METABOLIC PANEL: CPT | Performed by: NURSE PRACTITIONER

## 2023-04-04 PROCEDURE — 99024 POSTOP FOLLOW-UP VISIT: CPT | Performed by: ORTHOPAEDIC SURGERY

## 2023-04-04 PROCEDURE — 25010000002 DENOSUMAB 60 MG/ML SOLUTION PREFILLED SYRINGE: Performed by: NURSE PRACTITIONER

## 2023-04-04 PROCEDURE — 36415 COLL VENOUS BLD VENIPUNCTURE: CPT

## 2023-04-04 RX ADMIN — DENOSUMAB 60 MG: 60 INJECTION SUBCUTANEOUS at 14:56

## 2023-04-04 NOTE — PROGRESS NOTES
Subjective: Right ulnar shaft fracture     Patient ID: Jung Short is a 77 y.o. female.    Chief Complaint:    History of Present Illness patient is approximately 10 days following her injury.  He is doing fairly well although the posterior splint is starting to bother her around the upper arm.       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   Tobacco Use   • Smoking status: Former     Packs/day: 3.00     Years: 30.00     Pack years: 90.00     Types: Cigarettes     Quit date:      Years since quittin.2   • Smokeless tobacco: Never   Vaping Use   • Vaping Use: Never used   Substance and Sexual Activity   • Alcohol use: No     Comment: Caffeine use: 3 cups daily    • Drug use: No   • Sexual activity: Defer     Partners: Male     Comment: celibate      Review of Systems      Past Medical History:   Diagnosis Date   • Age-related osteoporosis without current pathological fracture 2022   • Anemia    • Benign breast disease    • Cancer 2015    Right breast   • Cellulitis of leg     May- right lower extremity   • CHF (congestive heart failure)     Dr Bates follows   • Chronic kidney disease     Dr Hannah follows   • Chronic ulcer of right foot     Non-pressure, history of    • Closed fracture of patella 2017   • Cluster headache    • Colon polyp    • Depression    • Diabetic peripheral neuropathy    • Difficulty walking    • Diverticulosis    • Femoral fracture     right   • Fibromyalgia, primary    • Fracture of left wrist     history of   • Gastroesophageal reflux    • Generalized pain 2016   • Hiatal hernia    • Hyperlipidemia    • Hypertension    • Hypothyroidism    • Impingement syndrome of right shoulder    • Joint pain    • Left knee pain 2016   • Menopausal disorder    • Methicillin resistant Staphylococcus aureus infection     after bladder surgery   • Nonischemic cardiomyopathy     Ejection fraction 10% per 2-D  echo with Doppler however she did have cardiac catheterization April 2015 which showed ejection fraction 20% with global hypokinesis and severe mitral insufficiency   • Osteoarthritis     generalized, sched jania TKA   • Osteomyelitis 02/02/2016   • Pain in shoulder 08/02/2016   • Paroxysmal atrial fibrillation     Dr Bates follows   • Radius/ulna fracture, left, closed, initial encounter 10/21/2017   • Shoulder pain, bilateral     has tears on both sides   • Sleep apnea    • Syncope, psychogenic 04/19/2017   • Thoracic back pain 02/02/2016   • Thoracic injuries    • Toe ulcer     h/o to both feet, d/t shoes rubbing per patient   • Transient alteration of awareness 04/19/2017   • Traumatic closed nondisp torus fracture of distal radial metaphysis, right, initial encounter 2/28/2020     Past Surgical History:   Procedure Laterality Date   • ABDOMINAL SURGERY  2012    had to be reopened after bladder surgery d/t infection   • AMPUTATION FOOT / TOE Right 11/2013    Rt foot amputation MTP first toe   • BLADDER SURGERY  2012   • BREAST BIOPSY     • BREAST SURGERY  06/29/2015    Percutaneous ultrasound-guided placement of metal localized clip 1st lesion   • CARDIAC CATHETERIZATION  2015   • CARDIAC CATHETERIZATION N/A 1/25/2021    Procedure: Right and Left Heart Cath;  Surgeon: Nicholas Andrade MD;  Location:  [facility] CATH INVASIVE LOCATION;  Service: Cardiovascular;  Laterality: N/A;  drop in EF, with hx of NICM, SOA fatigue.  Discussed with RM   • CARDIAC CATHETERIZATION N/A 1/25/2021    Procedure: Left ventriculography;  Surgeon: Nicholas Andrade MD;  Location:  [facility] CATH INVASIVE LOCATION;  Service: Cardiovascular;  Laterality: N/A;   • CARDIAC CATHETERIZATION N/A 1/25/2021    Procedure: Coronary angiography;  Surgeon: Nicholas Andrade MD;  Location:  [facility] CATH INVASIVE LOCATION;  Service: Cardiovascular;  Laterality: N/A;   • CATARACT EXTRACTION Bilateral 2017   • COLONOSCOPY     • DEBRIDEMENT  FOOT  Right 01/2013    During hospitalization    • EPIDURAL BLOCK      4 chronic back pain and leg pain last epidurals done 5-2012 she had no benefit at all from this procedure.   • FEMUR FRACTURE SURGERY     • FOOT SURGERY Bilateral     several   • HERNIA REPAIR      At the abdomen February 2007 Dr. Mendez   • INCISIONAL HERNIA REPAIR  05/16/2014    Recurrent. Incarcerated. With mesh implantation   • MASTECTOMY Right 05/2015   • MASTECTOMY MODIFIED RADICAL W/ AXILLARY LYMPH NODES W/ OR W/O PECTORALIS MINOR Right    • SHOULDER SURGERY Right     x2 RCR   • TOTAL ABDOMINAL HYSTERECTOMY      with oophorectomy-Done June-2012 secondary to vaginal wall prolapse.   • TOTAL KNEE ARTHROPLASTY Right    • TOTAL KNEE ARTHROPLASTY Left 4/17/2018    Procedure: TOTAL KNEE ARTHROPLASTY;  Surgeon: Live Chambers MD;  Location: [facility];  Service: Orthopedics     Family History   Problem Relation Age of Onset   • Colonic polyp Mother    • Hypertension Mother    • Migraines Mother    • Mental illness Mother    • Depression Mother    • Arthritis Mother    • Coronary artery disease Father    • Other Father         Cardiac Disorder   • Colon cancer Father    • Kidney disease Father    • Cancer Father    • Diabetes Father    • Heart disease Father    • Hypertension Father    • Breast cancer Maternal Aunt    • Colon cancer Brother    • Alcohol abuse Brother    • Arthritis Sister    • Hypertension Brother    • Lung disease Brother    • Alcohol abuse Brother    • Malig Hyperthermia Neg Hx          Objective:  There were no vitals filed for this visit.      04/04/23  1306   Weight: 95.3 kg (210 lb)     Body mass index is 34.95 kg/m².        Ortho Exam   AP and lateral show satisfactory alignment.  The AP is anatomical and the lateral is offset by maybe 5 mm.  Again there is no motor or sensory deficit.  The splint was removed and a new posterior splint was applied.  There is a long-arm splint immobilizing the wrist in neutral position of the elbow at  90 degrees.  Cast padding was applied in the posterior fiberglass splint held in place with Ace bandages.  There is no ecchymosis or bruising noted before the splint was applied    Assessment:        1. Closed nondisplaced transverse fracture of shaft of right ulna, initial encounter           Plan: Return April 19 with a repeat x-ray in the splint.  Once callus is seen and Converted to a short arm splint.  Patient was in agreement.  Answered all questions            Work Status:    LOUANN query complete.    Orders:  Orders Placed This Encounter   Procedures   • XR Forearm 2 View Right       Medications:  No orders of the defined types were placed in this encounter.      Followup:  Return in about 15 days (around 4/19/2023).          Dictated utilizing Dragon dictation

## 2023-04-04 NOTE — PATIENT INSTRUCTIONS
"            Call [facility] at [phone] if you have any problems or concerns.    We know you have a Choice in healthcare and appreciate you using [facility].  Our purpose is to provide you \"Excellent Care\".  We hope that you will always choose us in the future and continue to recommend us to your family and friends.              "

## 2023-04-04 NOTE — NURSING NOTE
Pt arrived for appt. VSS. Labs drawn via venipuncture. Medication given per MD order. Pt tolerated well. Pt discharged via wheelchair at 15:11 PM. AVS went over and copy given to pt. No complaints at this time.

## 2023-04-11 ENCOUNTER — TELEPHONE (OUTPATIENT)
Dept: ORTHOPEDIC SURGERY | Facility: CLINIC | Age: 78
End: 2023-04-11
Payer: MEDICARE

## 2023-04-11 ENCOUNTER — OFFICE VISIT (OUTPATIENT)
Dept: ORTHOPEDIC SURGERY | Facility: CLINIC | Age: 78
End: 2023-04-11
Payer: MEDICARE

## 2023-04-11 VITALS — WEIGHT: 200 LBS | HEIGHT: 65 IN | BODY MASS INDEX: 33.32 KG/M2

## 2023-04-11 DIAGNOSIS — S52.224D CLOSED NONDISPLACED TRANSVERSE FRACTURE OF SHAFT OF RIGHT ULNA WITH ROUTINE HEALING, SUBSEQUENT ENCOUNTER: Primary | ICD-10-CM

## 2023-04-11 NOTE — TELEPHONE ENCOUNTER
"  Caller: Jung Short \"SHEILA\"    Relationship: Self    Best call back number:     What is the best time to reach you: ANY     Who are you requesting to speak with (clinical staff, provider,  specific staff member): CLINICAL    What was the call regarding: PATIENTS RIGHT ARM IS NOT SITTING CORRECT IN CAST AND SOMETHING IS OFF.  PLEASE CONTACT PATIENT FOR GUIDANCE.  CAST IS MOVING     Do you require a callback: YES          "

## 2023-04-11 NOTE — PROGRESS NOTES
Subjective:     Patient ID: Jung Short is a 77 y.o. female.    Chief Complaint:    History of Present Illness  Jung Short returns to clinic today for evaluation of right ulnar shaft fracture.  She has been treated by my partner nonoperatively in a posterior long-arm splint.  She states last night the splint became too tight and then suddenly became very loose.  She thinks that the splint broke.  Additionally she has been complaining of worsening right forearm pain.  She denies any numbness or tingling in her hands or any new trauma.     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   Tobacco Use   • Smoking status: Former     Packs/day: 3.00     Years: 30.00     Pack years: 90.00     Types: Cigarettes     Quit date:      Years since quittin.3   • Smokeless tobacco: Never   Vaping Use   • Vaping Use: Never used   Substance and Sexual Activity   • Alcohol use: No     Comment: Caffeine use: 3 cups daily    • Drug use: No   • Sexual activity: Defer     Partners: Male     Comment: celibate      Past Medical History:   Diagnosis Date   • Age-related osteoporosis without current pathological fracture 2022   • Anemia    • Benign breast disease    • Cancer 2015    Right breast   • Cellulitis of leg     May- right lower extremity   • CHF (congestive heart failure)     Dr Bates follows   • Chronic kidney disease     Dr Hannah follows   • Chronic ulcer of right foot     Non-pressure, history of    • Closed fracture of patella 2017   • Cluster headache    • Colon polyp    • Depression    • Diabetic peripheral neuropathy    • Difficulty walking    • Diverticulosis    • Femoral fracture     right   • Fibromyalgia, primary    • Fracture of left wrist     history of   • Gastroesophageal reflux    • Generalized pain 2016   • Hiatal hernia    • Hyperlipidemia    • Hypertension    • Hypothyroidism    • Impingement syndrome of right shoulder     • Joint pain    • Left knee pain 05/17/2016   • Menopausal disorder    • Methicillin resistant Staphylococcus aureus infection 2012    after bladder surgery   • Nonischemic cardiomyopathy     Ejection fraction 10% per 2-D echo with Doppler however she did have cardiac catheterization April 2015 which showed ejection fraction 20% with global hypokinesis and severe mitral insufficiency   • Osteoarthritis     generalized, sched jania TKA   • Osteomyelitis 02/02/2016   • Pain in shoulder 08/02/2016   • Paroxysmal atrial fibrillation     Dr Bates follows   • Radius/ulna fracture, left, closed, initial encounter 10/21/2017   • Shoulder pain, bilateral     has tears on both sides   • Sleep apnea    • Syncope, psychogenic 04/19/2017   • Thoracic back pain 02/02/2016   • Thoracic injuries    • Toe ulcer     h/o to both feet, d/t shoes rubbing per patient   • Transient alteration of awareness 04/19/2017   • Traumatic closed nondisp torus fracture of distal radial metaphysis, right, initial encounter 2/28/2020     Past Surgical History:   Procedure Laterality Date   • ABDOMINAL SURGERY  2012    had to be reopened after bladder surgery d/t infection   • AMPUTATION FOOT / TOE Right 11/2013    Rt foot amputation MTP first toe   • BLADDER SURGERY  2012   • BREAST BIOPSY     • BREAST SURGERY  06/29/2015    Percutaneous ultrasound-guided placement of metal localized clip 1st lesion   • CARDIAC CATHETERIZATION  2015   • CARDIAC CATHETERIZATION N/A 1/25/2021    Procedure: Right and Left Heart Cath;  Surgeon: Nicholas Andrade MD;  Location: [facility] CATH INVASIVE LOCATION;  Service: Cardiovascular;  Laterality: N/A;  drop in EF, with hx of NICM, SOA fatigue.  Discussed with    • CARDIAC CATHETERIZATION N/A 1/25/2021    Procedure: Left ventriculography;  Surgeon: Nicholas Andrade MD;  Location:  [facility] CATH INVASIVE LOCATION;  Service: Cardiovascular;  Laterality: N/A;   • CARDIAC CATHETERIZATION N/A 1/25/2021    Procedure:  "Coronary angiography;  Surgeon: Nicholas Andrade MD;  Location:  [facility] CATH INVASIVE LOCATION;  Service: Cardiovascular;  Laterality: N/A;   • CATARACT EXTRACTION Bilateral 2017   • COLONOSCOPY     • DEBRIDEMENT  FOOT Right 01/2013    During hospitalization    • EPIDURAL BLOCK      4 chronic back pain and leg pain last epidurals done 5-2012 she had no benefit at all from this procedure.   • FEMUR FRACTURE SURGERY     • FOOT SURGERY Bilateral     several   • HERNIA REPAIR      At the abdomen February 2007 Dr. Mendez   • INCISIONAL HERNIA REPAIR  05/16/2014    Recurrent. Incarcerated. With mesh implantation   • MASTECTOMY Right 05/2015   • MASTECTOMY MODIFIED RADICAL W/ AXILLARY LYMPH NODES W/ OR W/O PECTORALIS MINOR Right    • SHOULDER SURGERY Right     x2 RCR   • TOTAL ABDOMINAL HYSTERECTOMY      with oophorectomy-Done June-2012 secondary to vaginal wall prolapse.   • TOTAL KNEE ARTHROPLASTY Right    • TOTAL KNEE ARTHROPLASTY Left 4/17/2018    Procedure: TOTAL KNEE ARTHROPLASTY;  Surgeon: Live Chambers MD;  Location:  [facility];  Service: Orthopedics       Family History   Problem Relation Age of Onset   • Colonic polyp Mother    • Hypertension Mother    • Migraines Mother    • Mental illness Mother    • Depression Mother    • Arthritis Mother    • Coronary artery disease Father    • Other Father         Cardiac Disorder   • Colon cancer Father    • Kidney disease Father    • Cancer Father    • Diabetes Father    • Heart disease Father    • Hypertension Father    • Breast cancer Maternal Aunt    • Colon cancer Brother    • Alcohol abuse Brother    • Arthritis Sister    • Hypertension Brother    • Lung disease Brother    • Alcohol abuse Brother    • Malig Hyperthermia Neg Hx                  Objective:  Vitals:    04/11/23 1426   Weight: 90.7 kg (200 lb)   Height: 165.1 cm (65\")         04/11/23  1426   Weight: 90.7 kg (200 lb)     Body mass index is 33.28 kg/m².        Right Hand Exam     Tenderness   The patient " is experiencing tenderness in the ulnar area.    Range of Motion   Wrist   Extension: abnormal   Flexion: abnormal   Pronation: abnormal   Supination: abnormal     Muscle Strength   : 4/5     Other   Erythema: absent  Scars: absent  Sensation: normal  Pulse: present    Comments:  No obvious deformity.               Imagin views of the right forearm ordered and reviewed by myself in the office today  Indication: Right ulnar shaft fracture  Findings: X-rays demonstrate a right ulnar shaft fracture with no significant displacement.  There is mildly increased angulation from x-rays taken at last visit with Dr. Chambers.  This level of angulation is well within the acceptable limits of nonoperative treatment.  Comparative studies: X-rays last week with Dr. Chambers    Assessment:        1. Closed nondisplaced transverse fracture of shaft of right ulna with routine healing, subsequent encounter           Plan:          1. Discussed treatment options at length with patient at today's visit.  I discussed with the patient that at this point time she does not have enough bony callus visible on radiographs that I think we should transition her to a short arm removable players.  The patient appears to have broken the prior splint right at her elbow.  The patient was placed into another well-padded posterior long-arm splint with special attention taken to pad both ends to prevent fiberglass irritation of her skin.  The patient has a regularly scheduled follow-up visit with Dr. Chambers for next week and she should keep that visit.  2. Follow up: Next week with Dr. Reyes Short was in agreement with plan and had all questions answered.     Medications:  No orders of the defined types were placed in this encounter.      Followup:  No follow-ups on file.    Diagnoses and all orders for this visit:    1. Closed nondisplaced transverse fracture of shaft of right ulna with routine healing, subsequent encounter  (Primary)  -     XR Forearm 2 View Right          Dictated utilizing Dragon dictation

## 2023-04-12 NOTE — PROGRESS NOTES
Tried to call patient to get her scheduled but was unable to reach and left a voicemail for her to call back to schedule

## 2023-04-13 DIAGNOSIS — M47.816 LUMBAR FACET ARTHROPATHY: ICD-10-CM

## 2023-04-13 DIAGNOSIS — Z79.899 ENCOUNTER FOR LONG-TERM (CURRENT) USE OF HIGH-RISK MEDICATION: ICD-10-CM

## 2023-04-13 RX ORDER — HYDROCODONE BITARTRATE AND ACETAMINOPHEN 5; 325 MG/1; MG/1
TABLET ORAL
Qty: 120 TABLET | Refills: 0 | Status: SHIPPED | OUTPATIENT
Start: 2023-04-19

## 2023-04-13 NOTE — TELEPHONE ENCOUNTER
Medication Refill Request    Date of phone call: 23    Medication being requested: Norco 5-325 mg   si tab po q 6 hrs prn  Qty: 120    Date of last visit: 3/14/23    Date of last refill:     LOUANN up to date?:     Next Follow up?: 23    Any new pertinent information? (i.e, new medication allergies, new use of medications, change in patient's health or condition, non-compliance or inconsistency with prescribing agreement?):

## 2023-04-19 ENCOUNTER — OFFICE VISIT (OUTPATIENT)
Dept: ORTHOPEDIC SURGERY | Facility: CLINIC | Age: 78
End: 2023-04-19
Payer: MEDICARE

## 2023-04-19 VITALS — BODY MASS INDEX: 33.32 KG/M2 | WEIGHT: 200 LBS | HEIGHT: 65 IN

## 2023-04-19 DIAGNOSIS — S52.224D CLOSED NONDISPLACED TRANSVERSE FRACTURE OF SHAFT OF RIGHT ULNA WITH ROUTINE HEALING, SUBSEQUENT ENCOUNTER: Primary | ICD-10-CM

## 2023-04-19 NOTE — PROGRESS NOTES
Subjective: Right ulnar shaft fracture     Patient ID: Jung Short is a 77 y.o. female.    Chief Complaint:    History of Present Illness patient 4 weeks out from her fracture.  She had another fall a couple days ago with some soreness in the forearm but is not significant.  Again she did multiple falls.       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   HENT: Negative for hearing loss, nosebleeds, sore throat and tinnitus.    Eyes: Negative for pain and visual disturbance.   Respiratory: Negative for cough, shortness of breath and wheezing.    Cardiovascular: Negative for chest pain and palpitations.   Gastrointestinal: Negative for abdominal pain, diarrhea, nausea and vomiting.   Endocrine: Negative for cold intolerance, heat intolerance and polydipsia.   Genitourinary: Negative for difficulty urinating, dysuria and hematuria.   Musculoskeletal: Positive for arthralgias and myalgias. Negative for joint swelling.   Skin: Negative for rash and wound.   Allergic/Immunologic: Negative for environmental allergies.   Neurological: Negative for dizziness, syncope and numbness.   Hematological: Does not bruise/bleed easily.   Psychiatric/Behavioral: Negative for dysphoric mood and sleep disturbance. The patient is not nervous/anxious.          Past Medical History:   Diagnosis Date   • Age-related osteoporosis without current pathological fracture  02/25/2022   • Anemia    • Benign breast disease    • Cancer 2015    Right breast   • Cellulitis of leg     May-2003 right lower extremity   • CHF (congestive heart failure)     Dr Bates follows   • Chronic kidney disease     Dr Hannah follows   • Chronic ulcer of right foot     Non-pressure, history of    • Closed fracture of patella 09/05/2017   • Cluster headache    • Colon polyp    • Depression    • Diabetic peripheral neuropathy    • Difficulty walking    • Diverticulosis    • Femoral fracture     right   • Fibromyalgia, primary    • Fracture of left wrist     history of   • Gastroesophageal reflux    • Generalized pain 02/02/2016   • Hiatal hernia    • Hyperlipidemia    • Hypertension    • Hypothyroidism    • Impingement syndrome of right shoulder    • Joint pain    • Left knee pain 05/17/2016   • Menopausal disorder    • Methicillin resistant Staphylococcus aureus infection 2012    after bladder surgery   • Nonischemic cardiomyopathy     Ejection fraction 10% per 2-D echo with Doppler however she did have cardiac catheterization April 2015 which showed ejection fraction 20% with global hypokinesis and severe mitral insufficiency   • Osteoarthritis     generalized, sched jania TKA   • Osteomyelitis 02/02/2016   • Pain in shoulder 08/02/2016   • Paroxysmal atrial fibrillation     Dr Bates follows   • Radius/ulna fracture, left, closed, initial encounter 10/21/2017   • Shoulder pain, bilateral     has tears on both sides   • Sleep apnea    • Syncope, psychogenic 04/19/2017   • Thoracic back pain 02/02/2016   • Thoracic injuries    • Toe ulcer     h/o to both feet, d/t shoes rubbing per patient   • Transient alteration of awareness 04/19/2017   • Traumatic closed nondisp torus fracture of distal radial metaphysis, right, initial encounter 2/28/2020     Past Surgical History:   Procedure Laterality Date   • ABDOMINAL SURGERY  2012    had to be reopened after bladder surgery d/t infection   • AMPUTATION FOOT  / TOE Right 11/2013    Rt foot amputation MTP first toe   • BLADDER SURGERY  2012   • BREAST BIOPSY     • BREAST SURGERY  06/29/2015    Percutaneous ultrasound-guided placement of metal localized clip 1st lesion   • CARDIAC CATHETERIZATION  2015   • CARDIAC CATHETERIZATION N/A 1/25/2021    Procedure: Right and Left Heart Cath;  Surgeon: Nicholas Andrade MD;  Location:  [facility] CATH INVASIVE LOCATION;  Service: Cardiovascular;  Laterality: N/A;  drop in EF, with hx of NICM, SOA fatigue.  Discussed with RM   • CARDIAC CATHETERIZATION N/A 1/25/2021    Procedure: Left ventriculography;  Surgeon: Nicholas Andrade MD;  Location:  [facility] CATH INVASIVE LOCATION;  Service: Cardiovascular;  Laterality: N/A;   • CARDIAC CATHETERIZATION N/A 1/25/2021    Procedure: Coronary angiography;  Surgeon: Nicholas Andrade MD;  Location:  [facility] CATH INVASIVE LOCATION;  Service: Cardiovascular;  Laterality: N/A;   • CATARACT EXTRACTION Bilateral 2017   • COLONOSCOPY     • DEBRIDEMENT  FOOT Right 01/2013    During hospitalization    • EPIDURAL BLOCK      4 chronic back pain and leg pain last epidurals done 5-2012 she had no benefit at all from this procedure.   • FEMUR FRACTURE SURGERY     • FOOT SURGERY Bilateral     several   • HERNIA REPAIR      At the abdomen February 2007 Dr. Mendez   • INCISIONAL HERNIA REPAIR  05/16/2014    Recurrent. Incarcerated. With mesh implantation   • MASTECTOMY Right 05/2015   • MASTECTOMY MODIFIED RADICAL W/ AXILLARY LYMPH NODES W/ OR W/O PECTORALIS MINOR Right    • SHOULDER SURGERY Right     x2 RCR   • TOTAL ABDOMINAL HYSTERECTOMY      with oophorectomy-Done June-2012 secondary to vaginal wall prolapse.   • TOTAL KNEE ARTHROPLASTY Right    • TOTAL KNEE ARTHROPLASTY Left 4/17/2018    Procedure: TOTAL KNEE ARTHROPLASTY;  Surgeon: Live Chambers MD;  Location: [facility];  Service: Orthopedics     Family History   Problem Relation Age of Onset   • Colonic polyp Mother    • Hypertension Mother    •  Migraines Mother    • Mental illness Mother    • Depression Mother    • Arthritis Mother    • Coronary artery disease Father    • Other Father         Cardiac Disorder   • Colon cancer Father    • Kidney disease Father    • Cancer Father    • Diabetes Father    • Heart disease Father    • Hypertension Father    • Breast cancer Maternal Aunt    • Colon cancer Brother    • Alcohol abuse Brother    • Arthritis Sister    • Hypertension Brother    • Lung disease Brother    • Alcohol abuse Brother    • Malig Hyperthermia Neg Hx          Objective:  There were no vitals filed for this visit.      04/19/23  1426   Weight: 90.7 kg (200 lb)     Body mass index is 33.28 kg/m².        Ortho Exam   AP and lateral shows excellent alignment but abundant callus is not seen.  There is no motor or sensory deficit.  Splint is in good repair.    Assessment:        1. Closed nondisplaced transverse fracture of shaft of right ulna with routine healing, subsequent encounter           Plan: Return in 3 weeks with x-rays out of the splint we will probably convert to a short arm removable splint at that time.  Answered all questions            Work Status:    LOUANN query complete.    Orders:  Orders Placed This Encounter   Procedures   • XR Forearm 2 View Right       Medications:  No orders of the defined types were placed in this encounter.      Followup:  Return in about 3 weeks (around 5/10/2023).          Dictated utilizing Dragon dictation

## 2023-04-21 ENCOUNTER — HOSPITAL ENCOUNTER (EMERGENCY)
Facility: HOSPITAL | Age: 78
Discharge: HOME | End: 2023-04-21
Attending: EMERGENCY MEDICINE
Payer: MEDICARE

## 2023-04-21 ENCOUNTER — APPOINTMENT (OUTPATIENT)
Dept: CT IMAGING | Facility: HOSPITAL | Age: 78
End: 2023-04-21
Payer: MEDICARE

## 2023-04-21 VITALS
WEIGHT: 224.4 LBS | DIASTOLIC BLOOD PRESSURE: 66 MMHG | TEMPERATURE: 98.5 F | SYSTOLIC BLOOD PRESSURE: 149 MMHG | HEIGHT: 65 IN | RESPIRATION RATE: 18 BRPM | BODY MASS INDEX: 37.39 KG/M2 | HEART RATE: 64 BPM | OXYGEN SATURATION: 96 %

## 2023-04-21 DIAGNOSIS — S00.03XA TRAUMATIC HEMATOMA OF SCALP, INITIAL ENCOUNTER: Primary | ICD-10-CM

## 2023-04-21 PROCEDURE — 70450 CT HEAD/BRAIN W/O DYE: CPT

## 2023-04-21 PROCEDURE — 99283 EMERGENCY DEPT VISIT LOW MDM: CPT

## 2023-04-21 PROCEDURE — 72125 CT NECK SPINE W/O DYE: CPT

## 2023-04-21 RX ORDER — NAPROXEN 500 MG/1
500 TABLET ORAL 2 TIMES DAILY PRN
Qty: 20 TABLET | Refills: 0 | Status: SHIPPED | OUTPATIENT
Start: 2023-04-21
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
## 2023-04-21 NOTE — DISCHARGE INSTRUCTIONS
Medications as directed. Follow up with your PCP as above. Return to the ED for worsening symptoms or medical emergencies.

## 2023-04-25 ENCOUNTER — PATIENT OUTREACH (OUTPATIENT)
Dept: CASE MANAGEMENT | Facility: OTHER | Age: 78
End: 2023-04-25
Payer: MEDICARE

## 2023-04-25 NOTE — OUTREACH NOTE
"AMBULATORY CASE MANAGEMENT NOTE    Name and Relationship of Patient/Support Person: Jung Short \"SHEILA\" - Self  Patient Outreach  RN-ACM outreach with patient. Discussed 4/21/23 ED visit regarding traumatic hematoma with fall. Patient treated and discharged home. Patient states to be compliant with ED recommendations; states to have pain to \"knot \" at back of head; difficulty with back pain and no difficulty with headaches; dizziness; lightheadedness; nausea; vomiting and has 5/9/23 pain management and 5/10/23 ortho appointment. Patient states to be ambulating with walker and  continues wearing splint and sling to right arm following recent fall. Patient states to have repaired eyeglasses and receiving assistance from family as needed. Reviewed with patient ED AVS recommendations and education. Patient verbalized understanding and states to appreciate outreach. No further questions voiced at this time.       Education Documentation  Assistive/Adaptive Devices, taught by Shanda Gerardo RN at 4/25/2023 10:42 AM.  Learner: Patient  Readiness: Acceptance  Method: Explanation  Response: Verbalizes Understanding    Provider Follow-Up, taught by Shanda Gerardo RN at 4/25/2023 10:42 AM.  Learner: Patient  Readiness: Acceptance  Method: Explanation  Response: Verbalizes Understanding    back health, taught by Shanda Gerardo RN at 4/25/2023 10:42 AM.  Learner: Patient  Readiness: Acceptance  Method: Explanation  Response: Verbalizes Understanding    Unresolved/Worsening Symptoms, taught by Shanda Gerardo RN at 4/25/2023 10:42 AM.  Learner: Patient  Readiness: Acceptance  Method: Explanation  Response: Verbalizes Understanding    Safety, taught by Shanda Gerardo RN at 4/25/2023 10:42 AM.  Learner: Patient  Readiness: Acceptance  Method: Explanation  Response: Verbalizes Understanding    Medication Management, taught by Shnada Gerardo RN at 4/25/2023 10:42 AM.  Learner: Patient  Readiness: " Acceptance  Method: Explanation  Response: Verbalizes Understanding    Home Safety, taught by Shanda Gerardo, RN at 4/25/2023 10:42 AM.  Learner: Patient  Readiness: Acceptance  Method: Explanation  Response: Verbalizes Understanding    Energy Conservation, taught by Shanda Gerardo, RN at 4/25/2023 10:42 AM.  Learner: Patient  Readiness: Acceptance  Method: Explanation  Response: Verbalizes Understanding          Shanda FREEMAN  Ambulatory Case Management    4/25/2023, 10:42 EDT

## 2023-04-27 RX ORDER — CARVEDILOL 25 MG/1
TABLET ORAL
Qty: 180 TABLET | Refills: 0 | Status: SHIPPED | OUTPATIENT
Start: 2023-04-27

## 2023-05-02 ENCOUNTER — OFFICE VISIT (OUTPATIENT)
Dept: ORTHOPEDIC SURGERY | Facility: CLINIC | Age: 78
End: 2023-05-02
Payer: MEDICARE

## 2023-05-02 VITALS — BODY MASS INDEX: 37.32 KG/M2 | WEIGHT: 224 LBS | HEIGHT: 65 IN

## 2023-05-02 DIAGNOSIS — S52.224D CLOSED NONDISPLACED TRANSVERSE FRACTURE OF SHAFT OF RIGHT ULNA WITH ROUTINE HEALING, SUBSEQUENT ENCOUNTER: Primary | ICD-10-CM
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
## 2023-05-02 NOTE — PROGRESS NOTES
Subjective: Fall     Patient ID: Jung Short is a 77 y.o. female.    Chief Complaint:    History of Present Illness 77-year-old female known to me currently being treated for nondisplaced distal ulnar shaft fracture presents early over concern of a new injury to that arm.  Last Friday she had a fall in a parking lot when she fell backwards striking her head.  Did not strike the arm.  Was taken emergency room and evaluated.  There is no loss of consciousness.  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   Tobacco Use   • Smoking status: Former     Packs/day: 3.00     Years: 30.00     Pack years: 90.00     Types: Cigarettes     Quit date:      Years since quittin.3   • Smokeless tobacco: Never   Vaping Use   • Vaping Use: Never used   Substance and Sexual Activity   • Alcohol use: No     Comment: Caffeine use: 3 cups daily    • Drug use: No   • Sexual activity: Defer     Partners: Male     Comment: celibate      Review of Systems      Past Medical History:   Diagnosis Date   • Age-related osteoporosis without current pathological fracture 2022   • Anemia    • Benign breast disease    • Cancer 2015    Right breast   • Cellulitis of leg     May-2003 right lower extremity   • CHF (congestive heart failure)     Dr Bates follows   • Chronic kidney disease     Dr Hannah follows   • Chronic ulcer of right foot     Non-pressure, history of    • Closed fracture of patella 2017   • Cluster headache    • Colon polyp    • Depression    • Diabetic peripheral neuropathy    • Difficulty walking    • Diverticulosis    • Femoral fracture     right   • Fibromyalgia, primary    • Fracture of left wrist     history of   • Gastroesophageal reflux    • Generalized pain 2016   • Hiatal hernia    •  Hyperlipidemia    • Hypertension    • Hypothyroidism    • Impingement syndrome of right shoulder    • Joint pain    • Left knee pain 05/17/2016   • Menopausal disorder    • Methicillin resistant Staphylococcus aureus infection 2012    after bladder surgery   • Nonischemic cardiomyopathy     Ejection fraction 10% per 2-D echo with Doppler however she did have cardiac catheterization April 2015 which showed ejection fraction 20% with global hypokinesis and severe mitral insufficiency   • Osteoarthritis     generalized, sched jania TKA   • Osteomyelitis 02/02/2016   • Pain in shoulder 08/02/2016   • Paroxysmal atrial fibrillation     Dr Bates follows   • Radius/ulna fracture, left, closed, initial encounter 10/21/2017   • Shoulder pain, bilateral     has tears on both sides   • Sleep apnea    • Syncope, psychogenic 04/19/2017   • Thoracic back pain 02/02/2016   • Thoracic injuries    • Toe ulcer     h/o to both feet, d/t shoes rubbing per patient   • Transient alteration of awareness 04/19/2017   • Traumatic closed nondisp torus fracture of distal radial metaphysis, right, initial encounter 2/28/2020     Past Surgical History:   Procedure Laterality Date   • ABDOMINAL SURGERY  2012    had to be reopened after bladder surgery d/t infection   • AMPUTATION FOOT / TOE Right 11/2013    Rt foot amputation MTP first toe   • BLADDER SURGERY  2012   • BREAST BIOPSY     • BREAST SURGERY  06/29/2015    Percutaneous ultrasound-guided placement of metal localized clip 1st lesion   • CARDIAC CATHETERIZATION  2015   • CARDIAC CATHETERIZATION N/A 1/25/2021    Procedure: Right and Left Heart Cath;  Surgeon: Nicholas Andrade MD;  Location:  [facility] CATH INVASIVE LOCATION;  Service: Cardiovascular;  Laterality: N/A;  drop in EF, with hx of NICM, SOA fatigue.  Discussed with RM   • CARDIAC CATHETERIZATION N/A 1/25/2021    Procedure: Left ventriculography;  Surgeon: Nicholas Andrade MD;  Location:  [facility] CATH INVASIVE LOCATION;   Service: Cardiovascular;  Laterality: N/A;   • CARDIAC CATHETERIZATION N/A 1/25/2021    Procedure: Coronary angiography;  Surgeon: Nicholas Andrade MD;  Location: [facility] INVASIVE LOCATION;  Service: Cardiovascular;  Laterality: N/A;   • CATARACT EXTRACTION Bilateral 2017   • COLONOSCOPY     • DEBRIDEMENT  FOOT Right 01/2013    During hospitalization    • EPIDURAL BLOCK      4 chronic back pain and leg pain last epidurals done 5-2012 she had no benefit at all from this procedure.   • FEMUR FRACTURE SURGERY     • FOOT SURGERY Bilateral     several   • HERNIA REPAIR      At the abdomen February 2007 Dr. Mendez   • INCISIONAL HERNIA REPAIR  05/16/2014    Recurrent. Incarcerated. With mesh implantation   • MASTECTOMY Right 05/2015   • MASTECTOMY MODIFIED RADICAL W/ AXILLARY LYMPH NODES W/ OR W/O PECTORALIS MINOR Right    • SHOULDER SURGERY Right     x2 RCR   • TOTAL ABDOMINAL HYSTERECTOMY      with oophorectomy-Done June-2012 secondary to vaginal wall prolapse.   • TOTAL KNEE ARTHROPLASTY Right    • TOTAL KNEE ARTHROPLASTY Left 4/17/2018    Procedure: TOTAL KNEE ARTHROPLASTY;  Surgeon: Live Chambers MD;  [facility];  Service: Orthopedics     Family History   Problem Relation Age of Onset   • Colonic polyp Mother    • Hypertension Mother    • Migraines Mother    • Mental illness Mother    • Depression Mother    • Arthritis Mother    • Coronary artery disease Father    • Other Father         Cardiac Disorder   • Colon cancer Father    • Kidney disease Father    • Cancer Father    • Diabetes Father    • Heart disease Father    • Hypertension Father    • Breast cancer Maternal Aunt    • Colon cancer Brother    • Alcohol abuse Brother    • Arthritis Sister    • Hypertension Brother    • Lung disease Brother    • Alcohol abuse Brother    • Malig Hyperthermia Neg Hx          Objective:  There were no vitals filed for this visit.      05/02/23  1301   Weight: 102 kg (224 lb)     Body mass index is 37.28  kg/m².        Ortho Exam   AP and lateral shows a nondisplaced ulnar shaft fracture with some callus is seen on today's x-ray which is not previously seen.  She is alert and oriented.  She does have ecchymosis to the back of her head and the neck area and onto the shoulders even.  There is no loss of consciousness.  Strong performance concern exam out of the splint shows no motor or sensory deficit.  No real tenderness with pronation and supination or flexion extension of the elbow.    Assessment:        1. Closed nondisplaced transverse fracture of shaft of right ulna with routine healing, subsequent encounter    2. Fall from slip, trip, or stumble, initial encounter           Plan: I have converted her to a forearm brace he can remove for bathing and range of motion exercises.  She is to wear at all times other than bathing.  Return next week at her scheduled appointment to repeat x-ray of the arm before she leaves for vacation to [facility].  Answered all questions            Work Status:    LOUANN query complete.    Orders:  Orders Placed This Encounter   Procedures   • XR Forearm 2 View Right       Medications:  No orders of the defined types were placed in this encounter.      Followup:  Return in about 8 days (around 5/10/2023).          Dictated utilizing Dragon dictation

## 2023-05-09 ENCOUNTER — OFFICE VISIT (OUTPATIENT)
Dept: PAIN MEDICINE | Facility: CLINIC | Age: 78
End: 2023-05-09
Payer: MEDICARE

## 2023-05-09 VITALS
SYSTOLIC BLOOD PRESSURE: 114 MMHG | HEART RATE: 64 BPM | TEMPERATURE: 96.8 F | RESPIRATION RATE: 20 BRPM | DIASTOLIC BLOOD PRESSURE: 65 MMHG | OXYGEN SATURATION: 96 % | HEIGHT: 65 IN | BODY MASS INDEX: 37.28 KG/M2

## 2023-05-09 DIAGNOSIS — M51.36 DDD (DEGENERATIVE DISC DISEASE), LUMBAR: Primary | ICD-10-CM

## 2023-05-09 DIAGNOSIS — M47.816 LUMBAR FACET ARTHROPATHY: ICD-10-CM

## 2023-05-09 DIAGNOSIS — Z79.899 ENCOUNTER FOR LONG-TERM (CURRENT) USE OF HIGH-RISK MEDICATION: ICD-10-CM

## 2023-05-09 RX ORDER — HYDROCODONE BITARTRATE AND ACETAMINOPHEN 5; 325 MG/1; MG/1
TABLET ORAL
Qty: 120 TABLET | Refills: 0 | Status: SHIPPED | OUTPATIENT
Start: 2023-05-19

## 2023-05-09 NOTE — PROGRESS NOTES
CHIEF COMPLAINT  Back pain   Patient reports worsened pain due to 2 falls broke right arm. Other fall she had she hit her head.    Subjective   Jung Short is a 77 y.o. female  who presents for follow-up.  She has a history of chronic low back and multijoint pain.  Since her to fall she has had increased pain in a bandlike distribution across lumbosacral spine which is referred into the posterior buttocks/hips and occasionally radiates into the left posterior thigh.  Patient also experiences multijoint pain affecting various areas secondary to osteoarthritis.    Patient has had 2 falls since last seen evaluated here in the office.  On 3/20/2023 she was evaluated in the emergency department at [facility] after trying to go up a steep incline at the pharmacy where she lost her footing slipped and fell.  As a result of this she sustained a nondisplaced right forearm/wrist fracture as well as right shoulder dislocation and proximal right humerus fracture.  The patient has been treated successfully nonoperatively and has been taken out of the cast last week and placed in a soft arm splint/brace.    On 4/21/2023 while getting out of her brother's truck she was leaning against the vehicle as he was pulling off which caused her to lose her balance and she fell backwards and sustained a traumatic hematoma of the scalp where she was treated at [facility] ED and sent home the same day.    Patient continues to be medically managed on hydrocodone 5 mg 1-2 p.o. twice daily which she tolerates well without adverse effect such as somnolence or constipation.  Overall the medication provides 50% reduction of pain allowing her to maintain her ADLs and live independently.    Pain today 8/10 VAS in severity.      Back Pain  This is a chronic problem. The current episode started more than 1 year ago. The problem occurs constantly. The problem is unchanged. The pain is present in the lumbar spine. The quality of the  pain is described as aching, burning and shooting (sharp). The pain radiates to the left thigh. The pain is at a severity of 8/10. The pain is severe. The pain is the same all the time. The symptoms are aggravated by standing and position (activity). Pertinent negatives include no abdominal pain, dysuria, fever, headaches, numbness or weakness.   Joint Pain  This is a chronic problem. The current episode started more than 1 year ago. The problem occurs constantly. The problem has been unchanged. Associated symptoms include arthralgias. Pertinent negatives include no abdominal pain, congestion, coughing, fatigue, fever, headaches, numbness or weakness. The symptoms are aggravated by exertion, standing and walking.        PEG Assessment   What number best describes your pain on average in the past week?7  What number best describes how, during the past week, pain has interfered with your enjoyment of life?9  What number best describes how, during the past week, pain has interfered with your general activity?  9    Review of Pertinent Medical Data ---  MRI Spine Lumbar WO     HISTORY:  Low back pain symptoms persist greater than 6 weeks treatment. Spondylosis without myelopathy. Injury 2 months ago. Lungs. Fell onto bottom. Low back and left hip pain and left thigh pain since. Not as severe as it was. History of breast cancer in 2016.     TECHNIQUE:  Multiplanar multisequence imaging of the lumbar spine acquired without IV contrast utilizing a standard protocol.     COMPARISON: Plain film from 8/30/2022.     FINDINGS:     For the purposes of this dictation is presumed to be 5 lumbar-type vertebra with the last fully formed disc space representing L5-S1.     There is a compression fracture at L4 with about 40% loss of mid vertebral body height, 35% loss of posterior vertebral body height, and 15% loss of anterior vertebral body height. This is mildly progressed from the plain films. There is marrow edema  throughout the  L4 vertebral body. Posterior cortex is buckled and apparently disrupted. There is retropulsed bone and or epidural soft tissue edema anterior epidural space. Combination of findings result in severe canal stenosis at the level of L4  vertebral body. The edema extends into the bilateral posterior elements. Given history this is most likely a recent osteoporotic type compression fracture. Bone marrow signal intensity is otherwise normal and there is otherwise nothing to suggest osseous  metastatic disease. Follow-up to healing is recommended to ensure expected clinical course.     Conus medullaris terminates at T12-L1 and is normal. Intervertebral discs are desiccated in general. There is subtle grade 1 degenerative anterolisthesis of L4 on L5 unchanged.     Level by level:     L1-2: Mild bilateral facet degenerative change. Minimal posterior disc bulge. No canal or foraminal compromise.     L2-3: Moderate facet degenerative change bilaterally. There is a broad posterior protrusion/extrusion extending into the bilateral foramina asymmetric to the right with a small right foraminal extrusion measuring about 11 mm SI dimension by 4.5 mm AP  dimension. This contributes to moderate to severe right foraminal impingement with mass effect on expected course of the right L2 root. There is mild to moderate left foraminal narrowing. There is posterior epidural lipomatosis and mild to moderate canal  stenosis.     L3-4: There is fairly severe facet arthritis bilaterally with moderate ligamentum flavum thickening. There is a concentric disc bulge with a superimposed broad posterior protrusion/extrusion. This is associated with the posterior cortical disruption at  L4. The combination of findings result in severe canal stenosis with severe impingement on the bilateral lateral recesses. Disc material extends into the foramina with approximately moderate right and mild to moderate left foraminal narrowing. There is  posterior  epidural lipomatosis.     L4-5: There is a broad posterior protrusion/extrusion remaining contiguous with the disc but extending above and below the level the disc measuring about 12 mm SI dimension by 3 mm AP dimension. There is moderate facet arthritis and mild to moderate  ligamentum flavum thickening worse the left. There is mild to moderate canal stenosis with impingement on the bilateral lateral recesses. There is moderate to severe bilateral foraminal narrowing. Loss of L4 vertebral body disc height contributes to  this.     L5-S1: There is a mild concentric disc bulge with a superimposed broad posterior protrusion. There is mild to moderate left and moderate to severe right-sided facet arthritis. No canal stenosis. Mild left foraminal narrowing. There is severe right  foraminal narrowing with more focal right foraminal extrusion measuring about 9 mm SI dimension by 5.5 mm AP dimension.     OTHER: Gallstone. Arthritis of the sacroiliac joints partly in field-of-view     IMPRESSION:  1.  There is a recent appearing compression fracture at L4, overall moderate, measurements above. There is disruption of the posterior cortex with retropulsed cortex/edema in the anterior epidural space at L4 resulting in severe canal stenosis.  Additionally there is degenerative change at L3-4 and L4-5 with severe canal stenosis at L3-4 and mild to moderate canal stenosis at L4-5. For reasons discussed above, this is more likely to be an osteoporotic type compression fracture despite history of  breast cancer. Clinical correlation and follow-up is recommended to confirm.  2.  Multiple level foraminal impingement, some of which is severe. See level by level discussion.     Signer Name: Zarina Mari MD   Signed: 10/5/2022 2:40 PM    The following portions of the patient's history were reviewed and updated as appropriate: allergies, current medications, past family history, past medical history, past social history, past surgical  "history and problem list.    Review of Systems   Constitutional: Negative for activity change (less), fatigue and fever.   HENT: Negative for congestion.    Eyes: Negative for visual disturbance.   Respiratory: Negative for cough and chest tightness.    Gastrointestinal: Negative for abdominal pain, constipation and diarrhea.   Genitourinary: Negative for difficulty urinating and dysuria.   Musculoskeletal: Positive for arthralgias and back pain.   Neurological: Negative for dizziness, weakness, light-headedness, numbness and headaches.   Psychiatric/Behavioral: Positive for agitation and sleep disturbance. Negative for suicidal ideas. The patient is not nervous/anxious.      I have reviewed and confirmed the accuracy of the ROS as documented by the MA/LPN/RN MANJINDER Tong    Vitals:    05/09/23 1423   BP: 114/65   BP Location: Left arm   Patient Position: Sitting   Cuff Size: Large Adult   Pulse: 64   Resp: 20   Temp: 96.8 °F (36 °C)   TempSrc: Temporal   SpO2: 96%   Weight: Comment: refused   Height: 165.1 cm (65\")   PainSc:   8         Objective   Physical Exam  Vitals and nursing note reviewed.   Constitutional:       Appearance: Normal appearance. She is obese.   HENT:      Head: Normocephalic.   Pulmonary:      Effort: Pulmonary effort is normal.   Musculoskeletal:      Lumbar back: Spasms and tenderness present. Decreased range of motion.      Comments: Right forearm/wrist in soft brace     Skin:     General: Skin is warm and dry.   Neurological:      General: No focal deficit present.      Mental Status: She is alert and oriented to person, place, and time.      Cranial Nerves: Cranial nerves 2-12 are intact.      Sensory: Sensation is intact.      Motor: Weakness present.      Gait: Gait abnormal (antalgic gait; ambulates with rollator).   Psychiatric:         Mood and Affect: Mood normal.         Behavior: Behavior normal.         Thought Content: Thought content normal.         Judgment: Judgment " normal.         Assessment & Plan   Diagnoses and all orders for this visit:    1. DDD (degenerative disc disease), lumbar (Primary)    2. Lumbar facet arthropathy    3. Encounter for long-term (current) use of high-risk medication        --- Follow-up in 2 months or sooner as needed  --- Refill hydrocodone today Patient appears stable with current regimen. No adverse effects. Regarding continuation of opioids, there is no evidence of aberrant behavior or any red flags.  The patient continues with appropriate response to opioid therapy. ADL's remain intact by self.     The urine drug screen confirmation from 10/24/2022 has been reviewed and the result is appropriate based on patient history and LOUANN report        The patient signed an updated copy of the controlled substance agreement on 10/24/2022.      LOUANN REPORT  As part of the patient's treatment plan, I am prescribing controlled substances. The patient has been made aware of appropriate use of such medications, including potential risk of somnolence, limited ability to drive and/or work safely, and the potential for dependence or overdose. It has also been made clear that these medications are for use by this patient only, without concomitant use of alcohol or other substances unless prescribed.     Patient has completed prescribing agreement detailing terms of continued prescribing of controlled substances, including monitoring LOUANN reports, urine drug screening, and pill counts if necessary. The patient is aware that inappropriate use will results in cessation of prescribing such medications.    As the clinician, I personally reviewed the LOUANN from 5/9/2023 while the patient was in the office today.    History and physical exam exhibit continued safe and appropriate use of controlled substances.     Dictated utilizing Dragon dictation.

## 2023-05-10 ENCOUNTER — OFFICE VISIT (OUTPATIENT)
Dept: ORTHOPEDIC SURGERY | Facility: CLINIC | Age: 78
End: 2023-05-10
Payer: MEDICARE

## 2023-05-10 VITALS — BODY MASS INDEX: 37.32 KG/M2 | WEIGHT: 224 LBS | HEIGHT: 65 IN

## 2023-05-10 DIAGNOSIS — S52.224D CLOSED NONDISPLACED TRANSVERSE FRACTURE OF SHAFT OF RIGHT ULNA WITH ROUTINE HEALING, SUBSEQUENT ENCOUNTER: Primary | ICD-10-CM

## 2023-05-10 NOTE — PROGRESS NOTES
Subjective: Right ulnar shaft fracture     Patient ID: Jung Short is a 77 y.o. female.    Chief Complaint:    History of Present Illness patient is now approximately 2 months out from her injury doing well with no complaints of pain at the fracture site.  Some soreness in the elbow and the wrist from immobilization but otherwise is doing quite well.       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   Tobacco Use   • Smoking status: Former     Packs/day: 3.00     Years: 30.00     Pack years: 90.00     Types: Cigarettes     Quit date:      Years since quittin.3   • Smokeless tobacco: Never   Vaping Use   • Vaping Use: Never used   Substance and Sexual Activity   • Alcohol use: No     Comment: Caffeine use: 3 cups daily    • Drug use: No   • Sexual activity: Defer     Partners: Male     Comment: celibate      Review of Systems      Past Medical History:   Diagnosis Date   • Age-related osteoporosis without current pathological fracture 2022   • Anemia    • Benign breast disease    • Cancer 2015    Right breast   • Cellulitis of leg     May-2003 right lower extremity   • CHF (congestive heart failure)     Dr Bates follows   • Chronic kidney disease     Dr Hannah follows   • Chronic ulcer of right foot     Non-pressure, history of    • Closed fracture of patella 2017   • Cluster headache    • Colon polyp    • Depression    • Diabetic peripheral neuropathy    • Difficulty walking    • Diverticulosis    • Femoral fracture     right   • Fibromyalgia, primary    • Fracture of left wrist     history of   • Gastroesophageal reflux    • Generalized pain 2016   • Hiatal hernia    • Hyperlipidemia    • Hypertension    • Hypothyroidism    • Impingement syndrome of right shoulder    • Joint pain    • Left knee pain 2016   • Menopausal disorder    • Methicillin resistant Staphylococcus aureus infection     after bladder surgery   •  Nonischemic cardiomyopathy     Ejection fraction 10% per 2-D echo with Doppler however she did have cardiac catheterization April 2015 which showed ejection fraction 20% with global hypokinesis and severe mitral insufficiency   • Osteoarthritis     generalized, sched jania TKA   • Osteomyelitis 02/02/2016   • Pain in shoulder 08/02/2016   • Paroxysmal atrial fibrillation     Dr Bates follows   • Radius/ulna fracture, left, closed, initial encounter 10/21/2017   • Shoulder pain, bilateral     has tears on both sides   • Sleep apnea    • Syncope, psychogenic 04/19/2017   • Thoracic back pain 02/02/2016   • Thoracic injuries    • Toe ulcer     h/o to both feet, d/t shoes rubbing per patient   • Transient alteration of awareness 04/19/2017   • Traumatic closed nondisp torus fracture of distal radial metaphysis, right, initial encounter 2/28/2020     Past Surgical History:   Procedure Laterality Date   • ABDOMINAL SURGERY  2012    had to be reopened after bladder surgery d/t infection   • AMPUTATION FOOT / TOE Right 11/2013    Rt foot amputation MTP first toe   • BLADDER SURGERY  2012   • BREAST BIOPSY     • BREAST SURGERY  06/29/2015    Percutaneous ultrasound-guided placement of metal localized clip 1st lesion   • CARDIAC CATHETERIZATION  2015   • CARDIAC CATHETERIZATION N/A 1/25/2021    Procedure: Right and Left Heart Cath;  Surgeon: Nicholas Andrade MD;  Location[facility] C[facility];  Service: Cardiovascular;  Laterality: N/A;  drop in EF, with hx of NICM, SOA fatigue.  Discussed with    • CARDIAC CATHETERIZATION N/A 1/25/2021    Procedure: Left ventriculography;  Surgeon: Nicholas Andrade MD;  [facility];  Service: Cardiovascular;  Laterality: N/A;   • CARDIAC CATHETERIZATION N/A 1/25/2021    Procedure: Coronary angiography;  Surgeon: Nicholas Andrade MD;  Location:  [facility] CATH INVASIVE LOCATION;  Service: Cardiovascular;  Laterality: N/A;   • CATARACT  EXTRACTION Bilateral 2017   • COLONOSCOPY     • DEBRIDEMENT  FOOT Right 01/2013    During hospitalization    • EPIDURAL BLOCK      4 chronic back pain and leg pain last epidurals done 5-2012 she had no benefit at all from this procedure.   • FEMUR FRACTURE SURGERY     • FOOT SURGERY Bilateral     several   • HERNIA REPAIR      At the abdomen February 2007 Dr. Mendez   • INCISIONAL HERNIA REPAIR  05/16/2014    Recurrent. Incarcerated. With mesh implantation   • MASTECTOMY Right 05/2015   • MASTECTOMY MODIFIED RADICAL W/ AXILLARY LYMPH NODES W/ OR W/O PECTORALIS MINOR Right    • SHOULDER SURGERY Right     x2 RCR   • TOTAL ABDOMINAL HYSTERECTOMY      with oophorectomy-Done June-2012 secondary to vaginal wall prolapse.   • TOTAL KNEE ARTHROPLASTY Right    • TOTAL KNEE ARTHROPLASTY Left 4/17/2018    Procedure: TOTAL KNEE ARTHROPLASTY;  Surgeon: Live Chambers MD;  Location: [facility];  Service: Orthopedics     Family History   Problem Relation Age of Onset   • Colonic polyp Mother    • Hypertension Mother    • Migraines Mother    • Mental illness Mother    • Depression Mother    • Arthritis Mother    • Coronary artery disease Father    • Other Father         Cardiac Disorder   • Colon cancer Father    • Kidney disease Father    • Cancer Father    • Diabetes Father    • Heart disease Father    • Hypertension Father    • Breast cancer Maternal Aunt    • Colon cancer Brother    • Alcohol abuse Brother    • Arthritis Sister    • Hypertension Brother    • Lung disease Brother    • Alcohol abuse Brother    • Malig Hyperthermia Neg Hx          Objective:  There were no vitals filed for this visit.      05/10/23  1259   Weight: 102 kg (224 lb)     Body mass index is 37.28 kg/m².        Ortho Exam   AP and lateral of the right forearm shows abundant callus at the fracture site with anatomic alignment.  Compared to previous x-rays further callus formation is seen.  She is alert and oriented x3.  Some soreness to the elbow and the  wrist again because of immobilization and she does have arthritic joints.  Some forearm soreness but is more muscular than any real tenderness over the fracture site itself.  Skin is cool to touch.  No appreciable swelling is noted.  No ecchymosis.  Good capillary refill.    Assessment:        1. Closed nondisplaced transverse fracture of shaft of right ulna with routine healing, subsequent encounter           Plan: I advised her to wear that form immobilizer while on her medication in [place] but when she returned she is at home she can leave it off if she is resting watching TV reading.  I would continue to wear to bed for the time being.  She can wean herself out of the form immobilizer as pain permits.  Return to see me only 3 to 4 weeks if still symptomatic otherwise as needed.  Patient was in agreement            Work Status:    LOUANN query complete.    Orders:  Orders Placed This Encounter   Procedures   • XR Forearm 2 View Right       Medications:  No orders of the defined types were placed in this encounter.      Followup:  Return if symptoms worsen or fail to improve.          Dictated utilizing Dragon dictation

## 2023-06-06 ENCOUNTER — OFFICE VISIT (OUTPATIENT)
Dept: ORTHOPEDIC SURGERY | Facility: CLINIC | Age: 78
End: 2023-06-06
Payer: MEDICARE

## 2023-06-06 VITALS — HEIGHT: 65 IN | BODY MASS INDEX: 37.32 KG/M2 | WEIGHT: 224 LBS

## 2023-06-06 DIAGNOSIS — S52.224D CLOSED NONDISPLACED TRANSVERSE FRACTURE OF SHAFT OF RIGHT ULNA WITH ROUTINE HEALING, SUBSEQUENT ENCOUNTER: Primary | ICD-10-CM

## 2023-06-06 NOTE — PROGRESS NOTES
Subjective: Right ulnar shaft fracture     Patient ID: Jung Short is a 77 y.o. female.    Chief Complaint:    History of Present Illness patient seen in follow-up to the ulnar shaft fracture.  Patient is now 2-1/2 months out from her injury she is doing well with no complaints at the fracture site.  She has an arthritic wrist that hurts with the ulnar shaft fracture area is asymptomatic.  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   Tobacco Use    Smoking status: Former     Packs/day: 3.00     Years: 30.00     Pack years: 90.00     Types: Cigarettes     Quit date:      Years since quittin.4    Smokeless tobacco: Never   Vaping Use    Vaping Use: Never used   Substance and Sexual Activity    Alcohol use: No     Comment: Caffeine use: 3 cups daily     Drug use: No    Sexual activity: Defer     Partners: Male     Comment: celibate      Review of Systems      Past Medical History:   Diagnosis Date    Age-related osteoporosis without current pathological fracture 2022    Anemia     Benign breast disease     Cancer     Right breast    Cellulitis of leg     May- right lower extremity    CHF (congestive heart failure)     Dr Bates follows    Chronic kidney disease     Dr Hannah follows    Chronic ulcer of right foot     Non-pressure, history of     Closed fracture of patella 2017    Cluster headache     Colon polyp     Depression     Diabetic peripheral neuropathy     Difficulty walking     Diverticulosis     Femoral fracture     right    Fibromyalgia, primary     Fracture of left wrist     history of    Gastroesophageal reflux     Generalized pain 2016    Hiatal hernia     Hyperlipidemia     Hypertension     Hypothyroidism     Impingement syndrome of right shoulder     Joint pain     Left knee pain 2016     Menopausal disorder     Methicillin resistant Staphylococcus aureus infection 2012    after bladder surgery    Nonischemic cardiomyopathy     Ejection fraction 10% per 2-D echo with Doppler however she did have cardiac catheterization April 2015 which showed ejection fraction 20% with global hypokinesis and severe mitral insufficiency    Osteoarthritis     generalized, sched jania TKA    Osteomyelitis 02/02/2016    Pain in shoulder 08/02/2016    Paroxysmal atrial fibrillation     Dr Bates follows    Radius/ulna fracture, left, closed, initial encounter 10/21/2017    Shoulder pain, bilateral     has tears on both sides    Sleep apnea     Syncope, psychogenic 04/19/2017    Thoracic back pain 02/02/2016    Thoracic injuries     Toe ulcer     h/o to both feet, d/t shoes rubbing per patient    Transient alteration of awareness 04/19/2017    Traumatic closed nondisp torus fracture of distal radial metaphysis, right, initial encounter 2/28/2020     Past Surgical History:   Procedure Laterality Date    ABDOMINAL SURGERY  2012    had to be reopened after bladder surgery d/t infection    AMPUTATION FOOT / TOE Right 11/2013    Rt foot amputation MTP first toe    BLADDER SURGERY  2012    BREAST BIOPSY      BREAST SURGERY  06/29/2015    Percutaneous ultrasound-guided placement of metal localized clip 1st lesion    CARDIAC CATHETERIZATION  2015    CARDIAC CATHETERIZATION N/A 1/25/2021    Procedure: Right and Left Heart Cath;  Surgeon: Nicholas Andrade MD;  Location: [facility] CATH INVASIVE LOCATION;  Service: Cardiovascular;  Laterality: N/A;  drop in EF, with hx of NICM, SOA fatigue.  Discussed with     CARDIAC CATHETERIZATION N/A 1/25/2021    Procedure: Left ventriculography;  Surgeon: Nicholas Andrade MD;  Location: [facility] CATH INVASIVE LOCATION;  Service: Cardiovascular;  Laterality: N/A;    CARDIAC CATHETERIZATION N/A 1/25/2021    Procedure: Coronary angiography;  Surgeon: Nicholas Andrade MD;  Location: [facility]  CATH INVASIVE LOCATION;  Service: Cardiovascular;  Laterality: N/A;    CATARACT EXTRACTION Bilateral 2017    COLONOSCOPY      DEBRIDEMENT  FOOT Right 01/2013    During hospitalization     EPIDURAL BLOCK      4 chronic back pain and leg pain last epidurals done 5-2012 she had no benefit at all from this procedure.    FEMUR FRACTURE SURGERY      FOOT SURGERY Bilateral     several    HERNIA REPAIR      At the abdomen February 2007 Dr. Mendez    INCISIONAL HERNIA REPAIR  05/16/2014    Recurrent. Incarcerated. With mesh implantation    MASTECTOMY Right 05/2015    MASTECTOMY MODIFIED RADICAL W/ AXILLARY LYMPH NODES W/ OR W/O PECTORALIS MINOR Right     SHOULDER SURGERY Right     x2 RCR    TOTAL ABDOMINAL HYSTERECTOMY      with oophorectomy-Done June-2012 secondary to vaginal wall prolapse.    TOTAL KNEE ARTHROPLASTY Right     TOTAL KNEE ARTHROPLASTY Left 4/17/2018    Procedure: TOTAL KNEE ARTHROPLASTY;  Surgeon: Live Chambers MD;  Location: [facility] OR;  Service: Orthopedics     Family History   Problem Relation Age of Onset    Colonic polyp Mother     Hypertension Mother     Migraines Mother     Mental illness Mother     Depression Mother     Arthritis Mother     Coronary artery disease Father     Other Father         Cardiac Disorder    Colon cancer Father     Kidney disease Father     Cancer Father     Diabetes Father     Heart disease Father     Hypertension Father     Breast cancer Maternal Aunt     Colon cancer Brother     Alcohol abuse Brother     Arthritis Sister     Hypertension Brother     Lung disease Brother     Alcohol abuse Brother     Malig Hyperthermia Neg Hx          Objective:  There were no vitals filed for this visit.      06/06/23  1342   Weight: 102 kg (224 lb)     Body mass index is 37.28 kg/m².        Ortho Exam   AP and lateral shows abundant callus at the fracture site.  Further callus is seen in alignment remains anatomical compared to previous x-rays.  Minimal to no tenderness at the fracture site  and there is no motor or sensory deficit.    Assessment:        1. Closed nondisplaced transverse fracture of shaft of right ulna with routine healing, subsequent encounter           Plan: At this time I think the fracture is clinically healed she does not need to wear the wrist or forearm immobilizer.  Activity as tolerated return to see me as needed.  Answered all questions            Work Status:    LOUANN query complete.    Orders:  Orders Placed This Encounter   Procedures    XR Forearm 2 View Right       Medications:  No orders of the defined types were placed in this encounter.      Followup:  Return if symptoms worsen or fail to improve.          Dictated utilizing Dragon dictation

## 2023-06-06 NOTE — PROGRESS NOTES
Subjective:     Patient ID: Jung Short is a 77 y.o. female.    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   Tobacco Use    Smoking status: Former     Packs/day: 3.00     Years: 30.00     Pack years: 90.00     Types: Cigarettes     Quit date:      Years since quittin.4    Smokeless tobacco: Never   Vaping Use    Vaping Use: Never used   Substance and Sexual Activity    Alcohol use: No     Comment: Caffeine use: 3 cups daily     Drug use: No    Sexual activity: Defer     Partners: Male     Comment: celibate      Review of Systems      Past Medical History:   Diagnosis Date    Age-related osteoporosis without current pathological fracture 2022    Anemia     Benign breast disease     Cancer     Right breast    Cellulitis of leg     May- right lower extremity    CHF (congestive heart failure)     Dr Bates follows    Chronic kidney disease     Dr Hannah follows    Chronic ulcer of right foot     Non-pressure, history of     Closed fracture of patella 2017    Cluster headache     Colon polyp     Depression     Diabetic peripheral neuropathy     Difficulty walking     Diverticulosis     Femoral fracture     right    Fibromyalgia, primary     Fracture of left wrist     history of    Gastroesophageal reflux     Generalized pain 2016    Hiatal hernia     Hyperlipidemia     Hypertension     Hypothyroidism     Impingement syndrome of right shoulder     Joint pain     Left knee pain 2016    Menopausal disorder     Methicillin resistant Staphylococcus aureus infection     after bladder surgery    Nonischemic cardiomyopathy     Ejection fraction 10% per 2-D echo with Doppler however she did have cardiac catheterization 2015 which showed ejection fraction 20% with global hypokinesis and severe mitral insufficiency    Osteoarthritis     generalized, sched jania TKA     Osteomyelitis 02/02/2016    Pain in shoulder 08/02/2016    Paroxysmal atrial fibrillation     Dr Bates follows    Radius/ulna fracture, left, closed, initial encounter 10/21/2017    Shoulder pain, bilateral     has tears on both sides    Sleep apnea     Syncope, psychogenic 04/19/2017    Thoracic back pain 02/02/2016    Thoracic injuries     Toe ulcer     h/o to both feet, d/t shoes rubbing per patient    Transient alteration of awareness 04/19/2017    Traumatic closed nondisp torus fracture of distal radial metaphysis, right, initial encounter 2/28/2020     Past Surgical History:   Procedure Laterality Date    ABDOMINAL SURGERY  2012    had to be reopened after bladder surgery d/t infection    AMPUTATION FOOT / TOE Right 11/2013    Rt foot amputation MTP first toe    BLADDER SURGERY  2012    BREAST BIOPSY      BREAST SURGERY  06/29/2015    Percutaneous ultrasound-guided placement of metal localized clip 1st lesion    CARDIAC CATHETERIZATION  2015    CARDIAC CATHETERIZATION N/A 1/25/2021    Procedure: Right and Left Heart Cath;  Surgeon: Nicholas Andrade MD;  Location:  [facility] CATH INVASIVE LOCATION;  Service: Cardiovascular;  Laterality: N/A;  drop in EF, with hx of NICM, SOA fatigue.  Discussed with     CARDIAC CATHETERIZATION N/A 1/25/2021    Procedure: Left ventriculography;  Surgeon: Nicholas Andrade MD;  Location:  [facility] CATH INVASIVE LOCATION;  Service: Cardiovascular;  Laterality: N/A;    CARDIAC CATHETERIZATION N/A 1/25/2021    Procedure: Coronary angiography;  Surgeon: Nicholas Andrade MD;  Location:  [facility] CATH INVASIVE LOCATION;  Service: Cardiovascular;  Laterality: N/A;    CATARACT EXTRACTION Bilateral 2017    COLONOSCOPY      DEBRIDEMENT  FOOT Right 01/2013    During hospitalization     EPIDURAL BLOCK      4 chronic back pain and leg pain last epidurals done 5-2012 she had no benefit at all from this procedure.    FEMUR FRACTURE SURGERY      FOOT SURGERY Bilateral     several     HERNIA REPAIR      At the abdomen February 2007 Dr. Mendez    INCISIONAL HERNIA REPAIR  05/16/2014    Recurrent. Incarcerated. With mesh implantation    MASTECTOMY Right 05/2015    MASTECTOMY MODIFIED RADICAL W/ AXILLARY LYMPH NODES W/ OR W/O PECTORALIS MINOR Right     SHOULDER SURGERY Right     x2 RCR    TOTAL ABDOMINAL HYSTERECTOMY      with oophorectomy-Done June-2012 secondary to vaginal wall prolapse.    TOTAL KNEE ARTHROPLASTY Right     TOTAL KNEE ARTHROPLASTY Left 4/17/2018    Procedure: TOTAL KNEE ARTHROPLASTY;  Surgeon: Live Chambers MD;  Location: [facility];  Service: Orthopedics     Family History   Problem Relation Age of Onset    Colonic polyp Mother     Hypertension Mother     Migraines Mother     Mental illness Mother     Depression Mother     Arthritis Mother     Coronary artery disease Father     Other Father         Cardiac Disorder    Colon cancer Father     Kidney disease Father     Cancer Father     Diabetes Father     Heart disease Father     Hypertension Father     Breast cancer Maternal Aunt     Colon cancer Brother     Alcohol abuse Brother     Arthritis Sister     Hypertension Brother     Lung disease Brother     Alcohol abuse Brother     Malig Hyperthermia Neg Hx          Objective:  There were no vitals filed for this visit.      06/06/23  1342   Weight: 102 kg (224 lb)     Body mass index is 37.28 kg/m².        Ortho Exam       Assessment:        1. Closed nondisplaced transverse fracture of shaft of right ulna with routine healing, subsequent encounter           Plan:            Work Status:    LOUANN query complete.    Orders:  Orders Placed This Encounter   Procedures    XR Forearm 2 View Right       Medications:  No orders of the defined types were placed in this encounter.      Followup:  Return if symptoms worsen or fail to improve.          Dictated utilizing Dragon dictation

## 2023-06-14 DIAGNOSIS — Z79.899 ENCOUNTER FOR LONG-TERM (CURRENT) USE OF HIGH-RISK MEDICATION: ICD-10-CM

## 2023-06-14 DIAGNOSIS — M47.816 LUMBAR FACET ARTHROPATHY: ICD-10-CM

## 2023-06-14 NOTE — TELEPHONE ENCOUNTER
"    Caller: Jung Short \"SHEILA\"    Relationship: Self    Best call back number: [phone]    Requested Prescriptions:   Requested Prescriptions     Pending Prescriptions Disp Refills    HYDROcodone-acetaminophen (NORCO) 5-325 MG per tablet 120 tablet 0     Sig: Take 1 tablet by mouth every 6 hours as needed for moderate pain        Pharmacy where request should be sent: [facility] [place], [place] - [address] [phone] [facility] [phone] FX     Last office visit with prescribing clinician: 5/9/2023   Last telemedicine visit with prescribing clinician: Visit date not found   Next office visit with prescribing clinician: 7/10/2023     Additional details provided by patient: PATIENT HAS ENOUGH MEDICATION TO LAST UNTIL SATURDAY; HOWEVER HER PHARMACY IS NOT OPEN ON SUNDAY AND CLOSES EARLY ON SATURDAY. SHE HAS TO PICK THIS UP NO LATER THAN SATURDAY MORNING    Does the patient have less than a 3 day supply:  [] Yes  [x] No    Would you like a call back once the refill request has been completed: [] Yes [x] No    If the office needs to give you a call back, can they leave a voicemail: [x] Yes [] No    Allan Cunningham Rep   06/14/23 10:32 EDT         "

## 2023-06-15 RX ORDER — HYDROCODONE BITARTRATE AND ACETAMINOPHEN 5; 325 MG/1; MG/1
TABLET ORAL
Qty: 120 TABLET | Refills: 0 | Status: SHIPPED | OUTPATIENT
Start: 2023-06-17

## 2023-07-14 PROBLEM — M75.102 TEAR OF LEFT ROTATOR CUFF: Status: ACTIVE | Noted: 2023-07-14

## 2023-07-14 PROBLEM — M19.012 PRIMARY OSTEOARTHRITIS, LEFT SHOULDER: Status: ACTIVE | Noted: 2023-07-14
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
## 2023-07-14 PROBLEM — M75.52 SUBACROMIAL BURSITIS OF LEFT SHOULDER JOINT: Status: ACTIVE | Noted: 2023-07-14

## 2023-07-28 ENCOUNTER — OFFICE VISIT (OUTPATIENT)
Dept: CARDIOLOGY | Facility: CLINIC | Age: 78
End: 2023-07-28
Payer: MEDICARE

## 2023-07-28 VITALS
DIASTOLIC BLOOD PRESSURE: 62 MMHG | BODY MASS INDEX: 34.16 KG/M2 | HEIGHT: 65 IN | HEART RATE: 59 BPM | WEIGHT: 205 LBS | SYSTOLIC BLOOD PRESSURE: 120 MMHG

## 2023-07-28 DIAGNOSIS — I50.20 HFREF (HEART FAILURE WITH REDUCED EJECTION FRACTION): Primary | ICD-10-CM

## 2023-07-28 DIAGNOSIS — I25.10 ATHEROSCLEROSIS OF NATIVE CORONARY ARTERY OF NATIVE HEART WITHOUT ANGINA PECTORIS: ICD-10-CM

## 2023-07-28 DIAGNOSIS — E11.9 TYPE 2 DIABETES MELLITUS WITHOUT COMPLICATION, WITHOUT LONG-TERM CURRENT USE OF INSULIN: ICD-10-CM

## 2023-07-28 DIAGNOSIS — I42.8 NICM (NONISCHEMIC CARDIOMYOPATHY): ICD-10-CM

## 2023-07-28 DIAGNOSIS — E78.5 DYSLIPIDEMIA: ICD-10-CM

## 2023-07-28 RX ORDER — SACUBITRIL AND VALSARTAN 49; 51 MG/1; MG/1
1 TABLET, FILM COATED ORAL 2 TIMES DAILY
Qty: 60 TABLET | Refills: 11 | COMMUNITY
Start: 2023-07-28

## 2023-07-28 RX ORDER — CARVEDILOL 25 MG/1
TABLET ORAL
Qty: 180 TABLET | Refills: 0 | Status: SHIPPED | OUTPATIENT
Start: 2023-07-28

## 2023-07-28 NOTE — PROGRESS NOTES
Date of Office Visit: 2023  Encounter Provider: Aria Bates MD  Place of Service: [facility] CARDIOLOGY  Patient Name: Jung Short  :1945      Patient ID:  Jung Short is a 78 y.o. female is here for  followup for nonischemic cardiomyopathy, chronic HFpEF and HFrEF.        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    She presented to [facility] in 2015 with acute congestive heart failure, systolic and diastolic. She had a 2-D echocardiogram which revealed an ejection fraction of 10-15%, moderate left ventricular dilation, severe mitral and tricuspid insufficiency, and her aortic valve was normal. She was transferred to [facility] for a cardiac catheterization. 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 She also had a right heart catheterization done which revealed an RV pressure of 50/20, PA pressure 50/30 with the main of 38 and a capillary wedge pressure of 31 mmHg. Her right atrial pressure was 20 mmHg. Her cardiac output was 3.9 liters per minute. She was treated medically and diuresed.        Echocardiogram done 2021 showed mild concentric left ventricular hypertrophy, moderate left atrial enlargement, normal diastolic function, mild left ventricular dilation, ejection fraction 20-25%, severely reduced ejection fraction global hypokinesis.  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  Repeat echocardiogram done 4/22/2021 showed moderate left ventricular dilation, mild concentric left ventricle hypertrophy, grade 2 diastolic dysfunction, ejection fraction 37%, moderate reduction of left ventricular systolic function, severe left atrial enlargement, normal saline contrast study, mild right atrial enlargement, mild right ventricular enlargement, global hypokinesis.  She was started on Entresto.     On 12/30/2021, she presented to the emergency department with high blood pressure.  She states it was 200 over 90s.  She actually woke up around 2 AM that morning with a throbbing pain in her left arm.  When she took her blood pressure it was systolic 203.  She checked it several times after the that over the next 2 hours and that it was persistently elevated.  She also had some aching chest pain that was quite brief.  Since her blood pressure was not coming down she decided to come to the ER.  She felt that since she had had COVID and especially the last couple weeks that her systolic blood pressure has remained elevated.  It has been consistently 170/80 systolic.  She was given 10 mg of labetalol IV and her blood pressure responded well.  She states by the time she left it was 140 systolic. Work-up was unremarkable.  Chest x-rays was negative for any acute findings.  She did have a mildly elevated D-dimer at 1.30.  Her CTA was negative for PE.     Echo done 4/12/2020 showed ejection fraction 40% with moderate left atrial enlargement, mild mitral insufficiency, pericardial effusion - no evidence of tamponade, moderate global hypokinesis noted. Echo done 2/6/2023 showed ejection fraction 57% with grade 1 diastolic dysfunction, mild mitral tricuspid sufficiency, moderate left atrial enlargement, normal RVSP.    Labs on 4/4/2023 show potassium 3.2, creatinine 1.15, glucose  146, otherwise normal CMP.  She had a normal thyroid panel done 10/2022, lipids at that time also showed total cholesterol 181, HDL 46,  triglycerides 187, VLDL 32.    She has no chest pain or pressure.  She does not feel heart racing or skipping.  She has a small ulcer on her left foot.  Her right foot is doing well.  She walks with a cane but does try to walk outside with a walker to get exercise.  She has no orthopnea or PND.  She has mild chronic lower extremity edema.  She has no chest pain or pressure.  She has no orthopnea or PND.  She is taking her medications as directed but does have difficulty affording Entresto and Jardiance.    Past Medical History:   Diagnosis Date    Age-related osteoporosis without current pathological fracture 02/25/2022    Anemia     Benign breast disease     Cancer 2015    Right breast    Cellulitis of leg     May-2003 right lower extremity    CHF (congestive heart failure)     Dr Bates follows    Chronic kidney disease     Dr Hannah follows    Chronic ulcer of right foot     Non-pressure, history of     Closed fracture of patella 09/05/2017    Cluster headache     Colon polyp     Depression     Diabetic peripheral neuropathy     Difficulty walking     Diverticulosis     Femoral fracture     right    Fibromyalgia, primary     Fracture of left wrist     history of    Gastroesophageal reflux     Generalized pain 02/02/2016    Hiatal hernia     Hyperlipidemia     Hypertension     Hypothyroidism     Impingement syndrome of right shoulder     Joint pain     Left knee pain 05/17/2016    Menopausal disorder     Methicillin resistant Staphylococcus aureus infection 2012    after bladder surgery    Nonischemic cardiomyopathy     Ejection fraction 10% per 2-D echo with Doppler however she did have cardiac catheterization April 2015 which showed ejection fraction 20% with global hypokinesis and severe mitral insufficiency    Osteoarthritis     generalized, sched jania TKA     Osteomyelitis 02/02/2016    Pain in shoulder 08/02/2016    Paroxysmal atrial fibrillation     Dr Bates follows    Radius/ulna fracture, left, closed, initial encounter 10/21/2017    Shoulder pain, bilateral     has tears on both sides    Sleep apnea     Syncope, psychogenic 04/19/2017    Thoracic back pain 02/02/2016    Thoracic injuries     Toe ulcer     h/o to both feet, d/t shoes rubbing per patient    Transient alteration of awareness 04/19/2017    Traumatic closed nondisp torus fracture of distal radial metaphysis, right, initial encounter 2/28/2020         Past Surgical History:   Procedure Laterality Date    ABDOMINAL SURGERY  2012    had to be reopened after bladder surgery d/t infection    AMPUTATION FOOT / TOE Right 11/2013    Rt foot amputation MTP first toe    BLADDER SURGERY  2012    BREAST BIOPSY      BREAST SURGERY  06/29/2015    Percutaneous ultrasound-guided placement of metal localized clip 1st lesion    CARDIAC CATHETERIZATION  2015    CARDIAC CATHETERIZATION N/A 1/25/2021    Procedure: Right and Left Heart Cath;  Surgeon: Nicholas Andrade MD;  Location:  [facility];  Service: Cardiovascular;  Laterality: N/A;  drop in EF, with hx of NICM, SOA fatigue.  Discussed with     CARDIAC CATHETERIZATION N/A 1/25/2021    Procedure: Left ventriculography;  Surgeon: Nicholas Andrade MD;  Location:  [facility];  Service: Cardiovascular;  Laterality: N/A;    CARDIAC CATHETERIZATION N/A 1/25/2021    Procedure: Coronary angiography;  Surgeon: Nicholas Andrade MD;  Location:  [facility] C[facility]VAS[facility];  Service: Cardiovascular;  Laterality: N/A;    CATARACT EXTRACTION Bilateral 2017    COLONOSCOPY      DEBRIDEMENT  FOOT Right 01/2013    During hospitalization     EPIDURAL BLOCK      4 chronic back pain and leg pain last epidurals done 5-2012 she had no benefit at all from this procedure.    FEMUR FRACTURE SURGERY      FOOT SURGERY Bilateral     several     HERNIA REPAIR      At the abdomen February 2007 Dr. Mendez    INCISIONAL HERNIA REPAIR  05/16/2014    Recurrent. Incarcerated. With mesh implantation    MASTECTOMY Right 05/2015    MASTECTOMY MODIFIED RADICAL W/ AXILLARY LYMPH NODES W/ OR W/O PECTORALIS MINOR Right     SHOULDER SURGERY Right     x2 RCR    TOTAL ABDOMINAL HYSTERECTOMY      with oophorectomy-Done June-2012 secondary to vaginal wall prolapse.    TOTAL KNEE ARTHROPLASTY Right     TOTAL KNEE ARTHROPLASTY Left 4/17/2018    Procedure: TOTAL KNEE ARTHROPLASTY;  Surgeon: Live Chambers MD;  Location: [facility];  Service: Orthopedics       Current Outpatient Medications on File Prior to Visit   Medication Sig Dispense Refill    amLODIPine (NORVASC) 2.5 MG tablet TAKE 1 TABLET BY MOUTH EVERY EVENING 90 tablet 3    amLODIPine (NORVASC) 5 MG tablet TAKE 1 TABLET BY MOUTH EVERY MORNING 90 tablet 3    aspirin 81 MG EC tablet Take 1 tablet by mouth Daily.      bumetanide (BUMEX) 0.5 MG tablet TAKE 3 TABLETS BY MOUTH DAILY. 270 tablet 1    carvedilol (COREG) 25 MG tablet TAKE 1 TABLET BY MOUTH TWICE DAILY 180 tablet 0    D-1000 Extra Strength 25 MCG (1000 UT) tablet TAKE 1 TABLET BY MOUTH DAILY 90 tablet 2    DULoxetine (CYMBALTA) 60 MG capsule TAKE 1 CAPSULE BY MOUTH DAILY. 90 capsule 3    empagliflozin (JARDIANCE) 10 MG tablet tablet Take 1 tablet by mouth Daily. 30 tablet 0    esomeprazole (nexIUM) 20 MG capsule Take 1 capsule by mouth Every Morning Before Breakfast.      glipizide (GLUCOTROL XL) 5 MG ER tablet TAKE 1 TABLET BY MOUTH DAILY WITH BREAKFAST. 90 tablet 3    HYDROcodone-acetaminophen (NORCO) 5-325 MG per tablet Take 1 tablet by mouth every 6 hours as needed for moderate pain - ok to release 6- 120 tablet 0    levothyroxine (SYNTHROID, LEVOTHROID) 25 MCG tablet TAKE 1 TABLET BY MOUTH DAILY 90 tablet 3    Multiple Vitamins-Minerals (MULTIVITAMIN ADULT PO) Take 1 tablet by mouth Daily.      naproxen (EC NAPROSYN) 500 MG EC tablet Take 1 tablet by  "mouth 2 (Two) Times a Day As Needed for Mild Pain. 20 tablet 0    pravastatin (PRAVACHOL) 10 MG tablet TAKE 1 TABLET BY MOUTH AT BEDTIME 90 tablet 2    pregabalin (LYRICA) 75 MG capsule TAKE 1 CAPSULE BY MOUTH 2 (TWO) TIMES A DAY. 60 capsule 6    sacubitril-valsartan (Entresto) 49-51 MG tablet Take 1 tablet by mouth 2 (Two) Times a Day. 30 tablet 0     No current facility-administered medications on file prior to visit.       Social History     Socioeconomic History    Marital status:     Number of children: 2   Tobacco Use    Smoking status: Former     Packs/day: 3.00     Years: 30.00     Pack years: 90.00     Types: Cigarettes     Quit date:      Years since quittin.5     Passive exposure: Past    Smokeless tobacco: Never   Vaping Use    Vaping Use: Never used   Substance and Sexual Activity    Alcohol use: No     Comment: Caffeine use: 3 cups daily     Drug use: No    Sexual activity: Defer     Partners: Male     Comment: celibate           ROS    Procedures    ECG 12 Lead    Date/Time: 2023 1:18 PM  Performed by: Aria Bates MD  Authorized by: Aria Bates MD   Comparison: compared with previous ECG   Similar to previous ECG  Rhythm: sinus rhythm  Conduction: left anterior fascicular block    Clinical impression: abnormal EKG            Objective:      Vitals:    23 1313   BP: 120/62   Pulse: 59   Weight: 93 kg (205 lb)   Height: 165.1 cm (65\")     Body mass index is 34.11 kg/m².    Vitals reviewed.   Constitutional:       General: Not in acute distress.     Appearance: Well-developed. Not diaphoretic.   Eyes:      General: No scleral icterus.     Conjunctiva/sclera: Conjunctivae normal.   HENT:      Head: Normocephalic and atraumatic.   Neck:      Thyroid: No thyromegaly.      Vascular: No carotid bruit or JVD.      Lymphadenopathy: No cervical adenopathy.   Pulmonary:      Effort: Pulmonary effort is normal. No respiratory distress.      Breath sounds: Normal " breath sounds. No wheezing. No rhonchi. No rales.   Chest:      Chest wall: Not tender to palpatation.   Cardiovascular:      Normal rate. Regular rhythm.      Murmurs: There is no murmur.      No gallop.  No rub.   Pulses:     Intact distal pulses.      Carotid: 2+ bilaterally.     Radial: 2+ bilaterally.     Dorsalis pedis: 2+ bilaterally.     Posterior tibial: 2+ bilaterally.  Edema:     Peripheral edema present.     Thigh: bilateral trace edema of the thigh.     Pretibial: bilateral trace edema of the pretibial area.     Ankle: bilateral trace edema of the ankle.     Feet: bilateral trace edema of the feet.  Abdominal:      General: Bowel sounds are normal. There is no distension or abdominal bruit.      Palpations: Abdomen is soft. There is no abdominal mass.      Tenderness: There is no abdominal tenderness.   Musculoskeletal:         General: No deformity.      Extremities: No clubbing present.     Cervical back: Neck supple. Skin:     General: Skin is warm and dry. There is no cyanosis.      Coloration: Skin is not pale.      Findings: No rash.   Neurological:      Mental Status: Alert and oriented to person, place, and time.      Cranial Nerves: No cranial nerve deficit.   Psychiatric:         Judgment: Judgment normal.       Lab Review:       Assessment:      Diagnosis Plan   1. Atherosclerosis of native coronary artery of native heart without angina pectoris        2. Dyslipidemia        3. HFrEF (heart failure with reduced ejection fraction)        4. NICM (nonischemic cardiomyopathy)        5. Type 2 diabetes mellitus without complication, without long-term current use of insulin          1. Nonischemic cardiomyopathy, recurrent. Her ejection fraction is now 57%.  Maintain carvedilol, Jardiance, Entresto at the current doses.  She is euvolemic, maintain Bumex.  2. Hypertension. Goal <120/80.  Her blood pressure is controlled.  Continue amlodipine with current regimen to control this.  3. DM, type 2.  4.  "Anomalous coronary artery, LV off RCC.   5. H/o right breast cancer. Sees Dr. Draper.   6. Severe OA of the knees.  7. CKD, she no longer sees nephrology.     Plan:       No changes, overall doing well, no active heart failure, no angina.  See Monica in 6 months.    STOP-Bang Score  Have you been diagnosed with Sleep Apnea?: no  Snoring?: yes  Tired?: no  Observed?: no  Pressure?: yes  Stop Score: 2  Body Mass Index more than 35 kg/m2?: no  Age older than 50 year old?: yes  Neck large? \">17\"/43cm-M, >16\"/41cm-F: no  Gender=Male?: no  Total Stop-Bang Score: 3    "

## 2023-08-11 DIAGNOSIS — Z79.899 ENCOUNTER FOR LONG-TERM (CURRENT) USE OF HIGH-RISK MEDICATION: ICD-10-CM

## 2023-08-11 DIAGNOSIS — M47.816 LUMBAR FACET ARTHROPATHY: ICD-10-CM

## 2023-08-11 NOTE — TELEPHONE ENCOUNTER
Medication Refill Request    Date of phone call: 8/11/2023    Medication being requested: hydro/apap 5-325 mg sig: take 1 tab q 6 hrs prn  Qty: 120    Date of last visit: 7/10/2023    Date of last refill: 7/17/2023    LOUANN up to date?: yes    Next Follow up?: 9/11/2023    Any new pertinent information? (i.e, new medication allergies, new use of medications, change in patient's health or condition, non-compliance or inconsistency with prescribing agreement?):

## 2023-08-12 RX ORDER — HYDROCODONE BITARTRATE AND ACETAMINOPHEN 5; 325 MG/1; MG/1
1 TABLET ORAL EVERY 6 HOURS PRN
Qty: 120 TABLET | Refills: 0 | Status: SHIPPED | OUTPATIENT
Start: 2023-08-17

## 2023-08-16 NOTE — TELEPHONE ENCOUNTER
HgA1c in past 6 months     Rx Refill Note  Requested Prescriptions     Pending Prescriptions Disp Refills    glipizide (GLUCOTROL) 5 MG tablet [Pharmacy Med Name: GLIPIZIDE 5MG] 90 tablet 2     Sig: TAKE 1 TABLET BY MOUTH DAILY WITH BREAKFAST.      Last office visit with prescribing clinician: 1/19/2023   Last telemedicine visit with prescribing clinician: Visit date not found   Next office visit with prescribing clinician: Visit date not found                         Would you like a call back once the refill request has been completed: [] Yes [] No    If the office needs to give you a call back, can they leave a voicemail: [] Yes [] No    Aries Ruiz, PCT  08/16/23, 13:17 EDT

## 2023-08-17 RX ORDER — GLIPIZIDE 5 MG/1
TABLET ORAL
Qty: 90 TABLET | Refills: 2 | OUTPATIENT
Start: 2023-08-17

## 2023-08-17 NOTE — TELEPHONE ENCOUNTER
This appears to be an incorrect med. Her med list notes she is taking ER. She has not been seen in over 8mo and has not had labs. Please clarify what she is actually taking and get her set up for labs and OV. IF she is due for AWV, please schedule accordingly.

## 2023-08-17 NOTE — TELEPHONE ENCOUNTER
Hub staff attempted to follow warm transfer process and was unsuccessful     Caller: JOSSELIN GUAJARDO    Relationship to patient: PATIENT     Best call back number: [phone]    Patient is needing: PATIENT IS CALLING FOLLOWING UP ON MEDICATION REFILL

## 2023-08-24 NOTE — TELEPHONE ENCOUNTER
Wants to know if she can drive to work, she can put the brace on when she gets to the hospital in her car.  She only has a few steps from her kitchen to her car.  She needs to go to work tomorrow morning.  
Yes she can drive to work  
Yes

## 2023-08-29 DIAGNOSIS — E11.9 TYPE 2 DIABETES MELLITUS WITHOUT COMPLICATION, WITHOUT LONG-TERM CURRENT USE OF INSULIN: ICD-10-CM

## 2023-08-29 RX ORDER — SACUBITRIL AND VALSARTAN 49; 51 MG/1; MG/1
1 TABLET, FILM COATED ORAL 2 TIMES DAILY
Qty: 60 TABLET | Refills: 11 | Status: SHIPPED | OUTPATIENT
Start: 2023-08-29

## 2023-09-12 ENCOUNTER — OFFICE VISIT (OUTPATIENT)
Dept: INTERNAL MEDICINE | Facility: CLINIC | Age: 78
End: 2023-09-12
Payer: MEDICARE

## 2023-09-12 VITALS
WEIGHT: 210 LBS | DIASTOLIC BLOOD PRESSURE: 64 MMHG | SYSTOLIC BLOOD PRESSURE: 120 MMHG | TEMPERATURE: 97 F | OXYGEN SATURATION: 97 % | BODY MASS INDEX: 34.99 KG/M2 | HEIGHT: 65 IN | HEART RATE: 72 BPM

## 2023-09-12 DIAGNOSIS — M47.816 LUMBAR FACET ARTHROPATHY: ICD-10-CM

## 2023-09-12 DIAGNOSIS — I50.20 HFREF (HEART FAILURE WITH REDUCED EJECTION FRACTION): ICD-10-CM

## 2023-09-12 DIAGNOSIS — I10 PRIMARY HYPERTENSION: ICD-10-CM

## 2023-09-12 DIAGNOSIS — E78.5 DYSLIPIDEMIA: ICD-10-CM

## 2023-09-12 DIAGNOSIS — I25.10 ATHEROSCLEROSIS OF NATIVE CORONARY ARTERY OF NATIVE HEART WITHOUT ANGINA PECTORIS: ICD-10-CM

## 2023-09-12 DIAGNOSIS — E53.8 COBALAMIN DEFICIENCY: ICD-10-CM

## 2023-09-12 DIAGNOSIS — Z00.00 ENCOUNTER FOR MEDICARE ANNUAL WELLNESS EXAM: Primary | ICD-10-CM

## 2023-09-12 DIAGNOSIS — E55.9 VITAMIN D DEFICIENCY: ICD-10-CM

## 2023-09-12 DIAGNOSIS — E11.9 TYPE 2 DIABETES MELLITUS WITHOUT COMPLICATION, WITHOUT LONG-TERM CURRENT USE OF INSULIN: ICD-10-CM

## 2023-09-12 DIAGNOSIS — R53.81 PHYSICAL DECONDITIONING: ICD-10-CM

## 2023-09-12 DIAGNOSIS — N18.31 STAGE 3A CHRONIC KIDNEY DISEASE: ICD-10-CM

## 2023-09-12 DIAGNOSIS — M51.36 DDD (DEGENERATIVE DISC DISEASE), LUMBAR: ICD-10-CM

## 2023-09-12 DIAGNOSIS — E03.9 ACQUIRED HYPOTHYROIDISM: ICD-10-CM

## 2023-09-12 DIAGNOSIS — Z12.31 ENCOUNTER FOR SCREENING MAMMOGRAM FOR MALIGNANT NEOPLASM OF BREAST: ICD-10-CM
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
## 2023-09-12 PROBLEM — J96.01 ACUTE RESPIRATORY FAILURE WITH HYPOXIA: Status: RESOLVED | Noted: 2020-01-31 | Resolved: 2023-09-12

## 2023-09-12 PROBLEM — J90 PLEURAL EFFUSION: Status: RESOLVED | Noted: 2020-01-31 | Resolved: 2023-09-12

## 2023-09-12 LAB
EXPIRATION DATE: NORMAL
Lab: NORMAL
POC CREATININE URINE: 50
POC MICROALBUMIN URINE: 10

## 2023-09-12 PROCEDURE — 3074F SYST BP LT 130 MM HG: CPT | Performed by: NURSE PRACTITIONER

## 2023-09-12 PROCEDURE — 1170F FXNL STATUS ASSESSED: CPT | Performed by: NURSE PRACTITIONER

## 2023-09-12 PROCEDURE — 1160F RVW MEDS BY RX/DR IN RCRD: CPT | Performed by: NURSE PRACTITIONER

## 2023-09-12 PROCEDURE — 3078F DIAST BP <80 MM HG: CPT | Performed by: NURSE PRACTITIONER

## 2023-09-12 PROCEDURE — 1159F MED LIST DOCD IN RCRD: CPT | Performed by: NURSE PRACTITIONER

## 2023-09-12 PROCEDURE — G0439 PPPS, SUBSEQ VISIT: HCPCS | Performed by: NURSE PRACTITIONER

## 2023-09-12 PROCEDURE — 82044 UR ALBUMIN SEMIQUANTITATIVE: CPT | Performed by: NURSE PRACTITIONER

## 2023-09-12 NOTE — PROGRESS NOTES
The ABCs of the Annual Wellness Visit  Subsequent Medicare Wellness Visit    Chief Complaint   Patient presents with    Medicare Wellness-subsequent       Subjective   History of Present Illness:  Jung Short is a 78 y.o. female who presents for a Subsequent Medicare Wellness Visit as well as follow up of her chronic health conditions.        Patient Active Problem List   Diagnosis    Chronic anemia    Malignant neoplasm of upper-inner quadrant of right breast in female, estrogen receptor positive    Disc disorder of cervical region    Neck pain    Chronic pain    Depression    Type 2 diabetes mellitus without complication, without long-term current use of insulin    Dyslipidemia    Gastroesophageal reflux disease with esophagitis    Primary hypertension    Hypothyroidism    Incisional hernia    Mitral valve insufficiency    Arthralgia of multiple joints    Peripheral neuropathy    Pulmonary hypertension    Tricuspid valve insufficiency    Cobalamin deficiency    Vitamin D deficiency    HFrEF (heart failure with reduced ejection fraction)    Arthritis of knee    DDD (degenerative disc disease), lumbar    Lumbar facet arthropathy    Chronic gout of multiple sites    Encounter for long-term (current) use of high-risk medication    Primary osteoarthritis of left knee    Physical deconditioning    CKD (chronic kidney disease) stage 3, GFR 30-59 ml/min    Periprosthetic subtrochanteric fracture of femur    Atherosclerotic heart disease of native coronary artery without angina pectoris    NICM (nonischemic cardiomyopathy)    Age-related osteoporosis without current pathological fracture    Tear of left rotator cuff    Subacromial bursitis of left shoulder joint    Primary osteoarthritis, left shoulder       Outpatient Medications Marked as Taking for the 9/12/23 encounter (Office Visit) with Kathryn Epstein APRN   Medication Sig Dispense Refill    amLODIPine (NORVASC) 2.5 MG tablet TAKE 1 TABLET BY MOUTH  EVERY EVENING 90 tablet 3    amLODIPine (NORVASC) 5 MG tablet TAKE 1 TABLET BY MOUTH EVERY MORNING 90 tablet 3    aspirin 81 MG EC tablet Take 1 tablet by mouth Daily.      bumetanide (BUMEX) 0.5 MG tablet TAKE 3 TABLETS BY MOUTH DAILY. 270 tablet 1    carvedilol (COREG) 25 MG tablet TAKE 1 TABLET BY MOUTH TWICE DAILY 180 tablet 0    D-1000 Extra Strength 25 MCG (1000 UT) tablet TAKE 1 TABLET BY MOUTH DAILY 90 tablet 2    DULoxetine (CYMBALTA) 60 MG capsule TAKE 1 CAPSULE BY MOUTH DAILY. 90 capsule 3    empagliflozin (JARDIANCE) 10 MG tablet tablet Take 1 tablet by mouth Daily. 30 tablet 11    esomeprazole (nexIUM) 20 MG capsule Take 1 capsule by mouth Every Morning Before Breakfast.      glipizide (GLUCOTROL XL) 5 MG ER tablet TAKE 1 TABLET BY MOUTH DAILY WITH BREAKFAST. 90 tablet 3    HYDROcodone-acetaminophen (NORCO) 5-325 MG per tablet Take 1 tablet by mouth Every 6 (Six) Hours As Needed for Severe Pain. - ok to fill 8- 120 tablet 0    levothyroxine (SYNTHROID, LEVOTHROID) 25 MCG tablet TAKE 1 TABLET BY MOUTH DAILY 90 tablet 3    Multiple Vitamins-Minerals (MULTIVITAMIN ADULT PO) Take 1 tablet by mouth Daily.      naproxen (EC NAPROSYN) 500 MG EC tablet Take 1 tablet by mouth 2 (Two) Times a Day As Needed for Mild Pain. 20 tablet 0    pravastatin (PRAVACHOL) 10 MG tablet TAKE 1 TABLET BY MOUTH AT BEDTIME 90 tablet 2    pregabalin (LYRICA) 75 MG capsule TAKE 1 CAPSULE BY MOUTH 2 (TWO) TIMES A DAY. 60 capsule 6    sacubitril-valsartan (Entresto) 49-51 MG tablet Take 1 tablet by mouth 2 (Two) Times a Day. 60 tablet 11         HEALTH RISK ASSESSMENT    Recent Hospitalizations:  No hospitalization(s) within the last year.    Current Medical Providers:  Patient Care Team:  Kathryn Epstein APRN as PCP - General (Family Medicine)  Aria Bates MD as Consulting Physician (Cardiology)  Rafa Hannah MD as Consulting Physician (Nephrology)  Live Chambers MD as Surgeon (Orthopedic  Surgery)  Trini Zaidi APRN as Nurse Practitioner (Pain Medicine)  Kaitlynn Frias DPM as Consulting Physician (Podiatry)    Smoking Status:  Social History     Tobacco Use   Smoking Status Former    Packs/day: 3.00    Years: 30.00    Pack years: 90.00    Types: Cigarettes    Quit date:     Years since quittin.7    Passive exposure: Past   Smokeless Tobacco Never       Alcohol Consumption:  Social History     Substance and Sexual Activity   Alcohol Use No    Comment: Caffeine use: 3 cups daily        Depression Screen:       2023     1:52 PM   PHQ-2/PHQ-9 Depression Screening   Little Interest or Pleasure in Doing Things 0-->not at all   Feeling Down, Depressed or Hopeless 0-->not at all   PHQ-9: Brief Depression Severity Measure Score 0       Fall Risk Screen:  ALEKS Fall Risk Assessment was completed, and patient is at HIGH risk for falls. Assessment completed on:2023    Health Habits and Functional and Cognitive Screenin/12/2023     1:49 PM   Functional & Cognitive Status   Do you have difficulty preparing food and eating? No   Do you have difficulty bathing yourself, getting dressed or grooming yourself? No   Do you have difficulty using the toilet? No   Do you have difficulty moving around from place to place? No   Do you have trouble with steps or getting out of a bed or a chair? Yes   Current Diet Well Balanced Diet   Dental Exam Up to date   Eye Exam Up to date   Exercise (times per week) 0 times per week   Current Exercises Include No Regular Exercise   Do you need help using the phone?  No   Are you deaf or do you have serious difficulty hearing?  Yes   Do you need help to go to places out of walking distance? No   Do you need help shopping? No   Do you need help preparing meals?  No   Do you need help with housework?  Yes   Do you need help with laundry? No   Do you need help taking your medications? No   Do you need help managing money? No   Do you ever drive or  ride in a car without wearing a seat belt? No   Have you felt unusual stress, anger or loneliness in the last month? No   Who do you live with? Alone   If you need help, do you have trouble finding someone available to you? No   Have you been bothered in the last four weeks by sexual problems? No   Do you have difficulty concentrating, remembering or making decisions? No         Does the patient have evidence of cognitive impairment? No    Asprin use counseling:Taking ASA appropriately as indicated    Age-appropriate Screening Schedule:  Refer to the list below for future screening recommendations based on patient's age, sex and/or medical conditions. Orders for these recommended tests are listed in the plan section. The patient has been provided with a written plan.    Health Maintenance   Topic Date Due    TDAP/TD VACCINES (1 - Tdap) Never done    ZOSTER VACCINE (1 of 2) Never done    MAMMOGRAM  02/22/2023    INFLUENZA VACCINE  10/01/2023    DXA SCAN  02/22/2024    HEMOGLOBIN A1C  03/05/2024    DIABETIC EYE EXAM  08/08/2024    LIPID PANEL  09/05/2024    ANNUAL WELLNESS VISIT  09/12/2024    URINE MICROALBUMIN  09/12/2024    BMI FOLLOWUP  09/12/2024    Pneumococcal Vaccine 65+  Completed    COVID-19 Vaccine  Discontinued    COLORECTAL CANCER SCREENING  Discontinued          The following portions of the patient's history were reviewed and updated as appropriate: allergies, current medications, past family history, past medical history, past social history, past surgical history, and problem list.      Compared to one year ago, the patient feels her physical health is worse.  Compared to one year ago, the patient feels her mental health is the same.    Reviewed chart for potential of high risk medication in the elderly: Yes  Reviewed chart for potential of harmful drug interactions in the elderly:Yes      Review of Systems   HENT: Negative.     Eyes: Negative.    Respiratory: Negative.     Cardiovascular: Negative.   "  Gastrointestinal: Negative.    Endocrine: Negative.    Genitourinary: Negative.    Musculoskeletal:  Positive for arthralgias and myalgias.   Skin: Negative.    Allergic/Immunologic: Negative.    Neurological:  Positive for weakness. Negative for dizziness and headaches.   Hematological: Negative.    Psychiatric/Behavioral: Negative.         Objective         Vitals:    09/12/23 1345   BP: 120/64   BP Location: Left arm   Patient Position: Sitting   Cuff Size: Adult   Pulse: 72   Temp: 97 °F (36.1 °C)   SpO2: 97%   Weight: 95.3 kg (210 lb)  Comment: pt reported   Height: 165.1 cm (65\")       No results found.    Body mass index is 34.95 kg/m².  Discussed the patient's BMI with her. The BMI is above average; no BMI management plan is appropriate..  BMI is >= 30 and <35. (Class 1 Obesity). The following options were offered after discussion;: none (medical contraindication)        Physical Exam  Constitutional:       General: She is not in acute distress.     Appearance: She is well-developed.   HENT:      Right Ear: Hearing, tympanic membrane, ear canal and external ear normal.      Left Ear: Hearing, tympanic membrane, ear canal and external ear normal.      Nose: Nose normal.      Mouth/Throat:      Mouth: Mucous membranes are moist.      Pharynx: Oropharynx is clear. Uvula midline.   Neck:      Thyroid: No thyroid mass or thyromegaly.   Cardiovascular:      Rate and Rhythm: Regular rhythm.      Pulses: Normal pulses.      Heart sounds: S1 normal and S2 normal. No murmur heard.    No friction rub. No gallop.   Pulmonary:      Effort: Pulmonary effort is normal.      Breath sounds: Normal breath sounds. No wheezing, rhonchi or rales.   Abdominal:      Comments: Limited by habitus.   Musculoskeletal:      Cervical back: Neck supple.   Lymphadenopathy:      Cervical: No cervical adenopathy.   Neurological:      Mental Status: She is alert and oriented to person, place, and time.      Cranial Nerves: No cranial nerve " deficit.      Sensory: No sensory deficit.   Psychiatric:         Attention and Perception: She is attentive.         Speech: Speech normal.         Behavior: Behavior normal.       Recent Results (from the past 336 hour(s))   CBC (No Diff)    Collection Time: 09/05/23 11:26 AM    Specimen: Blood   Result Value Ref Range    WBC 6.74 3.40 - 10.80 10*3/mm3    RBC 4.09 3.77 - 5.28 10*6/mm3    Hemoglobin 12.1 12.0 - 15.9 g/dL    Hematocrit 36.5 34.0 - 46.6 %    MCV 89.2 79.0 - 97.0 fL    MCH 29.6 26.6 - 33.0 pg    MCHC 33.2 31.5 - 35.7 g/dL    RDW 13.8 12.3 - 15.4 %    Platelets 161 140 - 450 10*3/mm3   Comprehensive Metabolic Panel    Collection Time: 09/05/23 11:26 AM    Specimen: Blood   Result Value Ref Range    Glucose 130 (H) 65 - 99 mg/dL    BUN 13 8 - 23 mg/dL    Creatinine 0.83 0.57 - 1.00 mg/dL    EGFR Result 71.4 >60.0 mL/min/1.73    BUN/Creatinine Ratio 15.7 7.0 - 25.0    Sodium 140 136 - 145 mmol/L    Potassium 3.8 3.5 - 5.2 mmol/L    Chloride 105 98 - 107 mmol/L    Total CO2 25.0 22.0 - 29.0 mmol/L    Calcium 10.3 8.6 - 10.5 mg/dL    Total Protein 6.1 6.0 - 8.5 g/dL    Albumin 4.0 3.5 - 5.2 g/dL    Globulin 2.1 gm/dL    A/G Ratio 1.9 g/dL    Total Bilirubin 0.8 0.0 - 1.2 mg/dL    Alkaline Phosphatase 71 39 - 117 U/L    AST (SGOT) 12 1 - 32 U/L    ALT (SGPT) 12 1 - 33 U/L   Hemoglobin A1c    Collection Time: 09/05/23 11:26 AM    Specimen: Blood   Result Value Ref Range    Hemoglobin A1C 6.90 (H) 4.80 - 5.60 %   Lipid Panel With / Chol / HDL Ratio    Collection Time: 09/05/23 11:26 AM    Specimen: Blood   Result Value Ref Range    Total Cholesterol 192 0 - 200 mg/dL    Triglycerides 103 0 - 150 mg/dL    HDL Cholesterol 56 40 - 60 mg/dL    VLDL Cholesterol Sergio 19 5 - 40 mg/dL    LDL Chol Calc ([facility] [[email or web address]]) 117 (H) 0 - 100 mg/dL    Chol/HDL Ratio 3.43    TSH    Collection Time: 09/05/23 11:26 AM    Specimen: Blood   Result Value Ref Range    TSH 1.430 0.270 - 4.200 uIU/mL   T4, Free    Collection Time: 09/05/23 11:26  AM    Specimen: Blood   Result Value Ref Range    Free T4 1.44 0.93 - 1.70 ng/dL   Vitamin B12    Collection Time: 09/05/23 11:26 AM    Specimen: Blood   Result Value Ref Range    Vitamin B-12 239 211 - 946 pg/mL   Vitamin D,25-Hydroxy    Collection Time: 09/05/23 11:26 AM    Specimen: Blood   Result Value Ref Range    25 Hydroxy, Vitamin D 47.7 30.0 - 100.0 ng/ml   POC Microalbumin    Collection Time: 09/12/23  2:42 PM    Specimen: Urine   Result Value Ref Range    Microalbumin, Urine 10     Creatinine, Urine 50     Lot Number [id number]     Expiration Date 10/31/23        Each of these lab results were discussed individually in detail with the patient.      Assessment & Plan     Diagnoses and all orders for this visit:    1. Encounter for Medicare annual wellness exam (Primary)    2. Primary hypertension    3. Dyslipidemia    4. HFrEF (heart failure with reduced ejection fraction)    5. Atherosclerosis of native coronary artery of native heart without angina pectoris    6. Type 2 diabetes mellitus without complication, without long-term current use of insulin  -     POC Microalbumin    7. Acquired hypothyroidism    8. Cobalamin deficiency    9. Vitamin D deficiency    10. Stage 3a chronic kidney disease    11. DDD (degenerative disc disease), lumbar  -     Ambulatory Referral to Physical Therapy Evaluate and treat    12. Lumbar facet arthropathy  -     Ambulatory Referral to Physical Therapy Evaluate and treat    13. Physical deconditioning  -     Ambulatory Referral to Physical Therapy Evaluate and treat    14. Encounter for screening mammogram for malignant neoplasm of breast  -     Mammo screening modified with tomosynthesis left w CAD; Future      Except as noted above, pt will continue current medications as noted in the medication list. I will continue to authorize refills as needed.    Continue to follow with specialty care as directed by them.      Medicare Risks and Personalized Health Plan    Advanced Care  Planning:  ACP discussion was held with the patient during this visit. Patient has an advance directive in EMR which is still valid.     CMS Preventative Services Quick Reference  Breast Cancer/Mammogram Screening  Cardiovascular risk  Chronic Pain   Colon Cancer Screening  Diabetic Lab Screening   Obesity/Overweight   Osteoporosis Risk        The above risks/problems have been discussed with the patient.  Pertinent information has been shared with the patient in the After Visit Summary.  Follow up plans and orders are seen below in the Assessment/Plan Section.      Follow Up:  Return in about 6 months (around 3/12/2024) for fasting labs one week prior to., sooner if needed.    An After Visit Summary and PPPS were given to the patient.

## 2023-09-13 ENCOUNTER — TELEPHONE (OUTPATIENT)
Dept: PAIN MEDICINE | Facility: CLINIC | Age: 78
End: 2023-09-13

## 2023-09-13 NOTE — TELEPHONE ENCOUNTER
Can you call pharmacy and see if they will take a verbal to fill one day early--if they won't please go ahead and cancel that rx and then let me know if I need to send a new one.  Thanks

## 2023-09-13 NOTE — TELEPHONE ENCOUNTER
Ms. Short called today and wanted to know if she would be able to pick-up her hydro/apap Rx on Friday 9/15/23 instead of Saturday 9/16/23? She states that she is going to [place], [place] on Friday  and won't be back to [place] until Sunday 9/17/23. She states that her pharmacy is closed on Sundays.

## 2023-09-15 DIAGNOSIS — Z79.899 ENCOUNTER FOR LONG-TERM (CURRENT) USE OF HIGH-RISK MEDICATION: ICD-10-CM

## 2023-09-15 DIAGNOSIS — M47.816 LUMBAR FACET ARTHROPATHY: ICD-10-CM

## 2023-09-15 RX ORDER — GLIPIZIDE 5 MG/1
TABLET ORAL
Qty: 90 TABLET | Refills: 2 | Status: SHIPPED | OUTPATIENT
Start: 2023-09-15

## 2023-09-15 RX ORDER — PRAVASTATIN SODIUM 10 MG
TABLET ORAL
Qty: 90 TABLET | Refills: 1 | Status: SHIPPED | OUTPATIENT
Start: 2023-09-15

## 2023-09-15 RX ORDER — HYDROCODONE BITARTRATE AND ACETAMINOPHEN 5; 325 MG/1; MG/1
1 TABLET ORAL EVERY 6 HOURS PRN
Qty: 120 TABLET | Refills: 0 | Status: SHIPPED | OUTPATIENT
Start: 2023-09-15

## 2023-09-15 NOTE — TELEPHONE ENCOUNTER
Medication Refill Request    Date of phone call: 9/13/2023    Medication being requested: hydro/apap 5-325 mg sig: take 1 tab q 6 hrs prn  Qty: 120    Date of last visit: 7/10/2023    Date of last refill: 8/17/2023    LOUANN up to date?: yes    Next Follow up?: 9/20/2023    Any new pertinent information? (i.e, new medication allergies, new use of medications, change in patient's health or condition, non-compliance or inconsistency with prescribing agreement?):  Per Mirza Case, PAC it's ok for Ms. Short to  her Rx today because she is going out of town tomorrow.

## 2023-09-20 RX ORDER — BUMETANIDE 0.5 MG/1
1.5 TABLET ORAL DAILY
Qty: 270 TABLET | Refills: 0 | Status: SHIPPED | OUTPATIENT
Start: 2023-09-20

## 2023-09-25 ENCOUNTER — OFFICE VISIT (OUTPATIENT)
Dept: PAIN MEDICINE | Facility: CLINIC | Age: 78
End: 2023-09-25

## 2023-09-25 VITALS
DIASTOLIC BLOOD PRESSURE: 66 MMHG | BODY MASS INDEX: 34.95 KG/M2 | HEIGHT: 65 IN | TEMPERATURE: 97 F | OXYGEN SATURATION: 97 % | HEART RATE: 78 BPM | SYSTOLIC BLOOD PRESSURE: 130 MMHG | RESPIRATION RATE: 20 BRPM

## 2023-09-25 DIAGNOSIS — M51.36 DDD (DEGENERATIVE DISC DISEASE), LUMBAR: Primary | ICD-10-CM

## 2023-09-25 DIAGNOSIS — Z79.899 ENCOUNTER FOR LONG-TERM (CURRENT) USE OF HIGH-RISK MEDICATION: ICD-10-CM

## 2023-09-25 DIAGNOSIS — M19.012 PRIMARY OSTEOARTHRITIS, LEFT SHOULDER: ICD-10-CM

## 2023-09-25 DIAGNOSIS — M47.816 LUMBAR FACET ARTHROPATHY: ICD-10-CM

## 2023-09-25 PROCEDURE — 1125F AMNT PAIN NOTED PAIN PRSNT: CPT | Performed by: PHYSICIAN ASSISTANT

## 2023-09-25 PROCEDURE — 1159F MED LIST DOCD IN RCRD: CPT | Performed by: PHYSICIAN ASSISTANT

## 2023-09-25 PROCEDURE — 1160F RVW MEDS BY RX/DR IN RCRD: CPT | Performed by: PHYSICIAN ASSISTANT

## 2023-09-25 PROCEDURE — 3075F SYST BP GE 130 - 139MM HG: CPT | Performed by: PHYSICIAN ASSISTANT

## 2023-09-25 PROCEDURE — 99214 OFFICE O/P EST MOD 30 MIN: CPT | Performed by: PHYSICIAN ASSISTANT

## 2023-09-25 PROCEDURE — 3078F DIAST BP <80 MM HG: CPT | Performed by: PHYSICIAN ASSISTANT
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
## 2023-09-29 ENCOUNTER — HOSPITAL ENCOUNTER (OUTPATIENT)
Dept: MAMMOGRAPHY | Facility: HOSPITAL | Age: 78
Discharge: HOME | End: 2023-09-29
Admitting: NURSE PRACTITIONER
Payer: MEDICARE

## 2023-09-29 DIAGNOSIS — Z12.31 ENCOUNTER FOR SCREENING MAMMOGRAM FOR MALIGNANT NEOPLASM OF BREAST: ICD-10-CM

## 2023-09-29 PROCEDURE — 77067 SCR MAMMO BI INCL CAD: CPT

## 2023-09-29 PROCEDURE — 77063 BREAST TOMOSYNTHESIS BI: CPT

## 2023-10-05 ENCOUNTER — HOSPITAL ENCOUNTER (OUTPATIENT)
Dept: INFUSION THERAPY | Facility: HOSPITAL | Age: 78
Discharge: HOME | End: 2023-10-05
Payer: MEDICARE

## 2023-10-06 ENCOUNTER — TELEPHONE (OUTPATIENT)
Dept: INTERNAL MEDICINE | Facility: CLINIC | Age: 78
End: 2023-10-06

## 2023-10-06 NOTE — TELEPHONE ENCOUNTER
"Caller: Jung Short \"SHEILA\"    Relationship: Self    Best call back number: [phone]     What medication are you requesting: ANTIBIOTIC    What are your current symptoms: FOR THE FOOT SORE     How long have you been experiencing symptoms: ONGOING    Have you had these symptoms before:    [x] Yes  [] No    Have you been treated for these symptoms before:   [x] Yes  [] No    If a prescription is needed, what is your preferred pharmacy and phone number: [facility] [place] [address] [phone] [facility] [phone]      Additional notes: PATIENT HAS SPOKEN TO SILVESTRE HERNÁNDEZ ABOUT THIS AND SHE HAS NOT HEARD FROM THE DOCTOR SHE WAS REFERRED TO.     PLEASE CALL TO ADVISE PATIENT ABOUT THIS ISSUE.         "

## 2023-10-09 ENCOUNTER — TELEPHONE (OUTPATIENT)
Dept: INTERNAL MEDICINE | Facility: CLINIC | Age: 78
End: 2023-10-09
Payer: MEDICARE

## 2023-10-10 ENCOUNTER — OFFICE VISIT (OUTPATIENT)
Dept: INTERNAL MEDICINE | Facility: CLINIC | Age: 78
End: 2023-10-10
Payer: MEDICARE

## 2023-10-10 VITALS
DIASTOLIC BLOOD PRESSURE: 64 MMHG | WEIGHT: 211 LBS | OXYGEN SATURATION: 96 % | TEMPERATURE: 97.2 F | BODY MASS INDEX: 35.16 KG/M2 | HEIGHT: 65 IN | HEART RATE: 74 BPM | SYSTOLIC BLOOD PRESSURE: 116 MMHG

## 2023-10-10 DIAGNOSIS — Z79.899 ENCOUNTER FOR LONG-TERM (CURRENT) USE OF HIGH-RISK MEDICATION: ICD-10-CM

## 2023-10-10 DIAGNOSIS — L97.528 ULCER OF LEFT FOOT WITH OTHER SEVERITY: Primary | ICD-10-CM

## 2023-10-10 DIAGNOSIS — E11.9 TYPE 2 DIABETES MELLITUS WITHOUT COMPLICATION, WITHOUT LONG-TERM CURRENT USE OF INSULIN: ICD-10-CM

## 2023-10-10 DIAGNOSIS — M47.816 LUMBAR FACET ARTHROPATHY: ICD-10-CM

## 2023-10-10 RX ORDER — HYDROCODONE BITARTRATE AND ACETAMINOPHEN 5; 325 MG/1; MG/1
1 TABLET ORAL EVERY 6 HOURS PRN
Qty: 120 TABLET | Refills: 0 | Status: SHIPPED | OUTPATIENT
Start: 2023-10-15

## 2023-10-10 NOTE — PROGRESS NOTES
"Jung Short is a 78 y.o. female presenting today for   Chief Complaint   Patient presents with    Sore       Subjective    History of Present Illness     Presents c/o sore on her L foot. Sts this is a chronic recurrent issue for her. She has been f/b [facility] w/ [facility] in the past but has not been seen there in 2yrs. Sts this current sore started approx 8mo ago and has been gradually worsening. She began applying Algicell as previously prescribed by Wound Care about 1mo ago. She notes it is improving, in that the area is smaller. Denies pain, warmth or redness. She notes a clear drainage.     The following portions of the patient's history were reviewed and updated as appropriate: allergies, current medications, problem list, past medical history, past surgical history, family history, and social history.    Review of Systems   Constitutional:  Negative for fever.         Objective    Vitals:    10/10/23 1439   BP: 116/64   BP Location: Left arm   Patient Position: Sitting   Cuff Size: Adult   Pulse: 74   Temp: 97.2 øF (36.2 øC)   TempSrc: Infrared   SpO2: 96%   Weight: 95.7 kg (211 lb)   Height: 165.1 cm (65\")     Body mass index is 35.11 kg/mý.  Nursing notes and vitals reviewed.    Physical Exam  Constitutional:       General: She is not in acute distress.     Appearance: She is well-developed.   Pulmonary:      Effort: Pulmonary effort is normal.   Neurological:      Mental Status: She is alert.   Psychiatric:         Attention and Perception: She is attentive.         Speech: Speech normal.           Assessment and Plan    Diagnoses and all orders for this visit:    1. Ulcer of left foot with other severity (Primary)  -     Ambulatory Referral to Podiatry    2. Type 2 diabetes mellitus without complication, without long-term current use of insulin  -     Ambulatory Referral to Podiatry      No secondary infection.   Continue Algicell.  Consult Podiatry/Wound Care.      Medications, including side " effects, were discussed with the patient. Patient verbalized understanding.  The plan of care was discussed. All questions were answered. Patient verbalized understanding.        Return if symptoms worsen or fail to improve.

## 2023-10-10 NOTE — TELEPHONE ENCOUNTER
LOUANN reviewed and appropriate.  Last drug screen 7/10/23 appropriate.     This patient is under the care of my colleague and I am covering patient care for him at this time.  I have reviewed pertinent information/documentation as necessary and will continue the plan of care as previously directed to the best of my ability.

## 2023-10-17 ENCOUNTER — HOSPITAL ENCOUNTER (OUTPATIENT)
Dept: INFUSION THERAPY | Facility: HOSPITAL | Age: 78
Discharge: HOME | End: 2023-10-17
Admitting: NURSE PRACTITIONER
Payer: MEDICARE

## 2023-10-17 VITALS
SYSTOLIC BLOOD PRESSURE: 132 MMHG | RESPIRATION RATE: 16 BRPM | TEMPERATURE: 97.4 F | OXYGEN SATURATION: 97 % | DIASTOLIC BLOOD PRESSURE: 71 MMHG | HEART RATE: 67 BPM

## 2023-10-17 DIAGNOSIS — M81.0 AGE-RELATED OSTEOPOROSIS WITHOUT CURRENT PATHOLOGICAL FRACTURE: Primary | ICD-10-CM

## 2023-10-17 LAB
MAGNESIUM SERPL-MCNC: 1.5 MG/DL (ref 1.6–2.4)
PHOSPHATE SERPL-MCNC: 3.7 MG/DL (ref 2.5–4.5)

## 2023-10-17 PROCEDURE — 36415 COLL VENOUS BLD VENIPUNCTURE: CPT

## 2023-10-17 PROCEDURE — 84100 ASSAY OF PHOSPHORUS: CPT | Performed by: NURSE PRACTITIONER

## 2023-10-17 PROCEDURE — 83735 ASSAY OF MAGNESIUM: CPT | Performed by: NURSE PRACTITIONER

## 2023-10-17 PROCEDURE — 96372 THER/PROPH/DIAG INJ SC/IM: CPT

## 2023-10-17 PROCEDURE — 25010000002 DENOSUMAB 60 MG/ML SOLUTION PREFILLED SYRINGE: Performed by: NURSE PRACTITIONER

## 2023-10-17 RX ADMIN — DENOSUMAB 60 MG: 60 INJECTION SUBCUTANEOUS at 14:21

## 2023-10-17 NOTE — NURSING NOTE
Pt arrived to [facility] for appt. VSS, no complaints at this time. Labs drawn via venipuncture. Medication administered per MD order. Pt tolerated well. Pt discharged from [facility] at 1435 in stable condition, without complaints.

## 2023-10-17 NOTE — PATIENT INSTRUCTIONS
"  Call [facility] at [phone] if you have any problems or concerns.    We know you have a Choice in healthcare and appreciate you using [facility].  Our purpose is to provide you \"Excellent Care\".  We hope that you will always choose us in the future and continue to recommend us to your family and friends.              "

## 2023-10-19 RX ORDER — MULTIVIT-MIN/IRON/FOLIC ACID/K 18-600-40
1000 CAPSULE ORAL DAILY
Qty: 90 TABLET | Refills: 1 | Status: SHIPPED | OUTPATIENT
Start: 2023-10-19

## 2023-10-23 ENCOUNTER — TELEPHONE (OUTPATIENT)
Dept: CARDIOLOGY | Facility: CLINIC | Age: 78
End: 2023-10-23
Payer: MEDICARE

## 2023-10-23 ENCOUNTER — OFFICE VISIT (OUTPATIENT)
Dept: CARDIOLOGY | Facility: CLINIC | Age: 78
End: 2023-10-23
Payer: MEDICARE

## 2023-10-23 ENCOUNTER — LAB (OUTPATIENT)
Dept: LAB | Facility: HOSPITAL | Age: 78
End: 2023-10-23
Payer: MEDICARE

## 2023-10-23 ENCOUNTER — TELEPHONE (OUTPATIENT)
Dept: CARDIOLOGY | Facility: CLINIC | Age: 78
End: 2023-10-23

## 2023-10-23 VITALS
HEIGHT: 65 IN | DIASTOLIC BLOOD PRESSURE: 65 MMHG | BODY MASS INDEX: 35.16 KG/M2 | WEIGHT: 211 LBS | SYSTOLIC BLOOD PRESSURE: 130 MMHG | HEART RATE: 79 BPM

## 2023-10-23 DIAGNOSIS — I42.8 NICM (NONISCHEMIC CARDIOMYOPATHY): ICD-10-CM

## 2023-10-23 DIAGNOSIS — E11.9 TYPE 2 DIABETES MELLITUS WITHOUT COMPLICATION, WITHOUT LONG-TERM CURRENT USE OF INSULIN: ICD-10-CM

## 2023-10-23 DIAGNOSIS — I50.20 HFREF (HEART FAILURE WITH REDUCED EJECTION FRACTION): ICD-10-CM

## 2023-10-23 DIAGNOSIS — C50.211 MALIGNANT NEOPLASM OF UPPER-INNER QUADRANT OF RIGHT BREAST IN FEMALE, ESTROGEN RECEPTOR POSITIVE: ICD-10-CM

## 2023-10-23 DIAGNOSIS — I25.10 ATHEROSCLEROSIS OF NATIVE CORONARY ARTERY OF NATIVE HEART WITHOUT ANGINA PECTORIS: ICD-10-CM

## 2023-10-23 DIAGNOSIS — I10 PRIMARY HYPERTENSION: ICD-10-CM

## 2023-10-23 DIAGNOSIS — Z17.0 MALIGNANT NEOPLASM OF UPPER-INNER QUADRANT OF RIGHT BREAST IN FEMALE, ESTROGEN RECEPTOR POSITIVE: ICD-10-CM

## 2023-10-23 DIAGNOSIS — M79.89 LEG SWELLING: ICD-10-CM

## 2023-10-23 DIAGNOSIS — E78.5 DYSLIPIDEMIA: ICD-10-CM

## 2023-10-23 DIAGNOSIS — I50.20 HFREF (HEART FAILURE WITH REDUCED EJECTION FRACTION): Primary | ICD-10-CM

## 2023-10-23 DIAGNOSIS — I49.3 PVC (PREMATURE VENTRICULAR CONTRACTION): ICD-10-CM

## 2023-10-23 DIAGNOSIS — I34.0 NONRHEUMATIC MITRAL VALVE REGURGITATION: ICD-10-CM

## 2023-10-23 DIAGNOSIS — N18.31 STAGE 3A CHRONIC KIDNEY DISEASE: ICD-10-CM

## 2023-10-23 DIAGNOSIS — I36.1 NONRHEUMATIC TRICUSPID VALVE REGURGITATION: ICD-10-CM

## 2023-10-23 LAB
ALBUMIN SERPL-MCNC: 3.8 G/DL (ref 3.5–5.2)
ALBUMIN/GLOB SERPL: 1.4 G/DL
ALP SERPL-CCNC: 73 U/L (ref 39–117)
ALT SERPL W P-5'-P-CCNC: 9 U/L (ref 1–33)
ANION GAP SERPL CALCULATED.3IONS-SCNC: 12.1 MMOL/L (ref 5–15)
AST SERPL-CCNC: 14 U/L (ref 1–32)
BASOPHILS # BLD AUTO: 0.04 10*3/MM3 (ref 0–0.2)
BASOPHILS NFR BLD AUTO: 0.5 % (ref 0–1.5)
BILIRUB SERPL-MCNC: 0.5 MG/DL (ref 0–1.2)
BUN SERPL-MCNC: 8 MG/DL (ref 8–23)
BUN/CREAT SERPL: 11.3 (ref 7–25)
CALCIUM SPEC-SCNC: 7.5 MG/DL (ref 8.6–10.5)
CHLORIDE SERPL-SCNC: 106 MMOL/L (ref 98–107)
CO2 SERPL-SCNC: 24.9 MMOL/L (ref 22–29)
CREAT SERPL-MCNC: 0.71 MG/DL (ref 0.57–1)
DEPRECATED RDW RBC AUTO: 48.7 FL (ref 37–54)
EGFRCR SERPLBLD CKD-EPI 2021: 86.1 ML/MIN/1.73
EOSINOPHIL # BLD AUTO: 0.2 10*3/MM3 (ref 0–0.4)
EOSINOPHIL NFR BLD AUTO: 2.7 % (ref 0.3–6.2)
ERYTHROCYTE [DISTWIDTH] IN BLOOD BY AUTOMATED COUNT: 14.1 % (ref 12.3–15.4)
ERYTHROCYTE [SEDIMENTATION RATE] IN BLOOD: 12 MM/HR (ref 0–30)
GLOBULIN UR ELPH-MCNC: 2.7 GM/DL
GLUCOSE SERPL-MCNC: 156 MG/DL (ref 65–99)
HCT VFR BLD AUTO: 36.6 % (ref 34–46.6)
HGB BLD-MCNC: 11.5 G/DL (ref 12–15.9)
IMM GRANULOCYTES # BLD AUTO: 0.09 10*3/MM3 (ref 0–0.05)
IMM GRANULOCYTES NFR BLD AUTO: 1.2 % (ref 0–0.5)
LYMPHOCYTES # BLD AUTO: 1.49 10*3/MM3 (ref 0.7–3.1)
LYMPHOCYTES NFR BLD AUTO: 19.8 % (ref 19.6–45.3)
MAGNESIUM SERPL-MCNC: 1.5 MG/DL (ref 1.6–2.4)
MCH RBC QN AUTO: 29.9 PG (ref 26.6–33)
MCHC RBC AUTO-ENTMCNC: 31.4 G/DL (ref 31.5–35.7)
MCV RBC AUTO: 95.1 FL (ref 79–97)
MONOCYTES # BLD AUTO: 0.49 10*3/MM3 (ref 0.1–0.9)
MONOCYTES NFR BLD AUTO: 6.5 % (ref 5–12)
NEUTROPHILS NFR BLD AUTO: 5.2 10*3/MM3 (ref 1.7–7)
NEUTROPHILS NFR BLD AUTO: 69.3 % (ref 42.7–76)
NRBC BLD AUTO-RTO: 0 /100 WBC (ref 0–0.2)
NT-PROBNP SERPL-MCNC: 522.8 PG/ML (ref 0–1800)
PLATELET # BLD AUTO: 182 10*3/MM3 (ref 140–450)
PMV BLD AUTO: 9.4 FL (ref 6–12)
POTASSIUM SERPL-SCNC: 3.2 MMOL/L (ref 3.5–5.2)
PROT SERPL-MCNC: 6.5 G/DL (ref 6–8.5)
RBC # BLD AUTO: 3.85 10*6/MM3 (ref 3.77–5.28)
SODIUM SERPL-SCNC: 143 MMOL/L (ref 136–145)
URATE SERPL-MCNC: 4.8 MG/DL (ref 2.4–5.7)
WBC NRBC COR # BLD: 7.51 10*3/MM3 (ref 3.4–10.8)

## 2023-10-23 PROCEDURE — 83880 ASSAY OF NATRIURETIC PEPTIDE: CPT

## 2023-10-23 PROCEDURE — 85652 RBC SED RATE AUTOMATED: CPT

## 2023-10-23 PROCEDURE — 80053 COMPREHEN METABOLIC PANEL: CPT

## 2023-10-23 PROCEDURE — 84550 ASSAY OF BLOOD/URIC ACID: CPT

## 2023-10-23 PROCEDURE — 83735 ASSAY OF MAGNESIUM: CPT

## 2023-10-23 PROCEDURE — 36415 COLL VENOUS BLD VENIPUNCTURE: CPT

## 2023-10-23 PROCEDURE — 85025 COMPLETE CBC W/AUTO DIFF WBC: CPT

## 2023-10-23 RX ORDER — BUMETANIDE 0.5 MG/1
1.5 TABLET ORAL DAILY
Qty: 270 TABLET | Refills: 3 | Status: SHIPPED | OUTPATIENT
Start: 2023-10-23

## 2023-10-23 RX ORDER — SPIRONOLACTONE 25 MG/1
25 TABLET ORAL DAILY
Qty: 30 TABLET | Refills: 11 | Status: SHIPPED | OUTPATIENT
Start: 2023-10-23

## 2023-10-23 NOTE — TELEPHONE ENCOUNTER
Pt went to physical therapy today. During her session she experienced sharp, right-sided chest pain that has now subsided. Spoke with the physical therapist. He said patient's legs are swollen and have been all weekend. B/P is also a little elevated at 149/80, but she hasn't had her am meds yet.    Scheduled pt to see Monica today at 1:30.    Thank you,    Beronica Salgado, RN  [facility]  10/23/23 12:09 EDT

## 2023-10-23 NOTE — TELEPHONE ENCOUNTER
Her labs look ok but she is low in potassium, magnesium, and calcium.    I'm going to stop 5 mg amlodipine every morning and start 25 mg spironolactone.    I also sent in calcium with vitamin D.     She will take extra bumex for 3 days.     I will see her next week.     Thanks!  JOSE ANTONIO Shearer

## 2023-10-23 NOTE — PROGRESS NOTES
[facility] CARDIOLOGY  [address]  [address]  [place] [address]  Phone: [phone]  Fax: [phone]  Patient Name: Jung Short  :1945  Age: 78 y.o.  Primary Cardiologist: Aria Bates MD  Encounter Provider:  JOSE ANTONIO Armenta    History of Present Illness     Jung Short is a 78 y.o.  female whose medical history includes hyperlipidemia, type 2 diabetes, CKD, history of right foot ulcer, history of right breast cancer s/p anastrozole therapy.  She is followed in our office by Dr. 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 I have reviewed the past medical records in preparation of today's visit.     10/23/23 Follow-up:  She is here for early follow-up of leg swelling and I am seeing her for the first time today.  She was going to physical therapy today but did not know if they would let her participate because her legs were so swollen; she states she has had worsening swelling over the past 3 to 4 days.  While she was having her blood pressure taken she did have two, sharp, right-sided to right shoulder pains which were very brief in nature.  She has not been having chest pain at home and has not had this happen again.  Her blood pressure was a little elevated compared to normal as she had not yet taken her medication this morning.  She needed to go to the pharmacy to  her medications which is why she did not take it.  She has not missed any of her medications.  She is had no diet changes and has not been prescribed any new medications.  She states her weight has been stable at home but it is up 6 pounds compared to her last appointment here.  She denies any chest pain, shortness of breath, or palpitations.  She has been having diarrhea.  She also reports a rash or some pinpoint lesions that started on her legs in her  "left arm.  They are not itchy but are easy to bleed and heal.    Data Review     The following data was reviewed by JOSE ANTONIO Armenta on 10/23/23:    Vital Signs:   /65 (BP Location: Left arm)   Pulse 79   Ht 165.1 cm (65\")   Wt 95.7 kg (211 lb)   BMI 35.11 kg/m²       Weight:  Wt Readings from Last 3 Encounters:   10/23/23 95.7 kg (211 lb)   10/10/23 95.7 kg (211 lb)   09/12/23 95.3 kg (210 lb)     Body mass index is 35.11 kg/m².    Below is a summary of pertinent cardiology findings:  April 2015 she presented to [facility] and was found to be in a acute heart systolic and diastolic heart failure.  Echocardiogram showed EF 10 to 15%, moderate LV dilation, severe mitral and tricuspid valve insufficiency, and aortic valve was normal.  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  Right heart cath was also done showing an RV pressure of 50/20, PAP 50/38 with mean pressure of 38 mmHg and capillary wedge pressure 31 mmHg.  Her cardiac output was 3.9 L/min; she was diuresed and treated medically.  January 2021 echocardiogram showed mild concentric LVH, moderate left atrial enlargement, normal diastolic function, mild LV dilation, EF 20 to 25%, severely reduced EF with global hypokinesis.  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  April 2021 echocardiogram showed moderate LV dilation, mild concentric LVH, grade 2 diastolic dysfunction, EF 37%, moderate reduction of the LV systolic function, severe left atrial enlargement, and normal " saline contrast study, mild right atrial enlargement, mild RV enlargement, global hypokinesis; she was started on Entresto.  December 2020 when she presented to the emergency room with BP 200s/90s which woke her up in the morning due to throbbing pain in her left arm.  She also had chest pain.  She was treated with IV labetalol and her blood pressure was better controlled.  Otherwise evaluation was negative though she did have a mildly elevated D-dimer at 1.30 but CTA negative for PE.  April 2022 echocardiogram showed EF 40%, moderate global hypokinesis, normal RV cavity size and systolic function, moderately dilated left atrial cavity, mild mitral valve regurgitation, and a small (less than 1 cm) pericardial effusion with no evidence of tamponade.  November 2022 she had COVID-19.  January 2023 she was seen by Ravinder Valencia and stated she fell in the parking lot while getting groceries.  February 2023 echocardiogram showed EF 57%, grade 1 LV diastolic dysfunction, moderately increased left atrial volume, mild mitral valve regurgitation, mild tricuspid valve regurgitation, and RVSP 20 mmHg.  March 2023 10-day heart monitor showed the predominant rhythm to be sinus rhythm with average heart rate 67 bpm, a total PVC burden of 1.5% with no ventricular runs, and 2 atrial runs with longest lasting 5 beats.    Labs:  09/05/2023:  cr 0.8, K 3.8, otherwise unremarkable CMP, Chol 192, HDL 56, , Trig 103, Hgb 12.1, Plt 161      ECG 12 Lead    Date/Time: 10/23/2023 1:58 PM  Performed by: Faye Chen APRN    Authorized by: Faye Chen APRN  Comparison: compared with previous ECG from 7/28/2023  Similar to previous ECG  Rhythm: sinus rhythm  Ectopy: unifocal PVCs  Rate: normal  BPM: 79  Conduction: left anterior fascicular block    Clinical impression: abnormal EKG          Medications     Allergies as of 10/23/2023 - Reviewed 10/23/2023   Allergen Reaction Noted    Dilaudid [hydromorphone]  Delirium and Confusion 04/20/2018    Latex Rash 10/20/2016       Current Outpatient Medications   Medication Instructions    amLODIPine (NORVASC) 2.5 MG tablet TAKE 1 TABLET BY MOUTH EVERY EVENING    amLODIPine (NORVASC) 5 MG tablet TAKE 1 TABLET BY MOUTH EVERY MORNING    aspirin 81 mg, Oral, Daily    bumetanide (BUMEX) 1.5 mg, Oral, Daily    carvedilol (COREG) 25 MG tablet TAKE 1 TABLET BY MOUTH TWICE DAILY    D3-1000 1,000 Units, Oral, Daily    DULoxetine (CYMBALTA) 60 mg, Oral, Daily    empagliflozin (JARDIANCE) 10 mg, Oral, Daily    esomeprazole (NEXIUM) 20 mg, Oral, Every Morning Before Breakfast    glipizide (GLUCOTROL XL) 5 mg, Oral, Daily With Breakfast    glipizide (GLUCOTROL) 5 MG tablet TAKE 1 TABLET BY MOUTH DAILY WITH BREAKFAST.    HYDROcodone-acetaminophen (NORCO) 5-325 MG per tablet 1 tablet, Oral, Every 6 Hours PRN    levothyroxine (SYNTHROID, LEVOTHROID) 25 mcg, Oral, Daily    Multiple Vitamins-Minerals (MULTIVITAMIN ADULT PO) 1 tablet, Oral, Daily    naproxen (EC NAPROSYN) 500 mg, Oral, 2 Times Daily PRN    pravastatin (PRAVACHOL) 10 MG tablet TAKE 1 TABLET BY MOUTH AT BEDTIME    pregabalin (LYRICA) 75 mg, Oral, 2 Times Daily    sacubitril-valsartan (Entresto) 49-51 MG tablet 1 tablet, Oral, 2 Times Daily        Past History, Review of Systems, Exam     Past Medical History:   Diagnosis Date    Age-related osteoporosis without current pathological fracture 02/25/2022    Anemia     Benign breast disease     Cancer 2015    Cellulitis of leg     CHF (congestive heart failure)     Chronic kidney disease     Chronic ulcer of right foot     Closed fracture of patella 09/05/2017    Cluster headache     Colon polyp     Depression     Diabetic peripheral neuropathy     Difficulty walking     Diverticulosis     Femoral fracture     Fibromyalgia, primary     Fracture of left wrist     Gastroesophageal reflux     Generalized pain 02/02/2016    Hiatal hernia     Hyperlipidemia     Hypertension      Hypothyroidism     Impingement syndrome of right shoulder     Joint pain     Left knee pain 2016    Menopausal disorder     Methicillin resistant Staphylococcus aureus infection     Nonischemic cardiomyopathy     Osteoarthritis     Osteomyelitis 2016    Pain in shoulder 2016    Paroxysmal atrial fibrillation     Radius/ulna fracture, left, closed, initial encounter 10/21/2017    Shoulder pain, bilateral     Sleep apnea     Syncope, psychogenic 2017    Thoracic back pain 2016    Thoracic injuries     Toe ulcer     Transient alteration of awareness 2017    Traumatic closed nondisp torus fracture of distal radial metaphysis, right, initial encounter 2020       Past Surgical History:   has a past surgical history that includes Bladder surgery (); Epidural block injection; Total knee arthroplasty (Right); Hernia repair; Incisional hernia repair (2014); Shoulder surgery (Right); Debridement  foot (Right, 2013); Amputation foot / toe (Right, 2013); Breast surgery (2015); Breast biopsy; Foot surgery (Bilateral); Cardiac catheterization (); Abdominal surgery (); Cataract extraction (Bilateral, ); Total abdominal hysterectomy; Total knee arthroplasty (Left, 2018); Mastectomy (Right, 2015); Femur fracture surgery; Colonoscopy; Mastectomy modified radical w/ axillary lymph nodes w/ or w/o pectoralis minor (Right); Cardiac catheterization (N/A, 2021); Cardiac catheterization (N/A, 2021); and Cardiac catheterization (N/A, 2021).     Social History     Socioeconomic History    Marital status:     Number of children: 2   Tobacco Use    Smoking status: Former     Packs/day: 3.00     Years: 30.00     Additional pack years: 0.00     Total pack years: 90.00     Types: Cigarettes     Quit date:      Years since quittin.8     Passive exposure: Past    Smokeless tobacco: Never   Vaping Use    Vaping Use: Never used    Substance and Sexual Activity    Alcohol use: No     Comment: Caffeine use: 3 cups daily     Drug use: No    Sexual activity: Defer     Partners: Male     Comment: celibate       Review of Systems   Cardiovascular:  Positive for leg swelling. Negative for chest pain, claudication, cyanosis, dyspnea on exertion, irregular heartbeat, near-syncope, orthopnea, palpitations, paroxysmal nocturnal dyspnea and syncope.   Skin:  Positive for rash and suspicious lesions.       Vitals reviewed.   Constitutional:       Appearance: Not in distress.   Eyes:      Conjunctiva/sclera: Conjunctivae normal.      Pupils: Pupils are equal, round, and reactive to light.   HENT:      Head: Normocephalic.      Nose: Nose normal.    Mouth/Throat:      Pharynx: Oropharynx is clear.   Neck:      Vascular: JVD normal.   Pulmonary:      Effort: Pulmonary effort is normal.      Breath sounds: Normal breath sounds. No wheezing. No rhonchi. No rales.   Cardiovascular:      Normal rate. Regular rhythm. Normal S1. Normal S2.       Murmurs: There is no murmur.   Pulses:     Intact distal pulses.   Edema:     Peripheral edema present.     Pretibial: bilateral 1+ edema of the pretibial area.     Ankle: bilateral 1+ edema of the ankle.  Abdominal:      General: Bowel sounds are normal. There is no distension.      Palpations: Abdomen is soft.      Tenderness: There is no abdominal tenderness.   Musculoskeletal: Normal range of motion.      Cervical back: Normal range of motion and neck supple. Skin:     General: Skin is warm and dry.      Findings: Petechiae present.   Neurological:      Mental Status: Alert and oriented to person, place and time.   Psychiatric:         Attention and Perception: Attention normal.         Mood and Affect: Mood normal.         Speech: Speech normal.         Behavior: Behavior is cooperative.          Assessment and Plan     Assessment:  1. HFrEF (heart failure with reduced ejection fraction)    2. Atherosclerosis of  native coronary artery of native heart without angina pectoris    3. NICM (nonischemic cardiomyopathy)    4. Nonrheumatic mitral valve regurgitation    5. Nonrheumatic tricuspid valve regurgitation    6. Primary hypertension    7. Dyslipidemia    8. Type 2 diabetes mellitus without complication, without long-term current use of insulin    9. Stage 3a chronic kidney disease    10. 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 Leg swelling    12. PVC (premature ventricular contraction)         Leg swelling: She has had leg swelling over the past few days she has no shortness of breath.  Her weight is up 6 pounds versus last appointment but she reports stable weight at home.    HFrEF with improved EF: Her EF was 20% when she was seen in April 2015 and diagnosed with nonischemic cardiomyopathy.  She has been treated medically.  Her last echocardiogram was February 2023 and EF had improved to 57%.  She does have mild LV dilation.  She does have leg swelling today on 1.5 mg Bumex daily.  Her medical therapy also includes 49-51 mg Entresto twice daily in 25 mg carvedilol twice daily.  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  She had only minimal coronary artery disease.  Her medical therapy includes amlodipine, aspirin, carvedilol, and pravastatin.  She denies chest pain and there are no ischemic changes on her EKG today.  Nonischemic cardiomyopathy: April 2015 she presented with acute heart failure and found to have EF 10 to 15% with moderate LV dilation and had minimal coronary artery disease on cardiac catheterization.  Again she had repeat cardiac catheterization in January 2021 with no new coronary artery disease.  Her EF has improved to 57%.  Mitral valve regurgitation: This was graded as mild on February 2023 echocardiogram.  Tricuspid  valve regurgitation: This was graded as mild on February 2023 echocardiogram.  PVCs: She had a Holter monitor after she fell and February 2023 which showed a 1.5 PVC burden.  Occasional PVCs noted on exam today.  She is on 25 mg carvedilol twice daily.  Hypertension: Typically her blood pressure is low given her medical therapy.  She does not have any syncope.  Hyperlipidemia: LDL elevated when checked in September 2023; she is on 10 mg pravastatin daily.  Type 2 diabetes: No recent hemoglobin A1c to review.  She is on Jardiance and other medications for her diabetes.  CKD 3: She did have normal renal function when checked in September 2023.  History of right breast cancer: This was treated with anastrozole.    Ms. Short is a patient of Dr. 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  She is seen today for worsening leg swelling.  I am going to update her labs and increase her Bumex to 2.5 mg daily for the next 3 days.  I am also going to check a few labs giving these lesions that she has on her skin.  I am going to see her back in 1 week.    Return for Follow-up with JOSE ANTONIO Shearer 10/30 at 1230.  Orders Placed This Encounter   Procedures    Comprehensive Metabolic Panel    Magnesium    proBNP    Uric Acid    Sedimentation Rate    ECG 12 Lead    CBC & Differential      New Medications Ordered This Visit   Medications    bumetanide (BUMEX) 0.5 MG tablet     Sig: Take 3 tablets by mouth Daily.     Dispense:  270 tablet     Refill:  3     09/14/2023 5:46:46 PM* * N O T I C E * * Last quantity doesn't match original quantity         Thank you for the opportunity to participate in this patient's care.    JOSE ANTONIO Shearer    This office note has been dictated.

## 2023-10-24 ENCOUNTER — TELEPHONE (OUTPATIENT)
Dept: CARDIOLOGY | Facility: CLINIC | Age: 78
End: 2023-10-24
Payer: MEDICARE

## 2023-10-24 ENCOUNTER — TELEPHONE (OUTPATIENT)
Dept: INTERNAL MEDICINE | Facility: CLINIC | Age: 78
End: 2023-10-24
Payer: MEDICARE

## 2023-10-24 NOTE — TELEPHONE ENCOUNTER
Please confirm her meds:  She is supposed to take 5 - 0.5 mg bumetanide (water) tablets today, tomorrow, and Thursday.   Start 25 mg spironolactone once daily. Stop 5 mg amlodipine every morning but continue 2.5 mg amlodipine every evening.   Start calcium-vitamin D. I sent script in for 2 pills daily but I would just start with one pill daily.     Monica

## 2023-10-24 NOTE — TELEPHONE ENCOUNTER
"Name: Jung Short \"SHEILA\"      Relationship: Self      Best Callback Number: [phone]      HUB PROVIDED THE RELAY MESSAGE FROM THE OFFICE      PATIENT: VOICED UNDERSTANDING AND HAS NO FURTHER QUESTIONS AT THIS TIME    ADDITIONAL INFORMATION: PATIENT WAS ALSO RETURNING A CALL ABOUT HER LABS  "

## 2023-10-24 NOTE — TELEPHONE ENCOUNTER
Pt called and LvM stating  that she seen you yesterday and you gave her a rx for some meds and told her to take two of them but she said when she picked it up it said to only take 1 tabs please advise  Thanks KL

## 2023-10-24 NOTE — TELEPHONE ENCOUNTER
Notified pt of recommendations/orders. Pt wrote down instructions and verbalized understanding.    Thank you,    Beronica Salgado, RN  Triage [facility]  10/24/23 15:43 EDT

## 2023-10-24 NOTE — TELEPHONE ENCOUNTER
"Unable to reach pt. LMTRC to discuss results.  Relay     \"  I have reviewed the radiologist's report regarding your recent mammogram. I am pleased to report there is no evidence of malignancy. We will plan to continue with yearly screenings.     Feel free to send a message via [employer] or call the office with any questions. ...\"                  "

## 2023-10-30 ENCOUNTER — OFFICE VISIT (OUTPATIENT)
Dept: CARDIOLOGY | Facility: CLINIC | Age: 78
End: 2023-10-30
Payer: MEDICARE

## 2023-10-30 VITALS
WEIGHT: 204 LBS | BODY MASS INDEX: 33.99 KG/M2 | HEIGHT: 65 IN | HEART RATE: 75 BPM | SYSTOLIC BLOOD PRESSURE: 120 MMHG | DIASTOLIC BLOOD PRESSURE: 68 MMHG

## 2023-10-30 DIAGNOSIS — I10 PRIMARY HYPERTENSION: ICD-10-CM

## 2023-10-30 DIAGNOSIS — I25.10 ATHEROSCLEROSIS OF NATIVE CORONARY ARTERY OF NATIVE HEART WITHOUT ANGINA PECTORIS: ICD-10-CM

## 2023-10-30 DIAGNOSIS — I49.3 PVC (PREMATURE VENTRICULAR CONTRACTION): ICD-10-CM

## 2023-10-30 DIAGNOSIS — I50.20 HFREF (HEART FAILURE WITH REDUCED EJECTION FRACTION): ICD-10-CM

## 2023-10-30 DIAGNOSIS — M79.89 LEG SWELLING: Primary | ICD-10-CM

## 2023-10-30 PROCEDURE — 93000 ELECTROCARDIOGRAM COMPLETE: CPT | Performed by: NURSE PRACTITIONER

## 2023-10-30 PROCEDURE — 99213 OFFICE O/P EST LOW 20 MIN: CPT | Performed by: NURSE PRACTITIONER

## 2023-10-30 PROCEDURE — 3078F DIAST BP <80 MM HG: CPT | Performed by: NURSE PRACTITIONER

## 2023-10-30 PROCEDURE — 3074F SYST BP LT 130 MM HG: CPT | Performed by: NURSE PRACTITIONER

## 2023-10-30 NOTE — PROGRESS NOTES
"      [facility]  [address]  [address]  [place] [address]  Phone: [phone]  Fax: [phone]  Patient Name: Jung Short  :1945  Age: 78 y.o.  Primary Cardiologist: Aria Bates MD  Encounter Provider:  JOSE ANTONIO Armenta    History of Present Illness     Jung Short is a 78 y.o.  female whose medical history includes hyperlipidemia, type 2 diabetes, CKD, history of right foot ulcer, history of right breast cancer s/p anastrozole therapy.  She is followed in our office by Dr. 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 I have reviewed the past medical records in preparation of today's visit.     10/30/23 Follow-up:  She is here for medication changes.  Last week I saw her for increased leg swelling.  I reduced her amlodipine to 2.5 mg daily, added 25 mg spironolactone, and temporarily increased her bumetanide.  Her weight is down 7 pounds compared to last week and her leg swelling is almost back to normal.  Her breathing is comfortable and she has no palpitations.  She did fall last Friday after missing a step but is feeling okay.  She denies any lightheadedness.  She is taking her medications as prescribed.  Her rash lesions are healing.    Data Review     The following data was reviewed by JOSE ANTONIO Armenta on 10/30/23:    Vital Signs:   /68 (BP Location: Left arm)   Pulse 75   Ht 165.1 cm (65\")   Wt 92.5 kg (204 lb)   BMI 33.95 kg/m²       Weight:  Wt Readings from Last 3 Encounters:   10/30/23 92.5 kg (204 lb)   10/23/23 95.7 kg (211 lb)   10/10/23 95.7 kg (211 lb)     Body mass index is 33.95 kg/m².    Below is a summary of pertinent cardiology findings:  2015 she presented to [facility] and was found to be in a acute heart systolic and diastolic heart failure.  Echocardiogram " showed EF 10 to 15%, moderate LV dilation, severe mitral and tricuspid valve insufficiency, and aortic valve was normal.  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  Right heart cath was also done showing an RV pressure of 50/20, PAP 50/38 with mean pressure of 38 mmHg and capillary wedge pressure 31 mmHg.  Her cardiac output was 3.9 L/min; she was diuresed and treated medically.  January 2021 echocardiogram showed mild concentric LVH, moderate left atrial enlargement, normal diastolic function, mild LV dilation, EF 20 to 25%, severely reduced EF with global hypokinesis.  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  April 2021 echocardiogram showed moderate LV dilation, mild concentric LVH, grade 2 diastolic dysfunction, EF 37%, moderate reduction of the LV systolic function, severe left atrial enlargement, and normal saline contrast study, mild right atrial enlargement, mild RV enlargement, global hypokinesis; she was started on Entresto.  December 2020 when she presented to the emergency room with BP 200s/90s which woke her up in the morning due to throbbing pain in her left arm.  She also had chest pain.  She was treated with IV labetalol and her blood pressure was better controlled.  Otherwise evaluation was negative though she did have a mildly elevated D-dimer at 1.30 but CTA negative for PE.  April 2022 echocardiogram showed EF 40%, moderate global hypokinesis, normal RV cavity size and systolic function, moderately dilated left atrial cavity, mild mitral valve  regurgitation, and a small (less than 1 cm) pericardial effusion with no evidence of tamponade.  November 2022 she had COVID-19.  January 2023 she was seen by Ravinder Valencia and stated she fell in the parking lot while getting groceries.  February 2023 echocardiogram showed EF 57%, grade 1 LV diastolic dysfunction, moderately increased left atrial volume, mild mitral valve regurgitation, mild tricuspid valve regurgitation, and RVSP 20 mmHg.  March 2023 10-day heart monitor showed the predominant rhythm to be sinus rhythm with average heart rate 67 bpm, a total PVC burden of 1.5% with no ventricular runs, and 2 atrial runs with longest lasting 5 beats.    Labs:  09/05/2023:  cr 0.8, K 3.8, otherwise unremarkable CMP, Chol 192, HDL 56, , Trig 103, Hgb 12.1, Plt 161  10/23/2023:  cr 0.7, K 3.2, CA 7.5, MG 1.5, otherwise unremarkable CMP, uric acid 4.8, proBNP 423, Hgb 11.5,       ECG 12 Lead    Date/Time: 10/30/2023 1:00 PM  Performed by: Faye Chen APRN    Authorized by: Faye Chen APRN  Comparison: compared with previous ECG from 10/23/2023  Similar to previous ECG  Rhythm: sinus rhythm  Ectopy: unifocal PVCs and bigeminy  Rate: normal  BPM: 75  Conduction: incomplete left bundle branch block    Clinical impression: abnormal EKG          Medications     Allergies as of 10/30/2023 - Reviewed 10/30/2023   Allergen Reaction Noted    Dilaudid [hydromorphone] Delirium and Confusion 04/20/2018    Latex Rash 10/20/2016       Current Outpatient Medications   Medication Instructions    amLODIPine (NORVASC) 2.5 MG tablet TAKE 1 TABLET BY MOUTH EVERY EVENING    aspirin 81 mg, Oral, Daily    bumetanide (BUMEX) 1.5 mg, Oral, Daily    Calcium Citrate-Vitamin D 250-2.5 MG-MCG per tablet 1 tablet, Oral, 2 Times Daily    carvedilol (COREG) 25 MG tablet TAKE 1 TABLET BY MOUTH TWICE DAILY    D3-1000 1,000 Units, Oral, Daily    DULoxetine (CYMBALTA) 60 mg, Oral, Daily    empagliflozin  (JARDIANCE) 10 mg, Oral, Daily    esomeprazole (NEXIUM) 20 mg, Oral, Every Morning Before Breakfast    glipizide (GLUCOTROL XL) 5 mg, Oral, Daily With Breakfast    glipizide (GLUCOTROL) 5 MG tablet TAKE 1 TABLET BY MOUTH DAILY WITH BREAKFAST.    HYDROcodone-acetaminophen (NORCO) 5-325 MG per tablet 1 tablet, Oral, Every 6 Hours PRN    levothyroxine (SYNTHROID, LEVOTHROID) 25 mcg, Oral, Daily    Multiple Vitamins-Minerals (MULTIVITAMIN ADULT PO) 1 tablet, Oral, Daily    naproxen (EC NAPROSYN) 500 mg, Oral, 2 Times Daily PRN    pravastatin (PRAVACHOL) 10 MG tablet TAKE 1 TABLET BY MOUTH AT BEDTIME    pregabalin (LYRICA) 75 mg, Oral, 2 Times Daily    sacubitril-valsartan (Entresto) 49-51 MG tablet 1 tablet, Oral, 2 Times Daily    spironolactone (ALDACTONE) 25 mg, Oral, Daily        Past History, Review of Systems, Exam     Past Medical History:   Diagnosis Date    Age-related osteoporosis without current pathological fracture 02/25/2022    Anemia     Benign breast disease     Cancer 2015    Cellulitis of leg     CHF (congestive heart failure)     Chronic kidney disease     Chronic ulcer of right foot     Closed fracture of patella 09/05/2017    Cluster headache     Colon polyp     Depression     Diabetic peripheral neuropathy     Difficulty walking     Diverticulosis     Femoral fracture     Fibromyalgia, primary     Fracture of left wrist     Gastroesophageal reflux     Generalized pain 02/02/2016    Hiatal hernia     Hyperlipidemia     Hypertension     Hypothyroidism     Impingement syndrome of right shoulder     Joint pain     Left knee pain 05/17/2016    Menopausal disorder     Methicillin resistant Staphylococcus aureus infection 2012    Nonischemic cardiomyopathy     Osteoarthritis     Osteomyelitis 02/02/2016    Pain in shoulder 08/02/2016    Paroxysmal atrial fibrillation     Radius/ulna fracture, left, closed, initial encounter 10/21/2017    Shoulder pain, bilateral     Sleep apnea     Syncope, psychogenic  2017    Thoracic back pain 2016    Thoracic injuries     Toe ulcer     Transient alteration of awareness 2017    Traumatic closed nondisp torus fracture of distal radial metaphysis, right, initial encounter 2020       Past Surgical History:   has a past surgical history that includes Bladder surgery (); Epidural block injection; Total knee arthroplasty (Right); Hernia repair; Incisional hernia repair (2014); Shoulder surgery (Right); Debridement  foot (Right, 2013); Amputation foot / toe (Right, 2013); Breast surgery (2015); Breast biopsy; Foot surgery (Bilateral); Cardiac catheterization (); Abdominal surgery (); Cataract extraction (Bilateral, ); Total abdominal hysterectomy; Total knee arthroplasty (Left, 2018); Mastectomy (Right, 2015); Femur fracture surgery; Colonoscopy; Mastectomy modified radical w/ axillary lymph nodes w/ or w/o pectoralis minor (Right); Cardiac catheterization (N/A, 2021); Cardiac catheterization (N/A, 2021); and Cardiac catheterization (N/A, 2021).     Social History     Socioeconomic History    Marital status:     Number of children: 2   Tobacco Use    Smoking status: Former     Packs/day: 3.00     Years: 30.00     Additional pack years: 0.00     Total pack years: 90.00     Types: Cigarettes     Quit date:      Years since quittin.8     Passive exposure: Past    Smokeless tobacco: Never   Vaping Use    Vaping Use: Never used   Substance and Sexual Activity    Alcohol use: No     Comment: Caffeine use: 3 cups daily     Drug use: No    Sexual activity: Defer     Partners: Male     Comment: celibate       Review of Systems   Cardiovascular:  Positive for leg swelling. Negative for chest pain, claudication, cyanosis, dyspnea on exertion, irregular heartbeat, near-syncope, orthopnea, palpitations, paroxysmal nocturnal dyspnea and syncope.       Vitals reviewed.   Constitutional:       Appearance:  Not in distress.   Eyes:      Conjunctiva/sclera: Conjunctivae normal.      Pupils: Pupils are equal, round, and reactive to light.   HENT:      Head: Normocephalic.      Nose: Nose normal.    Mouth/Throat:      Pharynx: Oropharynx is clear.   Neck:      Vascular: JVD normal.   Pulmonary:      Effort: Pulmonary effort is normal.      Breath sounds: Normal breath sounds. No wheezing. No rhonchi. No rales.   Cardiovascular:      Normal rate. Regular rhythm. Normal S1. Normal S2.       Murmurs: There is no murmur.   Pulses:     Intact distal pulses.   Edema:     Peripheral edema present.     Pretibial: bilateral trace edema of the pretibial area.     Ankle: 1+ edema of the left ankle and trace edema of the right ankle.  Abdominal:      General: Bowel sounds are normal. There is no distension.      Palpations: Abdomen is soft.      Tenderness: There is no abdominal tenderness.   Musculoskeletal: Normal range of motion.      Cervical back: Normal range of motion and neck supple. Skin:     General: Skin is warm and dry.      Findings: Petechiae present.   Neurological:      Mental Status: Alert and oriented to person, place and time.   Psychiatric:         Attention and Perception: Attention normal.         Mood and Affect: Mood normal.         Speech: Speech normal.         Behavior: Behavior is cooperative.          Assessment and Plan     Assessment:  1. Leg swelling    2. HFrEF (heart failure with reduced ejection fraction)    3. Atherosclerosis of native coronary artery of native heart without angina pectoris    4. Primary hypertension    5. PVC (premature ventricular contraction)           Leg swelling: Better on lower dose amlodipine and on spironolactone.  Her weight is down 7 pounds.  HFrEF with improved EF: Her EF was 20% when she was seen in April 2015 and diagnosed with nonischemic cardiomyopathy.  She has been treated medically.  Her last echocardiogram was February 2023 and EF had improved to 57%.  She does  have mild LV dilation.  She does have leg swelling today on 1.5 mg Bumex daily.  Her medical therapy also includes 49-51 mg Entresto twice daily, 25 mg carvedilol twice daily, and 25 mg spironolactone daily.  Leg swelling is better today.  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  She had only minimal coronary artery disease.  Her medical therapy includes amlodipine, aspirin, carvedilol, and pravastatin.  Her EKG remained stable.  Nonischemic cardiomyopathy: April 2015 she presented with acute heart failure and found to have EF 10 to 15% with moderate LV dilation and had minimal coronary artery disease on cardiac catheterization.  Again she had repeat cardiac catheterization in January 2021 with no new coronary artery disease.  Her EF has improved to 57%.  Mitral valve regurgitation: This was graded as mild on February 2023 echocardiogram.  Tricuspid valve regurgitation: This was graded as mild on February 2023 echocardiogram.  PVCs: She had a Holter monitor after she fell and February 2023 which showed a 1.5 PVC burden.  Bigeminal PVCs noted today.  She is on 25 mg carvedilol twice daily.  Hypertension: Typically her blood pressure is low given her medical therapy.  She is tolerating spironolactone.  Hyperlipidemia: LDL elevated when checked in September 2023; she is on 10 mg pravastatin daily.  Type 2 diabetes: No recent hemoglobin A1c to review.  She is on Jardiance and other medications for her diabetes.  CKD 3: She did have normal renal function when checked in September 2023.  Renal function stable in October 2023.  History of right breast cancer: This was treated with anastrozole.    Ms. Short is a patient of Dr. 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  Her leg swelling is improved on lower dose amlodipine and with addition of 25 mg spironolactone daily.  I will make no other changes today.  I will have her keep her January 2024 appointment with me.    No follow-ups on file.  Orders Placed This Encounter   Procedures    ECG 12 Lead      No orders of the defined types were placed in this encounter.        Thank you for the opportunity to participate in this patient's care.    JOSE ANTONIO Shearer    This office note has been dictated.

## 2023-11-13 ENCOUNTER — TELEPHONE (OUTPATIENT)
Dept: CARDIOLOGY | Facility: CLINIC | Age: 78
End: 2023-11-13
Payer: MEDICARE

## 2023-11-13 DIAGNOSIS — Z79.899 ENCOUNTER FOR LONG-TERM (CURRENT) USE OF HIGH-RISK MEDICATION: ICD-10-CM

## 2023-11-13 DIAGNOSIS — M47.816 LUMBAR FACET ARTHROPATHY: ICD-10-CM

## 2023-11-13 RX ORDER — HYDROCODONE BITARTRATE AND ACETAMINOPHEN 5; 325 MG/1; MG/1
1 TABLET ORAL EVERY 6 HOURS PRN
Qty: 120 TABLET | Refills: 0 | Status: SHIPPED | OUTPATIENT
Start: 2023-11-14

## 2023-11-13 NOTE — TELEPHONE ENCOUNTER
Patient is calling requesting samples of Jardiance and Entresto from the [place] office. Can someone look into this for the patient and call her back? Thanks!    Nivia Todd RN  Triage [facility]

## 2023-11-20 DIAGNOSIS — R14.0 ABDOMINAL BLOATING: ICD-10-CM

## 2023-11-21 RX ORDER — DULOXETIN HYDROCHLORIDE 60 MG/1
60 CAPSULE, DELAYED RELEASE ORAL DAILY
Qty: 90 CAPSULE | Refills: 2 | Status: SHIPPED | OUTPATIENT
Start: 2023-11-21

## 2023-11-27 RX ORDER — CARVEDILOL 25 MG/1
TABLET ORAL
Qty: 180 TABLET | Refills: 0 | Status: SHIPPED | OUTPATIENT
Start: 2023-11-27

## 2023-11-28 ENCOUNTER — OFFICE VISIT (OUTPATIENT)
Dept: PAIN MEDICINE | Facility: CLINIC | Age: 78
End: 2023-11-28
Payer: MEDICARE

## 2023-11-28 VITALS
WEIGHT: 203 LBS | OXYGEN SATURATION: 96 % | SYSTOLIC BLOOD PRESSURE: 118 MMHG | DIASTOLIC BLOOD PRESSURE: 68 MMHG | HEIGHT: 65 IN | BODY MASS INDEX: 33.82 KG/M2 | TEMPERATURE: 97.3 F | HEART RATE: 89 BPM

## 2023-11-28 DIAGNOSIS — Z79.899 ENCOUNTER FOR LONG-TERM (CURRENT) USE OF HIGH-RISK MEDICATION: ICD-10-CM

## 2023-11-28 DIAGNOSIS — M17.12 PRIMARY OSTEOARTHRITIS OF LEFT KNEE: ICD-10-CM

## 2023-11-28 DIAGNOSIS — M47.816 LUMBAR FACET ARTHROPATHY: ICD-10-CM

## 2023-11-28 DIAGNOSIS — M51.36 DDD (DEGENERATIVE DISC DISEASE), LUMBAR: Primary | ICD-10-CM

## 2023-11-28 PROCEDURE — 1125F AMNT PAIN NOTED PAIN PRSNT: CPT | Performed by: PHYSICIAN ASSISTANT

## 2023-11-28 PROCEDURE — 3074F SYST BP LT 130 MM HG: CPT | Performed by: PHYSICIAN ASSISTANT

## 2023-11-28 PROCEDURE — 3078F DIAST BP <80 MM HG: CPT | Performed by: PHYSICIAN ASSISTANT

## 2023-11-28 PROCEDURE — 1159F MED LIST DOCD IN RCRD: CPT | Performed by: PHYSICIAN ASSISTANT

## 2023-11-28 PROCEDURE — 1160F RVW MEDS BY RX/DR IN RCRD: CPT | Performed by: PHYSICIAN ASSISTANT

## 2023-11-28 PROCEDURE — 99214 OFFICE O/P EST MOD 30 MIN: CPT | Performed by: PHYSICIAN ASSISTANT

## 2023-11-28 RX ORDER — HYDROCODONE BITARTRATE AND ACETAMINOPHEN 5; 325 MG/1; MG/1
1 TABLET ORAL EVERY 6 HOURS PRN
Qty: 120 TABLET | Refills: 0 | Status: SHIPPED | OUTPATIENT
Start: 2023-12-15

## 2023-11-28 NOTE — PROGRESS NOTES
CHIEF COMPLAINT    Follow up back and leg pain.       Subjective   Jung Short is a 78 y.o. female  who presents for follow-up.  She has a history of chronic low back and multijoint pain.  The patient continues with pain in a bandlike distribution across the lumbosacral spine which occasionally radiates into the lower extremity, left greater than right.  She also has widespread multijoint pain secondary to generalized osteoarthritis.  At this time her primary pain complaint is severe pain affecting the left foot as she has an ulcer on the bottom of her left foot for which she was referred to podiatry by Kathryn Epstein on her 10/10/2023 visit.    She is currently medically managed on hydrocodone 5 mg 1-2 p.o. twice daily which she tolerates without adverse effect such as constipation or somnolence.  Overall her medication provides at least 50% improvement of pain allowing her to move about independently and perform her ADLs.    Pain today 4/10 VAS in severity.      Back Pain  This is a chronic problem. The current episode started more than 1 year ago. The problem occurs constantly. The problem is unchanged. The pain is present in the lumbar spine. The quality of the pain is described as aching and burning. Radiates to: Occasionally radiates into the lower extremities. The pain is at a severity of 4/10. The pain is moderate. The pain is The same all the time. The symptoms are aggravated by position and standing (activity/walking). Pertinent negatives include no abdominal pain, dysuria, fever, headaches or weakness.   Foot Pain  This is a new problem. The current episode started more than 1 month ago. The problem occurs constantly. The problem has been gradually worsening. Pertinent negatives include no abdominal pain, chills, coughing, fever, headaches or weakness. The symptoms are aggravated by walking and standing.        PEG Assessment   What number best describes your pain on average in the past week?5  What  number best describes how, during the past week, pain has interfered with your enjoyment of life?0  What number best describes how, during the past week, pain has interfered with your general activity?  5    Review of Pertinent Medical Data ---  MRI Spine Lumbar WO     HISTORY:  Low back pain symptoms persist greater than 6 weeks treatment. Spondylosis without myelopathy. Injury 2 months ago. Lungs. Fell onto bottom. Low back and left hip pain and left thigh pain since. Not as severe as it was. History of breast cancer in 2016.     TECHNIQUE:  Multiplanar multisequence imaging of the lumbar spine acquired without IV contrast utilizing a standard protocol.     COMPARISON: Plain film from 8/30/2022.     FINDINGS:     For the purposes of this dictation is presumed to be 5 lumbar-type vertebra with the last fully formed disc space representing L5-S1.     There is a compression fracture at L4 with about 40% loss of mid vertebral body height, 35% loss of posterior vertebral body height, and 15% loss of anterior vertebral body height. This is mildly progressed from the plain films. There is marrow edema  throughout the L4 vertebral body. Posterior cortex is buckled and apparently disrupted. There is retropulsed bone and or epidural soft tissue edema anterior epidural space. Combination of findings result in severe canal stenosis at the level of L4  vertebral body. The edema extends into the bilateral posterior elements. Given history this is most likely a recent osteoporotic type compression fracture. Bone marrow signal intensity is otherwise normal and there is otherwise nothing to suggest osseous  metastatic disease. Follow-up to healing is recommended to ensure expected clinical course.     Conus medullaris terminates at T12-L1 and is normal. Intervertebral discs are desiccated in general. There is subtle grade 1 degenerative anterolisthesis of L4 on L5 unchanged.     Level by level:     L1-2: Mild bilateral facet  degenerative change. Minimal posterior disc bulge. No canal or foraminal compromise.     L2-3: Moderate facet degenerative change bilaterally. There is a broad posterior protrusion/extrusion extending into the bilateral foramina asymmetric to the right with a small right foraminal extrusion measuring about 11 mm SI dimension by 4.5 mm AP  dimension. This contributes to moderate to severe right foraminal impingement with mass effect on expected course of the right L2 root. There is mild to moderate left foraminal narrowing. There is posterior epidural lipomatosis and mild to moderate canal  stenosis.     L3-4: There is fairly severe facet arthritis bilaterally with moderate ligamentum flavum thickening. There is a concentric disc bulge with a superimposed broad posterior protrusion/extrusion. This is associated with the posterior cortical disruption at  L4. The combination of findings result in severe canal stenosis with severe impingement on the bilateral lateral recesses. Disc material extends into the foramina with approximately moderate right and mild to moderate left foraminal narrowing. There is  posterior epidural lipomatosis.     L4-5: There is a broad posterior protrusion/extrusion remaining contiguous with the disc but extending above and below the level the disc measuring about 12 mm SI dimension by 3 mm AP dimension. There is moderate facet arthritis and mild to moderate  ligamentum flavum thickening worse the left. There is mild to moderate canal stenosis with impingement on the bilateral lateral recesses. There is moderate to severe bilateral foraminal narrowing. Loss of L4 vertebral body disc height contributes to  this.     L5-S1: There is a mild concentric disc bulge with a superimposed broad posterior protrusion. There is mild to moderate left and moderate to severe right-sided facet arthritis. No canal stenosis. Mild left foraminal narrowing. There is severe right  foraminal narrowing with more  "focal right foraminal extrusion measuring about 9 mm SI dimension by 5.5 mm AP dimension.     OTHER: Gallstone. Arthritis of the sacroiliac joints partly in field-of-view     IMPRESSION:  1.  There is a recent appearing compression fracture at L4, overall moderate, measurements above. There is disruption of the posterior cortex with retropulsed cortex/edema in the anterior epidural space at L4 resulting in severe canal stenosis.  Additionally there is degenerative change at L3-4 and L4-5 with severe canal stenosis at L3-4 and mild to moderate canal stenosis at L4-5. For reasons discussed above, this is more likely to be an osteoporotic type compression fracture despite history of  breast cancer. Clinical correlation and follow-up is recommended to confirm.  2.  Multiple level foraminal impingement, some of which is severe. See level by level discussion.     Signer Name: Zarina Mari MD   Signed: 10/5/2022 2:40 PM    The following portions of the patient's history were reviewed and updated as appropriate: allergies, current medications, past family history, past medical history, past social history, past surgical history, and problem list.    Review of Systems   Constitutional:  Negative for chills and fever.   Respiratory:  Negative for cough and shortness of breath.    Gastrointestinal:  Positive for diarrhea (occasional). Negative for abdominal pain and constipation.   Genitourinary:  Negative for difficulty urinating and dysuria.   Musculoskeletal:  Positive for back pain and gait problem (ambulates with a cane).   Neurological:  Negative for weakness and headaches.     I have reviewed and confirmed the accuracy of the ROS as documented by the MA/LPN/RN MANJINDER Tong  Vitals:    11/28/23 1318   BP: 118/68   BP Location: Left arm   Patient Position: Sitting   Pulse: 89   Temp: 97.3 °F (36.3 °C)   TempSrc: Temporal   SpO2: 96%   Weight: 92.1 kg (203 lb)   Height: 165.1 cm (65\")   PainSc:   4   PainLoc: Back "         Objective   Physical Exam  Vitals and nursing note reviewed.   Constitutional:       Appearance: Normal appearance. She is obese.   HENT:      Head: Normocephalic.   Pulmonary:      Effort: Pulmonary effort is normal.   Musculoskeletal:      Lumbar back: Spasms and tenderness present. Decreased range of motion.      Left foot: Tenderness present.      Comments: Patient reports ulceration on the dorsal aspect of the left foot; wearing a soft foot brace   Skin:     General: Skin is warm and dry.   Neurological:      General: No focal deficit present.      Mental Status: She is alert and oriented to person, place, and time.      Cranial Nerves: Cranial nerves 2-12 are intact.      Sensory: Sensation is intact.      Motor: Weakness present.      Gait: Gait abnormal (antalgic gait; ambulates with a cane).   Psychiatric:         Mood and Affect: Mood normal.         Behavior: Behavior normal.         Thought Content: Thought content normal.         Judgment: Judgment normal.         Assessment & Plan   Diagnoses and all orders for this visit:    1. DDD (degenerative disc disease), lumbar (Primary)    2. Lumbar facet arthropathy    3. Primary osteoarthritis of left knee    4. Encounter for long-term (current) use of high-risk medication        Joelyesica Short reports a pain score of 4.  Given her pain assessment as noted, treatment options were discussed and the following options were decided upon as a follow-up plan to address the patient's pain: continuation of current treatment plan for pain, prescription for opiod analgesics, referral to Primary Care for assistance in pain treatment guidance, and use of non-medical modalities (ice, heat, stretching and/or behavior modifications).      --- Follow-up in 2 months or sooner as needed for medication management  --- Continue hydrocodone. Patient appears stable with current regimen. No adverse effects. Regarding continuation of opioids, there is no evidence of  aberrant behavior or any red flags.  The patient continues with appropriate response to opioid therapy. ADL's remain intact by self.   --- Moderate risk for opiate abuse/diversion  --- Patient has been STRONGLY urged to follow through in scheduling her podiatry appointment.  The patient was referred to podiatry by Kathryn back at her October visit however Vonnie has not yet made the appointment for the ulceration of the left foot--stating that she felt that the ulcer was improving until the last several days when the pain is significantly increased.  The patient verbalizes understanding.      The urine drug screen confirmation from 7/10/2023 has been reviewed and the result is appropriate based on patient history and LOUANN report     The patient signed an updated copy of the controlled substance agreement on today, 11/28/2023.        LOUANN REPORT  As part of the patient's treatment plan, I am prescribing controlled substances. The patient has been made aware of appropriate use of such medications, including potential risk of somnolence, limited ability to drive and/or work safely, and the potential for dependence or overdose. It has also been made clear that these medications are for use by this patient only, without concomitant use of alcohol or other substances unless prescribed.     Patient has completed prescribing agreement detailing terms of continued prescribing of controlled substances, including monitoring LOUANN reports, urine drug screening, and pill counts if necessary. The patient is aware that inappropriate use will results in cessation of prescribing such medications.    As the clinician, I personally reviewed the LOUANN from 11/28/2023 while the patient was in the office today.    History and physical exam exhibit continued safe and appropriate use of controlled substances.     Dictated utilizing Dragon dictation.

## 2023-12-01 ENCOUNTER — OFFICE VISIT (OUTPATIENT)
Dept: INTERNAL MEDICINE | Facility: CLINIC | Age: 78
End: 2023-12-01
Payer: MEDICARE

## 2023-12-01 VITALS
HEIGHT: 65 IN | SYSTOLIC BLOOD PRESSURE: 116 MMHG | OXYGEN SATURATION: 97 % | WEIGHT: 203 LBS | TEMPERATURE: 98 F | BODY MASS INDEX: 33.82 KG/M2 | DIASTOLIC BLOOD PRESSURE: 68 MMHG | HEART RATE: 77 BPM

## 2023-12-01 DIAGNOSIS — K64.9 BLEEDING HEMORRHOID: Primary | ICD-10-CM

## 2023-12-01 PROCEDURE — 1159F MED LIST DOCD IN RCRD: CPT | Performed by: NURSE PRACTITIONER

## 2023-12-01 PROCEDURE — 3078F DIAST BP <80 MM HG: CPT | Performed by: NURSE PRACTITIONER

## 2023-12-01 PROCEDURE — 99213 OFFICE O/P EST LOW 20 MIN: CPT | Performed by: NURSE PRACTITIONER

## 2023-12-01 PROCEDURE — 1160F RVW MEDS BY RX/DR IN RCRD: CPT | Performed by: NURSE PRACTITIONER

## 2023-12-01 PROCEDURE — 3074F SYST BP LT 130 MM HG: CPT | Performed by: NURSE PRACTITIONER

## 2023-12-01 RX ORDER — HYDROCORTISONE 25 MG/G
CREAM TOPICAL 2 TIMES DAILY
Qty: 28 G | Refills: 1 | Status: SHIPPED | OUTPATIENT
Start: 2023-12-01 | End: 2023-12-15

## 2023-12-01 NOTE — PROGRESS NOTES
"Jung Short is a 78 y.o. female presenting today for   Chief Complaint   Patient presents with    blood in commode       Subjective    History of Present Illness     Pt presents reporting blood in the commode since yesterday. She had a similar episode last week after a more solid BM. This more recent episode began yesterday after a more liquid BM.     The following portions of the patient's history were reviewed and updated as appropriate: allergies, current medications, problem list, past medical history, past surgical history, family history, and social history.    Review of Systems   Constitutional:  Negative for chills and fever.   Gastrointestinal:  Positive for anal bleeding. Negative for abdominal pain, nausea, rectal pain and vomiting.   Genitourinary:  Negative for dysuria, frequency and urgency.         Objective    Vitals:    12/01/23 1356   BP: 116/68   BP Location: Left arm   Patient Position: Sitting   Cuff Size: Large Adult   Pulse: 77   Temp: 98 °F (36.7 °C)   TempSrc: Infrared   SpO2: 97%   Weight: 92.1 kg (203 lb)   Height: 165.1 cm (65\")     Body mass index is 33.78 kg/m².  Nursing notes and vitals reviewed.    Physical Exam  Constitutional:       General: She is not in acute distress.     Appearance: She is well-developed.   Pulmonary:      Effort: Pulmonary effort is normal.   Genitourinary:     Rectum: External hemorrhoid present.   Neurological:      Mental Status: She is alert.   Psychiatric:         Attention and Perception: She is attentive.         Speech: Speech normal.           Assessment and Plan    Diagnoses and all orders for this visit:    1. Bleeding hemorrhoid (Primary)  -     Hydrocortisone, Perianal, (ANUSOL-HC) 2.5 % rectal cream; Insert  into the rectum 2 (Two) Times a Day for 14 days.  Dispense: 28 g; Refill: 1        Discussed sitz baths.      Medications, including side effects, were discussed with the patient. Patient verbalized understanding.  The plan of care was " discussed. All questions were answered. Patient verbalized understanding.        Return if symptoms worsen or fail to improve.

## 2024-01-05 ENCOUNTER — OFFICE VISIT (OUTPATIENT)
Dept: CARDIOLOGY | Facility: CLINIC | Age: 79
End: 2024-01-05
Payer: MEDICARE

## 2024-01-05 VITALS
DIASTOLIC BLOOD PRESSURE: 70 MMHG | BODY MASS INDEX: 33.66 KG/M2 | WEIGHT: 202 LBS | HEART RATE: 65 BPM | SYSTOLIC BLOOD PRESSURE: 140 MMHG | HEIGHT: 65 IN

## 2024-01-05 DIAGNOSIS — I36.1 NONRHEUMATIC TRICUSPID VALVE REGURGITATION: ICD-10-CM

## 2024-01-05 DIAGNOSIS — I10 PRIMARY HYPERTENSION: ICD-10-CM

## 2024-01-05 DIAGNOSIS — I25.10 ATHEROSCLEROSIS OF NATIVE CORONARY ARTERY OF NATIVE HEART WITHOUT ANGINA PECTORIS: ICD-10-CM

## 2024-01-05 DIAGNOSIS — I42.8 NICM (NONISCHEMIC CARDIOMYOPATHY): ICD-10-CM

## 2024-01-05 DIAGNOSIS — I50.20 HFREF (HEART FAILURE WITH REDUCED EJECTION FRACTION): Primary | ICD-10-CM

## 2024-01-05 DIAGNOSIS — R55 SYNCOPE, UNSPECIFIED SYNCOPE TYPE: ICD-10-CM

## 2024-01-05 DIAGNOSIS — I34.0 NONRHEUMATIC MITRAL VALVE REGURGITATION: ICD-10-CM

## 2024-01-05 DIAGNOSIS — I49.3 PVC (PREMATURE VENTRICULAR CONTRACTION): ICD-10-CM

## 2024-01-05 NOTE — PROGRESS NOTES
"      [facility] CARDIOLOGY  [address]  [address]  [place] [address]  Phone: [phone]  Fax: [phone]  Patient Name: Jung Short  :1945  Age: 78 y.o.  Primary Cardiologist: Aria Bates MD  Encounter Provider:  JOSE ANTONIO Armenta    History of Present Illness     Jung Short is a 78 y.o.  female whose medical history includes hyperlipidemia, type 2 diabetes, CKD, history of right foot ulcer, history of right breast cancer s/p anastrozole therapy.  She is followed in our office by Dr. 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 I have reviewed the past medical records in preparation of today's visit.     24 Follow-up:  She is here for 3-month follow-up.  She is doing well as far as leg swelling goes; her weight is down 2 pounds compared to last visit.  Her blood pressure at home is also better controlled and running 120/50.  She has been having some leg pain and is getting physical therapy at this time for leg strengthening.  She does state that last weekend she was in [place] for the [employer] football game; she had another fall in which she fell backwards and hit her head.  She states she has been having these falls for several years in which she falls without warning.  She denies chest pain, dyspnea with exertion, orthopnea, and has stable leg swelling.  She is taking her medications as prescribed.    Data Review     The following data was reviewed by JOSE ANTONIO Armenta on 24:    Vital Signs:   /70 (BP Location: Left arm)   Pulse 65   Ht 165.1 cm (65\")   Wt 91.6 kg (202 lb)   BMI 33.61 kg/m²       Weight:  Wt Readings from Last 3 Encounters:   24 91.6 kg (202 lb)   23 92.1 kg (203 lb)   23 92.1 kg (203 lb)     Body mass index is 33.61 kg/m².    Below is a summary of pertinent " cardiology findings:  April 2015 she presented to [facility] and was found to be in a acute heart systolic and diastolic heart failure.  Echocardiogram showed EF 10 to 15%, moderate LV dilation, severe mitral and tricuspid valve insufficiency, and aortic valve was normal.  She was transferred to [facility] for cardiac catheterization which showed EF 20%, a right dominant system, a single coronary artery arising from the right coronary cusp feeding the right coronary artery as well as a branch that feeds the conus and ventricular septum, another branch from the RCA feeding the LAD and left circumflex vessels with minimal atherosclerosis throughout.  Right heart cath was also done showing an RV pressure of 50/20, PAP 50/38 with mean pressure of 38 mmHg and capillary wedge pressure 31 mmHg.  Her cardiac output was 3.9 L/min; she was diuresed and treated medically.  January 2021 echocardiogram showed mild concentric LVH, moderate left atrial enlargement, normal diastolic function, mild LV dilation, EF 20 to 25%, severely reduced EF with global hypokinesis.  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  April 2021 echocardiogram showed moderate LV dilation, mild concentric LVH, grade 2 diastolic dysfunction, EF 37%, moderate reduction of the LV systolic function, severe left atrial enlargement, and normal saline contrast study, mild right atrial enlargement, mild RV enlargement, global hypokinesis; she was started on Entresto.  December 2020 when she presented to the emergency room with BP 200s/90s which woke her up in the morning due to throbbing pain in her left arm.  She also had chest pain.  She was treated with IV labetalol and her blood pressure was better controlled.  Otherwise evaluation was negative though she did have a mildly elevated D-dimer at 1.30 but CTA negative for PE.  April  2022 echocardiogram showed EF 40%, moderate global hypokinesis, normal RV cavity size and systolic function, moderately dilated left atrial cavity, mild mitral valve regurgitation, and a small (less than 1 cm) pericardial effusion with no evidence of tamponade.  November 2022 she had COVID-19.  January 2023 she was seen by Ravinder Valencia and stated she fell in the parking lot while getting groceries.  February 2023 echocardiogram showed EF 57%, grade 1 LV diastolic dysfunction, moderately increased left atrial volume, mild mitral valve regurgitation, mild tricuspid valve regurgitation, and RVSP 20 mmHg.  March 2023 10-day heart monitor showed the predominant rhythm to be sinus rhythm with average heart rate 67 bpm, a total PVC burden of 1.5% with no ventricular runs, and 2 atrial runs with longest lasting 5 beats.  October 2023 I reduced her amlodipine to 2.5 mg daily, added 25 mg spironolactone, and temporarily increased her bumetanide.  Her weight went down 7 pounds and leg swelling improved.    Labs:  09/05/2023:  cr 0.8, K 3.8, otherwise unremarkable CMP, Chol 192, HDL 56, , Trig 103, Hgb 12.1, Plt 161  10/23/2023:  cr 0.7, K 3.2, CA 7.5, MG 1.5, otherwise unremarkable CMP, uric acid 4.8, proBNP 423, Hgb 11.5,   01/05/24 no updated labs to review      ECG 12 Lead    Date/Time: 1/5/2024 1:30 PM  Performed by: Faye Chen APRN    Authorized by: Faye Chen APRN  Comparison: compared with previous ECG from 10/30/2023  Similar to previous ECG  Comparison to previous ECG: T waves similar to July 2023 EKG  Rhythm: sinus rhythm  Rate: normal  BPM: 65  ST Depression: II, III, aVF and V3  T inversion: III and aVF  T flattening: II, V3, V4, V5 and V6          Medications     Allergies as of 01/05/2024 - Reviewed 01/05/2024   Allergen Reaction Noted    Dilaudid [hydromorphone] Delirium and Confusion 04/20/2018    Latex Rash 10/20/2016       Current Outpatient Medications    Medication Instructions    amLODIPine (NORVASC) 2.5 MG tablet TAKE 1 TABLET BY MOUTH EVERY EVENING    aspirin 81 mg, Oral, Daily    bumetanide (BUMEX) 1.5 mg, Oral, Daily    Calcium Citrate-Vitamin D 250-2.5 MG-MCG per tablet 1 tablet, Oral, 2 Times Daily    carvedilol (COREG) 25 MG tablet TAKE 1 TABLET BY MOUTH TWICE DAILY    D3-1000 1,000 Units, Oral, Daily    DULoxetine (CYMBALTA) 60 mg, Oral, Daily    empagliflozin (JARDIANCE) 10 mg, Oral, Daily    esomeprazole (NEXIUM) 20 mg, Oral, Every Morning Before Breakfast    glipizide (GLUCOTROL XL) 5 mg, Oral, Daily With Breakfast    glipizide (GLUCOTROL) 5 MG tablet TAKE 1 TABLET BY MOUTH DAILY WITH BREAKFAST.    HYDROcodone-acetaminophen (NORCO) 5-325 MG per tablet 1 tablet, Oral, Every 6 Hours PRN    levothyroxine (SYNTHROID, LEVOTHROID) 25 mcg, Oral, Daily    Multiple Vitamins-Minerals (MULTIVITAMIN ADULT PO) 1 tablet, Oral, Daily    naproxen (EC NAPROSYN) 500 mg, Oral, 2 Times Daily PRN    pravastatin (PRAVACHOL) 10 MG tablet TAKE 1 TABLET BY MOUTH AT BEDTIME    pregabalin (LYRICA) 75 mg, Oral, 2 Times Daily    sacubitril-valsartan (Entresto) 49-51 MG tablet 1 tablet, Oral, 2 Times Daily    spironolactone (ALDACTONE) 25 mg, Oral, Daily        Past History, Review of Systems, Exam     Past Medical History:   Diagnosis Date    Age-related osteoporosis without current pathological fracture 02/25/2022    Anemia     Benign breast disease     Cancer 2015    Cellulitis of leg     CHF (congestive heart failure)     Chronic kidney disease     Chronic ulcer of right foot     Closed fracture of patella 09/05/2017    Cluster headache     Colon polyp     Depression     Diabetic peripheral neuropathy     Difficulty walking     Diverticulosis     Femoral fracture     Fibromyalgia, primary     Fracture of left wrist     Gastroesophageal reflux     Generalized pain 02/02/2016    Hiatal hernia     Hyperlipidemia     Hypertension     Hypothyroidism     Impingement syndrome of right  shoulder     Joint pain     Left knee pain 2016    Menopausal disorder     Methicillin resistant Staphylococcus aureus infection     Nonischemic cardiomyopathy     Osteoarthritis     Osteomyelitis 2016    Pain in shoulder 2016    Paroxysmal atrial fibrillation     Radius/ulna fracture, left, closed, initial encounter 10/21/2017    Shoulder pain, bilateral     Sleep apnea     Syncope, psychogenic 2017    Thoracic back pain 2016    Thoracic injuries     Toe ulcer     Transient alteration of awareness 2017    Traumatic closed nondisp torus fracture of distal radial metaphysis, right, initial encounter 2020       Past Surgical History:   has a past surgical history that includes Bladder surgery (); Epidural block injection; Total knee arthroplasty (Right); Hernia repair; Incisional hernia repair (2014); Shoulder surgery (Right); Debridement  foot (Right, 2013); Amputation foot / toe (Right, 2013); Breast surgery (2015); Breast biopsy; Foot surgery (Bilateral); Cardiac catheterization (); Abdominal surgery (); Cataract extraction (Bilateral, ); Total abdominal hysterectomy; Total knee arthroplasty (Left, 2018); Mastectomy (Right, 2015); Femur fracture surgery; Colonoscopy; Mastectomy modified radical w/ axillary lymph nodes w/ or w/o pectoralis minor (Right); Cardiac catheterization (N/A, 2021); Cardiac catheterization (N/A, 2021); and Cardiac catheterization (N/A, 2021).     Social History     Socioeconomic History    Marital status:     Number of children: 2   Tobacco Use    Smoking status: Former     Packs/day: 3.00     Years: 30.00     Additional pack years: 0.00     Total pack years: 90.00     Types: Cigarettes     Quit date:      Years since quittin.0     Passive exposure: Past    Smokeless tobacco: Never   Vaping Use    Vaping Use: Never used   Substance and Sexual Activity    Alcohol use: No      Comment: Caffeine use: 3 cups daily     Drug use: No    Sexual activity: Defer     Partners: Male     Comment: celibate       Review of Systems   Cardiovascular:  Positive for syncope. Negative for chest pain, claudication, cyanosis, dyspnea on exertion, irregular heartbeat, leg swelling, near-syncope, orthopnea, palpitations and paroxysmal nocturnal dyspnea.   Musculoskeletal:  Positive for falls.       Vitals reviewed.   Constitutional:       Appearance: Not in distress.   Eyes:      Conjunctiva/sclera: Conjunctivae normal.      Pupils: Pupils are equal, round, and reactive to light.   HENT:      Head: Normocephalic.      Nose: Nose normal.    Mouth/Throat:      Pharynx: Oropharynx is clear.   Neck:      Vascular: JVD normal.   Pulmonary:      Effort: Pulmonary effort is normal.      Breath sounds: Normal breath sounds. No wheezing. No rhonchi. No rales.   Cardiovascular:      Normal rate. Regular rhythm. Normal S1. Normal S2.       Murmurs: There is no murmur.   Pulses:     Intact distal pulses.   Edema:     Peripheral edema present.     Ankle: bilateral trace edema of the ankle.  Abdominal:      General: Bowel sounds are normal. There is no distension.      Palpations: Abdomen is soft.      Tenderness: There is no abdominal tenderness.   Musculoskeletal: Normal range of motion.      Cervical back: Normal range of motion and neck supple. Skin:     General: Skin is warm and dry.   Neurological:      Mental Status: Alert and oriented to person, place and time.   Psychiatric:         Attention and Perception: Attention normal.         Mood and Affect: Mood normal.         Speech: Speech normal.         Behavior: Behavior is cooperative.          Assessment and Plan     Assessment:  1. HFrEF (heart failure with reduced ejection fraction)    2. Atherosclerosis of native coronary artery of native heart without angina pectoris    3. NICM (nonischemic cardiomyopathy)    4. Nonrheumatic mitral valve regurgitation    5.  Nonrheumatic tricuspid valve regurgitation    6. PVC (premature ventricular contraction)    7. Primary hypertension             Syncope: She states she has had several falls over the years.  They typically occur without warning and she always falls backwards.  She states Dr. Bates told her this is her heart stopping and then restarting.  She does have history of PVCs but does not report any palpitations or chest pain prior to fall.  HFrEF with improved EF: Her EF was 20% when she was seen in April 2015 and diagnosed with nonischemic cardiomyopathy.  She has been treated medically.  Her last echocardiogram was February 2023 and EF had improved to 57%.  She does have mild LV dilation. Her medical therapy also includes 49-51 mg Entresto twice daily, 25 mg carvedilol twice daily, and 25 mg spironolactone daily.  Leg swelling is better today with the addition of spironolactone and on lower dose amlodipine.  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  She had only minimal coronary artery disease.  Her medical therapy includes amlodipine, aspirin, carvedilol, and pravastatin.  She denies chest pain.  Nonischemic cardiomyopathy: April 2015 she presented with acute heart failure and found to have EF 10 to 15% with moderate LV dilation and had minimal coronary artery disease on cardiac catheterization.  Again she had repeat cardiac catheterization in January 2021 with no new coronary artery disease.  Her EF has improved to 57%.  She denies chest pain.  Mitral valve regurgitation: This was graded as mild on February 2023 echocardiogram.  She is euvolemic today.  Tricuspid valve regurgitation: This was graded as mild on February 2023 echocardiogram.  She is euvolemic today.  PVCs: She had a Holter monitor after she fell and February 2023 which showed a 1.5 PVC burden.  She is on 25 mg carvedilol twice  daily.  No PVCs on exam today but I do worry that she is having an arrhythmia that is causing her falls.  Hypertension:  controlled.  Hyperlipidemia: LDL elevated when checked in September 2023; she is on 10 mg pravastatin daily.  No recent labs to review.  Type 2 diabetes: No recent hemoglobin A1c to review.  She is on Jardiance and other medications for her diabetes.  CKD 3: She did have normal renal function when checked in September 2023.  Renal function stable in October 2023.  No recent labs to review.  History of right breast cancer: This was treated with anastrozole.    Ms. Short is a patient of Dr. 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  Her leg swelling is improved on lower dose amlodipine and with addition of 25 mg spironolactone daily.      She has had another fall in which she loses consciousness and falls backwards hitting her head.  She then comes to.  These occur without warning.  She does have history of PVCs and I wonder if this is due to her arrhythmia.  She did have a 10-day Holter monitor in March 2023 which showed a 1.5 percent PVC burden. I discussed this with Dr. Bates to see if we need to check a longer monitor or if we need to think of additional treatment for her PVCs.    Return for 3 months - Monica; 6 months - Dr. Bates.  Orders Placed This Encounter   Procedures    ECG 12 Lead      No orders of the defined types were placed in this encounter.        Thank you for the opportunity to participate in this patient's care.    JOSE ANTONIO Shearer    This office note has been dictated.

## 2024-01-05 NOTE — Clinical Note
She had another fall without warning; she stopped while walking fell backwards and hit her head.  She comes to then.  She has had these before.  She had a monitor in March 2023 which showed a 1.5% PVC burden and she is on 25 mg carvedilol.  Do we need to check an extended monitor or possibly do something else for her PVCs?

## 2024-01-09 ENCOUNTER — TELEPHONE (OUTPATIENT)
Dept: CARDIOLOGY | Facility: CLINIC | Age: 79
End: 2024-01-09
Payer: MEDICARE

## 2024-01-09 NOTE — TELEPHONE ENCOUNTER
----- Message from Aria Bates MD sent at 1/6/2024  8:10 AM EST -----  Check an extended monitor.  Make sure you check orthostatics.  Also make sure that we have laboratory values on her to exclude anything that would cause this.      ----- Message -----  From: Faye Chen APRN  Sent: 1/5/2024   6:33 PM EST  To: Aria Bates MD    She had another fall without warning; she stopped while walking fell backwards and hit her head.  She comes to then.  She has had these before.  She had a monitor in March 2023 which showed a 1.5% PVC burden and she is on 25 mg carvedilol.  Do we need to check an extended monitor or possibly do something else for her PVCs?

## 2024-01-09 NOTE — TELEPHONE ENCOUNTER
I discussed her passing out spells with Dr. Bates.     Would she be able to come to [place] to have an event monitor placed to look for abnormal heart rhythms?    Also, is she due to have labs checked soon?    Thanks!  JOSE ANTONIO Shearer

## 2024-01-10 ENCOUNTER — TELEPHONE (OUTPATIENT)
Dept: CARDIOLOGY | Facility: CLINIC | Age: 79
End: 2024-01-10
Payer: MEDICARE

## 2024-01-10 DIAGNOSIS — I36.1 NONRHEUMATIC TRICUSPID VALVE REGURGITATION: ICD-10-CM

## 2024-01-10 DIAGNOSIS — R55 SYNCOPE AND COLLAPSE: Primary | ICD-10-CM

## 2024-01-10 DIAGNOSIS — I42.8 NICM (NONISCHEMIC CARDIOMYOPATHY): ICD-10-CM

## 2024-01-10 DIAGNOSIS — I49.3 PVC (PREMATURE VENTRICULAR CONTRACTION): ICD-10-CM

## 2024-01-10 DIAGNOSIS — I50.20 HFREF (HEART FAILURE WITH REDUCED EJECTION FRACTION): ICD-10-CM

## 2024-01-10 DIAGNOSIS — I34.0 NONRHEUMATIC MITRAL VALVE REGURGITATION: ICD-10-CM

## 2024-01-10 DIAGNOSIS — I25.10 ATHEROSCLEROSIS OF NATIVE CORONARY ARTERY OF NATIVE HEART WITHOUT ANGINA PECTORIS: ICD-10-CM

## 2024-01-10 NOTE — TELEPHONE ENCOUNTER
Called pt to notify her that labs need to be drawn and she can go to the [place] office. Pt verbalized understanding

## 2024-01-10 NOTE — TELEPHONE ENCOUNTER
Can we please get her scheduled for [facility] in [place]? Also, when she comes into [place], can we also get her scheduled for orthostatic BP check. Dr. Bates wants to check these things.     Thanks!  JOSE ANTONIO Shearer

## 2024-01-10 NOTE — TELEPHONE ENCOUNTER
----- Message from JOSE ANTONIO Armenta sent at 1/10/2024 10:17 AM EST -----  Triage spoke to her yesterday. She is going to be scheduled to come to [place] for monitor and orthostatic BP check.     I've also ordered labs. She can get them in [place] at her convenience. The orders are in the computer.     Thanks!  JOSE ANTONIO Shearer

## 2024-01-10 NOTE — TELEPHONE ENCOUNTER
Pt is agreeable to coming to [place] for a monitor.    She's not sure when she's due for blood work.    Thank you,    Jacque HENSLEY RN  Triage [facility]  01/10/24 08:34 EST

## 2024-01-12 DIAGNOSIS — M47.816 LUMBAR FACET ARTHROPATHY: ICD-10-CM

## 2024-01-12 DIAGNOSIS — Z79.899 ENCOUNTER FOR LONG-TERM (CURRENT) USE OF HIGH-RISK MEDICATION: ICD-10-CM

## 2024-01-12 NOTE — TELEPHONE ENCOUNTER
Medication Refill Request    Date of phone call: 1/12/2024    Medication being requested: hydro/apap 5-325 mg sig: take 1 tab q 6 hrs prn  Qty: 120    Date of last visit: 11/28/2023    Date of last refill: 12/15/2023    LOUANN up to date?: yes    Next Follow up?: 1/29/2024    Any new pertinent information? (i.e, new medication allergies, new use of medications, change in patient's health or condition, non-compliance or inconsistency with prescribing agreement?):

## 2024-01-13 DIAGNOSIS — M47.816 LUMBAR FACET ARTHROPATHY: ICD-10-CM

## 2024-01-13 DIAGNOSIS — Z79.899 ENCOUNTER FOR LONG-TERM (CURRENT) USE OF HIGH-RISK MEDICATION: ICD-10-CM

## 2024-01-13 RX ORDER — HYDROCODONE BITARTRATE AND ACETAMINOPHEN 5; 325 MG/1; MG/1
1 TABLET ORAL EVERY 6 HOURS PRN
Qty: 120 TABLET | Refills: 0 | Status: SHIPPED | OUTPATIENT
Start: 2024-01-13 | End: 2024-01-15

## 2024-01-15 RX ORDER — HYDROCODONE BITARTRATE AND ACETAMINOPHEN 5; 325 MG/1; MG/1
1 TABLET ORAL EVERY 6 HOURS PRN
Qty: 120 TABLET | Refills: 0 | Status: SHIPPED | OUTPATIENT
Start: 2024-01-15

## 2024-01-19 ENCOUNTER — LAB (OUTPATIENT)
Dept: LAB | Facility: HOSPITAL | Age: 79
End: 2024-01-19
Payer: MEDICARE

## 2024-01-19 DIAGNOSIS — I42.8 NICM (NONISCHEMIC CARDIOMYOPATHY): ICD-10-CM

## 2024-01-19 DIAGNOSIS — I25.10 ATHEROSCLEROSIS OF NATIVE CORONARY ARTERY OF NATIVE HEART WITHOUT ANGINA PECTORIS: ICD-10-CM

## 2024-01-19 DIAGNOSIS — I36.1 NONRHEUMATIC TRICUSPID VALVE REGURGITATION: ICD-10-CM

## 2024-01-19 DIAGNOSIS — R55 SYNCOPE AND COLLAPSE: ICD-10-CM

## 2024-01-19 DIAGNOSIS — I49.3 PVC (PREMATURE VENTRICULAR CONTRACTION): ICD-10-CM

## 2024-01-19 DIAGNOSIS — I50.20 HFREF (HEART FAILURE WITH REDUCED EJECTION FRACTION): ICD-10-CM

## 2024-01-19 DIAGNOSIS — I34.0 NONRHEUMATIC MITRAL VALVE REGURGITATION: ICD-10-CM

## 2024-01-19 LAB
ALBUMIN SERPL-MCNC: 3.8 G/DL (ref 3.5–5.2)
ALBUMIN/GLOB SERPL: 1.3 G/DL
ALP SERPL-CCNC: 81 U/L (ref 39–117)
ALT SERPL W P-5'-P-CCNC: 9 U/L (ref 1–33)
ANION GAP SERPL CALCULATED.3IONS-SCNC: 6.8 MMOL/L (ref 5–15)
AST SERPL-CCNC: 15 U/L (ref 1–32)
BASOPHILS # BLD AUTO: 0.02 10*3/MM3 (ref 0–0.2)
BASOPHILS NFR BLD AUTO: 0.3 % (ref 0–1.5)
BILIRUB SERPL-MCNC: 0.5 MG/DL (ref 0–1.2)
BUN SERPL-MCNC: 10 MG/DL (ref 8–23)
BUN/CREAT SERPL: 12.3 (ref 7–25)
CALCIUM SPEC-SCNC: 9.7 MG/DL (ref 8.6–10.5)
CHLORIDE SERPL-SCNC: 102 MMOL/L (ref 98–107)
CO2 SERPL-SCNC: 29.2 MMOL/L (ref 22–29)
CREAT SERPL-MCNC: 0.81 MG/DL (ref 0.57–1)
DEPRECATED RDW RBC AUTO: 48.6 FL (ref 37–54)
EGFRCR SERPLBLD CKD-EPI 2021: 73.5 ML/MIN/1.73
EOSINOPHIL # BLD AUTO: 0.18 10*3/MM3 (ref 0–0.4)
EOSINOPHIL NFR BLD AUTO: 3.1 % (ref 0.3–6.2)
ERYTHROCYTE [DISTWIDTH] IN BLOOD BY AUTOMATED COUNT: 15 % (ref 12.3–15.4)
GLOBULIN UR ELPH-MCNC: 2.9 GM/DL
GLUCOSE SERPL-MCNC: 276 MG/DL (ref 65–99)
HCT VFR BLD AUTO: 40.9 % (ref 34–46.6)
HGB BLD-MCNC: 12.7 G/DL (ref 12–15.9)
IMM GRANULOCYTES # BLD AUTO: 0.03 10*3/MM3 (ref 0–0.05)
IMM GRANULOCYTES NFR BLD AUTO: 0.5 % (ref 0–0.5)
IRON 24H UR-MRATE: 33 MCG/DL (ref 37–145)
IRON SATN MFR SERPL: 13 % (ref 20–50)
LYMPHOCYTES # BLD AUTO: 1.32 10*3/MM3 (ref 0.7–3.1)
LYMPHOCYTES NFR BLD AUTO: 22.8 % (ref 19.6–45.3)
MAGNESIUM SERPL-MCNC: 1.7 MG/DL (ref 1.6–2.4)
MCH RBC QN AUTO: 27.9 PG (ref 26.6–33)
MCHC RBC AUTO-ENTMCNC: 31.1 G/DL (ref 31.5–35.7)
MCV RBC AUTO: 89.7 FL (ref 79–97)
MONOCYTES # BLD AUTO: 0.35 10*3/MM3 (ref 0.1–0.9)
MONOCYTES NFR BLD AUTO: 6.1 % (ref 5–12)
NEUTROPHILS NFR BLD AUTO: 3.88 10*3/MM3 (ref 1.7–7)
NEUTROPHILS NFR BLD AUTO: 67.2 % (ref 42.7–76)
NRBC BLD AUTO-RTO: 0 /100 WBC (ref 0–0.2)
NT-PROBNP SERPL-MCNC: 544 PG/ML (ref 0–1800)
PLATELET # BLD AUTO: 224 10*3/MM3 (ref 140–450)
PMV BLD AUTO: 9.5 FL (ref 6–12)
POTASSIUM SERPL-SCNC: 3.6 MMOL/L (ref 3.5–5.2)
PROT SERPL-MCNC: 6.7 G/DL (ref 6–8.5)
RBC # BLD AUTO: 4.56 10*6/MM3 (ref 3.77–5.28)
SODIUM SERPL-SCNC: 138 MMOL/L (ref 136–145)
TIBC SERPL-MCNC: 247 MCG/DL (ref 298–536)
TROPONIN T SERPL HS-MCNC: 19 NG/L
TSH SERPL DL<=0.05 MIU/L-ACNC: 0.91 UIU/ML (ref 0.27–4.2)
UIBC SERPL-MCNC: 214 MCG/DL (ref 112–346)
URATE SERPL-MCNC: 5.2 MG/DL (ref 2.4–5.7)
WBC NRBC COR # BLD AUTO: 5.78 10*3/MM3 (ref 3.4–10.8)

## 2024-01-19 PROCEDURE — 84484 ASSAY OF TROPONIN QUANT: CPT

## 2024-01-19 PROCEDURE — 36415 COLL VENOUS BLD VENIPUNCTURE: CPT

## 2024-01-19 PROCEDURE — 80053 COMPREHEN METABOLIC PANEL: CPT

## 2024-01-19 PROCEDURE — 84443 ASSAY THYROID STIM HORMONE: CPT

## 2024-01-19 PROCEDURE — 83550 IRON BINDING TEST: CPT

## 2024-01-19 PROCEDURE — 83880 ASSAY OF NATRIURETIC PEPTIDE: CPT

## 2024-01-19 PROCEDURE — 85025 COMPLETE CBC W/AUTO DIFF WBC: CPT

## 2024-01-19 PROCEDURE — 83735 ASSAY OF MAGNESIUM: CPT

## 2024-01-19 PROCEDURE — 83540 ASSAY OF IRON: CPT

## 2024-01-19 PROCEDURE — 84550 ASSAY OF BLOOD/URIC ACID: CPT

## 2024-01-22 ENCOUNTER — TELEPHONE (OUTPATIENT)
Dept: CARDIOLOGY | Facility: CLINIC | Age: 79
End: 2024-01-22
Payer: MEDICARE

## 2024-01-22 DIAGNOSIS — G89.29 OTHER CHRONIC PAIN: ICD-10-CM

## 2024-01-22 RX ORDER — PREGABALIN 75 MG/1
75 CAPSULE ORAL 2 TIMES DAILY
Qty: 60 CAPSULE | Refills: 6 | Status: SHIPPED | OUTPATIENT
Start: 2024-01-22

## 2024-01-22 NOTE — TELEPHONE ENCOUNTER
Please let her know that her labs look okay.  Her troponin, which is a screening test for heart disease, was mildly elevated.    If she having any chest pain?  Has she had any syncope?    I am not terribly worried about it I just want to make sure she is feeling okay.    Thanks!  JOSE ANTONIO Shearer

## 2024-01-22 NOTE — TELEPHONE ENCOUNTER
Rx Refill Note  Requested Prescriptions     Pending Prescriptions Disp Refills    pregabalin (LYRICA) 75 MG capsule 60 capsule 6     Sig: Take 1 capsule by mouth 2 (Two) Times a Day.      Last office visit with prescribing clinician: 12/1/2023   Last telemedicine visit with prescribing clinician: Visit date not found   Next office visit with prescribing clinician: Visit date not found                         Would you like a call back once the refill request has been completed: [] Yes [] No    If the office needs to give you a call back, can they leave a voicemail: [] Yes [] No    Janeth Dyer MA  01/22/24, 15:50 EST

## 2024-01-22 NOTE — TELEPHONE ENCOUNTER
"Caller: Jung Short \"SHEILA\"    Relationship: Self    Best call back number: [phone]     Requested Prescriptions:   Requested Prescriptions     Pending Prescriptions Disp Refills    pregabalin (LYRICA) 75 MG capsule 60 capsule 6     Sig: Take 1 capsule by mouth 2 (Two) Times a Day.        Pharmacy where request should be sent: [id number] - [place] [address] [facility] - [phone]  - [phone] FX     Last office visit with prescribing clinician: 12/1/2023   Last telemedicine visit with prescribing clinician: Visit date not found   Next office visit with prescribing clinician: Visit date not found     Additional details provided by patient: PATIENT STATES THAT SHE HAS CHANGED PHARMACY DUE TO INSURANCE AND THEY NEED A NEW WRITTEN PRESCRIPTION SENT IN FOR THIS MEDICATION BEFORE THEY CAN REFILL.     PATIENT IS OUT OF THIS MEDICATION.     Does the patient have less than a 3 day supply:  [x] Yes  [] No    Would you like a call back once the refill request has been completed: [] Yes [x] No    If the office needs to give you a call back, can they leave a voicemail: [x] Yes [] No    Allan Ley Rep   01/22/24 14:53 EST           "

## 2024-01-22 NOTE — TELEPHONE ENCOUNTER
Called and left VM, will continue to try to reach pt.    HUB- please put patient straight through to triage    Rachael Mao, RN  Triage RN  01/22/24 10:12 EST

## 2024-01-22 NOTE — TELEPHONE ENCOUNTER
Notified patient of results/recommendations. Patient verbalized understanding. She denies any chest pain or syncope.     Nivia Todd RN  [place]riage [facility]

## 2024-01-23 ENCOUNTER — CLINICAL SUPPORT (OUTPATIENT)
Dept: CARDIOLOGY | Facility: CLINIC | Age: 79
End: 2024-01-23
Payer: MEDICARE

## 2024-01-23 VITALS — DIASTOLIC BLOOD PRESSURE: 60 MMHG | HEART RATE: 85 BPM | SYSTOLIC BLOOD PRESSURE: 106 MMHG

## 2024-01-23 NOTE — PROGRESS NOTES
Patient is being seen in the office today for B/P orthostatic, per Monica Chen. After laying for 5 minutes, B/P left arm 102/72 HR 76. Standing for 1 minute 110/72 HR 82. Standing for 3 minutes 106/60 HR 85. She did not have any symptoms besides legs feeling weak from standing too long.

## 2024-02-01 ENCOUNTER — OFFICE VISIT (OUTPATIENT)
Dept: PAIN MEDICINE | Facility: CLINIC | Age: 79
End: 2024-02-01
Payer: MEDICARE

## 2024-02-01 VITALS
OXYGEN SATURATION: 97 % | DIASTOLIC BLOOD PRESSURE: 64 MMHG | BODY MASS INDEX: 33.61 KG/M2 | RESPIRATION RATE: 18 BRPM | SYSTOLIC BLOOD PRESSURE: 112 MMHG | HEART RATE: 71 BPM | HEIGHT: 65 IN | TEMPERATURE: 96.9 F

## 2024-02-01 DIAGNOSIS — G62.9 PERIPHERAL POLYNEUROPATHY: ICD-10-CM

## 2024-02-01 DIAGNOSIS — Z79.899 ENCOUNTER FOR LONG-TERM (CURRENT) USE OF HIGH-RISK MEDICATION: ICD-10-CM

## 2024-02-01 DIAGNOSIS — M47.816 LUMBAR FACET ARTHROPATHY: Primary | ICD-10-CM

## 2024-02-01 DIAGNOSIS — M17.10 ARTHRITIS OF KNEE: ICD-10-CM

## 2024-02-01 DIAGNOSIS — M51.36 DDD (DEGENERATIVE DISC DISEASE), LUMBAR: ICD-10-CM

## 2024-02-01 NOTE — PROGRESS NOTES
CHIEF COMPLAINT  Back pain  Leg pain  Uds opi    Subjective   Wendyl Aron Short is a 78 y.o. female  who presents for follow-up.  She has a history of chronic low back and multijoint pain.  Patient continues with pain in a transverse distribution across lumbosacral spine which radiates occasionally into the bilateral lower extremities, left greater than right.  She also has secondary complaints of widespread multijoint pain due to generalized osteoarthritis.  She states that she continues to have a small ulceration on the plantar aspect of the left foot however she feels that it has healed.    Current medication regimen consists of hydrocodone 5 mg 1-2 p.o. twice daily which she tolerates without adverse effects.  Overall her medications provide at least 50% improvement of pain relief allowing her to live independently.    Pain today 5/10 VAS in severity.      Back Pain  This is a chronic problem. The current episode started more than 1 year ago. The problem occurs constantly. The problem is unchanged. The pain is present in the lumbar spine. The quality of the pain is described as aching and burning. Radiates to: Occasionally radiates into the lower extremities. The pain is at a severity of 5/10. The pain is moderate. The pain is The same all the time. The symptoms are aggravated by position and standing (activity/walking). Pertinent negatives include no abdominal pain, dysuria, fever, headaches or weakness.   Foot Pain  This is a new problem. The current episode started more than 1 month ago. The problem occurs constantly. The problem has been gradually worsening. Associated symptoms include fatigue. Pertinent negatives include no abdominal pain, chills, coughing, fever, headaches or weakness. The symptoms are aggravated by walking and standing.        PEG Assessment   What number best describes your pain on average in the past week?5  What number best describes how, during the past week, pain has interfered with  your enjoyment of life?2  What number best describes how, during the past week, pain has interfered with your general activity?  2    Review of Pertinent Medical Data ---  I have reviewed the cardiology note from JOSE ANTONIO Lobo dated 1/5/2024. The patient is currently wearing a 2 week Holter monitor for cardiac evaluation of repeated syncopal episodes.    MRI Spine Lumbar WO     HISTORY:  Low back pain symptoms persist greater than 6 weeks treatment. Spondylosis without myelopathy. Injury 2 months ago. Lungs. Fell onto bottom. Low back and left hip pain and left thigh pain since. Not as severe as it was. History of breast cancer in 2016.     TECHNIQUE:  Multiplanar multisequence imaging of the lumbar spine acquired without IV contrast utilizing a standard protocol.     COMPARISON: Plain film from 8/30/2022.     FINDINGS:     For the purposes of this dictation is presumed to be 5 lumbar-type vertebra with the last fully formed disc space representing L5-S1.     There is a compression fracture at L4 with about 40% loss of mid vertebral body height, 35% loss of posterior vertebral body height, and 15% loss of anterior vertebral body height. This is mildly progressed from the plain films. There is marrow edema  throughout the L4 vertebral body. Posterior cortex is buckled and apparently disrupted. There is retropulsed bone and or epidural soft tissue edema anterior epidural space. Combination of findings result in severe canal stenosis at the level of L4  vertebral body. The edema extends into the bilateral posterior elements. Given history this is most likely a recent osteoporotic type compression fracture. Bone marrow signal intensity is otherwise normal and there is otherwise nothing to suggest osseous  metastatic disease. Follow-up to healing is recommended to ensure expected clinical course.     Conus medullaris terminates at T12-L1 and is normal. Intervertebral discs are desiccated in general. There is  subtle grade 1 degenerative anterolisthesis of L4 on L5 unchanged.     Level by level:     L1-2: Mild bilateral facet degenerative change. Minimal posterior disc bulge. No canal or foraminal compromise.     L2-3: Moderate facet degenerative change bilaterally. There is a broad posterior protrusion/extrusion extending into the bilateral foramina asymmetric to the right with a small right foraminal extrusion measuring about 11 mm SI dimension by 4.5 mm AP  dimension. This contributes to moderate to severe right foraminal impingement with mass effect on expected course of the right L2 root. There is mild to moderate left foraminal narrowing. There is posterior epidural lipomatosis and mild to moderate canal  stenosis.     L3-4: There is fairly severe facet arthritis bilaterally with moderate ligamentum flavum thickening. There is a concentric disc bulge with a superimposed broad posterior protrusion/extrusion. This is associated with the posterior cortical disruption at  L4. The combination of findings result in severe canal stenosis with severe impingement on the bilateral lateral recesses. Disc material extends into the foramina with approximately moderate right and mild to moderate left foraminal narrowing. There is  posterior epidural lipomatosis.     L4-5: There is a broad posterior protrusion/extrusion remaining contiguous with the disc but extending above and below the level the disc measuring about 12 mm SI dimension by 3 mm AP dimension. There is moderate facet arthritis and mild to moderate  ligamentum flavum thickening worse the left. There is mild to moderate canal stenosis with impingement on the bilateral lateral recesses. There is moderate to severe bilateral foraminal narrowing. Loss of L4 vertebral body disc height contributes to  this.     L5-S1: There is a mild concentric disc bulge with a superimposed broad posterior protrusion. There is mild to moderate left and moderate to severe right-sided facet  arthritis. No canal stenosis. Mild left foraminal narrowing. There is severe right  foraminal narrowing with more focal right foraminal extrusion measuring about 9 mm SI dimension by 5.5 mm AP dimension.     OTHER: Gallstone. Arthritis of the sacroiliac joints partly in field-of-view     IMPRESSION:  1.  There is a recent appearing compression fracture at L4, overall moderate, measurements above. There is disruption of the posterior cortex with retropulsed cortex/edema in the anterior epidural space at L4 resulting in severe canal stenosis.  Additionally there is degenerative change at L3-4 and L4-5 with severe canal stenosis at L3-4 and mild to moderate canal stenosis at L4-5. For reasons discussed above, this is more likely to be an osteoporotic type compression fracture despite history of  breast cancer. Clinical correlation and follow-up is recommended to confirm.  2.  Multiple level foraminal impingement, some of which is severe. See level by level discussion.     Signer Name: Zarina Mari MD   Signed: 10/5/2022 2:40 PM    The following portions of the patient's history were reviewed and updated as appropriate: allergies, current medications, past family history, past medical history, past social history, past surgical history, and problem list.    Review of Systems   Constitutional:  Positive for activity change and fatigue. Negative for chills and fever.   Respiratory:  Negative for cough.    Gastrointestinal:  Negative for abdominal pain, constipation and diarrhea.   Genitourinary:  Negative for difficulty urinating and dysuria.   Musculoskeletal:  Positive for back pain.   Neurological:  Negative for dizziness, weakness, light-headedness and headaches.   Psychiatric/Behavioral:  Negative for agitation, sleep disturbance and suicidal ideas. The patient is not nervous/anxious.      I have reviewed and confirmed the accuracy of the ROS as documented by the MA/NELDA/RN MANJINDER Tong  Vitals:    02/01/24  "0901   BP: 112/64   BP Location: Left arm   Patient Position: Sitting   Cuff Size: Large Adult   Pulse: 71   Resp: 18   Temp: 96.9 °F (36.1 °C)   SpO2: 97%   Height: 165.1 cm (65\")   PainSc:   5         Objective   Physical Exam  Vitals and nursing note reviewed.   Constitutional:       Appearance: Normal appearance. She is obese.   HENT:      Head: Normocephalic.   Pulmonary:      Effort: Pulmonary effort is normal.   Musculoskeletal:      Lumbar back: Spasms and tenderness present. Decreased range of motion.      Left foot: Tenderness present.   Skin:     General: Skin is warm and dry.   Neurological:      General: No focal deficit present.      Mental Status: She is alert and oriented to person, place, and time.      Cranial Nerves: Cranial nerves 2-12 are intact.      Sensory: Sensation is intact.      Motor: Weakness present.      Gait: Gait abnormal (antalgic gait; ambulates with a cane).   Psychiatric:         Mood and Affect: Mood normal.         Behavior: Behavior normal.         Thought Content: Thought content normal.         Judgment: Judgment normal.             Assessment & Plan   Diagnoses and all orders for this visit:    1. Lumbar facet arthropathy (Primary)    2. DDD (degenerative disc disease), lumbar    3. Peripheral polyneuropathy    4. Arthritis of knee    5. Encounter for long-term (current) use of high-risk medication        Jung Short reports a pain score of 5.  Given her pain assessment as noted, treatment options were discussed and the following options were decided upon as a follow-up plan to address the patient's pain: continuation of current treatment plan for pain, prescription for opiod analgesics, and use of non-medical modalities (ice, heat, stretching and/or behavior modifications).      --- Follow-up in 2 months or sooner for medication management  --- Sinew hydrocodone.  The patient does not require refill on today's visit. Patient appears stable with current regimen. No " adverse effects. Regarding continuation of opioids, there is no evidence of aberrant behavior or any red flags.  The patient continues with appropriate response to opioid therapy. ADL's remain intact by self.   --- Moderate risk for opiate abuse/diversion        Routine UDS in office today as part of monitoring requirements for controlled substances.  The specimen was viewed and the immunoassay result reviewed and is appropriately positive for opiates.  [facility].          The patient signed an updated copy of the controlled substance agreement on 11/28/2023.      LOUANN REPORT  As part of the patient's treatment plan, I am prescribing controlled substances. The patient has been made aware of appropriate use of such medications, including potential risk of somnolence, limited ability to drive and/or work safely, and the potential for dependence or overdose. It has also been made clear that these medications are for use by this patient only, without concomitant use of alcohol or other substances unless prescribed.     Patient has completed prescribing agreement detailing terms of continued prescribing of controlled substances, including monitoring LOUANN reports, urine drug screening, and pill counts if necessary. The patient is aware that inappropriate use will results in cessation of prescribing such medications.    As the clinician, I personally reviewed the LOUANN from 2/1/2024 while the patient was in the office today.    History and physical exam exhibit continued safe and appropriate use of controlled substances.     Dictated utilizing Dragon dictation.

## 2024-02-02 ENCOUNTER — TELEPHONE (OUTPATIENT)
Dept: INTERNAL MEDICINE | Facility: CLINIC | Age: 79
End: 2024-02-02

## 2024-02-02 NOTE — TELEPHONE ENCOUNTER
"Caller: Jung Short \"SHEILA\"    Relationship: Self    Best call back number: [phone]    What orders are you requesting (i.e. lab or imaging): PROLIA INJECTION     In what timeframe would the patient need to come in:DUE APRIL 18, 2024                 "

## 2024-02-09 ENCOUNTER — APPOINTMENT (OUTPATIENT)
Dept: CT IMAGING | Facility: HOSPITAL | Age: 79
End: 2024-02-09
Payer: MEDICARE

## 2024-02-09 ENCOUNTER — APPOINTMENT (OUTPATIENT)
Dept: GENERAL RADIOLOGY | Facility: HOSPITAL | Age: 79
End: 2024-02-09
Payer: MEDICARE

## 2024-02-09 ENCOUNTER — HOSPITAL ENCOUNTER (EMERGENCY)
Facility: HOSPITAL | Age: 79
Discharge: HOME | End: 2024-02-09
Attending: EMERGENCY MEDICINE
Payer: MEDICARE

## 2024-02-09 VITALS
HEART RATE: 64 BPM | TEMPERATURE: 98.2 F | BODY MASS INDEX: 33.66 KG/M2 | OXYGEN SATURATION: 98 % | WEIGHT: 202 LBS | HEIGHT: 65 IN | RESPIRATION RATE: 18 BRPM | DIASTOLIC BLOOD PRESSURE: 56 MMHG | SYSTOLIC BLOOD PRESSURE: 120 MMHG

## 2024-02-09 DIAGNOSIS — S40.012A CONTUSION OF LEFT SHOULDER, INITIAL ENCOUNTER: ICD-10-CM

## 2024-02-09 DIAGNOSIS — S01.01XA LACERATION OF SCALP, INITIAL ENCOUNTER: ICD-10-CM

## 2024-02-09 DIAGNOSIS — M87.876 OTHER OSTEONECROSIS, UNSPECIFIED FOOT: Primary | ICD-10-CM

## 2024-02-09 PROCEDURE — 73590 X-RAY EXAM OF LOWER LEG: CPT

## 2024-02-09 PROCEDURE — 72125 CT NECK SPINE W/O DYE: CPT

## 2024-02-09 PROCEDURE — 99284 EMERGENCY DEPT VISIT MOD MDM: CPT

## 2024-02-09 PROCEDURE — 73030 X-RAY EXAM OF SHOULDER: CPT

## 2024-02-09 PROCEDURE — 73630 X-RAY EXAM OF FOOT: CPT

## 2024-02-09 PROCEDURE — 70450 CT HEAD/BRAIN W/O DYE: CPT

## 2024-02-09 RX ORDER — HYDROCODONE BITARTRATE AND ACETAMINOPHEN 5; 325 MG/1; MG/1
1 TABLET ORAL ONCE
Status: COMPLETED | OUTPATIENT
Start: 2024-02-09 | End: 2024-02-09

## 2024-02-09 RX ORDER — OXYCODONE HYDROCHLORIDE AND ACETAMINOPHEN 5; 325 MG/1; MG/1
1 TABLET ORAL ONCE
Status: COMPLETED | OUTPATIENT
Start: 2024-02-09 | End: 2024-02-09

## 2024-02-09 RX ADMIN — HYDROCODONE BITARTRATE AND ACETAMINOPHEN 1 TABLET: 5; 325 TABLET ORAL at 13:46

## 2024-02-09 RX ADMIN — OXYCODONE HYDROCHLORIDE AND ACETAMINOPHEN 1 TABLET: 5; 325 TABLET ORAL at 15:49

## 2024-02-09 NOTE — ED PROVIDER NOTES
Subjective   History of Present Illness  78-year-old female presents to the ER status post fall.  Her daughter is also at bedside and contributes to the history.    Patient states she had a fall prior to arrival.  States that she falls frequently.  Daughter states she has had multiple evaluations and is involved in the care of a cardiologist, family medicine, neurologist, and multiple specialties, all of which are aware of her falls.    Patient states that she injured her left shoulder is well as her left leg during the fall.  States she was not aware that she hit her head but she does have an area in the back of her scalp that had some bleeding which is since been controlled.  Denies any residual headache, change in vision, or confusion.  Patient has no associated chest pain, neck or back pain.  However, she did have associated left shoulder injury with some pain radiating towards the neck.  She has not ambulated since the fall.      Review of Systems   Eyes:  Negative for visual disturbance.   Musculoskeletal:  Positive for arthralgias and joint swelling. Negative for back pain and neck pain.   Neurological:  Negative for syncope, facial asymmetry, light-headedness and headaches.   Psychiatric/Behavioral:  Negative for agitation and confusion.        Past Medical History:   Diagnosis Date    Age-related osteoporosis without current pathological fracture 02/25/2022    Anemia     Benign breast disease     Cancer 2015    Right breast    Cellulitis of leg     May-2003 right lower extremity    CHF (congestive heart failure)     Dr Bates follows    Chronic kidney disease     Dr Hannah follows    Chronic ulcer of right foot     Non-pressure, history of     Closed fracture of patella 09/05/2017    Cluster headache     Colon polyp     Depression     Diabetic peripheral neuropathy     Difficulty walking     Diverticulosis     Femoral fracture     right    Fibromyalgia, primary     Fracture of left wrist     history of     Gastroesophageal reflux     Generalized pain 02/02/2016    Hiatal hernia     Hyperlipidemia     Hypertension     Hypothyroidism     Impingement syndrome of right shoulder     Joint pain     Left knee pain 05/17/2016    Menopausal disorder     Methicillin resistant Staphylococcus aureus infection 2012    after bladder surgery    Nonischemic cardiomyopathy     Ejection fraction 10% per 2-D echo with Doppler however she did have cardiac catheterization April 2015 which showed ejection fraction 20% with global hypokinesis and severe mitral insufficiency    Osteoarthritis     generalized, sched jania TKA    Osteomyelitis 02/02/2016    Pain in shoulder 08/02/2016    Paroxysmal atrial fibrillation     Dr Bates follows    Radius/ulna fracture, left, closed, initial encounter 10/21/2017    Shoulder pain, bilateral     has tears on both sides    Sleep apnea     Syncope, psychogenic 04/19/2017    Thoracic back pain 02/02/2016    Thoracic injuries     Toe ulcer     h/o to both feet, d/t shoes rubbing per patient    Transient alteration of awareness 04/19/2017    Traumatic closed nondisp torus fracture of distal radial metaphysis, right, initial encounter 2/28/2020       Allergies   Allergen Reactions    Dilaudid [Hydromorphone] Delirium and Confusion    Latex Rash       Past Surgical History:   Procedure Laterality Date    ABDOMINAL SURGERY  2012    had to be reopened after bladder surgery d/t infection    AMPUTATION FOOT / TOE Right 11/2013    Rt foot amputation MTP first toe    BLADDER SURGERY  2012    BREAST BIOPSY      BREAST SURGERY  06/29/2015    Percutaneous ultrasound-guided placement of metal localized clip 1st lesion    CARDIAC CATHETERIZATION  2015    CARDIAC CATHETERIZATION N/A 1/25/2021    Procedure: Right and Left Heart Cath;  Surgeon: Nicholas Andarde MD;  Location: [facility] CATH INVASIVE LOC[facility];  Service: Cardiovascular;  Laterality: N/A;  drop in EF, with hx of NICM, SOA fatigue.  Discussed with TEJAS     CARDIAC CATHETERIZATION N/A 1/25/2021    Procedure: Left ventriculography;  Surgeon: Nicholas Andrade MD;  Location:  [facility] CATH INVASIVE LOCATION;  Service: Cardiovascular;  Laterality: N/A;    CARDIAC CATHETERIZATION N/A 1/25/2021    Procedure: Coronary angiography;  Surgeon: Nicholas Andrade MD;  Location:  [facility] CATH INVASIVE LOCATION;  Service: Cardiovascular;  Laterality: N/A;    CATARACT EXTRACTION Bilateral 2017    COLONOSCOPY      DEBRIDEMENT  FOOT Right 01/2013    During hospitalization     EPIDURAL BLOCK      4 chronic back pain and leg pain last epidurals done 5-2012 she had no benefit at all from this procedure.    FEMUR FRACTURE SURGERY      FOOT SURGERY Bilateral     several    HERNIA REPAIR      At the abdomen February 2007 Dr. Mendez    INCISIONAL HERNIA REPAIR  05/16/2014    Recurrent. Incarcerated. With mesh implantation    MASTECTOMY Right 05/2015    MASTECTOMY MODIFIED RADICAL W/ AXILLARY LYMPH NODES W/ OR W/O PECTORALIS MINOR Right     SHOULDER SURGERY Right     x2 RCR    TOTAL ABDOMINAL HYSTERECTOMY      with oophorectomy-Done June-2012 secondary to vaginal wall prolapse.    TOTAL KNEE ARTHROPLASTY Right     TOTAL KNEE ARTHROPLASTY Left 4/17/2018    Procedure: TOTAL KNEE ARTHROPLASTY;  Surgeon: Live Chambers MD;  Location:  [facility] OR;  Service: Orthopedics       Family History   Problem Relation Age of Onset    Colonic polyp Mother     Hypertension Mother     Migraines Mother     Mental illness Mother     Depression Mother     Arthritis Mother     Coronary artery disease Father     Other Father         Cardiac Disorder    Colon cancer Father     Kidney disease Father     Cancer Father     Diabetes Father     Heart disease Father     Hypertension Father     Breast cancer Maternal Aunt     Colon cancer Brother     Alcohol abuse Brother     Arthritis Sister     Hypertension Brother     Lung disease Brother     Alcohol abuse Brother     Malig Hyperthermia Neg Hx        Social History      Socioeconomic History    Marital status:     Number of children: 2   Tobacco Use    Smoking status: Former     Packs/day: 3.00     Years: 30.00     Additional pack years: 0.00     Total pack years: 90.00     Types: Cigarettes     Quit date:      Years since quittin.1     Passive exposure: Past    Smokeless tobacco: Never   Vaping Use    Vaping Use: Never used   Substance and Sexual Activity    Alcohol use: No     Comment: Caffeine use: 3 cups daily     Drug use: No    Sexual activity: Defer     Partners: Male     Comment: celibate           Objective   Physical Exam  Vitals and nursing note reviewed.   Constitutional:       General: She is not in acute distress.     Appearance: Normal appearance.   HENT:      Head: Normocephalic and atraumatic.      Nose: Nose normal.      Mouth/Throat:      Mouth: Mucous membranes are moist.   Eyes:      Conjunctiva/sclera: Conjunctivae normal.      Pupils: Pupils are equal, round, and reactive to light.   Cardiovascular:      Rate and Rhythm: Normal rate and regular rhythm.   Pulmonary:      Effort: Pulmonary effort is normal.      Breath sounds: Normal breath sounds.   Abdominal:      Palpations: Abdomen is soft.      Tenderness: There is no abdominal tenderness.   Musculoskeletal:         General: Normal range of motion.      Cervical back: Normal range of motion and neck supple.      Comments: Tender anterior left shoulder, no clavicle tenderness or gross abnormality. Unremarkable left elbow or wrist exam. Normal distal NV exam left upper extremity.     Left hip without tenderness, tender with swelling proximal left tibia. Tender at dorsal aspect left foot. Unremarkable ankle exam. Normal distal NV exam left lower extremity.    Skin:     General: Skin is warm and dry.      Comments: 3mm left scalp laceration   Neurological:      General: No focal deficit present.      Mental Status: She is alert and oriented to person, place, and time. Mental status is at  baseline.      Comments: Daughter at bedside states patient at baseline   Psychiatric:         Mood and Affect: Mood normal.         Behavior: Behavior normal.         Thought Content: Thought content normal.         Judgment: Judgment normal.         Procedures           ED Course  ED Course as of 02/09/24 1632   Fri Feb 09, 2024   1443 Tibia xray, CT cervical and CT Head all unremarkable. Awaiting Shoulder and Foot.  [AK]   1535 Xrays shows possible osteonecrosis. Repeat left foot exam, no evidence of wound, redness, or swelling at site of necrosis. Unlikely osteomyelitis. Also no pain at the site mentioned on xray. She has a large callous at the distal 1st metatarsal without associated tenderness or xray findings.     Call to Dr. Gonsalez, Podiatry.  [AK]   1538 I spoke with Podiatrist on call for hospital. We discussed the patient's xray without clinical coorelatino fo stephon or concerns for infectino. She was greeable that outpatietn care would be appropriate at this time, as the findines are likely chronic in nature. I concur. This converstaino was discussed  [AK]   1627 Wound cleaned, flushed, and covered prior to discharge. No closuer clinically needed.  [AK]      ED Course User Index  [AK] Sofía Goodman, ATTILA                                             Medical Decision Making  78-year-old female presented status post fall.  Patient has frequent falls at home that is previously been evaluated and worked up by multi specialties.  Patient's complaints today were left shoulder pain and left leg pain.  X-rays and CT imaging reviewed and viewed by me.  Left shoulder x-ray and tibia x-ray unremarkable.  CT head and CT cervical spine unremarkable.  Patient did have some abnormalities on the left foot showing osteonecrosis.  There is no clinical correlation with underlying infection.  I did have a phone consult with the podiatrist who felt it was appropriate for patient to follow-up outpatient.  All of these  findings were discussed with patient at bedside.  Patient was given a sling for her left shoulder contusion.  Also her small, 3 mm laceration without any active bleeding was cleaned, flushed, and covered prior to discharge.  Shared decision making with the patient and daughter to discharge home.    Problems Addressed:  Contusion of left shoulder, initial encounter: complicated acute illness or injury  Laceration of scalp, initial encounter: complicated acute illness or injury  Other osteonecrosis, unspecified foot: complicated acute illness or injury    Amount and/or Complexity of Data Reviewed  Radiology: ordered.    Risk  Prescription drug management.        Final diagnoses:   Other osteonecrosis, unspecified foot   Contusion of left shoulder, initial encounter   Laceration of scalp, initial encounter       ED Disposition  ED Disposition       ED Disposition   Discharge    Condition   Stable    Comment   --               Anmol Gonsalez, DPM  [address] [facility] [address]  [place] [address] [phone]               Medication List        Changed      naproxen 500 MG EC tablet  Commonly known as: EC NAPROSYN  Take 1 tablet by mouth 2 (Two) Times a Day As Needed for Mild Pain.  What changed: additional instructions                 Sofía Goodman PA-C  02/09/24 4083

## 2024-02-12 ENCOUNTER — PATIENT OUTREACH (OUTPATIENT)
Dept: CASE MANAGEMENT | Facility: OTHER | Age: 79
End: 2024-02-12
Payer: MEDICARE

## 2024-02-16 ENCOUNTER — OFFICE VISIT (OUTPATIENT)
Dept: INTERNAL MEDICINE | Facility: CLINIC | Age: 79
End: 2024-02-16
Payer: MEDICARE

## 2024-02-16 ENCOUNTER — TELEPHONE (OUTPATIENT)
Dept: CARDIOLOGY | Facility: CLINIC | Age: 79
End: 2024-02-16
Payer: MEDICARE

## 2024-02-16 VITALS
OXYGEN SATURATION: 99 % | DIASTOLIC BLOOD PRESSURE: 59 MMHG | TEMPERATURE: 98 F | HEART RATE: 68 BPM | HEIGHT: 65 IN | SYSTOLIC BLOOD PRESSURE: 102 MMHG | BODY MASS INDEX: 32.65 KG/M2 | WEIGHT: 196 LBS

## 2024-02-16 DIAGNOSIS — R55 SYNCOPE AND COLLAPSE: Primary | ICD-10-CM

## 2024-02-16 DIAGNOSIS — R29.6 FREQUENT FALLS: ICD-10-CM

## 2024-02-16 DIAGNOSIS — R41.3 MEMORY LOSS: ICD-10-CM

## 2024-02-16 DIAGNOSIS — R41.0 CONFUSION: ICD-10-CM

## 2024-02-16 DIAGNOSIS — Z74.09 IMPAIRED MOBILITY: ICD-10-CM

## 2024-02-19 DIAGNOSIS — Z79.899 ENCOUNTER FOR LONG-TERM (CURRENT) USE OF HIGH-RISK MEDICATION: ICD-10-CM

## 2024-02-19 DIAGNOSIS — M47.816 LUMBAR FACET ARTHROPATHY: ICD-10-CM

## 2024-02-19 RX ORDER — HYDROCODONE BITARTRATE AND ACETAMINOPHEN 5; 325 MG/1; MG/1
1 TABLET ORAL EVERY 6 HOURS PRN
Qty: 120 TABLET | Refills: 0 | Status: SHIPPED | OUTPATIENT
Start: 2024-02-19

## 2024-02-27 ENCOUNTER — HOSPITAL ENCOUNTER (OUTPATIENT)
Dept: GENERAL RADIOLOGY | Facility: HOSPITAL | Age: 79
Discharge: HOME | End: 2024-02-27
Admitting: STUDENT IN AN ORGANIZED HEALTH CARE EDUCATION/TRAINING PROGRAM
Payer: MEDICARE

## 2024-02-27 DIAGNOSIS — L97.524 ULCER OF TOE OF LEFT FOOT, WITH NECROSIS OF BONE: ICD-10-CM

## 2024-02-27 DIAGNOSIS — L97.524 ULCER OF TOE OF LEFT FOOT, WITH NECROSIS OF BONE: Primary | ICD-10-CM

## 2024-02-27 PROCEDURE — 73630 X-RAY EXAM OF FOOT: CPT

## 2024-03-05 NOTE — PROGRESS NOTES
Jung Short is a 76 y.o. female, who presents with a chief complaint of   Chief Complaint   Patient presents with   • Covid-19 Home Monitoring Video Visit           HPI   This visit has been scheduled as a telehealth visit to comply with patient safety concerns in accordance with CDC recommendations. This was an audio and video enabled telemedicine encounter.    You have chosen to receive care through a televisit visit. Do you consent to use a televisit visit for your medical care today? Yes    Pt is new to me.  She normally sees Kathryn Epstein.  Pt has been sick since last Friday (12/10 ) +congestion .  She wasn't getting any better so she went to have covid testing done yesterday at the [facility].  covid test was positive and she was told to call her PCP.  Her temp has been up to 99.5 and she felt like she had a fever.   Cough and .  + sore throat.  She has aches and fatigue.  She has a home bp cuff and pulse oximeter.  Vitals are updated below.  sats 96% on Room air.     She is high risk for complications from covid bc of age, CHF, DM, htn, hypothyroidism, ckd    The following portions of the patient's history were reviewed and updated as appropriate: allergies, current medications, past family history, past medical history, past social history, past surgical history and problem list.    Allergies: Dilaudid [hydromorphone] and Latex    Review of Systems   Constitutional: Positive for fatigue and fever.   HENT: Positive for congestion and sore throat.    Eyes: Negative.    Respiratory: Positive for cough.    Cardiovascular: Negative.    Gastrointestinal: Negative.    Endocrine: Negative.    Genitourinary: Negative.    Musculoskeletal: Positive for myalgias.   Skin: Negative.    Allergic/Immunologic: Negative.    Neurological: Negative.    Hematological: Negative.    Psychiatric/Behavioral: Negative.    All other systems reviewed and are negative.            Wt Readings from Last 3 Encounters:  Pt. extubated per MD order.  No stridor heard, and pt. able to phonate.  Some difficulty with oxygenation post extubation, and pt currently on a Heated High Flow Nasal Cannula on 40 liters and 60%.  MD aware of oxygenation issues, and will be ordering airway clearance therapies, and BiPAP at bedtime and PRN.     12/16/21 94.3 kg (208 lb)   10/07/21 95.3 kg (210 lb)   10/05/21 95.3 kg (210 lb)     Temp Readings from Last 3 Encounters:   12/16/21 98.4 °F (36.9 °C) (Oral)   12/16/21 97.6 °F (36.4 °C) (Oral)   10/07/21 97.6 °F (36.4 °C)     BP Readings from Last 3 Encounters:   12/16/21 179/91   12/16/21 173/87   12/15/21 140/71     Pulse Readings from Last 3 Encounters:   12/16/21 62   12/16/21 60   12/15/21 64     There is no height or weight on file to calculate BMI.  SpO2 Readings from Last 3 Encounters:   12/16/21 96%   12/16/21 97%   12/15/21 96%          Physical Exam  Vitals and nursing note reviewed.   Constitutional:       General: She is not in acute distress.     Appearance: She is well-developed.   HENT:      Head: Normocephalic and atraumatic.      Right Ear: External ear normal.      Left Ear: External ear normal.      Nose: Nose normal.   Eyes:      Conjunctiva/sclera: Conjunctivae normal.      Pupils: Pupils are equal, round, and reactive to light.   Cardiovascular:      Rate and Rhythm: Normal rate and regular rhythm.      Heart sounds: Normal heart sounds.   Pulmonary:      Effort: Pulmonary effort is normal. No respiratory distress.      Breath sounds: Normal breath sounds. No wheezing.   Musculoskeletal:         General: Normal range of motion.      Cervical back: Normal range of motion and neck supple.      Comments: Normal gait   Skin:     General: Skin is warm and dry.   Neurological:      Mental Status: She is alert and oriented to person, place, and time.   Psychiatric:         Behavior: Behavior normal.         Thought Content: Thought content normal.         Judgment: Judgment normal.         Results for orders placed or performed in visit on 10/05/21   POC Urine Drug Screen, Triage    Specimen: Urine   Result Value Ref Range    Methamphetaine Screen, Urine Negative Negative    POC Amphetamines Negative Negative    Barbiturates Screen Negative Negative    Benzodiazepine Screen Negative Negative     Cocaine Screen Negative Negative    Methadone Screen Negative Negative    Opiate Screen Positive (A) Negative    Oxycodone, Screen Negative Negative    Phencyclidine (PCP) Screen Negative Negative    Propoxyphene Screen Negative Negative    THC, Screen Negative Negative    Tricyclic Antidepressants Screen Negative Negative     Result Review :                  Assessment and Plan    Diagnoses and all orders for this visit:    1. COVID-19 virus infection (Primary)       Pt is high risk for complications from Covid-19 and qualifies for antibody therapy.  Orders placed and consent obtained.  Tylenol/motrin prn.  Otc meds for congestion as needed.  Make sure pt remains hydrated.  Discussed signs and symptoms of dehydration, respiratory distress, and when to seek care in Emergency Department.  F/u if sx not starting to improve in 2-3 days or sooner if worsening.       The FDA has issued an Emergency Use Authorization (EUA) to permit emergency use of the unapproved product regeneron or available antibody treatment for treatment of laboratory confirmed COVID-19 in certain ambulatory patients. There is no prescribing information or package insert, however, the FDA has authorized distribution of Fact Sheets for patients and/or caregivers for additional information on this investigational therapy. I have provided the Fact Sheet to and discussed the following with the patient and he/she agreed to proceed:  · FDA has authorized the emergency use (EUA) of antibody therapy for Covid-19, which is not an FDA approved drug.  · The patient has the option to accept or refuse antibody therapy at any time.  · The significant known and potential risks and benefits of antibody therapy and the extent to which such risks and benefits are unknown.  · Information on available alternative treatments and the risks and benefits of those alternatives have been shared.          Outpatient Medications Prior to Visit   Medication Sig Dispense Refill    • alosetron (LOTRONEX) 0.5 MG tablet Take 1 tablet by mouth 2 (Two) Times a Day. 60 tablet 11   • amoxicillin-clavulanate (AUGMENTIN) 875-125 MG per tablet TAKE 1 TABLET BY MOUTH TWICE DAILY FOR 14 DAYS     • aspirin 81 MG EC tablet Take 81 mg by mouth Daily.     • bumetanide (BUMEX) 0.5 MG tablet Take 3 tablets by mouth 2 (Two) Times a Day. 270 tablet 1   • carvedilol (COREG) 25 MG tablet TAKE 1 TABLET BY MOUTH TWICE DAILY 180 tablet 1   • D-1000 Extra Strength 25 MCG (1000 UT) tablet TAKE 1 TABLET BY MOUTH DAILY 90 tablet 3   • DULoxetine (CYMBALTA) 60 MG capsule TAKE 1 CAPSULE BY MOUTH DAILY. 90 capsule 3   • empagliflozin (JARDIANCE) 10 MG tablet tablet Take 1 tablet by mouth Daily. 30 tablet 1   • esomeprazole (nexIUM) 20 MG capsule Take 20 mg by mouth Every Morning Before Breakfast.     • HYDROcodone-acetaminophen (NORCO) 5-325 MG per tablet Take 1 tablet by mouth Every 6 (Six) Hours As Needed for Moderate Pain . 120 tablet 0   • levothyroxine (SYNTHROID, LEVOTHROID) 25 MCG tablet TAKE 1 TABLET BY MOUTH DAILY 90 tablet 3   • Multiple Vitamins-Minerals (MULTIVITAMIN ADULT PO) Take 1 tablet by mouth Daily.     • pravastatin (PRAVACHOL) 10 MG tablet TAKE 1 TABLET BY MOUTH AT BEDTIME 90 tablet 3   • pregabalin (LYRICA) 75 MG capsule TAKE 1 CAPSULE BY MOUTH 2 (TWO) TIMES A DAY. 60 capsule 4   • sacubitril-valsartan (Entresto) 49-51 MG tablet Take 1 tablet by mouth 2 (Two) Times a Day. 60 tablet 11     No facility-administered medications prior to visit.     No orders of the defined types were placed in this encounter.    [unfilled]  There are no discontinued medications.      Return if symptoms worsen or fail to improve.    Patient was given instructions and counseling regarding her condition or for health maintenance advice. Please see specific information pulled into the AVS if appropriate.

## 2024-03-12 ENCOUNTER — TELEPHONE (OUTPATIENT)
Dept: INTERNAL MEDICINE | Facility: CLINIC | Age: 79
End: 2024-03-12
Payer: MEDICARE

## 2024-03-12 NOTE — TELEPHONE ENCOUNTER
PT stopped in office to request orders for Prolia. She is in need of the labs entered. States she has an appt with [facility] 4/18. I left a vm with [facility] to find out if anything else was needed at this time.

## 2024-03-13 ENCOUNTER — PATIENT OUTREACH (OUTPATIENT)
Dept: CASE MANAGEMENT | Facility: OTHER | Age: 79
End: 2024-03-13
Payer: MEDICARE

## 2024-03-13 NOTE — TELEPHONE ENCOUNTER
Caller: ALONZO    Relationship: Other    Best call back number: [phone]     What is the best time to reach you: ANYTIME BEFORE 4:30 PM    Who are you requesting to speak with (clinical staff, provider,  specific staff member): SHANTI OR CLINICAL STAFF    What was the call regarding: NEEDING OK TO SCHEDULE APPOINTMENT FOR PATIENT TO COME IN AND GET HER PROLIA SHOT.     PLEASE CALL TO ADVISE.

## 2024-03-13 NOTE — OUTREACH NOTE
"AMBULATORY CASE MANAGEMENT NOTE    Name and Relationship of Patient/Support Person: Jung Short \"SHEILA\" - Self    Patient Outreach  RN-ACM outreach with patient. Patient states unable to talk at this time and requests RN-ACM call back. Additional outreach scheduled.     Education Documentation  No documentation found.        Shanda FREEMAN  Ambulatory Case Management    3/13/2024, 11:50 EDT  "

## 2024-03-15 ENCOUNTER — HOSPITAL ENCOUNTER (OUTPATIENT)
Dept: MRI IMAGING | Facility: HOSPITAL | Age: 79
Discharge: HOME | End: 2024-03-15
Payer: MEDICARE

## 2024-03-15 DIAGNOSIS — R41.0 CONFUSION: ICD-10-CM

## 2024-03-15 DIAGNOSIS — R41.3 MEMORY LOSS: ICD-10-CM

## 2024-03-15 LAB — CREAT BLDA-MCNC: 1.3 MG/DL (ref 0.6–1.3)

## 2024-03-15 PROCEDURE — A9577 INJ MULTIHANCE: HCPCS | Performed by: NURSE PRACTITIONER

## 2024-03-15 PROCEDURE — 0 GADOBENATE DIMEGLUMINE 529 MG/ML SOLUTION: Performed by: NURSE PRACTITIONER

## 2024-03-15 PROCEDURE — 70553 MRI BRAIN STEM W/O & W/DYE: CPT

## 2024-03-15 PROCEDURE — 82565 ASSAY OF CREATININE: CPT

## 2024-03-15 RX ADMIN — GADOBENATE DIMEGLUMINE 17 ML: 529 INJECTION, SOLUTION INTRAVENOUS at 16:52

## 2024-03-18 ENCOUNTER — TELEPHONE (OUTPATIENT)
Dept: INTERNAL MEDICINE | Facility: CLINIC | Age: 79
End: 2024-03-18
Payer: MEDICARE

## 2024-03-18 NOTE — PROGRESS NOTES
Patients daughter, Flory, is aware and understands results. She said patient is doing 1000x better now and pretty much back to normal. She said she isn't sure if patient had an infection at the time of concern or what. She said they have not received a call to schedule the Neuro appointment yet- I sent Kiarra a message asking her to check on the referral status so they can get scheduled

## 2024-03-18 NOTE — TELEPHONE ENCOUNTER
Called [facility] to let her know that the neuro referral is waiting on cardiology office to change referral to a different Dr. Dr Granados doesn't see for pt dx. Advised [facility] to call cardio office and asg that they correct referral as it has been setting for sometime and hasn't been corrected.

## 2024-03-18 NOTE — TELEPHONE ENCOUNTER
I spoke with patients daughter and she said they haven't received a call to schedule patients Neuro appointment yet. Can you help with this please?

## 2024-03-19 DIAGNOSIS — M47.816 LUMBAR FACET ARTHROPATHY: ICD-10-CM

## 2024-03-19 DIAGNOSIS — Z79.899 ENCOUNTER FOR LONG-TERM (CURRENT) USE OF HIGH-RISK MEDICATION: ICD-10-CM

## 2024-03-19 RX ORDER — HYDROCODONE BITARTRATE AND ACETAMINOPHEN 5; 325 MG/1; MG/1
1 TABLET ORAL EVERY 6 HOURS PRN
Qty: 120 TABLET | Refills: 0 | Status: SHIPPED | OUTPATIENT
Start: 2024-03-19

## 2024-03-19 NOTE — TELEPHONE ENCOUNTER
"Caller: Jung Short \"SHEILA\"    Relationship: Self    Best call back number: [phone] (home)     Requested Prescriptions:   Requested Prescriptions     Pending Prescriptions Disp Refills    HYDROcodone-acetaminophen (NORCO) 5-325 MG per tablet 120 tablet 0     Sig: Take 1 tablet by mouth Every 6 (Six) Hours As Needed for Severe Pain.        Pharmacy where request should be sent: [id number] - [place] [id number] AT [facility] [address] - [phone]  - [phone] FX     Last office visit with prescribing clinician: Visit date not found   Last telemedicine visit with prescribing clinician: Visit date not found   Next office visit with prescribing clinician: Visit date not found     Additional details provided by patient: PATIENT NEEDS REFILL, NEXT FOLLOW UP IS 4/2/24.     Does the patient have less than a 3 day supply:  [x] Yes  [] No    Allan Cancino Rep   03/19/24 14:46 EDT       "

## 2024-04-02 ENCOUNTER — OFFICE VISIT (OUTPATIENT)
Age: 79
End: 2024-04-02
Payer: MEDICARE

## 2024-04-02 VITALS
BODY MASS INDEX: 30.49 KG/M2 | SYSTOLIC BLOOD PRESSURE: 118 MMHG | OXYGEN SATURATION: 97 % | HEART RATE: 79 BPM | WEIGHT: 183 LBS | TEMPERATURE: 97.7 F | DIASTOLIC BLOOD PRESSURE: 58 MMHG | HEIGHT: 65 IN

## 2024-04-02 DIAGNOSIS — Z79.899 ENCOUNTER FOR LONG-TERM (CURRENT) USE OF HIGH-RISK MEDICATION: ICD-10-CM

## 2024-04-02 DIAGNOSIS — M17.12 PRIMARY OSTEOARTHRITIS OF LEFT KNEE: ICD-10-CM

## 2024-04-02 DIAGNOSIS — M47.816 LUMBAR FACET ARTHROPATHY: Primary | ICD-10-CM

## 2024-04-02 DIAGNOSIS — M51.36 DDD (DEGENERATIVE DISC DISEASE), LUMBAR: ICD-10-CM

## 2024-04-02 DIAGNOSIS — M17.10 ARTHRITIS OF KNEE: ICD-10-CM

## 2024-04-02 DIAGNOSIS — G62.9 PERIPHERAL POLYNEUROPATHY: ICD-10-CM

## 2024-04-02 PROCEDURE — 1125F AMNT PAIN NOTED PAIN PRSNT: CPT

## 2024-04-02 PROCEDURE — 3078F DIAST BP <80 MM HG: CPT

## 2024-04-02 PROCEDURE — 1160F RVW MEDS BY RX/DR IN RCRD: CPT

## 2024-04-02 PROCEDURE — 99213 OFFICE O/P EST LOW 20 MIN: CPT

## 2024-04-02 PROCEDURE — 1159F MED LIST DOCD IN RCRD: CPT

## 2024-04-02 PROCEDURE — 3074F SYST BP LT 130 MM HG: CPT

## 2024-04-02 RX ORDER — HYDROCODONE BITARTRATE AND ACETAMINOPHEN 5; 325 MG/1; MG/1
1 TABLET ORAL EVERY 6 HOURS PRN
Qty: 120 TABLET | Refills: 0 | Status: SHIPPED | OUTPATIENT
Start: 2024-04-02

## 2024-04-02 NOTE — PROGRESS NOTES
CHIEF COMPLAINT  Back and leg pain    Subjective   Jung Short is a 78 y.o. female  who presents for follow-up.  She has a history of chronic low back and joint pain. She reports that her pain has slightly worsened since her last office visit.     Today pain is 6/10VAS in severity. Pain is located across her low back in a bandlike pattern and intermittently radiates into bilateral lower extremities. Describes this pain as a nearly continuous aching and burning. Pain is worsened by prolonged standing, walking long distances, and increased physical activity. Pain improves with rest/reposition and medications.     Continues with Hydrocodone 5mg 4/day. Denies any side effects from the regimen, including constipation and somnolence. The regimen helps decrease pain by 50% and allows her to live independently. ADL's by self. Denies any bowel or bladder changes.     Back Pain  This is a chronic problem. The current episode started more than 1 year ago. The problem occurs constantly. The problem has been worse since onset. The pain is present in the lumbar spine. The quality of the pain is described as aching and burning. Radiates to: intermittently radiates into BLE. The pain is at a severity of 6/10. The pain is moderate. The symptoms are aggravated by standing and position (walking long distances, increased physical activity). Associated symptoms include numbness (rubi feet) and weakness (rubi legs). Pertinent negatives include no abdominal pain, chest pain, dysuria, fever or headaches. She has tried analgesics for the symptoms. The treatment provided moderate relief.      PEG Assessment   What number best describes your pain on average in the past week?5  What number best describes how, during the past week, pain has interfered with your enjoyment of life?5  What number best describes how, during the past week, pain has interfered with your general activity?  5    The following portions of the patient's history were  "reviewed and updated as appropriate: allergies, current medications, past family history, past medical history, past social history, past surgical history, and problem list.    Review of Systems   Constitutional:  Positive for activity change (decreased). Negative for chills, fatigue and fever.   HENT:  Negative for congestion.    Eyes:  Negative for visual disturbance.   Respiratory:  Negative for chest tightness and shortness of breath.    Cardiovascular:  Negative for chest pain.   Gastrointestinal:  Negative for abdominal pain, constipation and diarrhea.   Genitourinary:  Negative for difficulty urinating, dyspareunia and dysuria.   Musculoskeletal:  Positive for back pain.   Neurological:  Positive for weakness (rubi legs) and numbness (rubi feet). Negative for dizziness, light-headedness and headaches.   Psychiatric/Behavioral:  Positive for sleep disturbance. Negative for agitation. The patient is not nervous/anxious.      I have reviewed and confirmed the accuracy of the ROS as documented by the MA/LPN/RN JOSE ANTONIO Turner    Vitals:    04/02/24 1319   BP: 118/58   Pulse: 79   Temp: 97.7 °F (36.5 °C)   SpO2: 97%   Weight: 83 kg (183 lb)  Comment: patient states- refused to weigh   Height: 165.1 cm (65\")   PainSc:   6   PainLoc: Back     Objective   Physical Exam  Constitutional:       Appearance: Normal appearance.   HENT:      Head: Normocephalic.   Cardiovascular:      Rate and Rhythm: Normal rate and regular rhythm.   Pulmonary:      Effort: Pulmonary effort is normal.      Breath sounds: Normal breath sounds.   Musculoskeletal:         General: Normal range of motion.      Cervical back: Normal range of motion.      Lumbar back: Spasms, tenderness and bony tenderness present. Decreased range of motion.   Skin:     General: Skin is warm and dry.      Capillary Refill: Capillary refill takes less than 2 seconds.   Neurological:      General: No focal deficit present.      Mental Status: She is alert and " oriented to person, place, and time.      Gait: Gait abnormal (slowed, ambulates with cane).   Psychiatric:         Mood and Affect: Mood normal.         Behavior: Behavior normal.         Thought Content: Thought content normal.         Cognition and Memory: Cognition normal.       Assessment & Plan   Diagnoses and all orders for this visit:    1. Lumbar facet arthropathy (Primary)  -     HYDROcodone-acetaminophen (NORCO) 5-325 MG per tablet; Take 1 tablet by mouth Every 6 (Six) Hours As Needed for Severe Pain. 30 day supply. DNF 4/20/24  Dispense: 120 tablet; Refill: 0    2. DDD (degenerative disc disease), lumbar  -     HYDROcodone-acetaminophen (NORCO) 5-325 MG per tablet; Take 1 tablet by mouth Every 6 (Six) Hours As Needed for Severe Pain. 30 day supply. DNF 4/20/24  Dispense: 120 tablet; Refill: 0    3. Peripheral polyneuropathy    4. Arthritis of knee    5. Primary osteoarthritis of left knee    6. Encounter for long-term (current) use of high-risk medication      Svetlanakermit Short reports a pain score of 6.  Given her pain assessment as noted, treatment options were discussed and the following options were decided upon as a follow-up plan to address the patient's pain: continuation of current treatment plan for pain and prescription for opiod analgesics.    --- The urine drug screen confirmation from 2/1/24 has been reviewed and the result is appropriate based on patient history and LOUANN report  --- The patient signed an updated copy of the controlled substance agreement on 11/28/23  --- Continue Hydrocodone. DNF 4/20/24. Patient appears stable with current regimen. No adverse effects. Regarding continuation of opioids, there is no evidence of aberrant behavior or any red flags.  The patient continues with appropriate response to opioid therapy. ADL's remain intact by self.   --- Moderate risk for opiate abuse/diversion   --- Follow-up 2 months or sooner     LOUANN REPORT  As part of the patient's  treatment plan, I am prescribing controlled substances. The patient has been made aware of appropriate use of such medications, including potential risk of somnolence, limited ability to drive and/or work safely, and the potential for dependence or overdose. It has also been made clear that these medications are for use by this patient only, without concomitant use of alcohol or other substances unless prescribed.     Patient has completed prescribing agreement detailing terms of continued prescribing of controlled substances, including monitoring LOUANN reports, urine drug screening, and pill counts if necessary. The patient is aware that inappropriate use will results in cessation of prescribing such medications.    As the clinician, I personally reviewed the LOUANN from 4/2/24 while the patient was in the office today.    History and physical exam exhibit continued safe and appropriate use of controlled substances.    Dictated utilizing Dragon dictation.

## 2024-04-04 ENCOUNTER — PATIENT OUTREACH (OUTPATIENT)
Dept: CASE MANAGEMENT | Facility: OTHER | Age: 79
End: 2024-04-04
Payer: MEDICARE

## 2024-04-04 NOTE — OUTREACH NOTE
"AMBULATORY CASE MANAGEMENT NOTE    Name and Relationship of Patient/Support Person: Jung Short \"SHEILA\" - Self    Patient Outreach  RN-ACM outreach with patient. Patient states to be unable to talk and requests 4/5/24 RN-ACM outreach. Additional outreach scheduled.         Shanda FREEMAN  Ambulatory Case Management    4/4/2024, 11:07 EDT  "

## 2024-04-05 ENCOUNTER — PATIENT OUTREACH (OUTPATIENT)
Dept: CASE MANAGEMENT | Facility: OTHER | Age: 79
End: 2024-04-05
Payer: MEDICARE

## 2024-04-05 NOTE — OUTREACH NOTE
"AMBULATORY CASE MANAGEMENT NOTE    Name and Relationship of Patient/Support Person: Jung Short \"SHEILA\" - Self    Patient Outreach  RN-ACM outreach with patient. Patient states tp continue with podiatry regarding ulcer to left foot which patient states is improving. Patient ambulating with walker /cane as needed and states no recent falls. Patient states to continue with PT. Patient states to have 4/12/24 cardiology appointment and voiced intent to discuss neuro appointment. Patient states no difficulty with chest pain; SOB; appetite or sleeping. Patient states to be compliant with medications; medical appointments and monitoring of blood sugars (WNL's). Reviewed with patient education and verbalized understanding. Patient states to appreciate outreach and declines needs for further outreach. No further questions voiced at thist rebeca.        Education Documentation  Unresolved/Worsening Symptoms, taught by Shanda Gerardo RN at 4/5/2024  3:54 PM.  Learner: Patient  Readiness: Acceptance  Method: Explanation  Response: Verbalizes Understanding    Wound Care, taught by Shanda Gerardo RN at 4/5/2024  3:54 PM.  Learner: Patient  Readiness: Acceptance  Method: Explanation  Response: Verbalizes Understanding    Provider Follow-Up, taught by Shanda Gerardo RN at 4/5/2024  3:54 PM.  Learner: Patient  Readiness: Acceptance  Method: Explanation  Response: Verbalizes Understanding    Energy Conservation, taught by Shanda Gerardo RN at 4/5/2024  3:54 PM.  Learner: Patient  Readiness: Acceptance  Method: Explanation  Response: Verbalizes Understanding    Assistive/Adaptive Devices, taught by Shanda Gerardo RN at 4/5/2024  3:54 PM.  Learner: Patient  Readiness: Acceptance  Method: Explanation  Response: Verbalizes Understanding    Home Safety, taught by Shanda Gerardo RN at 4/5/2024  3:54 PM.  Learner: Patient  Readiness: Acceptance  Method: Explanation  Response: Verbalizes " Understanding          Shanda FREEMAN  Ambulatory Case Management    4/5/2024, 15:54 EDT

## 2024-04-12 ENCOUNTER — OFFICE VISIT (OUTPATIENT)
Dept: CARDIOLOGY | Facility: CLINIC | Age: 79
End: 2024-04-12
Payer: MEDICARE

## 2024-04-12 VITALS
SYSTOLIC BLOOD PRESSURE: 140 MMHG | HEIGHT: 65 IN | DIASTOLIC BLOOD PRESSURE: 68 MMHG | WEIGHT: 185 LBS | HEART RATE: 77 BPM | BODY MASS INDEX: 30.82 KG/M2

## 2024-04-12 DIAGNOSIS — I34.0 NONRHEUMATIC MITRAL VALVE REGURGITATION: ICD-10-CM

## 2024-04-12 DIAGNOSIS — I10 PRIMARY HYPERTENSION: ICD-10-CM

## 2024-04-12 DIAGNOSIS — E78.5 DYSLIPIDEMIA: ICD-10-CM

## 2024-04-12 DIAGNOSIS — I42.8 NICM (NONISCHEMIC CARDIOMYOPATHY): ICD-10-CM

## 2024-04-12 DIAGNOSIS — I49.3 PVC (PREMATURE VENTRICULAR CONTRACTION): ICD-10-CM

## 2024-04-12 DIAGNOSIS — I50.20 HFREF (HEART FAILURE WITH REDUCED EJECTION FRACTION): ICD-10-CM

## 2024-04-12 DIAGNOSIS — I36.1 NONRHEUMATIC TRICUSPID VALVE REGURGITATION: ICD-10-CM

## 2024-04-12 DIAGNOSIS — R55 SYNCOPE, UNSPECIFIED SYNCOPE TYPE: Primary | ICD-10-CM

## 2024-04-12 DIAGNOSIS — E11.9 TYPE 2 DIABETES MELLITUS WITHOUT COMPLICATION, WITHOUT LONG-TERM CURRENT USE OF INSULIN: ICD-10-CM

## 2024-04-12 DIAGNOSIS — I27.20 PULMONARY HYPERTENSION: ICD-10-CM

## 2024-04-12 DIAGNOSIS — N18.31 STAGE 3A CHRONIC KIDNEY DISEASE: ICD-10-CM

## 2024-04-12 DIAGNOSIS — I25.10 ATHEROSCLEROSIS OF NATIVE CORONARY ARTERY OF NATIVE HEART WITHOUT ANGINA PECTORIS: ICD-10-CM

## 2024-04-12 RX ORDER — BUMETANIDE 0.5 MG/1
1 TABLET ORAL DAILY
Start: 2024-04-12

## 2024-04-12 NOTE — Clinical Note
She had another syncopal episode with fall. I had wanted a 30 day event monitor, but it was only put on for 2 weeks. I am stopping her amlodipine and reducing bumex for concern for low BP. We have not been able to elicit any orthostasis.  Does she need a loop recorder?

## 2024-04-12 NOTE — Clinical Note
Dr. Bates has an opening on 5/31; can you call Ms. Short and schedule her in that appointment. Her July appointment has been canceled.   Thanks! JOSE ANTONIO Shearer

## 2024-04-12 NOTE — PROGRESS NOTES
"      [facility] CARDIOLOGY  [address]  [address]  [place] [address]  Phone: [phone]  Fax: [phone]  Patient Name: Jung Short  :1945  Age: 78 y.o.  Primary Cardiologist: Aria Bates MD  Encounter Provider:  JOSE ANTONIO Armenta    History of Present Illness     Jung Short is a 78 y.o.  female whose medical history includes hyperlipidemia, type 2 diabetes, CKD, history of right foot ulcer, history of right breast cancer s/p anastrozole therapy.  She is followed in our office by Dr. 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 I have reviewed the past medical records in preparation of today's visit.     24 Follow-up:  She is here for 3-month follow-up.  I ordered an event monitor given her episodes of syncope without warning and falls.  It was only a 2-week monitor and was normal.  3 days after she took the monitor off, she had another episode of syncope in which she fell back and hit her head.  She reports that she had to get up and answer the door and was rushing.  She turned to do something else and then she woke up on the floor and had hit her head.  She does not report any lightheadedness with standing.  She is not feeling her heart race or skipped beats.  Her leg swelling is actually very good.  She is not having chest pain, worsening dyspnea, orthopnea.  She is taking her medications as prescribed.    Data Review     The following data was reviewed by JOSE ANTONIO Armenta on 24:    Vital Signs:   /68 (BP Location: Left arm)   Pulse 77   Ht 165.1 cm (65\")   Wt 83.9 kg (185 lb)   BMI 30.79 kg/m²       Weight:  Wt Readings from Last 3 Encounters:   24 83.9 kg (185 lb)   24 83 kg (183 lb)   03/15/24 88.9 kg (196 lb)     Body mass index is 30.79 kg/m².    Below is a summary of " pertinent cardiology findings:  April 2015 she presented to [facility] and was found to be in a acute heart systolic and diastolic heart failure.  Echocardiogram showed EF 10 to 15%, moderate LV dilation, severe mitral and tricuspid valve insufficiency, and aortic valve was normal.  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  Right heart cath was also done showing an RV pressure of 50/20, PAP 50/38 with mean pressure of 38 mmHg and capillary wedge pressure 31 mmHg.  Her cardiac output was 3.9 L/min; she was diuresed and treated medically.  January 2021 echocardiogram showed mild concentric LVH, moderate left atrial enlargement, normal diastolic function, mild LV dilation, EF 20 to 25%, severely reduced EF with global hypokinesis.  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  April 2021 echocardiogram showed moderate LV dilation, mild concentric LVH, grade 2 diastolic dysfunction, EF 37%, moderate reduction of the LV systolic function, severe left atrial enlargement, and normal saline contrast study, mild right atrial enlargement, mild RV enlargement, global hypokinesis; she was started on Entresto.  December 2020 when she presented to the emergency room with BP 200s/90s which woke her up in the morning due to throbbing pain in her left arm.  She also had chest pain.  She was treated with IV labetalol and her blood pressure was better controlled.  Otherwise evaluation was negative though she did have a mildly elevated D-dimer at 1.30 but CTA negative for  PE.  April 2022 echocardiogram showed EF 40%, moderate global hypokinesis, normal RV cavity size and systolic function, moderately dilated left atrial cavity, mild mitral valve regurgitation, and a small (less than 1 cm) pericardial effusion with no evidence of tamponade.  November 2022 she had COVID-19.  January 2023 she was seen by Ravinder Valencia and stated she fell in the parking lot while getting groceries.  February 2023 echocardiogram showed EF 57%, grade 1 LV diastolic dysfunction, moderately increased left atrial volume, mild mitral valve regurgitation, mild tricuspid valve regurgitation, and RVSP 20 mmHg.  March 2023 10-day heart monitor showed the predominant rhythm to be sinus rhythm with average heart rate 67 bpm, a total PVC burden of 1.5% with no ventricular runs, and 2 atrial runs with longest lasting 5 beats.  October 2023 I reduced her amlodipine to 2.5 mg daily, added 25 mg spironolactone, and temporarily increased her bumetanide.  Her weight went down 7 pounds and leg swelling improved.  February 2024 14 day Zywie showed NSR with average HR 68, low HR 53, and 2 brief episodes of SVT. NO AV block or VT. She was not orthostatic when check in office.   March 2024 MRI brain showed generalized atrophy, chronic microvascular ischemic changes, no acute processes.     Labs:  09/05/2023:  cr 0.8, K 3.8, otherwise unremarkable CMP, Chol 192, HDL 56, , Trig 103, Hgb 12.1, Plt 161  10/23/2023:  cr 0.7, K 3.2, CA 7.5, MG 1.5, otherwise unremarkable CMP, uric acid 4.8, proBNP 423, Hgb 11.5,   01/19/2024:  cr 0.8, K 3.6, otherwise unremarkable CMP, proBNP 544, troponin 19, TSH 0.907, uric acid 5.2, hgb 12.7, plt 224      ECG 12 Lead    Date/Time: 4/12/2024 1:49 PM  Performed by: Faye Chen APRN    Authorized by: Faye Chen APRN  Comparison: compared with previous ECG from 1/5/2024  Similar to previous ECG  Rhythm: sinus rhythm  Conduction: incomplete right bundle  branch block and left anterior fascicular block  T inversion: III, V4, V5 and V6  T flattening: V2          Medications     Allergies as of 04/12/2024 - Reviewed 04/12/2024   Allergen Reaction Noted    Dilaudid [hydromorphone] Delirium and Confusion 04/20/2018    Latex Rash 10/20/2016       Current Outpatient Medications   Medication Instructions    aspirin 81 mg, Oral, Daily    bumetanide (BUMEX) 1 mg, Oral, Daily    Calcium Citrate-Vitamin D 250-2.5 MG-MCG per tablet 1 tablet, Oral, 2 Times Daily    carvedilol (COREG) 25 MG tablet TAKE 1 TABLET BY MOUTH TWICE DAILY    D3-1000 1,000 Units, Oral, Daily    DULoxetine (CYMBALTA) 60 mg, Oral, Daily    empagliflozin (JARDIANCE) 10 mg, Oral, Daily    esomeprazole (NEXIUM) 20 mg, Oral, Every Morning Before Breakfast    glipizide (GLUCOTROL XL) 5 mg, Oral, Daily With Breakfast    HYDROcodone-acetaminophen (NORCO) 5-325 MG per tablet 1 tablet, Oral, Every 6 Hours PRN, 30 day supply. DNF 4/20/24    levothyroxine (SYNTHROID, LEVOTHROID) 25 mcg, Oral, Daily    Multiple Vitamins-Minerals (MULTIVITAMIN ADULT PO) 1 tablet, Oral, Daily    naproxen (EC NAPROSYN) 500 mg, Oral, 2 Times Daily PRN    pravastatin (PRAVACHOL) 10 MG tablet TAKE 1 TABLET BY MOUTH AT BEDTIME    pregabalin (LYRICA) 75 mg, Oral, 2 Times Daily    sacubitril-valsartan (Entresto) 49-51 MG tablet 1 tablet, Oral, 2 Times Daily    spironolactone (ALDACTONE) 25 mg, Oral, Daily        Past History, Review of Systems, Exam     Past Medical History:   Diagnosis Date    Age-related osteoporosis without current pathological fracture 02/25/2022    Anemia     Benign breast disease     Cancer 2015    Cellulitis of leg     CHF (congestive heart failure)     Chronic kidney disease     Chronic ulcer of right foot     Closed fracture of patella 09/05/2017    Cluster headache     Colon polyp     Depression     Diabetic peripheral neuropathy     Difficulty walking     Diverticulosis     Femoral fracture     Fibromyalgia, primary      Fracture of left wrist     Gastroesophageal reflux     Generalized pain 02/02/2016    Hiatal hernia     Hyperlipidemia     Hypertension     Hypothyroidism     Impingement syndrome of right shoulder     Joint pain     Left knee pain 05/17/2016    Menopausal disorder     Methicillin resistant Staphylococcus aureus infection 2012    Nonischemic cardiomyopathy     Osteoarthritis     Osteomyelitis 02/02/2016    Pain in shoulder 08/02/2016    Paroxysmal atrial fibrillation     Radius/ulna fracture, left, closed, initial encounter 10/21/2017    Shoulder pain, bilateral     Sleep apnea     Syncope, psychogenic 04/19/2017    Thoracic back pain 02/02/2016    Thoracic injuries     Toe ulcer     Transient alteration of awareness 04/19/2017    Traumatic closed nondisp torus fracture of distal radial metaphysis, right, initial encounter 2/28/2020       Past Surgical History:   has a past surgical history that includes Bladder surgery (2012); Epidural block injection; Total knee arthroplasty (Right); Hernia repair; Incisional hernia repair (05/16/2014); Shoulder surgery (Right); Debridement  foot (Right, 01/2013); Amputation foot / toe (Right, 11/2013); Breast surgery (06/29/2015); Breast biopsy; Foot surgery (Bilateral); Cardiac catheterization (2015); Abdominal surgery (2012); Cataract extraction (Bilateral, 2017); Total abdominal hysterectomy; Total knee arthroplasty (Left, 4/17/2018); Mastectomy (Right, 05/2015); Femur fracture surgery; Colonoscopy; Mastectomy modified radical w/ axillary lymph nodes w/ or w/o pectoralis minor (Right); Cardiac catheterization (N/A, 1/25/2021); Cardiac catheterization (N/A, 1/25/2021); and Cardiac catheterization (N/A, 1/25/2021).     Social History     Socioeconomic History    Marital status:     Number of children: 2   Tobacco Use    Smoking status: Former     Current packs/day: 0.00     Average packs/day: 3.0 packs/day for 30.0 years (90.0 ttl pk-yrs)     Types: Cigarettes      Start date:      Quit date:      Years since quittin.3     Passive exposure: Past    Smokeless tobacco: Never   Vaping Use    Vaping status: Never Used   Substance and Sexual Activity    Alcohol use: No     Comment: Caffeine use: 3 cups daily     Drug use: No    Sexual activity: Defer     Partners: Male     Comment: celibate       Review of Systems   Cardiovascular:  Positive for syncope. Negative for chest pain, claudication, cyanosis, dyspnea on exertion, irregular heartbeat, leg swelling, near-syncope, orthopnea, palpitations and paroxysmal nocturnal dyspnea.   Musculoskeletal:  Positive for falls.       Vitals reviewed.   Constitutional:       Appearance: Not in distress.   Eyes:      Conjunctiva/sclera: Conjunctivae normal.      Pupils: Pupils are equal, round, and reactive to light.   HENT:      Head: Normocephalic.      Nose: Nose normal.    Mouth/Throat:      Pharynx: Oropharynx is clear.   Neck:      Vascular: JVD normal.   Pulmonary:      Effort: Pulmonary effort is normal.      Breath sounds: Normal breath sounds. No wheezing. No rhonchi. No rales.   Cardiovascular:      Normal rate. Regular rhythm. Normal S1. Normal S2.       Murmurs: There is no murmur.   Pulses:     Intact distal pulses.   Edema:     Peripheral edema absent.   Abdominal:      General: Bowel sounds are normal. There is no distension.      Palpations: Abdomen is soft.      Tenderness: There is no abdominal tenderness.   Musculoskeletal: Normal range of motion.      Cervical back: Normal range of motion and neck supple. Skin:     General: Skin is warm and dry.   Neurological:      Mental Status: Alert and oriented to person, place and time.   Psychiatric:         Attention and Perception: Attention normal.         Mood and Affect: Mood normal.         Speech: Speech normal.         Behavior: Behavior is cooperative.          Assessment and Plan     Assessment:  1. Syncope, unspecified syncope type    2. HFrEF (heart failure  with reduced ejection fraction)    3. Atherosclerosis of native coronary artery of native heart without angina pectoris    4. NICM (nonischemic cardiomyopathy)    5. Nonrheumatic mitral valve regurgitation    6. Nonrheumatic tricuspid valve regurgitation    7. PVC (premature ventricular contraction)    8. Primary hypertension    9. Dyslipidemia    10. Type 2 diabetes mellitus without complication, without long-term current use of insulin    11. Stage 3a chronic kidney disease    12. Pulmonary hypertension               Syncope: She states she has had several falls over the years.  They typically occur without warning and she always falls backwards.  She states Dr. Bates told her this is her heart stopping and then restarting.  She does have history of PVCs but does not report any palpitations or chest pain prior to fall; normal 14-day event monitor in February 2024.  She was also not orthostatic when checked in office.  She had another episode of syncope in February 2024.  HFrEF with improved EF: Her EF was 20% when she was seen in April 2015 and diagnosed with nonischemic cardiomyopathy.  She has been treated medically.  Her last echocardiogram was February 2023 and EF had improved to 57%.  She does have mild LV dilation. Her medical therapy also includes 49-51 mg Entresto twice daily, 25 mg carvedilol twice daily, and 25 mg spironolactone daily.  Her leg swelling looks great.  She is also on 1.5 mg Bumex daily.  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  She had only minimal coronary artery disease.  Her medical therapy includes amlodipine, aspirin, carvedilol, and pravastatin.  She denies chest pain, and there are no EKG changes.  Nonischemic cardiomyopathy: April 2015 she presented with acute heart failure and found to have EF 10 to 15% with moderate LV dilation and had minimal coronary  artery disease on cardiac catheterization.  Again she had repeat cardiac catheterization in January 2021 with no new coronary artery disease.  Her EF has improved to 57%.  She denies chest pain.  She is euvolemic today.  Mitral valve regurgitation: This was graded as mild on February 2023 echocardiogram.  She is compensated today.  Tricuspid valve regurgitation: This was graded as mild on February 2023 echocardiogram.  She is compensated today.  Pulmonary hypertension: Previous history of pulmonary hypertension though echocardiogram in February 2023 showed normal RVSP.  PVCs: She had a Holter monitor after she fell and February 2023 which showed a 1.5 PVC burden.  She is on 25 mg carvedilol twice daily.  No PVCs on February 2024  Hypertension: I am concerned her blood pressure is overcontrolled.  Hyperlipidemia: LDL elevated when checked in September 2023; she is on 10 mg pravastatin daily.  No recent labs.  Type 2 diabetes: Again, no recent hemoglobin A1c to review.  She is on Jardiance and other medications for her diabetes.  CKD 3: She did have normal renal function when checked in September 2023.  Renal function stable in October 2023.  Stable in February 2024  History of right breast cancer: This was treated with anastrozole.    Ms. Short is a patient of Dr. 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  Her leg swelling is improved on lower dose amlodipine and with addition of 25 mg spironolactone daily.      She has had another fall in which she loses consciousness and falls backwards hitting her head.  These occur without warning and she had a normal 14-day event monitor.  For now, I am concerned this may be due to orthostasis and I am going to stop her amlodipine and reduce Bumex to 1 mg daily.  I will also discuss with Dr. Bates  about whether she would be a candidate for loop recorder.    Her July 2024 appointment with Dr. Bates has been canceled; I will work on getting her a follow-up appointment with Dr. Bates.    No follow-ups on file.  Orders Placed This Encounter   Procedures    ECG 12 Lead      New Medications Ordered This Visit   Medications    bumetanide (BUMEX) 0.5 MG tablet     Sig: Take 2 tablets by mouth Daily.     09/14/2023 5:46:46 PM* * N O T I C E * * Last quantity doesn't match original quantity         Thank you for the opportunity to participate in this patient's care.    JOSE ANTONIO Shearer    This office note has been dictated.

## 2024-04-16 ENCOUNTER — TELEPHONE (OUTPATIENT)
Dept: CARDIOLOGY | Facility: CLINIC | Age: 79
End: 2024-04-16
Payer: MEDICARE

## 2024-04-16 NOTE — TELEPHONE ENCOUNTER
----- Message from Aria Bates MD sent at 4/12/2024  4:00 PM EDT -----  Yes on loop  ----- Message -----  From: Faye Chen APRN  Sent: 4/12/2024   3:09 PM EDT  To: Aria Bates MD    She had another syncopal episode with fall. I had wanted a 30 day event monitor, but it was only put on for 2 weeks. I am stopping her amlodipine and reducing bumex for concern for low BP. We have not been able to elicit any orthostasis.   Does she need a loop recorder?

## 2024-04-17 NOTE — TELEPHONE ENCOUNTER
I left her a message about possible loop recorder. I left details and have asked her to call back to discuss.     Thanks!  JOSE ANTONIO Shearer

## 2024-04-19 DIAGNOSIS — I10 PRIMARY HYPERTENSION: ICD-10-CM

## 2024-04-19 DIAGNOSIS — E11.9 TYPE 2 DIABETES MELLITUS WITHOUT COMPLICATION, WITHOUT LONG-TERM CURRENT USE OF INSULIN: ICD-10-CM

## 2024-04-19 DIAGNOSIS — E03.9 ACQUIRED HYPOTHYROIDISM: ICD-10-CM

## 2024-04-19 DIAGNOSIS — E78.5 DYSLIPIDEMIA: Primary | ICD-10-CM

## 2024-04-19 DIAGNOSIS — M81.0 AGE-RELATED OSTEOPOROSIS WITHOUT CURRENT PATHOLOGICAL FRACTURE: Chronic | ICD-10-CM

## 2024-04-22 ENCOUNTER — HOSPITAL ENCOUNTER (OUTPATIENT)
Dept: INFUSION THERAPY | Facility: HOSPITAL | Age: 79
Discharge: HOME | End: 2024-04-22
Admitting: NURSE PRACTITIONER
Payer: MEDICARE

## 2024-04-22 VITALS
TEMPERATURE: 97.2 F | RESPIRATION RATE: 16 BRPM | DIASTOLIC BLOOD PRESSURE: 59 MMHG | OXYGEN SATURATION: 98 % | SYSTOLIC BLOOD PRESSURE: 94 MMHG | HEART RATE: 73 BPM

## 2024-04-22 DIAGNOSIS — M81.0 AGE-RELATED OSTEOPOROSIS WITHOUT CURRENT PATHOLOGICAL FRACTURE: Primary | ICD-10-CM

## 2024-04-22 LAB
25(OH)D3 SERPL-MCNC: 55.6 NG/ML (ref 30–100)
ALBUMIN SERPL-MCNC: 3.8 G/DL (ref 3.5–5.2)
ALBUMIN/GLOB SERPL: 1.3 G/DL
ALP SERPL-CCNC: 96 U/L (ref 39–117)
ALT SERPL W P-5'-P-CCNC: 15 U/L (ref 1–33)
ANION GAP SERPL CALCULATED.3IONS-SCNC: 10.5 MMOL/L (ref 5–15)
AST SERPL-CCNC: 19 U/L (ref 1–32)
BILIRUB SERPL-MCNC: 0.4 MG/DL (ref 0–1.2)
BUN SERPL-MCNC: 19 MG/DL (ref 8–23)
BUN/CREAT SERPL: 15.7 (ref 7–25)
CALCIUM SPEC-SCNC: 10.4 MG/DL (ref 8.6–10.5)
CHLORIDE SERPL-SCNC: 102 MMOL/L (ref 98–107)
CHOLEST SERPL-MCNC: 171 MG/DL (ref 0–200)
CO2 SERPL-SCNC: 24.5 MMOL/L (ref 22–29)
CREAT SERPL-MCNC: 1.21 MG/DL (ref 0.57–1)
EGFRCR SERPLBLD CKD-EPI 2021: 45.4 ML/MIN/1.73
GLOBULIN UR ELPH-MCNC: 2.9 GM/DL
GLUCOSE SERPL-MCNC: 79 MG/DL (ref 65–99)
HBA1C MFR BLD: 6.5 % (ref 4.8–5.6)
HDLC SERPL QL: 3.11
HDLC SERPL-MCNC: 55 MG/DL (ref 40–60)
LDLC SERPL CALC-MCNC: 94 MG/DL (ref 0–100)
MAGNESIUM SERPL-MCNC: 1.9 MG/DL (ref 1.6–2.4)
PHOSPHATE SERPL-MCNC: 3.9 MG/DL (ref 2.5–4.5)
POTASSIUM SERPL-SCNC: 3.7 MMOL/L (ref 3.5–5.2)
PROT SERPL-MCNC: 6.7 G/DL (ref 6–8.5)
SODIUM SERPL-SCNC: 137 MMOL/L (ref 136–145)
TRIGL SERPL-MCNC: 123 MG/DL (ref 0–150)
TSH SERPL DL<=0.05 MIU/L-ACNC: 3.63 UIU/ML (ref 0.27–4.2)
VLDLC SERPL-MCNC: 22 MG/DL (ref 5–40)

## 2024-04-22 PROCEDURE — 80061 LIPID PANEL: CPT | Performed by: NURSE PRACTITIONER

## 2024-04-22 PROCEDURE — 25010000002 DENOSUMAB 60 MG/ML SOLUTION PREFILLED SYRINGE: Performed by: NURSE PRACTITIONER

## 2024-04-22 PROCEDURE — 80053 COMPREHEN METABOLIC PANEL: CPT | Performed by: NURSE PRACTITIONER

## 2024-04-22 PROCEDURE — 96372 THER/PROPH/DIAG INJ SC/IM: CPT

## 2024-04-22 PROCEDURE — 36415 COLL VENOUS BLD VENIPUNCTURE: CPT

## 2024-04-22 PROCEDURE — 84443 ASSAY THYROID STIM HORMONE: CPT | Performed by: NURSE PRACTITIONER

## 2024-04-22 PROCEDURE — 84100 ASSAY OF PHOSPHORUS: CPT | Performed by: NURSE PRACTITIONER

## 2024-04-22 PROCEDURE — 82306 VITAMIN D 25 HYDROXY: CPT | Performed by: NURSE PRACTITIONER

## 2024-04-22 PROCEDURE — 83735 ASSAY OF MAGNESIUM: CPT | Performed by: NURSE PRACTITIONER

## 2024-04-22 PROCEDURE — 83036 HEMOGLOBIN GLYCOSYLATED A1C: CPT | Performed by: NURSE PRACTITIONER

## 2024-04-22 RX ADMIN — DENOSUMAB 60 MG: 60 INJECTION SUBCUTANEOUS at 14:53

## 2024-04-22 NOTE — PATIENT INSTRUCTIONS
"  Call  Kathryn Epstein, APRN [phone]  if you have any problems or concerns.    We know you have a Choice in healthcare and appreciate you using [facility].  Our purpose is to provide you \"Excellent Care\".  We hope that you will always choose us in the future and continue to recommend us to your family and friends.             "

## 2024-04-22 NOTE — NURSING NOTE
PT ARRIVED TO [facility] FOR APPT. VSS, NO COMPLAINTS AT THIS TIME. LABS DRAWN VIA VENIPUNCTURE. MEDICATION ADMINISTERED PER MD ORDER. PT TOLERATED WELL. SCHEDULED NEXT APPT. AVS PRINTED OUT, COPY GIVEN TO PT. PT DISCHARGED FROM [facility] AT 15:15 PM IN STABLE CONDITION, WITHOUT COMPLAINTS.

## 2024-04-23 RX ORDER — CARVEDILOL 25 MG/1
25 TABLET ORAL 2 TIMES DAILY
Qty: 180 TABLET | Refills: 0 | Status: SHIPPED | OUTPATIENT
Start: 2024-04-23

## 2024-05-21 DIAGNOSIS — M47.816 LUMBAR FACET ARTHROPATHY: ICD-10-CM

## 2024-05-21 DIAGNOSIS — M51.36 DDD (DEGENERATIVE DISC DISEASE), LUMBAR: ICD-10-CM

## 2024-05-21 RX ORDER — HYDROCODONE BITARTRATE AND ACETAMINOPHEN 5; 325 MG/1; MG/1
1 TABLET ORAL EVERY 6 HOURS PRN
Qty: 120 TABLET | Refills: 0 | Status: SHIPPED | OUTPATIENT
Start: 2024-05-21

## 2024-05-21 NOTE — TELEPHONE ENCOUNTER
Rx Refill Note  Requested Prescriptions     Pending Prescriptions Disp Refills    HYDROcodone-acetaminophen (NORCO) 5-325 MG per tablet 120 tablet 0     Sig: Take 1 tablet by mouth Every 6 (Six) Hours As Needed for Severe Pain. 30 day supply. DNF 4/20/24      Last office visit with prescribing clinician: 4/2/2024   Last telemedicine visit with prescribing clinician: Visit date not found   Next office visit with prescribing clinician: 6/4/2024                         Would you like a call back once the refill request has been completed: [] Yes [] No    If the office needs to give you a call back, can they leave a voicemail: [] Yes [] No    Philippe Adams CMA  05/21/24, 16:01 EDT

## 2024-05-30 ENCOUNTER — TELEPHONE (OUTPATIENT)
Dept: INTERNAL MEDICINE | Facility: CLINIC | Age: 79
End: 2024-05-30

## 2024-05-30 DIAGNOSIS — E11.9 TYPE 2 DIABETES MELLITUS WITHOUT COMPLICATION, WITHOUT LONG-TERM CURRENT USE OF INSULIN: ICD-10-CM

## 2024-05-30 NOTE — TELEPHONE ENCOUNTER
"  Caller: Jung Short \"SHEILA\"    Relationship: Self    Best call back number: [phone]    Who are you requesting to speak with (clinical staff, provider,  specific staff member): CLINICAL     What was the call regarding: PATIENT STATES THAT A WOMAN WILL BE CALLING THE OFFICE ON HER BEHALF TO REQUEST SOME  INFORMATION REGARDING CUSTOM BRAS.     PATIENT WANTED HER PCP TO BE ON THE LOOKOUT FOR THIS INFORMATION.     "

## 2024-05-31 ENCOUNTER — OFFICE VISIT (OUTPATIENT)
Dept: CARDIOLOGY | Facility: CLINIC | Age: 79
End: 2024-05-31
Payer: MEDICARE

## 2024-05-31 VITALS
BODY MASS INDEX: 31.49 KG/M2 | HEIGHT: 65 IN | WEIGHT: 189 LBS | OXYGEN SATURATION: 98 % | DIASTOLIC BLOOD PRESSURE: 58 MMHG | HEART RATE: 73 BPM | RESPIRATION RATE: 20 BRPM | SYSTOLIC BLOOD PRESSURE: 94 MMHG

## 2024-05-31 DIAGNOSIS — E11.9 TYPE 2 DIABETES MELLITUS WITHOUT COMPLICATION, WITHOUT LONG-TERM CURRENT USE OF INSULIN: ICD-10-CM

## 2024-05-31 DIAGNOSIS — E78.5 DYSLIPIDEMIA: ICD-10-CM

## 2024-05-31 DIAGNOSIS — R55 SYNCOPE AND COLLAPSE: Primary | ICD-10-CM

## 2024-05-31 DIAGNOSIS — I10 PRIMARY HYPERTENSION: ICD-10-CM

## 2024-05-31 DIAGNOSIS — I50.20 HFREF (HEART FAILURE WITH REDUCED EJECTION FRACTION): ICD-10-CM

## 2024-05-31 NOTE — PROGRESS NOTES
Date of Office Visit: 2024  Encounter Provider: Aria Bates MD  Place of Service: [facility] CARDIOLOGY  Patient Name: Jung Short  :1945      Patient ID:  Jung Short is a 78 y.o. 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     She presented to [facility] in 2015 with acute congestive heart failure, systolic and diastolic. She had a 2-D echocardiogram which revealed an ejection fraction of 10-15%, moderate left ventricular dilation, severe mitral and tricuspid insufficiency, and her aortic valve was normal. She was transferred to [facility] for a cardiac catheterization. 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 She also had a right heart catheterization done which revealed an RV pressure of 50/20, PA pressure 50/30 with the main of 38 and a capillary wedge pressure of 31 mmHg. Her right atrial pressure was 20 mmHg. Her cardiac output was 3.9 liters per minute. She was treated medically and diuresed.         Echocardiogram done 2021 showed mild concentric left ventricular hypertrophy, moderate left atrial enlargement, normal diastolic function, mild left ventricular dilation, ejection fraction 20-25%, severely reduced ejection fraction global hypokinesis.  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  Repeat echocardiogram done 4/22/2021 showed moderate left ventricular dilation, mild concentric left ventricle hypertrophy, grade 2 diastolic dysfunction, ejection fraction 37%, moderate reduction of left ventricular systolic function, severe left atrial enlargement, normal saline contrast study, mild right atrial enlargement, mild right ventricular enlargement, global hypokinesis.  She was started on Entresto.      On 12/30/2021, she presented to the emergency department with high blood pressure.  She states it was 200 over 90s.  She actually woke up around 2 AM that morning with a throbbing pain in her left arm.  When she took her blood pressure it was systolic 203.  She checked it several times after the that over the next 2 hours and that it was persistently elevated.  She also had some aching chest pain that was quite brief.  Since her blood pressure was not coming down she decided to come to the ER.  She felt that since she had had COVID and especially the last couple weeks that her systolic blood pressure has remained elevated.  It has been consistently 170/80 systolic.  She was given 10 mg of labetalol IV and her blood pressure responded well.  She states by the time she left it was 140 systolic. Work-up was unremarkable.  Chest x-rays was negative for any acute findings.  She did have a mildly elevated D-dimer at 1.30.  Her CTA was negative for PE.      Echo done 4/12/2022 showed ejection fraction 40% with moderate left atrial enlargement, mild mitral insufficiency, pericardial effusion - no evidence of tamponade, moderate global hypokinesis noted. Echo done 2/6/2023 showed ejection fraction 57% with grade 1 diastolic dysfunction, mild mitral tricuspid sufficiency, moderate left atrial enlargement, normal RVSP.     Labs on 4/20/2024 showed creatinine 1.21, potassium 3.7, otherwise normal CMP.  Lipids done 4/22/2024 showed  total cholesterol 171, HDL 55, LDL 94, VLDL 22, triglycerides 123.  She had a normal CBC done 1/2024.  Her TSH was normal 4/22/2024 with hemoglobin A1c of 6.5%.  She had a 2-week Zio patch monitor done 1/23/2024 which was benign.  CT of cervical spine done 2/9/2024 showed degenerative change of the cervical spine with no acute fracture or malalignment.    She has recurrent, unprovoked syncope without warning.  She says she will just all of a sudden go out.  People witnessing this say that she falls backwards and she injures herself.  She has hit her head multiple times.  She says she has no idea what can happen.  It is not accompanied by dizziness or chest pain or short windedness and when she wakes up, she feels fine.  We have never been able to figure out why she has had this happen but she has had about 10 falls in the last 2 years due to this was several significant injuries.  She has no difficulty breathing right now no orthopnea or PND.  She takes her medications as directed without difficulty.    Past Medical History:   Diagnosis Date    Age-related osteoporosis without current pathological fracture 02/25/2022    Anemia     Benign breast disease     Cancer 2015    Right breast    Cellulitis of leg     May-2003 right lower extremity    CHF (congestive heart failure)     Dr Bates follows    Chronic kidney disease     Dr Hannah follows    Chronic ulcer of right foot     Non-pressure, history of     Closed fracture of patella 09/05/2017    Cluster headache     Colon polyp     Depression     Diabetic peripheral neuropathy     Difficulty walking     Diverticulosis     Femoral fracture     right    Fibromyalgia, primary     Fracture of left wrist     history of    Gastroesophageal reflux     Generalized pain 02/02/2016    Hiatal hernia     Hyperlipidemia     Hypertension     Hypothyroidism     Impingement syndrome of right shoulder     Joint pain     Left knee pain 05/17/2016    Menopausal disorder      Methicillin resistant Staphylococcus aureus infection 2012    after bladder surgery    Nonischemic cardiomyopathy     Ejection fraction 10% per 2-D echo with Doppler however she did have cardiac catheterization April 2015 which showed ejection fraction 20% with global hypokinesis and severe mitral insufficiency    Osteoarthritis     generalized, sched jania TKA    Osteomyelitis 02/02/2016    Pain in shoulder 08/02/2016    Paroxysmal atrial fibrillation     Dr Bates follows    Radius/ulna fracture, left, closed, initial encounter 10/21/2017    Shoulder pain, bilateral     has tears on both sides    Sleep apnea     Syncope, psychogenic 04/19/2017    Thoracic back pain 02/02/2016    Thoracic injuries     Toe ulcer     h/o to both feet, d/t shoes rubbing per patient    Transient alteration of awareness 04/19/2017    Traumatic closed nondisp torus fracture of distal radial metaphysis, right, initial encounter 2/28/2020         Past Surgical History:   Procedure Laterality Date    ABDOMINAL SURGERY  2012    had to be reopened after bladder surgery d/t infection    AMPUTATION FOOT / TOE Right 11/2013    Rt foot amputation MTP first toe    BLADDER SURGERY  2012    BREAST BIOPSY      BREAST SURGERY  06/29/2015    Percutaneous ultrasound-guided placement of metal localized clip 1st lesion    CARDIAC CATHETERIZATION  2015    CARDIAC CATHETERIZATION N/A 1/25/2021    Procedure: Right and Left Heart Cath;  Surgeon: Nicholas Andrade MD;  Location:  [facility] C[facility]VAS[facility];  Service: Cardiovascular;  Laterality: N/A;  drop in EF, with hx of NICM, SOA fatigue.  Discussed with     CARDIAC CATHETERIZATION N/A 1/25/2021    Procedure: Left ventriculography;  Surgeon: Nicholas Andrade MD;  Location:  [facility] C[facility]VAS[facility];  Service: Cardiovascular;  Laterality: N/A;    CARDIAC CATHETERIZATION N/A 1/25/2021    Procedure: Coronary angiography;  Surgeon: Nicholas Andrade MD;  Location:  [facility] CATH INVASIVE  LOCATION;  Service: Cardiovascular;  Laterality: N/A;    CATARACT EXTRACTION Bilateral 2017    COLONOSCOPY      DEBRIDEMENT  FOOT Right 01/2013    During hospitalization     EPIDURAL BLOCK      4 chronic back pain and leg pain last epidurals done 5-2012 she had no benefit at all from this procedure.    FEMUR FRACTURE SURGERY      FOOT SURGERY Bilateral     several    HERNIA REPAIR      At the abdomen February 2007 Dr. Mendez    INCISIONAL HERNIA REPAIR  05/16/2014    Recurrent. Incarcerated. With mesh implantation    MASTECTOMY Right 05/2015    MASTECTOMY MODIFIED RADICAL W/ AXILLARY LYMPH NODES W/ OR W/O PECTORALIS MINOR Right     SHOULDER SURGERY Right     x2 RCR    TOTAL ABDOMINAL HYSTERECTOMY      with oophorectomy-Done June-2012 secondary to vaginal wall prolapse.    TOTAL KNEE ARTHROPLASTY Right     TOTAL KNEE ARTHROPLASTY Left 4/17/2018    Procedure: TOTAL KNEE ARTHROPLASTY;  Surgeon: Live Chambers MD;  Location: [facility];  Service: Orthopedics       Current Outpatient Medications on File Prior to Visit   Medication Sig Dispense Refill    aspirin 81 MG EC tablet Take 1 tablet by mouth Daily.      bumetanide (BUMEX) 0.5 MG tablet Take 2 tablets by mouth Daily.      Calcium Citrate-Vitamin D 250-2.5 MG-MCG per tablet Take 1 tablet by mouth 2 (Two) Times a Day. 60 tablet 11    carvedilol (COREG) 25 MG tablet Take 1 tablet by mouth 2 (Two) Times a Day. 180 tablet 0    D3-1000 25 MCG (1000 UT) tablet TAKE 1 TABLET BY MOUTH DAILY 90 tablet 1    DULoxetine (CYMBALTA) 60 MG capsule TAKE 1 CAPSULE BY MOUTH DAILY. 90 capsule 2    empagliflozin (JARDIANCE) 10 MG tablet tablet Take 1 tablet by mouth Daily. 21 tablet 0    esomeprazole (nexIUM) 20 MG capsule Take 1 capsule by mouth Every Morning Before Breakfast.      glipizide (GLUCOTROL XL) 5 MG ER tablet TAKE 1 TABLET BY MOUTH DAILY WITH BREAKFAST. 90 tablet 3    HYDROcodone-acetaminophen (NORCO) 5-325 MG per tablet Take 1 tablet by mouth Every 6 (Six) Hours As Needed  for Severe Pain. 30 day supply. DNF 24 120 tablet 0    levothyroxine (SYNTHROID, LEVOTHROID) 25 MCG tablet TAKE 1 TABLET BY MOUTH DAILY 90 tablet 3    Multiple Vitamins-Minerals (MULTIVITAMIN ADULT PO) Take 1 tablet by mouth Daily.      naproxen (EC NAPROSYN) 500 MG EC tablet Take 1 tablet by mouth 2 (Two) Times a Day As Needed for Mild Pain. (Patient taking differently: Take 1 tablet by mouth 2 (Two) Times a Day As Needed for Mild Pain. prn) 20 tablet 0    pravastatin (PRAVACHOL) 10 MG tablet TAKE 1 TABLET BY MOUTH AT BEDTIME 90 tablet 1    pregabalin (LYRICA) 75 MG capsule Take 1 capsule by mouth 2 (Two) Times a Day. 60 capsule 6    sacubitril-valsartan (Entresto) 49-51 MG tablet Take 1 tablet by mouth 2 (Two) Times a Day. 60 tablet 11    spironolactone (ALDACTONE) 25 MG tablet Take 1 tablet by mouth Daily. 30 tablet 11     No current facility-administered medications on file prior to visit.       Social History     Socioeconomic History    Marital status:     Number of children: 2   Tobacco Use    Smoking status: Former     Current packs/day: 0.00     Average packs/day: 3.0 packs/day for 30.0 years (90.0 ttl pk-yrs)     Types: Cigarettes     Start date:      Quit date:      Years since quittin.4     Passive exposure: Past    Smokeless tobacco: Never   Vaping Use    Vaping status: Never Used   Substance and Sexual Activity    Alcohol use: No     Comment: Caffeine use: 3 cups daily     Drug use: No    Sexual activity: Defer     Partners: Male     Comment: celibate             Procedures    ECG 12 Lead    Date/Time: 2024 1:50 PM  Performed by: Aria Bates MD    Authorized by: Aria Bates MD  Comparison: compared with previous ECG   Similar to previous ECG  Rhythm: sinus rhythm  Conduction: left anterior fascicular block    Clinical impression: abnormal EKG              Objective:      Vitals:    24 1329   BP: 94/58   BP Location: Left arm   Patient Position:  "Sitting   Cuff Size: Adult   Pulse: 73   Resp: 20   SpO2: 98%   Weight: 85.7 kg (189 lb)   Height: 165.1 cm (65\")     Body mass index is 31.45 kg/m².    Vitals reviewed.   Constitutional:       General: Not in acute distress.     Appearance: Not diaphoretic.   Neck:      Vascular: No carotid bruit or JVD.   Pulmonary:      Effort: Pulmonary effort is normal.      Breath sounds: Normal breath sounds.   Cardiovascular:      Normal rate. Regular rhythm.      Murmurs: There is no murmur.      No gallop.  No rub.   Pulses:     Intact distal pulses.      Carotid: 2+ bilaterally.     Radial: 2+ bilaterally.     Dorsalis pedis: 2+ bilaterally.     Posterior tibial: 2+ bilaterally.  Edema:     Peripheral edema absent.   Neurological:      Cranial Nerves: No cranial nerve deficit.         Lab Review:       Assessment:      Diagnosis Plan   1. Primary hypertension  Case Request EP Lab: Loop insertion      2. HFrEF (heart failure with reduced ejection fraction)        3. Dyslipidemia        4. Type 2 diabetes mellitus without complication, without long-term current use of insulin        5. Syncope and collapse  Case Request EP Lab: Loop insertion            Nonischemic cardiomyopathy, recovered.  Maintain carvedilol, Jardiance, Entresto.  No decompensated heart failure  Hypertension blood pressure controlled.  She has no dizziness even if her blood pressure gets little low.  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  Recommended loop recorder due to recurrent syncope.  "

## 2024-06-03 ENCOUNTER — HOSPITAL ENCOUNTER (INPATIENT)
Facility: HOSPITAL | Age: 79
LOS: 2 days | Discharge: HOME | DRG: 256 | End: 2024-06-06
Attending: EMERGENCY MEDICINE | Admitting: HOSPITALIST
Payer: MEDICARE

## 2024-06-03 ENCOUNTER — APPOINTMENT (OUTPATIENT)
Dept: GENERAL RADIOLOGY | Facility: HOSPITAL | Age: 79
DRG: 256 | End: 2024-06-03
Payer: MEDICARE

## 2024-06-03 DIAGNOSIS — M86.171 OTHER ACUTE OSTEOMYELITIS, RIGHT ANKLE AND FOOT: ICD-10-CM

## 2024-06-03 DIAGNOSIS — Z48.1: ICD-10-CM

## 2024-06-03 DIAGNOSIS — L03.119 CELLULITIS IN DIABETIC FOOT: Primary | ICD-10-CM

## 2024-06-03 DIAGNOSIS — L03.031 CELLULITIS OF RIGHT TOE: ICD-10-CM

## 2024-06-03 DIAGNOSIS — L03.031 CELLULITIS OF MIDDLE TOE, RIGHT: ICD-10-CM

## 2024-06-03 DIAGNOSIS — E11.628 CELLULITIS IN DIABETIC FOOT: Primary | ICD-10-CM

## 2024-06-03 LAB
ANION GAP SERPL CALCULATED.3IONS-SCNC: 10.3 MMOL/L (ref 5–15)
BASOPHILS # BLD AUTO: 0.01 10*3/MM3 (ref 0–0.2)
BASOPHILS NFR BLD AUTO: 0.2 % (ref 0–1.5)
BUN SERPL-MCNC: 24 MG/DL (ref 8–23)
BUN/CREAT SERPL: 19.2 (ref 7–25)
CALCIUM SPEC-SCNC: 9.1 MG/DL (ref 8.6–10.5)
CHLORIDE SERPL-SCNC: 105 MMOL/L (ref 98–107)
CO2 SERPL-SCNC: 20.7 MMOL/L (ref 22–29)
CREAT SERPL-MCNC: 1.25 MG/DL (ref 0.57–1)
DEPRECATED RDW RBC AUTO: 53.8 FL (ref 37–54)
EGFRCR SERPLBLD CKD-EPI 2021: 43.7 ML/MIN/1.73
EOSINOPHIL # BLD AUTO: 0.06 10*3/MM3 (ref 0–0.4)
EOSINOPHIL NFR BLD AUTO: 0.9 % (ref 0.3–6.2)
ERYTHROCYTE [DISTWIDTH] IN BLOOD BY AUTOMATED COUNT: 15.2 % (ref 12.3–15.4)
ERYTHROCYTE [SEDIMENTATION RATE] IN BLOOD: 40 MM/HR (ref 0–30)
FERRITIN SERPL-MCNC: 204 NG/ML (ref 13–150)
GLUCOSE SERPL-MCNC: 74 MG/DL (ref 65–99)
HCT VFR BLD AUTO: 33 % (ref 34–46.6)
HGB BLD-MCNC: 10.5 G/DL (ref 12–15.9)
IMM GRANULOCYTES # BLD AUTO: 0.02 10*3/MM3 (ref 0–0.05)
IMM GRANULOCYTES NFR BLD AUTO: 0.3 % (ref 0–0.5)
IRON 24H UR-MRATE: 15 MCG/DL (ref 37–145)
IRON SATN MFR SERPL: 5 % (ref 20–50)
LYMPHOCYTES # BLD AUTO: 0.83 10*3/MM3 (ref 0.7–3.1)
LYMPHOCYTES NFR BLD AUTO: 12.8 % (ref 19.6–45.3)
MCH RBC QN AUTO: 30.2 PG (ref 26.6–33)
MCHC RBC AUTO-ENTMCNC: 31.8 G/DL (ref 31.5–35.7)
MCV RBC AUTO: 94.8 FL (ref 79–97)
MONOCYTES # BLD AUTO: 0.61 10*3/MM3 (ref 0.1–0.9)
MONOCYTES NFR BLD AUTO: 9.4 % (ref 5–12)
NEUTROPHILS NFR BLD AUTO: 4.93 10*3/MM3 (ref 1.7–7)
NEUTROPHILS NFR BLD AUTO: 76.4 % (ref 42.7–76)
PLATELET # BLD AUTO: 146 10*3/MM3 (ref 140–450)
PMV BLD AUTO: 9.4 FL (ref 6–12)
POTASSIUM SERPL-SCNC: 4.5 MMOL/L (ref 3.5–5.2)
RBC # BLD AUTO: 3.48 10*6/MM3 (ref 3.77–5.28)
SODIUM SERPL-SCNC: 136 MMOL/L (ref 136–145)
TIBC SERPL-MCNC: 299 MCG/DL (ref 298–536)
TRANSFERRIN SERPL-MCNC: 201 MG/DL (ref 200–360)
WBC NRBC COR # BLD AUTO: 6.46 10*3/MM3 (ref 3.4–10.8)

## 2024-06-03 PROCEDURE — 84466 ASSAY OF TRANSFERRIN: CPT | Performed by: NURSE PRACTITIONER

## 2024-06-03 PROCEDURE — 82728 ASSAY OF FERRITIN: CPT | Performed by: NURSE PRACTITIONER

## 2024-06-03 PROCEDURE — G0378 HOSPITAL OBSERVATION PER HR: HCPCS

## 2024-06-03 PROCEDURE — 94799 UNLISTED PULMONARY SVC/PX: CPT

## 2024-06-03 PROCEDURE — 25010000002 LEVOFLOXACIN PER 250 MG: Performed by: NURSE PRACTITIONER

## 2024-06-03 PROCEDURE — 25010000002 MORPHINE PER 10 MG: Performed by: STUDENT IN AN ORGANIZED HEALTH CARE EDUCATION/TRAINING PROGRAM

## 2024-06-03 PROCEDURE — 99223 1ST HOSP IP/OBS HIGH 75: CPT | Performed by: NURSE PRACTITIONER

## 2024-06-03 PROCEDURE — 87040 BLOOD CULTURE FOR BACTERIA: CPT | Performed by: NURSE PRACTITIONER

## 2024-06-03 PROCEDURE — 93005 ELECTROCARDIOGRAM TRACING: CPT | Performed by: NURSE PRACTITIONER

## 2024-06-03 PROCEDURE — 80048 BASIC METABOLIC PNL TOTAL CA: CPT | Performed by: EMERGENCY MEDICINE

## 2024-06-03 PROCEDURE — 83540 ASSAY OF IRON: CPT | Performed by: NURSE PRACTITIONER

## 2024-06-03 PROCEDURE — 85025 COMPLETE CBC W/AUTO DIFF WBC: CPT | Performed by: EMERGENCY MEDICINE

## 2024-06-03 PROCEDURE — 87186 SC STD MICRODIL/AGAR DIL: CPT | Performed by: EMERGENCY MEDICINE

## 2024-06-03 PROCEDURE — 25810000003 SODIUM CHLORIDE 0.9 % SOLUTION 500 ML FLEX CONT: Performed by: EMERGENCY MEDICINE

## 2024-06-03 PROCEDURE — 25010000002 MORPHINE PER 10 MG: Performed by: NURSE PRACTITIONER

## 2024-06-03 PROCEDURE — 87205 SMEAR GRAM STAIN: CPT | Performed by: EMERGENCY MEDICINE

## 2024-06-03 PROCEDURE — 25010000002 VANCOMYCIN 1.5 G RECONSTITUTED SOLUTION 1 EACH VIAL: Performed by: EMERGENCY MEDICINE

## 2024-06-03 PROCEDURE — 87154 CUL TYP ID BLD PTHGN 6+ TRGT: CPT | Performed by: NURSE PRACTITIONER

## 2024-06-03 PROCEDURE — 99285 EMERGENCY DEPT VISIT HI MDM: CPT

## 2024-06-03 PROCEDURE — 87077 CULTURE AEROBIC IDENTIFY: CPT | Performed by: EMERGENCY MEDICINE

## 2024-06-03 PROCEDURE — 86140 C-REACTIVE PROTEIN: CPT | Performed by: NURSE PRACTITIONER

## 2024-06-03 PROCEDURE — 93010 ELECTROCARDIOGRAM REPORT: CPT | Performed by: INTERNAL MEDICINE

## 2024-06-03 PROCEDURE — 85652 RBC SED RATE AUTOMATED: CPT | Performed by: NURSE PRACTITIONER

## 2024-06-03 PROCEDURE — 73630 X-RAY EXAM OF FOOT: CPT

## 2024-06-03 PROCEDURE — 87070 CULTURE OTHR SPECIMN AEROBIC: CPT | Performed by: EMERGENCY MEDICINE

## 2024-06-03 PROCEDURE — 87147 CULTURE TYPE IMMUNOLOGIC: CPT | Performed by: NURSE PRACTITIONER

## 2024-06-03 RX ORDER — UREA 10 %
5 LOTION (ML) TOPICAL NIGHTLY PRN
Status: DISCONTINUED | OUTPATIENT
Start: 2024-06-03 | End: 2024-06-06 | Stop reason: HOSPADM

## 2024-06-03 RX ORDER — ONDANSETRON 2 MG/ML
4 INJECTION INTRAMUSCULAR; INTRAVENOUS EVERY 6 HOURS PRN
Status: DISCONTINUED | OUTPATIENT
Start: 2024-06-03 | End: 2024-06-06 | Stop reason: HOSPADM

## 2024-06-03 RX ORDER — AMOXICILLIN 250 MG
2 CAPSULE ORAL 2 TIMES DAILY PRN
Status: DISCONTINUED | OUTPATIENT
Start: 2024-06-03 | End: 2024-06-06 | Stop reason: HOSPADM

## 2024-06-03 RX ORDER — POLYETHYLENE GLYCOL 3350 17 G/17G
17 POWDER, FOR SOLUTION ORAL DAILY PRN
Status: DISCONTINUED | OUTPATIENT
Start: 2024-06-03 | End: 2024-06-06 | Stop reason: HOSPADM

## 2024-06-03 RX ORDER — BISACODYL 5 MG/1
5 TABLET, DELAYED RELEASE ORAL DAILY PRN
Status: DISCONTINUED | OUTPATIENT
Start: 2024-06-03 | End: 2024-06-06 | Stop reason: HOSPADM

## 2024-06-03 RX ORDER — LEVOFLOXACIN 5 MG/ML
750 INJECTION, SOLUTION INTRAVENOUS ONCE
Status: COMPLETED | OUTPATIENT
Start: 2024-06-03 | End: 2024-06-04

## 2024-06-03 RX ORDER — HYDROCODONE BITARTRATE AND ACETAMINOPHEN 5; 325 MG/1; MG/1
1 TABLET ORAL EVERY 6 HOURS PRN
Status: DISCONTINUED | OUTPATIENT
Start: 2024-06-03 | End: 2024-06-06 | Stop reason: HOSPADM

## 2024-06-03 RX ORDER — MORPHINE SULFATE 2 MG/ML
2 INJECTION, SOLUTION INTRAMUSCULAR; INTRAVENOUS EVERY 4 HOURS PRN
Status: DISCONTINUED | OUTPATIENT
Start: 2024-06-03 | End: 2024-06-06 | Stop reason: HOSPADM

## 2024-06-03 RX ORDER — DULOXETIN HYDROCHLORIDE 60 MG/1
60 CAPSULE, DELAYED RELEASE ORAL DAILY
Status: DISCONTINUED | OUTPATIENT
Start: 2024-06-04 | End: 2024-06-06 | Stop reason: HOSPADM

## 2024-06-03 RX ORDER — SODIUM CHLORIDE 9 MG/ML
40 INJECTION, SOLUTION INTRAVENOUS AS NEEDED
Status: DISCONTINUED | OUTPATIENT
Start: 2024-06-03 | End: 2024-06-06 | Stop reason: HOSPADM

## 2024-06-03 RX ORDER — PREGABALIN 75 MG/1
75 CAPSULE ORAL 2 TIMES DAILY
Status: DISCONTINUED | OUTPATIENT
Start: 2024-06-03 | End: 2024-06-06 | Stop reason: HOSPADM

## 2024-06-03 RX ORDER — SODIUM CHLORIDE 0.9 % (FLUSH) 0.9 %
10 SYRINGE (ML) INJECTION EVERY 12 HOURS SCHEDULED
Status: DISCONTINUED | OUTPATIENT
Start: 2024-06-03 | End: 2024-06-06 | Stop reason: HOSPADM

## 2024-06-03 RX ORDER — MELATONIN
1000 DAILY
Status: DISCONTINUED | OUTPATIENT
Start: 2024-06-04 | End: 2024-06-06 | Stop reason: HOSPADM

## 2024-06-03 RX ORDER — LEVOTHYROXINE SODIUM 0.03 MG/1
25 TABLET ORAL
Status: DISCONTINUED | OUTPATIENT
Start: 2024-06-04 | End: 2024-06-06 | Stop reason: HOSPADM

## 2024-06-03 RX ORDER — ACETAMINOPHEN 160 MG/5ML
650 SOLUTION ORAL EVERY 4 HOURS PRN
Status: DISCONTINUED | OUTPATIENT
Start: 2024-06-03 | End: 2024-06-06 | Stop reason: HOSPADM

## 2024-06-03 RX ORDER — PRAVASTATIN SODIUM 20 MG
10 TABLET ORAL NIGHTLY
Status: DISCONTINUED | OUTPATIENT
Start: 2024-06-04 | End: 2024-06-06 | Stop reason: HOSPADM

## 2024-06-03 RX ORDER — ONDANSETRON 4 MG/1
4 TABLET, ORALLY DISINTEGRATING ORAL EVERY 6 HOURS PRN
Status: DISCONTINUED | OUTPATIENT
Start: 2024-06-03 | End: 2024-06-06 | Stop reason: HOSPADM

## 2024-06-03 RX ORDER — BISACODYL 10 MG
10 SUPPOSITORY, RECTAL RECTAL DAILY PRN
Status: DISCONTINUED | OUTPATIENT
Start: 2024-06-03 | End: 2024-06-06 | Stop reason: HOSPADM

## 2024-06-03 RX ORDER — SODIUM CHLORIDE 0.9 % (FLUSH) 0.9 %
10 SYRINGE (ML) INJECTION AS NEEDED
Status: DISCONTINUED | OUTPATIENT
Start: 2024-06-03 | End: 2024-06-06 | Stop reason: HOSPADM

## 2024-06-03 RX ORDER — ACETAMINOPHEN 650 MG/1
650 SUPPOSITORY RECTAL EVERY 4 HOURS PRN
Status: DISCONTINUED | OUTPATIENT
Start: 2024-06-03 | End: 2024-06-06 | Stop reason: HOSPADM

## 2024-06-03 RX ORDER — ACETAMINOPHEN 325 MG/1
650 TABLET ORAL EVERY 4 HOURS PRN
Status: DISCONTINUED | OUTPATIENT
Start: 2024-06-03 | End: 2024-06-06 | Stop reason: HOSPADM

## 2024-06-03 RX ORDER — PANTOPRAZOLE SODIUM 40 MG/1
40 TABLET, DELAYED RELEASE ORAL
Status: DISCONTINUED | OUTPATIENT
Start: 2024-06-04 | End: 2024-06-06 | Stop reason: HOSPADM

## 2024-06-03 RX ORDER — CARVEDILOL 12.5 MG/1
25 TABLET ORAL 2 TIMES DAILY
Status: DISCONTINUED | OUTPATIENT
Start: 2024-06-03 | End: 2024-06-06 | Stop reason: HOSPADM

## 2024-06-03 RX ADMIN — Medication 10 ML: at 23:01

## 2024-06-03 RX ADMIN — VANCOMYCIN HYDROCHLORIDE 1500 MG: 1.5 INJECTION, POWDER, LYOPHILIZED, FOR SOLUTION INTRAVENOUS at 18:12

## 2024-06-03 RX ADMIN — MORPHINE SULFATE 2 MG: 2 INJECTION, SOLUTION INTRAMUSCULAR; INTRAVENOUS at 23:47

## 2024-06-03 RX ADMIN — LEVOFLOXACIN 750 MG: 5 INJECTION, SOLUTION INTRAVENOUS at 22:31

## 2024-06-03 RX ADMIN — CARVEDILOL 25 MG: 12.5 TABLET, FILM COATED ORAL at 22:03

## 2024-06-03 RX ADMIN — HYDROCODONE BITARTRATE AND ACETAMINOPHEN 1 TABLET: 5; 325 TABLET ORAL at 22:03

## 2024-06-03 RX ADMIN — Medication 5 MG: at 23:47

## 2024-06-03 NOTE — H&P
"[facility] HOSPITALIST     Kathryn Epstein, JOSE ANTONIO    CHIEF COMPLAINT: Toe pain    HISTORY OF PRESENT ILLNESS:  The patient is an 78-year-old female who presented to the emergency department secondary to 1 day of increased pain and redness to right foot.  She notes ongoing right foot cellulitis that is been managed by Dr. Gonsalez of podiatry, on oral antibiotics.  Antibiotics were changed last week however patient notes she is only taken 2 days of new antibiotics.  She notes pain only when walking, none at rest.  She states she is completely unaware of any plans to amputate her toe other than \"Dr. Gonsalez is going to take it off in the office 3 days after I come back from [place]\".  She denies pain at rest.    She perseverates on need to go to [place] on 6/8/2024 and notes that she will do this regardless of what happens while in hospital now.    She states that she only uses a \"walker while I'm walking\".    She states that she has been out of her \"heart medication\" for two days and was due to pick it up today. She does not know her home medications.     Diagnostics in ER significant for:  Right foot x-ray showing soft tissue gas, see full report in epic  Labs generally unremarkable.  Wound culture taken in ER    She has a history of HFrEF, HFpEF, NICM, PAF, recurrent, unprovoked syncope, hypertension, hyperlipidemia, thrombocytopenia/anemia, DM2 in obese with hyperglycemia and diabetic peripheral neuropathy, DFU, POA, hypothyroidism, GERD/HH, history of right breast cancer s/p right mastectomy 2015, depression, remote history of MRSA infection, gout, CKD stage III    She otherwise denies f/c/headache/rhinorrhea/nasal congestion/lightheadedness/syncopal sensation/cough/soa/n/v/d/chest pain/abdominal pain/recent illness/sick exposures/change in bowel or bladder habits/no weight change/bloody emesis or bloody stools/change in medications or any other new concerns.    Past Medical History: "   Diagnosis Date    Age-related osteoporosis without current pathological fracture 02/25/2022    Anemia     Benign breast disease     Cancer 2015    Right breast    Cellulitis of leg     May-2003 right lower extremity    CHF (congestive heart failure)     Dr Bates follows    Chronic kidney disease     Dr Hannah follows    Chronic ulcer of right foot     Non-pressure, history of     Closed fracture of patella 09/05/2017    Cluster headache     Colon polyp     Depression     Diabetic peripheral neuropathy     Difficulty walking     Diverticulosis     Femoral fracture     right    Fibromyalgia, primary     Fracture of left wrist     history of    Gastroesophageal reflux     Generalized pain 02/02/2016    Hiatal hernia     Hyperlipidemia     Hypertension     Hypothyroidism     Impingement syndrome of right shoulder     Joint pain     Left knee pain 05/17/2016    Menopausal disorder     Methicillin resistant Staphylococcus aureus infection 2012    after bladder surgery    Nonischemic cardiomyopathy     Ejection fraction 10% per 2-D echo with Doppler however she did have cardiac catheterization April 2015 which showed ejection fraction 20% with global hypokinesis and severe mitral insufficiency    Osteoarthritis     generalized, sched jania TKA    Osteomyelitis 02/02/2016    Pain in shoulder 08/02/2016    Paroxysmal atrial fibrillation     Dr Bates follows    Radius/ulna fracture, left, closed, initial encounter 10/21/2017    Shoulder pain, bilateral     has tears on both sides    Sleep apnea     Syncope, psychogenic 04/19/2017    Thoracic back pain 02/02/2016    Thoracic injuries     Toe ulcer     h/o to both feet, d/t shoes rubbing per patient    Transient alteration of awareness 04/19/2017    Traumatic closed nondisp torus fracture of distal radial metaphysis, right, initial encounter 2/28/2020     Past Surgical History:   Procedure Laterality Date    ABDOMINAL SURGERY  2012    had to be reopened after bladder  surgery d/t infection    AMPUTATION FOOT / TOE Right 11/2013    Rt foot amputation MTP first toe    BLADDER SURGERY  2012    BREAST BIOPSY      BREAST SURGERY  06/29/2015    Percutaneous ultrasound-guided placement of metal localized clip 1st lesion    CARDIAC CATHETERIZATION  2015    CARDIAC CATHETERIZATION N/A 1/25/2021    Procedure: Right and Left Heart Cath;  Surgeon: Nicholas Andrade MD;  Location:  [facility] CATH [facility]VAS[facility];  Service: Cardiovascular;  Laterality: N/A;  drop in EF, with hx of NICM, SOA fatigue.  Discussed with     CARDIAC CATHETERIZATION N/A 1/25/2021    Procedure: Left ventriculography;  Surgeon: Nicholas Andrade MD;  Location:  [facility] CATH [facility]VASIVE [facility];  Service: Cardiovascular;  Laterality: N/A;    CARDIAC CATHETERIZATION N/A 1/25/2021    Procedure: Coronary angiography;  Surgeon: Nicholas Andrade MD;  Location:  [facility] CATH INVASIVE LOCATION;  Service: Cardiovascular;  Laterality: N/A;    CATARACT EXTRACTION Bilateral 2017    COLONOSCOPY      DEBRIDEMENT  FOOT Right 01/2013    During hospitalization     EPIDURAL BLOCK      4 chronic back pain and leg pain last epidurals done 5-2012 she had no benefit at all from this procedure.    FEMUR FRACTURE SURGERY      FOOT SURGERY Bilateral     several    HERNIA REPAIR      At the abdomen February 2007 Dr. Mendez    INCISIONAL HERNIA REPAIR  05/16/2014    Recurrent. Incarcerated. With mesh implantation    MASTECTOMY Right 05/2015    MASTECTOMY MODIFIED RADICAL W/ AXILLARY LYMPH NODES W/ OR W/O PECTORALIS MINOR Right     SHOULDER SURGERY Right     x2 RCR    TOTAL ABDOMINAL HYSTERECTOMY      with oophorectomy-Done June-2012 secondary to vaginal wall prolapse.    TOTAL KNEE ARTHROPLASTY Right     TOTAL KNEE ARTHROPLASTY Left 4/17/2018    Procedure: TOTAL KNEE ARTHROPLASTY;  Surgeon: Live Chambers MD;  Location: [facility];  Service: Orthopedics     Family History   Problem Relation Age of Onset    Colonic polyp Mother     Hypertension  Mother     Migraines Mother     Mental illness Mother     Depression Mother     Arthritis Mother     Coronary artery disease Father     Other Father         Cardiac Disorder    Colon cancer Father     Kidney disease Father     Cancer Father     Diabetes Father     Heart disease Father     Hypertension Father     Breast cancer Maternal Aunt     Colon cancer Brother     Alcohol abuse Brother     Arthritis Sister     Hypertension Brother     Lung disease Brother     Alcohol abuse Brother     Malig Hyperthermia Neg Hx      Social History     Tobacco Use    Smoking status: Former     Current packs/day: 0.00     Average packs/day: 3.0 packs/day for 30.0 years (90.0 ttl pk-yrs)     Types: Cigarettes     Start date:      Quit date:      Years since quittin.4     Passive exposure: Past    Smokeless tobacco: Never   Vaping Use    Vaping status: Never Used   Substance Use Topics    Alcohol use: No     Comment: Caffeine use: 3 cups daily     Drug use: No     Medications Prior to Admission   Medication Sig Dispense Refill Last Dose    empagliflozin (JARDIANCE) 10 MG tablet tablet Take 1 tablet by mouth Daily. 21 tablet 0 2024    HYDROcodone-acetaminophen (NORCO) 5-325 MG per tablet Take 1 tablet by mouth Every 6 (Six) Hours As Needed for Severe Pain. 30 day supply. DNF 24 120 tablet 0 6/3/2024    aspirin 81 MG EC tablet Take 1 tablet by mouth Daily.       bumetanide (BUMEX) 0.5 MG tablet Take 2 tablets by mouth Daily.       Calcium Citrate-Vitamin D 250-2.5 MG-MCG per tablet Take 1 tablet by mouth 2 (Two) Times a Day. 60 tablet 11     carvedilol (COREG) 25 MG tablet Take 1 tablet by mouth 2 (Two) Times a Day. 180 tablet 0     D3-1000 25 MCG (1000 UT) tablet TAKE 1 TABLET BY MOUTH DAILY 90 tablet 1     DULoxetine (CYMBALTA) 60 MG capsule TAKE 1 CAPSULE BY MOUTH DAILY. 90 capsule 2     esomeprazole (nexIUM) 20 MG capsule Take 1 capsule by mouth Every Morning Before Breakfast.       glipizide (GLUCOTROL XL) 5  MG ER tablet TAKE 1 TABLET BY MOUTH DAILY WITH BREAKFAST. 90 tablet 3     levothyroxine (SYNTHROID, LEVOTHROID) 25 MCG tablet TAKE 1 TABLET BY MOUTH DAILY 90 tablet 3     Multiple Vitamins-Minerals (MULTIVITAMIN ADULT PO) Take 1 tablet by mouth Daily.       naproxen (EC NAPROSYN) 500 MG EC tablet Take 1 tablet by mouth 2 (Two) Times a Day As Needed for Mild Pain. (Patient taking differently: Take 1 tablet by mouth 2 (Two) Times a Day As Needed for Mild Pain. prn) 20 tablet 0     pravastatin (PRAVACHOL) 10 MG tablet TAKE 1 TABLET BY MOUTH AT BEDTIME 90 tablet 1     pregabalin (LYRICA) 75 MG capsule Take 1 capsule by mouth 2 (Two) Times a Day. 60 capsule 6     sacubitril-valsartan (Entresto) 49-51 MG tablet Take 1 tablet by mouth 2 (Two) Times a Day. 60 tablet 11     spironolactone (ALDACTONE) 25 MG tablet Take 1 tablet by mouth Daily. 30 tablet 11      Allergies:  Dilaudid [hydromorphone] and Latex    Immunization History   Administered Date(s) Administered    COVID-19 (MODERNA) 1st,2nd,3rd Dose Monovalent 02/10/2021, 03/10/2021, 03/25/2022    FLUAD TRI 65YR+ 09/01/2016, 09/27/2019    FluMist 2-49yrs 09/17/2015    Fluad Quad 65+ 09/27/2019, 10/09/2020, 10/15/2020    Fluzone High Dose =>65 Years ([employer] ONLY) 08/25/2016, 08/31/2017, 10/05/2018    Fluzone High-Dose 65+yrs 10/07/2021, 10/07/2022, 10/23/2023    Hepatitis B 06/01/2010, 11/23/2010    Hepatitis B Adult/Adolescent IM 06/01/2010, 11/23/2010    PPD Test 04/20/2018    Pneumococcal Conjugate 13-Valent (PCV13) 08/31/2017    Pneumococcal Polysaccharide (PPSV23) 04/06/2018       REVIEW OF SYSTEMS:  Please see the above history of present illness for pertinent positives and negatives.  The remainder of the patient's systems have been reviewed and are negative.     Vital Signs  Temp:  [98.2 °F (36.8 °C)] 98.2 °F (36.8 °C)  Heart Rate:  [70-85] 70  Resp:  [18-20] 20  BP: (122-140)/(75-76) 140/76  Body mass index is 30.11 kg/m².    Flowsheet Rows      Flowsheet Row  "First Filed Value   Admission Height 172.7 cm (68\") Documented at 06/03/2024 1732   Admission Weight 89.8 kg (198 lb) Documented at 06/03/2024 1732               Physical Exam  Vitals reviewed.   Constitutional:       General: She is not in acute distress.     Appearance: She is obese. She is not ill-appearing.   HENT:      Head: Normocephalic and atraumatic.      Mouth/Throat:      Mouth: Mucous membranes are moist.   Eyes:      Extraocular Movements: Extraocular movements intact.      Pupils: Pupils are equal, round, and reactive to light.   Cardiovascular:      Rate and Rhythm: Normal rate and regular rhythm.   Pulmonary:      Effort: Pulmonary effort is normal. No respiratory distress.      Breath sounds: Normal breath sounds. No wheezing or rales.   Abdominal:      General: Abdomen is flat. Bowel sounds are normal. There is no distension.      Palpations: Abdomen is soft.      Tenderness: There is no abdominal tenderness. There is no guarding.   Musculoskeletal:         General: Swelling present.   Skin:     Capillary Refill: Capillary refill takes more than 3 seconds. Right foot third toe     Findings: Erythema present.      Comments: Erythema base of toes to lateral foot, tender base of third toe, third toe purple   Neurological:      General: No focal deficit present.      Mental Status: She is alert and oriented to person, place, and time.   Psychiatric:      Comments: Perseverative, tangential        Emotional Behavior:    Judgment and Insight: Poor   Mental Status:  Alertness alert   Memory: Fair   Mood and Affect:         Depression none               Anxiety none obvious    Debilities:   Physical Weakness unknown   Handicaps unknown   Disabilities unknown   Agitation mild     Results Review:    I reviewed the patient's new clinical results.  Lab Results (most recent)       Procedure Component Value Units Date/Time    Basic Metabolic Panel [[id number]]  (Abnormal) Collected: 06/03/24 1805    Specimen: " Blood from Arm, Left Updated: 06/03/24 1834     Glucose 74 mg/dL      BUN 24 mg/dL      Creatinine 1.25 mg/dL      Sodium 136 mmol/L      Potassium 4.5 mmol/L      Chloride 105 mmol/L      CO2 20.7 mmol/L      Calcium 9.1 mg/dL      BUN/Creatinine Ratio 19.2     Anion Gap 10.3 mmol/L      eGFR 43.7 mL/min/1.73     Narrative:      GFR Normal >60  Chronic Kidney Disease <60  Kidney Failure <15    The GFR formula is only valid for adults with stable renal function between ages 18 and 70.    CBC & Differential [[id number]]  (Abnormal) Collected: 06/03/24 1805    Specimen: Blood from Arm, Left Updated: 06/03/24 1815    Narrative:      The following orders were created for panel order CBC & Differential.  Procedure                               Abnormality         Status                     ---------                               -----------         ------                     CBC Auto Differential[[id number]]        Abnormal            Final result                 Please view results for these tests on the individual orders.    CBC Auto Differential [[id number]]  (Abnormal) Collected: 06/03/24 1805    Specimen: Blood from Arm, Left Updated: 06/03/24 1815     WBC 6.46 10*3/mm3      RBC 3.48 10*6/mm3      Hemoglobin 10.5 g/dL      Hematocrit 33.0 %      MCV 94.8 fL      MCH 30.2 pg      MCHC 31.8 g/dL      RDW 15.2 %      RDW-SD 53.8 fl      MPV 9.4 fL      Platelets 146 10*3/mm3      Neutrophil % 76.4 %      Lymphocyte % 12.8 %      Monocyte % 9.4 %      Eosinophil % 0.9 %      Basophil % 0.2 %      Immature Grans % 0.3 %      Neutrophils, Absolute 4.93 10*3/mm3      Lymphocytes, Absolute 0.83 10*3/mm3      Monocytes, Absolute 0.61 10*3/mm3      Eosinophils, Absolute 0.06 10*3/mm3      Basophils, Absolute 0.01 10*3/mm3      Immature Grans, Absolute 0.02 10*3/mm3     Wound Culture - Wound, Toe, Right [[id number]] Collected: 06/03/24 1805    Specimen: Wound from Toe, Right Updated: 06/03/24 1809            Imaging Results (Most  Recent)       Procedure Component Value Units Date/Time    XR Foot 3+ View Right [[id number]] Collected: 06/03/24 1848     Updated: 06/03/24 1853    Narrative:      XR FOOT 3+ VW RIGHT    Date of Exam: 6/3/2024 6:27 PM EDT    Indication: Swelling and pain with infected second toe    Comparison: Foot x-ray 7/2/2022    Findings:  Status post transmetatarsal amputation second digit. Status post amputation of the distal first metatarsal. Osteopenia. No radiographic findings osteomyelitis. Severe degenerative change. Large plantar calcaneal spur. Questionable soft tissue gas at the   heel and hindfoot. Correlate for soft tissue ulceration.      Impression:      Impression:  No radiographic findings of osteomyelitis. MRI is more sensitive right for osteomyelitis.  Soft tissue gas is suspected at the plantar aspect of the heel and along the hindfoot. Correlate for wound.  Severe degenerative changes. Postoperative changes of the metatarsals. Large plantar calcaneal spur.      Electronically Signed: Chanel Llanes MD    6/3/2024 6:50 PM EDT    Workstation ID: [id number]          reviewed    ECG/EMG Results (most recent)       None          Pending    Assessment & Plan   Acute/chronic diabetic right foot cellulitis/third right toe gangrene/infection: POA  Consult podiatry  Check blood cultures x 2, sed rate 40  Wound culture pending, CRP pending  Add vancomycin and Levaquin  N.p.o. at midnight  Add home hydrocodone as needed  Add morphine IV after n.p.o.  Check EKG and venous duplex RLE pre-op  Monitor    H/O HFpEF/HFrEF:  NICM:  History of PAF:  Recurrent unprovoked syncope:  Hypertension:  Hyperlipidemia:  Follows with Dr. Bates, last visit note 5/31/2024 reviewed  Echo 2/6/2023 with normal EF, grade 1 diastolic dysfunction, mild MR, mild TR  Add home Coreg, Entresto, Jardiance, Pravachol, hold home spironolactone, Bumex  Fall precautions  Check EKG preop    DM2 in obese with hyperglycemia and diabetic peripheral neuropathy,  "chronic DFU, POA:  Remote history of MRSA infection:  Last A1c 4/22/2024 was 6.5%  Continue home Jardiance, resume glipizide after surgery  CCC diet then NPO at midnight  Monitor Accu-Cheks, no Glucommander at this time with normal glucose    CKD stage III:  Baseline creatinine approximately 1.2-1.3 most recently, currently 1.25  Recheck in a.m.    Hypothyroidism:  Last TSH 4/2024 normal, add home levothyroxine    GERD/HH: No current acute issues, add Protonix, substitute for home Nexium    Chronic iron deficiency anemia:  No active blood loss noted, hold home aspirin and chemical DVT prophylaxis for pending surgery  Check anemia panels, do not see iron supplement on MAR    History of gout: Nothing acute currently    Depression: Add home Cymbalta    History of right breast cancer s/p right mastectomy 2015:  Nothing acute    Lengthy discussion with patient regarding need to remain in hospital and allow surgical removal and treatment.  Discussed gangrene and sepsis and possible bloodstream infection should she choose to sign out AGAINST MEDICAL ADVICE.  She stated she would stay but that she still has to go to [place] on Saturday.    I discussed the patient's findings and my recommendations with patient and staff.     Lisa Mcleod, APRN  06/03/24  20:58 EDT    \"Dictated utilizing Dragon dictation\"    Note disclaimer: At [facility], we believe that sharing information builds trust and better relationships. You are receiving this note because you recently visited [facility]. It is possible you will see health information before a provider has talked with you about it. This kind of information can be easy to misunderstand. To help you fully understand what it means for your health, we urge you to discuss this note with your provider.        "

## 2024-06-03 NOTE — ED PROVIDER NOTES
Subjective     History provided by:  Patient    History of Present Illness    Chief complaint: Foot pain    Location: Right foot    Quality/Severity: Patient is developed pain and swelling and redness of the right foot extending from the gangrenous right second toe.    Timing/Onset: Started yesterday.    Modifying Factors: Patient has a right second toe that has been purple and Dr. Gonsalez, podiatry, he is planning to amputated in 2 weeks.    Associated symptoms: Bloody drainage from the toe.    Narrative: The patient is a 78-year-old white female who has had a purple right second toe that has a chronic diabetic ulcer on it.  It is scheduled for amputation in 2 weeks with Dr. Gonsalez when she gets back from [facility].  Yesterday she developed redness and swelling of the adjacent foot with discomfort.  Dr. Gonsalez office instructed her to go to the emergency department.    Review of Systems  Past Medical History:   Diagnosis Date    Age-related osteoporosis without current pathological fracture 02/25/2022    Anemia     Benign breast disease     Cancer 2015    Right breast    Cellulitis of leg     May-2003 right lower extremity    CHF (congestive heart failure)     Dr Bates follows    Chronic kidney disease     Dr Hannah follows    Chronic ulcer of right foot     Non-pressure, history of     Closed fracture of patella 09/05/2017    Cluster headache     Colon polyp     Depression     Diabetic peripheral neuropathy     Difficulty walking     Diverticulosis     Femoral fracture     right    Fibromyalgia, primary     Fracture of left wrist     history of    Gastroesophageal reflux     Generalized pain 02/02/2016    Hiatal hernia     Hyperlipidemia     Hypertension     Hypothyroidism     Impingement syndrome of right shoulder     Joint pain     Left knee pain 05/17/2016    Menopausal disorder     Methicillin resistant Staphylococcus aureus infection 2012    after bladder surgery    Nonischemic cardiomyopathy      "Ejection fraction 10% per 2-D echo with Doppler however she did have cardiac catheterization April 2015 which showed ejection fraction 20% with global hypokinesis and severe mitral insufficiency    Osteoarthritis     generalized, sched jania TKA    Osteomyelitis 02/02/2016    Pain in shoulder 08/02/2016    Paroxysmal atrial fibrillation     Dr Bates follows    Radius/ulna fracture, left, closed, initial encounter 10/21/2017    Shoulder pain, bilateral     has tears on both sides    Sleep apnea     Syncope, psychogenic 04/19/2017    Thoracic back pain 02/02/2016    Thoracic injuries     Toe ulcer     h/o to both feet, d/t shoes rubbing per patient    Transient alteration of awareness 04/19/2017    Traumatic closed nondisp torus fracture of distal radial metaphysis, right, initial encounter 2/28/2020     /76 (BP Location: Left arm, Patient Position: Lying)   Pulse 70   Temp 98.2 °F (36.8 °C) (Oral)   Resp 20   Ht 172.7 cm (68\")   Wt 89.8 kg (198 lb)   SpO2 97%   BMI 30.11 kg/m²     Past Medical History:   Diagnosis Date    Age-related osteoporosis without current pathological fracture 02/25/2022    Anemia     Benign breast disease     Cancer 2015    Right breast    Cellulitis of leg     May-2003 right lower extremity    CHF (congestive heart failure)     Dr Bates follows    Chronic kidney disease     Dr Hannah follows    Chronic ulcer of right foot     Non-pressure, history of     Closed fracture of patella 09/05/2017    Cluster headache     Colon polyp     Depression     Diabetic peripheral neuropathy     Difficulty walking     Diverticulosis     Femoral fracture     right    Fibromyalgia, primary     Fracture of left wrist     history of    Gastroesophageal reflux     Generalized pain 02/02/2016    Hiatal hernia     Hyperlipidemia     Hypertension     Hypothyroidism     Impingement syndrome of right shoulder     Joint pain     Left knee pain 05/17/2016    Menopausal disorder     Methicillin " resistant Staphylococcus aureus infection 2012    after bladder surgery    Nonischemic cardiomyopathy     Ejection fraction 10% per 2-D echo with Doppler however she did have cardiac catheterization April 2015 which showed ejection fraction 20% with global hypokinesis and severe mitral insufficiency    Osteoarthritis     generalized, sched jania TKA    Osteomyelitis 02/02/2016    Pain in shoulder 08/02/2016    Paroxysmal atrial fibrillation     Dr Bates follows    Radius/ulna fracture, left, closed, initial encounter 10/21/2017    Shoulder pain, bilateral     has tears on both sides    Sleep apnea     Syncope, psychogenic 04/19/2017    Thoracic back pain 02/02/2016    Thoracic injuries     Toe ulcer     h/o to both feet, d/t shoes rubbing per patient    Transient alteration of awareness 04/19/2017    Traumatic closed nondisp torus fracture of distal radial metaphysis, right, initial encounter 2/28/2020       Allergies   Allergen Reactions    Dilaudid [Hydromorphone] Delirium and Confusion    Latex Rash       Past Surgical History:   Procedure Laterality Date    ABDOMINAL SURGERY  2012    had to be reopened after bladder surgery d/t infection    AMPUTATION FOOT / TOE Right 11/2013    Rt foot amputation MTP first toe    BLADDER SURGERY  2012    BREAST BIOPSY      BREAST SURGERY  06/29/2015    Percutaneous ultrasound-guided placement of metal localized clip 1st lesion    CARDIAC CATHETERIZATION  2015    CARDIAC CATHETERIZATION N/A 1/25/2021    Procedure: Right and Left Heart Cath;  Surgeon: Nicholas Andrade MD;  Location:  [facility]AS[facility];  Service: Cardiovascular;  Laterality: N/A;  drop in EF, with hx of NICM, SOA fatigue.  Discussed with     CARDIAC CATHETERIZATION N/A 1/25/2021    Procedure: Left ventriculography;  Surgeon: Nicholas Andrade MD;  Location:  [facility];  Service: Cardiovascular;  Laterality: N/A;    CARDIAC CATHETERIZATION N/A 1/25/2021    Procedure:  Coronary angiography;  Surgeon: Nicholas Andrade MD;  Location:  [facility];  Service: Cardiovascular;  Laterality: N/A;    CATARACT EXTRACTION Bilateral 2017    COLONOSCOPY      DEBRIDEMENT  FOOT Right 01/2013    During hospitalization     EPIDURAL BLOCK      4 chronic back pain and leg pain last epidurals done 5-2012 she had no benefit at all from this procedure.    FEMUR FRACTURE SURGERY      FOOT SURGERY Bilateral     several    HERNIA REPAIR      At the abdomen February 2007 Dr. Mendez    INCISIONAL HERNIA REPAIR  05/16/2014    Recurrent. Incarcerated. With mesh implantation    MASTECTOMY Right 05/2015    MASTECTOMY MODIFIED RADICAL W/ AXILLARY LYMPH NODES W/ OR W/O PECTORALIS MINOR Right     SHOULDER SURGERY Right     x2 RCR    TOTAL ABDOMINAL HYSTERECTOMY      with oophorectomy-Done June-2012 secondary to vaginal wall prolapse.    TOTAL KNEE ARTHROPLASTY Right     TOTAL KNEE ARTHROPLASTY Left 4/17/2018    Procedure: TOTAL KNEE ARTHROPLASTY;  Surgeon: iLve Chambers MD;  Location:  [facility];  Service: Orthopedics       Family History   Problem Relation Age of Onset    Colonic polyp Mother     Hypertension Mother     Migraines Mother     Mental illness Mother     Depression Mother     Arthritis Mother     Coronary artery disease Father     Other Father         Cardiac Disorder    Colon cancer Father     Kidney disease Father     Cancer Father     Diabetes Father     Heart disease Father     Hypertension Father     Breast cancer Maternal Aunt     Colon cancer Brother     Alcohol abuse Brother     Arthritis Sister     Hypertension Brother     Lung disease Brother     Alcohol abuse Brother     Malig Hyperthermia Neg Hx        Social History     Socioeconomic History    Marital status:     Number of children: 2   Tobacco Use    Smoking status: Former     Current packs/day: 0.00     Average packs/day: 3.0 packs/day for 30.0 years (90.0 ttl pk-yrs)     Types: Cigarettes     Start date: 1953      Quit date:      Years since quittin.4     Passive exposure: Past    Smokeless tobacco: Never   Vaping Use    Vaping status: Never Used   Substance and Sexual Activity    Alcohol use: No     Comment: Caffeine use: 3 cups daily     Drug use: No    Sexual activity: Defer     Partners: Male     Comment: celibate           Objective   Physical Exam  Vitals and nursing note reviewed.   Constitutional:       General: She is not in acute distress.     Appearance: Normal appearance. She is not ill-appearing, toxic-appearing or diaphoretic.      Comments: Patient appears healthy in no acute distress.  Review of her vital signs: She is afebrile and all her vital signs are within normal limits.   HENT:      Head: Normocephalic and atraumatic.   Musculoskeletal:      Comments: The patient's right second toe has a dorsal 5 mm wound in the center of it that is draining purulent bloody drainage.  It is purple in color and appears gangrenous.  The adjacent right foot is erythematous, swollen and tender consistent with cellulitis.   Skin:     Capillary Refill: Capillary refill takes less than 2 seconds.   Neurological:      General: No focal deficit present.      Mental Status: She is alert and oriented to person, place, and time.      Cranial Nerves: No cranial nerve deficit.      Sensory: No sensory deficit.      Motor: No weakness.   Psychiatric:         Mood and Affect: Mood normal.         Behavior: Behavior normal.         Thought Content: Thought content normal.         Judgment: Judgment normal.         Procedures           ED Course  ED Course as of 24 Reviewed the patient's laboratory studies: The patient's CBC has a normal white count of 6.46 with a left shift.  The patient is anemic with a hemoglobin 10.5 hematocrit 33.  BMP has an elevated BUN at 24 and elevated creatinine 1.25 with a low GFR 43.7 consistent with chronic kidney disease.  Electrolytes within normal limits.   I cultured the wound on the dorsum of the second toe. [TP]   1903 X-ray of the right foot was interpreted by the radiologist as no radiologic findings of osteomyelitis.  Radiologist suspects soft tissue gas in the heel and hindfoot. [TP]   1904 Is my impression the patient's second toe that appears gangrenous had developed an infection on the wound on the dorsal aspect which is spread to cellulitis involving the right foot. [TP]   1927 19:12 patient discussed with Dr. Gonsalez, the patient's podiatrist, he stated the patient has been on Levaquin and Bactrim.  He request the hospitalist admit and he would consult seeing the patient tomorrow morning. [TP]   1927 19: 26 patient discussed with JOSE ANTONIO Harp hospitalist, who agrees to admit the patient. [TP]   2024 Antibiotic therapy for the patient's cellulitis of the right foot was initiated with vancomycin 1.5 g IV. [TP]      ED Course User Index  [TP] Giovany Durham MD                                             Medical Decision Making  Problems Addressed:  Cellulitis in diabetic foot: complicated acute illness or injury  Cellulitis of middle toe, right: complicated acute illness or injury    Amount and/or Complexity of Data Reviewed  Labs: ordered. Decision-making details documented in ED Course.  Radiology: ordered. Decision-making details documented in ED Course.  Discussion of management or test interpretation with external provider(s): Details documented in ED course.    Risk  Prescription drug management.  Decision regarding hospitalization.        Final diagnoses:   Cellulitis in diabetic foot   Cellulitis of middle toe, right       ED Disposition  ED Disposition       ED Disposition   Decision to Admit    Condition   --    Comment   Level of Care: Med/Surg [1]   Diagnosis: Cellulitis in diabetic foot [058888]   Admitting Physician: DEION MCMAHON [[id number]]   Bed Request Comments: Foot and toe cellulitis                 No follow-up provider  specified.       Medication List      No changes were made to your prescriptions during this visit.           Labs Reviewed   BASIC METABOLIC PANEL - Abnormal; Notable for the following components:       Result Value    BUN 24 (*)     Creatinine 1.25 (*)     CO2 20.7 (*)     eGFR 43.7 (*)     All other components within normal limits    Narrative:     GFR Normal >60  Chronic Kidney Disease <60  Kidney Failure <15    The GFR formula is only valid for adults with stable renal function between ages 18 and 70.   CBC WITH AUTO DIFFERENTIAL - Abnormal; Notable for the following components:    RBC 3.48 (*)     Hemoglobin 10.5 (*)     Hematocrit 33.0 (*)     Neutrophil % 76.4 (*)     Lymphocyte % 12.8 (*)     All other components within normal limits   WOUND CULTURE   CBC AND DIFFERENTIAL    Narrative:     The following orders were created for panel order CBC & Differential.  Procedure                               Abnormality         Status                     ---------                               -----------         ------                     CBC Auto Differential[[id number]]        Abnormal            Final result                 Please view results for these tests on the individual orders.     XR Foot 3+ View Right   Final Result   Impression:   No radiographic findings of osteomyelitis. MRI is more sensitive right for osteomyelitis.   Soft tissue gas is suspected at the plantar aspect of the heel and along the hindfoot. Correlate for wound.   Severe degenerative changes. Postoperative changes of the metatarsals. Large plantar calcaneal spur.         Electronically Signed: Chanel Llanes MD     6/3/2024 6:50 PM EDT     Workstation ID: [id number]             Medication List      No changes were made to your prescriptions during this visit.              Giovany Durham MD  06/03/24 2025

## 2024-06-04 ENCOUNTER — ANESTHESIA EVENT (OUTPATIENT)
Dept: PERIOP | Facility: HOSPITAL | Age: 79
End: 2024-06-04
Payer: MEDICARE

## 2024-06-04 ENCOUNTER — PREP FOR SURGERY (OUTPATIENT)
Dept: OTHER | Facility: HOSPITAL | Age: 79
End: 2024-06-04
Payer: MEDICARE

## 2024-06-04 ENCOUNTER — APPOINTMENT (OUTPATIENT)
Dept: GENERAL RADIOLOGY | Facility: HOSPITAL | Age: 79
DRG: 256 | End: 2024-06-04
Payer: MEDICARE

## 2024-06-04 ENCOUNTER — APPOINTMENT (OUTPATIENT)
Dept: ULTRASOUND IMAGING | Facility: HOSPITAL | Age: 79
DRG: 256 | End: 2024-06-04
Payer: MEDICARE

## 2024-06-04 ENCOUNTER — ANESTHESIA (OUTPATIENT)
Dept: PERIOP | Facility: HOSPITAL | Age: 79
End: 2024-06-04
Payer: MEDICARE

## 2024-06-04 DIAGNOSIS — M86.171 OTHER ACUTE OSTEOMYELITIS, RIGHT ANKLE AND FOOT: ICD-10-CM

## 2024-06-04 DIAGNOSIS — L03.031 CELLULITIS OF RIGHT TOE: Primary | ICD-10-CM

## 2024-06-04 DIAGNOSIS — Z48.1: Primary | ICD-10-CM

## 2024-06-04 PROBLEM — L03.90 CELLULITIS: Status: ACTIVE | Noted: 2024-06-04

## 2024-06-04 LAB
ANION GAP SERPL CALCULATED.3IONS-SCNC: 8.8 MMOL/L (ref 5–15)
BASOPHILS # BLD AUTO: 0.01 10*3/MM3 (ref 0–0.2)
BASOPHILS NFR BLD AUTO: 0.3 % (ref 0–1.5)
BUN SERPL-MCNC: 22 MG/DL (ref 8–23)
BUN/CREAT SERPL: 18.6 (ref 7–25)
CALCIUM SPEC-SCNC: 8.4 MG/DL (ref 8.6–10.5)
CHLORIDE SERPL-SCNC: 107 MMOL/L (ref 98–107)
CO2 SERPL-SCNC: 20.2 MMOL/L (ref 22–29)
CREAT SERPL-MCNC: 1.18 MG/DL (ref 0.57–1)
CRP SERPL-MCNC: 8.84 MG/DL (ref 0–0.5)
DEPRECATED RDW RBC AUTO: 55.6 FL (ref 37–54)
EGFRCR SERPLBLD CKD-EPI 2021: 46.8 ML/MIN/1.73
EOSINOPHIL # BLD AUTO: 0.11 10*3/MM3 (ref 0–0.4)
EOSINOPHIL NFR BLD AUTO: 2.8 % (ref 0.3–6.2)
ERYTHROCYTE [DISTWIDTH] IN BLOOD BY AUTOMATED COUNT: 15.2 % (ref 12.3–15.4)
FOLATE SERPL-MCNC: >20 NG/ML (ref 4.78–24.2)
GLUCOSE BLDC GLUCOMTR-MCNC: 118 MG/DL (ref 70–130)
GLUCOSE BLDC GLUCOMTR-MCNC: 153 MG/DL (ref 70–130)
GLUCOSE SERPL-MCNC: 129 MG/DL (ref 65–99)
HCT VFR BLD AUTO: 32.9 % (ref 34–46.6)
HGB BLD-MCNC: 10 G/DL (ref 12–15.9)
IMM GRANULOCYTES # BLD AUTO: 0.01 10*3/MM3 (ref 0–0.05)
IMM GRANULOCYTES NFR BLD AUTO: 0.3 % (ref 0–0.5)
LYMPHOCYTES # BLD AUTO: 0.61 10*3/MM3 (ref 0.7–3.1)
LYMPHOCYTES NFR BLD AUTO: 15.3 % (ref 19.6–45.3)
MCH RBC QN AUTO: 29.9 PG (ref 26.6–33)
MCHC RBC AUTO-ENTMCNC: 30.4 G/DL (ref 31.5–35.7)
MCV RBC AUTO: 98.5 FL (ref 79–97)
MONOCYTES # BLD AUTO: 0.41 10*3/MM3 (ref 0.1–0.9)
MONOCYTES NFR BLD AUTO: 10.3 % (ref 5–12)
NEUTROPHILS NFR BLD AUTO: 2.84 10*3/MM3 (ref 1.7–7)
NEUTROPHILS NFR BLD AUTO: 71 % (ref 42.7–76)
PLATELET # BLD AUTO: 129 10*3/MM3 (ref 140–450)
PMV BLD AUTO: 9.9 FL (ref 6–12)
POTASSIUM SERPL-SCNC: 4.5 MMOL/L (ref 3.5–5.2)
QT INTERVAL: 380 MS
QTC INTERVAL: 413 MS
RBC # BLD AUTO: 3.34 10*6/MM3 (ref 3.77–5.28)
SODIUM SERPL-SCNC: 136 MMOL/L (ref 136–145)
VIT B12 BLD-MCNC: <150 PG/ML (ref 211–946)
WBC NRBC COR # BLD AUTO: 3.99 10*3/MM3 (ref 3.4–10.8)

## 2024-06-04 PROCEDURE — 25010000002 MORPHINE PER 10 MG: Performed by: NURSE PRACTITIONER

## 2024-06-04 PROCEDURE — 82746 ASSAY OF FOLIC ACID SERUM: CPT | Performed by: NURSE PRACTITIONER

## 2024-06-04 PROCEDURE — 25010000002 VANCOMYCIN 1 G RECONSTITUTED SOLUTION 1 EACH VIAL: Performed by: STUDENT IN AN ORGANIZED HEALTH CARE EDUCATION/TRAINING PROGRAM

## 2024-06-04 PROCEDURE — 87070 CULTURE OTHR SPECIMN AEROBIC: CPT | Performed by: STUDENT IN AN ORGANIZED HEALTH CARE EDUCATION/TRAINING PROGRAM

## 2024-06-04 PROCEDURE — 88311 DECALCIFY TISSUE: CPT | Performed by: STUDENT IN AN ORGANIZED HEALTH CARE EDUCATION/TRAINING PROGRAM

## 2024-06-04 PROCEDURE — 87205 SMEAR GRAM STAIN: CPT | Performed by: STUDENT IN AN ORGANIZED HEALTH CARE EDUCATION/TRAINING PROGRAM

## 2024-06-04 PROCEDURE — 99232 SBSQ HOSP IP/OBS MODERATE 35: CPT | Performed by: HOSPITALIST

## 2024-06-04 PROCEDURE — 25010000002 PROPOFOL 200 MG/20ML EMULSION: Performed by: NURSE ANESTHETIST, CERTIFIED REGISTERED

## 2024-06-04 PROCEDURE — 25010000002 MORPHINE PER 10 MG: Performed by: STUDENT IN AN ORGANIZED HEALTH CARE EDUCATION/TRAINING PROGRAM

## 2024-06-04 PROCEDURE — 82948 REAGENT STRIP/BLOOD GLUCOSE: CPT

## 2024-06-04 PROCEDURE — 94761 N-INVAS EAR/PLS OXIMETRY MLT: CPT

## 2024-06-04 PROCEDURE — 0Y6T0Z0 DETACHMENT AT RIGHT 3RD TOE, COMPLETE, OPEN APPROACH: ICD-10-PCS | Performed by: STUDENT IN AN ORGANIZED HEALTH CARE EDUCATION/TRAINING PROGRAM

## 2024-06-04 PROCEDURE — 25810000003 SODIUM CHLORIDE 0.9 % SOLUTION 250 ML FLEX CONT: Performed by: STUDENT IN AN ORGANIZED HEALTH CARE EDUCATION/TRAINING PROGRAM

## 2024-06-04 PROCEDURE — 80048 BASIC METABOLIC PNL TOTAL CA: CPT | Performed by: NURSE PRACTITIONER

## 2024-06-04 PROCEDURE — 25010000002 VANCOMYCIN 1 G RECONSTITUTED SOLUTION 1 EACH VIAL: Performed by: HOSPITALIST

## 2024-06-04 PROCEDURE — 87176 TISSUE HOMOGENIZATION CULTR: CPT | Performed by: STUDENT IN AN ORGANIZED HEALTH CARE EDUCATION/TRAINING PROGRAM

## 2024-06-04 PROCEDURE — 93971 EXTREMITY STUDY: CPT

## 2024-06-04 PROCEDURE — 94799 UNLISTED PULMONARY SVC/PX: CPT

## 2024-06-04 PROCEDURE — 25010000002 ONDANSETRON PER 1 MG: Performed by: NURSE ANESTHETIST, CERTIFIED REGISTERED

## 2024-06-04 PROCEDURE — 82607 VITAMIN B-12: CPT | Performed by: NURSE PRACTITIONER

## 2024-06-04 PROCEDURE — 25810000003 SODIUM CHLORIDE 0.9 % SOLUTION 250 ML FLEX CONT: Performed by: HOSPITALIST

## 2024-06-04 PROCEDURE — 25810000003 LACTATED RINGERS PER 1000 ML: Performed by: NURSE ANESTHETIST, CERTIFIED REGISTERED

## 2024-06-04 PROCEDURE — 25010000002 BUPIVACAINE 0.25 % SOLUTION: Performed by: STUDENT IN AN ORGANIZED HEALTH CARE EDUCATION/TRAINING PROGRAM

## 2024-06-04 PROCEDURE — 85025 COMPLETE CBC W/AUTO DIFF WBC: CPT | Performed by: NURSE PRACTITIONER

## 2024-06-04 PROCEDURE — 25810000003 SODIUM CHLORIDE 0.9 % SOLUTION: Performed by: STUDENT IN AN ORGANIZED HEALTH CARE EDUCATION/TRAINING PROGRAM

## 2024-06-04 PROCEDURE — 88305 TISSUE EXAM BY PATHOLOGIST: CPT | Performed by: STUDENT IN AN ORGANIZED HEALTH CARE EDUCATION/TRAINING PROGRAM

## 2024-06-04 PROCEDURE — 73630 X-RAY EXAM OF FOOT: CPT

## 2024-06-04 PROCEDURE — 25010000002 CEFEPIME PER 500 MG: Performed by: STUDENT IN AN ORGANIZED HEALTH CARE EDUCATION/TRAINING PROGRAM

## 2024-06-04 RX ORDER — ENOXAPARIN SODIUM 100 MG/ML
40 INJECTION SUBCUTANEOUS NIGHTLY
Status: DISCONTINUED | OUTPATIENT
Start: 2024-06-04 | End: 2024-06-05

## 2024-06-04 RX ORDER — LIDOCAINE HYDROCHLORIDE 20 MG/ML
INJECTION, SOLUTION INFILTRATION; PERINEURAL AS NEEDED
Status: DISCONTINUED | OUTPATIENT
Start: 2024-06-04 | End: 2024-06-04 | Stop reason: SURG

## 2024-06-04 RX ORDER — MAGNESIUM HYDROXIDE 1200 MG/15ML
LIQUID ORAL AS NEEDED
Status: DISCONTINUED | OUTPATIENT
Start: 2024-06-04 | End: 2024-06-04 | Stop reason: HOSPADM

## 2024-06-04 RX ORDER — SODIUM CHLORIDE 0.9 % (FLUSH) 0.9 %
10 SYRINGE (ML) INJECTION AS NEEDED
Status: DISCONTINUED | OUTPATIENT
Start: 2024-06-04 | End: 2024-06-04 | Stop reason: HOSPADM

## 2024-06-04 RX ORDER — SODIUM CHLORIDE, SODIUM LACTATE, POTASSIUM CHLORIDE, CALCIUM CHLORIDE 600; 310; 30; 20 MG/100ML; MG/100ML; MG/100ML; MG/100ML
100 INJECTION, SOLUTION INTRAVENOUS ONCE
Status: DISCONTINUED | OUTPATIENT
Start: 2024-06-04 | End: 2024-06-04 | Stop reason: HOSPADM

## 2024-06-04 RX ORDER — LIDOCAINE HYDROCHLORIDE 10 MG/ML
0.5 INJECTION, SOLUTION EPIDURAL; INFILTRATION; INTRACAUDAL; PERINEURAL ONCE AS NEEDED
Status: DISCONTINUED | OUTPATIENT
Start: 2024-06-04 | End: 2024-06-04 | Stop reason: HOSPADM

## 2024-06-04 RX ORDER — ACETAMINOPHEN 500 MG
1000 TABLET ORAL ONCE
Status: CANCELLED | OUTPATIENT
Start: 2024-06-04 | End: 2024-06-04

## 2024-06-04 RX ORDER — PROPOFOL 10 MG/ML
INJECTION, EMULSION INTRAVENOUS AS NEEDED
Status: DISCONTINUED | OUTPATIENT
Start: 2024-06-04 | End: 2024-06-04 | Stop reason: SURG

## 2024-06-04 RX ORDER — FAMOTIDINE 10 MG/ML
20 INJECTION, SOLUTION INTRAVENOUS
Status: COMPLETED | OUTPATIENT
Start: 2024-06-04 | End: 2024-06-04

## 2024-06-04 RX ORDER — BUPIVACAINE HYDROCHLORIDE 2.5 MG/ML
INJECTION, SOLUTION INFILTRATION; PERINEURAL AS NEEDED
Status: DISCONTINUED | OUTPATIENT
Start: 2024-06-04 | End: 2024-06-04 | Stop reason: HOSPADM

## 2024-06-04 RX ORDER — SODIUM CHLORIDE 9 MG/ML
40 INJECTION, SOLUTION INTRAVENOUS AS NEEDED
Status: DISCONTINUED | OUTPATIENT
Start: 2024-06-04 | End: 2024-06-04 | Stop reason: HOSPADM

## 2024-06-04 RX ORDER — ONDANSETRON 2 MG/ML
4 INJECTION INTRAMUSCULAR; INTRAVENOUS ONCE AS NEEDED
Status: DISCONTINUED | OUTPATIENT
Start: 2024-06-04 | End: 2024-06-04 | Stop reason: HOSPADM

## 2024-06-04 RX ORDER — SODIUM HYPOCHLORITE 1.25 MG/ML
SOLUTION TOPICAL AS NEEDED
Status: DISCONTINUED | OUTPATIENT
Start: 2024-06-04 | End: 2024-06-04 | Stop reason: HOSPADM

## 2024-06-04 RX ORDER — LEVOFLOXACIN 5 MG/ML
750 INJECTION, SOLUTION INTRAVENOUS
Status: DISCONTINUED | OUTPATIENT
Start: 2024-06-05 | End: 2024-06-04

## 2024-06-04 RX ORDER — ACETAMINOPHEN 500 MG
1000 TABLET ORAL ONCE
Status: COMPLETED | OUTPATIENT
Start: 2024-06-04 | End: 2024-06-04

## 2024-06-04 RX ORDER — ONDANSETRON 2 MG/ML
4 INJECTION INTRAMUSCULAR; INTRAVENOUS ONCE AS NEEDED
Status: COMPLETED | OUTPATIENT
Start: 2024-06-04 | End: 2024-06-04

## 2024-06-04 RX ORDER — SODIUM HYPOCHLORITE 1.25 MG/ML
SOLUTION TOPICAL 2 TIMES DAILY
Status: DISCONTINUED | OUTPATIENT
Start: 2024-06-04 | End: 2024-06-06

## 2024-06-04 RX ORDER — SODIUM CHLORIDE, SODIUM LACTATE, POTASSIUM CHLORIDE, CALCIUM CHLORIDE 600; 310; 30; 20 MG/100ML; MG/100ML; MG/100ML; MG/100ML
9 INJECTION, SOLUTION INTRAVENOUS CONTINUOUS
Status: DISCONTINUED | OUTPATIENT
Start: 2024-06-04 | End: 2024-06-04

## 2024-06-04 RX ORDER — SODIUM CHLORIDE 9 MG/ML
75 INJECTION, SOLUTION INTRAVENOUS CONTINUOUS
Status: DISCONTINUED | OUTPATIENT
Start: 2024-06-04 | End: 2024-06-04

## 2024-06-04 RX ORDER — SODIUM CHLORIDE 0.9 % (FLUSH) 0.9 %
10 SYRINGE (ML) INJECTION EVERY 12 HOURS SCHEDULED
Status: DISCONTINUED | OUTPATIENT
Start: 2024-06-04 | End: 2024-06-04 | Stop reason: HOSPADM

## 2024-06-04 RX ADMIN — SODIUM CHLORIDE, POTASSIUM CHLORIDE, SODIUM LACTATE AND CALCIUM CHLORIDE 9 ML/HR: 600; 310; 30; 20 INJECTION, SOLUTION INTRAVENOUS at 15:58

## 2024-06-04 RX ADMIN — PANTOPRAZOLE SODIUM 40 MG: 40 TABLET, DELAYED RELEASE ORAL at 05:17

## 2024-06-04 RX ADMIN — CEFEPIME 1000 MG: 1 INJECTION, POWDER, FOR SOLUTION INTRAMUSCULAR; INTRAVENOUS at 22:31

## 2024-06-04 RX ADMIN — EMPAGLIFLOZIN 10 MG: 10 TABLET, FILM COATED ORAL at 08:32

## 2024-06-04 RX ADMIN — ONDANSETRON 4 MG: 2 INJECTION INTRAMUSCULAR; INTRAVENOUS at 16:01

## 2024-06-04 RX ADMIN — LEVOTHYROXINE SODIUM 25 MCG: 25 TABLET ORAL at 05:17

## 2024-06-04 RX ADMIN — DULOXETINE HYDROCHLORIDE 60 MG: 60 CAPSULE, DELAYED RELEASE ORAL at 08:32

## 2024-06-04 RX ADMIN — PREGABALIN 75 MG: 75 CAPSULE ORAL at 08:32

## 2024-06-04 RX ADMIN — ACETAMINOPHEN 1000 MG: 500 TABLET ORAL at 15:39

## 2024-06-04 RX ADMIN — HYDROCODONE BITARTRATE AND ACETAMINOPHEN 1 TABLET: 5; 325 TABLET ORAL at 12:28

## 2024-06-04 RX ADMIN — HYDROCODONE BITARTRATE AND ACETAMINOPHEN 1 TABLET: 5; 325 TABLET ORAL at 20:47

## 2024-06-04 RX ADMIN — PRAVASTATIN SODIUM 10 MG: 20 TABLET ORAL at 20:47

## 2024-06-04 RX ADMIN — Medication 10 ML: at 08:34

## 2024-06-04 RX ADMIN — CEFEPIME 1000 MG: 1 INJECTION, POWDER, FOR SOLUTION INTRAMUSCULAR; INTRAVENOUS at 10:56

## 2024-06-04 RX ADMIN — SODIUM CHLORIDE 75 ML/HR: 9 INJECTION, SOLUTION INTRAVENOUS at 09:32

## 2024-06-04 RX ADMIN — FAMOTIDINE 20 MG: 10 INJECTION, SOLUTION INTRAVENOUS at 16:01

## 2024-06-04 RX ADMIN — MORPHINE SULFATE 2 MG: 2 INJECTION, SOLUTION INTRAMUSCULAR; INTRAVENOUS at 05:17

## 2024-06-04 RX ADMIN — PROPOFOL 25 MG: 10 INJECTION, EMULSION INTRAVENOUS at 18:04

## 2024-06-04 RX ADMIN — VANCOMYCIN HYDROCHLORIDE 1000 MG: 1 INJECTION, POWDER, LYOPHILIZED, FOR SOLUTION INTRAVENOUS at 12:17

## 2024-06-04 RX ADMIN — PREGABALIN 75 MG: 75 CAPSULE ORAL at 20:48

## 2024-06-04 RX ADMIN — Medication 10 ML: at 20:47

## 2024-06-04 RX ADMIN — PROPOFOL 25 MG: 10 INJECTION, EMULSION INTRAVENOUS at 17:59

## 2024-06-04 RX ADMIN — CHOLECALCIFEROL TAB 25 MCG (1000 UNIT) 1000 UNITS: 25 TAB at 08:32

## 2024-06-04 RX ADMIN — LIDOCAINE HYDROCHLORIDE 100 MG: 20 INJECTION, SOLUTION INFILTRATION; PERINEURAL at 17:44

## 2024-06-04 RX ADMIN — PROPOFOL 25 MG: 10 INJECTION, EMULSION INTRAVENOUS at 17:52

## 2024-06-04 RX ADMIN — PROPOFOL 50 MG: 10 INJECTION, EMULSION INTRAVENOUS at 17:47

## 2024-06-04 NOTE — NURSING NOTE
Patient irritable so we obtained BG from lab work rather than sticking her again because she was very frustrated after multiple attempts at IV and obtaining lab work.

## 2024-06-04 NOTE — PROGRESS NOTES
"Pharmacy Consult - [facility]    Jung Short has been consulted for pharmacy to dose levofloxacin for skin and soft tissue infection per JOSE ANTONIO Mcleod's request.       Relevant clinical data and objective history reviewed:  78 y.o. female 172.7 cm (68\") 89.8 kg (198 lb)      Past Medical History:   Diagnosis Date    Age-related osteoporosis without current pathological fracture 02/25/2022    Anemia     Benign breast disease     Cancer 2015    Right breast    Cellulitis of leg     May-2003 right lower extremity    CHF (congestive heart failure)     Dr Bates follows    Chronic kidney disease     Dr Hannah follows    Chronic ulcer of right foot     Non-pressure, history of     Closed fracture of patella 09/05/2017    Cluster headache     Colon polyp     Depression     Diabetic peripheral neuropathy     Difficulty walking     Diverticulosis     Femoral fracture     right    Fibromyalgia, primary     Fracture of left wrist     history of    Gastroesophageal reflux     Generalized pain 02/02/2016    Hiatal hernia     Hyperlipidemia     Hypertension     Hypothyroidism     Impingement syndrome of right shoulder     Joint pain     Left knee pain 05/17/2016    Menopausal disorder     Methicillin resistant Staphylococcus aureus infection 2012    after bladder surgery    Nonischemic cardiomyopathy     Ejection fraction 10% per 2-D echo with Doppler however she did have cardiac catheterization April 2015 which showed ejection fraction 20% with global hypokinesis and severe mitral insufficiency    Osteoarthritis     generalized, sched jania TKA    Osteomyelitis 02/02/2016    Pain in shoulder 08/02/2016    Paroxysmal atrial fibrillation     Dr Bates follows    Radius/ulna fracture, left, closed, initial encounter 10/21/2017    Shoulder pain, bilateral     has tears on both sides    Sleep apnea     Syncope, psychogenic 04/19/2017    Thoracic back pain 02/02/2016    Thoracic injuries     Toe ulcer     h/o to both feet, d/t " shoes rubbing per patient    Transient alteration of awareness 04/19/2017    Traumatic closed nondisp torus fracture of distal radial metaphysis, right, initial encounter 2/28/2020     is allergic to dilaudid [hydromorphone] and latex.    Lab Results   Component Value Date    WBC 6.46 06/03/2024    HGB 10.5 (L) 06/03/2024    HCT 33.0 (L) 06/03/2024    MCV 94.8 06/03/2024     06/03/2024     Lab Results   Component Value Date    GLUCOSE 74 06/03/2024    CALCIUM 9.1 06/03/2024     06/03/2024    K 4.5 06/03/2024    CO2 20.7 (L) 06/03/2024     06/03/2024    BUN 24 (H) 06/03/2024    CREATININE 1.25 (H) 06/03/2024    EGFRIFAFRI 70 01/19/2022    EGFRIFNONA 61 01/19/2022    BCR 19.2 06/03/2024    ANIONGAP 10.3 06/03/2024       Estimated Creatinine Clearance: 42.1 mL/min (A) (by C-G formula based on SCr of 1.25 mg/dL (H)).      Assessment/Plan    1) Will start patient on Levaquin 750mg IV every 48 hours. Adjusted for renal impairment.    2) Will monitor patient's renal function every 24 hours, platelets at least every 3 days, and adjust as needed.      Yazan Valentine, LexaD

## 2024-06-04 NOTE — OP NOTE
AMPUTATION DIGIT  Procedure Report    Patient Name:  Jung Short  YOB: 1945    Date of Surgery:  6/4/2024     Indications: Nonhealing ulcer, cellulitis, osteomyelitis right third toe    Pre-op Diagnosis:   Cellulitis of right toe [L03.031]  Other acute osteomyelitis, right ankle and foot [M86.171]       Post-Op Diagnosis Codes:     * Cellulitis of right toe [L03.031]     * Other acute osteomyelitis, right ankle and foot [M86.171]    Procedure/CPT® Codes:      Procedure(s):  AMPUTATION DIGIT right third toe    Staff:  Surgeon(s):  Anmol Gonsalez DPM         Anesthesia: Monitored Anesthesia Care    Estimated Blood Loss: minimal    Implants:    Nothing was implanted during the procedure    Specimen:          None        Findings: Devitalized and necrotic bone to the distal, middle, proximal phalanx of the third toe.  Hard, viable bone to the third metatarsal head    Complications: None    Description of Procedure:   Patient was seen in the preoperative holding area.  The right foot was marked as the correct operative site.  The procedure was discussed in detail with the patient and all questions were answered to patient satisfaction.     The patient was brought back to the room and left in the hospital bed in the supine position.  Monitored anesthesia was initiated.  20 cc of 0.25% Marcaine plain were given in the right foot as a preoperative block.  The right foot was cleaned, prepped, draped in the usual sterile manner.  A timeout was performed to ensure correct patient, site, procedure.    Attention was directed to the right third toe which noted to be erythematous and edematous with exposed bone from the ulcer at the PIPJ.  An axial fillet flap style incision was made over the distal aspect of the toe and the distal middle phalanx or disarticulated and removed.  Attention was directed to the remaining proximal phalanx which was noted to the necrotic and soft.  It was disarticulated from  the toe and soft tissue attachments at the MPJ.    Surgical was inspected visually and palpated and no bone was noted to remain.  The metatarsal head was visible bone was hard, yellow, viable.  The area was irrigated with copious amounts of sterile water.  The skin edges were remodeled to remove necrotic skin, but the skin is noted to be erythematous and the decision was made to leave the wound open for closure in a few days as previously discussed.    Dakin's solution was applied to 4 x 4 gauze and packed in the wound and the foot was dressed with Kerlix, Ace bandage.  A postop shoe was applied    A tourniquet was not used throughout this case.      The patient tolerated the procedure and anesthesia well.  Patient was transferred to the recovery area with vital signs stable and neurovascular status intact.  The patient was placed in the care of the PACU nurses and transferred back to the floor when stable by anesthesia.           Anmol Gonsalez DPM     Date: 6/4/2024  Time: 18:22 EDT

## 2024-06-04 NOTE — NURSING NOTE
"Patient nor daughter can find list of patients meds, nor can they verify any of the meds I have on file. Patient has told me several times that we \"should have it on file because she sees a [facility] doctor.\"  She said she took her meds today at home.  "

## 2024-06-04 NOTE — CASE MANAGEMENT/SOCIAL WORK
"Continued Stay Note  NOHEMY Diaz     Patient Name: Jung Short  MRN: [id number]  Today's Date: 6/4/2024    Admit Date: 6/3/2024    Plan: plan home, lives alone   Discharge Plan       Row Name 06/04/24 1134       Plan    Plan plan home, lives alone    Patient/Family in Agreement with Plan yes    Plan Comments Spoke with patient at bedside. Face sheet verified. Patient lives alone in a home and is independent of ADLs including driving.She uses a rw, cane, rollator as needed. She has used HH in the past, but does not recall the agency. She has been to inpatient rehab previously. She has a living will, but states she may need to re-do it \" because it is so old.\" Per chart review, no living will noted. Referal to [facility] for ACP. She sees Kathryn BRADY as PCP. She uses [facility] and denies issues obtaining medications. There are no concerns r/t food, housing,utilities or transportation. CM # placed on white board, will continue to follow.                   Discharge Codes    No documentation.                       Blade Craig RN    "

## 2024-06-04 NOTE — PROGRESS NOTES
Pharmacokinetic Consult - Vancomycin Dosing    Jung Short is a 78 y.o. female who has been consulted for vancomycin dosing for complicated skin and soft tissue infection.    Relevant clinical data and objective history reviewed:  Lab Results   Component Value Date/Time    CREATININE 1.25 (H) 06/03/2024 06:05 PM    CREATININE 1.21 (H) 04/22/2024 01:34 PM    CREATININE 1.30 03/15/2024 04:31 PM    CREATININE 0.81 01/19/2024 03:15 PM    CREATININE 0.9 12/11/2019 02:43 PM    CREATININE 0.9 05/22/2019 04:29 PM    BUN 24 (H) 06/03/2024 06:05 PM    BUN 19 04/22/2024 01:34 PM    BUN 10 01/19/2024 03:15 PM    BUN 20 12/11/2019 02:43 PM    BUN 11 05/22/2019 04:29 PM     Estimated Creatinine Clearance: 42.1 mL/min (A) (by C-G formula based on SCr of 1.25 mg/dL (H)).  No intake/output data recorded.  Lab Results   Component Value Date/Time    WBC 6.46 06/03/2024 06:05 PM    WBC 6.74 09/05/2023 11:26 AM    WBC 8.50 12/11/2019 02:43 PM    HGB 10.5 (L) 06/03/2024 06:05 PM    HGB 9.1 (L) 12/11/2019 02:43 PM    HCT 33.0 (L) 06/03/2024 06:05 PM    HCT 29.6 (L) 12/11/2019 02:43 PM    MCV 94.8 06/03/2024 06:05 PM    MCV 92.8 12/11/2019 02:43 PM     06/03/2024 06:05 PM     12/11/2019 02:43 PM     Temp Readings from Last 3 Encounters:   06/04/24 97.6 °F (36.4 °C) (Oral)   04/22/24 97.2 °F (36.2 °C) (Temporal)   04/02/24 97.7 °F (36.5 °C)     Weight: 89.8 kg (198 lb)    Assessment/Plan  The patient will be started on vancomycin utilizing scheduled dosing based on actual body weight.  Baseline risks associated with therapy include: pre-existing renal impairment.  Pt received 1500mg IV once followed by 1000 mg IV every 18 hours.  Pharmacy will also follow closely for s/sx of nephrotoxicity.  Serum creatinine will be ordered per policy.  Plan for trough as patient approaches steady state, prior to the 4th dose.  Due to infection severity, will target a trough of 15-20 ug/mL.  Pharmacy will continue to follow the  patient’s culture results and clinical progress daily.    Yazan Valentine, PharmD

## 2024-06-04 NOTE — PROGRESS NOTES
"Hospitalist Team      Patient Care Team:  Kathryn Epstein APRN as PCP - General (Family Medicine)  Aria Bates MD as Consulting Physician (Cardiology)  Rafa Hannah MD as Consulting Physician (Nephrology)  Live Chambers MD as Surgeon (Orthopedic Surgery)  Trini Zaidi APRN as Nurse Practitioner (Pain Medicine)  Kaitlynn Frias DPM as Consulting Physician (Podiatry)        Chief Complaint:  Follow-up Gangrenous Toe    Subjective    No acute events overnight.  Ms. Short reports she's ready to go to the OR.  She denies chest pain and dyspnea.    Objective    Vital Signs  Temp:  [97.5 °F (36.4 °C)-98.5 °F (36.9 °C)] 97.6 °F (36.4 °C)  Heart Rate:  [61-85] 68  Resp:  [16-20] 16  BP: ()/(51-77) 93/53  Oxygen Therapy  SpO2: 91 %  Pulse Oximetry Type: Continuous  Device (Oxygen Therapy): room air}    Flowsheet Rows      Flowsheet Row First Filed Value   Admission Height 172.7 cm (68\") Documented at 06/03/2024 1732   Admission Weight 89.8 kg (198 lb) Documented at 06/03/2024 1732            Physical Exam:    General: Appears stated age in no acute distress.  Lungs: Breath sounds are clear throughout all fields.  Respirations are nonlabored.  CV: Regular rate and rhythm.  Appreciate no murmurs.  Radial and pedal pulses are 2+ and symmetric.  Abdomen: Obese, soft, and nontender.  MSK: No clubbing or cyanosis.  Neuro: Cranial nerves II through XII are grossly intact.  Psych: Normal affect.  Oriented x 3.    Results Review:     I reviewed the patient's new clinical results.    Lab Results (last 24 hours)       Procedure Component Value Units Date/Time    CBC & Differential [[id number]]  (Abnormal) Collected: 06/04/24 0937    Specimen: Blood Updated: 06/04/24 0952    Narrative:      The following orders were created for panel order CBC & Differential.  Procedure                               Abnormality         Status                     ---------                               " -----------         ------                     CBC Auto Differential[[id number]]        Abnormal            Final result                 Please view results for these tests on the individual orders.    CBC Auto Differential [[id number]]  (Abnormal) Collected: 06/04/24 0937    Specimen: Blood Updated: 06/04/24 0952     WBC 3.99 10*3/mm3      RBC 3.34 10*6/mm3      Hemoglobin 10.0 g/dL      Hematocrit 32.9 %      MCV 98.5 fL      MCH 29.9 pg      MCHC 30.4 g/dL      RDW 15.2 %      RDW-SD 55.6 fl      MPV 9.9 fL      Platelets 129 10*3/mm3      Neutrophil % 71.0 %      Lymphocyte % 15.3 %      Monocyte % 10.3 %      Eosinophil % 2.8 %      Basophil % 0.3 %      Immature Grans % 0.3 %      Neutrophils, Absolute 2.84 10*3/mm3      Lymphocytes, Absolute 0.61 10*3/mm3      Monocytes, Absolute 0.41 10*3/mm3      Eosinophils, Absolute 0.11 10*3/mm3      Basophils, Absolute 0.01 10*3/mm3      Immature Grans, Absolute 0.01 10*3/mm3     Vitamin B12 [[id number]] Collected: 06/04/24 0937    Specimen: Blood Updated: 06/04/24 0938    Folate [[id number]] Collected: 06/04/24 0937    Specimen: Blood Updated: 06/04/24 0938    Basic Metabolic Panel [[id number]] Collected: 06/04/24 0937    Specimen: Blood Updated: 06/04/24 0937    POC Glucose Once [[id number]]  (Normal) Collected: 06/04/24 0606    Specimen: Blood Updated: 06/04/24 0612     Glucose 118 mg/dL     Blood Culture - Blood, Arm, Left [[id number]] Collected: 06/03/24 2319    Specimen: Blood from Arm, Left Updated: 06/03/24 2325    Blood Culture - Blood, Arm, Left [[id number]] Collected: 06/03/24 2324    Specimen: Blood from Arm, Left Updated: 06/03/24 2324    C-reactive Protein [[id number]] Collected: 06/03/24 2249    Specimen: Blood Updated: 06/03/24 2249    Ferritin [[id number]]  (Abnormal) Collected: 06/03/24 1805    Specimen: Blood from Arm, Left Updated: 06/03/24 2142     Ferritin 204.00 ng/mL     Narrative:      Results may be falsely decreased if patient taking Biotin.      Iron  Profile [[id number]]  (Abnormal) Collected: 06/03/24 1805    Specimen: Blood from Arm, Left Updated: 06/03/24 2142     Iron 15 mcg/dL      Iron Saturation (TSAT) 5 %      Transferrin 201 mg/dL      TIBC 299 mcg/dL     Sedimentation Rate [[id number]]  (Abnormal) Collected: 06/03/24 1805    Specimen: Blood from Arm, Left Updated: 06/03/24 2049     Sed Rate 40 mm/hr     Wound Culture - Wound, Toe, Right [[id number]] Collected: 06/03/24 1805    Specimen: Wound from Toe, Right Updated: 06/03/24 1951     Gram Stain Few (2+) WBCs seen      No organisms seen    Basic Metabolic Panel [[id number]]  (Abnormal) Collected: 06/03/24 1805    Specimen: Blood from Arm, Left Updated: 06/03/24 1834     Glucose 74 mg/dL      BUN 24 mg/dL      Creatinine 1.25 mg/dL      Sodium 136 mmol/L      Potassium 4.5 mmol/L      Chloride 105 mmol/L      CO2 20.7 mmol/L      Calcium 9.1 mg/dL      BUN/Creatinine Ratio 19.2     Anion Gap 10.3 mmol/L      eGFR 43.7 mL/min/1.73     Narrative:      GFR Normal >60  Chronic Kidney Disease <60  Kidney Failure <15    The GFR formula is only valid for adults with stable renal function between ages 18 and 70.    CBC & Differential [[id number]]  (Abnormal) Collected: 06/03/24 1805    Specimen: Blood from Arm, Left Updated: 06/03/24 1815    Narrative:      The following orders were created for panel order CBC & Differential.  Procedure                               Abnormality         Status                     ---------                               -----------         ------                     CBC Auto Differential[[id number]]        Abnormal            Final result                 Please view results for these tests on the individual orders.    CBC Auto Differential [[id number]]  (Abnormal) Collected: 06/03/24 1805    Specimen: Blood from Arm, Left Updated: 06/03/24 1815     WBC 6.46 10*3/mm3      RBC 3.48 10*6/mm3      Hemoglobin 10.5 g/dL      Hematocrit 33.0 %      MCV 94.8 fL      MCH 30.2 pg      MCHC 31.8  g/dL      RDW 15.2 %      RDW-SD 53.8 fl      MPV 9.4 fL      Platelets 146 10*3/mm3      Neutrophil % 76.4 %      Lymphocyte % 12.8 %      Monocyte % 9.4 %      Eosinophil % 0.9 %      Basophil % 0.2 %      Immature Grans % 0.3 %      Neutrophils, Absolute 4.93 10*3/mm3      Lymphocytes, Absolute 0.83 10*3/mm3      Monocytes, Absolute 0.61 10*3/mm3      Eosinophils, Absolute 0.06 10*3/mm3      Basophils, Absolute 0.01 10*3/mm3      Immature Grans, Absolute 0.02 10*3/mm3             Imaging Results (Last 24 Hours)       Procedure Component Value Units Date/Time    US Venous Doppler Lower Extremity Right (duplex) [[id number]] Collected: 06/04/24 0929     Updated: 06/04/24 0933    Narrative:      US VENOUS DOPPLER LOWER EXTREMITY RIGHT (DUPLEX)    Date of Exam: 6/4/2024 8:44 AM EDT    Indication: edema/cellulitis.    Comparison: None available.    Technique:  Routine gray scale, color flow, compression and spectral Doppler analysis of the right lower extremity. A complete venous study was performed with image documentation obtained per protocol.      Findings:  The veins are compressible throughout. No intraluminal filling defects are identified. There is normal phasicity and augmentation of flow.      Impression:      Impression:  Negative right lower extremity venous Doppler.        Electronically Signed: Geoffrey Matos MD    6/4/2024 9:30 AM EDT    Workstation ID: [id number]    XR Foot 3+ View Right [[id number]] Collected: 06/03/24 1848     Updated: 06/03/24 1853    Narrative:      XR FOOT 3+ VW RIGHT    Date of Exam: 6/3/2024 6:27 PM EDT    Indication: Swelling and pain with infected second toe    Comparison: Foot x-ray 7/2/2022    Findings:  Status post transmetatarsal amputation second digit. Status post amputation of the distal first metatarsal. Osteopenia. No radiographic findings osteomyelitis. Severe degenerative change. Large plantar calcaneal spur. Questionable soft tissue gas at the   heel and hindfoot.  Correlate for soft tissue ulceration.      Impression:      Impression:  No radiographic findings of osteomyelitis. MRI is more sensitive right for osteomyelitis.  Soft tissue gas is suspected at the plantar aspect of the heel and along the hindfoot. Correlate for wound.  Severe degenerative changes. Postoperative changes of the metatarsals. Large plantar calcaneal spur.      Electronically Signed: Chanel Llanes MD    6/3/2024 6:50 PM EDT    Workstation ID: [id number]              Medication Review:   I have reviewed the patient's current medication list    Current Facility-Administered Medications:     acetaminophen (TYLENOL) tablet 650 mg, 650 mg, Oral, Q4H PRN **OR** acetaminophen (TYLENOL) 160 MG/5ML oral solution 650 mg, 650 mg, Oral, Q4H PRN **OR** acetaminophen (TYLENOL) suppository 650 mg, 650 mg, Rectal, Q4H PRN, Lisa Mcleod R, APRN    sennosides-docusate (PERICOLACE) 8.6-50 MG per tablet 2 tablet, 2 tablet, Oral, BID PRN **AND** polyethylene glycol (MIRALAX) packet 17 g, 17 g, Oral, Daily PRN **AND** bisacodyl (DULCOLAX) EC tablet 5 mg, 5 mg, Oral, Daily PRN **AND** bisacodyl (DULCOLAX) suppository 10 mg, 10 mg, Rectal, Daily PRN, HarshaKaye everettana R, APRN    carvedilol (COREG) tablet 25 mg, 25 mg, Oral, BID, HarshaLisa everett R, APRN, 25 mg at 06/03/24 2203    cholecalciferol (VITAMIN D3) tablet 1,000 Units, 1,000 Units, Oral, Daily, OrlandoLisa everett R, APRN, 1,000 Units at 06/04/24 0832    DULoxetine (CYMBALTA) DR capsule 60 mg, 60 mg, Oral, Daily, Lisa Mcleod R, APRN, 60 mg at 06/04/24 0832    empagliflozin (JARDIANCE) tablet 10 mg, 10 mg, Oral, Daily, Orlando, Lisa R, APRN, 10 mg at 06/04/24 0832    HYDROcodone-acetaminophen (NORCO) 5-325 MG per tablet 1 tablet, 1 tablet, Oral, Q6H PRN, Lisa Mcleod, JOSE ANTONIO, 1 tablet at 06/03/24 2203    [START ON 6/5/2024] levoFLOXacin (LEVAQUIN) 750 mg/150 mL D5W (premix) (LEVAQUIN) 750 mg, 750 mg, Intravenous, Q48H, Lisa Mcleod, APRN     levothyroxine (SYNTHROID, LEVOTHROID) tablet 25 mcg, 25 mcg, Oral, Q AM, Rexford, Lisa R, APRN, 25 mcg at 06/04/24 0517    melatonin tablet 5 mg, 5 mg, Oral, Nightly PRN, Rexford, Lisa R, APRN, 5 mg at 06/03/24 2347    morphine injection 2 mg, 2 mg, Intravenous, Q4H PRN, Rexford, Lisa R, APRN, 2 mg at 06/04/24 0517    ondansetron ODT (ZOFRAN-ODT) disintegrating tablet 4 mg, 4 mg, Oral, Q6H PRN **OR** ondansetron (ZOFRAN) injection 4 mg, 4 mg, Intravenous, Q6H PRN, Kaye Mcleodana R, APRN    pantoprazole (PROTONIX) EC tablet 40 mg, 40 mg, Oral, Q AM, Harsha, Lisa R, APRN, 40 mg at 06/04/24 0517    Pharmacy to Dose LevoFLOXacin (LEVAQUIN), , Does not apply, Continuous PRN, Harsha, Lisa R, APRN    Pharmacy to dose vancomycin, , Does not apply, Continuous PRN, Harsha, Lisa R, APRN    pravastatin (PRAVACHOL) tablet 10 mg, 10 mg, Oral, Nightly, HarshaKaye everettana R, APRN    pregabalin (LYRICA) capsule 75 mg, 75 mg, Oral, BID, Rexford, Lisa R, APRN, 75 mg at 06/04/24 0832    sacubitril-valsartan (ENTRESTO) 24-26 MG tablet 2 tablet, 2 tablet, Oral, Q12H, Rey Giron MD    [COMPLETED] Insert Peripheral IV, , , Once **AND** sodium chloride 0.9 % flush 10 mL, 10 mL, Intravenous, PRN, Giovany Durham MD    sodium chloride 0.9 % flush 10 mL, 10 mL, Intravenous, Q12H, HarshaKaye everettana R, APRN, 10 mL at 06/04/24 0834    sodium chloride 0.9 % flush 10 mL, 10 mL, Intravenous, PRN, Kaye Mcleodana R, APRN    sodium chloride 0.9 % infusion 40 mL, 40 mL, Intravenous, PRN, Lisa Mcleod R, APRN    sodium chloride 0.9 % infusion, 75 mL/hr, Intravenous, Continuous, Anmol Gonsalez, DPM, Last Rate: 75 mL/hr at 06/04/24 0932, 75 mL/hr at 06/04/24 0932    vancomycin (VANCOCIN) 1,000 mg in sodium chloride 0.9 % 250 mL IVPB-VTB, 1,000 mg, Intravenous, Q18H, Rey Giron MD      Assessment & Plan     Acute cellulitis of the right foot with third toe gangrene, POA: Plan for OR later this afternoon.   Continue antibiotic regimen.  Venous duplex was negative.  Diabetes mellitus type 2 in obese with diabetic peripheral neuropathy: A1c was at goal at 6.5%.  A.m. glucose was at goal.  Continue to monitor.  CKD stage III: Creatinine remains at baseline.  Hypothyroidism: Continue home replacement dose.  Chronic iron deficiency anemia: Benefit from IV iron infusions.  Will defer to outpatient provider.    Plan for disposition: Plans to leave for [place] on Saturday.    Rey Giron MD  06/04/24  09:52 EDT

## 2024-06-04 NOTE — ANESTHESIA PREPROCEDURE EVALUATION
Anesthesia Evaluation     Patient summary reviewed and Nursing notes reviewed   NPO Solid Status: > 8 hours  NPO Liquid Status: > 4 hours           Airway   Mallampati: II  TM distance: >3 FB  Neck ROM: full  No difficulty expected  Dental - normal exam   (+) upper dentures and lower dentures    Pulmonary - normal exam    breath sounds clear to auscultation  (+) lung cancer (quit 40 years ago),  Cardiovascular - normal exam  Exercise tolerance: poor (<4 METS)    ECG reviewed  Patient on routine beta blocker and Beta blocker given within 24 hours of surgery  Rhythm: regular  Rate: normal    (+) hypertension, valvular problems/murmurs MR, CAD, CHF (H/O IN PAST) Diastolic >=55% and Systolic <55%, PVD, hyperlipidemia    ROS comment: CARDIAC CLEARANCE 4/9   Diagnosis Plan  1. Other cardiomyopathy     2. Essential hypertension        1. H/o Nonischemic cardiomyopathy. Her ejection fraction was 10-20%. At this  time she is New York Heart Association class 2a. Her repeat echocardiogram 6/2015 showed  LVEF 50% with trace mitral and tricuspid insufficiency. No CHF now.    2. Hypertension, controlled.  3. DM, type 2.  4. Anomalous coronary artery.   5. H/o right breast cancer. Sees Dr. Draper.   6. Severe OA of the knees.  7. CKD, sees Dr. Hannah.   8.  Left knee OA.  She is low CV risk to undergo surgery 4/17/18 with Dr. Chambers.            Plan:     No further testing, no med changes, see back in 1 yeaR    .       Neuro/Psych  (+) syncope, psychiatric history Depression and Anxiety, poor historian.Numbness: PERIPH NEUROPATHY.  GI/Hepatic/Renal/Endo    (+) obesity, GERD, renal disease- CRI, diabetes mellitus (ACCUCHK 176) type 2, thyroid problem     Musculoskeletal     (+) chronic pain, myalgias (fibromyalgia)  Abdominal    Substance History      OB/GYN          Other   arthritis,   history of cancer (BREAST / NO CHEMO/ TAKING ORALLY . right mastectomy)    ROS/Med Hx Other: Sips of water at 1200    Cardiology visit  5/31/24  1. Nonischemic cardiomyopathy, recovered.  Maintain carvedilol, Jardiance, Entresto.  No decompensated heart failure  2. Hypertension blood pressure controlled.  She has no dizziness even if her blood pressure gets little low.  3. 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 History of right breast cancer  6. CKD  7. Recurrent syncope without warning, set up loop recorder      HEART RATE= 71  bpm  RR Interval= 848  ms  IA Interval= 186  ms  P Horizontal Axis= 55  deg  P Front Axis= 91  deg  QRSD Interval= 123  ms  QT Interval= 380  ms  QTcB= 413  ms  QRS Axis= -33  deg  T Wave Axis= 61  deg  - ABNORMAL ECG -  Sinus rhythm  Nonspecific intraventricular conduction delay  Electronically Signed By:   Date and Time of Study: 2024-06-03 21:49:36                        Anesthesia Plan    ASA 3     MAC     intravenous induction     Anesthetic plan, risks, benefits, and alternatives have been provided, discussed and informed consent has been obtained with: patient.    Use of blood products discussed with patient  Consented to blood products.    Plan discussed with CRNA.

## 2024-06-04 NOTE — ANESTHESIA POSTPROCEDURE EVALUATION
Patient: Jung Short    Procedure Summary       Date: 06/04/24 Room / Location:  [facility] 2 /  [facility]    Anesthesia Start: 1738 Anesthesia Stop: 1821    Procedure: AMPUTATION DIGIT right third toe (Right: Foot) Diagnosis:       Cellulitis of right toe      Other acute osteomyelitis, right ankle and foot      (Cellulitis of right toe [L03.031])      (Other acute osteomyelitis, right ankle and foot [M86.171])    Surgeons: Anmol Gonsalez DPM Provider: Niki Buchanan CRNA    Anesthesia Type: MAC ASA Status: 3            Anesthesia Type: MAC    Vitals  Vitals Value Taken Time   /63 06/04/24 1847   Temp 98 °F (36.7 °C) 06/04/24 1818   Pulse 59 06/04/24 1850   Resp 16 06/04/24 1845   SpO2 97 % 06/04/24 1850   Vitals shown include unfiled device data.        Post Anesthesia Care and Evaluation    Patient location during evaluation: PACU  Patient participation: complete - patient participated  Level of consciousness: awake  Pain management: adequate    Airway patency: patent  Anesthetic complications: No anesthetic complications  PONV Status: none  Cardiovascular status: acceptable  Respiratory status: acceptable  Hydration status: acceptable

## 2024-06-04 NOTE — PLAN OF CARE
Goal Outcome Evaluation:  Plan of Care Reviewed With: patient        Progress: no change  Outcome Evaluation: Patient is very unhappy about her current situation because she is supposed to leave for [place] this weekend. Pain controlled with current regimen. IV abx infusing per order. NPO at midnight. RA.

## 2024-06-04 NOTE — PROGRESS NOTES
Adult Nutrition  Assessment/PES    Patient Name:  Jung Short  YOB: 1945  MRN: [id number]  Admit Date:  6/3/2024    Assessment Date:  6/4/2024    Comments:  Adv diet as indicated post op.     Pt focused on getting food post op.    Encourage po and pro each meal aid with healing.  May benefit from multivitamin daily to aid with healing.    Will cont to follow and monitor.      Reason for Assessment       Row Name 06/04/24 1155          Reason for Assessment    Reason For Assessment identified at risk by screening criteria  wound     Diagnosis infection/sepsis  Acute on chronic food cellulitis, R toe gangrene hx Dm breast CA CKD                    Nutrition/Diet History       Row Name 06/04/24 1156          Nutrition/Diet History    Typical Intake (Food/Fluid/EN/PN) Spoke w pt at bedside. NKFA. Denies wt loss or issues with eating or swallowing/chewing.                    Labs/Tests/Procedures/Meds       Row Name 06/04/24 1157          Labs/Procedures/Meds    Lab Results Reviewed reviewed     Lab Results Comments BUn 24/creat 1.2 elevated        Diagnostic Tests/Procedures    Diagnostic Test/Procedure Reviewed reviewed        Medications    Pertinent Medications Reviewed reviewed     Pertinent Medications Comments vitamin D, jardiance                    Physical Findings       Row Name 06/04/24 1157          Physical Findings    Overall Physical Appearance calf some loss, otherwise no significant losses                    Estimated/Assessed Needs - Anthropometrics       Row Name 06/04/24 1157          Anthropometrics    Weight for Calculation 89.8 kg (197 lb 15.6 oz)        Estimated/Assessed Needs    Additional Documentation Estimated Calorie Needs (Group);Fluid Requirements (Group);Protein Requirements (Group)        Estimated Calorie Needs    Estimated Calorie Requirement (kcal/day) 9476-7955 kcal ( 18-22 kcal/kg BMI)     Estimated Calorie Need Method kcal/kg        Protein Requirements    Est  Protein Requirement Amount (gms/kg) 1.0 gm protein   gm pro (1-1.2 gm/kg pro)        Fluid Requirements    Estimated Fluid Requirement Method RDA Method  9228-5153 ml                    Nutrition Prescription Ordered       Row Name 06/04/24 1158          Nutrition Prescription PO    Current PO Diet NPO                    Evaluation of Received Nutrient/Fluid Intake       Row Name 06/04/24 1159          Fluid Intake Evaluation    Oral Fluid (mL) --  insufficient data        PO Evaluation    Number of Days PO Intake Evaluated Insufficient Data                       Problem/Interventions:   Problem 1       Row Name 06/04/24 1201          Nutrition Diagnoses Problem 1    Problem 1 Other (comment)  Increated nutritient needs related to Acute on chronic foot infection as evidenced by wound                          Intervention Goal       Row Name 06/04/24 1201          Intervention Goal    General Meet nutritional needs for age/condition     PO Tolerate PO;PO intake (%)     PO Intake % 50 %  or greater                    Nutrition Intervention       Row Name 06/04/24 1201          Nutrition Intervention    RD/Tech Action Follow Tx progress                      Education/Evaluation       Row Name 06/04/24 1202          Education    Education Other (comment)  Edu on nutrition exam. Edu on NPO at this time. [facility] kitchen closes 6pm, sandwich from floor after this.        Monitor/Evaluation    Monitor Per protocol;I&O;PO intake;Pertinent labs;Weight;Symptoms     Education Follow-up Other (comment)  pt verbalized understanding                     Electronically signed by:  Symone Dinero RD  06/04/24 12:03 EDT

## 2024-06-04 NOTE — PROGRESS NOTES
"[facility] Clinical Pharmacy Services: Enoxaparin Consult    Jung Short has a pharmacy consult to dose prophylactic enoxaparin per Winter's request.     Indication: VTE Prophylaxis  Home Anticoagulation: none     Relevant clinical data and objective history reviewed:  78 y.o. female 172.7 cm (68\") 89.8 kg (198 lb)   Body mass index is 30.11 kg/m².   Results from last 7 days   Lab Units 06/04/24  0937   PLATELETS 10*3/mm3 129*     Estimated Creatinine Clearance: 44.6 mL/min (A) (by C-G formula based on SCr of 1.18 mg/dL (H)).    Assessment/Plan    Will start patient on 40mg subcutaneous every 24 hours, adjusted for renal function. Consult order will be discontinued but pharmacy will continue to follow.     Bell Zamarripa, PharmD  Clinical Pharmacist    "

## 2024-06-04 NOTE — CONSULTS
[facility]   Consult Note    Patient Name: Jung Short  : 1945  MRN: [id number]  Primary Care Physician:  Kathryn Epstein APRN  Referring Physician: Anmol Gonsalez DPM  Date of admission: 6/3/2024    Inpatient Podiatry Consult  Consult performed by: Anmol Gonsalez DPM  Consult ordered by: Lisa Mcleod APRN  Reason for consult: Right third toe cellulitis and osteomyelitis  Assessment/Recommendations: IV antibiotics per primary  Amputation of right third toe out of the afternoon   Possible delayed closure on Thursday        Subjective   Subjective     Reason for Consult/ Chief Complaint: Right third toe cellulitis, osteomyelitis    History of Present Illness    Jung Short is a 78 y.o. female known to me for a left foot ulcer and ulcer of her right third toe.  The left foot ulcer was healed at her last visit.  However, 1 month ago, she presented for follow-up of an ulcer on the dorsal aspect of her right third toe.  This ulcer has been present for some time, but she had not been following up as instructed.  The ulcer on the right third toe had worsened and there was exposed bone at that time.  I recommended amputation, but she declined.  She wanted to try wound care and antibiotics.  I placed her on Bactrim.  Despite this, at her last visit, the ulcer was noted to be worsening and the toe was erythematous with obvious worsening cellulitis.  I recommend amputation at the time, the patient declined due to going on vacation the next week, but was agreeable to amputation after her vacation.  I placed her on Levaquin.  However, yesterday, she called my office and stated that her whole foot was red and swollen.  I recommended she go to the emergency department she did present last night for admission.  Since admission and IV antibiotics, the swelling in her foot has gone down and the pain has improved.    Review of Systems   Denies nausea, vomiting, fever, chills,  shortness of breath, chest pain  Personal History     Past Medical History:   Diagnosis Date    Age-related osteoporosis without current pathological fracture 02/25/2022    Anemia     Benign breast disease     Cancer 2015    Right breast    Cellulitis of leg     May-2003 right lower extremity    CHF (congestive heart failure)     Dr Bates follows    Chronic kidney disease     Dr Hannah follows    Chronic ulcer of right foot     Non-pressure, history of     Closed fracture of patella 09/05/2017    Cluster headache     Colon polyp     Depression     Diabetic peripheral neuropathy     Difficulty walking     Diverticulosis     Femoral fracture     right    Fibromyalgia, primary     Fracture of left wrist     history of    Gastroesophageal reflux     Generalized pain 02/02/2016    Hiatal hernia     Hyperlipidemia     Hypertension     Hypothyroidism     Impingement syndrome of right shoulder     Joint pain     Left knee pain 05/17/2016    Menopausal disorder     Methicillin resistant Staphylococcus aureus infection 2012    after bladder surgery    Nonischemic cardiomyopathy     Ejection fraction 10% per 2-D echo with Doppler however she did have cardiac catheterization April 2015 which showed ejection fraction 20% with global hypokinesis and severe mitral insufficiency    Osteoarthritis     generalized, sched jania TKA    Osteomyelitis 02/02/2016    Pain in shoulder 08/02/2016    Paroxysmal atrial fibrillation     Dr Bates follows    Radius/ulna fracture, left, closed, initial encounter 10/21/2017    Shoulder pain, bilateral     has tears on both sides    Sleep apnea     Syncope, psychogenic 04/19/2017    Thoracic back pain 02/02/2016    Thoracic injuries     Toe ulcer     h/o to both feet, d/t shoes rubbing per patient    Transient alteration of awareness 04/19/2017    Traumatic closed nondisp torus fracture of distal radial metaphysis, right, initial encounter 2/28/2020       Past Surgical History:   Procedure  Laterality Date    ABDOMINAL SURGERY  2012    had to be reopened after bladder surgery d/t infection    AMPUTATION FOOT / TOE Right 11/2013    Rt foot amputation MTP first toe    BLADDER SURGERY  2012    BREAST BIOPSY      BREAST SURGERY  06/29/2015    Percutaneous ultrasound-guided placement of metal localized clip 1st lesion    CARDIAC CATHETERIZATION  2015    CARDIAC CATHETERIZATION N/A 1/25/2021    Procedure: Right and Left Heart Cath;  Surgeon: Nicholas Andrade MD;  Location:  [facility];  Service: Cardiovascular;  Laterality: N/A;  drop in EF, with hx of NICM, SOA fatigue.  Discussed with     CARDIAC CATHETERIZATION N/A 1/25/2021    Procedure: Left ventriculography;  Surgeon: Nicholas Andrade MD;  Location:  [facility];  Service: Cardiovascular;  Laterality: N/A;    CARDIAC CATHETERIZATION N/A 1/25/2021    Procedure: Coronary angiography;  Surgeon: Nicholas Andrade MD;  Location:  [facility]VAS[facility];  Service: Cardiovascular;  Laterality: N/A;    CATARACT EXTRACTION Bilateral 2017    COLONOSCOPY      DEBRIDEMENT  FOOT Right 01/2013    During hospitalization     EPIDURAL BLOCK      4 chronic back pain and leg pain last epidurals done 5-2012 she had no benefit at all from this procedure.    FEMUR FRACTURE SURGERY      FOOT SURGERY Bilateral     several    HERNIA REPAIR      At the abdomen February 2007 Dr. Mendez    INCISIONAL HERNIA REPAIR  05/16/2014    Recurrent. Incarcerated. With mesh implantation    MASTECTOMY Right 05/2015    MASTECTOMY MODIFIED RADICAL W/ AXILLARY LYMPH NODES W/ OR W/O PECTORALIS MINOR Right     SHOULDER SURGERY Right     x2 RCR    TOTAL ABDOMINAL HYSTERECTOMY      with oophorectomy-Done June-2012 secondary to vaginal wall prolapse.    TOTAL KNEE ARTHROPLASTY Right     TOTAL KNEE ARTHROPLASTY Left 4/17/2018    Procedure: TOTAL KNEE ARTHROPLASTY;  Surgeon: Live Chambers MD;  Location: [facility];  Service: Orthopedics       Family  History: family history includes Alcohol abuse in her brother and brother; Arthritis in her mother and sister; Breast cancer in her maternal aunt; Cancer in her father; Colon cancer in her brother and father; Colonic polyp in her mother; Coronary artery disease in her father; Depression in her mother; Diabetes in her father; Heart disease in her father; Hypertension in her brother, father, and mother; Kidney disease in her father; Lung disease in her brother; Mental illness in her mother; Migraines in her mother; Other in her father. Otherwise pertinent FHx was reviewed and not pertinent to current issue.    Social History:  reports that she quit smoking about 41 years ago. Her smoking use included cigarettes. She started smoking about 71 years ago. She has a 90 pack-year smoking history. She has been exposed to tobacco smoke. She has never used smokeless tobacco. She reports that she does not drink alcohol and does not use drugs.    Home Medications:   Calcium Citrate-Vitamin D, DULoxetine, HYDROcodone-acetaminophen, aspirin, bumetanide, carvedilol, cholecalciferol, empagliflozin, esomeprazole, glipizide, levothyroxine, multivitamin with minerals, naproxen, pravastatin, pregabalin, sacubitril-valsartan, and spironolactone    Allergies:  Allergies   Allergen Reactions    Dilaudid [Hydromorphone] Delirium and Confusion    Latex Rash       Objective    Objective     Vitals:  Temp:  [97.5 °F (36.4 °C)-98.5 °F (36.9 °C)] 97.6 °F (36.4 °C)  Heart Rate:  [61-85] 68  Resp:  [16-20] 16  BP: ()/(51-77) 93/53    Physical Exam   Mental Status: AAOx3 and in NAD   Heart:  RRR   Lungs: Respirations nonlabored    Abdomen: Nondistended   Other findings noted:  none    LE exam  Vascular: DP/PT pulses +2/4 bilaterally.  Edema and erythema noted to the right third toe    Neurologic: Light touch sensation intact bilaterally    Dermatologic: Right third toe is erythematous with ulceration to the dorsal PIPJ and exposed bone and  purulent drainage noted.  See photo from today's exam below:      Musculoskeletal: Muscle strength 5/5 for plantar flexion, dorsiflexion, inversion, eversion, bilateral  Deformities: Amputation of right second toe noted  Tenderness to palpation: To right third toe  Result Review    Result Review:  I have personally reviewed the results from the time of this admission to 6/4/2024 07:58 EDT and agree with these findings:  [x]  Laboratory list / accordion  [x]  Microbiology  [x]  Radiology  []  EKG/Telemetry   []  Cardiology/Vascular   []  Pathology  []  Old records  []  Other:  Most notable findings include: White count within normal limits at 6.46, ESR elevated to 40, Gram stain shows white blood cells and no organisms but culture results pending.  No osteomyelitis seen on x-ray.      Assessment & Plan   Assessment / Plan     Brief Patient Summary:  Jung Short is a 78 y.o. female who was seen for right third toe cellulitis, osteomyelitis    Active Hospital Problems:  Active Hospital Problems    Diagnosis     **Cellulitis in diabetic foot     Cellulitis of right toe     Other acute osteomyelitis, right ankle and foot      Plan:   Osteomyelitis of right third toe  Continue antibiotics per primary.  I discussed the need for amputation to control the infection and eradicate the source with patient.  I also discussed that it is likely she will need this to be a staged procedure.  I do not want to trap any bacteria in the skin by closing primarily if the skin is not healthy.  I know she wants to go to [place], but I am more concerned about readmission for infection if this is not treated appropriately.  She understands.    Continue n.p.o., I got her added onto the surgery schedule for 4:30 this afternoon.    I also have her added onto the surgery schedule for 11:45 on Thursday for closure of the wound.    Will start some IV fluids due to n.p.o. status.    I believe surgical intervention is warranted.  Risks and  benefits discussed with patient.  Risks, including pain, numbness, site infection, need for revisional surgery, unappealing cosmetic result, DVT, discussed with patient.  No guarantees given or implied.    N.p.o. after midnight.  Hold blood thinner.  Continue antibiotics.          Anmol Gonsalez DPM

## 2024-06-04 NOTE — NURSING NOTE
Pt returned to unit s/p toe amputation. Pt a&o x4. Dressing with noted drainage. Foot in surgical shoe. Denies pain. Family at bedside.

## 2024-06-04 NOTE — ED NOTES
[facility] unable [facility], so I am unable to review meds. Daughter is going to the patients house and will bring list back

## 2024-06-05 ENCOUNTER — APPOINTMENT (OUTPATIENT)
Dept: ULTRASOUND IMAGING | Facility: HOSPITAL | Age: 79
DRG: 256 | End: 2024-06-05
Payer: MEDICARE

## 2024-06-05 ENCOUNTER — ANESTHESIA EVENT (OUTPATIENT)
Dept: PERIOP | Facility: HOSPITAL | Age: 79
End: 2024-06-05
Payer: MEDICARE

## 2024-06-05 LAB
ANION GAP SERPL CALCULATED.3IONS-SCNC: 12.2 MMOL/L (ref 5–15)
BASOPHILS # BLD AUTO: 0.01 10*3/MM3 (ref 0–0.2)
BASOPHILS NFR BLD AUTO: 0.3 % (ref 0–1.5)
BUN SERPL-MCNC: 17 MG/DL (ref 8–23)
BUN/CREAT SERPL: 16.7 (ref 7–25)
CALCIUM SPEC-SCNC: 8.9 MG/DL (ref 8.6–10.5)
CHLORIDE SERPL-SCNC: 111 MMOL/L (ref 98–107)
CO2 SERPL-SCNC: 16.8 MMOL/L (ref 22–29)
CREAT SERPL-MCNC: 1.02 MG/DL (ref 0.57–1)
DEPRECATED RDW RBC AUTO: 55.2 FL (ref 37–54)
EGFRCR SERPLBLD CKD-EPI 2021: 55.7 ML/MIN/1.73
EOSINOPHIL # BLD AUTO: 0.16 10*3/MM3 (ref 0–0.4)
EOSINOPHIL NFR BLD AUTO: 4.6 % (ref 0.3–6.2)
ERYTHROCYTE [DISTWIDTH] IN BLOOD BY AUTOMATED COUNT: 15.3 % (ref 12.3–15.4)
GLUCOSE SERPL-MCNC: 113 MG/DL (ref 65–99)
HCT VFR BLD AUTO: 36.5 % (ref 34–46.6)
HGB BLD-MCNC: 11 G/DL (ref 12–15.9)
IMM GRANULOCYTES # BLD AUTO: 0.02 10*3/MM3 (ref 0–0.05)
IMM GRANULOCYTES NFR BLD AUTO: 0.6 % (ref 0–0.5)
LYMPHOCYTES # BLD AUTO: 0.86 10*3/MM3 (ref 0.7–3.1)
LYMPHOCYTES NFR BLD AUTO: 24.8 % (ref 19.6–45.3)
MCH RBC QN AUTO: 29.5 PG (ref 26.6–33)
MCHC RBC AUTO-ENTMCNC: 30.1 G/DL (ref 31.5–35.7)
MCV RBC AUTO: 97.9 FL (ref 79–97)
MONOCYTES # BLD AUTO: 0.4 10*3/MM3 (ref 0.1–0.9)
MONOCYTES NFR BLD AUTO: 11.5 % (ref 5–12)
NEUTROPHILS NFR BLD AUTO: 2.02 10*3/MM3 (ref 1.7–7)
NEUTROPHILS NFR BLD AUTO: 58.2 % (ref 42.7–76)
PLATELET # BLD AUTO: 142 10*3/MM3 (ref 140–450)
PMV BLD AUTO: 10 FL (ref 6–12)
POTASSIUM SERPL-SCNC: 4.7 MMOL/L (ref 3.5–5.2)
RBC # BLD AUTO: 3.73 10*6/MM3 (ref 3.77–5.28)
SODIUM SERPL-SCNC: 140 MMOL/L (ref 136–145)
VANCOMYCIN SERPL-MCNC: 11.9 MCG/ML (ref 5–40)
WBC NRBC COR # BLD AUTO: 3.47 10*3/MM3 (ref 3.4–10.8)

## 2024-06-05 PROCEDURE — 80202 ASSAY OF VANCOMYCIN: CPT | Performed by: STUDENT IN AN ORGANIZED HEALTH CARE EDUCATION/TRAINING PROGRAM

## 2024-06-05 PROCEDURE — 85025 COMPLETE CBC W/AUTO DIFF WBC: CPT | Performed by: HOSPITALIST

## 2024-06-05 PROCEDURE — 25010000002 VANCOMYCIN 1 G RECONSTITUTED SOLUTION 1 EACH VIAL: Performed by: STUDENT IN AN ORGANIZED HEALTH CARE EDUCATION/TRAINING PROGRAM

## 2024-06-05 PROCEDURE — 80048 BASIC METABOLIC PNL TOTAL CA: CPT | Performed by: HOSPITALIST

## 2024-06-05 PROCEDURE — 99232 SBSQ HOSP IP/OBS MODERATE 35: CPT | Performed by: HOSPITALIST

## 2024-06-05 PROCEDURE — 93923 UPR/LXTR ART STDY 3+ LVLS: CPT

## 2024-06-05 PROCEDURE — 25010000002 ENOXAPARIN PER 10 MG: Performed by: STUDENT IN AN ORGANIZED HEALTH CARE EDUCATION/TRAINING PROGRAM

## 2024-06-05 PROCEDURE — 94799 UNLISTED PULMONARY SVC/PX: CPT

## 2024-06-05 PROCEDURE — 25010000002 CEFEPIME PER 500 MG: Performed by: STUDENT IN AN ORGANIZED HEALTH CARE EDUCATION/TRAINING PROGRAM

## 2024-06-05 PROCEDURE — 25810000003 SODIUM CHLORIDE 0.9 % SOLUTION 250 ML FLEX CONT: Performed by: STUDENT IN AN ORGANIZED HEALTH CARE EDUCATION/TRAINING PROGRAM

## 2024-06-05 RX ORDER — ENOXAPARIN SODIUM 100 MG/ML
40 INJECTION SUBCUTANEOUS DAILY
Status: DISCONTINUED | OUTPATIENT
Start: 2024-06-05 | End: 2024-06-06

## 2024-06-05 RX ORDER — UREA 10 %
1000 LOTION (ML) TOPICAL DAILY
Status: DISCONTINUED | OUTPATIENT
Start: 2024-06-05 | End: 2024-06-06 | Stop reason: HOSPADM

## 2024-06-05 RX ADMIN — CEFEPIME 1000 MG: 1 INJECTION, POWDER, FOR SOLUTION INTRAMUSCULAR; INTRAVENOUS at 10:47

## 2024-06-05 RX ADMIN — PANTOPRAZOLE SODIUM 40 MG: 40 TABLET, DELAYED RELEASE ORAL at 06:21

## 2024-06-05 RX ADMIN — CARVEDILOL 25 MG: 12.5 TABLET, FILM COATED ORAL at 22:35

## 2024-06-05 RX ADMIN — SACUBITRIL AND VALSARTAN 2 TABLET: 24; 26 TABLET, FILM COATED ORAL at 22:35

## 2024-06-05 RX ADMIN — EMPAGLIFLOZIN 10 MG: 10 TABLET, FILM COATED ORAL at 08:34

## 2024-06-05 RX ADMIN — SACUBITRIL AND VALSARTAN 2 TABLET: 24; 26 TABLET, FILM COATED ORAL at 08:33

## 2024-06-05 RX ADMIN — PREGABALIN 75 MG: 75 CAPSULE ORAL at 22:35

## 2024-06-05 RX ADMIN — PRAVASTATIN SODIUM 10 MG: 20 TABLET ORAL at 22:35

## 2024-06-05 RX ADMIN — LEVOTHYROXINE SODIUM 25 MCG: 25 TABLET ORAL at 06:21

## 2024-06-05 RX ADMIN — CHOLECALCIFEROL TAB 25 MCG (1000 UNIT) 1000 UNITS: 25 TAB at 08:33

## 2024-06-05 RX ADMIN — VANCOMYCIN HYDROCHLORIDE 1000 MG: 1 INJECTION, POWDER, LYOPHILIZED, FOR SOLUTION INTRAVENOUS at 06:21

## 2024-06-05 RX ADMIN — Medication 10 ML: at 08:38

## 2024-06-05 RX ADMIN — ENOXAPARIN SODIUM 40 MG: 100 INJECTION SUBCUTANEOUS at 10:49

## 2024-06-05 RX ADMIN — HYDROCODONE BITARTRATE AND ACETAMINOPHEN 1 TABLET: 5; 325 TABLET ORAL at 22:35

## 2024-06-05 RX ADMIN — DAKIN'S SOLUTION 0.125% (QUARTER STRENGTH): 0.12 SOLUTION at 08:38

## 2024-06-05 RX ADMIN — DAKIN'S SOLUTION 0.125% (QUARTER STRENGTH): 0.12 SOLUTION at 22:37

## 2024-06-05 RX ADMIN — PREGABALIN 75 MG: 75 CAPSULE ORAL at 08:34

## 2024-06-05 RX ADMIN — DULOXETINE HYDROCHLORIDE 60 MG: 60 CAPSULE, DELAYED RELEASE ORAL at 08:35

## 2024-06-05 RX ADMIN — CEFEPIME 1000 MG: 1 INJECTION, POWDER, FOR SOLUTION INTRAMUSCULAR; INTRAVENOUS at 22:32

## 2024-06-05 RX ADMIN — Medication 10 ML: at 22:37

## 2024-06-05 RX ADMIN — HYDROCODONE BITARTRATE AND ACETAMINOPHEN 1 TABLET: 5; 325 TABLET ORAL at 06:21

## 2024-06-05 RX ADMIN — CARVEDILOL 25 MG: 12.5 TABLET, FILM COATED ORAL at 08:34

## 2024-06-05 RX ADMIN — CYANOCOBALAMIN TAB 500 MCG 1000 MCG: 500 TAB at 17:26

## 2024-06-05 NOTE — THERAPY DISCHARGE NOTE
Acute Care - Physical Therapy Discharge Summary   Alissa Castellon       Patient Name: Jung Short  : 1945  MRN: [id number]    Today's Date: 2024                 Admit Date: 6/3/2024    Patient ambulating in room with use of SPC and surgical shoe, no balance loss noted.  Patient declines need for physical therapy for functional mobility.  Patient states surgical shoe is too large, multiple additional surgical shoes/offloader shoes offered however patient declines due to size or presence of heel wedge.  Discussed with patient and RN, encouraged patient to discuss with MD if different surgical shoe desired.  No further PT needs at this time.                             Sarah Begum, PT   2024

## 2024-06-05 NOTE — PLAN OF CARE
Goal Outcome Evaluation:  Plan of Care Reviewed With: patient        Progress: no change  Outcome Evaluation: POD #1. Vital signs stable. Oxygen stable on room air. IV antibiotics given, IV otherwise saline locked. Doppler pulse on right foot. Tolerating PO pain medication. Drainage unchanged since arrival to the floor. Patient appears to have rested well overnight.

## 2024-06-05 NOTE — PROGRESS NOTES
"Hospitalist Team      Patient Care Team:  Kathryn Epstein APRN as PCP - General (Family Medicine)  Aria Bates MD as Consulting Physician (Cardiology)  Rafa Hannah MD as Consulting Physician (Nephrology)  Live Chambers MD as Surgeon (Orthopedic Surgery)  Trini Zaidi APRN as Nurse Practitioner (Pain Medicine)  Kaitlynn Frias DPM as Consulting Physician (Podiatry)        Chief Complaint:  Follow-up Cellulitis; Amputation.    Subjective    No acute events overnight.  Ms. Short feels well.  She denies chest pain and dyspnea.  Good post-op pain control.  She is tolerating PO.    Objective    Vital Signs  Temp:  [97.4 °F (36.3 °C)-98 °F (36.7 °C)] 97.6 °F (36.4 °C)  Heart Rate:  [56-74] 60  Resp:  [14-20] 20  BP: ()/(49-99) 132/99  Oxygen Therapy  SpO2: 99 %  Pulse Oximetry Type: Continuous  Device (Oxygen Therapy): room air  ETCO2 (mmHg): 36 mmHg}    Flowsheet Rows      Flowsheet Row First Filed Value   Admission Height 172.7 cm (68\") Documented at 06/03/2024 1732   Admission Weight 89.8 kg (198 lb) Documented at 06/03/2024 1732            Physical Exam:    General: Appears stated age in no acute distress.  Lungs: Breath sounds are clear throughout all fields.  Respirations are non-labored.  CV: Regular rate and rhythm.  No murmurs appreciated.  Radial pulses are 2+ and symmetric.  Abdomen: Obese, soft, and non-tender w/ active bowel sounds.  MSK: No C/C/E.  Skin: Dressing intact.  Neuro: CN II-XII grossly intact.  Psych: Pleasant affect.  Ox3.    Results Review:     I reviewed the patient's new clinical results.    Lab Results (last 24 hours)       Procedure Component Value Units Date/Time    Tissue / Bone Culture - Surgical Site, Toe, Right [[id number]] Collected: 06/04/24 1828    Specimen: Surgical Site from Toe, Right Updated: 06/05/24 1039     Tissue Culture No growth     Gram Stain No WBCs seen      No organisms seen    Wound Culture - Wound, Toe, Right [[id number]]  " (Abnormal) Collected: 06/03/24 1805    Specimen: Wound from Toe, Right Updated: 06/05/24 0958     Wound Culture Light growth (2+) Gram Negative Bacilli      Scant growth (1+) Normal Skin Dee     Gram Stain Few (2+) WBCs seen      No organisms seen    Basic Metabolic Panel [[id number]]  (Abnormal) Collected: 06/05/24 0557    Specimen: Blood Updated: 06/05/24 0824     Glucose 113 mg/dL      BUN 17 mg/dL      Creatinine 1.02 mg/dL      Sodium 140 mmol/L      Potassium 4.7 mmol/L      Chloride 111 mmol/L      CO2 16.8 mmol/L      Calcium 8.9 mg/dL      BUN/Creatinine Ratio 16.7     Anion Gap 12.2 mmol/L      eGFR 55.7 mL/min/1.73     Narrative:      GFR Normal >60  Chronic Kidney Disease <60  Kidney Failure <15    The GFR formula is only valid for adults with stable renal function between ages 18 and 70.    CBC & Differential [[id number]]  (Abnormal) Collected: 06/05/24 0557    Specimen: Blood Updated: 06/05/24 0815    Narrative:      The following orders were created for panel order CBC & Differential.  Procedure                               Abnormality         Status                     ---------                               -----------         ------                     CBC Auto Differential[[id number]]        Abnormal            Final result                 Please view results for these tests on the individual orders.    CBC Auto Differential [[id number]]  (Abnormal) Collected: 06/05/24 0557    Specimen: Blood Updated: 06/05/24 0815     WBC 3.47 10*3/mm3      RBC 3.73 10*6/mm3      Hemoglobin 11.0 g/dL      Hematocrit 36.5 %      MCV 97.9 fL      MCH 29.5 pg      MCHC 30.1 g/dL      RDW 15.3 %      RDW-SD 55.2 fl      MPV 10.0 fL      Platelets 142 10*3/mm3      Neutrophil % 58.2 %      Lymphocyte % 24.8 %      Monocyte % 11.5 %      Eosinophil % 4.6 %      Basophil % 0.3 %      Immature Grans % 0.6 %      Neutrophils, Absolute 2.02 10*3/mm3      Lymphocytes, Absolute 0.86 10*3/mm3      Monocytes, Absolute 0.40  10*3/mm3      Eosinophils, Absolute 0.16 10*3/mm3      Basophils, Absolute 0.01 10*3/mm3      Immature Grans, Absolute 0.02 10*3/mm3     Vancomycin, Random [[id number]]  (Normal) Collected: 06/05/24 0557    Specimen: Blood Updated: 06/05/24 0702     Vancomycin Random 11.90 mcg/mL     Narrative:      Therapeutic Ranges for Vancomycin    Vancomycin Random   5.0-40.0 mcg/mL  Vancomycin Trough   5.0-20.0 mcg/mL  Vancomycin Peak     20.0-40.0 mcg/mL    Blood Culture - Blood, Arm, Left [[id number]]  (Normal) Collected: 06/03/24 2319    Specimen: Blood from Arm, Left Updated: 06/04/24 2330     Blood Culture No growth at 24 hours    Narrative:      Less than seven (7) mL's of blood was collected.  Insufficient quantity may yield false negative results.    Blood Culture - Blood, Arm, Left [[id number]]  (Normal) Collected: 06/03/24 2324    Specimen: Blood from Arm, Left Updated: 06/04/24 2330     Blood Culture No growth at 24 hours    Narrative:      Less than seven (7) mL's of blood was collected.  Insufficient quantity may yield false negative results.    Tissue Pathology Exam [[id number]] Collected: 06/04/24 1827    Specimen: Tissue from Toe, Right Updated: 06/04/24 1959    Folate [[id number]]  (Normal) Collected: 06/04/24 0937    Specimen: Blood Updated: 06/04/24 1632     Folate >20.00 ng/mL     Narrative:      Results may be falsely increased if patient taking Biotin.      Vitamin B12 [[id number]]  (Abnormal) Collected: 06/04/24 0937    Specimen: Blood Updated: 06/04/24 1632     Vitamin B-12 <150 pg/mL     Narrative:      Results may be falsely increased if patient taking Biotin.              Imaging Results (Last 24 Hours)       Procedure Component Value Units Date/Time    US Ankle / Brachial Indices Extremity Complete [[id number]] Collected: 06/05/24 0847     Updated: 06/05/24 0854    Narrative:      US ANKLE / BRACHIAL INDICES EXTREMITY COMPLETE    Date of Exam: 6/5/2024 7:52 AM EDT    Indication: Status post right third toe  amputation due to ulcer.    Comparison: No comparisons available.    Technique: Routine ankle-brachial indices were calculated bilaterally.      Findings:  Right lower extremity:  The brachial pressure on the right could not be determined.    There is a normal triphasic waveform noted in the posterior tibial artery and biphasic waveform in the dorsalis pedis artery with velocities of 83 cm/s and 113 cm/s, respectively. Pressure measurement at the right ankle is 137 mm Hg. The JULIEN could not be   calculated.     Left lower extremity:  The brachial pressure is 121 mmHg.     There is a normal triphasic waveform noted within the posterior tibial artery and normal triphasic waveform in the dorsalis pedis artery with velocities of 145 cm/s and 112 cm/s, respectively. Pressure measurement at the right ankle is 136 mm Hg and the   JULIEN is 1.12.      Impression:      1.The right brachial pressure could not be measured. The JULIEN on the right could therefore not be determined.  2.The JULIEN on the left is 1.12 within the normal range. There is no evidence for hemodynamically significant stenosis involving the left lower extremity.        Electronically Signed: Bryce Talbot MD    6/5/2024 8:51 AM EDT    Workstation ID: [id number]    XR Foot 3+ View Right [[id number]] Collected: 06/04/24 1937     Updated: 06/04/24 1942    Narrative:      XR FOOT 3+ VW RIGHT    Date of Exam: 6/4/2024 7:31 PM EDT    Indication: Status post right third toe amputation for osteomyelitis    Comparison: Right foot radiographs 6/3/2024    Findings:  There has been amputation of the third digit. There is remote surgical change with transmetatarsal amputation of the first and second metatarsal. There is overlap of the fourth and fifth toes. There is severe osteopenia. Soft tissue swelling and soft   tissue gas likely due to recent surgery. Large plantar calcaneal spur. Moderate to severe arthritis in the foot.      Impression:      Impression:  Amputation of  the third digit. Soft tissue swelling and soft tissue gas likely due to recent surgery.      Electronically Signed: Chanel Llanes MD    6/4/2024 7:39 PM EDT    Workstation ID: [id number]              Medication Review:   I have reviewed the patient's current medication list    Current Facility-Administered Medications:     acetaminophen (TYLENOL) tablet 650 mg, 650 mg, Oral, Q4H PRN **OR** acetaminophen (TYLENOL) 160 MG/5ML oral solution 650 mg, 650 mg, Oral, Q4H PRN **OR** acetaminophen (TYLENOL) suppository 650 mg, 650 mg, Rectal, Q4H PRN, Anmol Gonsalez T, DPM    sennosides-docusate (PERICOLACE) 8.6-50 MG per tablet 2 tablet, 2 tablet, Oral, BID PRN **AND** polyethylene glycol (MIRALAX) packet 17 g, 17 g, Oral, Daily PRN **AND** bisacodyl (DULCOLAX) EC tablet 5 mg, 5 mg, Oral, Daily PRN **AND** bisacodyl (DULCOLAX) suppository 10 mg, 10 mg, Rectal, Daily PRN, Larry Gonsaleziel T, DPM    carvedilol (COREG) tablet 25 mg, 25 mg, Oral, BID, Larry Gonsaleziel T, DPM, 25 mg at 06/05/24 0834    cefepime 1000 mg IVPB in 100 mL NS (VTB), 1,000 mg, Intravenous, Q12H, Anmol Gonsalez T, DPM, 1,000 mg at 06/05/24 1047    cholecalciferol (VITAMIN D3) tablet 1,000 Units, 1,000 Units, Oral, Daily, Anmol Gonsalez T, DPM, 1,000 Units at 06/05/24 0833    DULoxetine (CYMBALTA) DR capsule 60 mg, 60 mg, Oral, Daily, Anmol Gonsalez T, DPM, 60 mg at 06/05/24 0835    empagliflozin (JARDIANCE) tablet 10 mg, 10 mg, Oral, Daily, Larry Gonsaleziel T, DPM, 10 mg at 06/05/24 0834    Enoxaparin Sodium (LOVENOX) syringe 40 mg, 40 mg, Subcutaneous, Daily, Anmol Gonsalez, DPM, 40 mg at 06/05/24 1049    HYDROcodone-acetaminophen (NORCO) 5-325 MG per tablet 1 tablet, 1 tablet, Oral, Q6H PRN, Anmol Gonsalez, DPM, 1 tablet at 06/05/24 0621    levothyroxine (SYNTHROID, LEVOTHROID) tablet 25 mcg, 25 mcg, Oral, Q AM, Anmol Gonsalez, DPM, 25 mcg at 06/05/24 0621    melatonin tablet 5 mg, 5 mg, Oral, Nightly PRN, Sunshine,  Anmol T, DPM, 5 mg at 06/03/24 2347    morphine injection 2 mg, 2 mg, Intravenous, Q4H PRN, Anmol Gonsalez, DPM, 2 mg at 06/04/24 0517    ondansetron ODT (ZOFRAN-ODT) disintegrating tablet 4 mg, 4 mg, Oral, Q6H PRN **OR** ondansetron (ZOFRAN) injection 4 mg, 4 mg, Intravenous, Q6H PRN, Anmol Gonsalez, DPM    pantoprazole (PROTONIX) EC tablet 40 mg, 40 mg, Oral, Q AM, Anmol Gonsalez T, DPM, 40 mg at 06/05/24 0621    Pharmacy to Dose enoxaparin (LOVENOX), , Does not apply, Continuous PRN, Anmol Gonsalez T, DPM    Pharmacy to dose vancomycin, , Does not apply, Continuous PRN, Anmol Gonsalez, DPM    pravastatin (PRAVACHOL) tablet 10 mg, 10 mg, Oral, Nightly, Anmol Gonsalez T, DPM, 10 mg at 06/04/24 2047    pregabalin (LYRICA) capsule 75 mg, 75 mg, Oral, BID, Anmol Gonsalez, DPM, 75 mg at 06/05/24 0834    sacubitril-valsartan (ENTRESTO) 24-26 MG tablet 2 tablet, 2 tablet, Oral, Q12H, Anmol Gonsalez, DPM, 2 tablet at 06/05/24 0833    [COMPLETED] Insert Peripheral IV, , , Once **AND** sodium chloride 0.9 % flush 10 mL, 10 mL, Intravenous, PRN, Anmol Gonsalez T, DPM    sodium chloride 0.9 % flush 10 mL, 10 mL, Intravenous, Q12H, Anmol Gonsalez T, DPM, 10 mL at 06/05/24 0838    sodium chloride 0.9 % flush 10 mL, 10 mL, Intravenous, PRN, Anmol Gonsalez T, DPM    sodium chloride 0.9 % infusion 40 mL, 40 mL, Intravenous, PRN, Anmol Gonsalez T, DPM    sodium hypochlorite (DAKIN'S 1/4 STRENGTH) 0.125 % topical solution 0.125% solution, , Topical, BID, Anmol Gonsalez, DPM, Given at 06/05/24 0838    vancomycin (VANCOCIN) 1,000 mg in sodium chloride 0.9 % 250 mL IVPB-VTB, 1,000 mg, Intravenous, Q18H, Anmol Gonsalez, DPM, Last Rate: 250 mL/hr at 06/05/24 0621, 1,000 mg at 06/05/24 0621      Assessment & Plan     Acute cellulitis of the right foot with third toe gangrene, POA: POD #1 from amputation.  Plan for close tomorrow.  Will de-escalate abx therapy given  GNB is culture.  B12 Deficiency: I did not order a Methylmalonic acid level given how low the B12 is.  Started on replacement.  Diabetes mellitus type 2 in obese with Diabetic Peripheral Neuropathy: No acute issues.  Continue current regimen.  CKD stage III: Creatinine around baseline.  Continue to monitor.  Hypothyroidism: No acute issues on home replacement dose.  Chronic iron deficiency anemia: Nothing acute.  As discussed yesterday.    Plan for disposition: Home tomorrow after closure.    Rey Giron MD  06/05/24  15:16 EDT   [FreeTextEntry1] : bp check [de-identified] : here for bp recheckfeeling well. no c/o

## 2024-06-05 NOTE — PLAN OF CARE
Goal Outcome Evaluation:              Outcome Evaluation: POD#1, vss, drsg and surgical boot in place, pwb to rle. npo after mn for scheduled surgery with Dr garcia 6/6/24 at 11;45, lovenox to be held after mn.

## 2024-06-05 NOTE — PROGRESS NOTES
Patient: Jung Short  Procedure(s):  AMPUTATION DIGIT right third toe  Anesthesia type: MAC    Patient location: [facility] Surgical Floor  Last vitals:   Vitals:    06/05/24 0719   BP: 125/58   Pulse: 59   Resp: 18   Temp: 97.8 °F (36.6 °C)   SpO2: 99%     Level of consciousness: awake, alert, and oriented    Post-anesthesia pain: adequate analgesia  Airway patency: patent  Respiratory: unassisted  Cardiovascular: stable and blood pressure at baseline  Hydration: euvolemic    Anesthetic complications: no. Patient not in room during rounds. Chart reviewed.

## 2024-06-05 NOTE — PROGRESS NOTES
Assessment/Plan  Assessment & Plan   Osteomyelitis right third toe: Now status post amputation.  Continue vancomycin and cefepime.  She is already scheduled for washout and closure tomorrow in the OR.  I changed the dressing myself today with Dakin's wet-to-dry.  Continue twice daily dressing changes by nursing.    Although her pulses are strong, bleeding is not great during surgery.  There was a lot of bleeding during dressing change this morning.  However, I am going to check an JULIEN anyway.    Physical therapy consult placed for assistance with ambulation.    N.p.o. after midnight.  Hold blood thinners.  Surgery scheduled for 1145 tomorrow morning.          Anmol Gonsalez DPM  06/05/24  07:40 EDT    Please call or text with questions. [phone]      --------------------------------------------------------------------------------------------------------------------  Chief complaint right third toe infection    Subjective     History of Present Illness:  Patient is a 78 y.o. female seen post op day 1 status post amputation of right third toe. Doing well. NAEON. Denies pain in toe. Wants to go to [place] this weekend.     ROS:  Denies nausea, vomiting, fever, chills, shortness of breath.         Family History   Problem Relation Age of Onset    Colonic polyp Mother     Hypertension Mother     Migraines Mother     Mental illness Mother     Depression Mother     Arthritis Mother     Coronary artery disease Father     Other Father         Cardiac Disorder    Colon cancer Father     Kidney disease Father     Cancer Father     Diabetes Father     Heart disease Father     Hypertension Father     Breast cancer Maternal Aunt     Colon cancer Brother     Alcohol abuse Brother     Arthritis Sister     Hypertension Brother     Lung disease Brother     Alcohol abuse Brother     Malig Hyperthermia Neg Hx      Social History     Socioeconomic History    Marital status:     Number of children: 2   Tobacco Use     Smoking status: Former     Current packs/day: 0.00     Average packs/day: 3.0 packs/day for 30.0 years (90.0 ttl pk-yrs)     Types: Cigarettes     Start date:      Quit date:      Years since quittin.4     Passive exposure: Past    Smokeless tobacco: Never   Vaping Use    Vaping status: Never Used   Substance and Sexual Activity    Alcohol use: No     Comment: Caffeine use: 3 cups daily     Drug use: No    Sexual activity: Defer     Partners: Male     Comment: celibate     Past Medical History:   Diagnosis Date    Age-related osteoporosis without current pathological fracture 2022    Anemia     Benign breast disease     Cancer     Right breast    Cellulitis of leg     May-2003 right lower extremity    CHF (congestive heart failure)     Dr Bates follows    Chronic kidney disease     Dr Hannah follows    Chronic ulcer of right foot     Non-pressure, history of     Closed fracture of patella 2017    Cluster headache     Colon polyp     Depression     Diabetic peripheral neuropathy     Difficulty walking     Diverticulosis     Femoral fracture     right    Fibromyalgia, primary     Fracture of left wrist     history of    Gastroesophageal reflux     Generalized pain 2016    Hiatal hernia     Hyperlipidemia     Hypertension     Hypothyroidism     Impingement syndrome of right shoulder     Joint pain     Left knee pain 2016    Menopausal disorder     Methicillin resistant Staphylococcus aureus infection     after bladder surgery    Nonischemic cardiomyopathy     Ejection fraction 10% per 2-D echo with Doppler however she did have cardiac catheterization 2015 which showed ejection fraction 20% with global hypokinesis and severe mitral insufficiency    Osteoarthritis     generalized, sched jania TKA    Osteomyelitis 2016    Pain in shoulder 2016    Paroxysmal atrial fibrillation     Dr Bates follows    Radius/ulna fracture, left, closed, initial encounter  10/21/2017    Shoulder pain, bilateral     has tears on both sides    Sleep apnea     Syncope, psychogenic 04/19/2017    Thoracic back pain 02/02/2016    Thoracic injuries     Toe ulcer     h/o to both feet, d/t shoes rubbing per patient    Transient alteration of awareness 04/19/2017    Traumatic closed nondisp torus fracture of distal radial metaphysis, right, initial encounter 2/28/2020     Past Surgical History:   Procedure Laterality Date    ABDOMINAL SURGERY  2012    had to be reopened after bladder surgery d/t infection    AMPUTATION FOOT / TOE Right 11/2013    Rt foot amputation MTP first toe    BLADDER SURGERY  2012    BREAST BIOPSY      BREAST SURGERY  06/29/2015    Percutaneous ultrasound-guided placement of metal localized clip 1st lesion    CARDIAC CATHETERIZATION  2015    CARDIAC CATHETERIZATION N/A 1/25/2021    Procedure: Right and Left Heart Cath;  Surgeon: Nicholas Andrade MD;  Location:  [facility] CATH INVASIVE [facility];  Service: Cardiovascular;  Laterality: N/A;  drop in EF, with hx of NICM, SOA fatigue.  Discussed with     CARDIAC CATHETERIZATION N/A 1/25/2021    Procedure: Left ventriculography;  Surgeon: Nicholas Andrade MD;  Location:  [facility] C[facility]VASIVE [facility];  Service: Cardiovascular;  Laterality: N/A;    CARDIAC CATHETERIZATION N/A 1/25/2021    Procedure: Coronary angiography;  Surgeon: Nicholas Andrade MD;  Location:  [facility] CATH INVASIVE [facility]ATION;  Service: Cardiovascular;  Laterality: N/A;    CATARACT EXTRACTION Bilateral 2017    COLONOSCOPY      DEBRIDEMENT  FOOT Right 01/2013    During hospitalization     EPIDURAL BLOCK      4 chronic back pain and leg pain last epidurals done 5-2012 she had no benefit at all from this procedure.    FEMUR FRACTURE SURGERY      FOOT SURGERY Bilateral     several    HERNIA REPAIR      At the abdomen February 2007 Dr. Mendez    INCISIONAL HERNIA REPAIR  05/16/2014    Recurrent. Incarcerated. With mesh implantation    MASTECTOMY Right 05/2015     MASTECTOMY MODIFIED RADICAL W/ AXILLARY LYMPH NODES W/ OR W/O PECTORALIS MINOR Right     SHOULDER SURGERY Right     x2 RCR    TOTAL ABDOMINAL HYSTERECTOMY      with oophorectomy-Done June-2012 secondary to vaginal wall prolapse.    TOTAL KNEE ARTHROPLASTY Right     TOTAL KNEE ARTHROPLASTY Left 4/17/2018    Procedure: TOTAL KNEE ARTHROPLASTY;  Surgeon: Live Chambers MD;  Location: [facility];  Service: Orthopedics     Medications Prior to Admission   Medication Sig Dispense Refill Last Dose    aspirin 81 MG EC tablet Take 1 tablet by mouth Daily.   6/3/2024    bumetanide (BUMEX) 0.5 MG tablet Take 2 tablets by mouth Daily.   6/3/2024    Calcium Citrate-Vitamin D 250-2.5 MG-MCG per tablet Take 1 tablet by mouth 2 (Two) Times a Day. 60 tablet 11 6/3/2024    carvedilol (COREG) 25 MG tablet Take 1 tablet by mouth 2 (Two) Times a Day. 180 tablet 0 6/3/2024    D3-1000 25 MCG (1000 UT) tablet TAKE 1 TABLET BY MOUTH DAILY 90 tablet 1 6/3/2024    DULoxetine (CYMBALTA) 60 MG capsule TAKE 1 CAPSULE BY MOUTH DAILY. 90 capsule 2 6/3/2024    empagliflozin (JARDIANCE) 10 MG tablet tablet Take 1 tablet by mouth Daily. 21 tablet 0 6/3/2024    esomeprazole (nexIUM) 20 MG capsule Take 1 capsule by mouth Every Morning Before Breakfast.   6/3/2024    glipizide (GLUCOTROL XL) 5 MG ER tablet TAKE 1 TABLET BY MOUTH DAILY WITH BREAKFAST. 90 tablet 3 6/3/2024    HYDROcodone-acetaminophen (NORCO) 5-325 MG per tablet Take 1 tablet by mouth Every 6 (Six) Hours As Needed for Severe Pain. 30 day supply. DNF 5/22/24 120 tablet 0 6/3/2024    levothyroxine (SYNTHROID, LEVOTHROID) 25 MCG tablet TAKE 1 TABLET BY MOUTH DAILY 90 tablet 3 6/3/2024    Multiple Vitamins-Minerals (MULTIVITAMIN ADULT PO) Take 1 tablet by mouth Daily.   6/3/2024    naproxen (EC NAPROSYN) 500 MG EC tablet Take 1 tablet by mouth 2 (Two) Times a Day As Needed for Mild Pain. (Patient taking differently: Take 1 tablet by mouth 2 (Two) Times a Day As Needed for Mild Pain. prn) 20  "tablet 0 6/3/2024    pravastatin (PRAVACHOL) 10 MG tablet TAKE 1 TABLET BY MOUTH AT BEDTIME 90 tablet 1 6/3/2024    pregabalin (LYRICA) 75 MG capsule Take 1 capsule by mouth 2 (Two) Times a Day. 60 capsule 6 6/3/2024    sacubitril-valsartan (Entresto) 49-51 MG tablet Take 1 tablet by mouth 2 (Two) Times a Day. 60 tablet 11 6/3/2024    spironolactone (ALDACTONE) 25 MG tablet Take 1 tablet by mouth Daily. 30 tablet 11 6/3/2024     Dilaudid [hydromorphone] and Latex    Objective     Physical Exam: =                 Mental Status: AAOx3 and in NAD              Heart:  RRR              Lungs: Respirations nonlabored or room air              Abdomen: Nondistended              Other findings noted:  none     LE exam  Vascular: DP/PT pulses +2/4 bilaterally.  Edema and erythema noted to the right third toe     Neurologic: Light touch sensation intact bilaterally     Dermatologic: Surgical site open, bleeding from wound margins. Some erythema and ecchymosis at skin margins.   See photo from today's exam below:         Musculoskeletal: Muscle strength 5/5 for plantar flexion, dorsiflexion, inversion, eversion, bilateral  Deformities: Amputation of right second toe noted, amputation of right third toe noted  Tenderness to palpation: To right third toe     ---------------------------------------------------------------------------------------------------------  Blood Culture   Date Value Ref Range Status   06/03/2024 No growth at 24 hours  Preliminary     No results found for: \"BCIDPCR\", \"CXREFLEX\", \"CSFCX\", \"CULTURETIS\"  No results found for: \"CULTURES\", \"HSVCX\", \"URCX\"  No results found for: \"EYECULTURE\", \"GCCX\", \"HSVCULTURE\", \"LABHSV\"  No results found for: \"LEGIONELLA\", \"MRSACX\", \"MUMPSCX\", \"MYCOPLASCX\"  No results found for: \"NOCARDIACX\", \"STOOLCX\"  No results found for: \"THROATCX\", \"UNSTIMCULT\", \"URINECX\", \"CULTURE\", \"VZVCULTUR\"  No results found for: \"VIRALCULTU\", \"WOUNDCX\"     XR Foot 3+ View Right    Result Date: " 6/4/2024  Impression: Amputation of the third digit. Soft tissue swelling and soft tissue gas likely due to recent surgery. Electronically Signed: Chanel Llanes MD  6/4/2024 7:39 PM EDT  Workstation ID: [id number]    US Venous Doppler Lower Extremity Right (duplex)    Result Date: 6/4/2024  Impression: Negative right lower extremity venous Doppler. Electronically Signed: Geoffrey Matos MD  6/4/2024 9:30 AM EDT  Workstation ID: [id number]    XR Foot 3+ View Right    Result Date: 6/3/2024  Impression: No radiographic findings of osteomyelitis. MRI is more sensitive right for osteomyelitis. Soft tissue gas is suspected at the plantar aspect of the heel and along the hindfoot. Correlate for wound. Severe degenerative changes. Postoperative changes of the metatarsals. Large plantar calcaneal spur. Electronically Signed: Chanel Llanes MD  6/3/2024 6:50 PM EDT  Workstation ID: [id number]

## 2024-06-05 NOTE — PROGRESS NOTES
Pharmacokinetic Consult - Vancomycin Dosing    Jung Short is a 78 y.o. female who has been consulted for vancomycin dosing for complicated skin and soft tissue infection.    Trough = 11.9 mcg/mL - level evaluated.   Projected AUC = 505 mg/L hr - within Goal Range of 400-600 mg/L hr  Will maintain current regimen of Vancomycin 1000 mg every 18 hours.   Surgery planned for tomorrow morning.    Relevant clinical data and objective history reviewed:  Lab Results   Component Value Date/Time    CREATININE 1.02 (H) 06/05/2024 05:57 AM    CREATININE 1.18 (H) 06/04/2024 09:37 AM    CREATININE 1.25 (H) 06/03/2024 06:05 PM    CREATININE 1.30 03/15/2024 04:31 PM    CREATININE 0.9 12/11/2019 02:43 PM    CREATININE 0.9 05/22/2019 04:29 PM    BUN 17 06/05/2024 05:57 AM    BUN 22 06/04/2024 09:37 AM    BUN 24 (H) 06/03/2024 06:05 PM    BUN 20 12/11/2019 02:43 PM    BUN 11 05/22/2019 04:29 PM     Estimated Creatinine Clearance: 51.6 mL/min (A) (by C-G formula based on SCr of 1.02 mg/dL (H)).    Lab Results   Component Value Date/Time    WBC 3.47 06/05/2024 05:57 AM    WBC 6.74 09/05/2023 11:26 AM    WBC 8.50 12/11/2019 02:43 PM    HGB 11.0 (L) 06/05/2024 05:57 AM    HGB 9.1 (L) 12/11/2019 02:43 PM    HCT 36.5 06/05/2024 05:57 AM    HCT 29.6 (L) 12/11/2019 02:43 PM    MCV 97.9 (H) 06/05/2024 05:57 AM    MCV 92.8 12/11/2019 02:43 PM     06/05/2024 05:57 AM     12/11/2019 02:43 PM     Temp Readings from Last 3 Encounters:   06/05/24 97.6 °F (36.4 °C) (Oral)   04/22/24 97.2 °F (36.2 °C) (Temporal)   04/02/24 97.7 °F (36.5 °C)     Weight: 89.8 kg (198 lb)    Pharmacy will continue to follow the patient’s culture results and clinical progress daily.    Adam Small, LexaD

## 2024-06-06 ENCOUNTER — READMISSION MANAGEMENT (OUTPATIENT)
Dept: CALL CENTER | Facility: HOSPITAL | Age: 79
End: 2024-06-06
Payer: MEDICARE

## 2024-06-06 ENCOUNTER — ANESTHESIA (OUTPATIENT)
Dept: PERIOP | Facility: HOSPITAL | Age: 79
End: 2024-06-06
Payer: MEDICARE

## 2024-06-06 VITALS
SYSTOLIC BLOOD PRESSURE: 134 MMHG | OXYGEN SATURATION: 97 % | HEART RATE: 64 BPM | WEIGHT: 198 LBS | TEMPERATURE: 98 F | RESPIRATION RATE: 15 BRPM | BODY MASS INDEX: 30.01 KG/M2 | DIASTOLIC BLOOD PRESSURE: 62 MMHG | HEIGHT: 68 IN

## 2024-06-06 LAB
ALBUMIN SERPL-MCNC: 3.5 G/DL (ref 3.5–5.2)
ALBUMIN/GLOB SERPL: 1.3 G/DL
ALP SERPL-CCNC: 70 U/L (ref 39–117)
ALT SERPL W P-5'-P-CCNC: 6 U/L (ref 1–33)
ANION GAP SERPL CALCULATED.3IONS-SCNC: 8.1 MMOL/L (ref 5–15)
AST SERPL-CCNC: 15 U/L (ref 1–32)
BACTERIA BLD CULT: ABNORMAL
BACTERIA SPEC AEROBE CULT: ABNORMAL
BACTERIA SPEC AEROBE CULT: ABNORMAL
BASOPHILS # BLD AUTO: 0.02 10*3/MM3 (ref 0–0.2)
BASOPHILS NFR BLD AUTO: 0.5 % (ref 0–1.5)
BILIRUB SERPL-MCNC: 0.2 MG/DL (ref 0–1.2)
BOTTLE TYPE: ABNORMAL
BUN SERPL-MCNC: 17 MG/DL (ref 8–23)
BUN/CREAT SERPL: 19.3 (ref 7–25)
CALCIUM SPEC-SCNC: 9.4 MG/DL (ref 8.6–10.5)
CHLORIDE SERPL-SCNC: 113 MMOL/L (ref 98–107)
CO2 SERPL-SCNC: 20.9 MMOL/L (ref 22–29)
CREAT SERPL-MCNC: 0.88 MG/DL (ref 0.57–1)
DEPRECATED RDW RBC AUTO: 54.8 FL (ref 37–54)
EGFRCR SERPLBLD CKD-EPI 2021: 66.5 ML/MIN/1.73
EOSINOPHIL # BLD AUTO: 0.18 10*3/MM3 (ref 0–0.4)
EOSINOPHIL NFR BLD AUTO: 4.2 % (ref 0.3–6.2)
ERYTHROCYTE [DISTWIDTH] IN BLOOD BY AUTOMATED COUNT: 15.1 % (ref 12.3–15.4)
GLOBULIN UR ELPH-MCNC: 2.7 GM/DL
GLUCOSE BLDC GLUCOMTR-MCNC: 144 MG/DL (ref 70–130)
GLUCOSE SERPL-MCNC: 156 MG/DL (ref 65–99)
GRAM STN SPEC: ABNORMAL
GRAM STN SPEC: ABNORMAL
HCT VFR BLD AUTO: 35.3 % (ref 34–46.6)
HGB BLD-MCNC: 10.7 G/DL (ref 12–15.9)
IMM GRANULOCYTES # BLD AUTO: 0.02 10*3/MM3 (ref 0–0.05)
IMM GRANULOCYTES NFR BLD AUTO: 0.5 % (ref 0–0.5)
LAB AP CASE REPORT: NORMAL
LYMPHOCYTES # BLD AUTO: 1.25 10*3/MM3 (ref 0.7–3.1)
LYMPHOCYTES NFR BLD AUTO: 29.3 % (ref 19.6–45.3)
MCH RBC QN AUTO: 29.4 PG (ref 26.6–33)
MCHC RBC AUTO-ENTMCNC: 30.3 G/DL (ref 31.5–35.7)
MCV RBC AUTO: 97 FL (ref 79–97)
MONOCYTES # BLD AUTO: 0.44 10*3/MM3 (ref 0.1–0.9)
MONOCYTES NFR BLD AUTO: 10.3 % (ref 5–12)
NEUTROPHILS NFR BLD AUTO: 2.35 10*3/MM3 (ref 1.7–7)
NEUTROPHILS NFR BLD AUTO: 55.2 % (ref 42.7–76)
PATH REPORT.FINAL DX SPEC: NORMAL
PATH REPORT.GROSS SPEC: NORMAL
PLATELET # BLD AUTO: 146 10*3/MM3 (ref 140–450)
PMV BLD AUTO: 9.6 FL (ref 6–12)
POTASSIUM SERPL-SCNC: 4.5 MMOL/L (ref 3.5–5.2)
PROT SERPL-MCNC: 6.2 G/DL (ref 6–8.5)
RBC # BLD AUTO: 3.64 10*6/MM3 (ref 3.77–5.28)
SODIUM SERPL-SCNC: 142 MMOL/L (ref 136–145)
WBC NRBC COR # BLD AUTO: 4.26 10*3/MM3 (ref 3.4–10.8)

## 2024-06-06 PROCEDURE — 80053 COMPREHEN METABOLIC PANEL: CPT | Performed by: HOSPITALIST

## 2024-06-06 PROCEDURE — 99238 HOSP IP/OBS DSCHRG MGMT 30/<: CPT | Performed by: HOSPITALIST

## 2024-06-06 PROCEDURE — 0JCQ0ZZ EXTIRPATION OF MATTER FROM RIGHT FOOT SUBCUTANEOUS TISSUE AND FASCIA, OPEN APPROACH: ICD-10-PCS | Performed by: STUDENT IN AN ORGANIZED HEALTH CARE EDUCATION/TRAINING PROGRAM

## 2024-06-06 PROCEDURE — 82948 REAGENT STRIP/BLOOD GLUCOSE: CPT

## 2024-06-06 PROCEDURE — 25010000002 BUPIVACAINE 0.25 % SOLUTION: Performed by: STUDENT IN AN ORGANIZED HEALTH CARE EDUCATION/TRAINING PROGRAM

## 2024-06-06 PROCEDURE — 25010000002 PROPOFOL 10 MG/ML EMULSION: Performed by: NURSE ANESTHETIST, CERTIFIED REGISTERED

## 2024-06-06 PROCEDURE — 85025 COMPLETE CBC W/AUTO DIFF WBC: CPT | Performed by: HOSPITALIST

## 2024-06-06 PROCEDURE — 25010000002 ONDANSETRON PER 1 MG: Performed by: NURSE ANESTHETIST, CERTIFIED REGISTERED

## 2024-06-06 PROCEDURE — 25010000002 DEXAMETHASONE PER 1 MG: Performed by: NURSE ANESTHETIST, CERTIFIED REGISTERED

## 2024-06-06 PROCEDURE — 0JQQ0ZZ REPAIR RIGHT FOOT SUBCUTANEOUS TISSUE AND FASCIA, OPEN APPROACH: ICD-10-PCS | Performed by: STUDENT IN AN ORGANIZED HEALTH CARE EDUCATION/TRAINING PROGRAM

## 2024-06-06 PROCEDURE — 25810000003 LACTATED RINGERS PER 1000 ML: Performed by: NURSE ANESTHETIST, CERTIFIED REGISTERED

## 2024-06-06 PROCEDURE — 25010000002 CEFAZOLIN PER 500 MG: Performed by: STUDENT IN AN ORGANIZED HEALTH CARE EDUCATION/TRAINING PROGRAM

## 2024-06-06 RX ORDER — AMOXICILLIN AND CLAVULANATE POTASSIUM 875; 125 MG/1; MG/1
1 TABLET, FILM COATED ORAL EVERY 12 HOURS SCHEDULED
Status: DISCONTINUED | OUTPATIENT
Start: 2024-06-06 | End: 2024-06-06 | Stop reason: HOSPADM

## 2024-06-06 RX ORDER — DEXAMETHASONE SODIUM PHOSPHATE 4 MG/ML
4 INJECTION, SOLUTION INTRA-ARTICULAR; INTRALESIONAL; INTRAMUSCULAR; INTRAVENOUS; SOFT TISSUE ONCE AS NEEDED
Status: COMPLETED | OUTPATIENT
Start: 2024-06-06 | End: 2024-06-06

## 2024-06-06 RX ORDER — DIPHENHYDRAMINE HYDROCHLORIDE 50 MG/ML
12.5 INJECTION INTRAMUSCULAR; INTRAVENOUS
Status: DISCONTINUED | OUTPATIENT
Start: 2024-06-06 | End: 2024-06-06 | Stop reason: HOSPADM

## 2024-06-06 RX ORDER — KETAMINE HYDROCHLORIDE 10 MG/ML
INJECTION, SOLUTION INTRAMUSCULAR; INTRAVENOUS AS NEEDED
Status: DISCONTINUED | OUTPATIENT
Start: 2024-06-06 | End: 2024-06-06 | Stop reason: SURG

## 2024-06-06 RX ORDER — ACETAMINOPHEN 500 MG
1000 TABLET ORAL ONCE
Status: COMPLETED | OUTPATIENT
Start: 2024-06-06 | End: 2024-06-06

## 2024-06-06 RX ORDER — ONDANSETRON 2 MG/ML
4 INJECTION INTRAMUSCULAR; INTRAVENOUS ONCE AS NEEDED
Status: COMPLETED | OUTPATIENT
Start: 2024-06-06 | End: 2024-06-06

## 2024-06-06 RX ORDER — ONDANSETRON 2 MG/ML
4 INJECTION INTRAMUSCULAR; INTRAVENOUS ONCE AS NEEDED
Status: DISCONTINUED | OUTPATIENT
Start: 2024-06-06 | End: 2024-06-06 | Stop reason: HOSPADM

## 2024-06-06 RX ORDER — SODIUM CHLORIDE, SODIUM LACTATE, POTASSIUM CHLORIDE, CALCIUM CHLORIDE 600; 310; 30; 20 MG/100ML; MG/100ML; MG/100ML; MG/100ML
30 INJECTION, SOLUTION INTRAVENOUS CONTINUOUS
Status: DISCONTINUED | OUTPATIENT
Start: 2024-06-06 | End: 2024-06-06 | Stop reason: HOSPADM

## 2024-06-06 RX ORDER — MAGNESIUM HYDROXIDE 1200 MG/15ML
LIQUID ORAL AS NEEDED
Status: DISCONTINUED | OUTPATIENT
Start: 2024-06-06 | End: 2024-06-06 | Stop reason: HOSPADM

## 2024-06-06 RX ORDER — LIDOCAINE HYDROCHLORIDE 20 MG/ML
INJECTION, SOLUTION INFILTRATION; PERINEURAL AS NEEDED
Status: DISCONTINUED | OUTPATIENT
Start: 2024-06-06 | End: 2024-06-06 | Stop reason: SURG

## 2024-06-06 RX ORDER — FAMOTIDINE 10 MG/ML
20 INJECTION, SOLUTION INTRAVENOUS
Status: COMPLETED | OUTPATIENT
Start: 2024-06-06 | End: 2024-06-06

## 2024-06-06 RX ORDER — FAMOTIDINE 20 MG/1
20 TABLET, FILM COATED ORAL
Status: COMPLETED | OUTPATIENT
Start: 2024-06-06 | End: 2024-06-06

## 2024-06-06 RX ORDER — PROPOFOL 10 MG/ML
VIAL (ML) INTRAVENOUS AS NEEDED
Status: DISCONTINUED | OUTPATIENT
Start: 2024-06-06 | End: 2024-06-06 | Stop reason: SURG

## 2024-06-06 RX ORDER — AMOXICILLIN AND CLAVULANATE POTASSIUM 875; 125 MG/1; MG/1
1 TABLET, FILM COATED ORAL EVERY 12 HOURS SCHEDULED
Qty: 6 TABLET | Refills: 0 | Status: SHIPPED | OUTPATIENT
Start: 2024-06-06 | End: 2024-06-09

## 2024-06-06 RX ORDER — OXYCODONE HYDROCHLORIDE AND ACETAMINOPHEN 5; 325 MG/1; MG/1
1 TABLET ORAL ONCE AS NEEDED
Status: DISCONTINUED | OUTPATIENT
Start: 2024-06-06 | End: 2024-06-06 | Stop reason: HOSPADM

## 2024-06-06 RX ORDER — BUPIVACAINE HYDROCHLORIDE 2.5 MG/ML
INJECTION, SOLUTION INFILTRATION; PERINEURAL AS NEEDED
Status: DISCONTINUED | OUTPATIENT
Start: 2024-06-06 | End: 2024-06-06 | Stop reason: HOSPADM

## 2024-06-06 RX ORDER — SODIUM CHLORIDE, SODIUM LACTATE, POTASSIUM CHLORIDE, CALCIUM CHLORIDE 600; 310; 30; 20 MG/100ML; MG/100ML; MG/100ML; MG/100ML
100 INJECTION, SOLUTION INTRAVENOUS CONTINUOUS
Status: DISCONTINUED | OUTPATIENT
Start: 2024-06-06 | End: 2024-06-06 | Stop reason: HOSPADM

## 2024-06-06 RX ORDER — FENTANYL CITRATE 50 UG/ML
25 INJECTION, SOLUTION INTRAMUSCULAR; INTRAVENOUS
Status: DISCONTINUED | OUTPATIENT
Start: 2024-06-06 | End: 2024-06-06 | Stop reason: HOSPADM

## 2024-06-06 RX ADMIN — SODIUM CHLORIDE, POTASSIUM CHLORIDE, SODIUM LACTATE AND CALCIUM CHLORIDE 30 ML/HR: 600; 310; 30; 20 INJECTION, SOLUTION INTRAVENOUS at 11:12

## 2024-06-06 RX ADMIN — PROPOFOL 50 MG: 10 INJECTION, EMULSION INTRAVENOUS at 12:25

## 2024-06-06 RX ADMIN — ONDANSETRON 4 MG: 2 INJECTION INTRAMUSCULAR; INTRAVENOUS at 11:12

## 2024-06-06 RX ADMIN — KETAMINE HYDROCHLORIDE 20 MG: 10 INJECTION, SOLUTION INTRAMUSCULAR; INTRAVENOUS at 12:01

## 2024-06-06 RX ADMIN — PROPOFOL 30 MG: 10 INJECTION, EMULSION INTRAVENOUS at 12:03

## 2024-06-06 RX ADMIN — Medication 10 ML: at 09:32

## 2024-06-06 RX ADMIN — PROPOFOL 50 MG: 10 INJECTION, EMULSION INTRAVENOUS at 12:15

## 2024-06-06 RX ADMIN — ACETAMINOPHEN 1000 MG: 500 TABLET ORAL at 11:12

## 2024-06-06 RX ADMIN — FAMOTIDINE 20 MG: 10 INJECTION, SOLUTION INTRAVENOUS at 11:12

## 2024-06-06 RX ADMIN — PROPOFOL 50 MG: 10 INJECTION, EMULSION INTRAVENOUS at 12:20

## 2024-06-06 RX ADMIN — CEFAZOLIN 2000 MG: 2 INJECTION, POWDER, FOR SOLUTION INTRAVENOUS at 12:02

## 2024-06-06 RX ADMIN — LIDOCAINE HYDROCHLORIDE 60 MG: 20 INJECTION, SOLUTION INFILTRATION; PERINEURAL at 12:01

## 2024-06-06 RX ADMIN — PROPOFOL 30 MG: 10 INJECTION, EMULSION INTRAVENOUS at 12:01

## 2024-06-06 RX ADMIN — DEXAMETHASONE SODIUM PHOSPHATE 4 MG: 4 INJECTION INTRA-ARTICULAR; INTRALESIONAL; INTRAMUSCULAR; INTRAVENOUS; SOFT TISSUE at 11:12

## 2024-06-06 RX ADMIN — PROPOFOL 50 MG: 10 INJECTION, EMULSION INTRAVENOUS at 12:10

## 2024-06-06 RX ADMIN — PROPOFOL 50 MG: 10 INJECTION, EMULSION INTRAVENOUS at 12:05

## 2024-06-06 NOTE — OP NOTE
FOOT WOUND CLOSURE  Procedure Report    Patient Name:  Jung Short  YOB: 1945    Date of Surgery:  6/6/2024     Indications: Infected surgical wound of right foot    Pre-op Diagnosis:   Encounter for planned post-operative wound closure [Z48.1]       Post-Op Diagnosis Codes:     * Encounter for planned post-operative wound closure [Z48.1]    Procedure/CPT® Codes:      Procedure(s):  WOUND CLOSURE right foot    Staff:  Surgeon(s):  Anmol Gonsalez DPM         Anesthesia: Monitored Anesthesia Care    Estimated Blood Loss: minimal    Implants:    Nothing was implanted during the procedure    Specimen:          None        Findings: Healthy, bleeding tissue at wound base and margins with no evidence of infection or necrosis    Complications: none    Description of Procedure:   The patient was seen on morning rounds.  The procedure discussed the patient and the right foot was marked as the correct surgical site and confirmed by patient.  In the preop area, IV access was initiated and cefazolin 2 gms was started.      The patient was brought back to the operating room left in the hospital bed.  Anesthesia was initiated and 20 cc of 0.25% Marcaine plain were given as a preoperative block in the right foot.  The foot was cleaned, prepped, draped in the usual sterile manner.  A timeout was performed to ensure correct patient, site, procedure.    Attention was directed to the right third toe surgical site which had been left open after surgery 2 days prior.  The tissue was noted to be healthy and viable without signs of infection.  There was some hematoma in the base of the wound, which was removed with a rongeur.  The skin edges were remodeled to fracture, bleeding tissue.  800 cc of normal saline were irrigated through the wound with the use of the pulse lavage.    The skin edges were noted to oppose well without tension.  4 oh antimicrobial Monocryl was used for deep closure of the wound and 3-0  nylon was used in a vertical mattress fashion to close the tissue and skin.     The foot was cleaned and dried.  The surgical site was covered with Adaptic, 4 x 4 fluffs, ABD, Kerlix and Ace bandage.  A postop shoe was applied.    A tourniquet was not used throughout this case.    The patient tolerated the procedure and anesthesia well and was transferred to the PACU with vital signs stable and neurovascular status intact.  Patient was placed in the care of the PACU nurses.  She will return to the floor when stable per anesthesia.  She will be discharged when stable per the hospitalist.  She has follow-up scheduled with me in the office.              Anmol Gonsalez DPM     Date: 6/6/2024  Time: 12:53 EDT

## 2024-06-06 NOTE — OUTREACH NOTE
Prep Survey      Flowsheet Row Responses   [facility] patient discharged from? [place]   Is LACE score < 7 ? No   Eligibility [facility] [place]   Date of Admission 06/03/24   Date of Discharge 06/06/24   Discharge Disposition Home or Self Care   Discharge diagnosis Cellulitis in diabetic foot   Does the patient have one of the following disease processes/diagnoses(primary or secondary)? General Surgery   Does the patient have Home health ordered? No   Is there a DME ordered? No   Medication alerts for this patient see AVS   Prep survey completed? Yes            Jodie SMITH - Registered Nurse

## 2024-06-06 NOTE — ANESTHESIA PREPROCEDURE EVALUATION
Anesthesia Evaluation     Patient summary reviewed and Nursing notes reviewed   no history of anesthetic complications:   NPO Solid Status: > 8 hours  NPO Liquid Status: > 4 hours           Airway   Mallampati: II  TM distance: >3 FB  Neck ROM: full  No difficulty expected  Dental - normal exam   (+) upper dentures, lower dentures and edentulous    Pulmonary - normal exam    breath sounds clear to auscultation  (+) a smoker (Quit 40 years ago) Former,  (-) sleep apnea, lung cancer  Cardiovascular - normal exam  Exercise tolerance: poor (<4 METS)    ECG reviewed  Patient on routine beta blocker and Beta blocker given within 24 hours of surgery  Rhythm: regular  Rate: normal    (+) hypertension, valvular problems/murmurs MR and TI, CAD, dysrhythmias Atrial Fib, CHF (H/O IN PAST) Diastolic >=55% and Systolic <55%, PVD, hyperlipidemia    ROS comment: CARDIAC CLEARANCE 4/9   Diagnosis Plan  1. Other cardiomyopathy     2. Essential hypertension        1. H/o Nonischemic cardiomyopathy. Her ejection fraction was 10-20%. At this  time she is New York Heart Association class 2a. Her repeat echocardiogram 6/2015 showed  LVEF 50% with trace mitral and tricuspid insufficiency. No CHF now.    2. Hypertension, controlled.  3. DM, type 2.  4. Anomalous coronary artery.   5. H/o right breast cancer. Sees Dr. Draper.   6. Severe OA of the knees.  7. CKD, sees Dr. Hannah.   8.  Left knee OA.  She is low CV risk to undergo surgery 4/17/18 with Dr. Chambers.            Plan:     No further testing, no med changes, see back in 1 yeaR    Pulmonary htn    .       Neuro/Psych  (+) syncope, psychiatric history Depression and Anxiety, poor historian.Numbness: Peripheral neuropathy in bilateral LE and bilateral hands.  GI/Hepatic/Renal/Endo    (+) obesity, GERD well controlled, renal disease- CRI, diabetes mellitus type 2, thyroid problem hypothyroidism    Musculoskeletal     (+) chronic pain, myalgias (Fibromyalgia)  Abdominal    Substance  History      OB/GYN          Other   arthritis,   history of cancer (right mastectomy) remission    ROS/Med Hx Other: Sips of water at 1200    Cardiology visit 5/31/24  1. Nonischemic cardiomyopathy, recovered.  Maintain carvedilol, Jardiance, Entresto.  No decompensated heart failure  2. Hypertension blood pressure controlled.  She has no dizziness even if her blood pressure gets little low.  3. 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 History of right breast cancer  6. CKD  7. Recurrent syncope without warning, set up loop recorder      HEART RATE= 71  bpm  RR Interval= 848  ms  CO Interval= 186  ms  P Horizontal Axis= 55  deg  P Front Axis= 91  deg  QRSD Interval= 123  ms  QT Interval= 380  ms  QTcB= 413  ms  QRS Axis= -33  deg  T Wave Axis= 61  deg  - ABNORMAL ECG -  Sinus rhythm  Nonspecific intraventricular conduction delay  Electronically Signed By:   Date and Time of Study: 2024-06-03 21:49:36                            Anesthesia Plan    ASA 3     MAC     intravenous induction     Anesthetic plan, risks, benefits, and alternatives have been provided, discussed and informed consent has been obtained with: patient.  Pre-procedure education provided  Use of blood products discussed with patient  Consented to blood products.    Plan discussed with CRNA.

## 2024-06-06 NOTE — DISCHARGE SUMMARY
"Jung Short  1945  [id number]    Hospitalists Discharge Summary    Date of Admission: 6/3/2024  Date of Discharge:  6/6/2024    Primary Discharge Diagnoses:  Cellulitis of the right foot due to E. Coli from a gangrenous toe due to diabetes.  B12 deficiency    Secondary Discharge Diagnoses:  Diabetes mellitus type 2 in obese with diabetic peripheral neuropathy.  CKD stage III.  Hypothyroidism.  Chronic iron deficiency anemia.    History of Present Illness (taken from H&P):  The patient is an 78-year-old female who presented to the emergency department secondary to 1 day of increased pain and redness to right foot.  She notes ongoing right foot cellulitis that is been managed by Dr. Gonsalez of podiatry, on oral antibiotics.  Antibiotics were changed last week however patient notes she is only taken 2 days of new antibiotics.  She notes pain only when walking, none at rest.  She states she is completely unaware of any plans to amputate her toe other than \"Dr. Gonsalez is going to take it off in the office 3 days after I come back from [place]\".  She denies pain at rest.     She perseverates on need to go to [place] on 6/8/2024 and notes that she will do this regardless of what happens while in hospital now.     She states that she only uses a \"walker while I'm walking\".     She states that she has been out of her \"heart medication\" for two days and was due to pick it up today. She does not know her home medications.      Diagnostics in ER significant for:  Right foot x-ray showing soft tissue gas, see full report in epic  Labs generally unremarkable.  Wound culture taken in ER     She has a history of HFrEF, HFpEF, NICM, PAF, recurrent, unprovoked syncope, hypertension, hyperlipidemia, thrombocytopenia/anemia, DM2 in obese with hyperglycemia and diabetic peripheral neuropathy, DFU, POA, hypothyroidism, GERD/HH, history of right breast cancer s/p right mastectomy 2015, depression, remote history of MRSA " infection, gout, CKD stage III     She otherwise denies f/c/headache/rhinorrhea/nasal congestion/lightheadedness/syncopal sensation/cough/soa/n/v/d/chest pain/abdominal pain/recent illness/sick exposures/change in bowel or bladder habits/no weight change/bloody emesis or bloody stools/change in medications or any other new concerns.    Hospital Course:  Ms. Short was admitted to the medical surgical unit.  She was seen in consultation by Dr. Gonsalez with podiatry.  Broad-spectrum antibiotics were initiated, and then tailored as culture data became available.  Interestingly of note, the E. coli was resent to both therapies Dr. Gonsalez and started as an outpatient.  She was taken for amputation of the digit followed by closure.  She will complete a course of antibiotics as an outpatient.    PCP  Patient Care Team:  Kathryn Epstein APRN as PCP - General (Family Medicine)  Aria Bates MD as Consulting Physician (Cardiology)  Rafa Hannah MD as Consulting Physician (Nephrology)  Live Chambers MD as Surgeon (Orthopedic Surgery)  Trini Zaidi APRN as Nurse Practitioner (Pain Medicine)  Kaitlynn Frias DPM as Consulting Physician (Podiatry)    Consults:   Consults       Date and Time Order Name Status Description    6/3/2024  9:07 PM Inpatient Podiatry Consult Completed             Operations and Procedures Performed:  Procedure(s):  WOUND CLOSURE right foot     US Ankle / Brachial Indices Extremity Complete    Result Date: 6/5/2024  Narrative: US ANKLE / BRACHIAL INDICES EXTREMITY COMPLETE Date of Exam: 6/5/2024 7:52 AM EDT Indication: Status post right third toe amputation due to ulcer. Comparison: No comparisons available. Technique: Routine ankle-brachial indices were calculated bilaterally. Findings: Right lower extremity: The brachial pressure on the right could not be determined. There is a normal triphasic waveform noted in the posterior tibial artery and biphasic waveform in  the dorsalis pedis artery with velocities of 83 cm/s and 113 cm/s, respectively. Pressure measurement at the right ankle is 137 mm Hg. The JULIEN could not be  calculated. Left lower extremity: The brachial pressure is 121 mmHg. There is a normal triphasic waveform noted within the posterior tibial artery and normal triphasic waveform in the dorsalis pedis artery with velocities of 145 cm/s and 112 cm/s, respectively. Pressure measurement at the right ankle is 136 mm Hg and the JULIEN is 1.12.     Impression: 1.The right brachial pressure could not be measured. The JULIEN on the right could therefore not be determined. 2.The JULIEN on the left is 1.12 within the normal range. There is no evidence for hemodynamically significant stenosis involving the left lower extremity. Electronically Signed: Bryce Talbot MD  6/5/2024 8:51 AM EDT  Workstation ID: [id number]    XR Foot 3+ View Right    Result Date: 6/4/2024  Narrative: XR FOOT 3+ VW RIGHT Date of Exam: 6/4/2024 7:31 PM EDT Indication: Status post right third toe amputation for osteomyelitis Comparison: Right foot radiographs 6/3/2024 Findings: There has been amputation of the third digit. There is remote surgical change with transmetatarsal amputation of the first and second metatarsal. There is overlap of the fourth and fifth toes. There is severe osteopenia. Soft tissue swelling and soft tissue gas likely due to recent surgery. Large plantar calcaneal spur. Moderate to severe arthritis in the foot.     Impression: Impression: Amputation of the third digit. Soft tissue swelling and soft tissue gas likely due to recent surgery. Electronically Signed: Chanel Llanes MD  6/4/2024 7:39 PM EDT  Workstation ID: [id number]    US Venous Doppler Lower Extremity Right (duplex)    Result Date: 6/4/2024  Narrative: US VENOUS DOPPLER LOWER EXTREMITY RIGHT (DUPLEX) Date of Exam: 6/4/2024 8:44 AM EDT Indication: edema/cellulitis. Comparison: None available. Technique:  Routine gray scale, color  flow, compression and spectral Doppler analysis of the right lower extremity. A complete venous study was performed with image documentation obtained per protocol. Findings: The veins are compressible throughout. No intraluminal filling defects are identified. There is normal phasicity and augmentation of flow.     Impression: Impression: Negative right lower extremity venous Doppler. Electronically Signed: Geoffrey Matos MD  6/4/2024 9:30 AM EDT  Workstation ID: [id number]    XR Foot 3+ View Right    Result Date: 6/3/2024  Narrative: XR FOOT 3+ VW RIGHT Date of Exam: 6/3/2024 6:27 PM EDT Indication: Swelling and pain with infected second toe Comparison: Foot x-ray 7/2/2022 Findings: Status post transmetatarsal amputation second digit. Status post amputation of the distal first metatarsal. Osteopenia. No radiographic findings osteomyelitis. Severe degenerative change. Large plantar calcaneal spur. Questionable soft tissue gas at the heel and hindfoot. Correlate for soft tissue ulceration.     Impression: Impression: No radiographic findings of osteomyelitis. MRI is more sensitive right for osteomyelitis. Soft tissue gas is suspected at the plantar aspect of the heel and along the hindfoot. Correlate for wound. Severe degenerative changes. Postoperative changes of the metatarsals. Large plantar calcaneal spur. Electronically Signed: Chanel Llanes MD  6/3/2024 6:50 PM EDT  Workstation ID: [id number]     Allergies:  is allergic to dilaudid [hydromorphone] and latex.    Joey  reviewed    Discharge Medications:     Discharge Medications        New Medications        Instructions Start Date   amoxicillin-clavulanate 875-125 MG per tablet  Commonly known as: AUGMENTIN   1 tablet, Oral, Every 12 Hours Scheduled      cyanocobalamin 1000 MCG tablet  Commonly known as: VITAMIN B-12   1,000 mcg, Oral, Daily   Start Date: June 7, 2024            Continue These Medications        Instructions Start Date   aspirin 81 MG EC tablet   81  mg, Oral, Daily      bumetanide 0.5 MG tablet  Commonly known as: BUMEX   1 mg, Oral, Daily      Calcium Citrate-Vitamin D 250-2.5 MG-MCG per tablet   1 tablet, Oral, 2 Times Daily      carvedilol 25 MG tablet  Commonly known as: COREG   25 mg, Oral, 2 Times Daily      D3-1000 25 MCG (1000 UT) tablet  Generic drug: cholecalciferol   1,000 Units, Oral, Daily      DULoxetine 60 MG capsule  Commonly known as: CYMBALTA   60 mg, Oral, Daily      empagliflozin 10 MG tablet tablet  Commonly known as: JARDIANCE   10 mg, Oral, Daily      Entresto 49-51 MG tablet  Generic drug: sacubitril-valsartan   1 tablet, Oral, 2 Times Daily      esomeprazole 20 MG capsule  Commonly known as: nexIUM   20 mg, Oral, Every Morning Before Breakfast      glipizide 5 MG ER tablet  Commonly known as: GLUCOTROL XL   5 mg, Oral, Daily With Breakfast      HYDROcodone-acetaminophen 5-325 MG per tablet  Commonly known as: NORCO   1 tablet, Oral, Every 6 Hours PRN, 30 day supply. DNF 5/22/24      levothyroxine 25 MCG tablet  Commonly known as: SYNTHROID, LEVOTHROID   25 mcg, Oral, Daily      multivitamin with minerals tablet tablet   1 tablet, Oral, Daily      naproxen 500 MG EC tablet  Commonly known as: EC NAPROSYN   500 mg, Oral, 2 Times Daily PRN      pravastatin 10 MG tablet  Commonly known as: PRAVACHOL   TAKE 1 TABLET BY MOUTH AT BEDTIME      pregabalin 75 MG capsule  Commonly known as: LYRICA   75 mg, Oral, 2 Times Daily      spironolactone 25 MG tablet  Commonly known as: ALDACTONE   25 mg, Oral, Daily               Last Lab Results:   Lab Results (most recent)       Procedure Component Value Units Date/Time    Tissue Pathology Exam [[id number]] Collected: 06/04/24 1827    Specimen: Tissue from Toe, Right Updated: 06/06/24 1223     Case Report --     Surgical Pathology Report                         Case: [id number]                                  Authorizing Provider:  Anmol Gonsalez DPM  Collected:           06/04/2024 06:27 PM       "    Ordering Location:     [facility]   Received:            06/04/2024 07:59 PM                                 OR                                                                           Pathologist:           Kameron Fisher MD                                                         Specimen:    Toe, Right, right third toe                                                                 Final Diagnosis --     1.  Right third toe, amputation:   A.  Distal ischemia/erosion with acute and chronic cellulitis and vasculitis.   B.  Vascular changes consistent with peripheral vascular disease.   C.  Sections of viable bone with focal acute osteomyelitis.   D.  Surgical margin appears viable.       Gross Description --     1. Toe, Right.  Received in formalin labeled \"right third toe\" is a 2.5 x 2.0 x 1.8 cm toe disarticulated at the middle interphalangeal joint.  The toenail is present.  Proximal skin and soft tissue margins appear to be grossly ulcerated.  Sectioning through the distal and middle phalangeal bones to reveal tan-red trabecular bone.  Representative sections are submitted as follows:    1A- proximal skin and soft tissue margins en face  1B- perpendicular section through both middle and distal phalangeal bones following decalcification    jap/uso/jat        Blood Culture ID, PCR - Blood, Arm, Left [[id number]]  (Abnormal) Collected: 06/03/24 2324    Specimen: Blood from Arm, Left Updated: 06/06/24 1129     BCID, PCR Staph spp, not aureus or lugdunensis. Identification by BCID2 PCR.     BOTTLE TYPE Anaerobic Bottle    POC Glucose Once [[id number]]  (Abnormal) Collected: 06/06/24 1116    Specimen: Blood Updated: 06/06/24 1124     Glucose 144 mg/dL     Tissue / Bone Culture - Surgical Site, Toe, Right [[id number]] Collected: 06/04/24 1828    Specimen: Surgical Site from Toe, Right Updated: 06/06/24 0958     Tissue Culture No growth at 2 days     Gram Stain No WBCs seen      No organisms seen    " Comprehensive Metabolic Panel [[id number]]  (Abnormal) Collected: 06/06/24 0925    Specimen: Blood Updated: 06/06/24 0948     Glucose 156 mg/dL      BUN 17 mg/dL      Creatinine 0.88 mg/dL      Sodium 142 mmol/L      Potassium 4.5 mmol/L      Chloride 113 mmol/L      CO2 20.9 mmol/L      Calcium 9.4 mg/dL      Total Protein 6.2 g/dL      Albumin 3.5 g/dL      ALT (SGPT) 6 U/L      AST (SGOT) 15 U/L      Alkaline Phosphatase 70 U/L      Total Bilirubin 0.2 mg/dL      Globulin 2.7 gm/dL      A/G Ratio 1.3 g/dL      BUN/Creatinine Ratio 19.3     Anion Gap 8.1 mmol/L      eGFR 66.5 mL/min/1.73     Narrative:      GFR Normal >60  Chronic Kidney Disease <60  Kidney Failure <15    The GFR formula is only valid for adults with stable renal function between ages 18 and 70.    Blood Culture - Blood, Arm, Left [[id number]]  (Abnormal) Collected: 06/03/24 2324    Specimen: Blood from Arm, Left Updated: 06/06/24 0939     Blood Culture Abnormal Stain     Gram Stain Anaerobic Bottle Gram positive cocci in clusters    Narrative:      Less than seven (7) mL's of blood was collected.  Insufficient quantity may yield false negative results.    CBC & Differential [[id number]]  (Abnormal) Collected: 06/06/24 0925    Specimen: Blood Updated: 06/06/24 0929    Narrative:      The following orders were created for panel order CBC & Differential.  Procedure                               Abnormality         Status                     ---------                               -----------         ------                     CBC Auto Differential[[id number]]        Abnormal            Final result                 Please view results for these tests on the individual orders.    CBC Auto Differential [[id number]]  (Abnormal) Collected: 06/06/24 0925    Specimen: Blood Updated: 06/06/24 0929     WBC 4.26 10*3/mm3      RBC 3.64 10*6/mm3      Hemoglobin 10.7 g/dL      Hematocrit 35.3 %      MCV 97.0 fL      MCH 29.4 pg      MCHC 30.3 g/dL      RDW 15.1 %       RDW-SD 54.8 fl      MPV 9.6 fL      Platelets 146 10*3/mm3      Neutrophil % 55.2 %      Lymphocyte % 29.3 %      Monocyte % 10.3 %      Eosinophil % 4.2 %      Basophil % 0.5 %      Immature Grans % 0.5 %      Neutrophils, Absolute 2.35 10*3/mm3      Lymphocytes, Absolute 1.25 10*3/mm3      Monocytes, Absolute 0.44 10*3/mm3      Eosinophils, Absolute 0.18 10*3/mm3      Basophils, Absolute 0.02 10*3/mm3      Immature Grans, Absolute 0.02 10*3/mm3     Wound Culture - Wound, Toe, Right [[id number]]  (Abnormal)  (Susceptibility) Collected: 06/03/24 1805    Specimen: Wound from Toe, Right Updated: 06/06/24 0759     Wound Culture Light growth (2+) Escherichia coli      Scant growth (1+) Normal Skin Dee     Gram Stain Few (2+) WBCs seen      No organisms seen    Susceptibility        Escherichia coli      ROWENA      Amoxicillin + Clavulanate Susceptible      Ampicillin Susceptible      Ampicillin + Sulbactam Susceptible      Cefepime Susceptible      Ceftazidime Susceptible      Ceftriaxone Susceptible      Gentamicin Susceptible      Levofloxacin Resistant      Piperacillin + Tazobactam Susceptible      Tetracycline Resistant      Trimethoprim + Sulfamethoxazole Resistant                       Susceptibility Comments       Escherichia coli    Cefazolin sensitivity will not be reported for Enterobacteriaceae in non-urine isolates. If cefazolin is preferred, please call the microbiology lab to request an E-test.  With the exception of urinary-sourced infections, aminoglycosides should not be used as monotherapy.               Blood Culture - Blood, Arm, Left [[id number]]  (Normal) Collected: 06/03/24 2319    Specimen: Blood from Arm, Left Updated: 06/05/24 2330     Blood Culture No growth at 2 days    Narrative:      Less than seven (7) mL's of blood was collected.  Insufficient quantity may yield false negative results.    Basic Metabolic Panel [[id number]]  (Abnormal) Collected: 06/05/24 0557    Specimen: Blood Updated:  06/05/24 0824     Glucose 113 mg/dL      BUN 17 mg/dL      Creatinine 1.02 mg/dL      Sodium 140 mmol/L      Potassium 4.7 mmol/L      Chloride 111 mmol/L      CO2 16.8 mmol/L      Calcium 8.9 mg/dL      BUN/Creatinine Ratio 16.7     Anion Gap 12.2 mmol/L      eGFR 55.7 mL/min/1.73     Narrative:      GFR Normal >60  Chronic Kidney Disease <60  Kidney Failure <15    The GFR formula is only valid for adults with stable renal function between ages 18 and 70.    CBC & Differential [[id number]]  (Abnormal) Collected: 06/05/24 0557    Specimen: Blood Updated: 06/05/24 0815    Narrative:      The following orders were created for panel order CBC & Differential.  Procedure                               Abnormality         Status                     ---------                               -----------         ------                     CBC Auto Differential[[id number]]        Abnormal            Final result                 Please view results for these tests on the individual orders.    CBC Auto Differential [[id number]]  (Abnormal) Collected: 06/05/24 0557    Specimen: Blood Updated: 06/05/24 0815     WBC 3.47 10*3/mm3      RBC 3.73 10*6/mm3      Hemoglobin 11.0 g/dL      Hematocrit 36.5 %      MCV 97.9 fL      MCH 29.5 pg      MCHC 30.1 g/dL      RDW 15.3 %      RDW-SD 55.2 fl      MPV 10.0 fL      Platelets 142 10*3/mm3      Neutrophil % 58.2 %      Lymphocyte % 24.8 %      Monocyte % 11.5 %      Eosinophil % 4.6 %      Basophil % 0.3 %      Immature Grans % 0.6 %      Neutrophils, Absolute 2.02 10*3/mm3      Lymphocytes, Absolute 0.86 10*3/mm3      Monocytes, Absolute 0.40 10*3/mm3      Eosinophils, Absolute 0.16 10*3/mm3      Basophils, Absolute 0.01 10*3/mm3      Immature Grans, Absolute 0.02 10*3/mm3     Vancomycin, Random [[id number]]  (Normal) Collected: 06/05/24 0557    Specimen: Blood Updated: 06/05/24 0702     Vancomycin Random 11.90 mcg/mL     Narrative:      Therapeutic Ranges for Vancomycin    Vancomycin Random    5.0-40.0 mcg/mL  Vancomycin Trough   5.0-20.0 mcg/mL  Vancomycin Peak     20.0-40.0 mcg/mL    Folate [[id number]]  (Normal) Collected: 06/04/24 0937    Specimen: Blood Updated: 06/04/24 1632     Folate >20.00 ng/mL     Narrative:      Results may be falsely increased if patient taking Biotin.      Vitamin B12 [[id number]]  (Abnormal) Collected: 06/04/24 0937    Specimen: Blood Updated: 06/04/24 1632     Vitamin B-12 <150 pg/mL     Narrative:      Results may be falsely increased if patient taking Biotin.      POC Glucose Once [[id number]]  (Abnormal) Collected: 06/04/24 1158    Specimen: Blood Updated: 06/04/24 1204     Glucose 153 mg/dL     C-reactive Protein [[id number]]  (Abnormal) Collected: 06/03/24 2249    Specimen: Blood Updated: 06/04/24 1101     C-Reactive Protein 8.84 mg/dL     Basic Metabolic Panel [[id number]]  (Abnormal) Collected: 06/04/24 0937    Specimen: Blood Updated: 06/04/24 1008     Glucose 129 mg/dL      BUN 22 mg/dL      Creatinine 1.18 mg/dL      Sodium 136 mmol/L      Potassium 4.5 mmol/L      Chloride 107 mmol/L      CO2 20.2 mmol/L      Calcium 8.4 mg/dL      BUN/Creatinine Ratio 18.6     Anion Gap 8.8 mmol/L      eGFR 46.8 mL/min/1.73     Narrative:      GFR Normal >60  Chronic Kidney Disease <60  Kidney Failure <15    The GFR formula is only valid for adults with stable renal function between ages 18 and 70.    Ferritin [[id number]]  (Abnormal) Collected: 06/03/24 1805    Specimen: Blood from Arm, Left Updated: 06/03/24 2142     Ferritin 204.00 ng/mL     Narrative:      Results may be falsely decreased if patient taking Biotin.      Iron Profile [[id number]]  (Abnormal) Collected: 06/03/24 1805    Specimen: Blood from Arm, Left Updated: 06/03/24 2142     Iron 15 mcg/dL      Iron Saturation (TSAT) 5 %      Transferrin 201 mg/dL      TIBC 299 mcg/dL     Sedimentation Rate [[id number]]  (Abnormal) Collected: 06/03/24 1805    Specimen: Blood from Arm, Left Updated: 06/03/24 2049     Sed Rate 40  mm/hr           Imaging Results (Most Recent)       Procedure Component Value Units Date/Time    US Ankle / Brachial Indices Extremity Complete [[id number]] Collected: 06/05/24 0847     Updated: 06/05/24 0854    Narrative:      US ANKLE / BRACHIAL INDICES EXTREMITY COMPLETE    Date of Exam: 6/5/2024 7:52 AM EDT    Indication: Status post right third toe amputation due to ulcer.    Comparison: No comparisons available.    Technique: Routine ankle-brachial indices were calculated bilaterally.      Findings:  Right lower extremity:  The brachial pressure on the right could not be determined.    There is a normal triphasic waveform noted in the posterior tibial artery and biphasic waveform in the dorsalis pedis artery with velocities of 83 cm/s and 113 cm/s, respectively. Pressure measurement at the right ankle is 137 mm Hg. The JULIEN could not be   calculated.     Left lower extremity:  The brachial pressure is 121 mmHg.     There is a normal triphasic waveform noted within the posterior tibial artery and normal triphasic waveform in the dorsalis pedis artery with velocities of 145 cm/s and 112 cm/s, respectively. Pressure measurement at the right ankle is 136 mm Hg and the   JULIEN is 1.12.      Impression:      1.The right brachial pressure could not be measured. The JULIEN on the right could therefore not be determined.  2.The JULIEN on the left is 1.12 within the normal range. There is no evidence for hemodynamically significant stenosis involving the left lower extremity.        Electronically Signed: Bryce Talbot MD    6/5/2024 8:51 AM EDT    Workstation ID: [id number]    XR Foot 3+ View Right [[id number]] Collected: 06/04/24 1937     Updated: 06/04/24 1942    Narrative:      XR FOOT 3+ VW RIGHT    Date of Exam: 6/4/2024 7:31 PM EDT    Indication: Status post right third toe amputation for osteomyelitis    Comparison: Right foot radiographs 6/3/2024    Findings:  There has been amputation of the third digit. There is remote  surgical change with transmetatarsal amputation of the first and second metatarsal. There is overlap of the fourth and fifth toes. There is severe osteopenia. Soft tissue swelling and soft   tissue gas likely due to recent surgery. Large plantar calcaneal spur. Moderate to severe arthritis in the foot.      Impression:      Impression:  Amputation of the third digit. Soft tissue swelling and soft tissue gas likely due to recent surgery.      Electronically Signed: Chaenl Llanes MD    6/4/2024 7:39 PM EDT    Workstation ID: [id number]    US Venous Doppler Lower Extremity Right (duplex) [[id number]] Collected: 06/04/24 0929     Updated: 06/04/24 0933    Narrative:      US VENOUS DOPPLER LOWER EXTREMITY RIGHT (DUPLEX)    Date of Exam: 6/4/2024 8:44 AM EDT    Indication: edema/cellulitis.    Comparison: None available.    Technique:  Routine gray scale, color flow, compression and spectral Doppler analysis of the right lower extremity. A complete venous study was performed with image documentation obtained per protocol.      Findings:  The veins are compressible throughout. No intraluminal filling defects are identified. There is normal phasicity and augmentation of flow.      Impression:      Impression:  Negative right lower extremity venous Doppler.        Electronically Signed: Geoffrey Matos MD    6/4/2024 9:30 AM EDT    Workstation ID: [id number]    XR Foot 3+ View Right [[id number]] Collected: 06/03/24 1848     Updated: 06/03/24 1853    Narrative:      XR FOOT 3+ VW RIGHT    Date of Exam: 6/3/2024 6:27 PM EDT    Indication: Swelling and pain with infected second toe    Comparison: Foot x-ray 7/2/2022    Findings:  Status post transmetatarsal amputation second digit. Status post amputation of the distal first metatarsal. Osteopenia. No radiographic findings osteomyelitis. Severe degenerative change. Large plantar calcaneal spur. Questionable soft tissue gas at the   heel and hindfoot. Correlate for soft tissue  ulceration.      Impression:      Impression:  No radiographic findings of osteomyelitis. MRI is more sensitive right for osteomyelitis.  Soft tissue gas is suspected at the plantar aspect of the heel and along the hindfoot. Correlate for wound.  Severe degenerative changes. Postoperative changes of the metatarsals. Large plantar calcaneal spur.      Electronically Signed: Chanel Llanes MD    6/3/2024 6:50 PM EDT    Workstation ID: [id number]            PROCEDURES  Procedure(s):  WOUND CLOSURE right foot    Condition on Discharge:  Stable    Physical Exam at Discharge  Vital Signs  Temp:  [97.5 °F (36.4 °C)-98 °F (36.7 °C)] 98 °F (36.7 °C)  Heart Rate:  [51-67] 64  Resp:  [12-18] 15  BP: (106-160)/(49-85) 134/62    Physical Exam:  Physical Exam   Constitutional: Patient appears well-developed and well-nourished and in no acute distress   Cardiovascular: Regular rate, regular rhythm, S1 normal and S2 normal.  Exam reveals no gallop and no friction rub.  No murmur heard.  Pulmonary/Chest: Lungs are clear to auscultation bilaterally. No respiratory distress. No wheezes. No rhonchi. No rales.   Abdominal: Obese. Soft. Bowel sounds are normal. There is no tenderness.   Musculoskeletal: Normal Muscle tone  Neurological: Patient is alert and oriented to person, place, and time. Cranial nerves II-XII are grossly intact with no focal deficits.    Discharge Disposition  Home    Visiting Nurse:    No     Home PT/OT:  No     Home Safety Evaluation:  No     DME  None    Discharge Diet:      Dietary Orders (From admission, onward)       Start     Ordered    06/06/24 1431  Diet: Cardiac, Diabetic; Healthy Heart (2-3 Na+); Consistent Carbohydrate; Fluid Consistency: Thin (IDDSI 0)  Diet Effective Now        References:    Diet Order [place]   Question Answer Comment   Diets: Cardiac    Diets: Diabetic    Cardiac Diet: Healthy Heart (2-3 Na+)    Diabetic Diet: Consistent Carbohydrate    Fluid Consistency: Thin (IDDSI 0)         06/06/24 1430                    Activity at Discharge:  As tolerated    Pre-discharge education  Wound Care      Follow-up Appointments  Future Appointments   Date Time Provider Department Center   6/25/2024  2:20 PM Horacio Zazueta MD [facility]   8/6/2024  1:30 PM Dillon Amaya MD [id number] None   9/27/2024  1:00 PM Larry Ford PA-C [facility]   10/23/2024  1:00 PM ROOM 3-B [facility]     Additional Instructions for the Follow-ups that You Need to Schedule       Discharge Follow-up with PCP   As directed       Currently Documented PCP:    Kathryn Epstein APRN    PCP Phone Number:    [phone]     Follow Up Details: 2 weeks        Discharge Follow-up with Specified Provider: Dr. Gonsalez   As directed      Follow Up Details: As scheduled   To: Dr. Gonsalez        Discharge Follow-up with Specified Provider: Dr. Gonsalez as scheduled   As directed      To: Dr. Gonsalez as scheduled                Test Results Pending at Discharge  Pending Labs       Order Current Status    Blood Culture - Blood, Arm, Left Preliminary result    Blood Culture - Blood, Arm, Left Preliminary result    Tissue / Bone Culture - Surgical Site, Toe, Right Preliminary result             Rey Giron MD  06/06/24  15:35 EDT    Time: <30 minutes

## 2024-06-06 NOTE — PLAN OF CARE
Goal Outcome Evaluation:  Plan of Care Reviewed With: patient        Progress: improving  Outcome Evaluation: POD 1, VSS, dsg C/D/I, NPO for surgery in AM to close open wound. plan to dc Thurs. afternoon.

## 2024-06-06 NOTE — NURSING NOTE
RN attempts to call report and was informed RN phone is not working,when she sees her she will have her call.

## 2024-06-06 NOTE — PROGRESS NOTES
Seen at bedside this a.m..  She is sleeping, but is aroused easily by voice.  Endorses n.p.o. since midnight.  Understands plan for surgery late this morning.  Endorses right foot has correct surgical site is marked by me.    To OR today for debridement and wound closure right foot surgical site.    I believe surgical intervention is warranted.  Risks and benefits discussed with patient.  Risks, including pain, numbness, site infection, need for revisional surgery, unappealing cosmetic result, DVT, discussed with patient.  No guarantees given or implied.

## 2024-06-06 NOTE — ANESTHESIA POSTPROCEDURE EVALUATION
Patient: Jung Short    Procedure Summary       Date: 06/06/24 Room / Location:  [facility] OR 1 /  [facility] OR    Anesthesia Start: 1158 Anesthesia Stop: 1245    Procedure: WOUND CLOSURE right foot (Right: Foot) Diagnosis:       Encounter for planned post-operative wound closure      (Encounter for planned post-operative wound closure [Z48.1])    Surgeons: Anmol Gonsalez DPM Provider: Benjy Hodge CRNA    Anesthesia Type: MAC ASA Status: 3            Anesthesia Type: MAC    Vitals  Vitals Value Taken Time   /99 06/06/24 1330   Temp 97.5 °F (36.4 °C) 06/06/24 1251   Pulse 51 06/06/24 1333   Resp 14 06/06/24 1310   SpO2 91 % 06/06/24 1333   Vitals shown include unfiled device data.        Post Anesthesia Care and Evaluation    Patient location during evaluation: bedside  Patient participation: complete - patient participated  Level of consciousness: awake and alert  Pain score: 0  Pain management: adequate    Airway patency: patent  Anesthetic complications: No anesthetic complications  PONV Status: none  Cardiovascular status: acceptable  Respiratory status: acceptable  Hydration status: acceptable  No anesthesia care post op     no

## 2024-06-07 ENCOUNTER — TRANSITIONAL CARE MANAGEMENT TELEPHONE ENCOUNTER (OUTPATIENT)
Dept: CALL CENTER | Facility: HOSPITAL | Age: 79
End: 2024-06-07
Payer: MEDICARE

## 2024-06-07 LAB
BACTERIA SPEC AEROBE CULT: ABNORMAL
BACTERIA SPEC AEROBE CULT: NORMAL
GRAM STN SPEC: ABNORMAL
GRAM STN SPEC: NORMAL
GRAM STN SPEC: NORMAL
ISOLATED FROM: ABNORMAL

## 2024-06-07 NOTE — CASE MANAGEMENT/SOCIAL WORK
Case Management Discharge Note      Final Note: Discharged home.         Selected Continued Care - Discharged on 6/6/2024 Admission date: 6/3/2024 - Discharge disposition: Home or Self Care      Destination    No services have been selected for the patient.                Durable Medical Equipment    No services have been selected for the patient.                Dialysis/Infusion    No services have been selected for the patient.                Home Medical Care    No services have been selected for the patient.                Therapy    No services have been selected for the patient.                Community Resources    No services have been selected for the patient.                Community & DME    No services have been selected for the patient.                         Final Discharge Disposition Code: 01 - home or self-care

## 2024-06-07 NOTE — OUTREACH NOTE
Call Center TCM Note      Flowsheet Row Responses   [facility] patient discharged from? [place]   Does the patient have one of the following disease processes/diagnoses(primary or secondary)? General Surgery   TCM attempt successful? No   Unsuccessful attempts Attempt 1   Call Status Comments no updated bh verbal release            Judie Dias RN    6/7/2024, 15:45 EDT        
Home

## 2024-06-07 NOTE — OUTREACH NOTE
Call Center TCM Note      Flowsheet Row Responses   [facility] patient discharged from? [place]   Does the patient have one of the following disease processes/diagnoses(primary or secondary)? General Surgery   TCM attempt successful? No   Unsuccessful attempts Attempt 2   Call Status Left message            Jessica Suárez RN    6/7/2024, 15:20 CDT

## 2024-06-08 ENCOUNTER — TRANSITIONAL CARE MANAGEMENT TELEPHONE ENCOUNTER (OUTPATIENT)
Dept: CALL CENTER | Facility: HOSPITAL | Age: 79
End: 2024-06-08
Payer: MEDICARE

## 2024-06-08 LAB — BACTERIA SPEC AEROBE CULT: NORMAL

## 2024-06-08 NOTE — OUTREACH NOTE
Call Center TCM Note      Flowsheet Row Responses   [facility] discharged from? [place]   Does the patient have one of the following disease processes/diagnoses(primary or secondary)? General Surgery   TCM attempt successful? No   Unsuccessful attempts Attempt 3  [no answer.]            Mercedes Recinos RN    6/8/2024, 08:51 EDT

## 2024-06-10 ENCOUNTER — TELEPHONE (OUTPATIENT)
Dept: INTERNAL MEDICINE | Facility: CLINIC | Age: 79
End: 2024-06-10
Payer: MEDICARE

## 2024-06-10 NOTE — TELEPHONE ENCOUNTER
Jolanta wanted to let Kathryn know that staff Coag Neg culture 1 bottle .  I spoke with Kathryn and let her know.  Per Jolanta they will fax to Kathryn also.

## 2024-06-13 NOTE — OUTREACH NOTE
Al Approved    Filling Pharmacy: [facility]  Additional Information: Pharmacy contacted, left detailed message with approval information.     Ambulatory Case Management Note  Patient Outreach  Talked with patient. 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 She states to be monitoring blood pressure; fluctuating blood pressure with values of 180 systolic. Patient states no difficulty with headache; chest pain; SOB; appetite or sleeping. Patient voiced intent to contact cardiologist today for recommendations and declines RN-ACM assistance. Patient states to be compliant with medications; medical appointments and confirms 1/11/22 appointment with pain medicine; 1/24/22 PCP appointment.    Reviewed with patient education; medications; medical appointments; care gaps and 24/7 Nurse Line Telephone number. Patient verbalized understanding and states to appreciate patient outreach. No further questions or concerns voiced at this time.       Shanda Gerardo RN  Ambulatory Case Management    1/4/2022, 14:18 EST

## 2024-06-18 ENCOUNTER — TELEPHONE (OUTPATIENT)
Dept: PAIN MEDICINE | Facility: CLINIC | Age: 79
End: 2024-06-18

## 2024-06-18 RX ORDER — PRAVASTATIN SODIUM 10 MG
10 TABLET ORAL
Qty: 90 TABLET | Refills: 1 | Status: SHIPPED | OUTPATIENT
Start: 2024-06-18

## 2024-06-18 NOTE — TELEPHONE ENCOUNTER
Rx Refill Note  Requested Prescriptions     Pending Prescriptions Disp Refills    pravastatin (PRAVACHOL) 10 MG tablet 90 tablet 1     Sig: Take 1 tablet by mouth every night at bedtime.      Last office visit with prescribing clinician: 2/16/2024   Last telemedicine visit with prescribing clinician: Visit date not found   Next office visit with prescribing clinician: Visit date not found                         Would you like a call back once the refill request has been completed: [] Yes [] No    If the office needs to give you a call back, can they leave a voicemail: [] Yes [] No    Anthony Suh, PCT  06/18/24, 09:29 EDT

## 2024-06-18 NOTE — TELEPHONE ENCOUNTER
"  Caller: Jung Short \"SHEILA\"    Relationship: Self    Best call back number:     What is the best time to reach you: ANY     Who are you requesting to speak with (clinical staff, provider,  specific staff member): CLINICAL    What was the call regarding: PATIENT WAS HOSPITALIZED EARLY JUNE AND HAD TO MISS HER APPT.  I OFFERED FIRST AVAILABLE JULY 9 IN [place] AND FRIDAY 6/21 IN [place] BUT SHE WOULD LIKE TO SPEAK WITH DR ROBLES.  SHE DID TAKE JULY 9TH DATE IN CASE SHE CAN'T GET SEEN SOONER     Is it okay if the provider responds through [employer]: PLEASE CALL  "

## 2024-06-18 NOTE — TELEPHONE ENCOUNTER
Rx Refill Note  Requested Prescriptions     Pending Prescriptions Disp Refills    pravastatin (PRAVACHOL) 10 MG tablet 90 tablet 1     Sig: Take 1 tablet by mouth every night at bedtime.      Last office visit with prescribing clinician: 2/16/2024   Last telemedicine visit with prescribing clinician: Visit date not found   Next office visit with prescribing clinician: Visit date not found                         Would you like a call back once the refill request has been completed: [] Yes [] No    If the office needs to give you a call back, can they leave a voicemail: [] Yes [] No    Aries Shields MA  06/18/24, 11:59 EDT

## 2024-06-19 NOTE — TELEPHONE ENCOUNTER
Left patient a message to return our call. Please find out why she is calling. If she needs a refill she will need to move her appointment up sooner than july 9th.

## 2024-06-25 ENCOUNTER — OFFICE VISIT (OUTPATIENT)
Dept: NEUROLOGY | Facility: CLINIC | Age: 79
End: 2024-06-25
Payer: MEDICARE

## 2024-06-25 VITALS
HEIGHT: 68 IN | SYSTOLIC BLOOD PRESSURE: 120 MMHG | OXYGEN SATURATION: 98 % | HEART RATE: 74 BPM | DIASTOLIC BLOOD PRESSURE: 82 MMHG | BODY MASS INDEX: 30.11 KG/M2

## 2024-06-25 DIAGNOSIS — R55 SYNCOPE AND COLLAPSE: Primary | ICD-10-CM

## 2024-06-25 PROCEDURE — 3079F DIAST BP 80-89 MM HG: CPT | Performed by: PSYCHIATRY & NEUROLOGY

## 2024-06-25 PROCEDURE — 1160F RVW MEDS BY RX/DR IN RCRD: CPT | Performed by: PSYCHIATRY & NEUROLOGY

## 2024-06-25 PROCEDURE — 3074F SYST BP LT 130 MM HG: CPT | Performed by: PSYCHIATRY & NEUROLOGY

## 2024-06-25 PROCEDURE — 1159F MED LIST DOCD IN RCRD: CPT | Performed by: PSYCHIATRY & NEUROLOGY

## 2024-06-25 PROCEDURE — 99204 OFFICE O/P NEW MOD 45 MIN: CPT | Performed by: PSYCHIATRY & NEUROLOGY

## 2024-06-27 DIAGNOSIS — M47.816 LUMBAR FACET ARTHROPATHY: ICD-10-CM

## 2024-06-27 DIAGNOSIS — M51.36 DDD (DEGENERATIVE DISC DISEASE), LUMBAR: ICD-10-CM

## 2024-06-27 RX ORDER — HYDROCODONE BITARTRATE AND ACETAMINOPHEN 5; 325 MG/1; MG/1
1 TABLET ORAL EVERY 6 HOURS PRN
Qty: 48 TABLET | Refills: 0 | Status: SHIPPED | OUTPATIENT
Start: 2024-06-27

## 2024-06-27 NOTE — TELEPHONE ENCOUNTER
Rx Refill Note  Requested Prescriptions     Pending Prescriptions Disp Refills    HYDROcodone-acetaminophen (NORCO) 5-325 MG per tablet 120 tablet 0     Sig: Take 1 tablet by mouth Every 6 (Six) Hours As Needed for Severe Pain. 30 day supply. DNF 5/22/24      Last office visit with prescribing clinician: 4/2/2024   Last telemedicine visit with prescribing clinician: Visit date not found   Next office visit with prescribing clinician: 7/9/2024                         Would you like a call back once the refill request has been completed: [] Yes [] No    If the office needs to give you a call back, can they leave a voicemail: [] Yes [] No    Phliippe Adams CMA  06/27/24, 08:22 EDT

## 2024-06-27 NOTE — TELEPHONE ENCOUNTER
I will send a partial supply to last until her appointment on 7/9/24. Reviewed UDS and LOUANN. Both updated and appropriate. Refill appropriate.

## 2024-06-28 ENCOUNTER — PATIENT ROUNDING (BHMG ONLY) (OUTPATIENT)
Dept: NEUROLOGY | Facility: CLINIC | Age: 79
End: 2024-06-28
Payer: MEDICARE

## 2024-07-03 ENCOUNTER — HOSPITAL ENCOUNTER (OUTPATIENT)
Dept: PULMONOLOGY | Facility: HOSPITAL | Age: 79
Discharge: HOME | End: 2024-07-03
Admitting: PSYCHIATRY & NEUROLOGY
Payer: MEDICARE

## 2024-07-03 DIAGNOSIS — R55 SYNCOPE AND COLLAPSE: ICD-10-CM

## 2024-07-03 PROCEDURE — 95816 EEG AWAKE AND DROWSY: CPT

## 2024-07-09 ENCOUNTER — TELEPHONE (OUTPATIENT)
Dept: NEUROLOGY | Facility: CLINIC | Age: 79
End: 2024-07-09
Payer: MEDICARE

## 2024-07-10 ENCOUNTER — TELEPHONE (OUTPATIENT)
Dept: PAIN MEDICINE | Facility: CLINIC | Age: 79
End: 2024-07-10

## 2024-07-10 NOTE — TELEPHONE ENCOUNTER
Per your last refill, you sent her in partial until her appt yesterday , she was a no show yesterday, I lvm for her stating the obvious .

## 2024-07-10 NOTE — TELEPHONE ENCOUNTER
"    Caller: Jung Short \"SHEILA\"    Relationship: Self    Best call back number: [phone]    Requested Prescriptions: NORCO 5-325 MG     Pharmacy where request should be sent: [facility] [phone]     Last office visit with prescribing clinician: 4/2/2024     Next office visit with prescribing clinician: 7/16/2024     Additional details provided by patient: PATIENT STATES SHE IS OUT OF MEDICATION    Does the patient have less than a 3 day supply:  [x] Yes  [] No    "

## 2024-07-12 ENCOUNTER — TELEPHONE (OUTPATIENT)
Dept: INTERNAL MEDICINE | Facility: CLINIC | Age: 79
End: 2024-07-12

## 2024-07-12 DIAGNOSIS — R29.6 FREQUENT FALLS: Primary | ICD-10-CM

## 2024-07-12 DIAGNOSIS — Z74.09 IMPAIRED MOBILITY: ICD-10-CM

## 2024-07-12 NOTE — TELEPHONE ENCOUNTER
"Name: Svetlana Shortkermit Sharp \"SHEILA\"    Relationship: Self    Best Callback Number: [phone]     HUB PROVIDED THE RELAY MESSAGE FROM THE OFFICE   PATIENT HAS FURTHER QUESTIONS AND WOULD LIKE A CALL BACK AT THE FOLLOWING PHONE NUMBER [phone]    ADDITIONAL INFORMATION: PATIENT IS WANTING TO CONTINUE WITH THE PHYSICAL THERAPY AND IF NEEDED CAN GET THE SURGEON TO RECOMMEND IT. JUST LET HER KNOW.     PLEASE CALL TO ADVISE.     " ,[email or web address]

## 2024-07-12 NOTE — TELEPHONE ENCOUNTER
Patients chart shows that patient had a toe amputation with Dr. Gonsalez June 4th. Kathryn had ordered PT in February so I think patient is wanting to continue with this and wasn't able to go after the amputation. Calling patient to confirm.     m to call back    Relay: is she wanting to continue the PT that Kathryn had ordered in February or did her surgeon recommend PT for the amputation?

## 2024-07-12 NOTE — TELEPHONE ENCOUNTER
"Caller: Jung Short \"SHEILA\"    Relationship: Self    Best call back number: [phone]     What is the medical concern/diagnosis: TOE AMPUTATION    What specialty or service is being requested: PHYSICAL THERAPY    What is the provider, practice or medical service name: [facility]    What is the office location: [place]    Any additional details: PATIENT STATES THAT SHE HAS NOT HAD PHYSICAL THERAPY IN OVER A MONTH, AND NEEDS REFERRAL FOR ADDITIONAL THERAPY  "

## 2024-07-16 ENCOUNTER — OFFICE VISIT (OUTPATIENT)
Age: 79
End: 2024-07-16
Payer: MEDICARE

## 2024-07-16 VITALS
HEIGHT: 68 IN | OXYGEN SATURATION: 98 % | BODY MASS INDEX: 30.16 KG/M2 | HEART RATE: 79 BPM | SYSTOLIC BLOOD PRESSURE: 124 MMHG | WEIGHT: 199 LBS | TEMPERATURE: 96.8 F | DIASTOLIC BLOOD PRESSURE: 78 MMHG | RESPIRATION RATE: 18 BRPM

## 2024-07-16 DIAGNOSIS — Z79.899 ENCOUNTER FOR LONG-TERM (CURRENT) USE OF HIGH-RISK MEDICATION: ICD-10-CM

## 2024-07-16 DIAGNOSIS — M51.36 DDD (DEGENERATIVE DISC DISEASE), LUMBAR: ICD-10-CM

## 2024-07-16 DIAGNOSIS — M17.10 ARTHRITIS OF KNEE: ICD-10-CM

## 2024-07-16 DIAGNOSIS — G62.9 PERIPHERAL POLYNEUROPATHY: Primary | ICD-10-CM

## 2024-07-16 DIAGNOSIS — M17.12 PRIMARY OSTEOARTHRITIS OF LEFT KNEE: ICD-10-CM

## 2024-07-16 DIAGNOSIS — M47.816 LUMBAR FACET ARTHROPATHY: ICD-10-CM

## 2024-07-16 PROCEDURE — 1125F AMNT PAIN NOTED PAIN PRSNT: CPT

## 2024-07-16 PROCEDURE — 1160F RVW MEDS BY RX/DR IN RCRD: CPT

## 2024-07-16 PROCEDURE — 1159F MED LIST DOCD IN RCRD: CPT

## 2024-07-16 PROCEDURE — 99213 OFFICE O/P EST LOW 20 MIN: CPT

## 2024-07-16 PROCEDURE — 3078F DIAST BP <80 MM HG: CPT

## 2024-07-16 PROCEDURE — 3074F SYST BP LT 130 MM HG: CPT

## 2024-07-16 PROCEDURE — 80305 DRUG TEST PRSMV DIR OPT OBS: CPT

## 2024-07-16 RX ORDER — HYDROCODONE BITARTRATE AND ACETAMINOPHEN 5; 325 MG/1; MG/1
1 TABLET ORAL EVERY 6 HOURS PRN
Qty: 120 TABLET | Refills: 0 | Status: SHIPPED | OUTPATIENT
Start: 2024-07-16

## 2024-07-16 NOTE — PROGRESS NOTES
CHIEF COMPLAINT  Back pain    Subjective   Jung Short is a 79 y.o. female  who presents for follow-up.  She has a history of chronic low back pain. She reports that her pain has slightly worsened since her last office visit. She reports that she has been out of medication for 2 days.     Today pain is 8/10VAS in severity. Pain is located across her low back in a bandlike pattern and intermittently radiates into bilateral lower extremities. Describes this pain as a nearly continuous aching and burning. Pain is worsened by prolonged standing, walking long distances, and increased physical activity. Pain improves with rest/reposition and medications.      Continues with Hydrocodone 5mg 4/day. Denies any side effects from the regimen, including constipation and somnolence. The regimen helps decrease pain by 50% and allows her to live independently. ADL's by self. Denies any bowel or bladder changes.     Back Pain  This is a chronic problem. The current episode started more than 1 year ago. The problem occurs constantly. The problem has been worse since onset. The pain is present in the lumbar spine. The quality of the pain is described as aching and burning. Radiates to: intermittently radiates into BLE. The pain is at a severity of 8/10. The pain is moderate. The symptoms are aggravated by standing and position (walking long distances, increased physical activity). Associated symptoms include weakness (bilateral legs). Pertinent negatives include no abdominal pain, chest pain, dysuria, fever, headaches or numbness. She has tried analgesics for the symptoms. The treatment provided moderate relief.      PEG Assessment   What number best describes your pain on average in the past week?6  What number best describes how, during the past week, pain has interfered with your enjoyment of life?10  What number best describes how, during the past week, pain has interfered with your general activity?  8    Review of  "Pertinent Medical Data ---      The following portions of the patient's history were reviewed and updated as appropriate: allergies, current medications, past family history, past medical history, past social history, past surgical history, and problem list.    Review of Systems   Constitutional:  Negative for fever.   Cardiovascular:  Negative for chest pain.   Gastrointestinal:  Positive for diarrhea. Negative for abdominal pain and constipation.   Genitourinary:  Negative for difficulty urinating and dysuria.   Musculoskeletal:  Positive for back pain.   Neurological:  Positive for weakness (bilateral legs). Negative for numbness and headaches.   Psychiatric/Behavioral:  Negative for sleep disturbance and suicidal ideas. The patient is not nervous/anxious.      I have reviewed and confirmed the accuracy of the ROS as documented by the MA/LPN/RN JOSE ANTONIO Turner    Vitals:    07/16/24 1305   BP: 124/78   Pulse: 79   Resp: 18   Temp: 96.8 °F (36 °C)   SpO2: 98%   Weight: 90.3 kg (199 lb)  Comment: per patient   Height: 172.7 cm (68\")   PainSc:   8   PainLoc: Back     Objective   Physical Exam  Constitutional:       Appearance: Normal appearance.   HENT:      Head: Normocephalic.   Cardiovascular:      Rate and Rhythm: Normal rate and regular rhythm.   Pulmonary:      Effort: Pulmonary effort is normal.      Breath sounds: Normal breath sounds.   Musculoskeletal:      Cervical back: Normal range of motion.      Lumbar back: Spasms, tenderness and bony tenderness present. Decreased range of motion.   Skin:     General: Skin is warm and dry.      Capillary Refill: Capillary refill takes less than 2 seconds.   Neurological:      General: No focal deficit present.      Mental Status: She is alert and oriented to person, place, and time.      Gait: Gait abnormal (slowed, ambulates with cane).   Psychiatric:         Mood and Affect: Mood normal.         Behavior: Behavior normal.         Thought Content: Thought " content normal.         Cognition and Memory: Cognition normal.       Assessment & Plan   Diagnoses and all orders for this visit:    1. Peripheral polyneuropathy (Primary)    2. Lumbar facet arthropathy  -     HYDROcodone-acetaminophen (NORCO) 5-325 MG per tablet; Take 1 tablet by mouth Every 6 (Six) Hours As Needed for Severe Pain. 30 day supply  Dispense: 120 tablet; Refill: 0  -     Ambulatory Referral to Physical Therapy    3. DDD (degenerative disc disease), lumbar  -     HYDROcodone-acetaminophen (NORCO) 5-325 MG per tablet; Take 1 tablet by mouth Every 6 (Six) Hours As Needed for Severe Pain. 30 day supply  Dispense: 120 tablet; Refill: 0  -     Ambulatory Referral to Physical Therapy    4. Arthritis of knee    5. Primary osteoarthritis of left knee    6. Encounter for long-term (current) use of high-risk medication      Jung Short reports a pain score of 8.  Given her pain assessment as noted, treatment options were discussed and the following options were decided upon as a follow-up plan to address the patient's pain: continuation of current treatment plan for pain and prescription for opiod analgesics.    --- Routine UDS in office today as part of monitoring requirements for controlled substances.  The specimen was viewed and the immunoassay result reviewed and is +OPI.  This specimen will be sent to [facility] laboratory for confirmation.     --- The patient signed an updated copy of the controlled substance agreement on 11/28/23  --- Continue Hydrocodone. Patient appears stable with current regimen. No adverse effects. Regarding continuation of opioids, there is no evidence of aberrant behavior or any red flags.  The patient continues with appropriate response to opioid therapy. ADL's remain intact by self.   --- Referral to [facility] per patient request due to worsening low back pain  --- Moderate risk for opiate abuse/diversion   --- Follow-up 2 months or sooner if needed      LOUANN REPORT  As  part of the patient's treatment plan, I am prescribing controlled substances. The patient has been made aware of appropriate use of such medications, including potential risk of somnolence, limited ability to drive and/or work safely, and the potential for dependence or overdose. It has also been made clear that these medications are for use by this patient only, without concomitant use of alcohol or other substances unless prescribed.     Patient has completed prescribing agreement detailing terms of continued prescribing of controlled substances, including monitoring LOUANN reports, urine drug screening, and pill counts if necessary. The patient is aware that inappropriate use will results in cessation of prescribing such medications.    As the clinician, I personally reviewed the LOUANN from 7/6/24 while the patient was in the office today.    History and physical exam exhibit continued safe and appropriate use of controlled substances.    Dictated utilizing Dragon dictation.

## 2024-07-22 ENCOUNTER — OFFICE VISIT (OUTPATIENT)
Dept: INTERNAL MEDICINE | Facility: CLINIC | Age: 79
End: 2024-07-22
Payer: MEDICARE

## 2024-07-22 VITALS
OXYGEN SATURATION: 98 % | HEART RATE: 65 BPM | DIASTOLIC BLOOD PRESSURE: 62 MMHG | SYSTOLIC BLOOD PRESSURE: 122 MMHG | BODY MASS INDEX: 30.26 KG/M2 | HEIGHT: 68 IN | TEMPERATURE: 98 F

## 2024-07-22 DIAGNOSIS — I50.20 HFREF (HEART FAILURE WITH REDUCED EJECTION FRACTION): ICD-10-CM

## 2024-07-22 DIAGNOSIS — E11.65 TYPE 2 DIABETES MELLITUS WITH HYPERGLYCEMIA, WITHOUT LONG-TERM CURRENT USE OF INSULIN: ICD-10-CM

## 2024-07-22 DIAGNOSIS — R30.0 DYSURIA: ICD-10-CM

## 2024-07-22 DIAGNOSIS — I42.8 NICM (NONISCHEMIC CARDIOMYOPATHY): ICD-10-CM

## 2024-07-22 DIAGNOSIS — R55 SYNCOPE AND COLLAPSE: ICD-10-CM

## 2024-07-22 DIAGNOSIS — G62.9 PERIPHERAL POLYNEUROPATHY: ICD-10-CM

## 2024-07-22 DIAGNOSIS — E53.8 COBALAMIN DEFICIENCY: ICD-10-CM

## 2024-07-22 DIAGNOSIS — E03.9 ACQUIRED HYPOTHYROIDISM: ICD-10-CM

## 2024-07-22 DIAGNOSIS — N18.31 STAGE 3A CHRONIC KIDNEY DISEASE: ICD-10-CM

## 2024-07-22 DIAGNOSIS — Z89.421 STATUS POST AMPUTATION OF LESSER TOE OF RIGHT FOOT: ICD-10-CM

## 2024-07-22 DIAGNOSIS — E55.9 VITAMIN D DEFICIENCY: ICD-10-CM

## 2024-07-22 DIAGNOSIS — I10 PRIMARY HYPERTENSION: Primary | ICD-10-CM

## 2024-07-22 DIAGNOSIS — Z09 ENCOUNTER FOR EXAMINATION FOLLOWING TREATMENT AT HOSPITAL: ICD-10-CM

## 2024-07-22 DIAGNOSIS — E78.5 DYSLIPIDEMIA: ICD-10-CM

## 2024-07-22 DIAGNOSIS — G89.29 OTHER CHRONIC PAIN: ICD-10-CM

## 2024-07-22 PROBLEM — Z48.1 ENCOUNTER FOR PLANNED POST-OPERATIVE WOUND CLOSURE: Status: RESOLVED | Noted: 2024-06-04 | Resolved: 2024-07-22

## 2024-07-22 PROCEDURE — 3074F SYST BP LT 130 MM HG: CPT | Performed by: NURSE PRACTITIONER

## 2024-07-22 PROCEDURE — 99215 OFFICE O/P EST HI 40 MIN: CPT | Performed by: NURSE PRACTITIONER

## 2024-07-22 PROCEDURE — 1159F MED LIST DOCD IN RCRD: CPT | Performed by: NURSE PRACTITIONER

## 2024-07-22 PROCEDURE — 1160F RVW MEDS BY RX/DR IN RCRD: CPT | Performed by: NURSE PRACTITIONER

## 2024-07-22 PROCEDURE — 3078F DIAST BP <80 MM HG: CPT | Performed by: NURSE PRACTITIONER

## 2024-07-22 PROCEDURE — G2211 COMPLEX E/M VISIT ADD ON: HCPCS | Performed by: NURSE PRACTITIONER

## 2024-07-22 PROCEDURE — 1125F AMNT PAIN NOTED PAIN PRSNT: CPT | Performed by: NURSE PRACTITIONER

## 2024-07-22 NOTE — PROGRESS NOTES
"Chief Complaint   Patient presents with    Hospital Follow Up Visit     6/3 third toe removal on right foot    Difficulty Urinating     Symptoms off and on for 2 weeks       SUBJECTIVE  Jung Short is a 79 y.o. female presenting today for follow up of her chronic health conditions.     These include HTN, HLD, HFrEF, NICM, DM2, CKD, hypothyroidism, GERD, peripheral neuropathy, depression, anemia, vitamin b12 def, vitamin D def. She has prior h/o R breast cancer s/p mastectomy in 2015.    She had recent hospital admission.  Date of Admission: 6/3/2024  Date of Discharge:  6/6/2024     Primary Discharge Diagnoses:  Cellulitis of the right foot due to E. Coli from a gangrenous toe due to diabetes.    History of Present Illness (taken from H&P):  The patient is an 78-year-old female who presented to the emergency department secondary to 1 day of increased pain and redness to right foot.  She notes ongoing right foot cellulitis that is been managed by Dr. Gonsalez of podiatry, on oral antibiotics.  Antibiotics were changed last week however patient notes she is only taken 2 days of new antibiotics.  She notes pain only when walking, none at rest.  She states she is completely unaware of any plans to amputate her toe other than \"Dr. Gonsalez is going to take it off in the office 3 days after I come back from [place]\".  She denies pain at rest.    Hospital Course:  Ms. Short was admitted to the medical surgical unit.  She was seen in consultation by Dr. Gonsalez with podiatry.  Broad-spectrum antibiotics were initiated, and then tailored as culture data became available.  Interestingly of note, the E. coli was resent to both therapies Dr. Gonsalez and started as an outpatient.  She was taken for amputation of the digit followed by closure.  She will complete a course of antibiotics as an outpatient.    This is healing well and she has f/u w/ Dr. Gonsalez tomorrow.    Office Visit with Aria Bates MD " (05/31/2024)   1. Primary hypertension  Case Request EP Lab: Loop insertion       2. HFrEF (heart failure with reduced ejection fraction)          3. Dyslipidemia          4. Type 2 diabetes mellitus without complication, without long-term current use of insulin          5. Syncope and collapse  Case Request EP Lab: Loop insertion           Nonischemic cardiomyopathy, recovered.  Maintain carvedilol, Jardiance, Entresto.  No decompensated heart failure  Hypertension blood pressure controlled.  She has no dizziness even if her blood pressure gets little low.  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      Office Visit with Horacio Zazueta MD (06/25/2024)   78-year-old woman sent for evaluation of syncope and collapse. Most of the features do not suggest an epileptic origin for these events but apparently no other clear source has been found to this point. Will move ahead with an EEG to evaluate this further and I will review the results and take any further measures that may be necessary. Currently, there is no clear indication for other neurological intervention.     Nml EEG. No further episodes of syncope.      Office Visit with Jordyn Sheehan APRN (07/16/2024)   Her pain control is adequate. [facility].    Today she requests to start medication for wgt loss, specifically semaglutide.    She c/o intermittent dysuria x2 wks which is waxing and waning. She has not had this symptom for at least the past 3 days.      Outpatient Medications Marked as Taking for the 7/22/24 encounter (Office Visit) with Kathryn Epstein APRN   Medication Sig Dispense Refill    aspirin 81 MG EC tablet Take 1 tablet by mouth Daily.      bumetanide (BUMEX) 0.5 MG tablet  Take 2 tablets by mouth Daily.      Calcium Citrate-Vitamin D 250-2.5 MG-MCG per tablet Take 1 tablet by mouth 2 (Two) Times a Day. 60 tablet 11    carvedilol (COREG) 25 MG tablet Take 1 tablet by mouth 2 (Two) Times a Day. 180 tablet 0    D3-1000 25 MCG (1000 UT) tablet TAKE 1 TABLET BY MOUTH DAILY 90 tablet 1    DULoxetine (CYMBALTA) 60 MG capsule TAKE 1 CAPSULE BY MOUTH DAILY. 90 capsule 2    empagliflozin (JARDIANCE) 10 MG tablet tablet Take 1 tablet by mouth Daily. 21 tablet 0    esomeprazole (nexIUM) 20 MG capsule Take 1 capsule by mouth Every Morning Before Breakfast.      glipizide (GLUCOTROL XL) 5 MG ER tablet TAKE 1 TABLET BY MOUTH DAILY WITH BREAKFAST. 90 tablet 3    HYDROcodone-acetaminophen (NORCO) 5-325 MG per tablet Take 1 tablet by mouth Every 6 (Six) Hours As Needed for Severe Pain. 30 day supply 120 tablet 0    levothyroxine (SYNTHROID, LEVOTHROID) 25 MCG tablet TAKE 1 TABLET BY MOUTH DAILY 90 tablet 3    Multiple Vitamins-Minerals (MULTIVITAMIN ADULT PO) Take 1 tablet by mouth Daily.      pravastatin (PRAVACHOL) 10 MG tablet Take 1 tablet by mouth every night at bedtime. 90 tablet 1    pregabalin (LYRICA) 75 MG capsule Take 1 capsule by mouth 2 (Two) Times a Day. 60 capsule 6    sacubitril-valsartan (Entresto) 49-51 MG tablet Take 1 tablet by mouth 2 (Two) Times a Day. 60 tablet 11    spironolactone (ALDACTONE) 25 MG tablet Take 1 tablet by mouth Daily. 30 tablet 11         The following portions of the patient's history were reviewed and updated as appropriate: allergies, current medications, past family history, past medical history, past social history, past surgical history and problem list.    Review of Systems   Constitutional:  Negative for chills and fever.   Gastrointestinal:  Negative for nausea.       Objective   Vitals:    07/22/24 1055   BP: 122/62   BP Location: Left arm   Patient Position: Sitting   Cuff Size: Large Adult   Pulse: 65   Temp: 98 °F (36.7 °C)   TempSrc: Infrared  "  SpO2: 98%   Height: 172.7 cm (68\")       BP Readings from Last 3 Encounters:   07/22/24 122/62   07/16/24 124/78   06/25/24 120/82        Wt Readings from Last 3 Encounters:   07/16/24 90.3 kg (199 lb)   06/03/24 89.8 kg (198 lb)   05/31/24 85.7 kg (189 lb)        Body mass index is 30.26 kg/m².  Nursing notes and vitals reviewed.    Physical Exam  Constitutional:       General: She is not in acute distress.     Appearance: She is well-developed.   Neck:      Thyroid: No thyroid mass or thyromegaly.   Cardiovascular:      Rate and Rhythm: Regular rhythm.      Heart sounds: S1 normal and S2 normal.   Pulmonary:      Effort: Pulmonary effort is normal.      Breath sounds: Normal breath sounds.   Musculoskeletal:      Cervical back: Neck supple.   Lymphadenopathy:      Cervical: No cervical adenopathy.   Neurological:      Mental Status: She is alert and oriented to person, place, and time.   Psychiatric:         Attention and Perception: She is attentive.         Behavior: Behavior normal.         Thought Content: Thought content normal.         Recent Results (from the past 672 hour(s))   POC Urine Drug Screen, Triage    Collection Time: 07/16/24  2:01 PM    Specimen: Urine   Result Value Ref Range    Methamphetaine Screen, Urine Negative Negative    POC Amphetamines Negative Negative    Barbiturates Screen Negative Negative    Benzodiazepine Screen Negative Negative    Cocaine Screen Negative Negative    Methadone Screen Negative Negative    Opiate Screen Positive Negative    Oxycodone, Screen Negative Negative    Phencyclidine (PCP) Screen Negative Negative    Propoxyphene Screen Negative Negative    THC, Screen Negative Negative    Tricyclic Antidepressants Screen Negative Negative       CBC & Differential (06/06/2024 09:25)   Comprehensive Metabolic Panel (06/06/2024 09:25)       Assessment & Plan   Diagnoses and all orders for this visit:    1. Primary hypertension (Primary)    2. Dyslipidemia    3. HFrEF " (heart failure with reduced ejection fraction)    4. NICM (nonischemic cardiomyopathy)    5. Type 2 diabetes mellitus with hyperglycemia, without long-term current use of insulin  -     Semaglutide,0.25 or 0.5MG/DOS, (OZEMPIC) 2 MG/3ML solution pen-injector; Inject 0.25 mg under the skin into the appropriate area as directed 1 (One) Time Per Week.  Dispense: 3 mL; Refill: 0    6. Stage 3a chronic kidney disease    7. Encounter for examination following treatment at hospital    8. Status post amputation of lesser toe of right foot    9. Syncope and collapse    10. Peripheral polyneuropathy    11. Other chronic pain    12. Dysuria  -     Cancel: POC Urinalysis Dipstick, Automated  -     Urinalysis With Microscopic - Urine, Clean Catch  -     Urine Culture - , Urine, Clean Catch        Start Ozempic.  Check urine and tx accordingly.  Except as noted above, pt will continue current medications as noted in the medication list. I will continue to authorize refills as needed.    Continue to follow with specialty care as directed by them.        Medications, including side effects, were discussed with the patient. Patient verbalized understanding.  The plan of care was discussed. All questions were answered. Patient verbalized understanding.      Return in about 2 months (around 9/22/2024) for Medicare Wellness, fasting labs one week prior to.      I spent 49 minutes caring for Jung on this date of service. This time includes time spent by me in the following activities:preparing for the visit, reviewing tests, obtaining and/or reviewing a separately obtained history, performing a medically appropriate examination and/or evaluation , counseling and educating the patient/family/caregiver, ordering medications, tests, or procedures, referring and communicating with other health care professionals , documenting information in the medical record, independently interpreting results and communicating that information with the  patient/family/caregiver, and care coordination

## 2024-07-23 DIAGNOSIS — E78.5 DYSLIPIDEMIA: ICD-10-CM

## 2024-07-23 DIAGNOSIS — I10 PRIMARY HYPERTENSION: ICD-10-CM

## 2024-07-23 DIAGNOSIS — N18.31 STAGE 3A CHRONIC KIDNEY DISEASE: ICD-10-CM

## 2024-07-23 DIAGNOSIS — E53.8 COBALAMIN DEFICIENCY: ICD-10-CM

## 2024-07-23 DIAGNOSIS — E55.9 VITAMIN D DEFICIENCY: ICD-10-CM

## 2024-07-23 DIAGNOSIS — E11.9 TYPE 2 DIABETES MELLITUS WITHOUT COMPLICATION, WITHOUT LONG-TERM CURRENT USE OF INSULIN: ICD-10-CM

## 2024-07-23 DIAGNOSIS — E03.9 ACQUIRED HYPOTHYROIDISM: ICD-10-CM

## 2024-07-23 DIAGNOSIS — E11.65 TYPE 2 DIABETES MELLITUS WITH HYPERGLYCEMIA, WITHOUT LONG-TERM CURRENT USE OF INSULIN: ICD-10-CM

## 2024-07-23 RX ORDER — EMPAGLIFLOZIN 10 MG/1
10 TABLET, FILM COATED ORAL DAILY
Qty: 21 TABLET | Refills: 0 | Status: SHIPPED | OUTPATIENT
Start: 2024-07-23

## 2024-07-24 LAB
25(OH)D3+25(OH)D2 SERPL-MCNC: 40.3 NG/ML (ref 30–100)
ALBUMIN SERPL-MCNC: 4.1 G/DL (ref 3.7–4.7)
ALBUMIN/CREAT UR: 138 MG/G CREAT (ref 0–29)
ALP SERPL-CCNC: 64 IU/L (ref 44–121)
ALT SERPL-CCNC: 12 IU/L (ref 0–32)
APPEARANCE UR: CLEAR
AST SERPL-CCNC: 17 IU/L (ref 0–40)
BACTERIA #/AREA URNS HPF: ABNORMAL /[HPF]
BACTERIA UR CULT: ABNORMAL
BACTERIA UR CULT: ABNORMAL
BASOPHILS # BLD AUTO: 0 X10E3/UL (ref 0–0.2)
BASOPHILS NFR BLD AUTO: 0 %
BILIRUB SERPL-MCNC: 0.5 MG/DL (ref 0–1.2)
BILIRUB UR QL STRIP: NEGATIVE
BUN SERPL-MCNC: 10 MG/DL (ref 8–27)
BUN/CREAT SERPL: 12 (ref 12–28)
CALCIUM SERPL-MCNC: 10 MG/DL (ref 8.7–10.3)
CASTS URNS QL MICRO: ABNORMAL /LPF
CHLORIDE SERPL-SCNC: 111 MMOL/L (ref 96–106)
CHOLEST SERPL-MCNC: 170 MG/DL (ref 100–199)
CHOLEST/HDLC SERPL: 3 RATIO (ref 0–4.4)
CO2 SERPL-SCNC: 21 MMOL/L (ref 20–29)
COLOR UR: YELLOW
CREAT SERPL-MCNC: 0.81 MG/DL (ref 0.57–1)
CREAT UR-MCNC: 112.1 MG/DL
EGFRCR SERPLBLD CKD-EPI 2021: 73 ML/MIN/1.73
EOSINOPHIL # BLD AUTO: 0.1 X10E3/UL (ref 0–0.4)
EOSINOPHIL NFR BLD AUTO: 3 %
EPI CELLS #/AREA URNS HPF: ABNORMAL /HPF (ref 0–10)
ERYTHROCYTE [DISTWIDTH] IN BLOOD BY AUTOMATED COUNT: 13.7 % (ref 11.7–15.4)
GLOBULIN SER CALC-MCNC: 2.2 G/DL (ref 1.5–4.5)
GLUCOSE SERPL-MCNC: 116 MG/DL (ref 70–99)
GLUCOSE UR QL STRIP: ABNORMAL
HBA1C MFR BLD: 6.1 % (ref 4.8–5.6)
HCT VFR BLD AUTO: 38.5 % (ref 34–46.6)
HDLC SERPL-MCNC: 56 MG/DL
HGB BLD-MCNC: 12.5 G/DL (ref 11.1–15.9)
HGB UR QL STRIP: NEGATIVE
IMM GRANULOCYTES # BLD AUTO: 0 X10E3/UL (ref 0–0.1)
IMM GRANULOCYTES NFR BLD AUTO: 1 %
KETONES UR QL STRIP: NEGATIVE
LDLC SERPL CALC-MCNC: 92 MG/DL (ref 0–99)
LEUKOCYTE ESTERASE UR QL STRIP: ABNORMAL
LYMPHOCYTES # BLD AUTO: 1.4 X10E3/UL (ref 0.7–3.1)
LYMPHOCYTES NFR BLD AUTO: 33 %
MCH RBC QN AUTO: 30.3 PG (ref 26.6–33)
MCHC RBC AUTO-ENTMCNC: 32.5 G/DL (ref 31.5–35.7)
MCV RBC AUTO: 93 FL (ref 79–97)
MICRO URNS: ABNORMAL
MICROALBUMIN UR-MCNC: 154.7 UG/ML
MONOCYTES # BLD AUTO: 0.3 X10E3/UL (ref 0.1–0.9)
MONOCYTES NFR BLD AUTO: 8 %
NEUTROPHILS # BLD AUTO: 2.3 X10E3/UL (ref 1.4–7)
NEUTROPHILS NFR BLD AUTO: 55 %
NITRITE UR QL STRIP: POSITIVE
OTHER ANTIBIOTIC SUSC ISLT: ABNORMAL
PH UR STRIP: 5.5 [PH] (ref 5–7.5)
PLATELET # BLD AUTO: 152 X10E3/UL (ref 150–450)
POTASSIUM SERPL-SCNC: 4.6 MMOL/L (ref 3.5–5.2)
PROT SERPL-MCNC: 6.3 G/DL (ref 6–8.5)
PROT UR QL STRIP: ABNORMAL
RBC # BLD AUTO: 4.12 X10E6/UL (ref 3.77–5.28)
RBC #/AREA URNS HPF: ABNORMAL /HPF (ref 0–2)
SODIUM SERPL-SCNC: 143 MMOL/L (ref 134–144)
SP GR UR STRIP: 1.03 (ref 1–1.03)
T4 FREE SERPL-MCNC: 1.05 NG/DL (ref 0.82–1.77)
TRIGL SERPL-MCNC: 124 MG/DL (ref 0–149)
TSH SERPL DL<=0.005 MIU/L-ACNC: 0.95 UIU/ML (ref 0.45–4.5)
UROBILINOGEN UR STRIP-MCNC: 0.2 MG/DL (ref 0.2–1)
VIT B12 SERPL-MCNC: 415 PG/ML (ref 232–1245)
VLDLC SERPL CALC-MCNC: 22 MG/DL (ref 5–40)
WBC # BLD AUTO: 4.2 X10E3/UL (ref 3.4–10.8)
WBC #/AREA URNS HPF: >30 /HPF (ref 0–5)

## 2024-07-25 ENCOUNTER — TELEPHONE (OUTPATIENT)
Dept: INTERNAL MEDICINE | Facility: CLINIC | Age: 79
End: 2024-07-25
Payer: MEDICARE

## 2024-07-25 RX ORDER — CEFDINIR 300 MG/1
300 CAPSULE ORAL 2 TIMES DAILY
Qty: 14 CAPSULE | Refills: 0 | Status: SHIPPED | OUTPATIENT
Start: 2024-07-25 | End: 2024-08-01

## 2024-07-26 ENCOUNTER — TELEPHONE (OUTPATIENT)
Dept: INTERNAL MEDICINE | Facility: CLINIC | Age: 79
End: 2024-07-26

## 2024-07-26 NOTE — TELEPHONE ENCOUNTER
"  Caller: Jung Short \"SHEILA\"    Relationship: Self    Best call back number: [phone]    What was the call regarding: STATED THAT THEY WERE ABLE TO GET THE MEDICATION THAT WAS CALLED IN YESTERDAY FOR THEM FROM [facility].    "

## 2024-07-30 DIAGNOSIS — E11.9 TYPE 2 DIABETES MELLITUS WITHOUT COMPLICATION, WITHOUT LONG-TERM CURRENT USE OF INSULIN: ICD-10-CM

## 2024-07-30 RX ORDER — GLIPIZIDE 5 MG/1
5 TABLET, FILM COATED, EXTENDED RELEASE ORAL
Qty: 90 TABLET | Refills: 3 | Status: SHIPPED | OUTPATIENT
Start: 2024-07-30

## 2024-08-06 ENCOUNTER — OFFICE VISIT (OUTPATIENT)
Age: 79
End: 2024-08-06
Payer: MEDICARE

## 2024-08-06 VITALS
BODY MASS INDEX: 30.31 KG/M2 | DIASTOLIC BLOOD PRESSURE: 70 MMHG | SYSTOLIC BLOOD PRESSURE: 112 MMHG | HEIGHT: 68 IN | WEIGHT: 200 LBS | HEART RATE: 68 BPM

## 2024-08-06 DIAGNOSIS — R55 SYNCOPE AND COLLAPSE: Primary | ICD-10-CM

## 2024-08-09 DIAGNOSIS — E11.9 TYPE 2 DIABETES MELLITUS WITHOUT COMPLICATION, WITHOUT LONG-TERM CURRENT USE OF INSULIN: ICD-10-CM

## 2024-08-09 RX ORDER — SACUBITRIL AND VALSARTAN 49; 51 MG/1; MG/1
1 TABLET, FILM COATED ORAL 2 TIMES DAILY
Qty: 112 TABLET | Refills: 0 | Status: SHIPPED | OUTPATIENT
Start: 2024-08-09

## 2024-08-21 DIAGNOSIS — M47.816 LUMBAR FACET ARTHROPATHY: ICD-10-CM

## 2024-08-21 DIAGNOSIS — M51.36 DDD (DEGENERATIVE DISC DISEASE), LUMBAR: ICD-10-CM

## 2024-08-21 RX ORDER — HYDROCODONE BITARTRATE AND ACETAMINOPHEN 5; 325 MG/1; MG/1
1 TABLET ORAL EVERY 6 HOURS PRN
Qty: 120 TABLET | Refills: 0 | Status: SHIPPED | OUTPATIENT
Start: 2024-08-21

## 2024-08-21 NOTE — TELEPHONE ENCOUNTER
Refill for JOSE ANTONIO Lester (out of office today)  Last OV: 7/16/24  Next OV: 9/17/24  Patient left message on nurse's VM yesterday, she is out of medication

## 2024-08-21 NOTE — TELEPHONE ENCOUNTER
Reviewed last office visit on 7/16/2024. UDS and LOUANN reviewed and are appropriate. Due to provider being out of office, will refill appropriately.    Covering for JOSE ANTONIO Lester

## 2024-08-22 DIAGNOSIS — G89.29 OTHER CHRONIC PAIN: ICD-10-CM

## 2024-08-22 RX ORDER — PREGABALIN 75 MG/1
75 CAPSULE ORAL 2 TIMES DAILY
Qty: 60 CAPSULE | Refills: 5 | Status: SHIPPED | OUTPATIENT
Start: 2024-08-22

## 2024-08-22 NOTE — TELEPHONE ENCOUNTER
Rx Refill Note  Requested Prescriptions     Pending Prescriptions Disp Refills    pregabalin (LYRICA) 75 MG capsule [Pharmacy Med Name: PREGABALIN 75MG CAPSULES] 60 capsule      Sig: TAKE 1 CAPSULE BY MOUTH TWICE DAILY      Last office visit with prescribing clinician: 7/22/2024   Last telemedicine visit with prescribing clinician: Visit date not found   Next office visit with prescribing clinician: 9/24/2024                         Would you like a call back once the refill request has been completed: [] Yes [] No    If the office needs to give you a call back, can they leave a voicemail: [] Yes [] No    Aries Shields MA  08/22/24, 12:19 EDT

## 2024-08-22 NOTE — TELEPHONE ENCOUNTER
Rx Refill Note  Requested Prescriptions     Pending Prescriptions Disp Refills    pregabalin (LYRICA) 75 MG capsule [Pharmacy Med Name: PREGABALIN 75MG CAPSULES] 60 capsule      Sig: TAKE 1 CAPSULE BY MOUTH TWICE DAILY      Last office visit with prescribing clinician: 7/22/2024   Last telemedicine visit with prescribing clinician: Visit date not found   Next office visit with prescribing clinician: 9/24/2024                         Would you like a call back once the refill request has been completed: [] Yes [] No    If the office needs to give you a call back, can they leave a voicemail: [] Yes [] No    Aries Shields MA  08/22/24, 12:20 EDT

## 2024-08-28 ENCOUNTER — HOSPITAL ENCOUNTER (OUTPATIENT)
Facility: HOSPITAL | Age: 79
Setting detail: HOSPITAL OUTPATIENT SURGERY
Discharge: HOME | End: 2024-08-28
Attending: INTERNAL MEDICINE | Admitting: INTERNAL MEDICINE
Payer: MEDICARE

## 2024-08-28 VITALS
OXYGEN SATURATION: 96 % | BODY MASS INDEX: 33.32 KG/M2 | HEART RATE: 59 BPM | DIASTOLIC BLOOD PRESSURE: 49 MMHG | RESPIRATION RATE: 15 BRPM | WEIGHT: 200 LBS | TEMPERATURE: 97.2 F | HEIGHT: 65 IN | SYSTOLIC BLOOD PRESSURE: 102 MMHG

## 2024-08-28 DIAGNOSIS — R55 SYNCOPE AND COLLAPSE: ICD-10-CM

## 2024-08-28 PROCEDURE — 33285 INSJ SUBQ CAR RHYTHM MNTR: CPT | Performed by: INTERNAL MEDICINE

## 2024-08-28 PROCEDURE — C1764 EVENT RECORDER, CARDIAC: HCPCS | Performed by: INTERNAL MEDICINE

## 2024-08-28 DEVICE — ICM LP/RECRD CARD LUX/DX/PLUS: Type: IMPLANTABLE DEVICE | Status: FUNCTIONAL

## 2024-08-28 RX ORDER — SODIUM CHLORIDE 9 MG/ML
75 INJECTION, SOLUTION INTRAVENOUS CONTINUOUS
Status: DISCONTINUED | OUTPATIENT
Start: 2024-08-28 | End: 2024-08-28 | Stop reason: HOSPADM

## 2024-08-28 RX ORDER — NITROGLYCERIN 0.4 MG/1
0.4 TABLET SUBLINGUAL
Status: DISCONTINUED | OUTPATIENT
Start: 2024-08-28 | End: 2024-08-28 | Stop reason: HOSPADM

## 2024-08-28 RX ORDER — SODIUM CHLORIDE 0.9 % (FLUSH) 0.9 %
10 SYRINGE (ML) INJECTION AS NEEDED
Status: DISCONTINUED | OUTPATIENT
Start: 2024-08-28 | End: 2024-08-28 | Stop reason: HOSPADM

## 2024-08-28 RX ORDER — LIDOCAINE HYDROCHLORIDE AND EPINEPHRINE 10; 10 MG/ML; UG/ML
INJECTION, SOLUTION INFILTRATION; PERINEURAL
Status: DISCONTINUED | OUTPATIENT
Start: 2024-08-28 | End: 2024-08-28 | Stop reason: HOSPADM

## 2024-08-28 NOTE — H&P (VIEW-ONLY)
Date of Office Visit: 2024  Encounter Provider: Dillon Amaya MD  Place of Service: [facility] CARDIOLOGY  Patient Name: Jung Short  :1945    Chief Complaint   Patient presents with    Syncope    HFrEF   :     HPI: Jung Short is a 79 y.o. female who presents today for syncope    She is an 78 yo female who reports multiple instances of sudden syncope.     Episodes have occurred over the past couple of years.     Episodes have generally occurred while standing, but one while sitting.     She has sustained injury from fall, she denies any warning.           Past Medical History:   Diagnosis Date    Age-related osteoporosis without current pathological fracture 2022    Anemia     Benign breast disease     Cancer     Right breast    Cellulitis of leg     May- right lower extremity    CHF (congestive heart failure)     Dr Bates follows    Chronic kidney disease     Dr Hannah follows    Chronic ulcer of right foot     Non-pressure, history of     Closed fracture of patella 2017    Cluster headache     Colon polyp     Depression     Diabetic peripheral neuropathy     Difficulty walking     Diverticulosis     Femoral fracture     right    Fibromyalgia, primary     Fracture of left wrist     history of    Gastroesophageal reflux     Generalized pain 2016    Hiatal hernia     Hyperlipidemia     Hypertension     Hypothyroidism     Impingement syndrome of right shoulder     Joint pain     Left knee pain 2016    Menopausal disorder     Methicillin resistant Staphylococcus aureus infection     after bladder surgery    Nonischemic cardiomyopathy     Ejection fraction 10% per 2-D echo with Doppler however she did have cardiac catheterization 2015 which showed ejection fraction 20% with global hypokinesis and severe mitral insufficiency    Osteoarthritis     generalized, sched jania TKA    Osteomyelitis 2016    Pain in  shoulder 08/02/2016    Paroxysmal atrial fibrillation     Dr Bates follows    Radius/ulna fracture, left, closed, initial encounter 10/21/2017    Shoulder pain, bilateral     has tears on both sides    Sleep apnea     Syncope, psychogenic 04/19/2017    Thoracic back pain 02/02/2016    Thoracic injuries     Toe ulcer     h/o to both feet, d/t shoes rubbing per patient    Transient alteration of awareness 04/19/2017    Traumatic closed nondisp torus fracture of distal radial metaphysis, right, initial encounter 2/28/2020       Past Surgical History:   Procedure Laterality Date    ABDOMINAL SURGERY  2012    had to be reopened after bladder surgery d/t infection    AMPUTATION DIGIT Right 6/4/2024    Procedure: AMPUTATION DIGIT right third toe;  Surgeon: Anmol Gonsalez DPM;  Location: [facility];  Service: Podiatry;  Laterality: Right;    AMPUTATION FOOT / TOE Right 11/2013    Rt foot amputation MTP first toe    BLADDER SURGERY  2012    BREAST BIOPSY      BREAST SURGERY  06/29/2015    Percutaneous ultrasound-guided placement of metal localized clip 1st lesion    CARDIAC CATHETERIZATION  2015    CARDIAC CATHETERIZATION N/A 1/25/2021    Procedure: Right and Left Heart Cath;  Surgeon: Nicholas Andrade MD;  Location: [facility] CATH INVASIVE LOCATION;  Service: Cardiovascular;  Laterality: N/A;  drop in EF, with hx of NICM, SOA fatigue.  Discussed with     CARDIAC CATHETERIZATION N/A 1/25/2021    Procedure: Left ventriculography;  Surgeon: Nicholas Andrade MD;  Location: [facility] CATH INVASIVE LOCATION;  Service: Cardiovascular;  Laterality: N/A;    CARDIAC CATHETERIZATION N/A 1/25/2021    Procedure: Coronary angiography;  Surgeon: Nicholas Andrade MD;  Location: [facility] CATH INVASIVE LOCATION;  Service: Cardiovascular;  Laterality: N/A;    CATARACT EXTRACTION Bilateral 2017    COLONOSCOPY      DEBRIDEMENT  FOOT Right 01/2013    During hospitalization     EPIDURAL BLOCK      4 chronic back pain and leg pain  last epidurals done  she had no benefit at all from this procedure.    FEMUR FRACTURE SURGERY      FOOT SURGERY Bilateral     several    FOOT WOUND CLOSURE Right 2024    Procedure: WOUND CLOSURE right foot;  Surgeon: Anmol Gonsalez DPM;  Location: [facility] OR;  Service: Podiatry;  Laterality: Right;    HERNIA REPAIR      At the abdomen 2007 Dr. Mendez    INCISIONAL HERNIA REPAIR  2014    Recurrent. Incarcerated. With mesh implantation    MASTECTOMY Right 2015    MASTECTOMY MODIFIED RADICAL W/ AXILLARY LYMPH NODES W/ OR W/O PECTORALIS MINOR Right     SHOULDER SURGERY Right     x2 RCR    TOTAL ABDOMINAL HYSTERECTOMY      with oophorectomy-Done 2012 secondary to vaginal wall prolapse.    TOTAL KNEE ARTHROPLASTY Right     TOTAL KNEE ARTHROPLASTY Left 2018    Procedure: TOTAL KNEE ARTHROPLASTY;  Surgeon: Live Chambers MD;  Location: [facility] OR;  Service: Orthopedics       Social History     Socioeconomic History    Marital status:     Number of children: 2   Tobacco Use    Smoking status: Former     Current packs/day: 0.00     Average packs/day: 3.0 packs/day for 30.0 years (90.0 ttl pk-yrs)     Types: Cigarettes     Start date:      Quit date:      Years since quittin.6     Passive exposure: Past    Smokeless tobacco: Never   Vaping Use    Vaping status: Never Used   Substance and Sexual Activity    Alcohol use: No     Comment: Caffeine use: 3 cups daily     Drug use: No    Sexual activity: Defer     Partners: Male     Comment: celibate       Family History   Problem Relation Age of Onset    Colonic polyp Mother     Hypertension Mother     Migraines Mother     Mental illness Mother     Depression Mother     Arthritis Mother     Coronary artery disease Father     Other Father         Cardiac Disorder    Colon cancer Father     Kidney disease Father     Cancer Father     Diabetes Father     Heart disease Father     Hypertension Father     Breast cancer Maternal  "Aunt     Colon cancer Brother     Alcohol abuse Brother     Arthritis Sister     Hypertension Brother     Lung disease Brother     Alcohol abuse Brother     Malig Hyperthermia Neg Hx        Review of Systems   Constitutional: Negative.   Cardiovascular: Negative.  Positive for syncope.   Respiratory: Negative.     Gastrointestinal: Negative.        Allergies   Allergen Reactions    Dilaudid [Hydromorphone] Delirium and Confusion    Latex Rash       No current facility-administered medications for this visit.  No current outpatient medications on file.    Facility-Administered Medications Ordered in Other Visits:     nitroglycerin (NITROSTAT) SL tablet 0.4 mg, 0.4 mg, Sublingual, Q5 Min PRN, Dillon Amaya MD    sodium chloride 0.9 % flush 10 mL, 10 mL, Intravenous, PRN, Dillon Amaya MD    sodium chloride 0.9 % infusion, 75 mL/hr, Intravenous, Continuous, Dillon Amaya MD      Objective:     Vitals:    08/06/24 1350   BP: 112/70   Pulse: 68   Weight: 90.7 kg (200 lb)   Height: 172.7 cm (68\")     Body mass index is 30.41 kg/m².    PHYSICAL EXAM:    Vitals and nursing note reviewed.   Constitutional:       General: Not in acute distress.  Pulmonary:      Effort: Pulmonary effort is normal. No respiratory distress.   Cardiovascular:      Normal rate. Regular rhythm.   Skin:     General: Skin is warm and dry.   Neurological:      Mental Status: Alert and oriented to person, place, and time.   Psychiatric:         Behavior: Behavior normal.         Thought Content: Thought content normal.         Judgment: Judgment normal.             ECG 12 Lead    Date/Time: 8/6/2024 10:33 AM  Performed by: Dillon Amaya MD    Authorized by: Dillon Amaya MD  Comparison: compared with previous ECG from 5/31/2024  Similar to previous ECG  Rhythm: sinus rhythm      Event monitor, have been unremarkable.       Assessment:       Diagnosis Plan   1. Syncope and collapse  Case Request EP Lab: Loop " insertion             Plan:       Discussed options for monitoring.  Due to rare, but significant nature of symptoms, we decided to proceed with implanted loop monitor.      As always, it has been a pleasure to participate in your patient's care.      Sincerely,         Dillon Amaya MD

## 2024-08-28 NOTE — DISCHARGE INSTRUCTIONS
"[facility]   [phone]    LOOP INSERTION      1.  The dressing may be removed the next day.    2. If steri-strips were used, they should not be removed. Allow them to \"fall off\".      3. You may shower after the dressing is removed. Do not allow shower water to hit directly on incision.    4. No lotion/powder/ointment/cream on incision until it is healed.    5. Gently wash incision daily with soap and water and pat dry.    6. You may reapply a dressing if there is drainage, otherwise leave your incision open to air. If you reapply a dressing, please notify the pacemaker clinic.     Please call the office if you experience any of the following:   bleeding or drainage from your incision   swelling, redness, or opening of your incision   fever or chills   pain not relieved with medication   chest pain or difficulty breathing   lightheadness      "

## 2024-08-28 NOTE — PROGRESS NOTES
Date of Office Visit: 2024  Encounter Provider: Dillon Amaya MD  Place of Service: [facility] CARDIOLOGY  Patient Name: Jung Short  :1945    Chief Complaint   Patient presents with    Syncope    HFrEF   :     HPI: Jung Short is a 79 y.o. female who presents today for syncope    She is an 78 yo female who reports multiple instances of sudden syncope.     Episodes have occurred over the past couple of years.     Episodes have generally occurred while standing, but one while sitting.     She has sustained injury from fall, she denies any warning.           Past Medical History:   Diagnosis Date    Age-related osteoporosis without current pathological fracture 2022    Anemia     Benign breast disease     Cancer     Right breast    Cellulitis of leg     May- right lower extremity    CHF (congestive heart failure)     Dr Bates follows    Chronic kidney disease     Dr Hannah follows    Chronic ulcer of right foot     Non-pressure, history of     Closed fracture of patella 2017    Cluster headache     Colon polyp     Depression     Diabetic peripheral neuropathy     Difficulty walking     Diverticulosis     Femoral fracture     right    Fibromyalgia, primary     Fracture of left wrist     history of    Gastroesophageal reflux     Generalized pain 2016    Hiatal hernia     Hyperlipidemia     Hypertension     Hypothyroidism     Impingement syndrome of right shoulder     Joint pain     Left knee pain 2016    Menopausal disorder     Methicillin resistant Staphylococcus aureus infection     after bladder surgery    Nonischemic cardiomyopathy     Ejection fraction 10% per 2-D echo with Doppler however she did have cardiac catheterization 2015 which showed ejection fraction 20% with global hypokinesis and severe mitral insufficiency    Osteoarthritis     generalized, sched jania TKA    Osteomyelitis 2016    Pain in  shoulder 08/02/2016    Paroxysmal atrial fibrillation     Dr Bates follows    Radius/ulna fracture, left, closed, initial encounter 10/21/2017    Shoulder pain, bilateral     has tears on both sides    Sleep apnea     Syncope, psychogenic 04/19/2017    Thoracic back pain 02/02/2016    Thoracic injuries     Toe ulcer     h/o to both feet, d/t shoes rubbing per patient    Transient alteration of awareness 04/19/2017    Traumatic closed nondisp torus fracture of distal radial metaphysis, right, initial encounter 2/28/2020       Past Surgical History:   Procedure Laterality Date    ABDOMINAL SURGERY  2012    had to be reopened after bladder surgery d/t infection    AMPUTATION DIGIT Right 6/4/2024    Procedure: AMPUTATION DIGIT right third toe;  Surgeon: Anmol Gonsalez DPM;  Location: [facility];  Service: Podiatry;  Laterality: Right;    AMPUTATION FOOT / TOE Right 11/2013    Rt foot amputation MTP first toe    BLADDER SURGERY  2012    BREAST BIOPSY      BREAST SURGERY  06/29/2015    Percutaneous ultrasound-guided placement of metal localized clip 1st lesion    CARDIAC CATHETERIZATION  2015    CARDIAC CATHETERIZATION N/A 1/25/2021    Procedure: Right and Left Heart Cath;  Surgeon: Nicholas Andrade MD;  Location: [facility] CATH INVASIVE LOCATION;  Service: Cardiovascular;  Laterality: N/A;  drop in EF, with hx of NICM, SOA fatigue.  Discussed with     CARDIAC CATHETERIZATION N/A 1/25/2021    Procedure: Left ventriculography;  Surgeon: Nicholas Andrade MD;  Location: [facility] CATH INVASIVE LOCATION;  Service: Cardiovascular;  Laterality: N/A;    CARDIAC CATHETERIZATION N/A 1/25/2021    Procedure: Coronary angiography;  Surgeon: Nicholas Andrade MD;  Location: [facility] CATH INVASIVE LOCATION;  Service: Cardiovascular;  Laterality: N/A;    CATARACT EXTRACTION Bilateral 2017    COLONOSCOPY      DEBRIDEMENT  FOOT Right 01/2013    During hospitalization     EPIDURAL BLOCK      4 chronic back pain and leg pain  last epidurals done  she had no benefit at all from this procedure.    FEMUR FRACTURE SURGERY      FOOT SURGERY Bilateral     several    FOOT WOUND CLOSURE Right 2024    Procedure: WOUND CLOSURE right foot;  Surgeon: Anmol Gonsalez DPM;  Location: [facility] OR;  Service: Podiatry;  Laterality: Right;    HERNIA REPAIR      At the abdomen 2007 Dr. Mendez    INCISIONAL HERNIA REPAIR  2014    Recurrent. Incarcerated. With mesh implantation    MASTECTOMY Right 2015    MASTECTOMY MODIFIED RADICAL W/ AXILLARY LYMPH NODES W/ OR W/O PECTORALIS MINOR Right     SHOULDER SURGERY Right     x2 RCR    TOTAL ABDOMINAL HYSTERECTOMY      with oophorectomy-Done 2012 secondary to vaginal wall prolapse.    TOTAL KNEE ARTHROPLASTY Right     TOTAL KNEE ARTHROPLASTY Left 2018    Procedure: TOTAL KNEE ARTHROPLASTY;  Surgeon: Live Chambers MD;  Location: [facility] OR;  Service: Orthopedics       Social History     Socioeconomic History    Marital status:     Number of children: 2   Tobacco Use    Smoking status: Former     Current packs/day: 0.00     Average packs/day: 3.0 packs/day for 30.0 years (90.0 ttl pk-yrs)     Types: Cigarettes     Start date:      Quit date:      Years since quittin.6     Passive exposure: Past    Smokeless tobacco: Never   Vaping Use    Vaping status: Never Used   Substance and Sexual Activity    Alcohol use: No     Comment: Caffeine use: 3 cups daily     Drug use: No    Sexual activity: Defer     Partners: Male     Comment: celibate       Family History   Problem Relation Age of Onset    Colonic polyp Mother     Hypertension Mother     Migraines Mother     Mental illness Mother     Depression Mother     Arthritis Mother     Coronary artery disease Father     Other Father         Cardiac Disorder    Colon cancer Father     Kidney disease Father     Cancer Father     Diabetes Father     Heart disease Father     Hypertension Father     Breast cancer Maternal  "Aunt     Colon cancer Brother     Alcohol abuse Brother     Arthritis Sister     Hypertension Brother     Lung disease Brother     Alcohol abuse Brother     Malig Hyperthermia Neg Hx        Review of Systems   Constitutional: Negative.   Cardiovascular: Negative.  Positive for syncope.   Respiratory: Negative.     Gastrointestinal: Negative.        Allergies   Allergen Reactions    Dilaudid [Hydromorphone] Delirium and Confusion    Latex Rash       No current facility-administered medications for this visit.  No current outpatient medications on file.    Facility-Administered Medications Ordered in Other Visits:     nitroglycerin (NITROSTAT) SL tablet 0.4 mg, 0.4 mg, Sublingual, Q5 Min PRN, Dillon Amaya MD    sodium chloride 0.9 % flush 10 mL, 10 mL, Intravenous, PRN, Dillon Amaya MD    sodium chloride 0.9 % infusion, 75 mL/hr, Intravenous, Continuous, Dillon Amaya MD      Objective:     Vitals:    08/06/24 1350   BP: 112/70   Pulse: 68   Weight: 90.7 kg (200 lb)   Height: 172.7 cm (68\")     Body mass index is 30.41 kg/m².    PHYSICAL EXAM:    Vitals and nursing note reviewed.   Constitutional:       General: Not in acute distress.  Pulmonary:      Effort: Pulmonary effort is normal. No respiratory distress.   Cardiovascular:      Normal rate. Regular rhythm.   Skin:     General: Skin is warm and dry.   Neurological:      Mental Status: Alert and oriented to person, place, and time.   Psychiatric:         Behavior: Behavior normal.         Thought Content: Thought content normal.         Judgment: Judgment normal.             ECG 12 Lead    Date/Time: 8/6/2024 10:33 AM  Performed by: Dillon Amaya MD    Authorized by: Dillon Amaya MD  Comparison: compared with previous ECG from 5/31/2024  Similar to previous ECG  Rhythm: sinus rhythm      Event monitor, have been unremarkable.       Assessment:       Diagnosis Plan   1. Syncope and collapse  Case Request EP Lab: Loop " insertion             Plan:       Discussed options for monitoring.  Due to rare, but significant nature of symptoms, we decided to proceed with implanted loop monitor.      As always, it has been a pleasure to participate in your patient's care.      Sincerely,         Dillon Amaya MD

## 2024-09-11 ENCOUNTER — TELEPHONE (OUTPATIENT)
Dept: INTERNAL MEDICINE | Facility: CLINIC | Age: 79
End: 2024-09-11
Payer: MEDICARE

## 2024-09-12 DIAGNOSIS — E11.9 TYPE 2 DIABETES MELLITUS WITHOUT COMPLICATION, WITHOUT LONG-TERM CURRENT USE OF INSULIN: ICD-10-CM

## 2024-09-12 DIAGNOSIS — E03.9 ACQUIRED HYPOTHYROIDISM: ICD-10-CM

## 2024-09-12 DIAGNOSIS — E53.8 COBALAMIN DEFICIENCY: ICD-10-CM

## 2024-09-12 DIAGNOSIS — I10 PRIMARY HYPERTENSION: Primary | ICD-10-CM

## 2024-09-12 DIAGNOSIS — R14.0 ABDOMINAL BLOATING: ICD-10-CM

## 2024-09-12 DIAGNOSIS — E55.9 VITAMIN D DEFICIENCY: ICD-10-CM

## 2024-09-12 RX ORDER — CARVEDILOL 25 MG/1
25 TABLET ORAL 2 TIMES DAILY
Qty: 180 TABLET | Refills: 0 | Status: SHIPPED | OUTPATIENT
Start: 2024-09-12

## 2024-09-13 ENCOUNTER — TELEPHONE (OUTPATIENT)
Dept: INTERNAL MEDICINE | Facility: CLINIC | Age: 79
End: 2024-09-13

## 2024-09-13 NOTE — TELEPHONE ENCOUNTER
"  Caller: Jung Short \"SHEILA\"    Relationship: Self    Best call back number: [phone]    What was the call regarding: STATED THAT THEY JUST NOTICED ON THE PAPERWORK FOR THE CARDIAC MONITOR THEY HAD PUT IN THAT THEY WERE SUPPOSED TO FOLLOW UP WITH SILVESTRE. STATED THAT THEY HAVE AN APPOINTMENT LATER THIS MONTH WITH HER AND WOULD LIKE TO KNOW IF THEY SHOULD WAIT AND SEE HER THEN OR COME ON IN FOR AN APPOINTMENT TO DISCUSS. PLEASE CALL AND ADVISE     "

## 2024-09-16 RX ORDER — DULOXETIN HYDROCHLORIDE 60 MG/1
60 CAPSULE, DELAYED RELEASE ORAL DAILY
Qty: 90 CAPSULE | Refills: 3 | Status: SHIPPED | OUTPATIENT
Start: 2024-09-16

## 2024-09-17 ENCOUNTER — OFFICE VISIT (OUTPATIENT)
Age: 79
End: 2024-09-17
Payer: MEDICARE

## 2024-09-17 VITALS
SYSTOLIC BLOOD PRESSURE: 112 MMHG | DIASTOLIC BLOOD PRESSURE: 72 MMHG | BODY MASS INDEX: 34.99 KG/M2 | HEART RATE: 64 BPM | HEIGHT: 65 IN | TEMPERATURE: 98 F | WEIGHT: 210 LBS | OXYGEN SATURATION: 96 %

## 2024-09-17 DIAGNOSIS — M51.36 DDD (DEGENERATIVE DISC DISEASE), LUMBAR: ICD-10-CM

## 2024-09-17 DIAGNOSIS — Z79.899 ENCOUNTER FOR LONG-TERM (CURRENT) USE OF HIGH-RISK MEDICATION: ICD-10-CM

## 2024-09-17 DIAGNOSIS — M17.12 PRIMARY OSTEOARTHRITIS OF LEFT KNEE: ICD-10-CM

## 2024-09-17 DIAGNOSIS — M17.10 ARTHRITIS OF KNEE: ICD-10-CM

## 2024-09-17 DIAGNOSIS — M47.816 LUMBAR FACET ARTHROPATHY: Primary | ICD-10-CM

## 2024-09-17 PROCEDURE — 1159F MED LIST DOCD IN RCRD: CPT

## 2024-09-17 PROCEDURE — 3078F DIAST BP <80 MM HG: CPT

## 2024-09-17 PROCEDURE — 99213 OFFICE O/P EST LOW 20 MIN: CPT

## 2024-09-17 PROCEDURE — 1160F RVW MEDS BY RX/DR IN RCRD: CPT

## 2024-09-17 PROCEDURE — 1125F AMNT PAIN NOTED PAIN PRSNT: CPT

## 2024-09-17 PROCEDURE — 3074F SYST BP LT 130 MM HG: CPT

## 2024-09-17 RX ORDER — HYDROCODONE BITARTRATE AND ACETAMINOPHEN 5; 325 MG/1; MG/1
1 TABLET ORAL EVERY 6 HOURS PRN
Qty: 120 TABLET | Refills: 0 | Status: SHIPPED | OUTPATIENT
Start: 2024-09-17

## 2024-09-17 NOTE — OUTREACH NOTE
CHF Week 3 Survey      Responses   Facility patient discharged from?  [place]   Does the patient have one of the following disease processes/diagnoses(primary or secondary)?  CHF   Week 3 attempt successful?  No   Revoke  Readmitted          Lupe Orozco RN   Detail Level: Detailed Detail Level: Simple

## 2024-09-20 ENCOUNTER — TELEPHONE (OUTPATIENT)
Dept: CARDIOLOGY | Facility: CLINIC | Age: 79
End: 2024-09-20
Payer: MEDICARE

## 2024-09-20 ENCOUNTER — OFFICE VISIT (OUTPATIENT)
Dept: CARDIOLOGY | Facility: CLINIC | Age: 79
End: 2024-09-20
Payer: MEDICARE

## 2024-09-20 VITALS
OXYGEN SATURATION: 96 % | BODY MASS INDEX: 35.36 KG/M2 | SYSTOLIC BLOOD PRESSURE: 114 MMHG | DIASTOLIC BLOOD PRESSURE: 64 MMHG | WEIGHT: 220 LBS | HEIGHT: 66 IN | HEART RATE: 69 BPM

## 2024-09-20 DIAGNOSIS — I10 PRIMARY HYPERTENSION: ICD-10-CM

## 2024-09-20 DIAGNOSIS — I50.20 HFREF (HEART FAILURE WITH REDUCED EJECTION FRACTION): Primary | ICD-10-CM

## 2024-09-20 DIAGNOSIS — E11.9 TYPE 2 DIABETES MELLITUS WITHOUT COMPLICATION, WITHOUT LONG-TERM CURRENT USE OF INSULIN: ICD-10-CM

## 2024-09-20 DIAGNOSIS — R55 SYNCOPE, UNSPECIFIED SYNCOPE TYPE: ICD-10-CM

## 2024-09-24 ENCOUNTER — OFFICE VISIT (OUTPATIENT)
Dept: INTERNAL MEDICINE | Facility: CLINIC | Age: 79
End: 2024-09-24
Payer: MEDICARE

## 2024-09-24 VITALS
HEART RATE: 59 BPM | SYSTOLIC BLOOD PRESSURE: 114 MMHG | OXYGEN SATURATION: 98 % | HEIGHT: 66 IN | BODY MASS INDEX: 33.86 KG/M2 | DIASTOLIC BLOOD PRESSURE: 60 MMHG | TEMPERATURE: 97.8 F | WEIGHT: 210.7 LBS

## 2024-09-24 DIAGNOSIS — E53.8 COBALAMIN DEFICIENCY: ICD-10-CM

## 2024-09-24 DIAGNOSIS — E03.9 ACQUIRED HYPOTHYROIDISM: ICD-10-CM

## 2024-09-24 DIAGNOSIS — Z12.31 ENCOUNTER FOR SCREENING MAMMOGRAM FOR MALIGNANT NEOPLASM OF BREAST: ICD-10-CM

## 2024-09-24 DIAGNOSIS — E11.65 TYPE 2 DIABETES MELLITUS WITH HYPERGLYCEMIA, WITHOUT LONG-TERM CURRENT USE OF INSULIN: ICD-10-CM

## 2024-09-24 DIAGNOSIS — E78.5 DYSLIPIDEMIA: ICD-10-CM

## 2024-09-24 DIAGNOSIS — I25.10 ATHEROSCLEROSIS OF NATIVE CORONARY ARTERY OF NATIVE HEART WITHOUT ANGINA PECTORIS: ICD-10-CM

## 2024-09-24 DIAGNOSIS — E55.9 VITAMIN D DEFICIENCY: ICD-10-CM

## 2024-09-24 DIAGNOSIS — K21.00 GASTROESOPHAGEAL REFLUX DISEASE WITH ESOPHAGITIS WITHOUT HEMORRHAGE: ICD-10-CM

## 2024-09-24 DIAGNOSIS — Z23 NEED FOR INFLUENZA VACCINATION: ICD-10-CM

## 2024-09-24 DIAGNOSIS — Z78.0 POSTMENOPAUSE: ICD-10-CM

## 2024-09-24 DIAGNOSIS — I50.20 HFREF (HEART FAILURE WITH REDUCED EJECTION FRACTION): ICD-10-CM

## 2024-09-24 DIAGNOSIS — Z00.00 ENCOUNTER FOR MEDICARE ANNUAL WELLNESS EXAM: Primary | ICD-10-CM

## 2024-09-24 DIAGNOSIS — N18.31 STAGE 3A CHRONIC KIDNEY DISEASE: ICD-10-CM

## 2024-09-24 DIAGNOSIS — I10 PRIMARY HYPERTENSION: ICD-10-CM

## 2024-09-24 PROCEDURE — 90662 IIV NO PRSV INCREASED AG IM: CPT | Performed by: NURSE PRACTITIONER

## 2024-09-24 PROCEDURE — G0008 ADMIN INFLUENZA VIRUS VAC: HCPCS | Performed by: NURSE PRACTITIONER

## 2024-09-24 PROCEDURE — 99214 OFFICE O/P EST MOD 30 MIN: CPT | Performed by: NURSE PRACTITIONER

## 2024-09-24 PROCEDURE — 3078F DIAST BP <80 MM HG: CPT | Performed by: NURSE PRACTITIONER

## 2024-09-24 PROCEDURE — 1125F AMNT PAIN NOTED PAIN PRSNT: CPT | Performed by: NURSE PRACTITIONER

## 2024-09-24 PROCEDURE — 1170F FXNL STATUS ASSESSED: CPT | Performed by: NURSE PRACTITIONER

## 2024-09-24 PROCEDURE — 1159F MED LIST DOCD IN RCRD: CPT | Performed by: NURSE PRACTITIONER

## 2024-09-24 PROCEDURE — 1160F RVW MEDS BY RX/DR IN RCRD: CPT | Performed by: NURSE PRACTITIONER

## 2024-09-24 PROCEDURE — 3074F SYST BP LT 130 MM HG: CPT | Performed by: NURSE PRACTITIONER

## 2024-09-24 PROCEDURE — G0439 PPPS, SUBSEQ VISIT: HCPCS | Performed by: NURSE PRACTITIONER

## 2024-09-30 ENCOUNTER — TELEPHONE (OUTPATIENT)
Dept: ORTHOPEDIC SURGERY | Facility: CLINIC | Age: 79
End: 2024-09-30

## 2024-09-30 NOTE — TELEPHONE ENCOUNTER
"Caller: Jung Short \"SHEILA\"    Relationship to patient: Self    Best call back number: [phone] (home)     Chief complaint: RT ANKLE PAIN - NO RECENT IMAGING     Type of visit: NEW PROBLEM - PT SAW DR HWITTAKER FOR HER RT ANKLE 3+ YRS AGO    Requested date: ASAP     Additional notes: PER REF TOOL, SENDING TO BE REVIEWED FOR DX    "

## 2024-10-01 ENCOUNTER — OFFICE VISIT (OUTPATIENT)
Dept: ORTHOPEDIC SURGERY | Facility: CLINIC | Age: 79
End: 2024-10-01
Payer: MEDICARE

## 2024-10-01 ENCOUNTER — TELEPHONE (OUTPATIENT)
Dept: INTERNAL MEDICINE | Facility: CLINIC | Age: 79
End: 2024-10-01
Payer: MEDICARE

## 2024-10-01 VITALS
SYSTOLIC BLOOD PRESSURE: 135 MMHG | HEART RATE: 68 BPM | WEIGHT: 210 LBS | DIASTOLIC BLOOD PRESSURE: 85 MMHG | HEIGHT: 66 IN | BODY MASS INDEX: 33.75 KG/M2

## 2024-10-01 DIAGNOSIS — M19.071 ARTHRITIS OF RIGHT ANKLE: ICD-10-CM

## 2024-10-01 DIAGNOSIS — M25.571 RIGHT ANKLE PAIN, UNSPECIFIED CHRONICITY: Primary | ICD-10-CM

## 2024-10-01 PROCEDURE — 3075F SYST BP GE 130 - 139MM HG: CPT | Performed by: ORTHOPAEDIC SURGERY

## 2024-10-01 PROCEDURE — 1159F MED LIST DOCD IN RCRD: CPT | Performed by: ORTHOPAEDIC SURGERY

## 2024-10-01 PROCEDURE — 99214 OFFICE O/P EST MOD 30 MIN: CPT | Performed by: ORTHOPAEDIC SURGERY

## 2024-10-01 PROCEDURE — 3079F DIAST BP 80-89 MM HG: CPT | Performed by: ORTHOPAEDIC SURGERY

## 2024-10-01 PROCEDURE — 73610 X-RAY EXAM OF ANKLE: CPT | Performed by: ORTHOPAEDIC SURGERY

## 2024-10-01 PROCEDURE — 1160F RVW MEDS BY RX/DR IN RCRD: CPT | Performed by: ORTHOPAEDIC SURGERY

## 2024-10-01 NOTE — PROGRESS NOTES
Subjective: Right ankle pain     Patient ID: Jung Short is a 79 y.o. female.    Chief Complaint:    History of Present Illness 79-year-old female known to me is seen for right ankle pain that is developed for about a week.  Noticed the pain after a fall and since that time she is average which she would describe a severe pain 6 out of 10.  Has stabbing shooting achy discomfort in her ankle making ambulation and daily activities difficult if not impossible.  She has had toe amputation on that side she is a diabetic.  Is not taking any anti-inflammatory medication.       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   Tobacco Use    Smoking status: Former     Current packs/day: 0.00     Average packs/day: 3.0 packs/day for 30.0 years (90.0 ttl pk-yrs)     Types: Cigarettes     Start date:      Quit date:      Years since quittin.7     Passive exposure: Past    Smokeless tobacco: Never   Vaping Use    Vaping status: Never Used   Substance and Sexual Activity    Alcohol use: No     Comment: Caffeine use: 3 cups daily     Drug use: No    Sexual activity: Defer     Partners: Male     Comment: celibate      Review of Systems      Past Medical History:   Diagnosis Date    Age-related osteoporosis without current pathological fracture 2022    Anemia     Benign breast disease     Cancer     Right breast    Cellulitis of leg     May- right lower extremity    CHF (congestive heart failure)     Dr Bates follows    Chronic kidney disease     Dr Hannah follows    Chronic ulcer of right foot     Non-pressure, history of     Closed fracture of patella 2017    Cluster headache     Colon polyp     Depression     Diabetic peripheral neuropathy     Difficulty walking     Diverticulosis     Femoral fracture     right    Fibromyalgia, primary     Fracture of left wrist     history of    Gastroesophageal reflux     Generalized pain 2016    Hiatal  hernia     Hyperlipidemia     Hypertension     Hypothyroidism     Impingement syndrome of right shoulder     Joint pain     Left knee pain 05/17/2016    Menopausal disorder     Methicillin resistant Staphylococcus aureus infection 2012    after bladder surgery    Nonischemic cardiomyopathy     Ejection fraction 10% per 2-D echo with Doppler however she did have cardiac catheterization April 2015 which showed ejection fraction 20% with global hypokinesis and severe mitral insufficiency    Osteoarthritis     generalized, sched jania TKA    Osteomyelitis 02/02/2016    Pain in shoulder 08/02/2016    Paroxysmal atrial fibrillation     Dr Bates follows    Radius/ulna fracture, left, closed, initial encounter 10/21/2017    Shoulder pain, bilateral     has tears on both sides    Sleep apnea     Syncope, psychogenic 04/19/2017    Thoracic back pain 02/02/2016    Thoracic injuries     Toe ulcer     h/o to both feet, d/t shoes rubbing per patient    Transient alteration of awareness 04/19/2017    Traumatic closed nondisp torus fracture of distal radial metaphysis, right, initial encounter 2/28/2020     Past Surgical History:   Procedure Laterality Date    ABDOMINAL SURGERY  2012    had to be reopened after bladder surgery d/t infection    AMPUTATION DIGIT Right 6/4/2024    Procedure: AMPUTATION DIGIT right third toe;  Surgeon: Anmol Gonsalez DPM;  Location: [facility] OR;  Service: Podiatry;  Laterality: Right;    AMPUTATION FOOT / TOE Right 11/2013    Rt foot amputation MTP first toe    BLADDER SURGERY  2012    BREAST BIOPSY      BREAST SURGERY  06/29/2015    Percutaneous ultrasound-guided placement of metal localized clip 1st lesion    CARDIAC CATHETERIZATION  2015    CARDIAC CATHETERIZATION N/A 1/25/2021    Procedure: Right and Left Heart Cath;  Surgeon: Nicholas Andrade MD;  Location:  [facility] CATH INVASIVE LOCATION;  Service: Cardiovascular;  Laterality: N/A;  drop in EF, with hx of NICM, SOA fatigue.  Discussed  with RM    CARDIAC CATHETERIZATION N/A 1/25/2021    Procedure: Left ventriculography;  Surgeon: Nicholas Andrade MD;  Location:  [facility] CATH INVASIVE LOCATION;  Service: Cardiovascular;  Laterality: N/A;    CARDIAC CATHETERIZATION N/A 1/25/2021    Procedure: Coronary angiography;  Surgeon: Nicholas Andrade MD;  Location:  [facility] CATH INVASIVE LOCATION;  Service: Cardiovascular;  Laterality: N/A;    CARDIAC ELECTROPHYSIOLOGY PROCEDURE N/A 8/28/2024    Procedure: Loop insertion [place];  Surgeon: Dillon Amaya MD;  Location:  [facility] CATH INVASIVE LOCATION;  Service: Cardiovascular;  Laterality: N/A;    CATARACT EXTRACTION Bilateral 2017    COLONOSCOPY      DEBRIDEMENT  FOOT Right 01/2013    During hospitalization     EPIDURAL BLOCK      4 chronic back pain and leg pain last epidurals done 5-2012 she had no benefit at all from this procedure.    FEMUR FRACTURE SURGERY      FOOT SURGERY Bilateral     several    FOOT WOUND CLOSURE Right 6/6/2024    Procedure: WOUND CLOSURE right foot;  Surgeon: Anmol Gonsalez DPM;  Location:  [facility] OR;  Service: Podiatry;  Laterality: Right;    HERNIA REPAIR      At the abdomen February 2007 Dr. Mendez    INCISIONAL HERNIA REPAIR  05/16/2014    Recurrent. Incarcerated. With mesh implantation    MASTECTOMY Right 05/2015    MASTECTOMY MODIFIED RADICAL W/ AXILLARY LYMPH NODES W/ OR W/O PECTORALIS MINOR Right     SHOULDER SURGERY Right     x2 RCR    TOTAL ABDOMINAL HYSTERECTOMY      with oophorectomy-Done June-2012 secondary to vaginal wall prolapse.    TOTAL KNEE ARTHROPLASTY Right     TOTAL KNEE ARTHROPLASTY Left 4/17/2018    Procedure: TOTAL KNEE ARTHROPLASTY;  Surgeon: Live Chambers MD;  Location:  [facility] OR;  Service: Orthopedics     Family History   Problem Relation Age of Onset    Colonic polyp Mother     Hypertension Mother     Migraines Mother     Mental illness Mother     Depression Mother     Arthritis Mother     Coronary artery disease Father     Other Father          Cardiac Disorder    Colon cancer Father     Kidney disease Father     Cancer Father     Diabetes Father     Heart disease Father     Hypertension Father     Breast cancer Maternal Aunt     Colon cancer Brother     Alcohol abuse Brother     Arthritis Sister     Hypertension Brother     Lung disease Brother     Alcohol abuse Brother     Malig Hyperthermia Neg Hx          Objective:  Vitals:    10/01/24 1330   BP: 135/85   Pulse: 68         10/01/24  1330   Weight: 95.3 kg (210 lb)     Body mass index is 33.89 kg/m².           Ortho Exam   AP lateral oblique view of the ankle does show some arthritis in the tibiotalar joint particular in the medial gutter and she has midfoot and subtalar arthritis noted also.  She is alert and oriented x 3.  The ankle shows no swelling effusion erythema.  There is some mild tenderness to the Achilles tendon and the pain today is laterally over the  ligament complex.  Medially over the deltoid ligament there is minimal discomfort.  She can plantar and dorsiflex the foot with just mild discomfort.  Mild pain with eversion and inversion.  She has a good dorsalis pedis pulse.  The skin is cool to touch.  Assessment:        1. Right ankle pain, unspecified chronicity    2. Arthritis of right ankle           Plan: Reviewed the x-rays history and physical exam with the patient.  She has osteoarthritis of that ankle and I think she aggravated in the fall.  I was going to place her on meloxicam but recent blood work shows an elevated creatinine and glomerular filtration rate so I want her to contact her family doctor before started on Mobic.  If she feels it is okay she can call me and I will prescribe Mobic otherwise him to put on Voltaren gel 3-4 times a day give her an ankle brace for support.  Return to see me in 3 to 4 weeks for follow-up.  Answered all questions            Work Status:    LOUANN query complete.    Orders:  Orders Placed This Encounter   Procedures    XR Ankle 3+ View  Right       Medications:  No orders of the defined types were placed in this encounter.      Followup:  Return in about 3 weeks (around 10/22/2024).          Dictated utilizing Dragon dictation

## 2024-10-01 NOTE — TELEPHONE ENCOUNTER
Patient stopped by and stated that Dr. Chambers has given her Mobic for arthritis and wanted to know if it was ok. Please advise and call patient back.

## 2024-10-04 ENCOUNTER — TELEPHONE (OUTPATIENT)
Dept: ORTHOPEDIC SURGERY | Facility: CLINIC | Age: 79
End: 2024-10-04
Payer: MEDICARE

## 2024-10-04 NOTE — TELEPHONE ENCOUNTER
Name: NIR SHEILA GUAJARDO      Relationship: PATIENT        Best Callback Number:       HUB PROVIDED THE RELAY MESSAGE FROM THE OFFICE      PATIENT: VOICED UNDERSTANDING AND HAS NO FURTHER QUESTIONS AT THIS TIME    ADDITIONAL INFORMATION:

## 2024-10-10 ENCOUNTER — TELEPHONE (OUTPATIENT)
Dept: INTERNAL MEDICINE | Facility: CLINIC | Age: 79
End: 2024-10-10

## 2024-10-10 NOTE — TELEPHONE ENCOUNTER
Caller: DR HAZEL RIVERA    Relationship: Other    Best call back number: [phone]  EXT [phone]    What is the best time to reach you: ANY    Who are you requesting to speak with (clinical staff, provider,  specific staff member): CLINICAL    Do you know the name of the person who called: JUANITA    What was the call regarding:   DR RIVERA,PODIATRY, SENT PAPERWORK THAT NEEDED SIGNED AND PAPERWORK WAS RETURN HOWEVER CAN NOT READ SIGNATURE. PLEASE CALL TO DISCUSS WHO'S SIGNATURE IS ON THE PAPER

## 2024-10-15 ENCOUNTER — TELEPHONE (OUTPATIENT)
Dept: ORTHOPEDIC SURGERY | Facility: CLINIC | Age: 79
End: 2024-10-15
Payer: MEDICARE

## 2024-10-15 NOTE — TELEPHONE ENCOUNTER
"    Caller: Jung Short \"SHEILA\"    Relationship to patient: Self    Best call back number: [phone]    Chief complaint: RIGHT ANKLE    Type of visit: FOLLOW UP    Requested date: ASAP     If rescheduling, when is the original appointment: 10/22/24     Additional notes:PATIENT STATE HER FOOT FEELS COLD MOST OF THE TIME. SHE ALSO STATES IT IS VERY ACHY. SHE IS ASKING IF SHE CAN BE WORKED IN SOONER THAT 10/22/24. SHE HASN'T BEEN ABLE TO TOLERATE WEARING THE BOOT SHE WAS GIVEN. SHE STATES IF UNABLE TO SEE DR. HARMON, SHE WILL SEE ANOTHER PROVIDER.          "

## 2024-10-16 RX ORDER — CHOLECALCIFEROL (VITAMIN D3) 25 MCG
1000 TABLET ORAL DAILY
Qty: 90 TABLET | Refills: 1 | Status: SHIPPED | OUTPATIENT
Start: 2024-10-16

## 2024-10-16 NOTE — TELEPHONE ENCOUNTER
Spoke to patient, encouraged her to continue the Volteran 4 X's a day and ice and elevate her ankle. We did not have any available appointments. She will keep her scheduled appt with DR. Chambers

## 2024-10-18 DIAGNOSIS — M51.369 DDD (DEGENERATIVE DISC DISEASE), LUMBAR: ICD-10-CM

## 2024-10-18 DIAGNOSIS — M47.816 LUMBAR FACET ARTHROPATHY: ICD-10-CM

## 2024-10-18 RX ORDER — HYDROCODONE BITARTRATE AND ACETAMINOPHEN 5; 325 MG/1; MG/1
1 TABLET ORAL EVERY 6 HOURS PRN
Qty: 120 TABLET | Refills: 0 | Status: SHIPPED | OUTPATIENT
Start: 2024-10-18

## 2024-10-18 NOTE — TELEPHONE ENCOUNTER
Medication Refill Request    Date of phone call: 10-18-24    Medication being requested: norco 5s  sig: Take 1 tablet by mouth Every 6 (Six) Hours As Needed for Severe Pain.   Qty: 120    Date of last visit: 9-17-24    Date of last refill:     LOUANN up to date?:     Next Follow up?: 5-9-25    Any new pertinent information? (i.e, new medication allergies, new use of medications, change in patient's health or condition, non-compliance or inconsistency with prescribing agreement?):

## 2024-10-22 ENCOUNTER — OFFICE VISIT (OUTPATIENT)
Dept: ORTHOPEDIC SURGERY | Facility: CLINIC | Age: 79
End: 2024-10-22
Payer: MEDICARE

## 2024-10-22 VITALS — HEIGHT: 66 IN | BODY MASS INDEX: 33.75 KG/M2 | WEIGHT: 210 LBS

## 2024-10-22 DIAGNOSIS — M70.61 TROCHANTERIC BURSITIS OF RIGHT HIP: ICD-10-CM

## 2024-10-22 DIAGNOSIS — M79.604 RIGHT LEG PAIN: Primary | ICD-10-CM

## 2024-10-22 RX ORDER — LIDOCAINE HYDROCHLORIDE 10 MG/ML
4 INJECTION, SOLUTION EPIDURAL; INFILTRATION; INTRACAUDAL; PERINEURAL
Status: COMPLETED | OUTPATIENT
Start: 2024-10-22 | End: 2024-10-22

## 2024-10-22 RX ORDER — TRIAMCINOLONE ACETONIDE 40 MG/ML
80 INJECTION, SUSPENSION INTRA-ARTICULAR; INTRAMUSCULAR
Status: COMPLETED | OUTPATIENT
Start: 2024-10-22 | End: 2024-10-22

## 2024-10-22 RX ADMIN — LIDOCAINE HYDROCHLORIDE 4 ML: 10 INJECTION, SOLUTION EPIDURAL; INFILTRATION; INTRACAUDAL; PERINEURAL at 13:41

## 2024-10-22 RX ADMIN — TRIAMCINOLONE ACETONIDE 80 MG: 40 INJECTION, SUSPENSION INTRA-ARTICULAR; INTRAMUSCULAR at 13:41

## 2024-10-22 NOTE — PROGRESS NOTES
Subjective: Right hip pain right ankle pain     Patient ID: Jung Short is a 79 y.o. female.    Chief Complaint:    History of Present Illness 79-year-old female returns with a new problem involving her right hip that developed several days after her last office visit.  She states the ankle is doing much better but the hip is most symptomatic causing severe lateral hip pain that will radiate down to the level of the knee if not below.  No history of trauma.       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   Tobacco Use    Smoking status: Former     Current packs/day: 0.00     Average packs/day: 3.0 packs/day for 30.0 years (90.0 ttl pk-yrs)     Types: Cigarettes     Start date:      Quit date:      Years since quittin.8     Passive exposure: Past    Smokeless tobacco: Never   Vaping Use    Vaping status: Never Used   Substance and Sexual Activity    Alcohol use: No     Comment: Caffeine use: 3 cups daily     Drug use: No    Sexual activity: Defer     Partners: Male     Comment: celibate      Review of Systems      Past Medical History:   Diagnosis Date    Age-related osteoporosis without current pathological fracture 2022    Anemia     Benign breast disease     Cancer 2015    Right breast    Cellulitis of leg     May- right lower extremity    CHF (congestive heart failure)     Dr Bates follows    Chronic kidney disease     Dr Hannah follows    Chronic ulcer of right foot     Non-pressure, history of     Closed fracture of patella 2017    Cluster headache     Colon polyp     Depression     Diabetic peripheral neuropathy     Difficulty walking     Diverticulosis     Femoral fracture     right    Fibromyalgia, primary     Fracture of left wrist     history of    Gastroesophageal reflux     Generalized pain 2016    Hiatal hernia     Hyperlipidemia     Hypertension     Hypothyroidism     Impingement syndrome of right shoulder      Joint pain     Left knee pain 05/17/2016    Menopausal disorder     Methicillin resistant Staphylococcus aureus infection 2012    after bladder surgery    Nonischemic cardiomyopathy     Ejection fraction 10% per 2-D echo with Doppler however she did have cardiac catheterization April 2015 which showed ejection fraction 20% with global hypokinesis and severe mitral insufficiency    Osteoarthritis     generalized, sched jania TKA    Osteomyelitis 02/02/2016    Pain in shoulder 08/02/2016    Paroxysmal atrial fibrillation     Dr Bates follows    Radius/ulna fracture, left, closed, initial encounter 10/21/2017    Shoulder pain, bilateral     has tears on both sides    Sleep apnea     Syncope, psychogenic 04/19/2017    Thoracic back pain 02/02/2016    Thoracic injuries     Toe ulcer     h/o to both feet, d/t shoes rubbing per patient    Transient alteration of awareness 04/19/2017    Traumatic closed nondisp torus fracture of distal radial metaphysis, right, initial encounter 2/28/2020     Past Surgical History:   Procedure Laterality Date    ABDOMINAL SURGERY  2012    had to be reopened after bladder surgery d/t infection    AMPUTATION DIGIT Right 6/4/2024    Procedure: AMPUTATION DIGIT right third toe;  Surgeon: Anmol Gonsalez DPM;  Location: [facility] OR;  Service: Podiatry;  Laterality: Right;    AMPUTATION FOOT / TOE Right 11/2013    Rt foot amputation MTP first toe    BLADDER SURGERY  2012    BREAST BIOPSY      BREAST SURGERY  06/29/2015    Percutaneous ultrasound-guided placement of metal localized clip 1st lesion    CARDIAC CATHETERIZATION  2015    CARDIAC CATHETERIZATION N/A 1/25/2021    Procedure: Right and Left Heart Cath;  Surgeon: Nicholas Andrade MD;  [facility];  Service: Cardiovascular;  Laterality: N/A;  drop in EF, with hx of NICM, SOA fatigue.  Discussed with     CARDIAC CATHETERIZATION N/A 1/25/2021    Procedure: Left ventriculography;  Surgeon: Lupe  Nicholas QUISPE MD;  Location:  [facility] CATH INVASIVE LOCATION;  Service: Cardiovascular;  Laterality: N/A;    CARDIAC CATHETERIZATION N/A 1/25/2021    Procedure: Coronary angiography;  Surgeon: Nicholas Andrade MD;  Location:  [facility] CATH INVASIVE LOCATION;  Service: Cardiovascular;  Laterality: N/A;    CARDIAC ELECTROPHYSIOLOGY PROCEDURE N/A 8/28/2024    Procedure: Loop insertion BOSTON;  Surgeon: Dillon Amaya MD;  Location:  [facility] CATH INVASIVE LOCATION;  Service: Cardiovascular;  Laterality: N/A;    CATARACT EXTRACTION Bilateral 2017    COLONOSCOPY      DEBRIDEMENT  FOOT Right 01/2013    During hospitalization     EPIDURAL BLOCK      4 chronic back pain and leg pain last epidurals done 5-2012 she had no benefit at all from this procedure.    FEMUR FRACTURE SURGERY      FOOT SURGERY Bilateral     several    FOOT WOUND CLOSURE Right 6/6/2024    Procedure: WOUND CLOSURE right foot;  Surgeon: Anmol Gonsalez DPM;  Location:  [facility] OR;  Service: Podiatry;  Laterality: Right;    HERNIA REPAIR      At the abdomen February 2007 Dr. Mendez    INCISIONAL HERNIA REPAIR  05/16/2014    Recurrent. Incarcerated. With mesh implantation    MASTECTOMY Right 05/2015    MASTECTOMY MODIFIED RADICAL W/ AXILLARY LYMPH NODES W/ OR W/O PECTORALIS MINOR Right     SHOULDER SURGERY Right     x2 RCR    TOTAL ABDOMINAL HYSTERECTOMY      with oophorectomy-Done June-2012 secondary to vaginal wall prolapse.    TOTAL KNEE ARTHROPLASTY Right     TOTAL KNEE ARTHROPLASTY Left 4/17/2018    Procedure: TOTAL KNEE ARTHROPLASTY;  Surgeon: Live Chambers MD;  Location:  [facility] OR;  Service: Orthopedics     Family History   Problem Relation Age of Onset    Colonic polyp Mother     Hypertension Mother     Migraines Mother     Mental illness Mother     Depression Mother     Arthritis Mother     Coronary artery disease Father     Other Father         Cardiac Disorder    Colon cancer Father     Kidney disease Father     Cancer Father     Diabetes  Father     Heart disease Father     Hypertension Father     Breast cancer Maternal Aunt     Colon cancer Brother     Alcohol abuse Brother     Arthritis Sister     Hypertension Brother     Lung disease Brother     Alcohol abuse Brother     Malig Hyperthermia Neg Hx          Objective:  There were no vitals filed for this visit.      10/22/24  1310   Weight: 95.3 kg (210 lb)     Body mass index is 33.89 kg/m².           Ortho Exam   AP lateral of the right femur is unremarkable showing no acute changes as is AP and lateral of the right tibia.  She is alert and oriented x 3.  She has marked pain to palpation over the right greater trochanter.  There is no pain with internal or external of the leg and a negative Stinchfield but there is marked pain with abduction against resistance over the right greater trochanter positive Sarah's test.    Assessment:        1. Right leg pain    2. Trochanteric bursitis of right hip           Plan: Reviewed the x-rays with the patient along with a history and exam.  She has developed an acute trochanteric bursitis.  After reviewing treatment options agreed proceed with a cortisone injection after sterile prepping of 4 cc of lidocaine and 2 cc of Kenalog.  Postinjection instructions regarding her blood sugar given.  She does have an appoint with her podiatrist today and advised her to discuss her ankle pain with her podiatrist.  Return to see me in 3 weeks regarding the hip.  Answered all questions      Large Joint Arthrocentesis: R greater trochanteric bursa  Date/Time: 10/22/2024 1:41 PM  Consent given by: patient  Site marked: site marked  Timeout: Immediately prior to procedure a time out was called to verify the correct patient, procedure, equipment, support staff and site/side marked as required   Supporting Documentation  Indications: pain   Procedure Details  Location: hip - R greater trochanteric bursa  Preparation: Patient was prepped and draped in the usual sterile  fashion  Needle size: 22 G  Approach: lateral  Medications administered: 4 mL lidocaine PF 1% 1 %; 80 mg triamcinolone acetonide 40 MG/ML  Patient tolerance: patient tolerated the procedure well with no immediate complications            Work Status:    LOUANN query complete.    Orders:  Orders Placed This Encounter   Procedures    Large Joint Arthrocentesis: R greater trochanteric bursa    XR Femur 2 View Right    XR Tibia Fibula 2 View Right       Medications:  No orders of the defined types were placed in this encounter.      Followup:  Return in about 3 weeks (around 11/12/2024).          Dictated utilizing Dragon dictation

## 2024-10-23 NOTE — TELEPHONE ENCOUNTER
Caller: DR [facility]     ATTN: JUANITA Rodriguez call back number: [phone]  EXT [phone]    Who are you requesting to speak with (clinical staff, provider,  specific staff member): CLINICAL       What was the call regarding: PLEASE CALL WITH INFORMATION REQUESTED ON FORM FOR DIABETIC SHOES.   JUANITA CAN'T ENTER ORDER UNTIL SHE KNOWS WHO SIGNED IT

## 2024-10-23 NOTE — TELEPHONE ENCOUNTER
Spoke to Beronica. Dr. Milton's name was marked out and Dr. Jimenez signed forms with Kathryn Epstein. Dr. Gonsalez will send a new form for Kathryn and Dr. Jimenez to sign.

## 2024-11-11 DIAGNOSIS — E11.9 TYPE 2 DIABETES MELLITUS WITHOUT COMPLICATION, WITHOUT LONG-TERM CURRENT USE OF INSULIN: ICD-10-CM

## 2024-11-11 RX ORDER — SACUBITRIL AND VALSARTAN 49; 51 MG/1; MG/1
1 TABLET, FILM COATED ORAL 2 TIMES DAILY
Qty: 28 TABLET | Refills: 0 | COMMUNITY
Start: 2024-11-11

## 2024-11-21 RX ORDER — SPIRONOLACTONE 25 MG/1
25 TABLET ORAL DAILY
Qty: 30 TABLET | Refills: 11 | Status: SHIPPED | OUTPATIENT
Start: 2024-11-21

## 2024-11-21 NOTE — TELEPHONE ENCOUNTER
Patient requested refill on Spironolactone 25 mg daily to be sent to [facility]    NOV-05/09/25-RM  LOV-09/20/24-NM    Nonischemic cardiomyopathy, recovered.  Maintain carvedilol, Jardiance, Entresto.  No decompensated heart failure  Hypertension blood pressure controlled.  She has no dizziness even if her blood pressure gets little low.  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      Will send a message to device clinic to see if there has been any events noted on for monitoring.  Patient has not had any near syncope or syncopal episodes since having loop recorder placed.  Patient otherwise has been doing well from a cardiac standpoint.        Addendum: Received message from device clinic and patient was the provider had no events to date.     Return in about 3 months (around 12/20/2024) for Dr. Bates.

## 2024-11-22 ENCOUNTER — TELEPHONE (OUTPATIENT)
Dept: PAIN MEDICINE | Facility: CLINIC | Age: 79
End: 2024-11-22

## 2024-11-22 ENCOUNTER — TELEPHONE (OUTPATIENT)
Dept: PAIN MEDICINE | Facility: CLINIC | Age: 79
End: 2024-11-22
Payer: MEDICARE

## 2024-11-22 DIAGNOSIS — M47.816 LUMBAR FACET ARTHROPATHY: ICD-10-CM

## 2024-11-22 DIAGNOSIS — M51.369 DDD (DEGENERATIVE DISC DISEASE), LUMBAR: ICD-10-CM

## 2024-11-22 RX ORDER — HYDROCODONE BITARTRATE AND ACETAMINOPHEN 5; 325 MG/1; MG/1
1 TABLET ORAL EVERY 6 HOURS PRN
Qty: 44 TABLET | Refills: 0 | Status: SHIPPED | OUTPATIENT
Start: 2024-11-22

## 2024-11-22 NOTE — TELEPHONE ENCOUNTER
"    Caller: Jung Short \"SHEILA\"    Relationship to patient: Self    Best call back number: [phone]    Type of visit: FOLLOW UP     Requested date: ASAP     Additional notes:  PATIENT WANTS [facility] OFFICE. SHE THOUGHT SHE HAD AN APPT 11/26/24. HUB UNABLE TO SCHEDULE UNTIL 01/21/25, ASKING TO BE WORKED IN SOONER.        "

## 2024-11-22 NOTE — TELEPHONE ENCOUNTER
Patient needs an OV. Last seen in September. If you can make her an appointment, I can send a temporary supply to last until she can be seen.

## 2024-11-22 NOTE — TELEPHONE ENCOUNTER
Reviewed UDS and LOUANN. Both updated and appropriate. Refill appropriate.  Partial supply sent to last patient until appointment on 12/3/24.

## 2024-11-22 NOTE — TELEPHONE ENCOUNTER
"    Caller: Jung Short \"SHEILA\"    Relationship: Self    Best call back number: [phone]    Requested Prescriptions: NORCO 5-325 MG     Pharmacy where request should be sent:  [facility] [phone]    Last office visit with prescribing clinician: 9/17/2024     Additional details provided by patient: PATIENT IS OUT OF MEDICATION. SHE STATES HER PHARMACY CLOSES EARLY TODAY, AND WON'T BE OPEN UNTIL 11/25/24.    Does the patient have less than a 3 day supply:  [x] Yes  [] No    Would you like a call back once the refill request has been completed: [x] Yes [] No    "

## 2024-12-03 ENCOUNTER — OFFICE VISIT (OUTPATIENT)
Age: 79
End: 2024-12-03
Payer: MEDICARE

## 2024-12-03 VITALS
HEART RATE: 83 BPM | BODY MASS INDEX: 33.89 KG/M2 | SYSTOLIC BLOOD PRESSURE: 146 MMHG | OXYGEN SATURATION: 98 % | HEIGHT: 66 IN | TEMPERATURE: 97.5 F | DIASTOLIC BLOOD PRESSURE: 72 MMHG

## 2024-12-03 DIAGNOSIS — M17.10 ARTHRITIS OF KNEE: Primary | ICD-10-CM

## 2024-12-03 DIAGNOSIS — M47.816 LUMBAR FACET ARTHROPATHY: ICD-10-CM

## 2024-12-03 DIAGNOSIS — M51.369 DDD (DEGENERATIVE DISC DISEASE), LUMBAR: ICD-10-CM

## 2024-12-03 DIAGNOSIS — M17.12 PRIMARY OSTEOARTHRITIS OF LEFT KNEE: ICD-10-CM

## 2024-12-03 DIAGNOSIS — Z79.899 ENCOUNTER FOR LONG-TERM (CURRENT) USE OF HIGH-RISK MEDICATION: ICD-10-CM

## 2024-12-03 PROCEDURE — 3078F DIAST BP <80 MM HG: CPT

## 2024-12-03 PROCEDURE — 3077F SYST BP >= 140 MM HG: CPT

## 2024-12-03 PROCEDURE — 1125F AMNT PAIN NOTED PAIN PRSNT: CPT

## 2024-12-03 PROCEDURE — 99213 OFFICE O/P EST LOW 20 MIN: CPT

## 2024-12-03 PROCEDURE — 1160F RVW MEDS BY RX/DR IN RCRD: CPT

## 2024-12-03 PROCEDURE — 1159F MED LIST DOCD IN RCRD: CPT

## 2024-12-03 RX ORDER — HYDROCODONE BITARTRATE AND ACETAMINOPHEN 5; 325 MG/1; MG/1
1 TABLET ORAL EVERY 6 HOURS PRN
Qty: 120 TABLET | Refills: 0 | Status: SHIPPED | OUTPATIENT
Start: 2024-12-03

## 2024-12-03 NOTE — PROGRESS NOTES
CHIEF COMPLAINT  Back pain     Subjective   Wendyl Aron Short is a 79 y.o. female  who presents for follow-up.  She has a history of chronic low back pain. She reports that her pain level has fluctuated sicne her last office visit      Today pain is 5/10VAS in severity. Pain is located across her low back in a bandlike pattern and intermittently radiates into bilateral lower extremities. Describes this pain as a nearly continuous aching and burning. Pain is worsened by prolonged standing, walking long distances, and increased physical activity. Pain improves with rest/reposition and medications. She has not been to PT in the last few months and asks about a new referral today.      Continues with Hydrocodone 5mg 4/day. Denies any side effects from the regimen, including constipation and somnolence. The regimen helps decrease pain by 50% and allows her to live independently. ADL's by self. Denies any bowel or bladder changes.     Right GTB injection with Dr. Chambers on 10/22/24.     Back Pain  This is a chronic problem. The current episode started more than 1 year ago. The problem occurs constantly. The problem has been comes and goes since onset. The pain is present in the lumbar spine. The quality of the pain is described as aching and burning. Radiates to: intermittently radiates into BLE. The pain is at a severity of 5/10. The pain is moderate. The symptoms are aggravated by standing and position (walking long distances, increased physical activity). Associated symptoms include weakness. Pertinent negatives include no abdominal pain, chest pain, dysuria, fever, headaches or numbness. She has tried analgesics for the symptoms. The treatment provided moderate relief.     PEG Assessment   What number best describes your pain on average in the past week?5  What number best describes how, during the past week, pain has interfered with your enjoyment of life?5  What number best describes how, during the past week, pain  "has interfered with your general activity?  6    Review of Pertinent Medical Data ---      The following portions of the patient's history were reviewed and updated as appropriate: allergies, current medications, past family history, past medical history, past social history, past surgical history, and problem list.    Review of Systems   Constitutional:  Negative for activity change, chills, fatigue and fever.   HENT:  Positive for congestion.    Respiratory:  Positive for cough. Negative for chest tightness and shortness of breath.    Cardiovascular:  Negative for chest pain.   Gastrointestinal:  Positive for diarrhea. Negative for abdominal pain and constipation.   Genitourinary:  Negative for difficulty urinating, dyspareunia and dysuria.   Musculoskeletal:  Positive for back pain.   Neurological:  Positive for weakness. Negative for dizziness, light-headedness, numbness and headaches.   Psychiatric/Behavioral:  Positive for sleep disturbance. Negative for agitation, self-injury and suicidal ideas. The patient is not nervous/anxious.        I have reviewed and confirmed the accuracy of the ROS as documented by the MA/LPN/RN Jordyn Sheehan, APRN    Vitals:    12/03/24 1525   BP: 146/72   Pulse: 83   Temp: 97.5 °F (36.4 °C)   SpO2: 98%   Height: 167.6 cm (66\")   PainSc:   5   PainLoc: Back     Objective   Physical Exam  Constitutional:       Appearance: Normal appearance.   HENT:      Head: Normocephalic.   Cardiovascular:      Rate and Rhythm: Normal rate and regular rhythm.   Pulmonary:      Effort: Pulmonary effort is normal.      Breath sounds: Normal breath sounds.   Musculoskeletal:      Cervical back: Normal range of motion.      Lumbar back: Spasms, tenderness and bony tenderness present. Decreased range of motion.   Skin:     General: Skin is warm and dry.      Capillary Refill: Capillary refill takes less than 2 seconds.   Neurological:      General: No focal deficit present.      Mental Status: She is " alert and oriented to person, place, and time.      Gait: Gait abnormal (slowed, ambulates with cane).   Psychiatric:         Mood and Affect: Mood normal.         Behavior: Behavior normal.         Thought Content: Thought content normal.         Cognition and Memory: Cognition normal.       Assessment & Plan   Diagnoses and all orders for this visit:    1. Arthritis of knee (Primary)    2. Lumbar facet arthropathy  -     HYDROcodone-acetaminophen (NORCO) 5-325 MG per tablet; Take 1 tablet by mouth Every 6 (Six) Hours As Needed for Severe Pain. 30 day supply  Dispense: 120 tablet; Refill: 0  -     Ambulatory Referral to Physical Therapy for Evaluation & Treatment    3. DDD (degenerative disc disease), lumbar  -     HYDROcodone-acetaminophen (NORCO) 5-325 MG per tablet; Take 1 tablet by mouth Every 6 (Six) Hours As Needed for Severe Pain. 30 day supply  Dispense: 120 tablet; Refill: 0  -     Ambulatory Referral to Physical Therapy for Evaluation & Treatment    4. Primary osteoarthritis of left knee    5. Encounter for long-term (current) use of high-risk medication      Joelyesica Short reports a pain score of 5.  Given her pain assessment as noted, treatment options were discussed and the following options were decided upon as a follow-up plan to address the patient's pain: continuation of current treatment plan for pain, prescription for opiod analgesics, and referral to Physical Therapy.    --- The urine drug screen confirmation from 7/16/24 has been reviewed and the result is appropriate based on patient history and LOUANN report   --- The patient signed an updated copy of the controlled substance agreement on 11/28/23  --- Continue Hydrocodone. Patient appears stable with current regimen. No adverse effects. Regarding continuation of opioids, there is no evidence of aberrant behavior or any red flags.  The patient continues with appropriate response to opioid therapy. ADL's remain intact by self.   ---  Moderate risk for opiate abuse/diversion   --- Referral to [facility] due to chronic back pain  --- Follow-up 2 months or sooner if needed      LOUANN REPORT  As part of the patient's treatment plan, I am prescribing controlled substances. The patient has been made aware of appropriate use of such medications, including potential risk of somnolence, limited ability to drive and/or work safely, and the potential for dependence or overdose. It has also been made clear that these medications are for use by this patient only, without concomitant use of alcohol or other substances unless prescribed.     Patient has completed prescribing agreement detailing terms of continued prescribing of controlled substances, including monitoring LOUANN reports, urine drug screening, and pill counts if necessary. The patient is aware that inappropriate use will results in cessation of prescribing such medications.    As the clinician, I personally reviewed the LOUANN from 12/3/24 while the patient was in the office today.    History and physical exam exhibit continued safe and appropriate use of controlled substances.    Dictated utilizing Dragon dictation.

## 2024-12-06 DIAGNOSIS — E53.8 COBALAMIN DEFICIENCY: ICD-10-CM

## 2024-12-06 DIAGNOSIS — E78.5 DYSLIPIDEMIA: ICD-10-CM

## 2024-12-06 DIAGNOSIS — I10 PRIMARY HYPERTENSION: ICD-10-CM

## 2024-12-06 DIAGNOSIS — E03.9 ACQUIRED HYPOTHYROIDISM: ICD-10-CM

## 2024-12-06 DIAGNOSIS — N18.31 STAGE 3A CHRONIC KIDNEY DISEASE: ICD-10-CM

## 2024-12-06 DIAGNOSIS — E55.9 VITAMIN D DEFICIENCY: ICD-10-CM

## 2024-12-06 DIAGNOSIS — E11.65 TYPE 2 DIABETES MELLITUS WITH HYPERGLYCEMIA, WITHOUT LONG-TERM CURRENT USE OF INSULIN: ICD-10-CM

## 2024-12-09 RX ORDER — CARVEDILOL 25 MG/1
25 TABLET ORAL 2 TIMES DAILY
Qty: 180 TABLET | Refills: 1 | Status: SHIPPED | OUTPATIENT
Start: 2024-12-09

## 2024-12-09 RX ORDER — PRAVASTATIN SODIUM 10 MG
10 TABLET ORAL
Qty: 90 TABLET | Refills: 3 | Status: SHIPPED | OUTPATIENT
Start: 2024-12-09

## 2024-12-09 NOTE — TELEPHONE ENCOUNTER
NOV-05/09/25-RM  LOV-09/20/24-NM    Nonischemic cardiomyopathy, recovered.  Maintain carvedilol, Jardiance, Entresto.  No decompensated heart failure  Hypertension blood pressure controlled.  She has no dizziness even if her blood pressure gets little low.  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      Will send a message to device clinic to see if there has been any events noted on for monitoring.  Patient has not had any near syncope or syncopal episodes since having loop recorder placed.  Patient otherwise has been doing well from a cardiac standpoint.        Addendum: Received message from device clinic and patient was the provider had no events to date.     Return in about 3 months (around 12/20/2024) for Dr. Bates

## 2024-12-11 ENCOUNTER — TELEPHONE (OUTPATIENT)
Dept: INTERNAL MEDICINE | Facility: CLINIC | Age: 79
End: 2024-12-11
Payer: MEDICARE

## 2024-12-11 NOTE — TELEPHONE ENCOUNTER
"Pt called asking about \"injectable weight loss medication\" that was supposed to be sent to [facility] after getting a letter in the mail, but the only thing I see is Ozempic in her med list from July and a note from her office visit in September about needed paper work needing to be filled out.   "

## 2024-12-12 DIAGNOSIS — E11.65 TYPE 2 DIABETES MELLITUS WITH HYPERGLYCEMIA, WITHOUT LONG-TERM CURRENT USE OF INSULIN: ICD-10-CM

## 2024-12-12 NOTE — TELEPHONE ENCOUNTER
Rx Refill Note  Requested Prescriptions     Pending Prescriptions Disp Refills    Semaglutide,0.25 or 0.5MG/DOS, (OZEMPIC) 2 MG/3ML solution pen-injector 3 mL 0     Sig: Inject 0.25 mg under the skin into the appropriate area as directed 1 (One) Time Per Week.      Last office visit with prescribing clinician: 9/24/2024   Last telemedicine visit with prescribing clinician: Visit date not found   Next office visit with prescribing clinician: 1/24/2025                         Would you like a call back once the refill request has been completed: [] Yes [] No    If the office needs to give you a call back, can they leave a voicemail: [] Yes [] No    Christina Sanchez MA  12/12/24, 16:25 EST

## 2024-12-12 NOTE — TELEPHONE ENCOUNTER
She was prescribe Ozempic but then reported this to be too expensive. I completed paperwork to assist w/ cost at her request.

## 2024-12-13 ENCOUNTER — TELEPHONE (OUTPATIENT)
Dept: INTERNAL MEDICINE | Facility: CLINIC | Age: 79
End: 2024-12-13

## 2024-12-13 NOTE — TELEPHONE ENCOUNTER
"  Caller: Jung Short \"SHEILA\"    Relationship: Self    What was the call regarding: PT REQUESTING CALLBACK REGARDING SEMAGLUTIDE PRESCRIPTION. WAS UNSURE WHY SHE IS BEING CHARGED FOR IT AT PHARMACY  "

## 2024-12-20 ENCOUNTER — TELEPHONE (OUTPATIENT)
Dept: INTERNAL MEDICINE | Facility: CLINIC | Age: 79
End: 2024-12-20

## 2024-12-20 NOTE — TELEPHONE ENCOUNTER
"Hub staff attempted to follow warm transfer process and was unsuccessful     Caller: Jung Short \"SHEILA\"    Relationship to patient: Self    Best call back number: [phone]    Patient is needing: PT ATTEMPTED TO CALLBACK PER [employer] MESSAGE  "

## 2024-12-24 ENCOUNTER — TELEPHONE (OUTPATIENT)
Dept: CARDIOLOGY | Facility: CLINIC | Age: 79
End: 2024-12-24
Payer: MEDICARE

## 2024-12-24 DIAGNOSIS — E11.9 TYPE 2 DIABETES MELLITUS WITHOUT COMPLICATION, WITHOUT LONG-TERM CURRENT USE OF INSULIN: ICD-10-CM

## 2024-12-24 RX ORDER — SACUBITRIL AND VALSARTAN 49; 51 MG/1; MG/1
1 TABLET, FILM COATED ORAL 2 TIMES DAILY
Qty: 14 TABLET | Refills: 0 | COMMUNITY
Start: 2024-12-24

## 2025-01-03 DIAGNOSIS — M51.369 DDD (DEGENERATIVE DISC DISEASE), LUMBAR: ICD-10-CM

## 2025-01-03 DIAGNOSIS — M47.816 LUMBAR FACET ARTHROPATHY: ICD-10-CM

## 2025-01-03 RX ORDER — HYDROCODONE BITARTRATE AND ACETAMINOPHEN 5; 325 MG/1; MG/1
1 TABLET ORAL EVERY 6 HOURS PRN
Qty: 120 TABLET | Refills: 0 | Status: SHIPPED | OUTPATIENT
Start: 2025-01-03

## 2025-01-03 NOTE — TELEPHONE ENCOUNTER
Reviewed UDS and LOUANN. Both updated and appropriate. Refill appropriate.      Covering for Jordyn BRADY

## 2025-01-03 NOTE — TELEPHONE ENCOUNTER
"Caller: Jung Short \"SHEILA\"    Relationship: Self    Best call back number: [phone] (home)     Requested Prescriptions:   Requested Prescriptions     Pending Prescriptions Disp Refills    HYDROcodone-acetaminophen (NORCO) 5-325 MG per tablet 120 tablet 0     Sig: Take 1 tablet by mouth Every 6 (Six) Hours As Needed for Severe Pain. 30 day supply        Pharmacy where request should be sent: [id number] [place] [address] AT [facility] [address] - [phone] PH - [phone] FX     Last office visit with prescribing clinician: 12/3/2024   Last telemedicine visit with prescribing clinician: Visit date not found   Next office visit with prescribing clinician: 2/4/2025     Additional details provided by patient: PATIENT WILL BE OUT TODAY     Does the patient have less than a 3 day supply:  [x] Yes  [] No    Allan Cancino   01/03/25 11:46 EST     "

## 2025-01-20 ENCOUNTER — TELEPHONE (OUTPATIENT)
Dept: INTERNAL MEDICINE | Facility: CLINIC | Age: 80
End: 2025-01-20

## 2025-01-20 NOTE — TELEPHONE ENCOUNTER
"    Hub staff attempted to follow warm transfer process and was unsuccessful     Caller: Jung Short \"SHEILA\"    Relationship to patient: Self    Best call back number: [phone]     Patient is needing: TO SCHEDULE LAB APPOINTMENT     PATIENT WOULD LIKE A CALLBACK       "

## 2025-01-22 ENCOUNTER — LAB (OUTPATIENT)
Dept: LAB | Facility: HOSPITAL | Age: 80
End: 2025-01-22
Payer: MEDICARE

## 2025-01-22 LAB
25(OH)D3 SERPL-MCNC: 52.3 NG/ML (ref 30–100)
ALBUMIN SERPL-MCNC: 3.9 G/DL (ref 3.5–5.2)
ALBUMIN/GLOB SERPL: 1.6 G/DL
ALP SERPL-CCNC: 93 U/L (ref 39–117)
ALT SERPL W P-5'-P-CCNC: 15 U/L (ref 1–33)
ANION GAP SERPL CALCULATED.3IONS-SCNC: 10 MMOL/L (ref 5–15)
AST SERPL-CCNC: 17 U/L (ref 1–32)
BASOPHILS # BLD AUTO: 0.04 10*3/MM3 (ref 0–0.2)
BASOPHILS NFR BLD AUTO: 0.5 % (ref 0–1.5)
BILIRUB SERPL-MCNC: 0.4 MG/DL (ref 0–1.2)
BUN SERPL-MCNC: 21 MG/DL (ref 8–23)
BUN/CREAT SERPL: 20.2 (ref 7–25)
CALCIUM SPEC-SCNC: 10 MG/DL (ref 8.6–10.5)
CHLORIDE SERPL-SCNC: 102 MMOL/L (ref 98–107)
CHOLEST SERPL-MCNC: 171 MG/DL (ref 0–200)
CO2 SERPL-SCNC: 23 MMOL/L (ref 22–29)
CREAT SERPL-MCNC: 1.04 MG/DL (ref 0.57–1)
DEPRECATED RDW RBC AUTO: 42.5 FL (ref 37–54)
EGFRCR SERPLBLD CKD-EPI 2021: 54.1 ML/MIN/1.73
EOSINOPHIL # BLD AUTO: 0.23 10*3/MM3 (ref 0–0.4)
EOSINOPHIL NFR BLD AUTO: 3 % (ref 0.3–6.2)
ERYTHROCYTE [DISTWIDTH] IN BLOOD BY AUTOMATED COUNT: 12.6 % (ref 12.3–15.4)
GLOBULIN UR ELPH-MCNC: 2.5 GM/DL
GLUCOSE SERPL-MCNC: 108 MG/DL (ref 65–99)
HBA1C MFR BLD: 6.6 % (ref 4.8–5.6)
HCT VFR BLD AUTO: 38.5 % (ref 34–46.6)
HDLC SERPL QL: 3.98
HDLC SERPL-MCNC: 43 MG/DL (ref 40–60)
HGB BLD-MCNC: 12.9 G/DL (ref 12–15.9)
IMM GRANULOCYTES # BLD AUTO: 0.04 10*3/MM3 (ref 0–0.05)
IMM GRANULOCYTES NFR BLD AUTO: 0.5 % (ref 0–0.5)
LDLC SERPL CALC-MCNC: 91 MG/DL (ref 0–100)
LYMPHOCYTES # BLD AUTO: 1.93 10*3/MM3 (ref 0.7–3.1)
LYMPHOCYTES NFR BLD AUTO: 24.9 % (ref 19.6–45.3)
MCH RBC QN AUTO: 31.2 PG (ref 26.6–33)
MCHC RBC AUTO-ENTMCNC: 33.5 G/DL (ref 31.5–35.7)
MCV RBC AUTO: 93 FL (ref 79–97)
MONOCYTES # BLD AUTO: 0.73 10*3/MM3 (ref 0.1–0.9)
MONOCYTES NFR BLD AUTO: 9.4 % (ref 5–12)
NEUTROPHILS NFR BLD AUTO: 4.78 10*3/MM3 (ref 1.7–7)
NEUTROPHILS NFR BLD AUTO: 61.7 % (ref 42.7–76)
NRBC BLD AUTO-RTO: 0 /100 WBC (ref 0–0.2)
PLATELET # BLD AUTO: 209 10*3/MM3 (ref 140–450)
PMV BLD AUTO: 10 FL (ref 6–12)
POTASSIUM SERPL-SCNC: 4.6 MMOL/L (ref 3.5–5.2)
PROT SERPL-MCNC: 6.4 G/DL (ref 6–8.5)
RBC # BLD AUTO: 4.14 10*6/MM3 (ref 3.77–5.28)
SODIUM SERPL-SCNC: 135 MMOL/L (ref 136–145)
T4 FREE SERPL-MCNC: 1.51 NG/DL (ref 0.92–1.68)
TRIGL SERPL-MCNC: 216 MG/DL (ref 0–150)
TSH SERPL DL<=0.05 MIU/L-ACNC: 2.06 UIU/ML (ref 0.27–4.2)
VIT B12 BLD-MCNC: 1111 PG/ML (ref 211–946)
VLDLC SERPL-MCNC: 37 MG/DL (ref 5–40)
WBC NRBC COR # BLD AUTO: 7.75 10*3/MM3 (ref 3.4–10.8)

## 2025-01-22 PROCEDURE — 85025 COMPLETE CBC W/AUTO DIFF WBC: CPT | Performed by: NURSE PRACTITIONER

## 2025-01-22 PROCEDURE — 84439 ASSAY OF FREE THYROXINE: CPT | Performed by: NURSE PRACTITIONER

## 2025-01-22 PROCEDURE — 80053 COMPREHEN METABOLIC PANEL: CPT | Performed by: NURSE PRACTITIONER

## 2025-01-22 PROCEDURE — 82306 VITAMIN D 25 HYDROXY: CPT | Performed by: NURSE PRACTITIONER

## 2025-01-22 PROCEDURE — 83036 HEMOGLOBIN GLYCOSYLATED A1C: CPT | Performed by: NURSE PRACTITIONER

## 2025-01-22 PROCEDURE — 84443 ASSAY THYROID STIM HORMONE: CPT | Performed by: NURSE PRACTITIONER

## 2025-01-22 PROCEDURE — 80061 LIPID PANEL: CPT | Performed by: NURSE PRACTITIONER

## 2025-01-22 PROCEDURE — 82607 VITAMIN B-12: CPT | Performed by: NURSE PRACTITIONER

## 2025-01-27 ENCOUNTER — TELEPHONE (OUTPATIENT)
Dept: CARDIOLOGY | Facility: CLINIC | Age: 80
End: 2025-01-27
Payer: MEDICARE

## 2025-01-27 DIAGNOSIS — E11.9 TYPE 2 DIABETES MELLITUS WITHOUT COMPLICATION, WITHOUT LONG-TERM CURRENT USE OF INSULIN: ICD-10-CM

## 2025-01-27 RX ORDER — SACUBITRIL AND VALSARTAN 49; 51 MG/1; MG/1
1 TABLET, FILM COATED ORAL 2 TIMES DAILY
Qty: 28 TABLET | Refills: 0 | COMMUNITY
Start: 2025-01-27

## 2025-01-27 NOTE — TELEPHONE ENCOUNTER
Patient received 2 boxes of Jardiance 10 mg samples     LOT [id number]  EXP 11/30/2026    Patient received 1 bottle Entresto 49-51 mg sample    LOT [id number]  EXP 11/30/26

## 2025-02-04 ENCOUNTER — TELEPHONE (OUTPATIENT)
Dept: PAIN MEDICINE | Facility: CLINIC | Age: 80
End: 2025-02-04
Payer: MEDICARE

## 2025-02-04 ENCOUNTER — APPOINTMENT (OUTPATIENT)
Dept: GENERAL RADIOLOGY | Facility: HOSPITAL | Age: 80
End: 2025-02-04
Payer: MEDICARE

## 2025-02-04 ENCOUNTER — HOSPITAL ENCOUNTER (EMERGENCY)
Facility: HOSPITAL | Age: 80
Discharge: HOME | End: 2025-02-04
Attending: STUDENT IN AN ORGANIZED HEALTH CARE EDUCATION/TRAINING PROGRAM | Admitting: STUDENT IN AN ORGANIZED HEALTH CARE EDUCATION/TRAINING PROGRAM
Payer: MEDICARE

## 2025-02-04 VITALS
DIASTOLIC BLOOD PRESSURE: 66 MMHG | HEART RATE: 71 BPM | WEIGHT: 284 LBS | OXYGEN SATURATION: 100 % | TEMPERATURE: 97.7 F | SYSTOLIC BLOOD PRESSURE: 142 MMHG | HEIGHT: 65 IN | RESPIRATION RATE: 16 BRPM | BODY MASS INDEX: 47.32 KG/M2

## 2025-02-04 DIAGNOSIS — M47.816 LUMBAR FACET ARTHROPATHY: ICD-10-CM

## 2025-02-04 DIAGNOSIS — M85.862 OSTEOPENIA OF LEFT LOWER LEG: ICD-10-CM

## 2025-02-04 DIAGNOSIS — M51.369 DDD (DEGENERATIVE DISC DISEASE), LUMBAR: ICD-10-CM

## 2025-02-04 DIAGNOSIS — S70.02XA CONTUSION OF LEFT HIP, INITIAL ENCOUNTER: Primary | ICD-10-CM

## 2025-02-04 PROCEDURE — 71045 X-RAY EXAM CHEST 1 VIEW: CPT

## 2025-02-04 PROCEDURE — 72170 X-RAY EXAM OF PELVIS: CPT

## 2025-02-04 PROCEDURE — 73552 X-RAY EXAM OF FEMUR 2/>: CPT

## 2025-02-04 PROCEDURE — 96372 THER/PROPH/DIAG INJ SC/IM: CPT

## 2025-02-04 PROCEDURE — 25010000002 KETOROLAC TROMETHAMINE PER 15 MG: Performed by: STUDENT IN AN ORGANIZED HEALTH CARE EDUCATION/TRAINING PROGRAM

## 2025-02-04 PROCEDURE — 99283 EMERGENCY DEPT VISIT LOW MDM: CPT | Performed by: STUDENT IN AN ORGANIZED HEALTH CARE EDUCATION/TRAINING PROGRAM

## 2025-02-04 RX ORDER — ACETAMINOPHEN 500 MG
1000 TABLET ORAL EVERY 6 HOURS PRN
Qty: 30 TABLET | Refills: 0 | Status: SHIPPED | OUTPATIENT
Start: 2025-02-04 | End: 2025-02-11

## 2025-02-04 RX ORDER — ACETAMINOPHEN 500 MG
1000 TABLET ORAL ONCE
Status: COMPLETED | OUTPATIENT
Start: 2025-02-04 | End: 2025-02-04

## 2025-02-04 RX ORDER — FENTANYL CITRATE 50 UG/ML
50 INJECTION, SOLUTION INTRAMUSCULAR; INTRAVENOUS ONCE
Status: DISCONTINUED | OUTPATIENT
Start: 2025-02-04 | End: 2025-02-04 | Stop reason: HOSPADM

## 2025-02-04 RX ORDER — KETOROLAC TROMETHAMINE 30 MG/ML
30 INJECTION, SOLUTION INTRAMUSCULAR; INTRAVENOUS ONCE
Status: COMPLETED | OUTPATIENT
Start: 2025-02-04 | End: 2025-02-04

## 2025-02-04 RX ORDER — MELOXICAM 7.5 MG/1
15 TABLET ORAL DAILY
Qty: 14 TABLET | Refills: 0 | Status: SHIPPED | OUTPATIENT
Start: 2025-02-04 | End: 2025-02-11

## 2025-02-04 RX ORDER — HYDROCODONE BITARTRATE AND ACETAMINOPHEN 5; 325 MG/1; MG/1
1 TABLET ORAL EVERY 6 HOURS PRN
Qty: 120 TABLET | Refills: 0 | Status: SHIPPED | OUTPATIENT
Start: 2025-02-04

## 2025-02-04 RX ADMIN — KETOROLAC TROMETHAMINE 30 MG: 30 INJECTION, SOLUTION INTRAMUSCULAR; INTRAVENOUS at 10:54

## 2025-02-04 RX ADMIN — ACETAMINOPHEN 1000 MG: 500 TABLET ORAL at 10:53

## 2025-02-04 NOTE — ED PROVIDER NOTES
Subjective   History of Present Illness  78 yo F hx CHF, DM, fibromyalgia, osteoporosis presents to ed w/ cc of left hip pain, without associated sx, in context of fall last night, unable to ambulate since. She took her morning medications including aspirin. Denies pain elsewhere. -HS, -LOC, -AC, +AP (baby aspirin), GCS 15. Last meal was yesterday afternoon. Vitals are wnls. On exam pt well-appearing, ttp left hip, 2+ pulses, able to move toes bilat; surgically removed toes for right foot.    Review of Systems    Past Medical History:   Diagnosis Date    Age-related osteoporosis without current pathological fracture 02/25/2022    Anemia     Benign breast disease     Cancer 2015    Right breast    Cellulitis of leg     May-2003 right lower extremity    CHF (congestive heart failure)     Dr Bates follows    Chronic kidney disease     Dr Hannah follows    Chronic ulcer of right foot     Non-pressure, history of     Closed fracture of patella 09/05/2017    Cluster headache     Colon polyp     Depression     Diabetic peripheral neuropathy     Difficulty walking     Diverticulosis     Femoral fracture     right    Fibromyalgia, primary     Fracture of left wrist     history of    Gastroesophageal reflux     Generalized pain 02/02/2016    Hiatal hernia     Hyperlipidemia     Hypertension     Hypothyroidism     Impingement syndrome of right shoulder     Joint pain     Left knee pain 05/17/2016    Menopausal disorder     Methicillin resistant Staphylococcus aureus infection 2012    after bladder surgery    Nonischemic cardiomyopathy     Ejection fraction 10% per 2-D echo with Doppler however she did have cardiac catheterization April 2015 which showed ejection fraction 20% with global hypokinesis and severe mitral insufficiency    Osteoarthritis     generalized, sched jania TKA    Osteomyelitis 02/02/2016    Pain in shoulder 08/02/2016    Paroxysmal atrial fibrillation     Dr Bates follows    Radius/ulna fracture,  left, closed, initial encounter 10/21/2017    Shoulder pain, bilateral     has tears on both sides    Sleep apnea     Syncope, psychogenic 04/19/2017    Thoracic back pain 02/02/2016    Thoracic injuries     Toe ulcer     h/o to both feet, d/t shoes rubbing per patient    Transient alteration of awareness 04/19/2017    Traumatic closed nondisp torus fracture of distal radial metaphysis, right, initial encounter 2/28/2020       Allergies   Allergen Reactions    Dilaudid [Hydromorphone] Delirium and Confusion    Latex Rash       Past Surgical History:   Procedure Laterality Date    ABDOMINAL SURGERY  2012    had to be reopened after bladder surgery d/t infection    AMPUTATION DIGIT Right 6/4/2024    Procedure: AMPUTATION DIGIT right third toe;  Surgeon: Anmol Gonsalez DPM;  Location: [facility];  Service: Podiatry;  Laterality: Right;    AMPUTATION FOOT / TOE Right 11/2013    Rt foot amputation MTP first toe    BLADDER SURGERY  2012    BREAST BIOPSY      BREAST SURGERY  06/29/2015    Percutaneous ultrasound-guided placement of metal localized clip 1st lesion    CARDIAC CATHETERIZATION  2015    CARDIAC CATHETERIZATION N/A 1/25/2021    Procedure: Right and Left Heart Cath;  Surgeon: Nicholas Andrade MD;  Location: [facility] INVASIVE LOCATION;  Service: Cardiovascular;  Laterality: N/A;  drop in EF, with hx of NICM, SOA fatigue.  Discussed with     CARDIAC CATHETERIZATION N/A 1/25/2021    Procedure: Left ventriculography;  Surgeon: Nicholas Andrade MD;  Location: [facility] INVASIVE LOCATION;  Service: Cardiovascular;  Laterality: N/A;    CARDIAC CATHETERIZATION N/A 1/25/2021    Procedure: Coronary angiography;  Surgeon: Nicholas Andrade MD;  Location: [facility] INVASIVE LOCATION;  Service: Cardiovascular;  Laterality: N/A;    CARDIAC ELECTROPHYSIOLOGY PROCEDURE N/A 8/28/2024    Procedure: Loop insertion BOSTON;  Surgeon: Dillon Amaya MD;  Location: [facility] CATH INVASIVE LOCATION;   Service: Cardiovascular;  Laterality: N/A;    CATARACT EXTRACTION Bilateral 2017    COLONOSCOPY      DEBRIDEMENT  FOOT Right 01/2013    During hospitalization     EPIDURAL BLOCK      4 chronic back pain and leg pain last epidurals done 5-2012 she had no benefit at all from this procedure.    FEMUR FRACTURE SURGERY      FOOT SURGERY Bilateral     several    FOOT WOUND CLOSURE Right 6/6/2024    Procedure: WOUND CLOSURE right foot;  Surgeon: Anmol Gonsalez DPM;  Location: [facility] OR;  Service: Podiatry;  Laterality: Right;    HERNIA REPAIR      At the abdomen February 2007 Dr. Mendez    INCISIONAL HERNIA REPAIR  05/16/2014    Recurrent. Incarcerated. With mesh implantation    MASTECTOMY Right 05/2015    MASTECTOMY MODIFIED RADICAL W/ AXILLARY LYMPH NODES W/ OR W/O PECTORALIS MINOR Right     SHOULDER SURGERY Right     x2 RCR    TOTAL ABDOMINAL HYSTERECTOMY      with oophorectomy-Done June-2012 secondary to vaginal wall prolapse.    TOTAL KNEE ARTHROPLASTY Right     TOTAL KNEE ARTHROPLASTY Left 4/17/2018    Procedure: TOTAL KNEE ARTHROPLASTY;  Surgeon: Live Chambers MD;  Location: [facility] OR;  Service: Orthopedics       Family History   Problem Relation Age of Onset    Colonic polyp Mother     Hypertension Mother     Migraines Mother     Mental illness Mother     Depression Mother     Arthritis Mother     Coronary artery disease Father     Other Father         Cardiac Disorder    Colon cancer Father     Kidney disease Father     Cancer Father     Diabetes Father     Heart disease Father     Hypertension Father     Breast cancer Maternal Aunt     Colon cancer Brother     Alcohol abuse Brother     Arthritis Sister     Hypertension Brother     Lung disease Brother     Alcohol abuse Brother     Malig Hyperthermia Neg Hx        Social History     Socioeconomic History    Marital status:     Number of children: 2   Tobacco Use    Smoking status: Former     Current packs/day: 0.00     Average packs/day: 3.0 packs/day  for 30.0 years (90.0 ttl pk-yrs)     Types: Cigarettes     Start date:      Quit date:      Years since quittin.1     Passive exposure: Past    Smokeless tobacco: Never   Vaping Use    Vaping status: Never Used   Substance and Sexual Activity    Alcohol use: No     Comment: Caffeine use: 3 cups daily     Drug use: No    Sexual activity: Defer     Partners: Male     Comment: celibate           Objective   Physical Exam    Procedures           ED Course                                                       Medical Decision Making  Amount and/or Complexity of Data Reviewed  Radiology: ordered.    Risk  Prescription drug management.      80 yo F hx CHF, DM, fibromyalgia, osteoporosis presents to ed w/ cc of left hip pain, without associated sx, in context of fall last night, unable to ambulate since. She took her morning medications including aspirin. Denies pain elsewhere. -HS, -LOC, -AC, +AP (baby aspirin), GCS 15. Last meal was yesterday afternoon. Vitals are wnls. On exam pt well-appearing, ttp left hip, 2+ pulses, able to move toes bilat; surgically removed toes for right foot.     Concern for left hip fx vs hip contusion; eval with cxr, pelvis x-ray, left femur x-ray; tx pain w/ fentanyl IM.      10:40 am pain improved; x-rays neg for fx; will ambulate prior to dc      Hst pt  Dsp home  Final diagnoses:   Contusion of left hip, initial encounter   Osteopenia of left lower leg       ED Disposition  ED Disposition       ED Disposition   Discharge    Condition   Stable    Comment   --               Kathryn Epstein, APRN  [address]  [address]  [facility] [address] [phone]    In 2 days      -  FOLLOW-UP CARE (2 DAYS):  Alan Arce MD - Orthopedic Surgery  [address] [place] [address] [phone]             Medication List        New Prescriptions      acetaminophen 500 MG tablet  Commonly known as: TYLENOL  Take 2 tablets by mouth Every 6 (Six) Hours As Needed for  Mild Pain for up to 7 days.     meloxicam 7.5 MG tablet  Commonly known as: MOBIC  Take 2 tablets by mouth Daily for 7 days.               Where to Get Your Medications        These medications were sent to [id number] - [place] [address] [facility] [address] - [phone] PH - [phone] [facility] [address], [place] [address]      Phone: [phone]   acetaminophen 500 MG tablet  meloxicam 7.5 MG tablet            Albert Santos MD  02/04/25 2662

## 2025-02-04 NOTE — TELEPHONE ENCOUNTER
"Caller: Jung Short \"SHEILA\"    Relationship: Self    Best call back number: [phone]    Requested Prescriptions:   HYDROCODONE     Pharmacy where request should be sent: [facility] #[id number] - [place], [place] - [address] AT [facility] & [address] - [phone] PH - [phone] FX     Last office visit with prescribing clinician: 12/3/2024   Last telemedicine visit with prescribing clinician: Visit date not found   Next office visit with prescribing clinician: Visit date not found     Additional details provided by patient: PT IS IN HOSPITAL CURRENTLY IN [place] AND NEEDS RX REFILLED TODAY. PT HAD TO CANCEL APPT FROM TODAY. 1ST AVAILABLE 03/11/25, PLEASE CONTACT PT TO RESCHEDULE THE APPT AS WELL.    Does the patient have less than a 3 day supply:  [x] Yes  [] No    Would you like a call back once the refill request has been completed: [x] Yes [] No    If the office needs to give you a call back, can they leave a voicemail: [x] Yes [] No    Reyes Pineda   02/04/25 10:30 EST       "

## 2025-02-07 ENCOUNTER — OFFICE VISIT (OUTPATIENT)
Dept: INTERNAL MEDICINE | Facility: CLINIC | Age: 80
End: 2025-02-07
Payer: MEDICARE

## 2025-02-07 VITALS
DIASTOLIC BLOOD PRESSURE: 68 MMHG | HEIGHT: 65 IN | HEART RATE: 74 BPM | SYSTOLIC BLOOD PRESSURE: 118 MMHG | WEIGHT: 210.2 LBS | BODY MASS INDEX: 35.02 KG/M2 | TEMPERATURE: 97.8 F | OXYGEN SATURATION: 97 %

## 2025-02-07 DIAGNOSIS — I42.8 NICM (NONISCHEMIC CARDIOMYOPATHY): ICD-10-CM

## 2025-02-07 DIAGNOSIS — I25.10 ATHEROSCLEROSIS OF NATIVE CORONARY ARTERY OF NATIVE HEART WITHOUT ANGINA PECTORIS: ICD-10-CM

## 2025-02-07 DIAGNOSIS — E11.65 TYPE 2 DIABETES MELLITUS WITH HYPERGLYCEMIA, WITHOUT LONG-TERM CURRENT USE OF INSULIN: ICD-10-CM

## 2025-02-07 DIAGNOSIS — N18.31 STAGE 3A CHRONIC KIDNEY DISEASE: ICD-10-CM

## 2025-02-07 DIAGNOSIS — E03.9 ACQUIRED HYPOTHYROIDISM: ICD-10-CM

## 2025-02-07 DIAGNOSIS — I10 PRIMARY HYPERTENSION: Primary | ICD-10-CM

## 2025-02-07 DIAGNOSIS — I50.20 HFREF (HEART FAILURE WITH REDUCED EJECTION FRACTION): ICD-10-CM

## 2025-02-07 DIAGNOSIS — E53.8 COBALAMIN DEFICIENCY: ICD-10-CM

## 2025-02-07 DIAGNOSIS — K21.00 GASTROESOPHAGEAL REFLUX DISEASE WITH ESOPHAGITIS WITHOUT HEMORRHAGE: ICD-10-CM

## 2025-02-07 DIAGNOSIS — E55.9 VITAMIN D DEFICIENCY: ICD-10-CM

## 2025-02-07 DIAGNOSIS — E11.9 TYPE 2 DIABETES MELLITUS WITHOUT COMPLICATION, WITHOUT LONG-TERM CURRENT USE OF INSULIN: ICD-10-CM

## 2025-02-07 DIAGNOSIS — E78.5 DYSLIPIDEMIA: ICD-10-CM

## 2025-02-07 DIAGNOSIS — M81.0 AGE-RELATED OSTEOPOROSIS WITHOUT CURRENT PATHOLOGICAL FRACTURE: Chronic | ICD-10-CM

## 2025-02-07 NOTE — PROGRESS NOTES
Chief Complaint   Patient presents with    Fall     On 2/4/25    Hypertension     F/u on labs       SUBJECTIVE  Jung Short is a 79 y.o. female presenting today for follow up of her chronic health conditions.    These include HTN, HLD, HFrEF, NICM, DM2, CKD, hypothyroidism, GERD, peripheral neuropathy, depression, anemia, vitamin b12 def, vitamin D def. She has prior h/o R breast cancer s/p mastectomy in 2015.     I ordered her DXA in Sept but this was not scheduled d/t presence of Loop recorder.    She missed her mammogram d/t illness.    She missed her last Prolia injection d/t illness.    Outpatient Medications Marked as Taking for the 2/7/25 encounter (Office Visit) with Kathryn Epstein APRN   Medication Sig Dispense Refill    acetaminophen (TYLENOL) 500 MG tablet Take 2 tablets by mouth Every 6 (Six) Hours As Needed for Mild Pain for up to 7 days. 30 tablet 0    aspirin 81 MG EC tablet Take 1 tablet by mouth Daily.      bumetanide (BUMEX) 0.5 MG tablet Take 2 tablets by mouth Daily.      Calcium Citrate-Vitamin D 250-2.5 MG-MCG per tablet Take 1 tablet by mouth 2 (Two) Times a Day. 60 tablet 11    carvedilol (COREG) 25 MG tablet TAKE 1 TABLET BY MOUTH TWICE DAILY 180 tablet 1    cholecalciferol (VITAMIN D3) 25 MCG (1000 UT) tablet TAKE 1 TABLET BY MOUTH DAILY 90 tablet 1    DULoxetine (CYMBALTA) 60 MG capsule TAKE 1 CAPSULE BY MOUTH EVERY DAY 90 capsule 3    empagliflozin (Jardiance) 10 MG tablet tablet Take 1 tablet by mouth Daily. 14 tablet 0    esomeprazole (nexIUM) 20 MG capsule Take 1 capsule by mouth Every Morning Before Breakfast.      glipizide (GLUCOTROL XL) 5 MG ER tablet Take 1 tablet by mouth Daily With Breakfast. 90 tablet 3    HYDROcodone-acetaminophen (NORCO) 5-325 MG per tablet Take 1 tablet by mouth Every 6 (Six) Hours As Needed for Severe Pain. 120 tablet 0    meloxicam (MOBIC) 7.5 MG tablet Take 2 tablets by mouth Daily for 7 days. 14 tablet 0    Multiple Vitamins-Minerals  "(MULTIVITAMIN ADULT PO) Take 1 tablet by mouth Daily.      pravastatin (PRAVACHOL) 10 MG tablet TAKE 1 TABLET BY MOUTH EVERY NIGHT AT BEDTIME 90 tablet 3    pregabalin (LYRICA) 75 MG capsule TAKE 1 CAPSULE BY MOUTH TWICE DAILY 60 capsule 5    sacubitril-valsartan (Entresto) 49-51 MG tablet Take 1 tablet by mouth 2 (Two) Times a Day. 28 tablet 0    Semaglutide,0.25 or 0.5MG/DOS, (OZEMPIC) 2 MG/3ML solution pen-injector Inject 0.25 mg under the skin into the appropriate area as directed 1 (One) Time Per Week. 3 mL 0    spironolactone (ALDACTONE) 25 MG tablet Take 1 tablet by mouth Daily. 30 tablet 11         The following portions of the patient's history were reviewed and updated as appropriate: allergies, current medications, past family history, past medical history, past social history, past surgical history and problem list.        Objective   Vitals:    02/07/25 1438   BP: 118/68   BP Location: Left arm   Patient Position: Sitting   Cuff Size: Large Adult   Pulse: 74   Temp: 97.8 °F (36.6 °C)   TempSrc: Infrared   SpO2: 97%   Weight: 95.3 kg (210 lb 3.2 oz)   Height: 165.1 cm (65\")       BP Readings from Last 3 Encounters:   02/07/25 118/68   02/04/25 142/66   12/03/24 146/72        Wt Readings from Last 3 Encounters:   02/07/25 95.3 kg (210 lb 3.2 oz)   02/04/25 129 kg (284 lb)   10/22/24 95.3 kg (210 lb)      **Wgt is stated by patient. She refuses to be weighed in the office.**      Body mass index is 34.98 kg/m².  Nursing notes and vitals reviewed.    Physical Exam  Constitutional:       General: She is not in acute distress.     Appearance: She is well-developed.   Pulmonary:      Effort: Pulmonary effort is normal.   Neurological:      Mental Status: She is alert.   Psychiatric:         Attention and Perception: She is attentive.         Speech: Speech normal.           Data Reviewed:  Recent Results (from the past 4 weeks)   Vitamin D,25-Hydroxy    Collection Time: 01/22/25  3:30 PM    Specimen: Blood "   Result Value Ref Range    25 Hydroxy, Vitamin D 52.3 30.0 - 100.0 ng/ml   Vitamin B12    Collection Time: 01/22/25  3:30 PM    Specimen: Blood   Result Value Ref Range    Vitamin B-12 1,111 (H) 211 - 946 pg/mL   T4, Free    Collection Time: 01/22/25  3:30 PM    Specimen: Blood   Result Value Ref Range    Free T4 1.51 0.92 - 1.68 ng/dL   TSH    Collection Time: 01/22/25  3:30 PM    Specimen: Blood   Result Value Ref Range    TSH 2.060 0.270 - 4.200 uIU/mL   Lipid Panel With / Chol / HDL Ratio    Collection Time: 01/22/25  3:30 PM    Specimen: Blood   Result Value Ref Range    Total Cholesterol 171 0 - 200 mg/dL    Triglycerides 216 (H) 0 - 150 mg/dL    HDL Cholesterol 43 40 - 60 mg/dL    LDL Cholesterol  91 0 - 100 mg/dL    VLDL Cholesterol 37 5 - 40 mg/dL    Chol/HDL Ratio 3.98    Hemoglobin A1c    Collection Time: 01/22/25  3:30 PM    Specimen: Blood   Result Value Ref Range    Hemoglobin A1C 6.60 (H) 4.80 - 5.60 %   Comprehensive Metabolic Panel    Collection Time: 01/22/25  3:30 PM    Specimen: Blood   Result Value Ref Range    Glucose 108 (H) 65 - 99 mg/dL    BUN 21 8 - 23 mg/dL    Creatinine 1.04 (H) 0.57 - 1.00 mg/dL    Sodium 135 (L) 136 - 145 mmol/L    Potassium 4.6 3.5 - 5.2 mmol/L    Chloride 102 98 - 107 mmol/L    CO2 23.0 22.0 - 29.0 mmol/L    Calcium 10.0 8.6 - 10.5 mg/dL    Total Protein 6.4 6.0 - 8.5 g/dL    Albumin 3.9 3.5 - 5.2 g/dL    ALT (SGPT) 15 1 - 33 U/L    AST (SGOT) 17 1 - 32 U/L    Alkaline Phosphatase 93 39 - 117 U/L    Total Bilirubin 0.4 0.0 - 1.2 mg/dL    Globulin 2.5 gm/dL    A/G Ratio 1.6 g/dL    BUN/Creatinine Ratio 20.2 7.0 - 25.0    Anion Gap 10.0 5.0 - 15.0 mmol/L    eGFR 54.1 (L) >60.0 mL/min/1.73   CBC Auto Differential    Collection Time: 01/22/25  3:30 PM    Specimen: Blood   Result Value Ref Range    WBC 7.75 3.40 - 10.80 10*3/mm3    RBC 4.14 3.77 - 5.28 10*6/mm3    Hemoglobin 12.9 12.0 - 15.9 g/dL    Hematocrit 38.5 34.0 - 46.6 %    MCV 93.0 79.0 - 97.0 fL    MCH 31.2 26.6  - 33.0 pg    MCHC 33.5 31.5 - 35.7 g/dL    RDW 12.6 12.3 - 15.4 %    RDW-SD 42.5 37.0 - 54.0 fl    MPV 10.0 6.0 - 12.0 fL    Platelets 209 140 - 450 10*3/mm3    Neutrophil % 61.7 42.7 - 76.0 %    Lymphocyte % 24.9 19.6 - 45.3 %    Monocyte % 9.4 5.0 - 12.0 %    Eosinophil % 3.0 0.3 - 6.2 %    Basophil % 0.5 0.0 - 1.5 %    Immature Grans % 0.5 0.0 - 0.5 %    Neutrophils, Absolute 4.78 1.70 - 7.00 10*3/mm3    Lymphocytes, Absolute 1.93 0.70 - 3.10 10*3/mm3    Monocytes, Absolute 0.73 0.10 - 0.90 10*3/mm3    Eosinophils, Absolute 0.23 0.00 - 0.40 10*3/mm3    Basophils, Absolute 0.04 0.00 - 0.20 10*3/mm3    Immature Grans, Absolute 0.04 0.00 - 0.05 10*3/mm3    nRBC 0.0 0.0 - 0.2 /100 WBC       ED with Albert Santos MD (02/04/2025)     XR Femur 2 View Left    Result Date: 2/4/2025  Impression: Generalized osteopenia with osteoarthritis in the left hip. No acute fractures are identified. Electronically Signed: Geoffrey Matos MD  2/4/2025 10:28 AM EST  Workstation ID: [id number]    XR Pelvis 1 or 2 View    Result Date: 2/4/2025  Impression: 1.No acute fracture or dislocation. 2.Postsurgical changes in the right femoral shaft. Electronically Signed: Geoffrey Matos MD  2/4/2025 10:26 AM EST  Workstation ID: [id number]    XR Chest 1 View    Result Date: 2/4/2025  Impression: 1.Low lung volumes. 2.No active disease. Electronically Signed: Geoffrey Matos MD  2/4/2025 10:25 AM EST  Workstation ID: [id number]    XR Femur 2 View Right    Result Date: 10/22/2024  Ordering physician's impression is located in the Encounter Note dated 10/22/24.     XR Tibia Fibula 2 View Right    Result Date: 10/22/2024  Ordering physician's impression is located in the Encounter Note dated 10/22/24.      Assessment & Plan  Primary hypertension  - continue current regimen         Dyslipidemia  - continue current regimen       HFrEF (heart failure with reduced ejection fraction)         NICM (nonischemic cardiomyopathy)                Atherosclerosis of native coronary artery of native heart without angina pectoris           Type 2 diabetes mellitus without complication, without long-term current use of insulin  - increase Ozempic to 0.5mg weekly         Acquired hypothyroidism  - continue current regimen       Cobalamin deficiency  - continue supp       Vitamin D deficiency  - continue supp       Gastroesophageal reflux disease with esophagitis without hemorrhage  - continue current regimen       Stage 3a chronic kidney disease  - stable         Age-related osteoporosis without current pathological fracture  - advised to rescheduled Prolia   - will reschedule DXA as soon as equipment issue is resolved       Type 2 diabetes mellitus with hyperglycemia, without long-term current use of insulin      Orders:    Semaglutide,0.25 or 0.5MG/DOS, (OZEMPIC) 2 MG/3ML solution pen-injector; Inject 0.5 mg under the skin into the appropriate area as directed 1 (One) Time Per Week.          Medications, including side effects, were discussed with the patient. Patient verbalized understanding.  The plan of care was discussed. All questions were answered. Patient verbalized understanding.      Return in about 3 months (around 5/7/2025) for fasting labs one week prior to.

## 2025-02-07 NOTE — ASSESSMENT & PLAN NOTE
- advised to rescheduled Prolia   - will reschedule DXA as soon as equipment issue is resolved

## 2025-02-10 ENCOUNTER — TELEPHONE (OUTPATIENT)
Dept: INTERNAL MEDICINE | Facility: CLINIC | Age: 80
End: 2025-02-10
Payer: MEDICARE

## 2025-02-10 ENCOUNTER — TELEPHONE (OUTPATIENT)
Dept: CASE MANAGEMENT | Facility: OTHER | Age: 80
End: 2025-02-10
Payer: MEDICARE

## 2025-02-10 NOTE — TELEPHONE ENCOUNTER
Ok for HUB to read    Called and left voicemail informing the patient that Alfreda Mytrus said that her application  25 and will need a new application for re-enrollment. We must submit an updated application packet with updated proof of income and new dosing instructions. The patient can either come in to the office and bring her proof of income with her or the packet can be mailed to her and she can bring it back in to the office once completed. I told her to call back and let me know what she decides.     (Debbi has the packet in orange folder on her desk)

## 2025-02-10 NOTE — TELEPHONE ENCOUNTER
Outreach attempts were made x 3 to follow up with the patient for recent ED visit and to offer and discuss case management services with the [facility] program  Saida Moore RN

## 2025-02-25 DIAGNOSIS — G89.29 OTHER CHRONIC PAIN: ICD-10-CM

## 2025-02-25 RX ORDER — PREGABALIN 75 MG/1
75 CAPSULE ORAL 2 TIMES DAILY
Qty: 60 CAPSULE | Refills: 5 | Status: SHIPPED | OUTPATIENT
Start: 2025-02-25

## 2025-02-25 NOTE — TELEPHONE ENCOUNTER
Rx Refill Note  Requested Prescriptions     Pending Prescriptions Disp Refills    pregabalin (LYRICA) 75 MG capsule 60 capsule 5     Sig: Take 1 capsule by mouth 2 (Two) Times a Day.      Last office visit with prescribing clinician: 2/7/2025   Last telemedicine visit with prescribing clinician: Visit date not found   Next office visit with prescribing clinician: 5/12/2025                         Would you like a call back once the refill request has been completed: [] Yes [] No    If the office needs to give you a call back, can they leave a voicemail: [] Yes [] No    Debbi Crane MA  02/25/25, 16:33 EST

## 2025-02-25 NOTE — TELEPHONE ENCOUNTER
Rx Refill Note  Requested Prescriptions     Pending Prescriptions Disp Refills    pregabalin (LYRICA) 75 MG capsule 60 capsule 5     Sig: Take 1 capsule by mouth 2 (Two) Times a Day.      Last office visit with prescribing clinician: 2/7/2025   Last telemedicine visit with prescribing clinician: Visit date not found   Next office visit with prescribing clinician: 5/12/2025                         Would you like a call back once the refill request has been completed: [] Yes [] No    If the office needs to give you a call back, can they leave a voicemail: [] Yes [] No    Debbi Crane MA  02/25/25, 16:31 EST

## 2025-03-04 DIAGNOSIS — M51.369 DDD (DEGENERATIVE DISC DISEASE), LUMBAR: ICD-10-CM

## 2025-03-04 DIAGNOSIS — M47.816 LUMBAR FACET ARTHROPATHY: ICD-10-CM

## 2025-03-04 NOTE — TELEPHONE ENCOUNTER
Medication Refill Request    Date of phone call: 3/4/2025    Medication being requested: hydro/apap 5-325 mg sig: take 1 tab q 6 hrs prn  Qty: 120    Date of last visit: 12/3/2024    Date of last refill: 2/4/2025    LOUANN up to date?: yes    Next Follow up?: 3/11/2025    Any new pertinent information? (i.e, new medication allergies, new use of medications, change in patient's health or condition, non-compliance or inconsistency with prescribing agreement?):

## 2025-03-06 RX ORDER — HYDROCODONE BITARTRATE AND ACETAMINOPHEN 5; 325 MG/1; MG/1
1 TABLET ORAL EVERY 6 HOURS PRN
Qty: 120 TABLET | Refills: 0 | Status: SHIPPED | OUTPATIENT
Start: 2025-03-06

## 2025-03-20 RX ORDER — BUMETANIDE 0.5 MG/1
1.5 TABLET ORAL DAILY
Qty: 270 TABLET | OUTPATIENT
Start: 2025-03-20

## 2025-03-20 RX ORDER — BUMETANIDE 0.5 MG/1
1 TABLET ORAL DAILY
Qty: 90 TABLET | Refills: 3 | Status: SHIPPED | OUTPATIENT
Start: 2025-03-20

## 2025-03-20 NOTE — TELEPHONE ENCOUNTER
From previous provider, is this something you want to fill?     Rx Refill Note  Requested Prescriptions     Pending Prescriptions Disp Refills    bumetanide (BUMEX) 0.5 MG tablet [Pharmacy Med Name: BUMETANIDE 0.5MG TABLETS] 270 tablet      Sig: TAKE 3 TABLETS BY MOUTH DAILY.      Last office visit with prescribing clinician: 2/7/2025   Last telemedicine visit with prescribing clinician: Visit date not found   Next office visit with prescribing clinician: 5/12/2025                         Would you like a call back once the refill request has been completed: [] Yes [] No    If the office needs to give you a call back, can they leave a voicemail: [] Yes [] No    Aries Shields MA  03/20/25, 07:48 EDT

## 2025-04-07 ENCOUNTER — OFFICE VISIT (OUTPATIENT)
Dept: PAIN MEDICINE | Facility: CLINIC | Age: 80
End: 2025-04-07
Payer: MEDICARE

## 2025-04-07 VITALS
SYSTOLIC BLOOD PRESSURE: 134 MMHG | BODY MASS INDEX: 35.72 KG/M2 | OXYGEN SATURATION: 96 % | DIASTOLIC BLOOD PRESSURE: 77 MMHG | TEMPERATURE: 97.5 F | HEIGHT: 65 IN | WEIGHT: 214.4 LBS | HEART RATE: 77 BPM

## 2025-04-07 DIAGNOSIS — M17.10 ARTHRITIS OF KNEE: Primary | ICD-10-CM

## 2025-04-07 DIAGNOSIS — E11.9 TYPE 2 DIABETES MELLITUS WITHOUT COMPLICATION, WITHOUT LONG-TERM CURRENT USE OF INSULIN: ICD-10-CM

## 2025-04-07 DIAGNOSIS — M51.369 DDD (DEGENERATIVE DISC DISEASE), LUMBAR: ICD-10-CM

## 2025-04-07 DIAGNOSIS — M47.816 LUMBAR FACET ARTHROPATHY: ICD-10-CM

## 2025-04-07 DIAGNOSIS — Z79.899 ENCOUNTER FOR LONG-TERM (CURRENT) USE OF HIGH-RISK MEDICATION: ICD-10-CM

## 2025-04-07 DIAGNOSIS — M17.12 PRIMARY OSTEOARTHRITIS OF LEFT KNEE: ICD-10-CM

## 2025-04-07 LAB
POC AMPHETAMINES: NEGATIVE
POC BARBITURATES: NEGATIVE
POC BENZODIAZEPHINES: POSITIVE
POC BUPRENORPHINE: NEGATIVE
POC COCAINE: NEGATIVE
POC METHADONE: NEGATIVE
POC METHAMPHETAMINE SCREEN URINE: NEGATIVE
POC OPIATES: POSITIVE
POC OXYCODONE: NEGATIVE
POC PHENCYCLIDINE: NEGATIVE
POC PROPOXYPHENE: NEGATIVE
POC THC: NEGATIVE
POC TRICYCLIC ANTIDEPRESSANTS: NEGATIVE

## 2025-04-07 PROCEDURE — 3075F SYST BP GE 130 - 139MM HG: CPT

## 2025-04-07 PROCEDURE — 1159F MED LIST DOCD IN RCRD: CPT

## 2025-04-07 PROCEDURE — 80305 DRUG TEST PRSMV DIR OPT OBS: CPT

## 2025-04-07 PROCEDURE — 1160F RVW MEDS BY RX/DR IN RCRD: CPT

## 2025-04-07 PROCEDURE — 99214 OFFICE O/P EST MOD 30 MIN: CPT

## 2025-04-07 PROCEDURE — 1125F AMNT PAIN NOTED PAIN PRSNT: CPT

## 2025-04-07 PROCEDURE — 3078F DIAST BP <80 MM HG: CPT

## 2025-04-07 RX ORDER — LANOLIN ALCOHOL/MO/W.PET/CERES
1000 CREAM (GRAM) TOPICAL DAILY
COMMUNITY

## 2025-04-07 RX ORDER — SACUBITRIL AND VALSARTAN 49; 51 MG/1; MG/1
1 TABLET, FILM COATED ORAL 2 TIMES DAILY
Qty: 28 TABLET | Refills: 0 | COMMUNITY
Start: 2025-04-07

## 2025-04-07 RX ORDER — VIT A/VIT C/VIT E/ZINC/COPPER 2148-113
1 TABLET ORAL DAILY
COMMUNITY

## 2025-04-07 RX ORDER — HYDROCODONE BITARTRATE AND ACETAMINOPHEN 5; 325 MG/1; MG/1
1 TABLET ORAL EVERY 6 HOURS PRN
Qty: 120 TABLET | Refills: 0 | Status: SHIPPED | OUTPATIENT
Start: 2025-04-07

## 2025-04-07 NOTE — PROGRESS NOTES
CHIEF COMPLAINT  Back pain    Subjective   Jung Short is a 79 y.o. female  who presents for follow-up.  She has a history of chronic low back pain. She reports that her pain has worsened since her last office visit.    Today pain is 5/10VAS in severity. Pain is located across her low back in a bandlike pattern and intermittently radiates into bilateral lower extremities. Describes this pain as a nearly continuous aching and burning. Pain is worsened by prolonged standing, walking long distances, and increased physical activity. Pain improves with rest/reposition and medications. She has restarted PT since her last office visit and is going twice a week.      Continues with Hydrocodone 5mg 4/day. Denies any side effects from the regimen, including constipation and somnolence. The regimen helps decrease pain by 50% and allows her to live independently. ADL's by self. Denies any bowel or bladder changes.      Right GTB injection with Dr. Chambers on 10/22/24.     She was seen in the ED on 2/24/25 following a fall with complaints of left hip pain. Reviewed office note from Dr. Akins from this date. Patient reports that she did not lose consciousness.  X-rays negative for fractures. Recommended she follow up with PCP and Dr. Arce with Orthopedics.     Back Pain  This is a chronic problem. The current episode started more than 1 year ago. The problem occurs constantly. The problem has been comes and goes since onset. The pain is present in the lumbar spine. The quality of the pain is described as aching and burning. Radiates to: intermittently radiates into BLE. The pain is at a severity of 5/10. The pain is moderate. The symptoms are aggravated by standing and position (walking long distances, increased physical activity). Associated symptoms include numbness and weakness. Pertinent negatives include no abdominal pain, chest pain, dysuria, fever or headaches. She has tried analgesics for the symptoms. The  "treatment provided moderate relief.     PEG Assessment   What number best describes your pain on average in the past week?8  What number best describes how, during the past week, pain has interfered with your enjoyment of life?0  What number best describes how, during the past week, pain has interfered with your general activity?  7    Review of Pertinent Medical Data ---          The following portions of the patient's history were reviewed and updated as appropriate: allergies, current medications, past family history, past medical history, past social history, past surgical history, and problem list.    Review of Systems   Constitutional:  Positive for activity change (decreased) and fatigue. Negative for chills and fever.   HENT:  Negative for congestion.    Eyes:  Negative for visual disturbance.   Respiratory:  Negative for chest tightness and shortness of breath.    Cardiovascular:  Negative for chest pain.   Gastrointestinal:  Negative for abdominal pain, constipation and diarrhea.   Genitourinary:  Negative for difficulty urinating, dyspareunia and dysuria.   Musculoskeletal:  Positive for back pain.   Neurological:  Positive for weakness and numbness. Negative for dizziness, light-headedness and headaches.   Psychiatric/Behavioral:  Positive for agitation and sleep disturbance. Negative for self-injury and suicidal ideas. The patient is not nervous/anxious.      I have reviewed and confirmed the accuracy of the ROS as documented by the MA/LPN/RN JOSE ANTONIO Turner    Vitals:    04/07/25 1031   BP: 134/77   Pulse: 77   Temp: 97.5 °F (36.4 °C)   SpO2: 96%   Weight: 97.3 kg (214 lb 6.4 oz)   Height: 165.1 cm (65\")   PainSc: 5    PainLoc: Back     Objective   Physical Exam  Constitutional:       Appearance: Normal appearance.   HENT:      Head: Normocephalic.   Cardiovascular:      Rate and Rhythm: Normal rate and regular rhythm.   Pulmonary:      Effort: Pulmonary effort is normal.      Breath sounds: " Normal breath sounds.   Musculoskeletal:      Cervical back: Normal range of motion.      Lumbar back: Spasms, tenderness and bony tenderness present. Decreased range of motion.   Skin:     General: Skin is warm and dry.      Capillary Refill: Capillary refill takes less than 2 seconds.   Neurological:      General: No focal deficit present.      Mental Status: She is alert and oriented to person, place, and time.      Gait: Gait abnormal (slowed, ambulates with cane).   Psychiatric:         Mood and Affect: Mood normal.         Behavior: Behavior normal.         Thought Content: Thought content normal.         Cognition and Memory: Cognition normal.       Assessment & Plan   Diagnoses and all orders for this visit:    1. Arthritis of knee (Primary)    2. Lumbar facet arthropathy  -     HYDROcodone-acetaminophen (NORCO) 5-325 MG per tablet; Take 1 tablet by mouth Every 6 (Six) Hours As Needed for Severe Pain.  Dispense: 120 tablet; Refill: 0    3. DDD (degenerative disc disease), lumbar  -     HYDROcodone-acetaminophen (NORCO) 5-325 MG per tablet; Take 1 tablet by mouth Every 6 (Six) Hours As Needed for Severe Pain.  Dispense: 120 tablet; Refill: 0    4. Primary osteoarthritis of left knee    5. Encounter for long-term (current) use of high-risk medication      Jung Short reports a pain score of 5.  Given her pain assessment as noted, treatment options were discussed and the following options were decided upon as a follow-up plan to address the patient's pain: continuation of current treatment plan for pain and prescription for opiod analgesics.    --- Routine UDS in office today as part of monitoring requirements for controlled substances.  The specimen was viewed and the immunoassay result reviewed and is +OPI.  This specimen will be sent to [facility] for confirmation.     --- The patient signed an updated copy of the controlled substance agreement on 4/7/25  --- Continue Hydrocodone. Patient  appears stable with current regimen. No adverse effects. Regarding continuation of opioids, there is no evidence of aberrant behavior or any red flags.  The patient continues with appropriate response to opioid therapy. ADL's remain intact by self.   --- Moderate risk for opiate abuse/diversion   --- She is not interested in interventional procedures at this time  --- Follow-up 2 months or sooner if needed        LOUANN REPORT  As part of the patient's treatment plan, I am prescribing controlled substances. The patient has been made aware of appropriate use of such medications, including potential risk of somnolence, limited ability to drive and/or work safely, and the potential for dependence or overdose. It has also been made clear that these medications are for use by this patient only, without concomitant use of alcohol or other substances unless prescribed.     Patient has completed prescribing agreement detailing terms of continued prescribing of controlled substances, including monitoring LOUANN reports, urine drug screening, and pill counts if necessary. The patient is aware that inappropriate use will results in cessation of prescribing such medications.    As the clinician, I personally reviewed the LOUANN from 4/7/25 while the patient was in the office today.    History and physical exam exhibit continued safe and appropriate use of controlled substances.    Dictated utilizing Dragon dictation.

## 2025-04-23 NOTE — PROGRESS NOTES
Notes by LPN:  Patient referred for syncope. She states this has been going on for 9 years. It has no warning. It just hits and she always falls backwards and hits her head. No dizziness. It is random. Last time was about 2 months ago.              Subjective:     Dear Jazmine, thank you for referring Mrs. Short for neurological consultation.  As you know, she is an 78-year-old right-handed woman sent for evaluation of syncope and collapse.  These events have been going on for about 9 years and are unpredictable.  She has no warning prior to these events.  Interestingly, they always occur from an upright posture and never occur from seated or laying down.  She has had a cardiology workup which is apparently unremarkable although she tells me there are plans for a loop recorder.  She usually falls backwards sometimes hitting her head.  She does have history of diabetes.  Apparently there is no associated glucose abnormalities associated with these events.  There is no confusion afterwards.  There is no postictal syndrome.  There is no bladder incontinence.  There is no tongue biting.  There is no family history of epilepsy.  She did have an MRI of the brain which was unremarkable for age.  I personally reviewed the study.  There is no acute process.  Patient ID: Jung Short is a 78 y.o. female.    Syncope  Associated symptoms include weakness. Pertinent negatives include no abdominal pain, back pain, chest pain, confusion, dizziness, headaches, light-headedness, nausea, palpitations or vomiting.     The following portions of the patient's history were reviewed and updated as appropriate: allergies, current medications, past family history, past medical history, past social history, past surgical history, and problem list.    Review of Systems   Constitutional:  Positive for fatigue. Negative for activity change and appetite change.   HENT:  Negative for facial swelling, trouble swallowing and voice change.   [Mother] : mother   Eyes:  Negative for photophobia, pain and visual disturbance.   Respiratory:  Negative for chest tightness, shortness of breath and wheezing.    Cardiovascular:  Positive for syncope. Negative for chest pain, palpitations and leg swelling.   Gastrointestinal:  Negative for abdominal pain, nausea and vomiting.   Endocrine: Negative for cold intolerance and heat intolerance.   Musculoskeletal:  Positive for gait problem. Negative for arthralgias, back pain, joint swelling, myalgias, neck pain and neck stiffness.   Neurological:  Positive for syncope and weakness. Negative for dizziness, tremors, seizures, facial asymmetry, speech difficulty, light-headedness, numbness and headaches.   Hematological:  Does not bruise/bleed easily.   Psychiatric/Behavioral:  Negative for agitation, behavioral problems, confusion, decreased concentration, dysphoric mood, hallucinations, self-injury, sleep disturbance and suicidal ideas. The patient is not nervous/anxious and is not hyperactive.         Objective:    Neurological Exam  Awake alert pleasant cooperative oriented in all spheres with fluent speech and normal speech comprehension.    Cranial nerves II through XII normal and symmetric.    Motor exam reveals normal symmetric tone bulk and power throughout without drift or spasticity.  No adventitious movements.    The sensory exam reveals normal and symmetric sensation to light touch, temperature, vibration throughout.    Coordination testing reveals smooth and accurate finger-nose-finger bilaterally, normal heel-to-shin bilaterally and normal rhythmic rapid alternating movements.  Romberg testing is negative.  She uses a cane for ambulatory support and limps a little bit because of right foot issues including recent toe amputation.    Tendon reflexes are 1+ and symmetric throughout including preserved ankle jerks bilaterally.  No ankle clonus.    Physical Exam    Assessment/Plan:     Diagnoses and all orders for this  visit:    1. Syncope and collapse (Primary)  -     EEG (Hospital Performed); Future     78-year-old woman sent for evaluation of syncope and collapse.  Most of the features do not suggest an epileptic origin for these events but apparently no other clear source has been found to this point.  Will move ahead with an EEG to evaluate this further and I will review the results and take any further measures that may be necessary.  Currently, there is no clear indication for other neurological intervention.  I discussed all this with her and answered her questions.  Thank you very much for the opportunity to participate in the care of this delightful woman.

## 2025-04-29 ENCOUNTER — TELEPHONE (OUTPATIENT)
Dept: INTERNAL MEDICINE | Facility: CLINIC | Age: 80
End: 2025-04-29
Payer: MEDICARE

## 2025-04-29 DIAGNOSIS — E53.8 COBALAMIN DEFICIENCY: ICD-10-CM

## 2025-04-29 DIAGNOSIS — E55.9 VITAMIN D DEFICIENCY: ICD-10-CM

## 2025-04-29 DIAGNOSIS — E11.9 TYPE 2 DIABETES MELLITUS WITHOUT COMPLICATION, WITHOUT LONG-TERM CURRENT USE OF INSULIN: ICD-10-CM

## 2025-04-29 DIAGNOSIS — I10 PRIMARY HYPERTENSION: Primary | ICD-10-CM

## 2025-04-29 DIAGNOSIS — E78.5 DYSLIPIDEMIA: ICD-10-CM

## 2025-04-29 DIAGNOSIS — E03.9 ACQUIRED HYPOTHYROIDISM: ICD-10-CM

## 2025-05-06 DIAGNOSIS — E11.9 TYPE 2 DIABETES MELLITUS WITHOUT COMPLICATION, WITHOUT LONG-TERM CURRENT USE OF INSULIN: ICD-10-CM

## 2025-05-06 RX ORDER — SACUBITRIL AND VALSARTAN 49; 51 MG/1; MG/1
1 TABLET, FILM COATED ORAL 2 TIMES DAILY
Qty: 28 TABLET | Refills: 0 | COMMUNITY
Start: 2025-05-06

## 2025-05-07 DIAGNOSIS — M51.369 DDD (DEGENERATIVE DISC DISEASE), LUMBAR: ICD-10-CM

## 2025-05-07 DIAGNOSIS — M47.816 LUMBAR FACET ARTHROPATHY: ICD-10-CM

## 2025-05-07 RX ORDER — HYDROCODONE BITARTRATE AND ACETAMINOPHEN 5; 325 MG/1; MG/1
1 TABLET ORAL EVERY 6 HOURS PRN
Qty: 120 TABLET | Refills: 0 | Status: SHIPPED | OUTPATIENT
Start: 2025-05-07

## 2025-05-07 NOTE — TELEPHONE ENCOUNTER
"  Caller: ShortJung \"SHEILA\"    Relationship: Self    Best call back number: [phone]    Requested Prescriptions:   Requested Prescriptions     Pending Prescriptions Disp Refills    HYDROcodone-acetaminophen (NORCO) 5-325 MG per tablet 120 tablet 0     Sig: Take 1 tablet by mouth Every 6 (Six) Hours As Needed for Severe Pain.        Pharmacy where request should be sent:  Pharmacy: [id number] - [address] AT [facility] [address] - [phone]  - [phone]     Last office visit with prescribing clinician: 4/7/2025   Last telemedicine visit with prescribing clinician: Visit date not found   Next office visit with prescribing clinician: 6/27/2025     Additional details provided by patient: SHE IS CURRENTLY OUT OF MEDICATION     Does the patient have less than a 3 day supply:  [x] Yes  [] No    Would you like a call back once the refill request has been completed: [] Yes [x] No    If the office needs to give you a call back, can they leave a voicemail: [x] Yes [] No    Allan Grace Rep   05/07/25 14:42 EDT           "

## 2025-05-09 ENCOUNTER — OFFICE VISIT (OUTPATIENT)
Dept: CARDIOLOGY | Facility: CLINIC | Age: 80
End: 2025-05-09
Payer: MEDICARE

## 2025-05-09 VITALS
WEIGHT: 207 LBS | SYSTOLIC BLOOD PRESSURE: 112 MMHG | BODY MASS INDEX: 34.49 KG/M2 | DIASTOLIC BLOOD PRESSURE: 68 MMHG | HEART RATE: 71 BPM | HEIGHT: 65 IN

## 2025-05-09 DIAGNOSIS — I10 PRIMARY HYPERTENSION: ICD-10-CM

## 2025-05-09 DIAGNOSIS — I50.20 HFREF (HEART FAILURE WITH REDUCED EJECTION FRACTION): ICD-10-CM

## 2025-05-09 DIAGNOSIS — E11.9 TYPE 2 DIABETES MELLITUS WITHOUT COMPLICATION, WITHOUT LONG-TERM CURRENT USE OF INSULIN: ICD-10-CM

## 2025-05-09 DIAGNOSIS — I42.8 NICM (NONISCHEMIC CARDIOMYOPATHY): Primary | ICD-10-CM

## 2025-05-09 DIAGNOSIS — E78.5 DYSLIPIDEMIA: ICD-10-CM

## 2025-05-09 NOTE — PROGRESS NOTES
Date of Office Visit: 2024  Encounter Provider: Aria Bates MD  Place of Service: [facility] CARDIOLOGY  Patient Name: Jung Short  :1945      Patient ID:  Jung Short is a 79 y.o. 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     She presented to [facility] in 2015 with acute congestive heart failure, systolic and diastolic. She had a 2-D echocardiogram which revealed an ejection fraction of 10-15%, moderate left ventricular dilation, severe mitral and tricuspid insufficiency, and her aortic valve was normal. She was transferred to [facility] for a cardiac catheterization. 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 She also had a right heart catheterization done which revealed an RV pressure of 50/20, PA pressure 50/30 with the main of 38 and a capillary wedge pressure of 31 mmHg. Her right atrial pressure was 20 mmHg. Her cardiac output was 3.9 liters per minute. She was treated medically and diuresed.         Echocardiogram done 2021 showed mild concentric left ventricular hypertrophy, moderate left atrial enlargement, normal diastolic function, mild left ventricular dilation, ejection fraction 20-25%, severely reduced ejection fraction global hypokinesis.  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  Repeat echocardiogram done 4/22/2021 showed moderate left ventricular dilation, mild concentric left ventricle hypertrophy, grade 2 diastolic dysfunction, ejection fraction 37%, moderate reduction of left ventricular systolic function, severe left atrial enlargement, normal saline contrast study, mild right atrial enlargement, mild right ventricular enlargement, global hypokinesis.  She was started on Entresto.      On 12/30/2021, she presented to the emergency department with high blood pressure.  She states it was 200 over 90s.  She actually woke up around 2 AM that morning with a throbbing pain in her left arm.  When she took her blood pressure it was systolic 203.  She checked it several times after the that over the next 2 hours and that it was persistently elevated.  She also had some aching chest pain that was quite brief.  Since her blood pressure was not coming down she decided to come to the ER.  She felt that since she had had COVID and especially the last couple weeks that her systolic blood pressure has remained elevated.  It has been consistently 170/80 systolic.  She was given 10 mg of labetalol IV and her blood pressure responded well.  She states by the time she left it was 140 systolic. Work-up was unremarkable.  Chest x-rays was negative for any acute findings.  She did have a mildly elevated D-dimer at 1.30.  Her CTA was negative for PE.      Echo done 4/12/2022 showed ejection fraction 40% with moderate left atrial enlargement, mild mitral insufficiency, pericardial effusion - no evidence of tamponade, moderate global hypokinesis noted. Echo done 2/6/2023 showed ejection fraction 57% with grade 1 diastolic dysfunction, mild mitral tricuspid sufficiency, moderate left atrial enlargement, normal RVSP.     CT of cervical spine done 2/9/2024 showed degenerative  change of the cervical spine with no acute fracture or malalignment.    She has recurrent, unprovoked syncope without warning.  She says she will just all of a sudden go out.  People witnessing this say that she falls backwards and she injures herself.  She has hit her head multiple times.      She was in the emergency department after fall 2/4/2025.  She had left hip pain and it was contused.  Labs on 5/5/2025 show unremarkable CMP, hemoglobin A1c 6.4%, normal TSH and free T4, total cholesterol 166, HDL 50, LDL 84, triglycerides 188, VLDL 32, normal CBC.Remote loop recorder done 1/13/2025 showed no abnormalities.    She fell this last week when she stumbled over her right foot and bruised her right leg, right big toe and her left arm.  She has a lipoma on her left shoulder.  She has had some intermittent left hand numbness.  She does not feel her heart racing or skipping.  She has no chest pain or pressure.  Her breathing is stable.  She has no orthopnea or PND.  She has no exertional chest tightness or pressure.  She walks with a cane.  Her right leg is always problematic because she has fractured it multiple times and so she is unstable on that leg which is what causes some of her falls.    Past Medical History:   Diagnosis Date    Age-related osteoporosis without current pathological fracture 02/25/2022    Anemia     Benign breast disease     Cancer 2015    Right breast    Cellulitis of leg     May-2003 right lower extremity    CHF (congestive heart failure)     Dr Bates follows    Chronic kidney disease     Dr Hannah follows    Chronic ulcer of right foot     Non-pressure, history of     Closed fracture of patella 09/05/2017    Cluster headache     Colon polyp     Depression     Diabetic peripheral neuropathy     Difficulty walking     Diverticulosis     Femoral fracture     right    Fibromyalgia, primary     Fracture of left wrist     history of    Gastroesophageal reflux     Generalized pain 02/02/2016     Hiatal hernia     Hyperlipidemia     Hypertension     Hypothyroidism     Impingement syndrome of right shoulder     Joint pain     Left knee pain 05/17/2016    Menopausal disorder     Methicillin resistant Staphylococcus aureus infection 2012    after bladder surgery    Nonischemic cardiomyopathy     Ejection fraction 10% per 2-D echo with Doppler however she did have cardiac catheterization April 2015 which showed ejection fraction 20% with global hypokinesis and severe mitral insufficiency    Osteoarthritis     generalized, sched jania TKA    Osteomyelitis 02/02/2016    Pain in shoulder 08/02/2016    Paroxysmal atrial fibrillation     Dr Bates follows    Radius/ulna fracture, left, closed, initial encounter 10/21/2017    Shoulder pain, bilateral     has tears on both sides    Sleep apnea     Syncope, psychogenic 04/19/2017    Thoracic back pain 02/02/2016    Thoracic injuries     Toe ulcer     h/o to both feet, d/t shoes rubbing per patient    Transient alteration of awareness 04/19/2017    Traumatic closed nondisp torus fracture of distal radial metaphysis, right, initial encounter 2/28/2020         Past Surgical History:   Procedure Laterality Date    ABDOMINAL SURGERY  2012    had to be reopened after bladder surgery d/t infection    AMPUTATION DIGIT Right 6/4/2024    Procedure: AMPUTATION DIGIT right third toe;  Surgeon: Anmol Gonsalez DPM;  Location: [facility] OR;  Service: Podiatry;  Laterality: Right;    AMPUTATION FOOT / TOE Right 11/2013    Rt foot amputation MTP first toe    BLADDER SURGERY  2012    BREAST BIOPSY      BREAST SURGERY  06/29/2015    Percutaneous ultrasound-guided placement of metal localized clip 1st lesion    CARDIAC CATHETERIZATION  2015    CARDIAC CATHETERIZATION N/A 1/25/2021    Procedure: Right and Left Heart Cath;  Surgeon: Nicholas Andrade MD;  Location:  [facility] CATH INVASIVE LOCATION;  Service: Cardiovascular;  Laterality: N/A;  drop in EF, with hx of NICM, SOA fatigue.   Discussed with     CARDIAC CATHETERIZATION N/A 1/25/2021    Procedure: Left ventriculography;  Surgeon: Nicholas Andrade MD;  Location:  [facility] CATH INVASIVE LOCATION;  Service: Cardiovascular;  Laterality: N/A;    CARDIAC CATHETERIZATION N/A 1/25/2021    Procedure: Coronary angiography;  Surgeon: Nicholas Andrade MD;  Location:  [facility] CATH INVASIVE LOCATION;  Service: Cardiovascular;  Laterality: N/A;    CARDIAC ELECTROPHYSIOLOGY PROCEDURE N/A 8/28/2024    Procedure: Loop insertion BOSTON;  Surgeon: Dillon Amaya MD;  Location:  [facility] CATH INVASIVE LOCATION;  Service: Cardiovascular;  Laterality: N/A;    CATARACT EXTRACTION Bilateral 2017    COLONOSCOPY      DEBRIDEMENT  FOOT Right 01/2013    During hospitalization     EPIDURAL BLOCK      4 chronic back pain and leg pain last epidurals done 5-2012 she had no benefit at all from this procedure.    FEMUR FRACTURE SURGERY      FOOT SURGERY Bilateral     several    FOOT WOUND CLOSURE Right 6/6/2024    Procedure: WOUND CLOSURE right foot;  Surgeon: Anmol Gonsalez DPM;  Location: [facility] OR;  Service: Podiatry;  Laterality: Right;    HERNIA REPAIR      At the abdomen February 2007 Dr. Mendez    INCISIONAL HERNIA REPAIR  05/16/2014    Recurrent. Incarcerated. With mesh implantation    MASTECTOMY Right 05/2015    MASTECTOMY MODIFIED RADICAL W/ AXILLARY LYMPH NODES W/ OR W/O PECTORALIS MINOR Right     SHOULDER SURGERY Right     x2 RCR    TOTAL ABDOMINAL HYSTERECTOMY      with oophorectomy-Done June-2012 secondary to vaginal wall prolapse.    TOTAL KNEE ARTHROPLASTY Right     TOTAL KNEE ARTHROPLASTY Left 4/17/2018    Procedure: TOTAL KNEE ARTHROPLASTY;  Surgeon: Live Chambers MD;  Location: [facility] OR;  Service: Orthopedics       Current Outpatient Medications on File Prior to Visit   Medication Sig Dispense Refill    Apoaequorin (PREVAGEN PO) Take  by mouth.      aspirin 81 MG EC tablet Take 1 tablet by mouth Daily.      bumetanide (BUMEX) 0.5 MG tablet  Take 2 tablets by mouth Daily. 90 tablet 3    Calcium Citrate-Vitamin D 250-2.5 MG-MCG per tablet Take 1 tablet by mouth 2 (Two) Times a Day. 60 tablet 11    carvedilol (COREG) 25 MG tablet TAKE 1 TABLET BY MOUTH TWICE DAILY 180 tablet 1    cholecalciferol (VITAMIN D3) 25 MCG (1000 UT) tablet TAKE 1 TABLET BY MOUTH DAILY 90 tablet 1    DULoxetine (CYMBALTA) 60 MG capsule TAKE 1 CAPSULE BY MOUTH EVERY DAY 90 capsule 3    empagliflozin (Jardiance) 10 MG tablet tablet Take 1 tablet by mouth Daily. 28 tablet 0    esomeprazole (nexIUM) 20 MG capsule Take 1 capsule by mouth Every Morning Before Breakfast.      glipizide (GLUCOTROL XL) 5 MG ER tablet Take 1 tablet by mouth Daily With Breakfast. 90 tablet 3    HYDROcodone-acetaminophen (NORCO) 5-325 MG per tablet Take 1 tablet by mouth Every 6 (Six) Hours As Needed for Severe Pain. 120 tablet 0    Multiple Vitamins-Minerals (MULTIVITAMIN ADULT PO) Take 1 tablet by mouth Daily.      multivitamin with minerals (PreserVision AREDS) tablet tablet Take 1 tablet by mouth Daily.      pravastatin (PRAVACHOL) 10 MG tablet TAKE 1 TABLET BY MOUTH EVERY NIGHT AT BEDTIME 90 tablet 3    pregabalin (LYRICA) 75 MG capsule Take 1 capsule by mouth 2 (Two) Times a Day. 60 capsule 5    sacubitril-valsartan (Entresto) 49-51 MG tablet Take 1 tablet by mouth 2 (Two) Times a Day. 28 tablet 0    spironolactone (ALDACTONE) 25 MG tablet Take 1 tablet by mouth Daily. 30 tablet 11    vitamin B-12 (CYANOCOBALAMIN) 1000 MCG tablet Take 1 tablet by mouth Daily.      Semaglutide,0.25 or 0.5MG/DOS, (OZEMPIC) 2 MG/3ML solution pen-injector Inject 0.5 mg under the skin into the appropriate area as directed 1 (One) Time Per Week. (Patient not taking: Reported on 5/9/2025) 3 mL 0     No current facility-administered medications on file prior to visit.       Social History     Socioeconomic History    Marital status:     Number of children: 2   Tobacco Use    Smoking status: Former     Current packs/day:  "0.00     Average packs/day: 3.0 packs/day for 30.0 years (90.0 ttl pk-yrs)     Types: Cigarettes     Start date:      Quit date:      Years since quittin.3     Passive exposure: Past    Smokeless tobacco: Never   Vaping Use    Vaping status: Never Used   Substance and Sexual Activity    Alcohol use: No     Comment: Caffeine use: 3 cups daily     Drug use: No    Sexual activity: Defer     Partners: Male     Comment: celibate             Procedures    ECG 12 Lead    Date/Time: 2025 3:12 PM  Performed by: Aria Bates MD    Authorized by: Aria Bates MD  Comparison: compared with previous ECG   Similar to previous ECG  Rhythm: sinus rhythm  Conduction: left anterior fascicular block  T inversion: aVF    Clinical impression: abnormal EKG              Objective:      Vitals:    25 1454   BP: 112/68   Pulse: 71   Weight: 93.9 kg (207 lb)   Height: 165.1 cm (65\")     Body mass index is 34.45 kg/m².    Vitals reviewed.   Constitutional:       General: Not in acute distress.     Appearance: Not diaphoretic.   Neck:      Vascular: No carotid bruit or JVD.   Pulmonary:      Effort: Pulmonary effort is normal.      Breath sounds: Normal breath sounds.   Cardiovascular:      Normal rate. Regular rhythm.      Murmurs: There is no murmur.      No gallop.  No rub.   Pulses:     Intact distal pulses.      Carotid: 2+ bilaterally.     Radial: 2+ bilaterally.     Dorsalis pedis: 2+ bilaterally.     Posterior tibial: 2+ bilaterally.  Edema:     Peripheral edema absent.   Neurological:      Cranial Nerves: No cranial nerve deficit.         Lab Review:       Assessment:      Diagnosis Plan   1. NICM (nonischemic cardiomyopathy)        2. HFrEF (heart failure with reduced ejection fraction)        3. Primary hypertension        4. Type 2 diabetes mellitus without complication, without long-term current use of insulin        5. Dyslipidemia                Nonischemic cardiomyopathy, recovered.  " Maintain carvedilol, Jardiance, Entresto.  No decompensated heart failure  Hypertension blood pressure controlled.  She has no dizziness even if her blood pressure gets little low.  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  Fractures of the right leg causing unstable gait and recurrent falls.  Lipoma left shoulder  Left hand numbness, likely due to cervical spine pathology.     Plan:       See Rosa in 6 months, no medication changes, no other testing at this time.

## 2025-05-12 ENCOUNTER — OFFICE VISIT (OUTPATIENT)
Dept: INTERNAL MEDICINE | Facility: CLINIC | Age: 80
End: 2025-05-12
Payer: MEDICARE

## 2025-05-12 VITALS
TEMPERATURE: 97.7 F | HEART RATE: 66 BPM | DIASTOLIC BLOOD PRESSURE: 66 MMHG | SYSTOLIC BLOOD PRESSURE: 110 MMHG | HEIGHT: 65 IN | BODY MASS INDEX: 36.35 KG/M2 | OXYGEN SATURATION: 99 % | WEIGHT: 218.2 LBS

## 2025-05-12 DIAGNOSIS — I50.20 HFREF (HEART FAILURE WITH REDUCED EJECTION FRACTION): ICD-10-CM

## 2025-05-12 DIAGNOSIS — E78.5 DYSLIPIDEMIA: ICD-10-CM

## 2025-05-12 DIAGNOSIS — M79.642 PAIN IN LEFT HAND: ICD-10-CM

## 2025-05-12 DIAGNOSIS — N18.31 STAGE 3A CHRONIC KIDNEY DISEASE: ICD-10-CM

## 2025-05-12 DIAGNOSIS — T14.8XXA INFECTED WOUND: Primary | ICD-10-CM

## 2025-05-12 DIAGNOSIS — I10 PRIMARY HYPERTENSION: ICD-10-CM

## 2025-05-12 DIAGNOSIS — E55.9 VITAMIN D DEFICIENCY: ICD-10-CM

## 2025-05-12 DIAGNOSIS — R20.0 NUMBNESS OF LEFT HAND: ICD-10-CM

## 2025-05-12 DIAGNOSIS — E53.8 COBALAMIN DEFICIENCY: ICD-10-CM

## 2025-05-12 DIAGNOSIS — E03.9 ACQUIRED HYPOTHYROIDISM: ICD-10-CM

## 2025-05-12 DIAGNOSIS — L08.9 INFECTED WOUND: Primary | ICD-10-CM

## 2025-05-12 DIAGNOSIS — E11.65 TYPE 2 DIABETES MELLITUS WITH HYPERGLYCEMIA, WITHOUT LONG-TERM CURRENT USE OF INSULIN: ICD-10-CM

## 2025-05-12 DIAGNOSIS — G89.29 OTHER CHRONIC PAIN: ICD-10-CM

## 2025-05-12 DIAGNOSIS — I25.10 ATHEROSCLEROSIS OF NATIVE CORONARY ARTERY OF NATIVE HEART WITHOUT ANGINA PECTORIS: ICD-10-CM

## 2025-05-12 PROCEDURE — 1160F RVW MEDS BY RX/DR IN RCRD: CPT | Performed by: NURSE PRACTITIONER

## 2025-05-12 PROCEDURE — 3074F SYST BP LT 130 MM HG: CPT | Performed by: NURSE PRACTITIONER

## 2025-05-12 PROCEDURE — 1125F AMNT PAIN NOTED PAIN PRSNT: CPT | Performed by: NURSE PRACTITIONER

## 2025-05-12 PROCEDURE — G2211 COMPLEX E/M VISIT ADD ON: HCPCS | Performed by: NURSE PRACTITIONER

## 2025-05-12 PROCEDURE — 1159F MED LIST DOCD IN RCRD: CPT | Performed by: NURSE PRACTITIONER

## 2025-05-12 PROCEDURE — 99214 OFFICE O/P EST MOD 30 MIN: CPT | Performed by: NURSE PRACTITIONER

## 2025-05-12 PROCEDURE — 3078F DIAST BP <80 MM HG: CPT | Performed by: NURSE PRACTITIONER

## 2025-05-12 RX ORDER — MUPIROCIN CALCIUM 20 MG/G
1 CREAM TOPICAL 3 TIMES DAILY
Qty: 30 G | Refills: 0 | Status: SHIPPED | OUTPATIENT
Start: 2025-05-12 | End: 2025-05-22

## 2025-05-12 RX ORDER — CEPHALEXIN 500 MG/1
500 CAPSULE ORAL 3 TIMES DAILY
Qty: 30 CAPSULE | Refills: 0 | Status: SHIPPED | OUTPATIENT
Start: 2025-05-12 | End: 2025-05-22

## 2025-05-12 NOTE — PROGRESS NOTES
"Chief Complaint   Patient presents with    Diabetes     Follow up    Wound Infection     Wound on right leg and leg is swollen    Arm Pain     Left arm pain and hand numbness       SUBJECTIVE  Jung Short is a 79 y.o. female presenting today for follow up of her chronic health conditions.    These include HTN, HLD, HFrEF, NICM, DM2, CKD, hypothyroidism, GERD, peripheral neuropathy, depression, anemia, vitamin b12 def, vitamin D def. She has prior h/o R breast cancer s/p mastectomy in 2015.     She experienced a fall one week ago striking her R knee. She has not applied anything to the resultant wound. Yesterday she expressed a large quantity of \"pus\" from the wound. She notes the surrounding tissue to be tender and hot.    She notes numbness and achiness in her L hand intermittently at first and now constant for the past several weeks.    Outpatient Medications Marked as Taking for the 5/12/25 encounter (Office Visit) with Kathryn Epstein APRN   Medication Sig Dispense Refill    Apoaequorin (PREVAGEN PO) Take  by mouth.      aspirin 81 MG EC tablet Take 1 tablet by mouth Daily.      bumetanide (BUMEX) 0.5 MG tablet Take 2 tablets by mouth Daily. 90 tablet 3    Calcium Citrate-Vitamin D 250-2.5 MG-MCG per tablet Take 1 tablet by mouth 2 (Two) Times a Day. 60 tablet 11    carvedilol (COREG) 25 MG tablet TAKE 1 TABLET BY MOUTH TWICE DAILY 180 tablet 1    cholecalciferol (VITAMIN D3) 25 MCG (1000 UT) tablet TAKE 1 TABLET BY MOUTH DAILY 90 tablet 1    DULoxetine (CYMBALTA) 60 MG capsule TAKE 1 CAPSULE BY MOUTH EVERY DAY 90 capsule 3    empagliflozin (Jardiance) 10 MG tablet tablet Take 1 tablet by mouth Daily. 28 tablet 0    esomeprazole (nexIUM) 20 MG capsule Take 1 capsule by mouth Every Morning Before Breakfast.      glipizide (GLUCOTROL XL) 5 MG ER tablet Take 1 tablet by mouth Daily With Breakfast. 90 tablet 3    HYDROcodone-acetaminophen (NORCO) 5-325 MG per tablet Take 1 tablet by mouth Every 6 (Six) " "Hours As Needed for Severe Pain. 120 tablet 0    Multiple Vitamins-Minerals (MULTIVITAMIN ADULT PO) Take 1 tablet by mouth Daily.      multivitamin with minerals (PreserVision AREDS) tablet tablet Take 1 tablet by mouth Daily.      pravastatin (PRAVACHOL) 10 MG tablet TAKE 1 TABLET BY MOUTH EVERY NIGHT AT BEDTIME 90 tablet 3    pregabalin (LYRICA) 75 MG capsule Take 1 capsule by mouth 2 (Two) Times a Day. 60 capsule 5    sacubitril-valsartan (Entresto) 49-51 MG tablet Take 1 tablet by mouth 2 (Two) Times a Day. 28 tablet 0    spironolactone (ALDACTONE) 25 MG tablet Take 1 tablet by mouth Daily. 30 tablet 11    vitamin B-12 (CYANOCOBALAMIN) 1000 MCG tablet Take 1 tablet by mouth Daily.           The following portions of the patient's history were reviewed and updated as appropriate: allergies, current medications, past family history, past medical history, past social history, past surgical history and problem list.        Objective   Vitals:    05/12/25 1432   BP: 110/66   BP Location: Left arm   Patient Position: Sitting   Cuff Size: Large Adult   Pulse: 66   Temp: 97.7 °F (36.5 °C)   TempSrc: Infrared   SpO2: 99%   Weight: 99 kg (218 lb 3.2 oz)   Height: 165.1 cm (65\")       BP Readings from Last 3 Encounters:   05/12/25 110/66   05/09/25 112/68   04/07/25 134/77        Wt Readings from Last 3 Encounters:   05/12/25 99 kg (218 lb 3.2 oz)   05/09/25 93.9 kg (207 lb)   04/07/25 97.3 kg (214 lb 6.4 oz)        Body mass index is 36.31 kg/m².  Nursing notes and vitals reviewed.    Physical Exam  Constitutional:       General: She is not in acute distress.     Appearance: She is well-developed.   Cardiovascular:      Rate and Rhythm: Regular rhythm.      Heart sounds: S1 normal and S2 normal.   Pulmonary:      Effort: Pulmonary effort is normal.      Breath sounds: Normal breath sounds.   Skin:         Neurological:      Mental Status: She is alert and oriented to person, place, and time.   Psychiatric:         " Attention and Perception: She is attentive.         Behavior: Behavior normal.         Thought Content: Thought content normal.           Data Reviewed:  Recent Results (from the past 4 weeks)   CBC & Differential    Collection Time: 05/05/25 10:26 AM    Specimen: Blood   Result Value Ref Range    WBC 6.75 3.40 - 10.80 10*3/mm3    RBC 3.99 3.77 - 5.28 10*6/mm3    Hemoglobin 12.1 12.0 - 15.9 g/dL    Hematocrit 38.1 34.0 - 46.6 %    MCV 95.5 79.0 - 97.0 fL    MCH 30.3 26.6 - 33.0 pg    MCHC 31.8 31.5 - 35.7 g/dL    RDW 13.0 12.3 - 15.4 %    Platelets 167 140 - 450 10*3/mm3    Neutrophil Rel % 65.2 42.7 - 76.0 %    Lymphocyte Rel % 22.7 19.6 - 45.3 %    Monocyte Rel % 7.6 5.0 - 12.0 %    Eosinophil Rel % 2.5 0.3 - 6.2 %    Basophil Rel % 0.7 0.0 - 1.5 %    Neutrophils Absolute 4.40 1.70 - 7.00 10*3/mm3    Lymphocytes Absolute 1.53 0.70 - 3.10 10*3/mm3    Monocytes Absolute 0.51 0.10 - 0.90 10*3/mm3    Eosinophils Absolute 0.17 0.00 - 0.40 10*3/mm3    Basophils Absolute 0.05 0.00 - 0.20 10*3/mm3    Immature Granulocyte Rel % 1.3 (H) 0.0 - 0.5 %    Immature Grans Absolute 0.09 (H) 0.00 - 0.05 10*3/mm3   Comprehensive Metabolic Panel    Collection Time: 05/05/25 10:26 AM    Specimen: Blood   Result Value Ref Range    Glucose 97 65 - 99 mg/dL    BUN 15 8 - 23 mg/dL    Creatinine 0.92 0.57 - 1.00 mg/dL    EGFR Result 62.7 >60.0 mL/min/1.73    BUN/Creatinine Ratio 16.3 7.0 - 25.0    Sodium 142 136 - 145 mmol/L    Potassium 4.3 3.5 - 5.2 mmol/L    Chloride 107 98 - 107 mmol/L    Total CO2 23.2 22.0 - 29.0 mmol/L    Calcium 9.9 8.6 - 10.5 mg/dL    Total Protein 6.2 6.0 - 8.5 g/dL    Albumin 4.1 3.5 - 5.2 g/dL    Globulin 2.1 gm/dL    A/G Ratio 2.0 g/dL    Total Bilirubin 0.4 0.0 - 1.2 mg/dL    Alkaline Phosphatase 125 (H) 39 - 117 U/L    AST (SGOT) 19 1 - 32 U/L    ALT (SGPT) 12 1 - 33 U/L   Hemoglobin A1c    Collection Time: 05/05/25 10:26 AM    Specimen: Blood   Result Value Ref Range    Hemoglobin A1C 6.40 (H) 4.80 - 5.60 %    Lipid Panel With / Chol / HDL Ratio    Collection Time: 05/05/25 10:26 AM    Specimen: Blood   Result Value Ref Range    Total Cholesterol 166 0 - 200 mg/dL    Triglycerides 188 (H) 0 - 150 mg/dL    HDL Cholesterol 50 40 - 60 mg/dL    VLDL Cholesterol Sergio 32 5 - 40 mg/dL    LDL Chol Calc (NIH) 84 0 - 100 mg/dL    Chol/HDL Ratio 3.32    TSH    Collection Time: 05/05/25 10:26 AM    Specimen: Blood   Result Value Ref Range    TSH 1.290 0.270 - 4.200 uIU/mL   T4, Free    Collection Time: 05/05/25 10:26 AM    Specimen: Blood   Result Value Ref Range    Free T4 1.13 0.92 - 1.68 ng/dL   Vitamin B12    Collection Time: 05/05/25 10:26 AM    Specimen: Blood   Result Value Ref Range    Vitamin B-12 636 211 - 946 pg/mL   Vitamin D,25-Hydroxy    Collection Time: 05/05/25 10:26 AM    Specimen: Blood   Result Value Ref Range    25 Hydroxy, Vitamin D 47.2 30.0 - 100.0 ng/ml         Assessment and Plan:       Infected wound    Orders:    cephalexin (KEFLEX) 500 MG capsule; Take 1 capsule by mouth 3 (Three) Times a Day for 10 days.    mupirocin (BACTROBAN) 2 % cream; Apply 1 Application topically to the appropriate area as directed 3 (Three) Times a Day for 10 days.    Numbness of left hand    Orders:    EMG & Nerve Conduction Test; Future    Pain in left hand    Orders:    EMG & Nerve Conduction Test; Future    Primary hypertension           Dyslipidemia         HFrEF (heart failure with reduced ejection fraction)         Atherosclerosis of native coronary artery of native heart without angina pectoris           Type 2 diabetes mellitus with hyperglycemia, without long-term current use of insulin           Acquired hypothyroidism         Cobalamin deficiency         Vitamin D deficiency         Stage 3a chronic kidney disease           Other chronic pain             Continue to follow with specialty care as directed by them.      Continue to follow with specialty care as directed by them.        Medications, including side  effects, were discussed with the patient. Patient verbalized understanding.  The plan of care was discussed. All questions were answered. Patient verbalized understanding.      Return in about 3 months (around 8/12/2025) for fasting labs one week prior to.

## 2025-06-02 ENCOUNTER — TELEPHONE (OUTPATIENT)
Dept: INTERNAL MEDICINE | Facility: CLINIC | Age: 80
End: 2025-06-02
Payer: MEDICARE

## 2025-06-02 NOTE — TELEPHONE ENCOUNTER
"    Hub staff attempted to follow warm transfer process and was unsuccessful     Caller: Jung Short \"SHEILA\"    Relationship to patient: Self    Best call back number: [phone]     Patient is needing: TO SPEAK TO SOMEONE REGARDING APPOINTMENT THAT WAS SCHEDULED FOR HAND AND ARM     PATIENT STATES THE PERSON SHE WAS SETUP WITH DOES NOT DO IT IN [place] THAT SHE HAS TO GO TO [place] AND CANNOT BE SEEN UNTIL 6/17/25    PATIENT WOULD LIKE A CALLBACK         "

## 2025-06-03 ENCOUNTER — TELEPHONE (OUTPATIENT)
Dept: CARDIOLOGY | Facility: CLINIC | Age: 80
End: 2025-06-03
Payer: MEDICARE

## 2025-06-03 DIAGNOSIS — E11.9 TYPE 2 DIABETES MELLITUS WITHOUT COMPLICATION, WITHOUT LONG-TERM CURRENT USE OF INSULIN: ICD-10-CM

## 2025-06-03 RX ORDER — SACUBITRIL AND VALSARTAN 49; 51 MG/1; MG/1
1 TABLET, FILM COATED ORAL 2 TIMES DAILY
Qty: 56 TABLET | Refills: 0 | COMMUNITY
Start: 2025-06-03

## 2025-06-03 NOTE — TELEPHONE ENCOUNTER
Patient received     2 bottles of Entresto 49/5 mg samples    LOT [id number]    EXP 12/31/2026    2 boxes of Jardiance 10 mg samples    LOT [id number]    EXP 02/28/2027

## 2025-06-04 NOTE — TELEPHONE ENCOUNTER
"Relay     \"Just wanted to clarify,  [facility] does not do Nerve conduction testing and they want to you have this done at [facility]? And if so, how can we help? Are you unable to find transportation to [facility]?\"    If patient calls back please clarify what is going on and how we can help.  "

## 2025-06-10 NOTE — TELEPHONE ENCOUNTER
Patient called stating she is confused and she needs help because she is in a lot of pain because of her shoulder/arm and her neurology appointment was cancelled at [facility] because they can't to the test that was ordered and rescheduled her for 6/17/25 in [place] and also, she received a letter in the mail about her patient assistance program paperwork still not being filled out correctly.    I spoke with JOSE ANTONIO Bethea if there is anything Aron could do to help the pain until her appointment next week. She advised that the patient is already taking Lyrica and Norco so there isn't anything more we can really give her that would be effective. She said she could try some OTC lidocaine patches and I suggested a heating pad and some Biofreeze or Icyhot. I will ask or referrals coordinator if she knows if there is any way she can be seen sooner.    In regards to the patient assistance paperwork, Aron said she could not find the letter when I was on the phone with her but she will fill out the paper they mailed her and send it back to them when she finds it. I plan on calling them again to see what the issue with the paperwork is this time. I have been trying to get this paperwork done for her since October of 2024 and every time I fax it to them they say something else is wrong with it but they never call back or fax anything to me to let me know that it is incorrect so I go months without hearing anything back from them.

## 2025-06-12 NOTE — TELEPHONE ENCOUNTER
Patient called and advised that she found the missing paperwork and she filled it out with her daughter and mailed it yesterday. I told her I would keep an eye out for anything pertaining to her Ozempic and I informed her that elliot recommended the lidocaine patches.

## 2025-06-13 DIAGNOSIS — M51.369 DDD (DEGENERATIVE DISC DISEASE), LUMBAR: ICD-10-CM

## 2025-06-13 DIAGNOSIS — M47.816 LUMBAR FACET ARTHROPATHY: ICD-10-CM

## 2025-06-13 RX ORDER — HYDROCODONE BITARTRATE AND ACETAMINOPHEN 5; 325 MG/1; MG/1
1 TABLET ORAL EVERY 6 HOURS PRN
Qty: 120 TABLET | Refills: 0 | Status: SHIPPED | OUTPATIENT
Start: 2025-06-13

## 2025-06-13 NOTE — TELEPHONE ENCOUNTER
Bill 80241 For Specimen Handling/Conveyance To Laboratory?: no Medication Refill Request    Date of phone call: 25    Medication being requested: Norco 5-325 mg si tab po q 6 hrs prn  Qty: 120    Date of last visit: 25    Date of last refill:     LOUANN up to date?:     Next Follow up?: 25    Any new pertinent information? (i.e, new medication allergies, new use of medications, change in patient's health or condition, non-compliance or inconsistency with prescribing agreement?):    Punch Size In Mm: 4 Accession #: [id number] Depth Of Punch In Cm (Optional): 0 Detail Level: Detailed Suture Removal: 10 days Post-Care Instructions: I reviewed with the patient in detail post-care instructions. Patient is to keep the biopsy site dry overnight, and then apply bacitracin twice daily until healed. Patient may apply hydrogen peroxide soaks to remove any crusting. Billing Type: Third-Party Bill Epidermal Sutures: 4-0 Ethilon Anesthesia Type: 1% lidocaine with epinephrine Biopsy Type: H and E Path Notes (To The Dermatopathologist): 2, 4 mm punch biopsies in the bottle.  Specimens split along deep dermis Wound Care: Petrolatum Consent: Written consent was obtained and risks were reviewed including but not limited to scarring, infection, bleeding, scabbing, incomplete removal, nerve damage and allergy to anesthesia. Notification Instructions: Patient will be notified of biopsy results. However, patient instructed to call the office if not contacted within 2 weeks. Hemostasis: None Home Suture Removal Text: Patient was provided a home suture removal kit and will remove their sutures at home.  If they have any questions or difficulties they will call the office. Dressing: bandage Anesthesia Volume In Cc: 3

## 2025-06-17 ENCOUNTER — HOSPITAL ENCOUNTER (OUTPATIENT)
Dept: NEUROLOGY | Facility: HOSPITAL | Age: 80
Discharge: HOME | End: 2025-06-17
Admitting: NURSE PRACTITIONER
Payer: MEDICARE

## 2025-06-17 DIAGNOSIS — R20.0 NUMBNESS OF LEFT HAND: ICD-10-CM

## 2025-06-17 DIAGNOSIS — M79.642 PAIN IN LEFT HAND: ICD-10-CM

## 2025-06-17 PROCEDURE — 95886 MUSC TEST DONE W/N TEST COMP: CPT

## 2025-06-17 PROCEDURE — 95909 NRV CNDJ TST 5-6 STUDIES: CPT

## 2025-06-17 NOTE — PROCEDURES
"EMG and Nerve Conduction Studies    I.      Instrument used: [facility]  II.     Please see data sheets for tabular summary of NCS and details on methods, temperatures and lab standards.   III.    EMG muscles tested for upper extremity studies include the deltoid, biceps, triceps, pronator teres, extensor digitorum communis, first dorsal interosseous and abductor pollicis brevis.    IV.   EMG muscles tested for lower extremity studies include the vastus lateralis, tibialis anterior, peroneus longus, medial gastrocnemius and extensor digitorum brevis.    V.    Additional muscles tested as needed.  Paraspinal muscles tested as needed.   VI.   Please see data sheets for tabular summary of EMG findings.   VII. The complete report includes the data sheets.      Indication: left hand numbness and pain  History: She has had pain with her left shoulder and has a knot there. In the past 2-3 months she has started having burning/stinging sensation of the hand intermittently and now constantly. All the fingers and the thumb. No radiation down the arm. She has chronic back pain. Chart lists diagnosis of neuropathy. She notes occasional stinging of the feet.       Ht: 65\"  Wt: 218 lb  HbA1C:   Lab Results   Component Value Date    HGBA1C 6.40 (H) 05/05/2025     TSH:   Lab Results   Component Value Date    TSH 1.290 05/05/2025       Technical summary: Nerve conduction studies were obtained of the LUE with one comparison on the right. Skin temperatures were normal and temperature correction was used where indicated. Needle examination was obtained on selected muscles in the LUE.    Results:  Absent left median antisensory response.  Comparison study on the right was similarly absent versus low amplitude  Absent versus low amplitude left ulnar sensory response  Abnormal left median motor study with low amplitude of 2.3 mV at the wrist, delayed onset.  Due to difficulty obtaining response at the elbow, no conduction " velocity was calculated.  Normal left ulnar motor conduction velocity with a normal distal latency and amplitudes.  Needle examination of selected muscles in the left arm noted for prominent atrophy of the left APB with trace positive sharp waves appreciated, but no reproducible motor unit action potential able to be identified in part was pain limited. Otherwise, muscles tested showed normal insertional activities throughout with normal motor units and recruitment patterns.  Cervical paraspinals at c7 showed no abnormality.      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  Motor conduction studies noted low amplitude median motor response of the left suggestive of a median neuropathy.  Based on isolated abnormalities in the abductor pollicis brevis, this supports a median neuropathy across the wrist (carpal tunnel syndrome).  There is no additional evidence of a radiculopathy or plexopathy.  Study results discussed with the patient.    Kole Darden M.D.          Dictated utilizing Dragon dictation.

## 2025-06-27 ENCOUNTER — TELEPHONE (OUTPATIENT)
Dept: PAIN MEDICINE | Facility: CLINIC | Age: 80
End: 2025-06-27
Payer: MEDICARE

## 2025-06-27 ENCOUNTER — TELEPHONE (OUTPATIENT)
Dept: CARDIOLOGY | Facility: CLINIC | Age: 80
End: 2025-06-27
Payer: MEDICARE

## 2025-06-27 DIAGNOSIS — E11.9 TYPE 2 DIABETES MELLITUS WITHOUT COMPLICATION, WITHOUT LONG-TERM CURRENT USE OF INSULIN: ICD-10-CM

## 2025-06-27 RX ORDER — CHOLECALCIFEROL (VITAMIN D3) 25 MCG
1000 TABLET ORAL DAILY
Qty: 90 TABLET | Refills: 0 | Status: SHIPPED | OUTPATIENT
Start: 2025-06-27

## 2025-06-27 NOTE — TELEPHONE ENCOUNTER
Rx Refill Note  Requested Prescriptions     Pending Prescriptions Disp Refills    cholecalciferol (VITAMIN D3) 25 MCG (1000 UT) tablet [Pharmacy Med Name: VITAMIN D3 1,000 UNIT TABLETS] 90 tablet 1     Sig: TAKE 1 TABLET BY MOUTH DAILY      Last office visit with prescribing clinician: 5/12/2025   Last telemedicine visit with prescribing clinician: Visit date not found   Next office visit with prescribing clinician: 8/19/2025                         Would you like a call back once the refill request has been completed: [] Yes [] No    If the office needs to give you a call back, can they leave a voicemail: [] Yes [] No    Aries Shields MA  06/27/25, 14:51 EDT

## 2025-06-27 NOTE — TELEPHONE ENCOUNTER
Called PT, left a vm. Informed pt that her appt today will have to be rescheduled as the provider is sick.     PT needs to reschedule. Okay for hub to schedule.

## 2025-07-09 ENCOUNTER — TELEPHONE (OUTPATIENT)
Dept: INTERNAL MEDICINE | Facility: CLINIC | Age: 80
End: 2025-07-09
Payer: MEDICARE

## 2025-07-09 DIAGNOSIS — M51.369 DDD (DEGENERATIVE DISC DISEASE), LUMBAR: ICD-10-CM

## 2025-07-09 DIAGNOSIS — M47.816 LUMBAR FACET ARTHROPATHY: ICD-10-CM

## 2025-07-09 NOTE — TELEPHONE ENCOUNTER
Medication Refill Request    Date of phone call: 07/09/25    Medication being requested: HYDROcodone-acetaminophen (NORCO) 5-325 MG per tablet  sig: Take 1 tablet by mouth Every 6 (Six) Hours As Needed for Severe Pain.   Qty: 120    Date of last visit: 04/07/25    Date of last refill: 06/13/25    LOUANN up to date?: yes    Next Follow up?: 08/01/25    Any new pertinent information? (i.e, new medication allergies, new use of medications, change in patient's health or condition, non-compliance or inconsistency with prescribing agreement?): last visit canceled, provider sick

## 2025-07-09 NOTE — TELEPHONE ENCOUNTER
Pt requesting a call back from clinical about medications.     Best call back number   [phone]

## 2025-07-10 RX ORDER — HYDROCODONE BITARTRATE AND ACETAMINOPHEN 5; 325 MG/1; MG/1
1 TABLET ORAL EVERY 6 HOURS PRN
Qty: 120 TABLET | Refills: 0 | Status: SHIPPED | OUTPATIENT
Start: 2025-07-10

## 2025-07-11 NOTE — TELEPHONE ENCOUNTER
Called and informed patient that kathryn did not fill out the patient assistance paperwork that she brought in for the Ozempic because all of her recent A1c readings show controlled type 2 diabetes and since she has not taken this medication in months Kathryn said there is no need for her to start this medication. The patient said that she is confused and does not understand why Kathryn would do this and requested that she speak with Kathryn.

## 2025-07-14 NOTE — TELEPHONE ENCOUNTER
In an 80 year old patient w/ controlled A1c, it would be inappropriate to start a new diabetes medication d/t the risk of hypoglycemia. We can schedule her for a visit if she feels she needs to discuss this further.

## 2025-08-03 ENCOUNTER — APPOINTMENT (OUTPATIENT)
Dept: GENERAL RADIOLOGY | Facility: HOSPITAL | Age: 80
End: 2025-08-03
Payer: MEDICARE

## 2025-08-03 ENCOUNTER — HOSPITAL ENCOUNTER (EMERGENCY)
Facility: HOSPITAL | Age: 80
Discharge: HOME | End: 2025-08-03
Attending: STUDENT IN AN ORGANIZED HEALTH CARE EDUCATION/TRAINING PROGRAM | Admitting: STUDENT IN AN ORGANIZED HEALTH CARE EDUCATION/TRAINING PROGRAM
Payer: MEDICARE

## 2025-08-03 VITALS
SYSTOLIC BLOOD PRESSURE: 141 MMHG | DIASTOLIC BLOOD PRESSURE: 75 MMHG | TEMPERATURE: 97.5 F | OXYGEN SATURATION: 92 % | RESPIRATION RATE: 17 BRPM | HEART RATE: 62 BPM

## 2025-08-03 DIAGNOSIS — S89.91XA KNEE INJURY, RIGHT, INITIAL ENCOUNTER: Primary | ICD-10-CM

## 2025-08-03 DIAGNOSIS — M25.551 RIGHT HIP PAIN: ICD-10-CM

## 2025-08-03 PROCEDURE — 73562 X-RAY EXAM OF KNEE 3: CPT

## 2025-08-03 PROCEDURE — 99283 EMERGENCY DEPT VISIT LOW MDM: CPT | Performed by: STUDENT IN AN ORGANIZED HEALTH CARE EDUCATION/TRAINING PROGRAM

## 2025-08-03 PROCEDURE — 73502 X-RAY EXAM HIP UNI 2-3 VIEWS: CPT

## 2025-08-03 PROCEDURE — 73552 X-RAY EXAM OF FEMUR 2/>: CPT

## 2025-08-03 RX ORDER — HYDROCODONE BITARTRATE AND ACETAMINOPHEN 5; 325 MG/1; MG/1
1 TABLET ORAL ONCE
Refills: 0 | Status: COMPLETED | OUTPATIENT
Start: 2025-08-03 | End: 2025-08-03

## 2025-08-03 RX ORDER — LIDOCAINE 4 G/G
1 PATCH TOPICAL ONCE
Status: DISCONTINUED | OUTPATIENT
Start: 2025-08-03 | End: 2025-08-03 | Stop reason: HOSPADM

## 2025-08-03 RX ORDER — LIDOCAINE 50 MG/G
1 PATCH TOPICAL EVERY 24 HOURS
Qty: 15 PATCH | Refills: 0 | Status: SHIPPED | OUTPATIENT
Start: 2025-08-03

## 2025-08-03 RX ADMIN — HYDROCODONE BITARTRATE AND ACETAMINOPHEN 1 TABLET: 5; 325 TABLET ORAL at 19:32

## 2025-08-03 RX ADMIN — LIDOCAINE 1 PATCH: 4 PATCH TOPICAL at 20:18

## 2025-08-03 RX ADMIN — LIDOCAINE 1 PATCH: 4 PATCH TOPICAL at 20:19

## 2025-08-05 ENCOUNTER — TELEPHONE (OUTPATIENT)
Dept: CARDIOLOGY | Facility: CLINIC | Age: 80
End: 2025-08-05
Payer: MEDICARE

## 2025-08-05 ENCOUNTER — TELEPHONE (OUTPATIENT)
Dept: INTERNAL MEDICINE | Facility: CLINIC | Age: 80
End: 2025-08-05
Payer: MEDICARE

## 2025-08-05 DIAGNOSIS — I10 PRIMARY HYPERTENSION: Primary | ICD-10-CM

## 2025-08-05 DIAGNOSIS — E55.9 VITAMIN D DEFICIENCY: ICD-10-CM

## 2025-08-05 DIAGNOSIS — E11.65 TYPE 2 DIABETES MELLITUS WITH HYPERGLYCEMIA, WITHOUT LONG-TERM CURRENT USE OF INSULIN: ICD-10-CM

## 2025-08-05 DIAGNOSIS — E78.5 DYSLIPIDEMIA: ICD-10-CM

## 2025-08-05 DIAGNOSIS — E53.8 COBALAMIN DEFICIENCY: ICD-10-CM

## 2025-08-05 DIAGNOSIS — E11.9 TYPE 2 DIABETES MELLITUS WITHOUT COMPLICATION, WITHOUT LONG-TERM CURRENT USE OF INSULIN: ICD-10-CM

## 2025-08-05 DIAGNOSIS — E03.9 ACQUIRED HYPOTHYROIDISM: ICD-10-CM

## 2025-08-05 RX ORDER — SACUBITRIL AND VALSARTAN 49; 51 MG/1; MG/1
1 TABLET, FILM COATED ORAL 2 TIMES DAILY
Qty: 28 TABLET | Refills: 0 | COMMUNITY
Start: 2025-08-05

## 2025-08-07 ENCOUNTER — OFFICE VISIT (OUTPATIENT)
Dept: ORTHOPEDIC SURGERY | Facility: CLINIC | Age: 80
End: 2025-08-07
Payer: MEDICARE

## 2025-08-07 VITALS
HEART RATE: 59 BPM | SYSTOLIC BLOOD PRESSURE: 91 MMHG | BODY MASS INDEX: 36.32 KG/M2 | HEIGHT: 65 IN | WEIGHT: 218 LBS | DIASTOLIC BLOOD PRESSURE: 57 MMHG

## 2025-08-07 DIAGNOSIS — S83.411A SPRAIN OF MEDIAL COLLATERAL LIGAMENT OF RIGHT KNEE, INITIAL ENCOUNTER: ICD-10-CM

## 2025-08-07 DIAGNOSIS — Z96.651 HISTORY OF TOTAL RIGHT KNEE REPLACEMENT: Primary | ICD-10-CM

## 2025-08-07 DIAGNOSIS — E11.9 TYPE 2 DIABETES MELLITUS WITHOUT COMPLICATION, WITHOUT LONG-TERM CURRENT USE OF INSULIN: ICD-10-CM

## 2025-08-07 DIAGNOSIS — M70.61 TROCHANTERIC BURSITIS OF RIGHT HIP: ICD-10-CM

## 2025-08-07 RX ORDER — PREDNISONE 10 MG/1
10 TABLET ORAL DAILY
Qty: 18 TABLET | Refills: 0 | Status: SHIPPED | OUTPATIENT
Start: 2025-08-07

## 2025-08-07 RX ORDER — GLIPIZIDE 5 MG/1
5 TABLET, FILM COATED, EXTENDED RELEASE ORAL
Qty: 90 TABLET | Refills: 3 | Status: SHIPPED | OUTPATIENT
Start: 2025-08-07

## 2025-08-11 ENCOUNTER — TELEPHONE (OUTPATIENT)
Dept: PAIN MEDICINE | Facility: CLINIC | Age: 80
End: 2025-08-11
Payer: MEDICARE

## 2025-08-11 DIAGNOSIS — M47.816 LUMBAR FACET ARTHROPATHY: ICD-10-CM

## 2025-08-11 DIAGNOSIS — M51.369 DDD (DEGENERATIVE DISC DISEASE), LUMBAR: ICD-10-CM

## 2025-08-11 RX ORDER — HYDROCODONE BITARTRATE AND ACETAMINOPHEN 5; 325 MG/1; MG/1
1 TABLET ORAL EVERY 6 HOURS PRN
Qty: 120 TABLET | Refills: 0 | Status: SHIPPED | OUTPATIENT
Start: 2025-08-11

## 2025-08-14 ENCOUNTER — OFFICE VISIT (OUTPATIENT)
Dept: ORTHOPEDIC SURGERY | Facility: CLINIC | Age: 80
End: 2025-08-14
Payer: MEDICARE

## 2025-08-14 VITALS — WEIGHT: 218 LBS | HEIGHT: 65 IN | BODY MASS INDEX: 36.32 KG/M2

## 2025-08-14 DIAGNOSIS — S83.411A SPRAIN OF MEDIAL COLLATERAL LIGAMENT OF RIGHT KNEE, INITIAL ENCOUNTER: ICD-10-CM

## 2025-08-14 DIAGNOSIS — Z96.651 HISTORY OF TOTAL RIGHT KNEE REPLACEMENT: Primary | ICD-10-CM

## 2025-08-19 ENCOUNTER — OFFICE VISIT (OUTPATIENT)
Age: 80
End: 2025-08-19
Payer: MEDICARE

## 2025-08-19 ENCOUNTER — OFFICE VISIT (OUTPATIENT)
Dept: INTERNAL MEDICINE | Facility: CLINIC | Age: 80
End: 2025-08-19
Payer: MEDICARE

## 2025-08-19 VITALS
TEMPERATURE: 97.8 F | HEIGHT: 65 IN | OXYGEN SATURATION: 95 % | RESPIRATION RATE: 16 BRPM | SYSTOLIC BLOOD PRESSURE: 93 MMHG | HEART RATE: 60 BPM | WEIGHT: 218 LBS | DIASTOLIC BLOOD PRESSURE: 50 MMHG | BODY MASS INDEX: 36.32 KG/M2

## 2025-08-19 VITALS
TEMPERATURE: 96.8 F | SYSTOLIC BLOOD PRESSURE: 110 MMHG | OXYGEN SATURATION: 97 % | HEIGHT: 65 IN | BODY MASS INDEX: 36.28 KG/M2 | HEART RATE: 78 BPM | DIASTOLIC BLOOD PRESSURE: 50 MMHG

## 2025-08-19 DIAGNOSIS — E53.8 COBALAMIN DEFICIENCY: ICD-10-CM

## 2025-08-19 DIAGNOSIS — G62.9 PERIPHERAL POLYNEUROPATHY: ICD-10-CM

## 2025-08-19 DIAGNOSIS — E78.5 DYSLIPIDEMIA: ICD-10-CM

## 2025-08-19 DIAGNOSIS — M17.10 ARTHRITIS OF KNEE: ICD-10-CM

## 2025-08-19 DIAGNOSIS — M17.12 PRIMARY OSTEOARTHRITIS OF LEFT KNEE: ICD-10-CM

## 2025-08-19 DIAGNOSIS — K21.00 GASTROESOPHAGEAL REFLUX DISEASE WITH ESOPHAGITIS WITHOUT HEMORRHAGE: ICD-10-CM

## 2025-08-19 DIAGNOSIS — M47.816 LUMBAR FACET ARTHROPATHY: Primary | ICD-10-CM

## 2025-08-19 DIAGNOSIS — E55.9 VITAMIN D DEFICIENCY: ICD-10-CM

## 2025-08-19 DIAGNOSIS — M51.360 DEGENERATION OF INTERVERTEBRAL DISC OF LUMBAR REGION WITH DISCOGENIC BACK PAIN: ICD-10-CM

## 2025-08-19 DIAGNOSIS — Z79.899 ENCOUNTER FOR LONG-TERM (CURRENT) USE OF HIGH-RISK MEDICATION: ICD-10-CM

## 2025-08-19 DIAGNOSIS — N18.31 STAGE 3A CHRONIC KIDNEY DISEASE: ICD-10-CM

## 2025-08-19 DIAGNOSIS — E11.65 TYPE 2 DIABETES MELLITUS WITH HYPERGLYCEMIA, WITHOUT LONG-TERM CURRENT USE OF INSULIN: ICD-10-CM

## 2025-08-19 DIAGNOSIS — I10 PRIMARY HYPERTENSION: Primary | ICD-10-CM

## 2025-08-19 DIAGNOSIS — I25.10 ATHEROSCLEROSIS OF NATIVE CORONARY ARTERY OF NATIVE HEART WITHOUT ANGINA PECTORIS: ICD-10-CM

## 2025-08-19 DIAGNOSIS — E03.9 ACQUIRED HYPOTHYROIDISM: ICD-10-CM

## 2025-08-19 DIAGNOSIS — I50.20 HFREF (HEART FAILURE WITH REDUCED EJECTION FRACTION): ICD-10-CM

## 2025-08-19 PROCEDURE — 3078F DIAST BP <80 MM HG: CPT

## 2025-08-19 PROCEDURE — 1125F AMNT PAIN NOTED PAIN PRSNT: CPT

## 2025-08-19 PROCEDURE — 1159F MED LIST DOCD IN RCRD: CPT

## 2025-08-19 PROCEDURE — 3078F DIAST BP <80 MM HG: CPT | Performed by: NURSE PRACTITIONER

## 2025-08-19 PROCEDURE — 1160F RVW MEDS BY RX/DR IN RCRD: CPT | Performed by: NURSE PRACTITIONER

## 2025-08-19 PROCEDURE — 99214 OFFICE O/P EST MOD 30 MIN: CPT | Performed by: NURSE PRACTITIONER

## 2025-08-19 PROCEDURE — 1125F AMNT PAIN NOTED PAIN PRSNT: CPT | Performed by: NURSE PRACTITIONER

## 2025-08-19 PROCEDURE — 3074F SYST BP LT 130 MM HG: CPT | Performed by: NURSE PRACTITIONER

## 2025-08-19 PROCEDURE — 1160F RVW MEDS BY RX/DR IN RCRD: CPT

## 2025-08-19 PROCEDURE — 99214 OFFICE O/P EST MOD 30 MIN: CPT

## 2025-08-19 PROCEDURE — 1159F MED LIST DOCD IN RCRD: CPT | Performed by: NURSE PRACTITIONER

## 2025-08-19 PROCEDURE — G2211 COMPLEX E/M VISIT ADD ON: HCPCS | Performed by: NURSE PRACTITIONER

## 2025-08-19 PROCEDURE — 3074F SYST BP LT 130 MM HG: CPT

## 2025-08-22 ENCOUNTER — TELEPHONE (OUTPATIENT)
Dept: CARDIOLOGY | Age: 80
End: 2025-08-22
Payer: MEDICARE

## 2025-08-25 ENCOUNTER — TELEPHONE (OUTPATIENT)
Dept: CARDIOLOGY | Facility: CLINIC | Age: 80
End: 2025-08-25
Payer: MEDICARE

## 2025-08-25 DIAGNOSIS — E11.9 TYPE 2 DIABETES MELLITUS WITHOUT COMPLICATION, WITHOUT LONG-TERM CURRENT USE OF INSULIN: ICD-10-CM

## 2025-08-25 RX ORDER — SACUBITRIL AND VALSARTAN 49; 51 MG/1; MG/1
1 TABLET ORAL 2 TIMES DAILY
Qty: 14 TABLET | Refills: 0 | COMMUNITY
Start: 2025-08-25 | End: 2025-08-25 | Stop reason: SDUPTHER

## 2025-08-25 RX ORDER — SACUBITRIL AND VALSARTAN 49; 51 MG/1; MG/1
1 TABLET, FILM COATED ORAL 2 TIMES DAILY
Qty: 180 TABLET | Refills: 3 | Status: SHIPPED | OUTPATIENT
Start: 2025-08-25

## (undated) DEVICE — DRSNG WND GZ CURAD OIL EMULSION 3X3IN STRL

## (undated) DEVICE — CATH DIAG IMPULSE PIG 5F 100CM

## (undated) DEVICE — GW EMR FIX EXCHG J STD .035 3MM 260CM

## (undated) DEVICE — BNDG,ELSTC,MATRIX,STRL,6"X5YD,LF,HOOK&LP: Brand: MEDLINE

## (undated) DEVICE — GLV SURG EUDERMIC PF LTX 8 STRL

## (undated) DEVICE — PENCL SMOKE/EVAC MEGADYNE TELESCP 10FT

## (undated) DEVICE — LOU LOOP RECORDER PACK: Brand: MEDLINE INDUSTRIES, INC.

## (undated) DEVICE — SYR LL TP 10ML STRL

## (undated) DEVICE — MED-SURG™ POST-OP SHOE: Brand: DEROYAL

## (undated) DEVICE — KT MANIFLD CARDIAC

## (undated) DEVICE — CATH DIAG IMPULSE AL2 5F 100CM

## (undated) DEVICE — LINER SURG CANSTR SXN S/RIGD 1500CC

## (undated) DEVICE — SYS SKIN CLS DERMABOND PRINEO W/22CM MESH TP

## (undated) DEVICE — COLD THERAPY BLANKET: Brand: DEROYAL

## (undated) DEVICE — IMMOB KN 3PNL DLX CANVS 19IN BLU

## (undated) DEVICE — DRSNG TELFA PAD NONADH STR 1S 3X8IN

## (undated) DEVICE — CATH DIAG IMPULSE FR4 5F 100CM

## (undated) DEVICE — DEV NAV KN ALIGN

## (undated) DEVICE — HI-TORQUE BALANCE MIDDLEWEIGHT GUIDE WIRE .014 STRAIGHT TIP 3.0 CM X 190 CM: Brand: HI-TORQUE BALANCE MIDDLEWEIGHT

## (undated) DEVICE — SPNG GZ WOVN 4X4IN 12PLY 10/BX STRL

## (undated) DEVICE — GAUZE,SPONGE,FLUFF,6"X6.75",STRL,5/TRAY: Brand: MEDLINE

## (undated) DEVICE — SUT ETHLN 3/0 PS2 18 IN 1669H

## (undated) DEVICE — MAT FLR ABSORBENT LG 4FT 10 2.5FT

## (undated) DEVICE — BANDAGE,ELASTIC,ESMARK,STERILE,4"X9',LF: Brand: MEDLINE

## (undated) DEVICE — TBG INJ CONTRL PVCCLR RIGD RA 1200PSI 10

## (undated) DEVICE — NDL HYPO PRECISIONGLIDE REG 25G 1 1/2

## (undated) DEVICE — BALN PRESS WEDGE 5F 110CM

## (undated) DEVICE — SUT VIC TP1 SZ2 27IN J849G

## (undated) DEVICE — CONTAINER,SPECIMEN,OR STERILE,4OZ: Brand: MEDLINE

## (undated) DEVICE — DRSNG SURESITE WNDW 4X4.5

## (undated) DEVICE — DISPOSABLE TOURNIQUET CUFF SINGLE BLADDER, SINGLE PORT AND LUER LOCK CONNECTOR: Brand: COLOR CUFF

## (undated) DEVICE — DUAL CUT SAGITTAL BLADE

## (undated) DEVICE — DRP Z/FRICTION 10X16IN

## (undated) DEVICE — PK BASIC ORTHO 90

## (undated) DEVICE — PREP SOL POVIDONE/IODINE BT 4OZ

## (undated) DEVICE — GLV SURG SENSICARE ORTHO PF LF 8 STRL

## (undated) DEVICE — CATH DIAG IMPULSE FL3.5 5F 100CM

## (undated) DEVICE — BANDAGE,GAUZE,BULKEE II,4.5"X4.1YD,STRL: Brand: MEDLINE

## (undated) DEVICE — BANDAGE ROLL,100% COTTON, 6 PLY, LARGE: Brand: KERLIX

## (undated) DEVICE — GLOVE,SURG,SENSICARE,ALOE,LF,PF,7: Brand: MEDLINE

## (undated) DEVICE — SOL IRR H2O BTL 1000ML STRL

## (undated) DEVICE — GLV SURG SENSICARE PI PF LF 7 GRN STRL

## (undated) DEVICE — TR BAND RADIAL ARTERY COMPRESSION DEVICE: Brand: TR BAND

## (undated) DEVICE — GLV SURG NEOLON 2G PF LF 8 STRL

## (undated) DEVICE — TOWEL,OR,DSP,ST,BLUE,STD,4/PK,20PK/CS: Brand: MEDLINE

## (undated) DEVICE — DRSNG PAD ABD 8X10IN STRL

## (undated) DEVICE — PK KN TOTL 90

## (undated) DEVICE — BOWL AND CEMENT CARTRIDGE WITH BREAKAWAY FEMORAL NOZZLE: Brand: ACM

## (undated) DEVICE — GLV SURG NEOLON 2G PF LF 8.5 STRL

## (undated) DEVICE — GLV SURG SENSICARE ORTHO PF LF 8.5 STRL

## (undated) DEVICE — HOOD, PEEL-AWAY: Brand: FLYTE

## (undated) DEVICE — CATH DIAG IMPULSE MPA2 SH 5F 100CM

## (undated) DEVICE — KNEE HOLDER DISPOSABLE LINER: Brand: ALVARADO®  KNEE SUPPORT

## (undated) DEVICE — CATH DIAG IMPULSE AR1 5F 100CM

## (undated) DEVICE — GLIDESHEATH BASIC HYDROPHILIC COATED INTRODUCER SHEATH: Brand: GLIDESHEATH

## (undated) DEVICE — SUT MNCRYL PLS ANTIB UD 4/0 PS2 18IN

## (undated) DEVICE — FRAZIER SUCTION INSTRUMENT 10 FR W/CONTROL VENT & OBTURATOR: Brand: FRAZIER

## (undated) DEVICE — INTENDED FOR TISSUE SEPARATION, AND OTHER PROCEDURES THAT REQUIRE A SHARP SURGICAL BLADE TO PUNCTURE OR CUT.: Brand: BARD-PARKER ® STAINLESS STEEL BLADES

## (undated) DEVICE — DECANT BG O JET

## (undated) DEVICE — APPL CHLORAPREP W/TINT 26ML ORNG

## (undated) DEVICE — NDL HYPO PRECISIONGLIDE/REG 18G 1IN PNK

## (undated) DEVICE — Device

## (undated) DEVICE — GLV SURG SENSICARE MICRO PF LF 8.5 STRL

## (undated) DEVICE — PK CATH CARD 40